# Patient Record
Sex: FEMALE | Race: BLACK OR AFRICAN AMERICAN | NOT HISPANIC OR LATINO | Employment: OTHER | URBAN - METROPOLITAN AREA
[De-identification: names, ages, dates, MRNs, and addresses within clinical notes are randomized per-mention and may not be internally consistent; named-entity substitution may affect disease eponyms.]

---

## 2017-01-16 ENCOUNTER — TELEPHONE (OUTPATIENT)
Dept: FAMILY MEDICINE | Facility: CLINIC | Age: 67
End: 2017-01-16

## 2017-01-16 DIAGNOSIS — E78.5 HYPERLIPIDEMIA WITH TARGET LDL LESS THAN 100: ICD-10-CM

## 2017-01-16 DIAGNOSIS — I10 ESSENTIAL HYPERTENSION: Primary | ICD-10-CM

## 2017-01-16 DIAGNOSIS — F32.0 MAJOR DEPRESSIVE DISORDER, SINGLE EPISODE, MILD: ICD-10-CM

## 2017-01-16 RX ORDER — BUPROPION HYDROCHLORIDE 75 MG/1
TABLET ORAL
Qty: 180 TABLET | Refills: 0 | Status: SHIPPED | OUTPATIENT
Start: 2017-01-16 | End: 2017-03-15 | Stop reason: SDUPTHER

## 2017-01-16 NOTE — TELEPHONE ENCOUNTER
Pt has a appointment in march and she is asking to have labs scheduled before her appointment.Thanks

## 2017-01-19 DIAGNOSIS — G25.81 RESTLESS LEGS SYNDROME (RLS): ICD-10-CM

## 2017-01-19 RX ORDER — ROPINIROLE 0.5 MG/1
TABLET, FILM COATED ORAL
Qty: 90 TABLET | Refills: 0 | Status: SHIPPED | OUTPATIENT
Start: 2017-01-19 | End: 2017-03-15 | Stop reason: SDUPTHER

## 2017-02-06 ENCOUNTER — TELEPHONE (OUTPATIENT)
Dept: BARIATRICS | Facility: CLINIC | Age: 67
End: 2017-02-06

## 2017-02-06 NOTE — TELEPHONE ENCOUNTER
----- Message from Thea Addison sent at 2/4/2017  2:56 PM CST -----  Contact: self   Patient would like to make an appointment , she is coming in for a consult .she is inquiring on the sleeve    She can be reached at [phone]

## 2017-02-06 NOTE — TELEPHONE ENCOUNTER
Called pt and instructed her to complete the online Seminar. Once complete she will be able to schedule a consult. Pt v/u

## 2017-02-09 ENCOUNTER — TELEPHONE (OUTPATIENT)
Dept: FAMILY MEDICINE | Facility: CLINIC | Age: 67
End: 2017-02-09

## 2017-02-09 NOTE — TELEPHONE ENCOUNTER
I called and spoke with pt she states that she has been having some abdominal pain,nauseau and vometing she asked for an appointment I offered her an appointment with Cecilia and she refused. I booked her an appointment with Ms. Vázquez tomorrow 02/10/17 for 11am.Thanks

## 2017-02-09 NOTE — TELEPHONE ENCOUNTER
----- Message from Liana Song sent at 2/9/2017 12:35 PM CST -----  Contact: self  Patient called stating that she has been nauseated even after she eats and not sure what to do.  Patient also been having abdominal pain. Please contact her at [phone], Thanks!

## 2017-02-09 NOTE — TELEPHONE ENCOUNTER
----- Message from Liana Song sent at 2/9/2017 12:35 PM CST -----  Contact: self  Patient called stating that she has been nauseated even after she eats and not sure what to do.  Patient also been having abdominal pain. Please contact her at [phone], Thanks!

## 2017-02-10 ENCOUNTER — OFFICE VISIT (OUTPATIENT)
Dept: FAMILY MEDICINE | Facility: CLINIC | Age: 67
End: 2017-02-10
Payer: MEDICARE

## 2017-02-10 ENCOUNTER — NURSE TRIAGE (OUTPATIENT)
Dept: ADMINISTRATIVE | Facility: CLINIC | Age: 67
End: 2017-02-10

## 2017-02-10 VITALS
TEMPERATURE: 98 F | BODY MASS INDEX: 36.6 KG/M2 | OXYGEN SATURATION: 98 % | SYSTOLIC BLOOD PRESSURE: 126 MMHG | HEIGHT: 62 IN | DIASTOLIC BLOOD PRESSURE: 82 MMHG | HEART RATE: 75 BPM | WEIGHT: 198.88 LBS

## 2017-02-10 DIAGNOSIS — R11.0 NAUSEA: ICD-10-CM

## 2017-02-10 DIAGNOSIS — R42 VERTIGO: Primary | ICD-10-CM

## 2017-02-10 PROCEDURE — 99214 OFFICE O/P EST MOD 30 MIN: CPT | Mod: S$GLB,,, | Performed by: NURSE PRACTITIONER

## 2017-02-10 PROCEDURE — 99999 PR PBB SHADOW E&M-EST. PATIENT-LVL V: CPT | Mod: PBBFAC,,, | Performed by: NURSE PRACTITIONER

## 2017-02-10 PROCEDURE — 3079F DIAST BP 80-89 MM HG: CPT | Mod: S$GLB,,, | Performed by: NURSE PRACTITIONER

## 2017-02-10 PROCEDURE — 3074F SYST BP LT 130 MM HG: CPT | Mod: S$GLB,,, | Performed by: NURSE PRACTITIONER

## 2017-02-10 RX ORDER — MECLIZINE HYDROCHLORIDE 25 MG/1
25 TABLET ORAL 3 TIMES DAILY PRN
Qty: 30 TABLET | Refills: 0 | Status: SHIPPED | OUTPATIENT
Start: 2017-02-10 | End: 2017-03-15 | Stop reason: SDUPTHER

## 2017-02-10 RX ORDER — ONDANSETRON 8 MG/1
8 TABLET, ORALLY DISINTEGRATING ORAL EVERY 8 HOURS PRN
Qty: 12 TABLET | Refills: 0 | Status: SHIPPED | OUTPATIENT
Start: 2017-02-10 | End: 2017-03-15 | Stop reason: SDUPTHER

## 2017-02-10 RX ORDER — LEVOCETIRIZINE DIHYDROCHLORIDE 5 MG/1
5 TABLET, FILM COATED ORAL NIGHTLY
Qty: 30 TABLET | Refills: 0 | Status: SHIPPED | OUTPATIENT
Start: 2017-02-10 | End: 2017-03-15 | Stop reason: SDUPTHER

## 2017-02-10 NOTE — PROGRESS NOTES
Subjective:       Patient ID: Cecilia Barahona is a 66 y.o. female.    Chief Complaint: Dizziness; Nausea; and Headache    Dizziness:   Chronicity:  New  Onset:  1 to 4 weeks ago (started about 3 weeks ago)  Progression since onset:  Unchanged  Frequency:  Constantly  Dizziness characteristics:  Off-balance   Associated symptoms: headaches, nausea and vomiting (a week ago).no ear congestion, no ear pain and no fever.  Aggravated by:  Position changes  Treatments tried:  Nothing      Past Medical History   Diagnosis Date    Allergic rhinitis, seasonal     Arthritis     Glaucoma NEC     History of positive PPD - treated - remote past     Hyperlipidemia LDL goal < 100     Hypertension     Obesity     CHETNA (obstructive sleep apnea)     Type II or unspecified type diabetes mellitus without mention of complication, uncontrolled        Social History     Social History    Marital status:      Spouse name: N/A    Number of children: N/A    Years of education: N/A     Occupational History    Not on file.     Social History Main Topics    Smoking status: Former Smoker     Packs/day: 0.50     Types: Cigarettes     Quit date: 11/10/2012    Smokeless tobacco: Never Used    Alcohol use No    Drug use: No    Sexual activity: Yes     Partners: Male     Other Topics Concern    Not on file     Social History Narrative       Past Surgical History   Procedure Laterality Date     section, classic      Trigger finger release      Total abdominal hysterectomy      Carpal tunnel release         Review of Systems   Constitutional: Negative for chills and fever.   HENT: Negative for congestion, ear pain, postnasal drip, rhinorrhea, sinus pressure, sneezing and sore throat.    Respiratory: Negative for cough and shortness of breath.    Gastrointestinal: Positive for nausea and vomiting (a week ago).   Neurological: Positive for dizziness and headaches.   All other systems reviewed and are negative.     "  Objective:     Visit Vitals    /82    Pulse 75    Temp 98 °F (36.7 °C) (Oral)    Ht 5' 2" (1.575 m)    Wt 90.2 kg (198 lb 13.7 oz)    SpO2 98%    BMI 36.37 kg/m2        Physical Exam   Constitutional: She is oriented to person, place, and time. She appears well-developed and well-nourished.   HENT:   Head: Normocephalic and atraumatic.   Right Ear: A middle ear effusion is present.   Left Ear: A middle ear effusion is present.   Cardiovascular: Normal rate, regular rhythm and normal heart sounds.    Pulmonary/Chest: Effort normal and breath sounds normal. No respiratory distress. She has no decreased breath sounds. She has no wheezes. She has no rhonchi. She has no rales.   Abdominal: Soft. Bowel sounds are normal. She exhibits no distension and no mass. There is no tenderness.   Neurological: She is alert and oriented to person, place, and time.   Skin: Skin is warm, dry and intact. She is not diaphoretic. No pallor.   Psychiatric: She has a normal mood and affect. Her speech is normal and behavior is normal.       Assessment:       1. Vertigo    2. Nausea        Plan:       Cecilia was seen today for dizziness, nausea and headache.    Diagnoses and all orders for this visit:    Vertigo  -     meclizine (ANTIVERT) 25 mg tablet; Take 1 tablet (25 mg total) by mouth 3 (three) times daily as needed.    Nausea  -     ondansetron (ZOFRAN-ODT) 8 MG TbDL; Take 1 tablet (8 mg total) by mouth every 8 (eight) hours as needed.      Return if symptoms worsen or fail to improve.  "This note will not be shared with the patient."    "

## 2017-02-10 NOTE — TELEPHONE ENCOUNTER
Reason for Disposition   Caller has medication question, adult has minor symptoms, caller declines triage, and triager answers question    Protocols used: ST MEDICATION QUESTION CALL-A-  has questions re:meds prescribed today/ reviewed med card and AVS and clarified info w/ pt    Elyssa Garcia RN

## 2017-02-10 NOTE — PATIENT INSTRUCTIONS
Dizziness (Uncertain Cause)  Dizziness is a common symptom. It may be described as lightheadedness, spinning, or feeling like you are going to faint. Dizziness can have many causes.  Be sure to tell the healthcare provider about:  · All medicines you take, including prescription, over-the-counter, herbs, and supplements  · Any other symptoms you have  · Any health problems you are being treated for  · Anything that causes the dizziness to get worse or better  Today's exam did not show an exact cause for your dizziness. Other tests may be needed. Follow up with your healthcare provider.  Home care  · Dizziness that occurs with sudden standing may be a sign of mild dehydration. Drink extra fluids for the next few days.  · If you recently started a new medicine, stopped a medicine, or had the dose of a current medicine changed, talk with the prescribing healthcare provider. Your medicine plan may need adjustment.  · If dizziness lasts more than a few seconds, sit or lie down until it passes. This may help prevent injury in case you pass out.  · Do not drive or use power tools or dangerous equipment until you have had no dizziness for at least 48 hours.  Follow-up care  Follow up with your healthcare provider for further evaluation within the next 7 days or as advised.  When to seek medical advice  Call your healthcare provider for any of the following:  · Worsening of symptoms or new symptoms  · Passing out or seizure  · Repeated vomiting  · Headache  · Palpitations (the sense that your heart is fluttering or beating fast or hard)  · Shortness of breath  · Blood in vomit or stool (black or red color)  · Weakness of an arm or leg or one side of the face  · Vision or hearing changes  · Trouble walking or speaking  · Chest, arm, neck, back, or jaw pain  Date Last Reviewed: 8/23/2015  © 0462-4804 [employer]. [address] [place] [address], [place] [address]. All rights reserved. This information is not intended as  a substitute for professional medical care. Always follow your healthcare professional's instructions.        Vertigo (Unknown Cause)    In addition to helping with hearing, the inner ear is part of the balance center of your body. Problems with the inner ear can a false feeling of motion. This is called vertigo. Often, it feels as if you or the room is spinning. A vertigo attack may cause sudden nausea, vomiting and heavy sweating. Severe vertigo causes a loss of balance and can cause you to fall. During vertigo, small head movements and changes in body position will often make the symptoms worse. You may also have ringing in the ears called tinnitus.  An episode of vertigo may last seconds, minutes or hours. Once you are over the first episode, it may never come back. However, symptoms may return off and on.  The cause of your vertigo is not yet known. Possible causes of vertigo include:  · Inflammation of the inner ear  · Disease of the nerves to the inner ear  · Movement of calcium particles in the inner ear  · Poor blood flow to the balance centers of the brain  · Migraine headaches  Home care  · If symptoms are severe, rest quietly in bed. Change positions very slowly. There is usually one position that will feel best, such as lying on one side or lying on your back with your head slightly raised on pillows.  · Do not drive a car or work with dangerous machinery until symptoms have been gone for at least one week.  · Take medicine as prescribed to relieve your symptoms. Unless another medicine was prescribed for symptoms of nausea, vomiting, and dizziness, you may use over-the-counter motion sickness pills. Ask your pharmacist for suggestions.  Follow-up care  Follow up with your healthcare provider or as directed. If you are referred to a specialist or for testing, make the appointment promptly.  When to seek medical advice  Call your healthcare provider if any of the following occur:  · Fever of 100.4°F  (38ºC) or higher, or as directed by your healthcare provider  · Vertigo worsens or is not controlled by prescribed medicine   · Repeated vomiting not relieved by prescribed medicine   · Severe headache  · Confusion  · Weakness of an arm or leg or one side of the face  · Difficulty with speech or vision  · Loss of consciousness   · Seizure  Date Last Reviewed: 8/16/2015  © 1580-6091 [employer]. [address] [place] [address], [place] [address]. All rights reserved. This information is not intended as a substitute for professional medical care. Always follow your healthcare professional's instructions.

## 2017-02-10 NOTE — MR AVS SNAPSHOT
[facility]  [address]  [place] [address]  Phone: [phone]  Fax: [phone]                  Cecilia Barahona   2/10/2017 11:00 AM   Office Visit    Description:  Female : 1950   Provider:  THOM Ramos   Department:  [facility]           Reason for Visit     Dizziness     Nausea     Headache                To Do List           Future Appointments        Provider Department Dept Phone    2/15/2017 2:00 PM Shayan Schaefer Jr., MD [facility] [phone]    3/7/2017 8:00 AM LAB, [place] [facility] [phone]    3/15/2017 3:00 PM Yudi Smart MD [facility] [phone]    3/21/2017 2:00 PM Billie Healy OD [facility]ometry [phone]      Goals (5 Years of Data)              10/28/16    3/29/16    10/2/15    HEMOGLOBIN A1C < 7.0   9.5  8.2  7.8    Related Problems    Type 2 diabetes, uncontrolled, with neuropathy      Follow-Up and Disposition     Return if symptoms worsen or fail to improve.       These Medications        Disp Refills Start End    levocetirizine (XYZAL) 5 MG tablet 30 tablet 0 2/10/2017 2/10/2018    Take 1 tablet (5 mg total) by mouth every evening. - Oral    Pharmacy: [address] Ph #: [phone]       ondansetron (ZOFRAN-ODT) 8 MG TbDL 12 tablet 0 2/10/2017     Take 1 tablet (8 mg total) by mouth every 8 (eight) hours as needed. - Oral    Pharmacy: [address] Ph #: [phone]       meclizine (ANTIVERT) 25 mg tablet 30 tablet 0 2/10/2017     Take 1 tablet (25 mg total) by mouth 3 (three) times daily as needed. - Oral    Pharmacy: [facility] [address] Ph #: [phone]         [facility]     [facility] -  Assistance  Registered nurses in the [facility] provide clinical  "advisement, health education, appointment booking, and other advisory services.  Call for this free service at [phone].             Medications           Message regarding Medications     Verify the changes and/or additions to your medication regime listed below are the same as discussed with your clinician today.  If any of these changes or additions are incorrect, please notify your healthcare provider.        START taking these NEW medications        Refills    levocetirizine (XYZAL) 5 MG tablet 0    Sig: Take 1 tablet (5 mg total) by mouth every evening.    Class: Normal    Route: Oral    ondansetron (ZOFRAN-ODT) 8 MG TbDL 0    Sig: Take 1 tablet (8 mg total) by mouth every 8 (eight) hours as needed.    Class: Normal    Route: Oral    meclizine (ANTIVERT) 25 mg tablet 0    Sig: Take 1 tablet (25 mg total) by mouth 3 (three) times daily as needed.    Class: Normal    Route: Oral      STOP taking these medications     loratadine (CLARITIN) 10 mg tablet Take 1 tablet (10 mg total) by mouth once daily.           Verify that the below list of medications is an accurate representation of the medications you are currently taking.  If none reported, the list may be blank. If incorrect, please contact your healthcare provider. Carry this list with you in case of emergency.           Current Medications     alcohol swabs PadM Apply 1 each topically 3 (three) times daily.    amlodipine-benazepril (LOTREL) 5-40 mg per capsule TAKE ONE Capsule BY MOUTH TWICE DAILY    artificial tear ointment (ARTIFICIAL TEARS) Oint Place into both eyes every evening.    aspirin 81 MG Chew Take by mouth once daily. 1 tablet, chewable Oral Every day    azelastine (OPTIVAR) 0.05 % ophthalmic solution Place 1 drop into both eyes 2 (two) times daily.    BD ULTRA-FINE LENY PEN NEEDLES 32 gauge x 5/32" Ndle USE FIVE TIMES DAILY    blood glucose control high,low (ACCU-CHEK BRIDGETTE CONTROL SOLN) Soln 1 kit by Misc.(Non-Drug; Combo Route) route " once daily.    blood glucose control, low (EMBRACE GLUCOSE CONTROL LOW) Soln Inject 1 Bottle into the skin every 30 days.    BLOOD SUGAR DIAGNOSTIC (ACCU-CHEK BRIDGETTE PLUS TEST STRP MISC) 1 strip by Misc.(Non-Drug; Combo Route) route 4 (four) times daily.    blood sugar diagnostic (EMBRACE BLOOD GLUCOSE SYSTEM) Strp 1 strip by Alternating Nares route 3 (three) times daily.    BLOOD-GLUCOSE METER (ACCU-CHEK BRIDGETTE PLUS METER MISC) by Misc.(Non-Drug; Combo Route) route. Use to test blood sugar 4 times daily.    blood-glucose meter (EMBRACE BLOOD GLUCOSE SYSTEM) Misc 1 Device by Alternating Nares route 3 (three) times daily.    buPROPion (WELLBUTRIN) 75 MG tablet TAKE TWO TABLETS BY MOUTH TWICE DAILY    gabapentin (NEURONTIN) 300 MG capsule Take 300 mg by mouth every evening.    glimepiride (AMARYL) 4 MG tablet TAKE ONE Tablet BY MOUTH TWICE DAILY    hydrochlorothiazide (MICROZIDE) 12.5 mg capsule TAKE ONE CAPSULE BY MOUTH EVERY DAY    insulin aspart (NOVOLOG FLEXPEN) 100 unit/mL InPn pen 10 units Subcutaneous before meals. Sliding scale: take 2 additional units for each blood sugar increment of 50 above 150.    lancets 30 gauge Misc Apply 1 lancet topically 3 (three) times daily.    lancets Misc 1 lancet by Misc.(Non-Drug; Combo Route) route 4 (four) times daily.    LANCING DEVICE WITH LANCETS Misc USE AS DIRECTED FOR DIABETIC TESTING    latanoprost 0.005 % ophthalmic solution Place 1 drop into both eyes every evening.    LEVEMIR FLEXTOUCH 100 unit/mL (3 mL) InPn pen INJECT 38 UNITS SUBCUTANEOUSLY EVERY NIGHT AT BEDTIME    levocetirizine (XYZAL) 5 MG tablet Take 1 tablet (5 mg total) by mouth every evening.    losartan (COZAAR) 100 MG tablet Take 1 tablet (100 mg total) by mouth once daily.    meclizine (ANTIVERT) 25 mg tablet Take 1 tablet (25 mg total) by mouth 3 (three) times daily as needed.    metformin (GLUCOPHAGE) 1000 MG tablet TAKE ONE TABLET BY MOUTH TWICE DAILY **TAKE WITH FOOD**    ondansetron (ZOFRAN-ODT) 8  "MG TbDL Take 1 tablet (8 mg total) by mouth every 8 (eight) hours as needed.    ONETOUCH ULTRA TEST Strp TEST THREE TIMES DAILY AS DIRECTED    pantoprazole (PROTONIX) 40 MG tablet Take 1 tablet (40 mg total) by mouth once daily.    pravastatin (PRAVACHOL) 40 MG tablet TAKE ONE TABLET BY MOUTH EVERY DAY    ropinirole (REQUIP) 0.5 MG tablet TAKE ONE Tablet BY MOUTH EVERY EVENING    tizanidine (ZANAFLEX) 2 MG tablet Take 2 tablets (4 mg total) by mouth nightly as needed.    ULTRA THIN LANCETS 31 gauge Misc USE 4 TIMES DAILY FOR DIABETIC TESTING           Clinical Reference Information           Your Vitals Were     BP Pulse Temp Height Weight SpO2    126/82 75 98 °F (36.7 °C) (Oral) 5' 2" (1.575 m) 90.2 kg (198 lb 13.7 oz) 98%    BMI                36.37 kg/m2          Blood Pressure          Most Recent Value    BP  126/82      Allergies as of 2/10/2017     Ace Inhibitors    Invokana [Canagliflozin]      Immunizations Administered on Date of Encounter - 2/10/2017     None      Instructions      Dizziness (Uncertain Cause)  Dizziness is a common symptom. It may be described as lightheadedness, spinning, or feeling like you are going to faint. Dizziness can have many causes.  Be sure to tell the healthcare provider about:  · All medicines you take, including prescription, over-the-counter, herbs, and supplements  · Any other symptoms you have  · Any health problems you are being treated for  · Anything that causes the dizziness to get worse or better  Today's exam did not show an exact cause for your dizziness. Other tests may be needed. Follow up with your healthcare provider.  Home care  · Dizziness that occurs with sudden standing may be a sign of mild dehydration. Drink extra fluids for the next few days.  · If you recently started a new medicine, stopped a medicine, or had the dose of a current medicine changed, talk with the prescribing healthcare provider. Your medicine plan may need adjustment.  · If dizziness " lasts more than a few seconds, sit or lie down until it passes. This may help prevent injury in case you pass out.  · Do not drive or use power tools or dangerous equipment until you have had no dizziness for at least 48 hours.  Follow-up care  Follow up with your healthcare provider for further evaluation within the next 7 days or as advised.  When to seek medical advice  Call your healthcare provider for any of the following:  · Worsening of symptoms or new symptoms  · Passing out or seizure  · Repeated vomiting  · Headache  · Palpitations (the sense that your heart is fluttering or beating fast or hard)  · Shortness of breath  · Blood in vomit or stool (black or red color)  · Weakness of an arm or leg or one side of the face  · Vision or hearing changes  · Trouble walking or speaking  · Chest, arm, neck, back, or jaw pain  Date Last Reviewed: 8/23/2015  © 9887-2586 [employer]. [address] [place] [address]. All rights reserved. This information is not intended as a substitute for professional medical care. Always follow your healthcare professional's instructions.        Vertigo (Unknown Cause)    In addition to helping with hearing, the inner ear is part of the balance center of your body. Problems with the inner ear can a false feeling of motion. This is called vertigo. Often, it feels as if you or the room is spinning. A vertigo attack may cause sudden nausea, vomiting and heavy sweating. Severe vertigo causes a loss of balance and can cause you to fall. During vertigo, small head movements and changes in body position will often make the symptoms worse. You may also have ringing in the ears called tinnitus.  An episode of vertigo may last seconds, minutes or hours. Once you are over the first episode, it may never come back. However, symptoms may return off and on.  The cause of your vertigo is not yet known. Possible causes of vertigo include:  · Inflammation of the inner  ear  · Disease of the nerves to the inner ear  · Movement of calcium particles in the inner ear  · Poor blood flow to the balance centers of the brain  · Migraine headaches  Home care  · If symptoms are severe, rest quietly in bed. Change positions very slowly. There is usually one position that will feel best, such as lying on one side or lying on your back with your head slightly raised on pillows.  · Do not drive a car or work with dangerous machinery until symptoms have been gone for at least one week.  · Take medicine as prescribed to relieve your symptoms. Unless another medicine was prescribed for symptoms of nausea, vomiting, and dizziness, you may use over-the-counter motion sickness pills. Ask your pharmacist for suggestions.  Follow-up care  Follow up with your healthcare provider or as directed. If you are referred to a specialist or for testing, make the appointment promptly.  When to seek medical advice  Call your healthcare provider if any of the following occur:  · Fever of 100.4°F (38ºC) or higher, or as directed by your healthcare provider  · Vertigo worsens or is not controlled by prescribed medicine   · Repeated vomiting not relieved by prescribed medicine   · Severe headache  · Confusion  · Weakness of an arm or leg or one side of the face  · Difficulty with speech or vision  · Loss of consciousness   · Seizure  Date Last Reviewed: 8/16/2015  © 6427-5548 [employer]. [address] [place] [address]. All rights reserved. This information is not intended as a substitute for professional medical care. Always follow your healthcare professional's instructions.             Language Assistance Services     ATTENTION: Language assistance services are available, free of charge. Please call [phone].      ATENCIÓN: Si mario albertola cristian, tiene a villanueva disposición servicios gratuitos de asistencia lingüística. Llame al [phone].     CHÚ Ý: N?u b?n nói Ti?ng Vi?t, có các d?ch v?  h? tr? jesus ng? mi?n phí dành cho b?n. G?i s? [phone].         [facility] complies with applicable Federal civil rights laws and does not discriminate on the basis of race, color, national origin, age, disability, or sex.

## 2017-02-14 DIAGNOSIS — I10 ESSENTIAL HYPERTENSION: ICD-10-CM

## 2017-02-14 RX ORDER — HYDROCHLOROTHIAZIDE 12.5 MG/1
CAPSULE ORAL
Qty: 90 CAPSULE | Refills: 0 | Status: SHIPPED | OUTPATIENT
Start: 2017-02-14 | End: 2017-03-15 | Stop reason: SDUPTHER

## 2017-02-20 ENCOUNTER — OFFICE VISIT (OUTPATIENT)
Dept: FAMILY MEDICINE | Facility: CLINIC | Age: 67
End: 2017-02-20
Payer: MEDICARE

## 2017-02-20 ENCOUNTER — HOSPITAL ENCOUNTER (OUTPATIENT)
Dept: RADIOLOGY | Facility: HOSPITAL | Age: 67
Discharge: HOME | End: 2017-02-20
Attending: NURSE PRACTITIONER
Payer: MEDICARE

## 2017-02-20 VITALS
BODY MASS INDEX: 30.79 KG/M2 | HEIGHT: 67 IN | HEART RATE: 64 BPM | OXYGEN SATURATION: 96 % | TEMPERATURE: 98 F | WEIGHT: 196.19 LBS | DIASTOLIC BLOOD PRESSURE: 61 MMHG | SYSTOLIC BLOOD PRESSURE: 128 MMHG

## 2017-02-20 DIAGNOSIS — J40 BRONCHITIS: ICD-10-CM

## 2017-02-20 DIAGNOSIS — J20.8 ACUTE BACTERIAL BRONCHITIS: Primary | ICD-10-CM

## 2017-02-20 DIAGNOSIS — B96.89 ACUTE BACTERIAL BRONCHITIS: Primary | ICD-10-CM

## 2017-02-20 PROCEDURE — 99999 PR PBB SHADOW E&M-EST. PATIENT-LVL V: CPT | Mod: PBBFAC,,, | Performed by: NURSE PRACTITIONER

## 2017-02-20 PROCEDURE — 71020 XR CHEST PA AND LATERAL: CPT | Mod: 26,,, | Performed by: RADIOLOGY

## 2017-02-20 PROCEDURE — 99214 OFFICE O/P EST MOD 30 MIN: CPT | Mod: S$GLB,,, | Performed by: NURSE PRACTITIONER

## 2017-02-20 PROCEDURE — 3074F SYST BP LT 130 MM HG: CPT | Mod: S$GLB,,, | Performed by: NURSE PRACTITIONER

## 2017-02-20 PROCEDURE — 71020 XR CHEST PA AND LATERAL: CPT | Mod: TC,PO

## 2017-02-20 PROCEDURE — 1160F RVW MEDS BY RX/DR IN RCRD: CPT | Mod: S$GLB,,, | Performed by: NURSE PRACTITIONER

## 2017-02-20 PROCEDURE — 3078F DIAST BP <80 MM HG: CPT | Mod: S$GLB,,, | Performed by: NURSE PRACTITIONER

## 2017-02-20 RX ORDER — ALBUTEROL SULFATE 90 UG/1
2 AEROSOL, METERED RESPIRATORY (INHALATION) EVERY 6 HOURS PRN
Qty: 1 INHALER | Refills: 0 | Status: SHIPPED | OUTPATIENT
Start: 2017-02-20 | End: 2017-11-08 | Stop reason: SDUPTHER

## 2017-02-20 RX ORDER — BENZONATATE 200 MG/1
200 CAPSULE ORAL EVERY 8 HOURS PRN
Qty: 30 CAPSULE | Refills: 0 | Status: SHIPPED | OUTPATIENT
Start: 2017-02-20 | End: 2017-03-02

## 2017-02-20 RX ORDER — METHYLPREDNISOLONE 4 MG/1
TABLET ORAL
Qty: 1 PACKAGE | Refills: 0 | Status: SHIPPED | OUTPATIENT
Start: 2017-02-20 | End: 2017-03-15 | Stop reason: ALTCHOICE

## 2017-02-20 RX ORDER — AMOXICILLIN AND CLAVULANATE POTASSIUM 875; 125 MG/1; MG/1
1 TABLET, FILM COATED ORAL 2 TIMES DAILY
Qty: 20 TABLET | Refills: 0 | Status: SHIPPED | OUTPATIENT
Start: 2017-02-20 | End: 2017-03-02

## 2017-02-20 NOTE — PROGRESS NOTES
Patient Name: Cecilia Barahona    : 1950  MRN: [id number]    Subjective:  Cecilia is a 66 y.o. female who presents today for     1. Cough, sob, wheezing, chest tightness, Started 1 week prior with fever , chills, bodyaches, which has improve, residual cough, taking delsym , mucinex , tylenol without improvement    Past Medical History  Past Medical History   Diagnosis Date    Allergic rhinitis, seasonal     Arthritis     Glaucoma NEC     History of positive PPD - treated - remote past     Hyperlipidemia LDL goal < 100     Hypertension     Obesity     CHETNA (obstructive sleep apnea)     Type II or unspecified type diabetes mellitus without mention of complication, uncontrolled        Past Surgical History  Past Surgical History   Procedure Laterality Date     section, classic      Trigger finger release      Total abdominal hysterectomy      Carpal tunnel release         Family History  Family History   Problem Relation Age of Onset    Hypertension Mother     Diabetes Mother     Heart failure Mother     Cataracts Mother     Prostate cancer Father     Hypertension Father     Diabetes Father     Heart failure Father     No Known Problems Sister     Alcohol abuse Maternal Aunt     Diabetes Maternal Uncle     Hyperlipidemia Maternal Uncle     Hypertension Maternal Uncle     No Known Problems Maternal Grandmother     No Known Problems Maternal Grandfather     No Known Problems Paternal Grandmother     No Known Problems Paternal Grandfather     Diabetes Maternal Aunt     Alzheimer's disease Maternal Aunt     Heart disease Cousin      s/p heart transplant    Amblyopia Neg Hx     Blindness Neg Hx     Cancer Neg Hx     Glaucoma Neg Hx     Macular degeneration Neg Hx     Retinal detachment Neg Hx     Strabismus Neg Hx     Stroke Neg Hx     Thyroid disease Neg Hx        Social History  Social History     Social History    Marital status:      Spouse name: N/A     "Number of children: N/A    Years of education: N/A     Occupational History    Not on file.     Social History Main Topics    Smoking status: Former Smoker     Packs/day: 0.50     Types: Cigarettes     Quit date: 11/10/2012    Smokeless tobacco: Never Used    Alcohol use No    Drug use: No    Sexual activity: Yes     Partners: Male     Other Topics Concern    Not on file     Social History Narrative       Allergies  Review of patient's allergies indicates:   Allergen Reactions    Ace inhibitors Other (See Comments)     cough    Invokana [canagliflozin] Other (See Comments)     Throat and tongue swelling    -reviewed and updated      Medications  Reviewed and updated.   Current Outpatient Prescriptions   Medication Sig Dispense Refill    alcohol swabs PadM Apply 1 each topically 3 (three) times daily. 400 each 2    amlodipine-benazepril (LOTREL) 5-40 mg per capsule TAKE ONE Capsule BY MOUTH TWICE DAILY 180 capsule 0    artificial tear ointment (ARTIFICIAL TEARS) Oint Place into both eyes every evening. 305 g 1    aspirin 81 MG Chew Take by mouth once daily. 1 tablet, chewable Oral Every day      azelastine (OPTIVAR) 0.05 % ophthalmic solution Place 1 drop into both eyes 2 (two) times daily. 18 mL 1    BD ULTRA-FINE LENY PEN NEEDLES 32 gauge x 5/32" Ndle USE FIVE TIMES DAILY 100 each 5    blood glucose control, low (EMBRACE GLUCOSE CONTROL LOW) Soln Inject 1 Bottle into the skin every 30 days. 1 each 0    BLOOD SUGAR DIAGNOSTIC (ACCU-CHEK BRIDGETTE PLUS TEST STRP MISC) 1 strip by Misc.(Non-Drug; Combo Route) route 4 (four) times daily.      blood sugar diagnostic (EMBRACE BLOOD GLUCOSE SYSTEM) Strp 1 strip by Alternating Nares route 3 (three) times daily. 400 each 1    BLOOD-GLUCOSE METER (ACCU-CHEK BRIDGETTE PLUS METER MISC) by Misc.(Non-Drug; Combo Route) route. Use to test blood sugar 4 times daily.      blood-glucose meter (EMBRACE BLOOD GLUCOSE SYSTEM) Misc 1 Device by Alternating Nares route 3 " (three) times daily. 1 each 0    buPROPion (WELLBUTRIN) 75 MG tablet TAKE TWO TABLETS BY MOUTH TWICE DAILY 180 tablet 0    gabapentin (NEURONTIN) 300 MG capsule Take 300 mg by mouth every evening.      glimepiride (AMARYL) 4 MG tablet TAKE ONE Tablet BY MOUTH TWICE DAILY 180 tablet 1    hydrochlorothiazide (MICROZIDE) 12.5 mg capsule TAKE ONE CAPSULE BY MOUTH EVERY DAY 90 capsule 0    insulin aspart (NOVOLOG FLEXPEN) 100 unit/mL InPn pen 10 units Subcutaneous before meals. Sliding scale: take 2 additional units for each blood sugar increment of 50 above 150. 9 mL 0    lancets 30 gauge Misc Apply 1 lancet topically 3 (three) times daily. 400 each 0    lancets Misc 1 lancet by Misc.(Non-Drug; Combo Route) route 4 (four) times daily. 400 each 1    LANCING DEVICE WITH LANCETS Misc USE AS DIRECTED FOR DIABETIC TESTING 1 each 0    latanoprost 0.005 % ophthalmic solution Place 1 drop into both eyes every evening. 1 Bottle 10    LEVEMIR FLEXTOUCH 100 unit/mL (3 mL) InPn pen INJECT 38 UNITS SUBCUTANEOUSLY EVERY NIGHT AT BEDTIME 45 mL 2    levocetirizine (XYZAL) 5 MG tablet Take 1 tablet (5 mg total) by mouth every evening. 30 tablet 0    losartan (COZAAR) 100 MG tablet Take 1 tablet (100 mg total) by mouth once daily. 90 tablet 1    meclizine (ANTIVERT) 25 mg tablet Take 1 tablet (25 mg total) by mouth 3 (three) times daily as needed. 30 tablet 0    metformin (GLUCOPHAGE) 1000 MG tablet TAKE ONE TABLET BY MOUTH TWICE DAILY **TAKE WITH FOOD** 180 tablet 0    ondansetron (ZOFRAN-ODT) 8 MG TbDL Take 1 tablet (8 mg total) by mouth every 8 (eight) hours as needed. 12 tablet 0    ONETOUCH ULTRA TEST Strp TEST THREE TIMES DAILY AS DIRECTED 300 strip 11    pantoprazole (PROTONIX) 40 MG tablet Take 1 tablet (40 mg total) by mouth once daily. 90 tablet 1    pravastatin (PRAVACHOL) 40 MG tablet TAKE ONE TABLET BY MOUTH EVERY DAY 90 tablet 0    ropinirole (REQUIP) 0.5 MG tablet TAKE ONE Tablet BY MOUTH EVERY EVENING  "90 tablet 0    tizanidine (ZANAFLEX) 2 MG tablet Take 2 tablets (4 mg total) by mouth nightly as needed. 30 tablet 0    ULTRA THIN LANCETS 31 gauge Misc USE 4 TIMES DAILY FOR DIABETIC TESTING 400 each 0    albuterol 90 mcg/actuation inhaler Inhale 2 puffs into the lungs every 6 (six) hours as needed for Wheezing or Shortness of Breath. Rescue 1 Inhaler 0    amoxicillin-clavulanate 875-125mg (AUGMENTIN) 875-125 mg per tablet Take 1 tablet by mouth 2 (two) times daily. 20 tablet 0    benzonatate (TESSALON) 200 MG capsule Take 1 capsule (200 mg total) by mouth every 8 (eight) hours as needed for Cough. 30 capsule 0    blood glucose control high,low (ACCU-CHEK BRIDGETTE CONTROL SOLN) Soln 1 kit by Misc.(Non-Drug; Combo Route) route once daily. 1 each 0    methylPREDNISolone (MEDROL DOSEPACK) 4 mg tablet use as directed 1 Package 0     No current facility-administered medications for this visit.          Review of Systems   Constitutional: Negative for chills and fever.   HENT: Negative for congestion, ear pain and sore throat.    Respiratory: Positive for cough (mostly dry, wet cough), sputum production, shortness of breath and wheezing.    Cardiovascular: Positive for chest pain (achy).   Musculoskeletal: Negative for myalgias.   Neurological: Negative for headaches.         Physical Exam  Visit Vitals    /61 (BP Location: Right arm, Patient Position: Sitting, BP Method: Manual)    Pulse 64    Temp 98.4 °F (36.9 °C) (Oral)    Ht 5' 7" (1.702 m)    Wt 89 kg (196 lb 3.4 oz)    SpO2 96%    BMI 30.73 kg/m2     Physical Exam   Constitutional: She appears well-developed. No distress.   HENT:   Head: Normocephalic.   Nose: Nose normal. Right sinus exhibits no maxillary sinus tenderness and no frontal sinus tenderness. Left sinus exhibits no maxillary sinus tenderness and no frontal sinus tenderness.   Mouth/Throat: No posterior oropharyngeal edema or posterior oropharyngeal erythema.   Cardiovascular: Normal " rate, regular rhythm and normal heart sounds.    Pulmonary/Chest: Effort normal.   Bilateral course crackles and wheezing   Skin: She is not diaphoretic.       Chest xray-No acute cardiopulmonary process  Assessment/Plan:  Cecilia Barahona is a 66 y.o. female who presents today for :    Acute bacterial bronchitis  -     X-Ray Chest PA And Lateral; Future; Expected date: 2/20/17  -     benzonatate (TESSALON) 200 MG capsule; Take 1 capsule (200 mg total) by mouth every 8 (eight) hours as needed for Cough.  Dispense: 30 capsule; Refill: 0  -     methylPREDNISolone (MEDROL DOSEPACK) 4 mg tablet; use as directed  Dispense: 1 Package; Refill: 0  -     amoxicillin-clavulanate 875-125mg (AUGMENTIN) 875-125 mg per tablet; Take 1 tablet by mouth 2 (two) times daily.  Dispense: 20 tablet; Refill: 0  -     albuterol 90 mcg/actuation inhaler; Inhale 2 puffs into the lungs every 6 (six) hours as needed for Wheezing or Shortness of Breath. Rescue  Dispense: 1 Inhaler; Refill: 0        Return if symptoms worsen or fail to improve in 3-5 days.

## 2017-02-20 NOTE — MR AVS SNAPSHOT
[facility]  [address] [place] [address]  Phone: [phone]  Fax: [phone]                  Cecilia Barahona   2017 3:00 PM   Office Visit    Description:  Female : 1950   Provider:  [facility]   Department:  [facility] Medicine           Reason for Visit     POSSIBLE FLU           Diagnoses this Visit        Comments    Bronchitis    -  Primary            To Do List           Future Appointments        Provider Department Dept Phone    3/7/2017 8:00 AM LAB, [facility] [phone]    3/15/2017 1:00 PM Shayan Schaefer Jr., MD [facility] [phone]    3/15/2017 3:00 PM Yudi Smart MD [facility] [phone]    3/21/2017 2:00 PM Billie Healy OD [facility] - Optometry [phone]      Goals (5 Years of Data)              10/28/16    3/29/16    10/2/15    HEMOGLOBIN A1C < 7.0   9.5  8.2  7.8    Related Problems    Type 2 diabetes, uncontrolled, with neuropathy      Follow-Up and Disposition     Return if symptoms worsen or fail to improve in 3-5 days.       These Medications        Disp Refills Start End    benzonatate (TESSALON) 200 MG capsule 30 capsule 0 2017 3/2/2017    Take 1 capsule (200 mg total) by mouth every 8 (eight) hours as needed for Cough. - Oral    Pharmacy: [facility] [place] Ph #: [phone]         [facility]     [facility] -  Assistance  Registered nurses in the [facility] provide clinical advisement, health education, appointment booking, and other advisory services.  Call for this free service at [phone].             Medications           Message regarding Medications     Verify the changes and/or additions to your medication regime listed below are the same as discussed with your clinician today.  If any of these changes or additions are incorrect, please notify your  "healthcare provider.        START taking these NEW medications        Refills    benzonatate (TESSALON) 200 MG capsule 0    Sig: Take 1 capsule (200 mg total) by mouth every 8 (eight) hours as needed for Cough.    Class: Normal    Route: Oral           Verify that the below list of medications is an accurate representation of the medications you are currently taking.  If none reported, the list may be blank. If incorrect, please contact your healthcare provider. Carry this list with you in case of emergency.           Current Medications     alcohol swabs PadM Apply 1 each topically 3 (three) times daily.    amlodipine-benazepril (LOTREL) 5-40 mg per capsule TAKE ONE Capsule BY MOUTH TWICE DAILY    artificial tear ointment (ARTIFICIAL TEARS) Oint Place into both eyes every evening.    aspirin 81 MG Chew Take by mouth once daily. 1 tablet, chewable Oral Every day    azelastine (OPTIVAR) 0.05 % ophthalmic solution Place 1 drop into both eyes 2 (two) times daily.    BD ULTRA-FINE LENY PEN NEEDLES 32 gauge x 5/32" Ndle USE FIVE TIMES DAILY    blood glucose control, low (EMBRACE GLUCOSE CONTROL LOW) Soln Inject 1 Bottle into the skin every 30 days.    BLOOD SUGAR DIAGNOSTIC (ACCU-CHEK BRIDGETTE PLUS TEST STRP MISC) 1 strip by Misc.(Non-Drug; Combo Route) route 4 (four) times daily.    blood sugar diagnostic (EMBRACE BLOOD GLUCOSE SYSTEM) Strp 1 strip by Alternating Nares route 3 (three) times daily.    BLOOD-GLUCOSE METER (ACCU-CHEK BRIDGETTE PLUS METER MISC) by Misc.(Non-Drug; Combo Route) route. Use to test blood sugar 4 times daily.    blood-glucose meter (EMBRACE BLOOD GLUCOSE SYSTEM) Misc 1 Device by Alternating Nares route 3 (three) times daily.    buPROPion (WELLBUTRIN) 75 MG tablet TAKE TWO TABLETS BY MOUTH TWICE DAILY    gabapentin (NEURONTIN) 300 MG capsule Take 300 mg by mouth every evening.    glimepiride (AMARYL) 4 MG tablet TAKE ONE Tablet BY MOUTH TWICE DAILY    hydrochlorothiazide (MICROZIDE) 12.5 mg capsule TAKE " ONE CAPSULE BY MOUTH EVERY DAY    insulin aspart (NOVOLOG FLEXPEN) 100 unit/mL InPn pen 10 units Subcutaneous before meals. Sliding scale: take 2 additional units for each blood sugar increment of 50 above 150.    lancets 30 gauge Misc Apply 1 lancet topically 3 (three) times daily.    lancets Misc 1 lancet by Misc.(Non-Drug; Combo Route) route 4 (four) times daily.    LANCING DEVICE WITH LANCETS Misc USE AS DIRECTED FOR DIABETIC TESTING    latanoprost 0.005 % ophthalmic solution Place 1 drop into both eyes every evening.    LEVEMIR FLEXTOUCH 100 unit/mL (3 mL) InPn pen INJECT 38 UNITS SUBCUTANEOUSLY EVERY NIGHT AT BEDTIME    levocetirizine (XYZAL) 5 MG tablet Take 1 tablet (5 mg total) by mouth every evening.    losartan (COZAAR) 100 MG tablet Take 1 tablet (100 mg total) by mouth once daily.    meclizine (ANTIVERT) 25 mg tablet Take 1 tablet (25 mg total) by mouth 3 (three) times daily as needed.    metformin (GLUCOPHAGE) 1000 MG tablet TAKE ONE TABLET BY MOUTH TWICE DAILY **TAKE WITH FOOD**    ondansetron (ZOFRAN-ODT) 8 MG TbDL Take 1 tablet (8 mg total) by mouth every 8 (eight) hours as needed.    ONETOUCH ULTRA TEST Strp TEST THREE TIMES DAILY AS DIRECTED    pantoprazole (PROTONIX) 40 MG tablet Take 1 tablet (40 mg total) by mouth once daily.    pravastatin (PRAVACHOL) 40 MG tablet TAKE ONE TABLET BY MOUTH EVERY DAY    ropinirole (REQUIP) 0.5 MG tablet TAKE ONE Tablet BY MOUTH EVERY EVENING    tizanidine (ZANAFLEX) 2 MG tablet Take 2 tablets (4 mg total) by mouth nightly as needed.    ULTRA THIN LANCETS 31 gauge Misc USE 4 TIMES DAILY FOR DIABETIC TESTING    benzonatate (TESSALON) 200 MG capsule Take 1 capsule (200 mg total) by mouth every 8 (eight) hours as needed for Cough.    blood glucose control high,low (ACCU-CHEK BRIDGETTE CONTROL SOLN) Soln 1 kit by Misc.(Non-Drug; Combo Route) route once daily.           Clinical Reference Information           Your Vitals Were     BP Pulse Temp Height Weight SpO2     "128/61 (BP Location: Right arm, Patient Position: Sitting, BP Method: Manual) 64 98.4 °F (36.9 °C) (Oral) 5' 7" (1.702 m) 89 kg (196 lb 3.4 oz) 96%    BMI                30.73 kg/m2          Blood Pressure          Most Recent Value    BP  128/61      Allergies as of 2/20/2017     Ace Inhibitors    Invokana [Canagliflozin]      Immunizations Administered on Date of Encounter - 2/20/2017     None      Orders Placed During Today's Visit     Future Labs/Procedures Expected by Expires    X-Ray Chest PA And Lateral  2/20/2017 2/20/2018      Language Assistance Services     ATTENTION: Language assistance services are available, free of charge. Please call [phone].      ATENCIÓN: Si mario albertola cristian, tiene a villanueva disposición servicios gratuitos de asistencia lingüística. Llame al [phone].     CHÚ Ý: N?u b?n nói Ti?ng Vi?t, có các d?ch v? h? tr? ngôn ng? mi?n phí dành cho b?n. G?i s? [phone].         [facility] complies with applicable Federal civil rights laws and does not discriminate on the basis of race, color, national origin, age, disability, or sex.        "

## 2017-03-02 RX ORDER — INSULIN ASPART 100 [IU]/ML
INJECTION, SOLUTION INTRAVENOUS; SUBCUTANEOUS
Qty: 9 ML | Refills: 0 | Status: SHIPPED | OUTPATIENT
Start: 2017-03-02 | End: 2017-03-15 | Stop reason: SDUPTHER

## 2017-03-02 RX ORDER — INSULIN ASPART 100 [IU]/ML
INJECTION, SOLUTION INTRAVENOUS; SUBCUTANEOUS
Qty: 9 ML | Refills: 0 | Status: SHIPPED | OUTPATIENT
Start: 2017-03-02 | End: 2017-03-02 | Stop reason: SDUPTHER

## 2017-03-02 NOTE — TELEPHONE ENCOUNTER
----- Message from Tonia Navarro sent at 3/1/2017 11:10 AM CST -----  Contact: Janis  REFILL:insulin aspart (NOVOLOG FLEXPEN) 100 unit/mL InPn pen

## 2017-03-10 ENCOUNTER — LAB VISIT (OUTPATIENT)
Dept: LAB | Facility: HOSPITAL | Age: 67
End: 2017-03-10
Attending: INTERNAL MEDICINE
Payer: MEDICARE

## 2017-03-10 DIAGNOSIS — E78.5 HYPERLIPIDEMIA WITH TARGET LDL LESS THAN 100: ICD-10-CM

## 2017-03-10 DIAGNOSIS — I10 ESSENTIAL HYPERTENSION: ICD-10-CM

## 2017-03-10 LAB
ALBUMIN SERPL BCP-MCNC: 3.3 G/DL
ALP SERPL-CCNC: 100 U/L
ALT SERPL W/O P-5'-P-CCNC: 21 U/L
ANION GAP SERPL CALC-SCNC: 12 MMOL/L
AST SERPL-CCNC: 24 U/L
BASOPHILS # BLD AUTO: 0.01 K/UL
BASOPHILS NFR BLD: 0.1 %
BILIRUB SERPL-MCNC: 0.6 MG/DL
BUN SERPL-MCNC: 18 MG/DL
CALCIUM SERPL-MCNC: 9.3 MG/DL
CHLORIDE SERPL-SCNC: 104 MMOL/L
CHOLEST/HDLC SERPL: 4.1 {RATIO}
CO2 SERPL-SCNC: 27 MMOL/L
CREAT SERPL-MCNC: 0.9 MG/DL
DIFFERENTIAL METHOD: NORMAL
EOSINOPHIL # BLD AUTO: 0.1 K/UL
EOSINOPHIL NFR BLD: 1.3 %
ERYTHROCYTE [DISTWIDTH] IN BLOOD BY AUTOMATED COUNT: 14.2 %
EST. GFR  (AFRICAN AMERICAN): >60 ML/MIN/1.73 M^2
EST. GFR  (NON AFRICAN AMERICAN): >60 ML/MIN/1.73 M^2
ESTIMATED AVG GLUCOSE: 192 MG/DL
GLUCOSE SERPL-MCNC: 80 MG/DL
HBA1C MFR BLD HPLC: 8.3 %
HCT VFR BLD AUTO: 37.9 %
HDL/CHOLESTEROL RATIO: 24.4 %
HDLC SERPL-MCNC: 176 MG/DL
HDLC SERPL-MCNC: 43 MG/DL
HGB BLD-MCNC: 12.3 G/DL
LDLC SERPL CALC-MCNC: 109 MG/DL
LYMPHOCYTES # BLD AUTO: 3.7 K/UL
LYMPHOCYTES NFR BLD: 40.1 %
MCH RBC QN AUTO: 29.2 PG
MCHC RBC AUTO-ENTMCNC: 32.5 %
MCV RBC AUTO: 90 FL
MONOCYTES # BLD AUTO: 0.7 K/UL
MONOCYTES NFR BLD: 7.4 %
NEUTROPHILS # BLD AUTO: 4.7 K/UL
NEUTROPHILS NFR BLD: 50.8 %
NONHDLC SERPL-MCNC: 133 MG/DL
PLATELET # BLD AUTO: 277 K/UL
PMV BLD AUTO: 10.2 FL
POTASSIUM SERPL-SCNC: 3.8 MMOL/L
PROT SERPL-MCNC: 7.5 G/DL
RBC # BLD AUTO: 4.21 M/UL
SODIUM SERPL-SCNC: 143 MMOL/L
TRIGL SERPL-MCNC: 120 MG/DL
WBC # BLD AUTO: 9.2 K/UL

## 2017-03-10 PROCEDURE — 36415 COLL VENOUS BLD VENIPUNCTURE: CPT | Mod: PO

## 2017-03-10 PROCEDURE — 85025 COMPLETE CBC W/AUTO DIFF WBC: CPT

## 2017-03-10 PROCEDURE — 80061 LIPID PANEL: CPT

## 2017-03-10 PROCEDURE — 83036 HEMOGLOBIN GLYCOSYLATED A1C: CPT

## 2017-03-10 PROCEDURE — 80053 COMPREHEN METABOLIC PANEL: CPT

## 2017-03-15 ENCOUNTER — OFFICE VISIT (OUTPATIENT)
Dept: FAMILY MEDICINE | Facility: CLINIC | Age: 67
End: 2017-03-15
Payer: MEDICARE

## 2017-03-15 VITALS
HEIGHT: 63 IN | DIASTOLIC BLOOD PRESSURE: 78 MMHG | OXYGEN SATURATION: 97 % | WEIGHT: 198 LBS | SYSTOLIC BLOOD PRESSURE: 132 MMHG | BODY MASS INDEX: 35.08 KG/M2 | HEART RATE: 77 BPM | TEMPERATURE: 98 F

## 2017-03-15 DIAGNOSIS — M25.562 CHRONIC PAIN OF LEFT KNEE: ICD-10-CM

## 2017-03-15 DIAGNOSIS — G47.33 OSA (OBSTRUCTIVE SLEEP APNEA): ICD-10-CM

## 2017-03-15 DIAGNOSIS — J30.9 ALLERGIC RHINITIS, UNSPECIFIED ALLERGIC RHINITIS TRIGGER, UNSPECIFIED RHINITIS SEASONALITY: ICD-10-CM

## 2017-03-15 DIAGNOSIS — E78.5 HYPERLIPIDEMIA WITH TARGET LDL LESS THAN 100: ICD-10-CM

## 2017-03-15 DIAGNOSIS — E66.01 SEVERE OBESITY (BMI 35.0-35.9 WITH COMORBIDITY): ICD-10-CM

## 2017-03-15 DIAGNOSIS — F32.0 MAJOR DEPRESSIVE DISORDER, SINGLE EPISODE, MILD: ICD-10-CM

## 2017-03-15 DIAGNOSIS — G25.81 RESTLESS LEGS SYNDROME (RLS): ICD-10-CM

## 2017-03-15 DIAGNOSIS — K21.9 GASTROESOPHAGEAL REFLUX DISEASE WITHOUT ESOPHAGITIS: ICD-10-CM

## 2017-03-15 DIAGNOSIS — I10 ESSENTIAL HYPERTENSION: ICD-10-CM

## 2017-03-15 DIAGNOSIS — G89.29 CHRONIC PAIN OF LEFT KNEE: ICD-10-CM

## 2017-03-15 DIAGNOSIS — R42 VERTIGO: ICD-10-CM

## 2017-03-15 DIAGNOSIS — M47.812 OSTEOARTHRITIS OF CERVICAL SPINE, UNSPECIFIED SPINAL OSTEOARTHRITIS COMPLICATION STATUS: ICD-10-CM

## 2017-03-15 DIAGNOSIS — Z79.4 LONG-TERM INSULIN USE: ICD-10-CM

## 2017-03-15 PROCEDURE — 99499 UNLISTED E&M SERVICE: CPT | Mod: S$GLB,,, | Performed by: INTERNAL MEDICINE

## 2017-03-15 PROCEDURE — 4010F ACE/ARB THERAPY RXD/TAKEN: CPT | Mod: S$GLB,,, | Performed by: INTERNAL MEDICINE

## 2017-03-15 PROCEDURE — 1126F AMNT PAIN NOTED NONE PRSNT: CPT | Mod: S$GLB,,, | Performed by: INTERNAL MEDICINE

## 2017-03-15 PROCEDURE — 1159F MED LIST DOCD IN RCRD: CPT | Mod: S$GLB,,, | Performed by: INTERNAL MEDICINE

## 2017-03-15 PROCEDURE — 3045F PR MOST RECENT HEMOGLOBIN A1C LEVEL 7.0-9.0%: CPT | Mod: S$GLB,,, | Performed by: INTERNAL MEDICINE

## 2017-03-15 PROCEDURE — 1160F RVW MEDS BY RX/DR IN RCRD: CPT | Mod: S$GLB,,, | Performed by: INTERNAL MEDICINE

## 2017-03-15 PROCEDURE — 1157F ADVNC CARE PLAN IN RCRD: CPT | Mod: S$GLB,,, | Performed by: INTERNAL MEDICINE

## 2017-03-15 PROCEDURE — 99999 PR PBB SHADOW E&M-EST. PATIENT-LVL IV: CPT | Mod: PBBFAC,,, | Performed by: INTERNAL MEDICINE

## 2017-03-15 PROCEDURE — 3075F SYST BP GE 130 - 139MM HG: CPT | Mod: S$GLB,,, | Performed by: INTERNAL MEDICINE

## 2017-03-15 PROCEDURE — 99215 OFFICE O/P EST HI 40 MIN: CPT | Mod: S$GLB,,, | Performed by: INTERNAL MEDICINE

## 2017-03-15 PROCEDURE — 3078F DIAST BP <80 MM HG: CPT | Mod: S$GLB,,, | Performed by: INTERNAL MEDICINE

## 2017-03-15 RX ORDER — PRAVASTATIN SODIUM 40 MG/1
40 TABLET ORAL DAILY
Qty: 90 TABLET | Refills: 1 | Status: SHIPPED | OUTPATIENT
Start: 2017-03-15 | End: 2017-07-19 | Stop reason: SDUPTHER

## 2017-03-15 RX ORDER — PEN NEEDLE, DIABETIC 30 GX3/16"
NEEDLE, DISPOSABLE MISCELLANEOUS
Qty: 100 EACH | Refills: 5 | Status: SHIPPED | OUTPATIENT
Start: 2017-03-15 | End: 2017-05-24 | Stop reason: SDUPTHER

## 2017-03-15 RX ORDER — BUPROPION HYDROCHLORIDE 75 MG/1
150 TABLET ORAL 2 TIMES DAILY
Qty: 180 TABLET | Refills: 0 | Status: SHIPPED | OUTPATIENT
Start: 2017-03-15 | End: 2017-05-19 | Stop reason: SDUPTHER

## 2017-03-15 RX ORDER — METFORMIN HYDROCHLORIDE 1000 MG/1
TABLET ORAL
Qty: 180 TABLET | Refills: 1 | Status: SHIPPED | OUTPATIENT
Start: 2017-03-15 | End: 2017-08-03 | Stop reason: SDUPTHER

## 2017-03-15 RX ORDER — LEVOCETIRIZINE DIHYDROCHLORIDE 5 MG/1
5 TABLET, FILM COATED ORAL NIGHTLY
Qty: 90 TABLET | Refills: 1 | Status: SHIPPED | OUTPATIENT
Start: 2017-03-15 | End: 2017-07-21

## 2017-03-15 RX ORDER — ALBUTEROL SULFATE 90 UG/1
2 AEROSOL, METERED RESPIRATORY (INHALATION) EVERY 6 HOURS PRN
Qty: 1 INHALER | Refills: 0 | Status: CANCELLED | OUTPATIENT
Start: 2017-03-15

## 2017-03-15 RX ORDER — PANTOPRAZOLE SODIUM 40 MG/1
40 TABLET, DELAYED RELEASE ORAL DAILY
Qty: 90 TABLET | Refills: 1 | Status: SHIPPED | OUTPATIENT
Start: 2017-03-15 | End: 2017-08-23

## 2017-03-15 RX ORDER — TIZANIDINE 2 MG/1
4 TABLET ORAL NIGHTLY PRN
Qty: 30 TABLET | Refills: 0 | Status: SHIPPED | OUTPATIENT
Start: 2017-03-15 | End: 2017-05-03 | Stop reason: SDUPTHER

## 2017-03-15 RX ORDER — HYDROCHLOROTHIAZIDE 12.5 MG/1
12.5 CAPSULE ORAL DAILY
Qty: 90 CAPSULE | Refills: 1 | Status: SHIPPED | OUTPATIENT
Start: 2017-03-15 | End: 2017-08-03 | Stop reason: SDUPTHER

## 2017-03-15 RX ORDER — LOSARTAN POTASSIUM 100 MG/1
100 TABLET ORAL DAILY
Qty: 90 TABLET | Refills: 1 | Status: SHIPPED | OUTPATIENT
Start: 2017-03-15 | End: 2017-07-21 | Stop reason: ALTCHOICE

## 2017-03-15 RX ORDER — BLOOD-GLUCOSE CONTROL, NORMAL
1 EACH MISCELLANEOUS 3 TIMES DAILY
Qty: 400 EACH | Refills: 0 | Status: SHIPPED | OUTPATIENT
Start: 2017-03-15 | End: 2017-07-21 | Stop reason: ALTCHOICE

## 2017-03-15 RX ORDER — GLIMEPIRIDE 4 MG/1
TABLET ORAL
Qty: 180 TABLET | Refills: 1 | Status: SHIPPED | OUTPATIENT
Start: 2017-03-15 | End: 2017-08-23

## 2017-03-15 RX ORDER — ONDANSETRON 8 MG/1
8 TABLET, ORALLY DISINTEGRATING ORAL EVERY 8 HOURS PRN
Qty: 12 TABLET | Refills: 0 | Status: SHIPPED | OUTPATIENT
Start: 2017-03-15 | End: 2017-08-23

## 2017-03-15 RX ORDER — MECLIZINE HYDROCHLORIDE 25 MG/1
25 TABLET ORAL 3 TIMES DAILY PRN
Qty: 30 TABLET | Refills: 3 | Status: SHIPPED | OUTPATIENT
Start: 2017-03-15 | End: 2017-08-23

## 2017-03-15 RX ORDER — SCOLOPAMINE TRANSDERMAL SYSTEM 1 MG/1
1 PATCH, EXTENDED RELEASE TRANSDERMAL
Qty: 10 PATCH | Refills: 0 | Status: SHIPPED | OUTPATIENT
Start: 2017-03-15 | End: 2017-03-25

## 2017-03-15 RX ORDER — INSULIN ASPART 100 [IU]/ML
INJECTION, SOLUTION INTRAVENOUS; SUBCUTANEOUS
Qty: 9 ML | Refills: 0 | Status: SHIPPED | OUTPATIENT
Start: 2017-03-15 | End: 2017-05-24 | Stop reason: SDUPTHER

## 2017-03-15 RX ORDER — GABAPENTIN 300 MG/1
300 CAPSULE ORAL NIGHTLY
Qty: 90 CAPSULE | Refills: 1 | Status: SHIPPED | OUTPATIENT
Start: 2017-03-15 | End: 2020-05-18 | Stop reason: ALTCHOICE

## 2017-03-15 RX ORDER — AMLODIPINE AND BENAZEPRIL HYDROCHLORIDE 5; 40 MG/1; MG/1
CAPSULE ORAL
Qty: 180 CAPSULE | Refills: 0 | Status: SHIPPED | OUTPATIENT
Start: 2017-03-15 | End: 2017-10-26 | Stop reason: CLARIF

## 2017-03-15 RX ORDER — ROPINIROLE 0.5 MG/1
0.5 TABLET, FILM COATED ORAL NIGHTLY
Qty: 90 TABLET | Refills: 1 | Status: SHIPPED | OUTPATIENT
Start: 2017-03-15 | End: 2017-08-23

## 2017-03-15 NOTE — MR AVS SNAPSHOT
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         Long-term insulin use         Essential hypertension         Hyperlipidemia with target LDL less than 100         Gastroesophageal reflux disease without esophagitis         Restless legs syndrome (RLS)         Major depressive disorder, single episode, mild         CHETNA (obstructive sleep apnea)         Mild major depression         Vertigo         Allergic rhinitis, unspecified allergic rhinitis trigger, unspecified rhinitis seasonality         Osteoarthritis of cervical spine, unspecified spinal osteoarthritis complication status         Chronic pain of left knee         Severe obesity (BMI 35.0-35.9 with comorbidity)                To Do List           Future Appointments        Provider Department Dept Phone    3/21/2017 2:00 PM Billie Healy OD [facility] [phone]    3/29/2017 1:00 PM Shayan Schaefer Jr., MD [facility] [phone]      Goals (5 Years of Data)              3/10/17    10/28/16    3/29/16    HEMOGLOBIN A1C < 7.0   8.3  9.5  8.2    Related Problems    Type 2 diabetes, uncontrolled, with neuropathy      Follow-Up and Disposition     Return in about 6 months (around 9/15/2017), or if symptoms worsen or fail to improve, for annual exam.       These Medications        Disp Refills Start End    amlodipine-benazepril (LOTREL) 5-40 mg per capsule 180 capsule 0 3/15/2017     TAKE ONE Capsule BY MOUTH TWICE  "DAILY    Pharmacy: [address] Ph #: [phone]       glimepiride (AMARYL) 4 MG tablet 180 tablet 1 3/15/2017     TAKE ONE Tablet BY MOUTH TWICE DAILY    Pharmacy: [address] Ph #: [phone]       buPROPion (WELLBUTRIN) 75 MG tablet 180 tablet 0 3/15/2017     Take 2 tablets (150 mg total) by mouth 2 (two) times daily. - Oral    Pharmacy: [address] Ph #: [phone]       gabapentin (NEURONTIN) 300 MG capsule 90 capsule 1 3/15/2017     Take 1 capsule (300 mg total) by mouth every evening. - Oral    Pharmacy: [address] Ph #: [phone]       blood sugar diagnostic (ACCU-CHEK BRIDGETTE PLUS TEST STRP) Strp 100 strip 11 3/15/2017     Use as directed - test qday and prn    Pharmacy: [address] Ph #: [phone]       pen needle, diabetic (BD ULTRA-FINE LENY PEN NEEDLES) 32 gauge x 5/32" Ndle 100 each 5 3/15/2017     USE FIVE TIMES DAILY    Pharmacy: [address] Ph #: [phone]       hydrochlorothiazide (MICROZIDE) 12.5 mg capsule 90 capsule 1 3/15/2017     Take 1 capsule (12.5 mg total) by mouth once daily. - Oral    Pharmacy: [address] Ph #: [phone]       insulin aspart (NOVOLOG FLEXPEN) 100 unit/mL InPn pen 9 mL 0 3/15/2017     10 units Subcutaneous before meals. Sliding scale: take 2 additional units for each blood sugar increment of 50 above 150.    Pharmacy: [address] Ph #: [phone]       lancets 30 gauge Misc 400 each 0 3/15/2017     Apply 1 lancet topically 3 (three) times daily. - Topical (Top)    [place] [facility] [place] [address] Ph #: " [phone]       meclizine (ANTIVERT) 25 mg tablet 30 tablet 3 3/15/2017     Take 1 tablet (25 mg total) by mouth 3 (three) times daily as needed. - Oral    Pharmacy: [address] Ph #: [phone]       losartan (COZAAR) 100 MG tablet 90 tablet 1 3/15/2017 3/15/2018    Take 1 tablet (100 mg total) by mouth once daily. - Oral    Pharmacy: [address] Ph #: [phone]       levocetirizine (XYZAL) 5 MG tablet 90 tablet 1 3/15/2017 3/15/2018    Take 1 tablet (5 mg total) by mouth every evening. - Oral    Pharmacy: [address] Ph #: [phone]       insulin detemir (LEVEMIR FLEXTOUCH) 100 unit/mL (3 mL) SubQ InPn pen 45 mL 2 3/15/2017     INJECT 38 UNITS SUBCUTANEOUSLY EVERY NIGHT AT BEDTIME    Pharmacy: [address] Ph #: [phone]       ondansetron (ZOFRAN-ODT) 8 MG TbDL 12 tablet 0 3/15/2017     Take 1 tablet (8 mg total) by mouth every 8 (eight) hours as needed. - Oral    Pharmacy: [address] Ph #: [phone]       pantoprazole (PROTONIX) 40 MG tablet 90 tablet 1 3/15/2017     Take 1 tablet (40 mg total) by mouth once daily. - Oral    Pharmacy: [address] Ph #: [phone]       pravastatin (PRAVACHOL) 40 MG tablet 90 tablet 1 3/15/2017     Take 1 tablet (40 mg total) by mouth once daily. - Oral    Pharmacy: [address] Ph #: [phone]       ropinirole (REQUIP) 0.5 MG tablet 90 tablet 1 3/15/2017     Take 1 tablet (0.5 mg total) by mouth every evening. - Oral    [place] [facility] [place] [address] #: [phone]       Notes to Pharmacy: This prescription was filled today(1/19/2017). Any refills  "authorized will be placed on file.    tizanidine (ZANAFLEX) 2 MG tablet 30 tablet 0 3/15/2017     Take 2 tablets (4 mg total) by mouth nightly as needed. - Oral    [place] [facility] [place] [address] [place] #: [phone]       metformin (GLUCOPHAGE) 1000 MG tablet 180 tablet 1 3/15/2017     TAKE ONE TABLET BY MOUTH TWICE DAILY **TAKE WITH FOOD**    Pharmacy: [facility] - [place] [address] Ph #: [phone]         [facility] On Call     [facility] On Call Nurse Care Line - 24/7 Assistance  Registered nurses in the [facility] On Call Center provide clinical advisement, health education, appointment booking, and other advisory services.  Call for this free service at [phone].             Medications           Message regarding Medications     Verify the changes and/or additions to your medication regime listed below are the same as discussed with your clinician today.  If any of these changes or additions are incorrect, please notify your healthcare provider.        START taking these NEW medications        Refills    pen needle, diabetic (BD ULTRA-FINE LENY PEN NEEDLES) 32 gauge x 5/32" Ndle 5    Sig: USE FIVE TIMES DAILY    Class: Normal      CHANGE how you are taking these medications     Start Taking Instead of    buPROPion (WELLBUTRIN) 75 MG tablet buPROPion (WELLBUTRIN) 75 MG tablet    Dosage:  Take 2 tablets (150 mg total) by mouth 2 (two) times daily. Dosage:  TAKE TWO TABLETS BY MOUTH TWICE DAILY    Reason for Change:  Reorder     gabapentin (NEURONTIN) 300 MG capsule gabapentin (NEURONTIN) 300 MG capsule    Dosage:  Take 1 capsule (300 mg total) by mouth every evening. Dosage:  Take 300 mg by mouth every evening.    Reason for Change:  Reorder     blood sugar diagnostic (ACCU-CHEK BRIDGETTE PLUS TEST STRP) Strp BLOOD SUGAR DIAGNOSTIC (ACCU-CHEK BRIDGETTE PLUS TEST STRP MISC)    Dosage:  Use as directed - test qday and prn Dosage:  1 strip by " Misc.(Non-Drug; Combo Route) route 4 (four) times daily.    Reason for Change:  Reorder     hydrochlorothiazide (MICROZIDE) 12.5 mg capsule hydrochlorothiazide (MICROZIDE) 12.5 mg capsule    Dosage:  Take 1 capsule (12.5 mg total) by mouth once daily. Dosage:  TAKE ONE CAPSULE BY MOUTH EVERY DAY    Reason for Change:  Reorder     insulin detemir (LEVEMIR FLEXTOUCH) 100 unit/mL (3 mL) SubQ InPn pen LEVEMIR FLEXTOUCH 100 unit/mL (3 mL) InPn pen    Dosage:  INJECT 38 UNITS SUBCUTANEOUSLY EVERY NIGHT AT BEDTIME Dosage:  INJECT 38 UNITS SUBCUTANEOUSLY EVERY NIGHT AT BEDTIME    Reason for Change:  Reorder     pravastatin (PRAVACHOL) 40 MG tablet pravastatin (PRAVACHOL) 40 MG tablet    Dosage:  Take 1 tablet (40 mg total) by mouth once daily. Dosage:  TAKE ONE TABLET BY MOUTH EVERY DAY    Reason for Change:  Reorder     ropinirole (REQUIP) 0.5 MG tablet ropinirole (REQUIP) 0.5 MG tablet    Dosage:  Take 1 tablet (0.5 mg total) by mouth every evening. Dosage:  TAKE ONE Tablet BY MOUTH EVERY EVENING    Reason for Change:  Reorder       STOP taking these medications     methylPREDNISolone (MEDROL DOSEPACK) 4 mg tablet use as directed           Verify that the below list of medications is an accurate representation of the medications you are currently taking.  If none reported, the list may be blank. If incorrect, please contact your healthcare provider. Carry this list with you in case of emergency.           Current Medications     albuterol 90 mcg/actuation inhaler Inhale 2 puffs into the lungs every 6 (six) hours as needed for Wheezing or Shortness of Breath. Rescue    alcohol swabs PadM Apply 1 each topically 3 (three) times daily.    amlodipine-benazepril (LOTREL) 5-40 mg per capsule TAKE ONE Capsule BY MOUTH TWICE DAILY    artificial tear ointment (ARTIFICIAL TEARS) Oint Place into both eyes every evening.    aspirin 81 MG Chew Take by mouth once daily. 1 tablet, chewable Oral Every day    azelastine (OPTIVAR) 0.05 %  ophthalmic solution Place 1 drop into both eyes 2 (two) times daily.    blood glucose control, low (EMBRACE GLUCOSE CONTROL LOW) Soln Inject 1 Bottle into the skin every 30 days.    blood sugar diagnostic (ACCU-CHEK BRIDGETTE PLUS TEST STRP) Strp Use as directed - test qday and prn    blood sugar diagnostic (EMBRACE BLOOD GLUCOSE SYSTEM) Strp 1 strip by Alternating Nares route 3 (three) times daily.    BLOOD-GLUCOSE METER (ACCU-CHEK BRIDGETTE PLUS METER MISC) by Misc.(Non-Drug; Combo Route) route. Use to test blood sugar 4 times daily.    blood-glucose meter (EMBRACE BLOOD GLUCOSE SYSTEM) Misc 1 Device by Alternating Nares route 3 (three) times daily.    buPROPion (WELLBUTRIN) 75 MG tablet Take 2 tablets (150 mg total) by mouth 2 (two) times daily.    gabapentin (NEURONTIN) 300 MG capsule Take 1 capsule (300 mg total) by mouth every evening.    glimepiride (AMARYL) 4 MG tablet TAKE ONE Tablet BY MOUTH TWICE DAILY    hydrochlorothiazide (MICROZIDE) 12.5 mg capsule Take 1 capsule (12.5 mg total) by mouth once daily.    insulin aspart (NOVOLOG FLEXPEN) 100 unit/mL InPn pen 10 units Subcutaneous before meals. Sliding scale: take 2 additional units for each blood sugar increment of 50 above 150.    insulin detemir (LEVEMIR FLEXTOUCH) 100 unit/mL (3 mL) SubQ InPn pen INJECT 38 UNITS SUBCUTANEOUSLY EVERY NIGHT AT BEDTIME    lancets 30 gauge Misc Apply 1 lancet topically 3 (three) times daily.    lancets Misc 1 lancet by Misc.(Non-Drug; Combo Route) route 4 (four) times daily.    LANCING DEVICE WITH LANCETS Misc USE AS DIRECTED FOR DIABETIC TESTING    latanoprost 0.005 % ophthalmic solution Place 1 drop into both eyes every evening.    levocetirizine (XYZAL) 5 MG tablet Take 1 tablet (5 mg total) by mouth every evening.    losartan (COZAAR) 100 MG tablet Take 1 tablet (100 mg total) by mouth once daily.    meclizine (ANTIVERT) 25 mg tablet Take 1 tablet (25 mg total) by mouth 3 (three) times daily as needed.    metformin  "(GLUCOPHAGE) 1000 MG tablet TAKE ONE TABLET BY MOUTH TWICE DAILY **TAKE WITH FOOD**    ondansetron (ZOFRAN-ODT) 8 MG TbDL Take 1 tablet (8 mg total) by mouth every 8 (eight) hours as needed.    ONETOUCH ULTRA TEST Strp TEST THREE TIMES DAILY AS DIRECTED    pantoprazole (PROTONIX) 40 MG tablet Take 1 tablet (40 mg total) by mouth once daily.    pen needle, diabetic (BD ULTRA-FINE LENY PEN NEEDLES) 32 gauge x 5/32" Ndle USE FIVE TIMES DAILY    pravastatin (PRAVACHOL) 40 MG tablet Take 1 tablet (40 mg total) by mouth once daily.    ropinirole (REQUIP) 0.5 MG tablet Take 1 tablet (0.5 mg total) by mouth every evening.    tizanidine (ZANAFLEX) 2 MG tablet Take 2 tablets (4 mg total) by mouth nightly as needed.    ULTRA THIN LANCETS 31 gauge Misc USE 4 TIMES DAILY FOR DIABETIC TESTING    blood glucose control high,low (ACCU-CHEK BRIDGETTE CONTROL SOLN) Soln 1 kit by Misc.(Non-Drug; Combo Route) route once daily.           Clinical Reference Information           Your Vitals Were     BP Pulse Temp Height Weight SpO2    132/78 77 98.2 °F (36.8 °C) (Oral) 5' 3" (1.6 m) 89.8 kg (197 lb 15.6 oz) 97%    BMI                35.07 kg/m2          Blood Pressure          Most Recent Value    BP  132/78      Allergies as of 3/15/2017     Ace Inhibitors    Invokana [Canagliflozin]      Immunizations Administered on Date of Encounter - 3/15/2017     None      Orders Placed During Today's Visit      Normal Orders This Visit    Ambulatory consult to Orthopedics     Ambulatory referral to ENT       Language Assistance Services     ATTENTION: Language assistance services are available, free of charge. Please call [phone].      ATENCIÓN: Si habla cristian, tiene a villanueva disposición servicios gratuitos de asistencia lingüística. Llame al [phone].     CHÚ Ý: N?u b?n nói Ti?ng Vi?t, có các d?ch v? h? tr? ngôn ng? mi?n phí dành cho b?n. G?i s? [phone].         [facility] complies with applicable Federal civil rights " laws and does not discriminate on the basis of race, color, national origin, age, disability, or sex.

## 2017-03-15 NOTE — PROGRESS NOTES
HISTORY OF PRESENT ILLNESS:  Cecilia Barahona is a 67 y.o. female who presents to the clinic today for Hypertension; Follow-up; Dizziness; and Nausea  .  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  She reports improvement in her dietary habits.  She recently had blood work done and is here today to discuss the results. The patient reports compliance with current medication- patient denies missing any  medication doses. The patient denies any problems with cardiac chest pain, dizziness, palpitations, orthopnea, or lower extremity edema.  She denies any significant problems with heartburn or reflux at this time.  The patient is cheerful.  She has depression.  She states that she thinks about death on a daily basis since her mother passed away.  She does take her Wellbutrin on a regular basis.  She finds this only minimally helpful.  She denies any suicidal or homicidal ideation.  She admits to not sleeping well at night.  She does use her CPAP machine on a regular basis.  She also has problems with intermittent vertigo for which she takes meclizine as needed.  She would like further evaluation by ENT.  She also complains of chronic left knee pain for which she would like to see orthopedics.  She denies any recent trauma.  She currently takes over-the-counter medication for pain as needed.      PAST MEDICAL HISTORY:  Past Medical History:   Diagnosis Date    Allergic rhinitis, seasonal     Arthritis     Glaucoma NEC     History of positive PPD - treated - remote past     Hyperlipidemia LDL goal < 100     Hypertension     Obesity     CHETNA (obstructive sleep apnea)     Type II or unspecified type diabetes mellitus without mention of complication, uncontrolled        PAST SURGICAL HISTORY:  Past Surgical History:   Procedure Laterality Date    CARPAL TUNNEL RELEASE       SECTION, CLASSIC       TOTAL ABDOMINAL HYSTERECTOMY      TRIGGER FINGER RELEASE         SOCIAL HISTORY:  Social History     Social History    Marital status:      Spouse name: N/A    Number of children: N/A    Years of education: N/A     Occupational History    Not on file.     Social History Main Topics    Smoking status: Former Smoker     Packs/day: 0.50     Types: Cigarettes     Quit date: 11/10/2012    Smokeless tobacco: Never Used    Alcohol use No    Drug use: No    Sexual activity: Yes     Partners: Male     Other Topics Concern    Not on file     Social History Narrative       FAMILY HISTORY:  Family History   Problem Relation Age of Onset    Hypertension Mother     Diabetes Mother     Heart failure Mother     Cataracts Mother     Prostate cancer Father     Hypertension Father     Diabetes Father     Heart failure Father     No Known Problems Sister     Alcohol abuse Maternal Aunt     Diabetes Maternal Uncle     Hyperlipidemia Maternal Uncle     Hypertension Maternal Uncle     No Known Problems Maternal Grandmother     No Known Problems Maternal Grandfather     No Known Problems Paternal Grandmother     No Known Problems Paternal Grandfather     Diabetes Maternal Aunt     Alzheimer's disease Maternal Aunt     Heart disease Cousin      s/p heart transplant    Amblyopia Neg Hx     Blindness Neg Hx     Cancer Neg Hx     Glaucoma Neg Hx     Macular degeneration Neg Hx     Retinal detachment Neg Hx     Strabismus Neg Hx     Stroke Neg Hx     Thyroid disease Neg Hx        ALLERGIES AND MEDICATIONS: updated and reviewed.  Review of patient's allergies indicates:   Allergen Reactions    Ace inhibitors Other (See Comments)     cough    Invokana [canagliflozin] Other (See Comments)     Throat and tongue swelling     Medication List with Changes/Refills   New Medications    SCOPOLAMINE (TRANSDERM-SCOP) 1.5 MG (1 MG OVER 3 DAYS)    Place 1 patch (1.5 mg total) onto the skin Every 3  (three) days. Apply behind ear 30 min prior to event. Replace q3 days.   Current Medications    ALBUTEROL 90 MCG/ACTUATION INHALER    Inhale 2 puffs into the lungs every 6 (six) hours as needed for Wheezing or Shortness of Breath. Rescue    ALCOHOL SWABS PADM    Apply 1 each topically 3 (three) times daily.    ARTIFICIAL TEAR OINTMENT (ARTIFICIAL TEARS) OINT    Place into both eyes every evening.    ASPIRIN 81 MG CHEW    Take by mouth once daily. 1 tablet, chewable Oral Every day    AZELASTINE (OPTIVAR) 0.05 % OPHTHALMIC SOLUTION    Place 1 drop into both eyes 2 (two) times daily.    BLOOD GLUCOSE CONTROL HIGH,LOW (ACCU-CHEK BRIDGETTE CONTROL SOLN) SOLN    1 kit by Misc.(Non-Drug; Combo Route) route once daily.    BLOOD GLUCOSE CONTROL, LOW (EMBRACE GLUCOSE CONTROL LOW) SOLN    Inject 1 Bottle into the skin every 30 days.    BLOOD SUGAR DIAGNOSTIC (EMBRACE BLOOD GLUCOSE SYSTEM) STRP    1 strip by Alternating Nares route 3 (three) times daily.    BLOOD-GLUCOSE METER (ACCU-CHEK BRIDGETTE PLUS METER MISC)    by Misc.(Non-Drug; Combo Route) route. Use to test blood sugar 4 times daily.    BLOOD-GLUCOSE METER (EMBRACE BLOOD GLUCOSE SYSTEM) MISC    1 Device by Alternating Nares route 3 (three) times daily.    LANCETS MISC    1 lancet by Misc.(Non-Drug; Combo Route) route 4 (four) times daily.    LANCING DEVICE WITH LANCETS MISC    USE AS DIRECTED FOR DIABETIC TESTING    LATANOPROST 0.005 % OPHTHALMIC SOLUTION    Place 1 drop into both eyes every evening.    ONETOUCH ULTRA TEST STRP    TEST THREE TIMES DAILY AS DIRECTED    ULTRA THIN LANCETS 31 GAUGE MISC    USE 4 TIMES DAILY FOR DIABETIC TESTING   Changed and/or Refilled Medications    Modified Medication Previous Medication    AMLODIPINE-BENAZEPRIL (LOTREL) 5-40 MG PER CAPSULE amlodipine-benazepril (LOTREL) 5-40 mg per capsule       TAKE ONE Capsule BY MOUTH TWICE DAILY    TAKE ONE Capsule BY MOUTH TWICE DAILY    BLOOD SUGAR DIAGNOSTIC (ACCU-CHEK BRIDGETTE PLUS TEST STRP) STRP BLOOD  SUGAR DIAGNOSTIC (ACCU-CHEK BRIDGETTE PLUS TEST STRP MISC)       Use as directed - test qday and prn    1 strip by Misc.(Non-Drug; Combo Route) route 4 (four) times daily.    BUPROPION (WELLBUTRIN) 75 MG TABLET buPROPion (WELLBUTRIN) 75 MG tablet       Take 2 tablets (150 mg total) by mouth 2 (two) times daily.    TAKE TWO TABLETS BY MOUTH TWICE DAILY    GABAPENTIN (NEURONTIN) 300 MG CAPSULE gabapentin (NEURONTIN) 300 MG capsule       Take 1 capsule (300 mg total) by mouth every evening.    Take 300 mg by mouth every evening.    GLIMEPIRIDE (AMARYL) 4 MG TABLET glimepiride (AMARYL) 4 MG tablet       TAKE ONE Tablet BY MOUTH TWICE DAILY    TAKE ONE Tablet BY MOUTH TWICE DAILY    HYDROCHLOROTHIAZIDE (MICROZIDE) 12.5 MG CAPSULE hydrochlorothiazide (MICROZIDE) 12.5 mg capsule       Take 1 capsule (12.5 mg total) by mouth once daily.    TAKE ONE CAPSULE BY MOUTH EVERY DAY    INSULIN ASPART (NOVOLOG FLEXPEN) 100 UNIT/ML INPN PEN insulin aspart (NOVOLOG FLEXPEN) 100 unit/mL InPn pen       10 units Subcutaneous before meals. Sliding scale: take 2 additional units for each blood sugar increment of 50 above 150.    10 units Subcutaneous before meals. Sliding scale: take 2 additional units for each blood sugar increment of 50 above 150.    INSULIN DETEMIR (LEVEMIR FLEXTOUCH) 100 UNIT/ML (3 ML) SUBQ INPN PEN LEVEMIR FLEXTOUCH 100 unit/mL (3 mL) InPn pen       INJECT 38 UNITS SUBCUTANEOUSLY EVERY NIGHT AT BEDTIME    INJECT 38 UNITS SUBCUTANEOUSLY EVERY NIGHT AT BEDTIME    LANCETS 30 GAUGE MISC lancets 30 gauge Misc       Apply 1 lancet topically 3 (three) times daily.    Apply 1 lancet topically 3 (three) times daily.    LEVOCETIRIZINE (XYZAL) 5 MG TABLET levocetirizine (XYZAL) 5 MG tablet       Take 1 tablet (5 mg total) by mouth every evening.    Take 1 tablet (5 mg total) by mouth every evening.    LOSARTAN (COZAAR) 100 MG TABLET losartan (COZAAR) 100 MG tablet       Take 1 tablet (100 mg total) by mouth once daily.    Take 1  "tablet (100 mg total) by mouth once daily.    MECLIZINE (ANTIVERT) 25 MG TABLET meclizine (ANTIVERT) 25 mg tablet       Take 1 tablet (25 mg total) by mouth 3 (three) times daily as needed.    Take 1 tablet (25 mg total) by mouth 3 (three) times daily as needed.    METFORMIN (GLUCOPHAGE) 1000 MG TABLET metformin (GLUCOPHAGE) 1000 MG tablet       TAKE ONE TABLET BY MOUTH TWICE DAILY **TAKE WITH FOOD**    TAKE ONE TABLET BY MOUTH TWICE DAILY **TAKE WITH FOOD**    ONDANSETRON (ZOFRAN-ODT) 8 MG TBDL ondansetron (ZOFRAN-ODT) 8 MG TbDL       Take 1 tablet (8 mg total) by mouth every 8 (eight) hours as needed.    Take 1 tablet (8 mg total) by mouth every 8 (eight) hours as needed.    PANTOPRAZOLE (PROTONIX) 40 MG TABLET pantoprazole (PROTONIX) 40 MG tablet       Take 1 tablet (40 mg total) by mouth once daily.    Take 1 tablet (40 mg total) by mouth once daily.    PEN NEEDLE, DIABETIC (BD ULTRA-FINE LENY PEN NEEDLES) 32 GAUGE X 5/32" NDLE BD ULTRA-FINE LENY PEN NEEDLES 32 gauge x 5/32" Ndle       USE FIVE TIMES DAILY    USE FIVE TIMES DAILY    PRAVASTATIN (PRAVACHOL) 40 MG TABLET pravastatin (PRAVACHOL) 40 MG tablet       Take 1 tablet (40 mg total) by mouth once daily.    TAKE ONE TABLET BY MOUTH EVERY DAY    ROPINIROLE (REQUIP) 0.5 MG TABLET ropinirole (REQUIP) 0.5 MG tablet       Take 1 tablet (0.5 mg total) by mouth every evening.    TAKE ONE Tablet BY MOUTH EVERY EVENING    TIZANIDINE (ZANAFLEX) 2 MG TABLET tizanidine (ZANAFLEX) 2 MG tablet       Take 2 tablets (4 mg total) by mouth nightly as needed.    Take 2 tablets (4 mg total) by mouth nightly as needed.   Discontinued Medications    METHYLPREDNISOLONE (MEDROL DOSEPACK) 4 MG TABLET    use as directed            REVIEW OF SYSTEMS:  General ROS: negative for - chills, fever or malaise  Psychological ROS: negative for - behavioral disorder, obsessive thoughts or suicidal ideation  Ophthalmic ROS: negative for - blurry vision or eye pain  ENT ROS: negative for - " "epistaxis, oral lesions or sore throat  Allergy and Immunology ROS: negative for - hives  Hematological and Lymphatic ROS: negative for - swollen lymph nodes  Endocrine ROS: negative for - polydipsia/polyuria or temperature intolerance  Respiratory ROS: no cough, shortness of breath, or wheezing  Cardiovascular ROS: no chest pain or dyspnea on exertion  Gastrointestinal ROS: no abdominal pain, change in bowel habits, or black or bloody stools  Dermatological ROS: negative for mole changes and rash  Musculoskeletal ROS: negative for - gait disturbance or muscle pain  Neurological ROS: no TIA or stroke symptoms  Genito-Urinary ROS: no dysuria, trouble voiding, or hematuria  Breast ROS: negative for breast lumps      PHYSICAL EXAM:   Vitals:    03/15/17 1511   BP: 132/78   Pulse: 77   Temp: 98.2 °F (36.8 °C)     Weight: 89.8 kg (197 lb 15.6 oz)   Height: 5' 3" (160 cm)   Body mass index is 35.07 kg/(m^2).     General appearance - alert, well appearing, and in no distress and obese  Mental status - alert, oriented to person, place, and time, normal behavior, speech, dress, motor activity, and thought processes, depressed mood - she became tearful during office visit  Eyes - pupils equal and reactive, extraocular eye movements intact, sclera anicteric  Mouth - mucous membranes moist, pharynx normal without lesions  Neck - supple, no significant adenopathy, carotids upstroke normal bilaterally, no bruits, thyroid exam: thyroid is normal in size without nodules or tenderness  Lymphatics - no palpable lymphadenopathy  Chest - clear to auscultation, no wheezes, rales or rhonchi, symmetric air entry  Heart - normal rate and regular rhythm, no gallops noted  Back exam - limited range of motion  Neurological - alert, oriented, normal speech, no focal findings or movement disorder noted, cranial nerves II through XII intact  Musculoskeletal - no muscular tenderness noted; Mild-Moderate osteoarthritic changes noted to both knee " joints. No joint effusions noted.   Extremities - no pedal edema noted, intact peripheral pulses  Skin - normal coloration and turgor, no rashes, no suspicious skin lesions noted      Protective Sensation (w/ 10 gram monofilament):  Right: Decreased  Left: Decreased    Visual Inspection:  Normal -  Bilateral except Dry Skin -  Bilateral    Pedal Pulses:   Right: Present  Left: Present    Posterior tibialis:   Right:Present  Left: Present         Results for orders placed or performed in visit on 03/10/17   CBC auto differential   Result Value Ref Range    WBC 9.20 3.90 - 12.70 K/uL    RBC 4.21 4.00 - 5.40 M/uL    Hemoglobin 12.3 12.0 - 16.0 g/dL    Hematocrit 37.9 37.0 - 48.5 %    MCV 90 82 - 98 fL    MCH 29.2 27.0 - 31.0 pg    MCHC 32.5 32.0 - 36.0 %    RDW 14.2 11.5 - 14.5 %    Platelets 277 150 - 350 K/uL    MPV 10.2 9.2 - 12.9 fL    Gran # 4.7 1.8 - 7.7 K/uL    Lymph # 3.7 1.0 - 4.8 K/uL    Mono # 0.7 0.3 - 1.0 K/uL    Eos # 0.1 0.0 - 0.5 K/uL    Baso # 0.01 0.00 - 0.20 K/uL    Gran% 50.8 38.0 - 73.0 %    Lymph% 40.1 18.0 - 48.0 %    Mono% 7.4 4.0 - 15.0 %    Eosinophil% 1.3 0.0 - 8.0 %    Basophil% 0.1 0.0 - 1.9 %    Differential Method Automated    Comprehensive metabolic panel   Result Value Ref Range    Sodium 143 136 - 145 mmol/L    Potassium 3.8 3.5 - 5.1 mmol/L    Chloride 104 95 - 110 mmol/L    CO2 27 23 - 29 mmol/L    Glucose 80 70 - 110 mg/dL    BUN, Bld 18 8 - 23 mg/dL    Creatinine 0.9 0.5 - 1.4 mg/dL    Calcium 9.3 8.7 - 10.5 mg/dL    Total Protein 7.5 6.0 - 8.4 g/dL    Albumin 3.3 (L) 3.5 - 5.2 g/dL    Total Bilirubin 0.6 0.1 - 1.0 mg/dL    Alkaline Phosphatase 100 55 - 135 U/L    AST 24 10 - 40 U/L    ALT 21 10 - 44 U/L    Anion Gap 12 8 - 16 mmol/L    eGFR if African American >60.0 >60 mL/min/1.73 m^2    eGFR if non African American >60.0 >60 mL/min/1.73 m^2   Lipid panel   Result Value Ref Range    Cholesterol 176 120 - 199 mg/dL    Triglycerides 120 30 - 150 mg/dL    HDL 43 40 - 75 mg/dL    LDL  "Cholesterol 109.0 63.0 - 159.0 mg/dL    HDL/Chol Ratio 24.4 20.0 - 50.0 %    Total Cholesterol/HDL Ratio 4.1 2.0 - 5.0    Non-HDL Cholesterol 133 mg/dL   Hemoglobin A1c   Result Value Ref Range    Hemoglobin A1C 8.3 (H) 4.5 - 6.2 %    Estimated Avg Glucose 192 (H) 68 - 131 mg/dL        ASSESSMENT AND PLAN:  1. Type 2 diabetes, uncontrolled, with neuropathy/2. 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 Long-term insulin use  Diabetes is not at goal, but there has certainly been a significant improvement.  We discussed continued improvement in dietary habits and increase in exercise level.  She will adjust her insulin dose as needed to prevent high blood sugars with meals.  Neuropathy pain control.  Recheck blood work in 6 months.  - glimepiride (AMARYL) 4 MG tablet; TAKE ONE Tablet BY MOUTH TWICE DAILY  Dispense: 180 tablet; Refill: 1  - gabapentin (NEURONTIN) 300 MG capsule; Take 1 capsule (300 mg total) by mouth every evening.  Dispense: 90 capsule; Refill: 1  - blood sugar diagnostic (ACCU-CHEK BRIDGETTE PLUS TEST STRP) Strp; Use as directed - test qday and prn  Dispense: 100 strip; Refill: 11  - pen needle, diabetic (BD ULTRA-FINE LENY PEN NEEDLES) 32 gauge x 5/32" Ndle; USE FIVE TIMES DAILY  Dispense: 100 each; Refill: 5  - insulin aspart (NOVOLOG FLEXPEN) 100 unit/mL InPn pen; 10 units Subcutaneous before meals. Sliding scale: take 2 additional units for each blood sugar increment of 50 above 150.  Dispense: 9 mL; Refill: 0  - lancets 30 gauge Misc; Apply 1 lancet topically 3 (three) times daily.  Dispense: 400 each; Refill: 0  - insulin detemir (LEVEMIR FLEXTOUCH) 100 unit/mL (3 mL) SubQ InPn pen; INJECT 38 UNITS SUBCUTANEOUSLY EVERY NIGHT AT BEDTIME  Dispense: 45 mL; Refill: 2  - metformin (GLUCOPHAGE) 1000 MG tablet; TAKE ONE TABLET BY MOUTH TWICE DAILY **TAKE WITH FOOD**  Dispense: 180 tablet; Refill: 1    4. Essential hypertension  Discussed sodium restriction, maintaining " ideal body weight and regular exercise program as physiologic means to achieve blood pressure control. The patient will strive towards this. The current medical regimen is effective;  continue present plan and medications. Recommended patient to check home readings to monitor and see me for followup as scheduled or sooner as needed. Patient was educated that both decongestant and anti-inflammatory medication may raise blood pressure.   - amlodipine-benazepril (LOTREL) 5-40 mg per capsule; TAKE ONE Capsule BY MOUTH TWICE DAILY  Dispense: 180 capsule; Refill: 0  - hydrochlorothiazide (MICROZIDE) 12.5 mg capsule; Take 1 capsule (12.5 mg total) by mouth once daily.  Dispense: 90 capsule; Refill: 1  - losartan (COZAAR) 100 MG tablet; Take 1 tablet (100 mg total) by mouth once daily.  Dispense: 90 tablet; Refill: 1    5. Hyperlipidemia with target LDL less than 100  We discussed low fat diet and regular exercise.The current medical regimen is effective;  continue present plan and medications.    - pravastatin (PRAVACHOL) 40 MG tablet; Take 1 tablet (40 mg total) by mouth once daily.  Dispense: 90 tablet; Refill: 1    6. Gastroesophageal reflux disease without esophagitis  Symptoms controlled. Reflux precautions discussed (eliminate tobacco if a smoker; minimize caffeine, chocolate and red/white peppermint intake; avoid heavy and spicy meals; don't lay down within 2-3 hours after eating). Medication as needed. Patient asked to take medication breaks, if possible - discussed chronic use can limit calcium absorption, increases the risk for intestinal infections, and can cause kidney damage.    - pantoprazole (PROTONIX) 40 MG tablet; Take 1 tablet (40 mg total) by mouth once daily.  Dispense: 90 tablet; Refill: 1    7. Major depressive disorder, single episode, mild  The current medical regimen is effective;  continue present plan and medications.  The patient is not interested in medication changes.  She does not want to see  a psychiatrist.  I did encourage her to see a counselor to discuss some coping mechanisms.  - buPROPion (WELLBUTRIN) 75 MG tablet; Take 2 tablets (150 mg total) by mouth 2 (two) times daily.  Dispense: 180 tablet; Refill: 0    8. Restless legs syndrome (RLS)  The current medical regimen is effective;  continue present plan and medications.   - ropinirole (REQUIP) 0.5 MG tablet; Take 1 tablet (0.5 mg total) by mouth every evening.  Dispense: 90 tablet; Refill: 1    9. CHETNA (obstructive sleep apnea)  We discussed the potential ramifications of untreated sleep apnea, which could include daytime sleepiness, hypertension, heart disease including CHF, sudden death while sleeping and/or stroke. The patient was advised to abstain from driving should they feel sleepy or drowsy.  We discussed potential treatment options, which could include weight loss, body positioning, continuous positive airway pressure (CPAP), or referral for surgical consideration.      10. Vertigo  Continue medication as needed.  Symptoms persistent.  I will refer her to ENT for further evaluation and treatment.  - meclizine (ANTIVERT) 25 mg tablet; Take 1 tablet (25 mg total) by mouth 3 (three) times daily as needed.  Dispense: 30 tablet; Refill: 3  - ondansetron (ZOFRAN-ODT) 8 MG TbDL; Take 1 tablet (8 mg total) by mouth every 8 (eight) hours as needed.  Dispense: 12 tablet; Refill: 0  - Ambulatory referral to ENT    11. Allergic rhinitis, unspecified allergic rhinitis trigger, unspecified rhinitis seasonality  The current medical regimen is effective;  continue present plan and medications.   - levocetirizine (XYZAL) 5 MG tablet; Take 1 tablet (5 mg total) by mouth every evening.  Dispense: 90 tablet; Refill: 1    12. Osteoarthritis of cervical spine, unspecified spinal osteoarthritis complication status  Stable. Medication as needed.   - tizanidine (ZANAFLEX) 2 MG tablet; Take 2 tablets (4 mg total) by mouth nightly as needed.  Dispense: 30 tablet;  Refill: 0    13. Chronic pain of left knee  Over-the-counter pain medication as needed.  I suspect osteoarthritis.  I will refer her to orthopedics for further evaluation and treatment.  I encouraged weight loss.  - Ambulatory consult to Orthopedics    14. Severe obesity (BMI 35.0-35.9 with comorbidity)  The patient is asked to make an attempt to improve diet and exercise patterns to aid in medical management of this problem.           Return in about 6 months (around 9/15/2017), or if symptoms worsen or fail to improve, for annual exam. or sooner as needed.

## 2017-03-21 ENCOUNTER — TELEPHONE (OUTPATIENT)
Dept: FAMILY MEDICINE | Facility: CLINIC | Age: 67
End: 2017-03-21

## 2017-03-21 NOTE — TELEPHONE ENCOUNTER
----- Message from Gemini Hunt sent at 3/21/2017 12:47 PM CDT -----  Contact: self  Pt is requesting a call in regards to a referral for a conselor. She can be reached @ [phone]        Thanks

## 2017-03-21 NOTE — TELEPHONE ENCOUNTER
Spoke w/patient, states at OV 3/15/17,  recommended a counselor for her to call and schedule appointment for nightmares, and thoughts of dying, but she never received the information. Please advise.  Patient also request recommendation/advice for medium size dark black spots on her back that has been there for years but has gotten worse with severe itching. Please advise.

## 2017-03-21 NOTE — TELEPHONE ENCOUNTER
Please give information for Cassie Bloch, the new  at [facility].  I will be happy to refer the patient to dermatology for evaluation of the spots on her back.

## 2017-03-29 ENCOUNTER — HOSPITAL ENCOUNTER (OUTPATIENT)
Dept: RADIOLOGY | Facility: OTHER | Age: 67
Discharge: HOME | End: 2017-03-29
Attending: ORTHOPAEDIC SURGERY
Payer: MEDICARE

## 2017-03-29 ENCOUNTER — OFFICE VISIT (OUTPATIENT)
Dept: ORTHOPEDICS | Facility: CLINIC | Age: 67
End: 2017-03-29
Attending: ORTHOPAEDIC SURGERY
Payer: MEDICARE

## 2017-03-29 VITALS — BODY MASS INDEX: 34.91 KG/M2 | HEIGHT: 63 IN | WEIGHT: 197 LBS

## 2017-03-29 DIAGNOSIS — G56.02 CARPAL TUNNEL SYNDROME OF LEFT WRIST: Primary | ICD-10-CM

## 2017-03-29 DIAGNOSIS — R52 PAIN: ICD-10-CM

## 2017-03-29 DIAGNOSIS — R52 PAIN: Primary | ICD-10-CM

## 2017-03-29 PROCEDURE — 1126F AMNT PAIN NOTED NONE PRSNT: CPT | Mod: S$GLB,,, | Performed by: ORTHOPAEDIC SURGERY

## 2017-03-29 PROCEDURE — 99999 PR PBB SHADOW E&M-EST. PATIENT-LVL II: CPT | Mod: PBBFAC,,, | Performed by: ORTHOPAEDIC SURGERY

## 2017-03-29 PROCEDURE — 99214 OFFICE O/P EST MOD 30 MIN: CPT | Mod: 25,S$GLB,, | Performed by: ORTHOPAEDIC SURGERY

## 2017-03-29 PROCEDURE — 73030 X-RAY EXAM OF SHOULDER: CPT | Mod: 26,50,, | Performed by: RADIOLOGY

## 2017-03-29 PROCEDURE — 20526 THER INJECTION CARP TUNNEL: CPT | Mod: LT,S$GLB,, | Performed by: ORTHOPAEDIC SURGERY

## 2017-03-29 PROCEDURE — 1157F ADVNC CARE PLAN IN RCRD: CPT | Mod: S$GLB,,, | Performed by: ORTHOPAEDIC SURGERY

## 2017-03-29 PROCEDURE — 73130 X-RAY EXAM OF HAND: CPT | Mod: 26,LT,, | Performed by: RADIOLOGY

## 2017-03-29 PROCEDURE — 1160F RVW MEDS BY RX/DR IN RCRD: CPT | Mod: S$GLB,,, | Performed by: ORTHOPAEDIC SURGERY

## 2017-03-29 PROCEDURE — 1159F MED LIST DOCD IN RCRD: CPT | Mod: S$GLB,,, | Performed by: ORTHOPAEDIC SURGERY

## 2017-03-29 RX ORDER — TRIAMCINOLONE ACETONIDE 40 MG/ML
40 INJECTION, SUSPENSION INTRA-ARTICULAR; INTRAMUSCULAR
Status: COMPLETED | OUTPATIENT
Start: 2017-03-29 | End: 2017-03-29

## 2017-03-29 RX ORDER — ETODOLAC 400 MG/1
400 TABLET, EXTENDED RELEASE ORAL DAILY
Qty: 30 TABLET | Refills: 2 | Status: SHIPPED | OUTPATIENT
Start: 2017-03-29 | End: 2017-04-28

## 2017-03-29 RX ADMIN — TRIAMCINOLONE ACETONIDE 40 MG: 40 INJECTION, SUSPENSION INTRA-ARTICULAR; INTRAMUSCULAR at 02:03

## 2017-03-29 NOTE — LETTER
March 29, 2017        Cecilia Singh, FNP-C  [address] [facility] [place] [address]             [facility]  [address], [address]  [facility] [address]  Phone: [phone]   Patient: Cecilia Barahona   MR Number: [id number]   YOB: 1950   Date of Visit: 3/29/2017       Dear Dr. Singh:    Thank you for referring Cecilia Barahona to me for evaluation. Below are the relevant portions of my assessment and plan of care.            If you have questions, please do not hesitate to call me. I look forward to following Cecilia along with you.    Sincerely,      Shayan Schaefer Jr., MD           CC  No Recipients

## 2017-03-29 NOTE — Clinical Note
March 29, 2017      Cecilia Singh, FNP-C  [address] [facility] [address]           [facility]  [address]  [facility] [address]  Phone: [phone]          Patient: Cecilia Barahona   MR Number: [id number]   YOB: 1950   Date of Visit: 3/29/2017       Dear Cecilia Singh:    Thank you for referring Cecilia Barahona to me for evaluation. Attached you will find relevant portions of my assessment and plan of care.    If you have questions, please do not hesitate to call me. I look forward to following Cecilia Barahona along with you.    Sincerely,    Shayan Schaefer Jr., MD    Enclosure  CC:  No Recipients    If you would like to receive this communication electronically, please contact [email or web address] or [phone] to request more information on [employer] access.    For providers and/or their staff who would like to refer a patient to [facility], please contact us through our one-stop-shop provider referral line, [facility], at [phone].    If you feel you have received this communication in error or would no longer like to receive these types of communications, please e-mail [email or web address]

## 2017-03-29 NOTE — PROGRESS NOTES
INITIAL VISIT HISTORY:  A 67-year-old female presents for evaluation of left   hand and arm symptoms.  For past several months, she has noticed some numbness   and tingling in the left hand and pain at night.  Her symptoms are worse at   night mainly, occasionally during the day.  Also, she has been having some pain   in the left shoulder at night, especially occasionally both shoulders, but   mainly in the left shoulder.  She also experiences a pop sensation in the left   shoulder from time to time.    PAST MEDICAL HISTORY:  Significant for rhinitis, glaucoma, hyperlipidemia,   hypertension and type 2 diabetes.    PAST SURGICAL HISTORY:  Includes , trigger release, total hysterectomy   and carpal tunnel release, right hand.    FAMILY HISTORY:  Positive for heart disease, diabetes and hyperlipidemia,   cataracts.    SOCIAL HISTORY:  The patient is a former smoker.  Does not drink alcohol.    REVIEW OF SYSTEMS:  Negative fever, chills, rashes.    CURRENT MEDICATIONS:  Reviewed on the chart.    ALLERGIES:  ACE INHIBITORS AND INVOKANA.    PHYSICAL EXAMINATION:  GENERAL:  Well-developed, well-nourished female in no acute distress, alert and   oriented x3.  MUSCULOSKELETAL:  Examination of upper extremities significant for the left   hand, demonstrating mild swelling volar compartment, left wrist.  Positive Tinel   sign.  Negative Phalen test.  Range of motion in wrist and fingers full.    Sensation intact.  No atrophy.   strength slightly decreased.  Examination   of left shoulder demonstrates some mild tenderness anterolaterally, positive   impingement sign with abduction and internal rotation of the shoulder, which   does cause a little bit of a pop sensation reproducing her pain symptoms.    Strength intact.    X-RAYS:  AP and lateral, left shoulder demonstrates some calcific deposit near   the supraspinatus insertion near the greater tuberosity.    IMPRESSION:  1.  Left carpal tunnel syndrome.  2.   Calcific tendinitis, left shoulder.    PLAN:  I explained the nature of these problems to the patient.  I think they   are unrelated although they seem to be related to her.    For treatment, I have recommended that we start with a wrist brace for the left   hand and wrist.  Also an injection was performed, left carpal tunnel with   combination of Kenalog 10 mg, 0.5 mL Xylocaine, sterile technique.    For the shoulder, I want to start her on Lodine twice a day with food and follow   up in three to four weeks for recheck.  If shoulder symptoms persist, we may   need to get an MRI scan of the shoulder to check the rotator cuff.      FAYE  dd: 03/29/2017 13:51:08 (CDT)  td: 03/30/2017 12:51:28 (CDT)  Doc ID   #[id number]  Job ID #[id number]    CC:

## 2017-04-01 ENCOUNTER — OFFICE VISIT (OUTPATIENT)
Dept: FAMILY MEDICINE | Facility: CLINIC | Age: 67
End: 2017-04-01
Payer: MEDICARE

## 2017-04-01 VITALS
BODY MASS INDEX: 34.65 KG/M2 | HEIGHT: 63 IN | WEIGHT: 195.56 LBS | DIASTOLIC BLOOD PRESSURE: 87 MMHG | SYSTOLIC BLOOD PRESSURE: 161 MMHG | HEART RATE: 71 BPM | OXYGEN SATURATION: 96 % | TEMPERATURE: 98 F

## 2017-04-01 DIAGNOSIS — E66.01 SEVERE OBESITY (BMI 35.0-35.9 WITH COMORBIDITY): ICD-10-CM

## 2017-04-01 DIAGNOSIS — J02.9 EXUDATIVE PHARYNGITIS: Primary | ICD-10-CM

## 2017-04-01 DIAGNOSIS — H83.02 OTITIS INTERNA, LEFT: ICD-10-CM

## 2017-04-01 DIAGNOSIS — I10 ESSENTIAL HYPERTENSION: ICD-10-CM

## 2017-04-01 PROCEDURE — 99999 PR PBB SHADOW E&M-EST. PATIENT-LVL V: CPT | Mod: PBBFAC,,, | Performed by: NURSE PRACTITIONER

## 2017-04-01 PROCEDURE — 99214 OFFICE O/P EST MOD 30 MIN: CPT | Mod: S$GLB,,, | Performed by: NURSE PRACTITIONER

## 2017-04-01 PROCEDURE — 3079F DIAST BP 80-89 MM HG: CPT | Mod: S$GLB,,, | Performed by: NURSE PRACTITIONER

## 2017-04-01 PROCEDURE — 1157F ADVNC CARE PLAN IN RCRD: CPT | Mod: S$GLB,,, | Performed by: NURSE PRACTITIONER

## 2017-04-01 PROCEDURE — 1160F RVW MEDS BY RX/DR IN RCRD: CPT | Mod: S$GLB,,, | Performed by: NURSE PRACTITIONER

## 2017-04-01 PROCEDURE — 99499 UNLISTED E&M SERVICE: CPT | Mod: S$GLB,,, | Performed by: NURSE PRACTITIONER

## 2017-04-01 PROCEDURE — 3077F SYST BP >= 140 MM HG: CPT | Mod: S$GLB,,, | Performed by: NURSE PRACTITIONER

## 2017-04-01 PROCEDURE — 3045F PR MOST RECENT HEMOGLOBIN A1C LEVEL 7.0-9.0%: CPT | Mod: S$GLB,,, | Performed by: NURSE PRACTITIONER

## 2017-04-01 PROCEDURE — 1125F AMNT PAIN NOTED PAIN PRSNT: CPT | Mod: S$GLB,,, | Performed by: NURSE PRACTITIONER

## 2017-04-01 PROCEDURE — 4010F ACE/ARB THERAPY RXD/TAKEN: CPT | Mod: S$GLB,,, | Performed by: NURSE PRACTITIONER

## 2017-04-01 PROCEDURE — 1159F MED LIST DOCD IN RCRD: CPT | Mod: S$GLB,,, | Performed by: NURSE PRACTITIONER

## 2017-04-01 RX ORDER — AZITHROMYCIN 250 MG/1
TABLET, FILM COATED ORAL
Qty: 6 TABLET | Refills: 0 | Status: SHIPPED | OUTPATIENT
Start: 2017-04-01 | End: 2017-05-03

## 2017-04-01 NOTE — PATIENT INSTRUCTIONS
Follow up with primary care  ER for new worse or concerning symptoms    Self-Care for Sore Throats  Sore throats happen for many reasons, such as colds, allergies, and infections caused by viruses or bacteria. In any case, your throat becomes red and sore. Your goal for self-care is to reduce your discomfort while giving your throat a chance to heal.    Moisten and soothe your throat  Tips include the following:  · Try a sip of water first thing after waking up.  · Keep your throat moist by drinking 6 or more glasses of clear liquids every day.  · Run a cool-air humidifier in your room overnight.  · Avoid cigarette smoke.   · Suck on throat lozenges, cough drops, hard candy, ice chips, or frozen fruit-juice bars. Use the sugar-free versions if your diet or medical condition requires them.  Gargle to ease irritation  Gargling every hour or 2 can ease irritation. Try gargling with 1 of these solutions:  · 1/4 teaspoon of salt in 1/2 cup of warm water  · An over-the-counter anesthetic gargle  Use medicine for more relief  Over-the-counter medicine can reduce sore throat symptoms. Ask your pharmacist if you have questions about which medicine to use:  · Ease pain with anesthetic sprays. Aspirin or an aspirin substitute also helps. Remember, never give aspirin to anyone 18 or younger, or if you are already taking blood thinners.   · For sore throats caused by allergies, try antihistamines to block the allergic reaction.  · Remember: unless a sore throat is caused by a bacterial infection, antibiotics wont help you.  Prevent future sore throats  Prevention tips include the following:  · Stop smoking or reduce contact with secondhand smoke. Smoke irritates the tender throat lining.  · Limit contact with pets and with allergy-causing substances, such as pollen and mold.  · When youre around someone with a sore throat or cold, wash your hands often to keep viruses or bacteria from spreading.  · Dont strain your vocal  cords.  Call your healthcare provider  Contact your healthcare provider if you have:  · A temperature over 101°F (38.3°C)  · White spots on the throat  · Great difficulty swallowing  · Trouble breathing  · A skin rash  · Recent exposure to someone else with strep bacteria  · Severe hoarseness and swollen glands in the neck or jaw   Date Last Reviewed: 8/1/2016 © 2000-2016 [employer]. [address] [place] [address], [place] [address]. All rights reserved. This information is not intended as a substitute for professional medical care. Always follow your healthcare professional's instructions.

## 2017-04-01 NOTE — PROGRESS NOTES
Subjective:       Patient ID: Cecilia Barahona is a 67 y.o. female.    Chief Complaint: URI (sore throat,cough,ear pain,congestion)    HPI Ms Barahona is here for some URI complaints. She was seen on 2/20 and 3/15 for similar complaints.  She reports L ear pain, sore throat and congestion.  She repots that she did not take the methylprednisolone at all and she's still taking the augmentin that was prescribed in February.  She has not taken her blood pressure medicine today, she states her sugars are normal.    Review of Systems   Constitutional: Negative for fever.   HENT: Positive for ear pain.    Respiratory: Negative for cough.    Cardiovascular: Negative.        Objective:      Physical Exam   Constitutional: She is oriented to person, place, and time. She appears well-developed and well-nourished. She does not appear ill. No distress.   HENT:   Right Ear: A middle ear effusion is present.   Left Ear: A middle ear effusion is present.   Nose: Mucosal edema and rhinorrhea present. Right sinus exhibits no maxillary sinus tenderness and no frontal sinus tenderness. Left sinus exhibits no maxillary sinus tenderness and no frontal sinus tenderness.   Mouth/Throat: Uvula is midline and mucous membranes are normal. Oropharyngeal exudate and posterior oropharyngeal erythema present.   Cardiovascular: Normal rate, regular rhythm and normal heart sounds.  Exam reveals no friction rub.    No murmur heard.  Pulmonary/Chest: Effort normal and breath sounds normal. No respiratory distress. She has no wheezes. She has no rales.   Musculoskeletal: Normal range of motion. She exhibits no edema.   Lymphadenopathy:        Head (right side): Submandibular adenopathy present. No submental and no tonsillar adenopathy present.        Head (left side): Submandibular adenopathy present. No submental and no tonsillar adenopathy present.     She has no cervical adenopathy.   Neurological: She is alert and oriented to person, place, and  time.   Skin: Skin is warm and dry.   Psychiatric: She has a normal mood and affect. Her behavior is normal.   Vitals reviewed.      Assessment:       1. Exudative pharyngitis    2. Otitis interna, left    3. Essential hypertension    4. Severe obesity (BMI 35.0-35.9 with comorbidity)    5. Type 2 diabetes, uncontrolled, with neuropathy        Plan:       Exudative pharyngitis  -     azithromycin (Z-CHAR) 250 MG tablet; Use as directed  Dispense: 6 tablet; Refill: 0    Otitis interna, left  -     azithromycin (Z-CHAR) 250 MG tablet; Use as directed  Dispense: 6 tablet; Refill: 0    Essential hypertension  F/u with PCP as needed    Severe obesity (BMI 35.0-35.9 with comorbidity)  F/u with PCP as needed    Type 2 diabetes, uncontrolled, with neuropathy  Controlled, f/u prn    She has some adenopathy and exudate, will treat with azithromycin.  She was instructed to d/c augmentin and take azithromycin exactly as directed, and take it until it's all gone.   Continue nasal spray and xyzal as previously prescribed  F/u if not improved  Increase fluid intake    Verbalied understanding

## 2017-04-01 NOTE — Clinical Note
Ms Barahona had a question about one of her blood pressure medications. She thinks you took her off of one, but she wasn't sure.

## 2017-04-01 NOTE — MR AVS SNAPSHOT
[facility]  [address] [place] [address]  Phone: [phone]  Fax: [phone]                  Cecilia Barahona   2017 1:30 PM   Office Visit    Description:  Female : 1950   Provider:  TRE HUSSEIN IN   Department:  [facility]           Reason for Visit     URI           Diagnoses this Visit        Comments    Exudative pharyngitis    -  Primary     Otitis interna, left                To Do List           Future Appointments        Provider Department Dept Phone    2017 1:30 PM TRE HUSSEIN IN [facility] [phone]    2017 1:30 PM Ishan Brink OD [facility] [phone]    2017 2:10 PM Shayan Schaefer Jr., MD [facility] [phone]      Goals (5 Years of Data)              3/10/17    10/28/16    3/29/16    HEMOGLOBIN A1C < 7.0   8.3  9.5  8.2    Related Problems    Type 2 diabetes, uncontrolled, with neuropathy       These Medications        Disp Refills Start End    azithromycin (Z-CHAR) 250 MG tablet 6 tablet 0 2017     Use as directed    Pharmacy: [facility] [address] Ph #: [phone]         [facility]     [facility] - 24 Assistance  Unless otherwise directed by your provider, please contact [facility], our nurse care line that is available for  assistance.     Registered nurses in the [facility] provide: appointment scheduling, clinical advisement, health education, and other advisory services.  Call: [phone] (toll free)               Medications           Message regarding Medications     Verify the changes and/or additions to your medication regime listed below are the same as discussed with your clinician today.  If any of these changes or additions are incorrect, please notify your healthcare provider.        START taking these NEW medications        Refills    azithromycin (Z-CHAR)  250 MG tablet 0    Sig: Use as directed    Class: Normal      STOP taking these medications     blood-glucose meter (EMBRACE BLOOD GLUCOSE SYSTEM) Misc 1 Device by Alternating Nares route 3 (three) times daily.           Verify that the below list of medications is an accurate representation of the medications you are currently taking.  If none reported, the list may be blank. If incorrect, please contact your healthcare provider. Carry this list with you in case of emergency.           Current Medications     albuterol 90 mcg/actuation inhaler Inhale 2 puffs into the lungs every 6 (six) hours as needed for Wheezing or Shortness of Breath. Rescue    alcohol swabs PadM Apply 1 each topically 3 (three) times daily.    amlodipine-benazepril (LOTREL) 5-40 mg per capsule TAKE ONE Capsule BY MOUTH TWICE DAILY    artificial tear ointment (ARTIFICIAL TEARS) Oint Place into both eyes every evening.    aspirin 81 MG Chew Take by mouth once daily. 1 tablet, chewable Oral Every day    azelastine (OPTIVAR) 0.05 % ophthalmic solution Place 1 drop into both eyes 2 (two) times daily.    blood glucose control high,low (ACCU-CHEK BRIDGETTE CONTROL SOLN) Soln 1 kit by Misc.(Non-Drug; Combo Route) route once daily.    blood glucose control, low (EMBRACE GLUCOSE CONTROL LOW) Soln Inject 1 Bottle into the skin every 30 days.    BLOOD-GLUCOSE METER (ACCU-CHEK BRIDGETTE PLUS METER MISC) by Misc.(Non-Drug; Combo Route) route. Use to test blood sugar 4 times daily.    buPROPion (WELLBUTRIN) 75 MG tablet Take 2 tablets (150 mg total) by mouth 2 (two) times daily.    etodolac (LODINE XL) 400 MG 24 hr tablet Take 1 tablet (400 mg total) by mouth once daily.    gabapentin (NEURONTIN) 300 MG capsule Take 1 capsule (300 mg total) by mouth every evening.    glimepiride (AMARYL) 4 MG tablet TAKE ONE Tablet BY MOUTH TWICE DAILY    hydrochlorothiazide (MICROZIDE) 12.5 mg capsule Take 1 capsule (12.5 mg total) by mouth once daily.    insulin aspart (NOVOLOG  "FLEXPEN) 100 unit/mL InPn pen 10 units Subcutaneous before meals. Sliding scale: take 2 additional units for each blood sugar increment of 50 above 150.    insulin detemir (LEVEMIR FLEXTOUCH) 100 unit/mL (3 mL) SubQ InPn pen INJECT 38 UNITS SUBCUTANEOUSLY EVERY NIGHT AT BEDTIME    lancets 30 gauge Misc Apply 1 lancet topically 3 (three) times daily.    LANCING DEVICE WITH LANCETS Misc USE AS DIRECTED FOR DIABETIC TESTING    latanoprost 0.005 % ophthalmic solution Place 1 drop into both eyes every evening.    levocetirizine (XYZAL) 5 MG tablet Take 1 tablet (5 mg total) by mouth every evening.    losartan (COZAAR) 100 MG tablet Take 1 tablet (100 mg total) by mouth once daily.    meclizine (ANTIVERT) 25 mg tablet Take 1 tablet (25 mg total) by mouth 3 (three) times daily as needed.    metformin (GLUCOPHAGE) 1000 MG tablet TAKE ONE TABLET BY MOUTH TWICE DAILY **TAKE WITH FOOD**    ondansetron (ZOFRAN-ODT) 8 MG TbDL Take 1 tablet (8 mg total) by mouth every 8 (eight) hours as needed.    ONETOUCH ULTRA TEST Strp TEST THREE TIMES DAILY AS DIRECTED    pantoprazole (PROTONIX) 40 MG tablet Take 1 tablet (40 mg total) by mouth once daily.    pen needle, diabetic (BD ULTRA-FINE LENY PEN NEEDLES) 32 gauge x 5/32" Ndle USE FIVE TIMES DAILY    pravastatin (PRAVACHOL) 40 MG tablet Take 1 tablet (40 mg total) by mouth once daily.    ropinirole (REQUIP) 0.5 MG tablet Take 1 tablet (0.5 mg total) by mouth every evening.    tizanidine (ZANAFLEX) 2 MG tablet Take 2 tablets (4 mg total) by mouth nightly as needed.    ULTRA THIN LANCETS 31 gauge Misc USE 4 TIMES DAILY FOR DIABETIC TESTING    azithromycin (Z-CHAR) 250 MG tablet Use as directed    lancets Misc 1 lancet by Misc.(Non-Drug; Combo Route) route 4 (four) times daily.           Clinical Reference Information           Your Vitals Were     BP Pulse Temp Height Weight SpO2    161/87 (BP Location: Left arm, Patient Position: Sitting, BP Method: Manual) 71 98 °F (36.7 °C) (Oral) 5' " "3" (1.6 m) 88.7 kg (195 lb 8.8 oz) 96%    BMI                34.64 kg/m2          Blood Pressure          Most Recent Value    BP  (!)  161/87      Allergies as of 4/1/2017     Ace Inhibitors    Invokana [Canagliflozin]      Immunizations Administered on Date of Encounter - 4/1/2017     None      Instructions    Follow up with primary care  ER for new worse or concerning symptoms    Self-Care for Sore Throats  Sore throats happen for many reasons, such as colds, allergies, and infections caused by viruses or bacteria. In any case, your throat becomes red and sore. Your goal for self-care is to reduce your discomfort while giving your throat a chance to heal.    Moisten and soothe your throat  Tips include the following:  · Try a sip of water first thing after waking up.  · Keep your throat moist by drinking 6 or more glasses of clear liquids every day.  · Run a cool-air humidifier in your room overnight.  · Avoid cigarette smoke.   · Suck on throat lozenges, cough drops, hard candy, ice chips, or frozen fruit-juice bars. Use the sugar-free versions if your diet or medical condition requires them.  Gargle to ease irritation  Gargling every hour or 2 can ease irritation. Try gargling with 1 of these solutions:  · 1/4 teaspoon of salt in 1/2 cup of warm water  · An over-the-counter anesthetic gargle  Use medicine for more relief  Over-the-counter medicine can reduce sore throat symptoms. Ask your pharmacist if you have questions about which medicine to use:  · Ease pain with anesthetic sprays. Aspirin or an aspirin substitute also helps. Remember, never give aspirin to anyone 18 or younger, or if you are already taking blood thinners.   · For sore throats caused by allergies, try antihistamines to block the allergic reaction.  · Remember: unless a sore throat is caused by a bacterial infection, antibiotics wont help you.  Prevent future sore throats  Prevention tips include the following:  · Stop smoking or reduce " contact with secondhand smoke. Smoke irritates the tender throat lining.  · Limit contact with pets and with allergy-causing substances, such as pollen and mold.  · When youre around someone with a sore throat or cold, wash your hands often to keep viruses or bacteria from spreading.  · Dont strain your vocal cords.  Call your healthcare provider  Contact your healthcare provider if you have:  · A temperature over 101°F (38.3°C)  · White spots on the throat  · Great difficulty swallowing  · Trouble breathing  · A skin rash  · Recent exposure to someone else with strep bacteria  · Severe hoarseness and swollen glands in the neck or jaw   Date Last Reviewed: 8/1/2016  © 8496-0365 [employer]. [address] [place] [address], [place] [address]. All rights reserved. This information is not intended as a substitute for professional medical care. Always follow your healthcare professional's instructions.             Language Assistance Services     ATTENTION: Language assistance services are available, free of charge. Please call [phone].      ATENCIÓN: Si habla marilyañol, tiene a villanueva disposición servicios gratuitos de asistencia lingüística. Llame al [phone].     BRIDGETTE Ý: N?u b?n nói Ti?ng Vi?t, có các d?ch v? h? tr? ngôn ng? mi?n phí dành cho b?n. G?i s? [phone].         [facility] complies with applicable Federal civil rights laws and does not discriminate on the basis of race, color, national origin, age, disability, or sex.

## 2017-04-04 ENCOUNTER — OFFICE VISIT (OUTPATIENT)
Dept: OPTOMETRY | Facility: CLINIC | Age: 67
End: 2017-04-04
Payer: MEDICARE

## 2017-04-04 DIAGNOSIS — H40.1132 PRIMARY OPEN-ANGLE GLAUCOMA, BILATERAL, MODERATE STAGE: Primary | ICD-10-CM

## 2017-04-04 PROCEDURE — 92014 COMPRE OPH EXAM EST PT 1/>: CPT | Mod: S$GLB,,, | Performed by: OPTOMETRIST

## 2017-04-04 PROCEDURE — 99499 UNLISTED E&M SERVICE: CPT | Mod: S$GLB,,, | Performed by: OPTOMETRIST

## 2017-04-04 PROCEDURE — 99999 PR PBB SHADOW E&M-EST. PATIENT-LVL III: CPT | Mod: PBBFAC,,, | Performed by: OPTOMETRIST

## 2017-04-04 NOTE — PROGRESS NOTES
HPI     Glaucoma    Additional comments: IOP Check            Comments   DSL- 12/2/17     Pt is here today for IOP check. Pt states when she look around she sees an   image then goes away- its not a floater. Pt does not check BSL. Pt did not   get last MRX filled.     Eyemeds  Latanoprost OU QHS last drop yesterday PM.  Last missed over 1 wk       Last edited by Ishan Brink, OD on 4/4/2017  1:51 PM. (History)            Assessment /Plan     For exam results, see Encounter Report.    Primary open-angle glaucoma, bilateral, moderate stage  -Improved Compliance and IOP control  -Latanoprost QHS  -No Pach available today    RTC 8 mo with HVF 24-2ss

## 2017-04-05 ENCOUNTER — TELEPHONE (OUTPATIENT)
Dept: OTOLARYNGOLOGY | Facility: CLINIC | Age: 67
End: 2017-04-05

## 2017-04-05 NOTE — TELEPHONE ENCOUNTER
----- Message from Gretchne Reveles sent at 4/4/2017  8:11 PM CDT -----  Contact: self  Pt would like a call back in regards to scheduling an urgent appt for a sore throat. Pt stated she has been to her pcp and her throat has not gotten better.     Pt stated she would like to be seen as soon as possible     Pt can be contacted at [phone]

## 2017-04-13 DIAGNOSIS — E11.9 DIABETES MELLITUS WITHOUT COMPLICATION: ICD-10-CM

## 2017-04-15 ENCOUNTER — PATIENT MESSAGE (OUTPATIENT)
Dept: FAMILY MEDICINE | Facility: CLINIC | Age: 67
End: 2017-04-15

## 2017-04-15 ENCOUNTER — HOSPITAL ENCOUNTER (EMERGENCY)
Facility: HOSPITAL | Age: 67
Discharge: HOME | End: 2017-04-15
Attending: EMERGENCY MEDICINE | Admitting: EMERGENCY MEDICINE
Payer: MEDICARE

## 2017-04-15 VITALS
WEIGHT: 198 LBS | SYSTOLIC BLOOD PRESSURE: 186 MMHG | BODY MASS INDEX: 36.44 KG/M2 | HEART RATE: 77 BPM | DIASTOLIC BLOOD PRESSURE: 84 MMHG | RESPIRATION RATE: 16 BRPM | TEMPERATURE: 99 F | HEIGHT: 62 IN | OXYGEN SATURATION: 99 %

## 2017-04-15 DIAGNOSIS — R31.9 URINARY TRACT INFECTION WITH HEMATURIA, SITE UNSPECIFIED: Primary | ICD-10-CM

## 2017-04-15 DIAGNOSIS — N39.0 URINARY TRACT INFECTION WITH HEMATURIA, SITE UNSPECIFIED: Primary | ICD-10-CM

## 2017-04-15 LAB
BACTERIA #/AREA URNS AUTO: ABNORMAL /HPF
BILIRUB UR QL STRIP: NEGATIVE
BUN SERPL-MCNC: 12 MG/DL (ref 6–30)
CHLORIDE SERPL-SCNC: 100 MMOL/L (ref 95–110)
CLARITY UR REFRACT.AUTO: ABNORMAL
COLOR UR AUTO: ABNORMAL
CREAT SERPL-MCNC: 0.7 MG/DL (ref 0.5–1.4)
GLUCOSE SERPL-MCNC: 91 MG/DL (ref 70–110)
GLUCOSE UR QL STRIP: NEGATIVE
HCT VFR BLD CALC: 37 %PCV (ref 36–54)
HGB UR QL STRIP: ABNORMAL
HYALINE CASTS UR QL AUTO: 0 /LPF
KETONES UR QL STRIP: NEGATIVE
LEUKOCYTE ESTERASE UR QL STRIP: ABNORMAL
MICROSCOPIC COMMENT: ABNORMAL
NITRITE UR QL STRIP: NEGATIVE
PH UR STRIP: >8 [PH] (ref 5–8)
POC IONIZED CALCIUM: 1.08 MMOL/L (ref 1.06–1.42)
POC TCO2 (MEASURED): 29 MMOL/L (ref 23–29)
POTASSIUM BLD-SCNC: 3.4 MMOL/L (ref 3.5–5.1)
PROT UR QL STRIP: ABNORMAL
RBC #/AREA URNS AUTO: >100 /HPF (ref 0–4)
SAMPLE: ABNORMAL
SODIUM BLD-SCNC: 142 MMOL/L (ref 136–145)
SP GR UR STRIP: 1.01 (ref 1–1.03)
URN SPEC COLLECT METH UR: ABNORMAL
UROBILINOGEN UR STRIP-ACNC: NEGATIVE EU/DL
WBC #/AREA URNS AUTO: >100 /HPF (ref 0–5)

## 2017-04-15 PROCEDURE — 96372 THER/PROPH/DIAG INJ SC/IM: CPT

## 2017-04-15 PROCEDURE — 87077 CULTURE AEROBIC IDENTIFY: CPT | Mod: 59

## 2017-04-15 PROCEDURE — 81001 URINALYSIS AUTO W/SCOPE: CPT

## 2017-04-15 PROCEDURE — 99283 EMERGENCY DEPT VISIT LOW MDM: CPT | Mod: ,,, | Performed by: PHYSICIAN ASSISTANT

## 2017-04-15 PROCEDURE — 99284 EMERGENCY DEPT VISIT MOD MDM: CPT | Mod: 25

## 2017-04-15 PROCEDURE — 87088 URINE BACTERIA CULTURE: CPT

## 2017-04-15 PROCEDURE — 63600175 PHARM REV CODE 636 W HCPCS: Performed by: PHYSICIAN ASSISTANT

## 2017-04-15 PROCEDURE — 87186 SC STD MICRODIL/AGAR DIL: CPT

## 2017-04-15 PROCEDURE — 87086 URINE CULTURE/COLONY COUNT: CPT

## 2017-04-15 RX ORDER — NITROFURANTOIN 25; 75 MG/1; MG/1
100 CAPSULE ORAL 2 TIMES DAILY
Qty: 14 CAPSULE | Refills: 0 | Status: SHIPPED | OUTPATIENT
Start: 2017-04-15 | End: 2017-04-22

## 2017-04-15 RX ORDER — CEFTRIAXONE 1 G/1
1 INJECTION, POWDER, FOR SOLUTION INTRAMUSCULAR; INTRAVENOUS
Status: COMPLETED | OUTPATIENT
Start: 2017-04-15 | End: 2017-04-15

## 2017-04-15 RX ADMIN — CEFTRIAXONE 1 G: 1 INJECTION, POWDER, FOR SOLUTION INTRAMUSCULAR; INTRAVENOUS at 07:04

## 2017-04-15 NOTE — ED AVS SNAPSHOT
[facility]  [address] [address]               Cecilia Barahona   4/15/2017  6:56 AM   ED    Description:  Female : 1950   Department:  [facility]           Your Care was Coordinated By:     Provider Role From To    Jordan Wood MD Attending Provider 04/15/17 0705 --    Kassie Nuñez PA-C Physician Assistant 04/15/17 0705 --      Reason for Visit     Hematuria           Diagnoses this Visit        Comments    Urinary tract infection with hematuria, site unspecified    -  Primary       ED Disposition     None           To Do List           Follow-up Information     Follow up with Yudi Smart MD. Schedule an appointment as soon as possible for a visit in 1 week.    Specialties:  Internal Medicine, Pediatrics    Contact information:    [address]  [place] [address] [phone]          Schedule an appointment as soon as possible for a visit with [facility] - Urology 4th Floor.    Specialty:  Urology    Contact information:    [address] [facility] [address] [phone]    Additional information:    [facility] - 4th Floor        Follow up with [facility]    Specialty:  Emergency Medicine    Why:  If symptoms worsen    Contact information:    [address] [facility] [address] [phone]       These Medications        Disp Refills Start End    nitrofurantoin, macrocrystal-monohydrate, (MACROBID) 100 MG capsule 14 capsule 0 4/15/2017 2017    Take 1 capsule (100 mg total) by mouth 2 (two) times daily. - Oral    [place] [facility] [place] [address] Ph #: [phone]         [facility]     [facility] Nurse Care Line - 24/ Assistance  Unless otherwise directed by your provider, please contact [facility], our nurse care line that is available for 24/7 assistance.     Registered nurses in the [facility] provide: appointment scheduling, clinical advisement, health education, and other advisory services.  Call: [phone] (toll free)               Medications           Message regarding Medications     Verify the changes and/or additions to your medication regime listed below are the same as discussed with your clinician today.  If any of these changes or additions are incorrect, please notify your healthcare provider.        START taking these NEW medications        Refills    nitrofurantoin, macrocrystal-monohydrate, (MACROBID) 100 MG capsule 0    Sig: Take 1 capsule (100 mg total) by mouth 2 (two) times daily.    Class: Print    Route: Oral      These medications were administered today        Dose Freq    cefTRIAXone injection 1 g 1 g ED 1 Time    Sig: Inject 1 g into the muscle ED 1 Time.    Class: Normal    Route: Intramuscular    Cosign for Ordering: Accepted by Jordan Wood MD on 4/15/2017  7:43 AM           Verify that the below list of medications is an accurate representation of the medications you are currently taking.  If none reported, the list may be blank. If incorrect, please contact your healthcare provider. Carry this list with you in case of emergency.           Current Medications     buPROPion (WELLBUTRIN) 75 MG tablet Take 2 tablets (150 mg total) by mouth 2 (two) times daily.    glimepiride (AMARYL) 4 MG tablet TAKE ONE Tablet BY MOUTH TWICE DAILY    hydrochlorothiazide (MICROZIDE) 12.5 mg capsule Take 1 capsule (12.5 mg total) by mouth once daily.    latanoprost 0.005 % ophthalmic solution Place 1 drop into both eyes every evening.    losartan (COZAAR) 100 MG tablet Take 1 tablet (100 mg total) by mouth once daily.    meclizine (ANTIVERT) 25 mg tablet Take 1 tablet (25 mg total) by mouth 3 (three) times daily as needed.    metformin (GLUCOPHAGE) 1000 MG tablet TAKE ONE TABLET BY MOUTH TWICE DAILY **TAKE WITH FOOD**    pravastatin (PRAVACHOL) 40 MG tablet Take 1 tablet (40  mg total) by mouth once daily.    ropinirole (REQUIP) 0.5 MG tablet Take 1 tablet (0.5 mg total) by mouth every evening.    albuterol 90 mcg/actuation inhaler Inhale 2 puffs into the lungs every 6 (six) hours as needed for Wheezing or Shortness of Breath. Rescue    alcohol swabs PadM Apply 1 each topically 3 (three) times daily.    amlodipine-benazepril (LOTREL) 5-40 mg per capsule TAKE ONE Capsule BY MOUTH TWICE DAILY    artificial tear ointment (ARTIFICIAL TEARS) Oint Place into both eyes every evening.    aspirin 81 MG Chew Take by mouth once daily. 1 tablet, chewable Oral Every day    azelastine (OPTIVAR) 0.05 % ophthalmic solution Place 1 drop into both eyes 2 (two) times daily.    azithromycin (Z-CHAR) 250 MG tablet Use as directed    blood glucose control high,low (ACCU-CHEK BRIDGETTE CONTROL SOLN) Soln 1 kit by Misc.(Non-Drug; Combo Route) route once daily.    blood glucose control, low (EMBRACE GLUCOSE CONTROL LOW) Soln Inject 1 Bottle into the skin every 30 days.    BLOOD-GLUCOSE METER (ACCU-CHEK BRIDGETTE PLUS METER MISC) by Misc.(Non-Drug; Combo Route) route. Use to test blood sugar 4 times daily.    cefTRIAXone injection 1 g Inject 1 g into the muscle ED 1 Time.    etodolac (LODINE XL) 400 MG 24 hr tablet Take 1 tablet (400 mg total) by mouth once daily.    gabapentin (NEURONTIN) 300 MG capsule Take 1 capsule (300 mg total) by mouth every evening.    insulin aspart (NOVOLOG FLEXPEN) 100 unit/mL InPn pen 10 units Subcutaneous before meals. Sliding scale: take 2 additional units for each blood sugar increment of 50 above 150.    insulin detemir (LEVEMIR FLEXTOUCH) 100 unit/mL (3 mL) SubQ InPn pen INJECT 38 UNITS SUBCUTANEOUSLY EVERY NIGHT AT BEDTIME    lancets 30 gauge Misc Apply 1 lancet topically 3 (three) times daily.    lancets Misc 1 lancet by Misc.(Non-Drug; Combo Route) route 4 (four) times daily.    LANCING DEVICE WITH LANCETS Misc USE AS DIRECTED FOR DIABETIC TESTING    levocetirizine (XYZAL) 5 MG tablet  "Take 1 tablet (5 mg total) by mouth every evening.    nitrofurantoin, macrocrystal-monohydrate, (MACROBID) 100 MG capsule Take 1 capsule (100 mg total) by mouth 2 (two) times daily.    ondansetron (ZOFRAN-ODT) 8 MG TbDL Take 1 tablet (8 mg total) by mouth every 8 (eight) hours as needed.    ONETOUCH ULTRA TEST Strp TEST THREE TIMES DAILY AS DIRECTED    pantoprazole (PROTONIX) 40 MG tablet Take 1 tablet (40 mg total) by mouth once daily.    pen needle, diabetic (BD ULTRA-FINE LENY PEN NEEDLES) 32 gauge x 5/32" Ndle USE FIVE TIMES DAILY    tizanidine (ZANAFLEX) 2 MG tablet Take 2 tablets (4 mg total) by mouth nightly as needed.    ULTRA THIN LANCETS 31 gauge Misc USE 4 TIMES DAILY FOR DIABETIC TESTING           Clinical Reference Information           Your Vitals Were     BP Pulse Temp Resp Height Weight    186/84 (BP Location: Right arm, Patient Position: Sitting) 77 98.7 °F (37.1 °C) (Oral) 16 5' 2" (1.575 m) 89.8 kg (198 lb)    SpO2 BMI             99% 36.21 kg/m2         Allergies as of 4/15/2017        Reactions    Ace Inhibitors Other (See Comments)    cough    Invokana [Canagliflozin] Other (See Comments)    Throat and tongue swelling      Immunizations Administered on Date of Encounter - 4/15/2017     None      ED Micro, Lab, POCT     Start Ordered       Status Ordering Provider    04/15/17 0736 04/15/17 0735  ISTAT CHEM8  Once      Acknowledged     04/15/17 0736 04/15/17 0735  Urine culture  Add-on      Completed     04/15/17 0707 04/15/17 0706  Urinalysis  STAT      Final result     04/15/17 0706 04/15/17 0706  Urinalysis Microscopic  Once      Final result       ED Imaging Orders     None        Discharge Instructions       Take oral antibiotics Macrobid 2 times a day which is every 12 hours for the next 7 days.  Drink plenty of fluids.  Practice proper hygiene.  Follow-up with your primary care physician in one week.  Follow-up with Urology if your symptoms do not improve or return to the emergency " "department for new worsening symptoms.    Future Appointments  Date Time Provider Department Center   4/18/2017 2:00 PM Slyvia Davies MD [facility] [address]   4/26/2017 2:10 PM Shayan Schaefer Jr., MD [facility]         Bladder Infection, Female (Adult)    Urine is normally doesn't have any bacteria in it. But bacteria can get into the urinary tract from the skin around the rectum. Or they can travel in the blood from elsewhere in the body. Once they are in your urinary tract, they can cause infection in the urethra (urethritis), the bladder (cystitis), or the kidneys (pyelonephritis).  The most common place for an infection is in the bladder. This is called a bladder infection. This is one of the most common infections in women. Most bladder infections are easily treated. They are not serious unless the infection spreads to the kidney.  The phrases "bladder infection," "UTI," and "cystitis" are often used to describe the same thing. But they are not always the same. Cystitis is an inflammation of the bladder. The most common cause of cystitis is an infection.  Symptoms  The infection causes inflammation in the urethra and bladder. This causes many of the symptoms. The most common symptoms of a bladder infection are:  · Pain or burning when urinating  · Having to urinate more often than usual  · Urgent need to urinate  · Only a small amount of urine comes out  · Blood in urine  · Abdominal discomfort. This is usually in the lower abdomen above the pubic bone.  · Cloudy urine  · Strong- or bad-smelling urine  · Unable to urinate (urinary retention)  · Unable to hold urine in (urinary incontinence)  · Fever  · Loss of appetite  · Confusion (in older adults)  Causes  Bladder infections are not contagious. You can't get one from someone else, from a toilet seat, or from sharing a bath.  The most common cause of bladder infections is bacteria from the bowels. The bacteria get onto the skin around the " opening of the urethra. From there, they can get into the urine and travel up to the bladder, causing inflammation and infection. This usually happens because of:  · Wiping improperly after urinating. Always wipe from front to back.  · Bowel incontinence  · Pregnancy  · Procedures such as having a catheter inserted  · Older age  · Not emptying your bladder. This can allow bacteria a chance to grow in your urine.  · Dehydration  · Constipation  · Sex  · Use of a diaphragm for birth control   Treatment  Bladder infections are diagnosed by a urine test. They are treated with antibiotics and usually clear up quickly without complications. Treatment helps prevent a more serious kidney infection.  Medicines  Medicines can help in the treatment of a bladder infection:  · Take antibiotics until they are used up, even if you feel better. It is important to finish them to make sure the infection has cleared.  · You can use acetaminophen or ibuprofen for pain, fever, or discomfort, unless another medicine was prescribed. If you have chronic liver or kidney disease, talk with your healthcare provider before using these medicines. Also talk with your provider if you've ever had a stomach ulcer or gastrointestinal bleeding, or are taking blood-thinner medicines.  · If you are given phenazopydridine to reduce burning with urination, it will cause your urine to become a bright orange color. This can stain clothing.  Care and prevention  These self-care steps can help prevent future infections:  · Drink plenty of fluids to prevent dehydration and flush out your bladder. Do this unless you must restrict fluids for other health reasons, or your doctor told you not to.  · Proper cleaning after going to the bathroom is important. Wipe from front to back after using the toilet to prevent the spread of bacteria.  · Urinate more often. Don't try to hold urine in for a long time.  · Wear loose-fitting clothes and cotton underwear. Avoid  tight-fitting pants.  · Improve your diet and prevent constipation. Eat more fresh fruit and vegetables, and fiber, and less junk and fatty foods.  · Avoid sex until your symptoms are gone.  · Avoid caffeine, alcohol, and spicy foods. These can irritate your bladder.  · Urinate right after intercourse to flush out your bladder.  · If you use birth control pills and have frequent bladder infections, discuss it with your doctor.  Follow-up care  Call your healthcare provider if all symptoms are not gone after 3 days of treatment. This is especially important if you have repeat infections.  If a culture was done, you will be told if your treatment needs to be changed. If directed, you can call to find out the results.  If X-rays were done, you will be told if the results will affect your treatment.  Call 911  Call 911 if any of the following occur:  · Trouble breathing  · Hard to wake up or confusion  · Fainting or loss of consciousness  · Rapid heart rate  When to seek medical advice  Call your healthcare provider right away if any of these occur:  · Fever of 100.4ºF (38.0ºC) or higher, or as directed by your healthcare provider  · Symptoms are not better by the third day of treatment  · Back or belly (abdominal) pain that gets worse  · Repeated vomiting, or unable to keep medicine down  · Weakness or dizziness  · Vaginal discharge  · Pain, redness, or swelling in the outer vaginal area (labia)  Date Last Reviewed: 10/1/2016  © 5392-8504 [employer]. [address] [place] [address]. All rights reserved. This information is not intended as a substitute for professional medical care. Always follow your healthcare professional's instructions.    Your Scheduled Appointments     Apr 18, 2017  2:00 PM CDT   New Patient with MD [facility] - Head[facility] S[facility] ([facility] )    [address]  [facility] [address]   [phone]            Apr 26, 2017  2:10 PM  CDT   Established Patient Visit with Shayan Schaefer Jr., MD   [facility] ([facility])    [address], [address]  [facility] [address] [phone]              Smoking Cessation     If you would like to quit smoking:   You may be eligible for free services if you are a [place] resident and started smoking cigarettes before September 1, 1988.  Call the Smoking Cessation Trust ([facility]) toll free at (490) 763-1416 or (196) 804-6653.   Call 1-800-QUIT-NOW if you do not meet the above criteria.   Contact us via email: [email or web address]   View our website for more information: [email or web address]         [facility] complies with applicable Federal civil rights laws and does not discriminate on the basis of race, color, national origin, age, disability, or sex.        Language Assistance Services     ATTENTION: Language assistance services are available, free of charge. Please call [phone].      ATENCIÓN: Si habla español, tiene a villanueva disposición servicios gratuitos de asistencia lingüística. Llame al [phone].     CHÚ Ý: N?u b?n nói Ti?ng Vi?t, có các d?ch v? h? tr? ngôn ng? mi?n phí dành cho b?n. G?i s? [phone].

## 2017-04-15 NOTE — ED PROVIDER NOTES
Encounter Date: 4/15/2017       History     Chief Complaint   Patient presents with    Hematuria     blood in urine states possible vaginal bleeding, uncertain     Review of patient's allergies indicates:   Allergen Reactions    Ace inhibitors Other (See Comments)     cough    Invokana [canagliflozin] Other (See Comments)     Throat and tongue swelling     HPI Comments: Patient is a 67-year-old female with past medical history of DM 2, HTN, HLD, CHETNA who presents the ED with gross hematuria this morning.  Patient states prior to using the bathroom this morning, she had a spot of blood on her pad.  Patient went to the bathroom and noticed gross hematuria.  She denies any vaginal pain, pelvic pain, abdominal pain, or dysuria.  She does not take any blood thinners.  She denies any fever, chills, chest pain, cough, SOB.  She endorses nausea and vomiting last night which resolved, but attributes this to her vertigo which she regularly gets and takes medication for. She denies any history of kidney stones. Patient had a hysterectomy but is unsure to what extent.  She has no other complaints at this time.      The history is provided by the patient.     Past Medical History:   Diagnosis Date    Allergic rhinitis, seasonal     Arthritis     Glaucoma NEC     History of positive PPD - treated - remote past     Hyperlipidemia LDL goal < 100     Hypertension     Obesity     CHETNA (obstructive sleep apnea)     Type II or unspecified type diabetes mellitus without mention of complication, uncontrolled      Past Surgical History:   Procedure Laterality Date    CARPAL TUNNEL RELEASE       SECTION, CLASSIC      TOTAL ABDOMINAL HYSTERECTOMY      TRIGGER FINGER RELEASE       Family History   Problem Relation Age of Onset    Hypertension Mother     Diabetes Mother     Heart failure Mother     Cataracts Mother     Prostate cancer Father     Hypertension Father     Diabetes Father     Heart failure Father      No Known Problems Sister     Alcohol abuse Maternal Aunt     Diabetes Maternal Uncle     Hyperlipidemia Maternal Uncle     Hypertension Maternal Uncle     No Known Problems Maternal Grandmother     No Known Problems Maternal Grandfather     No Known Problems Paternal Grandmother     No Known Problems Paternal Grandfather     Diabetes Maternal Aunt     Alzheimer's disease Maternal Aunt     Heart disease Cousin      s/p heart transplant    Amblyopia Neg Hx     Blindness Neg Hx     Cancer Neg Hx     Glaucoma Neg Hx     Macular degeneration Neg Hx     Retinal detachment Neg Hx     Strabismus Neg Hx     Stroke Neg Hx     Thyroid disease Neg Hx      Social History   Substance Use Topics    Smoking status: Former Smoker     Packs/day: 0.50     Types: Cigarettes     Quit date: 11/10/2012    Smokeless tobacco: Never Used    Alcohol use No     Review of Systems   Constitutional: Negative for chills and fever.   HENT: Negative for ear pain, rhinorrhea and sinus pressure.    Eyes: Negative for visual disturbance.   Respiratory: Negative for cough and shortness of breath.    Cardiovascular: Negative for chest pain.   Gastrointestinal: Negative for abdominal pain and diarrhea.   Genitourinary: Positive for hematuria. Negative for dysuria, flank pain, pelvic pain and vaginal pain.   Musculoskeletal: Negative for back pain and neck pain.   Skin: Negative for pallor.   Neurological: Negative for weakness and numbness.       Physical Exam   Initial Vitals   BP Pulse Resp Temp SpO2   04/15/17 0650 04/15/17 0650 04/15/17 0650 04/15/17 0650 04/15/17 0650   186/84 77 16 98.7 °F (37.1 °C) 99 %     Physical Exam    Vitals reviewed.  Constitutional: She appears well-developed and well-nourished. She is not diaphoretic. No distress.   HENT:   Head: Normocephalic and atraumatic.   Nose: Nose normal.   Eyes: Conjunctivae and EOM are normal.   Neck: Normal range of motion.   Cardiovascular: Normal rate, regular rhythm  and normal heart sounds. Exam reveals no friction rub.    No murmur heard.  Pulmonary/Chest: Breath sounds normal. No respiratory distress. She has no wheezes. She has no rales.   Abdominal: Soft. Bowel sounds are normal. She exhibits no distension and no mass. There is no tenderness. There is no rigidity, no rebound, no guarding and no CVA tenderness.   Musculoskeletal: Normal range of motion.   Neurological: She is alert and oriented to person, place, and time. She has normal strength. No sensory deficit.   Skin: Skin is warm and dry. No erythema. No pallor.   Psychiatric: She has a normal mood and affect. Her behavior is normal. Judgment and thought content normal.         ED Course   Procedures  Labs Reviewed   URINALYSIS - Abnormal; Notable for the following:        Result Value    Color, UA Red (*)     Appearance, UA Cloudy (*)     pH, UA >8.0 (*)     Protein, UA 2+ (*)     Occult Blood UA 3+ (*)     Leukocytes, UA 2+ (*)     All other components within normal limits   URINALYSIS MICROSCOPIC - Abnormal; Notable for the following:     RBC, UA >100 (*)     WBC, UA >100 (*)     All other components within normal limits   ISTAT PROCEDURE - Abnormal; Notable for the following:     POC Potassium 3.4 (*)     All other components within normal limits   CULTURE, URINE   CULTURE, URINE    Narrative:     CXURN added per Dr. Wood, order ID [id number] 04/15/17 07:47   ISTAT CHEM8             Medical Decision Making:   History:   Old Medical Records: I decided to obtain old medical records.  Clinical Tests:   Lab Tests: Ordered and Reviewed       APC / Resident Notes:   Patient presents the ED with gross hematuria.  On exam, vital signs stable.  NAD and nontoxic.  Abdomen soft, nontender nondistended.  No CVA tenderness.  On pelvic exam, external genitalia normal. No blood in vaginal vault.  Vaginal mucosa normal.  No hemorrhoids appreciated.  DDX includes but is not limited to UTI, carcinoma.  Will check UA and i-STAT chem  and reassess.    UA with 3+ blood and 2+ leukocytes. >100 WBCs and >100 RBCs on microscopy.  Urine culture pending.  I-STAT chem with hematocrit of 37%.    Patient updated with results.  I will give her 1 g of Rocephin IM here and prescribe her oral Macrobid for home for her UTI.  She is to follow-up with PCP.  Follow-up with urology if symptoms continue.  Strict ED return precaution given.  All questions answered.  She is comfortable with plan and stable for discharge.  I reviewed patient's chart and labs and discussed this case with my supervising M.D.              ED Course     Clinical Impression:   The encounter diagnosis was Urinary tract infection with hematuria, site unspecified.    Disposition:   Disposition: Discharged  Condition: Stable       Kassie Nuñez PA-C  04/15/17 1048

## 2017-04-15 NOTE — ED NOTES
Reports going to restroom this morning and having blood in her urine.  Unsure if from urine or vaginal bleeding.  Denies pain or dysuria.  Not on blood thinners.

## 2017-04-15 NOTE — DISCHARGE INSTRUCTIONS
"Take oral antibiotics Macrobid 2 times a day which is every 12 hours for the next 7 days.  Drink plenty of fluids.  Practice proper hygiene.  Follow-up with your primary care physician in one week.  Follow-up with Urology if your symptoms do not improve or return to the emergency department for new worsening symptoms.    Future Appointments  Date Time Provider Department Center   4/18/2017 2:00 PM Sylvia Davies MD [facility] [address]   4/26/2017 2:10 PM Shayan Schaefer Jr., MD [facility]         Bladder Infection, Female (Adult)    Urine is normally doesn't have any bacteria in it. But bacteria can get into the urinary tract from the skin around the rectum. Or they can travel in the blood from elsewhere in the body. Once they are in your urinary tract, they can cause infection in the urethra (urethritis), the bladder (cystitis), or the kidneys (pyelonephritis).  The most common place for an infection is in the bladder. This is called a bladder infection. This is one of the most common infections in women. Most bladder infections are easily treated. They are not serious unless the infection spreads to the kidney.  The phrases "bladder infection," "UTI," and "cystitis" are often used to describe the same thing. But they are not always the same. Cystitis is an inflammation of the bladder. The most common cause of cystitis is an infection.  Symptoms  The infection causes inflammation in the urethra and bladder. This causes many of the symptoms. The most common symptoms of a bladder infection are:  · Pain or burning when urinating  · Having to urinate more often than usual  · Urgent need to urinate  · Only a small amount of urine comes out  · Blood in urine  · Abdominal discomfort. This is usually in the lower abdomen above the pubic bone.  · Cloudy urine  · Strong- or bad-smelling urine  · Unable to urinate (urinary retention)  · Unable to hold urine in (urinary incontinence)  · Fever  · Loss of " appetite  · Confusion (in older adults)  Causes  Bladder infections are not contagious. You can't get one from someone else, from a toilet seat, or from sharing a bath.  The most common cause of bladder infections is bacteria from the bowels. The bacteria get onto the skin around the opening of the urethra. From there, they can get into the urine and travel up to the bladder, causing inflammation and infection. This usually happens because of:  · Wiping improperly after urinating. Always wipe from front to back.  · Bowel incontinence  · Pregnancy  · Procedures such as having a catheter inserted  · Older age  · Not emptying your bladder. This can allow bacteria a chance to grow in your urine.  · Dehydration  · Constipation  · Sex  · Use of a diaphragm for birth control   Treatment  Bladder infections are diagnosed by a urine test. They are treated with antibiotics and usually clear up quickly without complications. Treatment helps prevent a more serious kidney infection.  Medicines  Medicines can help in the treatment of a bladder infection:  · Take antibiotics until they are used up, even if you feel better. It is important to finish them to make sure the infection has cleared.  · You can use acetaminophen or ibuprofen for pain, fever, or discomfort, unless another medicine was prescribed. If you have chronic liver or kidney disease, talk with your healthcare provider before using these medicines. Also talk with your provider if you've ever had a stomach ulcer or gastrointestinal bleeding, or are taking blood-thinner medicines.  · If you are given phenazopydridine to reduce burning with urination, it will cause your urine to become a bright orange color. This can stain clothing.  Care and prevention  These self-care steps can help prevent future infections:  · Drink plenty of fluids to prevent dehydration and flush out your bladder. Do this unless you must restrict fluids for other health reasons, or your doctor  told you not to.  · Proper cleaning after going to the bathroom is important. Wipe from front to back after using the toilet to prevent the spread of bacteria.  · Urinate more often. Don't try to hold urine in for a long time.  · Wear loose-fitting clothes and cotton underwear. Avoid tight-fitting pants.  · Improve your diet and prevent constipation. Eat more fresh fruit and vegetables, and fiber, and less junk and fatty foods.  · Avoid sex until your symptoms are gone.  · Avoid caffeine, alcohol, and spicy foods. These can irritate your bladder.  · Urinate right after intercourse to flush out your bladder.  · If you use birth control pills and have frequent bladder infections, discuss it with your doctor.  Follow-up care  Call your healthcare provider if all symptoms are not gone after 3 days of treatment. This is especially important if you have repeat infections.  If a culture was done, you will be told if your treatment needs to be changed. If directed, you can call to find out the results.  If X-rays were done, you will be told if the results will affect your treatment.  Call 911  Call 911 if any of the following occur:  · Trouble breathing  · Hard to wake up or confusion  · Fainting or loss of consciousness  · Rapid heart rate  When to seek medical advice  Call your healthcare provider right away if any of these occur:  · Fever of 100.4ºF (38.0ºC) or higher, or as directed by your healthcare provider  · Symptoms are not better by the third day of treatment  · Back or belly (abdominal) pain that gets worse  · Repeated vomiting, or unable to keep medicine down  · Weakness or dizziness  · Vaginal discharge  · Pain, redness, or swelling in the outer vaginal area (labia)  Date Last Reviewed: 10/1/2016  © 3752-8989 [employer]. [address] [place] [address], [place] [address]. All rights reserved. This information is not intended as a substitute for professional medical care. Always follow your healthcare  professional's instructions.

## 2017-04-17 DIAGNOSIS — Z01.419 ROUTINE GYNECOLOGICAL EXAMINATION: Primary | ICD-10-CM

## 2017-04-17 LAB
BACTERIA UR CULT: NORMAL
BACTERIA UR CULT: NORMAL

## 2017-05-03 ENCOUNTER — HOSPITAL ENCOUNTER (OUTPATIENT)
Dept: RADIOLOGY | Facility: HOSPITAL | Age: 67
Discharge: HOME | End: 2017-05-03
Attending: INTERNAL MEDICINE
Payer: MEDICARE

## 2017-05-03 ENCOUNTER — OFFICE VISIT (OUTPATIENT)
Dept: FAMILY MEDICINE | Facility: CLINIC | Age: 67
End: 2017-05-03
Payer: MEDICARE

## 2017-05-03 VITALS
BODY MASS INDEX: 33.66 KG/M2 | OXYGEN SATURATION: 95 % | HEIGHT: 63 IN | HEART RATE: 76 BPM | SYSTOLIC BLOOD PRESSURE: 132 MMHG | DIASTOLIC BLOOD PRESSURE: 78 MMHG | TEMPERATURE: 98 F | WEIGHT: 190 LBS

## 2017-05-03 DIAGNOSIS — M47.812 OSTEOARTHRITIS OF CERVICAL SPINE, UNSPECIFIED SPINAL OSTEOARTHRITIS COMPLICATION STATUS: ICD-10-CM

## 2017-05-03 DIAGNOSIS — S83.92XA SPRAIN OF LEFT KNEE, UNSPECIFIED LIGAMENT, INITIAL ENCOUNTER: ICD-10-CM

## 2017-05-03 DIAGNOSIS — J30.9 ALLERGIC RHINITIS, UNSPECIFIED ALLERGIC RHINITIS TRIGGER, UNSPECIFIED RHINITIS SEASONALITY: ICD-10-CM

## 2017-05-03 DIAGNOSIS — S06.0X0A CONCUSSION, WITHOUT LOC, INITIAL ENCOUNTER: ICD-10-CM

## 2017-05-03 DIAGNOSIS — M54.2 NECK PAIN: ICD-10-CM
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
## 2017-05-03 PROCEDURE — 1160F RVW MEDS BY RX/DR IN RCRD: CPT | Mod: S$GLB,,, | Performed by: INTERNAL MEDICINE

## 2017-05-03 PROCEDURE — 1159F MED LIST DOCD IN RCRD: CPT | Mod: S$GLB,,, | Performed by: INTERNAL MEDICINE

## 2017-05-03 PROCEDURE — 73560 X-RAY EXAM OF KNEE 1 OR 2: CPT | Mod: 26,LT,, | Performed by: RADIOLOGY

## 2017-05-03 PROCEDURE — 3078F DIAST BP <80 MM HG: CPT | Mod: S$GLB,,, | Performed by: INTERNAL MEDICINE

## 2017-05-03 PROCEDURE — 72040 X-RAY EXAM NECK SPINE 2-3 VW: CPT | Mod: TC,PO

## 2017-05-03 PROCEDURE — 72040 X-RAY EXAM NECK SPINE 2-3 VW: CPT | Mod: 26,,, | Performed by: RADIOLOGY

## 2017-05-03 PROCEDURE — 1157F ADVNC CARE PLAN IN RCRD: CPT | Mod: S$GLB,,, | Performed by: INTERNAL MEDICINE

## 2017-05-03 PROCEDURE — 99214 OFFICE O/P EST MOD 30 MIN: CPT | Mod: S$GLB,,, | Performed by: INTERNAL MEDICINE

## 2017-05-03 PROCEDURE — 1125F AMNT PAIN NOTED PAIN PRSNT: CPT | Mod: S$GLB,,, | Performed by: INTERNAL MEDICINE

## 2017-05-03 PROCEDURE — 3075F SYST BP GE 130 - 139MM HG: CPT | Mod: S$GLB,,, | Performed by: INTERNAL MEDICINE

## 2017-05-03 PROCEDURE — 99999 PR PBB SHADOW E&M-EST. PATIENT-LVL IV: CPT | Mod: PBBFAC,,, | Performed by: INTERNAL MEDICINE

## 2017-05-03 PROCEDURE — 73560 X-RAY EXAM OF KNEE 1 OR 2: CPT | Mod: TC,PO,LT

## 2017-05-03 RX ORDER — TIZANIDINE 2 MG/1
4 TABLET ORAL NIGHTLY PRN
Qty: 30 TABLET | Refills: 0 | Status: SHIPPED | OUTPATIENT
Start: 2017-05-03 | End: 2017-08-23
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
## 2017-05-04 NOTE — PROGRESS NOTES
xR C spine chronic changes - spondylosis but no acute findings.  Referred to PT and concussion clinic

## 2017-05-10 ENCOUNTER — OFFICE VISIT (OUTPATIENT)
Dept: ORTHOPEDICS | Facility: CLINIC | Age: 67
End: 2017-05-10
Attending: ORTHOPAEDIC SURGERY
Payer: MEDICARE

## 2017-05-10 VITALS — BODY MASS INDEX: 33.66 KG/M2 | HEIGHT: 63 IN | WEIGHT: 190 LBS

## 2017-05-10 DIAGNOSIS — G56.02 CARPAL TUNNEL SYNDROME OF LEFT WRIST: ICD-10-CM

## 2017-05-10 DIAGNOSIS — M65.341 TRIGGER RING FINGER OF RIGHT HAND: Primary | ICD-10-CM

## 2017-05-10 PROCEDURE — 1159F MED LIST DOCD IN RCRD: CPT | Mod: S$GLB,,, | Performed by: ORTHOPAEDIC SURGERY

## 2017-05-10 PROCEDURE — 1126F AMNT PAIN NOTED NONE PRSNT: CPT | Mod: S$GLB,,, | Performed by: ORTHOPAEDIC SURGERY

## 2017-05-10 PROCEDURE — 99999 PR PBB SHADOW E&M-EST. PATIENT-LVL II: CPT | Mod: PBBFAC,,, | Performed by: ORTHOPAEDIC SURGERY

## 2017-05-10 PROCEDURE — 99213 OFFICE O/P EST LOW 20 MIN: CPT | Mod: S$GLB,,, | Performed by: ORTHOPAEDIC SURGERY

## 2017-05-10 PROCEDURE — 1157F ADVNC CARE PLAN IN RCRD: CPT | Mod: S$GLB,,, | Performed by: ORTHOPAEDIC SURGERY

## 2017-05-10 PROCEDURE — 1160F RVW MEDS BY RX/DR IN RCRD: CPT | Mod: S$GLB,,, | Performed by: ORTHOPAEDIC SURGERY

## 2017-05-10 RX ORDER — TRAMADOL HYDROCHLORIDE 50 MG/1
50 TABLET ORAL EVERY 6 HOURS PRN
Qty: 30 TABLET | Refills: 0 | Status: SHIPPED | OUTPATIENT
Start: 2017-05-10 | End: 2017-05-20

## 2017-05-10 NOTE — PROGRESS NOTES
HISTORY OF PRESENT ILLNESS:  Ms. Barahona in followup of left carpal tunnel   syndrome.  She is doing better since the last injection.  She is really having   just occasional symptoms only.  The shoulder is also improved, but she does have   some pain at night in the shoulder from time to time.    PHYSICAL EXAMINATION:  LEFT HAND:  No significant swelling.  Range of motion in wrist and fingers full.     strength improved.  Mildly positive Tinel sign.  Sensation intact.    IMPRESSION:  Left carpal tunnel syndrome, improved.    PLAN:  I would like her to continue with use of the wrist brace, mainly at   night.  I have given her some Ultram for shoulder pain at night as well.  Follow   up in 2-3 months or p.r.n.      JANEE/RAMESH  dd: 05/10/2017 15:44:17 (CDT)  td: 05/11/2017 09:06:49 (ESTRELLA)  Doc ID   #[id number]  Job ID #[id number]    CC:

## 2017-05-17 ENCOUNTER — CLINICAL SUPPORT (OUTPATIENT)
Dept: REHABILITATION | Facility: HOSPITAL | Age: 67
End: 2017-05-17
Attending: INTERNAL MEDICINE
Payer: MEDICARE

## 2017-05-17 DIAGNOSIS — S83.92XA SPRAIN OF UNSPECIFIED SITE OF LEFT KNEE, INITIAL ENCOUNTER: ICD-10-CM

## 2017-05-17 DIAGNOSIS — M25.60 JOINT STIFFNESS: ICD-10-CM

## 2017-05-17 DIAGNOSIS — M62.81 MUSCLE WEAKNESS: ICD-10-CM

## 2017-05-17 DIAGNOSIS — M54.2 NECK PAIN: ICD-10-CM

## 2017-05-17 PROCEDURE — G8978 MOBILITY CURRENT STATUS: HCPCS | Mod: CL,PN

## 2017-05-17 PROCEDURE — 97161 PT EVAL LOW COMPLEX 20 MIN: CPT | Mod: PN

## 2017-05-17 PROCEDURE — G8979 MOBILITY GOAL STATUS: HCPCS | Mod: CK,PN

## 2017-05-17 PROCEDURE — 97110 THERAPEUTIC EXERCISES: CPT | Mod: PN
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
## 2017-05-19 DIAGNOSIS — F32.0 MAJOR DEPRESSIVE DISORDER, SINGLE EPISODE, MILD: ICD-10-CM

## 2017-05-19 RX ORDER — BUPROPION HYDROCHLORIDE 75 MG/1
TABLET ORAL
Qty: 180 TABLET | Refills: 0 | Status: SHIPPED | OUTPATIENT
Start: 2017-05-19 | End: 2017-07-19 | Stop reason: SDUPTHER

## 2017-05-24 ENCOUNTER — TELEPHONE (OUTPATIENT)
Dept: FAMILY MEDICINE | Facility: CLINIC | Age: 67
End: 2017-05-24

## 2017-05-24 ENCOUNTER — OFFICE VISIT (OUTPATIENT)
Dept: FAMILY MEDICINE | Facility: CLINIC | Age: 67
End: 2017-05-24
Payer: MEDICARE

## 2017-05-24 VITALS
WEIGHT: 197 LBS | TEMPERATURE: 99 F | DIASTOLIC BLOOD PRESSURE: 64 MMHG | HEIGHT: 63 IN | HEART RATE: 77 BPM | OXYGEN SATURATION: 97 % | BODY MASS INDEX: 34.91 KG/M2 | SYSTOLIC BLOOD PRESSURE: 136 MMHG

## 2017-05-24 DIAGNOSIS — E66.9 OBESITY (BMI 30-39.9): ICD-10-CM

## 2017-05-24 DIAGNOSIS — M25.562 ACUTE PAIN OF LEFT KNEE: ICD-10-CM
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
## 2017-05-24 DIAGNOSIS — I10 ESSENTIAL HYPERTENSION: ICD-10-CM

## 2017-05-24 DIAGNOSIS — M54.2 NECK PAIN: ICD-10-CM

## 2017-05-24 PROCEDURE — 1159F MED LIST DOCD IN RCRD: CPT | Mod: S$GLB,,, | Performed by: INTERNAL MEDICINE

## 2017-05-24 PROCEDURE — 3075F SYST BP GE 130 - 139MM HG: CPT | Mod: S$GLB,,, | Performed by: INTERNAL MEDICINE

## 2017-05-24 PROCEDURE — 4010F ACE/ARB THERAPY RXD/TAKEN: CPT | Mod: S$GLB,,, | Performed by: INTERNAL MEDICINE

## 2017-05-24 PROCEDURE — 1160F RVW MEDS BY RX/DR IN RCRD: CPT | Mod: S$GLB,,, | Performed by: INTERNAL MEDICINE

## 2017-05-24 PROCEDURE — 3078F DIAST BP <80 MM HG: CPT | Mod: S$GLB,,, | Performed by: INTERNAL MEDICINE

## 2017-05-24 PROCEDURE — 99499 UNLISTED E&M SERVICE: CPT | Mod: S$GLB,,, | Performed by: INTERNAL MEDICINE

## 2017-05-24 PROCEDURE — 1126F AMNT PAIN NOTED NONE PRSNT: CPT | Mod: S$GLB,,, | Performed by: INTERNAL MEDICINE

## 2017-05-24 PROCEDURE — 99214 OFFICE O/P EST MOD 30 MIN: CPT | Mod: S$GLB,,, | Performed by: INTERNAL MEDICINE

## 2017-05-24 PROCEDURE — 3045F PR MOST RECENT HEMOGLOBIN A1C LEVEL 7.0-9.0%: CPT | Mod: S$GLB,,, | Performed by: INTERNAL MEDICINE

## 2017-05-24 PROCEDURE — 99999 PR PBB SHADOW E&M-EST. PATIENT-LVL III: CPT | Mod: PBBFAC,,, | Performed by: INTERNAL MEDICINE

## 2017-05-24 RX ORDER — PEN NEEDLE, DIABETIC 30 GX3/16"
NEEDLE, DISPOSABLE MISCELLANEOUS
Qty: 100 EACH | Refills: 5 | Status: SHIPPED | OUTPATIENT
Start: 2017-05-24 | End: 2018-01-10 | Stop reason: SDUPTHER

## 2017-05-24 RX ORDER — INSULIN ASPART 100 [IU]/ML
INJECTION, SOLUTION INTRAVENOUS; SUBCUTANEOUS
Qty: 9 ML | Refills: 0 | Status: SHIPPED | OUTPATIENT
Start: 2017-05-24 | End: 2017-07-21 | Stop reason: ALTCHOICE
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
## 2017-05-25 ENCOUNTER — TELEPHONE (OUTPATIENT)
Dept: REHABILITATION | Facility: HOSPITAL | Age: 67
End: 2017-05-25

## 2017-05-25 ENCOUNTER — TELEPHONE (OUTPATIENT)
Dept: FAMILY MEDICINE | Facility: CLINIC | Age: 67
End: 2017-05-25

## 2017-05-25 NOTE — TELEPHONE ENCOUNTER
----- Message from Mara Dhaliwal sent at 5/25/2017 10:22 AM CDT -----  Contact: self [phone]      ----- Message -----  From: Chioma Awan  Sent: 5/25/2017  10:06 AM  To: [facility] Cuevas Staff    Pt is upset that she put in a request for her valium to be refilled and no one has called her back. Pt hung up the phone . Pls call pt [phone]. Thanks......Zohreh

## 2017-05-26 ENCOUNTER — OFFICE VISIT (OUTPATIENT)
Dept: OBSTETRICS AND GYNECOLOGY | Facility: CLINIC | Age: 67
End: 2017-05-26
Payer: MEDICARE

## 2017-05-26 VITALS — BODY MASS INDEX: 34.76 KG/M2 | WEIGHT: 196.19 LBS | HEIGHT: 63 IN

## 2017-05-26 DIAGNOSIS — Z12.31 ENCOUNTER FOR SCREENING MAMMOGRAM FOR BREAST CANCER: ICD-10-CM

## 2017-05-26 DIAGNOSIS — Z01.419 ENCOUNTER FOR ANNUAL ROUTINE GYNECOLOGICAL EXAMINATION: Primary | ICD-10-CM

## 2017-05-26 PROCEDURE — 99999 PR PBB SHADOW E&M-EST. PATIENT-LVL II: CPT | Mod: PBBFAC,,, | Performed by: OBSTETRICS & GYNECOLOGY

## 2017-05-26 PROCEDURE — G0101 CA SCREEN;PELVIC/BREAST EXAM: HCPCS | Mod: S$GLB,,, | Performed by: OBSTETRICS & GYNECOLOGY

## 2017-05-26 NOTE — PROGRESS NOTES
Subjective:       Patient ID: Cecilia Barahona is a 67 y.o. female.    Chief Complaint:  Annual Exam      History of Present Illness  HPI  Cecilia Barahona is a 67 y.o. female  here for annual exam.    She had hysterectomy following her last baby.   denies break through bleeding.   denies vaginal itching or irritation.  denies vaginal discharge.  History of abnormal pap: No  Last Pap: ~30 years ago  Last MMG: 3/2016 was normal.   Last Colonoscopy:  colonoscopy 2 years ago without abnormalities. Repeat in 5 years.       GYN & OB History  No LMP recorded. Patient has had a hysterectomy.   Date of Last Pap: No result found    OB History   No data available       Review of Systems  Review of Systems   Constitutional: Negative for chills, fatigue and fever.   Respiratory: Negative for shortness of breath.    Cardiovascular: Negative for chest pain.   Gastrointestinal: Negative for abdominal pain, constipation, diarrhea, nausea and vomiting.   Genitourinary: Positive for urgency. Negative for dysuria, frequency, vaginal bleeding, vaginal discharge, vaginal pain, postcoital bleeding, postmenopausal bleeding and vaginal odor.   Breast: Negative for breast mass, breast pain, nipple discharge and skin changes          Objective:    Physical Exam:   Constitutional: She appears well-developed and well-nourished. No distress.    HENT:   Head: Normocephalic and atraumatic.    Eyes: EOM are normal.    Neck: Normal range of motion. Neck supple. No thyromegaly present.    Cardiovascular: Normal rate and normal heart sounds.  Exam reveals no gallop and no friction rub.    No murmur heard.   Pulmonary/Chest: Effort normal and breath sounds normal. She has no wheezes. She has no rales. Right breast exhibits no inverted nipple, no mass, no nipple discharge, no skin change, no tenderness, presence, no bleeding and no swelling. Left breast exhibits no inverted nipple, no mass, no nipple discharge, no skin change, no tenderness,  presence, no bleeding and no swelling. Breasts are symmetrical.        Abdominal: Soft. Normal appearance and bowel sounds are normal. She exhibits no distension. There is no hepatosplenomegaly. There is no tenderness. There is no rigidity, no rebound and no guarding.     Genitourinary: There is no rash, tenderness, lesion or injury on the right labia. There is no rash, tenderness, lesion or injury on the left labia. Uterus is absent. Right adnexum displays no mass, no tenderness and no fullness. Left adnexum displays no mass, no tenderness and no fullness. No tenderness or bleeding in the vagina. No foreign body in the vagina. No vaginal discharge found. Vaginal cuff normal.Cervix exhibits absence.              Lymphadenopathy:     She has no cervical adenopathy.     She has no axillary adenopathy.      Psychiatric: She has a normal mood and affect.          Assessment:        1. Encounter for annual routine gynecological examination    2. Encounter for screening mammogram for breast cancer              Plan:      1. Encounter for annual routine gynecological examination  - Pap not indicated. Discussed ASCCP guidelines for screening to stop after hysterectomy for benign conditions.   - MMG due 3/2018  - Colonoscopy up to date.      2. Encounter for screening mammogram for breast cancer  - Mammo Digital Screening Bilat With CAD; Future       Return in about 2 years (around 5/26/2019) for Annual exam.

## 2017-05-26 NOTE — LETTER
May 26, 2017      Yudi Smart MD  [address] [facility] [address]           [facility] [place] - OB/ GYN  [address] [facility] [address]  [facility] [address]  Phone: [phone]          Patient: Cecilia Barahona   MR Number: [id number]   YOB: 1950   Date of Visit: 5/26/2017       Dear Dr. Yudi Smart:    Thank you for referring Cecilia Barahona to me for evaluation. Attached you will find relevant portions of my assessment and plan of care.    If you have questions, please do not hesitate to call me. I look forward to following Cecilia Barahona along with you.    Sincerely,    Anna Platt MD    Enclosure  CC:  No Recipients    If you would like to receive this communication electronically, please contact [email or web address] or [phone] to request more information on [facility] Link access.    For providers and/or their staff who would like to refer a patient to [facility], please contact us through our one-stop-shop provider referral line, [facility], at [phone].    If you feel you have received this communication in error or would no longer like to receive these types of communications, please e-mail [email or web address]

## 2017-06-06 ENCOUNTER — TELEPHONE (OUTPATIENT)
Dept: ENDOCRINOLOGY | Facility: CLINIC | Age: 67
End: 2017-06-06

## 2017-06-06 ENCOUNTER — CLINICAL SUPPORT (OUTPATIENT)
Dept: REHABILITATION | Facility: HOSPITAL | Age: 67
End: 2017-06-06
Attending: INTERNAL MEDICINE
Payer: MEDICARE

## 2017-06-06 DIAGNOSIS — M62.81 MUSCLE WEAKNESS: ICD-10-CM

## 2017-06-06 DIAGNOSIS — M54.2 NECK PAIN: Primary | ICD-10-CM

## 2017-06-06 DIAGNOSIS — S83.92XA SPRAIN OF UNSPECIFIED SITE OF LEFT KNEE, INITIAL ENCOUNTER: ICD-10-CM

## 2017-06-06 DIAGNOSIS — M25.60 JOINT STIFFNESS: ICD-10-CM

## 2017-06-06 PROCEDURE — 97110 THERAPEUTIC EXERCISES: CPT | Mod: PN

## 2017-06-06 NOTE — TELEPHONE ENCOUNTER
Called and confirmed appointment with patient for Wed 6/7/17 at 1:00p. Patient verbalized confirmation.

## 2017-06-06 NOTE — PROGRESS NOTES
Physical Therapy Daily Note     Name: Cecilia Barahona  [facility] Number: [id number]  Diagnosis:   Encounter Diagnoses   Name Primary?    Neck pain Yes    Sprain of unspecified site of left knee, initial encounter     Muscle weakness     Joint stiffness      Physician: Jose Maria Angel MD  Visit #: 2 of 20  PTA Visit #: 1    Time In: 1500  Time Out: 1605  Total Treatment Time: 65 minutes (1:1 with PTA 30 minutes of treatment session)    Subjective     Pt reports: Cecilia reports she is feeling good today and that her neck is responding well to the HEP. Patient states her neck is ok but her left knee is a little sore today. Beatrice  Pain Scale: Cecilia rates pain on a scale of 0-10 to be 2 currently.    Objective     Cecilia received individual therapeutic exercises to develop strength, endurance, ROM, flexibility, posture and core stabilization for 45 minutes including:  -Nu step x 6 minutes   -Scapular retraction 2 x 10, 3 sec hold   -C/s AROM all planes x 2 minutes (rotation/ flex/ext)  -Seated B upper trap stretch 20 sec x 3 ea LE   -Seated BUE rotation (YTB) 2x10  -Seated LAQ 2x10, 3 sec hold   -Supine dowel flexion 2x10  -Supine quad set 2x10  -SAQ 2x10  -Heel slides 2x10    Cecilia received the following manual therapy techniques: patellar mobilizations were applied to the: left knee for 10 minutes including:  Medial/posterior knee STM    Cecilia received moist heat to cervical spine and left knee x 10 minutes post treatment session.     Written Home Exercises Provided: Reviewed current HEP with patient.   Pt demo good understanding of the education provided. Cecilia demonstrated good return demonstration of activities.     Education provided re:Cecilia verbalized good understanding of education provided.   No spiritual or educational barriers to learning provided    Assessment     Cecilia tolerated treatment session well today. Patient demonstrates improvements in active  cervical ROM today with only stretching reported at end ranges. Patient demonstrates decreased tissue mobility throughout medial/posterior knee structures with fair response to manual therapy techniques due to sensitivity. Patient demonstrates increased apprehension to movement in general with significant improvements in muscle relaxation achieved by end of treatment session.   This is a 67 y.o. female referred to outpatient physical therapy and presents with a medical diagnosis of neck/left knee pain and demonstrates limitations as described in the problem list. Pt prognosis is Good. Pt will continue to benefit from skilled outpatient physical therapy to address the deficits listed in the problem list, provide pt/family education and to maximize pt's level of independence in the home and community environment.     Goals as follows:  Short Term Goals (4 Weeks):     1.Pt to increase strength by a 1/2 grade of muscles test to allow for improvement in functional activities such as performing chores.  2.Pt to improve range of motion by 25% to allow for improved functional mobility to allow for improvement in IADLs.   3.Pt to report compliance with HEP and demonstrate proper exercise technique to PT to show competence with self management of condition.  4.Decrease pain by 25% during functional activities.    Long Term Goals (12 Weeks):     1. Increase ROM to allow improved joint biomechanics during functional activities.   2.Increase trunk, lowe and upper extremity strength to within normal limits during functional activities.   3. Independent with home exercise program.   4. Full return to functional activities with manageable complaints.  5. Patient to demonstrate improved posture and body mechanics.  6. Decrease pain by 75% during functional activities.       Plan     Continue with established Plan of Care towards PT goals.    Therapist: Betsy Yap, PTA  6/6/2017

## 2017-06-13 ENCOUNTER — CLINICAL SUPPORT (OUTPATIENT)
Dept: REHABILITATION | Facility: HOSPITAL | Age: 67
End: 2017-06-13
Attending: INTERNAL MEDICINE
Payer: MEDICARE

## 2017-06-13 DIAGNOSIS — M62.81 MUSCLE WEAKNESS: ICD-10-CM

## 2017-06-13 DIAGNOSIS — S83.92XA SPRAIN OF UNSPECIFIED SITE OF LEFT KNEE, INITIAL ENCOUNTER: ICD-10-CM

## 2017-06-13 DIAGNOSIS — M25.60 JOINT STIFFNESS: ICD-10-CM

## 2017-06-13 DIAGNOSIS — M54.2 NECK PAIN: Primary | ICD-10-CM

## 2017-06-13 PROCEDURE — 97110 THERAPEUTIC EXERCISES: CPT | Mod: PN

## 2017-06-13 PROCEDURE — 97140 MANUAL THERAPY 1/> REGIONS: CPT | Mod: PN

## 2017-06-13 NOTE — PROGRESS NOTES
Physical Therapy Daily Note     Name: Cecilia Barahona  [facility] Number: [id number]  Diagnosis:   Encounter Diagnoses   Name Primary?    Neck pain Yes    Sprain of unspecified site of left knee, initial encounter     Muscle weakness     Joint stiffness      Physician: Jose Maria Angel MD  Visit #: 3 of 20      Time In: 9:15  Time Out: 10:15  Total Treatment Time: 60 minutes    Subjective     Pt reports:  The knee is quite sore today 7/10 jpain. States the neck is 4/10.       Objective     Cecilia received individual therapeutic exercises to develop strength, endurance, ROM, flexibility, posture and core stabilization for 45 minutes including:  -Nu step x 6 minutes   -Scapular retraction 2 x 10, 3 sec hold   -C/s AROM all planes x 2 minutes (rotation/ flex/ext)  -Seated B upper trap stretch 20 sec x 3 ea LE   -Seated BUE rotation (YTB) 2x10; np  -Seated LAQ 2x10, 3 sec hold   -Supine dowel flexion 2x10  -resisted scapular retraction 2 x 10 yellow t-band  -Supine quad set 2x10  -SAQ 2x10  -Heel slides 2x10    Cecilia received the following manual therapy techniques: patellar mobilizations were applied to the: left knee for 8 minutes including:  Tibiofemoral distraction grade 2-2    Cecilia received moist heat to cervical spine and left knee x 10 minutes post treatment session.     Written Home Exercises Provided: Reviewed current HEP with patient.         Assessment     No c/o increased discomfort with prescribed activities. Good response to exercise progression.   Pt demonstrates a good understanding of the education provided and a good return demonstration of activities. Pt  Requires skilled supervision to complete and progress home program.    Medical necessity is demonstrated by the following IMPAIRMENTS:  -pain   -decreased range of motion/flexibility   -decreased muscle strength   -impaired function    -decreased ADL ability  -decreased recreational ability      Patient is making good progress towards established goals.      Goals as follows:  Short Term Goals (4 Weeks):     1.Pt to increase strength by a 1/2 grade of muscles test to allow for improvement in functional activities such as performing chores.  2.Pt to improve range of motion by 25% to allow for improved functional mobility to allow for improvement in IADLs.   3.Pt to report compliance with HEP and demonstrate proper exercise technique to PT to show competence with self management of condition.  4.Decrease pain by 25% during functional activities.    Long Term Goals (12 Weeks):     1. Increase ROM to allow improved joint biomechanics during functional activities.   2.Increase trunk, lowe and upper extremity strength to within normal limits during functional activities.   3. Independent with home exercise program.   4. Full return to functional activities with manageable complaints.  5. Patient to demonstrate improved posture and body mechanics.  6. Decrease pain by 75% during functional activities.       Plan     Continue with established Plan of Care towards PT goals.    Therapist: Maciel Mcarthur, PT  6/13/2017

## 2017-06-14 ENCOUNTER — TELEPHONE (OUTPATIENT)
Dept: ENDOCRINOLOGY | Facility: CLINIC | Age: 67
End: 2017-06-14

## 2017-06-14 NOTE — TELEPHONE ENCOUNTER
Called and confirmed appointment with patient for Thurs 6/15/17 at 10:00a. Patient verbalized confirmation.

## 2017-06-15 ENCOUNTER — TELEPHONE (OUTPATIENT)
Dept: OPTOMETRY | Facility: CLINIC | Age: 67
End: 2017-06-15

## 2017-06-16 ENCOUNTER — CLINICAL SUPPORT (OUTPATIENT)
Dept: REHABILITATION | Facility: HOSPITAL | Age: 67
End: 2017-06-16
Attending: INTERNAL MEDICINE
Payer: MEDICARE

## 2017-06-16 DIAGNOSIS — M54.2 NECK PAIN: Primary | ICD-10-CM

## 2017-06-16 DIAGNOSIS — S83.92XA SPRAIN OF UNSPECIFIED SITE OF LEFT KNEE, INITIAL ENCOUNTER: ICD-10-CM

## 2017-06-16 DIAGNOSIS — M62.81 MUSCLE WEAKNESS: ICD-10-CM

## 2017-06-16 DIAGNOSIS — M25.60 JOINT STIFFNESS: ICD-10-CM

## 2017-06-16 PROCEDURE — 97140 MANUAL THERAPY 1/> REGIONS: CPT | Mod: PN

## 2017-06-16 PROCEDURE — 97110 THERAPEUTIC EXERCISES: CPT | Mod: PN

## 2017-06-16 NOTE — PROGRESS NOTES
Physical Therapy Daily Note     Name: Cecilia Barahona  [facility] Number: [id number]  Diagnosis:   Encounter Diagnoses   Name Primary?    Neck pain Yes    Sprain of unspecified site of left knee, initial encounter     Muscle weakness     Joint stiffness      Physician: Jose Maria Angel MD  Visit #: 4 of 20      Time In: 9:00  Time Out: 10:00  Total Treatment Time: 60 minutes    Subjective     Pt reports:  The knee is feeling better today 4/10 pain. States the neck is 2/10.       Objective     Cecilia received individual therapeutic exercises to develop strength, endurance, ROM, flexibility, posture and core stabilization for 50 minutes including:    -Nu step x 6 minutes   -Scapular retraction 2 x 10, 3 sec hold   -C/s AROM all planes x 2 minutes (rotation/ flex/ext): home program  -Seated B upper trap stretch 20 sec x 3 ea LE   -Seated BUE rotation (YTB) 2x10; np  -Seated LAQ 2x10, 3 sec hold: np  -Supine dowel flexion 2x10  -hamstring stretch with belt 20 sec x 4  -resisted scapular retraction 2 x 10 yellow t-band  -Supine quad set 2x10  -SAQ 2x10: np  -supine hip abd with red t-band x 30  -SLR 2 x 5  -hip abd 2 x 5  -ball squeeze 2 x 10  -Heel slides 2x10    Cecilia received the following manual therapy techniques: patellar mobilizations were applied to the: left knee for 10 minutes including:  Tibiofemoral distraction grade 2, patellar mobilization    Cecilia received moist heat to cervical spine and left knee x 10 minutes post treatment session. np    Written Home Exercises Provided: Reviewed current HEP with patient.         Assessment     No c/o increased discomfort with prescribed activities. Good response to emphasis on right LE strengthening therex. . Pt demonstrates a good understanding of the education provided and a good return demonstration of activities. Pt  Requires skilled supervision to complete and progress home program.    Medical necessity is  demonstrated by the following IMPAIRMENTS:  -pain   -decreased range of motion/flexibility   -decreased muscle strength   -impaired function    -decreased ADL ability  -decreased recreational ability     Patient is making good progress towards established goals.      Goals as follows:  Short Term Goals (4 Weeks):     1.Pt to increase strength by a 1/2 grade of muscles test to allow for improvement in functional activities such as performing chores.  2.Pt to improve range of motion by 25% to allow for improved functional mobility to allow for improvement in IADLs.   3.Pt to report compliance with HEP and demonstrate proper exercise technique to PT to show competence with self management of condition.  4.Decrease pain by 25% during functional activities.    Long Term Goals (12 Weeks):     1. Increase ROM to allow improved joint biomechanics during functional activities.   2.Increase trunk, lowe and upper extremity strength to within normal limits during functional activities.   3. Independent with home exercise program.   4. Full return to functional activities with manageable complaints.  5. Patient to demonstrate improved posture and body mechanics.  6. Decrease pain by 75% during functional activities.       Plan     Continue with established Plan of Care towards PT goals.    Therapist: Maciel Mcarthur, PT  6/16/2017

## 2017-06-20 ENCOUNTER — TELEPHONE (OUTPATIENT)
Dept: ENDOCRINOLOGY | Facility: CLINIC | Age: 67
End: 2017-06-20

## 2017-06-20 NOTE — TELEPHONE ENCOUNTER
Called to confirm appointment with patient for Wed 6/21/17, patient stated she wanted to reschedule since the weather would be bad. Rescheduled for Wed 7/12/17 at 1:00p. Patient verbalized understanding. Reminder letter mailed.

## 2017-06-23 ENCOUNTER — CLINICAL SUPPORT (OUTPATIENT)
Dept: REHABILITATION | Facility: HOSPITAL | Age: 67
End: 2017-06-23
Attending: INTERNAL MEDICINE
Payer: MEDICARE

## 2017-06-23 DIAGNOSIS — M62.81 MUSCLE WEAKNESS: ICD-10-CM

## 2017-06-23 DIAGNOSIS — M25.60 JOINT STIFFNESS: ICD-10-CM

## 2017-06-23 DIAGNOSIS — S83.92XA SPRAIN OF UNSPECIFIED SITE OF LEFT KNEE, INITIAL ENCOUNTER: ICD-10-CM

## 2017-06-23 DIAGNOSIS — M54.2 NECK PAIN: Primary | ICD-10-CM

## 2017-06-23 PROCEDURE — 97110 THERAPEUTIC EXERCISES: CPT | Mod: PN

## 2017-06-27 ENCOUNTER — CLINICAL SUPPORT (OUTPATIENT)
Dept: REHABILITATION | Facility: HOSPITAL | Age: 67
End: 2017-06-27
Attending: INTERNAL MEDICINE
Payer: MEDICARE

## 2017-06-27 DIAGNOSIS — M54.2 NECK PAIN: Primary | ICD-10-CM

## 2017-06-27 DIAGNOSIS — S83.92XA SPRAIN OF UNSPECIFIED SITE OF LEFT KNEE, INITIAL ENCOUNTER: ICD-10-CM

## 2017-06-27 DIAGNOSIS — M62.81 MUSCLE WEAKNESS: ICD-10-CM

## 2017-06-27 DIAGNOSIS — M25.60 JOINT STIFFNESS: ICD-10-CM

## 2017-06-27 PROCEDURE — 97110 THERAPEUTIC EXERCISES: CPT | Mod: PN

## 2017-07-07 ENCOUNTER — TELEPHONE (OUTPATIENT)
Dept: FAMILY MEDICINE | Facility: CLINIC | Age: 67
End: 2017-07-07

## 2017-07-07 NOTE — TELEPHONE ENCOUNTER
2 months ago patient had a UTI and then 1 month ago she was passing blood and then that stopped. Now her symptoms are that when she urinates she still feels like she is full and only drops are coming out and her urine is clear. She denies drinking a lot of any fluids that could dilute this. Then the phone went dead, and have left 3 messages on this number and 1 on spouses cell. Message sent thru portal and Dr. Smart is aware of this .

## 2017-07-07 NOTE — TELEPHONE ENCOUNTER
----- Message from Tonia Navarro sent at 7/7/2017  1:49 PM CDT -----  Contact: Self  Pt states she's having trouble urinating.Pt can be reached @ [phone].

## 2017-07-10 ENCOUNTER — TELEPHONE (OUTPATIENT)
Dept: ENDOCRINOLOGY | Facility: CLINIC | Age: 67
End: 2017-07-10

## 2017-07-10 NOTE — TELEPHONE ENCOUNTER
Called and rescheduled Wed 7/12/17 appointment for Fri 7/21/17 at 10:00a per providers request. Patient verbalized understanding. Reminder letter mailed.

## 2017-07-12 DIAGNOSIS — R33.9 INCOMPLETE BLADDER EMPTYING: Primary | ICD-10-CM

## 2017-07-18 ENCOUNTER — TELEPHONE (OUTPATIENT)
Dept: FAMILY MEDICINE | Facility: CLINIC | Age: 67
End: 2017-07-18

## 2017-07-19 ENCOUNTER — OFFICE VISIT (OUTPATIENT)
Dept: UROLOGY | Facility: CLINIC | Age: 67
End: 2017-07-19
Attending: UROLOGY
Payer: MEDICARE

## 2017-07-19 ENCOUNTER — TELEPHONE (OUTPATIENT)
Dept: UROLOGY | Facility: CLINIC | Age: 67
End: 2017-07-19

## 2017-07-19 VITALS
HEIGHT: 63 IN | BODY MASS INDEX: 34.76 KG/M2 | HEART RATE: 75 BPM | WEIGHT: 196.19 LBS | DIASTOLIC BLOOD PRESSURE: 69 MMHG | SYSTOLIC BLOOD PRESSURE: 120 MMHG

## 2017-07-19 DIAGNOSIS — R31.0 GROSS HEMATURIA: Primary | ICD-10-CM

## 2017-07-19 DIAGNOSIS — R33.9 INCOMPLETE BLADDER EMPTYING: Primary | ICD-10-CM

## 2017-07-19 DIAGNOSIS — E78.5 HYPERLIPIDEMIA WITH TARGET LDL LESS THAN 100: ICD-10-CM

## 2017-07-19 DIAGNOSIS — F32.0 MAJOR DEPRESSIVE DISORDER, SINGLE EPISODE, MILD: ICD-10-CM

## 2017-07-19 DIAGNOSIS — R31.0 HEMATURIA, GROSS: ICD-10-CM

## 2017-07-19 LAB
BILIRUB SERPL-MCNC: ABNORMAL MG/DL
BLOOD URINE, POC: ABNORMAL
COLOR, POC UA: ABNORMAL
GLUCOSE UR QL STRIP: NORMAL
KETONES UR QL STRIP: ABNORMAL
LEUKOCYTE ESTERASE URINE, POC: ABNORMAL
NITRITE, POC UA: ABNORMAL
PH, POC UA: 5
POC RESIDUAL URINE VOLUME: 12 ML (ref 0–100)
PROTEIN, POC: ABNORMAL
SPECIFIC GRAVITY, POC UA: 1
UROBILINOGEN, POC UA: NORMAL

## 2017-07-19 PROCEDURE — 99999 PR PBB SHADOW E&M-EST. PATIENT-LVL III: CPT | Mod: PBBFAC,,, | Performed by: UROLOGY

## 2017-07-19 PROCEDURE — 99203 OFFICE O/P NEW LOW 30 MIN: CPT | Mod: 25,S$GLB,, | Performed by: UROLOGY

## 2017-07-19 PROCEDURE — 51798 US URINE CAPACITY MEASURE: CPT | Mod: S$GLB,,, | Performed by: UROLOGY

## 2017-07-19 PROCEDURE — 87088 URINE BACTERIA CULTURE: CPT

## 2017-07-19 PROCEDURE — 87077 CULTURE AEROBIC IDENTIFY: CPT

## 2017-07-19 PROCEDURE — 87186 SC STD MICRODIL/AGAR DIL: CPT

## 2017-07-19 PROCEDURE — 87086 URINE CULTURE/COLONY COUNT: CPT

## 2017-07-19 PROCEDURE — 1126F AMNT PAIN NOTED NONE PRSNT: CPT | Mod: S$GLB,,, | Performed by: UROLOGY

## 2017-07-19 PROCEDURE — 1159F MED LIST DOCD IN RCRD: CPT | Mod: S$GLB,,, | Performed by: UROLOGY

## 2017-07-19 PROCEDURE — 1157F ADVNC CARE PLAN IN RCRD: CPT | Mod: S$GLB,,, | Performed by: UROLOGY

## 2017-07-19 PROCEDURE — 81002 URINALYSIS NONAUTO W/O SCOPE: CPT | Mod: S$GLB,,, | Performed by: UROLOGY

## 2017-07-19 RX ORDER — PRAVASTATIN SODIUM 40 MG/1
TABLET ORAL
Qty: 90 TABLET | Refills: 1 | Status: SHIPPED | OUTPATIENT
Start: 2017-07-19 | End: 2018-01-10 | Stop reason: SDUPTHER

## 2017-07-19 RX ORDER — SULFAMETHOXAZOLE AND TRIMETHOPRIM 800; 160 MG/1; MG/1
1 TABLET ORAL 2 TIMES DAILY
Qty: 14 TABLET | Refills: 0 | Status: SHIPPED | OUTPATIENT
Start: 2017-07-19 | End: 2017-07-26

## 2017-07-19 RX ORDER — BUPROPION HYDROCHLORIDE 75 MG/1
TABLET ORAL
Qty: 180 TABLET | Refills: 1 | Status: SHIPPED | OUTPATIENT
Start: 2017-07-19 | End: 2017-11-10 | Stop reason: SDUPTHER

## 2017-07-19 NOTE — TELEPHONE ENCOUNTER
----- Message from Love Talavera sent at 7/19/2017  3:00 PM CDT -----  Patient is coming in tomorrow morning for a Ct scan patient will need lab orders for creatinine please. Thanks

## 2017-07-19 NOTE — TELEPHONE ENCOUNTER
----- Message from Idalmis Worrell MD sent at 7/19/2017 11:14 AM CDT -----  Please contact pt to schedule cystoscopy in [place] in 3-4 weeks.

## 2017-07-19 NOTE — PROGRESS NOTES
"  Subjective:       Cecilia Barahona is a 67 y.o. female who is a new patient who was referred by Dr Smart for evaluation of SALVADOR.      She reports difficulty emptying her bladder. She reports feeling of full bladder with only "drops coming out." She was treated for UTI in 4/17 (>100k E coli & Citrobacter). She noted gross hematuria at that time as well. She now has some occasional dysuria. Reports clear urine that was concerning for her. SALVADOR sensation has lessened but still present. Denies straining. Malodorous urine. Chronic constipation - has not been taking any meds for this now (Colace, MoM PRN). Reports BM only weekly!     Also noted blood while wiping only x 1 about 3 weeks ago.     PVR (bladder scan) today - 12cc      The following portions of the patient's history were reviewed and updated as appropriate: allergies, current medications, past family history, past medical history, past social history, past surgical history and problem list.    Review of Systems  Constitutional: no fever or chills  ENT: no nasal congestion or sore throat  Respiratory: no cough or shortness of breath  Cardiovascular: no chest pain or palpitations  Gastrointestinal: no nausea or vomiting, tolerating diet  Genitourinary: as per HPI  Hematologic/Lymphatic: no easy bruising or lymphadenopathy  Musculoskeletal: no arthralgias or myalgias  Skin: no rashes or lesions  Neurological: no seizures or tremors  Behavioral/Psych: no auditory or visual hallucinations       Objective:    Vitals: /69 (BP Location: Right arm, Patient Position: Sitting, BP Method: Automatic)   Pulse 75   Ht 5' 3" (1.6 m)   Wt 89 kg (196 lb 3.4 oz)   BMI 34.76 kg/m²     Physical Exam   General: well developed, well nourished in no acute distress  Head: normocephalic, atraumatic  Neck: supple, trachea midline, no obvious enlargement of thyroid  HEENT: EOMI, mucus membranes moist, sclera anicteric, no hearing impairment  Lungs: symmetric expansion, " non-labored breathing  Cardiovascular: regular rate and rhythm, normal pulses  Abdomen: soft, non tender, non distended, no palpable masses, no hepatosplenomegaly, no hernias, no CVA tenderness  Musculoskeletal: no peripheral edema, normal ROM in bilateral upper and lower extremities  Lymphatics: no cervical or inguinal lymphadenopathy  Skin: no rashes or lesions  Neuro: alert and oriented x 3, no gross deficits  Psych: normal judgment and insight, normal mood/affect and non-anxious  Genitourinary:   patient declined exam      Lab Review   Urine analysis today in clinic shows positive for ++leukocytes, red blood cells 50    Lab Results   Component Value Date    WBC 9.20 03/10/2017    HGB 12.3 03/10/2017    HCT 37 04/15/2017    MCV 90 03/10/2017     03/10/2017     Lab Results   Component Value Date    CREATININE 0.9 03/10/2017    BUN 18 03/10/2017       Imaging  NA       Assessment/Plan:      1. Incomplete bladder emptying    - PVR does not correlate with her sensation of SALVADOR   - Avoid/treat constipation - seems to be significant problem. May need GI referral.   - Consider Flomax   - Will assess urethra/bladder with cystoscopy   - Reassurance provided today     2. Hematuria, gross    - Likely initially related to UTI   - Recommend workup due to persistence   - Discussed etiology and workup of hematuria   - UCx today   - Cytology - not indicated   - CT urogram   - Office cystoscopy   - Bactrim empiric         Follow up in 3-4 weeks for cystoscopy

## 2017-07-19 NOTE — LETTER
July 19, 2017      Yudi Smart MD  [address]  [place] [address]           [facility]  [address] [place] [address], [address] [address]  Phone: [phone]          Patient: Cecilia Barahona   MR Number: [id number]   YOB: 1950   Date of Visit: 7/19/2017       Dear Dr. Yudi Smart:    Thank you for referring Cecilia Barahona to me for evaluation. Attached you will find relevant portions of my assessment and plan of care.    If you have questions, please do not hesitate to call me. I look forward to following Cecilia Barahona along with you.    Sincerely,    Idalmis Worrell MD    Enclosure  CC:  No Recipients    If you would like to receive this communication electronically, please contact [email or web address] or [phone] to request more information on [employer] Link access.    For providers and/or their staff who would like to refer a patient to [facility], please contact us through our one-stop-shop provider referral line, [facility], at [phone].    If you feel you have received this communication in error or would no longer like to receive these types of communications, please e-mail [email or web address]

## 2017-07-20 ENCOUNTER — HOSPITAL ENCOUNTER (OUTPATIENT)
Dept: RADIOLOGY | Facility: HOSPITAL | Age: 67
Discharge: HOME | End: 2017-07-20
Attending: UROLOGY
Payer: MEDICARE

## 2017-07-20 ENCOUNTER — TELEPHONE (OUTPATIENT)
Dept: ENDOCRINOLOGY | Facility: CLINIC | Age: 67
End: 2017-07-20

## 2017-07-20 DIAGNOSIS — R33.9 INCOMPLETE BLADDER EMPTYING: ICD-10-CM

## 2017-07-20 DIAGNOSIS — R31.0 HEMATURIA, GROSS: ICD-10-CM

## 2017-07-20 PROCEDURE — 25500020 PHARM REV CODE 255: Performed by: UROLOGY

## 2017-07-20 PROCEDURE — 74178 CT ABD&PLV WO CNTR FLWD CNTR: CPT | Mod: TC

## 2017-07-20 PROCEDURE — 74178 CT ABD&PLV WO CNTR FLWD CNTR: CPT | Mod: 26,,, | Performed by: RADIOLOGY

## 2017-07-20 RX ORDER — LORATADINE 10 MG/1
10 TABLET ORAL DAILY
Qty: 30 TABLET | Refills: 2 | Status: SHIPPED | OUTPATIENT
Start: 2017-07-20 | End: 2018-01-10 | Stop reason: SDUPTHER

## 2017-07-20 RX ADMIN — IOHEXOL 125 ML: 350 INJECTION, SOLUTION INTRAVENOUS at 09:07

## 2017-07-20 NOTE — TELEPHONE ENCOUNTER
Called and confirmed appointment with patient for Fri 7/21/17 at 10:00a. Patient verbalized confirmation.

## 2017-07-21 ENCOUNTER — LAB VISIT (OUTPATIENT)
Dept: LAB | Facility: HOSPITAL | Age: 67
End: 2017-07-21
Payer: MEDICARE

## 2017-07-21 ENCOUNTER — OFFICE VISIT (OUTPATIENT)
Dept: ENDOCRINOLOGY | Facility: CLINIC | Age: 67
End: 2017-07-21
Payer: MEDICARE

## 2017-07-21 VITALS
DIASTOLIC BLOOD PRESSURE: 72 MMHG | BODY MASS INDEX: 34.8 KG/M2 | SYSTOLIC BLOOD PRESSURE: 121 MMHG | WEIGHT: 196.44 LBS | HEART RATE: 70 BPM | HEIGHT: 63 IN

## 2017-07-21 DIAGNOSIS — E78.5 HYPERLIPIDEMIA LDL GOAL <70: ICD-10-CM

## 2017-07-21 DIAGNOSIS — E66.9 NON MORBID OBESITY, UNSPECIFIED OBESITY TYPE: ICD-10-CM

## 2017-07-21 DIAGNOSIS — I10 ESSENTIAL HYPERTENSION: ICD-10-CM

## 2017-07-21 DIAGNOSIS — G47.33 OSA (OBSTRUCTIVE SLEEP APNEA): ICD-10-CM

## 2017-07-21 LAB — GLUCOSE SERPL-MCNC: 68 MG/DL (ref 70–110)

## 2017-07-21 PROCEDURE — 3045F PR MOST RECENT HEMOGLOBIN A1C LEVEL 7.0-9.0%: CPT | Mod: S$GLB,,, | Performed by: NURSE PRACTITIONER

## 2017-07-21 PROCEDURE — 99204 OFFICE O/P NEW MOD 45 MIN: CPT | Mod: S$GLB,,, | Performed by: NURSE PRACTITIONER

## 2017-07-21 PROCEDURE — 82948 REAGENT STRIP/BLOOD GLUCOSE: CPT | Mod: S$GLB,,, | Performed by: NURSE PRACTITIONER

## 2017-07-21 PROCEDURE — 1126F AMNT PAIN NOTED NONE PRSNT: CPT | Mod: S$GLB,,, | Performed by: NURSE PRACTITIONER

## 2017-07-21 PROCEDURE — 1157F ADVNC CARE PLAN IN RCRD: CPT | Mod: S$GLB,,, | Performed by: NURSE PRACTITIONER

## 2017-07-21 PROCEDURE — 83036 HEMOGLOBIN GLYCOSYLATED A1C: CPT

## 2017-07-21 PROCEDURE — 99499 UNLISTED E&M SERVICE: CPT | Mod: S$GLB,,, | Performed by: NURSE PRACTITIONER

## 2017-07-21 PROCEDURE — 99999 PR PBB SHADOW E&M-EST. PATIENT-LVL V: CPT | Mod: PBBFAC,,, | Performed by: NURSE PRACTITIONER

## 2017-07-21 PROCEDURE — 4010F ACE/ARB THERAPY RXD/TAKEN: CPT | Mod: S$GLB,,, | Performed by: NURSE PRACTITIONER

## 2017-07-21 PROCEDURE — 36415 COLL VENOUS BLD VENIPUNCTURE: CPT | Mod: PN

## 2017-07-21 PROCEDURE — 1159F MED LIST DOCD IN RCRD: CPT | Mod: S$GLB,,, | Performed by: NURSE PRACTITIONER

## 2017-07-21 RX ORDER — LANCETS
EACH MISCELLANEOUS
Qty: 400 EACH | Refills: 3 | Status: SHIPPED | OUTPATIENT
Start: 2017-07-21 | End: 2018-01-10 | Stop reason: SDUPTHER

## 2017-07-21 RX ORDER — ACETAMINOPHEN 500 MG
1 TABLET ORAL DAILY
Qty: 1 EACH | Refills: 0 | Status: SHIPPED | OUTPATIENT
Start: 2017-07-21 | End: 2023-08-23

## 2017-07-21 RX ORDER — INSULIN PUMP SYRINGE, 3 ML
EACH MISCELLANEOUS
Qty: 1 EACH | Refills: 0 | Status: SHIPPED | OUTPATIENT
Start: 2017-07-21 | End: 2020-01-16 | Stop reason: ALTCHOICE

## 2017-07-21 NOTE — PATIENT INSTRUCTIONS
A1C goal: <7%  Fasting/premeal blood glucose goal:   2 hour post-meal blood glucose goal: less than 180      See Diabetes Educator/Registered Dietician for Medical Nutrition Therapy.   Start exercise routine - try to walk 30 minutes 3x/week.   Stop taking glimepiride since you are already on Novolog.   Continue Levemir at 38 units every night for now.   Continue Novolog 10 units before each meal - be consistent. Carry your Novolog when you go out to eat.   Test glucose 4x/day - before meals and at bedtime. Send a glucose log in 2 weeks.   Return to clinic in a month.

## 2017-07-21 NOTE — LETTER
July 21, 2017      Yudi Smart MD  [address]  [place] [address]           [facility]  [address]  [place] [address]  Phone: [phone]  Fax: [phone]          Patient: Cecilia Barahona   MR Number: [id number]   YOB: 1950   Date of Visit: 7/21/2017       Dear Dr. Yudi Smart:    Thank you for referring Cecilia Barahona to me for evaluation. Attached you will find relevant portions of my assessment and plan of care.    If you have questions, please do not hesitate to call me. I look forward to following Cecilia Barahona along with you.    Sincerely,    Sindi Shultz NP    Enclosure  CC:  No Recipients    If you would like to receive this communication electronically, please contact [email or web address] or [phone] to request more information on [employer] Link access.    For providers and/or their staff who would like to refer a patient to [facility], please contact us through our one-stop-shop provider referral line, [facility], at [phone].    If you feel you have received this communication in error or would no longer like to receive these types of communications, please e-mail [email or web address]

## 2017-07-21 NOTE — PROGRESS NOTES
"CC: This 67 y.o. Black or  female  is here for evaluation of  T2DM along with comorbidities indicated in the Visit Diagnosis section of this encounter.    HPI: Cecilia Barahona was diagnosed with T2DM in her 30s. Oral agents started at the time of diagnosis.     New to Endocrine. Referred by Dr. Smart.     PMHX: CHETNA - does not like her old mask.     LAST DIABETES EDUCATION: never    RECENT ILLNESSES/HOSPITALIZATIONS - -No.     HOSPITALIZED FOR DIABETES  -  No.    PRESCRIBED DIABETES MEDICATIONS: glimepiride 4 mg bid, Novolog Flexpen 10 units ac with ss, Levemir Flextouch 38 units hs ~ 10 pm, metformin 1000 mg bid     Misses medication doses - Yes - takes Novolog about 1x/day before lunch. If she eats "the wrong thing" she'll take it again after lunch.     DM COMPLICATIONS: peripheral neuropathy    SIGNIFICANT DIABETES MED HISTORY: denies    SELF MONITORING BLOOD GLUCOSE: Checks blood glucose at home - none.   POCT GLUCOSE 68, fasting.     HYPOGLYCEMIC EPISODES: denies      CURRENT DIET: drinks 2 cans of regular cokes/ week - a month ago, she was drinking 2/day.   Doesn't like breakfast. At least 2 meals/day but the times are inconsistent. Often goes several hrs without eating. Seldom snacks on a bag of chips or pickle.   Diet recall: supper was spaghetti, meat sauce, garlic toast, lemonade juice. Lunch?     CURRENT EXERCISE: none       /72 (BP Location: Right arm, Patient Position: Sitting, BP Method: Automatic)   Pulse 70   Ht 5' 3" (1.6 m)   Wt 89.1 kg (196 lb 6.9 oz)   BMI 34.80 kg/m²       ROS:   CONSTITUTIONAL: Appetite good, denies fatigue  SKIN: + rash to back x months    EYES: No visual disturbances  RESPIRATORY: No shortness of breath; + wsanson   CARDIAC: No chest pain or palpitations  GI: No nausea, vomiting, or diarrhea; + constipation   : No urinary frequency or dysuria; c/o incomplete emptying   MS: No arthralgias or mylagias   NEURO: + paresthesias to hands, h/o CTS  "   OTHER: + headache       PHYSICAL EXAM:  GENERAL: Well developed, well nourished. No acute distress.   PSYCH: AAOx3, appropriate mood and affect, conversant, well-groomed. Judgement and insight good.   NEURO: Cranial nerves grossly intact. Speech clear, no tremor.   NECK: Trachea midline, no thyromegaly or lymphadenopathy.   CHEST: Respirations even and unlabored. CTA bilaterally.  CARDIOVASCULAR: Regular rate and rhythm. No bruits. No murmur. No edema.   ABDOMEN: Soft, non-tender, non-distended. Bowel sounds present.   MS: Gait steady. No clubbing.   SKIN: Normal skin turgor. Skin warm and dry. No areas of breakdown. + nuchal acanthosis nigricans.        Hemoglobin A1C   Date Value Ref Range Status   03/10/2017 8.3 (H) 4.5 - 6.2 % Final     Comment:     According to ADA guidelines, hemoglobin A1C <7.0% represents  optimal control in non-pregnant diabetic patients.  Different  metrics may apply to specific populations.   Standards of Medical Care in Diabetes - 2016.  For the purpose of screening for the presence of diabetes:  <5.7%     Consistent with the absence of diabetes  5.7-6.4%  Consistent with increasing risk for diabetes   (prediabetes)  >or=6.5%  Consistent with diabetes  Currently no consensus exists for use of hemoglobin A1C  for diagnosis of diabetes for children.     10/28/2016 9.5 (H) 4.5 - 6.2 % Final     Comment:     According to ADA guidelines, hemoglobin A1C <7.0% represents  optimal control in non-pregnant diabetic patients.  Different  metrics may apply to specific populations.   Standards of Medical Care in Diabetes - 2016.  For the purpose of screening for the presence of diabetes:  <5.7%     Consistent with the absence of diabetes  5.7-6.4%  Consistent with increasing risk for diabetes   (prediabetes)  >or=6.5%  Consistent with diabetes  Currently no consensus exists for use of hemoglobin A1C  for diagnosis of diabetes for children.     03/29/2016 8.2 (H) 4.5 - 6.2 % Final            Chemistry        Component Value Date/Time     07/20/2017 0815    K 3.7 07/20/2017 0815     07/20/2017 0815    CO2 30 (H) 07/20/2017 0815    BUN 20 07/20/2017 0815    CREATININE 0.8 07/20/2017 0815    GLU 50 (L) 07/20/2017 0815        Component Value Date/Time    CALCIUM 9.7 07/20/2017 0815    ALKPHOS 100 03/10/2017 0920    AST 24 03/10/2017 0920    ALT 21 03/10/2017 0920    BILITOT 0.6 03/10/2017 0920    ESTGFRAFRICA >60 07/20/2017 0815    EGFRNONAA >60 07/20/2017 0815          Lab Results   Component Value Date    LDLCALC 109.0 03/10/2017       Lab Results   Component Value Date    MICALBCREAT 17.7 10/12/2016         STANDARDS of CARE:        ASA:               Last eye exam:       ASSESSMENT and PLAN:    A1C GOAL:     1. Uncontrolled type 2 diabetes mellitus with complication, with long-term current use of insulin  Discussed progression of DM disease, long term complications, and tx options. Reviewed A1c and BG goals. Reviewed hypoglycemia, s/s, and appropriate tx options.       a1c today.   See Diabetes Educator/Registered Dietician for Medical Nutrition Therapy.   Should ideally eat 3 meals/day. Can try a Glucerna as a meal replacement.   Start exercise routine - try to walk 30 minutes 3x/week.   Stop taking glimepiride since you are already on Novolog.   Continue Levemir at 38 units every night for now.   Continue Novolog 10 units before each meal - be consistent. Carry your Novolog when you go out to eat.   Test glucose 4x/day - before meals and at bedtime. Send a glucose log in 2 weeks.   Return to clinic in a month.     Ambulatory Referral to Diabetes Education    CANCELED: Ambulatory consult for Sleep Study   2. Essential hypertension  blood pressure monitor (BLOOD PRESSURE KIT) Kit    Stop losartan since she's already on amlodipine/benazepril BID     Ambulatory consult for Sleep Study - call to make appt.    3. CHETNA (obstructive sleep apnea)  Ambulatory consult for Sleep Study   4.  Hyperlipidemia LDL goal <70  Will address at next visit.    5. Non morbid obesity, unspecified obesity type  Increases insulin resistance.   Improve diet and exercise.          Orders Placed This Encounter   Procedures    Hemoglobin A1c     Standing Status:   Future     Standing Expiration Date:   9/19/2018    Ambulatory Referral to Diabetes Education     Referral Priority:   Routine     Referral Type:   Consultation     Referral Reason:   Specialty Services Required     Requested Specialty:   Endocrinology     Number of Visits Requested:   1    Ambulatory consult for Sleep Study     Referral Priority:   Routine     Referral Type:   Consultation     Number of Visits Requested:   1        Return in about 4 weeks (around 8/18/2017).     Thank you very much for allowing me to participate in Cecilia Barahona's care.

## 2017-07-22 LAB
BACTERIA UR CULT: NORMAL
ESTIMATED AVG GLUCOSE: 180 MG/DL
HBA1C MFR BLD HPLC: 7.9 %

## 2017-07-24 ENCOUNTER — TELEPHONE (OUTPATIENT)
Dept: UROLOGY | Facility: CLINIC | Age: 67
End: 2017-07-24

## 2017-07-24 NOTE — TELEPHONE ENCOUNTER
Please inform pt that recent urine culture showed infection. Continue taking abx given at recent visit until all pills are completed. Thanks.  (Bactrim)

## 2017-07-24 NOTE — TELEPHONE ENCOUNTER
Spoke to pt advised urine did show positive for uti advised pt to continue bactrim as dirrected./elisabet

## 2017-08-03 DIAGNOSIS — I10 ESSENTIAL HYPERTENSION: ICD-10-CM

## 2017-08-03 RX ORDER — METFORMIN HYDROCHLORIDE 1000 MG/1
TABLET ORAL
Qty: 180 TABLET | Refills: 0 | Status: SHIPPED | OUTPATIENT
Start: 2017-08-03 | End: 2018-03-12 | Stop reason: SDUPTHER

## 2017-08-03 RX ORDER — HYDROCHLOROTHIAZIDE 12.5 MG/1
CAPSULE ORAL
Qty: 90 CAPSULE | Refills: 0 | Status: SHIPPED | OUTPATIENT
Start: 2017-08-03 | End: 2018-01-10 | Stop reason: SDUPTHER

## 2017-08-18 ENCOUNTER — TELEPHONE (OUTPATIENT)
Dept: ENDOCRINOLOGY | Facility: CLINIC | Age: 67
End: 2017-08-18

## 2017-08-18 NOTE — TELEPHONE ENCOUNTER
Called to confirm 8/21/17 appointment with patient but rescheduled it to Wed 8/30/17 at 2:00p, per patients request. Reminder letter mailed.

## 2017-08-21 ENCOUNTER — HOSPITAL ENCOUNTER (EMERGENCY)
Facility: OTHER | Age: 67
Discharge: HOME | End: 2017-08-21
Attending: INTERNAL MEDICINE
Payer: MEDICARE

## 2017-08-21 VITALS
BODY MASS INDEX: 34.76 KG/M2 | RESPIRATION RATE: 16 BRPM | OXYGEN SATURATION: 98 % | DIASTOLIC BLOOD PRESSURE: 75 MMHG | TEMPERATURE: 98 F | HEART RATE: 71 BPM | SYSTOLIC BLOOD PRESSURE: 148 MMHG | WEIGHT: 196.25 LBS

## 2017-08-21 DIAGNOSIS — M54.9 ACUTE RIGHT-SIDED BACK PAIN, UNSPECIFIED BACK LOCATION: Primary | ICD-10-CM

## 2017-08-21 LAB
BILIRUBIN, POC UA: NEGATIVE
BLOOD, POC UA: NEGATIVE
CLARITY, POC UA: CLEAR
COLOR, POC UA: ABNORMAL
GLUCOSE, POC UA: NEGATIVE
KETONES, POC UA: NEGATIVE
LEUKOCYTE EST, POC UA: NEGATIVE
NITRITE, POC UA: NEGATIVE
PH UR STRIP: 5.5 [PH]
PROTEIN, POC UA: NEGATIVE
SPECIFIC GRAVITY, POC UA: <=1.005
UROBILINOGEN, POC UA: 0.2 E.U./DL

## 2017-08-21 PROCEDURE — 99283 EMERGENCY DEPT VISIT LOW MDM: CPT

## 2017-08-21 PROCEDURE — 81003 URINALYSIS AUTO W/O SCOPE: CPT

## 2017-08-21 RX ORDER — IBUPROFEN 800 MG/1
800 TABLET ORAL EVERY 8 HOURS PRN
Qty: 20 TABLET | Refills: 0 | Status: SHIPPED | OUTPATIENT
Start: 2017-08-21 | End: 2019-02-07

## 2017-08-21 RX ORDER — METHOCARBAMOL 750 MG/1
1500 TABLET, FILM COATED ORAL 3 TIMES DAILY
Qty: 30 TABLET | Refills: 0 | Status: SHIPPED | OUTPATIENT
Start: 2017-08-21 | End: 2017-08-26

## 2017-08-21 NOTE — ED NOTES
Pt reports flank pain to the rt side and back, pain has been ongoing for the past 3 days, states she has been taking Ultram without relief

## 2017-08-22 NOTE — ED PROVIDER NOTES
Encounter Date: 2017       History     Chief Complaint   Patient presents with    Back Pain     symptoms for the past 3 days, states she has been taking Ultram for pain    Flank Pain     68 y/o female presents to ED with c/o right lower back pain x 3 days, unrelieved by ultram      The history is provided by the patient. No  was used.   Back Pain    This is a new problem. The current episode started several days ago. The problem occurs intermittently. The problem has been unchanged. The pain is associated with no known injury. The pain is present in the lumbar spine. The quality of the pain is described as stabbing and cramping. The pain does not radiate. The pain is at a severity of 5/10. The symptoms are aggravated by bending, twisting and certain positions. The pain is the same all the time. Pertinent negatives include no fever. She has tried analgesics for the symptoms. The treatment provided mild relief. Risk factors include obesity.     Review of patient's allergies indicates:   Allergen Reactions    Ace inhibitors Other (See Comments)     cough    Invokana [canagliflozin] Other (See Comments)     Throat and tongue swelling     Past Medical History:   Diagnosis Date    Allergic rhinitis, seasonal     Arthritis     Glaucoma NEC     History of positive PPD - treated - remote past     Hyperlipidemia LDL goal < 100     Hypertension     Obesity     CHETNA (obstructive sleep apnea)     Type II or unspecified type diabetes mellitus without mention of complication, uncontrolled      Past Surgical History:   Procedure Laterality Date    CARPAL TUNNEL RELEASE       SECTION, CLASSIC      TOTAL ABDOMINAL HYSTERECTOMY      TRIGGER FINGER RELEASE       Family History   Problem Relation Age of Onset    Hypertension Mother     Diabetes Mother     Heart failure Mother     Cataracts Mother     Prostate cancer Father     Hypertension Father     Diabetes Father     Heart  failure Father     No Known Problems Sister     Alcohol abuse Maternal Aunt     Diabetes Maternal Uncle     Hyperlipidemia Maternal Uncle     Hypertension Maternal Uncle     Diabetes Maternal Grandmother     Diabetes Maternal Grandfather     No Known Problems Paternal Grandmother     No Known Problems Paternal Grandfather     Diabetes Maternal Aunt     Alzheimer's disease Maternal Aunt     Heart disease Cousin      s/p heart transplant    Amblyopia Neg Hx     Blindness Neg Hx     Cancer Neg Hx     Glaucoma Neg Hx     Macular degeneration Neg Hx     Retinal detachment Neg Hx     Strabismus Neg Hx     Stroke Neg Hx     Thyroid disease Neg Hx      Social History   Substance Use Topics    Smoking status: Former Smoker     Packs/day: 0.50     Types: Cigarettes     Quit date: 11/10/2012    Smokeless tobacco: Never Used    Alcohol use No     Review of Systems   Constitutional: Negative for fever.   Respiratory: Negative.    Cardiovascular: Negative.    Musculoskeletal: Positive for back pain.   All other systems reviewed and are negative.      Physical Exam     Initial Vitals [08/21/17 0212]   BP Pulse Resp Temp SpO2   (!) 181/85 85 16 97.9 °F (36.6 °C) 98 %      MAP       117         Physical Exam    Nursing note and vitals reviewed.  Constitutional: She appears well-developed and well-nourished.   HENT:   Head: Normocephalic and atraumatic.   Eyes: EOM are normal.   Neck: Normal range of motion.   Cardiovascular: Normal rate.   Pulmonary/Chest: Breath sounds normal.   Abdominal: Bowel sounds are normal.   Musculoskeletal:   Right flank pain upon movement; NV intact   Neurological: She is alert.   Skin: Skin is warm and dry.   Psychiatric: She has a normal mood and affect.         ED Course   Procedures  Labs Reviewed   POCT URINALYSIS W/O SCOPE - Abnormal; Notable for the following:        Result Value    Glucose, UA Negative (*)     Bilirubin, UA Negative (*)     Ketones, UA Negative (*)     Spec  Grav UA <=1.005 (*)     Blood, UA Negative (*)     Protein, UA Negative (*)     Nitrite, UA Negative (*)     Leukocytes, UA Negative (*)     All other components within normal limits   POCT URINALYSIS W/O SCOPE             Medical Decision Making:   Initial Assessment:   66 y/o female presents to ED with c/o right lower back pain x 3 days, unrelieved by ultram  Differential Diagnosis:   Pyelonephritis  Low back pain  ED Management:  U/A was wnl. Pt was given instructions for back pain and Rx for ibuprofen and robaxin. He was advised to f/u with PCP for reevaluation tomorrow.                    ED Course     Clinical Impression:   The encounter diagnosis was Acute right-sided back pain, unspecified back location.    Disposition:   Disposition: Discharged  Condition: Stable                        Oracio Vázquez MD  08/22/17 5236

## 2017-08-23 ENCOUNTER — OFFICE VISIT (OUTPATIENT)
Dept: FAMILY MEDICINE | Facility: CLINIC | Age: 67
End: 2017-08-23
Payer: MEDICARE

## 2017-08-23 VITALS
HEIGHT: 63 IN | HEART RATE: 74 BPM | DIASTOLIC BLOOD PRESSURE: 64 MMHG | BODY MASS INDEX: 34.45 KG/M2 | SYSTOLIC BLOOD PRESSURE: 120 MMHG | WEIGHT: 194.44 LBS | OXYGEN SATURATION: 97 % | TEMPERATURE: 98 F

## 2017-08-23 DIAGNOSIS — S39.012D BACK STRAIN, SUBSEQUENT ENCOUNTER: ICD-10-CM

## 2017-08-23 DIAGNOSIS — M54.9 ACUTE RIGHT-SIDED BACK PAIN, UNSPECIFIED BACK LOCATION: Primary | ICD-10-CM

## 2017-08-23 PROBLEM — S39.012A BACK STRAIN: Status: ACTIVE | Noted: 2017-08-23

## 2017-08-23 PROCEDURE — 1157F ADVNC CARE PLAN IN RCRD: CPT | Mod: S$GLB,,, | Performed by: FAMILY MEDICINE

## 2017-08-23 PROCEDURE — 3078F DIAST BP <80 MM HG: CPT | Mod: S$GLB,,, | Performed by: FAMILY MEDICINE

## 2017-08-23 PROCEDURE — 3074F SYST BP LT 130 MM HG: CPT | Mod: S$GLB,,, | Performed by: FAMILY MEDICINE

## 2017-08-23 PROCEDURE — 99214 OFFICE O/P EST MOD 30 MIN: CPT | Mod: S$GLB,,, | Performed by: FAMILY MEDICINE

## 2017-08-23 PROCEDURE — 1159F MED LIST DOCD IN RCRD: CPT | Mod: S$GLB,,, | Performed by: FAMILY MEDICINE

## 2017-08-23 PROCEDURE — 99999 PR PBB SHADOW E&M-EST. PATIENT-LVL V: CPT | Mod: PBBFAC,,, | Performed by: FAMILY MEDICINE

## 2017-08-23 PROCEDURE — 1125F AMNT PAIN NOTED PAIN PRSNT: CPT | Mod: S$GLB,,, | Performed by: FAMILY MEDICINE

## 2017-08-23 PROCEDURE — 3008F BODY MASS INDEX DOCD: CPT | Mod: S$GLB,,, | Performed by: FAMILY MEDICINE

## 2017-08-23 RX ORDER — HYDROXYZINE HYDROCHLORIDE 25 MG/1
25 TABLET, FILM COATED ORAL NIGHTLY
COMMUNITY
End: 2018-03-12 | Stop reason: SDUPTHER
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
## 2017-08-24 ENCOUNTER — PROCEDURE VISIT (OUTPATIENT)
Dept: UROLOGY | Facility: CLINIC | Age: 67
End: 2017-08-24
Payer: MEDICARE

## 2017-08-24 VITALS — BODY MASS INDEX: 34.45 KG/M2 | HEIGHT: 63 IN | WEIGHT: 194.44 LBS

## 2017-08-24 DIAGNOSIS — R33.9 INCOMPLETE BLADDER EMPTYING: ICD-10-CM

## 2017-08-24 DIAGNOSIS — R31.0 HEMATURIA, GROSS: ICD-10-CM

## 2017-08-24 PROCEDURE — 52000 CYSTOURETHROSCOPY: CPT | Mod: S$GLB,,, | Performed by: UROLOGY

## 2017-08-24 RX ORDER — DESOXIMETASONE 0.5 MG/G
CREAM TOPICAL
COMMUNITY
Start: 2017-08-18 | End: 2018-01-10 | Stop reason: SDUPTHER

## 2017-08-24 RX ORDER — INSULIN DETEMIR 100 [IU]/ML
INJECTION, SOLUTION SUBCUTANEOUS
COMMUNITY
Start: 2017-07-20 | End: 2018-01-10 | Stop reason: SDUPTHER

## 2017-08-29 ENCOUNTER — TELEPHONE (OUTPATIENT)
Dept: ENDOCRINOLOGY | Facility: CLINIC | Age: 67
End: 2017-08-29

## 2017-08-29 NOTE — TELEPHONE ENCOUNTER
Called to confirm appointment with patient for Wed 8/30/17 at 2:00p. Patient cancelled and will call at a later time to reschedule.

## 2017-08-30 ENCOUNTER — TELEPHONE (OUTPATIENT)
Dept: FAMILY MEDICINE | Facility: CLINIC | Age: 67
End: 2017-08-30

## 2017-08-30 NOTE — TELEPHONE ENCOUNTER
----- Message from Mirian Reid sent at 8/30/2017  9:42 AM CDT -----  Contact: self  Patient is calling to get the name of the spine physician that Dr. Tinoco was referring her to. Patient states she is still having pain. Patient can be reached at [phone].      thanks

## 2017-08-30 NOTE — TELEPHONE ENCOUNTER
Referral placed to sports medicine and healthy back on the [facility] [place], [place].  Needs to get scheduled.

## 2017-09-01 NOTE — TELEPHONE ENCOUNTER
Patient advised of message states she need to be put in the hospital she feel that sport medicine and rehab is not what she need she know her body and fell that something is wrong. Please advised.

## 2017-09-01 NOTE — TELEPHONE ENCOUNTER
She needs to come in to see her primary care doctor.  At this time she does not need to be admitted based upon her symptoms.

## 2017-09-01 NOTE — TELEPHONE ENCOUNTER
Patient advised of message below next appt with pcp(Bing) is in November patient she wait and go the referrals that Dr Tinoco put in.

## 2017-09-13 ENCOUNTER — CLINICAL SUPPORT (OUTPATIENT)
Dept: REHABILITATION | Facility: OTHER | Age: 67
End: 2017-09-13
Attending: PHYSICAL MEDICINE & REHABILITATION
Payer: MEDICARE

## 2017-09-13 VITALS
HEIGHT: 63 IN | SYSTOLIC BLOOD PRESSURE: 140 MMHG | BODY MASS INDEX: 35.08 KG/M2 | WEIGHT: 198 LBS | HEART RATE: 78 BPM | DIASTOLIC BLOOD PRESSURE: 70 MMHG

## 2017-09-13 DIAGNOSIS — M54.50 THORACOLUMBAR BACK PAIN: Primary | ICD-10-CM

## 2017-09-13 DIAGNOSIS — M62.838 MUSCLE SPASM: ICD-10-CM

## 2017-09-13 DIAGNOSIS — M54.6 THORACOLUMBAR BACK PAIN: Primary | ICD-10-CM

## 2017-09-13 PROCEDURE — 99204 OFFICE O/P NEW MOD 45 MIN: CPT | Mod: ,,, | Performed by: PHYSICAL MEDICINE & REHABILITATION

## 2017-09-13 RX ORDER — TRAMADOL HYDROCHLORIDE 50 MG/1
50 TABLET ORAL EVERY 6 HOURS PRN
COMMUNITY
End: 2020-05-18 | Stop reason: CLARIF

## 2017-09-13 RX ORDER — METHOCARBAMOL 500 MG/1
750 TABLET, FILM COATED ORAL 4 TIMES DAILY
COMMUNITY
End: 2018-07-13

## 2017-09-13 NOTE — PROGRESS NOTES
Health  met with patient to complete initial outcomes for the Healthy Back lumbar program.  Questions were reviewed with patient and discussed with Dr. Villalobos. The patient will meet with Dr. Villalobos to determine program enrollment.     HealthyBack Questionnaire  9/13/2017   Please select the location of your worst pain:  Low back   Please select the location of any additional pain: (hold down the control key, and click to select multiple responses) Mid back- Below shoulder blades   Did any event trigger your pain?  No   How long has this pain been going on?  2 months   Please indicate how the pain is changing.  Worsening   What is the WORST level of pain that you have experienced in the last week?  8   What is the LEAST level of pain that you have experienced in the past week?  0   What can you NOT do not that you used to be able to do?  sleeping   Are your limitations mainly due to your pain?  Yes   What are your additional complaints, if any? Numbness, Tingling   Is there ever a time during the day when your pain stops, even for a brief moment, before returning? Yes   If yes, when your pain stops, does it disappear completely? Is it totally gone? No   Does bending forward make your typical pain worse? No   Morning: Same   Afternoon: Same   Evening:  Worse during   Nighttime: Worse during   Standing:  Worse   Lying on stomach: Worse   Lying on back: Worse   Sitting:  Same   Walking: Same   Climbing stairs: Same   In the last seven days, have you had any changes in your bowel and/or bladder habits? No   Have all of your attempts to treat your back/leg pain resulted in failure?  Yes   Do you believe your doctor(s) do NOT understand how much pain you have?  No   Do you believe that you have one or more conditions that have not been diagnosed by your doctor(s)?  Yes   Do you believe that you deserve more understanding from others including your family than you are getting?  Yes   Do you feel relatively calm about  how your back/leg pain has impacted your life?  No   Are you OK with treatment taking a very long time (even years) before you feel relief from your back/leg pain?  Yes   Do you believe that your pain has caused you to be more forgetful?  Yes   Do you feel that you have not received enough treatment for your pain?  Yes   Do you believe that your doctor(s) do not have the right training to treat your back/leg pain effectively?  No   Do you believe you should not be allowed to work or attend school because of your back/leg pain?  Yes   When did this pain begin?  2 months ago   Did the pain begin after an injury or an accident? No   Is the pain work related?  No   Please roxanne which of the following over-the-counter medicines you take. (hold down the control key, and click to select multiple responses) Ibuprofen, Acetaminophen   Please roxanne which of the following prescription medicines you take. (hold down the control key, and click to select multiple responses) Other   Emergency department  Yes   Health care providers (hold down the control key, and click to select multiple responses) Pain management   Investigations done (hold down the control key, and click to select multiple responses) X-ray, MRI   Procedures (hold down the control key, and click to select multiple responses) Epidural steroid injections (BRANT)   Emergency department  No   Health care providers (hold down the control key, and click to select multiple responses) None   Investigations done (hold down the control key, and click to select multiple responses) None   Procedures (hold down the control key, and click to select multiple responses) None   Have you had any surgery on your back?  No   Please roxanne what you are experiencing. (hold down the control key, and click to select multiple responses) Muscle pain in arms or legs, Joint swelling   First activity you would like to perform better:  exercise   Current ability score to perform first activity: 5    Second activity you would like to perform better: pain/symptom  management    Current ability score to perform second activity: 2   Third activity you would like to perform better: laying on back/sleeping   Current ability score to perform third activity: 2   Pain intensity:  The pain comes and goes and is very severe.   Personal care (washing, dressing, etc.):  I do not normally change my way of washing or dressing even though it causes some pain.   Lifting: I can lift heavy weights, but it causes pain.   Walking: I cannot walk more than one mile without increasing pain.   Sitting: Pain prevents me from sitting more than one hour.   Standing: Pain prevents me from standing more than one hour.   Sleeping: Because of my pain, my normal night sleep is reduced by less than three quarters.   Social life: Pain has restricted my social life, and I do not go out as often.   Traveling: I get some pain while traveling, but none of my usual forms of travel make it any worse.   Changing degree of pain: My pain is neither getting better nor worse.   Do you need any help looking after yourself? I need no help at all.   When doing household tasks, e.g., preparing food, gardening, using the video recorder, radio, telephone, or washing the car: I need help with the more difficult household tasks.   Thinking about how easily you can get around your home and community: I find it difficult to get around my home and community by myself.   Because of your health, your relationships, e.g., your friends, partner or parents, generally: I have no close and warm relationships   Thinking about your relationships with other people: Although I have friends, I am occasionally lonely.   Thinking about your health and your relationship with your  family:  There are some parts of my family role I cannot carry out.   Thinking about your vision, including when using your glasses or contact lenses if needed: I have some difficulty focusing on  things, or I do not see them sharply, e.g., small print, a newspaper or objects in the distance.   Thinking about your hearing, including using your hearing aid if needed: I have some difficulty hearing, or I do not hear clearly, e.g., I ask people to speak up or turn up the TV radio volume.   When you communicate with others, e.g., talking, listening, writing, or signing: I am only understood by people who know me well. I have great trouble understanding what others are saying to me.   Thinking about how you sleep: I sleep in short bursts only. I am awake most of the night.   Thinking about how you generally feel: I feel moderately anxious, worried or depressed.   How much pain or discomfort do you experience? I suffer from severe pain.   Little interest or pleasure in doing things Nearly every day   Feeling down, depressed or hopeless Nearly every day   How do you spend your leisure time? What are your hobbies? watching tv   How do you spend your leisure time? What are your hobbies? watching tv   What is your highest level of education? Other   What is your work status? Disabled   How did you hear about the Healthyback program?  Physician   When did this pain begin?  2 months ago   Do you need any help looking after yourself? I need no help at all.   When doing household tasks, e.g., preparing food, gardening, using the video recorder, radio, telephone, or washing the car: I need help with the more difficult household tasks.   Thinking about how easily you can get around your home and community: I find it difficult to get around my home and community by myself.   Because of your health, your relationships, e.g., your friends, partner or parents, generally: I have no close and warm relationships   Thinking about your relationships with other people: Although I have friends, I am occasionally lonely.   Thinking about your health and your relationship with your  family:  There are some parts of my family role I  cannot carry out.   Thinking about your vision, including when using your glasses or contact lenses if needed: I have some difficulty focusing on things, or I do not see them sharply, e.g., small print, a newspaper or objects in the distance.   Thinking about your hearing, including using your hearing aid if needed: I have some difficulty hearing, or I do not hear clearly, e.g., I ask people to speak up or turn up the TV radio volume.   When you communicate with others, e.g., talking, listening, writing, or signing: I am only understood by people who know me well. I have great trouble understanding what others are saying to me.   Thinking about how you sleep: I sleep in short bursts only. I am awake most of the night.   Thinking about how you generally feel: I feel moderately anxious, worried or depressed.   How much pain or discomfort do you experience? I suffer from severe pain.   Little interest or pleasure in doing things Nearly every day   Feeling down, depressed or hopeless Nearly every day

## 2017-09-13 NOTE — PROGRESS NOTES
Subjective:      Patient ID: Cecilia Barahona is a 67 y.o. female.    Chief Complaint: No chief complaint on file.    Referred by: Sierra Tinoco MD     Ms Barahona is a 66 yo female sent by Dr. Tinoco  for evaluation for the healthy back lumbar program.  she has had upper low back pain for 2 months and feels like it is worsening, she has history of low back pain from 2012, but feels like this is different.  The 2012 claim is workers comp, but this is not.  The pain is low back dominant, and into right flank.  The pain is intermittent 0-8/10.  Pain is sharp and achy.  It is worse with standing, lying on her back or stomach increase the pain.  She feels like her sleep is interrupted.  She has to move with sitting, she has soreness but no pain, but standing is worse.  There is no increased pain with bending.  The recliner gives her some relief.  Her goals are exercise, manage her pain, lying on her back to sleep.  She feels like she jerks when she has the pain.  She has been taking tramadol, methocarbamol, and ibuprofen and naproxen.  We discussed nsaids, and she is going to switch to just ibuprofen.  She has gabapentin but does not take.  Pattern 2    Past Medical History:  No date: Allergic rhinitis, seasonal  No date: Arthritis  No date: Glaucoma NEC  No date: History of positive PPD - treated - remote past  No date: Hyperlipidemia LDL goal < 100  No date: Hypertension  No date: Obesity  No date: CHETNA (obstructive sleep apnea)  No date: Type II or unspecified type diabetes mellitus *    Past Surgical History:  No date: CARPAL TUNNEL RELEASE  No date:  SECTION, CLASSIC  No date: TOTAL ABDOMINAL HYSTERECTOMY  No date: TRIGGER FINGER RELEASE    Review of patient's family history indicates:    Hypertension                   Mother                    Diabetes                       Mother                    Heart failure                  Mother                    Cataracts                      Mother                     Prostate cancer                Father                    Hypertension                   Father                    Diabetes                       Father                    Heart failure                  Father                    No Known Problems              Sister                    Alcohol abuse                  Maternal Aunt             Diabetes                       Maternal Uncle            Hyperlipidemia                 Maternal Uncle            Hypertension                   Maternal Uncle            Diabetes                       Maternal Grandmother      Diabetes                       Maternal Grandfather      No Known Problems              Paternal Grandmother      No Known Problems              Paternal Grandfather      Diabetes                       Maternal Aunt             Alzheimer's disease            Maternal Aunt             Heart disease                  Cousin                      Comment: s/p heart transplant    Amblyopia                      Neg Hx                    Blindness                      Neg Hx                    Cancer                         Neg Hx                    Glaucoma                       Neg Hx                    Macular degeneration           Neg Hx                    Retinal detachment             Neg Hx                    Strabismus                     Neg Hx                    Stroke                         Neg Hx                    Thyroid disease                Neg Hx                      Social History    Marital status:              Spouse name:                       Years of education:                 Number of children:               Social History Main Topics    Smoking status: Former Smoker                                                                Packs/day: 0.50      Years: 0.00           Types: Cigarettes       Quit date: 11/10/2012    Smokeless tobacco: Never Used                        Alcohol use: No              Drug use: No               Sexual activity: No                       Current Outpatient Prescriptions:  albuterol 90 mcg/actuation inhaler, Inhale 2 puffs into the lungs every 6 (six) hours as needed for Wheezing or Shortness of Breath. Rescue, Disp: 1 Inhaler, Rfl: 0  alcohol swabs PadM, Apply 1 each topically 3 (three) times daily., Disp: 400 each, Rfl: 2  amlodipine-benazepril (LOTREL) 5-40 mg per capsule, TAKE ONE Capsule BY MOUTH TWICE DAILY, Disp: 180 capsule, Rfl: 0  artificial tear ointment (ARTIFICIAL TEARS) Oint, Place into both eyes every evening., Disp: 305 g, Rfl: 1  aspirin 81 MG Chew, Take by mouth once daily. 1 tablet, chewable Oral Every day, Disp: , Rfl:   azelastine (OPTIVAR) 0.05 % ophthalmic solution, Place 1 drop into both eyes 2 (two) times daily., Disp: 18 mL, Rfl: 1  blood glucose control high,low (ACCU-CHEK BRIDGETTE CONTROL SOLN) Soln, 1 kit by Misc.(Non-Drug; Combo Route) route once daily., Disp: 1 each, Rfl: 0  blood glucose control, low (EMBRACE GLUCOSE CONTROL LOW) Soln, Inject 1 Bottle into the skin every 30 days., Disp: 1 each, Rfl: 0  blood pressure monitor (BLOOD PRESSURE KIT) Kit, 1 kit by Misc.(Non-Drug; Combo Route) route once daily., Disp: 1 each, Rfl: 0  blood sugar diagnostic Strp, Check blood glucose 4 times a day. Dispense accuchek meter or other meter preferred by insurance. Dx code e11.65, Disp: 400 strip, Rfl: 3  blood-glucose meter kit, ispense accuchek meter or other meter preferred by insurance. Dx code e11.65, Disp: 1 each, Rfl: 0  buPROPion (WELLBUTRIN) 75 MG tablet, TAKE TWO TABLETS BY MOUTH TWICE DAILY, Disp: 180 tablet, Rfl: 1  desoximetasone (TOPICORT) 0.05 % cream, , Disp: , Rfl:   gabapentin (NEURONTIN) 300 MG capsule, Take 1 capsule (300 mg total) by mouth every evening., Disp: 90 capsule, Rfl: 1  hydrochlorothiazide (MICROZIDE) 12.5 mg capsule, TAKE ONE Capsule BY MOUTH once DAILY, Disp: 90 capsule, Rfl: 0  hydrOXYzine HCl (ATARAX) 25 MG tablet, Take 25 mg by mouth nightly., Disp: ,  "Rfl:   ibuprofen (ADVIL,MOTRIN) 800 MG tablet, Take 1 tablet (800 mg total) by mouth every 8 (eight) hours as needed for Pain., Disp: 20 tablet, Rfl: 0  lancets Misc, Check blood glucose 4 times a day. Dispense accuchek meter or other meter preferred by insurance. Dx code e11.65, Disp: 400 each, Rfl: 3  LANCING DEVICE WITH LANCETS Misc, USE AS DIRECTED FOR DIABETIC TESTING, Disp: 1 each, Rfl: 0  latanoprost 0.005 % ophthalmic solution, Place 1 drop into both eyes every evening., Disp: 1 Bottle, Rfl: 10  LEVEMIR FLEXTOUCH 100 unit/mL (3 mL) InPn pen, , Disp: , Rfl:   loratadine (CLARITIN) 10 mg tablet, Take 1 tablet (10 mg total) by mouth once daily., Disp: 30 tablet, Rfl: 2  metformin (GLUCOPHAGE) 1000 MG tablet, TAKE ONE Tablet BY MOUTH TWICE DAILY **TAKE WITH FOOD**, Disp: 180 tablet, Rfl: 0  multivitamin with minerals tablet, Take 1 tablet by mouth once daily., Disp: , Rfl:   pen needle, diabetic (BD ULTRA-FINE LENY PEN NEEDLES) 32 gauge x 5/32" Ndle, USE FIVE TIMES DAILY, Disp: 100 each, Rfl: 5  pravastatin (PRAVACHOL) 40 MG tablet, TAKE ONE Tablet BY MOUTH once DAILY, Disp: 90 tablet, Rfl: 1    No current facility-administered medications for this visit.       Review of patient's allergies indicates:   -- Ace inhibitors -- Other (See Comments)    --  cough   -- Invokana (canagliflozin) -- Other (See Comments)    --  Throat and tongue swelling                Review of Systems   Constitution: Negative for weight gain and weight loss.   Cardiovascular: Negative for chest pain.   Respiratory: Negative for shortness of breath.    Musculoskeletal: Positive for back pain. Negative for joint pain and joint swelling.   Gastrointestinal: Negative for abdominal pain and bowel incontinence.   Genitourinary: Negative for bladder incontinence.   Neurological: Negative for numbness.           Objective:          General    Vitals reviewed.  Constitutional: She is oriented to person, place, and time. She appears well-developed " and well-nourished.   HENT:   Head: Normocephalic and atraumatic.   Pulmonary/Chest: Effort normal.   Neurological: She is alert and oriented to person, place, and time.   Psychiatric: She has a normal mood and affect. Her behavior is normal. Judgment and thought content normal.     General Musculoskeletal Exam   Gait: normal     Right Ankle/Foot Exam     Tests   Heel Walk: able to perform  Tiptoe Walk: able to perform    Left Ankle/Foot Exam     Tests   Heel Walk: able to perform  Tiptoe Walk: able to perform  Back (L-Spine & T-Spine) / Neck (C-Spine) Exam     Tenderness Right paramedian tenderness of the Upper L-Spine.     Back (L-Spine & T-Spine) Range of Motion   Extension: 20 (with pain)   Flexion: 70   Lateral Bend Right: 20   Lateral Bend Left: 20 (with pain on right)   Rotation Right: 40   Rotation Left: 40     Spinal Sensation   Right Side Sensation  C-Spine Level: normal   L-Spine Level: normal  S-Spine Level: normal  Left Side Sensation  C-Spine Level: normal  L-Spine Level: normal  S-Spine Level: normal    Back (L-Spine & T-Spine) Tests   Right Side Tests  Straight leg raise:      Sitting SLR: > 70 degrees      Left Side Tests  Straight leg raise:     Sitting SLR: > 70 degrees          Other She has no scoliosis .  Spinal Kyphosis:  Absent      Muscle Strength   Right Upper Extremity   Biceps: 5/5/5   Deltoid:  5/5  Triceps:  5/5  Wrist Extension: 5/5/5   Finger Flexors:  5/5  Left Upper Extremity  Biceps: 5/5/5   Deltoid:  5/5  Triceps:  5/5  Wrist Extension: 5/5/5   Finger Flexors:  5/5  Right Lower Extremity   Hip Flexion: 5/5   Quadriceps:  5/5   Anterior tibial:  5/5/5  EHL:  5/5  Left Lower Extremity   Hip Flexion: 5/5   Quadriceps:  5/5   Anterior tibial:  5/5/5   EHL:  5/5    Reflexes     Left Side  Biceps:  2+  Triceps:  2+  Brachioradialis:  2+  Quadriceps:  2+  Achilles:  2+  Left Mccarty's Sign:  Absent  Babinski Sign:  absent    Right Side   Biceps:  2+  Triceps:  2+  Brachioradialis:   2+  Quadriceps:  2+  Achilles:  2+  Right Mccarty's Sign:  absent  Babinski Sign:  absent    Vascular Exam     Right Pulses        Carotid:                  2+    Left Pulses        Carotid:                  2+        Assessment:       Encounter Diagnoses   Name Primary?    Thoracolumbar back pain Yes    Muscle spasm          Plan:       Diagnoses and all orders for this visit:    Thoracolumbar back pain  -     Ambulatory Referral to Physical/Occupational Therapy    Muscle spasm  -     Ambulatory Referral to Physical/Occupational Therapy         The patient has had a history of low back pain with limitations in there activities of Daily living    Previous treatment has not provided relief.    The situation was discussed at length with the patient.  More than 50% of the total time of 45 minutes was spent in counseling.  We discussed different causes of back pain and different treatment options.  We discussed the importance of stretching and strengthening.  We discussed posture.  We discussed the pros and cons of further diagnostic testing, alternative treatment and Medications I reviewed her meds, and we discussed not taking two nsaids    Based on the history, physical exam, and functional index, an active physical therapy program is recommended.  The goal is to restore the patients function and reduce pain.  A program of progressive resistance exercises, biomechanical, and mobility maneuvers, instructions in proper body mechanics, aerobic conditioning and HEP will be utilized. The program will continue as long as making improvements.    An assessment of patients progress will be made at each visit to document change in status.    The patient will be actively involved in there own treatment, and responsible for appointments and home program    The patient's lumbar isometric strength will be tested and they will be placed in a program of isolated strength training based on 50% of their total functional torque and  advanced as clinically appropriate.      Directional preference of pain will further influence the patients active rehabilitation program    The patient was instructed there might be an initial increase in discomfort    They are enrolled with good prognosis  Pattern 2

## 2017-10-16 DIAGNOSIS — I10 ESSENTIAL HYPERTENSION: ICD-10-CM

## 2017-10-17 DIAGNOSIS — I10 ESSENTIAL HYPERTENSION: ICD-10-CM

## 2017-10-17 RX ORDER — LOSARTAN POTASSIUM 100 MG/1
TABLET ORAL
COMMUNITY
Start: 2017-09-11 | End: 2018-01-10 | Stop reason: SDUPTHER

## 2017-10-17 RX ORDER — AMLODIPINE AND BENAZEPRIL HYDROCHLORIDE 5; 40 MG/1; MG/1
CAPSULE ORAL
Qty: 180 CAPSULE | Refills: 0 | OUTPATIENT
Start: 2017-10-17

## 2017-10-17 RX ORDER — AMLODIPINE BESYLATE 5 MG/1
5 TABLET ORAL DAILY
Qty: 90 TABLET | Refills: 0 | Status: SHIPPED | OUTPATIENT
Start: 2017-10-17 | End: 2018-01-17 | Stop reason: ALTCHOICE

## 2017-10-17 RX ORDER — AMLODIPINE AND BENAZEPRIL HYDROCHLORIDE 5; 40 MG/1; MG/1
CAPSULE ORAL
Qty: 180 CAPSULE | OUTPATIENT
Start: 2017-10-17

## 2017-10-17 RX ORDER — NAPROXEN 500 MG/1
500 TABLET ORAL 2 TIMES DAILY PRN
Refills: 1 | Status: ON HOLD | COMMUNITY
Start: 2017-08-17 | End: 2020-05-29

## 2017-10-17 NOTE — TELEPHONE ENCOUNTER
Called pt and told her that her refills were sent to the pharmacy pt states she is not taking Lotrel

## 2017-10-17 NOTE — TELEPHONE ENCOUNTER
Lotrel is both amlodipine and benazepril in one pill. If she is currently taking lotrel in addition to the losartan then we need to change her lotrel to amlodipine by itself.

## 2017-10-17 NOTE — TELEPHONE ENCOUNTER
Amlodipine Rx sent to pharmacy.  Patient needs to continue losartan and start amlodipine - she needs to stop lotrel.  Please have her come for BP check with nursing staff in 4 weeks on this new regimen.

## 2017-10-17 NOTE — TELEPHONE ENCOUNTER
. Mrs. 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

## 2017-10-17 NOTE — TELEPHONE ENCOUNTER
Please clarify with patient: I received information that she recently filled losartan but now I am getting a refill request for lotrel. Is she taking both medications?

## 2017-10-17 NOTE — TELEPHONE ENCOUNTER
Spoke w/patient, states yes she is taking Lotrel, states she did not realize that Amlodipine is Lotrel.

## 2017-10-17 NOTE — TELEPHONE ENCOUNTER
Called patient to see if she was taking both lotrel and losartan. Pt states that she is taking both and she wants to know if you would like her to stop taking Losartan

## 2017-10-18 ENCOUNTER — PATIENT MESSAGE (OUTPATIENT)
Dept: FAMILY MEDICINE | Facility: CLINIC | Age: 67
End: 2017-10-18

## 2017-10-19 NOTE — TELEPHONE ENCOUNTER
Please clarify with patient:  - she needs to take amlodipine and losartan. (stop lotrel which contains lisinopril).

## 2017-10-19 NOTE — TELEPHONE ENCOUNTER
Spoke with patient and gave her the message and she told she was called yesterday and was told to stop the losartan. She said she would like to speak with you about this.

## 2017-10-26 ENCOUNTER — OFFICE VISIT (OUTPATIENT)
Dept: UROLOGY | Facility: CLINIC | Age: 67
End: 2017-10-26
Payer: MEDICARE

## 2017-10-26 VITALS
DIASTOLIC BLOOD PRESSURE: 62 MMHG | HEART RATE: 68 BPM | WEIGHT: 194.88 LBS | BODY MASS INDEX: 34.53 KG/M2 | HEIGHT: 63 IN | RESPIRATION RATE: 14 BRPM | SYSTOLIC BLOOD PRESSURE: 130 MMHG

## 2017-10-26 DIAGNOSIS — R31.0 HEMATURIA, GROSS: ICD-10-CM

## 2017-10-26 DIAGNOSIS — R33.9 INCOMPLETE BLADDER EMPTYING: Primary | ICD-10-CM

## 2017-10-26 PROCEDURE — 99999 PR PBB SHADOW E&M-EST. PATIENT-LVL III: CPT | Mod: PBBFAC,,, | Performed by: UROLOGY

## 2017-10-26 PROCEDURE — 99214 OFFICE O/P EST MOD 30 MIN: CPT | Mod: S$GLB,,, | Performed by: UROLOGY

## 2017-10-26 NOTE — PROGRESS NOTES
"  Subjective:       Cecilia Barahona is a 67 y.o. female who is an established patient who was referred by Dr Smart for evaluation of SALVADOR.      She reports difficulty emptying her bladder. She reports feeling of full bladder with only "drops coming out." She was treated for UTI in 4/17 (>100k E coli & Citrobacter). She noted gross hematuria at that time as well. She now has some occasional dysuria. Reports clear urine that was concerning for her. SALVADOR sensation has lessened but still present. Denies straining. Malodorous urine. Chronic constipation - has not been taking any meds for this now (Colace, MoM PRN). Reports BM only weekly!     Also noted blood while wiping only x 1 about 3 weeks ago.     PVR (bladder scan) initial visit - 12cc    She is now s/p hematuria workup - +UCx, CT uro - neg, cysto - neg (8/17).     She noted some SP pressure last week that went away on its own. No gross hematuria since then.       The following portions of the patient's history were reviewed and updated as appropriate: allergies, current medications, past family history, past medical history, past social history, past surgical history and problem list.    Review of Systems  Constitutional: no fever or chills  ENT: no nasal congestion or sore throat  Respiratory: no cough or shortness of breath  Cardiovascular: no chest pain or palpitations  Gastrointestinal: no nausea or vomiting, tolerating diet  Genitourinary: as per HPI  Hematologic/Lymphatic: no easy bruising or lymphadenopathy  Musculoskeletal: no arthralgias or myalgias  Skin: no rashes or lesions  Neurological: no seizures or tremors  Behavioral/Psych: no auditory or visual hallucinations       Objective:    Vitals: /62   Pulse 68   Resp 14   Ht 5' 3" (1.6 m)   Wt 88.4 kg (194 lb 14.2 oz)   BMI 34.52 kg/m²     Physical Exam   General: well developed, well nourished in no acute distress  Head: normocephalic, atraumatic  Neck: supple, trachea midline, no obvious " enlargement of thyroid  HEENT: EOMI, mucus membranes moist, sclera anicteric, no hearing impairment  Lungs: symmetric expansion, non-labored breathing  Cardiovascular: regular rate and rhythm, normal pulses  Abdomen: soft, non tender, non distended, no palpable masses, no hepatosplenomegaly, no hernias, no CVA tenderness  Musculoskeletal: no peripheral edema, normal ROM in bilateral upper and lower extremities  Lymphatics: no cervical or inguinal lymphadenopathy  Skin: no rashes or lesions  Neuro: alert and oriented x 3, no gross deficits  Psych: normal judgment and insight, normal mood/affect and non-anxious  Genitourinary:   patient declined exam      Lab Review   Urine analysis today in clinic shows positive for ++LE, 5-10 RBCs    Lab Results   Component Value Date    WBC 9.20 03/10/2017    HGB 12.3 03/10/2017    HCT 37 04/15/2017    MCV 90 03/10/2017     03/10/2017     Lab Results   Component Value Date    CREATININE 0.8 07/20/2017    BUN 20 07/20/2017       Imaging  NA       Assessment/Plan:      1. Incomplete bladder emptying    - PVR does not correlate with her sensation of SALVADOR   - Avoid/treat constipation - seems to be significant problem. May need GI referral.   - Consider Flomax   - Urethra/bladder normal with cystoscopy   - Reassurance provided today     2. Hematuria, gross    - Likely initially related to UTI   - Recommend workup due to persistence   - Discussed etiology and workup of hematuria   - UCx +   - Cytology - not indicated   - CT urogram - neg   - Office cystoscopy - neg          Follow up in 6 months

## 2017-10-26 NOTE — PATIENT INSTRUCTIONS
""What can I do to prevent urinary tract infections?"     These recommendations have not all been scientifically proven, but are nonetheless, frequently found to be helpful in many patients:      · Always urinate after sexual intercourse. Ideally, urinate before and after to minimize  risk of bacteria entering bladder.   · Consider cranberry juice or cranberry tablets.    · Consider adding a probiotic to vitamin regimen.    · When wiping after using the bathroom, always wipe front to back.    · Wear cotton-lined panties. Wash underwear in Clorox and rinse them very well.    · Wash genitalia with mild antibacterial soap. Showers, not baths.    · Do not douche.    · Do not use bubble baths, perfumed soaps, or any other caustic soaps on your skin. Strong soaps can disturb your own immune mechanisms that normally fight off infections.    · Avoid wearing constrictive and tight clothing, particularly during the warm months.    · Drink plenty of water in the warm months. Drink at least 2 liters in a 24 hour period. In the colder months, drink at least 1 liter in a 24 hour period.    · Frequent urination will keep your bladder empty and decrease the ability of the bacteria to grow in your bladder. Rather than waiting until you have strong urge to urinate, attempt to empty your bladder every two to three hours while awake, if possible.    · There are no known foods or beverages that cause urinary infections. However, the idea of keeping yourself well-hydrated, particularly with water, is quite effective.    · If you use sanitary pads for urinary leakage, change them often to prevent sitting in a warm, moist environment that bacteria will flourish.    · If post-menopausal, consider topical vaginal estrogen replacement to help prevent recurrent infections. Vaginal estrogen provides a very low dose of estrogen to help improve the quality of the skin by normalizing its acidity and making it thicker and better " lubricated.    Diabetics:  It is essential to keep your blood sugar under good control. High blood sugar can lead to sugar in the urine, which can contribute to UTIs as sugar can promote bacterial growth.

## 2017-11-06 ENCOUNTER — TELEPHONE (OUTPATIENT)
Dept: FAMILY MEDICINE | Facility: CLINIC | Age: 67
End: 2017-11-06

## 2017-11-06 DIAGNOSIS — R21 RASH: Primary | ICD-10-CM

## 2017-11-06 NOTE — TELEPHONE ENCOUNTER
----- Message from Randy Pagan sent at 11/6/2017 11:32 AM CST -----  Contact: [facility] [phone]  Calling to get an referral for PT

## 2017-11-07 NOTE — TELEPHONE ENCOUNTER
Spoke with [facility] - patient saw Pratik Humphrey MD.  Have sent message to Epic to have provider added ASAP so I can place referral

## 2017-11-07 NOTE — TELEPHONE ENCOUNTER
----- Message from Randy Pagan sent at 11/6/2017 11:32 AM CST -----  Contact: [facility] [phone]  Calling to get an referral for PT

## 2017-11-07 NOTE — TELEPHONE ENCOUNTER
Spoke/wTabigaila with [facility], requesting a referral for patient who was seen on 8/3/17 to submit to her insurance for payment.  Spoke w/patient, states she went for a bump on her (L) leg in which an injection was given and also for a rash on her back.

## 2017-11-08 DIAGNOSIS — J20.8 ACUTE BACTERIAL BRONCHITIS: ICD-10-CM

## 2017-11-08 DIAGNOSIS — B96.89 ACUTE BACTERIAL BRONCHITIS: ICD-10-CM

## 2017-11-08 RX ORDER — ALBUTEROL SULFATE 90 UG/1
2 AEROSOL, METERED RESPIRATORY (INHALATION) EVERY 6 HOURS PRN
Qty: 1 INHALER | Refills: 0 | Status: SHIPPED | OUTPATIENT
Start: 2017-11-08 | End: 2020-09-30 | Stop reason: SDUPTHER

## 2017-11-10 DIAGNOSIS — F32.0 MAJOR DEPRESSIVE DISORDER, SINGLE EPISODE, MILD: ICD-10-CM

## 2017-11-10 RX ORDER — BUPROPION HYDROCHLORIDE 75 MG/1
TABLET ORAL
Qty: 180 TABLET | Refills: 0 | Status: SHIPPED | OUTPATIENT
Start: 2017-11-10 | End: 2018-01-10 | Stop reason: SDUPTHER

## 2017-11-13 NOTE — TELEPHONE ENCOUNTER
----- Message from Randy Pagan sent at 11/13/2017  2:17 PM CST -----  Contact: [phone]/PT  Returning call TO [facility]

## 2017-11-14 ENCOUNTER — TELEPHONE (OUTPATIENT)
Dept: FAMILY MEDICINE | Facility: CLINIC | Age: 67
End: 2017-11-14

## 2017-11-14 ENCOUNTER — LAB VISIT (OUTPATIENT)
Dept: LAB | Facility: HOSPITAL | Age: 67
End: 2017-11-14
Payer: MEDICARE

## 2017-11-14 LAB
CREAT UR-MCNC: 107 MG/DL
MICROALBUMIN UR DL<=1MG/L-MCNC: 33 UG/ML
MICROALBUMIN/CREATININE RATIO: 30.8 UG/MG

## 2017-11-14 PROCEDURE — 82570 ASSAY OF URINE CREATININE: CPT

## 2018-01-03 ENCOUNTER — TELEPHONE (OUTPATIENT)
Dept: FAMILY MEDICINE | Facility: CLINIC | Age: 68
End: 2018-01-03

## 2018-01-03 NOTE — TELEPHONE ENCOUNTER
Spoke w/patient, want to know if she should be on more Blood pressure medications or maybe a higher dosage. States yesterday after taking b/p med her pressure was 175/91, one hour later 147/85. Today's before med b/p 159/87. States yesterday she was feeling dizzy, but has resolved today. Please advise.

## 2018-01-03 NOTE — TELEPHONE ENCOUNTER
Recommend office visit. Also recommend she sign up on the home digital hypertension program (she can go to the [facility]) at [facility] or [facility] to sign up).  Recommend patient get flu shot if not already completed.

## 2018-01-03 NOTE — TELEPHONE ENCOUNTER
Spoke w/patient, informed of recommendation. OV 1/10/18, states she will receive flu vaccine at visit.

## 2018-01-03 NOTE — TELEPHONE ENCOUNTER
----- Message from Opal Green sent at 1/3/2018 11:29 AM CST -----  Contact: [phone]  Pt believes her blood pressure is up because she is dizzy with a headache  Please call pt at your earliest convenience.  Thanks

## 2018-01-08 ENCOUNTER — TELEPHONE (OUTPATIENT)
Dept: FAMILY MEDICINE | Facility: CLINIC | Age: 68
End: 2018-01-08

## 2018-01-08 ENCOUNTER — TELEPHONE (OUTPATIENT)
Dept: OPTOMETRY | Facility: CLINIC | Age: 68
End: 2018-01-08

## 2018-01-08 NOTE — TELEPHONE ENCOUNTER
----- Message from Randy Pagan sent at 1/8/2018 12:00 PM CST -----  Contact: [phone]/PT  Calling to speak with nurse to find out,name of high blood pressure program that was referred .

## 2018-01-09 DIAGNOSIS — H40.1122 PRIMARY OPEN ANGLE GLAUCOMA OF LEFT EYE, MODERATE STAGE: ICD-10-CM

## 2018-01-09 RX ORDER — LATANOPROST 50 UG/ML
1 SOLUTION/ DROPS OPHTHALMIC NIGHTLY
Qty: 1 BOTTLE | Refills: 1 | Status: SHIPPED | OUTPATIENT
Start: 2018-01-09 | End: 2019-01-09

## 2018-01-10 ENCOUNTER — OFFICE VISIT (OUTPATIENT)
Dept: FAMILY MEDICINE | Facility: CLINIC | Age: 68
End: 2018-01-10
Payer: MEDICARE

## 2018-01-10 VITALS
HEIGHT: 63 IN | BODY MASS INDEX: 34.65 KG/M2 | HEART RATE: 74 BPM | OXYGEN SATURATION: 97 % | DIASTOLIC BLOOD PRESSURE: 80 MMHG | SYSTOLIC BLOOD PRESSURE: 122 MMHG | TEMPERATURE: 99 F | WEIGHT: 195.56 LBS

## 2018-01-10 DIAGNOSIS — F32.0 MAJOR DEPRESSIVE DISORDER, SINGLE EPISODE, MILD: ICD-10-CM

## 2018-01-10 DIAGNOSIS — L85.3 DRY SKIN DERMATITIS: ICD-10-CM

## 2018-01-10 DIAGNOSIS — E78.5 HYPERLIPIDEMIA LDL GOAL <70: ICD-10-CM

## 2018-01-10 DIAGNOSIS — I10 ESSENTIAL HYPERTENSION: ICD-10-CM

## 2018-01-10 DIAGNOSIS — Z23 FLU VACCINE NEED: ICD-10-CM

## 2018-01-10 DIAGNOSIS — H10.13 ALLERGIC CONJUNCTIVITIS, BILATERAL: ICD-10-CM

## 2018-01-10 DIAGNOSIS — K21.9 GASTROESOPHAGEAL REFLUX DISEASE WITHOUT ESOPHAGITIS: ICD-10-CM

## 2018-01-10 DIAGNOSIS — Z23 NEED FOR SHINGLES VACCINE: ICD-10-CM

## 2018-01-10 DIAGNOSIS — E66.9 OBESITY (BMI 30-39.9): ICD-10-CM

## 2018-01-10 DIAGNOSIS — R53.83 FATIGUE, UNSPECIFIED TYPE: ICD-10-CM

## 2018-01-10 DIAGNOSIS — Z00.00 ROUTINE MEDICAL EXAM: Primary | ICD-10-CM

## 2018-01-10 DIAGNOSIS — G47.33 OSA (OBSTRUCTIVE SLEEP APNEA): ICD-10-CM

## 2018-01-10 DIAGNOSIS — Z79.4 LONG-TERM INSULIN USE: ICD-10-CM

## 2018-01-10 DIAGNOSIS — J30.9 ALLERGIC RHINITIS, UNSPECIFIED CHRONICITY, UNSPECIFIED SEASONALITY, UNSPECIFIED TRIGGER: ICD-10-CM

## 2018-01-10 PROBLEM — M54.9 ACUTE RIGHT-SIDED BACK PAIN: Status: RESOLVED | Noted: 2017-08-21 | Resolved: 2018-01-10

## 2018-01-10 PROCEDURE — 99397 PER PM REEVAL EST PAT 65+ YR: CPT | Mod: 25,S$GLB,, | Performed by: INTERNAL MEDICINE

## 2018-01-10 PROCEDURE — 99999 PR PBB SHADOW E&M-EST. PATIENT-LVL III: CPT | Mod: PBBFAC,,, | Performed by: INTERNAL MEDICINE

## 2018-01-10 PROCEDURE — 90662 IIV NO PRSV INCREASED AG IM: CPT | Mod: S$GLB,,, | Performed by: INTERNAL MEDICINE

## 2018-01-10 PROCEDURE — G0008 ADMIN INFLUENZA VIRUS VAC: HCPCS | Mod: S$GLB,,, | Performed by: INTERNAL MEDICINE

## 2018-01-10 PROCEDURE — 99499 UNLISTED E&M SERVICE: CPT | Mod: S$GLB,,, | Performed by: INTERNAL MEDICINE

## 2018-01-10 RX ORDER — PRAVASTATIN SODIUM 40 MG/1
40 TABLET ORAL DAILY
Qty: 90 TABLET | Refills: 1 | Status: SHIPPED | OUTPATIENT
Start: 2018-01-10 | End: 2018-03-12 | Stop reason: SDUPTHER

## 2018-01-10 RX ORDER — LANCING DEVICE
EACH MISCELLANEOUS
Qty: 1 EACH | Refills: 0 | Status: CANCELLED | OUTPATIENT
Start: 2018-01-10

## 2018-01-10 RX ORDER — LATANOPROST 50 UG/ML
1 SOLUTION/ DROPS OPHTHALMIC NIGHTLY
Qty: 1 BOTTLE | Refills: 1 | Status: CANCELLED | OUTPATIENT
Start: 2018-01-10 | End: 2019-01-10

## 2018-01-10 RX ORDER — HYDROCHLOROTHIAZIDE 12.5 MG/1
12.5 CAPSULE ORAL DAILY
Qty: 90 CAPSULE | Refills: 1 | Status: SHIPPED | OUTPATIENT
Start: 2018-01-10 | End: 2018-02-07 | Stop reason: ALTCHOICE

## 2018-01-10 RX ORDER — DESOXIMETASONE 0.5 MG/G
CREAM TOPICAL 2 TIMES DAILY PRN
Qty: 100 G | Refills: 2 | Status: SHIPPED | OUTPATIENT
Start: 2018-01-10 | End: 2021-09-15 | Stop reason: SDUPTHER

## 2018-01-10 RX ORDER — MELOXICAM 15 MG/1
15 TABLET ORAL DAILY
Refills: 1 | COMMUNITY
Start: 2017-12-13 | End: 2018-10-09

## 2018-01-10 RX ORDER — AZELASTINE HYDROCHLORIDE 0.5 MG/ML
1 SOLUTION/ DROPS OPHTHALMIC 2 TIMES DAILY
Qty: 18 ML | Refills: 1 | Status: SHIPPED | OUTPATIENT
Start: 2018-01-10 | End: 2018-08-04 | Stop reason: SDUPTHER

## 2018-01-10 RX ORDER — INSULIN ASPART 100 [IU]/ML
INJECTION, SOLUTION INTRAVENOUS; SUBCUTANEOUS
Qty: 15 ML | Refills: 2 | Status: SHIPPED | OUTPATIENT
Start: 2018-01-10 | End: 2018-10-03 | Stop reason: ALTCHOICE

## 2018-01-10 RX ORDER — PEN NEEDLE, DIABETIC 30 GX3/16"
NEEDLE, DISPOSABLE MISCELLANEOUS
Qty: 100 EACH | Refills: 5 | Status: SHIPPED | OUTPATIENT
Start: 2018-01-10 | End: 2021-07-21

## 2018-01-10 RX ORDER — AMLODIPINE BESYLATE 10 MG/1
10 TABLET ORAL DAILY
COMMUNITY
Start: 2018-01-09 | End: 2018-03-12 | Stop reason: SDUPTHER

## 2018-01-10 RX ORDER — LORATADINE 10 MG/1
10 TABLET ORAL DAILY
Qty: 90 TABLET | Refills: 1 | Status: SHIPPED | OUTPATIENT
Start: 2018-01-10 | End: 2018-08-29 | Stop reason: SDUPTHER

## 2018-01-10 RX ORDER — LOSARTAN POTASSIUM 100 MG/1
100 TABLET ORAL DAILY
Qty: 90 TABLET | Refills: 1 | Status: SHIPPED | OUTPATIENT
Start: 2018-01-10 | End: 2018-03-12 | Stop reason: SDUPTHER

## 2018-01-10 RX ORDER — INSULIN DETEMIR 100 [IU]/ML
INJECTION, SOLUTION SUBCUTANEOUS
Qty: 15 ML | Refills: 2 | Status: SHIPPED | OUTPATIENT
Start: 2018-01-10 | End: 2018-04-14 | Stop reason: SDUPTHER

## 2018-01-10 RX ORDER — LANCETS
EACH MISCELLANEOUS
Qty: 400 EACH | Refills: 3 | Status: SHIPPED | OUTPATIENT
Start: 2018-01-10 | End: 2021-01-26

## 2018-01-10 RX ORDER — BUPROPION HYDROCHLORIDE 75 MG/1
150 TABLET ORAL 2 TIMES DAILY
Qty: 180 TABLET | Refills: 1 | Status: SHIPPED | OUTPATIENT
Start: 2018-01-10 | End: 2018-03-12 | Stop reason: SDUPTHER

## 2018-01-10 RX ORDER — ALBUTEROL SULFATE 90 UG/1
2 AEROSOL, METERED RESPIRATORY (INHALATION) EVERY 6 HOURS PRN
Qty: 1 INHALER | Refills: 0 | Status: CANCELLED | OUTPATIENT
Start: 2018-01-10

## 2018-01-10 NOTE — PROGRESS NOTES
HISTORY OF PRESENT ILLNESS:  Cecilia Barahona is a 67 y.o. female who presents to the clinic today for a routine medical physical exam. Her last physical exam was approximately 1 years(s) ago.        PAST MEDICAL HISTORY:  Past Medical History:   Diagnosis Date    Allergic rhinitis, seasonal     Arthritis     Glaucoma NEC     History of positive PPD - treated - remote past     Hyperlipidemia LDL goal < 100     Hypertension     Obesity     CHETNA (obstructive sleep apnea)     Type II or unspecified type diabetes mellitus without mention of complication, uncontrolled        PAST SURGICAL HISTORY:  Past Surgical History:   Procedure Laterality Date    CARPAL TUNNEL RELEASE       SECTION, CLASSIC      TOTAL ABDOMINAL HYSTERECTOMY      TRIGGER FINGER RELEASE         SOCIAL HISTORY:  Social History     Social History    Marital status:      Spouse name: N/A    Number of children: N/A    Years of education: N/A     Occupational History    Not on file.     Social History Main Topics    Smoking status: Former Smoker     Packs/day: 0.50     Types: Cigarettes     Quit date: 11/10/2012    Smokeless tobacco: Never Used    Alcohol use No    Drug use: No    Sexual activity: No     Other Topics Concern    Not on file     Social History Narrative    No narrative on file       FAMILY HISTORY:  Family History   Problem Relation Age of Onset    Hypertension Mother     Diabetes Mother     Heart failure Mother     Cataracts Mother     Prostate cancer Father     Hypertension Father     Diabetes Father     Heart failure Father     No Known Problems Sister     Alcohol abuse Maternal Aunt     Diabetes Maternal Uncle     Hyperlipidemia Maternal Uncle     Hypertension Maternal Uncle     Diabetes Maternal Grandmother     Diabetes Maternal Grandfather     No Known Problems Paternal Grandmother     No Known Problems Paternal Grandfather     Diabetes Maternal Aunt     Alzheimer's disease  Maternal Aunt     Heart disease Cousin      s/p heart transplant    Amblyopia Neg Hx     Blindness Neg Hx     Cancer Neg Hx     Glaucoma Neg Hx     Macular degeneration Neg Hx     Retinal detachment Neg Hx     Strabismus Neg Hx     Stroke Neg Hx     Thyroid disease Neg Hx        ALLERGIES AND MEDICATIONS: updated and reviewed.  Review of patient's allergies indicates:   Allergen Reactions    Ace inhibitors Other (See Comments)     cough    Invokana [canagliflozin] Other (See Comments)     Throat and tongue swelling     Medication List with Changes/Refills   New Medications    INSULIN ASPART (NOVOLOG FLEXPEN) 100 UNIT/ML INPN PEN    10 units sq three times a day with meals   Current Medications    ALBUTEROL 90 MCG/ACTUATION INHALER    Inhale 2 puffs into the lungs every 6 (six) hours as needed for Wheezing or Shortness of Breath. Rescue    ALCOHOL SWABS PADM    Apply 1 each topically 3 (three) times daily.    AMLODIPINE (NORVASC) 10 MG TABLET    Take 10 mg by mouth once daily.    AMLODIPINE (NORVASC) 5 MG TABLET    Take 1 tablet (5 mg total) by mouth once daily.    ARTIFICIAL TEAR OINTMENT (ARTIFICIAL TEARS) OINT    Place into both eyes every evening.    ASPIRIN 81 MG CHEW    Take by mouth once daily. 1 tablet, chewable Oral Every day    BLOOD GLUCOSE CONTROL HIGH,LOW (ACCU-CHEK BRIDGETTE CONTROL SOLN) SOLN    1 kit by Misc.(Non-Drug; Combo Route) route once daily.    BLOOD GLUCOSE CONTROL, LOW (EMBRACE GLUCOSE CONTROL LOW) SOLN    Inject 1 Bottle into the skin every 30 days.    BLOOD PRESSURE MONITOR (BLOOD PRESSURE KIT) KIT    1 kit by Misc.(Non-Drug; Combo Route) route once daily.    BLOOD SUGAR DIAGNOSTIC STRP    Check blood glucose 4 times a day. Dispense accuchek meter or other meter preferred by insurance. Dx code e11.65    BLOOD-GLUCOSE METER KIT    ispense accuchek meter or other meter preferred by insurance. Dx code e11.65    GABAPENTIN (NEURONTIN) 300 MG CAPSULE    Take 1 capsule (300 mg total) by  mouth every evening.    HYDROXYZINE HCL (ATARAX) 25 MG TABLET    Take 25 mg by mouth nightly.    IBUPROFEN (ADVIL,MOTRIN) 800 MG TABLET    Take 1 tablet (800 mg total) by mouth every 8 (eight) hours as needed for Pain.    LANCING DEVICE WITH LANCETS MISC    USE AS DIRECTED FOR DIABETIC TESTING    LATANOPROST 0.005 % OPHTHALMIC SOLUTION    Place 1 drop into both eyes every evening.    MELOXICAM (MOBIC) 15 MG TABLET    Take 15 mg by mouth once daily.    METFORMIN (GLUCOPHAGE) 1000 MG TABLET    TAKE ONE Tablet BY MOUTH TWICE DAILY **TAKE WITH FOOD**    METHOCARBAMOL (ROBAXIN) 500 MG TAB    Take 750 mg by mouth 4 (four) times daily.     MULTIVITAMIN WITH MINERALS TABLET    Take 1 tablet by mouth once daily.    NAPROXEN (NAPROSYN) 500 MG TABLET    Take 500 mg by mouth 2 (two) times daily as needed.    TRAMADOL (ULTRAM) 50 MG TABLET    Take 50 mg by mouth every 6 (six) hours as needed for Pain.   Changed and/or Refilled Medications    Modified Medication Previous Medication    AZELASTINE (OPTIVAR) 0.05 % OPHTHALMIC SOLUTION azelastine (OPTIVAR) 0.05 % ophthalmic solution       Place 1 drop into both eyes 2 (two) times daily.    Place 1 drop into both eyes 2 (two) times daily.    BUPROPION (WELLBUTRIN) 75 MG TABLET buPROPion (WELLBUTRIN) 75 MG tablet       Take 2 tablets (150 mg total) by mouth 2 (two) times daily.    TAKE TWO TABLETS BY MOUTH TWICE DAILY    DESOXIMETASONE (TOPICORT) 0.05 % CREAM desoximetasone (TOPICORT) 0.05 % cream       Apply topically 2 (two) times daily as needed.        HYDROCHLOROTHIAZIDE (MICROZIDE) 12.5 MG CAPSULE hydrochlorothiazide (MICROZIDE) 12.5 mg capsule       Take 1 capsule (12.5 mg total) by mouth once daily.    TAKE ONE Capsule BY MOUTH once DAILY    LANCETS MISC lancets Misc       Check blood glucose 4 times a day. Dispense accuchek meter or other meter preferred by insurance. Dx code e11.65    Check blood glucose 4 times a day. Dispense accuchek meter or other meter preferred by  "insurance. Dx code e11.65    LEVEMIR FLEXTOUCH 100 UNIT/ML (3 ML) INPN PEN LEVEMIR FLEXTOUCH 100 unit/mL (3 mL) InPn pen       38 units sq qhs        LORATADINE (CLARITIN) 10 MG TABLET loratadine (CLARITIN) 10 mg tablet       Take 1 tablet (10 mg total) by mouth once daily.    Take 1 tablet (10 mg total) by mouth once daily.    LOSARTAN (COZAAR) 100 MG TABLET losartan (COZAAR) 100 MG tablet       Take 1 tablet (100 mg total) by mouth once daily.        PEN NEEDLE, DIABETIC (BD ULTRA-FINE LENY PEN NEEDLES) 32 GAUGE X 5/32" NDLE pen needle, diabetic (BD ULTRA-FINE LENY PEN NEEDLES) 32 gauge x 5/32" Ndle       USE FIVE TIMES DAILY    USE FIVE TIMES DAILY    PRAVASTATIN (PRAVACHOL) 40 MG TABLET pravastatin (PRAVACHOL) 40 MG tablet       Take 1 tablet (40 mg total) by mouth once daily.    TAKE ONE Tablet BY MOUTH once DAILY         CARE TEAM:  Patient Care Team:  Yudi Smart MD as PCP - General (Internal Medicine)           SCREENING HISTORY:  Health Maintenance       Date Due Completion Date    Zoster Vaccine 03/10/2011 ---    Influenza Vaccine 08/01/2017 10/12/2016    Hemoglobin A1c 01/21/2018 7/21/2017    Lipid Panel 03/10/2018 3/10/2017    Foot Exam 03/15/2018 3/15/2017    Override on 2/23/2015: Done    Mammogram 03/29/2018 3/29/2016    Eye Exam 04/04/2018 4/4/2017    Override on 1/18/2016: Done (Dr. Lujan - no retinopathy)    Urine Microalbumin 11/14/2018 11/14/2017    DEXA SCAN 03/29/2019 3/29/2016    Colonoscopy 01/07/2020 1/7/2015    Override on 12/3/2004: Done    TETANUS VACCINE 09/30/2025 9/30/2015            REVIEW OF SYSTEMS:   The patient reports fair dietary habits.  The patient does not exercise regularly.  Review of Systems   Constitutional: Positive for malaise/fatigue (she is not using her CPAP machine). Negative for chills, fever and weight loss.   HENT: Negative for congestion, ear pain, nosebleeds and sore throat (or Oral Lesions).    Eyes: Negative for blurred vision, pain and discharge. " "  Respiratory: Negative for cough, shortness of breath and wheezing.    Cardiovascular: Negative for chest pain, palpitations and leg swelling.        - having some high BPs in the am on her home BP machine   Gastrointestinal: Negative for abdominal pain, blood in stool, constipation, diarrhea, heartburn, nausea and vomiting.   Genitourinary: Negative for dysuria, frequency, hematuria and urgency.   Musculoskeletal: Negative for falls (or Gait Disturbance), joint pain and myalgias.   Skin: Negative for itching (or Hives) and rash.   Neurological: Negative for dizziness, focal weakness, seizures, loss of consciousness and headaches.   Endo/Heme/Allergies: Negative for environmental allergies and polydipsia (or Temperature Intolerance). Does not bruise/bleed easily (or Lymphadenopathy).   Psychiatric/Behavioral: Positive for depression (very unhappy at home). Negative for memory loss and substance abuse. The patient is not nervous/anxious and does not have insomnia.                 Physical Examination:   Vitals:    01/10/18 1035   BP: 122/80   Pulse: 74   Temp: 98.8 °F (37.1 °C)     Weight: 88.7 kg (195 lb 8.8 oz)   Height: 5' 3" (160 cm)   Body mass index is 34.64 kg/m².    General appearance - alert, well appearing, and in no distress and obese  Mental status - alert, oriented to person, place, and time, normal behavior, speech, dress, motor activity, and thought processes, depressed mood  Eyes - pupils equal and reactive, extraocular eye movements intact, sclera anicteric  Mouth - mucous membranes moist, pharynx normal without lesions  Neck - supple, no significant adenopathy, carotids upstroke normal bilaterally, no bruits, thyroid exam: thyroid is normal in size without nodules or tenderness  Lymphatics - no palpable lymphadenopathy  Chest - clear to auscultation, no wheezes, rales or rhonchi, symmetric air entry  Heart - normal rate and regular rhythm, no gallops noted  Abdomen - soft, nontender, nondistended, " "no masses or organomegaly  Breasts - not examined  Back exam - full range of motion, no tenderness, palpable spasm or pain on motion  Neurological - alert, oriented, normal speech, no focal findings or movement disorder noted, cranial nerves II through XII intact  Musculoskeletal - no joint tenderness, deformity or swelling, no muscular tenderness noted  Extremities - peripheral pulses normal, no pedal edema, no clubbing or cyanosis  Skin - normal coloration and turgor, no rashes, no suspicious skin lesions noted      ASSESSMENT AND PLAN:  1. Routine medical exam  Counseled on age appropriate medical preventative services including age appropriate cancer screenings, age appropriate eye and dental exams, over all nutritional health, need for a consistent exercise regimen, and an over all push towards maintaining a vigorous and active lifestyle.  Counseled on age appropriate vaccines and discussed upcoming health care needs based on age/gender. Discussed good sleep hygiene and stress management.     2. Type 2 diabetes, uncontrolled, with neuropathy/3. 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 Long-term insulin use  Unsure of diabetes control at this time. Will check blood work and address results accordingly.  - LEVEMIR FLEXTOUCH 100 unit/mL (3 mL) InPn pen; 38 units sq qhs  Dispense: 15 mL; Refill: 2  - pen needle, diabetic (BD ULTRA-FINE LENY PEN NEEDLES) 32 gauge x 5/32" Ndle; USE FIVE TIMES DAILY  Dispense: 100 each; Refill: 5  - lancets Misc; Check blood glucose 4 times a day. Dispense accuchek meter or other meter preferred by insurance. Dx code e11.65  Dispense: 400 each; Refill: 3  - Hemoglobin A1c; Future  - CBC auto differential; Future  - insulin aspart (NOVOLOG FLEXPEN) 100 unit/mL InPn pen; 10 units sq three times a day with meals  Dispense: 15 mL; Refill: 2    5. Essential hypertension  Discussed sodium restriction, maintaining ideal body weight and regular exercise " program as physiologic means to achieve blood pressure control. The patient will strive towards this. The current medical regimen is effective per office BP check, but patient reports BP high at home;  continue present plan and medications. Recommended patient to check home readings to monitor and see me for followup as scheduled or sooner as needed. Patient was educated that both decongestant and anti-inflammatory medication may raise blood pressure. Will sign up for digital hypertension program.  - hydroCHLOROthiazide (MICROZIDE) 12.5 mg capsule; Take 1 capsule (12.5 mg total) by mouth once daily.  Dispense: 90 capsule; Refill: 1  - losartan (COZAAR) 100 MG tablet; Take 1 tablet (100 mg total) by mouth once daily.  Dispense: 90 tablet; Refill: 1  - Hypertension Digital Medicine ([facility]) Enrollment Order  - Hypertension Digital Medicine ([facility]): Assign Onboarding Questionnaires    6. Hyperlipidemia LDL goal <70  We discussed low fat diet and regular exercise.The current medical regimen is effective;  continue present plan and medications.    - pravastatin (PRAVACHOL) 40 MG tablet; Take 1 tablet (40 mg total) by mouth once daily.  Dispense: 90 tablet; Refill: 1  - Comprehensive metabolic panel; Future  - Lipid panel; Future    7. Major depressive disorder, single episode, mild  The current medical regimen is effective;  continue present plan and medications. Referred to counseling.  - buPROPion (WELLBUTRIN) 75 MG tablet; Take 2 tablets (150 mg total) by mouth 2 (two) times daily.  Dispense: 180 tablet; Refill: 1    8. Allergic rhinitis, unspecified chronicity, unspecified seasonality, unspecified trigger  Stable. Medication as needed.   - loratadine (CLARITIN) 10 mg tablet; Take 1 tablet (10 mg total) by mouth once daily.  Dispense: 90 tablet; Refill: 1    9. Allergic conjunctivitis, bilateral  Stable. Medication as needed.   - azelastine (OPTIVAR) 0.05 % ophthalmic solution; Place 1 drop into both eyes 2 (two) times  daily.  Dispense: 18 mL; Refill: 1    10. CHETNA (obstructive sleep apnea)/17. Fatigue, unspecified type  Last sleep study was many years ago at [facility].  We will set her up for a new sleep study.  - Polysomnogram (CPAP will be added if patient meets diagnostic criteria.); Future    11. Gastroesophageal reflux disease without esophagitis  Symptoms controlled. Reflux precautions discussed (eliminate tobacco if a smoker; minimize caffeine, chocolate and red/white peppermint intake; avoid heavy and spicy meals; don't lay down within 2-3 hours after eating). Medication as needed. Patient asked to take medication breaks, if possible - discussed chronic use can limit calcium absorption, increases the risk for intestinal infections, and can cause kidney damage. There are also some newer studies that show possible increased risk of mortality.     12. Obesity (BMI 30-39.9)  The patient is asked to make an attempt to improve diet and exercise patterns to aid in medical management of this problem.     13. Flu vaccine need    - Influenza - High Dose (65+) (PF) (IM)    14. Need for shingles vaccine  Will wait for new vaccine.    15. Dry skin dermatitis  - We discussed routine skin care: Take short warm (not hot) showers. Pat skin dry. Apply OTC Eucerin plus cream bid, especially after taking a shower. Use OTC 0.1 % hydrocortisone cream bid for up to 14 days on affected areas. Call if no improvement for consultation with dermatology.  - desoximetasone (TOPICORT) 0.05 % cream; Apply topically 2 (two) times daily as needed.  Dispense: 100 g; Refill: 2          Return in about 6 months (around 7/10/2018), or if symptoms worsen or fail to improve, for follow up chronic medical conditions.. or sooner as needed.

## 2018-01-10 NOTE — PROGRESS NOTES
Influenza vaccine administered, vitaly well. Instructed to wait 15mins for observation, no reaction noted @ time of discharge.

## 2018-01-16 ENCOUNTER — PATIENT MESSAGE (OUTPATIENT)
Dept: ADMINISTRATIVE | Facility: OTHER | Age: 68
End: 2018-01-16

## 2018-01-16 ENCOUNTER — TELEPHONE (OUTPATIENT)
Dept: FAMILY MEDICINE | Facility: CLINIC | Age: 68
End: 2018-01-16

## 2018-01-16 NOTE — TELEPHONE ENCOUNTER
Spoke w/patient, states for the last three morning. she has been waking up with a headache. States her b/p Sunday before med was 166/89, Monday 151/79, and today 179/86 (no other readings). States she does not know if she is not on enough medication or need medication to be changed. States she once was on Lotrel and never had a problem. Please advise.

## 2018-01-16 NOTE — TELEPHONE ENCOUNTER
Pt would like to speak with Dr. Smart or her nurse about some medications she is taking. Please Call. Thanks.

## 2018-01-17 NOTE — TELEPHONE ENCOUNTER
Ok - have her increase the microzide to 2 a day. She needs to decrease her salt and avoid canned foods (eat fresh or frozen only). Recommend nurse BP visit for Monday for recheck.

## 2018-01-17 NOTE — TELEPHONE ENCOUNTER
Spoke w/patient, states she takes Amlodipine 10mg, Losartan 100mg, Microzide 12.5mg. States she has not minimized her salt intake and do eat canned foods. States she takes Claritin only, does not exercise and will be signing up for the HTN program.

## 2018-01-17 NOTE — TELEPHONE ENCOUNTER
Unfortunately, the patient cannot take lotrel anymore because she is intolerant of one of its ingredients.   Please check with patient which dose of amlodipine she is taking (I see both 5 and 10 mg listed). Please make sure she is on losartan 100 mg and microzide 12.5 mg. Is she minimizing her salt intake (no canned foods, etc). Is she exercising? Is she taking any decongestant or anti-inflammatory medication?  I believe she is in the process of signing up on the digital hypertension program, correct?

## 2018-01-22 ENCOUNTER — CLINICAL SUPPORT (OUTPATIENT)
Dept: FAMILY MEDICINE | Facility: CLINIC | Age: 68
End: 2018-01-22
Payer: MEDICARE

## 2018-01-22 ENCOUNTER — PATIENT MESSAGE (OUTPATIENT)
Dept: ADMINISTRATIVE | Facility: OTHER | Age: 68
End: 2018-01-22

## 2018-01-22 VITALS — DIASTOLIC BLOOD PRESSURE: 74 MMHG | SYSTOLIC BLOOD PRESSURE: 120 MMHG | HEART RATE: 69 BPM

## 2018-01-22 DIAGNOSIS — I10 BENIGN ESSENTIAL HTN: Primary | ICD-10-CM

## 2018-01-22 PROCEDURE — 99499 UNLISTED E&M SERVICE: CPT | Mod: S$GLB,,, | Performed by: INTERNAL MEDICINE

## 2018-01-22 PROCEDURE — 99999 PR PBB SHADOW E&M-EST. PATIENT-LVL I: CPT | Mod: PBBFAC,,,

## 2018-01-22 NOTE — PROGRESS NOTES
Cecilia Barahona 67 y.o. female is here today for Blood Pressure check.   History of HTN yes.    Review of patient's allergies indicates:   Allergen Reactions    Ace inhibitors Other (See Comments)     cough    Invokana [canagliflozin] Other (See Comments)     Throat and tongue swelling     Creatinine   Date Value Ref Range Status   07/20/2017 0.8 0.5 - 1.4 mg/dL Final     Sodium   Date Value Ref Range Status   07/20/2017 143 136 - 145 mmol/L Final     Potassium   Date Value Ref Range Status   07/20/2017 3.7 3.5 - 5.1 mmol/L Final   ]  Patient verifies taking blood pressure medications on a regular basis at the same time of the day.     Current Outpatient Prescriptions:     albuterol 90 mcg/actuation inhaler, Inhale 2 puffs into the lungs every 6 (six) hours as needed for Wheezing or Shortness of Breath. Rescue, Disp: 1 Inhaler, Rfl: 0    alcohol swabs PadM, Apply 1 each topically 3 (three) times daily., Disp: 400 each, Rfl: 2    amLODIPine (NORVASC) 10 MG tablet, Take 10 mg by mouth once daily., Disp: , Rfl:     artificial tear ointment (ARTIFICIAL TEARS) Oint, Place into both eyes every evening., Disp: 305 g, Rfl: 1    aspirin 81 MG Chew, Take by mouth once daily. 1 tablet, chewable Oral Every day, Disp: , Rfl:     azelastine (OPTIVAR) 0.05 % ophthalmic solution, Place 1 drop into both eyes 2 (two) times daily., Disp: 18 mL, Rfl: 1    blood glucose control high,low (ACCU-CHEK BRIDGETTE CONTROL SOLN) Soln, 1 kit by Misc.(Non-Drug; Combo Route) route once daily., Disp: 1 each, Rfl: 0    blood glucose control, low (EMBRACE GLUCOSE CONTROL LOW) Soln, Inject 1 Bottle into the skin every 30 days., Disp: 1 each, Rfl: 0    blood pressure monitor (BLOOD PRESSURE KIT) Kit, 1 kit by Misc.(Non-Drug; Combo Route) route once daily., Disp: 1 each, Rfl: 0    blood sugar diagnostic Strp, Check blood glucose 4 times a day. Dispense accuchek meter or other meter preferred by insurance. Dx code e11.65, Disp: 400 strip, Rfl:  3    blood-glucose meter kit, ispense accuchek meter or other meter preferred by insurance. Dx code e11.65, Disp: 1 each, Rfl: 0    buPROPion (WELLBUTRIN) 75 MG tablet, Take 2 tablets (150 mg total) by mouth 2 (two) times daily., Disp: 180 tablet, Rfl: 1    desoximetasone (TOPICORT) 0.05 % cream, Apply topically 2 (two) times daily as needed., Disp: 100 g, Rfl: 2    gabapentin (NEURONTIN) 300 MG capsule, Take 1 capsule (300 mg total) by mouth every evening., Disp: 90 capsule, Rfl: 1    hydroCHLOROthiazide (MICROZIDE) 12.5 mg capsule, Take 1 capsule (12.5 mg total) by mouth once daily., Disp: 90 capsule, Rfl: 1    hydrOXYzine HCl (ATARAX) 25 MG tablet, Take 25 mg by mouth nightly., Disp: , Rfl:     ibuprofen (ADVIL,MOTRIN) 800 MG tablet, Take 1 tablet (800 mg total) by mouth every 8 (eight) hours as needed for Pain., Disp: 20 tablet, Rfl: 0    insulin aspart (NOVOLOG FLEXPEN) 100 unit/mL InPn pen, 10 units sq three times a day with meals, Disp: 15 mL, Rfl: 2    lancets Misc, Check blood glucose 4 times a day. Dispense accuchek meter or other meter preferred by insurance. Dx code e11.65, Disp: 400 each, Rfl: 3    LANCING DEVICE WITH LANCETS Misc, USE AS DIRECTED FOR DIABETIC TESTING, Disp: 1 each, Rfl: 0    latanoprost 0.005 % ophthalmic solution, Place 1 drop into both eyes every evening., Disp: 1 Bottle, Rfl: 1    LEVEMIR FLEXTOUCH 100 unit/mL (3 mL) InPn pen, 38 units sq qhs, Disp: 15 mL, Rfl: 2    loratadine (CLARITIN) 10 mg tablet, Take 1 tablet (10 mg total) by mouth once daily., Disp: 90 tablet, Rfl: 1    losartan (COZAAR) 100 MG tablet, Take 1 tablet (100 mg total) by mouth once daily., Disp: 90 tablet, Rfl: 1    meloxicam (MOBIC) 15 MG tablet, Take 15 mg by mouth once daily., Disp: , Rfl: 1    metformin (GLUCOPHAGE) 1000 MG tablet, TAKE ONE Tablet BY MOUTH TWICE DAILY **TAKE WITH FOOD**, Disp: 180 tablet, Rfl: 0    methocarbamol (ROBAXIN) 500 MG Tab, Take 750 mg by mouth 4 (four) times daily.  ", Disp: , Rfl:     multivitamin with minerals tablet, Take 1 tablet by mouth once daily., Disp: , Rfl:     naproxen (NAPROSYN) 500 MG tablet, Take 500 mg by mouth 2 (two) times daily as needed., Disp: , Rfl: 1    pen needle, diabetic (BD ULTRA-FINE LENY PEN NEEDLES) 32 gauge x 5/32" Ndle, USE FIVE TIMES DAILY, Disp: 100 each, Rfl: 5    pravastatin (PRAVACHOL) 40 MG tablet, Take 1 tablet (40 mg total) by mouth once daily., Disp: 90 tablet, Rfl: 1    tramadol (ULTRAM) 50 mg tablet, Take 50 mg by mouth every 6 (six) hours as needed for Pain., Disp: , Rfl:   Does patient have record of home blood pressure readings no..   Last dose of blood pressure medication was taken at 8:30 this morning.  Patient is asymptomatic.   Complains of none.. Escorted patient to [facility] to have Josette assist patient. Patient states she was having problems signing up    Vitals:    01/22/18 1029   BP: 120/74   BP Location: Right arm   Patient Position: Sitting   BP Method: Medium (Manual)   Pulse: 69         Dr. Smart notified.   "

## 2018-01-31 ENCOUNTER — TELEPHONE (OUTPATIENT)
Dept: SLEEP MEDICINE | Facility: OTHER | Age: 68
End: 2018-01-31

## 2018-02-06 DIAGNOSIS — J30.9 ALLERGIC RHINITIS: ICD-10-CM

## 2018-02-06 RX ORDER — LEVOCETIRIZINE DIHYDROCHLORIDE 5 MG/1
5 TABLET, FILM COATED ORAL NIGHTLY PRN
Qty: 90 TABLET | Refills: 1 | Status: SHIPPED | OUTPATIENT
Start: 2018-02-06 | End: 2019-02-07

## 2018-02-07 ENCOUNTER — TELEPHONE (OUTPATIENT)
Dept: FAMILY MEDICINE | Facility: CLINIC | Age: 68
End: 2018-02-07

## 2018-02-07 DIAGNOSIS — I10 ESSENTIAL HYPERTENSION: ICD-10-CM

## 2018-02-07 RX ORDER — HYDROCHLOROTHIAZIDE 25 MG/1
25 TABLET ORAL DAILY
Qty: 90 TABLET | Refills: 1 | Status: SHIPPED | OUTPATIENT
Start: 2018-02-07 | End: 2018-03-12 | Stop reason: SDUPTHER

## 2018-02-07 NOTE — TELEPHONE ENCOUNTER
----- Message from Tonia Navarro sent at 2/7/2018  1:23 PM CST -----  Contact: [facility]  Pharmacy called to request refill on hydrochlorothiazide (MICROZIDE) 12.5 mg capsule. States per pt Rx has been increased to 4 X's a day. Janis can be reached @ [phone].

## 2018-02-07 NOTE — TELEPHONE ENCOUNTER
Spoke with patient: clarified that I just wanted her to double up on her 12.5mg tablet. I will change her to a 25mg tablet once a day. Pharmacy notified. I sent the Rx to the pharmacy.

## 2018-02-07 NOTE — TELEPHONE ENCOUNTER
----- Message from Tonia Navarro sent at 2/7/2018  1:23 PM CST -----  Contact: [facility]  Pharmacy called to request refill on hydrochlorothiazide (MICROZIDE) 12.5 mg capsule. States per pt Rx has been increased to 4 X's a day. Janis can be reached @ [phone].

## 2018-02-21 ENCOUNTER — PES CALL (OUTPATIENT)
Dept: ADMINISTRATIVE | Facility: CLINIC | Age: 68
End: 2018-02-21

## 2018-02-28 ENCOUNTER — TELEPHONE (OUTPATIENT)
Dept: SLEEP MEDICINE | Facility: HOSPITAL | Age: 68
End: 2018-02-28

## 2018-03-01 ENCOUNTER — HOSPITAL ENCOUNTER (OUTPATIENT)
Dept: SLEEP MEDICINE | Facility: HOSPITAL | Age: 68
Discharge: HOME | End: 2018-03-01
Attending: INTERNAL MEDICINE
Payer: MEDICARE

## 2018-03-01 DIAGNOSIS — G47.33 OSA (OBSTRUCTIVE SLEEP APNEA): ICD-10-CM

## 2018-03-01 DIAGNOSIS — R53.83 FATIGUE, UNSPECIFIED TYPE: ICD-10-CM

## 2018-03-01 PROCEDURE — 95811 POLYSOM 6/>YRS CPAP 4/> PARM: CPT | Mod: 26,,, | Performed by: INTERNAL MEDICINE

## 2018-03-01 PROCEDURE — 95811 PR POLYSOMNOGRAPHY W/CPAP: ICD-10-PCS | Mod: 26,,, | Performed by: INTERNAL MEDICINE

## 2018-03-01 PROCEDURE — 95810 POLYSOM 6/> YRS 4/> PARAM: CPT

## 2018-03-02 NOTE — PROGRESS NOTES
Patient was educated about the purpose of the wires and what data was being collected.  Patient was informed that the technician may need to enter the room during the night to fix leads or make adjustments to the equipment.  Patient did not meet the criteria for cpap titration.      EKG appears to be Bradycardia/SVT    Left eye electrode changed       Follow up instructions were provided to the patient.

## 2018-03-12 ENCOUNTER — OFFICE VISIT (OUTPATIENT)
Dept: FAMILY MEDICINE | Facility: CLINIC | Age: 68
End: 2018-03-12
Payer: MEDICARE

## 2018-03-12 VITALS
WEIGHT: 201.94 LBS | BODY MASS INDEX: 35.78 KG/M2 | TEMPERATURE: 99 F | HEART RATE: 69 BPM | SYSTOLIC BLOOD PRESSURE: 128 MMHG | DIASTOLIC BLOOD PRESSURE: 76 MMHG | OXYGEN SATURATION: 95 % | HEIGHT: 63 IN

## 2018-03-12 DIAGNOSIS — E78.5 HYPERLIPIDEMIA LDL GOAL <70: ICD-10-CM

## 2018-03-12 DIAGNOSIS — Z23 NEED FOR SHINGLES VACCINE: ICD-10-CM

## 2018-03-12 DIAGNOSIS — G47.33 OSA (OBSTRUCTIVE SLEEP APNEA): ICD-10-CM

## 2018-03-12 DIAGNOSIS — F32.0 MAJOR DEPRESSIVE DISORDER, SINGLE EPISODE, MILD: ICD-10-CM

## 2018-03-12 DIAGNOSIS — G25.81 RESTLESS LEGS SYNDROME (RLS): ICD-10-CM

## 2018-03-12 DIAGNOSIS — E66.01 SEVERE OBESITY (BMI 35.0-39.9) WITH COMORBIDITY: ICD-10-CM

## 2018-03-12 DIAGNOSIS — Z12.31 ENCOUNTER FOR SCREENING MAMMOGRAM FOR BREAST CANCER: ICD-10-CM

## 2018-03-12 DIAGNOSIS — I10 ESSENTIAL HYPERTENSION: ICD-10-CM

## 2018-03-12 DIAGNOSIS — L29.9 PRURITUS OF SKIN: ICD-10-CM

## 2018-03-12 PROBLEM — R31.0 HEMATURIA, GROSS: Status: RESOLVED | Noted: 2017-07-19 | Resolved: 2018-03-12

## 2018-03-12 PROCEDURE — 99999 PR PBB SHADOW E&M-EST. PATIENT-LVL III: CPT | Mod: PBBFAC,,, | Performed by: INTERNAL MEDICINE

## 2018-03-12 PROCEDURE — 3078F DIAST BP <80 MM HG: CPT | Mod: CPTII,S$GLB,, | Performed by: INTERNAL MEDICINE

## 2018-03-12 PROCEDURE — 99215 OFFICE O/P EST HI 40 MIN: CPT | Mod: S$GLB,,, | Performed by: INTERNAL MEDICINE

## 2018-03-12 PROCEDURE — 99499 UNLISTED E&M SERVICE: CPT | Mod: S$GLB,,, | Performed by: INTERNAL MEDICINE

## 2018-03-12 PROCEDURE — 3074F SYST BP LT 130 MM HG: CPT | Mod: CPTII,S$GLB,, | Performed by: INTERNAL MEDICINE

## 2018-03-12 RX ORDER — LOSARTAN POTASSIUM 100 MG/1
100 TABLET ORAL DAILY
Qty: 90 TABLET | Refills: 1 | Status: SHIPPED | OUTPATIENT
Start: 2018-03-12 | End: 2018-07-13 | Stop reason: ALTCHOICE

## 2018-03-12 RX ORDER — AMLODIPINE BESYLATE 10 MG/1
10 TABLET ORAL DAILY
Qty: 30 TABLET | Refills: 1 | Status: SHIPPED | OUTPATIENT
Start: 2018-03-12 | End: 2018-05-15 | Stop reason: SDUPTHER

## 2018-03-12 RX ORDER — METFORMIN HYDROCHLORIDE 1000 MG/1
TABLET ORAL
Qty: 180 TABLET | Refills: 1 | Status: SHIPPED | OUTPATIENT
Start: 2018-03-12 | End: 2018-06-26 | Stop reason: SDUPTHER

## 2018-03-12 RX ORDER — BUPROPION HYDROCHLORIDE 75 MG/1
150 TABLET ORAL 2 TIMES DAILY
Qty: 180 TABLET | Refills: 1 | Status: SHIPPED | OUTPATIENT
Start: 2018-03-12 | End: 2018-06-26 | Stop reason: SDUPTHER

## 2018-03-12 RX ORDER — ACETAMINOPHEN 500 MG
1 TABLET ORAL DAILY
Qty: 1 EACH | Refills: 0 | Status: CANCELLED | OUTPATIENT
Start: 2018-03-12

## 2018-03-12 RX ORDER — PRAVASTATIN SODIUM 40 MG/1
40 TABLET ORAL DAILY
Qty: 90 TABLET | Refills: 1 | Status: SHIPPED | OUTPATIENT
Start: 2018-03-12 | End: 2018-10-29 | Stop reason: SDUPTHER

## 2018-03-12 RX ORDER — HYDROCHLOROTHIAZIDE 25 MG/1
25 TABLET ORAL DAILY
Qty: 90 TABLET | Refills: 1 | Status: SHIPPED | OUTPATIENT
Start: 2018-03-12 | End: 2018-08-03 | Stop reason: SDUPTHER

## 2018-03-14 ENCOUNTER — TELEPHONE (OUTPATIENT)
Dept: SLEEP MEDICINE | Facility: OTHER | Age: 68
End: 2018-03-14

## 2018-03-19 ENCOUNTER — TELEPHONE (OUTPATIENT)
Dept: FAMILY MEDICINE | Facility: CLINIC | Age: 68
End: 2018-03-19

## 2018-03-19 DIAGNOSIS — M25.561 RIGHT KNEE PAIN, UNSPECIFIED CHRONICITY: Primary | ICD-10-CM

## 2018-03-19 NOTE — TELEPHONE ENCOUNTER
----- Message from Hudson Paris sent at 3/16/2018  3:12 PM CDT -----  Contact: Cecilia [phone]  Pt is calling to speak to the staff about getting an xray of her knee. Please call at your earliest convenience.

## 2018-03-19 NOTE — TELEPHONE ENCOUNTER
Spoke w/patient, requesting xray of right knee. States for the past week, the pain has been unbearable. No injury, states it hurts to stand or bend. Please advise.

## 2018-03-19 NOTE — TELEPHONE ENCOUNTER
An X-ray is unlikely to show me anything if she did not have trauma. Recommend she see ortho or pain management for further evaluation.

## 2018-03-22 ENCOUNTER — TELEPHONE (OUTPATIENT)
Dept: SLEEP MEDICINE | Facility: OTHER | Age: 68
End: 2018-03-22

## 2018-04-05 ENCOUNTER — TELEPHONE (OUTPATIENT)
Dept: FAMILY MEDICINE | Facility: CLINIC | Age: 68
End: 2018-04-05

## 2018-04-05 DIAGNOSIS — M25.561 RIGHT KNEE PAIN, UNSPECIFIED CHRONICITY: Primary | ICD-10-CM

## 2018-04-05 NOTE — TELEPHONE ENCOUNTER
----- Message from Randy Pagan sent at 4/5/2018  1:46 PM CDT -----  Contact: [phone]/PT  Calling TO speak with doc to get referred to Orthopedic for knee pain. Pt states needs relief from pain keeping her up at night,all the way down leg

## 2018-04-05 NOTE — TELEPHONE ENCOUNTER
Spoke w/patient, states she need to see orthopaedic, states the pain is in the right knee that radiates to her calf. States she need to know what's going on, not cover it up with a knee injection. Please advise.

## 2018-04-05 NOTE — TELEPHONE ENCOUNTER
X-ray last year showed arthritis which is the likely cause. Pain management will investigate before doing injection and they have access to all our records. Orthopaedics will also most likely do injection, but they do not have access to our records.

## 2018-04-05 NOTE — TELEPHONE ENCOUNTER
Spoke w/patient, requesting orthopaedic referral on WB for right knee pain that keeps her up at night.

## 2018-04-05 NOTE — TELEPHONE ENCOUNTER
Please advise patient that I will refer her to pain management for possible knee injection.    Left a voice message with the previous message

## 2018-04-11 ENCOUNTER — TELEPHONE (OUTPATIENT)
Dept: FAMILY MEDICINE | Facility: CLINIC | Age: 68
End: 2018-04-11

## 2018-04-14 RX ORDER — INSULIN DETEMIR 100 [IU]/ML
INJECTION, SOLUTION SUBCUTANEOUS
Qty: 15 ML | Refills: 0 | Status: SHIPPED | OUTPATIENT
Start: 2018-04-14 | End: 2018-06-21 | Stop reason: SDUPTHER

## 2018-04-24 ENCOUNTER — HOSPITAL ENCOUNTER (OUTPATIENT)
Dept: SLEEP MEDICINE | Facility: HOSPITAL | Age: 68
Discharge: HOME | End: 2018-04-24
Attending: INTERNAL MEDICINE
Payer: MEDICARE

## 2018-04-24 DIAGNOSIS — G47.33 OSA (OBSTRUCTIVE SLEEP APNEA): ICD-10-CM

## 2018-04-24 PROCEDURE — 95811 PR POLYSOMNOGRAPHY W/CPAP: ICD-10-PCS | Mod: 26,,, | Performed by: INTERNAL MEDICINE

## 2018-04-24 PROCEDURE — 95811 POLYSOM 6/>YRS CPAP 4/> PARM: CPT

## 2018-04-24 PROCEDURE — 95811 POLYSOM 6/>YRS CPAP 4/> PARM: CPT | Mod: 26,,, | Performed by: INTERNAL MEDICINE

## 2018-04-25 NOTE — PROGRESS NOTES
Cecilia Barahona is a 67 y/o F. She's here for CPAP titration study. Patient reported she used CPAP before and failed. Reported unable to tolerate the mask. Patient education was performed including different types of masks available. She requested to use nasal pillow mask.    EKG: NSR, PACs    A Resmed Airfit 10 nasal pillow mask was use during the study. CPAP pressure from 4 to 14 explored. Supine REM sleep observed.    The patient's reaction to CPAP in the morning was, it was good.

## 2018-04-30 ENCOUNTER — TELEPHONE (OUTPATIENT)
Dept: FAMILY MEDICINE | Facility: CLINIC | Age: 68
End: 2018-04-30

## 2018-04-30 DIAGNOSIS — G47.33 OSA (OBSTRUCTIVE SLEEP APNEA): Primary | ICD-10-CM

## 2018-04-30 NOTE — TELEPHONE ENCOUNTER
Please call patient: I received the results of the CPAP sleep study/titration study. Please set up patient with machine as per recommendations in report. Patient will need office visit in 60-90 days for recheck.

## 2018-05-07 ENCOUNTER — TELEPHONE (OUTPATIENT)
Dept: FAMILY MEDICINE | Facility: CLINIC | Age: 68
End: 2018-05-07

## 2018-05-07 NOTE — TELEPHONE ENCOUNTER
----- Message from Mary Horvath sent at 5/7/2018  4:03 PM CDT -----  Contact: self  Please advise pt on status of CPAp machine. Pt contact [phone].    Thanks-

## 2018-05-08 NOTE — TELEPHONE ENCOUNTER
Spoke w/patient, informed her I spoke with [employer] and they stated they will be giving her a call for more information.

## 2018-05-10 ENCOUNTER — TELEPHONE (OUTPATIENT)
Dept: FAMILY MEDICINE | Facility: CLINIC | Age: 68
End: 2018-05-10

## 2018-05-15 DIAGNOSIS — I10 ESSENTIAL HYPERTENSION: ICD-10-CM

## 2018-05-15 RX ORDER — AMLODIPINE BESYLATE 10 MG/1
TABLET ORAL
Qty: 30 TABLET | Refills: 3 | Status: SHIPPED | OUTPATIENT
Start: 2018-05-15 | End: 2018-11-21 | Stop reason: SDUPTHER

## 2018-05-25 ENCOUNTER — PATIENT OUTREACH (OUTPATIENT)
Dept: OTHER | Facility: OTHER | Age: 68
End: 2018-05-25

## 2018-05-25 NOTE — PROGRESS NOTES
Ms. Cecilia Barahona is a 68 y.o. female who is newly enrolled in the [facility].     The following information was reviewed/updated:  Preferred pharmacy   [facility] [address] [address]  Phone: [phone] Fax: [phone]    Patient prefers a 90 days supply    Review of patient's allergies indicates:   Allergen Reactions    Ace inhibitors Other (See Comments)     cough    Invokana [canagliflozin] Other (See Comments)     Throat and tongue swelling     Current Outpatient Prescriptions on File Prior to Visit   Medication Sig Dispense Refill    albuterol 90 mcg/actuation inhaler Inhale 2 puffs into the lungs every 6 (six) hours as needed for Wheezing or Shortness of Breath. Rescue 1 Inhaler 0    alcohol swabs PadM Apply 1 each topically 3 (three) times daily. 400 each 2    amLODIPine (NORVASC) 10 MG tablet TAKE ONE TABLET BY MOUTH EVERY DAY 30 tablet 3    artificial tear ointment (ARTIFICIAL TEARS) Oint Place into both eyes every evening. 305 g 1    aspirin 81 MG Chew Take by mouth once daily. 1 tablet, chewable Oral Every day      azelastine (OPTIVAR) 0.05 % ophthalmic solution Place 1 drop into both eyes 2 (two) times daily. 18 mL 1    blood glucose control high,low (ACCU-CHEK BRIDGETTE CONTROL SOLN) Soln 1 kit by Misc.(Non-Drug; Combo Route) route once daily. 1 each 0    blood glucose control, low (EMBRACE GLUCOSE CONTROL LOW) Soln Inject 1 Bottle into the skin every 30 days. 1 each 0    blood pressure monitor (BLOOD PRESSURE KIT) Kit 1 kit by Misc.(Non-Drug; Combo Route) route once daily. 1 each 0    blood sugar diagnostic Strp Check blood glucose 4 times a day. Dispense accuchek meter or other meter preferred by insurance. Dx code e11.65 400 strip 3    blood-glucose meter kit ispense accuchek meter or other meter preferred by insurance. Dx code e11.65 1 each 0    buPROPion (WELLBUTRIN) 75 MG tablet  "Take 2 tablets (150 mg total) by mouth 2 (two) times daily. 180 tablet 1    desoximetasone (TOPICORT) 0.05 % cream Apply topically 2 (two) times daily as needed. 100 g 2    gabapentin (NEURONTIN) 300 MG capsule Take 1 capsule (300 mg total) by mouth every evening. 90 capsule 1    hydroCHLOROthiazide (HYDRODIURIL) 25 MG tablet Take 1 tablet (25 mg total) by mouth once daily. 90 tablet 1    ibuprofen (ADVIL,MOTRIN) 800 MG tablet Take 1 tablet (800 mg total) by mouth every 8 (eight) hours as needed for Pain. 20 tablet 0    insulin aspart (NOVOLOG FLEXPEN) 100 unit/mL InPn pen 10 units sq three times a day with meals 15 mL 2    lancets Misc Check blood glucose 4 times a day. Dispense accuchek meter or other meter preferred by insurance. Dx code e11.65 400 each 3    LANCING DEVICE WITH LANCETS Misc USE AS DIRECTED FOR DIABETIC TESTING 1 each 0    latanoprost 0.005 % ophthalmic solution Place 1 drop into both eyes every evening. 1 Bottle 1    LEVEMIR FLEXTOUCH U-100 INSULN 100 unit/mL (3 mL) InPn pen INJECT 38 UNITS EVERY NIGHT AT BEDTIME 15 mL 0    levocetirizine (XYZAL) 5 MG tablet Take 1 tablet (5 mg total) by mouth nightly as needed for Allergies. 90 tablet 1    loratadine (CLARITIN) 10 mg tablet Take 1 tablet (10 mg total) by mouth once daily. 90 tablet 1    losartan (COZAAR) 100 MG tablet Take 1 tablet (100 mg total) by mouth once daily. 90 tablet 1    meloxicam (MOBIC) 15 MG tablet Take 15 mg by mouth once daily.  1    metFORMIN (GLUCOPHAGE) 1000 MG tablet TAKE ONE Tablet BY MOUTH TWICE DAILY **TAKE WITH FOOD** 180 tablet 1    methocarbamol (ROBAXIN) 500 MG Tab Take 750 mg by mouth 4 (four) times daily.       multivitamin with minerals tablet Take 1 tablet by mouth once daily.      naproxen (NAPROSYN) 500 MG tablet Take 500 mg by mouth 2 (two) times daily as needed.  1    pen needle, diabetic (BD ULTRA-FINE LENY PEN NEEDLES) 32 gauge x 5/32" Ndle USE FIVE TIMES DAILY 100 each 5    pravastatin " (PRAVACHOL) 40 MG tablet Take 1 tablet (40 mg total) by mouth once daily. 90 tablet 1    tramadol (ULTRAM) 50 mg tablet Take 50 mg by mouth every 6 (six) hours as needed for Pain.       No current facility-administered medications on file prior to visit.        Responses to the depression screening suggest patient is having depressive symptoms. Patient is followed for this and is not interested in referral.     Yudi Smart MD indicated she would like to see patient / have patient  referred to a specialist for further evaluation.       CHETNA screening results for this patient suggest a high likelihood of sleep apnea, which can contribute to hypertension. Patient has been previously diagnosed with sleep apnea and is not interested in referral at this time. Ms. Barahona reports consistent CPAP use at least 7-8 hours per night, about 6 nights per week. CHETNA is managed effectively with CPAP use.     Yudi Smart MD indicated she would like to see patient / have patient  referred to a specialist for further evaluation.     Reviewed non-pharmacologic therapies and impact on BP:    1. Low-sodium diet- 11 mmHg reduction 2-4 weeks. I have reviewed [facility] diet sodium restrictions (<2000mg/day) Does not track salt intake. Prepares most of own food.  2. Exercise- 7 mmHg reduction 4-12 weeks. I have recommended patient engage in exercise as tolerated at least 30 minutes 5x per week to improve cardiovascular health. Was going to [facility] gym until about 3 weeks ago but stopped due to fatigue and muscle pain. Was doing continuous circuit group exercise for about 1 hour each time.    3. Alcohol intake- 3 mmHg reduction 4-12 weeks. I have discussed with patient the maximum recommended number of 1 drink(s) per day for women. Denies alcohol or tobacco use.    Explained that we expect patient to obtain several blood pressures per week at random times of day.   Our goal is to get  BP to consistently below 130/80mmHg and make the  process convenient so patient can avoid extra trips to the office. Getting your blood pressure below 130/80mmHg (definition of control) will reduce your risk for heart attack, kidney failure, stroke and death (as well as kidney failure, eye disease, & dementia).     Patient is meeting the goal already.   When asked what the patient thinks is causing BP to be elevated, she states: unsure    Instructed patient not to allow anyone else to use phone and BP cuff.   I'm not available for emergencies. Patient will call [facility] on-call ([phone] or [phone]) or 911 if needed.     Discussed appropriate BP measuring technique:  Before taking your blood pressure  Find a quiet place. You will need to listen for your heartbeat.  Roll up the sleeve on your left arm or remove any tight-sleeved clothing, if needed. (It's best to take your blood pressure from your left arm if you are right-handed.You can use the other arm if you have been told by your health care provider to do so.)  Rest in a chair next to a table for 5 to 10 minutes. (Your left arm should rest comfortably at heart level.)  Sit up straight with your back against the chair, legs uncrossed and on the ground.  Rest your forearm on the table with the palm of your hand facing up.  You should not talk, read the newspaper, or watch television during this process.      Patient and I agreed that she will continue to monitor blood pressure and sodium intake, and continue to remain adherent to medications.     I will plan to follow-up with the patient in 3-4 weeks.   Emailed patient link to [facility]'s HT[facility] webpage and my contact information in case she has any questions.       Last 5 Patient Entered Readings                                      Current 30 Day Average: 120/80     Recent Readings 5/15/2018    SBP (mmHg) 120    DBP (mmHg) 80    Pulse 80        Task placed for tech support as patient reports phone and cuff are not connecting.

## 2018-05-29 ENCOUNTER — PATIENT OUTREACH (OUTPATIENT)
Dept: OTHER | Facility: OTHER | Age: 68
End: 2018-05-29

## 2018-05-29 NOTE — PROGRESS NOTES
Last 5 Patient Entered Readings                                      Current 30 Day Average: 149/78     Recent Readings 5/29/2018 5/29/2018 5/29/2018 5/28/2018 5/15/2018    SBP (mmHg) 163 158 166 164 120    DBP (mmHg) 75 87 79 78 80    Pulse 73 65 61 59 80          Patient's BP average is above goal of <130/80.     Patient denies s/s of hypotension (lightheadedness, dizziness, nausea, fatigue) associated with low readings. Instructed patient to inform me if this occurs, patient confirms understanding.      Patient denies s/s of hypertension (SOB, CP, severe headaches, changes in vision) associated with high readings. Instructed patient to go to the ED if BP > 180/110 and accompanied by hypertensive s/s, patient confirms understanding.    Will continue to monitor regularly. Will follow up in 1-2 weeks, sooner if BP begins to trend upward or downward.    Patient has my contact information and knows to call with any concerns or clinical changes.     Current HTN regimen:  Hypertension Medications             amLODIPine (NORVASC) 10 MG tablet TAKE ONE TABLET BY MOUTH EVERY DAY    hydroCHLOROthiazide (HYDRODIURIL) 25 MG tablet Take 1 tablet (25 mg total) by mouth once daily.    losartan (COZAAR) 100 MG tablet Take 1 tablet (100 mg total) by mouth once daily.        Returned patient's call regarding BP reading classification in [facility]is yaakov. Patient denies s/s of hypertension with higher readings. States she did not rest prior to readings and drank a Coke about 10 minutes prior to measurement. Reviewed proper BP measurement technique and timing.    5/30 - Patient reports being out of losartan for 3 days. I spoke with [facility], and they are preparing her refill. Patient agreed to  losartan today or tomorrow.

## 2018-05-30 ENCOUNTER — PATIENT OUTREACH (OUTPATIENT)
Dept: OTHER | Facility: OTHER | Age: 68
End: 2018-05-30

## 2018-05-30 NOTE — PROGRESS NOTES
"Last 5 Patient Entered Readings                                      Current 30 Day Average: 149/78     Recent Readings 5/29/2018 5/29/2018 5/29/2018 5/28/2018 5/15/2018    SBP (mmHg) 163 158 166 164 120    DBP (mmHg) 75 87 79 78 80    Pulse 73 65 61 59 80          Digital Medicine: Health  Introduction    Introduced Mrs. Cecilia Barahona to Digital Medicine. Discussed health  role and recommended lifestyle modifications.    Lifestyle Assessment:  Current Dietary Habits(i.e. low sodium, food labels, dining out): Recommended DASH diet (<2,000mg of sodium/day)  Patient states that she likes to cook at home.  Patient states that she likes to cook beans, gravy, meat, rice, and "[place] blend" vegetables.  Patient states that she uses season all seasonings.  Recommended using fresh herbs for seasoning.  Patient states that she usually eats grits or oatmeal in the morning.  When asked if she would like to be sent information about the DASH diet, patient agreed.  Will email patient DASH diet manual and grocery list.    Exercise: Patient states that she would like to go back to [place] gym.  Patient states that she tried the Senior Interval Class, but it hurt her joints.  Recommended low impact exercises such as water aerobics or exercise bike.    Alcohol/Tobacco: Patient reports no alcohol or tobacco use.    Medication Adherence: has been compliant with the medicaiton regimen     Other goals: Patient states that she would like to lose some weight to keep her blood pressure down.    Patient states that she is out of her medication, but does not know which medication it is.  Put in a task for clinical pharmacist.    Reviewed AHA/AACE recommendations:  Limit sodium intake to <2000mg/day  Recommended CHO intake, 45-65% of daily caloric intake  Perform 150 minutes of physical activity per week    Reviewed the importance of self-monitoring, medication adherence, and that the health  can be used as a " resource for lifestyle modifications to help reduce or maintain a healthy lifestyle.  Reviewed that the Digital Medicine team is not available for emergencies and instructed the patient to call 911 or [facility] ([phone] or [phone]) if one arises.

## 2018-06-08 ENCOUNTER — PATIENT OUTREACH (OUTPATIENT)
Dept: OTHER | Facility: OTHER | Age: 68
End: 2018-06-08

## 2018-06-08 NOTE — PROGRESS NOTES
Last 5 Patient Entered Readings                                      Current 30 Day Average: 148/76     Recent Readings 6/8/2018 6/7/2018 6/6/2018 6/5/2018 6/4/2018    SBP (mmHg) 144 144 136 146 150    DBP (mmHg) 86 82 76 75 80    Pulse 70 74 79 80 64          Patient's BP average is above goal of <130/80.     Patient denies s/s of hypotension (lightheadedness, dizziness, nausea, fatigue) associated with low readings. Instructed patient to inform me if this occurs, patient confirms understanding.      Patient denies s/s of hypertension (SOB, CP, severe headaches, changes in vision) associated with high readings. Instructed patient to go to the ED if BP > 180/110 and accompanied by hypertensive s/s, patient confirms understanding.    Will continue to monitor regularly. Will follow up in 2-3 weeks, sooner if BP begins to trend upward or downward.    Patient has my contact information and knows to call with any concerns or clinical changes.     Current HTN regimen:  Hypertension Medications             amLODIPine (NORVASC) 10 MG tablet TAKE ONE TABLET BY MOUTH EVERY DAY    hydroCHLOROthiazide (HYDRODIURIL) 25 MG tablet Take 1 tablet (25 mg total) by mouth once daily.    losartan (COZAAR) 100 MG tablet Take 1 tablet (100 mg total) by mouth once daily.        Patient reports restarting losartan ~ 1 week ago and is tolerating. She wakes up ~ 11am - 12 pm and has been taking BP measurement within about 15 minutes of waking. She will try to delay BP measurement to about an hour or more after waking.

## 2018-06-13 ENCOUNTER — PATIENT OUTREACH (OUTPATIENT)
Dept: OTHER | Facility: OTHER | Age: 68
End: 2018-06-13

## 2018-06-13 NOTE — PROGRESS NOTES
Last 5 Patient Entered Readings                                      Current 30 Day Average: 148/76     Recent Readings 6/12/2018 6/11/2018 6/8/2018 6/7/2018 6/6/2018    SBP (mmHg) 144 153 144 144 136    DBP (mmHg) 73 75 86 82 76    Pulse 70 62 70 74 79        Digital Medicine: Health  Follow Up    Lifestyle Modifications:    1.Dietary Modifications (Sodium intake <2,000mg/day, food labels, dining out): Patient reports no change in diet.  Patient states she did not receive information about DASH diet, grocery list or salt free seasonings.  Patient states she is interested in received 7 day meal plan as well.  Will resend information to patient's email.    2.Physical Activity: Patient reports no change in physical activity.  Patient claims that she enjoys walking and states she is going to start that again.  Encouraged patient to start walking routine.    3.Medication Therapy: Patient has been compliant with the medication regimen.    4.Patient has the following medication side effects/concerns: none  (Frequency/Alleviating factors/Precipitating factors, etc.)     Follow up with Mrs. Cecilia Correa Jun completed. No further questions or concerns. Will continue follow up to achieve health goals.    Patient expressed concern about her top number and asked what she could do.  Informed patient that combining low sodium diet and increasing exercise will benefit her overall health as well as decrease her blood pressure.

## 2018-06-21 RX ORDER — INSULIN DETEMIR 100 [IU]/ML
INJECTION, SOLUTION SUBCUTANEOUS
Qty: 15 ML | Refills: 1 | Status: SHIPPED | OUTPATIENT
Start: 2018-06-21 | End: 2018-08-08 | Stop reason: SDUPTHER

## 2018-06-22 ENCOUNTER — PATIENT OUTREACH (OUTPATIENT)
Dept: OTHER | Facility: OTHER | Age: 68
End: 2018-06-22

## 2018-06-22 DIAGNOSIS — I10 ESSENTIAL HYPERTENSION: Primary | ICD-10-CM

## 2018-06-22 RX ORDER — INSULIN ASPART 100 [IU]/ML
INJECTION, SOLUTION INTRAVENOUS; SUBCUTANEOUS
Qty: 15 ML | Refills: 0 | Status: SHIPPED | OUTPATIENT
Start: 2018-06-22 | End: 2018-06-27 | Stop reason: SDUPTHER

## 2018-06-22 NOTE — PROGRESS NOTES
Last 5 Patient Entered Readings                                      Current 30 Day Average: 150/76     Recent Readings 7/12/2018 7/11/2018 7/10/2018 7/9/2018 7/7/2018    SBP (mmHg) 152 140 145 149 158    DBP (mmHg) 78 73 69 86 78    Pulse 69 71 66 75 68        Patient's BP average is above goal of <130/80.     Patient denies s/s of hypotension (lightheadedness, dizziness, nausea, fatigue) associated with low readings. Instructed patient to inform me if this occurs, patient confirms understanding.      Patient reports s/s of hypertension of headaches and vision changes during the day. Instructed patient to go to the ED if BP > 180/110 and accompanied by hypertensive s/s, patient confirms understanding.    Will continue to monitor regularly. Will follow up in 2-3 weeks, sooner if BP begins to trend upward or downward.    Patient has my contact information and knows to call with any concerns or clinical changes.     Current HTN regimen:  Hypertension Medications             amLODIPine (NORVASC) 10 MG tablet TAKE ONE TABLET BY MOUTH EVERY DAY    hydroCHLOROthiazide (HYDRODIURIL) 25 MG tablet Take 1 tablet (25 mg total) by mouth once daily.    losartan (COZAAR) 100 MG tablet Take 1 tablet (100 mg total) by mouth once daily.        6/22 - left voicemail. Consider changing losartan to valsartan or hctz to chlorthalidone. Discuss variation in BP readings.  6/29 - left voicemail and sent [facility] message  7/6 - left voicemail for patient per health  note. Discuss changing losartan to valsartan.  7/13 - Change losartan to valsartan 320 mg once daily.

## 2018-06-26 DIAGNOSIS — F32.0 MAJOR DEPRESSIVE DISORDER, SINGLE EPISODE, MILD: ICD-10-CM

## 2018-06-27 RX ORDER — BUPROPION HYDROCHLORIDE 75 MG/1
TABLET ORAL
Qty: 180 TABLET | Refills: 0 | Status: SHIPPED | OUTPATIENT
Start: 2018-06-27 | End: 2018-11-05 | Stop reason: SDUPTHER

## 2018-06-27 RX ORDER — INSULIN ASPART 100 [IU]/ML
INJECTION, SOLUTION INTRAVENOUS; SUBCUTANEOUS
Qty: 15 ML | Refills: 0 | Status: SHIPPED | OUTPATIENT
Start: 2018-06-27 | End: 2018-10-03 | Stop reason: ALTCHOICE

## 2018-06-27 RX ORDER — METFORMIN HYDROCHLORIDE 1000 MG/1
TABLET ORAL
Qty: 180 TABLET | Refills: 0 | Status: SHIPPED | OUTPATIENT
Start: 2018-06-27 | End: 2018-11-21 | Stop reason: SDUPTHER

## 2018-06-28 ENCOUNTER — TELEPHONE (OUTPATIENT)
Dept: FAMILY MEDICINE | Facility: CLINIC | Age: 68
End: 2018-06-28

## 2018-06-28 NOTE — TELEPHONE ENCOUNTER
Spoke with patient, she is upset that the phone room cancelled her appointment, August 15, 2018.   appt is 8/13/18.  I explained to the patient, that there is not an appt for 8/15/2018.  Patient said she will be here on 8/15/18, at 1:20 pm.  Patient hung up in my face!

## 2018-07-06 ENCOUNTER — PATIENT OUTREACH (OUTPATIENT)
Dept: OTHER | Facility: OTHER | Age: 68
End: 2018-07-06

## 2018-07-13 ENCOUNTER — TELEPHONE (OUTPATIENT)
Dept: FAMILY MEDICINE | Facility: CLINIC | Age: 68
End: 2018-07-13

## 2018-07-13 DIAGNOSIS — J30.9 ALLERGIC RHINITIS, UNSPECIFIED SEASONALITY, UNSPECIFIED TRIGGER: Primary | ICD-10-CM

## 2018-07-13 RX ORDER — FLUTICASONE PROPIONATE 50 MCG
1 SPRAY, SUSPENSION (ML) NASAL DAILY
Qty: 16 G | Refills: 5 | Status: SHIPPED | OUTPATIENT
Start: 2018-07-13 | End: 2018-08-20 | Stop reason: SDUPTHER

## 2018-07-13 RX ORDER — VALSARTAN 320 MG/1
320 TABLET ORAL NIGHTLY
Qty: 30 TABLET | Refills: 1 | Status: SHIPPED | OUTPATIENT
Start: 2018-07-13 | End: 2018-08-10 | Stop reason: SDUPTHER

## 2018-07-13 RX ORDER — ROPINIROLE 0.5 MG/1
TABLET, FILM COATED ORAL
COMMUNITY
End: 2018-08-20 | Stop reason: SDUPTHER

## 2018-07-13 NOTE — TELEPHONE ENCOUNTER
----- Message from Otilia Martin PharmD sent at 7/13/2018 11:40 AM CDT -----  Patient requesting prescription refill for flonase nasal spray for allergies. Please contact her with any questions.

## 2018-07-15 ENCOUNTER — NURSE TRIAGE (OUTPATIENT)
Dept: ADMINISTRATIVE | Facility: CLINIC | Age: 68
End: 2018-07-15

## 2018-07-16 ENCOUNTER — TELEPHONE (OUTPATIENT)
Dept: FAMILY MEDICINE | Facility: CLINIC | Age: 68
End: 2018-07-16

## 2018-07-16 NOTE — TELEPHONE ENCOUNTER
Spoke with the pt, she states she was throwing up on yesterday.  Pt states she is feeling much better today.  Patient verbalized understandings.

## 2018-07-20 ENCOUNTER — TELEPHONE (OUTPATIENT)
Dept: FAMILY MEDICINE | Facility: CLINIC | Age: 68
End: 2018-07-20

## 2018-07-20 NOTE — TELEPHONE ENCOUNTER
----- Message from Randy Pagan sent at 7/20/2018 12:05 PM CDT -----  Contact: [phone]/Pt  Calling To speak with nurse regarding feeling ill ,and bowel movement issues.

## 2018-07-20 NOTE — TELEPHONE ENCOUNTER
Spoke with patient states hasn't had a bowel movement since Mani 7/15/18. States she has some bloating and nausea w/no complaints of abdominal pain. States she is concerned that she hasn't had bowel movement. Advised pt to try miralax over the weekend as directed on bottle. Informed pt if Miralax is not effective to then try a fleet's enema. Pt states she will  miralax today. States she will call our office for further direction if needed.

## 2018-07-24 ENCOUNTER — PATIENT OUTREACH (OUTPATIENT)
Dept: OTHER | Facility: OTHER | Age: 68
End: 2018-07-24

## 2018-07-24 NOTE — PROGRESS NOTES
Last 5 Patient Entered Readings                                      Current 30 Day Average: 149/77     Recent Readings 7/21/2018 7/17/2018 7/16/2018 7/15/2018 7/15/2018    SBP (mmHg) 136 149 142 157 157    DBP (mmHg) 80 69 82 84 76    Pulse 69 72 82 93 69          07/24: RCB in 30min.

## 2018-07-24 NOTE — PROGRESS NOTES
Last 5 Patient Entered Readings                                      Current 30 Day Average: 149/77     Recent Readings 7/21/2018 7/17/2018 7/16/2018 7/15/2018 7/15/2018    SBP (mmHg) 136 149 142 157 157    DBP (mmHg) 80 69 82 84 76    Pulse 69 72 82 93 69          07/24: LVM.  Will call back on 07/31

## 2018-07-27 ENCOUNTER — PATIENT OUTREACH (OUTPATIENT)
Dept: OTHER | Facility: OTHER | Age: 68
End: 2018-07-27

## 2018-07-27 NOTE — PROGRESS NOTES
Last 5 Patient Entered Readings                                      Current 30 Day Average: 149/77     Recent Readings 8/1/2018 7/30/2018 7/29/2018 7/28/2018 7/28/2018    SBP (mmHg) 151 149 149 144 151    DBP (mmHg) 74 81 80 72 76    Pulse 78 71 74 59 58          7/27 - Patient agreed to contact pharmacy to determine if her valsartan affected by recall. She reports more mood disturbances and agreed to contact Dr. Smart.  8/3 - left voicemail. PCP appointment 8/20.

## 2018-07-31 ENCOUNTER — TELEPHONE (OUTPATIENT)
Dept: FAMILY MEDICINE | Facility: CLINIC | Age: 68
End: 2018-07-31

## 2018-07-31 NOTE — TELEPHONE ENCOUNTER
----- Message from Rafa Vázquez sent at 7/31/2018  1:57 PM CDT -----  Patient reports that she is having problems with bowel movements and mood.  Please advise.

## 2018-08-02 ENCOUNTER — HOSPITAL ENCOUNTER (OUTPATIENT)
Dept: RADIOLOGY | Facility: HOSPITAL | Age: 68
Discharge: HOME | End: 2018-08-02
Attending: INTERNAL MEDICINE
Payer: MEDICARE

## 2018-08-02 DIAGNOSIS — Z12.31 ENCOUNTER FOR SCREENING MAMMOGRAM FOR BREAST CANCER: ICD-10-CM

## 2018-08-02 PROCEDURE — 77067 SCR MAMMO BI INCL CAD: CPT | Mod: TC,PO

## 2018-08-02 PROCEDURE — 77067 SCR MAMMO BI INCL CAD: CPT | Mod: 26,,, | Performed by: RADIOLOGY

## 2018-08-02 PROCEDURE — 77063 BREAST TOMOSYNTHESIS BI: CPT | Mod: 26,,, | Performed by: RADIOLOGY

## 2018-08-03 DIAGNOSIS — I10 ESSENTIAL HYPERTENSION: ICD-10-CM

## 2018-08-03 RX ORDER — HYDROCHLOROTHIAZIDE 25 MG/1
TABLET ORAL
Qty: 90 TABLET | Refills: 0 | Status: SHIPPED | OUTPATIENT
Start: 2018-08-03 | End: 2018-10-03

## 2018-08-04 DIAGNOSIS — H10.13 ALLERGIC CONJUNCTIVITIS, BILATERAL: ICD-10-CM

## 2018-08-05 RX ORDER — AZELASTINE HYDROCHLORIDE 0.5 MG/ML
SOLUTION/ DROPS OPHTHALMIC
Qty: 12 ML | Refills: 0 | Status: SHIPPED | OUTPATIENT
Start: 2018-08-05 | End: 2023-08-23

## 2018-08-08 RX ORDER — INSULIN DETEMIR 100 [IU]/ML
INJECTION, SOLUTION SUBCUTANEOUS
Qty: 15 ML | Refills: 0 | Status: SHIPPED | OUTPATIENT
Start: 2018-08-08 | End: 2018-11-05 | Stop reason: SDUPTHER

## 2018-08-09 NOTE — PROGRESS NOTES
Last 5 Patient Entered Readings                                      Current 30 Day Average: 148/77     Recent Readings 8/7/2018 8/7/2018 8/6/2018 8/6/2018 8/1/2018    SBP (mmHg) 153 160 151 148 151    DBP (mmHg) 70 79 86 92 74    Pulse 76 58 69 69 78          Left voicemail and sent [facility] message

## 2018-08-10 DIAGNOSIS — I10 ESSENTIAL HYPERTENSION: ICD-10-CM

## 2018-08-10 RX ORDER — VALSARTAN 320 MG/1
TABLET ORAL
Qty: 30 TABLET | Refills: 0 | Status: SHIPPED | OUTPATIENT
Start: 2018-08-10 | End: 2018-10-02 | Stop reason: SDUPTHER

## 2018-08-20 ENCOUNTER — OFFICE VISIT (OUTPATIENT)
Dept: FAMILY MEDICINE | Facility: CLINIC | Age: 68
End: 2018-08-20
Payer: MEDICARE

## 2018-08-20 ENCOUNTER — PES CALL (OUTPATIENT)
Dept: ADMINISTRATIVE | Facility: CLINIC | Age: 68
End: 2018-08-20

## 2018-08-20 VITALS
TEMPERATURE: 99 F | WEIGHT: 199.5 LBS | OXYGEN SATURATION: 94 % | BODY MASS INDEX: 35.35 KG/M2 | HEART RATE: 74 BPM | DIASTOLIC BLOOD PRESSURE: 60 MMHG | SYSTOLIC BLOOD PRESSURE: 140 MMHG | HEIGHT: 63 IN

## 2018-08-20 DIAGNOSIS — E66.01 SEVERE OBESITY (BMI 35.0-39.9) WITH COMORBIDITY: ICD-10-CM

## 2018-08-20 DIAGNOSIS — G56.02 LEFT CARPAL TUNNEL SYNDROME: ICD-10-CM

## 2018-08-20 DIAGNOSIS — J30.9 ALLERGIC RHINITIS, UNSPECIFIED SEASONALITY, UNSPECIFIED TRIGGER: ICD-10-CM

## 2018-08-20 DIAGNOSIS — I10 ESSENTIAL HYPERTENSION: ICD-10-CM

## 2018-08-20 DIAGNOSIS — Z23 FLU VACCINE NEED: ICD-10-CM

## 2018-08-20 DIAGNOSIS — G47.33 OSA (OBSTRUCTIVE SLEEP APNEA): ICD-10-CM

## 2018-08-20 DIAGNOSIS — G25.81 RESTLESS LEGS SYNDROME (RLS): ICD-10-CM

## 2018-08-20 DIAGNOSIS — Z79.4 LONG-TERM INSULIN USE: ICD-10-CM

## 2018-08-20 DIAGNOSIS — K21.9 GASTROESOPHAGEAL REFLUX DISEASE WITHOUT ESOPHAGITIS: ICD-10-CM

## 2018-08-20 DIAGNOSIS — M18.11 DEGENERATIVE ARTHRITIS OF THUMB, RIGHT: ICD-10-CM

## 2018-08-20 DIAGNOSIS — E78.5 HYPERLIPIDEMIA LDL GOAL <70: ICD-10-CM

## 2018-08-20 DIAGNOSIS — Z23 NEED FOR SHINGLES VACCINE: ICD-10-CM

## 2018-08-20 PROBLEM — M25.60 JOINT STIFFNESS: Status: RESOLVED | Noted: 2017-05-17 | Resolved: 2018-08-20

## 2018-08-20 PROBLEM — S83.92XA SPRAIN OF UNSPECIFIED SITE OF LEFT KNEE, INITIAL ENCOUNTER: Status: RESOLVED | Noted: 2017-05-17 | Resolved: 2018-08-20

## 2018-08-20 PROBLEM — M62.81 MUSCLE WEAKNESS: Status: RESOLVED | Noted: 2017-05-17 | Resolved: 2018-08-20

## 2018-08-20 PROBLEM — S39.012A BACK STRAIN: Status: RESOLVED | Noted: 2017-08-23 | Resolved: 2018-08-20

## 2018-08-20 PROBLEM — M54.2 NECK PAIN: Status: RESOLVED | Noted: 2017-05-17 | Resolved: 2018-08-20

## 2018-08-20 PROCEDURE — 3045F PR MOST RECENT HEMOGLOBIN A1C LEVEL 7.0-9.0%: CPT | Mod: CPTII,S$GLB,, | Performed by: INTERNAL MEDICINE

## 2018-08-20 PROCEDURE — 99999 PR PBB SHADOW E&M-EST. PATIENT-LVL III: CPT | Mod: PBBFAC,,, | Performed by: INTERNAL MEDICINE

## 2018-08-20 PROCEDURE — 3077F SYST BP >= 140 MM HG: CPT | Mod: CPTII,S$GLB,, | Performed by: INTERNAL MEDICINE

## 2018-08-20 PROCEDURE — 99215 OFFICE O/P EST HI 40 MIN: CPT | Mod: S$GLB,,, | Performed by: INTERNAL MEDICINE

## 2018-08-20 PROCEDURE — 3078F DIAST BP <80 MM HG: CPT | Mod: CPTII,S$GLB,, | Performed by: INTERNAL MEDICINE

## 2018-08-20 RX ORDER — FLUTICASONE PROPIONATE 50 MCG
1 SPRAY, SUSPENSION (ML) NASAL DAILY
Qty: 16 G | Refills: 5 | Status: SHIPPED | OUTPATIENT
Start: 2018-08-20

## 2018-08-20 RX ORDER — ROPINIROLE 0.5 MG/1
TABLET, FILM COATED ORAL
Qty: 90 TABLET | Refills: 1 | Status: SHIPPED | OUTPATIENT
Start: 2018-08-20 | End: 2019-02-22 | Stop reason: SDUPTHER

## 2018-08-20 NOTE — Clinical Note
Patient feels her BP cuff is not working well - I asked her to come to the [facility] at [facility] at her convenience to have it checked.Also, she is still on Diovan - did her lot not get recalled? Just checking to make sure.Thanks!

## 2018-08-21 ENCOUNTER — TELEPHONE (OUTPATIENT)
Dept: FAMILY MEDICINE | Facility: CLINIC | Age: 68
End: 2018-08-21

## 2018-08-21 ENCOUNTER — LAB VISIT (OUTPATIENT)
Dept: LAB | Facility: HOSPITAL | Age: 68
End: 2018-08-21
Attending: INTERNAL MEDICINE
Payer: MEDICARE

## 2018-08-21 ENCOUNTER — PES CALL (OUTPATIENT)
Dept: ADMINISTRATIVE | Facility: CLINIC | Age: 68
End: 2018-08-21

## 2018-08-21 LAB
ALBUMIN/CREAT UR: 20.8 UG/MG
CREAT UR-MCNC: 144 MG/DL
MICROALBUMIN UR DL<=1MG/L-MCNC: 30 UG/ML

## 2018-08-21 PROCEDURE — 82043 UR ALBUMIN QUANTITATIVE: CPT

## 2018-08-21 NOTE — TELEPHONE ENCOUNTER
----- Message from Faby Escobar sent at 8/20/2018  3:54 PM CDT -----  Contact: Pharmacy  Pts pharmacy is calling to speak with staff to get clarification on rOPINIRole (REQUIP) 0.5 MG tablet. Please call Joseph at [phone].

## 2018-08-21 NOTE — TELEPHONE ENCOUNTER
Spoke with patient. Will send patient to endocrinology to see if they can get her a continuous glucose monitor.

## 2018-08-21 NOTE — TELEPHONE ENCOUNTER
----- Message from Delphine Wolff sent at 8/20/2018  3:27 PM CDT -----  Contact: [facility]   [facility] called because they don't dispense flash glucose sensor (FREESTYLE ANAMARIA SENSOR) Kit or flash glucose scanning reader (FREESTYLE ANAMARIA READER) Misc. Please sent it to another pharmacy.

## 2018-08-22 ENCOUNTER — TELEPHONE (OUTPATIENT)
Dept: FAMILY MEDICINE | Facility: CLINIC | Age: 68
End: 2018-08-22

## 2018-08-22 NOTE — PROGRESS NOTES
Last 5 Patient Entered Readings                                      Current 30 Day Average: 155/79     Recent Readings 8/20/2018 8/20/2018 8/18/2018 8/16/2018 8/15/2018    SBP (mmHg) 175 178 164 167 162    DBP (mmHg) 84 87 78 84 93    Pulse 71 62 68 60 63          Spoke with patient's pharmacy, her July fill of valsartan was affected by recall ( Solco) but latest fill 8/10 was an unaffected  (Mylan).

## 2018-08-22 NOTE — TELEPHONE ENCOUNTER
----- Message from Yudi Smart MD sent at 8/22/2018 10:14 AM CDT -----  Please inform patient regarding diovan.  ----- Message -----  From: Otilia Martin PharmD  Sent: 8/22/2018   9:51 AM  To: Yudi Smart MD    I spoke with the pharmacy. Her prescription in July was affected by the recall (Solco), however, they said the prescription sent 8/10 was filled with a non-affected  (Mylan).    ----- Message -----  From: Yudi Smart MD  Sent: 8/21/2018   1:29 PM  To: Otilia Martin PharmD    Is it possible to call her pharmacy and find out?  ----- Message -----  From: Otilia Martin PharmD  Sent: 8/21/2018   1:12 PM  To: Yudi Smart MD    Hi Dr. Smart,  Thanks for the information regarding her cuff.    As far as the valsartan, I spoke with her 7/27. She did not have her prescription bottle with her, so she agreed to call her dispensing pharmacy to see if her supply was affected by the recall. I have tried calling and sending [facility] messages since to follow up, but she has not responded.    Thanks,  Otilia    ----- Message -----  From: Yudi Smart MD  Sent: 8/20/2018   7:02 PM  To: Otilia Martin PharmD    Patient feels her BP cuff is not working well - I asked her to come to the [facility] at [facility] at her convenience to have it checked.  Also, she is still on Diovan - did her lot not get recalled? Just checking to make sure.  Thanks!

## 2018-08-23 NOTE — TELEPHONE ENCOUNTER
Spoke with patient - advised she would be a good candidate for the study but she needs to talk with the program to see if it is something she would like to enroll. She voiced understanding.

## 2018-08-23 NOTE — TELEPHONE ENCOUNTER
Spoke with pt requesting a callback from Dr. Smart States she received a letter for a clinical trial. Pt would not go into further detail. States she concerned about her kidneys.

## 2018-08-23 NOTE — TELEPHONE ENCOUNTER
----- Message from Mary Horvath sent at 8/23/2018 11:20 AM CDT -----  Contact: self  Pt called regarding letter she received referencing a clinical trial. She is concerned. Pt contact [phone].

## 2018-08-24 NOTE — PROGRESS NOTES
Last 5 Patient Entered Readings                                      Current 30 Day Average: 155/79     Recent Readings 8/20/2018 8/20/2018 8/18/2018 8/16/2018 8/15/2018    SBP (mmHg) 175 178 164 167 162    DBP (mmHg) 84 87 78 84 93    Pulse 71 62 68 60 63          Spoke with patient. Due to lower office BP reading, patient intends to take cuff to [facility] 8/27.

## 2018-08-28 NOTE — PROGRESS NOTES
Last 5 Patient Entered Readings                                      Current 30 Day Average: 158/80     Recent Readings 8/20/2018 8/20/2018 8/18/2018 8/16/2018 8/15/2018    SBP (mmHg) 175 178 164 167 162    DBP (mmHg) 84 87 78 84 93    Pulse 71 62 68 60 63          08/28: States she will go by the [facility] today and check her cuff.  Unable to talk about lifestyle.  Will follow up on 09/13 to check on progress.

## 2018-08-29 DIAGNOSIS — J30.9 ALLERGIC RHINITIS: ICD-10-CM

## 2018-08-29 RX ORDER — LORATADINE 10 MG/1
10 TABLET ORAL DAILY PRN
Qty: 90 TABLET | Refills: 1 | Status: SHIPPED | OUTPATIENT
Start: 2018-08-29 | End: 2019-04-22 | Stop reason: SDUPTHER

## 2018-09-13 ENCOUNTER — PATIENT OUTREACH (OUTPATIENT)
Dept: OTHER | Facility: OTHER | Age: 68
End: 2018-09-13

## 2018-09-13 NOTE — PROGRESS NOTES
Last 5 Patient Entered Readings                                      Current 30 Day Average: 166/85     Recent Readings 9/2/2018 8/20/2018 8/20/2018 8/18/2018 8/16/2018    SBP (mmHg) 164 175 178 164 167    DBP (mmHg) 87 84 87 78 84    Pulse 68 71 62 68 60          09/13: LVM.  Will follow up next week.  Informed patient to send more readings.

## 2018-09-18 ENCOUNTER — CLINICAL SUPPORT (OUTPATIENT)
Dept: FAMILY MEDICINE | Facility: CLINIC | Age: 68
End: 2018-09-18
Payer: MEDICARE

## 2018-09-18 VITALS — HEART RATE: 83 BPM | OXYGEN SATURATION: 96 % | DIASTOLIC BLOOD PRESSURE: 64 MMHG | SYSTOLIC BLOOD PRESSURE: 138 MMHG

## 2018-09-18 DIAGNOSIS — I10 ESSENTIAL HYPERTENSION: Primary | ICD-10-CM

## 2018-09-18 PROCEDURE — 99999 PR PBB SHADOW E&M-EST. PATIENT-LVL II: CPT | Mod: PBBFAC,,,

## 2018-09-18 PROCEDURE — 99212 OFFICE O/P EST SF 10 MIN: CPT | Mod: PBBFAC,PO

## 2018-09-18 PROCEDURE — 99499 UNLISTED E&M SERVICE: CPT | Mod: S$PBB,,, | Performed by: INTERNAL MEDICINE

## 2018-09-18 NOTE — PROGRESS NOTES
Cecilia Barahona 68 y.o. female is here today for Blood Pressure check.   History of HTN yes.    Review of patient's allergies indicates:   Allergen Reactions    Ace inhibitors Other (See Comments)     cough    Invokana [canagliflozin] Other (See Comments)     Throat and tongue swelling     Creatinine   Date Value Ref Range Status   08/02/2018 0.8 0.5 - 1.4 mg/dL Final     Sodium   Date Value Ref Range Status   08/02/2018 143 136 - 145 mmol/L Final     Potassium   Date Value Ref Range Status   08/02/2018 3.8 3.5 - 5.1 mmol/L Final   ]  Patient verifies taking blood pressure medications on a regular basis at the same time of the day.     Current Outpatient Medications:     albuterol 90 mcg/actuation inhaler, Inhale 2 puffs into the lungs every 6 (six) hours as needed for Wheezing or Shortness of Breath. Rescue, Disp: 1 Inhaler, Rfl: 0    alcohol swabs PadM, Apply 1 each topically 3 (three) times daily., Disp: 400 each, Rfl: 2    amLODIPine (NORVASC) 10 MG tablet, TAKE ONE TABLET BY MOUTH EVERY DAY, Disp: 30 tablet, Rfl: 3    artificial tear ointment (ARTIFICIAL TEARS) Oint, Place into both eyes every evening., Disp: 305 g, Rfl: 1    aspirin 81 MG Chew, Take by mouth once daily. 1 tablet, chewable Oral Every day, Disp: , Rfl:     azelastine (OPTIVAR) 0.05 % ophthalmic solution, INSTILL 1 DROP IN EACH EYE TWICE DAILY, Disp: 12 mL, Rfl: 0    blood glucose control high,low (ACCU-CHEK BRIDGETTE CONTROL SOLN) Soln, 1 kit by Misc.(Non-Drug; Combo Route) route once daily., Disp: 1 each, Rfl: 0    blood glucose control, low (EMBRACE GLUCOSE CONTROL LOW) Soln, Inject 1 Bottle into the skin every 30 days., Disp: 1 each, Rfl: 0    blood pressure monitor (BLOOD PRESSURE KIT) Kit, 1 kit by Misc.(Non-Drug; Combo Route) route once daily., Disp: 1 each, Rfl: 0    blood sugar diagnostic Strp, Check blood glucose 4 times a day. Dispense accuchek meter or other meter preferred by insurance. Dx code e11.65, Disp: 400 strip,  Rfl: 3    blood-glucose meter kit, ispense accuchek meter or other meter preferred by insurance. Dx code e11.65, Disp: 1 each, Rfl: 0    buPROPion (WELLBUTRIN) 75 MG tablet, TAKE TWO TABLETS BY MOUTH TWICE DAILY, Disp: 180 tablet, Rfl: 0    desoximetasone (TOPICORT) 0.05 % cream, Apply topically 2 (two) times daily as needed., Disp: 100 g, Rfl: 2    flash glucose scanning reader (FREESTYLE ANAMARIA READER) Misc, 1 Units by Misc.(Non-Drug; Combo Route) route before meals as needed., Disp: 1 each, Rfl: 11    flash glucose sensor (FREESTYLE ANAMARIA SENSOR) Kit, 1 Units by Misc.(Non-Drug; Combo Route) route every meal as needed., Disp: 1 kit, Rfl: 0    fluticasone (FLONASE) 50 mcg/actuation nasal spray, 1 spray (50 mcg total) by Each Nare route once daily., Disp: 16 g, Rfl: 5    gabapentin (NEURONTIN) 300 MG capsule, Take 1 capsule (300 mg total) by mouth every evening., Disp: 90 capsule, Rfl: 1    hydroCHLOROthiazide (HYDRODIURIL) 25 MG tablet, TAKE ONE TABLET BY MOUTH EVERY DAY, Disp: 90 tablet, Rfl: 0    ibuprofen (ADVIL,MOTRIN) 800 MG tablet, Take 1 tablet (800 mg total) by mouth every 8 (eight) hours as needed for Pain., Disp: 20 tablet, Rfl: 0    insulin aspart (NOVOLOG FLEXPEN) 100 unit/mL InPn pen, 10 units sq three times a day with meals, Disp: 15 mL, Rfl: 2    lancets Misc, Check blood glucose 4 times a day. Dispense accuchek meter or other meter preferred by insurance. Dx code e11.65, Disp: 400 each, Rfl: 3    LANCING DEVICE WITH LANCETS Misc, USE AS DIRECTED FOR DIABETIC TESTING, Disp: 1 each, Rfl: 0    latanoprost 0.005 % ophthalmic solution, Place 1 drop into both eyes every evening., Disp: 1 Bottle, Rfl: 1    LEVEMIR FLEXTOUCH U-100 INSULN 100 unit/mL (3 mL) InPn pen, INJECT 38 UNITS SUBCUTANEOUSLY AT BEDTIME, Disp: 15 mL, Rfl: 0    levocetirizine (XYZAL) 5 MG tablet, Take 1 tablet (5 mg total) by mouth nightly as needed for Allergies., Disp: 90 tablet, Rfl: 1    loratadine (CLARITIN) 10 mg  "tablet, Take 1 tablet (10 mg total) by mouth daily as needed for Allergies., Disp: 90 tablet, Rfl: 1    meloxicam (MOBIC) 15 MG tablet, Take 15 mg by mouth once daily., Disp: , Rfl: 1    metFORMIN (GLUCOPHAGE) 1000 MG tablet, TAKE ONE TABLET BY MOUTH TWICE DAILY WITH FOOD, Disp: 180 tablet, Rfl: 0    multivitamin with minerals tablet, Take 1 tablet by mouth once daily., Disp: , Rfl:     naproxen (NAPROSYN) 500 MG tablet, Take 500 mg by mouth 2 (two) times daily as needed., Disp: , Rfl: 1    NOVOLOG FLEXPEN U-100 INSULIN 100 unit/mL InPn pen, INJECT 10 UNITS SUBCUTANEOUSLY BEFORE meals sliding scale TAKE 2 additional UNITS FOR each blood sugar increment OF 50 above 150, Disp: 15 mL, Rfl: 0    pen needle, diabetic (BD ULTRA-FINE LENY PEN NEEDLES) 32 gauge x 5/32" Ndle, USE FIVE TIMES DAILY, Disp: 100 each, Rfl: 5    pravastatin (PRAVACHOL) 40 MG tablet, Take 1 tablet (40 mg total) by mouth once daily., Disp: 90 tablet, Rfl: 1    rOPINIRole (REQUIP) 0.5 MG tablet, ropinirole 0.5 mg tablet, Disp: 90 tablet, Rfl: 1    tramadol (ULTRAM) 50 mg tablet, Take 50 mg by mouth every 6 (six) hours as needed for Pain., Disp: , Rfl:     valsartan (DIOVAN) 320 MG tablet, TAKE ONE TABLET BY MOUTH EVERY EVENING., Disp: 30 tablet, Rfl: 0  Does patient have record of home blood pressure readings yes. Patient is enrolled in digital HTN program.   Last dose of blood pressure medication was taken at approx 9:30am.  Patient is asymptomatic.   Complains of none.    Vitals:    09/18/18 1148   BP: 138/64   BP Location: Right arm   Patient Position: Sitting   BP Method: Large (Manual)   Pulse: 83   SpO2: 96%     Digital HTN monitor reads pressure as 162/72 pulse 89. Patient heading to [facility] for assistance with machine. Reports that digital machine is consistently reading her pressure as high.     Dr. Smart notified.   "

## 2018-09-20 NOTE — PROGRESS NOTES
Last 5 Patient Entered Readings                                      Current 30 Day Average: 163/83     Recent Readings 9/19/2018 9/19/2018 9/19/2018 9/19/2018 9/18/2018    SBP (mmHg) 176 179 168 163 149    DBP (mmHg) 82 84 86 82 81    Pulse 75 73 75 71 92          Left message. Patient resumed BP monitoring. Follow up if she went to [facility]. Health  also attempting contact.

## 2018-09-26 ENCOUNTER — OFFICE VISIT (OUTPATIENT)
Dept: FAMILY MEDICINE | Facility: CLINIC | Age: 68
End: 2018-09-26
Payer: MEDICARE

## 2018-09-26 VITALS
HEIGHT: 63 IN | TEMPERATURE: 99 F | HEART RATE: 75 BPM | DIASTOLIC BLOOD PRESSURE: 80 MMHG | BODY MASS INDEX: 35.08 KG/M2 | WEIGHT: 198 LBS | OXYGEN SATURATION: 96 % | SYSTOLIC BLOOD PRESSURE: 148 MMHG

## 2018-09-26 DIAGNOSIS — Z23 FLU VACCINE NEED: ICD-10-CM

## 2018-09-26 DIAGNOSIS — G47.33 OSA (OBSTRUCTIVE SLEEP APNEA): ICD-10-CM

## 2018-09-26 DIAGNOSIS — I10 ESSENTIAL HYPERTENSION: ICD-10-CM

## 2018-09-26 DIAGNOSIS — E66.01 SEVERE OBESITY (BMI 35.0-39.9) WITH COMORBIDITY: ICD-10-CM

## 2018-09-26 DIAGNOSIS — M79.644 THUMB PAIN, RIGHT: ICD-10-CM

## 2018-09-26 DIAGNOSIS — M25.512 LEFT SHOULDER PAIN, UNSPECIFIED CHRONICITY: ICD-10-CM

## 2018-09-26 PROCEDURE — 3077F SYST BP >= 140 MM HG: CPT | Mod: CPTII,,, | Performed by: INTERNAL MEDICINE

## 2018-09-26 PROCEDURE — 90662 IIV NO PRSV INCREASED AG IM: CPT | Mod: PBBFAC,PO

## 2018-09-26 PROCEDURE — 1101F PT FALLS ASSESS-DOCD LE1/YR: CPT | Mod: CPTII,,, | Performed by: INTERNAL MEDICINE

## 2018-09-26 PROCEDURE — 3079F DIAST BP 80-89 MM HG: CPT | Mod: CPTII,,, | Performed by: INTERNAL MEDICINE

## 2018-09-26 PROCEDURE — 99214 OFFICE O/P EST MOD 30 MIN: CPT | Mod: S$PBB,,, | Performed by: INTERNAL MEDICINE

## 2018-09-26 PROCEDURE — 99999 PR PBB SHADOW E&M-EST. PATIENT-LVL V: CPT | Mod: PBBFAC,,, | Performed by: INTERNAL MEDICINE

## 2018-09-26 PROCEDURE — 99215 OFFICE O/P EST HI 40 MIN: CPT | Mod: PBBFAC,PO | Performed by: INTERNAL MEDICINE

## 2018-09-26 PROCEDURE — 3045F PR MOST RECENT HEMOGLOBIN A1C LEVEL 7.0-9.0%: CPT | Mod: CPTII,,, | Performed by: INTERNAL MEDICINE

## 2018-09-26 NOTE — PROGRESS NOTES
HISTORY OF PRESENT ILLNESS:  Cecilia Barahona is a 68 y.o. female who presents to the clinic today for Hypertension; c pap orders; and Hand Pain  .   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  She was compliant with her CPAP machine until very recently when she developed some nasal congestion. She needs some new equipment as she has developed a leak in 1 of her lines.  She did find the CPAP machine very helpful when she was able to use it regularly improvement of her fatigue and sleep overall.  Diabetes: The patient reports that they  check blood sugars only occasionally. Blood sugar results are - patient cannot remember blood sugar readings. The patient  denies any problems with low blood sugars. The patient  reports that they have been compliant with current medication plan but have had difficulty with following diet and/or exercise plan on a regular basis.  Hypertension: The patient reports that they check their blood sugars at home  regularly and blood pressure generally is not well controlled (>139/89). The patient  is enrolled and active in the digital hypertension program. The patient denies  cardiac chest pain, shortness of breath, lower extremity edema, headaches and side effects of medication. The patient reports problems with  none.  The patient  does not follow a low salt diet. The patient  has been compliant with the current medication regimen.  The patient also complains today of intermittent sharp pain in her right thumb as well as pain in her left shoulder and arm at times.  She denies any trauma.  She takes Tylenol occasionally as needed for pain.      PAST MEDICAL HISTORY:  Past Medical History:   Diagnosis Date    Allergic rhinitis, seasonal     Arthritis     Glaucoma NEC     History of positive PPD - treated - remote past     Hyperlipidemia LDL goal < 100      Hypertension     Obesity     CHETNA (obstructive sleep apnea)     Type II or unspecified type diabetes mellitus without mention of complication, uncontrolled        PAST SURGICAL HISTORY:  Past Surgical History:   Procedure Laterality Date    CARPAL TUNNEL RELEASE       SECTION, CLASSIC      COLONOSCOPY N/A 2015    Performed by [facility]el [facility] at [facility] END[facility] (4TH FLR)    ESOPHAGOGASTRODUODENOSCOPY (EGD) N/A 2015    Performed by [facility]el Sanders [facility] at [facility] ENDO (4TH FLR)    RELEASE-FINGER-TRIGGER left thumb Left 2014    Performed by [facility]ne Arnett [facility] at [facility] OR 1ST FLR    RELEASE-FINGER-TRIGGER, right ring Right 10/6/2014    [facility]    TOTAL ABDOMINAL HYSTERECTOMY      TRIGGER FINGER RELEASE         SOCIAL HISTORY:  Social History     Socioeconomic History    Marital status:      Spouse name: Not on file    Number of children: 3    Years of education: Not on file    Highest education level: Not on file   Social Needs    Financial resource strain: Somewhat hard    Food insecurity - worry: Never true    Food insecurity - inability: Sometimes true    Transportation needs - medical: No    Transportation needs - non-medical: No   Occupational History    Occupation: disabled - back and leg pain   Tobacco Use    Smoking status: Former Smoker     Packs/day: 0.50     Types: Cigarettes     Last attempt to quit: 11/10/2012     Years since quittin.8    Smokeless tobacco: Never Used   Substance and Sexual Activity    Alcohol use: No    Drug use: No    Sexual activity: No     Partners: Male   Other Topics Concern    Not on file   Social History Narrative    Not on file       FAMILY HISTORY:  Family History   Problem Relation Age of Onset    Hypertension Mother     Diabetes Mother     Heart failure Mother     Cataracts Mother     Prostate cancer Father     Hypertension Father     Diabetes Father     Heart failure  Father     No Known Problems Sister     Alcohol abuse Maternal Aunt     Diabetes Maternal Uncle     Hyperlipidemia Maternal Uncle     Hypertension Maternal Uncle     Diabetes Maternal Grandmother     Diabetes Maternal Grandfather     No Known Problems Paternal Grandmother     No Known Problems Paternal Grandfather     Diabetes Maternal Aunt     Alzheimer's disease Maternal Aunt     Heart disease Cousin         s/p heart transplant    Amblyopia Neg Hx     Blindness Neg Hx     Cancer Neg Hx     Glaucoma Neg Hx     Macular degeneration Neg Hx     Retinal detachment Neg Hx     Strabismus Neg Hx     Stroke Neg Hx     Thyroid disease Neg Hx        ALLERGIES AND MEDICATIONS: updated and reviewed.  Review of patient's allergies indicates:   Allergen Reactions    Ace inhibitors Other (See Comments)     cough    Invokana [canagliflozin] Other (See Comments)     Throat and tongue swelling        Medication List           Accurate as of 9/26/18  6:43 PM. If you have any questions, ask your nurse or doctor.               CONTINUE taking these medications    albuterol 90 mcg/actuation inhaler  Commonly known as:  PROVENTIL/VENTOLIN HFA  Inhale 2 puffs into the lungs every 6 (six) hours as needed for Wheezing or Shortness of Breath. Rescue     alcohol swabs Padm  Apply 1 each topically 3 (three) times daily.     amLODIPine 10 MG tablet  Commonly known as:  NORVASC  TAKE ONE TABLET BY MOUTH EVERY DAY     artificial tears ointment Oint  Commonly known as:  ARTIFICIAL TEARS  Place into both eyes every evening.     aspirin 81 MG Chew     azelastine 0.05 % ophthalmic solution  Commonly known as:  OPTIVAR  INSTILL 1 DROP IN EACH EYE TWICE DAILY     blood glucose control high,low Soln  Commonly known as:  ACCU-CHEK BRIDGETTE CONTROL SOLN  1 kit by Misc.(Non-Drug; Combo Route) route once daily.     blood glucose control, low Soln  Commonly known as:  EMBRACE GLUCOSE CONTROL LOW  Inject 1 Bottle into the skin every 30  days.     blood pressure monitor Kit  Commonly known as:  BLOOD PRESSURE KIT  1 kit by Misc.(Non-Drug; Combo Route) route once daily.     blood sugar diagnostic Strp  Check blood glucose 4 times a day. Dispense accuchek meter or other meter preferred by insurance. Dx code e11.65     blood-glucose meter kit  ispense accuchek meter or other meter preferred by insurance. Dx code e11.65     buPROPion 75 MG tablet  Commonly known as:  WELLBUTRIN  TAKE TWO TABLETS BY MOUTH TWICE DAILY     desoximetasone 0.05 % cream  Commonly known as:  TOPICORT  Apply topically 2 (two) times daily as needed.     flash glucose scanning reader Misc  Commonly known as:  FREESTYLE ANAMARIA READER  1 Units by Misc.(Non-Drug; Combo Route) route before meals as needed.     flash glucose sensor Kit  Commonly known as:  FREESTYLE ANAMARIA SENSOR  1 Units by Misc.(Non-Drug; Combo Route) route every meal as needed.     fluticasone 50 mcg/actuation nasal spray  Commonly known as:  FLONASE  1 spray (50 mcg total) by Each Nare route once daily.     gabapentin 300 MG capsule  Commonly known as:  NEURONTIN  Take 1 capsule (300 mg total) by mouth every evening.     hydroCHLOROthiazide 25 MG tablet  Commonly known as:  HYDRODIURIL  TAKE ONE TABLET BY MOUTH EVERY DAY     ibuprofen 800 MG tablet  Commonly known as:  ADVIL,MOTRIN  Take 1 tablet (800 mg total) by mouth every 8 (eight) hours as needed for Pain.     * insulin aspart U-100 100 unit/mL Inpn pen  Commonly known as:  NovoLOG Flexpen U-100 Insulin  10 units sq three times a day with meals     * NovoLOG Flexpen U-100 Insulin 100 unit/mL Inpn pen  Generic drug:  insulin aspart U-100  INJECT 10 UNITS SUBCUTANEOUSLY BEFORE meals sliding scale TAKE 2 additional UNITS FOR each blood sugar increment OF 50 above 150     lancets Misc  Check blood glucose 4 times a day. Dispense accuchek meter or other meter preferred by insurance. Dx code e11.65     LANCING DEVICE WITH LANCETS Misc  Generic drug:  lancing  "device  USE AS DIRECTED FOR DIABETIC TESTING     latanoprost 0.005 % ophthalmic solution  Place 1 drop into both eyes every evening.     LEVEMIR FLEXTOUCH U-100 INSULN 100 unit/mL (3 mL) Inpn pen  Generic drug:  insulin detemir U-100  INJECT 38 UNITS SUBCUTANEOUSLY AT BEDTIME     levocetirizine 5 MG tablet  Commonly known as:  XYZAL  Take 1 tablet (5 mg total) by mouth nightly as needed for Allergies.     loratadine 10 mg tablet  Commonly known as:  CLARITIN  Take 1 tablet (10 mg total) by mouth daily as needed for Allergies.     meloxicam 15 MG tablet  Commonly known as:  MOBIC     metFORMIN 1000 MG tablet  Commonly known as:  GLUCOPHAGE  TAKE ONE TABLET BY MOUTH TWICE DAILY WITH FOOD     multivitamin with minerals tablet     naproxen 500 MG tablet  Commonly known as:  NAPROSYN     pen needle, diabetic 32 gauge x 5/32" Ndle  Commonly known as:  BD ULTRA-FINE LENY PEN NEEDLE  USE FIVE TIMES DAILY     pravastatin 40 MG tablet  Commonly known as:  PRAVACHOL  Take 1 tablet (40 mg total) by mouth once daily.     rOPINIRole 0.5 MG tablet  Commonly known as:  REQUIP  ropinirole 0.5 mg tablet     traMADol 50 mg tablet  Commonly known as:  ULTRAM     valsartan 320 MG tablet  Commonly known as:  DIOVAN  TAKE ONE TABLET BY MOUTH EVERY EVENING.         * This list has 2 medication(s) that are the same as other medications prescribed for you. Read the directions carefully, and ask your doctor or other care provider to review them with you.                   CARE TEAM:  Patient Care Team:  Yudi Smart MD as PCP - General (Internal Medicine)  Yudi Smart MD as PCP - [facility]  Yudi Smart MD as Hypertension Digital Medicine Responsible Provider (Internal Medicine)  Otilia Martin, PharmD as Hypertension Digital Medicine Clinician (Pharmacist)  Rafa Vázquez as Digital Medicine Health          REVIEW OF SYSTEMS:  Review of Systems   Constitutional: Negative for chills, fatigue, fever and " "unexpected weight change.   HENT: Negative for congestion, ear discharge, ear pain and postnasal drip.    Eyes: Negative for photophobia, pain and visual disturbance.   Respiratory: Negative for cough, shortness of breath and wheezing.    Cardiovascular: Negative for chest pain, palpitations and leg swelling.   Gastrointestinal: Negative for abdominal pain, constipation, diarrhea, nausea and vomiting.   Genitourinary: Negative for dysuria, frequency, urgency and vaginal discharge.   Musculoskeletal: Positive for arthralgias. Negative for back pain, joint swelling and neck stiffness.   Skin: Negative for rash.   Neurological: Negative for weakness and headaches.   Psychiatric/Behavioral: Negative for dysphoric mood and sleep disturbance. The patient is not nervous/anxious.          PHYSICAL EXAM:   Vitals:    09/26/18 1623   BP: (!) 148/80   Pulse: 75   Temp: 98.8 °F (37.1 °C)     Weight: 89.8 kg (197 lb 15.6 oz)   Height: 5' 3" (160 cm)   Body mass index is 35.07 kg/m².     General appearance - alert, well appearing, and in no distress and obese  Mental status - alert, oriented to person, place, and time, normal mood, behavior, speech, dress, motor activity, and thought processes  Eyes - pupils equal and reactive, extraocular eye movements intact, sclera anicteric  Mouth - mucous membranes moist, pharynx normal without lesions  Neck - supple, no significant adenopathy, carotids upstroke normal bilaterally, no bruits  Lymphatics - no palpable lymphadenopathy  Chest - clear to auscultation, no wheezes, rales or rhonchi, symmetric air entry  Heart - normal rate and regular rhythm, no gallops noted  Neurological - alert, oriented, normal speech, no focal findings or movement disorder noted, cranial nerves II through XII intact  Musculoskeletal - not examined  Extremities - pedal edema trace +  Skin - normal coloration and turgor, no rashes, no suspicious skin lesions noted      ASSESSMENT AND PLAN:  1. Type 2 diabetes, " uncontrolled, with neuropathy/2. 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 We discussed diabetic diet and regular exercise. We discussed home blood sugar monitoring, if appropriate. I will refer her to endocrinology for further evaluation/treatment.  Diabetic complications addressed: Neuropathy pain controlled.  Patient was counseled on the need for yearly diabetic retinopathy exam and yearly diabetic foot exam.   - Ambulatory referral to Endocrinology  - Ambulatory referral to Optometry    3. Essential hypertension  BP under fair control.  We discussed low-salt diet and regular exercise.  She is enrolled in the digital hypertension program.    4. CHETNA (obstructive sleep apnea)  CPAP compliance: yes - but currently needs new supplies.  We discussed the potential ramifications of untreated sleep apnea, which could include daytime sleepiness, hypertension, heart disease including CHF, sudden death while sleeping and/or stroke. The patient was advised to abstain from driving should they feel sleepy or drowsy.  We discussed potential treatment options, which could include weight loss, body positioning, continuous positive airway pressure (CPAP), or referral for surgical consideration.    - CPAP/BIPAP SUPPLIES    5. Thumb pain, right/6.  Left shoulder pain  For now she can take Tylenol as needed for pain. I will refer her to Orthopedics for further evaluation.  - Ambulatory referral to Orthopedics    7. Severe obesity (BMI 35.0-39.9) with comorbidity  The patient is asked to make an attempt to improve diet and exercise patterns to aid in medical management of this problem.     8. Flu vaccine need    - Influenza - High Dose (65+) (PF) (IM)           Follow-up in about 6 months (around 3/26/2019), or if symptoms worsen or fail to improve, for follow up chronic medical conditions.. or sooner as needed.

## 2018-09-28 NOTE — PROGRESS NOTES
Last 5 Patient Entered Readings                                      Current 30 Day Average: 163/83     Recent Readings 9/28/2018 9/26/2018 9/25/2018 9/24/2018 9/22/2018    SBP (mmHg) 148 156 173 166 177    DBP (mmHg) 73 85 89 84 95    Pulse 72 77 64 70 81          Digital Medicine: Health  Follow Up    Lifestyle Modifications:    1.Dietary Modifications (Sodium intake <2,000mg/day, food labels, dining out): States she can work on reducing her sodium intake.  Informed patient about aiming for less than 500mg per serving for meals and less than 140mg for snacks.  Would like to receive low carb/low sodium recipes through mail.    2.Physical Activity: deferred    3.Medication Therapy: Patient has been compliant with the medication regimen.    4.Patient has the following medication side effects/concerns: none  (Frequency/Alleviating factors/Precipitating factors, etc.)     Follow up with Mrs. Cecilia Correa Jun completed. No further questions or concerns. Will continue to follow up to achieve health goals.

## 2018-10-01 ENCOUNTER — TELEPHONE (OUTPATIENT)
Dept: FAMILY MEDICINE | Facility: CLINIC | Age: 68
End: 2018-10-01

## 2018-10-02 DIAGNOSIS — I10 ESSENTIAL HYPERTENSION: ICD-10-CM

## 2018-10-02 RX ORDER — VALSARTAN 320 MG/1
TABLET ORAL
Qty: 30 TABLET | Refills: 5 | Status: SHIPPED | OUTPATIENT
Start: 2018-10-02 | End: 2019-04-04 | Stop reason: SDUPTHER

## 2018-10-03 ENCOUNTER — OFFICE VISIT (OUTPATIENT)
Dept: ENDOCRINOLOGY | Facility: CLINIC | Age: 68
End: 2018-10-03
Payer: MEDICARE

## 2018-10-03 VITALS
HEART RATE: 78 BPM | SYSTOLIC BLOOD PRESSURE: 143 MMHG | WEIGHT: 198.44 LBS | DIASTOLIC BLOOD PRESSURE: 77 MMHG | BODY MASS INDEX: 35.16 KG/M2 | HEIGHT: 63 IN

## 2018-10-03 DIAGNOSIS — E66.9 NON MORBID OBESITY, UNSPECIFIED OBESITY TYPE: ICD-10-CM

## 2018-10-03 DIAGNOSIS — I10 ESSENTIAL HYPERTENSION: ICD-10-CM

## 2018-10-03 PROCEDURE — 3077F SYST BP >= 140 MM HG: CPT | Mod: CPTII,,, | Performed by: NURSE PRACTITIONER

## 2018-10-03 PROCEDURE — 99215 OFFICE O/P EST HI 40 MIN: CPT | Mod: PBBFAC,PN | Performed by: NURSE PRACTITIONER

## 2018-10-03 PROCEDURE — 1101F PT FALLS ASSESS-DOCD LE1/YR: CPT | Mod: CPTII,,, | Performed by: NURSE PRACTITIONER

## 2018-10-03 PROCEDURE — 99214 OFFICE O/P EST MOD 30 MIN: CPT | Mod: S$PBB,,, | Performed by: NURSE PRACTITIONER

## 2018-10-03 PROCEDURE — 99999 PR PBB SHADOW E&M-EST. PATIENT-LVL V: CPT | Mod: PBBFAC,,, | Performed by: NURSE PRACTITIONER

## 2018-10-03 PROCEDURE — 3078F DIAST BP <80 MM HG: CPT | Mod: CPTII,,, | Performed by: NURSE PRACTITIONER

## 2018-10-03 PROCEDURE — 3045F PR MOST RECENT HEMOGLOBIN A1C LEVEL 7.0-9.0%: CPT | Mod: CPTII,,, | Performed by: NURSE PRACTITIONER

## 2018-10-03 RX ORDER — CHLORTHALIDONE 25 MG/1
25 TABLET ORAL DAILY
Qty: 30 TABLET | Refills: 1 | Status: SHIPPED | OUTPATIENT
Start: 2018-10-03 | End: 2018-10-29 | Stop reason: SDUPTHER

## 2018-10-03 NOTE — PATIENT INSTRUCTIONS
See Diabetes Educator/Registered Dietician for Medical Nutrition Therapy.   Start exercise routine.   Avoid beverages with sugar.     Stop Novolog - you haven't been taking it anyway.  CONTINUE metformin and Levemir at same doses.     Start Ozempic. Inject 0.25 mg once weekly for 4 weeks, then increase to 0.5 mg weekly.      Test blood sugar 2x/day - fasting and again before dinner/bedtime. Bring a written glucose log to every visit.     Return to clinic in 6 weeks.       Blood pressure medications - valsartan, amlodipine  Switch from hctz to chlorthalidone every morning for blood pressure.     Take pravastatin at night.       Start Freestyle Arvin glucose monitoring system.    Medicare/Medicaid patients: Please go on [email or web address] to obtain a discount.  You will be able to use the discount at the pharmacy and purchase the sensors and reader as a CASH payer with the discount. The pharmacy will not process the prescription through your insurance. The reader is about $65 and the sensors ~ $55/per month. [facility] may be the cheapest. You can set up an appointment with diabetes education when you  your supplies.

## 2018-10-03 NOTE — PROGRESS NOTES
CC: This 68 y.o. Black or  female  is here for evaluation of  T2DM along with comorbidities indicated in the Visit Diagnosis section of this encounter.    HPI: Cecilia Barahona was diagnosed with T2DM in her 30s. Oral agents started at the time of diagnosis.     Initial visit on 7/21/17  New to Endocrine. Referred by Dr. Smart.   Plan a1c today.   See Diabetes Educator/Registered Dietician for Medical Nutrition Therapy.   Should ideally eat 3 meals/day. Can try a Glucerna as a meal replacement.   Start exercise routine - try to walk 30 minutes 3x/week.   Stop taking glimepiride since you are already on Novolog.   Continue Levemir at 38 units every night for now.   Continue Novolog 10 units before each meal - be consistent. Carry your Novolog when you go out to eat.   Test glucose 4x/day - before meals and at bedtime. Send a glucose log in 2 weeks.   Return to clinic in a month.     Interval history  She has not been seen for over a year. Did not see dietician as advised.   a1c up to 8.6%.     PMHX: CHETNA - does not like her old mask.     LAST DIABETES EDUCATION: never    RECENT ILLNESSES/HOSPITALIZATIONS - -No.     HOSPITALIZED FOR DIABETES  -  No.    PRESCRIBED DIABETES MEDICATIONS:  Novolog Flexpen 10 units ac with ss, Levemir Flextouch 38 units hs ~ 10 pm, metformin 1000 mg bid     Misses medication doses - yes, skips Novolog most of the time (she takes it about 4x/week after meals). Sometimes she will take it if she eats something sweet like ice cream.     DM COMPLICATIONS: peripheral neuropathy    SIGNIFICANT DIABETES MED HISTORY: denies    SELF MONITORING BLOOD GLUCOSE: Checks blood glucose at home - very infrequent, perhaps a few times a month. last tested her BG 2 weeks ago, cannot recall the value.     HYPOGLYCEMIC EPISODES: denies      CURRENT DIET: drinks 2 cans of regular cokes/ week; also drinks juice, sweet tea, Koolaid.     Doesn't like breakfast. At least 2 meals/day but the times  "are inconsistent. Often goes several hrs without eating. Seldom snacks on a bag of chips or pickle, cookies, grapes. Loves to eat grapes and raisins.     CURRENT EXERCISE: none       BP (!) 143/77 (BP Location: Right arm, Patient Position: Sitting, BP Method: Large (Automatic))   Pulse 78   Ht 5' 3" (1.6 m)   Wt 90 kg (198 lb 6.6 oz)   BMI 35.15 kg/m²       ROS:   CONSTITUTIONAL: Appetite good, denies fatigue  RESPIRATORY: No shortness of breath; + swanson   CARDIAC: No chest pain or palpitations      PHYSICAL EXAM:  GENERAL: Well developed, well nourished. No acute distress.   PSYCH: AAOx3, appropriate mood and affect, conversant, well-groomed. Judgement and insight good.   NEURO: Cranial nerves grossly intact. Speech clear, no tremor.   CHEST: Respirations even and unlabored.   MS: Gait steady. No clubbing.         Hemoglobin A1C   Date Value Ref Range Status   08/02/2018 8.6 (H) 4.0 - 5.6 % Final     Comment:     ADA Screening Guidelines:  5.7-6.4%  Consistent with prediabetes  >or=6.5%  Consistent with diabetes  High levels of fetal hemoglobin interfere with the HbA1C  assay. Heterozygous hemoglobin variants (HbS, HgC, etc)do  not significantly interfere with this assay.   However, presence of multiple variants may affect accuracy.     07/21/2017 7.9 (H) 4.0 - 5.6 % Final     Comment:     According to ADA guidelines, hemoglobin A1c <7.0% represents  optimal control in non-pregnant diabetic patients. Different  metrics may apply to specific patient populations.   Standards of Medical Care in Diabetes-2016.  For the purpose of screening for the presence of diabetes:  <5.7%     Consistent with the absence of diabetes  5.7-6.4%  Consistent with increasing risk for diabetes   (prediabetes)  >or=6.5%  Consistent with diabetes  Currently, no consensus exists for use of hemoglobin A1c  for diagnosis of diabetes for children.  This Hemoglobin A1c assay has significant interference with fetal   hemoglobin   (HbF). The " results are invalid for patients with abnormal amounts of   HbF,   including those with known Hereditary Persistence   of Fetal Hemoglobin. Heterozygous hemoglobin variants (HbAS, HbAC,   HbAD, HbAE, HbA2) do not significantly interfere with this assay;   however, presence of multiple variants in a sample may impact the %   interference.     03/10/2017 8.3 (H) 4.5 - 6.2 % Final     Comment:     According to ADA guidelines, hemoglobin A1C <7.0% represents  optimal control in non-pregnant diabetic patients.  Different  metrics may apply to specific populations.   Standards of Medical Care in Diabetes - 2016.  For the purpose of screening for the presence of diabetes:  <5.7%     Consistent with the absence of diabetes  5.7-6.4%  Consistent with increasing risk for diabetes   (prediabetes)  >or=6.5%  Consistent with diabetes  Currently no consensus exists for use of hemoglobin A1C  for diagnosis of diabetes for children.             Chemistry        Component Value Date/Time     08/02/2018 1124    K 3.8 08/02/2018 1124     08/02/2018 1124    CO2 28 08/02/2018 1124    BUN 19 08/02/2018 1124    CREATININE 0.8 08/02/2018 1124    GLU 91 08/02/2018 1124        Component Value Date/Time    CALCIUM 9.4 08/02/2018 1124    ALKPHOS 99 08/02/2018 1124    AST 25 08/02/2018 1124    ALT 24 08/02/2018 1124    BILITOT 0.5 08/02/2018 1124    ESTGFRAFRICA >60.0 08/02/2018 1124    EGFRNONAA >60.0 08/02/2018 1124          Lab Results   Component Value Date    LDLCALC 88.8 08/02/2018        Ref. Range 8/2/2018 11:24   Cholesterol Latest Ref Range: 120 - 199 mg/dL 155   HDL Latest Ref Range: 40 - 75 mg/dL 42   LDL Cholesterol Latest Ref Range: 63.0 - 159.0 mg/dL 88.8   Total Cholesterol/HDL Ratio Latest Ref Range: 2.0 - 5.0  3.7   Triglycerides Latest Ref Range: 30 - 150 mg/dL 121     Lab Results   Component Value Date    MICALBCREAT 20.8 08/21/2018         STANDARDS of CARE:        ASA:               Last eye exam:        ASSESSMENT and PLAN:    A1C GOAL: < 7 %       1. Type 2 diabetes, uncontrolled, with neuropathy  See Diabetes Educator/Registered Dietician for Medical Nutrition Therapy.   Start exercise routine.   Avoid beverages with sugar.     Stop Novolog - you haven't been taking it anyway.  CONTINUE metformin and Levemir at same doses.     Start Ozempic. Inject 0.25 mg once weekly for 4 weeks, then increase to 0.5 mg weekly.      Test blood sugar 2x/day - fasting and again before dinner/bedtime. Bring a written glucose log to every visit. -- she is interested in Freestyle Arvin. rx's sent.     Return to clinic in 6 weeks.       Ambulatory Referral to Diabetes Education   2. Essential hypertension  Switch from hctz to chlorthalidone every morning for blood pressure.   She is also enrolled in digital htn medicine.    3. Non morbid obesity, unspecified obesity type  Lifestyle changes as above          Orders Placed This Encounter   Procedures    Ambulatory Referral to Diabetes Education     Referral Priority:   Routine     Referral Type:   Consultation     Referral Reason:   Specialty Services Required     Requested Specialty:   Endocrinology     Number of Visits Requested:   1     Expiration Date:   10/3/2019        Follow-up in about 6 weeks (around 11/14/2018).

## 2018-10-03 NOTE — LETTER
October 3, 2018      Yudi Smart MD  [address]  [place] [address]           [facility]  [address], [address]  [place] [address]  Phone: [phone]  Fax: [phone]          Patient: Cecilia Barahona   MR Number: [id number]   YOB: 1950   Date of Visit: 10/3/2018       Dear Dr. Yudi Smart:    Thank you for referring Cecilia Barahona to me for evaluation. Attached you will find relevant portions of my assessment and plan of care.    If you have questions, please do not hesitate to call me. I look forward to following Cecilia Barahona along with you.    Sincerely,    Sindi Shultz NP    Enclosure  CC:  No Recipients    If you would like to receive this communication electronically, please contact [email or web address] or [phone] to request more information on [facility] access.    For providers and/or their staff who would like to refer a patient to [facility], please contact us through our one-stop-shop provider referral line, [facility], at [phone].    If you feel you have received this communication in error or would no longer like to receive these types of communications, please e-mail [email or web address]

## 2018-10-04 ENCOUNTER — CLINICAL SUPPORT (OUTPATIENT)
Dept: DIABETES | Facility: CLINIC | Age: 68
End: 2018-10-04
Payer: MEDICARE

## 2018-10-04 VITALS — BODY MASS INDEX: 35.15 KG/M2 | WEIGHT: 198.44 LBS

## 2018-10-04 PROCEDURE — G0108 DIAB MANAGE TRN  PER INDIV: HCPCS | Mod: PBBFAC,PN | Performed by: DIETITIAN, REGISTERED

## 2018-10-04 NOTE — PROGRESS NOTES
"Diabetes Education  Author: Juana Larios RD  Date: 10/4/2018    Diabetes Care Management Summary  Pt visit today focused in introducing pt to diabetes self management and START Free Style Arvin CDM. Discussion included CHO awareness/counting, low-sodium food choices, meal planning, meds and exercise   Diabetes Education Record Assessment/Progress: Initial  Current Diabetes Risk Level: Moderate     Last A1c:   Lab Results   Component Value Date    HGBA1C 8.6 (H) 08/02/2018     Last visit with Diabetes Educator: : 10/04/2018      Diabetes Type  Diabetes Type : Type II    Diabetes History  Diabetes Diagnosis: >10 years  Current Treatment: Oral Medication, Diet, Injectable, Insulin  Reviewed Problem List with Patient: Yes    Health Maintenance was reviewed today with patient. Discussed with patient importance of routine eye exams, foot exams/foot care, blood work (i.e.: A1c, microalbumin, and lipid), dental visits, yearly flu vaccine, and pneumonia vaccine as indicated by PCP. Patient verbalized understanding.     Health Maintenance Topics with due status: Not Due       Topic Last Completion Date    Colonoscopy 01/07/2015    TETANUS VACCINE 09/30/2015    DEXA SCAN 03/29/2016    Foot Exam 03/12/2018    Mammogram 08/02/2018    Lipid Panel 08/02/2018    Hemoglobin A1c 08/02/2018    Urine Microalbumin 08/21/2018    Low Dose Statin 10/03/2018     Health Maintenance Due   Topic Date Due    Zoster Vaccine  03/10/2011    Eye Exam  07/21/2018       Nutrition 24-hr diet recall; pt states she skip meals but actually  she skips "Hot Cooked meals" but still consumes sandwiches or high CHO foods at these meal times  Meal Planning: drinks regular soda, water, eats out seldom, snacks between meal  What type of sweetener do you use?: sugar  What type of beverages do you drink?: regular soda/tea, milk  Meal Plan 24 Hour Recall - Breakfast: wakes up at 10am; eats meal at 1030 ater taking meds: luncheon meat sandwich on 2 sl toast w " barclay, REGULAR coke  Meal Plan 24 Hour Recall - Lunch: 1130: starts snacking on large amt of raisins and nicholas covered almonds throught early afternoon; pt states she could eat an entire bag of grapes in one sitting  Meal Plan 24 Hour Recall - Dinner: 5pm: spaghett w meat sauce, green beans, REGULAR sugar sweetened beverages such as COKE, Koolaid, or Juice  Meal Plan 24 Hour Recall - Snack: 9-10pm: when  comes in from work pt consumes a repeat of the 5pm meal or a sandwich and beverage like the 1030 meal    Monitoring   Blood Glucose Logs: No  Do you use a personal continuous glucose monitor?: Yes(started today on Freesyle Arvin CGM)  What kind of glucose monitor do you use?: Freestyle Arvin Flash  In the last month, how often have you had a low blood sugar reaction?: never  Can you tell when your blood sugar is too high?: no    Exercise   Exercise Type: none    Current Diabetes Treatment   Current Treatment: Oral Medication, Diet, Injectable, Insulin    Social History  Preferred Learning Method: Face to Face, Group Education  Primary Support: Self, Family, Spouse, Daughter  Educational Level: Some College  Smoking Status: Ex Smoker  Alcohol Use: Never    Barriers to Change: None  Learning Challenges : None  Readiness to Learn : Eager  Cultural Influences: No    Diabetes Education Assessment/Progress  Diabetes Disease Process (diabetes disease process and treatment options): Individual Session, Written Materials Provided, Instructed, Demonstrates Understanding/Competency(verbalizes/demonstrates), Discussion  Nutrition (Incorporating nutritional management into one's lifestyle): Individual Session, Written Materials Provided, Instructed, Discussion, Comprehends Key Points  -diet hx indicates continued DAILY use of high sugar beverages and foods (coke, nicholas candy, frosted or sugar in cereal, ice cream, etc). Estimated CHO in EACH meal and bedtime snack based on diet recall is 100-120geach , am snack in excess of  "120g. Pt is often CHO unaware as noted in 50% failure rate of identifying carbs in her daily meals.  Diet hx also notes limited fresh fruit, whole grains and non starchy vegetables.(exception: pt consumes large amt of raisins daily) Diet also includes regular intake of high sodium foods such as deli or luncheon meats and processed products/condiment.  -Identified foods to limit or change in diet to control sodium  intake and suggestions provided on choosing lower sodium foods  -Discussed carb vs non-carb foods. Discussed appropriate amount of carbs to have at meals/snacks. Discussed appropriate serving sizes of individual carb items.   -Instructed pt to aim for 3 evenly-spaced meals with 30-45 gm carb/meal and 0-15 gm at snacks.. Pt can also use a Glucerna in place of skipped meal or grazing mid-day snack    Physical Activity (incorporating physical activity into one's lifestyle): Individual Session, Written Materials Provided, Instructed, Discussion, Needs Review, Comprehends Key Points  -pt stated she started exercising w [employer] at  but quit after 2 weeks bc it hurt  - Discussed benefits of physical activity on BG control. Encouraged starting with small manageable goals such as waking 2 to 3 ten-minute walks per day for a total of 150 minutes per week while keeping 3 components in mind: Frequency- 3-5x/wk, Duration- 30 min, Intensity- can say name but "not sing a song"    Medications (states correct name, dose, onset, peak, duration, side effects & timing of meds): Individual Session, Written Materials Provided, Demonstration, Instructed, Discussion, Return Demonstration, Comprehends Key Points  -Reviewed change in meds: dc Novolg, continue w Levemir and Metformin in current doses, Start Ozempic. Inject 0.25 mg once weekly for 4 weeks, then increase to 0.5 mg weekly.   -Demonstrated correct pen use, airshots and storage w pt; pt correctly repeated the technique    Monitoring (monitoring blood " glucose/other parameters & using results): Individual Session, Written Materials Provided, Demonstration, Instructed, Discussion, Return Demonstration, Video, Comprehends Key Points  -DIABETES EDUCATION  FREESTYLE ANAMARIA CGM TRAINING     Patient referred to clinic today for Freestyle Anamaria continuous glucose sensor system training. Patient arrived with his reader charged and 1 sensor for application. Provided personal FreeStyle Anamaria training - reviewed the following with patient:   · no fingersticks required but if feeling s/s that do not match with SG reading or in event of hypoglycemia confirm with fingerstick,   · salicylic acid and ascorbic acid can cause false readings  · sensor is 10 day wear  · FDA approved for back of the arm wear  · site rotation  · 12 hour warm-up period  · scan minimum every 8 hours for continual graph. Encouraged pt to scan more often -  before each meal and before bed.      Pt successfully set up reader and placed sensor on back of upper left arm. Reviewed additional adhesive options if having any difficulty with sensor staying on. Target BG range set for 100-140 mg/dL.       Acute Complications (preventing, detecting, and treating acute complications): Individual Session, Written Materials Provided, Instructed, Discussion, Comprehends Key Points  -Reviewed s/s, causes, and treatment of hyperglycemia and hypoglycemia     Chronic Complications (preventing, detecting, and treating chronic complications): Individual Session, Written Materials Provided, Instructed, Discussion, Comprehends Key Points  Clinical (diabetes, other pertinent medical history, and relevant comorbidities reviewed during visit): Individual Session, Written Materials Provided, Instructed, Discussion, Comprehends Key Points  Cognitive (knowledge of self-management skills, functional health literacy): Individual Session, Written Materials Provided, Instructed, Discussion, Comprehends Key Points  Psychosocial (emotional  response to diabetes): Discussion, Individual Session  Diabetes Distress and Support Systems: Discussion, Individual Session  Behavioral (readiness for change, lifestyle practices, self-care behaviors): Discussion, Individual Session    Goals  Patient has selected/evaluated goals during today's session: Yes, selected  Healthy Eating: Set(consume 3 evenly spaced meals/day, 30-45 g CHO/meal)  Start Date: 10/04/18  Target Date: 11/04/18  Physical Activity: (exercise 20-30 min 3 times/wk)  Start Date: 10/04/18  Target Date: 11/04/18         Diabetes Care Plan/Intervention  Education Plan/Intervention: Individual Follow-Up DSMT, Sensor Start(contact information provided; pt will scheule appt in 6 weeks or as needed)    Diabetes Meal Plan  Restrictions: Restricted Carbohydrate, Low Sodium  Carbohydrate Per Meal: 30-45g  Carbohydrate Per Snack : 15-20g    Today's Self-Management Care Plan was developed with the patient's input and is based on barriers identified during today's assessment.    The long and short-term goals in the care plan were written with the patient/caregiver's input. The patient has agreed to work toward these goals to improve her overall diabetes control.      The patient received a copy of today's self-management plan and verbalized understanding of the care plan, goals, and all of today's instructions.      The patient was encouraged to communicate with her physician and care team regarding her condition(s) and treatment.  I provided the patient with my contact information today and encouraged her to contact me via phone or patient portal as needed.     Education Units of Time   Time Spent: 90 min

## 2018-10-04 NOTE — PROGRESS NOTES
Last 5 Patient Entered Readings                                      Current 30 Day Average: 161/81     Recent Readings 10/3/2018 10/2/2018 9/28/2018 9/26/2018 9/25/2018    SBP (mmHg) 151 155 148 156 173    DBP (mmHg) 74 76 73 85 89    Pulse 79 74 72 77 64          Contacted patient, who was busy at the time. Pt recently switched from HCTZ to chlorthalidone. Will assess adherence with BP meds. Consider BMP in 3-4 weeks. Consider adding carvedilol if pt is adherent to med changes and BP elevated. Will follow up 10/9/18

## 2018-10-08 ENCOUNTER — TELEPHONE (OUTPATIENT)
Dept: ORTHOPEDICS | Facility: CLINIC | Age: 68
End: 2018-10-08

## 2018-10-08 DIAGNOSIS — M25.519 SHOULDER PAIN, UNSPECIFIED CHRONICITY, UNSPECIFIED LATERALITY: Primary | ICD-10-CM

## 2018-10-08 DIAGNOSIS — M79.644 THUMB PAIN, RIGHT: ICD-10-CM

## 2018-10-09 ENCOUNTER — OFFICE VISIT (OUTPATIENT)
Dept: ORTHOPEDICS | Facility: CLINIC | Age: 68
End: 2018-10-09
Payer: MEDICARE

## 2018-10-09 VITALS
HEIGHT: 63 IN | SYSTOLIC BLOOD PRESSURE: 122 MMHG | BODY MASS INDEX: 34.38 KG/M2 | WEIGHT: 194 LBS | DIASTOLIC BLOOD PRESSURE: 60 MMHG

## 2018-10-09 DIAGNOSIS — G56.02 LEFT CARPAL TUNNEL SYNDROME: ICD-10-CM

## 2018-10-09 DIAGNOSIS — M75.32 CALCIFIC TENDINITIS OF LEFT SHOULDER: ICD-10-CM

## 2018-10-09 DIAGNOSIS — M25.512 CHRONIC LEFT SHOULDER PAIN: Primary | ICD-10-CM

## 2018-10-09 DIAGNOSIS — M65.341 TRIGGER RING FINGER OF RIGHT HAND: Primary | ICD-10-CM

## 2018-10-09 DIAGNOSIS — M79.641 RIGHT HAND PAIN: ICD-10-CM

## 2018-10-09 DIAGNOSIS — G89.29 CHRONIC LEFT SHOULDER PAIN: Primary | ICD-10-CM

## 2018-10-09 PROCEDURE — 99204 OFFICE O/P NEW MOD 45 MIN: CPT | Mod: S$PBB,,, | Performed by: ORTHOPAEDIC SURGERY

## 2018-10-09 PROCEDURE — 3074F SYST BP LT 130 MM HG: CPT | Mod: CPTII,,, | Performed by: ORTHOPAEDIC SURGERY

## 2018-10-09 PROCEDURE — 99215 OFFICE O/P EST HI 40 MIN: CPT | Mod: PBBFAC,25,PN | Performed by: ORTHOPAEDIC SURGERY

## 2018-10-09 PROCEDURE — 99999 PR PBB SHADOW E&M-EST. PATIENT-LVL V: CPT | Mod: PBBFAC,,, | Performed by: ORTHOPAEDIC SURGERY

## 2018-10-09 PROCEDURE — 1101F PT FALLS ASSESS-DOCD LE1/YR: CPT | Mod: CPTII,,, | Performed by: ORTHOPAEDIC SURGERY

## 2018-10-09 PROCEDURE — 3078F DIAST BP <80 MM HG: CPT | Mod: CPTII,,, | Performed by: ORTHOPAEDIC SURGERY

## 2018-10-09 RX ORDER — MELOXICAM 7.5 MG/1
7.5 TABLET ORAL DAILY
Qty: 30 TABLET | Refills: 0 | Status: SHIPPED | OUTPATIENT
Start: 2018-10-09 | End: 2019-03-19 | Stop reason: SDUPTHER

## 2018-10-09 NOTE — PROGRESS NOTES
Last 5 Patient Entered Readings                                      Current 30 Day Average: 161/83     Recent Readings 10/6/2018 10/3/2018 10/2/2018 9/28/2018 9/26/2018    SBP (mmHg) 153 151 155 148 156    DBP (mmHg) 106 74 76 73 85    Pulse 77 79 74 72 77          Contacted patient, who was busy at the time. Pt recently switched from HCTZ to chlorthalidone. Will assess adherence with BP meds. Consider BMP in 3-4 weeks. Consider adding carvedilol if pt is adherent to med changes and BP elevated. Will follow up 10/16/18

## 2018-10-09 NOTE — PROGRESS NOTES
CC: L shoulder pain, bilateral hand pain      This patient was seen in consultation at the request of Yudi Smart MD     HPI: Cecilia Barahona is a 68 y.o. female who presents with multiple complaints     Left shoulder pain for 6-12 months. Onset of pain was not associated with injury or new activities.  The pain is constant 1/10 at best and 8/10 at its worst.  The pain is worse at night when she sleeps on the shoulder and when she reaches overhead  and better with rest. She endorses  associated numbness in the left hand at night but denies radicular type symptoms. There is no associated limited range of motion.  She has not been treated in the past with PT and steroid injection .  She has tried OTC aleve and tramadol which do help with the pain.   The pain does interfere with sleep.  She also has notcied left hand numbness and pain that wakes her up along with the shoulder. She had previous CTR on the right and these symptoms are similar.     Right hand pain for about 1 year. Onset of pain was not associated with injury or new activities.  The pain is intermittent 0/10 at best and 8/10 at its worst.  She has episodes of pain and swelling in the area 1-2 times per week but has not noticed any aggravating or alleviatng activties. She denies associated numbness, paresthesias and radiation of pain to the arm. There is associated swelling.  She has been treated in the past with OTC NSAIDs .   The pain does not interfere with sleep and activities of daily living .    This is the extent of the patient's complaints at this time.     Hand dominance: Right     Occupation: Retired but takes care of her two grandchildren    Review of Systems   Constitutional: Negative.    HENT: Negative.    Eyes: Negative.    Respiratory: Negative.    Cardiovascular: Negative.    Gastrointestinal: Positive for nausea (she attributes this to a new DM medication). Negative for abdominal pain, blood in stool, constipation, diarrhea,  heartburn and vomiting.   Genitourinary: Negative.    Musculoskeletal: Positive for joint pain.   Skin: Negative.    Neurological: Negative.    Endo/Heme/Allergies: Negative.    Psychiatric/Behavioral: Negative.          Review of patient's allergies indicates:   Allergen Reactions    Ace inhibitors Other (See Comments)     cough    Invokana [canagliflozin] Other (See Comments)     Throat and tongue swelling       Current Outpatient Medications:     albuterol 90 mcg/actuation inhaler, Inhale 2 puffs into the lungs every 6 (six) hours as needed for Wheezing or Shortness of Breath. Rescue, Disp: 1 Inhaler, Rfl: 0    alcohol swabs PadM, Apply 1 each topically 3 (three) times daily., Disp: 400 each, Rfl: 2    amLODIPine (NORVASC) 10 MG tablet, TAKE ONE TABLET BY MOUTH EVERY DAY, Disp: 30 tablet, Rfl: 3    artificial tear ointment (ARTIFICIAL TEARS) Oint, Place into both eyes every evening., Disp: 305 g, Rfl: 1    aspirin 81 MG Chew, Take by mouth once daily. 1 tablet, chewable Oral Every day, Disp: , Rfl:     azelastine (OPTIVAR) 0.05 % ophthalmic solution, INSTILL 1 DROP IN EACH EYE TWICE DAILY, Disp: 12 mL, Rfl: 0    blood glucose control high,low (ACCU-CHEK BRIDGETTE CONTROL SOLN) Soln, 1 kit by Misc.(Non-Drug; Combo Route) route once daily., Disp: 1 each, Rfl: 0    blood glucose control, low (EMBRACE GLUCOSE CONTROL LOW) Soln, Inject 1 Bottle into the skin every 30 days., Disp: 1 each, Rfl: 0    blood pressure monitor (BLOOD PRESSURE KIT) Kit, 1 kit by Misc.(Non-Drug; Combo Route) route once daily., Disp: 1 each, Rfl: 0    blood sugar diagnostic Strp, Check blood glucose 4 times a day. Dispense accuchek meter or other meter preferred by insurance. Dx code e11.65, Disp: 400 strip, Rfl: 3    blood-glucose meter kit, ispense accuchek meter or other meter preferred by insurance. Dx code e11.65, Disp: 1 each, Rfl: 0    buPROPion (WELLBUTRIN) 75 MG tablet, TAKE TWO TABLETS BY MOUTH TWICE DAILY, Disp: 180 tablet,  Rfl: 0    chlorthalidone (HYGROTEN) 25 MG Tab, Take 1 tablet (25 mg total) by mouth once daily., Disp: 30 tablet, Rfl: 1    desoximetasone (TOPICORT) 0.05 % cream, Apply topically 2 (two) times daily as needed., Disp: 100 g, Rfl: 2    diabetic supplies, miscellan. Kit, 1 FreeStyle Arvin Lott, Disp: 1 kit, Rfl: 0    diabetic supplies, miscellan. Kit, Please change sensor every 10 days. FreeStyle Arvin Sensor 30-day supply (3 sensors/month), Disp: 3 kit, Rfl: 5    flash glucose scanning reader (FREESTYLE ARVIN READER) Misc, 1 Units by Misc.(Non-Drug; Combo Route) route before meals as needed., Disp: 1 each, Rfl: 11    flash glucose sensor (FREESTYLE ARVIN SENSOR) Kit, 1 Units by Misc.(Non-Drug; Combo Route) route every meal as needed., Disp: 1 kit, Rfl: 0    fluticasone (FLONASE) 50 mcg/actuation nasal spray, 1 spray (50 mcg total) by Each Nare route once daily., Disp: 16 g, Rfl: 5    gabapentin (NEURONTIN) 300 MG capsule, Take 1 capsule (300 mg total) by mouth every evening., Disp: 90 capsule, Rfl: 1    ibuprofen (ADVIL,MOTRIN) 800 MG tablet, Take 1 tablet (800 mg total) by mouth every 8 (eight) hours as needed for Pain., Disp: 20 tablet, Rfl: 0    lancets Misc, Check blood glucose 4 times a day. Dispense accuchek meter or other meter preferred by insurance. Dx code e11.65, Disp: 400 each, Rfl: 3    LANCING DEVICE WITH LANCETS Misc, USE AS DIRECTED FOR DIABETIC TESTING, Disp: 1 each, Rfl: 0    latanoprost 0.005 % ophthalmic solution, Place 1 drop into both eyes every evening., Disp: 1 Bottle, Rfl: 1    LEVEMIR FLEXTOUCH U-100 INSULN 100 unit/mL (3 mL) InPn pen, INJECT 38 UNITS SUBCUTANEOUSLY AT BEDTIME, Disp: 15 mL, Rfl: 0    levocetirizine (XYZAL) 5 MG tablet, Take 1 tablet (5 mg total) by mouth nightly as needed for Allergies., Disp: 90 tablet, Rfl: 1    loratadine (CLARITIN) 10 mg tablet, Take 1 tablet (10 mg total) by mouth daily as needed for Allergies., Disp: 90 tablet, Rfl: 1    meloxicam  "(MOBIC) 15 MG tablet, Take 15 mg by mouth once daily., Disp: , Rfl: 1    metFORMIN (GLUCOPHAGE) 1000 MG tablet, TAKE ONE TABLET BY MOUTH TWICE DAILY WITH FOOD, Disp: 180 tablet, Rfl: 0    multivitamin with minerals tablet, Take 1 tablet by mouth once daily., Disp: , Rfl:     naproxen (NAPROSYN) 500 MG tablet, Take 500 mg by mouth 2 (two) times daily as needed., Disp: , Rfl: 1    pen needle, diabetic (BD ULTRA-FINE LENY PEN NEEDLES) 32 gauge x 5/32" Ndle, USE FIVE TIMES DAILY, Disp: 100 each, Rfl: 5    pravastatin (PRAVACHOL) 40 MG tablet, Take 1 tablet (40 mg total) by mouth once daily., Disp: 90 tablet, Rfl: 1    rOPINIRole (REQUIP) 0.5 MG tablet, ropinirole 0.5 mg tablet, Disp: 90 tablet, Rfl: 1    semaglutide (OZEMPIC) 0.25 mg or 0.5 mg(2 mg/1.5 mL) PnIj, Inject 0.5 mg into the skin every 7 days., Disp: 1 Syringe, Rfl: 1    tramadol (ULTRAM) 50 mg tablet, Take 50 mg by mouth every 6 (six) hours as needed for Pain., Disp: , Rfl:     valsartan (DIOVAN) 320 MG tablet, TAKE ONE TABLET BY MOUTH EVERY EVENING., Disp: 30 tablet, Rfl: 5  Past Medical History:   Diagnosis Date    Allergic rhinitis, seasonal     Arthritis     Glaucoma NEC     History of positive PPD - treated - remote past     Hyperlipidemia LDL goal < 100     Hypertension     Obesity     CHETNA (obstructive sleep apnea)     Type II or unspecified type diabetes mellitus without mention of complication, uncontrolled      Past Surgical History:   Procedure Laterality Date    CARPAL TUNNEL RELEASE       SECTION, CLASSIC      COLONOSCOPY N/A 2015    [facility] ([facility])    ESOPHAGOGASTRODUODENOSCOPY (EGD) N/A 2015    Performed [facility] at [facility] END[facility] (4TH FLR)    RELEASE-FINGER-TRIGGER left thumb Left 2014    Performed by [facility] at [facility] OR 1ST FLR    RELEASE-FINGER-TRIGGER, right ring Right 10/6/2014    [facility] "    TOTAL ABDOMINAL HYSTERECTOMY      TRIGGER FINGER RELEASE       Social History     Tobacco Use    Smoking status: Former Smoker     Packs/day: 0.50     Types: Cigarettes     Last attempt to quit: 11/10/2012     Years since quittin.9    Smokeless tobacco: Never Used   Substance Use Topics    Alcohol use: No    Drug use: No     Family History   Problem Relation Age of Onset    Hypertension Mother     Diabetes Mother     Heart failure Mother     Cataracts Mother     Prostate cancer Father     Hypertension Father     Diabetes Father     Heart failure Father     No Known Problems Sister     Alcohol abuse Maternal Aunt     Diabetes Maternal Uncle     Hyperlipidemia Maternal Uncle     Hypertension Maternal Uncle     Diabetes Maternal Grandmother     Diabetes Maternal Grandfather     No Known Problems Paternal Grandmother     No Known Problems Paternal Grandfather     Diabetes Maternal Aunt     Alzheimer's disease Maternal Aunt     Heart disease Cousin         s/p heart transplant    Amblyopia Neg Hx     Blindness Neg Hx     Cancer Neg Hx     Glaucoma Neg Hx     Macular degeneration Neg Hx     Retinal detachment Neg Hx     Strabismus Neg Hx     Stroke Neg Hx     Thyroid disease Neg Hx        Physical Exam:     There were no vitals filed for this visit.        General:   There is no height or weight on file to calculate BMI.  Patient is alert, awake and oriented to time, place and person. Mood and affect are appropriate.  Patient does not appear to be in any distress, denies any constitutional symptoms and appears stated age.   HEENT: Pupils are equal and round, sclera are not injected. External examination of ears and nose reveals no abnormalities. Cranial nerves II-X are grossly intact  Neck: examination demonstrates painless  active range of motion.   Skin: no rashes, abrasions or open wounds on the affected extremity   Resp: No respiratory distress or audible wheezing   CV: 2+   pulses, all extremities warm and well perfused   Left Shoulder:    Shoulder Range of Motion    Right     Left   (Active/Passive)       Forward Elevation     150/150            120/140  External rotation (arm at side)  40/40             40/40   Internal rotation behind the back  L5                L5     Range of motion is painful     Acromioclavicular joint is not tender  Crossbody test: negative    Neer's positive  Hawkin's positive    Annabelle's positive (Pain no weakness)  Drop arm negative  Belly press negative  External rotation lag sign negative  Internal rotation lag sign negative    Cuff Strength     Right     Left   Supraspinatus        5/5    5/5  Infraspinatus     5/5    5/5  Subscapularis     5/5    5/5    Deltoid testing            5/5    5/5    Speeds negative  Yergasons negative    right wrist and hand:  Well healed scar over volar wrist   No triggering of any digit  FROM all digits  negative Phalen's and Tinel's   Inspection does not demonstrate thenar atrophy   Neg finklestein test   No pain over first dorsal compartment   positive grind test  No z deformity   5/5 epl, io, fds, fdp of  all digits  ltsi m/u/r  2+ RP    left wrist and hand:  FROM all digits   No triggering of any digit   Patient is able to make a full fist  positive Tinel's  and Durken's compression test   Inspection does not demonstrate thenar atrophy   neg finklestein test   No pain over first dorsal compartment   negative grind test  No z deformity   5/5 epl, io, fds, fdp of  all digits   ltsi m/u/r  2+ RP    Imaging:   3 views of the left shoulder:  positive for degenerative changes of the AC joint. The humeral head is well centered on the AP and axillary views.  There is calcification at the rotator cuff insertion on the greater tuberosity consistent with calcific tendinitis. There is not significant degenerative change of the glenohumeral joint or posterior subluxation of the humeral head. No acute changes or fracture.   3 views of  the bilateral hands:  + mild joint space narrowing of the CMC joint with ulnar sided osteophyte, radial subluxation of the first metacarpal. No other acute changes     Assessment: 68 y.o. female with left shoulder calcific tendinitis, left carpal tunnel syndrome, right CMC arthritis       Plan:   - Mobic 7.5 mg PO QD x 2 weeks then PRN. The patient was advised that NSAID-type medications have two very important potential side effects: gastrointestinal irritation including hemorrhage and renal injuries. She was asked to take the medication with food and to stop if she experiences any GI upset. I asked her to call for vomiting, abdominal pain or black/bloody stools. The patient expresses understanding of these issues and questions were answered.  - Physical therapy referral for the shoulder, may benefit from injection in the future   - She is going to pay attention aggravating or relieving factors for right thumb pain -- if only with certain activities will stacey her a rigid brace, if no correlation will consider Comfort Cool brace.  - Night splint for left wrist.  - Return to clinic in 3 months. Return sooner if symptoms worsen or fail to improve.    All questions were answered in detail. The patient is in full agreement with the treatment plan and will proceed accordingly.    A note notifying Yudi Smart MD of my findings was sent via the electronic medical record

## 2018-10-09 NOTE — LETTER
October 15, 2018      Yudi Smart MD  [address]  [place] [address]           [facility]  [address] [place] [address]  Phone: [phone]          Patient: Cecilia Barahona   MR Number: [id number]   YOB: 1950   Date of Visit: 10/9/2018       Dear Dr. Yudi Smart:    Thank you for referring Cecilia Barahona to me for evaluation. Attached you will find relevant portions of my assessment and plan of care.    If you have questions, please do not hesitate to call me. I look forward to following Cecilia Barahona along with you.    Sincerely,    Brandy Ordonez MD    Enclosure  CC:  No Recipients    If you would like to receive this communication electronically, please contact [email or web address] or [phone] to request more information on [facility] access.    For providers and/or their staff who would like to refer a patient to [facility], please contact us through our one-stop-shop provider referral line, [facility], at [phone].    If you feel you have received this communication in error or would no longer like to receive these types of communications, please e-mail [email or web address]

## 2018-10-16 NOTE — PROGRESS NOTES
Last 5 Patient Entered Readings                                      Current 30 Day Average: 157/80     Recent Readings 10/16/2018 10/15/2018 10/14/2018 10/6/2018 10/3/2018    SBP (mmHg) 145 149 137 153 151    DBP (mmHg) 74 69 63 106 74    Pulse 73 70 83 77 79          I agree with the resident's assessment and plan for this patient.

## 2018-10-16 NOTE — PROGRESS NOTES
Last 5 Patient Entered Readings                                      Current 30 Day Average: 157/80     Recent Readings 10/16/2018 10/15/2018 10/14/2018 10/6/2018 10/3/2018    SBP (mmHg) 145 149 137 153 151    DBP (mmHg) 74 69 63 106 74    Pulse 73 70 83 77 79          Patient's BP average is above goal of <130/80.     Patient denies s/s of hypotension (lightheadedness, dizziness, nausea, fatigue) associated with low readings. Instructed patient to inform me if this occurs, patient confirms understanding.      Patient denies s/s of hypertension (SOB, CP, severe headaches, changes in vision) associated with high readings. Instructed patient to go to the ED if BP > 180/110 and accompanied by hypertensive s/s, patient confirms understanding.    Patient has my contact information and knows to call with any concerns or clinical changes.     Current HTN regimen:  Hypertension Medications             amLODIPine (NORVASC) 10 MG tablet TAKE ONE TABLET BY MOUTH EVERY DAY    chlorthalidone (HYGROTEN) 25 MG Tab Take 1 tablet (25 mg total) by mouth once daily.    valsartan (DIOVAN) 320 MG tablet TAKE ONE TABLET BY MOUTH EVERY EVENING.          Avg /80 on max amlodipine, chlorthalidone, and valsartan. Pt endorses adherence with medications. However pt reports that she has been taking her BP readings in the morning before she eats/before she takes her BP meds. Reviewed how to take BP measurements and requested that pt check BP about an hour after meds, and that she check her BP readings more frequently. Pt states that she will consider adding on a BP med next week if BP is still elevated on assessment. Consider adding coreg 6.25 BID if still uncontrolled at follow up next week. Also consider BMP since pt switched from HCTZ to chlorthalidone at beginning of month. Pt also complains of N/V since starting Ozempic. Advised to have smaller portion meals.

## 2018-10-22 ENCOUNTER — TELEPHONE (OUTPATIENT)
Dept: ENDOCRINOLOGY | Facility: CLINIC | Age: 68
End: 2018-10-22

## 2018-10-22 NOTE — TELEPHONE ENCOUNTER
----- Message from Jasmyn Pritchett sent at 10/22/2018 12:57 PM CDT -----  Contact: self - [phone]  Pt states she had glucose monitor on her arm but she knocked it off. She would like to know what she should do. Please call back at earliest convenience.

## 2018-10-22 NOTE — TELEPHONE ENCOUNTER
Called patient and informed her to contact [facility] and inform them her sensor fell off, and to go ahead and put a new sensor on. Patient verbalized understanding.

## 2018-10-23 ENCOUNTER — PATIENT OUTREACH (OUTPATIENT)
Dept: OTHER | Facility: OTHER | Age: 68
End: 2018-10-23

## 2018-10-23 NOTE — PROGRESS NOTES
Last 5 Patient Entered Readings                                      Current 30 Day Average: 151/77     Recent Readings 10/21/2018 10/19/2018 10/18/2018 10/17/2018 10/16/2018    SBP (mmHg) 153 154 134 143 145    DBP (mmHg) 73 72 69 66 74    Pulse 70 72 75 77 73          Patient's BP average is above goal of <130/80.     Patient denies s/s of hypotension (lightheadedness, dizziness, nausea, fatigue) associated with low readings. Instructed patient to inform me if this occurs, patient confirms understanding.      Patient denies s/s of hypertension (SOB, CP, severe headaches, changes in vision) associated with high readings. Instructed patient to go to the ED if BP > 180/110 and accompanied by hypertensive s/s, patient confirms understanding.    Patient has my contact information and knows to call with any concerns or clinical changes.     Current HTN regimen:  Hypertension Medications             amLODIPine (NORVASC) 10 MG tablet TAKE ONE TABLET BY MOUTH EVERY DAY    chlorthalidone (HYGROTEN) 25 MG Tab Take 1 tablet (25 mg total) by mouth once daily.    valsartan (DIOVAN) 320 MG tablet TAKE ONE TABLET BY MOUTH EVERY EVENING.        From last call on 10/16, pt had been advised to check BP after she had taken her medications as opposed to checking before her medications which she had been doing (her most recent office visit BP was 122/60, although previous visits were elevated in the upper 140s). Today pt reports that she had been checking her BP after medications, but only after 10 mins of taking them. Reiterated to pt to check BP about 1-2 hours from taking BP meds. Pt feels like it maybe her monitor, although she reports that she had it replaced for another one at the [facility]. Pt wants to check BP for another week before addition of a new medication, which she states she would be amenable to. Consider coreg 6.25 BID. Will follow up in one week.

## 2018-10-24 NOTE — PROGRESS NOTES
Last 5 Patient Entered Readings                                      Current 30 Day Average: 151/76     Recent Readings 10/24/2018 10/21/2018 10/19/2018 10/18/2018 10/17/2018    SBP (mmHg) 142 153 154 134 143    DBP (mmHg) 66 73 72 69 66    Pulse 77 70 72 75 77        I agree with the resident's assessment and plan for this patient.

## 2018-10-25 ENCOUNTER — PATIENT OUTREACH (OUTPATIENT)
Dept: OTHER | Facility: OTHER | Age: 68
End: 2018-10-25

## 2018-10-25 NOTE — LETTER
November 26, 2018     Cecilia Barahona  [address] [place] [address]       Dear Cecilia,    Thank you for enrolling in the [facility]. To participate in our programs, we ask that you submit weekly home readings through your [facility] account and maintain regular contact with your Care Team.  We have not received any data or heard from you in some time.     The Digital Medicine Care Team has attempted to reach you on multiple occasions to determine if you would like to continue participating in the program. While we encourage you to continue participating fully, we understand that circumstances may change.      To continue participating in the program, please contact me at . If we do not hear back, you will be un-enrolled and your physician will be notified of your decision.    We look forward to hearing from you soon.    Sincerely,     Rafa Vázquez  [facility]  [phone]

## 2018-10-25 NOTE — PROGRESS NOTES
Last 5 Patient Entered Readings                                      Current 30 Day Average: 150/75     Recent Readings 10/24/2018 10/21/2018 10/19/2018 10/18/2018 10/17/2018    SBP (mmHg) 142 153 154 134 143    DBP (mmHg) 66 73 72 69 66    Pulse 77 70 72 75 77          10/25: Pharmacist contacted patient this week.  Will push call back 2 weeks.  Patient on downward trend.

## 2018-10-29 DIAGNOSIS — E78.5 HYPERLIPIDEMIA LDL GOAL <70: ICD-10-CM

## 2018-10-29 RX ORDER — CHLORTHALIDONE 25 MG/1
TABLET ORAL
Qty: 30 TABLET | Refills: 1 | Status: SHIPPED | OUTPATIENT
Start: 2018-10-29 | End: 2019-01-09 | Stop reason: SDUPTHER

## 2018-10-29 RX ORDER — PRAVASTATIN SODIUM 40 MG/1
TABLET ORAL
Qty: 90 TABLET | Refills: 1 | Status: SHIPPED | OUTPATIENT
Start: 2018-10-29 | End: 2019-07-22 | Stop reason: SDUPTHER

## 2018-10-30 ENCOUNTER — PATIENT OUTREACH (OUTPATIENT)
Dept: OTHER | Facility: OTHER | Age: 68
End: 2018-10-30

## 2018-10-30 NOTE — PROGRESS NOTES
Last 5 Patient Entered Readings                                      Current 30 Day Average: 145/72     Recent Readings 10/30/2018 10/29/2018 10/27/2018 10/26/2018 10/26/2018    SBP (mmHg) 151 128 137 155 142    DBP (mmHg) 72 66 72 72 66    Pulse 79 67 76 70 80               Contacted pt, who was unavailable. Left message. Consider addition of carvedilol 6.25 mg BID for elevated BP.

## 2018-11-05 DIAGNOSIS — F32.0 MAJOR DEPRESSIVE DISORDER, SINGLE EPISODE, MILD: ICD-10-CM

## 2018-11-05 RX ORDER — BUPROPION HYDROCHLORIDE 75 MG/1
TABLET ORAL
Qty: 180 TABLET | Refills: 0 | Status: SHIPPED | OUTPATIENT
Start: 2018-11-05 | End: 2018-11-21 | Stop reason: SDUPTHER

## 2018-11-05 RX ORDER — INSULIN DETEMIR 100 [IU]/ML
INJECTION, SOLUTION SUBCUTANEOUS
Qty: 15 ML | Refills: 0 | Status: SHIPPED | OUTPATIENT
Start: 2018-11-05 | End: 2018-11-23

## 2018-11-05 NOTE — PROGRESS NOTES
Last 5 Patient Entered Readings                                      Current 30 Day Average: 141/70     Recent Readings 11/5/2018 11/3/2018 11/1/2018 11/1/2018 10/31/2018    SBP (mmHg) 137 142 134 137 124    DBP (mmHg) 65 73 61 69 67    Pulse 75 73 84 71 77          11/5: LVM. Will call back in 1 week.  Patient on downward trend.

## 2018-11-06 NOTE — PROGRESS NOTES
Last 5 Patient Entered Readings                                      Current 30 Day Average: 140/68     Recent Readings 11/5/2018 11/3/2018 11/1/2018 11/1/2018 10/31/2018    SBP (mmHg) 137 142 134 137 124    DBP (mmHg) 65 73 61 69 67    Pulse 75 73 84 71 77          Left voicemail and sent [facility] message. Consider initiating carvedilol 6.25 mg BID.

## 2018-11-12 NOTE — PROGRESS NOTES
Last 5 Patient Entered Readings                                      Current 30 Day Average: 140/69     Recent Readings 11/11/2018 11/11/2018 11/11/2018 11/9/2018 11/9/2018    SBP (mmHg) 134 147 160 138 135    DBP (mmHg) 65 66 79 64 72    Pulse 88 79 87 62 73        11/12: LVM.  Will send [employer].

## 2018-11-13 NOTE — PROGRESS NOTES
Last 5 Patient Entered Readings                                      Current 30 Day Average: 140/69     Recent Readings 11/12/2018 11/11/2018 11/11/2018 11/11/2018 11/9/2018    SBP (mmHg) 144 134 147 160 138    DBP (mmHg) 70 65 66 79 64    Pulse 83 88 79 87 62          Left voicemail. Will ask health  to proceed with non-compliance protocol on next unsuccessful outreach. Consider initiating carvedilol 6.25 mg BID. Review measurement technique due to variable readings.

## 2018-11-15 RX ORDER — SEMAGLUTIDE 1.34 MG/ML
INJECTION, SOLUTION SUBCUTANEOUS
Qty: 1.5 ML | OUTPATIENT
Start: 2018-11-15

## 2018-11-21 ENCOUNTER — TELEPHONE (OUTPATIENT)
Dept: ENDOCRINOLOGY | Facility: CLINIC | Age: 68
End: 2018-11-21

## 2018-11-21 DIAGNOSIS — F32.0 MAJOR DEPRESSIVE DISORDER, SINGLE EPISODE, MILD: ICD-10-CM

## 2018-11-21 DIAGNOSIS — I10 ESSENTIAL HYPERTENSION: ICD-10-CM

## 2018-11-21 RX ORDER — METFORMIN HYDROCHLORIDE 1000 MG/1
TABLET ORAL
Qty: 180 TABLET | Refills: 0 | Status: SHIPPED | OUTPATIENT
Start: 2018-11-21 | End: 2019-02-07 | Stop reason: SDUPTHER

## 2018-11-21 RX ORDER — AMLODIPINE BESYLATE 10 MG/1
TABLET ORAL
Qty: 30 TABLET | Refills: 4 | Status: SHIPPED | OUTPATIENT
Start: 2018-11-21 | End: 2019-04-24 | Stop reason: SDUPTHER

## 2018-11-21 RX ORDER — BUPROPION HYDROCHLORIDE 75 MG/1
TABLET ORAL
Qty: 180 TABLET | Refills: 0 | Status: SHIPPED | OUTPATIENT
Start: 2018-11-21 | End: 2019-01-24 | Stop reason: SDUPTHER

## 2018-11-21 RX ORDER — SEMAGLUTIDE 1.34 MG/ML
INJECTION, SOLUTION SUBCUTANEOUS
Qty: 1.5 ML | OUTPATIENT
Start: 2018-11-21

## 2018-11-21 NOTE — TELEPHONE ENCOUNTER
She has f/u appt later this week. I would like to refill ozempic at that time since we may adjust the dose.

## 2018-11-21 NOTE — TELEPHONE ENCOUNTER
Can we schedule her for eyecam at [facility] for when she sees Sindi in the near future?

## 2018-11-21 NOTE — TELEPHONE ENCOUNTER
Called patient and informed her that provider wanted to hold Rx for Ozempic until her visit on Fri incase of medication changes. Patient verbalized understanding.

## 2018-11-23 ENCOUNTER — OFFICE VISIT (OUTPATIENT)
Dept: ENDOCRINOLOGY | Facility: CLINIC | Age: 68
End: 2018-11-23
Payer: MEDICARE

## 2018-11-23 VITALS
SYSTOLIC BLOOD PRESSURE: 110 MMHG | HEART RATE: 74 BPM | HEIGHT: 63 IN | DIASTOLIC BLOOD PRESSURE: 67 MMHG | BODY MASS INDEX: 34.1 KG/M2 | WEIGHT: 192.44 LBS

## 2018-11-23 DIAGNOSIS — E66.9 NON MORBID OBESITY, UNSPECIFIED OBESITY TYPE: ICD-10-CM

## 2018-11-23 DIAGNOSIS — I10 ESSENTIAL HYPERTENSION: ICD-10-CM

## 2018-11-23 PROCEDURE — 3078F DIAST BP <80 MM HG: CPT | Mod: CPTII,S$GLB,, | Performed by: NURSE PRACTITIONER

## 2018-11-23 PROCEDURE — 3074F SYST BP LT 130 MM HG: CPT | Mod: CPTII,S$GLB,, | Performed by: NURSE PRACTITIONER

## 2018-11-23 PROCEDURE — 3045F PR MOST RECENT HEMOGLOBIN A1C LEVEL 7.0-9.0%: CPT | Mod: CPTII,S$GLB,, | Performed by: NURSE PRACTITIONER

## 2018-11-23 PROCEDURE — 99214 OFFICE O/P EST MOD 30 MIN: CPT | Mod: S$GLB,,, | Performed by: NURSE PRACTITIONER

## 2018-11-23 PROCEDURE — 1101F PT FALLS ASSESS-DOCD LE1/YR: CPT | Mod: CPTII,S$GLB,, | Performed by: NURSE PRACTITIONER

## 2018-11-23 PROCEDURE — 99999 PR PBB SHADOW E&M-EST. PATIENT-LVL V: CPT | Mod: PBBFAC,,, | Performed by: NURSE PRACTITIONER

## 2018-11-23 RX ORDER — INSULIN DEGLUDEC 200 U/ML
30 INJECTION, SOLUTION SUBCUTANEOUS DAILY
Qty: 3 SYRINGE | Refills: 2 | Status: SHIPPED | OUTPATIENT
Start: 2018-11-23 | End: 2019-02-07 | Stop reason: SDUPTHER

## 2018-11-23 NOTE — PROGRESS NOTES
CC: This 68 y.o. Black or  female  is here for evaluation of  T2DM along with comorbidities indicated in the Visit Diagnosis section of this encounter.    HPI: Cecilia Barahona was diagnosed with T2DM in her 30s. Oral agents started at the time of diagnosis.       Prior visit on 10/3/18  She has not been seen for over a year. Did not see dietician as advised.   a1c up to 8.6%.   Plan See Diabetes Educator/Registered Dietician for Medical Nutrition Therapy.   Start exercise routine.   Avoid beverages with sugar.   Stop Novolog - you haven't been taking it anyway.  CONTINUE metformin and Levemir at same doses.   Start Ozempic. Inject 0.25 mg once weekly for 4 weeks, then increase to 0.5 mg weekly.    Test blood sugar 2x/day - fasting and again before dinner/bedtime. Bring a written glucose log to every visit. -- she is interested in Freestyle Arvin. rx's sent.   Return to clinic in 6 weeks.     Interval history  She started Ozempic and felt nauseous and vomited even at 0.25 mg weekly. Injected 0.25 mg weekly x 4 weeks then increased to 0.5 mg weekly but felt very sick at this dose w/ worse n/v. So last week she went back to 0.25 mg. Has had no n/v in the last week.   She has lost 6 lb since lov.       PMHX: CHETNA - does not like her old mask.     LAST DIABETES EDUCATION: never    RECENT ILLNESSES/HOSPITALIZATIONS - -No.     HOSPITALIZED FOR DIABETES  -  No.    PRESCRIBED DIABETES MEDICATIONS:   Levemir Flextouch 38 units hs ~ 10 pm, metformin 1000 mg bid, Ozempic 0.25 mg once weekly      Misses medication doses     DM COMPLICATIONS: peripheral neuropathy    SIGNIFICANT DIABETES MED HISTORY:   Glimepiride stopped at initial ov 7/2017 since she was already on Novolog      SELF MONITORING BLOOD GLUCOSE: Checks blood glucose at home - 1-2x/day. See media for Freestyle Arvin report. She enjoys using the Arvin.     HYPOGLYCEMIC EPISODES: denies      CURRENT DIET: drinks water. No more sweet beverage. Eats 2  "small meals per day with a snack.     CURRENT EXERCISE: none       /67 (BP Location: Left arm, Patient Position: Sitting)   Pulse 74   Ht 5' 3" (1.6 m)   Wt 87.3 kg (192 lb 7.4 oz)   BMI 34.09 kg/m²       ROS:   CONSTITUTIONAL: Appetite good, denies fatigue  RESPIRATORY: No shortness of breath; + swanson   CARDIAC: No chest pain or palpitations      PHYSICAL EXAM:  GENERAL: Well developed, well nourished. No acute distress.   PSYCH: AAOx3, appropriate mood and affect, conversant, well-groomed. Judgement and insight good.   NEURO: Cranial nerves grossly intact. Speech clear, no tremor.   CHEST: Respirations even and unlabored.   MS: Gait steady. No clubbing.         Hemoglobin A1C   Date Value Ref Range Status   08/02/2018 8.6 (H) 4.0 - 5.6 % Final     Comment:     ADA Screening Guidelines:  5.7-6.4%  Consistent with prediabetes  >or=6.5%  Consistent with diabetes  High levels of fetal hemoglobin interfere with the HbA1C  assay. Heterozygous hemoglobin variants (HbS, HgC, etc)do  not significantly interfere with this assay.   However, presence of multiple variants may affect accuracy.     07/21/2017 7.9 (H) 4.0 - 5.6 % Final     Comment:     According to ADA guidelines, hemoglobin A1c <7.0% represents  optimal control in non-pregnant diabetic patients. Different  metrics may apply to specific patient populations.   Standards of Medical Care in Diabetes-2016.  For the purpose of screening for the presence of diabetes:  <5.7%     Consistent with the absence of diabetes  5.7-6.4%  Consistent with increasing risk for diabetes   (prediabetes)  >or=6.5%  Consistent with diabetes  Currently, no consensus exists for use of hemoglobin A1c  for diagnosis of diabetes for children.  This Hemoglobin A1c assay has significant interference with fetal   hemoglobin   (HbF). The results are invalid for patients with abnormal amounts of   HbF,   including those with known Hereditary Persistence   of Fetal Hemoglobin. Heterozygous " hemoglobin variants (HbAS, HbAC,   HbAD, HbAE, HbA2) do not significantly interfere with this assay;   however, presence of multiple variants in a sample may impact the %   interference.     03/10/2017 8.3 (H) 4.5 - 6.2 % Final     Comment:     According to ADA guidelines, hemoglobin A1C <7.0% represents  optimal control in non-pregnant diabetic patients.  Different  metrics may apply to specific populations.   Standards of Medical Care in Diabetes - 2016.  For the purpose of screening for the presence of diabetes:  <5.7%     Consistent with the absence of diabetes  5.7-6.4%  Consistent with increasing risk for diabetes   (prediabetes)  >or=6.5%  Consistent with diabetes  Currently no consensus exists for use of hemoglobin A1C  for diagnosis of diabetes for children.             Chemistry        Component Value Date/Time     08/02/2018 1124    K 3.8 08/02/2018 1124     08/02/2018 1124    CO2 28 08/02/2018 1124    BUN 19 08/02/2018 1124    CREATININE 0.8 08/02/2018 1124    GLU 91 08/02/2018 1124        Component Value Date/Time    CALCIUM 9.4 08/02/2018 1124    ALKPHOS 99 08/02/2018 1124    AST 25 08/02/2018 1124    ALT 24 08/02/2018 1124    BILITOT 0.5 08/02/2018 1124    ESTGFRAFRICA >60.0 08/02/2018 1124    EGFRNONAA >60.0 08/02/2018 1124          Lab Results   Component Value Date    LDLCALC 88.8 08/02/2018        Ref. Range 8/2/2018 11:24   Cholesterol Latest Ref Range: 120 - 199 mg/dL 155   HDL Latest Ref Range: 40 - 75 mg/dL 42   LDL Cholesterol Latest Ref Range: 63.0 - 159.0 mg/dL 88.8   Total Cholesterol/HDL Ratio Latest Ref Range: 2.0 - 5.0  3.7   Triglycerides Latest Ref Range: 30 - 150 mg/dL 121     Lab Results   Component Value Date    MICALBCREAT 20.8 08/21/2018         STANDARDS of CARE:        ASA:               Last eye exam:       ASSESSMENT and PLAN:    A1C GOAL: < 7 %     1. Type 2 diabetes, uncontrolled, with neuropathy  Decrease insulin from 38 to 30  Units once daily.   Finish out  Levemir and then switch to Tresiba at the same dose/time.     Continue metformin.     Continue Ozempic at 0.25 mg weekly. Can increase it back up to 0.5 mg after 3-4 weeks. But please reduce back to 0.25 mg if having upset stomach, nausea, vomiting.     If blood sugars are still lower than 80, please reduce insulin dose from 30 to 26 units once daily.   If blood sugars are still lower than 80 at 26 units dose, please call office.      new reader for 14 day sensors. Prescriptions have been sent.   Monitor blood sugar every 8 hours with the Freestyle in order to get a continuous glucose reading.     Start exercise.     Return to clinic in 2 months with labs prior.     Hemoglobin A1c    Basic metabolic panel   2. Essential hypertension  Controlled    3. Non morbid obesity, unspecified obesity type  Weight loss noted. Advised her to start exercise.        Orders Placed This Encounter   Procedures    Hemoglobin A1c     Standing Status:   Future     Standing Expiration Date:   1/22/2020    Basic metabolic panel     Standing Status:   Future     Standing Expiration Date:   1/22/2020        Follow-up in about 2 months (around 1/23/2019).

## 2018-11-23 NOTE — PATIENT INSTRUCTIONS
Decrease insulin from 38 to 30  Units once daily.   Finish out Levemir and then switch to Tresiba at the same dose/time.     Continue metformin.     Continue Ozempic at 0.25 mg weekly. Can increase it back up to 0.5 mg after 3-4 weeks. But please reduce back to 0.25 mg if having upset stomach, nausea, vomiting.     If blood sugars are still lower than 80, please reduce insulin dose from 30 to 26 units once daily.   If blood sugars are still lower than 80 at 26 units dose, please call office.      new reader for 14 day sensors. Prescriptions have been sent.   Monitor blood sugar every 8 hours with the Freestyle in order to get a continuous glucose reading.     Start exercise.     Return to clinic in 2 months with labs prior.

## 2018-11-26 ENCOUNTER — PATIENT MESSAGE (OUTPATIENT)
Dept: ENDOCRINOLOGY | Facility: CLINIC | Age: 68
End: 2018-11-26

## 2018-11-26 ENCOUNTER — TELEPHONE (OUTPATIENT)
Dept: ENDOCRINOLOGY | Facility: CLINIC | Age: 68
End: 2018-11-26

## 2018-11-26 NOTE — PROGRESS NOTES
Last 5 Patient Entered Readings                                      Current 30 Day Average: 137/69     Recent Readings 11/24/2018 11/16/2018 11/16/2018 11/14/2018 11/12/2018    SBP (mmHg) 142 140 137 144 144    DBP (mmHg) 70 68 70 69 70    Pulse 76 70 72 73 83          11/26: LVM.  Sent noncompliance.  Will call in 2 weeks.

## 2018-11-26 NOTE — TELEPHONE ENCOUNTER
----- Message from Hudson Paris sent at 11/26/2018  2:25 PM CST -----  Contact: Yolie [phone]  Yolie with [facility], called to speak to the staff. They would like to get a new Rx for the  10 day sensors, for the patient, since that is the machine that she has. Please call at your earliest convenience.

## 2018-11-26 NOTE — TELEPHONE ENCOUNTER
Called pharmacy and informed them that we are trying to get the patient to transition over to the 14-day sensors and reader, and that a message was left for the patient to discuss this further. Pharmacy verbalized understanding.

## 2018-11-27 ENCOUNTER — TELEPHONE (OUTPATIENT)
Dept: ENDOCRINOLOGY | Facility: CLINIC | Age: 68
End: 2018-11-27

## 2018-11-27 NOTE — TELEPHONE ENCOUNTER
----- Message from Hailee Saavedra sent at 11/27/2018  8:12 AM CST -----  Contact: Self/ [phone]  Pt returning call to office. Thank you.

## 2018-11-27 NOTE — PROGRESS NOTES
Last 5 Patient Entered Readings                                      Current 30 Day Average: 137/69     Recent Readings 11/24/2018 11/16/2018 11/16/2018 11/14/2018 11/12/2018    SBP (mmHg) 142 140 137 144 144    DBP (mmHg) 70 68 70 69 70    Pulse 76 70 72 73 83          Closing encounter. Health  following non-compliance protocol. If patient remains in program, consider initiating carvedilol 6.25 mg BID. Review measurement technique due to variable readings.

## 2018-11-27 NOTE — TELEPHONE ENCOUNTER
----- Message from Jasmyn Pritchett sent at 11/27/2018 10:49 AM CST -----  Contact: self - [phone]  Pt returned staff's call. She states she wants to let Lexi know that she is in the waiting room waiting for her.

## 2018-12-04 NOTE — TELEPHONE ENCOUNTER
Eye cam schedule not available yet for appointment    Reminder placed to schedule patient when available

## 2018-12-10 NOTE — PROGRESS NOTES
Last 5 Patient Entered Readings                                      Current 30 Day Average: 143/70     Recent Readings 12/9/2018 12/8/2018 12/8/2018 11/24/2018 11/16/2018    SBP (mmHg) 144 156 120 142 140    DBP (mmHg) 64 80 69 70 68    Pulse 69 41 67 76 70          12/10: LVM.  Will call in 1 week.

## 2018-12-14 ENCOUNTER — CLINICAL SUPPORT (OUTPATIENT)
Dept: REHABILITATION | Facility: HOSPITAL | Age: 68
End: 2018-12-14
Payer: MEDICARE

## 2018-12-14 DIAGNOSIS — M62.81 MUSCLE WEAKNESS: ICD-10-CM

## 2018-12-14 DIAGNOSIS — R29.3 POSTURE ABNORMALITY: ICD-10-CM

## 2018-12-14 DIAGNOSIS — M54.2 CERVICAL PAIN: Primary | ICD-10-CM

## 2018-12-14 DIAGNOSIS — Z74.09 IMPAIRED MOBILITY: ICD-10-CM

## 2018-12-14 PROCEDURE — G8985 CARRY GOAL STATUS: HCPCS | Mod: CK,PN

## 2018-12-14 PROCEDURE — 97110 THERAPEUTIC EXERCISES: CPT | Mod: PN

## 2018-12-14 PROCEDURE — G8984 CARRY CURRENT STATUS: HCPCS | Mod: CK,PN

## 2018-12-14 PROCEDURE — 97161 PT EVAL LOW COMPLEX 20 MIN: CPT | Mod: PN

## 2018-12-14 NOTE — PROGRESS NOTES
See full Physical Therapy Evaluation in Plan of Care    Precautions: standard    Evaluation Date: 12/14/18  Visit # authorized: 1  Authorization period: 12/14/18 to 1/2/2020    TREATMENT:  Cecilia received therapeutic exercises to develop strength and endurance, flexibility for 15 minutes including:  Scapular retractions 20 reps  Upper trap stretch 20 sec x 3  Levator scap stretch 20 sec x 3  Shoulder extensions  20 reps    Prognosis: Fair    Anticipated barriers to physical therapy: Chronicity of condition, sedentary lifestyle    Medical necessity is demonstrated by the following IMPAIRMENTS/PROBLEM LIST:   1) Increase in pain level limiting function   2) muscular weakness   3) decreased cervical ROM   4) abnormal posture   5) Lack of HEP    GOALS: Short Term Goals:  4 weeks  1. Patient will report a decrease in cervical pain  <   / =  5/10 at worst to increase tolerance for daily activities and improved sleep.  2. Patient will be able to demonstrate an increase of at least 10 degrees of pain free L SB motion in cervical region to improve mobility.   3. Patient will be able to tolerate HEP to improve ROM and independence with ADL's.  4. Patient will be able to demonstrate proper resting and activity posture to improve shoulder mechanics with activity.     Long Term Goals: 8 weeks  1. Patient will report a decrease in shoulder, cervical and hand pain  <   / =  2/10 at worse to increase tolerance for walking and ADL activities.  2. Patient will be able to increase MMT to at least 4-/5 in B posterior shoulder girdle muscles  to increase tolerance for functional activities and hobbies.  3. Patient will report at least 50 percent improvement in her sleeping ability to improve quality of life.  4. Patient will be Independent with her finalized HEP to improve ROM and sleeping patterns.      Plan   Cont PT 1-3 times a week for 12 weeks during the certification period (12/14/18 - 3/14/18) PN Due: (1/14/19)

## 2018-12-14 NOTE — PLAN OF CARE
Physical Therapy Evaluation    Name: Cecilia Barahona  [facility] Number: [id number]    Diagnosis:   Encounter Diagnoses   Name Primary?    Cervical pain Yes    Muscle weakness     Posture abnormality     Impaired mobility      Physician: Brandy Ordonez MD  Treatment Orders: PT Eval and Treat    Past Medical History:   Diagnosis Date    Allergic rhinitis, seasonal     Arthritis     Glaucoma NEC     History of positive PPD - treated - remote past     Hyperlipidemia LDL goal < 100     Hypertension     Obesity     CHETNA (obstructive sleep apnea)     Type II or unspecified type diabetes mellitus without mention of complication, uncontrolled      Current Outpatient Medications   Medication Sig    albuterol 90 mcg/actuation inhaler Inhale 2 puffs into the lungs every 6 (six) hours as needed for Wheezing or Shortness of Breath. Rescue    alcohol swabs PadM Apply 1 each topically 3 (three) times daily.    amLODIPine (NORVASC) 10 MG tablet TAKE ONE TABLET BY MOUTH EVERY DAY    artificial tear ointment (ARTIFICIAL TEARS) Oint Place into both eyes every evening.    aspirin 81 MG Chew Take by mouth once daily. 1 tablet, chewable Oral Every day    azelastine (OPTIVAR) 0.05 % ophthalmic solution INSTILL 1 DROP IN EACH EYE TWICE DAILY    blood glucose control high,low (ACCU-CHEK BRIDGETTE CONTROL SOLN) Soln 1 kit by Misc.(Non-Drug; Combo Route) route once daily.    blood glucose control, low (EMBRACE GLUCOSE CONTROL LOW) Soln Inject 1 Bottle into the skin every 30 days.    blood pressure monitor (BLOOD PRESSURE KIT) Kit 1 kit by Misc.(Non-Drug; Combo Route) route once daily.    blood sugar diagnostic Strp Check blood glucose 4 times a day. Dispense accuchek meter or other meter preferred by insurance. Dx code e11.65    blood-glucose meter kit ispense accuchek meter or other meter preferred by insurance. Dx code e11.65    buPROPion (WELLBUTRIN) 75 MG tablet TAKE TWO TABLETS BY MOUTH TWICE DAILY    chlorthalidone  (HYGROTEN) 25 MG Tab TAKE ONE TABLET BY MOUTH once DAILY    desoximetasone (TOPICORT) 0.05 % cream Apply topically 2 (two) times daily as needed.    diabetic supplies, miscellan. Kit Please dispense one 14 day FreeStyle Arvin [place]    diabetic supplies, miscellan. Kit Please change sensor every 14 days. FreeStyle Arvin Sensor 30-day supply (2 sensors/month)    flash glucose scanning reader (FREESTYLE ARVIN READER) Misc 1 Units by Misc.(Non-Drug; Combo Route) route before meals as needed.    flash glucose sensor (FREESTYLE ARVIN SENSOR) Kit 1 Units by Misc.(Non-Drug; Combo Route) route every meal as needed.    fluticasone (FLONASE) 50 mcg/actuation nasal spray 1 spray (50 mcg total) by Each Nare route once daily.    gabapentin (NEURONTIN) 300 MG capsule Take 1 capsule (300 mg total) by mouth every evening.    ibuprofen (ADVIL,MOTRIN) 800 MG tablet Take 1 tablet (800 mg total) by mouth every 8 (eight) hours as needed for Pain.    insulin degludec (TRESIBA FLEXTOUCH U-200) 200 unit/mL (3 mL) InPn Inject 30 Units into the skin once daily.    lancets Misc Check blood glucose 4 times a day. Dispense accuchek meter or other meter preferred by insurance. Dx code e11.65    LANCING DEVICE WITH LANCETS Misc USE AS DIRECTED FOR DIABETIC TESTING    latanoprost 0.005 % ophthalmic solution Place 1 drop into both eyes every evening.    levocetirizine (XYZAL) 5 MG tablet Take 1 tablet (5 mg total) by mouth nightly as needed for Allergies.    loratadine (CLARITIN) 10 mg tablet Take 1 tablet (10 mg total) by mouth daily as needed for Allergies.    meloxicam (MOBIC) 7.5 MG tablet Take 1 tablet (7.5 mg total) by mouth once daily. Take once a day for two weeks then as needed. Take with food    metFORMIN (GLUCOPHAGE) 1000 MG tablet TAKE ONE TABLET BY MOUTH TWICE DAILY WITH FOOD    multivitamin with minerals tablet Take 1 tablet by mouth once daily.    naproxen (NAPROSYN) 500 MG tablet Take 500 mg by mouth 2 (two) times  "daily as needed.    pen needle, diabetic (BD ULTRA-FINE LENY PEN NEEDLES) 32 gauge x 5/32" Ndle USE FIVE TIMES DAILY    pravastatin (PRAVACHOL) 40 MG tablet TAKE ONE TABLET BY MOUTH EVERY DAY    rOPINIRole (REQUIP) 0.5 MG tablet ropinirole 0.5 mg tablet    semaglutide (OZEMPIC) 0.25 mg or 0.5 mg(2 mg/1.5 mL) PnIj Inject 0.5 mg into the skin every 7 days.    tramadol (ULTRAM) 50 mg tablet Take 50 mg by mouth every 6 (six) hours as needed for Pain.    valsartan (DIOVAN) 320 MG tablet TAKE ONE TABLET BY MOUTH EVERY EVENING.     No current facility-administered medications for this visit.      Review of patient's allergies indicates:   Allergen Reactions    Ace inhibitors Other (See Comments)     cough    Invokana [canagliflozin] Other (See Comments)     Throat and tongue swelling     Precautions: standard    Evaluation Date: 12/14/18  Visit # authorized: 1  Authorization period: 12/14/18 to 1/2/2020    Bre Brooks is a 68 y.o. female that presents to [facility] outpatient clinic secondary to B shoulder pain with reported B hand numbness that started about 6 months ago.  Left pain greater than right.  She reports an insidious onset.  She reports the pain is worst when she lies on her side.  She reports no accidents or falls.      Patient c/o: continuous/intermittent symptoms  Radicular symptoms: present  Onset:: insidious  Pain Scale: Cecilia rates pain on a scale of 0-10 to be 9 at worst; 4 currently; 4 at best .  Aggravating factors: sleeping on her side  Dizziness/HA/blurred vision/B&B/ringing in ears: none reported  Relieving factors: repositioning   Previous treatment/Past surgical history: none  Imaging: per x-ray in October 2018, no acute abnormality.  Calcific tendinitis at the left shoulder similar to prior exam.  Functional deficits: sleeping.  Reports she is waking 3 times a night due to pain.  Occupation: retired  No cultural or spiritual barriers identified to treatment or learning.  Patient's " goals: to get a good nights sleep      Objective     Observation: None    Posture: Slight rounded shoulder and forward head posture    Cervical Range of Motion:    Degrees Pain   Flexion WFL      Extension WFL      Right Side Bending 20 present     Left Side Bending 30      Right Rotation WFL    Left Rotation WFL       Shoulder Range of Motion:   Shoulder Left Right   Flexion WFL WFL   Abduction WFL WFL   ER Mildly limited WFL   IR WFL WFL   Shoulder ROM is grossly equal bilaterally with minor limitation noted on L ER.    Upper Extremity Strength   (R) UE  (L) UE   Shoulder flexion: 5/5 Shoulder flexion: 5/5   Shoulder Abduction: 5/5 Shoulder abduction: 5/5   Shoulder ER 4/5 Shoulder ER 4/5   Shoulder IR 5/5 Shoulder IR 5/5   Elbow flexion: 5/5 Elbow flexion: 5/5   Elbow extension: 5/5 Elbow extension: 5/5   Wrist extension: 5/5 Wrist extension: 5/5   Lower Trap 3+/5 Lower Trap 3+/5   Middle Trap 3+/5 Middle Trap 3+/5   Rhomboids 3+/5 Rhomboids 3+/5       Special Tests:  Distraction negative   Compression negative   Spurlings NT   Sharp-Sherice NT   VA test NT     Upper Limb Neurodynamic testing:   Right Left   UNT negative negative   MNT negative postive   RNT negative positive       Joint Mobility: decreased PA articular mobility noted at C5-C7    Thoracic mobility: decreased grossly in upper thoracic region    Palpation: hypertonus noted of her cervical paraspinals and B upper traps.. L>R    Sensation: intact    Flexibility: mild decreased pectoral length    Functional Limitations Reports - G Codes  Category: Carrying, moving & handling objects functional limitation  Intake 55% Current Status CK    Predicted 41% Goal Status+ CK     PT Evaluation Completed? Yes  Discussed Plan of Care with patient: Yes    TREATMENT:  Cecilia received therapeutic exercises to develop strength and endurance, flexibility for 15 minutes including:  Scapular retractions 20 reps  Upper trap stretch 20 sec x 3  Levator scap  stretch 20 sec x 3  Shoulder extensions  20 reps    Cecilia  received the following manual therapy techniques x 00 min.    HEP provided: See patient instructions  Instructed patient regarding: Proper technique with all exercises. Patient demonstrated good understanding of the education provided. Cecilia demonstrated good return demonstration of activities.      Assessment     This is a 68 y.o. female referred to outpatient physical therapy and presents with a medical diagnosis of calcific tendinitis of the L shoulder and demonstrates limitations as described in the problem list. Patient demonstrates decreased articular mobility in the lower cervical region, increased muscular tightness in her cervical and periscapular region, possible neural tension on the L, and postural deficits which will benefit from PT.  Patient will benefit from physcial therapy services in order to maximize pain free functional independence. The following goals were discussed with the patient and patient is in agreement with them as to be addressed in the treatment plan. Patient was given a HEP consisting of exercises above. Patient verbally understood the instructions as they were given and demonstrated proper form and technique during therapy. Patient was advised to perform these exercises free of pain, and to stop performing them if pain occurs. Patient would benefit from skilled PT to address above stated problems, as well as, achieve pt goals within a timely manner. Patient has set realistic goals and has verbalized good understanding and agreement with reported diagnosis, prognosis and treatment. Patient demonstrates no additional cultural, spiritual or educational need and currently has no barriers to learning.      History  Co-morbidities and personal factors that may impact the plan of care Examination  Body Structures and Functions, activity limitations and participation restrictions that may impact the plan of care Clinical  Presentation   Decision Making/ Complexity Score   Co-morbidities:   Allergic rhinitis, seasonal   Arthritis   Glaucoma NEC   History of positive PPD - treated - remote past   Hyperlipidemia LDL goal < 100   Hypertension   Obesity   CHETNA (obstructive sleep apnea)   Type II or unspecified type diabetes mellitus without mention of complication, uncontrolled         Personal Factors:   Age 68  Occupation: retired  Lifestyle: sedentary   Attitudes: pleasant   Body Regions: cervical, thoracic, and upper extremities    Body Systems: Musculoskeletal (symmetry, ROM, strength, flexibility, joint mobility), Neuromuscular (coordination, posture, balance, gait, transfers, motor control/learning), Cardiovascular (endurance)    Activity limitations: sleeping    Participation Restrictions: none noted   Stable clinical presentation with stable clinical characteristics    Pain: worst 9/10, current 4/10, best 4/10   Complexity:  low    Functional Outcome measure  FOTO limitation: 55% disability         Prognosis: Fair    Anticipated barriers to physical therapy: Chronicity of condition, sedentary lifestyle    Medical necessity is demonstrated by the following IMPAIRMENTS/PROBLEM LIST:   1) Increase in pain level limiting function   2) muscular weakness   3) decreased cervical ROM   4) abnormal posture   5) Lack of HEP    GOALS: Short Term Goals:  4 weeks  1. Patient will report a decrease in cervical pain  <   / =  5/10 at worst to increase tolerance for daily activities and improved sleep.  2. Patient will be able to demonstrate an increase of at least 10 degrees of pain free L SB motion in cervical region to improve mobility.   3. Patient will be able to tolerate HEP to improve ROM and independence with ADL's.  4. Patient will be able to demonstrate proper resting and activity posture to improve shoulder mechanics with activity.     Long Term Goals: 8 weeks  1. Patient will report a decrease in shoulder, cervical and hand pain  <    / =  2/10 at worse to increase tolerance for walking and ADL activities.  2. Patient will be able to increase MMT to at least 4-/5 in B posterior shoulder girdle muscles  to increase tolerance for functional activities and hobbies.  3. Patient will report at least 50 percent improvement in her sleeping ability to improve quality of life.  4. Patient will be Independent with her finalized HEP to improve ROM and sleeping patterns.      Plan     Patient will be treated by physical therapy 1-3 times a week for 12 weeks for Electrical Stimulation PRN, Iontophoresis (with dexamethasone PRN), Manual Therapy, Moist Heat/ Ice, Neuromuscular Re-ed, Patient Education, Therapeutic Activites, Therapeutic Exercise and Other therapeutic taping, dry needling, aquatic therapy to achieve established goals. Cecilia may at times be seen by a PTA as part of the Rehab Team.      Cont PT 1-3 times a week for 12 weeks during the certification period (12/14/18 - 3/14/18) PN Due: (1/14/19)       I certify the need for these services furnished under this plan of treatment and while under my care.______________________________ Physician/Referring Practitioner  Date of Signature      Max Sena, PT  12/14/2018

## 2018-12-17 ENCOUNTER — TELEPHONE (OUTPATIENT)
Dept: FAMILY MEDICINE | Facility: CLINIC | Age: 68
End: 2018-12-17

## 2018-12-17 NOTE — PROGRESS NOTES
Last 5 Patient Entered Readings                                      Current 30 Day Average: 139/68     Recent Readings 12/17/2018 12/16/2018 12/16/2018 12/15/2018 12/14/2018    SBP (mmHg) 131 144 150 126 137    DBP (mmHg) 64 70 68 63 65    Pulse 71 82 68 70 74          Digital Medicine: Health  Follow Up    Lifestyle Modifications:    1.Dietary Modifications (Sodium intake <2,000mg/day, food labels, dining out): Patient reports she has not been eating due to feeling nausea with ozempic.  States she has contacted her Endo's office.    2.Physical Activity: deferred    3.Medication Therapy: Patient has been compliant with the medication regimen.    4.Patient has the following medication side effects/concerns: feeling nauseous and has lost weight with ozempic.  Has not been able to digest anything.   (Frequency/Alleviating factors/Precipitating factors, etc.)     Follow up with Mrs. Cecilia Correa Jun completed. No further questions or concerns. Will continue to follow up to achieve health goals.    Patient feels her BP is looking better because she is losing weight and not eating due to ozempic.

## 2018-12-17 NOTE — TELEPHONE ENCOUNTER
Spoke with patient and she said that she has been having vomiting, constipation, and weight loss since she started the medication Ozempic but she tolerated due to it was helping her loss weight but she said that it has been 2 months now and the symptoms are getting worse.

## 2018-12-17 NOTE — PROGRESS NOTES
Last 5 Patient Entered Readings                                      Current 30 Day Average: 139/68     Recent Readings 12/17/2018 12/16/2018 12/16/2018 12/15/2018 12/14/2018    SBP (mmHg) 131 144 150 126 137    DBP (mmHg) 64 70 68 63 65    Pulse 71 82 68 70 74        12/17: RCB in 15min.

## 2018-12-17 NOTE — TELEPHONE ENCOUNTER
----- Message from Odette Maria sent at 12/17/2018  9:46 AM CST -----  Contact: Self  Patient called because listed medication is causing her to vomit, constipation, and weight lost. Please call to advise at [phone]          semaglutide (OZEMPIC) 0.25 mg or 0.5 mg(2 mg/1.5 mL) PnIj 1

## 2018-12-17 NOTE — TELEPHONE ENCOUNTER
Spoke to pt.  She has been taking ozempic 0.25 mg once weekly and yesterday was her last injection. However, she did take 0.5 mg last week.   Advised pt to stop Ozempic.   When symptoms resolve - start Trulicity 0.75 mg once weekly for 4 weeks. Then, if no side effects, increase to Trulicity 1.5 mg once weekly.   Call with any issues. She voiced understanding.

## 2018-12-18 NOTE — PROGRESS NOTES
Last 5 Patient Entered Readings                                      Current 30 Day Average: 139/68     Recent Readings 12/17/2018 12/16/2018 12/16/2018 12/15/2018 12/14/2018    SBP (mmHg) 131 144 150 126 137    DBP (mmHg) 64 70 68 63 65    Pulse 71 82 68 70 74          BP improving. Health  spoke with patient 12/17. Patient's comments reviewed, majority of recent elevated readings taken before meds, and one was her 's. Consider initiating carvedilol 6.25 mg BID if BP does not continue to improve.

## 2018-12-20 ENCOUNTER — CLINICAL SUPPORT (OUTPATIENT)
Dept: REHABILITATION | Facility: HOSPITAL | Age: 68
End: 2018-12-20
Payer: MEDICARE

## 2018-12-20 DIAGNOSIS — M54.2 CERVICAL PAIN: ICD-10-CM

## 2018-12-20 DIAGNOSIS — R29.3 POSTURE ABNORMALITY: ICD-10-CM

## 2018-12-20 DIAGNOSIS — Z74.09 IMPAIRED MOBILITY: ICD-10-CM

## 2018-12-20 PROCEDURE — 97110 THERAPEUTIC EXERCISES: CPT | Mod: PN

## 2018-12-20 NOTE — PROGRESS NOTES
Physical Therapy Daily Note   Name: Cecilia Barahona  Clinic Number: [id number]    Diagnosis:   Encounter Diagnoses   Name Primary?    Posture abnormality     Impaired mobility     Cervical pain      Physician: Brandy Ordonez MD  Treatment Orders: PT Eval and Treat    Precautions: Standard   Visit #: 2 of 20  G-code reported: Initial Evaluation (Visit #1)   Certification period: (12/14/18 - 3/14/18) PN Due: (1/14/19)      Subjective   Cecilia reports: that she has been doing her exercises at home. Patient reports that she is doing okay today.     Pain Scale:  6/102      Objective     Time In: 1308  Time Out:: 1408  Total Treatment time: 60 minutes (1:1 with PT for 60' of treatment session)     Patient began exercise program on UBE  for 6 minutes. (forward/backward)     Patient performed the following therapeutic exercises for 54 minutes in order to strengthen and improve endurance on para scapular region bilateral:  Scapular retractions 20 reps  Upper trap stretch 20 sec x 3  Levator scap stretch 20 sec x 3  Shoulder extensions  20 reps  +Standing rows 2 x 15 reps c green TB   +Seated median nerve flossing 10 reps 5 sec   +Standing radial flossing 10 reps 5 sec   Towel roll    Scapular retractions c red TB   Shoulder dowel flexion 2 x 15 reps    Shoulder protraction flexion 2 x 15 reps    Snow angels (next visit)   Supine chin tucks 20 reps 5 sec     Written Home Exercises Provided: Patient educated to continue with previously issued HEP in order to complement therapy sessions.   Exercises were reviewed and Cecilia was able to demonstrate them prior to the end of the session. Patient received a written copy of exercises to perform at home. Cecilia demonstrated good  understanding of the education provided.     Assessment     Cecilia is progressing well towards her goals. Patient demonstrates accuracy in her HEP exercises demonstrated and performed in clinic today.  Patient unable to tolerate addition of foam roll. Patient not able to lay supine without rolling. Towel roll worked better for this patient. Patient will continue to benefit from skilled PT services in order to address limitations present in problem list and education on proper advancement of HEP.       Pt's spiritual, cultural and educational needs considered and pt agreeable to plan of care and goals.    Prognosis: Fair    Anticipated barriers to physical therapy: Chronicity of condition, sedentary lifestyle    Medical necessity is demonstrated by the following IMPAIRMENTS/PROBLEM LIST:   1) Increase in pain level limiting function   2) muscular weakness   3) decreased cervical ROM   4) abnormal posture   5) Lack of HEP    GOALS: Short Term Goals:  4 weeks  1. Patient will report a decrease in cervical pain  <   / =  5/10 at worst to increase tolerance for daily activities and improved sleep.  2. Patient will be able to demonstrate an increase of at least 10 degrees of pain free L SB motion in cervical region to improve mobility.   3. Patient will be able to tolerate HEP to improve ROM and independence with ADL's.  4. Patient will be able to demonstrate proper resting and activity posture to improve shoulder mechanics with activity.     Long Term Goals: 8 weeks  1. Patient will report a decrease in shoulder, cervical and hand pain  <   / =  2/10 at worse to increase tolerance for walking and ADL activities.  2. Patient will be able to increase MMT to at least 4-/5 in B posterior shoulder girdle muscles  to increase tolerance for functional activities and hobbies.  3. Patient will report at least 50 percent improvement in her sleeping ability to improve quality of life.  4. Patient will be Independent with her finalized HEP to improve ROM and sleeping patterns.      Plan   Continue with established Plan of Care towards PT goals. Cont PT 1-3 times a week for 12 weeks during the certification period (12/14/18 -  3/14/18) PN Due: (1/14/19)           Max Sena, PT  12/21/2018

## 2018-12-28 ENCOUNTER — DOCUMENTATION ONLY (OUTPATIENT)
Dept: REHABILITATION | Facility: HOSPITAL | Age: 68
End: 2018-12-28

## 2018-12-28 NOTE — PROGRESS NOTES
Physical Therapy: No show/Cancellation of Visit  Date: 12/28/2018    Patient was a no show to today's PT appointment. Patient's next scheduled appointment is 1/2/2019.     Cancel: 0  No show: 2    Therapist: Cyn Holcomb PTA

## 2019-01-03 ENCOUNTER — CLINICAL SUPPORT (OUTPATIENT)
Dept: REHABILITATION | Facility: HOSPITAL | Age: 69
End: 2019-01-03
Payer: MEDICARE

## 2019-01-03 DIAGNOSIS — Z74.09 IMPAIRED MOBILITY: ICD-10-CM

## 2019-01-03 DIAGNOSIS — R29.3 POSTURE ABNORMALITY: ICD-10-CM

## 2019-01-03 DIAGNOSIS — M54.2 CERVICAL PAIN: ICD-10-CM

## 2019-01-03 PROCEDURE — 97110 THERAPEUTIC EXERCISES: CPT | Mod: PN

## 2019-01-03 NOTE — PROGRESS NOTES
Physical Therapy Daily Note   Name: Cecilia Barahona  [facility] Number: [id number]    Diagnosis:   Encounter Diagnoses   Name Primary?    Posture abnormality     Impaired mobility     Cervical pain      Physician: Brandy Ordonez MD  Treatment Orders: PT Eval and Treat    Precautions: Standard   Visit #: 3 of 20  G-code reported: Initial Evaluation (Visit #1)   Certification period: (12/14/18 - 3/14/18) PN Due: (1/14/19)      Subjective   Cecilia reports: that she is feeling shifting in her left shoulder. Patient states the numbness has gone away some. Patient reports that she feels it very little now     Pain Scale:  6/102      Objective     Time In: 0835  Time Out::0930  Total Treatment time: 55 minutes (1:1 with PT for 30' of treatment session)     Patient began exercise program on UBE  for 6 minutes. (forward/backward)     Patient performed the following therapeutic exercises for 54 minutes in order to strengthen and improve endurance on para scapular region bilateral:  Corner wall stretch 5 x 20 sec   Scapular retractions 20 reps  Upper trap stretch 20 sec x 3  Levator scap stretch 20 sec x 3  Shoulder extensions  2 x 15 reps c green   Standing rows 2 x 15 reps c green TB   Seated median nerve flossing 15 x 2   Standing radial flossing 15 x 2   Towel roll    No moneys c red TB   Shoulder dowel flexion 2 x 15 reps    Shoulder protraction flexion 2 x 15 reps    Snow angels (next visit)   Supine chin tucks 20 reps 5 sec     Written Home Exercises Provided: Patient educated to continue with previously issued HEP in order to complement therapy sessions.   Exercises were reviewed and Cecilia was able to demonstrate them prior to the end of the session. Patient received a written copy of exercises to perform at home. Cecilia demonstrated good  understanding of the education provided.     Assessment     Cecilia is progressing well towards her goals. Patient had poor  tolerance with supine dowel flexion exercise. Patient was encouraged to try to not go into pain with this exercise. Patient did well with the addition of new exercises to increase the strength in her left shoulder. Patient will continue to benefit from skilled PT services in order to address limitations present in problem list and education on proper advancement of HEP.     Patient was reassessed during today's treatment session and presents with improvements in pain symptoms and neurological symptoms. Patient will also benefit from additional activities that promote greater ROM of the cervical region and improve the stability of the left shoulder.       Pt's spiritual, cultural and educational needs considered and pt agreeable to plan of care and goals.    Prognosis: Fair    Anticipated barriers to physical therapy: Chronicity of condition, sedentary lifestyle    Medical necessity is demonstrated by the following IMPAIRMENTS/PROBLEM LIST:   1) Increase in pain level limiting function   2) muscular weakness   3) decreased cervical ROM   4) abnormal posture   5) Lack of HEP    GOALS: Short Term Goals:  4 weeks  1. Patient will report a decrease in cervical pain  <   / =  5/10 at worst to increase tolerance for daily activities and improved sleep. - MET (1/3/2019)   2. Patient will be able to demonstrate an increase of at least 10 degrees of pain free L SB motion in cervical region to improve mobility.  - IN progress   3. Patient will be able to tolerate HEP to improve ROM and independence with ADL's. - MET (1/3/2019)  4. Patient will be able to demonstrate proper resting and activity posture to improve shoulder mechanics with activity. - MET (1/3/3019)     Long Term Goals: 8 weeks  1. Patient will report a decrease in shoulder, cervical and hand pain  <   / =  2/10 at worse to increase tolerance for walking and ADL activities.  2. Patient will be able to increase MMT to at least 4-/5 in B posterior shoulder girdle  muscles  to increase tolerance for functional activities and hobbies.  3. Patient will report at least 50 percent improvement in her sleeping ability to improve quality of life.  4. Patient will be Independent with her finalized HEP to improve ROM and sleeping patterns.      Plan   Continue with established Plan of Care towards PT goals. Cont PT 1-3 times a week for 12 weeks during the certification period (12/14/18 - 3/14/18) PN Due: (2/3/19)           Max Sena, PT  1/3/2019

## 2019-01-08 ENCOUNTER — PATIENT OUTREACH (OUTPATIENT)
Dept: OTHER | Facility: OTHER | Age: 69
End: 2019-01-08

## 2019-01-08 NOTE — PROGRESS NOTES
Last 5 Patient Entered Readings                                      Current 30 Day Average: 138/68     Recent Readings 1/7/2019 1/6/2019 1/6/2019 1/4/2019 1/3/2019    SBP (mmHg) 149 138 98 126 150    DBP (mmHg) 65 65 83 75 66    Pulse 66 69 119 71 73          HPI:  Called patient to follow up. Patient endorses adherence to medication regimen. She states she continues to take BP readings prior to medications most days.    Patient denies hypotensive s/sx (lightheadedness, dizziness, nausea, fatigue); patient denies hypertensive s/sx (SOB, CP, severe headaches, changes in vision, dizziness, fatigue, confusion, anxiety, nosebleeds).    Assessment:  Reviewed recent readings. Per 2017 ACC/ AHA HTN guidelines (goal of BP < 130/80), current 30-day average needs to be addressed more thoroughly today. Majority of BP readings are taken prior to medication dose. Despite this timing, patient has submitted several at goal BP readings.     Plan:  Continue current medication regimen. Stressed timing of BP measurement an hour or more after medications, patient verbalized understanding. Monitor for hypotension. I will continue to monitor regularly and will follow-up in 3 to 4 weeks, sooner if blood pressure begins to trend upward or downward.     Current medication regimen:  Hypertension Medications             amLODIPine (NORVASC) 10 MG tablet TAKE ONE TABLET BY MOUTH EVERY DAY    chlorthalidone (HYGROTEN) 25 MG Tab TAKE ONE TABLET BY MOUTH once DAILY    valsartan (DIOVAN) 320 MG tablet TAKE ONE TABLET BY MOUTH EVERY EVENING.          Patient denies having questions or concerns. Patient has my contact information and knows to call with any concerns or clinical changes.

## 2019-01-09 ENCOUNTER — OFFICE VISIT (OUTPATIENT)
Dept: ORTHOPEDICS | Facility: CLINIC | Age: 69
End: 2019-01-09
Payer: MEDICARE

## 2019-01-09 VITALS
SYSTOLIC BLOOD PRESSURE: 120 MMHG | HEART RATE: 66 BPM | BODY MASS INDEX: 34.14 KG/M2 | WEIGHT: 192.69 LBS | DIASTOLIC BLOOD PRESSURE: 60 MMHG | HEIGHT: 63 IN

## 2019-01-09 DIAGNOSIS — M75.32 CALCIFIC TENDINITIS OF LEFT SHOULDER: ICD-10-CM

## 2019-01-09 DIAGNOSIS — G56.02 CARPAL TUNNEL SYNDROME OF LEFT WRIST: Primary | ICD-10-CM

## 2019-01-09 PROCEDURE — 99213 PR OFFICE/OUTPT VISIT, EST, LEVL III, 20-29 MIN: ICD-10-PCS | Mod: S$GLB,,, | Performed by: ORTHOPAEDIC SURGERY

## 2019-01-09 PROCEDURE — 3074F SYST BP LT 130 MM HG: CPT | Mod: CPTII,S$GLB,, | Performed by: ORTHOPAEDIC SURGERY

## 2019-01-09 PROCEDURE — 3078F PR MOST RECENT DIASTOLIC BLOOD PRESSURE < 80 MM HG: ICD-10-PCS | Mod: CPTII,S$GLB,, | Performed by: ORTHOPAEDIC SURGERY

## 2019-01-09 PROCEDURE — 99213 OFFICE O/P EST LOW 20 MIN: CPT | Mod: S$GLB,,, | Performed by: ORTHOPAEDIC SURGERY

## 2019-01-09 PROCEDURE — 3078F DIAST BP <80 MM HG: CPT | Mod: CPTII,S$GLB,, | Performed by: ORTHOPAEDIC SURGERY

## 2019-01-09 PROCEDURE — 3074F PR MOST RECENT SYSTOLIC BLOOD PRESSURE < 130 MM HG: ICD-10-PCS | Mod: CPTII,S$GLB,, | Performed by: ORTHOPAEDIC SURGERY

## 2019-01-09 PROCEDURE — 1101F PT FALLS ASSESS-DOCD LE1/YR: CPT | Mod: CPTII,S$GLB,, | Performed by: ORTHOPAEDIC SURGERY

## 2019-01-09 PROCEDURE — 99999 PR PBB SHADOW E&M-EST. PATIENT-LVL IV: ICD-10-PCS | Mod: PBBFAC,,, | Performed by: ORTHOPAEDIC SURGERY

## 2019-01-09 PROCEDURE — 1101F PR PT FALLS ASSESS DOC 0-1 FALLS W/OUT INJ PAST YR: ICD-10-PCS | Mod: CPTII,S$GLB,, | Performed by: ORTHOPAEDIC SURGERY

## 2019-01-09 PROCEDURE — 99999 PR PBB SHADOW E&M-EST. PATIENT-LVL IV: CPT | Mod: PBBFAC,,, | Performed by: ORTHOPAEDIC SURGERY

## 2019-01-09 RX ORDER — CHLORTHALIDONE 25 MG/1
TABLET ORAL
Qty: 30 TABLET | Refills: 0 | Status: SHIPPED | OUTPATIENT
Start: 2019-01-09 | End: 2019-02-22 | Stop reason: SDUPTHER

## 2019-01-09 NOTE — PROGRESS NOTES
"Follow up visit    History of Present Illness:   Cecilia comes to the office for follow up evaluation of left shoulder calcific tendinitis and left carpal tunnel syndrome.  Pain in the shoulder and hand has improved.  She does report occasional sharp, "excruciating" pain in the shoulder with forward elevation beyond 160 degrees and occasionally at night when she lays on the left side.  She gets numbness in the left hand when she lays on the left side as well.  She is not complaining of right hand pain today.    ROS: unremarkable and no change since last visit    Physical Examination:    NAD  Left shoulder:  ROM: , ER 40  RC strength: good, no pain with jobes  + pain with ojeda  NV status: Unchanged    Radiographic imaging: no new imaging     Assessment/Plan:  Left shoulder calcific tendinitis   Left CTS    Patient is improving with conservative management.  1. Oral antiinflammatory agents (Aleve/Advil/Tylenol-OTC, prn if tolerated)     2. Activity modification. Patient will continue with activity modification till the pain is controlled and then gradually introduce strenous activities.  3. Physical therapy: continue  4. Patient declined injection as her pain has improved  5. Return for follow up visit: PRN    All questions were answered in detail. The patient  verbalized the understanding of the treatment plan and is in full agreement with the treatment plan.    "

## 2019-01-11 ENCOUNTER — DOCUMENTATION ONLY (OUTPATIENT)
Dept: REHABILITATION | Facility: HOSPITAL | Age: 69
End: 2019-01-11

## 2019-01-11 NOTE — PROGRESS NOTES
Missed Visit/Cancellation     Date: 1/11/19    Canceled Number: 2  No Show: 2             Pt initially had visit scheduled for today at 1200.  Pt called office and cancelled appointment.

## 2019-01-15 ENCOUNTER — CLINICAL SUPPORT (OUTPATIENT)
Dept: REHABILITATION | Facility: HOSPITAL | Age: 69
End: 2019-01-15
Payer: MEDICARE

## 2019-01-15 ENCOUNTER — OFFICE VISIT (OUTPATIENT)
Dept: ORTHOPEDICS | Facility: CLINIC | Age: 69
End: 2019-01-15
Payer: MEDICARE

## 2019-01-15 VITALS
BODY MASS INDEX: 33.83 KG/M2 | DIASTOLIC BLOOD PRESSURE: 60 MMHG | WEIGHT: 190.94 LBS | HEIGHT: 63 IN | HEART RATE: 58 BPM | SYSTOLIC BLOOD PRESSURE: 120 MMHG

## 2019-01-15 DIAGNOSIS — M25.551 RIGHT HIP PAIN: ICD-10-CM

## 2019-01-15 DIAGNOSIS — R29.3 POSTURE ABNORMALITY: ICD-10-CM

## 2019-01-15 DIAGNOSIS — Z74.09 IMPAIRED MOBILITY: ICD-10-CM

## 2019-01-15 DIAGNOSIS — M54.2 CERVICAL PAIN: ICD-10-CM

## 2019-01-15 DIAGNOSIS — M70.61 GREATER TROCHANTERIC BURSITIS OF RIGHT HIP: Primary | ICD-10-CM

## 2019-01-15 PROCEDURE — 3078F PR MOST RECENT DIASTOLIC BLOOD PRESSURE < 80 MM HG: ICD-10-PCS | Mod: CPTII,S$GLB,, | Performed by: ORTHOPAEDIC SURGERY

## 2019-01-15 PROCEDURE — 99214 PR OFFICE/OUTPT VISIT, EST, LEVL IV, 30-39 MIN: ICD-10-PCS | Mod: 25,S$GLB,, | Performed by: ORTHOPAEDIC SURGERY

## 2019-01-15 PROCEDURE — 20610 DRAIN/INJ JOINT/BURSA W/O US: CPT | Mod: RT,S$GLB,, | Performed by: ORTHOPAEDIC SURGERY

## 2019-01-15 PROCEDURE — 99999 PR PBB SHADOW E&M-EST. PATIENT-LVL V: CPT | Mod: PBBFAC,,, | Performed by: ORTHOPAEDIC SURGERY

## 2019-01-15 PROCEDURE — 97110 THERAPEUTIC EXERCISES: CPT | Mod: PN

## 2019-01-15 PROCEDURE — 3074F SYST BP LT 130 MM HG: CPT | Mod: CPTII,S$GLB,, | Performed by: ORTHOPAEDIC SURGERY

## 2019-01-15 PROCEDURE — 99999 PR PBB SHADOW E&M-EST. PATIENT-LVL V: ICD-10-PCS | Mod: PBBFAC,,, | Performed by: ORTHOPAEDIC SURGERY

## 2019-01-15 PROCEDURE — 3074F PR MOST RECENT SYSTOLIC BLOOD PRESSURE < 130 MM HG: ICD-10-PCS | Mod: CPTII,S$GLB,, | Performed by: ORTHOPAEDIC SURGERY

## 2019-01-15 PROCEDURE — 3078F DIAST BP <80 MM HG: CPT | Mod: CPTII,S$GLB,, | Performed by: ORTHOPAEDIC SURGERY

## 2019-01-15 PROCEDURE — 1101F PT FALLS ASSESS-DOCD LE1/YR: CPT | Mod: CPTII,S$GLB,, | Performed by: ORTHOPAEDIC SURGERY

## 2019-01-15 PROCEDURE — 99214 OFFICE O/P EST MOD 30 MIN: CPT | Mod: 25,S$GLB,, | Performed by: ORTHOPAEDIC SURGERY

## 2019-01-15 PROCEDURE — 20610 LARGE JOINT ASPIRATION/INJECTION: R GREATER TROCHANTERIC BURSA: ICD-10-PCS | Mod: RT,S$GLB,, | Performed by: ORTHOPAEDIC SURGERY

## 2019-01-15 PROCEDURE — 1101F PR PT FALLS ASSESS DOC 0-1 FALLS W/OUT INJ PAST YR: ICD-10-PCS | Mod: CPTII,S$GLB,, | Performed by: ORTHOPAEDIC SURGERY

## 2019-01-15 RX ADMIN — TRIAMCINOLONE ACETONIDE 40 MG: 40 INJECTION, SUSPENSION INTRA-ARTICULAR; INTRAMUSCULAR at 11:01

## 2019-01-15 NOTE — PROGRESS NOTES
Physical Therapy Daily Note   Name: Cecilia Barahona  Clinic Number: [id number]    Diagnosis:   Encounter Diagnoses   Name Primary?    Posture abnormality     Impaired mobility     Cervical pain      Physician: Brandy Ordonez MD  Treatment Orders: PT Eval and Treat    Precautions: Standard   Visit #: 4 of 20  G-code reported: Initial Evaluation (Visit #1)   Certification period: (12/14/18 - 3/14/18) PN Due: (2/03/2019)      Subjective   Cecilia reports: that she is still just having problems with lifting her right arm.     Pain Scale:  6/10      Objective     Time In: 0930  Time Out :1030  Total Treatment time: 55 minutes (1:1 with PT for 30' of treatment session)     Patient began exercise program on UBE  for 6 minutes. (forward/backward)     Patient performed the following therapeutic exercises for 54 minutes in order to strengthen and improve endurance on para scapular region bilateral:  Corner wall stretch 5 x 20 sec   Shoulder extensions  2 x 15 reps c green   Standing rows 2 x 15 reps c green TB   Supine dowel flexion c white dowel 20 reps 3 sec hold  +Prone shoulder flexion 3 x 10   +Prone horizontal abduction neutral 3 x 10   +Prone row 3 x 10 c 3#  +Prone shoulder extension 3 x 10 reps   +Standing shoulder abduction 3 x 10 reps c yellow TB  +Standing shoulder scaption 3 x 10 reps c yellow TB   +Standing shoulder abduction 3 x 10 reps c yellow TB      Not performed today:  Seated median nerve flossing 15 x 2   Standing radial flossing 15 x 2   Scapular retractions 20 reps  Upper trap stretch 20 sec x 3  Levator scap stretch 20 sec x 3  Towel roll    No moneys c red TB   Shoulder dowel flexion 2 x 15 reps    Shoulder protraction flexion 2 x 15 reps    Snow angels (next visit)   Supine chin tucks 20 reps 5 sec     Written Home Exercises Provided: Patient educated to continue with previously issued HEP in order to complement therapy sessions.   Exercises  were reviewed and Cecilia was able to demonstrate them prior to the end of the session. Patient received a written copy of exercises to perform at home. Cecilia demonstrated good  understanding of the education provided.     Assessment     Cecilia is progressing well towards her goals. Patient did well with the addition of new exercises performed during today. Patient demonstrated greater tolerance to shoulder flexion at end of session today, than she did last treatment session. Patient will be progressed as tolerated to increase strength in ability to lift arm. Patient will continue to benefit from skilled PT services in order to address limitations present in problem list and education on proper advancement of HEP.     Pt's spiritual, cultural and educational needs considered and pt agreeable to plan of care and goals.     Prognosis: Fair    Anticipated barriers to physical therapy: Chronicity of condition, sedentary lifestyle    Medical necessity is demonstrated by the following IMPAIRMENTS/PROBLEM LIST:   1) Increase in pain level limiting function   2) muscular weakness   3) decreased cervical ROM   4) abnormal posture   5) Lack of HEP    GOALS: Short Term Goals:  4 weeks  1. Patient will report a decrease in cervical pain  <   / =  5/10 at worst to increase tolerance for daily activities and improved sleep. - MET (1/3/2019)   2. Patient will be able to demonstrate an increase of at least 10 degrees of pain free L SB motion in cervical region to improve mobility.  - IN progress   3. Patient will be able to tolerate HEP to improve ROM and independence with ADL's. - MET (1/3/2019)  4. Patient will be able to demonstrate proper resting and activity posture to improve shoulder mechanics with activity. - MET (1/3/3019)     Long Term Goals: 8 weeks  1. Patient will report a decrease in shoulder, cervical and hand pain  <   / =  2/10 at worse to increase tolerance for walking and ADL activities.  2. Patient will be able to  increase MMT to at least 4-/5 in B posterior shoulder girdle muscles  to increase tolerance for functional activities and hobbies.  3. Patient will report at least 50 percent improvement in her sleeping ability to improve quality of life.  4. Patient will be Independent with her finalized HEP to improve ROM and sleeping patterns.      Plan   Continue with established Plan of Care towards PT goals. Cont PT 1-3 times a week for 12 weeks during the certification period (12/14/18 - 3/14/18) PN Due: (2/3/19)           Max Sena, PT  1/15/2019

## 2019-01-15 NOTE — PATIENT INSTRUCTIONS
Your right hip was injected with two medications today: a numbing medicine (lidocaine) and a corticosteroid (kenalog).  The numbing medicine works immediately and works for a few hours. The steroid medication takes 2-3 days to work.   You may notice that your pain returns or even worsens after the numbing medicine wears off.    If pain worsens, treat with ice 30 minutes on and 10 minutes off the area, over the counter or prescription anti-inflammatories and rest.    Call for any redness, fevers, chills or inability to move the joint. Call the office if pain does not improve in 2-3 days.

## 2019-01-15 NOTE — PROGRESS NOTES
Follow up visit    History of Present Illness:   Cecilia comes to the office for follow up evaluation of right hip pain.  She states it was bothering her occasionally at the time of her last visit but has become much more bothersome over the last week. She denies back pain, numbness and paresthesias. The pain is localizes to the lateral hip and does not radiate down the leg. She denies groin pain.     ROS: unremarkable and no change since last visit    Physical Examination:    NAD  RLE:  + TTP over greater trochanter   Full ROM of hip without pain -- flexion to 110, ER 30, IR 40  The leg is non tender and not swollen  2+ DP  ltsi s/s/sp/dp/t  + ehl/fhl/ta/gs    Radiographic imaging: 3 views right hip: Minimal degenerative changes of the hip joint    Assessment/Plan:  1. Right greater trochanteric bursitis     We discussed the etiology of persistent pain and further treatment options.  1. Resume mobic for 2 weeks  2. Injection R hip, see procedure note   3. Ortho info hip burditis and stretching handouts  4. Will add Hip to current PT if she does not see improvement with stretches   5. RTC 3 months     All questions were answered in detail. The patient  verbalized the understanding of the treatment plan and is in full agreement with the treatment plan.

## 2019-01-15 NOTE — PROCEDURES
Large Joint Aspiration/Injection: R greater trochanteric bursa  Date/Time: 1/15/2019 11:40 AM  Performed by: Brandy Ordonez MD  Authorized by: Brandy Ordonez MD     Consent Done?:  Yes (Verbal)  Indications:  Pain  Timeout: Prior to procedure the correct patient, procedure, and site was verified      Location:  Hip  Site:  R greater trochanteric bursa  Prep: Patient was prepped and draped in usual sterile fashion    Ultrasonic Guidance for needle placement: No  Needle size:  22 G  Approach:  Lateral  Medications:  40 mg triamcinolone acetonide 40 mg/mL  Patient tolerance:  Patient tolerated the procedure well with no immediate complications

## 2019-01-16 ENCOUNTER — PATIENT OUTREACH (OUTPATIENT)
Dept: OTHER | Facility: OTHER | Age: 69
End: 2019-01-16

## 2019-01-16 PROBLEM — M70.61 GREATER TROCHANTERIC BURSITIS OF RIGHT HIP: Status: ACTIVE | Noted: 2019-01-16

## 2019-01-16 RX ORDER — TRIAMCINOLONE ACETONIDE 40 MG/ML
40 INJECTION, SUSPENSION INTRA-ARTICULAR; INTRAMUSCULAR
Status: DISCONTINUED | OUTPATIENT
Start: 2019-01-15 | End: 2019-01-16 | Stop reason: HOSPADM

## 2019-01-16 NOTE — PROGRESS NOTES
Last 5 Patient Entered Readings                                      Current 30 Day Average: 137/68     Recent Readings 1/7/2019 1/6/2019 1/6/2019 1/4/2019 1/3/2019    SBP (mmHg) 149 138 98 126 150    DBP (mmHg) 65 65 83 75 66    Pulse 66 69 119 71 73          1/16: LVM.  Will call in 1 week.

## 2019-01-17 ENCOUNTER — PES CALL (OUTPATIENT)
Dept: ADMINISTRATIVE | Facility: CLINIC | Age: 69
End: 2019-01-17

## 2019-01-22 ENCOUNTER — DOCUMENTATION ONLY (OUTPATIENT)
Dept: REHABILITATION | Facility: HOSPITAL | Age: 69
End: 2019-01-22

## 2019-01-22 NOTE — PROGRESS NOTES
Missed Visit/Cancellation     Date: 1/22/19    Canceled Number: 4  No Show: 2             Pt initially had visit scheduled for today at 1200.  Pt called office and cancelled appointment 2/2 to not being able to make it.

## 2019-01-23 NOTE — PROGRESS NOTES
"Last 5 Patient Entered Readings                                      Current 30 Day Average: 135/69     Recent Readings 1/23/2019 1/7/2019 1/6/2019 1/6/2019 1/4/2019    SBP (mmHg) 120 149 138 98 126    DBP (mmHg) 80 65 65 83 75    Pulse 80 66 69 119 71          1/23: Patient states she is back in town and is trying to connect her BP cuff to her phone.  States it just keeps "going around and around."  States the reading sent today of 120/80 mmHg was not accurate.  Placing task for tech team.  "

## 2019-01-23 NOTE — PROGRESS NOTES
Last 5 Patient Entered Readings                                      Current 30 Day Average: 137/69     Recent Readings 1/7/2019 1/6/2019 1/6/2019 1/4/2019 1/3/2019    SBP (mmHg) 149 138 98 126 150    DBP (mmHg) 65 65 83 75 66    Pulse 66 69 119 71 73          1/23: LVM.  Reminded patient to send readings.  Sent [email or web address].

## 2019-01-24 DIAGNOSIS — F32.0 MAJOR DEPRESSIVE DISORDER, SINGLE EPISODE, MILD: ICD-10-CM

## 2019-01-24 RX ORDER — BUPROPION HYDROCHLORIDE 75 MG/1
TABLET ORAL
Qty: 180 TABLET | Refills: 0 | Status: SHIPPED | OUTPATIENT
Start: 2019-01-24 | End: 2019-02-22 | Stop reason: SDUPTHER

## 2019-01-25 ENCOUNTER — DOCUMENTATION ONLY (OUTPATIENT)
Dept: REHABILITATION | Facility: HOSPITAL | Age: 69
End: 2019-01-25

## 2019-01-25 NOTE — PROGRESS NOTES
Missed Visit/Cancellation     Date: 1/25/19    Canceled Number: 4  No Show Number: 3             Pt initially had visit scheduled for today at 1200.  Pt with NS visit today.  Pt's next scheduled visit is 1/29 at 1330.

## 2019-01-30 ENCOUNTER — TELEPHONE (OUTPATIENT)
Dept: FAMILY MEDICINE | Facility: CLINIC | Age: 69
End: 2019-01-30

## 2019-01-30 NOTE — TELEPHONE ENCOUNTER
----- Message from Yudi Smart MD sent at 1/29/2019 11:18 AM CST -----      ----- Message -----  From: Yudi Smart MD  Sent: 1/24/2019   4:10 PM  To: [facility] Staff    Please call patient to address HM.

## 2019-02-04 DIAGNOSIS — H40.1132 PRIMARY OPEN-ANGLE GLAUCOMA, BILATERAL, MODERATE STAGE: Primary | ICD-10-CM

## 2019-02-04 NOTE — PROGRESS NOTES
Last 5 Patient Entered Readings                                      Current 30 Day Average: 135/74     Recent Readings 2/4/2019 2/4/2019 2/3/2019 2/3/2019 2/3/2019    SBP (mmHg) 126 129 135 142 132    DBP (mmHg) 70 58 88 96 69    Pulse 75 69 84 86 76          BP average improving. No medication recommendations at this time.

## 2019-02-05 ENCOUNTER — LAB VISIT (OUTPATIENT)
Dept: LAB | Facility: HOSPITAL | Age: 69
End: 2019-02-05
Attending: NURSE PRACTITIONER
Payer: MEDICARE

## 2019-02-05 LAB
ANION GAP SERPL CALC-SCNC: 10 MMOL/L
BUN SERPL-MCNC: 18 MG/DL
CALCIUM SERPL-MCNC: 9.7 MG/DL
CHLORIDE SERPL-SCNC: 102 MMOL/L
CO2 SERPL-SCNC: 28 MMOL/L
CREAT SERPL-MCNC: 0.9 MG/DL
EST. GFR  (AFRICAN AMERICAN): >60 ML/MIN/1.73 M^2
EST. GFR  (NON AFRICAN AMERICAN): >60 ML/MIN/1.73 M^2
ESTIMATED AVG GLUCOSE: 163 MG/DL
GLUCOSE SERPL-MCNC: 89 MG/DL
HBA1C MFR BLD HPLC: 7.3 %
POTASSIUM SERPL-SCNC: 3.7 MMOL/L
SODIUM SERPL-SCNC: 140 MMOL/L

## 2019-02-05 PROCEDURE — 80048 BASIC METABOLIC PNL TOTAL CA: CPT

## 2019-02-05 PROCEDURE — 36415 COLL VENOUS BLD VENIPUNCTURE: CPT | Mod: PN

## 2019-02-05 PROCEDURE — 83036 HEMOGLOBIN GLYCOSYLATED A1C: CPT

## 2019-02-07 ENCOUNTER — OFFICE VISIT (OUTPATIENT)
Dept: ENDOCRINOLOGY | Facility: CLINIC | Age: 69
End: 2019-02-07
Payer: MEDICARE

## 2019-02-07 VITALS
HEART RATE: 70 BPM | BODY MASS INDEX: 33.2 KG/M2 | HEIGHT: 63 IN | DIASTOLIC BLOOD PRESSURE: 69 MMHG | WEIGHT: 187.38 LBS | SYSTOLIC BLOOD PRESSURE: 124 MMHG

## 2019-02-07 DIAGNOSIS — E78.5 HYPERLIPIDEMIA, UNSPECIFIED HYPERLIPIDEMIA TYPE: ICD-10-CM

## 2019-02-07 DIAGNOSIS — I10 ESSENTIAL HYPERTENSION: ICD-10-CM

## 2019-02-07 DIAGNOSIS — E66.9 NON MORBID OBESITY, UNSPECIFIED OBESITY TYPE: ICD-10-CM

## 2019-02-07 PROCEDURE — 1101F PT FALLS ASSESS-DOCD LE1/YR: CPT | Mod: CPTII,S$GLB,, | Performed by: NURSE PRACTITIONER

## 2019-02-07 PROCEDURE — 3045F PR MOST RECENT HEMOGLOBIN A1C LEVEL 7.0-9.0%: ICD-10-PCS | Mod: CPTII,S$GLB,, | Performed by: NURSE PRACTITIONER

## 2019-02-07 PROCEDURE — 99499 RISK ADDL DX/OHS AUDIT: ICD-10-PCS | Mod: HCNC,S$GLB,, | Performed by: NURSE PRACTITIONER

## 2019-02-07 PROCEDURE — 99214 PR OFFICE/OUTPT VISIT, EST, LEVL IV, 30-39 MIN: ICD-10-PCS | Mod: S$GLB,,, | Performed by: NURSE PRACTITIONER

## 2019-02-07 PROCEDURE — 3078F PR MOST RECENT DIASTOLIC BLOOD PRESSURE < 80 MM HG: ICD-10-PCS | Mod: CPTII,S$GLB,, | Performed by: NURSE PRACTITIONER

## 2019-02-07 PROCEDURE — 99999 PR PBB SHADOW E&M-EST. PATIENT-LVL V: CPT | Mod: PBBFAC,,, | Performed by: NURSE PRACTITIONER

## 2019-02-07 PROCEDURE — 99999 PR PBB SHADOW E&M-EST. PATIENT-LVL V: ICD-10-PCS | Mod: PBBFAC,,, | Performed by: NURSE PRACTITIONER

## 2019-02-07 PROCEDURE — 1101F PR PT FALLS ASSESS DOC 0-1 FALLS W/OUT INJ PAST YR: ICD-10-PCS | Mod: CPTII,S$GLB,, | Performed by: NURSE PRACTITIONER

## 2019-02-07 PROCEDURE — 3074F SYST BP LT 130 MM HG: CPT | Mod: CPTII,S$GLB,, | Performed by: NURSE PRACTITIONER

## 2019-02-07 PROCEDURE — 3045F PR MOST RECENT HEMOGLOBIN A1C LEVEL 7.0-9.0%: CPT | Mod: CPTII,S$GLB,, | Performed by: NURSE PRACTITIONER

## 2019-02-07 PROCEDURE — 99214 OFFICE O/P EST MOD 30 MIN: CPT | Mod: S$GLB,,, | Performed by: NURSE PRACTITIONER

## 2019-02-07 PROCEDURE — 3078F DIAST BP <80 MM HG: CPT | Mod: CPTII,S$GLB,, | Performed by: NURSE PRACTITIONER

## 2019-02-07 PROCEDURE — 3074F PR MOST RECENT SYSTOLIC BLOOD PRESSURE < 130 MM HG: ICD-10-PCS | Mod: CPTII,S$GLB,, | Performed by: NURSE PRACTITIONER

## 2019-02-07 PROCEDURE — 99499 UNLISTED E&M SERVICE: CPT | Mod: HCNC,S$GLB,, | Performed by: NURSE PRACTITIONER

## 2019-02-07 RX ORDER — METFORMIN HYDROCHLORIDE 1000 MG/1
1000 TABLET ORAL 2 TIMES DAILY WITH MEALS
Qty: 180 TABLET | Refills: 1 | Status: SHIPPED | OUTPATIENT
Start: 2019-02-07 | End: 2019-10-30 | Stop reason: SDUPTHER

## 2019-02-07 RX ORDER — INSULIN DEGLUDEC 200 U/ML
30 INJECTION, SOLUTION SUBCUTANEOUS DAILY
Qty: 3 SYRINGE | Refills: 5 | Status: SHIPPED | OUTPATIENT
Start: 2019-02-07 | End: 2019-07-10 | Stop reason: SDUPTHER

## 2019-02-07 NOTE — PROGRESS NOTES
CC: This 68 y.o. Black or  female  is here for evaluation of  T2DM along with comorbidities indicated in the Visit Diagnosis section of this encounter.    HPI: Cecilia Barahona was diagnosed with T2DM in her 30s. Oral agents started at the time of diagnosis.       Prior visit on 11/23/18  She started Ozempic and felt nauseous and vomited even at 0.25 mg weekly. Injected 0.25 mg weekly x 4 weeks then increased to 0.5 mg weekly but felt very sick at this dose w/ worse n/v. So last week she went back to 0.25 mg. Has had no n/v in the last week.   She has lost 6 lb since lov.   Plan Decrease insulin from 38 to 30  Units once daily.   Finish out Levemir and then switch to Tresiba at the same dose/time.   Continue metformin.   Continue Ozempic at 0.25 mg weekly. Can increase it back up to 0.5 mg after 3-4 weeks. But please reduce back to 0.25 mg if having upset stomach, nausea, vomiting.   If blood sugars are still lower than 80, please reduce insulin dose from 30 to 26 units once daily.   If blood sugars are still lower than 80 at 26 units dose, please call office.    new reader for 14 day sensors. Prescriptions have been sent.   Monitor blood sugar every 8 hours with the Freestyle in order to get a continuous glucose reading.   Start exercise. Return to clinic in 2 months with labs prior.     Interval history  a1c down to 7.3%, from 8.6% in August.   She has lost 5 lb since lov.   Admits that she's been eating sweets with holidays, astrid cake, cinnamon rolls which she gets about once a week.   She stopped taking Ozempic 0.25 mg weekly r/t nausea, which did improve with discontinuation. However, she does c/o intermittent nausea still - unsure why however she take naproxen prn for pain sometimes on empty stomach. She is tolerating Trulicity much better than Ozempic.     She switched from levemir to Tresiba about a month ago.     PMHX: CHETNA - does not like her old mask.     LAST DIABETES EDUCATION:  "never    RECENT ILLNESSES/HOSPITALIZATIONS - -No.     HOSPITALIZED FOR DIABETES  -  No.    PRESCRIBED DIABETES MEDICATIONS:  Tresiba 30 units hs ~ 10 pm, metformin 1000 mg bid, Trulicity 1.5 mg once weekly       Misses medication doses     DM COMPLICATIONS: peripheral neuropathy    SIGNIFICANT DIABETES MED HISTORY:   Glimepiride stopped at initial ov 7/2017 since she was already on Novolog  ozempic switched to trulicity ~ 12/2018 d/t nausea     SELF MONITORING BLOOD GLUCOSE: Checks blood glucose at home - 1-2x/day. See media for Freestyle Arvin report. She enjoys using the Arvin.     HYPOGLYCEMIC EPISODES: denies      CURRENT DIET: drinks water. No more sweet beverages. Eats 2 small meals per day with a snack. Avoids rice.     CURRENT EXERCISE: none       /69 (BP Location: Right arm, Patient Position: Sitting, BP Method: Large (Automatic))   Pulse 70   Ht 5' 3" (1.6 m)   Wt 85 kg (187 lb 6.3 oz)   BMI 33.19 kg/m²       ROS:   CONSTITUTIONAL: Appetite good, denies fatigue  RESPIRATORY: No shortness of breath   CARDIAC: No chest pain or palpitations  GI: + nausea       PHYSICAL EXAM:  GENERAL: Well developed, well nourished. No acute distress.   PSYCH: AAOx3, appropriate mood and affect, conversant, well-groomed. Judgement and insight good.   NEURO: Cranial nerves grossly intact. Speech clear, no tremor.   CHEST: Respirations even and unlabored.   MS: Gait steady. No clubbing.         Hemoglobin A1C   Date Value Ref Range Status   02/05/2019 7.3 (H) 4.0 - 5.6 % Final     Comment:     ADA Screening Guidelines:  5.7-6.4%  Consistent with prediabetes  >or=6.5%  Consistent with diabetes  High levels of fetal hemoglobin interfere with the HbA1C  assay. Heterozygous hemoglobin variants (HbS, HgC, etc)do  not significantly interfere with this assay.   However, presence of multiple variants may affect accuracy.     08/02/2018 8.6 (H) 4.0 - 5.6 % Final     Comment:     ADA Screening Guidelines:  5.7-6.4%  Consistent " with prediabetes  >or=6.5%  Consistent with diabetes  High levels of fetal hemoglobin interfere with the HbA1C  assay. Heterozygous hemoglobin variants (HbS, HgC, etc)do  not significantly interfere with this assay.   However, presence of multiple variants may affect accuracy.     07/21/2017 7.9 (H) 4.0 - 5.6 % Final     Comment:     According to ADA guidelines, hemoglobin A1c <7.0% represents  optimal control in non-pregnant diabetic patients. Different  metrics may apply to specific patient populations.   Standards of Medical Care in Diabetes-2016.  For the purpose of screening for the presence of diabetes:  <5.7%     Consistent with the absence of diabetes  5.7-6.4%  Consistent with increasing risk for diabetes   (prediabetes)  >or=6.5%  Consistent with diabetes  Currently, no consensus exists for use of hemoglobin A1c  for diagnosis of diabetes for children.  This Hemoglobin A1c assay has significant interference with fetal   hemoglobin   (HbF). The results are invalid for patients with abnormal amounts of   HbF,   including those with known Hereditary Persistence   of Fetal Hemoglobin. Heterozygous hemoglobin variants (HbAS, HbAC,   HbAD, HbAE, HbA2) do not significantly interfere with this assay;   however, presence of multiple variants in a sample may impact the %   interference.             Chemistry        Component Value Date/Time     02/05/2019 0837    K 3.7 02/05/2019 0837     02/05/2019 0837    CO2 28 02/05/2019 0837    BUN 18 02/05/2019 0837    CREATININE 0.9 02/05/2019 0837    GLU 89 02/05/2019 0837        Component Value Date/Time    CALCIUM 9.7 02/05/2019 0837    ALKPHOS 99 08/02/2018 1124    AST 25 08/02/2018 1124    ALT 24 08/02/2018 1124    BILITOT 0.5 08/02/2018 1124    ESTGFRAFRICA >60 02/05/2019 0837    EGFRNONAA >60 02/05/2019 0837          Lab Results   Component Value Date    LDLCALC 88.8 08/02/2018        Ref. Range 8/2/2018 11:24   Cholesterol Latest Ref Range: 120 - 199 mg/dL  155   HDL Latest Ref Range: 40 - 75 mg/dL 42   LDL Cholesterol Latest Ref Range: 63.0 - 159.0 mg/dL 88.8   Total Cholesterol/HDL Ratio Latest Ref Range: 2.0 - 5.0  3.7   Triglycerides Latest Ref Range: 30 - 150 mg/dL 121     Lab Results   Component Value Date    MICALBCREAT 20.8 08/21/2018         STANDARDS of CARE:        ASA:               Last eye exam:       ASSESSMENT and PLAN:    A1C GOAL: < 7 %       1. Type 2 diabetes, uncontrolled, with neuropathy  Please improve diet. Avoid sweets.   Continue current meds.   Test blood sugars 2x/day.   Return to clinic in 5 mo with labs prior.     Hemoglobin A1c    Basic metabolic panel   2. Essential hypertension  controlled   3. Non morbid obesity, unspecified obesity type  Weight loss noted   4. Hyperlipidemia, unspecified hyperlipidemia type  Continue pravastatin.          Orders Placed This Encounter   Procedures    Hemoglobin A1c     Standing Status:   Future     Standing Expiration Date:   4/7/2020    Basic metabolic panel     Standing Status:   Future     Standing Expiration Date:   4/7/2020        Follow-up in about 5 months (around 7/7/2019).

## 2019-02-13 ENCOUNTER — PATIENT OUTREACH (OUTPATIENT)
Dept: OTHER | Facility: OTHER | Age: 69
End: 2019-02-13

## 2019-02-13 NOTE — PROGRESS NOTES
Last 5 Patient Entered Readings                                      Current 30 Day Average: 130/74     Recent Readings 2/8/2019 2/8/2019 2/6/2019 2/6/2019 2/6/2019    SBP (mmHg) 123 116 132 145 150    DBP (mmHg) 67 72 78 89 78    Pulse 40 72 75 84 38          2/13: LVM.  Will call in 3 weeks.  Patient at goal.

## 2019-02-22 ENCOUNTER — CLINICAL SUPPORT (OUTPATIENT)
Dept: OPHTHALMOLOGY | Facility: CLINIC | Age: 69
End: 2019-02-22
Payer: MEDICARE

## 2019-02-22 DIAGNOSIS — G25.81 RESTLESS LEGS SYNDROME (RLS): ICD-10-CM

## 2019-02-22 DIAGNOSIS — F32.0 MAJOR DEPRESSIVE DISORDER, SINGLE EPISODE, MILD: ICD-10-CM

## 2019-02-22 DIAGNOSIS — H40.1132 PRIMARY OPEN-ANGLE GLAUCOMA, BILATERAL, MODERATE STAGE: ICD-10-CM

## 2019-02-22 PROCEDURE — 92083 HUMPHREY VISUAL FIELD - OU - BOTH EYES: ICD-10-PCS | Mod: HCNC,S$GLB,, | Performed by: OPTOMETRIST

## 2019-02-22 PROCEDURE — 92133 CPTRZD OPH DX IMG PST SGM ON: CPT | Mod: HCNC,S$GLB,, | Performed by: OPTOMETRIST

## 2019-02-22 PROCEDURE — 92133 POSTERIOR SEGMENT OCT OPTIC NERVE(OCULAR COHERENCE TOMOGRAPHY) - OU - BOTH EYES: ICD-10-PCS | Mod: HCNC,S$GLB,, | Performed by: OPTOMETRIST

## 2019-02-22 PROCEDURE — 92083 EXTENDED VISUAL FIELD XM: CPT | Mod: HCNC,S$GLB,, | Performed by: OPTOMETRIST

## 2019-02-22 RX ORDER — LATANOPROST 50 UG/ML
SOLUTION/ DROPS OPHTHALMIC
COMMUNITY
End: 2019-02-26 | Stop reason: SDUPTHER

## 2019-02-22 RX ORDER — BUPROPION HYDROCHLORIDE 75 MG/1
TABLET ORAL
Qty: 180 TABLET | Refills: 0 | Status: SHIPPED | OUTPATIENT
Start: 2019-02-22 | End: 2019-04-24 | Stop reason: SDUPTHER

## 2019-02-22 RX ORDER — CHLORTHALIDONE 25 MG/1
TABLET ORAL
Qty: 90 TABLET | Refills: 2 | Status: SHIPPED | OUTPATIENT
Start: 2019-02-22 | End: 2020-01-14

## 2019-02-22 RX ORDER — ROPINIROLE 0.5 MG/1
TABLET, FILM COATED ORAL
Qty: 90 TABLET | Refills: 0 | Status: SHIPPED | OUTPATIENT
Start: 2019-02-22 | End: 2019-06-24 | Stop reason: SDUPTHER

## 2019-02-25 ENCOUNTER — PATIENT OUTREACH (OUTPATIENT)
Dept: OTHER | Facility: OTHER | Age: 69
End: 2019-02-25

## 2019-02-25 NOTE — PROGRESS NOTES
Last 5 Patient Entered Readings                                      Current 30 Day Average: 135/73     Recent Readings 2/25/2019 2/23/2019 2/22/2019 2/22/2019 2/21/2019    SBP (mmHg) 149 124 138 140 156    DBP (mmHg) 79 63 64 70 71    Pulse 70 74 79 77 69          HPI:  Called patient to follow up. Patient endorses adherence to medication regimen. Contributes higher BP readings to taking them prior to medications. Patient denies hypotensive s/sx (lightheadedness, dizziness, nausea, fatigue); patient denies hypertensive s/sx (SOB, CP, severe headaches, changes in vision).     Assessment:  Reviewed recent readings. Per 2017 ACC/ AHA HTN guidelines (goal of BP < 130/80), current 30-day average needs to be addressed more thoroughly today.     Plan:  Continue current medication regimen. Reviewed to take BP an hour or more after medications, requested 2-3 readings per week. I will continue to monitor regularly and will follow-up in 4 to 6 weeks, sooner if blood pressure begins to trend upward or downward.     Current medication regimen:  Hypertension Medications             amLODIPine (NORVASC) 10 MG tablet TAKE ONE TABLET BY MOUTH EVERY DAY    chlorthalidone (HYGROTEN) 25 MG Tab TAKE ONE TABLET BY MOUTH once DAILY    valsartan (DIOVAN) 320 MG tablet TAKE ONE TABLET BY MOUTH EVERY EVENING.          Patient denies having questions or concerns. Patient has my contact information and knows to call with any concerns or clinical changes.

## 2019-02-26 ENCOUNTER — INITIAL CONSULT (OUTPATIENT)
Dept: OPTOMETRY | Facility: CLINIC | Age: 69
End: 2019-02-26
Payer: MEDICARE

## 2019-02-26 DIAGNOSIS — H40.1122 PRIMARY OPEN ANGLE GLAUCOMA (POAG) OF LEFT EYE, MODERATE STAGE: ICD-10-CM

## 2019-02-26 DIAGNOSIS — E11.9 TYPE 2 DIABETES MELLITUS WITHOUT RETINOPATHY: Primary | ICD-10-CM

## 2019-02-26 DIAGNOSIS — H52.7 REFRACTIVE ERROR: ICD-10-CM

## 2019-02-26 DIAGNOSIS — H25.13 NUCLEAR SCLEROSIS OF BOTH EYES: ICD-10-CM

## 2019-02-26 DIAGNOSIS — H40.1111 PRIMARY OPEN ANGLE GLAUCOMA (POAG) OF RIGHT EYE, MILD STAGE: ICD-10-CM

## 2019-02-26 PROBLEM — H40.1132 PRIMARY OPEN-ANGLE GLAUCOMA, BILATERAL, MODERATE STAGE: Status: ACTIVE | Noted: 2019-02-26

## 2019-02-26 LAB
LEFT EYE DM RETINOPATHY: NEGATIVE
RIGHT EYE DM RETINOPATHY: NEGATIVE

## 2019-02-26 PROCEDURE — 99499 RISK ADDL DX/OHS AUDIT: ICD-10-PCS | Mod: HCNC,S$GLB,, | Performed by: OPTOMETRIST

## 2019-02-26 PROCEDURE — 92015 PR REFRACTION: ICD-10-PCS | Mod: HCNC,S$GLB,, | Performed by: OPTOMETRIST

## 2019-02-26 PROCEDURE — 99999 PR PBB SHADOW E&M-EST. PATIENT-LVL II: CPT | Mod: PBBFAC,HCNC,, | Performed by: OPTOMETRIST

## 2019-02-26 PROCEDURE — 99999 PR PBB SHADOW E&M-EST. PATIENT-LVL II: ICD-10-PCS | Mod: PBBFAC,HCNC,, | Performed by: OPTOMETRIST

## 2019-02-26 PROCEDURE — 99499 UNLISTED E&M SERVICE: CPT | Mod: HCNC,S$GLB,, | Performed by: OPTOMETRIST

## 2019-02-26 PROCEDURE — 92015 DETERMINE REFRACTIVE STATE: CPT | Mod: HCNC,S$GLB,, | Performed by: OPTOMETRIST

## 2019-02-26 PROCEDURE — 92014 COMPRE OPH EXAM EST PT 1/>: CPT | Mod: HCNC,S$GLB,, | Performed by: OPTOMETRIST

## 2019-02-26 PROCEDURE — 92014 PR EYE EXAM, EST PATIENT,COMPREHESV: ICD-10-PCS | Mod: HCNC,S$GLB,, | Performed by: OPTOMETRIST

## 2019-02-26 RX ORDER — LATANOPROST 50 UG/ML
1 SOLUTION/ DROPS OPHTHALMIC NIGHTLY
Qty: 7.5 ML | Refills: 2 | Status: SHIPPED | OUTPATIENT
Start: 2019-02-26 | End: 2019-09-05 | Stop reason: SDUPTHER

## 2019-02-26 NOTE — PROGRESS NOTES
Subjective:       Patient ID: Cecilia Barahona is a 68 y.o. female      Chief Complaint   Patient presents with    Glaucoma    Diabetic Eye Exam     History of Present Illness  Dls: 4/4/17 Dr. Brink     67 y/o female presents today for diabetic eye exam and glaucoma ck.   Pt states no changes in vision. Pt wears pal's.      x 3 days ago     No tearing  + itching  No burning  No pain  No ha's  +ou floaters  + ou  flashes    Eye meds  Latanoprost OU QHS last dose 6 months ago   ? rx gtts OU QD last dose 6 months ago     Hemoglobin A1C       Date                     Value               Ref Range                          02/05/2019               7.3 (H)             4.0 - 5.6 %         Final        08/02/2018               8.6 (H)             4.0 - 5.6 %         Final        07/21/2017               7.9 (H)             4.0 - 5.6 %         Final   ----------    Assessment/Plan:     1. Type 2 diabetes mellitus without retinopathy  No diabetic retinopathy. Discussed with pt the effects of diabetes on vision, importance of good blood sugar control, compliance with meds, and follow up care with PCP. Return in 1 year for dilated eye exam, sooner PRN.    2. Primary open angle glaucoma (POAG) of left eye, moderate stage  3. Primary open angle glaucoma (POAG) of right eye, mild stage  Poor compliance with drops. Rediscussed with pt importance of compliance of drops and progression of glc. Restart latanoprost QHS OU. OCT and HVF stable on exam today.   - latanoprost 0.005 % ophthalmic solution; Place 1 drop into both eyes every evening.  Dispense: 7.5 mL; Refill: 2    4. Nuclear sclerosis of both eyes  Educated pt on presence of cataracts and effects on vision. No surgery at this time. Recheck in one year.    5. Refractive error  Educated patient on refractive error and discussed lens options. Dispensed updated spectacle Rx. Educated about adaptation period to new specs.    Eyeglass Final Rx     Eyeglass Final Rx        Sphere Cylinder Axis Add    Right -0.50 +1.25 010 +2.75    Left -0.75 +1.50 010 +2.75    Expiration Date:  2/27/2020                  Follow-up in about 6 months (around 8/26/2019) for IOP check, CCT.

## 2019-02-26 NOTE — LETTER
February 26, 2019      Yudi Smart MD  [address]  [place] [address]           [facility]  [address]  [place] [address]  Phone: [phone]  Fax: [phone]          Patient: Cecilia Barahona   MR Number: [id number]   YOB: 1950   Date of Visit: 2/26/2019       Dear Dr. Yudi Smart:    Thank you for referring Cecilia Barahona to me for evaluation. Attached you will find relevant portions of my assessment and plan of care.    If you have questions, please do not hesitate to call me. I look forward to following Cecilia Barahona along with you.    Sincerely,    Billie Healy, OD    Enclosure  CC:  No Recipients    If you would like to receive this communication electronically, please contact [email or web address] or [phone] to request more information on "[employer] Link access.    For providers and/or their staff who would like to refer a patient to [facility], please contact us through our one-stop-shop provider referral line, [facility], at [phone].    If you feel you have received this communication in error or would no longer like to receive these types of communications, please e-mail [email or web address]          no

## 2019-03-06 NOTE — PROGRESS NOTES
Last 5 Patient Entered Readings                                      Current 30 Day Average: 136/70     Recent Readings 3/5/2019 3/4/2019 3/1/2019 2/28/2019 2/28/2019    SBP (mmHg) 136 127 139 144 144    DBP (mmHg) 61 69 65 74 74    Pulse 69 75 73 73 73          3/6: LVM.  Will call in 2 weeks.

## 2019-03-13 ENCOUNTER — TELEPHONE (OUTPATIENT)
Dept: ORTHOPEDICS | Facility: CLINIC | Age: 69
End: 2019-03-13

## 2019-03-13 NOTE — TELEPHONE ENCOUNTER
Mrs. Barahona missed her appointment with Dr. Ordonez on 03/12/19 for 11:30 am. She called the office to reschedule her appointment. I told that Dr. Ordonez will be out on Wednesday and I have a 8:00 am on Thursday . She took that appointment . She came to the clinic on Wednesday morning  thinking that she have appointment on that day. And was telling  me that I told her to come in on Wednesday for 8:00 am. I did reschedule her to Tuesday 19, 2019 at 1:45 pm and gave her appointment.

## 2019-03-19 ENCOUNTER — OFFICE VISIT (OUTPATIENT)
Dept: ORTHOPEDICS | Facility: CLINIC | Age: 69
End: 2019-03-19
Payer: MEDICARE

## 2019-03-19 VITALS
HEART RATE: 71 BPM | WEIGHT: 194 LBS | SYSTOLIC BLOOD PRESSURE: 122 MMHG | DIASTOLIC BLOOD PRESSURE: 60 MMHG | HEIGHT: 63 IN | BODY MASS INDEX: 34.38 KG/M2

## 2019-03-19 DIAGNOSIS — M79.604 PAIN OF RIGHT LEG: ICD-10-CM

## 2019-03-19 DIAGNOSIS — M25.561 ACUTE PAIN OF RIGHT KNEE: Primary | ICD-10-CM

## 2019-03-19 PROCEDURE — 3078F PR MOST RECENT DIASTOLIC BLOOD PRESSURE < 80 MM HG: ICD-10-PCS | Mod: HCNC,CPTII,S$GLB, | Performed by: ORTHOPAEDIC SURGERY

## 2019-03-19 PROCEDURE — 3074F PR MOST RECENT SYSTOLIC BLOOD PRESSURE < 130 MM HG: ICD-10-PCS | Mod: HCNC,CPTII,S$GLB, | Performed by: ORTHOPAEDIC SURGERY

## 2019-03-19 PROCEDURE — 99213 PR OFFICE/OUTPT VISIT, EST, LEVL III, 20-29 MIN: ICD-10-PCS | Mod: HCNC,S$GLB,, | Performed by: ORTHOPAEDIC SURGERY

## 2019-03-19 PROCEDURE — 1101F PT FALLS ASSESS-DOCD LE1/YR: CPT | Mod: HCNC,CPTII,S$GLB, | Performed by: ORTHOPAEDIC SURGERY

## 2019-03-19 PROCEDURE — 99999 PR PBB SHADOW E&M-EST. PATIENT-LVL V: ICD-10-PCS | Mod: PBBFAC,HCNC,, | Performed by: ORTHOPAEDIC SURGERY

## 2019-03-19 PROCEDURE — 99999 PR PBB SHADOW E&M-EST. PATIENT-LVL V: CPT | Mod: PBBFAC,HCNC,, | Performed by: ORTHOPAEDIC SURGERY

## 2019-03-19 PROCEDURE — 3078F DIAST BP <80 MM HG: CPT | Mod: HCNC,CPTII,S$GLB, | Performed by: ORTHOPAEDIC SURGERY

## 2019-03-19 PROCEDURE — 3074F SYST BP LT 130 MM HG: CPT | Mod: HCNC,CPTII,S$GLB, | Performed by: ORTHOPAEDIC SURGERY

## 2019-03-19 PROCEDURE — 99213 OFFICE O/P EST LOW 20 MIN: CPT | Mod: HCNC,S$GLB,, | Performed by: ORTHOPAEDIC SURGERY

## 2019-03-19 PROCEDURE — 1101F PR PT FALLS ASSESS DOC 0-1 FALLS W/OUT INJ PAST YR: ICD-10-PCS | Mod: HCNC,CPTII,S$GLB, | Performed by: ORTHOPAEDIC SURGERY

## 2019-03-19 RX ORDER — MELOXICAM 7.5 MG/1
7.5 TABLET ORAL DAILY
Qty: 30 TABLET | Refills: 0 | Status: SHIPPED | OUTPATIENT
Start: 2019-03-19 | End: 2019-04-05 | Stop reason: SDUPTHER

## 2019-03-19 NOTE — PROGRESS NOTES
Chief Complaint   Patient presents with    Right Knee - Pain, Swelling    Right Lower Leg - Pain      HPI: Cecilia Barahona is a 69 y.o. female who presents today complaining of right knee pain   Duration of symptoms:  3  months  Trauma or new activity: no  Pain is intermittent  Worst is 10/10, 2/0 right now   Aggravating factors: worse at night, kneeling   Relieving factors: changing position  Radicular symptoms: no numbness and paresthesias   Associated symptoms:no  swelling, popping and giving way.    Prior treatment:  OTC NSAIDs  with improvement in pain.   Can sleep with advial PM   Pain does interfere with sleep.  Does have a history of restless leg syndrome but this feels different     This is the extent of the patient's complaints at this time.     Review of Systems   All other systems reviewed and are negative.       Review of patient's allergies indicates:   Allergen Reactions    Ace inhibitors Other (See Comments)     cough    Invokana [canagliflozin] Other (See Comments)     Throat and tongue swelling    Ozempic [semaglutide]      Nausea and constipation on 0.25 mg dose.          Current Outpatient Medications:     albuterol 90 mcg/actuation inhaler, Inhale 2 puffs into the lungs every 6 (six) hours as needed for Wheezing or Shortness of Breath. Rescue, Disp: 1 Inhaler, Rfl: 0    alcohol swabs PadM, Apply 1 each topically 3 (three) times daily., Disp: 400 each, Rfl: 2    amLODIPine (NORVASC) 10 MG tablet, TAKE ONE TABLET BY MOUTH EVERY DAY, Disp: 30 tablet, Rfl: 4    artificial tear ointment (ARTIFICIAL TEARS) Oint, Place into both eyes every evening., Disp: 305 g, Rfl: 1    aspirin 81 MG Chew, Take by mouth once daily. 1 tablet, chewable Oral Every day, Disp: , Rfl:     azelastine (OPTIVAR) 0.05 % ophthalmic solution, INSTILL 1 DROP IN EACH EYE TWICE DAILY, Disp: 12 mL, Rfl: 0    blood glucose control, low (EMBRACE GLUCOSE CONTROL LOW) Soln, Inject 1 Bottle into the skin every 30 days.,  Disp: 1 each, Rfl: 0    blood pressure monitor (BLOOD PRESSURE KIT) Kit, 1 kit by Misc.(Non-Drug; Combo Route) route once daily., Disp: 1 each, Rfl: 0    blood sugar diagnostic Strp, Check blood glucose 4 times a day. Dispense accuchek meter or other meter preferred by insurance. Dx code e11.65, Disp: 400 strip, Rfl: 3    buPROPion (WELLBUTRIN) 75 MG tablet, TAKE TWO TABLETS BY MOUTH TWICE DAILY, Disp: 180 tablet, Rfl: 0    chlorthalidone (HYGROTEN) 25 MG Tab, TAKE ONE TABLET BY MOUTH once DAILY, Disp: 90 tablet, Rfl: 2    desoximetasone (TOPICORT) 0.05 % cream, Apply topically 2 (two) times daily as needed., Disp: 100 g, Rfl: 2    diabetic supplies, miscellan. Kit, Please change sensor every 14 days. FreeStyle Arvin Sensor 30-day supply (2 sensors/month), Disp: 2 kit, Rfl: 5    dulaglutide (TRULICITY) 1.5 mg/0.5 mL PnIj, Inject 1.5 mg into the skin every 7 days., Disp: 4 Syringe, Rfl: 5    flash glucose scanning reader (FREESTYLE ARVIN READER) Misc, 1 Units by Misc.(Non-Drug; Combo Route) route before meals as needed., Disp: 1 each, Rfl: 11    flash glucose sensor (FREESTYLE ARVIN SENSOR) Kit, 1 Units by Misc.(Non-Drug; Combo Route) route every meal as needed., Disp: 1 kit, Rfl: 0    fluticasone (FLONASE) 50 mcg/actuation nasal spray, 1 spray (50 mcg total) by Each Nare route once daily., Disp: 16 g, Rfl: 5    gabapentin (NEURONTIN) 300 MG capsule, Take 1 capsule (300 mg total) by mouth every evening., Disp: 90 capsule, Rfl: 1    insulin degludec (TRESIBA FLEXTOUCH U-200) 200 unit/mL (3 mL) InPn, Inject 30 Units into the skin once daily., Disp: 3 Syringe, Rfl: 5    lancets Misc, Check blood glucose 4 times a day. Dispense accuchek meter or other meter preferred by insurance. Dx code e11.65, Disp: 400 each, Rfl: 3    LANCING DEVICE WITH LANCETS Misc, USE AS DIRECTED FOR DIABETIC TESTING, Disp: 1 each, Rfl: 0    latanoprost 0.005 % ophthalmic solution, Place 1 drop into both eyes every evening., Disp:  "7.5 mL, Rfl: 2    loratadine (CLARITIN) 10 mg tablet, Take 1 tablet (10 mg total) by mouth daily as needed for Allergies., Disp: 90 tablet, Rfl: 1    meloxicam (MOBIC) 7.5 MG tablet, Take 1 tablet (7.5 mg total) by mouth once daily. Take once a day for two weeks then as needed. Take with food, Disp: 30 tablet, Rfl: 0    metFORMIN (GLUCOPHAGE) 1000 MG tablet, Take 1 tablet (1,000 mg total) by mouth 2 (two) times daily with meals., Disp: 180 tablet, Rfl: 1    multivitamin with minerals tablet, Take 1 tablet by mouth once daily., Disp: , Rfl:     naproxen (NAPROSYN) 500 MG tablet, Take 500 mg by mouth 2 (two) times daily as needed., Disp: , Rfl: 1    pen needle, diabetic (BD ULTRA-FINE LENY PEN NEEDLES) 32 gauge x 5/32" Ndle, USE FIVE TIMES DAILY, Disp: 100 each, Rfl: 5    pravastatin (PRAVACHOL) 40 MG tablet, TAKE ONE TABLET BY MOUTH EVERY DAY, Disp: 90 tablet, Rfl: 1    rOPINIRole (REQUIP) 0.5 MG tablet, TAKE ONE TABLET BY MOUTH EVERY NIGHT AT BEDTIME, Disp: 90 tablet, Rfl: 0    tramadol (ULTRAM) 50 mg tablet, Take 50 mg by mouth every 6 (six) hours as needed for Pain., Disp: , Rfl:     valsartan (DIOVAN) 320 MG tablet, TAKE ONE TABLET BY MOUTH EVERY EVENING., Disp: 30 tablet, Rfl: 5    blood glucose control high,low (ACCU-CHEK BRIDGETTE CONTROL SOLN) Soln, 1 kit by Misc.(Non-Drug; Combo Route) route once daily., Disp: 1 each, Rfl: 0    blood-glucose meter kit, ispense accuchek meter or other meter preferred by insurance. Dx code e11.65, Disp: 1 each, Rfl: 0    Past Medical History:   Diagnosis Date    Allergic rhinitis, seasonal     Arthritis     Glaucoma NEC     History of positive PPD - treated - remote past     Hyperlipidemia LDL goal < 100     Hypertension     Nuclear sclerosis of both eyes 2/26/2019    Obesity     CHETNA (obstructive sleep apnea)     Type II or unspecified type diabetes mellitus without mention of complication, uncontrolled        Patient Active Problem List   Diagnosis    " Hypertension    Severe obesity (BMI 35.0-39.9) with comorbidity    Glaucoma NEC    Type 2 diabetes, uncontrolled, with neuropathy    Hyperlipidemia LDL goal <70    Cervical spondylosis    Trigger ring finger of right hand    Mild major depression    Long-term insulin use    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    Gastroesophageal reflux disease without esophagitis    History of positive PPD - treated - remote past    Carpal tunnel syndrome of left wrist    Cervical pain    Incomplete bladder emptying    Restless legs syndrome (RLS)    Posture abnormality    Impaired mobility    Calcific tendinitis of left shoulder    Greater trochanteric bursitis of right hip    Refractive error    Nuclear sclerosis of both eyes    Type 2 diabetes mellitus without retinopathy    Primary open-angle glaucoma, bilateral, moderate stage       Past Surgical History:   Procedure Laterality Date    CARPAL TUNNEL RELEASE       SECTION, CLASSIC      COLONOSCOPY N/A 2015    [facility]formed [facility] ([facility])    ESOPHAGOGASTRODUODENOSCOPY (EGD) N/A 2015    [facility]formed [facility] at [facility] END[facility] (4TH FLR)    RELEASE-FINGER-TRIGGER left thumb Left 2014    Performed by [facility] at [facility] OR 1ST FLR    RELEASE-FINGER-TRIGGER, right ring Right 10/6/2014    [facility]    TOTAL ABDOMINAL HYSTERECTOMY      TRIGGER FINGER RELEASE         Social History     Tobacco Use    Smoking status: Former Smoker     Packs/day: 0.50     Types: Cigarettes     Last attempt to quit: 11/10/2012     Years since quittin.3    Smokeless tobacco: Never Used   Substance Use Topics    Alcohol use: No    Drug use: No       Family History   Problem Relation Age of Onset    Hypertension Mother     Diabetes Mother     Heart failure Mother     Cataracts Mother     Glaucoma Mother     Prostate  cancer Father     Hypertension Father     Diabetes Father     Heart failure Father     No Known Problems Sister     Alcohol abuse Maternal Aunt     Diabetes Maternal Uncle     Hyperlipidemia Maternal Uncle     Hypertension Maternal Uncle     Diabetes Maternal Grandmother     Diabetes Maternal Grandfather     No Known Problems Paternal Grandmother     No Known Problems Paternal Grandfather     Diabetes Maternal Aunt     Alzheimer's disease Maternal Aunt     Heart disease Cousin         s/p heart transplant    No Known Problems Paternal Aunt     No Known Problems Paternal Uncle     Amblyopia Neg Hx     Blindness Neg Hx     Cancer Neg Hx     Macular degeneration Neg Hx     Retinal detachment Neg Hx     Strabismus Neg Hx     Stroke Neg Hx     Thyroid disease Neg Hx        Physical Exam:     Vitals:    03/19/19 1406   BP: 122/60   Pulse: 71           General: Weight: 88 kg (194 lb 0.1 oz) Body mass index is 34.37 kg/m².  Patient is alert, awake and oriented to time, place and person. Mood and affect are appropriate.  Patient does not appear to be in any distress, denies any constitutional symptoms and appears stated age.   HEENT: Pupils are equal and round, sclera are not injected. External examination of ears and nose reveals no abnormalities. Cranial nerves II-X are grossly intact  Skin: no rashes, abrasions or open wounds on the affected extremity   Resp: No respiratory distress or audible wheezing   CV: 2+ pulses, all extremities warm and well perfused   Right Knee    AROM Knee 0-130  No effusion or erythema  Non tender over medial joint line, lateral joint line, posterior joint line.  Localizes knee pain to patellar tendon and patella  No calf swelling or discoloration   No calf tenderness but patient localizes this as area of pain at night   Negative Mccarty sign   2+ DP  Light touch sensation intact sural, saphenous, superficial peroneal, deep peroneal, tibial nerve distribution  Intact  EHL, FHL, TA, GS     Imaging:  3 views right knee:  mild patellofemoral OA with joint space narrowing and loose body formation      Assessment: 69 y.o. female with right calf pain, knee pain     I explained my diagnostic impression and the reasoning behind it in detail, using layman's terms.  Models and/or pictures were used to help in the explanation.    Plan:   - Right knee XR on the way out   - US doppler to r/o DVT  - Will call with results     All questions were answered in detail. The patient is in full agreement with the treatment plan and will proceed accordingly.      This note was created by combination of typed  and M-Modal dictation. Transcription and phonetic errors may be present.  If there are any questions, please contact me.

## 2019-03-20 NOTE — PROGRESS NOTES
Last 5 Patient Entered Readings                                      Current 30 Day Average: 139/68     Recent Readings 3/19/2019 3/18/2019 3/18/2019 3/11/2019 3/9/2019    SBP (mmHg) 132 139 148 128 147    DBP (mmHg) 62 64 67 71 64    Pulse 65 68 68 78 65        3/20: LVM.  Sent MyChart.  Will call in 1 week.

## 2019-03-28 NOTE — PROGRESS NOTES
Last 5 Patient Entered Readings                                      Current 30 Day Average: 137/68     Recent Readings 3/28/2019 3/28/2019 3/28/2019 3/26/2019 3/25/2019    SBP (mmHg) 153 162 160 137 132    DBP (mmHg) 67 67 78 77 62    Pulse 66 69 58 75 68          Digital Medicine: Health  Follow Up    Lifestyle Modifications:    1.Dietary Modifications (Sodium intake <2,000mg/day, food labels, dining out): deferred    2.Physical Activity: deferred    3.Medication Therapy: Patient has been compliant with the medication regimen.    4.Patient has the following medication side effects/concerns: Patient reports she ran out of amlodipine, so she did not take it this morning.  Took one of her 's amlodipine around 1pm and states she will check her BP again.  States when she gets paid tomorrow she will  refill.  Will route note to pharmD  (Frequency/Alleviating factors/Precipitating factors, etc.)     Follow up with Mrs. Brooks Sunny Barahona completed. No further questions or concerns. Will continue to follow up to achieve health goals.

## 2019-03-29 NOTE — PROGRESS NOTES
Last 5 Patient Entered Readings                                      Current 30 Day Average: 141/69     Recent Readings 3/29/2019 3/28/2019 3/28/2019 3/28/2019 3/28/2019    SBP (mmHg) 169 170 153 162 160    DBP (mmHg) 79 77 67 67 78    Pulse 66 74 66 69 58          See recent health  note. Reassess BP control once patient has picked up and resumed amlodipine, intended to  prescription 3/29.

## 2019-04-04 DIAGNOSIS — I10 ESSENTIAL HYPERTENSION: ICD-10-CM

## 2019-04-04 RX ORDER — VALSARTAN 320 MG/1
TABLET ORAL
Qty: 30 TABLET | Refills: 5 | Status: SHIPPED | OUTPATIENT
Start: 2019-04-04 | End: 2019-10-30 | Stop reason: SDUPTHER

## 2019-04-04 RX ORDER — INSULIN ASPART 100 [IU]/ML
INJECTION, SOLUTION INTRAVENOUS; SUBCUTANEOUS
COMMUNITY
End: 2019-09-11

## 2019-04-05 RX ORDER — MELOXICAM 7.5 MG/1
TABLET ORAL
Qty: 30 TABLET | Refills: 0 | Status: SHIPPED | OUTPATIENT
Start: 2019-04-05 | End: 2019-06-04 | Stop reason: SDUPTHER

## 2019-04-08 ENCOUNTER — PATIENT OUTREACH (OUTPATIENT)
Dept: ADMINISTRATIVE | Facility: HOSPITAL | Age: 69
End: 2019-04-08

## 2019-04-08 DIAGNOSIS — Z13.820 ENCOUNTER FOR SCREENING FOR OSTEOPOROSIS: Primary | ICD-10-CM

## 2019-04-08 DIAGNOSIS — M89.9 DISORDER OF BONE AND CARTILAGE: ICD-10-CM

## 2019-04-08 DIAGNOSIS — M94.9 DISORDER OF BONE AND CARTILAGE: ICD-10-CM

## 2019-04-22 ENCOUNTER — OFFICE VISIT (OUTPATIENT)
Dept: FAMILY MEDICINE | Facility: CLINIC | Age: 69
End: 2019-04-22
Payer: MEDICARE

## 2019-04-22 VITALS
OXYGEN SATURATION: 95 % | HEIGHT: 63 IN | WEIGHT: 191.56 LBS | TEMPERATURE: 98 F | BODY MASS INDEX: 33.94 KG/M2 | HEART RATE: 74 BPM | SYSTOLIC BLOOD PRESSURE: 130 MMHG | DIASTOLIC BLOOD PRESSURE: 60 MMHG

## 2019-04-22 DIAGNOSIS — F32.0 MILD MAJOR DEPRESSION: ICD-10-CM

## 2019-04-22 DIAGNOSIS — J30.9 ALLERGIC RHINITIS: ICD-10-CM

## 2019-04-22 DIAGNOSIS — E78.5 HYPERLIPIDEMIA LDL GOAL <70: ICD-10-CM

## 2019-04-22 DIAGNOSIS — Z79.4 LONG-TERM INSULIN USE: ICD-10-CM

## 2019-04-22 DIAGNOSIS — Z00.00 ROUTINE MEDICAL EXAM: Primary | ICD-10-CM

## 2019-04-22 DIAGNOSIS — N64.4 BREAST PAIN, LEFT: ICD-10-CM

## 2019-04-22 DIAGNOSIS — I10 ESSENTIAL HYPERTENSION: ICD-10-CM

## 2019-04-22 DIAGNOSIS — M94.9 DISORDER OF BONE AND CARTILAGE: ICD-10-CM

## 2019-04-22 DIAGNOSIS — M89.9 DISORDER OF BONE AND CARTILAGE: ICD-10-CM

## 2019-04-22 DIAGNOSIS — K59.09 CHRONIC CONSTIPATION: ICD-10-CM

## 2019-04-22 DIAGNOSIS — Z23 NEED FOR SHINGLES VACCINE: ICD-10-CM

## 2019-04-22 DIAGNOSIS — R21 RASH: ICD-10-CM

## 2019-04-22 DIAGNOSIS — E66.9 OBESITY (BMI 30-39.9): ICD-10-CM

## 2019-04-22 PROBLEM — M79.604 PAIN OF RIGHT LEG: Status: RESOLVED | Noted: 2019-03-19 | Resolved: 2019-04-22

## 2019-04-22 PROBLEM — E11.9 TYPE 2 DIABETES MELLITUS WITHOUT RETINOPATHY: Status: RESOLVED | Noted: 2019-02-26 | Resolved: 2019-04-22

## 2019-04-22 PROBLEM — M25.561 ACUTE PAIN OF RIGHT KNEE: Status: RESOLVED | Noted: 2019-03-19 | Resolved: 2019-04-22

## 2019-04-22 PROCEDURE — 3075F PR MOST RECENT SYSTOLIC BLOOD PRESS GE 130-139MM HG: ICD-10-PCS | Mod: HCNC,CPTII,S$GLB, | Performed by: INTERNAL MEDICINE

## 2019-04-22 PROCEDURE — 99999 PR PBB SHADOW E&M-EST. PATIENT-LVL IV: ICD-10-PCS | Mod: PBBFAC,HCNC,, | Performed by: INTERNAL MEDICINE

## 2019-04-22 PROCEDURE — 3045F PR MOST RECENT HEMOGLOBIN A1C LEVEL 7.0-9.0%: CPT | Mod: HCNC,CPTII,S$GLB, | Performed by: INTERNAL MEDICINE

## 2019-04-22 PROCEDURE — 99499 UNLISTED E&M SERVICE: CPT | Mod: HCNC,S$GLB,, | Performed by: INTERNAL MEDICINE

## 2019-04-22 PROCEDURE — 3045F PR MOST RECENT HEMOGLOBIN A1C LEVEL 7.0-9.0%: ICD-10-PCS | Mod: HCNC,CPTII,S$GLB, | Performed by: INTERNAL MEDICINE

## 2019-04-22 PROCEDURE — 99397 PR PREVENTIVE VISIT,EST,65 & OVER: ICD-10-PCS | Mod: HCNC,S$GLB,, | Performed by: INTERNAL MEDICINE

## 2019-04-22 PROCEDURE — 3078F DIAST BP <80 MM HG: CPT | Mod: HCNC,CPTII,S$GLB, | Performed by: INTERNAL MEDICINE

## 2019-04-22 PROCEDURE — 3075F SYST BP GE 130 - 139MM HG: CPT | Mod: HCNC,CPTII,S$GLB, | Performed by: INTERNAL MEDICINE

## 2019-04-22 PROCEDURE — 99999 PR PBB SHADOW E&M-EST. PATIENT-LVL IV: CPT | Mod: PBBFAC,HCNC,, | Performed by: INTERNAL MEDICINE

## 2019-04-22 PROCEDURE — 99397 PER PM REEVAL EST PAT 65+ YR: CPT | Mod: HCNC,S$GLB,, | Performed by: INTERNAL MEDICINE

## 2019-04-22 PROCEDURE — 99499 RISK ADDL DX/OHS AUDIT: ICD-10-PCS | Mod: HCNC,S$GLB,, | Performed by: INTERNAL MEDICINE

## 2019-04-22 PROCEDURE — 3078F PR MOST RECENT DIASTOLIC BLOOD PRESSURE < 80 MM HG: ICD-10-PCS | Mod: HCNC,CPTII,S$GLB, | Performed by: INTERNAL MEDICINE

## 2019-04-22 RX ORDER — LORATADINE 10 MG/1
10 TABLET ORAL DAILY PRN
Qty: 90 TABLET | Refills: 1 | Status: SHIPPED | OUTPATIENT
Start: 2019-04-22 | End: 2019-09-11 | Stop reason: SDUPTHER

## 2019-04-22 NOTE — PROGRESS NOTES
HISTORY OF PRESENT ILLNESS:  Cecilia Barahona is a 69 y.o. female who presents to the clinic today for a routine medical physical exam. Her last physical exam was approximately 1 years(s) ago.        PAST MEDICAL HISTORY:  Past Medical History:   Diagnosis Date    Allergic rhinitis, seasonal     Arthritis     Glaucoma NEC     History of positive PPD - treated - remote past     Hyperlipidemia LDL goal < 100     Hypertension     Nuclear sclerosis of both eyes 2019    Obesity     CHETNA (obstructive sleep apnea)     Type II or unspecified type diabetes mellitus without mention of complication, uncontrolled        PAST SURGICAL HISTORY:  Past Surgical History:   Procedure Laterality Date    CARPAL TUNNEL RELEASE       SECTION, CLASSIC      COLONOSCOPY N/A 2015    Performed by [facility]el [facility] at [facility] (4TH FLR)    ESOPHAGOGASTRODUODENOSCOPY (EGD) N/A 2015    Performed by [facility]el [facility] at [facility] (4TH FLR)    RELEASE-FINGER-TRIGGER left thumb Left 2014    Performed by [facility]ne Arnett [facility] at [facility] OR 1ST FLR    RELEASE-FINGER-TRIGGER, right ring Right 10/6/2014    Performed by [facility] at [facility] [place]    TOTAL ABDOMINAL HYSTERECTOMY      TRIGGER FINGER RELEASE         SOCIAL HISTORY:  Social History     Socioeconomic History    Marital status:      Spouse name: Not on file    Number of children: 3    Years of education: Not on file    Highest education level: Not on file   Occupational History    Occupation: disabled - back and leg pain   Social Needs    Financial resource strain: Somewhat hard    Food insecurity:     Worry: Never true     Inability: Sometimes true    Transportation needs:     Medical: No     Non-medical: No   Tobacco Use    Smoking status: Former Smoker     Packs/day: 0.50     Types: Cigarettes     Last attempt to quit: 11/10/2012     Years since quittin.4    Smokeless tobacco: Never Used   Substance and Sexual  Activity    Alcohol use: No    Drug use: No    Sexual activity: Never     Partners: Male   Lifestyle    Physical activity:     Days per week: Not on file     Minutes per session: Not on file    Stress: Not on file   Relationships    Social connections:     Talks on phone: Not on file     Gets together: Not on file     Attends [employer] service: Not on file     Active member of club or organization: Not on file     Attends meetings of clubs or organizations: Not on file     Relationship status: Not on file   Other Topics Concern    Not on file   Social History Narrative    Not on file       FAMILY HISTORY:  Family History   Problem Relation Age of Onset    Hypertension Mother     Diabetes Mother     Heart failure Mother     Cataracts Mother     Glaucoma Mother     Prostate cancer Father     Hypertension Father     Diabetes Father     Heart failure Father     No Known Problems Sister     Alcohol abuse Maternal Aunt     Diabetes Maternal Uncle     Hyperlipidemia Maternal Uncle     Hypertension Maternal Uncle     Diabetes Maternal Grandmother     Diabetes Maternal Grandfather     No Known Problems Paternal Grandmother     No Known Problems Paternal Grandfather     Diabetes Maternal Aunt     Alzheimer's disease Maternal Aunt     Heart disease Cousin         s/p heart transplant    No Known Problems Paternal Aunt     No Known Problems Paternal Uncle     Amblyopia Neg Hx     Blindness Neg Hx     Cancer Neg Hx     Macular degeneration Neg Hx     Retinal detachment Neg Hx     Strabismus Neg Hx     Stroke Neg Hx     Thyroid disease Neg Hx        ALLERGIES AND MEDICATIONS: updated and reviewed.  Review of patient's allergies indicates:   Allergen Reactions    Ace inhibitors Other (See Comments)     cough    Invokana [canagliflozin] Other (See Comments)     Throat and tongue swelling    Ozempic [semaglutide]      Nausea and constipation on 0.25 mg dose.      Medication List with  Changes/Refills   Current Medications    ALBUTEROL 90 MCG/ACTUATION INHALER    Inhale 2 puffs into the lungs every 6 (six) hours as needed for Wheezing or Shortness of Breath. Rescue    ALCOHOL SWABS PADM    Apply 1 each topically 3 (three) times daily.    AMLODIPINE (NORVASC) 10 MG TABLET    TAKE ONE TABLET BY MOUTH EVERY DAY    ARTIFICIAL TEAR OINTMENT (ARTIFICIAL TEARS) OINT    Place into both eyes every evening.    ASPIRIN 81 MG CHEW    Take by mouth once daily. 1 tablet, chewable Oral Every day    AZELASTINE (OPTIVAR) 0.05 % OPHTHALMIC SOLUTION    INSTILL 1 DROP IN EACH EYE TWICE DAILY    BLOOD GLUCOSE CONTROL HIGH,LOW (ACCU-CHEK BRIDGETTE CONTROL SOLN) SOLN    1 kit by Misc.(Non-Drug; Combo Route) route once daily.    BLOOD GLUCOSE CONTROL, LOW (EMBRACE GLUCOSE CONTROL LOW) SOLN    Inject 1 Bottle into the skin every 30 days.    BLOOD PRESSURE MONITOR (BLOOD PRESSURE KIT) KIT    1 kit by Misc.(Non-Drug; Combo Route) route once daily.    BLOOD SUGAR DIAGNOSTIC STRP    Check blood glucose 4 times a day. Dispense accuchek meter or other meter preferred by insurance. Dx code e11.65    BLOOD-GLUCOSE METER KIT    ispense accuchek meter or other meter preferred by insurance. Dx code e11.65    BUPROPION (WELLBUTRIN) 75 MG TABLET    TAKE TWO TABLETS BY MOUTH TWICE DAILY    CHLORTHALIDONE (HYGROTEN) 25 MG TAB    TAKE ONE TABLET BY MOUTH once DAILY    DESOXIMETASONE (TOPICORT) 0.05 % CREAM    Apply topically 2 (two) times daily as needed.    DIABETIC SUPPLIES, LeadCloudCELLAN. KIT    Please change sensor every 14 days. FreeStyle Arvin Sensor 30-day supply (2 sensors/month)    DULAGLUTIDE (TRULICITY) 1.5 MG/0.5 ML PNIJ    Inject 1.5 mg into the skin every 7 days.    FLASH GLUCOSE SCANNING READER (FREESTYLE ARVIN READER) MISC    1 Units by Misc.(Non-Drug; Combo Route) route before meals as needed.    FLASH GLUCOSE SENSOR (FREESTYLE ARVIN SENSOR) KIT    1 Units by Misc.(Non-Drug; Combo Route) route every meal as needed.     "FLUTICASONE (FLONASE) 50 MCG/ACTUATION NASAL SPRAY    1 spray (50 mcg total) by Each Nare route once daily.    GABAPENTIN (NEURONTIN) 300 MG CAPSULE    Take 1 capsule (300 mg total) by mouth every evening.    INSULIN ASPART U-100 (NOVOLOG FLEXPEN U-100 INSULIN) 100 UNIT/ML INPN PEN    Novolog Flexpen U-100 Insulin aspart 100 unit/mL (3 mL) subcutaneous    INSULIN DEGLUDEC (TRESIBA FLEXTOUCH U-200) 200 UNIT/ML (3 ML) INPN    Inject 30 Units into the skin once daily.    LANCETS MISC    Check blood glucose 4 times a day. Dispense accuchek meter or other meter preferred by insurance. Dx code e11.65    LANCING DEVICE WITH LANCETS MISC    USE AS DIRECTED FOR DIABETIC TESTING    LATANOPROST 0.005 % OPHTHALMIC SOLUTION    Place 1 drop into both eyes every evening.    MELOXICAM (MOBIC) 7.5 MG TABLET    TAKE ONE Tablet BY MOUTH once DAILY WITH FOOD for 2 weeks then as needed    METFORMIN (GLUCOPHAGE) 1000 MG TABLET    Take 1 tablet (1,000 mg total) by mouth 2 (two) times daily with meals.    MULTIVITAMIN WITH MINERALS TABLET    Take 1 tablet by mouth once daily.    NAPROXEN (NAPROSYN) 500 MG TABLET    Take 500 mg by mouth 2 (two) times daily as needed.    PEN NEEDLE, DIABETIC (BD ULTRA-FINE LENY PEN NEEDLES) 32 GAUGE X 5/32" NDLE    USE FIVE TIMES DAILY    PRAVASTATIN (PRAVACHOL) 40 MG TABLET    TAKE ONE TABLET BY MOUTH EVERY DAY    ROPINIROLE (REQUIP) 0.5 MG TABLET    TAKE ONE TABLET BY MOUTH EVERY NIGHT AT BEDTIME    TRAMADOL (ULTRAM) 50 MG TABLET    Take 50 mg by mouth every 6 (six) hours as needed for Pain.    VALSARTAN (DIOVAN) 320 MG TABLET    TAKE ONE TABLET BY MOUTH EVERY EVENING   Changed and/or Refilled Medications    Modified Medication Previous Medication    LORATADINE (CLARITIN) 10 MG TABLET loratadine (CLARITIN) 10 mg tablet       Take 1 tablet (10 mg total) by mouth daily as needed for Allergies.    Take 1 tablet (10 mg total) by mouth daily as needed for Allergies.         CARE TEAM:  Patient Care Team:  Yudi" TIFFANIE Smart MD as PCP - General (Internal Medicine)  Yudi Smart MD as Hypertension Digital Medicine Responsible Provider (Internal Medicine)  Fran AlbaradoD as Hypertension Digital Medicine Clinician (Pharmacist)  Rafa Vázquez as Digital Medicine Health   Susie Mauro LPN as Licensed Practical Nurse           SCREENING HISTORY:  Health Maintenance       Date Due Completion Date    Zoster Vaccine 03/10/2011 ---    Foot Exam 03/12/2019 3/12/2018    Override on 2/23/2015: Done    DEXA SCAN 03/29/2019 3/29/2016    Lipid Panel 08/02/2019 8/2/2018    Hemoglobin A1c 08/05/2019 2/5/2019    Urine Microalbumin 08/21/2019 8/21/2018    Colonoscopy 01/07/2020 1/7/2015    Override on 12/3/2004: Done    Eye Exam 02/26/2020 2/26/2019    Override on 1/18/2016: Done (Dr. Lujan - no retinopathy)    Low Dose Statin 04/22/2020 4/22/2019    Mammogram 08/02/2020 8/2/2018    TETANUS VACCINE 09/30/2025 9/30/2015            REVIEW OF SYSTEMS:   The patient reports: fair dietary habits.  The patient reports : that they do not exercise, but stay active.  Review of Systems   Constitutional: Negative for chills, fatigue, fever and unexpected weight change.   HENT: Positive for congestion. Negative for ear pain, sinus pain and sore throat.    Eyes: Negative for pain and visual disturbance.   Respiratory: Negative for cough, shortness of breath and wheezing.    Cardiovascular: Negative for chest pain, palpitations and leg swelling.   Gastrointestinal: Positive for constipation. Negative for abdominal pain, diarrhea, nausea and vomiting.   Genitourinary: Negative for dysuria.   Musculoskeletal: Negative for arthralgias and back pain.   Skin: Positive for rash (- on right upper back with itching).   Neurological: Negative for weakness and headaches.   Psychiatric/Behavioral: Negative for dysphoric mood and sleep disturbance. The patient is not nervous/anxious.      Breast ROS: negative for breast lumps  positive for -  "she reports a sensation in her left nipple when she lays on her breasts that she does not feel on the right side             Physical Examination:   Vitals:    04/22/19 0923   BP: 130/60   Pulse: 74   Temp: 98.3 °F (36.8 °C)     Weight: 86.9 kg (191 lb 9.3 oz)   Height: 5' 3" (160 cm)   Body mass index is 33.94 kg/m².      Patient did not require to have a chaperone present during the exam today.  General appearance - alert, well appearing, and in no distress and obese  Mental status - alert, oriented to person, place, and time, normal mood, behavior, speech, dress, motor activity, and thought processes  Eyes - pupils equal and reactive, extraocular eye movements intact, sclera anicteric  Ears - bilateral TM's and external ear canals normal  Nose - normal nontender sinuses and mucosal congestion  Mouth - mucous membranes moist, pharynx normal without lesions  Neck - supple, no significant adenopathy, carotids upstroke normal bilaterally, no bruits  Lymphatics - no palpable lymphadenopathy  Chest - clear to auscultation, no wheezes, rales or rhonchi, symmetric air entry  Heart - normal rate and regular rhythm, no gallops noted  Abdomen - not examined  Breasts - not examined  Back exam - full range of motion, no tenderness, palpable spasm or pain on motion  Neurological - alert, oriented, normal speech, no focal findings or movement disorder noted, cranial nerves II through XII intact  Musculoskeletal - no muscular tenderness noted, Mild-Moderate osteoarthritic changes noted to both knee joints. No joint effusions noted.   Extremities - no pedal edema noted  Skin - normal coloration and turgor, no suspicious skin lesions noted; small patch on right upper back that felt rough and was slightly hyper pigmented      Protective Sensation (w/ 10 gram monofilament):  Right: Intact  Left: Intact    Visual Inspection:  Normal -  Bilateral    Pedal Pulses:   Right: Present  Left: Present    Posterior tibialis: "   Right:Present  Left: Present         Lab Results   Component Value Date    HGBA1C 7.3 (H) 02/05/2019    HGBA1C 8.6 (H) 08/02/2018    HGBA1C 7.9 (H) 07/21/2017      Lab Results   Component Value Date    CHOL 155 08/02/2018    CHOL 176 03/10/2017    CHOL 172 03/29/2016     Lab Results   Component Value Date    LDLCALC 88.8 08/02/2018    LDLCALC 109.0 03/10/2017    LDLCALC 90.2 03/29/2016          ASSESSMENT AND PLAN:  1. Routine medical exam  Counseled on age appropriate medical preventative services including age appropriate cancer screenings, age appropriate eye and dental exams, over all nutritional health, need for a consistent exercise regimen, and an over all push towards maintaining a vigorous and active lifestyle.  Counseled on age appropriate vaccines and discussed upcoming health care needs based on age/gender. Discussed good sleep hygiene and stress management.     2. Type 2 diabetes, uncontrolled, with neuropathy/3. 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 Long-term insulin use  Diabetes uncontrolled, but much improved 2 months ago.  She has been having some difficulty with medication causing some nausea.  We discussed diabetic diet and regular exercise.  She will follow up with endocrinology in the near future.  Continue current regimen for now.    5. Essential hypertension  Discussed sodium restriction, maintaining ideal body weight and regular exercise program as physiologic means to achieve blood pressure control. The patient will strive towards this. The current medical regimen is effective;  continue present plan and medications. Recommended patient to check home readings to monitor and see me for followup as scheduled or sooner as needed. Patient was educated that both decongestant and anti-inflammatory medication may raise blood pressure.     6. Hyperlipidemia LDL goal <70  We discussed low fat diet and regular exercise.The current medical regimen is  effective;  continue present plan and medications.      7. Mild major depression  The current medical regimen is effective;  continue present plan and medications.     8. Allergic rhinitis  We discussed several treatment strategies: antihistamine at bedtime, flonase in the morning. We also discussed saline nasal rinse in the evening as needed. I recommended allergy covers for pillow and mattress. Patient will let me know if symptoms worsen or persist.   - loratadine (CLARITIN) 10 mg tablet; Take 1 tablet (10 mg total) by mouth daily as needed for Allergies.  Dispense: 90 tablet; Refill: 1    9. Obesity (BMI 30-39.9)  The patient is asked to make an attempt to improve diet and exercise patterns to aid in medical management of this problem.   She is considering bariatric surgery.  Consult order place.  - Ambulatory consult to Bariatric Surgery    10. Need for shingles vaccine  Patient was advised to get immunization at the pharmacy.     11. Disorder of bone and cartilage  BMD scheduled.    12. Chronic constipation  We discussed the need to prevent constipation rather than treated.  I recommended daily MiraLax.  She likely has constipation due to her chronic diabetes which has caused some neuropathy and slowing of the GI tract.    13. Rash  I discussed with her to try over-the-counter hydrocortisone cream twice a day and increased moisturization in general.  Consider Dermatology consultation if symptoms worsen or persist.    14. Breast pain, left  Mammogram in August of last year was normal.  She continues to have this altered sensation in the left nipple.  I will refer her to the breast specialist for further evaluation.  - Ambulatory Referral to Breast Surgery          Follow up in about 6 months (around 10/22/2019) for follow up chronic medical conditions.. or sooner as needed.

## 2019-04-24 DIAGNOSIS — I10 ESSENTIAL HYPERTENSION: ICD-10-CM

## 2019-04-24 DIAGNOSIS — F32.0 MAJOR DEPRESSIVE DISORDER, SINGLE EPISODE, MILD: ICD-10-CM

## 2019-04-24 RX ORDER — BUPROPION HYDROCHLORIDE 75 MG/1
TABLET ORAL
Qty: 180 TABLET | Refills: 1 | Status: SHIPPED | OUTPATIENT
Start: 2019-04-24 | End: 2019-08-10 | Stop reason: SDUPTHER

## 2019-04-24 RX ORDER — AMLODIPINE BESYLATE 10 MG/1
TABLET ORAL
Qty: 30 TABLET | Refills: 5 | Status: SHIPPED | OUTPATIENT
Start: 2019-04-24 | End: 2019-12-14 | Stop reason: SDUPTHER

## 2019-04-25 ENCOUNTER — NURSE TRIAGE (OUTPATIENT)
Dept: ADMINISTRATIVE | Facility: CLINIC | Age: 69
End: 2019-04-25

## 2019-04-25 NOTE — TELEPHONE ENCOUNTER
Reason for Disposition   Age > 40 and no obvious cause and pain even when not moving the arm (Exception: pain is clearly made worse by moving arm or bending neck)    Protocols used: SHOULDER PAIN-A-OH    Pt states she laid down all day yesterday and was nauseated. She states she has been nauseated all day today and started having left shoulder pain that would radiate down her arm to her left elbow. Pain is not duplicated by moving arm and does happen even when she is not moving it. Denies chest pain, sweating, or SOB. Advised her to seek treatment in the ER, caller agrees.

## 2019-04-26 ENCOUNTER — TELEPHONE (OUTPATIENT)
Dept: FAMILY MEDICINE | Facility: CLINIC | Age: 69
End: 2019-04-26

## 2019-04-26 NOTE — TELEPHONE ENCOUNTER
Pt is scheduled to see LORENZO Olmos on 5/14/19 @ 11am (1st available). The patient did not want to wait until August for an appointment on the [facility]. The patient will call the Bariatrics department to schedule her appointment. Thanks.

## 2019-04-29 ENCOUNTER — PATIENT OUTREACH (OUTPATIENT)
Dept: OTHER | Facility: OTHER | Age: 69
End: 2019-04-29

## 2019-04-29 NOTE — PROGRESS NOTES
Last 5 Patient Entered Readings                                      Current 30 Day Average: 139/67     Recent Readings 4/26/2019 4/23/2019 4/21/2019 4/21/2019 4/14/2019    SBP (mmHg) 128 147 151 151 144    DBP (mmHg) 62 69 66 69 63    Pulse 72 77 76 78 77          4/29: LVM.  Will call in 2 weeks.

## 2019-04-30 ENCOUNTER — TELEPHONE (OUTPATIENT)
Dept: FAMILY MEDICINE | Facility: CLINIC | Age: 69
End: 2019-04-30

## 2019-04-30 NOTE — TELEPHONE ENCOUNTER
"Patient stated she is not taking her medication that where prescribed since she seen you. Agitated she would not go into specifics when asked which medications she stated "Check your records all my medications" just tell her I can't afford them now. I'll get them on 05/8th or 9th.  "

## 2019-04-30 NOTE — TELEPHONE ENCOUNTER
Pt says she has not been taking her RX because she can not afford it and will not be able to get RX until 5/8-9/2019 when she receives her money, Pt just wanted to notify provider.        Thank you,  Jamila

## 2019-04-30 NOTE — TELEPHONE ENCOUNTER
----- Message from Hailee Ovalles sent at 4/30/2019 11:36 AM CDT -----  Contact: pt  Type: Patient Call Back    Who called:pt    What is the request in detail:pt wants to speak to the nurse in regards to affording her medication. Call pt    Can the clinic reply by [facility]?    Would the patient rather a call back or a response via [facility]? Call back    Best call back number:[phone]    Additional Information:

## 2019-05-01 NOTE — TELEPHONE ENCOUNTER
I do not see any new medications that I started.  Please advise patient that we can contact the pharmacy assistance program if we know which medication she may need help with.

## 2019-05-02 ENCOUNTER — TELEPHONE (OUTPATIENT)
Dept: PHARMACY | Facility: CLINIC | Age: 69
End: 2019-05-02

## 2019-05-06 ENCOUNTER — TELEPHONE (OUTPATIENT)
Dept: PHARMACY | Facility: CLINIC | Age: 69
End: 2019-05-06

## 2019-05-06 NOTE — TELEPHONE ENCOUNTER
Patient contacted by pharmacy assistance program and was rude - will not be able to help the patient at this time.

## 2019-05-06 NOTE — TELEPHONE ENCOUNTER
Returned call to patient left a message for patient to call Mary Kate with Pharmacy Patient Assistance.

## 2019-05-06 NOTE — TELEPHONE ENCOUNTER
I returned patients called and patient was very upset when I was trying to explain what documents were required to apply for Pharmacy Patient Assistance.  Patient stated if I need any documation I can get it from her doctor and pharmacist. She said I was not trying to help her and she have been out of her medication for days now. Patient wouldn't let me get a word in.  She disconnected the call saying she haven't had her medication and I wasn't trying to help.  I called her back asking if she could listen to me explain what was needed to assist her she said I was just trying to cover my butt(not the term used) and disconnected the call again. We will not be able to assist the patient unless she provides her proof of income and EOB.

## 2019-05-13 RX ORDER — MELOXICAM 7.5 MG/1
TABLET ORAL
Qty: 30 TABLET | Refills: 0 | OUTPATIENT
Start: 2019-05-13

## 2019-05-13 NOTE — PROGRESS NOTES
Last 5 Patient Entered Readings                                      Current 30 Day Average: 138/64     Recent Readings 5/7/2019 5/2/2019 4/30/2019 4/30/2019 4/26/2019    SBP (mmHg) 129 134 131 140 128    DBP (mmHg) 58 63 59 65 62    Pulse 70 67 70 72 72        Patient states her blood sugars are going up.  Reports no changes in lifestyle recently.  Reports no new goals she would like to work on.    Digital Medicine: Health  Follow Up    Lifestyle Modifications:    1.Dietary Modifications (Sodium intake <2,000mg/day, food labels, dining out): No change in diet.    2.Physical Activity: Reports no change in PA    3.Medication Therapy: Patient has been compliant with the medication regimen.    4.Patient has the following medication side effects/concerns: none  (Frequency/Alleviating factors/Precipitating factors, etc.)     Follow up with Mrs. Cecilia Correa Jun completed. No further questions or concerns. Will continue to follow up to achieve health goals.

## 2019-05-14 ENCOUNTER — TELEPHONE (OUTPATIENT)
Dept: FAMILY MEDICINE | Facility: CLINIC | Age: 69
End: 2019-05-14

## 2019-05-14 ENCOUNTER — TELEPHONE (OUTPATIENT)
Dept: SURGERY | Facility: CLINIC | Age: 69
End: 2019-05-14

## 2019-05-14 DIAGNOSIS — R29.90 ABNORMAL NEUROLOGICAL EXAM: Primary | ICD-10-CM

## 2019-05-14 NOTE — TELEPHONE ENCOUNTER
----- Message from Jasmyn Pritchett sent at 5/14/2019 12:38 PM CDT -----  ..Type: Patient Call Back    Who called: Pt     What is the request in detail: Pt asking for a call back from Dr. Smart. She states she went to her Workman's Comp doctor and they diagnosed her with Early onset Parkinson's.    Can the clinic reply by [facility]? No     Would the patient rather a call back or a response via [facility]? Call back     Best call back number: [phone]

## 2019-05-14 NOTE — TELEPHONE ENCOUNTER
Contacted the patient to reschedule her appointment that was canceled on today. Patient did not answer, left a voicemail for her to call me back on my direct line.

## 2019-05-15 ENCOUNTER — TELEPHONE (OUTPATIENT)
Dept: SURGERY | Facility: CLINIC | Age: 69
End: 2019-05-15

## 2019-05-15 NOTE — TELEPHONE ENCOUNTER
Spoke with pt informed of recommendation. Pt is requesting a referral to neurology.   Please advise.

## 2019-05-15 NOTE — TELEPHONE ENCOUNTER
Contacted the patient to reschedule her appointment that was canceled on 5/14/19 with Genesis Solis PA-C. I rescheduled the patient appointment to 5/20/19 @ 11am. The patient voiced understanding of Date,Time and Location. A reminder letter will be mailed out to the patient.

## 2019-05-20 ENCOUNTER — TELEPHONE (OUTPATIENT)
Dept: SURGERY | Facility: CLINIC | Age: 69
End: 2019-05-20

## 2019-05-20 NOTE — TELEPHONE ENCOUNTER
Contacted the patient to reschedule her appointment on 5/20/19. Scheduled the patient appointment to 5/22/19 @ 1:30pm with Genesis Solis PA-C.

## 2019-05-22 ENCOUNTER — TELEPHONE (OUTPATIENT)
Dept: SURGERY | Facility: CLINIC | Age: 69
End: 2019-05-22

## 2019-05-22 ENCOUNTER — DOCUMENTATION ONLY (OUTPATIENT)
Dept: BARIATRICS | Facility: CLINIC | Age: 69
End: 2019-05-22

## 2019-05-22 NOTE — TELEPHONE ENCOUNTER
Contacted the patient regarding the message below. Patient did not answer, left a voicemail to call me back on my direct line.        ----- Message from Sharon Denis sent at 5/21/2019  5:20 PM CDT -----  Contact: self  Pt called to cancel and wilda appt.          Pt callback number [phone]

## 2019-05-22 NOTE — PROGRESS NOTES
Date: 05- 20:08:55  Patient's Name: Cecilia Barahona  YOB: 1950 Email: [email or web address]  Phone: [phone]   Patient Address: [address] [place] [address] [address] [place] [address]  Preferred Surgical Location: [facility]   I certify that I am 18 years of age or older: Yes   Confirmation Code: [id number]  Verification of Bariatric Seminar: yes  Insurance Information  Insurance or Self Pay? Insurance - Fill out fields below  Insurance Company Name: [employer]   Type of Coverage/Coverage Plan (i.e. PPO, HMO): HMO   Group Name:    Subscriber Name: Cecilia Barahona   Member Name (patient's name): Same   Member ID/Policy #:[id number]   Plan Effective Date: 11/04/2018  Card Issuance date: 11/04/2018

## 2019-06-04 ENCOUNTER — PATIENT OUTREACH (OUTPATIENT)
Dept: OTHER | Facility: OTHER | Age: 69
End: 2019-06-04

## 2019-06-04 NOTE — PROGRESS NOTES
Last 5 Patient Entered Readings                                      Current 30 Day Average: 135/65     Recent Readings 6/4/2019 6/3/2019 6/3/2019 6/2/2019 6/1/2019    SBP (mmHg) 133 136 116 132 128    DBP (mmHg) 65 66 69 57 66    Pulse 79 74 79 74 77          HPI:  Called patient to follow up. Patient endorses adherence to medication regimen. Patient denies hypotensive s/sx (lightheadedness, dizziness, nausea, fatigue); patient denies hypertensive s/sx (SOB, CP, severe headaches, changes in vision).      Patient reports she is currently in [place] visiting her daughter and will be returning for upcoming appointments.    Assessment:  Reviewed recent readings. Per 2017 ACC/ AHA HTN guidelines (goal of BP < 130/80), current 30-day average needs to be addressed more thoroughly today. BP improved since last health  outreach.     Plan:  Continue current medication regimen. Be consistent with healthy lifestyle. Charge BP cuff once/month. I will continue to monitor regularly and will follow-up in 4 to 6 weeks, sooner if blood pressure begins to trend upward or downward.     Current medication regimen:  Hypertension Medications             amLODIPine (NORVASC) 10 MG tablet TAKE ONE TABLET BY MOUTH ONCE DAILY    chlorthalidone (HYGROTEN) 25 MG Tab TAKE ONE TABLET BY MOUTH once DAILY    valsartan (DIOVAN) 320 MG tablet TAKE ONE TABLET BY MOUTH EVERY EVENING          Patient denies having questions or concerns. Patient has my contact information and knows to call with any concerns or clinical changes.

## 2019-06-06 RX ORDER — MELOXICAM 7.5 MG/1
TABLET ORAL
Qty: 30 TABLET | Refills: 0 | Status: SHIPPED | OUTPATIENT
Start: 2019-06-06 | End: 2020-05-18 | Stop reason: CLARIF

## 2019-06-07 ENCOUNTER — OFFICE VISIT (OUTPATIENT)
Dept: NEUROLOGY | Facility: CLINIC | Age: 69
End: 2019-06-07
Payer: MEDICARE

## 2019-06-07 VITALS
BODY MASS INDEX: 33.49 KG/M2 | DIASTOLIC BLOOD PRESSURE: 63 MMHG | HEIGHT: 63 IN | WEIGHT: 189 LBS | SYSTOLIC BLOOD PRESSURE: 134 MMHG | HEART RATE: 70 BPM

## 2019-06-07 DIAGNOSIS — I63.81 LACUNAR INFARCTION: ICD-10-CM

## 2019-06-07 DIAGNOSIS — Z86.73 HISTORY OF STROKE: Primary | ICD-10-CM

## 2019-06-07 PROCEDURE — 3078F PR MOST RECENT DIASTOLIC BLOOD PRESSURE < 80 MM HG: ICD-10-PCS | Mod: HCNC,CPTII,S$GLB, | Performed by: PSYCHIATRY & NEUROLOGY

## 2019-06-07 PROCEDURE — 99204 PR OFFICE/OUTPT VISIT, NEW, LEVL IV, 45-59 MIN: ICD-10-PCS | Mod: HCNC,S$GLB,, | Performed by: PSYCHIATRY & NEUROLOGY

## 2019-06-07 PROCEDURE — 99204 OFFICE O/P NEW MOD 45 MIN: CPT | Mod: HCNC,S$GLB,, | Performed by: PSYCHIATRY & NEUROLOGY

## 2019-06-07 PROCEDURE — 3078F DIAST BP <80 MM HG: CPT | Mod: HCNC,CPTII,S$GLB, | Performed by: PSYCHIATRY & NEUROLOGY

## 2019-06-07 PROCEDURE — 3075F SYST BP GE 130 - 139MM HG: CPT | Mod: HCNC,CPTII,S$GLB, | Performed by: PSYCHIATRY & NEUROLOGY

## 2019-06-07 PROCEDURE — 1101F PR PT FALLS ASSESS DOC 0-1 FALLS W/OUT INJ PAST YR: ICD-10-PCS | Mod: HCNC,CPTII,S$GLB, | Performed by: PSYCHIATRY & NEUROLOGY

## 2019-06-07 PROCEDURE — 99999 PR PBB SHADOW E&M-EST. PATIENT-LVL V: ICD-10-PCS | Mod: PBBFAC,HCNC,, | Performed by: PSYCHIATRY & NEUROLOGY

## 2019-06-07 PROCEDURE — 3075F PR MOST RECENT SYSTOLIC BLOOD PRESS GE 130-139MM HG: ICD-10-PCS | Mod: HCNC,CPTII,S$GLB, | Performed by: PSYCHIATRY & NEUROLOGY

## 2019-06-07 PROCEDURE — 1101F PT FALLS ASSESS-DOCD LE1/YR: CPT | Mod: HCNC,CPTII,S$GLB, | Performed by: PSYCHIATRY & NEUROLOGY

## 2019-06-07 PROCEDURE — 99999 PR PBB SHADOW E&M-EST. PATIENT-LVL V: CPT | Mod: PBBFAC,HCNC,, | Performed by: PSYCHIATRY & NEUROLOGY

## 2019-06-07 RX ORDER — PROMETHAZINE HYDROCHLORIDE 25 MG/1
25 TABLET ORAL DAILY
Refills: 1 | COMMUNITY
Start: 2019-05-01 | End: 2020-05-18 | Stop reason: CLARIF

## 2019-06-07 RX ORDER — ACETAMINOPHEN AND CODEINE PHOSPHATE 300; 30 MG/1; MG/1
1 TABLET ORAL DAILY
Refills: 1 | COMMUNITY
Start: 2019-05-01 | End: 2020-05-18 | Stop reason: CLARIF

## 2019-06-07 NOTE — LETTER
June 7, 2019      Yudi Smart MD  [address] [place] [address]           [facility] [address] [facility] [address]  [facility] [address]  Phone: [phone]  Fax: [phone]          Patient: Cecilia Barahona   MR Number: [id number]   YOB: 1950   Date of Visit: 6/7/2019       Dear Dr. Yudi Smart:    Thank you for referring Cecilia Barahona to me for evaluation. Attached you will find relevant portions of my assessment and plan of care.    If you have questions, please do not hesitate to call me. I look forward to following Cecilia Barahona along with you.    Sincerely,    María Elena Membreno,     Enclosure  CC:  No Recipients    If you would like to receive this communication electronically, please contact [email or web address] or [phone] to request more information on [facility] access.    For providers and/or their staff who would like to refer a patient to [facility], please contact us through our one-stop-shop provider referral line, [facility], at [phone].    If you feel you have received this communication in error or would no longer like to receive these types of communications, please e-mail [email or web address]

## 2019-06-07 NOTE — PROGRESS NOTES
Neurology Consult Note    Chief Complaint: patient states she was told she had Parkinson's disease by another neurologist    HPI:   Cecilia Barahona is a 69 y.o. female with medical conditions as outlined below who presents for further evaluation of possible parkinson's. She states she went to a workman's compensation neurologist for a routine physical exam and she was told she had Parkinson's disease. She states she receives workman's compensation for a fall in . She denies any recent falls. She denies rest tremor, difficulty swallowing, or difficulty writing. She had a CT head in  which showed an incidental left basal ganglia infarct, but was otherwise unremarkable. She has no known history of a stroke. She takes aspirin and a statin daily. She has a history of HTN, HLD and DM. She denies having an irregular heart rate. She has no further complaints.    Past Medical History:  Past Medical History:   Diagnosis Date    Allergic rhinitis, seasonal     Arthritis     Glaucoma NEC     History of positive PPD - treated - remote past     Hyperlipidemia LDL goal < 100     Hypertension     Nuclear sclerosis of both eyes 2019    Obesity     CHETNA (obstructive sleep apnea)     Type II or unspecified type diabetes mellitus without mention of complication, uncontrolled        Past Surgical History:  Past Surgical History:   Procedure Laterality Date    CARPAL TUNNEL RELEASE       SECTION, CLASSIC      COLONOSCOPY N/A 2015    Performed by [facility]el [facility] at [facility] END[facility] (4TH FLR)    ESOPHAGOGASTRODUODENOSCOPY (EGD) N/A 2015    [facility] at [facility] (4TH FLR)    RELEASE-FINGER-TRIGGER left thumb Left 2014    Performed by [facility] at [facility] OR 1ST FLR    RELEASE-FINGER-TRIGGER, right ring Right 10/6/2014    [facility]    TOTAL ABDOMINAL HYSTERECTOMY      TRIGGER FINGER RELEASE         Social  History:  Social History     Socioeconomic History    Marital status:      Spouse name: Not on file    Number of children: 3    Years of education: Not on file    Highest education level: Not on file   Occupational History    Occupation: disabled - back and leg pain   Social Needs    Financial resource strain: Somewhat hard    Food insecurity:     Worry: Never true     Inability: Sometimes true    Transportation needs:     Medical: No     Non-medical: No   Tobacco Use    Smoking status: Former Smoker     Packs/day: 0.50     Types: Cigarettes     Last attempt to quit: 11/10/2012     Years since quittin.5    Smokeless tobacco: Never Used   Substance and Sexual Activity    Alcohol use: No    Drug use: No    Sexual activity: Never     Partners: Male   Lifestyle    Physical activity:     Days per week: Not on file     Minutes per session: Not on file    Stress: Not on file   Relationships    Social connections:     Talks on phone: Not on file     Gets together: Not on file     Attends Latter day service: Not on file     Active member of club or organization: Not on file     Attends meetings of clubs or organizations: Not on file     Relationship status: Not on file   Other Topics Concern    Not on file   Social History Narrative    Not on file       Family History:  Family History   Problem Relation Age of Onset    Hypertension Mother     Diabetes Mother     Heart failure Mother     Cataracts Mother     Glaucoma Mother     Prostate cancer Father     Hypertension Father     Diabetes Father     Heart failure Father     No Known Problems Sister     Alcohol abuse Maternal Aunt     Diabetes Maternal Uncle     Hyperlipidemia Maternal Uncle     Hypertension Maternal Uncle     Diabetes Maternal Grandmother     Diabetes Maternal Grandfather     No Known Problems Paternal Grandmother     No Known Problems Paternal Grandfather     Diabetes Maternal Aunt     Alzheimer's disease  Maternal Aunt     Heart disease Cousin         s/p heart transplant    No Known Problems Paternal Aunt     No Known Problems Paternal Uncle     Amblyopia Neg Hx     Blindness Neg Hx     Cancer Neg Hx     Macular degeneration Neg Hx     Retinal detachment Neg Hx     Strabismus Neg Hx     Stroke Neg Hx     Thyroid disease Neg Hx        Medications:  Current Outpatient Medications   Medication Sig Dispense Refill    acetaminophen-codeine 300-30mg (TYLENOL #3) 300-30 mg Tab Take 1 tablet by mouth once daily.  1    albuterol 90 mcg/actuation inhaler Inhale 2 puffs into the lungs every 6 (six) hours as needed for Wheezing or Shortness of Breath. Rescue 1 Inhaler 0    alcohol swabs PadM Apply 1 each topically 3 (three) times daily. 400 each 2    amLODIPine (NORVASC) 10 MG tablet TAKE ONE TABLET BY MOUTH ONCE DAILY 30 tablet 5    artificial tear ointment (ARTIFICIAL TEARS) Oint Place into both eyes every evening. 305 g 1    aspirin 81 MG Chew Take by mouth once daily. 1 tablet, chewable Oral Every day      azelastine (OPTIVAR) 0.05 % ophthalmic solution INSTILL 1 DROP IN EACH EYE TWICE DAILY 12 mL 0    blood glucose control, low (EMBRACE GLUCOSE CONTROL LOW) Soln Inject 1 Bottle into the skin every 30 days. 1 each 0    blood pressure monitor (BLOOD PRESSURE KIT) Kit 1 kit by Misc.(Non-Drug; Combo Route) route once daily. 1 each 0    blood sugar diagnostic Strp Check blood glucose 4 times a day. Dispense accuchek meter or other meter preferred by insurance. Dx code e11.65 400 strip 3    buPROPion (WELLBUTRIN) 75 MG tablet TAKE TWO TABLETS BY MOUTH TWICE DAILY 180 tablet 1    chlorthalidone (HYGROTEN) 25 MG Tab TAKE ONE TABLET BY MOUTH once DAILY 90 tablet 2    desoximetasone (TOPICORT) 0.05 % cream Apply topically 2 (two) times daily as needed. 100 g 2    diabetic supplies, miscellan. Kit Please change sensor every 14 days. FreeStyle Arvin Sensor 30-day supply (2 sensors/month) 2 kit 5     "dulaglutide (TRULICITY) 1.5 mg/0.5 mL PnIj Inject 1.5 mg into the skin every 7 days. 4 Syringe 5    flash glucose scanning reader (FREESTYLE ANAMARIA READER) Misc 1 Units by Misc.(Non-Drug; Combo Route) route before meals as needed. 1 each 11    flash glucose sensor (FREESTYLE ANAMARIA SENSOR) Kit 1 Units by Misc.(Non-Drug; Combo Route) route every meal as needed. 1 kit 0    fluticasone (FLONASE) 50 mcg/actuation nasal spray 1 spray (50 mcg total) by Each Nare route once daily. 16 g 5    gabapentin (NEURONTIN) 300 MG capsule Take 1 capsule (300 mg total) by mouth every evening. 90 capsule 1    insulin aspart U-100 (NOVOLOG FLEXPEN U-100 INSULIN) 100 unit/mL InPn pen Novolog Flexpen U-100 Insulin aspart 100 unit/mL (3 mL) subcutaneous      insulin degludec (TRESIBA FLEXTOUCH U-200) 200 unit/mL (3 mL) InPn Inject 30 Units into the skin once daily. 3 Syringe 5    lancets Misc Check blood glucose 4 times a day. Dispense accuchek meter or other meter preferred by insurance. Dx code e11.65 400 each 3    LANCING DEVICE WITH LANCETS Misc USE AS DIRECTED FOR DIABETIC TESTING 1 each 0    latanoprost 0.005 % ophthalmic solution Place 1 drop into both eyes every evening. 7.5 mL 2    loratadine (CLARITIN) 10 mg tablet Take 1 tablet (10 mg total) by mouth daily as needed for Allergies. 90 tablet 1    meloxicam (MOBIC) 7.5 MG tablet TAKE ONE Tablet BY MOUTH once DAILY WITH FOOD for 2 weeks then AS NEEDED 30 tablet 0    metFORMIN (GLUCOPHAGE) 1000 MG tablet Take 1 tablet (1,000 mg total) by mouth 2 (two) times daily with meals. 180 tablet 1    multivitamin with minerals tablet Take 1 tablet by mouth once daily.      naproxen (NAPROSYN) 500 MG tablet Take 500 mg by mouth 2 (two) times daily as needed.  1    pen needle, diabetic (BD ULTRA-FINE LENY PEN NEEDLES) 32 gauge x 5/32" Ndle USE FIVE TIMES DAILY 100 each 5    pravastatin (PRAVACHOL) 40 MG tablet TAKE ONE TABLET BY MOUTH EVERY DAY 90 tablet 1    promethazine " (PHENERGAN) 25 MG tablet Take 25 mg by mouth once daily.  1    rOPINIRole (REQUIP) 0.5 MG tablet TAKE ONE TABLET BY MOUTH EVERY NIGHT AT BEDTIME 90 tablet 0    tramadol (ULTRAM) 50 mg tablet Take 50 mg by mouth every 6 (six) hours as needed for Pain.      valsartan (DIOVAN) 320 MG tablet TAKE ONE TABLET BY MOUTH EVERY EVENING 30 tablet 5    blood glucose control high,low (ACCU-CHEK BRIDGETTE CONTROL SOLN) Soln 1 kit by Misc.(Non-Drug; Combo Route) route once daily. 1 each 0    blood-glucose meter kit ispense accuchek meter or other meter preferred by insurance. Dx code e11.65 1 each 0     No current facility-administered medications for this visit.        Allergies:  Review of patient's allergies indicates:   Allergen Reactions    Ace inhibitors Other (See Comments)     cough    Invokana [canagliflozin] Other (See Comments)     Throat and tongue swelling    Ozempic [semaglutide]      Nausea and constipation on 0.25 mg dose.        ROS:  A 12 point review of system was negative aside from pertinent positives and negatives as outlined above.    Physical Exam  Vitals:    06/07/19 0903   BP: 134/63   Pulse: 70       General: well nourished, well developed  Eyes: no scleral icterus   Nose: nasal turbinates intact  Neck: supple, ROM intact  Skin: no rash or ecchymosis  Joints: no swelling or erythema  Cardiac: regular rate and rhythm  Lungs: clear to auscultation bilaterally    Neuro:  Mental status: AAO x 3, no dysarthria, no aphasia, communicating appropriately  CN: PERRL, EOMI, VFF, V1-V3 sensation intact, mild right facial droop, hearing grossly intact, tongue midline  Motor:   RUE 5/5  RLE 5/5  LUE 5/5  LLE 5/5    Normal bulk and tone  No cogwheel rigidity bilaterally    Reflexes: 1+ throughout, toes equivocal bilaterally  Sensory: intact to light touch throughout  Coordination: no dysmetria on FTN  Gait: steady    Prior Imaging/Labs:  Reviewed      Assessment and Plan:  69 y.o. female with no evidence of  parkinson's disease on examination today. I will continue to follow her exam every few months to ensure she does not develop signs of this. Given mild right facial droop on examination, we discussed obtaining an open MRI brain as patient is claustrophobic and she is in agreement with this plan. It is possible right facial asymmetry is old given remote left basal ganglia infarct on CTH in 2012, will also obtain carotid US    1. History of stroke/right facial droop  - MRI Brain Without Contrast; Future  Carotid US  C/w aspirin, statin                Patient was advised to notify me for worsening symptoms. I will see patient back in 1 month or sooner if necessary.       María Elena Membreno DO  [facility]  [address] [facility] [address]  [place] [address] [phone]

## 2019-06-12 ENCOUNTER — HOSPITAL ENCOUNTER (OUTPATIENT)
Dept: RADIOLOGY | Facility: HOSPITAL | Age: 69
Discharge: HOME | End: 2019-06-12
Attending: PSYCHIATRY & NEUROLOGY
Payer: MEDICARE

## 2019-06-12 ENCOUNTER — HOSPITAL ENCOUNTER (OUTPATIENT)
Dept: RADIOLOGY | Facility: HOSPITAL | Age: 69
Discharge: HOME | End: 2019-06-12
Attending: INTERNAL MEDICINE
Payer: MEDICARE

## 2019-06-12 DIAGNOSIS — I63.81 LACUNAR INFARCTION: ICD-10-CM

## 2019-06-12 DIAGNOSIS — M94.9 DISORDER OF BONE AND CARTILAGE: ICD-10-CM

## 2019-06-12 DIAGNOSIS — M89.9 DISORDER OF BONE AND CARTILAGE: ICD-10-CM

## 2019-06-12 PROCEDURE — 93880 US CAROTID BILATERAL: ICD-10-PCS | Mod: 26,HCNC,, | Performed by: RADIOLOGY

## 2019-06-12 PROCEDURE — 93880 EXTRACRANIAL BILAT STUDY: CPT | Mod: TC,HCNC

## 2019-06-12 PROCEDURE — 77080 DEXA BONE DENSITY SPINE HIP: ICD-10-PCS | Mod: 26,HCNC,, | Performed by: RADIOLOGY

## 2019-06-12 PROCEDURE — 93880 EXTRACRANIAL BILAT STUDY: CPT | Mod: 26,HCNC,, | Performed by: RADIOLOGY

## 2019-06-12 PROCEDURE — 77080 DXA BONE DENSITY AXIAL: CPT | Mod: TC,HCNC

## 2019-06-12 PROCEDURE — 77080 DXA BONE DENSITY AXIAL: CPT | Mod: 26,HCNC,, | Performed by: RADIOLOGY

## 2019-06-13 ENCOUNTER — TELEPHONE (OUTPATIENT)
Dept: FAMILY MEDICINE | Facility: CLINIC | Age: 69
End: 2019-06-13

## 2019-06-13 NOTE — TELEPHONE ENCOUNTER
Spoke with pt requesting a sooner appt. Pt states she would like to discuss changing her diabetic medication and results from imaging. Pt also states she would like to discuss a personal matter but would not go into detail.   Pt declined to schedule w/ another provider and first avialable appt.  Please advise.

## 2019-06-13 NOTE — TELEPHONE ENCOUNTER
----- Message from Marifer Jennings sent at 6/13/2019  1:53 PM CDT -----  Contact: Patient  Type:  Sooner Appointment Request    Patient is requesting a sooner appointment.  Patient declined first available appointment listed as well as another facility and provider .  Patient will not accept being placed on the waitlist and is requesting a message be sent to doctor.    Name of Caller: Patient    When is the first available appointment? 9/4/2019    Symptoms: Discuss medication and test results    Would the patient rather a call back or a response via [facility]? Call back    Best Call Back Number: [phone]    Additional Information:

## 2019-06-13 NOTE — TELEPHONE ENCOUNTER
Recommend patient see Sindi Shultz NP to discuss her diabetes medication.  Which imaging test does she have concerns about?  Can she send me a message regarding the personal matter that I can respond to her? If not, we can put her in an urgent care spot next week.

## 2019-06-13 NOTE — TELEPHONE ENCOUNTER
Spoke with patient and informed her of provider recommendation to contact TAWNYA Shultz about her diabetes. Patient said that she has access to the portal and will contact provider that way for her personnel matter.

## 2019-06-17 ENCOUNTER — CLINICAL SUPPORT (OUTPATIENT)
Dept: BARIATRICS | Facility: CLINIC | Age: 69
End: 2019-06-17
Payer: MEDICARE

## 2019-06-17 ENCOUNTER — PATIENT OUTREACH (OUTPATIENT)
Dept: OTHER | Facility: OTHER | Age: 69
End: 2019-06-17

## 2019-06-17 NOTE — PROGRESS NOTES
Last 5 Patient Entered Readings                                      Current 30 Day Average: 132/66     Recent Readings 6/17/2019 6/15/2019 6/14/2019 6/13/2019 6/12/2019    SBP (mmHg) 103 132 127 119 144    DBP (mmHg) 62 58 61 73 70    Pulse 74 77 72 70 70        6/17: RCB tomorrow.

## 2019-06-18 NOTE — PROGRESS NOTES
Last 5 Patient Entered Readings                                      Current 30 Day Average: 132/65     Recent Readings 6/18/2019 6/17/2019 6/15/2019 6/14/2019 6/13/2019    SBP (mmHg) 139 103 132 127 119    DBP (mmHg) 63 62 58 61 73    Pulse 69 74 77 72 70          6/18: LVM.  Will call in 2 weeks.

## 2019-06-24 DIAGNOSIS — G25.81 RESTLESS LEGS SYNDROME (RLS): ICD-10-CM

## 2019-06-24 RX ORDER — ROPINIROLE 0.5 MG/1
TABLET, FILM COATED ORAL
Qty: 90 TABLET | Refills: 0 | Status: SHIPPED | OUTPATIENT
Start: 2019-06-24 | End: 2019-10-10 | Stop reason: SDUPTHER

## 2019-06-26 ENCOUNTER — TELEPHONE (OUTPATIENT)
Dept: ENDOCRINOLOGY | Facility: CLINIC | Age: 69
End: 2019-06-26

## 2019-06-26 NOTE — TELEPHONE ENCOUNTER
----- Message from Hudson Paris sent at 6/25/2019  2:11 PM CDT -----  Contact: Cecilia [phone]  Type:  Sooner Appointment Request    Patient is requesting a sooner appointment.  Patient declined first available appointment listed as well as another facility and provider .  Patient will not accept being placed on the waitlist and is requesting a message be sent to doctor.    Name of Caller: Cecilia     When is the first available appointment? #the patient has an appointment scheduled for Friday, June 28. She would like to reschedule for another date and time with IBIS Shultz. She would like a call back from the staff.    Symptoms: no symptoms stated    Would the patient rather a call back or a response via [facility]? Call back    Best Call Back Number: [phone]

## 2019-06-28 ENCOUNTER — LAB VISIT (OUTPATIENT)
Dept: LAB | Facility: HOSPITAL | Age: 69
End: 2019-06-28
Attending: NURSE PRACTITIONER
Payer: MEDICARE

## 2019-06-28 ENCOUNTER — OFFICE VISIT (OUTPATIENT)
Dept: ENDOCRINOLOGY | Facility: CLINIC | Age: 69
End: 2019-06-28
Payer: MEDICARE

## 2019-06-28 VITALS
BODY MASS INDEX: 33.99 KG/M2 | DIASTOLIC BLOOD PRESSURE: 67 MMHG | WEIGHT: 191.88 LBS | SYSTOLIC BLOOD PRESSURE: 116 MMHG | HEART RATE: 76 BPM

## 2019-06-28 DIAGNOSIS — I10 ESSENTIAL HYPERTENSION: ICD-10-CM

## 2019-06-28 DIAGNOSIS — E66.9 NON MORBID OBESITY, UNSPECIFIED OBESITY TYPE: ICD-10-CM

## 2019-06-28 DIAGNOSIS — E78.5 HYPERLIPIDEMIA, UNSPECIFIED HYPERLIPIDEMIA TYPE: ICD-10-CM

## 2019-06-28 PROCEDURE — 1101F PR PT FALLS ASSESS DOC 0-1 FALLS W/OUT INJ PAST YR: ICD-10-PCS | Mod: HCNC,CPTII,S$GLB, | Performed by: NURSE PRACTITIONER

## 2019-06-28 PROCEDURE — 3074F PR MOST RECENT SYSTOLIC BLOOD PRESSURE < 130 MM HG: ICD-10-PCS | Mod: HCNC,CPTII,S$GLB, | Performed by: NURSE PRACTITIONER

## 2019-06-28 PROCEDURE — 99999 PR PBB SHADOW E&M-EST. PATIENT-LVL V: CPT | Mod: PBBFAC,HCNC,, | Performed by: NURSE PRACTITIONER

## 2019-06-28 PROCEDURE — 3074F SYST BP LT 130 MM HG: CPT | Mod: HCNC,CPTII,S$GLB, | Performed by: NURSE PRACTITIONER

## 2019-06-28 PROCEDURE — 3045F PR MOST RECENT HEMOGLOBIN A1C LEVEL 7.0-9.0%: ICD-10-PCS | Mod: HCNC,CPTII,S$GLB, | Performed by: NURSE PRACTITIONER

## 2019-06-28 PROCEDURE — 99214 OFFICE O/P EST MOD 30 MIN: CPT | Mod: HCNC,S$GLB,, | Performed by: NURSE PRACTITIONER

## 2019-06-28 PROCEDURE — 36415 COLL VENOUS BLD VENIPUNCTURE: CPT | Mod: HCNC,PN

## 2019-06-28 PROCEDURE — 1101F PT FALLS ASSESS-DOCD LE1/YR: CPT | Mod: HCNC,CPTII,S$GLB, | Performed by: NURSE PRACTITIONER

## 2019-06-28 PROCEDURE — 3045F PR MOST RECENT HEMOGLOBIN A1C LEVEL 7.0-9.0%: CPT | Mod: HCNC,CPTII,S$GLB, | Performed by: NURSE PRACTITIONER

## 2019-06-28 PROCEDURE — 3078F PR MOST RECENT DIASTOLIC BLOOD PRESSURE < 80 MM HG: ICD-10-PCS | Mod: HCNC,CPTII,S$GLB, | Performed by: NURSE PRACTITIONER

## 2019-06-28 PROCEDURE — 99214 PR OFFICE/OUTPT VISIT, EST, LEVL IV, 30-39 MIN: ICD-10-PCS | Mod: HCNC,S$GLB,, | Performed by: NURSE PRACTITIONER

## 2019-06-28 PROCEDURE — 83036 HEMOGLOBIN GLYCOSYLATED A1C: CPT | Mod: HCNC

## 2019-06-28 PROCEDURE — 99999 PR PBB SHADOW E&M-EST. PATIENT-LVL V: ICD-10-PCS | Mod: PBBFAC,HCNC,, | Performed by: NURSE PRACTITIONER

## 2019-06-28 PROCEDURE — 3078F DIAST BP <80 MM HG: CPT | Mod: HCNC,CPTII,S$GLB, | Performed by: NURSE PRACTITIONER

## 2019-06-28 NOTE — PROGRESS NOTES
CC: This 69 y.o. Black or  female  is here for evaluation of  T2DM along with comorbidities indicated in the Visit Diagnosis section of this encounter.    HPI: Cecilia Barahona was diagnosed with T2DM in her 30s. Oral agents started at the time of diagnosis.         Prior visit on 2/7/19  a1c down to 7.3%, from 8.6% in August.   She has lost 5 lb since lov.   Admits that she's been eating sweets with holidays, astrid cake, cinnamon rolls which she gets about once a week.   She stopped taking Ozempic 0.25 mg weekly r/t nausea, which did improve with discontinuation. However, she does c/o intermittent nausea still - unsure why however she take naproxen prn for pain sometimes on empty stomach. She is tolerating Trulicity much better than Ozempic.   She switched from levemir to Tresiba about a month ago.   Plan Please improve diet. Avoid sweets.   Continue current meds.   Test blood sugars 2x/day.   Return to clinic in 5 mo with labs prior.     Interval history  Not taking Trulicity x 2 weeks d/t nausea. This was on 1.5 mg dose. No longer having nausea.   Was told she does not qualify for gastric sleeve since BMI is not high enough.     PMHX: CHETNA - does not like her old mask. H/o stroke/right facial dropp     LAST DIABETES EDUCATION: never    RECENT ILLNESSES/HOSPITALIZATIONS - -No.     HOSPITALIZED FOR DIABETES  -  No.    PRESCRIBED DIABETES MEDICATIONS:  Tresiba 30 units hs ~ 10 pm, metformin 1000 mg bid, Trulicity 1.5 mg once weekly       Misses medication doses     DM COMPLICATIONS: peripheral neuropathy    SIGNIFICANT DIABETES MED HISTORY:   Glimepiride stopped at initial ov 7/2017 since she was already on Novolog  ozempic started 10/2018,  switched to trulicity ~ 12/2018 d/t nausea     SELF MONITORING BLOOD GLUCOSE: Checks blood glucose at home - 1x/day fasting blood sugars 120-140s; never higher than 170.     HYPOGLYCEMIC EPISODES: denies      CURRENT DIET: drinks water. Drinks sweet tea a lot.  Eats 2 small meals per day with a snack. Avoids rice.     CURRENT EXERCISE: none       /67 (BP Location: Left arm, Patient Position: Sitting, BP Method: Large (Automatic))   Pulse 76   Wt 87 kg (191 lb 14.4 oz)   BMI 33.99 kg/m²       ROS:   CONSTITUTIONAL: Appetite good, denies fatigue  GI: nausea resolved with discontinuation of Trulicity.       PHYSICAL EXAM:  GENERAL: Well developed, well nourished. No acute distress.   PSYCH: AAOx3, appropriate mood and affect, conversant, well-groomed. Judgement and insight good.   NEURO: Cranial nerves grossly intact. Speech clear, no tremor.   CHEST: Respirations even and unlabored.   MS: Gait steady. No clubbing.         Hemoglobin A1C   Date Value Ref Range Status   02/05/2019 7.3 (H) 4.0 - 5.6 % Final     Comment:     ADA Screening Guidelines:  5.7-6.4%  Consistent with prediabetes  >or=6.5%  Consistent with diabetes  High levels of fetal hemoglobin interfere with the HbA1C  assay. Heterozygous hemoglobin variants (HbS, HgC, etc)do  not significantly interfere with this assay.   However, presence of multiple variants may affect accuracy.     08/02/2018 8.6 (H) 4.0 - 5.6 % Final     Comment:     ADA Screening Guidelines:  5.7-6.4%  Consistent with prediabetes  >or=6.5%  Consistent with diabetes  High levels of fetal hemoglobin interfere with the HbA1C  assay. Heterozygous hemoglobin variants (HbS, HgC, etc)do  not significantly interfere with this assay.   However, presence of multiple variants may affect accuracy.     07/21/2017 7.9 (H) 4.0 - 5.6 % Final     Comment:     According to ADA guidelines, hemoglobin A1c <7.0% represents  optimal control in non-pregnant diabetic patients. Different  metrics may apply to specific patient populations.   Standards of Medical Care in Diabetes-2016.  For the purpose of screening for the presence of diabetes:  <5.7%     Consistent with the absence of diabetes  5.7-6.4%  Consistent with increasing risk for diabetes    (prediabetes)  >or=6.5%  Consistent with diabetes  Currently, no consensus exists for use of hemoglobin A1c  for diagnosis of diabetes for children.  This Hemoglobin A1c assay has significant interference with fetal   hemoglobin   (HbF). The results are invalid for patients with abnormal amounts of   HbF,   including those with known Hereditary Persistence   of Fetal Hemoglobin. Heterozygous hemoglobin variants (HbAS, HbAC,   HbAD, HbAE, HbA2) do not significantly interfere with this assay;   however, presence of multiple variants in a sample may impact the %   interference.             Chemistry        Component Value Date/Time     02/05/2019 0837    K 3.7 02/05/2019 0837     02/05/2019 0837    CO2 28 02/05/2019 0837    BUN 18 02/05/2019 0837    CREATININE 0.9 02/05/2019 0837    GLU 89 02/05/2019 0837        Component Value Date/Time    CALCIUM 9.7 02/05/2019 0837    ALKPHOS 99 08/02/2018 1124    AST 25 08/02/2018 1124    ALT 24 08/02/2018 1124    BILITOT 0.5 08/02/2018 1124    ESTGFRAFRICA >60 02/05/2019 0837    EGFRNONAA >60 02/05/2019 0837          Lab Results   Component Value Date    LDLCALC 88.8 08/02/2018        Ref. Range 8/2/2018 11:24   Cholesterol Latest Ref Range: 120 - 199 mg/dL 155   HDL Latest Ref Range: 40 - 75 mg/dL 42   LDL Cholesterol Latest Ref Range: 63.0 - 159.0 mg/dL 88.8   Total Cholesterol/HDL Ratio Latest Ref Range: 2.0 - 5.0  3.7   Triglycerides Latest Ref Range: 30 - 150 mg/dL 121     Lab Results   Component Value Date    MICALBCREAT 20.8 08/21/2018         STANDARDS of CARE:        ASA:               Last eye exam:       ASSESSMENT and PLAN:    A1C GOAL: < 7 %       1. Type 2 diabetes, uncontrolled, with neuropathy  Avoid sweet tea. Start exercise routine.   Reduce trulicity to 0.75 mg once weekly. rtc in 3 mo with labs prior.     Hemoglobin A1c    Microalbumin/creatinine urine ratio    Hemoglobin A1c   2. Essential hypertension  controlled   3. Non morbid obesity,  unspecified obesity type  Increases insulin resistance.   Lifestyle changes as noted above. She will see Dr. Villarreal in Bariatrics.    4. Hyperlipidemia, unspecified hyperlipidemia type  Lipid panel         Orders Placed This Encounter   Procedures    Hemoglobin A1c     Standing Status:   Future     Standing Expiration Date:   8/26/2020    Microalbumin/creatinine urine ratio     Standing Status:   Future     Standing Expiration Date:   8/26/2020     Order Specific Question:   Specimen Source     Answer:   Urine    Lipid panel     Standing Status:   Future     Standing Expiration Date:   6/27/2020    Hemoglobin A1c     Standing Status:   Future     Standing Expiration Date:   8/26/2020        Follow up in about 3 months (around 9/28/2019).

## 2019-06-29 LAB
ESTIMATED AVG GLUCOSE: 186 MG/DL (ref 68–131)
HBA1C MFR BLD HPLC: 8.1 % (ref 4–5.6)

## 2019-07-02 NOTE — PROGRESS NOTES
Last 5 Patient Entered Readings                                      Current 30 Day Average: 132/66     Recent Readings 6/30/2019 6/29/2019 6/27/2019 6/26/2019 6/26/2019    SBP (mmHg) 134 132 133 128 143    DBP (mmHg) 66 62 67 61 69    Pulse 75 74 73 77 77        7/2: RCB later today.

## 2019-07-03 NOTE — PROGRESS NOTES
Last 5 Patient Entered Readings                                      Current 30 Day Average: 131/66     Recent Readings 7/3/2019 7/3/2019 6/30/2019 6/29/2019 6/27/2019    SBP (mmHg) 122 120 134 132 133    DBP (mmHg) 66 80 66 62 67    Pulse 68 80 75 74 73          7/3: LVM.  Will call in 4 weeks.  Patient's BP on downward trend

## 2019-07-09 ENCOUNTER — TELEPHONE (OUTPATIENT)
Dept: NEUROLOGY | Facility: CLINIC | Age: 69
End: 2019-07-09

## 2019-07-10 RX ORDER — INSULIN DEGLUDEC 200 U/ML
30 INJECTION, SOLUTION SUBCUTANEOUS DAILY
Qty: 6 SYRINGE | Refills: 1 | Status: SHIPPED | OUTPATIENT
Start: 2019-07-10 | End: 2019-09-14 | Stop reason: SDUPTHER

## 2019-07-10 NOTE — TELEPHONE ENCOUNTER
----- Message from Marisol Becky sent at 7/10/2019 11:24 AM CDT -----  Contact: self [phone]  .Type: RX Refill Request    Who Called: self     Refill or New Rx: refill    RX Name and Strength: insulin degludec (TRESIBA FLEXTOUCH U-200) 200 unit/mL (3 mL) InPn    Is this a 30 day or 90 day RX: 90 day    Preferred Pharmacy with phone number: [facility] [address] [address]  Phone: [phone] Fax: [phone]    Local or Mail Order: local     Would the patient rather a call back or a response via [facility]?  Call back     Best Call Back Number: [phone]

## 2019-07-15 NOTE — PROGRESS NOTES
Last 5 Patient Entered Readings                                      Current 30 Day Average: 130/66     Recent Readings 7/11/2019 7/3/2019 7/3/2019 6/30/2019 6/29/2019    SBP (mmHg) 129 122 120 134 132    DBP (mmHg) 69 66 80 66 62    Pulse 71 68 80 75 74          BP improved and 30 day average at goal  Health  attempting contact  No medication recommendations at this time

## 2019-07-17 ENCOUNTER — TELEPHONE (OUTPATIENT)
Dept: BARIATRICS | Facility: CLINIC | Age: 69
End: 2019-07-17

## 2019-07-17 NOTE — TELEPHONE ENCOUNTER
----- Message from Gretchen Villarreal MD sent at 7/17/2019 12:26 PM CDT -----  Please let pt know that with her medical hx history (glaucoma, bladder retention and drug allergies to trulicity and ozempic), there are not any medical option that I will be able to offer her for weight loss. Her BMI is too low for surgery.     Thank you,  Dr. Villarreal

## 2019-07-18 ENCOUNTER — TELEPHONE (OUTPATIENT)
Dept: NEUROLOGY | Facility: CLINIC | Age: 69
End: 2019-07-18

## 2019-07-18 NOTE — TELEPHONE ENCOUNTER
Returned call she needs a copy of office notes from 6-7-19.        ----- Message from Kenzie Meraz sent at 7/18/2019  1:17 PM CDT -----  Contact: Self   Type: Patient Call Back    What is the request in detail: Pt calling to speak to a nurse. Pt did not want to discuss anything with me only with nurse.     Can the clinic reply by [facility]? No     Would the patient rather a call back or a response via [facility]? Call back    Best call back number: [phone]

## 2019-07-22 DIAGNOSIS — E78.5 HYPERLIPIDEMIA LDL GOAL <70: ICD-10-CM

## 2019-07-22 RX ORDER — PRAVASTATIN SODIUM 40 MG/1
TABLET ORAL
Qty: 90 TABLET | Refills: 1 | Status: SHIPPED | OUTPATIENT
Start: 2019-07-22 | End: 2019-09-30

## 2019-07-30 ENCOUNTER — TELEPHONE (OUTPATIENT)
Dept: ENDOCRINOLOGY | Facility: CLINIC | Age: 69
End: 2019-07-30

## 2019-08-01 NOTE — PROGRESS NOTES
Last 5 Patient Entered Readings                                      Current 30 Day Average: 124/68     Recent Readings 7/29/2019 7/20/2019 7/17/2019 7/17/2019 7/16/2019    SBP (mmHg) 126 128 128 141 112    DBP (mmHg) 64 69 58 67 69    Pulse 67 77 79 80 79          8/1: LVM.  MyChart. Will call in 4 weeks.  Patient at goal.

## 2019-08-08 ENCOUNTER — TELEPHONE (OUTPATIENT)
Dept: FAMILY MEDICINE | Facility: CLINIC | Age: 69
End: 2019-08-08

## 2019-08-08 DIAGNOSIS — Z79.4 LONG-TERM INSULIN USE: ICD-10-CM

## 2019-08-08 DIAGNOSIS — E66.9 OBESITY (BMI 30-39.9): Primary | ICD-10-CM

## 2019-08-08 DIAGNOSIS — I10 ESSENTIAL HYPERTENSION: ICD-10-CM

## 2019-08-08 NOTE — TELEPHONE ENCOUNTER
Pt is requesting to  a referral to Bariatrics w/ Dr. Fall @. Pt states during previous visits she was told she did not qualify.   Please advise.

## 2019-08-08 NOTE — TELEPHONE ENCOUNTER
----- Message from Marisol Pena sent at 8/8/2019  1:03 PM CDT -----  Contact: self  [phone]  .Type:  Patient Requesting Referral    Who Called: self     Referral to What Specialty: Bariatric     Reason for Referral: weight loss    Does the patient want the referral with a specific physician?: Dr. Fall    Is the specialist [facility]?: [facility]     Would the patient rather a call back or a response via [facility]? Call back     Best Call Back Number: [phone]

## 2019-08-10 DIAGNOSIS — F32.0 MAJOR DEPRESSIVE DISORDER, SINGLE EPISODE, MILD: ICD-10-CM

## 2019-08-12 RX ORDER — BUPROPION HYDROCHLORIDE 75 MG/1
TABLET ORAL
Qty: 180 TABLET | Refills: 0 | Status: SHIPPED | OUTPATIENT
Start: 2019-08-12 | End: 2019-09-26 | Stop reason: DRUGHIGH

## 2019-08-20 ENCOUNTER — TELEPHONE (OUTPATIENT)
Dept: FAMILY MEDICINE | Facility: CLINIC | Age: 69
End: 2019-08-20

## 2019-08-28 ENCOUNTER — TELEPHONE (OUTPATIENT)
Dept: FAMILY MEDICINE | Facility: CLINIC | Age: 69
End: 2019-08-28

## 2019-09-04 NOTE — PROGRESS NOTES
Last 5 Patient Entered Readings                                      Current 30 Day Average: 135/66     Recent Readings 8/28/2019 8/17/2019 8/17/2019 8/1/2019 8/1/2019    SBP (mmHg) 134 135 143 137 139    DBP (mmHg) 65 66 68 64 65    Pulse 69 77 76 72 77            Digital Medicine: Health  Follow Up    Left voicemail to follow up with . Cecilia Correa Jun.  Current BP average 135/66 mmHg is not at goal, 130/80 mmHg.    Will call in 2 weeks.

## 2019-09-05 DIAGNOSIS — H40.1122 PRIMARY OPEN ANGLE GLAUCOMA (POAG) OF LEFT EYE, MODERATE STAGE: ICD-10-CM

## 2019-09-10 RX ORDER — LATANOPROST 50 UG/ML
SOLUTION/ DROPS OPHTHALMIC
Qty: 2.5 ML | Refills: 0 | Status: SHIPPED | OUTPATIENT
Start: 2019-09-10 | End: 2021-01-29 | Stop reason: SDUPTHER

## 2019-09-11 ENCOUNTER — TELEPHONE (OUTPATIENT)
Dept: BARIATRICS | Facility: CLINIC | Age: 69
End: 2019-09-11

## 2019-09-11 ENCOUNTER — OFFICE VISIT (OUTPATIENT)
Dept: FAMILY MEDICINE | Facility: CLINIC | Age: 69
End: 2019-09-11
Payer: MEDICARE

## 2019-09-11 VITALS
DIASTOLIC BLOOD PRESSURE: 52 MMHG | BODY MASS INDEX: 34.09 KG/M2 | SYSTOLIC BLOOD PRESSURE: 126 MMHG | HEIGHT: 63 IN | OXYGEN SATURATION: 97 % | WEIGHT: 192.38 LBS | TEMPERATURE: 98 F | HEART RATE: 71 BPM

## 2019-09-11 DIAGNOSIS — Z79.4 LONG-TERM INSULIN USE: ICD-10-CM

## 2019-09-11 DIAGNOSIS — M54.9 UPPER BACK PAIN ON LEFT SIDE: ICD-10-CM

## 2019-09-11 DIAGNOSIS — E78.5 HYPERLIPIDEMIA LDL GOAL <70: ICD-10-CM

## 2019-09-11 DIAGNOSIS — Z23 NEED FOR SHINGLES VACCINE: ICD-10-CM

## 2019-09-11 DIAGNOSIS — J30.9 ALLERGIC RHINITIS, UNSPECIFIED SEASONALITY, UNSPECIFIED TRIGGER: ICD-10-CM

## 2019-09-11 DIAGNOSIS — E66.9 OBESITY (BMI 30-39.9): ICD-10-CM

## 2019-09-11 DIAGNOSIS — Z23 FLU VACCINE NEED: ICD-10-CM

## 2019-09-11 DIAGNOSIS — R21 RASH: ICD-10-CM

## 2019-09-11 DIAGNOSIS — F32.0 MILD MAJOR DEPRESSION: ICD-10-CM

## 2019-09-11 DIAGNOSIS — I10 ESSENTIAL HYPERTENSION: ICD-10-CM

## 2019-09-11 DIAGNOSIS — G25.81 RESTLESS LEGS SYNDROME (RLS): ICD-10-CM

## 2019-09-11 PROBLEM — M54.2 CERVICAL PAIN: Status: RESOLVED | Noted: 2017-05-17 | Resolved: 2019-09-11

## 2019-09-11 PROBLEM — R29.3 POSTURE ABNORMALITY: Status: RESOLVED | Noted: 2018-12-14 | Resolved: 2019-09-11

## 2019-09-11 PROBLEM — H25.13 NUCLEAR SCLEROSIS OF BOTH EYES: Status: RESOLVED | Noted: 2019-02-26 | Resolved: 2019-09-11

## 2019-09-11 PROCEDURE — 99999 PR PBB SHADOW E&M-EST. PATIENT-LVL III: ICD-10-PCS | Mod: PBBFAC,HCNC,, | Performed by: INTERNAL MEDICINE

## 2019-09-11 PROCEDURE — 99999 PR PBB SHADOW E&M-EST. PATIENT-LVL III: CPT | Mod: PBBFAC,HCNC,, | Performed by: INTERNAL MEDICINE

## 2019-09-11 PROCEDURE — 3045F PR MOST RECENT HEMOGLOBIN A1C LEVEL 7.0-9.0%: CPT | Mod: HCNC,CPTII,S$GLB, | Performed by: INTERNAL MEDICINE

## 2019-09-11 PROCEDURE — 1101F PT FALLS ASSESS-DOCD LE1/YR: CPT | Mod: HCNC,CPTII,S$GLB, | Performed by: INTERNAL MEDICINE

## 2019-09-11 PROCEDURE — 1101F PR PT FALLS ASSESS DOC 0-1 FALLS W/OUT INJ PAST YR: ICD-10-PCS | Mod: HCNC,CPTII,S$GLB, | Performed by: INTERNAL MEDICINE

## 2019-09-11 PROCEDURE — 3074F PR MOST RECENT SYSTOLIC BLOOD PRESSURE < 130 MM HG: ICD-10-PCS | Mod: HCNC,CPTII,S$GLB, | Performed by: INTERNAL MEDICINE

## 2019-09-11 PROCEDURE — 3078F DIAST BP <80 MM HG: CPT | Mod: HCNC,CPTII,S$GLB, | Performed by: INTERNAL MEDICINE

## 2019-09-11 PROCEDURE — G0008 FLU VACCINE - HIGH DOSE (65+) PRESERVATIVE FREE IM: ICD-10-PCS | Mod: HCNC,S$GLB,, | Performed by: INTERNAL MEDICINE

## 2019-09-11 PROCEDURE — 3078F PR MOST RECENT DIASTOLIC BLOOD PRESSURE < 80 MM HG: ICD-10-PCS | Mod: HCNC,CPTII,S$GLB, | Performed by: INTERNAL MEDICINE

## 2019-09-11 PROCEDURE — 99214 OFFICE O/P EST MOD 30 MIN: CPT | Mod: 25,HCNC,S$GLB, | Performed by: INTERNAL MEDICINE

## 2019-09-11 PROCEDURE — 99214 PR OFFICE/OUTPT VISIT, EST, LEVL IV, 30-39 MIN: ICD-10-PCS | Mod: 25,HCNC,S$GLB, | Performed by: INTERNAL MEDICINE

## 2019-09-11 PROCEDURE — 90662 IIV NO PRSV INCREASED AG IM: CPT | Mod: HCNC,S$GLB,, | Performed by: INTERNAL MEDICINE

## 2019-09-11 PROCEDURE — G0008 ADMIN INFLUENZA VIRUS VAC: HCPCS | Mod: HCNC,S$GLB,, | Performed by: INTERNAL MEDICINE

## 2019-09-11 PROCEDURE — 3074F SYST BP LT 130 MM HG: CPT | Mod: HCNC,CPTII,S$GLB, | Performed by: INTERNAL MEDICINE

## 2019-09-11 PROCEDURE — 3045F PR MOST RECENT HEMOGLOBIN A1C LEVEL 7.0-9.0%: ICD-10-PCS | Mod: HCNC,CPTII,S$GLB, | Performed by: INTERNAL MEDICINE

## 2019-09-11 PROCEDURE — 90662 FLU VACCINE - HIGH DOSE (65+) PRESERVATIVE FREE IM: ICD-10-PCS | Mod: HCNC,S$GLB,, | Performed by: INTERNAL MEDICINE

## 2019-09-11 RX ORDER — LORATADINE 10 MG/1
10 TABLET ORAL DAILY PRN
Qty: 90 TABLET | Refills: 1 | Status: SHIPPED | OUTPATIENT
Start: 2019-09-11 | End: 2020-04-22

## 2019-09-11 NOTE — TELEPHONE ENCOUNTER
Left voicemail to let patient know she has an appt with everton Martinez Friday for 11am and to please be here 15 mins before appt.

## 2019-09-11 NOTE — TELEPHONE ENCOUNTER
----- Message from Ashely ANGÉLICA Aponte sent at 9/11/2019 12:47 PM CDT -----  Contact: PT  2nd Attempt  PT said she called earlier and left a message    PT wants to start being seen again, she said a few months back she was rejected because her BMI was too low and she wasn't eligible to go thru the program.   PT said it was because she starved herself because she thought she needed to.    Callback: [phone]

## 2019-09-11 NOTE — PROGRESS NOTES
HISTORY OF PRESENT ILLNESS:  Cecilia Barahona is a 69 y.o. female who presents to the clinic today for Diabetes (f/u); Back Pain; and Rash (on back )  .   The patient presents to clinic today for follow-up of her type 2 diabetes mellitus complicated by neuropathy, proteinuria on chronic insulin, hypertension, and hyperlipidemia.  She is currently seeing endocrinology to get her diabetes under control.  She has the Arvin sensor.  She is realizing what is causing her blood sugars to go up which includes Néstor-Aid and soft drinks.  She denies any problems with low blood sugars.  She is considering bariatric surgery.  Her daughter is concerned that she is too old.  She is in the digital hypertension program.  Blood pressures are generally well controlled.  She feels she is doing okay with her depression.  Restless leg syndrome is also controlled at this time.  She is concerned about a rash on her right upper back which is chronic.  It occasionally itches.  She wants to know what apply to it.  She is also concerned about pain in her left upper back.  She is concerned because her grandfather had Paget's disease. She does not really know what this is, but she knows that he started walking hunched over and she is concerned that this may happen to her.  She has a little tender spot that sometimes swells.      PAST MEDICAL HISTORY:  Past Medical History:   Diagnosis Date    Allergic rhinitis, seasonal     Arthritis     Glaucoma NEC     History of positive PPD - treated - remote past     Hyperlipidemia LDL goal < 100     Hypertension     Nuclear sclerosis of both eyes 2019    Obesity     CHETNA (obstructive sleep apnea)     Type II or unspecified type diabetes mellitus without mention of complication, uncontrolled        PAST SURGICAL HISTORY:  Past Surgical History:   Procedure Laterality Date    CARPAL TUNNEL RELEASE       SECTION, CLASSIC      COLONOSCOPY N/A 2015    Performed by Noel SHABAZZ  MD [facility] at [facility] ENDO (4TH FLR)    ESOPHAGOGASTRODUODENOSCOPY (EGD) N/A 2015    Performed by Noel Sanders MD at [facility] ENDO (4TH FLR)    RELEASE-FINGER-TRIGGER left thumb Left 2014    Performed by Brianne Arnett MD at [facility] OR 1ST FLR    RELEASE-FINGER-TRIGGER, right ring Right 10/6/2014    [facility] OR    TOTAL ABDOMINAL HYSTERECTOMY      TRIGGER FINGER RELEASE         SOCIAL HISTORY:  Social History     Socioeconomic History    Marital status:      Spouse name: Not on file    Number of children: 3    Years of education: Not on file    Highest education level: Not on file   Occupational History    Occupation: disabled - back and leg pain   Social Needs    Financial resource strain: Somewhat hard    Food insecurity:     Worry: Never true     Inability: Sometimes true    Transportation needs:     Medical: No     Non-medical: No   Tobacco Use    Smoking status: Former Smoker     Packs/day: 0.50     Types: Cigarettes     Last attempt to quit: 11/10/2012     Years since quittin.8    Smokeless tobacco: Never Used   Substance and Sexual Activity    Alcohol use: No    Drug use: No    Sexual activity: Never     Partners: Male   Lifestyle    Physical activity:     Days per week: Not on file     Minutes per session: Not on file    Stress: Very much   Relationships    Social connections:     Talks on phone: Not on file     Gets together: Not on file     Attends Quaker service: Not on file     Active member of club or organization: Not on file     Attends meetings of clubs or organizations: Not on file     Relationship status: Not on file   Other Topics Concern    Not on file   Social History Narrative    Not on file       FAMILY HISTORY:  Family History   Problem Relation Age of Onset    Hypertension Mother     Diabetes Mother     Heart failure Mother     Cataracts Mother     Glaucoma Mother     Prostate cancer Father      Hypertension Father     Diabetes Father     Heart failure Father     No Known Problems Sister     Alcohol abuse Maternal Aunt     Diabetes Maternal Uncle     Hyperlipidemia Maternal Uncle     Hypertension Maternal Uncle     Diabetes Maternal Grandmother     Diabetes Maternal Grandfather     No Known Problems Paternal Grandmother     No Known Problems Paternal Grandfather     Diabetes Maternal Aunt     Alzheimer's disease Maternal Aunt     Heart disease Cousin         s/p heart transplant    No Known Problems Paternal Aunt     No Known Problems Paternal Uncle     Amblyopia Neg Hx     Blindness Neg Hx     Cancer Neg Hx     Macular degeneration Neg Hx     Retinal detachment Neg Hx     Strabismus Neg Hx     Stroke Neg Hx     Thyroid disease Neg Hx        ALLERGIES AND MEDICATIONS: updated and reviewed.  Review of patient's allergies indicates:   Allergen Reactions    Ace inhibitors Other (See Comments)     cough    Invokana [canagliflozin] Other (See Comments)     Throat and tongue swelling    Ozempic [semaglutide]      Nausea and constipation on 0.25 mg dose.     Trulicity [dulaglutide]      Medication List with Changes/Refills   Current Medications    ACETAMINOPHEN-CODEINE 300-30MG (TYLENOL #3) 300-30 MG TAB    Take 1 tablet by mouth once daily.    ALBUTEROL 90 MCG/ACTUATION INHALER    Inhale 2 puffs into the lungs every 6 (six) hours as needed for Wheezing or Shortness of Breath. Rescue    ALCOHOL SWABS PADM    Apply 1 each topically 3 (three) times daily.    AMLODIPINE (NORVASC) 10 MG TABLET    TAKE ONE TABLET BY MOUTH ONCE DAILY    ARTIFICIAL TEAR OINTMENT (ARTIFICIAL TEARS) OINT    Place into both eyes every evening.    ASPIRIN 81 MG CHEW    Take by mouth once daily. 1 tablet, chewable Oral Every day    AZELASTINE (OPTIVAR) 0.05 % OPHTHALMIC SOLUTION    INSTILL 1 DROP IN EACH EYE TWICE DAILY    BLOOD GLUCOSE CONTROL HIGH,LOW (ACCU-CHEK BRIDGETTE CONTROL SOLN) SOLN    1 kit by  Misc.(Non-Drug; Combo Route) route once daily.    BLOOD GLUCOSE CONTROL, LOW (EMBRACE GLUCOSE CONTROL LOW) SOLN    Inject 1 Bottle into the skin every 30 days.    BLOOD PRESSURE MONITOR (BLOOD PRESSURE KIT) KIT    1 kit by Misc.(Non-Drug; Combo Route) route once daily.    BLOOD SUGAR DIAGNOSTIC STRP    Check blood glucose 4 times a day. Dispense accuchek meter or other meter preferred by insurance. Dx code e11.65    BLOOD-GLUCOSE METER KIT    ispense accuchek meter or other meter preferred by insurance. Dx code e11.65    BUPROPION (WELLBUTRIN) 75 MG TABLET    TAKE TWO TABLETS BY MOUTH TWICE DAILY    CHLORTHALIDONE (HYGROTEN) 25 MG TAB    TAKE ONE TABLET BY MOUTH once DAILY    DESOXIMETASONE (TOPICORT) 0.05 % CREAM    Apply topically 2 (two) times daily as needed.    DIABETIC SUPPLIES, [facility]. KIT    Please change sensor every 14 days. FreeStyle Arvin Sensor 30-day supply (2 sensors/month)    DULAGLUTIDE (TRULICITY) 0.75 MG/0.5 ML PNIJ    Inject 0.5 mLs (0.75 mg total) into the skin every 7 days.    FLASH GLUCOSE SCANNING READER (FREESTYLE ARVIN READER) MISC    1 Units by Misc.(Non-Drug; Combo Route) route before meals as needed.    FLASH GLUCOSE SENSOR (FREESTYLE AVRIN SENSOR) KIT    1 Units by Misc.(Non-Drug; Combo Route) route every meal as needed.    FLUTICASONE (FLONASE) 50 MCG/ACTUATION NASAL SPRAY    1 spray (50 mcg total) by Each Nare route once daily.    GABAPENTIN (NEURONTIN) 300 MG CAPSULE    Take 1 capsule (300 mg total) by mouth every evening.    INSULIN DEGLUDEC (TRESIBA FLEXTOUCH U-200) 200 UNIT/ML (3 ML) INPN    Inject 30 Units into the skin once daily.    LANCETS MISC    Check blood glucose 4 times a day. Dispense accuchek meter or other meter preferred by insurance. Dx code e11.65    LANCING DEVICE WITH LANCETS MISC    USE AS DIRECTED FOR DIABETIC TESTING    LATANOPROST 0.005 % OPHTHALMIC SOLUTION    PLACE 1 DROP IN EACH EYE EVERY EVENING    MELOXICAM (MOBIC) 7.5 MG TABLET    TAKE ONE Tablet BY  "MOUTH once DAILY WITH FOOD for 2 weeks then AS NEEDED    METFORMIN (GLUCOPHAGE) 1000 MG TABLET    Take 1 tablet (1,000 mg total) by mouth 2 (two) times daily with meals.    MULTIVITAMIN WITH MINERALS TABLET    Take 1 tablet by mouth once daily.    NAPROXEN (NAPROSYN) 500 MG TABLET    Take 500 mg by mouth 2 (two) times daily as needed.    PEN NEEDLE, DIABETIC (BD ULTRA-FINE LENY PEN NEEDLES) 32 GAUGE X 5/32" NDLE    USE FIVE TIMES DAILY    PRAVASTATIN (PRAVACHOL) 40 MG TABLET    TAKE ONE TABLET BY MOUTH EVERY DAY    PROMETHAZINE (PHENERGAN) 25 MG TABLET    Take 25 mg by mouth once daily.    ROPINIROLE (REQUIP) 0.5 MG TABLET    TAKE ONE TABLET BY MOUTH EVERY NIGHT AT BEDTIME    TRAMADOL (ULTRAM) 50 MG TABLET    Take 50 mg by mouth every 6 (six) hours as needed for Pain.    VALSARTAN (DIOVAN) 320 MG TABLET    TAKE ONE TABLET BY MOUTH EVERY EVENING   Changed and/or Refilled Medications    Modified Medication Previous Medication    LORATADINE (CLARITIN) 10 MG TABLET loratadine (CLARITIN) 10 mg tablet       Take 1 tablet (10 mg total) by mouth daily as needed for Allergies.    Take 1 tablet (10 mg total) by mouth daily as needed for Allergies.   Discontinued Medications    INSULIN ASPART U-100 (NOVOLOG FLEXPEN U-100 INSULIN) 100 UNIT/ML INPN PEN    Novolog Flexpen U-100 Insulin aspart 100 unit/mL (3 mL) subcutaneous          CARE TEAM:  Patient Care Team:  Yudi Smart MD as PCP - General (Internal Medicine)  Yudi Smart MD as Hypertension Digital Medicine Responsible Provider (Internal Medicine)  Fran AlbaradoD as Hypertension Digital Medicine Clinician (Pharmacist)  Rafa Vázquez as Digital Medicine Health   Susie Mauro LPN as Licensed Practical Nurse  [facility] as Hypertension Digital Medicine Contract         REVIEW OF SYSTEMS:  Review of Systems   Constitutional: Positive for fatigue. Negative for chills, fever and unexpected weight change.   HENT: Negative for " "congestion and postnasal drip.    Eyes: Negative for pain and visual disturbance.   Respiratory: Negative for cough, shortness of breath and wheezing.    Cardiovascular: Negative for chest pain, palpitations and leg swelling.   Gastrointestinal: Negative for abdominal pain, constipation, diarrhea, nausea and vomiting.   Genitourinary: Negative for dysuria.   Musculoskeletal: Positive for arthralgias and back pain.   Skin: Positive for rash.   Neurological: Negative for weakness and headaches.   Psychiatric/Behavioral: Positive for dysphoric mood. Negative for sleep disturbance. The patient is not nervous/anxious.          PHYSICAL EXAM:   Vitals:    09/11/19 0925   BP: (!) 126/52   Pulse: 71   Temp: 98.2 °F (36.8 °C)     Weight: 87.2 kg (192 lb 5.6 oz)   Height: 5' 3" (160 cm)   Body mass index is 34.07 kg/m².      General appearance - alert, well appearing, and in no distress, obese  Mental status - alert, oriented to person, place, and time, normal mood, behavior, speech, dress, motor activity, and thought processes  Eyes - pupils equal and reactive, extraocular eye movements intact, sclera anicteric  Mouth - mucous membranes moist, pharynx normal without lesions  Neck - supple, no significant adenopathy, carotids upstroke normal bilaterally, no bruits  Lymphatics - no palpable cervical lymphadenopathy  Chest - clear to auscultation, no wheezes, rales or rhonchi, symmetric air entry  Heart - normal rate and regular rhythm, no gallops noted  Back exam - she had a small spot of tenderness on her left upper back near the midline with what appear to be a opening for a small sebaceous cyst.  No significant swelling, erythema, or warmth noted at  Neurological - alert, normal speech, no focal findings or movement disorder noted, cranial nerves II through XII intact  Musculoskeletal - no muscular tenderness noted, patient noted to have Mild-Moderate osteoarthritic changes to both knee joints. No joint effusions " noted.  Extremities - peripheral pulses normal, no pedal edema, no clubbing or cyanosis  Skin - normal coloration, no suspicious skin lesions, rash noted:  She had some eczematous/chronic irritation on the right upper shoulder region      Labs:  Lab Results   Component Value Date    HGBA1C 8.1 (H) 06/28/2019    HGBA1C 7.3 (H) 02/05/2019    HGBA1C 8.6 (H) 08/02/2018      Lab Results   Component Value Date    CHOL 155 08/02/2018    CHOL 176 03/10/2017    CHOL 172 03/29/2016     Lab Results   Component Value Date    LDLCALC 88.8 08/02/2018    LDLCALC 109.0 03/10/2017    LDLCALC 90.2 03/29/2016          ASSESSMENT AND PLAN:  1. Type 2 diabetes, uncontrolled, with neuropathy/2. 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 Long-term insulin use  Diabetes is under fair control at this time for age and comorbid conditions - her control has improved. We discussed diabetic diet and regular exercise. We discussed home blood sugar monitoring, if appropriate - the patient should test three times per day with meals and as needed. Continue current medication regimen. and Followed by endocrinology.  Diabetic complications addressed: Neuropathy pain controlled.  Patient was counseled on the need for yearly eye exam to screen for/monitor diabetic retinopathy and yearly diabetic foot exam.   - Diabetes Digital Medicine (DDMP) Enrollment Order    4. Essential hypertension  Discussed sodium restriction, maintaining ideal body weight and regular exercise program as physiologic means to achieve blood pressure control. The patient will strive towards this. The current medical regimen is effective;  continue present plan and medications. Recommended patient to check home readings to monitor and see me for followup as scheduled or sooner as needed. Patient was educated that both decongestant and anti-inflammatory medication may raise blood pressure.   She is active on the digital hypertension program.    5.  Hyperlipidemia LDL goal <70  We discussed low fat diet and regular exercise.The current medical regimen is effective;  continue present plan and medications.      6. Mild major depression  The current medical regimen is effective;  continue present plan and medications.     7. Restless legs syndrome (RLS)  The current medical regimen is effective;  continue present plan and medications.     8. Allergic rhinitis, unspecified seasonality, unspecified trigger  Stable. Medication as needed.   - loratadine (CLARITIN) 10 mg tablet; Take 1 tablet (10 mg total) by mouth daily as needed for Allergies.  Dispense: 90 tablet; Refill: 1    9. Obesity (BMI 30-39.9)  The patient is asked to make an attempt to improve diet and exercise patterns to aid in medical management of this problem.     10. Flu vaccine need    - Influenza - High Dose (65+) (PF) (IM)    11. Need for shingles vaccine  Patient was advised to get immunization at the pharmacy.     12. Rash  I think this is just some benign irritation.  She can try over-the-counter hydrocortisone cream twice a day for 2 weeks.  We discussed the concerns long-term topical steroid use.  Consider Dermatology consultation if symptoms worsen or persist.    13. Upper back pain on left side  I had a brief discussion with the patient regarding kyphosis, which is likely what her grandfather exhibited as well as Paget's disease.  I do not think she has Paget's.  She has a normal alkaline phosphatase.  I advised her that I think her pain is coming from a small sebaceous cyst.  No need for further evaluation or treatment at this time.  Consider Dermatology consultation for removal if symptoms worsen or persist.           Follow up in about 6 months (around 3/11/2020). or sooner as needed.

## 2019-09-13 ENCOUNTER — CLINICAL SUPPORT (OUTPATIENT)
Dept: BARIATRICS | Facility: CLINIC | Age: 69
End: 2019-09-13
Payer: MEDICARE

## 2019-09-13 NOTE — PATIENT INSTRUCTIONS
Bariatric Diet Grocery List      High in Protein:   ? Canned tuna or chicken (packed in water)  ? Lean ground turkey breast or ground round  ? Turkey or chicken (no skin)  ? Lean pork or beef   ? Scrambled, poached, or boiled eggs  ? Baked or broiled fish and seafood (not fried!)  ? Beans, edamame and lentils  ? Low fat deli meats: turkey, chicken, ham, roast beef  ? 1% or Skim Milk, Lactaid, or Soymilk  ? Low-fat cheese, cottage cheese, mozzarella string cheese, ricotta  ? Light yogurt, FF/SF frozen yogurt, custard, SF pudding  ? Protein drinks and protein bars with 0-4 grams sugar     Fruits, Vegetables and Snacks   - Green beans, broccoli, cauliflower, spinach, asparagus, carrots, lima beans, yellow squash, zucchini, etc.  - Apple, pineapple, peach, grapes, banana, watermelon, oranges, etc.  - Fruit canned in its own juice or in water (not in syrup)  - Raw veggies  - Lettuce: dark greens like spinach and Shaggy  - Unsalted Nuts  - Jason Links beef jerky     Fluids:   Skim/1% milk, Lactaid, Soymilk  Sugar-free beverages  (decaf and non-carbonated)  Decaf coffee & decaf tea   Water     Healthy Eating on the go ~ Pre and Post-Surgery     (*) Means this meal is appropriate for pre-surgery consumption because it is >30g of protein per meal. Post-surgery, may want to split meal in half or save a small portion for a snack.     [facility] Kitchen  Item  Calories  Protein (g)   Mediterranean Lamb [place]  350 22   *Chicken Kabobs 290 41   *[place] Kabobs 330 40   Shrimp Kabobs 170 23   *Steak Kabobs 490 42   *Cauliflower Rice Bowl- chicken (salmon, lamb)  490 41   Mediterranean Chicken 260 34   *Protein Power Plate 520 41   Side items: Greek side salad, fruit salad, roasted vegetables, baked falafel       Mcmillans   Item  Calories Protein (g)   Artisan Grilled Chicken [place], no bun  <380 36   *Hawkins Ranch Grilled Chicken Salad, no dressing <320 42   Double Hamburger, no bun <440 25   Side items: apple slices, side salad        Nevaehs   Item Calories Protein (g)   Grilled Chicken [place], no bun  <370 34   [place] Chicken Salad, half size, no dressing 320  22   Apple Pecan Chicken Salad, half size, no dressing  340 20   Large Chili 250 21   Side items: garden or caesar side salad (no croutons), apple bites       Bob Holcomb   Item Calories Protein (g)   Grilled Chicken [place], no bun <470 37   Whopper Jr., no bun <310 13   Double Hamburger, no bun  <390 23   *Chicken Garden Salad, no croutons, use ½ packet of dressing  <520 40   Side items: garden side salad,         Chick-brooke-A  Item Calories Protein (g)   Grilled Chicken [place], no bun  <310 29   Grilled Nuggets, 8 count 140 25   Grilled Chicken Market Salad with light balsamic dressing 330 28   Small Chicken Tortilla Soup, no tortilla strips 340 23   Side items: side salad, superfood side salad, carrot raisin salad, fruit cup,          Sonic  Item Calories Protein (g)   Jr. Rojas, no bun  <330 15   Classic Grilled Chicken [place], no bun <490 31   Hearty Snover, no fritos 140 10       Darrons   Item Calories Protein (g)   Blackened Chicken Tenders, 3 piece 170 26   Side items: green beans             Fernando York   Item Calories Protein (g)   Unwich: any sandwich made wrapped in lettuce instead of bread. Ask for no barclay.      Original Roast Beef Unwich 260 16   [place] Breast Unwich 230 14   Perfect Japanese Unwich 340 22   Ham and Provolone Unwich 350 19   Tuna Salad Unwich 280 11   The Veggie Unwich 410 17   Side items: dill pickle          Chipotle  Item Calories Protein (g)   *Salad with your choice of meat, beans, fresh tomato salsa, fajita veggies, and guacamole (NO rice) ~375 ~40        Applebees  Item Calories Protein (g)   Grilled Chicken Breast 290 38   Joaquin Shrimp Salad, no wonton strips, use ½ dressing <390 26   Blackened Shrimp Caesar Salad, no croutons, use ½ dressing  <660 25   *Grilled Chicken Caesar Salad, no croutons, use ½ dressing <770 47   Dawes [place]  with Maple Mustard Glaze 350 37   Side Grilled Shrimp Skewer 110 27   Side items: steamed broccoli, garlicky green beans,               [employer]  Item Calories Protein (g)   *Spinach & Mushroom Omelette, no hollandaise sauce  <890 46   *Garden Omelette 840 46   Egg White Vegetable Omelette 380 29   *Grilled Chicken & Veggie Salad, use ½ dressing  <680 38     Olive Garden   Item Calories Protein (g)   *Herb-Grilled [place] 460 45   House salad with, no croutons. (Add grilled chicken or shrimp) <400 25   Side items: steamed broccoli       Walk Ons   Tuna Tini 410 30   Venison Hydes- Bowl 330 14   Chicken Berry Pecan Salad      Side items: seasonal fruit, broccoli, green beans side salad       1200- 1500 calorie Sample meal plan   80-120g protein per day.   Protein drinks and bars: 0-4 grams sugar   Drink lots of water throughout the day and exercise!   MENU # 1   Breakfast: 2 eggs, 1 turkey sausage nena, 1 apple   Snack: P3 protein pack  Lunch: 2 roll-ups (sliced turkey, low-fat sliced cheese, mustard), 12 baby carrots dipped in 1 Tbsp natural peanut butter   Mid-Day Snack: ¼ cup unsalted almonds, ½ cup fruit   Dinner: 1 chicken thigh simmered in tomato sauce + 2 Tbsp mozzarella cheese, ½ cup black beans, 1/2 cup steamed carrots   Evening Snack: 1/2 cup grapes with 4 cubes low-fat cheddar cheese   ___________________________________________________   MENU # 2   Breakfast: 200 calorie protein drink   Mid-morning snack : ¼ cup unsalted nuts, medium banana   Lunch: 3oz tuna or chicken salad made with 2 tbsp light barclay, over salad with tomatoes and cucumbers.   Snack: low-fat cheese stick   Dinner: 3oz hamburger nena, slice of low-fat cheese, 1 cup yellow squash and zucchini sauteed in nonstick cookspray  Snack: 6oz light yogurt   _______________________________________________________   Menu #3   Breakfast: 6oz plain Greek yogurt + fruit (½ banana OR ½ cup fruit - pineapple, blueberries, strawberries, peach), may add  Splenda to gael.   Lunch: Grilled chicken breast w/ slice low-fat pepper toan cheese, 1/2 cup grilled/baked asparagus and small salad with Salad Spritzer.   Mid-Day snack: 200 calorie protein drink   Dinner: 4oz Grilled fish, ½ cup white beans, ½ cup cooked spinach   Evening Snack: fudgsicle no-sugar-added   Menu # 4   Breakfast: 1 scoop vanilla protein powder + 4oz skim milk + 4oz coffee   Snack: Pure protein bar   Lunch: 2 Lettuce tacos: 3oz seasoned ground turkey wrapped in a Shaggy lettuce leaves with 1 Tbsp shredded cheese and dollop low-fat sour cream   Snack: ½ cup cottage cheese, ½ cup pineapple chunks   Dinner: Shrimp omelet: 2 eggs, ½ cup shrimp, green onions, and shredded cheese   ______________________________________________________   Menu #5   Breakfast: 1 cup low-fat cottage cheese, ½ cup peaches (no sugar added)   Snack: 1 apple, 4 cubes cheese   Lunch: 3oz baked pork chop, 1 cup okra and tomato stew   Snack: 1/2 cup black beans + salsa + dollop light sour cream   Dinner: Caprese chicken salad: 3 oz chicken breast, 1oz fresh mozzarella, sliced tomato, 1 Tbsp olive oil, basil   Snack: sugar-free popsicle   _______________________________________________________  Menu #6   Breakfast: ½ cup part-skim ricotta cheese, 2 Tbsp sugar-free strawberry preserves, sprinkle of slivered almonds   Snack: 1 orange + string cheese  Lunch: 3 oz canned chicken, 1oz shredded cheddar cheese, ½ cup black beans, 2 Tbsp salsa   Snack: 200 calorie Protein drink   Dinner: 4 oz grilled salmon steak, over mixed green salad with 2 tbsp light dressing   _______________________________________________________  Menu #7  Breakfast: crust-less quiche (bake 1 egg mixed w/ low fat cheese, broccoli and low sodium ham in a muffin cup until cooked inside)  Snack: 1 light yogurt  Lunch: protein shake (1 scoop powder + 8 oz skim milk, blended w/ ice)  Snack: small apple w/ 2 tbsp PB2 (powdered peanut butter)  Dinner: 2 Turkey meatballs  w/ low sugar marinara sauce, 1/2 cup spiralized zucchini (sauteed on stove top w/ nonstick cookspray)

## 2019-09-16 RX ORDER — INSULIN DEGLUDEC 200 U/ML
INJECTION, SOLUTION SUBCUTANEOUS
Qty: 6 SYRINGE | Refills: 1 | Status: SHIPPED | OUTPATIENT
Start: 2019-09-16 | End: 2019-12-20

## 2019-09-18 ENCOUNTER — TELEPHONE (OUTPATIENT)
Dept: FAMILY MEDICINE | Facility: CLINIC | Age: 69
End: 2019-09-18

## 2019-09-18 NOTE — TELEPHONE ENCOUNTER
----- Message from Gail Martin sent at 9/18/2019 10:41 AM CDT -----  Contact: Patient [phone]  Type:  Patient Requesting Referral    Who Called: Patient    Referral to What Specialty: Psychiatry    Reason for Referral: Possible Bipolar Disorder    Does the patient want the referral with a specific physician?: No    Is the specialist [facility]?: Either, just so long insurance covers it.     Would the patient rather a call back or a response via [facility]? Call back    Best Call Back Number:[phone]

## 2019-09-19 ENCOUNTER — TELEPHONE (OUTPATIENT)
Dept: FAMILY MEDICINE | Facility: CLINIC | Age: 69
End: 2019-09-19

## 2019-09-19 NOTE — TELEPHONE ENCOUNTER
Left message on answering machine to return call to clinic. Need to know with issue she is having for psych referral.

## 2019-09-19 NOTE — TELEPHONE ENCOUNTER
----- Message from Hudson Paris sent at 9/18/2019  2:02 PM CDT -----  Contact: Cecilia [phone]  Type: Patient Call Back    Who called:Cecilia    What is the request in detail: The patient is returning a call to the staff, in regards to a referral to psychiatry    Can the clinic reply by [facility]?no    Would the patient rather a call back or a response via [facility]? Call back    Best call back number:[phone]

## 2019-09-19 NOTE — TELEPHONE ENCOUNTER
Spoke with the pt and informed her, that she will need to call and schedule an appt.  I gave the telephone number [phone]..  Patient verbalized understandings.

## 2019-09-19 NOTE — TELEPHONE ENCOUNTER
----- Message from Hudson Paris sent at 9/19/2019  1:16 PM CDT -----  Contact: Cecilia [phone]  Type: Patient Call Back    Who called:Cecilia     What is the request in detail: the patient is requesting a call back from Ms. Tawnya    Can the clinic reply by [facility]?no    Would the patient rather a call back or a response via [facility]? Call back    Best call back number:[phone]

## 2019-09-19 NOTE — TELEPHONE ENCOUNTER
----- Message from Marifer Jennings sent at 9/19/2019  9:37 AM CDT -----  Contact: Patient   Type:  Patient Returning Call    Who Called: Patient     Who Left Message for Patient: Tawnya Nayak LPN      Does the patient know what this is regarding?: referral     Would the patient rather a call back or a response via [facility]? Call back     Best Call Back Number:[phone]
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
## 2019-09-26 ENCOUNTER — LAB VISIT (OUTPATIENT)
Dept: LAB | Facility: HOSPITAL | Age: 69
End: 2019-09-26
Attending: NURSE PRACTITIONER
Payer: MEDICARE

## 2019-09-26 ENCOUNTER — OFFICE VISIT (OUTPATIENT)
Dept: PSYCHIATRY | Facility: CLINIC | Age: 69
End: 2019-09-26
Payer: MEDICARE

## 2019-09-26 VITALS
BODY MASS INDEX: 34.73 KG/M2 | HEART RATE: 72 BPM | DIASTOLIC BLOOD PRESSURE: 62 MMHG | HEIGHT: 63 IN | WEIGHT: 196 LBS | SYSTOLIC BLOOD PRESSURE: 122 MMHG

## 2019-09-26 DIAGNOSIS — F41.1 GENERALIZED ANXIETY DISORDER: ICD-10-CM

## 2019-09-26 DIAGNOSIS — E78.5 HYPERLIPIDEMIA, UNSPECIFIED HYPERLIPIDEMIA TYPE: ICD-10-CM

## 2019-09-26 DIAGNOSIS — G47.10 HYPERSOMNIA: ICD-10-CM

## 2019-09-26 DIAGNOSIS — Z79.899 PHARMACOLOGIC THERAPY: ICD-10-CM

## 2019-09-26 DIAGNOSIS — F33.3 MAJOR DEPRESSIVE DISORDER, RECURRENT EPISODE, SEVERE WITH MOOD-CONGRUENT PSYCHOTIC FEATURES: Primary | ICD-10-CM

## 2019-09-26 LAB
CHOLEST SERPL-MCNC: 179 MG/DL (ref 120–199)
CHOLEST/HDLC SERPL: 4 {RATIO} (ref 2–5)
ESTIMATED AVG GLUCOSE: 214 MG/DL (ref 68–131)
HBA1C MFR BLD HPLC: 9.1 % (ref 4–5.6)
HDLC SERPL-MCNC: 45 MG/DL (ref 40–75)
HDLC SERPL: 25.1 % (ref 20–50)
LDLC SERPL CALC-MCNC: 106.6 MG/DL (ref 63–159)
NONHDLC SERPL-MCNC: 134 MG/DL
TRIGL SERPL-MCNC: 137 MG/DL (ref 30–150)

## 2019-09-26 PROCEDURE — 99999 PR PBB SHADOW E&M-EST. PATIENT-LVL III: ICD-10-PCS | Mod: PBBFAC,HCNC,, | Performed by: NURSE PRACTITIONER

## 2019-09-26 PROCEDURE — 83036 HEMOGLOBIN GLYCOSYLATED A1C: CPT | Mod: HCNC

## 2019-09-26 PROCEDURE — 1101F PT FALLS ASSESS-DOCD LE1/YR: CPT | Mod: HCNC,CPTII,S$GLB, | Performed by: NURSE PRACTITIONER

## 2019-09-26 PROCEDURE — 3078F PR MOST RECENT DIASTOLIC BLOOD PRESSURE < 80 MM HG: ICD-10-PCS | Mod: HCNC,CPTII,S$GLB, | Performed by: NURSE PRACTITIONER

## 2019-09-26 PROCEDURE — 80061 LIPID PANEL: CPT | Mod: HCNC

## 2019-09-26 PROCEDURE — 99999 PR PBB SHADOW E&M-EST. PATIENT-LVL III: CPT | Mod: PBBFAC,HCNC,, | Performed by: NURSE PRACTITIONER

## 2019-09-26 PROCEDURE — 3074F SYST BP LT 130 MM HG: CPT | Mod: HCNC,CPTII,S$GLB, | Performed by: NURSE PRACTITIONER

## 2019-09-26 PROCEDURE — 3078F DIAST BP <80 MM HG: CPT | Mod: HCNC,CPTII,S$GLB, | Performed by: NURSE PRACTITIONER

## 2019-09-26 PROCEDURE — 99204 OFFICE O/P NEW MOD 45 MIN: CPT | Mod: HCNC,S$GLB,, | Performed by: NURSE PRACTITIONER

## 2019-09-26 PROCEDURE — 99204 PR OFFICE/OUTPT VISIT, NEW, LEVL IV, 45-59 MIN: ICD-10-PCS | Mod: HCNC,S$GLB,, | Performed by: NURSE PRACTITIONER

## 2019-09-26 PROCEDURE — 3074F PR MOST RECENT SYSTOLIC BLOOD PRESSURE < 130 MM HG: ICD-10-PCS | Mod: HCNC,CPTII,S$GLB, | Performed by: NURSE PRACTITIONER

## 2019-09-26 PROCEDURE — 1101F PR PT FALLS ASSESS DOC 0-1 FALLS W/OUT INJ PAST YR: ICD-10-PCS | Mod: HCNC,CPTII,S$GLB, | Performed by: NURSE PRACTITIONER

## 2019-09-26 PROCEDURE — 36415 COLL VENOUS BLD VENIPUNCTURE: CPT | Mod: HCNC

## 2019-09-26 RX ORDER — FLUOXETINE 10 MG/1
10 CAPSULE ORAL DAILY
Qty: 30 CAPSULE | Refills: 0 | Status: SHIPPED | OUTPATIENT
Start: 2019-09-26 | End: 2019-11-11 | Stop reason: SDUPTHER

## 2019-09-26 RX ORDER — BUPROPION HYDROCHLORIDE 75 MG/1
75 TABLET ORAL 2 TIMES DAILY
Qty: 1 TABLET | Refills: 0
Start: 2019-09-26 | End: 2019-10-30 | Stop reason: SDUPTHER

## 2019-09-26 RX ORDER — BUPROPION HYDROCHLORIDE 75 MG/1
75 TABLET ORAL 2 TIMES DAILY
COMMUNITY
End: 2019-09-26 | Stop reason: SDUPTHER

## 2019-09-26 NOTE — PATIENT INSTRUCTIONS
[facility] - DEPARTMENT OF PSYCHIATRY   NEW PATIENT ORIENTATION INFORMATION  OUTPATIENT SERVICES COUNSELING CONTRACT    We appreciate the opportunity to participate in your medical care and hope the following protocols will make it easier for you to receive quality treatment in our department.    1. PUNCTUALITY: Your appointment is scheduled for a fixed amount of time reserved especially for you.  To get the benefit of your appointment, please arrive early enough to allow time for parking and registration.  If you are late for your appointment, your clinician is not able to offer additional time.  Please make every effort to be on time.      2. PAYMENT FOR SERVICES:   Payments are expected at the time of service.  Please contact [phone] if you need to resolve issues involving your account at [facility] or to set up a payment plan.    3. CANCELLATION / MISSED APPOINTMENTS:   In order to receive quality care, all appointments must be kept.  Appointment may be cancelled, ONLY by talking with an  at phone number [phone], between 8:00 a.m. and 5:00 p.m., Monday through Friday, at least 24 hours before your appointment time.  Your clinician reserves this time specifically for you, and if you will be unable to use it, it is necessary that you cancel in a timely manner.  If you do not give at least 24-hour notice of cancellation a full fee will be assessed.  Please note that insurance does not cover no-show charges, so you will be billed directly.  If you are consistently late, cancel, or do not show for your appointments, our department reserves the right to terminate treatment    4. CALLING THE DEPARTMENT:   MESSAGES, SCHEDULE OR CANCEL APPOINTMENTS- In general you can reach the department by calling [phone], between 8:00 a.m. and 5:00 p.m., Monday through Friday, to schedule or cancel appointments or leave a message for your clinician.  It is advisable to schedule your visits  far in advance to obtain the most convenient times for your appointments.   AFTER HOURS, WEEKEND OR HOLIDAYS- For urgent questions after hours, weekends and holidays, calling the department number [phone] will connect you to the nursing staff on the Psychiatry Inpatient Unit.  The nursing staff will speak with you and contact the On Call psychiatrist if necessary.   EMERGENCY-  In case of a crisis when there is a concern of harm to self or others, call 911 or the office [phone] between 8:00 a.m. and 5:00 p.m., Monday through Friday.  After hours, weekends or holidays, please call 911 or go to the Emergency Department where you can be thoroughly evaluated by the On Call psychiatrist.  If possible, contact the psychiatrist on call at [phone] prior to leaving for the [facility] Emergency Department.    5. TEAM APPROACH:  Most patients receive therapy through our team system.  In the team system, your primary therapist will be a , psychologist, psychiatry resident or psychiatrist.  If your therapist is a , psychologist or psychiatry resident, please contact your primary therapist first in matters other than medications or acute medical problems.    6. PRESCRIPTION REFILLS:  Prescription refills must be done at your physician office visit.  You will be given a sufficient number of refills to last one extra month beyond your next appointment.  No additional refills will be approved beyond the original treatment plan.  After hours, sufficient medication may be approved to last until the next scheduled appointment. After hours requests for refills on controlled substances may be declined by the psychiatrist on call, as he or she may not be familiar with your case  Please work closely with your doctor so that you have sufficient medication until your next appointment.  Again, please note that no additional prescriptions will be approved per patient request over the phone.   No  "additional refills will be approved beyond the original treatment plan.   In certain exceptional situations, a phone consult appointment may be arranged, with appropriate charge, for the review and approval of prescription refills to last until the next scheduled appointment.    7. FOLLOW UP APPOINTMENTS:  Follow-up appointments can be made in person at the Psychiatry Appointment Desk, [place] [phone], or by calling [phone], from 8am to 5pm, between Monday and Friday.  It is advisable to schedule your office visits far enough in advance to obtain the most convenient times for your appointments.    Revised Feb. 23, 2010 - F/OA1/Newpatient    You have been provided with a certain amount of medication with a specified number of refills.  Please follow up within an adequate time before you run out of medications.    REFILLS FOR CONTROLLED SUBSTANCES WILL NOT BE GIVEN WITHOUT AN APPOINTMENT.  I will not honor or fill automated refill requests from pharmacies.  You must come in for an appointment to get refills.    Please book your next appointment for myself or therapist by phone by calling our office at [phone].      PLEASE BE AT LEAST 15 MINUTES EARLY FOR YOUR NEXT APPOINTMENT.  PLEASE, DO NOT BE LATE OR YOU WILL BE TURNED AWAY AND ASKED TO RESCHEDULE.  YOU MUST COME EARLY TO ALLOW TIME FOR CHECK-IN AS WELL AS GET YOUR VITAL SIGNS AND GO OVER YOUR MEDICATIONS.  Tardiness is not fair to the patients who present after you and are on time for their appointments.  It causes a delay in the appointments for patients and staff.  IF YOU ARE LATE, THERE IS A POSSIBILITY THAT YOU WILL BE CHARGED FOR THE APPOINTMENT TIME PERSONALLY AND IT WILL NOT GO TO YOUR INSURANCE.  YOU MAY ALSO BE DISCHARGED FROM CLINIC with multiple "No Show" appointments.  -----------------------------------------------------------------------------------------------------------------  IF YOU FEEL SUICIDAL OR HAVING THOUGHTS OR PLANS TO HURT " YOURSELF OR OTHERS, CALL 911 OR REPORT TO THE NEAREST EMERGENCY ROOM.  YOU CAN ALSO ACCESS THE FOLLOWING HOTLINE(S):    National Suicide Hotline Number 9-215-670-TALK (7059)     ([facility] Mobile Crisis, 110.586.6477'   [facility], [phone]) [phone]; [facility], 24 hours / 7 days, [phone]) [phone]-AUJE (7559), 1-673-516-WTBG (7637))     TIPS FOR GETTING YOUR PRESCRIPTIONS:    You can always ask your pharmacist the cost of your medications without the use of your insurance. Sometimes the medication will be cheaper if you do not use your insurance.     If you decide you want to have your prescriptions filled at a different pharmacy, you can always go to the new pharmacy of your choice and have them call the pharmacy where your prescription was sent and they can have your prescription transferred to the new pharmacy.     INSTRUCTIONS:     Start Wellbutrin taper: Take one tablet by mouth twice a day while taking the new prescription for fluoxetine 10 mgs.

## 2019-09-29 ENCOUNTER — HOSPITAL ENCOUNTER (EMERGENCY)
Facility: HOSPITAL | Age: 69
Discharge: HOME | End: 2019-09-30
Attending: EMERGENCY MEDICINE
Payer: MEDICARE

## 2019-09-29 DIAGNOSIS — R09.1 PLEURISY: Primary | ICD-10-CM

## 2019-09-29 DIAGNOSIS — R07.9 CHEST PAIN: ICD-10-CM

## 2019-09-29 PROCEDURE — 99284 EMERGENCY DEPT VISIT MOD MDM: CPT | Mod: HCNC,ER

## 2019-09-30 ENCOUNTER — OFFICE VISIT (OUTPATIENT)
Dept: ENDOCRINOLOGY | Facility: CLINIC | Age: 69
End: 2019-09-30
Payer: MEDICARE

## 2019-09-30 VITALS
RESPIRATION RATE: 18 BRPM | OXYGEN SATURATION: 98 % | SYSTOLIC BLOOD PRESSURE: 156 MMHG | HEIGHT: 63 IN | WEIGHT: 198 LBS | BODY MASS INDEX: 35.08 KG/M2 | TEMPERATURE: 98 F | HEART RATE: 79 BPM | DIASTOLIC BLOOD PRESSURE: 78 MMHG

## 2019-09-30 VITALS
BODY MASS INDEX: 34.77 KG/M2 | HEART RATE: 70 BPM | SYSTOLIC BLOOD PRESSURE: 107 MMHG | WEIGHT: 196.31 LBS | DIASTOLIC BLOOD PRESSURE: 64 MMHG

## 2019-09-30 DIAGNOSIS — F43.9 STRESS: ICD-10-CM

## 2019-09-30 DIAGNOSIS — E78.5 HYPERLIPIDEMIA, UNSPECIFIED HYPERLIPIDEMIA TYPE: ICD-10-CM

## 2019-09-30 DIAGNOSIS — I10 ESSENTIAL HYPERTENSION: ICD-10-CM

## 2019-09-30 PROCEDURE — 99999 PR PBB SHADOW E&M-EST. PATIENT-LVL V: ICD-10-PCS | Mod: PBBFAC,HCNC,, | Performed by: NURSE PRACTITIONER

## 2019-09-30 PROCEDURE — 1101F PR PT FALLS ASSESS DOC 0-1 FALLS W/OUT INJ PAST YR: ICD-10-PCS | Mod: HCNC,CPTII,S$GLB, | Performed by: NURSE PRACTITIONER

## 2019-09-30 PROCEDURE — 1101F PT FALLS ASSESS-DOCD LE1/YR: CPT | Mod: HCNC,CPTII,S$GLB, | Performed by: NURSE PRACTITIONER

## 2019-09-30 PROCEDURE — 3046F HEMOGLOBIN A1C LEVEL >9.0%: CPT | Mod: HCNC,CPTII,S$GLB, | Performed by: NURSE PRACTITIONER

## 2019-09-30 PROCEDURE — 3078F PR MOST RECENT DIASTOLIC BLOOD PRESSURE < 80 MM HG: ICD-10-PCS | Mod: HCNC,CPTII,S$GLB, | Performed by: NURSE PRACTITIONER

## 2019-09-30 PROCEDURE — 3074F SYST BP LT 130 MM HG: CPT | Mod: HCNC,CPTII,S$GLB, | Performed by: NURSE PRACTITIONER

## 2019-09-30 PROCEDURE — 3078F DIAST BP <80 MM HG: CPT | Mod: HCNC,CPTII,S$GLB, | Performed by: NURSE PRACTITIONER

## 2019-09-30 PROCEDURE — 99999 PR PBB SHADOW E&M-EST. PATIENT-LVL V: CPT | Mod: PBBFAC,HCNC,, | Performed by: NURSE PRACTITIONER

## 2019-09-30 PROCEDURE — 99214 OFFICE O/P EST MOD 30 MIN: CPT | Mod: HCNC,S$GLB,, | Performed by: NURSE PRACTITIONER

## 2019-09-30 PROCEDURE — 3046F PR MOST RECENT HEMOGLOBIN A1C LEVEL > 9.0%: ICD-10-PCS | Mod: HCNC,CPTII,S$GLB, | Performed by: NURSE PRACTITIONER

## 2019-09-30 PROCEDURE — 99214 PR OFFICE/OUTPT VISIT, EST, LEVL IV, 30-39 MIN: ICD-10-PCS | Mod: HCNC,S$GLB,, | Performed by: NURSE PRACTITIONER

## 2019-09-30 PROCEDURE — 3074F PR MOST RECENT SYSTOLIC BLOOD PRESSURE < 130 MM HG: ICD-10-PCS | Mod: HCNC,CPTII,S$GLB, | Performed by: NURSE PRACTITIONER

## 2019-09-30 RX ORDER — ATORVASTATIN CALCIUM 20 MG/1
20 TABLET, FILM COATED ORAL DAILY
Qty: 30 TABLET | Refills: 3 | Status: SHIPPED | OUTPATIENT
Start: 2019-09-30 | End: 2020-02-06 | Stop reason: ALTCHOICE

## 2019-09-30 RX ORDER — PREDNISONE 10 MG/1
10 TABLET ORAL DAILY
Qty: 5 TABLET | Refills: 0 | Status: SHIPPED | OUTPATIENT
Start: 2019-09-30 | End: 2019-10-05

## 2019-09-30 RX ORDER — METHOCARBAMOL 500 MG/1
500 TABLET, FILM COATED ORAL 2 TIMES DAILY PRN
Qty: 15 TABLET | Refills: 0 | Status: SHIPPED | OUTPATIENT
Start: 2019-09-30 | End: 2019-10-05

## 2019-09-30 NOTE — ED PROVIDER NOTES
Encounter Date: 2019    SCRIBE #1 NOTE: I, Dominique Dumont , am scribing for, and in the presence of,  Dr. Fernandez . I have scribed the following portions of the note - Other sections scribed: HPI, ROS, PE .       History     Chief Complaint   Patient presents with    Shoulder Pain     pt c/o l shoulder and chest pain that worsen with breathing     Cecilia Barahona is a 69 y.o. female who presents to the ED complaining of left shoulder and left chest wall pain that is worse with breathing. Pt was originally triaged at [facility] but left before she could be seen by a provider. She has no other complaints at this time.     The history is provided by the patient.     Review of patient's allergies indicates:   Allergen Reactions    Ace inhibitors Other (See Comments)     cough    Invokana [canagliflozin] Other (See Comments)     Throat and tongue swelling    Ozempic [semaglutide]      Nausea and constipation on 0.25 mg dose.      Past Medical History:   Diagnosis Date    Allergic rhinitis, seasonal     Anxiety     Arthritis     CVA (cerebral vascular accident)     Depression     Glaucoma NEC     Hallucination     images out of corner of eye about a year ago    History of positive PPD - treated - remote past     Hx of psychiatric care     Hyperlipidemia LDL goal < 100     Hypertension     Nuclear sclerosis of both eyes 2019    Obesity     CHETNA (obstructive sleep apnea)     Psychiatric problem     Psychosis     Renal disorder     Sleep difficulties     Suicide attempt     about 30 years ago by overdose of pills    Therapy     Type II or unspecified type diabetes mellitus without mention of complication, uncontrolled      Past Surgical History:   Procedure Laterality Date    CARPAL TUNNEL RELEASE       SECTION, CLASSIC      TOTAL ABDOMINAL HYSTERECTOMY      TRIGGER FINGER RELEASE       Family History   Problem Relation Age of Onset    Hypertension Mother     Diabetes Mother      Heart failure Mother     Cataracts Mother     Glaucoma Mother     Prostate cancer Father     Hypertension Father     Diabetes Father     Heart failure Father     No Known Problems Sister     Diabetes Maternal Uncle     Hyperlipidemia Maternal Uncle     Hypertension Maternal Uncle     Diabetes Maternal Grandmother     Diabetes Maternal Grandfather     No Known Problems Paternal Grandmother     No Known Problems Paternal Grandfather     Diabetes Maternal Aunt     Alzheimer's disease Maternal Aunt     Schizophrenia Maternal Aunt     Heart disease Cousin         s/p heart transplant    No Known Problems Paternal Aunt     No Known Problems Paternal Uncle     Drug abuse Grandchild     Amblyopia Neg Hx     Blindness Neg Hx     Cancer Neg Hx     Macular degeneration Neg Hx     Retinal detachment Neg Hx     Strabismus Neg Hx     Stroke Neg Hx     Thyroid disease Neg Hx      Social History     Tobacco Use    Smoking status: Former Smoker     Packs/day: 0.50     Types: Cigarettes     Last attempt to quit: 11/10/2012     Years since quittin.8    Smokeless tobacco: Never Used   Substance Use Topics    Alcohol use: Not Currently     Comment: drank socially in the past    Drug use: Not Currently     Types: Marijuana     Comment: tried once 6 months ago     Review of Systems   Constitutional: Negative.  Negative for fever.   HENT: Negative.  Negative for sore throat.    Eyes: Negative.    Respiratory: Negative.  Negative for shortness of breath.    Cardiovascular: Positive for chest pain.   Gastrointestinal: Negative.  Negative for nausea and vomiting.   Endocrine: Negative.    Genitourinary: Negative.  Negative for dysuria.   Musculoskeletal: Positive for arthralgias (left shoulder). Negative for myalgias.   Skin: Negative.  Negative for rash.   Allergic/Immunologic: Negative.    Neurological: Negative.  Negative for headaches.   Hematological: Negative.  Negative for adenopathy.    Psychiatric/Behavioral: Negative.  Negative for behavioral problems.   All other systems reviewed and are negative.      Physical Exam     Initial Vitals [09/29/19 2348]   BP Pulse Resp Temp SpO2   (!) 172/73 78 18 98 °F (36.7 °C) 98 %      MAP       --         Physical Exam    Nursing note and vitals reviewed.  Constitutional: She appears well-developed and well-nourished.   HENT:   Head: Normocephalic and atraumatic.   Right Ear: External ear normal.   Left Ear: External ear normal.   Nose: Nose normal.   Eyes: Conjunctivae are normal.   Neck: Normal range of motion. Neck supple.   Cardiovascular: Normal rate and intact distal pulses.   Pulmonary/Chest: Effort normal. No respiratory distress.   Abdominal: Soft. There is no tenderness.   Musculoskeletal: Normal range of motion.   Reproducible pain in her upper sternal border on the left side.    Neurological: She is alert and oriented to person, place, and time.   Skin: Skin is warm and dry. Capillary refill takes less than 2 seconds.   Psychiatric: She has a normal mood and affect. Her behavior is normal.         ED Course   Procedures  Labs Reviewed - No data to display       Imaging Results          X-Ray Chest PA And Lateral (Final result)  Result time 09/30/19 00:29:35    Final result by Ananda Barrientos MD (09/30/19 00:29:35)                 Impression:      There is no radiographic evidence for acute intrathoracic process.      Electronically signed by: Ananda Barrientos  Date:    09/30/2019  Time:    00:29             Narrative:    EXAMINATION:  XR CHEST PA AND LATERAL    CLINICAL HISTORY:  Chest pain, unspecified    TECHNIQUE:  PA and lateral views of the chest were performed.    COMPARISON:  February 20, 2017    FINDINGS:  Two views of the chest are submitted.  The cardiomediastinal silhouette appears stable.  There is no evidence for confluent infiltrate or consolidation, significant pleural effusion or pneumothorax.  The osseous structures demonstrate  chronic change.                                 Medical Decision Making:   Clinical Tests:   Radiological Study: Ordered and Reviewed  ED Management:  This patient presents with low risk stratification for pulmonary embolus as well as for coronary artery disease.  Patient has reproducible pain in her left parasternal border with a normal chest x-ray and a nondiagnostic EKG.  I feel this patient's pain is musculoskeletal in etiology I will treat with steroids and muscle relaxer and have the patient follow up with primary health care physician.    Please low last comparison EKG is from 2015.  Again the patient's EKGs equivocal and her physical findings are consistent with musculoskeletal pain. Patient is instructed follow up with her primary health care physician.    Additional MDM:   PERC Rule:   Age is greater than or equal to 50 = 1.0  Heart Rate is greater than or equal to 100 = 0.0  SaO2 on room air < 95% = 0.0  Unilateral leg swelling = 0.0  Hemoptysis = 0.0  Recent surgery or trauma = 0.0  Prior PE or DVT =  0.0  Hormone use = 0.00  PERC Score = 1    Well's Criteria Score:  -Clinical symptoms of DVT (leg swelling, pain with palpation) = 0.0  -Other diagnosis less likely than pulmonary embolism =            0.0  -Heart Rate >100 =   0.0  -Immobilization (= or > than 3 days) or surgery in the previous 4 weeks = 0.0  -Previous DVT/PE = 0.0  -Hemoptysis =          0.0  -Malignancy =           0.0  Well's Probability Score =    0      Heart Score:    History:          Slightly suspicious.  ECG:             Normal  Age:               >65 years  Risk factors: 1-2 risk factors    SUSY Score:   Age over 65:                                    1.00   > or = to 3 CAD risk factors:           0.00  Established CAD:                            0.00  > or = to 2 anginal events in the past 24 hours: 0.00  Use of ASA in past 7 days:              0.00  Elevated Enzymes:                         0.00  ST Depression > or = to 0.05  mV:  0.00  SUSY score: 1     Imaging Results          X-Ray Chest PA And Lateral (Final result)  Result time 09/30/19 00:29:35    Final result by Ananda Barrientos MD (09/30/19 00:29:35)                 Impression:      There is no radiographic evidence for acute intrathoracic process.      Electronically signed by: Ananda Barrientos  Date:    09/30/2019  Time:    00:29             Narrative:    EXAMINATION:  XR CHEST PA AND LATERAL    CLINICAL HISTORY:  Chest pain, unspecified    TECHNIQUE:  PA and lateral views of the chest were performed.    COMPARISON:  February 20, 2017    FINDINGS:  Two views of the chest are submitted.  The cardiomediastinal silhouette appears stable.  There is no evidence for confluent infiltrate or consolidation, significant pleural effusion or pneumothorax.  The osseous structures demonstrate chronic change.                                     Scribe Attestation:   Scribe #1: I performed the above scribed service and the documentation accurately describes the services I performed. I attest to the accuracy of the note.    This document was produced by a scribe under my direction and in my presence. I agree with the content of the note and have made any necessary edits.     Albert Fernandez MD    09/30/2019 12:39 AM           Clinical Impression:     1. Pleurisy    2. Chest pain                                   Albert Fernandez MD  09/30/19 0040

## 2019-09-30 NOTE — PROGRESS NOTES
"CC: This 69 y.o. Black or  female  is here for evaluation of  T2DM along with comorbidities indicated in the Visit Diagnosis section of this encounter.    HPI: Cecilia Barahona was diagnosed with T2DM in her 30s. Oral agents started at the time of diagnosis.       Prior visit 6/28/19  Not taking Trulicity x 2 weeks d/t nausea. This was on 1.5 mg dose. No longer having nausea.   Was told she does not qualify for gastric sleeve since BMI is not high enough.   Plan Avoid sweet tea. Start exercise routine.   Reduce trulicity to 0.75 mg once weekly. rtc in 3 mo with labs prior.     Interval history  a1c up from 8.1 to 9.1% - the highest it's been in 3 years.   She has not been testing BGs often. Admits diet has been poor - eating watermelon. Gets frustrated and has been eating more. "just not caring." Undergoing financial trouble that she has not been faced with before.   She is taking the lower dose of Trulicity and not having any nausea. Interested in trying higher dose again to improve BGs; believes nausea could be better if she takes it in the morning.         PMHX: CHETNA - does not like her old mask. H/o stroke/right facial dropp     LAST DIABETES EDUCATION: never    RECENT ILLNESSES/HOSPITALIZATIONS - -No.     HOSPITALIZED FOR DIABETES  -  No.    PRESCRIBED DIABETES MEDICATIONS:  Tresiba 30 units hs ~ 10 pm, metformin 1000 mg bid, Trulicity 0.75 mg once weekly       Misses medication doses     DM COMPLICATIONS: peripheral neuropathy    SIGNIFICANT DIABETES MED HISTORY:   Glimepiride stopped at initial ov 7/2017 since she was already on Novolog  ozempic started 10/2018,  switched to trulicity ~ 12/2018 d/t nausea     SELF MONITORING BLOOD GLUCOSE: Checks blood glucose at home - 1x/day fasting blood sugars 120-140s; never higher than 170.     HYPOGLYCEMIC EPISODES: denies      CURRENT DIET: drinks water or crystal lite - no more sweet tea.  Snacking more and maybe not getting standard meals in. "     CURRENT EXERCISE: none       /64 (BP Location: Left arm, Patient Position: Sitting, BP Method: X-Large (Automatic))   Pulse 70   Wt 89 kg (196 lb 4.8 oz)   BMI 34.77 kg/m²         ROS:   CONSTITUTIONAL: Appetite good, denies fatigue  GI: nausea - no       PHYSICAL EXAM:  GENERAL: Well developed, well nourished. No acute distress.   PSYCH: AAOx3, appropriate mood and affect, conversant, well-groomed. Judgement and insight good.   NEURO: Cranial nerves grossly intact. Speech clear, no tremor.   CHEST: Respirations even and unlabored.   MS: Gait steady. No clubbing.         Hemoglobin A1C   Date Value Ref Range Status   09/26/2019 9.1 (H) 4.0 - 5.6 % Final     Comment:     ADA Screening Guidelines:  5.7-6.4%  Consistent with prediabetes  >or=6.5%  Consistent with diabetes  High levels of fetal hemoglobin interfere with the HbA1C  assay. Heterozygous hemoglobin variants (HbS, HgC, etc)do  not significantly interfere with this assay.   However, presence of multiple variants may affect accuracy.     06/28/2019 8.1 (H) 4.0 - 5.6 % Final     Comment:     ADA Screening Guidelines:  5.7-6.4%  Consistent with prediabetes  >or=6.5%  Consistent with diabetes  High levels of fetal hemoglobin interfere with the HbA1C  assay. Heterozygous hemoglobin variants (HbS, HgC, etc)do  not significantly interfere with this assay.   However, presence of multiple variants may affect accuracy.     02/05/2019 7.3 (H) 4.0 - 5.6 % Final     Comment:     ADA Screening Guidelines:  5.7-6.4%  Consistent with prediabetes  >or=6.5%  Consistent with diabetes  High levels of fetal hemoglobin interfere with the HbA1C  assay. Heterozygous hemoglobin variants (HbS, HgC, etc)do  not significantly interfere with this assay.   However, presence of multiple variants may affect accuracy.             Chemistry        Component Value Date/Time     09/26/2019 1004    K 3.7 09/26/2019 1004     09/26/2019 1004    CO2 29 09/26/2019 1004     BUN 15 09/26/2019 1004    CREATININE 0.9 09/26/2019 1004     (H) 09/26/2019 1004        Component Value Date/Time    CALCIUM 9.5 09/26/2019 1004    ALKPHOS 127 09/26/2019 1004    AST 19 09/26/2019 1004    ALT 20 09/26/2019 1004    BILITOT 0.4 09/26/2019 1004    ESTGFRAFRICA >60 09/26/2019 1004    EGFRNONAA >60 09/26/2019 1004          Lab Results   Component Value Date    LDLCALC 106.6 09/26/2019          Ref. Range 9/26/2019 10:04   Cholesterol Latest Ref Range: 120 - 199 mg/dL 179   HDL Latest Ref Range: 40 - 75 mg/dL 45   Hdl/Cholesterol Ratio Latest Ref Range: 20.0 - 50.0 % 25.1   LDL Cholesterol External Latest Ref Range: 63.0 - 159.0 mg/dL 106.6   Non-HDL Cholesterol Latest Units: mg/dL 134   Total Cholesterol/HDL Ratio Latest Ref Range: 2.0 - 5.0  4.0   Triglycerides Latest Ref Range: 30 - 150 mg/dL 137       Lab Results   Component Value Date    MICALBCREAT 24.2 09/26/2019         STANDARDS of CARE:        ASA:               Last eye exam:       ASSESSMENT and PLAN:    A1C GOAL: < 7 %     1. Type 2 diabetes, uncontrolled, with neuropathy  Increase Trulicity to 1.5 mg weekly   Advised her to improve diet.   Test blood glucose 2x/day.   Return to clinic 3 mo with labs prior.       Hemoglobin A1c   2. Hyperlipidemia, unspecified hyperlipidemia type  Switch from pravastatin to atorvastatin (LIPITOR) 20 MG tablet    Lipid panel    Hepatic function panel   3. Essential hypertension  controlled   4. Stress  Continue f/u with Psychiatry   Recently started on prozac.          Orders Placed This Encounter   Procedures    Hemoglobin A1c     Standing Status:   Future     Standing Expiration Date:   11/28/2020    Lipid panel     Standing Status:   Future     Standing Expiration Date:   9/29/2020    Hepatic function panel     Standing Status:   Future     Standing Expiration Date:   11/28/2020        Follow up in about 3 months (around 12/30/2019).

## 2019-10-03 ENCOUNTER — PATIENT MESSAGE (OUTPATIENT)
Dept: OPTOMETRY | Facility: CLINIC | Age: 69
End: 2019-10-03

## 2019-10-08 ENCOUNTER — TELEPHONE (OUTPATIENT)
Dept: ENDOCRINOLOGY | Facility: CLINIC | Age: 69
End: 2019-10-08

## 2019-10-08 ENCOUNTER — PATIENT MESSAGE (OUTPATIENT)
Dept: ENDOCRINOLOGY | Facility: CLINIC | Age: 69
End: 2019-10-08

## 2019-10-09 NOTE — PROGRESS NOTES
"[facility]: Health  Follow-Up    Brief encounter.  Patient reports she has been "forgetting" to take readings, but states she will take a BP reading today.  Reports she is feeling better since visiting ED last week.    The history is provided by the patient.   Hypertension       Follow Up  Follow-up reason(s): reading review      Readings are missing.   patient reminder needed.      Assessment/Plan    SDOH    INTERVENTION(S)  encouragement/support    PLAN  patient verbalizes understanding    Informed patient I will follow up in 2 weeks to discuss lifestyle goals.      There are no preventive care reminders to display for this patient.    Last 5 Patient Entered Readings                                      Current 30 Day Average: 123/65     Recent Readings 9/27/2019 9/27/2019 9/19/2019 9/12/2019 9/12/2019    SBP (mmHg) 133 144 138 95 138    DBP (mmHg) 61 63 68 66 68    Pulse 71 71 65 82 74                "

## 2019-10-10 DIAGNOSIS — G25.81 RESTLESS LEGS SYNDROME (RLS): ICD-10-CM

## 2019-10-10 RX ORDER — ROPINIROLE 0.5 MG/1
TABLET, FILM COATED ORAL
Qty: 90 TABLET | Refills: 0 | Status: SHIPPED | OUTPATIENT
Start: 2019-10-10 | End: 2020-01-14

## 2019-10-23 ENCOUNTER — PATIENT OUTREACH (OUTPATIENT)
Dept: OTHER | Facility: OTHER | Age: 69
End: 2019-10-23

## 2019-10-23 NOTE — PROGRESS NOTES
"Digital Medicine: Health  Follow-Up    The history is provided by the patient.     Follow Up  Follow-up reason(s): routine education      Routine Education Topics: eating patterns and physical activity  Reports she has been working on reducing temptations of candy this time of year.  Reports PA seems "too hard."          Diet:   Patient reports eating or drinking the following: desserts/sweets    Reports she is reducing temptations by keeping candy and sweets out of the house.  Replaced cold drinks with crystal light.    Intervention(s): limiting drinks that contain sugar and reducing processed foods    Physical Activity:   When asked if exercising, patient responded: no    She identified the following barriers to physical activity: motivation    Informed patient to start small with exercise goals, like walking in her neighborhood in morning or evening for 15min 2x/week.  Patient agreed to set SMG.    Intervention(s): goal setting     Assigning the following patient goal(s): participate in exercise weekly      SDOH    INTERVENTION(S)  recommended diet modifications, recommend physical activity and encouragement/support    PLAN  patient verbalizes understanding      There are no preventive care reminders to display for this patient.    Last 5 Patient Entered Readings                                      Current 30 Day Average: 136/67     Recent Readings 10/23/2019 10/22/2019 10/17/2019 10/14/2019 10/9/2019    SBP (mmHg) 128 138 146 134 139    DBP (mmHg) 64 74 68 67 73    Pulse 62 65 71 65 73                "

## 2019-10-30 DIAGNOSIS — F33.3 MAJOR DEPRESSIVE DISORDER, RECURRENT EPISODE, SEVERE WITH MOOD-CONGRUENT PSYCHOTIC FEATURES: ICD-10-CM

## 2019-10-30 DIAGNOSIS — I10 ESSENTIAL HYPERTENSION: ICD-10-CM

## 2019-10-30 RX ORDER — BUPROPION HYDROCHLORIDE 75 MG/1
TABLET ORAL
Qty: 60 TABLET | Refills: 5 | Status: SHIPPED | OUTPATIENT
Start: 2019-10-30 | End: 2019-12-12

## 2019-10-30 RX ORDER — VALSARTAN 320 MG/1
TABLET ORAL
Qty: 30 TABLET | Refills: 5 | Status: SHIPPED | OUTPATIENT
Start: 2019-10-30 | End: 2020-05-18 | Stop reason: SDUPTHER

## 2019-10-31 RX ORDER — METFORMIN HYDROCHLORIDE 1000 MG/1
TABLET ORAL
Qty: 180 TABLET | Refills: 2 | Status: ON HOLD | OUTPATIENT
Start: 2019-10-31 | End: 2020-06-03 | Stop reason: SDUPTHER

## 2019-11-01 ENCOUNTER — PATIENT OUTREACH (OUTPATIENT)
Dept: OTHER | Facility: OTHER | Age: 69
End: 2019-11-01

## 2019-11-01 NOTE — PROGRESS NOTES
Digital Medicine: Clinician Follow-Up    Patient states she is doing well with BP regimen    The history is provided by the patient.     Follow Up  Follow-up reason(s): reading review    CMP 9/2019 WNL      INTERVENTION(S)  reviewed appropriate dose schedule and encouragement/support    PLAN  patient verbalizes understanding and continue monitoring    Occasional BP spikes taken prior to medications  Other BP readings at or near goal  Continue current regimen      There are no preventive care reminders to display for this patient.    Last 5 Patient Entered Readings                                      Current 30 Day Average: 134/69     Recent Readings 10/30/2019 10/28/2019 10/26/2019 10/25/2019 10/24/2019    SBP (mmHg) 142 133 133 118 133    DBP (mmHg) 69 68 66 76 66    Pulse 66 62 68 63 59             Hypertension Medications             amLODIPine (NORVASC) 10 MG tablet TAKE ONE TABLET BY MOUTH ONCE DAILY    chlorthalidone (HYGROTEN) 25 MG Tab TAKE ONE TABLET BY MOUTH once DAILY    valsartan (DIOVAN) 320 MG tablet TAKE ONE Tablet BY MOUTH EVERY EVENING            Patient asked: Patient states she had nausea with Ozempic, less with Trulicity but still occurring. States she wants to go back to [facility]. Encouraged her to follow with TAWNYA Shultz NP, to discuss further. She denies weight loss with Trulicity.     Patient asked:     Patient asked:                   Medication Adherence Screening     She does not wonder if medications are working.  She knows purpose of medications.

## 2019-11-07 RX ORDER — FLASH GLUCOSE SENSOR
KIT MISCELLANEOUS
Qty: 2 KIT | Refills: 11 | Status: SHIPPED | OUTPATIENT
Start: 2019-11-07 | End: 2020-09-24 | Stop reason: SDUPTHER

## 2019-11-07 NOTE — TELEPHONE ENCOUNTER
Please let patient know the sensors are not covered. If she wishes, she can pay for it out of pocket for about $80 per month for the sensors.

## 2019-11-07 NOTE — TELEPHONE ENCOUNTER
----- Message from Chasity Celaya sent at 11/7/2019 12:51 PM CST -----  Contact: [phone]/self  Refill request for flash glucose sensor (FREESTYLE ANAMARIA SENSOR) Kit  PHARMACY: [id number] - [place] - [address] AT [address] [phone] (Phone)

## 2019-11-11 DIAGNOSIS — F41.1 GENERALIZED ANXIETY DISORDER: ICD-10-CM

## 2019-11-11 DIAGNOSIS — F33.3 MAJOR DEPRESSIVE DISORDER, RECURRENT EPISODE, SEVERE WITH MOOD-CONGRUENT PSYCHOTIC FEATURES: ICD-10-CM

## 2019-11-11 RX ORDER — FLUOXETINE 10 MG/1
10 CAPSULE ORAL DAILY
Qty: 30 CAPSULE | Refills: 0 | Status: SHIPPED | OUTPATIENT
Start: 2019-11-11 | End: 2019-12-12 | Stop reason: SDUPTHER

## 2019-11-11 NOTE — TELEPHONE ENCOUNTER
Received message from sheree Yang, stating that patient called and requests refill on Prozac. Further states that patient reported being out of medication for one week at this point. Script sent as requested.

## 2019-11-12 NOTE — PROGRESS NOTES
Physical Therapy Daily Note     Name: Cecilia Barahona  [facility] Number: [id number]  Diagnosis:   Encounter Diagnoses   Name Primary?    Neck pain Yes    Sprain of unspecified site of left knee, initial encounter     Muscle weakness     Joint stiffness      Physician: Jose Maria Angel MD  Visit #: 5 of 20      Time In: 9:00  Time Out: 10:00  Total Treatment Time: 60 minutes(1 on 1 with PT for 30 min)    Subjective     Pt reports:  The knee is feeling better today 3/10 pain. States the neck is 2/10.   Pain:  Location: left cervical, left knee     Activities Which Increase Pain: household chores, bending, lifting, sleeping on left side, turning,   Activities Which Decrease Pain: medication  Pain Scale: 2/10 at best 3/10 now  6/10 at worst     Objective        Palpation:  Mild med/lateral joint line and peripatellar tenderness globally. Mild tenderness on palpation of left CT junction and left C4-C6 facets.         Range of Motion/Strength:       Cervical AROM: Degrees   Flexion 35   Extension 25   Right side bending 15   Left side bending 15   Right rotation 35   Left rotation 35   Cervical quadrant reveals: increase in discomfort in all planes.      Knee Right   Left       AROM MMT AROM MMT   Flexion 125 4 125 4-   Extension 0 4 -3 4-         AROM: Bilateral UE: Grossly WFL, impaired to 110 deg bilaterally                       Right LE: WNL  Left LE: impaired flexion  MMT:  Right UE: 4-/5   Left UE: 4-/5              Right LE: 4/5   Left LE 4-/5      Cecilia received individual therapeutic exercises to develop strength, endurance, ROM, flexibility, posture and core stabilization for 50 minutes including:    -Nu step x 8 minutes   -Scapular retraction 2 x 10, 3 sec hold   -C/s AROM all planes x 2 minutes (rotation/ flex/ext): home program  -Seated B upper trap stretch 20 sec x 3 ea LE   -Seated BUE rotation (YTB) 2x10; np  -Seated LAQ 2x10, 3 sec hold: np  -Supine  dowel flexion 2x10  -hamstring stretch with belt 20 sec x 4  -resisted scapular retraction 2 x 10 yellow t-band  -Supine quad set 2x10  -SAQ 2x10: np  -supine hip abd with red t-band x 30  -SLR 2 x 5  -hip abd 2 x 5  -ball squeeze 2 x 10  -Heel slides 2x10    Cecilia received the following manual therapy techniques: patellar mobilizations were applied to the: left knee for 5 minutes including:  Tibiofemoral distraction grade 2, patellar mobilization    Cecilia received moist heat to cervical spine and left knee x 10 minutes post treatment session. np    Written Home Exercises Provided: Reviewed current HEP with patient.         Assessment     No c/o increased discomfort with prescribed activities. Improved cervical and left knee AROM in all planes. Pt demonstrates a good understanding of the education provided and a good return demonstration of activities. Pt  Requires skilled supervision to complete and progress home program.    Medical necessity is demonstrated by the following IMPAIRMENTS:  -pain   -decreased range of motion/flexibility   -decreased muscle strength   -impaired function    -decreased ADL ability  -decreased recreational ability     Patient is making good progress towards established goals.      Goals as follows:  Short Term Goals (4 Weeks):  Updated 6/23/17  MET    1.Pt to increase strength by a 1/2 grade of muscles test to allow for improvement in functional activities such as performing chores.  2.Pt to improve range of motion by 25% to allow for improved functional mobility to allow for improvement in IADLs.   3.Pt to report compliance with HEP and demonstrate proper exercise technique to PT to show competence with self management of condition.  4.Decrease pain by 25% during functional activities.    Long Term Goals (12 Weeks):  In progress    1. Increase ROM to allow improved joint biomechanics during functional activities.   2.Increase trunk, lowe and upper extremity strength to within normal  limits during functional activities.   3. Independent with home exercise program.   4. Full return to functional activities with manageable complaints.  5. Patient to demonstrate improved posture and body mechanics.  6. Decrease pain by 75% during functional activities.       Plan     Continue with established Plan of Care towards PT goals.    Therapist: Maciel Mcarthur, PT  6/23/2017   BRADLEY Elias

## 2019-11-22 ENCOUNTER — PATIENT OUTREACH (OUTPATIENT)
Dept: OTHER | Facility: OTHER | Age: 69
End: 2019-11-22

## 2019-12-05 ENCOUNTER — OFFICE VISIT (OUTPATIENT)
Dept: CARDIOLOGY | Facility: CLINIC | Age: 69
End: 2019-12-05
Payer: MEDICARE

## 2019-12-05 VITALS
HEART RATE: 60 BPM | WEIGHT: 197.63 LBS | OXYGEN SATURATION: 97 % | DIASTOLIC BLOOD PRESSURE: 72 MMHG | RESPIRATION RATE: 16 BRPM | BODY MASS INDEX: 35.01 KG/M2 | SYSTOLIC BLOOD PRESSURE: 136 MMHG

## 2019-12-05 DIAGNOSIS — R07.9 CHEST PAIN, UNSPECIFIED TYPE: Primary | ICD-10-CM

## 2019-12-05 DIAGNOSIS — Z76.89 ESTABLISHING CARE WITH NEW DOCTOR, ENCOUNTER FOR: ICD-10-CM

## 2019-12-05 PROCEDURE — 99999 PR PBB SHADOW E&M-EST. PATIENT-LVL III: CPT | Mod: PBBFAC,HCNC,, | Performed by: INTERNAL MEDICINE

## 2019-12-05 PROCEDURE — 99999 PR PBB SHADOW E&M-EST. PATIENT-LVL III: ICD-10-PCS | Mod: PBBFAC,HCNC,, | Performed by: INTERNAL MEDICINE

## 2019-12-05 PROCEDURE — 1101F PT FALLS ASSESS-DOCD LE1/YR: CPT | Mod: HCNC,CPTII,S$GLB, | Performed by: INTERNAL MEDICINE

## 2019-12-05 PROCEDURE — 1126F PR PAIN SEVERITY QUANTIFIED, NO PAIN PRESENT: ICD-10-PCS | Mod: HCNC,S$GLB,, | Performed by: INTERNAL MEDICINE

## 2019-12-05 PROCEDURE — 3075F SYST BP GE 130 - 139MM HG: CPT | Mod: HCNC,CPTII,S$GLB, | Performed by: INTERNAL MEDICINE

## 2019-12-05 PROCEDURE — 99204 OFFICE O/P NEW MOD 45 MIN: CPT | Mod: HCNC,S$GLB,, | Performed by: INTERNAL MEDICINE

## 2019-12-05 PROCEDURE — 3075F PR MOST RECENT SYSTOLIC BLOOD PRESS GE 130-139MM HG: ICD-10-PCS | Mod: HCNC,CPTII,S$GLB, | Performed by: INTERNAL MEDICINE

## 2019-12-05 PROCEDURE — 1159F MED LIST DOCD IN RCRD: CPT | Mod: HCNC,S$GLB,, | Performed by: INTERNAL MEDICINE

## 2019-12-05 PROCEDURE — 93000 EKG 12-LEAD: ICD-10-PCS | Mod: HCNC,S$GLB,, | Performed by: INTERNAL MEDICINE

## 2019-12-05 PROCEDURE — 3078F DIAST BP <80 MM HG: CPT | Mod: HCNC,CPTII,S$GLB, | Performed by: INTERNAL MEDICINE

## 2019-12-05 PROCEDURE — 93000 ELECTROCARDIOGRAM COMPLETE: CPT | Mod: HCNC,S$GLB,, | Performed by: INTERNAL MEDICINE

## 2019-12-05 PROCEDURE — 1159F PR MEDICATION LIST DOCUMENTED IN MEDICAL RECORD: ICD-10-PCS | Mod: HCNC,S$GLB,, | Performed by: INTERNAL MEDICINE

## 2019-12-05 PROCEDURE — 99204 PR OFFICE/OUTPT VISIT, NEW, LEVL IV, 45-59 MIN: ICD-10-PCS | Mod: HCNC,S$GLB,, | Performed by: INTERNAL MEDICINE

## 2019-12-05 PROCEDURE — 3078F PR MOST RECENT DIASTOLIC BLOOD PRESSURE < 80 MM HG: ICD-10-PCS | Mod: HCNC,CPTII,S$GLB, | Performed by: INTERNAL MEDICINE

## 2019-12-05 PROCEDURE — 1126F AMNT PAIN NOTED NONE PRSNT: CPT | Mod: HCNC,S$GLB,, | Performed by: INTERNAL MEDICINE

## 2019-12-05 PROCEDURE — 1101F PR PT FALLS ASSESS DOC 0-1 FALLS W/OUT INJ PAST YR: ICD-10-PCS | Mod: HCNC,CPTII,S$GLB, | Performed by: INTERNAL MEDICINE

## 2019-12-05 RX ORDER — ISOSORBIDE MONONITRATE 30 MG/1
30 TABLET, EXTENDED RELEASE ORAL DAILY
Qty: 90 TABLET | Refills: 3 | Status: ON HOLD | OUTPATIENT
Start: 2019-12-05 | End: 2020-06-01 | Stop reason: HOSPADM

## 2019-12-05 NOTE — PROGRESS NOTES
CARDIOVASCULAR CONSULTATION    REASON FOR CONSULT:   Cecilia Barahona is a 69 y.o. female who presents for chest pain.      HISTORY OF PRESENT ILLNESS:     Patient is a pleasant 61-year-old lady.  States that over the past month has started noticing that whenever she walks about 50 ft she gets chest pressure as well as dyspnea on exertion.  No chest pressure at rest.  Also feels tired after walking 50 ft and has to stop.  Denies orthopnea, PND any resting chest pains or pressure.      PAST MEDICAL HISTORY:     Past Medical History:   Diagnosis Date    Allergic rhinitis, seasonal     Anxiety     Arthritis     CVA (cerebral vascular accident)     Depression     Glaucoma NEC     Hallucination     images out of corner of eye about a year ago    History of positive PPD - treated - remote past     Hx of psychiatric care     Hyperlipidemia LDL goal < 100     Hypertension     Nuclear sclerosis of both eyes 2019    Obesity     CHETNA (obstructive sleep apnea)     Psychiatric problem     Psychosis     Renal disorder     Sleep difficulties     Suicide attempt     about 30 years ago by overdose of pills    Therapy     Type II or unspecified type diabetes mellitus without mention of complication, uncontrolled        PAST SURGICAL HISTORY:     Past Surgical History:   Procedure Laterality Date    CARPAL TUNNEL RELEASE       SECTION, CLASSIC      TOTAL ABDOMINAL HYSTERECTOMY      TRIGGER FINGER RELEASE         ALLERGIES AND MEDICATION:     Review of patient's allergies indicates:   Allergen Reactions    Ace inhibitors Other (See Comments)     cough    Invokana [canagliflozin] Other (See Comments)     Throat and tongue swelling    Ozempic [semaglutide]      Nausea and constipation on 0.25 mg dose.         Medication List           Accurate as of 2019  1:43 PM. If you have any questions, ask your nurse or doctor.               CONTINUE taking these medications     acetaminophen-codeine 300-30mg 300-30 mg Tab  Commonly known as:  TYLENOL #3     albuterol 90 mcg/actuation inhaler  Commonly known as:  PROVENTIL/VENTOLIN HFA  Inhale 2 puffs into the lungs every 6 (six) hours as needed for Wheezing or Shortness of Breath. Rescue     alcohol swabs Padm  Apply 1 each topically 3 (three) times daily.     amLODIPine 10 MG tablet  Commonly known as:  NORVASC  TAKE ONE TABLET BY MOUTH ONCE DAILY     artificial tears ointment Oint  Commonly known as:  Artificial Tears  Place into both eyes every evening.     aspirin 81 MG Chew     atorvastatin 20 MG tablet  Commonly known as:  LIPITOR  Take 1 tablet (20 mg total) by mouth once daily.     azelastine 0.05 % ophthalmic solution  Commonly known as:  OPTIVAR  INSTILL 1 DROP IN EACH EYE TWICE DAILY     blood glucose control high,low Soln  Commonly known as:  Accu-Chek Fiorella Control Soln  1 kit by Misc.(Non-Drug; Combo Route) route once daily.     blood glucose control, low Soln  Commonly known as:  Embrace Glucose Control Low  Inject 1 Bottle into the skin every 30 days.     blood pressure monitor Kit  Commonly known as:  Blood Pressure Kit  1 kit by Misc.(Non-Drug; Combo Route) route once daily.     blood sugar diagnostic Strp  Check blood glucose 4 times a day. Dispense accuchek meter or other meter preferred by insurance. Dx code e11.65     blood-glucose meter kit  ispense accuchek meter or other meter preferred by insurance. Dx code e11.65     blood-glucose meter,continuous Misc  Commonly known as:  Dexcom G6   Use with dexcom G6 system     blood-glucose sensor Vanna  Commonly known as:  Dexcom G6 Sensor  Change sensor every 10 days     blood-glucose transmitter Vanna  Commonly known as:  Dexcom G6 Transmitter  Change every 3 months     buPROPion 75 MG tablet  Commonly known as:  WELLBUTRIN  TAKE TWO TABLETS BY MOUTH TWICE DAILY     chlorthalidone 25 MG Tab  Commonly known as:  HYGROTEN  TAKE ONE TABLET BY MOUTH once DAILY    "  desoximetasone 0.05 % cream  Commonly known as:  TOPICORT  Apply topically 2 (two) times daily as needed.     dulaglutide 1.5 mg/0.5 mL Pnij  Commonly known as:  Trulicity  Inject 1.5 mg into the skin every 7 days.     flash glucose scanning reader Misc  Commonly known as:  FreeStyle Arvin 10 Day [place]  1 Units by Misc.(Non-Drug; Combo Route) route before meals as needed.     FLUoxetine 10 MG capsule  Take 1 capsule (10 mg total) by mouth once daily.     fluticasone propionate 50 mcg/actuation nasal spray  Commonly known as:  FLONASE  1 spray (50 mcg total) by Each Nare route once daily.     FreeStyle Arvin 14 Day Sensor Kit  Generic drug:  flash glucose sensor  CHANGE SENSOR EVERY 14 DAYS     gabapentin 300 MG capsule  Commonly known as:  NEURONTIN  Take 1 capsule (300 mg total) by mouth every evening.     lancets Misc  Check blood glucose 4 times a day. Dispense accuchek meter or other meter preferred by insurance. Dx code e11.65     lancing device with lancets Misc  Generic drug:  lancing device  USE AS DIRECTED FOR DIABETIC TESTING     latanoprost 0.005 % ophthalmic solution  PLACE 1 DROP IN EACH EYE EVERY EVENING     loratadine 10 mg tablet  Commonly known as:  CLARITIN  Take 1 tablet (10 mg total) by mouth daily as needed for Allergies.     meloxicam 7.5 MG tablet  Commonly known as:  MOBIC  TAKE ONE Tablet BY MOUTH once DAILY WITH FOOD for 2 weeks then AS NEEDED     metFORMIN 1000 MG tablet  Commonly known as:  GLUCOPHAGE  TAKE ONE TABLET BY MOUTH TWICE DAILY WITH MEALS     multivitamin with minerals tablet     naproxen 500 MG tablet  Commonly known as:  NAPROSYN     pen needle, diabetic 32 gauge x 5/32" Ndle  Commonly known as:  BD Ultra-Fine Edwige Pen Needle  USE FIVE TIMES DAILY     promethazine 25 MG tablet  Commonly known as:  PHENERGAN     rOPINIRole 0.5 MG tablet  Commonly known as:  REQUIP  TAKE ONE Tablet BY MOUTH EVERY NIGHT AT BEDTIME     traMADol 50 mg tablet  Commonly known as:  ULTRAM   "   Tresiba FlexTouch U-200 200 unit/mL (3 mL) Inpn  Generic drug:  insulin degludec  INJECT 30 UNITS SUBCUTANEOUSLY EVERY DAY     valsartan 320 MG tablet  Commonly known as:  DIOVAN  TAKE ONE Tablet BY MOUTH EVERY EVENING            SOCIAL HISTORY:     Social History     Socioeconomic History    Marital status:      Spouse name: Yuniel    Number of children: 3    Years of education: Not on file    Highest education level: Not on file   Occupational History    Occupation: Retired   Social Needs    Financial resource strain: Somewhat hard    Food insecurity:     Worry: Never true     Inability: Sometimes true    Transportation needs:     Medical: No     Non-medical: No   Tobacco Use    Smoking status: Former Smoker     Packs/day: 0.50     Types: Cigarettes     Last attempt to quit: 11/10/2012     Years since quittin.0    Smokeless tobacco: Never Used   Substance and Sexual Activity    Alcohol use: Not Currently     Comment: drank socially in the past    Drug use: Not Currently     Types: Marijuana     Comment: tried once 6 months ago    Sexual activity: Not Currently     Partners: Male   Lifestyle    Physical activity:     Days per week: Not on file     Minutes per session: Not on file    Stress: Very much   Relationships    Social connections:     Talks on phone: Not on file     Gets together: Not on file     Attends Congregational service: Not on file     Active member of club or organization: Not on file     Attends meetings of clubs or organizations: Not on file     Relationship status: Not on file   Other Topics Concern    Patient feels they ought to cut down on drinking/drug use No    Patient annoyed by others criticizing their drinking/drug use No    Patient has felt bad or guilty about drinking/drug use No    Patient has had a drink/used drugs as an eye opener in the AM No   Social History Narrative     x 1.    Has 3 grown children.    Lives with spouse.       FAMILY HISTORY:      Family History   Problem Relation Age of Onset    Hypertension Mother     Diabetes Mother     Heart failure Mother     Cataracts Mother     Glaucoma Mother     Prostate cancer Father     Hypertension Father     Diabetes Father     Heart failure Father     No Known Problems Sister     Diabetes Maternal Uncle     Hyperlipidemia Maternal Uncle     Hypertension Maternal Uncle     Diabetes Maternal Grandmother     Diabetes Maternal Grandfather     No Known Problems Paternal Grandmother     No Known Problems Paternal Grandfather     Diabetes Maternal Aunt     Alzheimer's disease Maternal Aunt     Schizophrenia Maternal Aunt     Heart disease Cousin         s/p heart transplant    No Known Problems Paternal Aunt     No Known Problems Paternal Uncle     Drug abuse Grandchild     Amblyopia Neg Hx     Blindness Neg Hx     Cancer Neg Hx     Macular degeneration Neg Hx     Retinal detachment Neg Hx     Strabismus Neg Hx     Stroke Neg Hx     Thyroid disease Neg Hx        REVIEW OF SYSTEMS:   Review of Systems   Constitution: Negative.   HENT: Negative.    Eyes: Negative.    Cardiovascular: Positive for chest pain.   Respiratory: Negative.    Endocrine: Negative.    Hematologic/Lymphatic: Negative.    Skin: Negative.    Musculoskeletal: Negative.    Gastrointestinal: Negative.    Genitourinary: Negative.    Neurological: Negative.    Psychiatric/Behavioral: Negative.    Allergic/Immunologic: Negative.        A 10 point review of systems was performed and all the pertinent positives have been mentioned. Rest of review of systems was negative.        PHYSICAL EXAM:     Vitals:    12/05/19 1328   BP: 136/72   Pulse: 60   Resp: 16    Body mass index is 35.01 kg/m².  Weight: 89.7 kg (197 lb 10.3 oz)         Physical Exam   Constitutional: She is oriented to person, place, and time. She appears well-developed and well-nourished.   HENT:   Head: Normocephalic.   Eyes: Pupils are equal, round, and  reactive to light.   Neck: Normal range of motion. Neck supple.   Cardiovascular: Normal rate and regular rhythm.   Pulmonary/Chest: Effort normal and breath sounds normal.   Abdominal: Soft. Normal appearance and bowel sounds are normal. There is no tenderness.   Musculoskeletal: Normal range of motion.   Neurological: She is alert and oriented to person, place, and time.   Skin: Skin is warm.   Psychiatric: She has a normal mood and affect.         DATA:     Laboratory:  CBC:  Recent Labs   Lab 03/10/17  0920 04/15/17  0751 08/02/18  1124 09/26/19  1004   WBC 9.20  --  7.97 7.55   Hemoglobin 12.3  --  11.1 L 11.7 L   POC Hematocrit  --  37  --   --    Hematocrit 37.9  --  36.4 L 37.9   Platelets 277  --  281 300       CHEMISTRIES:  Recent Labs   Lab 08/02/18  1124 02/05/19  0837 09/26/19  1004   Glucose 91 89 137 H   Sodium 143 140 140   Potassium 3.8 3.7 3.7   BUN, Bld 19 18 15   Creatinine 0.8 0.9 0.9   eGFR if African American >60.0 >60 >60   eGFR if non African American >60.0 >60 >60   Calcium 9.4 9.7 9.5       CARDIAC BIOMARKERS:        COAGS:        LIPIDS/LFTS:  Recent Labs   Lab 03/10/17  0920 08/02/18  1124 09/26/19  1004   Cholesterol 176 155 179   Triglycerides 120 121 137   HDL 43 42 45   LDL Cholesterol 109.0 88.8 106.6   Non-HDL Cholesterol 133 113 134   AST 24 25 19   ALT 21 24 20       Hemoglobin A1C   Date Value Ref Range Status   09/26/2019 9.1 (H) 4.0 - 5.6 % Final     Comment:     ADA Screening Guidelines:  5.7-6.4%  Consistent with prediabetes  >or=6.5%  Consistent with diabetes  High levels of fetal hemoglobin interfere with the HbA1C  assay. Heterozygous hemoglobin variants (HbS, HgC, etc)do  not significantly interfere with this assay.   However, presence of multiple variants may affect accuracy.     06/28/2019 8.1 (H) 4.0 - 5.6 % Final     Comment:     ADA Screening Guidelines:  5.7-6.4%  Consistent with prediabetes  >or=6.5%  Consistent with diabetes  High levels of fetal hemoglobin  interfere with the HbA1C  assay. Heterozygous hemoglobin variants (HbS, HgC, etc)do  not significantly interfere with this assay.   However, presence of multiple variants may affect accuracy.     02/05/2019 7.3 (H) 4.0 - 5.6 % Final     Comment:     ADA Screening Guidelines:  5.7-6.4%  Consistent with prediabetes  >or=6.5%  Consistent with diabetes  High levels of fetal hemoglobin interfere with the HbA1C  assay. Heterozygous hemoglobin variants (HbS, HgC, etc)do  not significantly interfere with this assay.   However, presence of multiple variants may affect accuracy.         TSH  Recent Labs   Lab 09/26/19  1004   TSH 2.177       The 10-year ASCVD risk score (Arelis KIM Jr., et al., 2013) is: 24.6%    Values used to calculate the score:      Age: 69 years      Sex: Female      Is Non- : Yes      Diabetic: Yes      Tobacco smoker: No      Systolic Blood Pressure: 136 mmHg      Is BP treated: Yes      HDL Cholesterol: 45 mg/dL      Total Cholesterol: 179 mg/dL           Cardiovascular Testing:    EKG: (personally reviewed tracing)  Normal sinus rhythm, low-voltage QRS, questionable anterior lateral infarct.      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  Check stress test and echocardiogram.  Start Imdur 30 mg daily.  Follow-up after testing.    Continue Lipitor.        Thank you very much for involving me in the care of your patient.  Please do not hesitate to contact me if there are any questions.      Nicole Brown MD, FAC, Psychiatric  Interventional Cardiologist, [facility].           This note was dictated with the help of speech recognition software.  There might be un-intended errors and/or substitutions.

## 2019-12-09 NOTE — PROGRESS NOTES
Digital Medicine: Health  Follow-Up    The history is provided by the patient.     Follow Up  Follow-up reason(s): reading review      Readings are trending up due to inaccurate technique.  Patient reports she feels something is wrong with her BP machine.  States she has charged her BP cuff.  States she has an appt on 12/11 with Dr. Smart and will bring her BP cuff to her appt.      INTERVENTION(S)  reviewed monitoring technique and encouragement/support    PLAN  patient verbalizes understanding      There are no preventive care reminders to display for this patient.    Last 5 Patient Entered Readings                                      Current 30 Day Average: 147/71     Recent Readings 12/7/2019 12/6/2019 12/6/2019 12/5/2019 12/2/2019    SBP (mmHg) 167 162 166 120 157    DBP (mmHg) 82 78 75 80 74    Pulse 60 68 76 80 62                  Screenings    SDOH

## 2019-12-11 ENCOUNTER — OFFICE VISIT (OUTPATIENT)
Dept: FAMILY MEDICINE | Facility: CLINIC | Age: 69
End: 2019-12-11
Payer: MEDICARE

## 2019-12-11 VITALS
TEMPERATURE: 98 F | DIASTOLIC BLOOD PRESSURE: 54 MMHG | WEIGHT: 198.31 LBS | HEIGHT: 63 IN | HEART RATE: 66 BPM | SYSTOLIC BLOOD PRESSURE: 142 MMHG | OXYGEN SATURATION: 96 % | BODY MASS INDEX: 35.14 KG/M2

## 2019-12-11 DIAGNOSIS — I10 ESSENTIAL HYPERTENSION: ICD-10-CM

## 2019-12-11 DIAGNOSIS — E66.01 SEVERE OBESITY (BMI 35.0-39.9) WITH COMORBIDITY: ICD-10-CM

## 2019-12-11 DIAGNOSIS — R41.3 MEMORY LOSS, SHORT TERM: ICD-10-CM

## 2019-12-11 DIAGNOSIS — F33.3 MAJOR DEPRESSIVE DISORDER, RECURRENT EPISODE, SEVERE WITH MOOD-CONGRUENT PSYCHOTIC FEATURES: ICD-10-CM

## 2019-12-11 DIAGNOSIS — E78.5 HYPERLIPIDEMIA LDL GOAL <70: ICD-10-CM

## 2019-12-11 DIAGNOSIS — Z79.4 LONG-TERM INSULIN USE: ICD-10-CM

## 2019-12-11 DIAGNOSIS — Z23 NEED FOR SHINGLES VACCINE: ICD-10-CM

## 2019-12-11 PROCEDURE — 99214 PR OFFICE/OUTPT VISIT, EST, LEVL IV, 30-39 MIN: ICD-10-PCS | Mod: HCNC,S$GLB,, | Performed by: INTERNAL MEDICINE

## 2019-12-11 PROCEDURE — 99214 OFFICE O/P EST MOD 30 MIN: CPT | Mod: HCNC,S$GLB,, | Performed by: INTERNAL MEDICINE

## 2019-12-11 PROCEDURE — 1101F PT FALLS ASSESS-DOCD LE1/YR: CPT | Mod: HCNC,CPTII,S$GLB, | Performed by: INTERNAL MEDICINE

## 2019-12-11 PROCEDURE — 99999 PR PBB SHADOW E&M-EST. PATIENT-LVL III: CPT | Mod: PBBFAC,HCNC,, | Performed by: INTERNAL MEDICINE

## 2019-12-11 PROCEDURE — 3077F PR MOST RECENT SYSTOLIC BLOOD PRESSURE >= 140 MM HG: ICD-10-PCS | Mod: HCNC,CPTII,S$GLB, | Performed by: INTERNAL MEDICINE

## 2019-12-11 PROCEDURE — 3046F HEMOGLOBIN A1C LEVEL >9.0%: CPT | Mod: HCNC,CPTII,S$GLB, | Performed by: INTERNAL MEDICINE

## 2019-12-11 PROCEDURE — 1101F PR PT FALLS ASSESS DOC 0-1 FALLS W/OUT INJ PAST YR: ICD-10-PCS | Mod: HCNC,CPTII,S$GLB, | Performed by: INTERNAL MEDICINE

## 2019-12-11 PROCEDURE — 1126F PR PAIN SEVERITY QUANTIFIED, NO PAIN PRESENT: ICD-10-PCS | Mod: HCNC,S$GLB,, | Performed by: INTERNAL MEDICINE

## 2019-12-11 PROCEDURE — 1159F MED LIST DOCD IN RCRD: CPT | Mod: HCNC,S$GLB,, | Performed by: INTERNAL MEDICINE

## 2019-12-11 PROCEDURE — 3046F PR MOST RECENT HEMOGLOBIN A1C LEVEL > 9.0%: ICD-10-PCS | Mod: HCNC,CPTII,S$GLB, | Performed by: INTERNAL MEDICINE

## 2019-12-11 PROCEDURE — 1159F PR MEDICATION LIST DOCUMENTED IN MEDICAL RECORD: ICD-10-PCS | Mod: HCNC,S$GLB,, | Performed by: INTERNAL MEDICINE

## 2019-12-11 PROCEDURE — 3078F PR MOST RECENT DIASTOLIC BLOOD PRESSURE < 80 MM HG: ICD-10-PCS | Mod: HCNC,CPTII,S$GLB, | Performed by: INTERNAL MEDICINE

## 2019-12-11 PROCEDURE — 3078F DIAST BP <80 MM HG: CPT | Mod: HCNC,CPTII,S$GLB, | Performed by: INTERNAL MEDICINE

## 2019-12-11 PROCEDURE — 99499 UNLISTED E&M SERVICE: CPT | Mod: HCNC,S$GLB,, | Performed by: INTERNAL MEDICINE

## 2019-12-11 PROCEDURE — 99499 RISK ADDL DX/OHS AUDIT: ICD-10-PCS | Mod: HCNC,S$GLB,, | Performed by: INTERNAL MEDICINE

## 2019-12-11 PROCEDURE — 1126F AMNT PAIN NOTED NONE PRSNT: CPT | Mod: HCNC,S$GLB,, | Performed by: INTERNAL MEDICINE

## 2019-12-11 PROCEDURE — 3077F SYST BP >= 140 MM HG: CPT | Mod: HCNC,CPTII,S$GLB, | Performed by: INTERNAL MEDICINE

## 2019-12-11 PROCEDURE — 99999 PR PBB SHADOW E&M-EST. PATIENT-LVL III: ICD-10-PCS | Mod: PBBFAC,HCNC,, | Performed by: INTERNAL MEDICINE

## 2019-12-11 NOTE — PROGRESS NOTES
HISTORY OF PRESENT ILLNESS:  Cecilia Barahona is a 69 y.o. female who presents to the clinic today for Hypertension; Diabetes; Memory Loss; Mass (on back); and Shortness of Breath  .   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  She is seeing endocrinology to get her diabetes under control.  She is seeing Cardiology to get her blood pressure under control.  She has some testing scheduled in the near future to evaluate atypical chest pain. She denies any cardiac chest pain or shortness of breath at this time.  She does admit to a lot of problems with depression.  Her  has some health issues that she is having difficulty dealing with.  Also, her daughter has mentioned to her many times in the past that she may put her in a nursing home.  She is helping to take care of grandchildren.  She has been having some trouble helping them out with new math.  All of this is very overwhelming to her.  She states she is having trouble with short-term memory.  She thought she had lost her wallet earlier today.  She did eventually find it.  She states she sometimes cannot remember what a sermon is about.  She has no difficulty finding her way home.      PAST MEDICAL HISTORY:  Past Medical History:   Diagnosis Date    Allergic rhinitis, seasonal     Anxiety     Arthritis     CVA (cerebral vascular accident)     Depression     Glaucoma NEC     Hallucination     images out of corner of eye about a year ago    History of positive PPD - treated - remote past     Hx of psychiatric care     Hyperlipidemia LDL goal < 100     Hypertension     Nuclear sclerosis of both eyes 2/26/2019    Obesity     CHETNA (obstructive sleep apnea)     Psychiatric problem     Psychosis     Renal disorder     Sleep difficulties     Suicide attempt     about 30 years ago by overdose of pills    Therapy     Type II or  unspecified type diabetes mellitus without mention of complication, uncontrolled        PAST SURGICAL HISTORY:  Past Surgical History:   Procedure Laterality Date    CARPAL TUNNEL RELEASE       SECTION, CLASSIC      TOTAL ABDOMINAL HYSTERECTOMY      TRIGGER FINGER RELEASE         SOCIAL HISTORY:  Social History     Socioeconomic History    Marital status:      Spouse name: Yuniel    Number of children: 3    Years of education: Not on file    Highest education level: Not on file   Occupational History    Occupation: Retired   Social Needs    Financial resource strain: Somewhat hard    Food insecurity:     Worry: Never true     Inability: Sometimes true    Transportation needs:     Medical: No     Non-medical: No   Tobacco Use    Smoking status: Former Smoker     Packs/day: 0.50     Types: Cigarettes     Last attempt to quit: 11/10/2012     Years since quittin.0    Smokeless tobacco: Never Used   Substance and Sexual Activity    Alcohol use: Not Currently     Comment: drank socially in the past    Drug use: Not Currently     Types: Marijuana     Comment: tried once 6 months ago    Sexual activity: Not Currently     Partners: Male   Lifestyle    Physical activity:     Days per week: Not on file     Minutes per session: Not on file    Stress: To some extent   Relationships    Social connections:     Talks on phone: Not on file     Gets together: Not on file     Attends Baptism service: Not on file     Active member of club or organization: Not on file     Attends meetings of clubs or organizations: Not on file     Relationship status: Not on file   Other Topics Concern    Patient feels they ought to cut down on drinking/drug use No    Patient annoyed by others criticizing their drinking/drug use No    Patient has felt bad or guilty about drinking/drug use No    Patient has had a drink/used drugs as an eye opener in the AM No   Social History Narrative     x 1.    Has  3 grown children.    Lives with spouse.       FAMILY HISTORY:  Family History   Problem Relation Age of Onset    Hypertension Mother     Diabetes Mother     Heart failure Mother     Cataracts Mother     Glaucoma Mother     Prostate cancer Father     Hypertension Father     Diabetes Father     Heart failure Father     No Known Problems Sister     Diabetes Maternal Uncle     Hyperlipidemia Maternal Uncle     Hypertension Maternal Uncle     Diabetes Maternal Grandmother     Diabetes Maternal Grandfather     No Known Problems Paternal Grandmother     No Known Problems Paternal Grandfather     Diabetes Maternal Aunt     Alzheimer's disease Maternal Aunt     Schizophrenia Maternal Aunt     Heart disease Cousin         s/p heart transplant    No Known Problems Paternal Aunt     No Known Problems Paternal Uncle     Drug abuse Grandchild     Amblyopia Neg Hx     Blindness Neg Hx     Cancer Neg Hx     Macular degeneration Neg Hx     Retinal detachment Neg Hx     Strabismus Neg Hx     Stroke Neg Hx     Thyroid disease Neg Hx        ALLERGIES AND MEDICATIONS: updated and reviewed.  Review of patient's allergies indicates:   Allergen Reactions    Ace inhibitors Other (See Comments)     cough    Invokana [canagliflozin] Other (See Comments)     Throat and tongue swelling    Ozempic [semaglutide]      Nausea and constipation on 0.25 mg dose.      Medication List with Changes/Refills   Current Medications    ACETAMINOPHEN-CODEINE 300-30MG (TYLENOL #3) 300-30 MG TAB    Take 1 tablet by mouth once daily.    ALBUTEROL 90 MCG/ACTUATION INHALER    Inhale 2 puffs into the lungs every 6 (six) hours as needed for Wheezing or Shortness of Breath. Rescue    ALCOHOL SWABS PADM    Apply 1 each topically 3 (three) times daily.    AMLODIPINE (NORVASC) 10 MG TABLET    TAKE ONE TABLET BY MOUTH ONCE DAILY    ARTIFICIAL TEAR OINTMENT (ARTIFICIAL TEARS) OINT    Place into both eyes every evening.    ASPIRIN 81 MG  CHEW    Take by mouth once daily. 1 tablet, chewable Oral Every day    ATORVASTATIN (LIPITOR) 20 MG TABLET    Take 1 tablet (20 mg total) by mouth once daily.    AZELASTINE (OPTIVAR) 0.05 % OPHTHALMIC SOLUTION    INSTILL 1 DROP IN EACH EYE TWICE DAILY    BLOOD GLUCOSE CONTROL HIGH,LOW (ACCU-CHEK BRIDGETTE CONTROL SOLN) SOLN    1 kit by Misc.(Non-Drug; Combo Route) route once daily.    BLOOD GLUCOSE CONTROL, LOW (EMBRACE GLUCOSE CONTROL LOW) SOLN    Inject 1 Bottle into the skin every 30 days.    BLOOD PRESSURE MONITOR (BLOOD PRESSURE KIT) KIT    1 kit by Misc.(Non-Drug; Combo Route) route once daily.    BLOOD SUGAR DIAGNOSTIC STRP    Check blood glucose 4 times a day. Dispense accuchek meter or other meter preferred by insurance. Dx code e11.65    BLOOD-GLUCOSE METER KIT    ispense accuchek meter or other meter preferred by insurance. Dx code e11.65    BLOOD-GLUCOSE METER,CONTINUOUS (DEXCOM G6 ) MISC    Use with dexcom G6 system    BLOOD-GLUCOSE SENSOR (DEXCOM G6 SENSOR) KAREN    Change sensor every 10 days    BLOOD-GLUCOSE TRANSMITTER (DEXCOM G6 TRANSMITTER) KAREN    Change every 3 months    BUPROPION (WELLBUTRIN) 75 MG TABLET    TAKE TWO TABLETS BY MOUTH TWICE DAILY    CHLORTHALIDONE (HYGROTEN) 25 MG TAB    TAKE ONE TABLET BY MOUTH once DAILY    DESOXIMETASONE (TOPICORT) 0.05 % CREAM    Apply topically 2 (two) times daily as needed.    DULAGLUTIDE (TRULICITY) 1.5 MG/0.5 ML PNIJ    Inject 1.5 mg into the skin every 7 days.    FLASH GLUCOSE SCANNING READER (FREESTYLE ANAMARIA READER) MISC    1 Units by Misc.(Non-Drug; Combo Route) route before meals as needed.    FLUOXETINE 10 MG CAPSULE    Take 1 capsule (10 mg total) by mouth once daily.    FLUTICASONE (FLONASE) 50 MCG/ACTUATION NASAL SPRAY    1 spray (50 mcg total) by Each Nare route once daily.    FREESTYLE ANAMARIA 14 DAY SENSOR KIT    CHANGE SENSOR EVERY 14 DAYS    GABAPENTIN (NEURONTIN) 300 MG CAPSULE    Take 1 capsule (300 mg total) by mouth every evening.     "ISOSORBIDE MONONITRATE (IMDUR) 30 MG 24 HR TABLET    Take 1 tablet (30 mg total) by mouth once daily.    LANCETS MISC    Check blood glucose 4 times a day. Dispense accuchek meter or other meter preferred by insurance. Dx code e11.65    LANCING DEVICE WITH LANCETS MISC    USE AS DIRECTED FOR DIABETIC TESTING    LATANOPROST 0.005 % OPHTHALMIC SOLUTION    PLACE 1 DROP IN EACH EYE EVERY EVENING    LORATADINE (CLARITIN) 10 MG TABLET    Take 1 tablet (10 mg total) by mouth daily as needed for Allergies.    MELOXICAM (MOBIC) 7.5 MG TABLET    TAKE ONE Tablet BY MOUTH once DAILY WITH FOOD for 2 weeks then AS NEEDED    METFORMIN (GLUCOPHAGE) 1000 MG TABLET    TAKE ONE TABLET BY MOUTH TWICE DAILY WITH MEALS    MULTIVITAMIN WITH MINERALS TABLET    Take 1 tablet by mouth once daily.    NAPROXEN (NAPROSYN) 500 MG TABLET    Take 500 mg by mouth 2 (two) times daily as needed.    PEN NEEDLE, DIABETIC (BD ULTRA-FINE LENY PEN NEEDLES) 32 GAUGE X 5/32" NDLE    USE FIVE TIMES DAILY    PROMETHAZINE (PHENERGAN) 25 MG TABLET    Take 25 mg by mouth once daily.    ROPINIROLE (REQUIP) 0.5 MG TABLET    TAKE ONE Tablet BY MOUTH EVERY NIGHT AT BEDTIME    TRAMADOL (ULTRAM) 50 MG TABLET    Take 50 mg by mouth every 6 (six) hours as needed for Pain.    TRESIBA FLEXTOUCH U-200 200 UNIT/ML (3 ML) INPN    INJECT 30 UNITS SUBCUTANEOUSLY EVERY DAY    VALSARTAN (DIOVAN) 320 MG TABLET    TAKE ONE Tablet BY MOUTH EVERY EVENING          CARE TEAM:  Patient Care Team:  Yudi Smart MD as PCP - General (Internal Medicine)  Yudi Smart MD as Hypertension Digital Medicine Responsible Provider (Internal Medicine)  Fran AlbaradoD as Hypertension Digital Medicine Clinician (Pharmacist)  Rafa Vázquez as Digital Medicine Health   Susie Mauro LPN as Licensed Practical Nurse  Located within [facility] as Hypertension Digital Medicine Contract         REVIEW OF SYSTEMS:  Review of Systems   Constitutional: Positive for fatigue. " "Negative for chills, fever and unexpected weight change.   HENT: Negative for congestion and postnasal drip.    Eyes: Negative for pain and visual disturbance.   Respiratory: Negative for cough, shortness of breath and wheezing.    Cardiovascular: Negative for chest pain, palpitations and leg swelling.   Gastrointestinal: Negative for abdominal pain, constipation, diarrhea, nausea and vomiting.   Genitourinary: Negative for dysuria.   Musculoskeletal: Positive for arthralgias. Negative for back pain.   Skin: Negative for rash.   Neurological: Negative for weakness and headaches.   Psychiatric/Behavioral: Positive for dysphoric mood. Negative for sleep disturbance. The patient is nervous/anxious.          PHYSICAL EXAM:   Vitals:    12/11/19 1452   BP: (!) 142/54   Pulse: 66   Temp: 97.9 °F (36.6 °C)     Weight: 89.9 kg (198 lb 4.9 oz)   Height: 5' 3" (160 cm)   Body mass index is 35.13 kg/m².      General appearance - alert, well appearing, and in no distress, obese  Psychiatric - alert, oriented to person, place, and time, normal behavior, speech, dress, motor activity, depressed mood, tearful during office visit  Eyes - pupils equal and reactive, extraocular eye movements intact, sclera anicteric  Mouth - mucous membranes moist, pharynx normal without lesions  Chest - clear to auscultation, no wheezes, rales or rhonchi, symmetric air entry  Heart - normal rate and regular rhythm, no gallops noted  Neurological - alert, normal speech, no focal findings or movement disorder noted, cranial nerves II through XII intact  Musculoskeletal - patient noted to have Mild-Moderate osteoarthritic changes to both knee joints. No joint effusions noted., no muscular tenderness noted  Extremities - pedal edema trace  Skin - normal coloration and turgor, no rashes, no suspicious skin lesions noted; she had a small dark spot on her left upper back.  She states his area occasionally is painful and is sometimes bigger than other " times.  It is not growing.      Labs:  Lab Results   Component Value Date    HGBA1C 9.1 (H) 09/26/2019    HGBA1C 8.1 (H) 06/28/2019    HGBA1C 7.3 (H) 02/05/2019      Lab Results   Component Value Date    CHOL 179 09/26/2019    CHOL 155 08/02/2018    CHOL 176 03/10/2017     Lab Results   Component Value Date    LDLCALC 106.6 09/26/2019    LDLCALC 88.8 08/02/2018    LDLCALC 109.0 03/10/2017         ASSESSMENT AND PLAN:  1. Type 2 diabetes, uncontrolled, with neuropathy/2. 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 Long-term insulin use  Diabetes is not controlled at this time for age and comorbid conditions. We discussed diabetic diet and regular exercise. We discussed home blood sugar monitoring, if appropriate - the patient should test three times per day with meals and as needed. Continue current medication regimen. and Followed by endocrinology.  Diabetic complications addressed: Neuropathy pain controlled.  Patient was counseled on the need for yearly eye exam to screen for/monitor diabetic retinopathy and yearly diabetic foot exam.     4. Essential hypertension  Hypertension under borderline control.  Continue current regimen.  She is active on the digital hypertension program.  She is followed by Cardiology.    5. Hyperlipidemia LDL goal <70  We discussed low fat diet and regular exercise.The current medical regimen is effective;  continue present plan and medications.     6. Major depressive disorder, recurrent episode, severe with mood-congruent psychotic features  She has been under a lot of stress and is depressed.  We discussed thing she can do to improve her mood.  She has an appointment with Psychiatry tomorrow.    7. Severe obesity (BMI 35.0-39.9) with comorbidity  The patient is asked to make an attempt to improve diet and exercise patterns to aid in medical management of this problem.    8. Need for shingles vaccine  Patient was advised to get immunization at the  "pharmacy.    9. Memory loss, short term  MMSE 12/11/2019   What is the (year), (season), (date), (day), (month)? 5   Where are we (state), (country), (town or city), (hospital), (floor)? 5   Name 3 common objects (eg. "apple", "table", "jacinta"). Take 1 second to say each. Then ask the patient to repeat all 3. Give 1 point for each correct answer. Then repeat them until he/she learns all 3. Count trials and record. 3   Serial 7's backwards. Stop after 5 answers. (100,93,86,79,72) or alternatively  spell "WORLD" backwards. (D..L..R..O..W). The score is the number of letters in correct order. 5   Ask for the 3 common objects named earlier in the exam. Give 1 point for each correct answer. 3   Name a "pencil" and "watch." 2   Repeat the following: "No ifs, ands, or buts." 1   Follow a 3-stage command: "Take a paper in your right hand, fold it in half, & put it on the floor." 3   Read and obey the following: (see paper exam) 1   Write a sentence. 1   Copy the following design: (see paper exam) 1   Total MMSE Score 30   Some recent data might be hidden             I discussed with the patient that I think her short-term memory loss is more related to all of her stress and depression.  She does not appear to have any signs of dementia.  Observe for now.           Follow up in about 6 months (around 6/11/2020), or if symptoms worsen or fail to improve, for follow up chronic medical conditions.. or sooner as needed.  "

## 2019-12-12 ENCOUNTER — OFFICE VISIT (OUTPATIENT)
Dept: PSYCHIATRY | Facility: CLINIC | Age: 69
End: 2019-12-12
Payer: MEDICARE

## 2019-12-12 VITALS
BODY MASS INDEX: 35.24 KG/M2 | WEIGHT: 198.88 LBS | HEIGHT: 63 IN | DIASTOLIC BLOOD PRESSURE: 64 MMHG | SYSTOLIC BLOOD PRESSURE: 136 MMHG | HEART RATE: 60 BPM

## 2019-12-12 DIAGNOSIS — G47.10 HYPERSOMNIA: ICD-10-CM

## 2019-12-12 DIAGNOSIS — F33.3 MAJOR DEPRESSIVE DISORDER, RECURRENT EPISODE, SEVERE WITH MOOD-CONGRUENT PSYCHOTIC FEATURES: Primary | ICD-10-CM

## 2019-12-12 DIAGNOSIS — F41.1 GENERALIZED ANXIETY DISORDER: ICD-10-CM

## 2019-12-12 PROCEDURE — 3075F SYST BP GE 130 - 139MM HG: CPT | Mod: HCNC,CPTII,S$GLB, | Performed by: NURSE PRACTITIONER

## 2019-12-12 PROCEDURE — 1159F PR MEDICATION LIST DOCUMENTED IN MEDICAL RECORD: ICD-10-PCS | Mod: HCNC,S$GLB,, | Performed by: NURSE PRACTITIONER

## 2019-12-12 PROCEDURE — 99213 PR OFFICE/OUTPT VISIT, EST, LEVL III, 20-29 MIN: ICD-10-PCS | Mod: HCNC,S$GLB,, | Performed by: NURSE PRACTITIONER

## 2019-12-12 PROCEDURE — 99499 UNLISTED E&M SERVICE: CPT | Mod: HCNC,S$GLB,, | Performed by: NURSE PRACTITIONER

## 2019-12-12 PROCEDURE — 3078F DIAST BP <80 MM HG: CPT | Mod: HCNC,CPTII,S$GLB, | Performed by: NURSE PRACTITIONER

## 2019-12-12 PROCEDURE — 3075F PR MOST RECENT SYSTOLIC BLOOD PRESS GE 130-139MM HG: ICD-10-PCS | Mod: HCNC,CPTII,S$GLB, | Performed by: NURSE PRACTITIONER

## 2019-12-12 PROCEDURE — 99999 PR PBB SHADOW E&M-EST. PATIENT-LVL III: ICD-10-PCS | Mod: PBBFAC,HCNC,, | Performed by: NURSE PRACTITIONER

## 2019-12-12 PROCEDURE — 1101F PT FALLS ASSESS-DOCD LE1/YR: CPT | Mod: HCNC,CPTII,S$GLB, | Performed by: NURSE PRACTITIONER

## 2019-12-12 PROCEDURE — 1126F PR PAIN SEVERITY QUANTIFIED, NO PAIN PRESENT: ICD-10-PCS | Mod: HCNC,S$GLB,, | Performed by: NURSE PRACTITIONER

## 2019-12-12 PROCEDURE — 99499 RISK ADDL DX/OHS AUDIT: ICD-10-PCS | Mod: HCNC,S$GLB,, | Performed by: NURSE PRACTITIONER

## 2019-12-12 PROCEDURE — 1159F MED LIST DOCD IN RCRD: CPT | Mod: HCNC,S$GLB,, | Performed by: NURSE PRACTITIONER

## 2019-12-12 PROCEDURE — 3078F PR MOST RECENT DIASTOLIC BLOOD PRESSURE < 80 MM HG: ICD-10-PCS | Mod: HCNC,CPTII,S$GLB, | Performed by: NURSE PRACTITIONER

## 2019-12-12 PROCEDURE — 99213 OFFICE O/P EST LOW 20 MIN: CPT | Mod: HCNC,S$GLB,, | Performed by: NURSE PRACTITIONER

## 2019-12-12 PROCEDURE — 99999 PR PBB SHADOW E&M-EST. PATIENT-LVL III: CPT | Mod: PBBFAC,HCNC,, | Performed by: NURSE PRACTITIONER

## 2019-12-12 PROCEDURE — 1101F PR PT FALLS ASSESS DOC 0-1 FALLS W/OUT INJ PAST YR: ICD-10-PCS | Mod: HCNC,CPTII,S$GLB, | Performed by: NURSE PRACTITIONER

## 2019-12-12 PROCEDURE — 1126F AMNT PAIN NOTED NONE PRSNT: CPT | Mod: HCNC,S$GLB,, | Performed by: NURSE PRACTITIONER

## 2019-12-12 RX ORDER — FLUOXETINE 10 MG/1
10 CAPSULE ORAL DAILY
Qty: 30 CAPSULE | Refills: 0 | Status: SHIPPED | OUTPATIENT
Start: 2019-12-12 | End: 2020-01-16 | Stop reason: SDUPTHER

## 2019-12-12 RX ORDER — BUPROPION HYDROCHLORIDE 75 MG/1
150 TABLET ORAL 2 TIMES DAILY
Qty: 1 TABLET | Refills: 0
Start: 2019-12-12 | End: 2020-01-16

## 2019-12-12 NOTE — PATIENT INSTRUCTIONS
"You have been provided with a certain amount of medication with a specified number of refills.  Please follow up within an adequate time before you run out of medications.    REFILLS FOR CONTROLLED SUBSTANCES WILL NOT BE GIVEN WITHOUT AN APPOINTMENT.  I will not honor or fill automated refill requests from pharmacies.  You must come in for an appointment to get refills.    Please book your next appointment for myself or therapist by phone by calling our office at [phone].      PLEASE BE AT LEAST 15 MINUTES EARLY FOR YOUR NEXT APPOINTMENT.  PLEASE, DO NOT BE LATE OR YOU WILL BE TURNED AWAY AND ASKED TO RESCHEDULE.  YOU MUST COME EARLY TO ALLOW TIME FOR CHECK-IN AS WELL AS GET YOUR VITAL SIGNS AND GO OVER YOUR MEDICATIONS.  Tardiness is not fair to the patients who present after you and are on time for their appointments.  It causes a delay in the appointments for patients and staff.  IF YOU ARE LATE, THERE IS A POSSIBILITY THAT YOU WILL BE CHARGED FOR THE APPOINTMENT TIME PERSONALLY AND IT WILL NOT GO TO YOUR INSURANCE.  YOU MAY ALSO BE DISCHARGED FROM CLINIC with multiple "No Show" appointments.  -----------------------------------------------------------------------------------------------------------------  IF YOU FEEL SUICIDAL OR HAVING THOUGHTS OR PLANS TO HURT YOURSELF OR OTHERS, CALL 911 OR REPORT TO THE NEAREST EMERGENCY ROOM.  YOU CAN ALSO ACCESS THE FOLLOWING HOTLINE(S):    National Suicide Hotline Number 2-622-129-TALK (9798)     ([facility] Mobile Crisis, 821.243.9350'   [place] [facility] Crisis Line, (293) 562-2496; [facility], 24 hours / 7 days, [phone]) 569-COPE (2154), 5-052-741-COPE (5688))     TIPS FOR GETTING YOUR PRESCRIPTIONS:    You can always ask your pharmacist the cost of your medications without the use of your insurance. Sometimes the medication will be cheaper if you do not use your insurance.     If you decide you want to have your prescriptions filled at " a different pharmacy, you can always go to the new pharmacy of your choice and have them call the pharmacy where your prescription was sent and they can have your prescription transferred to the new pharmacy.     INSTRUCTIONS:    Taper off Wellbutrin: Take one 75 mg tablet by mouth every other day for 7 doses, then stop.      2018 UpToDate, Inc. and/or its affiliates. All Rights Reserved.  Dietary sources of iron  Food Approximate measure Iron (mg)   High iron sources    Cream of Wheat (quick or instant)* 1/2 cup 7.8   Kidney, beef¶ 2 oz (60 g) 5.3   Liver, beef¶ 2 oz (60 g) 5.8   Liver, calf¶ 2 oz (60 g) 9   Liver, chicken¶ 2 oz (60 g) 6   Liverwurst¶ 2 oz (60 g) 3.6   Prune juice 1/2 cup 5.1   Spinach 1/2 cup 3.2   Moderate iron sources   All-Bran cereal 1/2 cup 2.9   Almonds, dried unblanched 1/2 cup 3   Dried beans and peas   Baked beans, no pork 1/4 cup 1.5   Blackeye peas, cooked 1/4 cup 0.8   Chick peas, dry 1/4 cup 3.5   Great northern beans, cooked 1/4 cup 1.3   Green peas, cooked 1/4 cup 1.4   Lentils, dry 1/4 cup 3.4   Lima beans, cooked 1/4 cup 1.3   Navy beans, cooked 1/4 cup 1.3   Red beans, dry 1/4 cup 3.5   Soybeans, cooked 1/4 cup 1.4   White beans, dry 1/4 cup 3.9   Beef, cooked 2 oz (60 g) 2-3?   Ham, cooked 2 oz (60 g) 1.3   Mason, cooked 2 oz (60 g) 1.9   Peaches, dried 1/4 cup 2.4   Peanuts, roasted without skins 3 1/2 oz (100 g) 3.2   Pork, cooked 2 oz (60 g) 2-3?   Prunes, dried 2 large 1.1   Scallops 2 oz (60 g) 1.6   Turkey, cooked 2 oz (60 g) 1.7   Approximate iron content of children's favorite foods   Hamburger, small 1 3   Large 1 5.2   Big Mac 1 4.3   Quarter Pounder 1 5.1   Spaghetti with meatballs 1 cup 3.3   Frankfurter and beans 1 cup 4.8   Pork and beans 1 cup 5.9   Raisins§ 5/8 cup 3.5   Cereals, fortified 1 serving 4.5-17.8   Nuts§ 1 cup 5-7   Seeds, sunflower§ 3 1/2 oz (100 g) 7.1   Chile con carne 1 cup 3.6   Beef burrito or quang 1 medium 3.4-4.6   Cheese pizza 2 slices 3    Cheese pizza with beef 2 slices 4.8   White bread 1 piece 0.7   * Or other fortified cereals which contain 10 mg of iron per ounce or 100 percent RDA per serving.  ¶ As organ meats are generally high in cholesterol, these iron-rich foods should be eaten in moderation.  ? Depending on cut, the greatest amounts of iron are generally found in the bety, flank, and bottom round cuts of beef.  ? Depending on cut, the greatest amounts of iron are generally found in the loin, sirloin, tenderloin, and picnic shoulder cuts of pork.  § Raisins, nuts, and seeds are not generally recommended for children under age three because of risk of choking.  Data from: Srini MARIE, Nikia TAYLOR (Eds), Nutrition in Pediatrics, 2nd ed, [employer] [facility] [facility], [place] 1997.  Graphic 78077 Version 2.0

## 2019-12-12 NOTE — PROGRESS NOTES
Outpatient Psychiatry Follow-Up Visit (MD/NP)    12/12/2019    Clinical Status of Patient:  Outpatient (Ambulatory)    Chief Complaint:  Cecilia Barahona is a 69 y.o. female who presents today for follow-up of depression, anxiety and psychosis and sleep problems.  Met with patient.      Interval History and Content of Current Session:  Interim Events/Subjective Report/Content of Current Session: Cecilia  was last seen by me on 09/26/2019 for an initial psychiatric evaluation. Cecilia was diagnosed with Major depressive disorder, recurrent episode, severe with mood-congruent psychotic features, hypersomnia and EILEEN. A plan of care was devised to include:    1. Safety: Call 911 or Crisis Line or go to ER for suicidal ideation, adverse effects of medication or any other emergency  2. Start Prozac 10 mg po qd.   3. Taper off Wellbutrin: Take one 75 mg tablet by mouth twice a day, then return for further taper.   4. Labs: CMP, CBC, Lipid profile, TSH, Free T3, Free T4, Prolactin Level, Urine Drug Screening.  5. Return to clinic in 3 weeks or sooner prn.       Today Cecilia is seen face to face. She did not return to clinic as originally scheduled. She should have been out of medication for about 6 weeks. She states her health  had a prescription sent and she has been taking the Prozac daily. She does not have her bottles with her. She is adamant that she has been taking her fluoxetine daily. She did taper down the Wellbutrin and states that she is now taking one tablet a day at nighttime. She is re-instructed on its potential to cause sleep problems. She has trouble falling asleep 3 nights a week. She is advised that she should take the wellbutrin at night as we complete her taper. Verbalizes understanding. Her appetite is good and her energy level is low. She pushes herself to do things. She also states that she does know that she is better though not optimal. She feels better. She use to feel guilty about not having a  relationship with her estranged sister who lives in [place]. She no longer feels guilty about this and accepts the situation for what it is. They have never been close and when her mother was ill and subsequently , her sister was not around much and complained when she was. She accepts this without feeling bad or feeling an obligation to keep trying to make them close because they are biological sisters.      She is having a stress test done next week with Dr. Holden because of SOB onexertion.     Her labs are reviewed with her today.  Lab Visit on 2019   Component Date Value Ref Range Status    Sodium 2019 140  136 - 145 mmol/L Final    Potassium 2019 3.7  3.5 - 5.1 mmol/L Final    Chloride 2019 103  95 - 110 mmol/L Final    CO2 2019 29  23 - 29 mmol/L Final    Glucose 2019 137* 70 - 110 mg/dL Final    BUN, Bld 2019 15  8 - 23 mg/dL Final    Creatinine 2019 0.9  0.5 - 1.4 mg/dL Final    Calcium 2019 9.5  8.7 - 10.5 mg/dL Final    Total Protein 2019 7.9  6.0 - 8.4 g/dL Final    Albumin 2019 3.6  3.5 - 5.2 g/dL Final    Total Bilirubin 2019 0.4  0.1 - 1.0 mg/dL Final    Comment: For infants and newborns, interpretation of results should be based  on gestational age, weight and in agreement with clinical  observations.  Premature Infant recommended reference ranges:  Up to 24 hours.............<8.0 mg/dL  Up to 48 hours............<12.0 mg/dL  3-5 days..................<15.0 mg/dL  6-29 days.................<15.0 mg/dL      Alkaline Phosphatase 2019 127  55 - 135 U/L Final    AST 2019 19  10 - 40 U/L Final    ALT 2019 20  10 - 44 U/L Final    Anion Gap 2019 8  8 - 16 mmol/L Final    eGFR if African American 2019 >60  >60 mL/min/1.73 m^2 Final    eGFR if non African American 2019 >60  >60 mL/min/1.73 m^2 Final    Comment: Calculation used to obtain the estimated glomerular filtration  rate  (eGFR) is the CKD-EPI equation.       WBC 09/26/2019 7.55  3.90 - 12.70 K/uL Final    RBC 09/26/2019 4.12  4.00 - 5.40 M/uL Final    Hemoglobin 09/26/2019 11.7* 12.0 - 16.0 g/dL Final    Hematocrit 09/26/2019 37.9  37.0 - 48.5 % Final    Mean Corpuscular Volume 09/26/2019 92  82 - 98 fL Final    Mean Corpuscular Hemoglobin 09/26/2019 28.4  27.0 - 31.0 pg Final    Mean Corpuscular Hemoglobin Conc 09/26/2019 30.9* 32.0 - 36.0 g/dL Final    RDW 09/26/2019 14.3  11.5 - 14.5 % Final    Platelets 09/26/2019 300  150 - 350 K/uL Final    MPV 09/26/2019 8.6* 9.2 - 12.9 fL Final    Gran # (ANC) 09/26/2019 4.3  1.8 - 7.7 K/uL Final    Lymph # 09/26/2019 2.6  1.0 - 4.8 K/uL Final    Mono # 09/26/2019 0.6  0.3 - 1.0 K/uL Final    Eos # 09/26/2019 0.1  0.0 - 0.5 K/uL Final    Baso # 09/26/2019 0.01  0.00 - 0.20 K/uL Final    Gran% 09/26/2019 57.2  38.0 - 73.0 % Final    Lymph% 09/26/2019 33.9  18.0 - 48.0 % Final    Mono% 09/26/2019 7.5  4.0 - 15.0 % Final    Eosinophil% 09/26/2019 1.3  0.0 - 8.0 % Final    Basophil% 09/26/2019 0.1  0.0 - 1.9 % Final    Differential Method 09/26/2019 Automated   Final    TSH 09/26/2019 2.177  0.400 - 4.000 uIU/mL Final    T3, Free 09/26/2019 2.5  2.3 - 4.2 pg/mL Final    Free T4 09/26/2019 1.05  0.71 - 1.51 ng/dL Final    Prolactin 09/26/2019 6.0  5.2 - 26.5 ng/mL Final    Alcohol, Urine 09/26/2019 <10  <10 mg/dL Final    Benzodiazepines 09/26/2019 Negative   Final    Methadone metabolites 09/26/2019 Negative   Final    Cocaine (Metab.) 09/26/2019 Negative   Final    Opiate Scrn, Ur 09/26/2019 Negative   Final    Barbiturate Screen, Ur 09/26/2019 Negative   Final    Amphetamine Screen, Ur 09/26/2019 Negative   Final    THC 09/26/2019 Negative   Final    Phencyclidine 09/26/2019 Negative   Final    Creatinine, Random Ur 09/26/2019 66.0  15.0 - 325.0 mg/dL Final    Comment: The random urine reference ranges provided were established   for 24 hour urine  collections.  No reference ranges exist for  random urine specimens.  Correlate clinically.      Toxicology Information 09/26/2019 SEE COMMENT   Final    Comment: This screen includes the following classes of drugs at the   listed cut-off:  Benzodiazepines                  200 ng/ml  Methadone                        300 ng/ml  Cocaine metabolite               300 ng/ml  Opiates                          300 ng/ml  Barbiturates                     200 ng/ml  Amphetamines                    1000 ng/ml  Marijuana metabs (THC)            50 ng/ml  Phencyclidine (PCP)               25 ng/ml  High concentrations of Diphenhydramine may cross-react with  Phencyclidine PCP screening immunoassay giving a false   positive result.  High concentrations of Methylenedioxymethamphetamine (MDMA aka  Ectasy) and other structurally similar compounds may cross-   react with the Amphetamine/Methamphetamine screening   immunoassay giving a false positive result.  A metabolite of the anti-HIV drug Sustiva () may cause  false positive results in the Marijuana metabolite (THC)   screening assay.  Note: This exception list includes only more common   interferants i                           n toxicology screen testing.  Because of many   cross-reactantspositive results on toxicology drug screens   should be confirmed whenever results do not correlate with   clinical presentation.  This report is intended for use in clinical monitoring and  management of patients. It is not intended for use in   employment related drug testing.  Because of any cross-reactants, positive results on toxicology  drug screens should be confirmed whenever results do not  correlate with clinical presentation.  Presumptive positive results are unconfirmed and may be used   only for medical purposes.  Assay Intended Use: This assay provides only a preliminary analytical  test result. A more specific alternate chemical method must be used  to obtain a confirmed  analytical result. Gas chromatography/mass  spectrometry (GS/MS)is the preferred confirmatory method. Clinical  consideration and professional judgement should be applied to any   drug of abuse test result, particularly when preliminary resul                           ts  are used.     Lab Visit on 09/26/2019   Component Date Value Ref Range Status    Microalbum.,U,Random 09/26/2019 16.0  ug/mL Final    Creatinine, Random Ur 09/26/2019 66.0  15.0 - 325.0 mg/dL Final    Comment: The random urine reference ranges provided were established   for 24 hour urine collections.  No reference ranges exist for  random urine specimens.  Correlate clinically.      Microalb Creat Ratio 09/26/2019 24.2  0.0 - 30.0 ug/mg Final   Lab Visit on 09/26/2019   Component Date Value Ref Range Status    Hemoglobin A1C 09/26/2019 9.1* 4.0 - 5.6 % Final    Comment: ADA Screening Guidelines:  5.7-6.4%  Consistent with prediabetes  >or=6.5%  Consistent with diabetes  High levels of fetal hemoglobin interfere with the HbA1C  assay. Heterozygous hemoglobin variants (HbS, HgC, etc)do  not significantly interfere with this assay.   However, presence of multiple variants may affect accuracy.      Estimated Avg Glucose 09/26/2019 214* 68 - 131 mg/dL Final    Cholesterol 09/26/2019 179  120 - 199 mg/dL Final    Comment: The National Cholesterol Education Program (NCEP) has set the  following guidelines (reference ranges) for Cholesterol:  Optimal.....................<200 mg/dL  Borderline High.............200-239 mg/dL  High........................> or = 240 mg/dL      Triglycerides 09/26/2019 137  30 - 150 mg/dL Final    Comment: The National Cholesterol Education Program (NCEP) has set the  following guidelines (reference values) for triglycerides:  Normal......................<150 mg/dL  Borderline High.............150-199 mg/dL  High........................200-499 mg/dL      HDL 09/26/2019 45  40 - 75 mg/dL Final    Comment: The National  Cholesterol Education Program (NCEP) has set the  following guidelines (reference values) for HDL Cholesterol:  Low...............<40 mg/dL  Optimal...........>60 mg/dL      LDL Cholesterol 09/26/2019 106.6  63.0 - 159.0 mg/dL Final    Comment: The National Cholesterol Education Program (NCEP) has set the  following guidelines (reference values) for LDL Cholesterol:  Optimal.......................<130 mg/dL  Borderline High...............130-159 mg/dL  High..........................160-189 mg/dL  Very High.....................>190 mg/dL      Hdl/Cholesterol Ratio 09/26/2019 25.1  20.0 - 50.0 % Final    Total Cholesterol/HDL Ratio 09/26/2019 4.0  2.0 - 5.0 Final    Non-HDL Cholesterol 09/26/2019 134  mg/dL Final    Comment: Risk category and Non-HDL cholesterol goals:  Coronary heart disease (CHD)or equivalent (10-year risk of CHD >20%):  Non-HDL cholesterol goal     <130 mg/dL  Two or more CHD risk factors and 10-year risk of CHD <= 20%:  Non-HDL cholesterol goal     <160 mg/dL  0 to 1 CHD risk factor:  Non-HDL cholesterol goal     <190 mg/dL     ]  Psychotherapy:  · Target symptoms: depression, anxiety , psychosis and sleep problems.   · Why chosen therapy is appropriate versus another modality: relevant to diagnosis, evidence based practice  · Outcome monitoring methods: self-report, observation  · Therapeutic intervention type: supportive psychotherapy  · Topics discussed/themes: medications, symptoms, improvement  · The patient's response to the intervention is accepting. The patient's progress toward treatment goals is fair.   · Duration of intervention: 19 minutes.    Review of Systems   · PSYCHIATRIC: Pertinant items are noted in the narrative.  GENERAL:  Well developed   SKIN:  No rashes or lacerations  HEAD:  No headache  EYES:  No exophthalmos, jaundice or blindness  EARS:  No hearing loss  MOUTH & THROAT:  No dyskinetic movements or obvious goiter  CHEST:  No shortness of breath  CARDIOVASCULAR:  No  "chest pain  ABDOMEN:  No nausea, vomiting, pain, or diarrhea, has chronic constipation  URINARY:  No dysuria, low sex drive  MUSCULOSKELETAL:  No tremor, no tic  NEUROLOGIC:  No abnormal movements    Past Medical, Family and Social History: The patient's past medical, family and social history have been reviewed and updated as appropriate within the electronic medical record - see encounter notes.    Compliance: yes    Side effects: None    Risk Parameters:  Patient reports no suicidal ideation  Patient reports no homicidal ideation  Patient reports no self-injurious behavior  Patient reports no violent behavior    Exam (detailed: at least 9 elements; comprehensive: all 15 elements)   Constitutional  Vitals:  Most recent vital signs, dated less than 90 days prior to this appointment, were reviewed.   Vitals:    12/12/19 1314   BP: 136/64   Pulse: 60   Weight: 90.2 kg (198 lb 13.7 oz)   Height: 5' 3" (1.6 m)        General:  age appropriate, overweight     Musculoskeletal  Muscle Strength/Tone:  no tremor, no tic   Gait & Station:  non-ataxic     Psychiatric  Speech:  no latency; no press   Mood & Affect:  "I'm better."  congruent and appropriate   Thought Process:  normal and logical   Associations:  intact   Thought Content:  normal, no suicidality, no homicidality, delusions, or paranoia   Insight:  has awareness of illness   Judgement: behavior is adequate to circumstances   Orientation:  grossly intact   Memory: intact for content of interview   Language: grossly intact   Attention Span & Concentration:  able to focus   Fund of Knowledge:  intact and appropriate to age and level of education     Assessment and Diagnosis   Status/Progress: Based on the examination today, the patient's problem(s) is/are improved.  New problems have not been presented today.   Co-morbidities are not complicating management of the primary condition.  There are no active rule-out diagnoses for this patient at this time.     General " Impression: She has improved but is not optimal. She is taking Wellbutrin at night and having trouble with sleep approximately three nights a week. Will taper patient off of Wellbutrin completely and give patient more time on Prozac 10 mgs po qd to see if symptoms improve.      ICD-10-CM ICD-9-CM   1. Major depressive disorder, recurrent episode, severe with mood-congruent psychotic features F33.3 296.34   2. Generalized anxiety disorder F41.1 300.02   3. Hypersomnia G47.10 780.54       Intervention/Counseling/Treatment Plan   · Medication Management: The risks and benefits of medication were discussed with the patient.  · The treatment plan and follow up plan were reviewed with the patient.   1. Safety: Call 911 or Crisis Line or go to ER for suicidal ideation, adverse effects of medication or any other emergency  2. Continue Prozac 10 mg po qd.   3. Taper off Wellbutrin: Take one 75 mg tablet by mouth every other day for 7 doses, then stop  4. Return to clinic in 4-6 weeks or sooner prn.     Patient agrees with POC.    INSTRUCTIONS  Instructed to call 911 or Crisis Line or go to ER for suicidal ideation, adverse effects of medication or any other emergency. Verbalizes understanding and plan to comply.    2018 Up[employer]Date, [employer]. and/or its affiliates. All Rights Reserved.  Dietary sources of iron  Food Approximate measure Iron (mg)   High iron sources    Cream of Wheat (quick or instant)* 1/2 cup 7.8   Kidney, beef¶ 2 oz (60 g) 5.3   Liver, beef¶ 2 oz (60 g) 5.8   Liver, calf¶ 2 oz (60 g) 9   Liver, chicken¶ 2 oz (60 g) 6   Liverwurst¶ 2 oz (60 g) 3.6   Prune juice 1/2 cup 5.1   Spinach 1/2 cup 3.2   Moderate iron sources   All-Bran cereal 1/2 cup 2.9   Almonds, dried unblanched 1/2 cup 3   Dried beans and peas   Baked beans, no pork 1/4 cup 1.5   Blackeye peas, cooked 1/4 cup 0.8   Chick peas, dry 1/4 cup 3.5   Great northern beans, cooked 1/4 cup 1.3   Green peas, cooked 1/4 cup 1.4   Lentils, dry 1/4 cup 3.4   Lima  beans, cooked 1/4 cup 1.3   Navy beans, cooked 1/4 cup 1.3   Red beans, dry 1/4 cup 3.5   Soybeans, cooked 1/4 cup 1.4   White beans, dry 1/4 cup 3.9   Beef, cooked 2 oz (60 g) 2-3?   Ham, cooked 2 oz (60 g) 1.3   Mason, cooked 2 oz (60 g) 1.9   Peaches, dried 1/4 cup 2.4   Peanuts, roasted without skins 3 1/2 oz (100 g) 3.2   Pork, cooked 2 oz (60 g) 2-3?   Prunes, dried 2 large 1.1   Scallops 2 oz (60 g) 1.6   Turkey, cooked 2 oz (60 g) 1.7   Approximate iron content of children's favorite foods   Hamburger, small 1 3   Large 1 5.2   Big Mac 1 4.3   Quarter Pounder 1 5.1   Spaghetti with meatballs 1 cup 3.3   Frankfurter and beans 1 cup 4.8   Pork and beans 1 cup 5.9   Raisins§ 5/8 cup 3.5   Cereals, fortified 1 serving 4.5-17.8   Nuts§ 1 cup 5-7   Seeds, sunflower§ 3 1/2 oz (100 g) 7.1   Chile con carne 1 cup 3.6   Beef burrito or quang 1 medium 3.4-4.6   Cheese pizza 2 slices 3   Cheese pizza with beef 2 slices 4.8   White bread 1 piece 0.7   * Or other fortified cereals which contain 10 mg of iron per ounce or 100 percent RDA per serving.  ¶ As organ meats are generally high in cholesterol, these iron-rich foods should be eaten in moderation.  ? Depending on cut, the greatest amounts of iron are generally found in the bety, flank, and bottom round cuts of beef.  ? Depending on cut, the greatest amounts of iron are generally found in the loin, sirloin, tenderloin, and picnic shoulder cuts of pork.  § Raisins, nuts, and seeds are not generally recommended for children under age three because of risk of choking.  Data from: Srini MARIE, Nikia TAYLOR (Eds), Nutrition in Pediatrics, 2nd ed, [place], [place] 1997.  Graphic 59500 Version 2.0      Return to Clinic: 6 weeks, 1 month, as needed

## 2019-12-14 DIAGNOSIS — I10 ESSENTIAL HYPERTENSION: ICD-10-CM

## 2019-12-16 RX ORDER — AMLODIPINE BESYLATE 10 MG/1
TABLET ORAL
Qty: 30 TABLET | Refills: 5 | Status: ON HOLD | OUTPATIENT
Start: 2019-12-16 | End: 2020-06-01 | Stop reason: HOSPADM

## 2019-12-19 ENCOUNTER — HOSPITAL ENCOUNTER (OUTPATIENT)
Dept: CARDIOLOGY | Facility: HOSPITAL | Age: 69
Discharge: HOME | End: 2019-12-19
Attending: INTERNAL MEDICINE
Payer: MEDICARE

## 2019-12-19 ENCOUNTER — HOSPITAL ENCOUNTER (OUTPATIENT)
Dept: RADIOLOGY | Facility: HOSPITAL | Age: 69
Discharge: HOME | End: 2019-12-19
Attending: INTERNAL MEDICINE
Payer: MEDICARE

## 2019-12-19 DIAGNOSIS — R07.9 CHEST PAIN, UNSPECIFIED TYPE: ICD-10-CM

## 2019-12-19 LAB
AORTIC ROOT ANNULUS: 3.31 CM
AORTIC VALVE CUSP SEPERATION: 1.97 CM
ASCENDING AORTA: 3.08 CM
AV INDEX (PROSTH): 0.89
AV MEAN GRADIENT: 7 MMHG
AV PEAK GRADIENT: 14 MMHG
AV VALVE AREA: 4.01 CM2
AV VELOCITY RATIO: 0.65
CV ECHO LV RWT: 0.46 CM
CV STRESS BASE HR: 68 BPM
DIASTOLIC BLOOD PRESSURE: 62 MMHG
DOP CALC AO PEAK VEL: 1.88 M/S
DOP CALC AO VTI: 34.97 CM
DOP CALC LVOT AREA: 4.5 CM2
DOP CALC LVOT DIAMETER: 2.39 CM
DOP CALC LVOT PEAK VEL: 1.23 M/S
DOP CALC LVOT STROKE VOLUME: 140.08 CM3
DOP CALCLVOT PEAK VEL VTI: 31.24 CM
E WAVE DECELERATION TIME: 233.51 MSEC
E/A RATIO: 0.68
E/E' RATIO: 12.47 M/S
ECHO LV POSTERIOR WALL: 1.14 CM (ref 0.6–1.1)
FRACTIONAL SHORTENING: 45 % (ref 28–44)
INTERVENTRICULAR SEPTUM: 1.01 CM (ref 0.6–1.1)
IVRT: 0.08 MSEC
LA MAJOR: 5.15 CM
LA MINOR: 5.11 CM
LA WIDTH: 5.14 CM
LEFT ATRIUM SIZE: 4.1 CM
LEFT ATRIUM VOLUME: 91.89 CM3
LEFT INTERNAL DIMENSION IN SYSTOLE: 2.7 CM (ref 2.1–4)
LEFT VENTRICLE DIASTOLIC VOLUME: 114.86 ML
LEFT VENTRICLE SYSTOLIC VOLUME: 26.96 ML
LEFT VENTRICULAR INTERNAL DIMENSION IN DIASTOLE: 4.94 CM (ref 3.5–6)
LEFT VENTRICULAR MASS: 196.82 G
LV LATERAL E/E' RATIO: 10.6 M/S
LV SEPTAL E/E' RATIO: 15.14 M/S
MV PEAK A VEL: 1.55 M/S
MV PEAK E VEL: 1.06 M/S
NUC REST EJECTION FRACTION: 73
OHS CV CPX 1 MINUTE RECOVERY HEART RATE: 111 BPM
OHS CV CPX 85 PERCENT MAX PREDICTED HEART RATE MALE: 124
OHS CV CPX ESTIMATED METS: 5
OHS CV CPX MAX PREDICTED HEART RATE: 146
OHS CV CPX PATIENT IS FEMALE: 1
OHS CV CPX PATIENT IS MALE: 0
OHS CV CPX PEAK DIASTOLIC BLOOD PRESSURE: 57 MMHG
OHS CV CPX PEAK HEAR RATE: 126 BPM
OHS CV CPX PEAK RATE PRESSURE PRODUCT: NORMAL
OHS CV CPX PEAK SYSTOLIC BLOOD PRESSURE: 156 MMHG
OHS CV CPX PERCENT MAX PREDICTED HEART RATE ACHIEVED: 86
OHS CV CPX RATE PRESSURE PRODUCT PRESENTING: 9724
PISA TR MAX VEL: 2.45 M/S
PULM VEIN S/D RATIO: 1.16
PV PEAK D VEL: 0.43 M/S
PV PEAK S VEL: 0.5 M/S
PV PEAK VELOCITY: 1.28 CM/S
RA MAJOR: 4.76 CM
RA PRESSURE: 3 MMHG
RA WIDTH: 3.31 CM
RIGHT VENTRICULAR END-DIASTOLIC DIMENSION: 2.63 CM
RV TISSUE DOPPLER FREE WALL SYSTOLIC VELOCITY 1 (APICAL 4 CHAMBER VIEW): 15.98 CM/S
SINUS: 3.76 CM
STJ: 2.81 CM
STRESS ECHO POST EXERCISE DUR MIN: 3 MINUTES
STRESS ECHO POST EXERCISE DUR SEC: 39 SECONDS
STRESS ECHO TARGET HR: 129 BPM
SYSTOLIC BLOOD PRESSURE: 143 MMHG
TDI LATERAL: 0.1 M/S
TDI SEPTAL: 0.07 M/S
TDI: 0.09 M/S
TR MAX PG: 24 MMHG
TRICUSPID ANNULAR PLANE SYSTOLIC EXCURSION: 2.66 CM
TV REST PULMONARY ARTERY PRESSURE: 27 MMHG

## 2019-12-19 PROCEDURE — 93018 CV STRESS TEST I&R ONLY: CPT | Mod: HCNC,,, | Performed by: INTERNAL MEDICINE

## 2019-12-19 PROCEDURE — 78452 HT MUSCLE IMAGE SPECT MULT: CPT | Mod: 26,HCNC,, | Performed by: INTERNAL MEDICINE

## 2019-12-19 PROCEDURE — 93016 CV STRESS TEST SUPVJ ONLY: CPT | Mod: HCNC,,, | Performed by: INTERNAL MEDICINE

## 2019-12-19 PROCEDURE — A9502 TC99M TETROFOSMIN: HCPCS | Mod: HCNC

## 2019-12-19 PROCEDURE — 93016 STRESS TEST WITH MYOCARDIAL PERFUSION (CUPID ONLY): ICD-10-PCS | Mod: HCNC,,, | Performed by: INTERNAL MEDICINE

## 2019-12-19 PROCEDURE — 93017 CV STRESS TEST TRACING ONLY: CPT | Mod: HCNC

## 2019-12-19 PROCEDURE — 93018 STRESS TEST WITH MYOCARDIAL PERFUSION (CUPID ONLY): ICD-10-PCS | Mod: HCNC,,, | Performed by: INTERNAL MEDICINE

## 2019-12-19 PROCEDURE — 78452 STRESS TEST WITH MYOCARDIAL PERFUSION (CUPID ONLY): ICD-10-PCS | Mod: 26,HCNC,, | Performed by: INTERNAL MEDICINE

## 2019-12-19 PROCEDURE — 93306 ECHO (CUPID ONLY): ICD-10-PCS | Mod: 26,HCNC,, | Performed by: INTERNAL MEDICINE

## 2019-12-19 PROCEDURE — 93306 TTE W/DOPPLER COMPLETE: CPT | Mod: HCNC

## 2019-12-19 PROCEDURE — 93306 TTE W/DOPPLER COMPLETE: CPT | Mod: 26,HCNC,, | Performed by: INTERNAL MEDICINE

## 2019-12-19 NOTE — PROGRESS NOTES
Physical Therapy Daily Note     Name: Cecilia Barahona  [facility] Number: [id number]  Diagnosis:   Encounter Diagnoses   Name Primary?    Neck pain Yes    Sprain of unspecified site of left knee, initial encounter     Muscle weakness     Joint stiffness      Physician: Jose Maria Angel MD  Visit #: 6 of 20    Time In: 1330  Time Out: 1430  Total Treatment Time: 60 minutes (1:1 with PTA 30 minutes of treatment session)    Subjective     Pt reports: Cecilia states her neck was very sore last night and she needed to take pain medication. Patient states her left knee is 6/10 and her neck is 5/10.      Objective      Cecilia received individual therapeutic exercises to develop strength, endurance, ROM, flexibility, posture and core stabilization for 50 minutes including:    -Nu step x 8 minutes   -Scapular retraction 2 x 10, 3 sec hold   -Seated B upper trap stretch 20 sec x 3 ea LE   -Seated BUE rotation (YTB) 2x10  -resisted scapular retraction 2 x 10 yellow t-band  -Seated LAQ 2x10, 3 sec hold: np  -Supine dowel flexion 2x10  -hamstring stretch with belt 20 sec x 4  -Supine quad set 2x10  -SAQ 2x10: np  -supine hip abd with red t-band x 30  -SLR 2 x 5  -hip abd 2 x 5  -ball squeeze 2 x 10  -Heel slides 2x10    Cecilia received the following manual therapy techniques: patellar mobilizations were applied to the: left knee for 5 minutes including:  Tibiofemoral distraction grade 2, patellar mobilization  +Kinesiotape for medial knee pain relief (star techniques) Patient received education regarding appropriate care and removal of Kinesiotape. Patient instructed in proper removal techniques if skin irritation occurs.    Cecilia received moist heat to cervical spine and left knee x 10 minutes post treatment session. np    Written Home Exercises Provided: Reviewed current HEP with patient.       Assessment     Cecilia tolerated treatment well today. Patient demonstrates increased  Billing Type: Third-Party Bill tissue restriction and discomfort through left medial knee with fair response to manual therapy techniques noted. Patient demonstrates improvements in active cervical ROM but cueing needed to correct upper trap over activation with motion. Patient demonstrates appropriate quad activation but difficulty maintaining TKE with SLR due to poor endurance and muscle weakness. Pt demonstrates a good understanding of the education provided and a good return demonstration of activities. Pt  Requires skilled supervision to complete and progress home program.    Medical necessity is demonstrated by the following IMPAIRMENTS:  -pain   -decreased range of motion/flexibility   -decreased muscle strength   -impaired function    -decreased ADL ability  -decreased recreational ability     Patient is making good progress towards established goals.      Goals as follows:  Short Term Goals (4 Weeks):  Updated 6/23/17  MET    1.Pt to increase strength by a 1/2 grade of muscles test to allow for improvement in functional activities such as performing chores.  2.Pt to improve range of motion by 25% to allow for improved functional mobility to allow for improvement in IADLs.   3.Pt to report compliance with HEP and demonstrate proper exercise technique to PT to show competence with self management of condition.  4.Decrease pain by 25% during functional activities.    Long Term Goals (12 Weeks):  In progress    1. Increase ROM to allow improved joint biomechanics during functional activities.   2.Increase trunk, lowe and upper extremity strength to within normal limits during functional activities.   3. Independent with home exercise program.   4. Full return to functional activities with manageable complaints.  5. Patient to demonstrate improved posture and body mechanics.  6. Decrease pain by 75% during functional activities.       Plan     Continue with established Plan of Care towards PT goals.    Therapist: Betsy Yap,  PTA  6/27/2017

## 2019-12-20 ENCOUNTER — LAB VISIT (OUTPATIENT)
Dept: LAB | Facility: HOSPITAL | Age: 69
End: 2019-12-20
Attending: NURSE PRACTITIONER
Payer: MEDICARE

## 2019-12-20 DIAGNOSIS — E78.5 HYPERLIPIDEMIA, UNSPECIFIED HYPERLIPIDEMIA TYPE: ICD-10-CM

## 2019-12-20 LAB
ALBUMIN SERPL BCP-MCNC: 3.4 G/DL (ref 3.5–5.2)
ALP SERPL-CCNC: 120 U/L (ref 55–135)
ALT SERPL W/O P-5'-P-CCNC: 16 U/L (ref 10–44)
AST SERPL-CCNC: 14 U/L (ref 10–40)
BILIRUB DIRECT SERPL-MCNC: 0.2 MG/DL (ref 0.1–0.3)
BILIRUB SERPL-MCNC: 0.6 MG/DL (ref 0.1–1)
CHOLEST SERPL-MCNC: 193 MG/DL (ref 120–199)
CHOLEST/HDLC SERPL: 3.8 {RATIO} (ref 2–5)
HDLC SERPL-MCNC: 51 MG/DL (ref 40–75)
HDLC SERPL: 26.4 % (ref 20–50)
LDLC SERPL CALC-MCNC: 120.4 MG/DL (ref 63–159)
NONHDLC SERPL-MCNC: 142 MG/DL
PROT SERPL-MCNC: 7.5 G/DL (ref 6–8.4)
TRIGL SERPL-MCNC: 108 MG/DL (ref 30–150)

## 2019-12-20 PROCEDURE — 80061 LIPID PANEL: CPT | Mod: HCNC

## 2019-12-20 PROCEDURE — 80076 HEPATIC FUNCTION PANEL: CPT | Mod: HCNC

## 2019-12-20 PROCEDURE — 36415 COLL VENOUS BLD VENIPUNCTURE: CPT | Mod: HCNC,PN

## 2019-12-20 PROCEDURE — 83036 HEMOGLOBIN GLYCOSYLATED A1C: CPT | Mod: HCNC

## 2019-12-20 RX ORDER — INSULIN DEGLUDEC 200 U/ML
INJECTION, SOLUTION SUBCUTANEOUS
Qty: 6 SYRINGE | Refills: 1 | Status: SHIPPED | OUTPATIENT
Start: 2019-12-20 | End: 2020-02-06 | Stop reason: SDUPTHER

## 2019-12-21 LAB
ESTIMATED AVG GLUCOSE: 214 MG/DL (ref 68–131)
HBA1C MFR BLD HPLC: 9.1 % (ref 4–5.6)

## 2019-12-23 ENCOUNTER — TELEPHONE (OUTPATIENT)
Dept: FAMILY MEDICINE | Facility: CLINIC | Age: 69
End: 2019-12-23

## 2019-12-23 NOTE — TELEPHONE ENCOUNTER
----- Message from Leonardo Nesbitt sent at 12/23/2019 12:59 PM CST -----  Contact: Self  Type: Patient Call Back    Who called: self  What is the request in detail: patient has left thigh pain with numbness and would like to speak with a nurse    Can the clinic reply by [facility]?no     Would the patient rather a call back or a response via [facility]? Call     Best call back number: [phone]

## 2019-12-24 ENCOUNTER — TELEPHONE (OUTPATIENT)
Dept: FAMILY MEDICINE | Facility: CLINIC | Age: 69
End: 2019-12-24

## 2019-12-24 NOTE — TELEPHONE ENCOUNTER
----- Message from Gail Martin sent at 12/23/2019  4:21 PM CST -----  Contact: Patient ph [phone]  Type:  Patient Returning Call    Who Called: Patient    Who Left Message for Patient: Tawnya    Does the patient know what this is regarding?: Concerns pt had earlier.    Would the patient rather a call back or a response via [facility]?  Call back    Best Call Back Number: [phone]

## 2020-01-07 ENCOUNTER — PATIENT OUTREACH (OUTPATIENT)
Dept: OTHER | Facility: OTHER | Age: 70
End: 2020-01-07

## 2020-01-08 ENCOUNTER — PATIENT OUTREACH (OUTPATIENT)
Dept: ADMINISTRATIVE | Facility: OTHER | Age: 70
End: 2020-01-08

## 2020-01-08 DIAGNOSIS — Z12.11 SCREEN FOR COLON CANCER: Primary | ICD-10-CM

## 2020-01-09 ENCOUNTER — TELEPHONE (OUTPATIENT)
Dept: FAMILY MEDICINE | Facility: CLINIC | Age: 70
End: 2020-01-09

## 2020-01-14 ENCOUNTER — PATIENT OUTREACH (OUTPATIENT)
Dept: ADMINISTRATIVE | Facility: OTHER | Age: 70
End: 2020-01-14

## 2020-01-14 DIAGNOSIS — G25.81 RESTLESS LEGS SYNDROME (RLS): ICD-10-CM

## 2020-01-14 DIAGNOSIS — Z12.11 COLON CANCER SCREENING: Primary | ICD-10-CM

## 2020-01-14 RX ORDER — CHLORTHALIDONE 25 MG/1
TABLET ORAL
Qty: 90 TABLET | Refills: 2 | Status: ON HOLD | OUTPATIENT
Start: 2020-01-14 | End: 2020-06-01 | Stop reason: HOSPADM

## 2020-01-14 RX ORDER — ROPINIROLE 0.5 MG/1
TABLET, FILM COATED ORAL
Qty: 90 TABLET | Refills: 0 | Status: SHIPPED | OUTPATIENT
Start: 2020-01-14 | End: 2020-06-29 | Stop reason: SDUPTHER

## 2020-01-16 ENCOUNTER — OFFICE VISIT (OUTPATIENT)
Dept: PSYCHIATRY | Facility: CLINIC | Age: 70
End: 2020-01-16
Payer: MEDICARE

## 2020-01-16 VITALS
SYSTOLIC BLOOD PRESSURE: 134 MMHG | HEIGHT: 63 IN | DIASTOLIC BLOOD PRESSURE: 66 MMHG | BODY MASS INDEX: 35.2 KG/M2 | HEART RATE: 74 BPM | WEIGHT: 198.63 LBS

## 2020-01-16 DIAGNOSIS — F33.3 MAJOR DEPRESSIVE DISORDER, RECURRENT EPISODE, SEVERE WITH MOOD-CONGRUENT PSYCHOTIC FEATURES: Primary | ICD-10-CM

## 2020-01-16 DIAGNOSIS — F41.1 GENERALIZED ANXIETY DISORDER: ICD-10-CM

## 2020-01-16 DIAGNOSIS — Z12.11 SPECIAL SCREENING FOR MALIGNANT NEOPLASMS, COLON: Primary | ICD-10-CM

## 2020-01-16 DIAGNOSIS — G47.10 HYPERSOMNIA: ICD-10-CM

## 2020-01-16 PROCEDURE — 99999 PR PBB SHADOW E&M-EST. PATIENT-LVL III: CPT | Mod: PBBFAC,HCNC,, | Performed by: NURSE PRACTITIONER

## 2020-01-16 PROCEDURE — 99999 PR PBB SHADOW E&M-EST. PATIENT-LVL III: ICD-10-PCS | Mod: PBBFAC,HCNC,, | Performed by: NURSE PRACTITIONER

## 2020-01-16 PROCEDURE — 3078F PR MOST RECENT DIASTOLIC BLOOD PRESSURE < 80 MM HG: ICD-10-PCS | Mod: HCNC,CPTII,S$GLB, | Performed by: NURSE PRACTITIONER

## 2020-01-16 PROCEDURE — 3078F DIAST BP <80 MM HG: CPT | Mod: HCNC,CPTII,S$GLB, | Performed by: NURSE PRACTITIONER

## 2020-01-16 PROCEDURE — 3075F SYST BP GE 130 - 139MM HG: CPT | Mod: HCNC,CPTII,S$GLB, | Performed by: NURSE PRACTITIONER

## 2020-01-16 PROCEDURE — 1101F PT FALLS ASSESS-DOCD LE1/YR: CPT | Mod: HCNC,CPTII,S$GLB, | Performed by: NURSE PRACTITIONER

## 2020-01-16 PROCEDURE — 1159F MED LIST DOCD IN RCRD: CPT | Mod: HCNC,S$GLB,, | Performed by: NURSE PRACTITIONER

## 2020-01-16 PROCEDURE — 3075F PR MOST RECENT SYSTOLIC BLOOD PRESS GE 130-139MM HG: ICD-10-PCS | Mod: HCNC,CPTII,S$GLB, | Performed by: NURSE PRACTITIONER

## 2020-01-16 PROCEDURE — 99499 UNLISTED E&M SERVICE: CPT | Mod: HCNC,S$GLB,, | Performed by: NURSE PRACTITIONER

## 2020-01-16 PROCEDURE — 1159F PR MEDICATION LIST DOCUMENTED IN MEDICAL RECORD: ICD-10-PCS | Mod: HCNC,S$GLB,, | Performed by: NURSE PRACTITIONER

## 2020-01-16 PROCEDURE — 1126F PR PAIN SEVERITY QUANTIFIED, NO PAIN PRESENT: ICD-10-PCS | Mod: HCNC,S$GLB,, | Performed by: NURSE PRACTITIONER

## 2020-01-16 PROCEDURE — 99213 PR OFFICE/OUTPT VISIT, EST, LEVL III, 20-29 MIN: ICD-10-PCS | Mod: HCNC,S$GLB,, | Performed by: NURSE PRACTITIONER

## 2020-01-16 PROCEDURE — 99213 OFFICE O/P EST LOW 20 MIN: CPT | Mod: HCNC,S$GLB,, | Performed by: NURSE PRACTITIONER

## 2020-01-16 PROCEDURE — 1101F PR PT FALLS ASSESS DOC 0-1 FALLS W/OUT INJ PAST YR: ICD-10-PCS | Mod: HCNC,CPTII,S$GLB, | Performed by: NURSE PRACTITIONER

## 2020-01-16 PROCEDURE — 1126F AMNT PAIN NOTED NONE PRSNT: CPT | Mod: HCNC,S$GLB,, | Performed by: NURSE PRACTITIONER

## 2020-01-16 PROCEDURE — 99499 RISK ADDL DX/OHS AUDIT: ICD-10-PCS | Mod: HCNC,S$GLB,, | Performed by: NURSE PRACTITIONER

## 2020-01-16 RX ORDER — FLUOXETINE 10 MG/1
10 CAPSULE ORAL DAILY
Qty: 90 CAPSULE | Refills: 1 | Status: SHIPPED | OUTPATIENT
Start: 2020-01-16 | End: 2020-04-01 | Stop reason: SDUPTHER

## 2020-01-16 RX ORDER — POLYETHYLENE GLYCOL 3350, SODIUM SULFATE ANHYDROUS, SODIUM BICARBONATE, SODIUM CHLORIDE, POTASSIUM CHLORIDE 236; 22.74; 6.74; 5.86; 2.97 G/4L; G/4L; G/4L; G/4L; G/4L
4 POWDER, FOR SOLUTION ORAL ONCE
Qty: 4000 ML | Refills: 0 | Status: SHIPPED | OUTPATIENT
Start: 2020-01-16 | End: 2020-01-16

## 2020-01-16 NOTE — PATIENT INSTRUCTIONS
"You have been provided with a certain amount of medication with a specified number of refills.  Please follow up within an adequate time before you run out of medications.    REFILLS FOR CONTROLLED SUBSTANCES WILL NOT BE GIVEN WITHOUT AN APPOINTMENT.  I will not honor or fill automated refill requests from pharmacies.  You must come in for an appointment to get refills.    Please book your next appointment for myself or therapist by phone by calling our office at [phone].      PLEASE BE AT LEAST 15 MINUTES EARLY FOR YOUR NEXT APPOINTMENT.  PLEASE, DO NOT BE LATE OR YOU WILL BE TURNED AWAY AND ASKED TO RESCHEDULE.  YOU MUST COME EARLY TO ALLOW TIME FOR CHECK-IN AS WELL AS GET YOUR VITAL SIGNS AND GO OVER YOUR MEDICATIONS.  [facility] is not fair to the patients who present after you and are on time for their appointments.  It causes a delay in the appointments for patients and staff.  IF YOU ARE LATE, THERE IS A POSSIBILITY THAT YOU WILL BE CHARGED FOR THE APPOINTMENT TIME PERSONALLY AND IT WILL NOT GO TO YOUR INSURANCE.  YOU MAY ALSO BE DISCHARGED FROM CLINIC with multiple "No Show" appointments.  -----------------------------------------------------------------------------------------------------------------  IF YOU FEEL SUICIDAL OR HAVING THOUGHTS OR PLANS TO HURT YOURSELF OR OTHERS, CALL 911 OR REPORT TO THE NEAREST EMERGENCY ROOM.  YOU CAN ALSO ACCESS THE FOLLOWING HOTLINE(S):    National Suicide Hotline Number 9-342-800-TALK (3728)     ([facility] Mobile Crisis, [phone]'   [place] [facility], (398) 362-4322; [place], 24 hours / 7 days, (584) 983-COPE (2123), 3-704-302-COPE (1679))     TIPS FOR GETTING YOUR PRESCRIPTIONS:    You can always ask your pharmacist the cost of your medications without the use of your insurance. Sometimes the medication will be cheaper if you do not use your insurance.     If you decide you want to have your prescriptions filled at " a different pharmacy, you can always go to the new pharmacy of your choice and have them call the pharmacy where your prescription was sent and they can have your prescription transferred to the new pharmacy.

## 2020-01-16 NOTE — PROCEDURES
"Cystoscopy  Date/Time: 8/24/2017 9:43 AM  Performed by: FAUSTINA MENDEZ  Authorized by: FAUSTINA MENDEZ     Consent Done?:  Yes (Written)  Time out: Immediately prior to procedure a "time out" was called to verify the correct patient, procedure, equipment, support staff and site/side marked as required.    Indications: recurrent UTIs and hematuria    Position:  Dorsal lithotomy  Anesthesia:  Lidocaine jelly  Patient sedated?: No    Preparation: Patient was prepped and draped in usual sterile fashion      Scope type:  Flexible cystoscope  Stent inserted: No    Stent removed: No    External exam normal: Yes    Digital exam performed: No    Urethra normal: Yes  Bladder neck normal: Bladder neck normal   Bladder normal: Yes      Patient tolerance:  Patient tolerated the procedure well with no immediate complications     Normal cystoscopy.      "
Spine appears normal, range of motion is not limited, no muscle or joint tenderness

## 2020-01-16 NOTE — PROGRESS NOTES
Outpatient Psychiatry Follow-Up Visit (MD/NP)    1/16/2020    Clinical Status of Patient:  Outpatient (Ambulatory)    Chief Complaint:  Cecilia Barahona is a 69 y.o. female who presents today for follow-up of depression, anxiety and psychosis and sleep problems.  Met with patient.      Interval History and Content of Current Session:  Interim Events/Subjective Report/Content of Current Session: Cecilia  was last seen by me on 12/12/2019 for a follow up visit. Cecilia was diagnosed with Major depressive disorder, recurrent episode, severe with mood-congruent psychotic features, hypersomnia and EILEEN. She had improved but was not optimal and was having problems with sleep. A plan of care was devised to include:    1. Safety: Call 911 or Crisis Line or go to ER for suicidal ideation, adverse effects of medication or any other emergency  2. Continue Prozac 10 mg po qd.   3. Taper off Wellbutrin: Take one 75 mg tablet by mouth every other day for 7 doses, then stop  4. Return to clinic in 4-6 weeks or sooner prn.     Today Cecilia is seen face to face.Her depression is under control. She does not cry a lot anymore. Her anxiety is under control. Her sleep is good. She is no longer sleeping excessively. Her appetite is good. Her energy level is good. She did taper off of the Wellbutrin without any difficulty.      Her labs are reviewed with her today.  Lab Visit on 12/20/2019   Component Date Value Ref Range Status    Hemoglobin A1C 12/20/2019 9.1* 4.0 - 5.6 % Final    Comment: ADA Screening Guidelines:  5.7-6.4%  Consistent with prediabetes  >or=6.5%  Consistent with diabetes  High levels of fetal hemoglobin interfere with the HbA1C  assay. Heterozygous hemoglobin variants (HbS, HgC, etc)do  not significantly interfere with this assay.   However, presence of multiple variants may affect accuracy.      Estimated Avg Glucose 12/20/2019 214* 68 - 131 mg/dL Final    Cholesterol 12/20/2019 193  120 - 199 mg/dL Final    Comment:  The National Cholesterol Education Program (NCEP) has set the  following guidelines (reference ranges) for Cholesterol:  Optimal.....................<200 mg/dL  Borderline High.............200-239 mg/dL  High........................> or = 240 mg/dL      Triglycerides 12/20/2019 108  30 - 150 mg/dL Final    Comment: The National Cholesterol Education Program (NCEP) has set the  following guidelines (reference values) for triglycerides:  Normal......................<150 mg/dL  Borderline High.............150-199 mg/dL  High........................200-499 mg/dL      HDL 12/20/2019 51  40 - 75 mg/dL Final    Comment: The National Cholesterol Education Program (NCEP) has set the  following guidelines (reference values) for HDL Cholesterol:  Low...............<40 mg/dL  Optimal...........>60 mg/dL      LDL Cholesterol 12/20/2019 120.4  63.0 - 159.0 mg/dL Final    Comment: The National Cholesterol Education Program (NCEP) has set the  following guidelines (reference values) for LDL Cholesterol:  Optimal.......................<130 mg/dL  Borderline High...............130-159 mg/dL  High..........................160-189 mg/dL  Very High.....................>190 mg/dL      Hdl/Cholesterol Ratio 12/20/2019 26.4  20.0 - 50.0 % Final    Total Cholesterol/HDL Ratio 12/20/2019 3.8  2.0 - 5.0 Final    Non-HDL Cholesterol 12/20/2019 142  mg/dL Final    Comment: Risk category and Non-HDL cholesterol goals:  Coronary heart disease (CHD)or equivalent (10-year risk of CHD >20%):  Non-HDL cholesterol goal     <130 mg/dL  Two or more CHD risk factors and 10-year risk of CHD <= 20%:  Non-HDL cholesterol goal     <160 mg/dL  0 to 1 CHD risk factor:  Non-HDL cholesterol goal     <190 mg/dL      Total Protein 12/20/2019 7.5  6.0 - 8.4 g/dL Final    Albumin 12/20/2019 3.4* 3.5 - 5.2 g/dL Final    Total Bilirubin 12/20/2019 0.6  0.1 - 1.0 mg/dL Final    Comment: For infants and newborns, interpretation of results should be based  on  gestational age, weight and in agreement with clinical  observations.  Premature Infant recommended reference ranges:  Up to 24 hours.............<8.0 mg/dL  Up to 48 hours............<12.0 mg/dL  3-5 days..................<15.0 mg/dL  6-29 days.................<15.0 mg/dL      Bilirubin, Direct 12/20/2019 0.2  0.1 - 0.3 mg/dL Final    AST 12/20/2019 14  10 - 40 U/L Final    ALT 12/20/2019 16  10 - 44 U/L Final    Alkaline Phosphatase 12/20/2019 120  55 - 135 U/L Final   Hospital Outpatient Visit on 12/19/2019   Component Date Value Ref Range Status    TDI SEPTAL 12/19/2019 0.07  m/s Final    LV LATERAL E/E' RATIO 12/19/2019 10.60  m/s Final    LV SEPTAL E/E' RATIO 12/19/2019 15.14  m/s Final    LA WIDTH 12/19/2019 5.14  cm Final    AORTIC VALVE CUSP SEPERATION 12/19/2019 1.97  cm Final    TDI LATERAL 12/19/2019 0.10  m/s Final    PV PEAK VELOCITY 12/19/2019 1.28  cm/s Final    LVIDD 12/19/2019 4.94  3.5 - 6.0 cm Final    IVS 12/19/2019 1.01  0.6 - 1.1 cm Final    PW 12/19/2019 1.14* 0.6 - 1.1 cm Final    Ao root annulus 12/19/2019 3.31  cm Final    LVIDS 12/19/2019 2.70  2.1 - 4.0 cm Final    FS 12/19/2019 45  28 - 44 % Final    LA volume 12/19/2019 91.89  cm3 Final    Sinus 12/19/2019 3.76  cm Final    STJ 12/19/2019 2.81  cm Final    Ascending aorta 12/19/2019 3.08  cm Final    LV mass 12/19/2019 196.82  g Final    LA size 12/19/2019 4.10  cm Final    RVDD 12/19/2019 2.63  cm Final    TAPSE 12/19/2019 2.66  cm Final    RV S' 12/19/2019 15.98  cm/s Final    Left Ventricle Relative Wall Thick* 12/19/2019 0.46  cm Final    AV mean gradient 12/19/2019 7  mmHg Final    AV valve area 12/19/2019 4.01  cm2 Final    AV Velocity Ratio 12/19/2019 0.65   Final    AV index (prosthetic) 12/19/2019 0.89   Final    E/A ratio 12/19/2019 0.68   Final    Mean e' 12/19/2019 0.09  m/s Final    E wave decelartion time 12/19/2019 233.51  msec Final    IVRT 12/19/2019 0.08  msec Final    Pulm vein S/D  ratio 12/19/2019 1.16   Final    LVOT diameter 12/19/2019 2.39  cm Final    LVOT area 12/19/2019 4.5  cm2 Final    LVOT peak chidi 12/19/2019 1.23  m/s Final    LVOT peak VTI 12/19/2019 31.24  cm Final    Ao peak chidi 12/19/2019 1.88  m/s Final    Ao VTI 12/19/2019 34.97  cm Final    LVOT stroke volume 12/19/2019 140.08  cm3 Final    AV peak gradient 12/19/2019 14  mmHg Final    E/E' ratio 12/19/2019 12.47  m/s Final    MV Peak E Chidi 12/19/2019 1.06  m/s Final    TR Max Chidi 12/19/2019 2.45  m/s Final    MV Peak A Chidi 12/19/2019 1.55  m/s Final    PV Peak S Chidi 12/19/2019 0.50  m/s Final    PV Peak D Chidi 12/19/2019 0.43  m/s Final    LV Systolic Volume 12/19/2019 26.96  mL Final    LV Diastolic Volume 12/19/2019 114.86  mL Final    RA Major Axis 12/19/2019 4.76  cm Final    Left Atrium Minor Axis 12/19/2019 5.11  cm Final    Left Atrium Major Axis 12/19/2019 5.15  cm Final    Triscuspid Valve Regurgitation Pea* 12/19/2019 24  mmHg Final    RA Width 12/19/2019 3.31  cm Final    Right Atrial Pressure (from IVC) 12/19/2019 3  mmHg Final    TV rest pulmonary artery pressure 12/19/2019 27  mmHg Final   Hospital Outpatient Visit on 12/19/2019   Component Date Value Ref Range Status    Target HR 12/19/2019 129  bpm Final    85% Max Predicted HR 12/19/2019 124   Final    Max Predicted HR 12/19/2019 146   Final    OHS CV CPX PATIENT IS MALE 12/19/2019 0   Final    OHS CV CPX PATIENT IS FEMALE 12/19/2019 1   Final    Exercise duration (min) 12/19/2019 3  minutes Final    Exercise duration (sec) 12/19/2019 39  seconds Final    HR at rest 12/19/2019 68.0  bpm Final    Systolic blood pressure 12/19/2019 143.0  mmHg Final    Diastolic blood pressure 12/19/2019 62.0  mmHg Final    RPP 12/19/2019 9,724   Final    Peak HR 12/19/2019 126.0  bpm Final    Peak Systolic BP 12/19/2019 156.0  mmHg Final    Peak Diatolic BP 12/19/2019 57.0  mmHg Final    Peak RPP 12/19/2019 19,656   Final    Estimated  METs 12/19/2019 5.0   Final    % Max HR Achieved 12/19/2019 86   Final    1 Minute Recovery HR 12/19/2019 111.0  bpm Final    Nuc Rest EF 12/19/2019 73   Final   ]  Psychotherapy:  · Target symptoms: depression, anxiety , psychosis and sleep problems.   · Why chosen therapy is appropriate versus another modality: relevant to diagnosis, evidence based practice  · Outcome monitoring methods: self-report, observation  · Therapeutic intervention type: supportive psychotherapy  · Topics discussed/themes: medications, symptoms, improvement   · The patient's response to the intervention is accepting. The patient's progress toward treatment goals is good.   · Duration of intervention: 19 minutes.    Review of Systems   · PSYCHIATRIC: Pertinant items are noted in the narrative.  GENERAL:  Well developed   SKIN:  No rashes or lacerations  HEAD:  No headache  EYES:  No exophthalmos, jaundice or blindness  EARS:  No hearing loss  MOUTH & THROAT:  No dyskinetic movements or obvious goiter  CHEST:  No shortness of breath  CARDIOVASCULAR:  No chest pain  ABDOMEN:  No nausea, vomiting, pain, or diarrhea, has chronic constipation  URINARY:  No dysuria, low sex drive  MUSCULOSKELETAL:  No tremor, no tic  NEUROLOGIC:  No abnormal movements    Past Medical, Family and Social History: The patient's past medical, family and social history have been reviewed and updated as appropriate within the electronic medical record - see encounter notes.    Compliance: yes     Side effects: None    Risk Parameters:  Patient reports no suicidal ideation  Patient reports no homicidal ideation  Patient reports no self-injurious behavior  Patient reports no violent behavior    Exam (detailed: at least 9 elements; comprehensive: all 15 elements)   Constitutional  Vitals:  Most recent vital signs, dated less than 90 days prior to this appointment, were reviewed.   Vitals:    01/16/20 1026   BP: 134/66   Pulse: 74   Weight: 90.1 kg (198 lb 10.2 oz)  "  Height: 5' 3" (1.6 m)        General:  age appropriate, overweight     Musculoskeletal  Muscle Strength/Tone:  no tremor, no tic   Gait & Station:  non-ataxic     Psychiatric  Speech:  no latency; no press   Mood & Affect:  "Good."  congruent and appropriate   Thought Process:  normal and logical   Associations:  intact   Thought Content:  normal, no suicidality, no homicidality, delusions, or paranoia   Insight:  has awareness of illness   Judgement: behavior is adequate to circumstances   Orientation:  grossly intact   Memory: intact for content of interview   Language: grossly intact   Attention Span & Concentration:  able to focus   Fund of Knowledge:  intact and appropriate to age and level of education     Assessment and Diagnosis   Status/Progress: Based on the examination today, the patient's problem(s) is/are improved and well controlled.  New problems have not been presented today.   Co-morbidities are not complicating management of the primary condition.  There are no active rule-out diagnoses for this patient at this time.     General Impression: She is doing very well on her current medication regime.       ICD-10-CM ICD-9-CM   1. Major depressive disorder, recurrent episode, severe with mood-congruent psychotic features F33.3 296.34   2. Generalized anxiety disorder F41.1 300.02   3. Hypersomnia G47.10 780.54       Intervention/Counseling/Treatment Plan   · Medication Management: The risks and benefits of medication were discussed with the patient.  · The treatment plan and follow up plan were reviewed with the patient.   1. Safety: Call 911 or Crisis Line or go to ER for suicidal ideation, adverse effects of medication or any other emergency  2. Continue Prozac 10 mg po qd to target depression and anxiety.   3. Return to clinic in 6 months or sooner prn.     Patient agrees with POC.    INSTRUCTIONS  Instructed to call 911 or Crisis Line or go to ER for suicidal ideation, adverse effects of " medication or any other emergency. Verbalizes understanding and plan to comply.      Return to Clinic: 6 months, as needed

## 2020-01-17 ENCOUNTER — OFFICE VISIT (OUTPATIENT)
Dept: FAMILY MEDICINE | Facility: CLINIC | Age: 70
End: 2020-01-17
Payer: MEDICARE

## 2020-01-17 ENCOUNTER — OFFICE VISIT (OUTPATIENT)
Dept: CARDIOLOGY | Facility: CLINIC | Age: 70
End: 2020-01-17
Payer: MEDICARE

## 2020-01-17 VITALS
WEIGHT: 200.19 LBS | SYSTOLIC BLOOD PRESSURE: 126 MMHG | HEART RATE: 70 BPM | DIASTOLIC BLOOD PRESSURE: 54 MMHG | OXYGEN SATURATION: 97 % | HEIGHT: 63 IN | BODY MASS INDEX: 35.47 KG/M2 | TEMPERATURE: 98 F

## 2020-01-17 VITALS
HEART RATE: 70 BPM | BODY MASS INDEX: 35.49 KG/M2 | SYSTOLIC BLOOD PRESSURE: 140 MMHG | DIASTOLIC BLOOD PRESSURE: 60 MMHG | OXYGEN SATURATION: 98 % | WEIGHT: 200.31 LBS | HEIGHT: 63 IN | RESPIRATION RATE: 15 BRPM

## 2020-01-17 DIAGNOSIS — M25.531 RIGHT WRIST PAIN: ICD-10-CM

## 2020-01-17 DIAGNOSIS — E66.01 SEVERE OBESITY (BMI 35.0-39.9) WITH COMORBIDITY: ICD-10-CM

## 2020-01-17 DIAGNOSIS — F32.0 MILD MAJOR DEPRESSION: ICD-10-CM

## 2020-01-17 DIAGNOSIS — R06.00 DYSPNEA, UNSPECIFIED TYPE: Primary | ICD-10-CM

## 2020-01-17 DIAGNOSIS — Z12.11 COLON CANCER SCREENING: ICD-10-CM

## 2020-01-17 DIAGNOSIS — Z79.4 LONG-TERM INSULIN USE: ICD-10-CM

## 2020-01-17 DIAGNOSIS — G57.12 MERALGIA PARAESTHETICA, LEFT: ICD-10-CM

## 2020-01-17 PROCEDURE — 3074F PR MOST RECENT SYSTOLIC BLOOD PRESSURE < 130 MM HG: ICD-10-PCS | Mod: HCNC,CPTII,S$GLB, | Performed by: INTERNAL MEDICINE

## 2020-01-17 PROCEDURE — 1159F MED LIST DOCD IN RCRD: CPT | Mod: HCNC,S$GLB,, | Performed by: INTERNAL MEDICINE

## 2020-01-17 PROCEDURE — 1101F PT FALLS ASSESS-DOCD LE1/YR: CPT | Mod: HCNC,CPTII,S$GLB, | Performed by: INTERNAL MEDICINE

## 2020-01-17 PROCEDURE — 99214 OFFICE O/P EST MOD 30 MIN: CPT | Mod: HCNC,S$GLB,, | Performed by: INTERNAL MEDICINE

## 2020-01-17 PROCEDURE — 1126F PR PAIN SEVERITY QUANTIFIED, NO PAIN PRESENT: ICD-10-PCS | Mod: HCNC,S$GLB,, | Performed by: INTERNAL MEDICINE

## 2020-01-17 PROCEDURE — 3078F DIAST BP <80 MM HG: CPT | Mod: HCNC,CPTII,S$GLB, | Performed by: INTERNAL MEDICINE

## 2020-01-17 PROCEDURE — 1126F AMNT PAIN NOTED NONE PRSNT: CPT | Mod: HCNC,S$GLB,, | Performed by: INTERNAL MEDICINE

## 2020-01-17 PROCEDURE — 99999 PR PBB SHADOW E&M-EST. PATIENT-LVL III: ICD-10-PCS | Mod: PBBFAC,HCNC,, | Performed by: INTERNAL MEDICINE

## 2020-01-17 PROCEDURE — 99214 PR OFFICE/OUTPT VISIT, EST, LEVL IV, 30-39 MIN: ICD-10-PCS | Mod: HCNC,S$GLB,, | Performed by: INTERNAL MEDICINE

## 2020-01-17 PROCEDURE — 99999 PR PBB SHADOW E&M-EST. PATIENT-LVL III: CPT | Mod: PBBFAC,HCNC,, | Performed by: INTERNAL MEDICINE

## 2020-01-17 PROCEDURE — 99499 UNLISTED E&M SERVICE: CPT | Mod: HCNC,S$GLB,, | Performed by: INTERNAL MEDICINE

## 2020-01-17 PROCEDURE — 3046F PR MOST RECENT HEMOGLOBIN A1C LEVEL > 9.0%: ICD-10-PCS | Mod: HCNC,CPTII,S$GLB, | Performed by: INTERNAL MEDICINE

## 2020-01-17 PROCEDURE — 3077F PR MOST RECENT SYSTOLIC BLOOD PRESSURE >= 140 MM HG: ICD-10-PCS | Mod: HCNC,CPTII,S$GLB, | Performed by: INTERNAL MEDICINE

## 2020-01-17 PROCEDURE — 99499 RISK ADDL DX/OHS AUDIT: ICD-10-PCS | Mod: HCNC,S$GLB,, | Performed by: INTERNAL MEDICINE

## 2020-01-17 PROCEDURE — 3078F PR MOST RECENT DIASTOLIC BLOOD PRESSURE < 80 MM HG: ICD-10-PCS | Mod: HCNC,CPTII,S$GLB, | Performed by: INTERNAL MEDICINE

## 2020-01-17 PROCEDURE — 3077F SYST BP >= 140 MM HG: CPT | Mod: HCNC,CPTII,S$GLB, | Performed by: INTERNAL MEDICINE

## 2020-01-17 PROCEDURE — 3046F HEMOGLOBIN A1C LEVEL >9.0%: CPT | Mod: HCNC,CPTII,S$GLB, | Performed by: INTERNAL MEDICINE

## 2020-01-17 PROCEDURE — 1101F PR PT FALLS ASSESS DOC 0-1 FALLS W/OUT INJ PAST YR: ICD-10-PCS | Mod: HCNC,CPTII,S$GLB, | Performed by: INTERNAL MEDICINE

## 2020-01-17 PROCEDURE — 3074F SYST BP LT 130 MM HG: CPT | Mod: HCNC,CPTII,S$GLB, | Performed by: INTERNAL MEDICINE

## 2020-01-17 PROCEDURE — 1159F PR MEDICATION LIST DOCUMENTED IN MEDICAL RECORD: ICD-10-PCS | Mod: HCNC,S$GLB,, | Performed by: INTERNAL MEDICINE

## 2020-01-17 NOTE — PROGRESS NOTES
CARDIOVASCULAR CONSULTATION    REASON FOR CONSULT:   Cecilia Barahona is a 69 y.o. female who presents for chest pain.      HISTORY OF PRESENT ILLNESS:     Patient is a pleasant 61-year-old lady.  States that over the past month has started noticing that whenever she walks about 50 ft she gets chest pressure as well as dyspnea on exertion.  No chest pressure at rest.  Also feels tired after walking 50 ft and has to stop.  Denies orthopnea, PND any resting chest pains or pressure.    Notes from January 2020:  Patient here for follow-up.  Stress test did not show any significant ischemia.  2D echocardiogram showed normal left ventricle systolic function.  States that her chest pains have gone away after starting the Imdur or.  However she still has dyspnea on exertion.  Denies any resting chest pains or exertional chest pains anymore.    The perfusion scan is free of evidence from myocardial ischemia or injury.    There is a mild to moderate intensity defect in the anterior and anteroapical wall of the left ventricle, secondary to breast attenuation.    Sensitivity was reduced because patient unable to achieve target heart rate (reached 82% of MAPHR)    Gated perfusion images showed an ejection fraction of 73 %    There is normal wall motion at rest and post stress.    The EKG portion of this study is negative for ischemia.    The patient reported no chest pain during the stress test.    There were no arrhythmias during stress.      Normal left ventricular systolic function. The estimated ejection fraction is 65%  Mild concentric left ventricular hypertrophy.  Grade I (mild) left ventricular diastolic dysfunction consistent with impaired relaxation.  Normal right ventricular systolic function.  Moderate left atrial enlargement.  Mild right atrial enlargement.  Mild mitral regurgitation.  Mild tricuspid regurgitation.  Normal central venous pressure (3 mm Hg).  The estimated PA systolic pressure is 27 mm  Hg        PAST MEDICAL HISTORY:     Past Medical History:   Diagnosis Date    Allergic rhinitis, seasonal     Anxiety     Arthritis     CVA (cerebral vascular accident)     Depression     Glaucoma NEC     Hallucination     images out of corner of eye about a year ago    History of positive PPD - treated - remote past     Hx of psychiatric care     Hyperlipidemia LDL goal < 100     Hypertension     Nuclear sclerosis of both eyes 2019    Obesity     CHETNA (obstructive sleep apnea)     Psychiatric problem     Psychosis     Renal disorder     Sleep difficulties     Suicide attempt     about 30 years ago by overdose of pills    Therapy     Type II or unspecified type diabetes mellitus without mention of complication, uncontrolled        PAST SURGICAL HISTORY:     Past Surgical History:   Procedure Laterality Date    CARPAL TUNNEL RELEASE       SECTION, CLASSIC      TOTAL ABDOMINAL HYSTERECTOMY      TRIGGER FINGER RELEASE         ALLERGIES AND MEDICATION:     Review of patient's allergies indicates:   Allergen Reactions    Ace inhibitors Other (See Comments)     cough    Invokana [canagliflozin] Other (See Comments)     Throat and tongue swelling    Ozempic [semaglutide]      Nausea and constipation on 0.25 mg dose.         Medication List           Accurate as of 2020 10:35 AM. If you have any questions, ask your nurse or doctor.               CONTINUE taking these medications    acetaminophen-codeine 300-30mg 300-30 mg Tab  Commonly known as:  TYLENOL #3     albuterol 90 mcg/actuation inhaler  Commonly known as:  PROVENTIL/VENTOLIN HFA  Inhale 2 puffs into the lungs every 6 (six) hours as needed for Wheezing or Shortness of Breath. Rescue     alcohol swabs Padm  Apply 1 each topically 3 (three) times daily.     amLODIPine 10 MG tablet  Commonly known as:  NORVASC  TAKE ONE TABLET BY MOUTH EVERY DAY     artificial tears ointment Oint  Commonly known as:  Artificial  Tears  Place into both eyes every evening.     aspirin 81 MG Chew     atorvastatin 20 MG tablet  Commonly known as:  LIPITOR  Take 1 tablet (20 mg total) by mouth once daily.     azelastine 0.05 % ophthalmic solution  Commonly known as:  OPTIVAR  INSTILL 1 DROP IN EACH EYE TWICE DAILY     blood pressure monitor Kit  Commonly known as:  Blood Pressure Kit  1 kit by Misc.(Non-Drug; Combo Route) route once daily.     blood-glucose meter,continuous Misc  Commonly known as:  Dexcom G6   Use with dexcom G6 system     blood-glucose sensor Vanna  Commonly known as:  Dexcom G6 Sensor  Change sensor every 10 days     blood-glucose transmitter Vanna  Commonly known as:  Dexcom G6 Transmitter  Change every 3 months     chlorthalidone 25 MG Tab  Commonly known as:  HYGROTEN  TAKE ONE TABLET BY MOUTH EVERY DAY     desoximetasone 0.05 % cream  Commonly known as:  TOPICORT  Apply topically 2 (two) times daily as needed.     dulaglutide 1.5 mg/0.5 mL Pnij  Commonly known as:  Trulicity  Inject 1.5 mg into the skin every 7 days.     flash glucose scanning reader Misc  Commonly known as:  FreeStyle Arvin 10 Day Lafe  1 Units by Misc.(Non-Drug; Combo Route) route before meals as needed.     FLUoxetine 10 MG capsule  Take 1 capsule (10 mg total) by mouth once daily.     fluticasone propionate 50 mcg/actuation nasal spray  Commonly known as:  FLONASE  1 spray (50 mcg total) by Each Nare route once daily.     FreeStyle Arvin 14 Day Sensor Kit  Generic drug:  flash glucose sensor  CHANGE SENSOR EVERY 14 DAYS     gabapentin 300 MG capsule  Commonly known as:  NEURONTIN  Take 1 capsule (300 mg total) by mouth every evening.     isosorbide mononitrate 30 MG 24 hr tablet  Commonly known as:  IMDUR  Take 1 tablet (30 mg total) by mouth once daily.     lancets Misc  Check blood glucose 4 times a day. Dispense accuchek meter or other meter preferred by insurance. Dx code e11.65     lancing device with lancets Misc  Generic drug:  lancing  "device  USE AS DIRECTED FOR DIABETIC TESTING     latanoprost 0.005 % ophthalmic solution  PLACE 1 DROP IN EACH EYE EVERY EVENING     loratadine 10 mg tablet  Commonly known as:  CLARITIN  Take 1 tablet (10 mg total) by mouth daily as needed for Allergies.     meloxicam 7.5 MG tablet  Commonly known as:  MOBIC  TAKE ONE Tablet BY MOUTH once DAILY WITH FOOD for 2 weeks then AS NEEDED     metFORMIN 1000 MG tablet  Commonly known as:  GLUCOPHAGE  TAKE ONE TABLET BY MOUTH TWICE DAILY WITH MEALS     multivitamin with minerals tablet     naproxen 500 MG tablet  Commonly known as:  NAPROSYN     pen needle, diabetic 32 gauge x /" Ndle  Commonly known as:  BD Ultra-Fine Edwige Pen Needle  USE FIVE TIMES DAILY     promethazine 25 MG tablet  Commonly known as:  PHENERGAN     rOPINIRole 0.5 MG tablet  Commonly known as:  REQUIP  TAKE ONE Tablet BY MOUTH EVERY NIGHT AT BEDTIME     traMADol 50 mg tablet  Commonly known as:  ULTRAM     Tresiba FlexTouch U-200 200 unit/mL (3 mL) Inpn  Generic drug:  insulin degludec  INJECT 30 UNITS SUBCUTANEOUSLY EVERY DAY     valsartan 320 MG tablet  Commonly known as:  DIOVAN  TAKE ONE Tablet BY MOUTH EVERY EVENING            SOCIAL HISTORY:     Social History     Socioeconomic History    Marital status:      Spouse name: Yuniel    Number of children: 3    Years of education: Not on file    Highest education level: Not on file   Occupational History    Occupation: Retired   Social Needs    Financial resource strain: Somewhat hard    Food insecurity:     Worry: Never true     Inability: Sometimes true    Transportation needs:     Medical: No     Non-medical: No   Tobacco Use    Smoking status: Former Smoker     Packs/day: 0.50     Types: Cigarettes     Last attempt to quit: 11/10/2012     Years since quittin.1    Smokeless tobacco: Never Used   Substance and Sexual Activity    Alcohol use: Not Currently     Comment: drank socially in the past    Drug use: Not Currently     " Types: Marijuana     Comment: tried once 6 months ago    Sexual activity: Not Currently     Partners: Male   Lifestyle    Physical activity:     Days per week: Not on file     Minutes per session: Not on file    Stress: To some extent   Relationships    Social connections:     Talks on phone: Not on file     Gets together: Not on file     Attends Anabaptism service: Not on file     Active member of club or organization: Not on file     Attends meetings of clubs or organizations: Not on file     Relationship status: Not on file   Other Topics Concern    Patient feels they ought to cut down on drinking/drug use No    Patient annoyed by others criticizing their drinking/drug use No    Patient has felt bad or guilty about drinking/drug use No    Patient has had a drink/used drugs as an eye opener in the AM No   Social History Narrative     x 1.    Has 3 grown children.    Lives with spouse.       FAMILY HISTORY:     Family History   Problem Relation Age of Onset    Hypertension Mother     Diabetes Mother     Heart failure Mother     Cataracts Mother     Glaucoma Mother     Prostate cancer Father     Hypertension Father     Diabetes Father     Heart failure Father     No Known Problems Sister     Diabetes Maternal Uncle     Hyperlipidemia Maternal Uncle     Hypertension Maternal Uncle     Diabetes Maternal Grandmother     Diabetes Maternal Grandfather     No Known Problems Paternal Grandmother     No Known Problems Paternal Grandfather     Diabetes Maternal Aunt     Alzheimer's disease Maternal Aunt     Schizophrenia Maternal Aunt     Heart disease Cousin         s/p heart transplant    No Known Problems Paternal Aunt     No Known Problems Paternal Uncle     Drug abuse Grandchild     Amblyopia Neg Hx     Blindness Neg Hx     Cancer Neg Hx     Macular degeneration Neg Hx     Retinal detachment Neg Hx     Strabismus Neg Hx     Stroke Neg Hx     Thyroid disease Neg Hx   "      REVIEW OF SYSTEMS:   Review of Systems   Constitution: Negative.   HENT: Negative.    Eyes: Negative.    Respiratory: Negative.    Endocrine: Negative.    Hematologic/Lymphatic: Negative.    Skin: Negative.    Musculoskeletal: Negative.    Gastrointestinal: Negative.    Genitourinary: Negative.    Neurological: Negative.    Psychiatric/Behavioral: Negative.    Allergic/Immunologic: Negative.        A 10 point review of systems was performed and all the pertinent positives have been mentioned. Rest of review of systems was negative.        PHYSICAL EXAM:     Vitals:    01/17/20 1029   BP: (!) 140/60   Pulse: 70   Resp: 15    Body mass index is 35.48 kg/m².  Weight: 90.9 kg (200 lb 4.6 oz)   Height: 5' 3" (160 cm)     Physical Exam   Constitutional: She is oriented to person, place, and time. She appears well-developed and well-nourished.   HENT:   Head: Normocephalic.   Eyes: Pupils are equal, round, and reactive to light.   Neck: Normal range of motion. Neck supple.   Cardiovascular: Normal rate and regular rhythm.   Pulmonary/Chest: Effort normal and breath sounds normal.   Abdominal: Soft. Normal appearance and bowel sounds are normal. There is no tenderness.   Musculoskeletal: Normal range of motion.   Neurological: She is alert and oriented to person, place, and time.   Skin: Skin is warm.   Psychiatric: She has a normal mood and affect.         DATA:     Laboratory:  CBC:  Recent Labs   Lab 03/10/17  0920 04/15/17  0751 08/02/18  1124 09/26/19  1004   WBC 9.20  --  7.97 7.55   Hemoglobin 12.3  --  11.1 L 11.7 L   POC Hematocrit  --  37  --   --    Hematocrit 37.9  --  36.4 L 37.9   Platelets 277  --  281 300       CHEMISTRIES:  Recent Labs   Lab 08/02/18  1124 02/05/19  0837 09/26/19  1004   Glucose 91 89 137 H   Sodium 143 140 140   Potassium 3.8 3.7 3.7   BUN, Bld 19 18 15   Creatinine 0.8 0.9 0.9   eGFR if African American >60.0 >60 >60   eGFR if non African American >60.0 >60 >60   Calcium 9.4 9.7 9.5 "       CARDIAC BIOMARKERS:        COAGS:        LIPIDS/LFTS:  Recent Labs   Lab 08/02/18  1124 09/26/19  1004 12/20/19  1138   Cholesterol 155 179 193   Triglycerides 121 137 108   HDL 42 45 51   LDL Cholesterol 88.8 106.6 120.4   Non-HDL Cholesterol 113 134 142   AST 25 19 14   ALT 24 20 16       Hemoglobin A1C   Date Value Ref Range Status   12/20/2019 9.1 (H) 4.0 - 5.6 % Final     Comment:     ADA Screening Guidelines:  5.7-6.4%  Consistent with prediabetes  >or=6.5%  Consistent with diabetes  High levels of fetal hemoglobin interfere with the HbA1C  assay. Heterozygous hemoglobin variants (HbS, HgC, etc)do  not significantly interfere with this assay.   However, presence of multiple variants may affect accuracy.     09/26/2019 9.1 (H) 4.0 - 5.6 % Final     Comment:     ADA Screening Guidelines:  5.7-6.4%  Consistent with prediabetes  >or=6.5%  Consistent with diabetes  High levels of fetal hemoglobin interfere with the HbA1C  assay. Heterozygous hemoglobin variants (HbS, HgC, etc)do  not significantly interfere with this assay.   However, presence of multiple variants may affect accuracy.     06/28/2019 8.1 (H) 4.0 - 5.6 % Final     Comment:     ADA Screening Guidelines:  5.7-6.4%  Consistent with prediabetes  >or=6.5%  Consistent with diabetes  High levels of fetal hemoglobin interfere with the HbA1C  assay. Heterozygous hemoglobin variants (HbS, HgC, etc)do  not significantly interfere with this assay.   However, presence of multiple variants may affect accuracy.         TSH  Recent Labs   Lab 09/26/19  1004   TSH 2.177       The 10-year ASCVD risk score (Arelis KIM Jr., et al., 2013) is: 27.5%    Values used to calculate the score:      Age: 69 years      Sex: Female      Is Non- : Yes      Diabetic: Yes      Tobacco smoker: No      Systolic Blood Pressure: 140 mmHg      Is BP treated: Yes      HDL Cholesterol: 51 mg/dL      Total Cholesterol: 193 mg/dL           Cardiovascular  Testing:    EKG: (personally reviewed tracing)  Normal sinus rhythm, low-voltage QRS, questionable anterior lateral infarct.      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  Chest pressure has resolved after starting Imdur or.  Still has mild dyspnea on exertion.  Stress test did not show any significant ischemia.  2D echocardiogram showed normal left ventricle systolic function.  Healthy diet, regular exercise have been recommended.  If symptoms persist, consider referral pulmonology for lung function testing.    Continue Lipitor.    Follow-up in 3 months.    Thank you very much for involving me in the care of your patient.  Please do not hesitate to contact me if there are any questions.      Nicole Brown MD, FACC, [facility]  Interventional Cardiologist, [facility].           This note was dictated with the help of speech recognition software.  There might be un-intended errors and/or substitutions.

## 2020-01-20 ENCOUNTER — PATIENT OUTREACH (OUTPATIENT)
Dept: OTHER | Facility: OTHER | Age: 70
End: 2020-01-20

## 2020-01-20 NOTE — PROGRESS NOTES
Digital Medicine: Clinician Follow-Up    Patient confirms exchanging BP cuff last month  Contributes elevated BP 1/15/20 to dietary indiscretion. States she needs to monitor what she eats.    The history is provided by the patient.     Follow Up  Follow-up reason(s): reading review          INTERVENTION(S)  encouragement/support and denied questions    PLAN  patient verbalizes understanding and continue monitoring    BP control acceptable  Majority of readings at goal with occasional spike  Praised patient for being more aware of diet choices and effect on BP  9/2019 CMP reviewed      There are no preventive care reminders to display for this patient.    Last 5 Patient Entered Readings                                      Current 30 Day Average: 136/66     Recent Readings 1/15/2020 1/15/2020 1/9/2020 12/27/2019 12/27/2019    SBP (mmHg) 159 145 121 128 113    DBP (mmHg) 76 69 56 61 65    Pulse 69 63 74 69 68             Hypertension Medications             amLODIPine (NORVASC) 10 MG tablet TAKE ONE TABLET BY MOUTH EVERY DAY    chlorthalidone (HYGROTEN) 25 MG Tab TAKE ONE TABLET BY MOUTH EVERY DAY    isosorbide mononitrate (IMDUR) 30 MG 24 hr tablet Take 1 tablet (30 mg total) by mouth once daily.    valsartan (DIOVAN) 320 MG tablet TAKE ONE Tablet BY MOUTH EVERY EVENING                             Medication Adherence Screening     She does not wonder if medications are working.  She knows purpose of medications.

## 2020-01-27 ENCOUNTER — HOSPITAL ENCOUNTER (EMERGENCY)
Facility: HOSPITAL | Age: 70
Discharge: HOME | End: 2020-01-27
Attending: INTERNAL MEDICINE
Payer: MEDICARE

## 2020-01-27 VITALS
BODY MASS INDEX: 34.55 KG/M2 | HEIGHT: 63 IN | TEMPERATURE: 98 F | RESPIRATION RATE: 20 BRPM | OXYGEN SATURATION: 95 % | HEART RATE: 63 BPM | SYSTOLIC BLOOD PRESSURE: 171 MMHG | DIASTOLIC BLOOD PRESSURE: 77 MMHG | WEIGHT: 195 LBS

## 2020-01-27 DIAGNOSIS — R07.89 CHEST WALL PAIN: Primary | ICD-10-CM

## 2020-01-27 DIAGNOSIS — R07.89 CHEST DISCOMFORT: ICD-10-CM

## 2020-01-27 LAB
ALBUMIN SERPL-MCNC: 3.3 G/DL (ref 3.3–5.5)
ALP SERPL-CCNC: 105 U/L (ref 42–141)
BILIRUB SERPL-MCNC: 0.5 MG/DL (ref 0.2–1.6)
BUN SERPL-MCNC: 21 MG/DL (ref 7–22)
CALCIUM SERPL-MCNC: 10.1 MG/DL (ref 8–10.3)
CHLORIDE SERPL-SCNC: 104 MMOL/L (ref 98–108)
CREAT SERPL-MCNC: 0.7 MG/DL (ref 0.6–1.2)
GLUCOSE SERPL-MCNC: 192 MG/DL (ref 73–118)
POC ALT (SGPT): 28 U/L (ref 10–47)
POC AST (SGOT): 30 U/L (ref 11–38)
POC CARDIAC TROPONIN I: 0 NG/ML
POC TCO2: 27 MMOL/L (ref 18–33)
POCT GLUCOSE: 179 MG/DL (ref 70–110)
POTASSIUM BLD-SCNC: 3.7 MMOL/L (ref 3.6–5.1)
PROTEIN, POC: 7.5 G/DL (ref 6.4–8.1)
SAMPLE: NORMAL
SODIUM BLD-SCNC: 143 MMOL/L (ref 128–145)

## 2020-01-27 PROCEDURE — 99285 EMERGENCY DEPT VISIT HI MDM: CPT | Mod: 25,HCNC,ER

## 2020-01-27 PROCEDURE — 84484 ASSAY OF TROPONIN QUANT: CPT | Mod: HCNC,ER

## 2020-01-27 PROCEDURE — 93005 ELECTROCARDIOGRAM TRACING: CPT | Mod: HCNC,ER

## 2020-01-27 PROCEDURE — 25000003 PHARM REV CODE 250: Mod: HCNC,ER | Performed by: INTERNAL MEDICINE

## 2020-01-27 PROCEDURE — 63600175 PHARM REV CODE 636 W HCPCS: Mod: HCNC,ER | Performed by: INTERNAL MEDICINE

## 2020-01-27 PROCEDURE — 93010 ELECTROCARDIOGRAM REPORT: CPT | Mod: HCNC,,, | Performed by: INTERNAL MEDICINE

## 2020-01-27 PROCEDURE — 85025 COMPLETE CBC W/AUTO DIFF WBC: CPT | Mod: HCNC,ER

## 2020-01-27 PROCEDURE — 93010 EKG 12-LEAD: ICD-10-PCS | Mod: HCNC,,, | Performed by: INTERNAL MEDICINE

## 2020-01-27 PROCEDURE — 96374 THER/PROPH/DIAG INJ IV PUSH: CPT | Mod: HCNC,ER

## 2020-01-27 PROCEDURE — 80053 COMPREHEN METABOLIC PANEL: CPT | Mod: HCNC,ER

## 2020-01-27 RX ORDER — KETOROLAC TROMETHAMINE 30 MG/ML
15 INJECTION, SOLUTION INTRAMUSCULAR; INTRAVENOUS
Status: COMPLETED | OUTPATIENT
Start: 2020-01-27 | End: 2020-01-27

## 2020-01-27 RX ORDER — NITROGLYCERIN 0.4 MG/1
0.4 TABLET SUBLINGUAL EVERY 5 MIN PRN
Status: DISCONTINUED | OUTPATIENT
Start: 2020-01-27 | End: 2020-01-27 | Stop reason: HOSPADM

## 2020-01-27 RX ORDER — ASPIRIN 325 MG
325 TABLET ORAL
Status: COMPLETED | OUTPATIENT
Start: 2020-01-27 | End: 2020-01-27

## 2020-01-27 RX ORDER — ONDANSETRON 4 MG/1
8 TABLET, ORALLY DISINTEGRATING ORAL
Status: COMPLETED | OUTPATIENT
Start: 2020-01-27 | End: 2020-01-27

## 2020-01-27 RX ADMIN — ONDANSETRON 8 MG: 4 TABLET, ORALLY DISINTEGRATING ORAL at 04:01

## 2020-01-27 RX ADMIN — KETOROLAC TROMETHAMINE 15 MG: 30 INJECTION, SOLUTION INTRAMUSCULAR; INTRAVENOUS at 05:01

## 2020-01-27 RX ADMIN — ASPIRIN 325 MG ORAL TABLET 325 MG: 325 PILL ORAL at 04:01

## 2020-01-27 NOTE — ED PROVIDER NOTES
Encounter Date: 2020       History     Chief Complaint   Patient presents with    Chest Pain     PT C/O RIGHT SIDED CP SINCE , REPORTS WORSE WITH MOVEMENT AND SLIGHT NAUSEA PER PT     69-year-old female with a past medical history significant for hypertension and diabetes presents to the emergency department complaining right-sided chest pain since 8:00 p.m. (approximately 8 hr ago).  She states she has had intermittent nausea without vomiting and denies fever/chills.    The history is provided by the patient. No  was used.     Review of patient's allergies indicates:   Allergen Reactions    Ace inhibitors Other (See Comments)     cough    Invokana [canagliflozin] Other (See Comments)     Throat and tongue swelling    Ozempic [semaglutide]      Nausea and constipation on 0.25 mg dose.      Past Medical History:   Diagnosis Date    Allergic rhinitis, seasonal     Anxiety     Arthritis     CVA (cerebral vascular accident)     Depression     Glaucoma NEC     Hallucination     images out of corner of eye about a year ago    History of positive PPD - treated - remote past     Hx of psychiatric care     Hyperlipidemia LDL goal < 100     Hypertension     Nuclear sclerosis of both eyes 2019    Obesity     CHETNA (obstructive sleep apnea)     Psychiatric problem     Psychosis     Renal disorder     Sleep difficulties     Suicide attempt     about 30 years ago by overdose of pills    Therapy     Type II or unspecified type diabetes mellitus without mention of complication, uncontrolled      Past Surgical History:   Procedure Laterality Date    CARPAL TUNNEL RELEASE       SECTION, CLASSIC      TOTAL ABDOMINAL HYSTERECTOMY      TRIGGER FINGER RELEASE       Family History   Problem Relation Age of Onset    Hypertension Mother     Diabetes Mother     Heart failure Mother     Cataracts Mother     Glaucoma Mother     Prostate cancer Father     Hypertension  Father     Diabetes Father     Heart failure Father     No Known Problems Sister     Diabetes Maternal Uncle     Hyperlipidemia Maternal Uncle     Hypertension Maternal Uncle     Diabetes Maternal Grandmother     Diabetes Maternal Grandfather     No Known Problems Paternal Grandmother     No Known Problems Paternal Grandfather     Diabetes Maternal Aunt     Alzheimer's disease Maternal Aunt     Schizophrenia Maternal Aunt     Heart disease Cousin         s/p heart transplant    No Known Problems Paternal Aunt     No Known Problems Paternal Uncle     Drug abuse Grandchild     Amblyopia Neg Hx     Blindness Neg Hx     Cancer Neg Hx     Macular degeneration Neg Hx     Retinal detachment Neg Hx     Strabismus Neg Hx     Stroke Neg Hx     Thyroid disease Neg Hx      Social History     Tobacco Use    Smoking status: Former Smoker     Packs/day: 0.50     Types: Cigarettes     Last attempt to quit: 11/10/2012     Years since quittin.2    Smokeless tobacco: Never Used   Substance Use Topics    Alcohol use: Not Currently     Comment: drank socially in the past    Drug use: Not Currently     Types: Marijuana     Comment: tried once 6 months ago     Review of Systems   Cardiovascular: Positive for chest pain.   Gastrointestinal: Positive for nausea. Negative for abdominal distention and abdominal pain.   All other systems reviewed and are negative.      Physical Exam     Initial Vitals [20 0427]   BP Pulse Resp Temp SpO2   (!) 192/83 71 20 98.3 °F (36.8 °C) 97 %      MAP       --         Physical Exam    Nursing note and vitals reviewed.  Constitutional: She appears well-developed and well-nourished. No distress.   HENT:   Head: Normocephalic and atraumatic.   Right Ear: External ear normal.   Left Ear: External ear normal.   Mouth/Throat: Oropharynx is clear and moist.   Eyes: Conjunctivae are normal. Pupils are equal, round, and reactive to light.   Neck: Normal range of motion. Neck  supple.   Cardiovascular: Normal rate and regular rhythm.   Pulmonary/Chest: Breath sounds normal. No respiratory distress.   Abdominal: Soft. Bowel sounds are normal. She exhibits no distension. There is no tenderness.   Musculoskeletal: Normal range of motion.   Neurological: She is alert. She has normal strength.   Skin: Skin is warm and dry.   Psychiatric: She has a normal mood and affect. Thought content normal.         ED Course   Procedures  Labs Reviewed   POCT CBC   POCT CMP   POCT TROPONIN     EKG Readings: (Independently Interpreted)   Initial Reading: No STEMI. Rhythm: Normal Sinus Rhythm. Heart Rate: 70. Ectopy: No Ectopy. ST Segments: Normal ST Segments.       Imaging Results    None          Medical Decision Making:   Initial Assessment:   69-year-old female with a past medical history significant for hypertension and diabetes presents to the emergency department complaining right-sided chest pain since 8:00 p.m. (approximately 8 hr ago).  She states she has had intermittent nausea without vomiting and denies fever/chills.    ED Management:  EKG, troponin, CBC, CMP, chest x-ray revealed no acute disease.  Toradol was given emergency depart for musculoskeletal chest pain and patient was given instructions musculoskeletal chest wall pain. She was advised follow up with her primary care physician within the next 3 days for re-evaluation/return to the emergency department if condition worsens.    Additional MDM:   Differential Diagnosis:   Symptom: Chest pain. <> The follow diagnoses were considered and will be evaluated: Acute MI, Chest Wall Pain, Chest Wall Trauma, Costochondritis, GERD, Pericarditis, Pleural Effusion, Pleurisy and Pneumonia.                                   Clinical Impression:       ICD-10-CM ICD-9-CM   1. Chest wall pain R07.89 786.52   2. Chest discomfort R07.89 786.59         Disposition:   Disposition: Discharged  Condition: Stable                     Oracio Vázquez  MD  01/27/20 0528

## 2020-01-29 ENCOUNTER — TELEPHONE (OUTPATIENT)
Dept: FAMILY MEDICINE | Facility: CLINIC | Age: 70
End: 2020-01-29

## 2020-02-03 ENCOUNTER — TELEPHONE (OUTPATIENT)
Dept: FAMILY MEDICINE | Facility: CLINIC | Age: 70
End: 2020-02-03

## 2020-02-03 NOTE — TELEPHONE ENCOUNTER
[facility] is requesting a new prescription for patient due to multiple Valsartan strengths on backorder please advise.

## 2020-02-03 NOTE — TELEPHONE ENCOUNTER
Spoke with pharmacist Jasmyn wiggins [facility] on [address] Piaemelia has it available she will go and pick it up later. If she has any problems she will call our office.

## 2020-02-04 ENCOUNTER — PATIENT OUTREACH (OUTPATIENT)
Dept: ADMINISTRATIVE | Facility: OTHER | Age: 70
End: 2020-02-04

## 2020-02-04 RX ORDER — PRAVASTATIN SODIUM 40 MG/1
TABLET ORAL
COMMUNITY
Start: 2020-01-30 | End: 2020-02-06 | Stop reason: ALTCHOICE

## 2020-02-06 ENCOUNTER — OFFICE VISIT (OUTPATIENT)
Dept: ENDOCRINOLOGY | Facility: CLINIC | Age: 70
End: 2020-02-06
Payer: MEDICARE

## 2020-02-06 VITALS
DIASTOLIC BLOOD PRESSURE: 64 MMHG | BODY MASS INDEX: 35.44 KG/M2 | HEART RATE: 66 BPM | SYSTOLIC BLOOD PRESSURE: 117 MMHG | WEIGHT: 196.88 LBS

## 2020-02-06 DIAGNOSIS — E78.5 HYPERLIPIDEMIA, UNSPECIFIED HYPERLIPIDEMIA TYPE: ICD-10-CM

## 2020-02-06 DIAGNOSIS — I10 ESSENTIAL HYPERTENSION: ICD-10-CM

## 2020-02-06 DIAGNOSIS — F32.A DEPRESSION, UNSPECIFIED DEPRESSION TYPE: ICD-10-CM

## 2020-02-06 DIAGNOSIS — Z12.11 SPECIAL SCREENING FOR MALIGNANT NEOPLASMS, COLON: Primary | ICD-10-CM

## 2020-02-06 DIAGNOSIS — Z71.9 VISIT FOR COUNSELING: ICD-10-CM

## 2020-02-06 PROCEDURE — 1101F PR PT FALLS ASSESS DOC 0-1 FALLS W/OUT INJ PAST YR: ICD-10-PCS | Mod: HCNC,CPTII,S$GLB, | Performed by: NURSE PRACTITIONER

## 2020-02-06 PROCEDURE — 1159F PR MEDICATION LIST DOCUMENTED IN MEDICAL RECORD: ICD-10-PCS | Mod: HCNC,S$GLB,, | Performed by: NURSE PRACTITIONER

## 2020-02-06 PROCEDURE — 3046F PR MOST RECENT HEMOGLOBIN A1C LEVEL > 9.0%: ICD-10-PCS | Mod: HCNC,CPTII,S$GLB, | Performed by: NURSE PRACTITIONER

## 2020-02-06 PROCEDURE — 99215 OFFICE O/P EST HI 40 MIN: CPT | Mod: HCNC,S$GLB,, | Performed by: NURSE PRACTITIONER

## 2020-02-06 PROCEDURE — 1126F PR PAIN SEVERITY QUANTIFIED, NO PAIN PRESENT: ICD-10-PCS | Mod: HCNC,S$GLB,, | Performed by: NURSE PRACTITIONER

## 2020-02-06 PROCEDURE — 1126F AMNT PAIN NOTED NONE PRSNT: CPT | Mod: HCNC,S$GLB,, | Performed by: NURSE PRACTITIONER

## 2020-02-06 PROCEDURE — 99999 PR PBB SHADOW E&M-EST. PATIENT-LVL V: CPT | Mod: PBBFAC,HCNC,, | Performed by: NURSE PRACTITIONER

## 2020-02-06 PROCEDURE — 3078F PR MOST RECENT DIASTOLIC BLOOD PRESSURE < 80 MM HG: ICD-10-PCS | Mod: HCNC,CPTII,S$GLB, | Performed by: NURSE PRACTITIONER

## 2020-02-06 PROCEDURE — 99999 PR PBB SHADOW E&M-EST. PATIENT-LVL V: ICD-10-PCS | Mod: PBBFAC,HCNC,, | Performed by: NURSE PRACTITIONER

## 2020-02-06 PROCEDURE — 1159F MED LIST DOCD IN RCRD: CPT | Mod: HCNC,S$GLB,, | Performed by: NURSE PRACTITIONER

## 2020-02-06 PROCEDURE — 3074F PR MOST RECENT SYSTOLIC BLOOD PRESSURE < 130 MM HG: ICD-10-PCS | Mod: HCNC,CPTII,S$GLB, | Performed by: NURSE PRACTITIONER

## 2020-02-06 PROCEDURE — 3046F HEMOGLOBIN A1C LEVEL >9.0%: CPT | Mod: HCNC,CPTII,S$GLB, | Performed by: NURSE PRACTITIONER

## 2020-02-06 PROCEDURE — 3074F SYST BP LT 130 MM HG: CPT | Mod: HCNC,CPTII,S$GLB, | Performed by: NURSE PRACTITIONER

## 2020-02-06 PROCEDURE — 3078F DIAST BP <80 MM HG: CPT | Mod: HCNC,CPTII,S$GLB, | Performed by: NURSE PRACTITIONER

## 2020-02-06 PROCEDURE — 99215 PR OFFICE/OUTPT VISIT, EST, LEVL V, 40-54 MIN: ICD-10-PCS | Mod: HCNC,S$GLB,, | Performed by: NURSE PRACTITIONER

## 2020-02-06 PROCEDURE — 1101F PT FALLS ASSESS-DOCD LE1/YR: CPT | Mod: HCNC,CPTII,S$GLB, | Performed by: NURSE PRACTITIONER

## 2020-02-06 RX ORDER — SODIUM, POTASSIUM,MAG SULFATES 17.5-3.13G
SOLUTION, RECONSTITUTED, ORAL ORAL
Qty: 1 BOTTLE | Refills: 0 | Status: SHIPPED | OUTPATIENT
Start: 2020-02-06 | End: 2020-05-18 | Stop reason: CLARIF

## 2020-02-06 RX ORDER — INSULIN DEGLUDEC 200 U/ML
30 INJECTION, SOLUTION SUBCUTANEOUS DAILY
Qty: 6 SYRINGE | Refills: 1 | Status: SHIPPED | OUTPATIENT
Start: 2020-02-06 | End: 2020-02-11

## 2020-02-06 RX ORDER — ATORVASTATIN CALCIUM 40 MG/1
40 TABLET, FILM COATED ORAL NIGHTLY
Qty: 90 TABLET | Refills: 0 | Status: ON HOLD | OUTPATIENT
Start: 2020-02-06 | End: 2020-06-01 | Stop reason: HOSPADM

## 2020-02-06 NOTE — PROGRESS NOTES
"CC: This 69 y.o. Black or  female  is here for evaluation of  T2DM along with comorbidities indicated in the Visit Diagnosis section of this encounter.    HPI: Cecilia Barahona was diagnosed with T2DM in her 30s. Oral agents started at the time of diagnosis.       Prior visit 9/30/19  a1c up from 8.1 to 9.1% - the highest it's been in 3 years.   She has not been testing BGs often. Admits diet has been poor - eating watermelon. Gets frustrated and has been eating more. "just not caring." Undergoing financial trouble that she has not been faced with before.   She is taking the lower dose of Trulicity and not having any nausea. Interested in trying higher dose again to improve BGs; believes nausea could be better if she takes it in the morning.   [place] Increase Trulicity to 1.5 mg weekly   Advised her to improve diet.   Test blood glucose 2x/day.   Return to clinic 3 mo with labs prior.       Interval history  Latest A1c was 9.1% a month ago.   She admits diet is poor and eats too much of the wrong things like astrid cake. Especially at night. Denies binging.   She did increase Trulicity from 0.75 mg to 1.5 mg weekly and denies nausea.       PMHX: CHETNA - does not like her old mask. H/o stroke/right facial dropp     LAST DIABETES EDUCATION: never    RECENT ILLNESSES/HOSPITALIZATIONS - -No.     HOSPITALIZED FOR DIABETES  -  No.    PRESCRIBED DIABETES MEDICATIONS:  Tresiba 30 units hs ~ 10 pm, metformin 1000 mg bid, Trulicity 1.5 mg once weekly       Misses medication doses     DM COMPLICATIONS: peripheral neuropathy    SIGNIFICANT DIABETES MED HISTORY:   Glimepiride stopped at initial ov 7/2017 since she was already on Novolog  ozempic started 10/2018,  switched to trulicity ~ 12/2018 d/t nausea     SELF MONITORING BLOOD GLUCOSE: Checks blood glucose at home  - 0-2x/day. Using Arvin and BGs range from . Mostly mid 100s to low 200s.     HYPOGLYCEMIC EPISODES: lowest BG was 77 yesterday afternoon, no " symptoms; attributes this to taking a laxative the day before.      CURRENT DIET: drinks water or crystal lite - no more sweet tea.   2 regular sodas/week. Snacking more and maybe not getting standard meals in. Likes popcorn with butter and salt.   Eats at her daughter's sometimes.   Breakfast yesterday was banana sandwich with sugar.     CURRENT EXERCISE: none       /64 (BP Location: Right arm, Patient Position: Sitting, BP Method: X-Large (Automatic))   Pulse 66   Wt 89.3 kg (196 lb 14.4 oz)   BMI 35.44 kg/m²         ROS:   CONSTITUTIONAL: Appetite good, denies fatigue  GI: nausea - none   PSYCH: + depression       PHYSICAL EXAM:  GENERAL: Well developed, well nourished. No acute distress.   PSYCH: AAOx3, appropriate mood and affect, conversant, well-groomed. Judgement and insight good. Tearful at times re: depression   NEURO: Cranial nerves grossly intact. Speech clear, no tremor.   CHEST: Respirations even and unlabored.   MS: Gait steady. No clubbing.         Hemoglobin A1C   Date Value Ref Range Status   12/20/2019 9.1 (H) 4.0 - 5.6 % Final     Comment:     ADA Screening Guidelines:  5.7-6.4%  Consistent with prediabetes  >or=6.5%  Consistent with diabetes  High levels of fetal hemoglobin interfere with the HbA1C  assay. Heterozygous hemoglobin variants (HbS, HgC, etc)do  not significantly interfere with this assay.   However, presence of multiple variants may affect accuracy.     09/26/2019 9.1 (H) 4.0 - 5.6 % Final     Comment:     ADA Screening Guidelines:  5.7-6.4%  Consistent with prediabetes  >or=6.5%  Consistent with diabetes  High levels of fetal hemoglobin interfere with the HbA1C  assay. Heterozygous hemoglobin variants (HbS, HgC, etc)do  not significantly interfere with this assay.   However, presence of multiple variants may affect accuracy.     06/28/2019 8.1 (H) 4.0 - 5.6 % Final     Comment:     ADA Screening Guidelines:  5.7-6.4%  Consistent with prediabetes  >or=6.5%  Consistent with  diabetes  High levels of fetal hemoglobin interfere with the HbA1C  assay. Heterozygous hemoglobin variants (HbS, HgC, etc)do  not significantly interfere with this assay.   However, presence of multiple variants may affect accuracy.             Chemistry        Component Value Date/Time     09/26/2019 1004    K 3.7 09/26/2019 1004     09/26/2019 1004    CO2 29 09/26/2019 1004    BUN 15 09/26/2019 1004    CREATININE 0.9 09/26/2019 1004     (H) 09/26/2019 1004        Component Value Date/Time    CALCIUM 9.5 09/26/2019 1004    ALKPHOS 120 12/20/2019 1138    AST 14 12/20/2019 1138    ALT 16 12/20/2019 1138    BILITOT 0.6 12/20/2019 1138    ESTGFRAFRICA >60 09/26/2019 1004    EGFRNONAA >60 09/26/2019 1004          Lab Results   Component Value Date    LDLCALC 120.4 12/20/2019           Lab Results   Component Value Date    MICALBCREAT 24.2 09/26/2019           ASSESSMENT and PLAN:    A1C GOAL: < 7 %       1. Type 2 diabetes, uncontrolled, with neuropathy  Start jardiance 10 mg once daily.   Increase Tresiba to 36 units once daily.   Continue metformin 1000 mg twice daily.   Test glucose 2x/day. Try to limit high carb foods.   Recommend seeking therapy with a counselor to help with depression. Patient has been using food as a coping mechanism.     Return to clinic in 3 months with labs prior.       Hemoglobin A1c   2. Hyperlipidemia, unspecified hyperlipidemia type  Change to atorvastatin (LIPITOR) 40 MG tablet    Hepatic function panel    Lipid panel   3. Essential hypertension  controlled   4. Depression, unspecified depression type  As above.   Affecting diabetes control        Spent 40 minutes with patient with >50% time spent in counseling, as noted in # 1-4       Orders Placed This Encounter   Procedures    Hepatic function panel     Standing Status:   Future     Standing Expiration Date:   4/6/2021    Lipid panel     Standing Status:   Future     Standing Expiration Date:   2/5/2021     Hemoglobin A1c     Standing Status:   Future     Standing Expiration Date:   4/6/2021        Follow up in about 3 months (around 5/6/2020).

## 2020-02-06 NOTE — PATIENT INSTRUCTIONS
Start jardiance 10 mg once daily.   Increase Tresiba to 36 units once daily.   Continue metformin 1000 mg twice daily.   Test glucose 2x/day. Try to limit high carb foods.   Recommend seeing a counselor to help with depression. Patient has been using food as a coping mechanism.     Return to clinic in 3 months with labs prior.

## 2020-02-11 ENCOUNTER — TELEPHONE (OUTPATIENT)
Dept: GASTROENTEROLOGY | Facility: CLINIC | Age: 70
End: 2020-02-11

## 2020-02-11 RX ORDER — INSULIN DEGLUDEC 200 U/ML
36 INJECTION, SOLUTION SUBCUTANEOUS DAILY
Qty: 6 SYRINGE | Refills: 1 | Status: ON HOLD | OUTPATIENT
Start: 2020-02-11 | End: 2020-06-01 | Stop reason: SDUPTHER

## 2020-02-11 NOTE — TELEPHONE ENCOUNTER
----- Message from Jane Ma sent at 2/11/2020 12:53 PM CST -----  Contact: Self  Pt is calling to speak with Staff regarding the time she needs to report for her procedure tomorrow.    She can be reached at [phone].    Thank you.

## 2020-02-21 ENCOUNTER — TELEPHONE (OUTPATIENT)
Dept: PSYCHIATRY | Facility: CLINIC | Age: 70
End: 2020-02-21

## 2020-02-21 NOTE — TELEPHONE ENCOUNTER
Returned patient's call left on our voicemail to schedule appointment with Elmer James NP. Patient was to follow up in July 2020 but requested sooner appointment. Patient notified that provider is no longer her and advised that she may want to call and schedule appointment at [facility] or with someone else of her choosing prior to needing Rx refills in July.

## 2020-03-04 ENCOUNTER — ANESTHESIA EVENT (OUTPATIENT)
Dept: ENDOSCOPY | Facility: HOSPITAL | Age: 70
End: 2020-03-04
Payer: MEDICARE

## 2020-03-04 NOTE — ANESTHESIA PREPROCEDURE EVALUATION
2020  Cecilia Barahona is a 69 y.o., female.  Past Medical History:   Diagnosis Date    Allergic rhinitis, seasonal     Anxiety     Arthritis     CVA (cerebral vascular accident)     Depression     Glaucoma NEC     Hallucination     images out of corner of eye about a year ago    History of positive PPD - treated - remote past     Hx of psychiatric care     Hyperlipidemia LDL goal < 100     Hypertension     Nuclear sclerosis of both eyes 2019    Obesity     CHETNA (obstructive sleep apnea)     Psychiatric problem     Psychosis     Renal disorder     Sleep difficulties     Suicide attempt     about 30 years ago by overdose of pills    Therapy     Type II or unspecified type diabetes mellitus without mention of complication, uncontrolled      Past Surgical History:   Procedure Laterality Date    CARPAL TUNNEL RELEASE       SECTION, CLASSIC      TOTAL ABDOMINAL HYSTERECTOMY      TRIGGER FINGER RELEASE         Anesthesia Evaluation    I have reviewed the Patient Summary Reports.    I have reviewed the Nursing Notes.   I have reviewed the Medications.     Review of Systems  Hematology/Oncology:  Hematology Normal   Oncology Normal     EENT/Dental:EENT/Dental Normal   Cardiovascular:  Cardiovascular Normal     Pulmonary:  Pulmonary Normal    Renal/:  Renal/ Normal     Hepatic/GI:  Hepatic/GI Normal    Musculoskeletal:  Musculoskeletal Normal    Neurological:  Neurology Normal    Endocrine:  Endocrine Normal    Dermatological:  Skin Normal    Psych:  Psychiatric Normal           Physical Exam  General:  Well nourished    Airway/Jaw/Neck:  Airway Findings: Mouth Opening: Normal Tongue: Normal  General Airway Assessment: Adult  Mallampati: II  Improves to II with phonation.  TM Distance: Normal, at least 6 cm     Eyes/Ears/Nose:  EYES/EARS/NOSE FINDINGS: Normal    Dental:  Dental Findings: In tact   Chest/Lungs:  Chest/Lungs Clear    Heart/Vascular:  Heart Findings: Normal Heart murmur: negative Vascular Findings: Normal    Abdomen:  Abdomen Findings: Normal    Musculoskeletal:  Musculoskeletal Findings: Normal   Skin:  Skin Findings: Normal    Mental Status:  Mental Status Findings: Normal        Anesthesia Plan  Type of Anesthesia, risks & benefits discussed:  Anesthesia Type:  general  Patient's Preference: General  Intra-op Monitoring Plan: standard ASA monitors  Intra-op Monitoring Plan Comments:   Post Op Pain Control Plan: multimodal analgesia  Post Op Pain Control Plan Comments:   Induction:   IV  Beta Blocker:  Patient is not currently on a Beta-Blocker (No further documentation required).       Informed Consent: Patient understands risks and agrees with Anesthesia plan.  Questions answered. Anesthesia consent signed with patient.  ASA Score: 3     Day of Surgery Review of History & Physical:    H&P update referred to the surgeon.         Ready For Surgery From Anesthesia Perspective.

## 2020-03-05 ENCOUNTER — HOSPITAL ENCOUNTER (OUTPATIENT)
Facility: HOSPITAL | Age: 70
Discharge: HOME | End: 2020-03-05
Attending: INTERNAL MEDICINE | Admitting: INTERNAL MEDICINE
Payer: MEDICARE

## 2020-03-05 ENCOUNTER — ANESTHESIA (OUTPATIENT)
Dept: ENDOSCOPY | Facility: HOSPITAL | Age: 70
End: 2020-03-05
Payer: MEDICARE

## 2020-03-05 VITALS
WEIGHT: 198 LBS | OXYGEN SATURATION: 99 % | BODY MASS INDEX: 35.08 KG/M2 | TEMPERATURE: 98 F | RESPIRATION RATE: 15 BRPM | HEIGHT: 63 IN | HEART RATE: 75 BPM | DIASTOLIC BLOOD PRESSURE: 97 MMHG | SYSTOLIC BLOOD PRESSURE: 150 MMHG

## 2020-03-05 DIAGNOSIS — Z12.11 COLON CANCER SCREENING: Primary | ICD-10-CM

## 2020-03-05 DIAGNOSIS — Z12.11 SCREENING FOR COLON CANCER: ICD-10-CM

## 2020-03-05 LAB — POCT GLUCOSE: 86 MG/DL (ref 70–110)

## 2020-03-05 PROCEDURE — 88305 TISSUE EXAM BY PATHOLOGIST: ICD-10-PCS | Mod: 26,HCNC,, | Performed by: PATHOLOGY

## 2020-03-05 PROCEDURE — 88305 TISSUE EXAM BY PATHOLOGIST: CPT | Mod: 26,HCNC,, | Performed by: PATHOLOGY

## 2020-03-05 PROCEDURE — 45380 COLONOSCOPY AND BIOPSY: CPT | Mod: HCNC | Performed by: INTERNAL MEDICINE

## 2020-03-05 PROCEDURE — 27201012 HC FORCEPS, HOT/COLD, DISP: Mod: HCNC | Performed by: INTERNAL MEDICINE

## 2020-03-05 PROCEDURE — 88305 TISSUE EXAM BY PATHOLOGIST: CPT | Mod: HCNC | Performed by: PATHOLOGY

## 2020-03-05 PROCEDURE — 63600175 PHARM REV CODE 636 W HCPCS: Mod: HCNC | Performed by: NURSE ANESTHETIST, CERTIFIED REGISTERED

## 2020-03-05 PROCEDURE — 37000009 HC ANESTHESIA EA ADD 15 MINS: Mod: HCNC | Performed by: INTERNAL MEDICINE

## 2020-03-05 PROCEDURE — 45380 COLONOSCOPY AND BIOPSY: CPT | Mod: PT,HCNC,, | Performed by: INTERNAL MEDICINE

## 2020-03-05 PROCEDURE — 37000008 HC ANESTHESIA 1ST 15 MINUTES: Mod: HCNC | Performed by: INTERNAL MEDICINE

## 2020-03-05 PROCEDURE — 63600175 PHARM REV CODE 636 W HCPCS: Mod: HCNC | Performed by: INTERNAL MEDICINE

## 2020-03-05 PROCEDURE — E9220 PRA ENDO ANESTHESIA: HCPCS | Mod: PT,HCNC,, | Performed by: NURSE ANESTHETIST, CERTIFIED REGISTERED

## 2020-03-05 PROCEDURE — 45380 PR COLONOSCOPY,BIOPSY: ICD-10-PCS | Mod: PT,HCNC,, | Performed by: INTERNAL MEDICINE

## 2020-03-05 PROCEDURE — E9220 PRA ENDO ANESTHESIA: ICD-10-PCS | Mod: PT,HCNC,, | Performed by: NURSE ANESTHETIST, CERTIFIED REGISTERED

## 2020-03-05 RX ORDER — PROPOFOL 10 MG/ML
VIAL (ML) INTRAVENOUS CONTINUOUS PRN
Status: DISCONTINUED | OUTPATIENT
Start: 2020-03-05 | End: 2020-03-05

## 2020-03-05 RX ORDER — PROPOFOL 10 MG/ML
VIAL (ML) INTRAVENOUS
Status: DISCONTINUED | OUTPATIENT
Start: 2020-03-05 | End: 2020-03-05

## 2020-03-05 RX ORDER — LIDOCAINE HYDROCHLORIDE 20 MG/ML
INJECTION INTRAVENOUS
Status: DISCONTINUED | OUTPATIENT
Start: 2020-03-05 | End: 2020-03-05

## 2020-03-05 RX ORDER — SODIUM CHLORIDE 9 MG/ML
INJECTION, SOLUTION INTRAVENOUS CONTINUOUS
Status: DISCONTINUED | OUTPATIENT
Start: 2020-03-05 | End: 2020-03-05 | Stop reason: HOSPADM

## 2020-03-05 RX ADMIN — LIDOCAINE HYDROCHLORIDE 50 MG: 20 INJECTION, SOLUTION INTRAVENOUS at 01:03

## 2020-03-05 RX ADMIN — PROPOFOL 150 MCG/KG/MIN: 10 INJECTION, EMULSION INTRAVENOUS at 01:03

## 2020-03-05 RX ADMIN — PROPOFOL 70 MG: 10 INJECTION, EMULSION INTRAVENOUS at 01:03

## 2020-03-05 RX ADMIN — SODIUM CHLORIDE: 0.9 INJECTION, SOLUTION INTRAVENOUS at 01:03

## 2020-03-05 NOTE — H&P
Short Stay Endoscopy History and Physical    PCP - Yudi Smart MD    Procedure - Colonoscopy  ASA - per anesthesia  Mallampati - per anesthesia  History of Anesthesia problems - no  Family history Anesthesia problems - no   Plan of anesthesia - General    HPI:  69 year old male with a history of HTN, HLD, DM Type 2, and CHETNA who presents for a colonoscopy.    Last colonoscopy was in  with a fair prep and no colon found.   No family history of advance colon polyps or colon cancer.    ROS:  Constitutional: No fevers, chills, No weight loss  CV: No chest pain  Pulm: No cough, No shortness of breath  GI: see HPI  Derm: No rash    Medical History:  has a past medical history of Allergic rhinitis, seasonal, Anxiety, Arthritis, CVA (cerebral vascular accident), Depression, Glaucoma NEC, Hallucination, History of positive PPD - treated - remote past, psychiatric care, Hyperlipidemia LDL goal < 100, Hypertension, Nuclear sclerosis of both eyes (2019), Obesity, CHETNA (obstructive sleep apnea), Psychiatric problem, Psychosis, Renal disorder, Sleep difficulties, Suicide attempt, Therapy, and Type II or unspecified type diabetes mellitus without mention of complication, uncontrolled.    Surgical History:  has a past surgical history that includes  section, classic; Trigger finger release; Total abdominal hysterectomy; and Carpal tunnel release.    Family History: family history includes Alzheimer's disease in her maternal aunt; Cataracts in her mother; Diabetes in her father, maternal aunt, maternal grandfather, maternal grandmother, maternal uncle, and mother; Drug abuse in her grandchild; Glaucoma in her mother; Heart disease in her cousin; Heart failure in her father and mother; Hyperlipidemia in her maternal uncle; Hypertension in her father, maternal uncle, and mother; No Known Problems in her paternal aunt, paternal grandfather, paternal grandmother, paternal uncle, and sister; Prostate cancer in her  father; Schizophrenia in her maternal aunt.. Otherwise no colon cancer, inflammatory bowel disease, or GI malignancies.    Social History:  reports that she quit smoking about 7 years ago. Her smoking use included cigarettes. She smoked 0.50 packs per day. She has never used smokeless tobacco. She reports that she drank alcohol. She reports that she has current or past drug history. Drug: Marijuana.    Review of patient's allergies indicates:   Allergen Reactions    Ace inhibitors Other (See Comments)     cough    Invokana [canagliflozin] Other (See Comments)     Throat and tongue swelling    Ozempic [semaglutide]      Nausea and constipation on 0.25 mg dose.        Medications:   Medications Prior to Admission   Medication Sig Dispense Refill Last Dose    albuterol 90 mcg/actuation inhaler Inhale 2 puffs into the lungs every 6 (six) hours as needed for Wheezing or Shortness of Breath. Rescue 1 Inhaler 0 Past Month at Unknown time    amLODIPine (NORVASC) 10 MG tablet TAKE ONE TABLET BY MOUTH EVERY DAY 30 tablet 5 Past Week at Unknown time    artificial tear ointment (ARTIFICIAL TEARS) Oint Place into both eyes every evening. 305 g 1 Past Week at Unknown time    aspirin 81 MG Chew Take by mouth once daily. 1 tablet, chewable Oral Every day   Past Week at Unknown time    atorvastatin (LIPITOR) 40 MG tablet Take 1 tablet (40 mg total) by mouth every evening. For cholesterol. 90 tablet 0 Past Week at Unknown time    azelastine (OPTIVAR) 0.05 % ophthalmic solution INSTILL 1 DROP IN EACH EYE TWICE DAILY 12 mL 0 Past Week at Unknown time    chlorthalidone (HYGROTEN) 25 MG Tab TAKE ONE TABLET BY MOUTH EVERY DAY 90 tablet 2 Past Week at Unknown time    empagliflozin (JARDIANCE) 10 mg Tab Take 10 mg by mouth once daily. 30 tablet 3 Past Week at Unknown time    FLUoxetine 10 MG capsule Take 1 capsule (10 mg total) by mouth once daily. 90 capsule 1 Past Week at Unknown time    fluticasone (FLONASE) 50 mcg/actuation  nasal spray 1 spray (50 mcg total) by Each Nare route once daily. 16 g 5 Past Month at Unknown time    gabapentin (NEURONTIN) 300 MG capsule Take 1 capsule (300 mg total) by mouth every evening. 90 capsule 1 Past Week at Unknown time    insulin degludec (TRESIBA FLEXTOUCH U-200) 200 unit/mL (3 mL) InPn Inject 36 Units into the skin once daily. Inject 36 units once daily 6 Syringe 1 Past Week at Unknown time    isosorbide mononitrate (IMDUR) 30 MG 24 hr tablet Take 1 tablet (30 mg total) by mouth once daily. 90 tablet 3 Past Week at Unknown time    latanoprost 0.005 % ophthalmic solution PLACE 1 DROP IN EACH EYE EVERY EVENING 2.5 mL 0 Past Week at Unknown time    loratadine (CLARITIN) 10 mg tablet Take 1 tablet (10 mg total) by mouth daily as needed for Allergies. 90 tablet 1 Past Week at Unknown time    metFORMIN (GLUCOPHAGE) 1000 MG tablet TAKE ONE TABLET BY MOUTH TWICE DAILY WITH MEALS 180 tablet 2 Past Week at Unknown time    multivitamin with minerals tablet Take 1 tablet by mouth once daily.   Past Week at Unknown time    rOPINIRole (REQUIP) 0.5 MG tablet TAKE ONE Tablet BY MOUTH EVERY NIGHT AT BEDTIME 90 tablet 0 Past Week at Unknown time    valsartan (DIOVAN) 320 MG tablet TAKE ONE Tablet BY MOUTH EVERY EVENING 30 tablet 5 Past Week at Unknown time    acetaminophen-codeine 300-30mg (TYLENOL #3) 300-30 mg Tab Take 1 tablet by mouth once daily.  1 More than a month at Unknown time    alcohol swabs PadM Apply 1 each topically 3 (three) times daily. 400 each 2 Taking    blood pressure monitor (BLOOD PRESSURE KIT) Kit 1 kit by Misc.(Non-Drug; Combo Route) route once daily. 1 each 0 Taking    blood-glucose meter,continuous (DEXCOM G6 ) Misc Use with dexcom G6 system 1 each 0 Taking    blood-glucose sensor (DEXCOM G6 SENSOR) Vanna Change sensor every 10 days 3 Device 12 Taking    blood-glucose transmitter (DEXCOM G6 TRANSMITTER) Vanna Change every 3 months 1 Device 3 Taking    desoximetasone  "(TOPICORT) 0.05 % cream Apply topically 2 (two) times daily as needed. 100 g 2 Taking    dulaglutide (TRULICITY) 1.5 mg/0.5 mL PnIj Inject 1.5 mg into the skin every 7 days. 4 Syringe 5 Taking    flash glucose scanning reader (FREESTYLE ANAMARIA READER) Misc 1 Units by Misc.(Non-Drug; Combo Route) route before meals as needed. 1 each 11 Taking    FREESTYLE ANAMARIA 14 DAY SENSOR Kit CHANGE SENSOR EVERY 14 DAYS 2 kit 11 Taking    lancets Misc Check blood glucose 4 times a day. Dispense accuchek meter or other meter preferred by insurance. Dx code e11.65 400 each 3 Taking    LANCING DEVICE WITH LANCETS Misc USE AS DIRECTED FOR DIABETIC TESTING 1 each 0 Taking    meloxicam (MOBIC) 7.5 MG tablet TAKE ONE Tablet BY MOUTH once DAILY WITH FOOD for 2 weeks then AS NEEDED 30 tablet 0 More than a month at Unknown time    naproxen (NAPROSYN) 500 MG tablet Take 500 mg by mouth 2 (two) times daily as needed.  1 More than a month at Unknown time    pen needle, diabetic (BD ULTRA-FINE LENY PEN NEEDLES) 32 gauge x 5/32" Ndle USE FIVE TIMES DAILY 100 each 5 Taking    promethazine (PHENERGAN) 25 MG tablet Take 25 mg by mouth once daily.  1 More than a month at Unknown time    sodium,potassium,mag sulfates (SUPREP BOWEL PREP KIT) 17.5-3.13-1.6 gram SolR As Directed 1 Bottle 0 Taking    tramadol (ULTRAM) 50 mg tablet Take 50 mg by mouth every 6 (six) hours as needed for Pain.   More than a month at Unknown time         Physical Exam:    Vital Signs:   Vitals:    03/05/20 1134   BP: (!) 141/67   Pulse: (!) 58   Resp: 16   Temp: 97.9 °F (36.6 °C)       General Appearance: Well appearing in no acute distress  Eyes:    No scleral icterus  ENT: Neck supple, Lips, mucosa, and tongue normal; teeth and gums normal  Lungs: CTA bilaterally  Heart:  S1, S2 normal, no murmurs heard  Abdomen: Soft, non tender, non distended with positive bowel sounds. No hepatosplenomegaly, ascites, or mass.  Extremities: 2+ pulses, no clubbing, cyanosis or " edema  Skin: No rash      Labs:  Lab Results   Component Value Date    WBC 7.55 09/26/2019    HGB 11.7 (L) 09/26/2019    HCT 37.9 09/26/2019     09/26/2019    CHOL 193 12/20/2019    TRIG 108 12/20/2019    HDL 51 12/20/2019    ALT 16 12/20/2019    AST 14 12/20/2019     09/26/2019    K 3.7 09/26/2019     09/26/2019    CREATININE 0.9 09/26/2019    BUN 15 09/26/2019    CO2 29 09/26/2019    TSH 2.177 09/26/2019    INR 1.0 08/07/2014    HGBA1C 9.1 (H) 12/20/2019       I have explained the risks and benefits of endoscopy procedures to the patient including but not limited to bleeding, perforation, infection, and death.      Elisa Blair M.D.  Gastroenterology Fellow, PGY-VI  Pager: [phone]  [facility]

## 2020-03-05 NOTE — PLAN OF CARE
ID applied and verified. Plan of care initiated with Cecilia Barahona. Understanding verbalized.

## 2020-03-05 NOTE — [employer]ATION PATIENT INSTRUCTIONS
Discharge Summary/Instructions after an Endoscopic Procedure  Patient Name: Cecilia Barahona  Patient MRN: [id number]  Patient YOB: 1950     Thursday, March 05, 2020  Wiliam Carrillo MD  RESTRICTIONS:  During your procedure today, you received medications for sedation.  These   medications may affect your judgment, balance and coordination.  Therefore,   for 24 hours, you have the following restrictions:   - DO NOT drive a car, operate machinery, make legal/financial decisions,   sign important papers or drink alcohol.    ACTIVITY:  Today: no heavy lifting, straining or running due to procedural   sedation/anesthesia.  The following day: return to full activity including work.  DIET:  Eat and drink normally unless instructed otherwise.     TREATMENT FOR COMMON SIDE EFFECTS:  - Mild abdominal pain, nausea, belching, bloating or excessive gas:  rest,   eat lightly and use a heating pad.  - Sore Throat: treat with throat lozenges and/or gargle with warm salt   water.  - Because air was used during the procedure, expelling large amounts of air   from your rectum or belching is normal.  - If a bowel prep was taken, you may not have a bowel movement for 1-3 days.    This is normal.  SYMPTOMS TO WATCH FOR AND REPORT TO YOUR PHYSICIAN:  1. Abdominal pain or bloating, other than gas cramps.  2. Chest pain.  3. Back pain.  4. Signs of infection such as: chills or fever occurring within 24 hours   after the procedure.  5. Rectal bleeding, which would show as bright red, maroon, or black stools.   (A tablespoon of blood from the rectum is not serious, especially if   hemorrhoids are present.)  6. Vomiting.  7. Weakness or dizziness.  GO DIRECTLY TO THE NEAREST EMERGENCY ROOM IF YOU HAVE ANY OF THE FOLLOWING:      Difficulty breathing              Chills and/or fever over 101 F   Persistent vomiting and/or vomiting blood   Severe abdominal pain   Severe chest pain   Black, tarry stools   Bleeding- more than one  tablespoon   Any other symptom or condition that you feel may need urgent attention  Your doctor recommends these additional instructions:  If any biopsies were taken, your doctors clinic will contact you in 1 to 2   weeks with any results.  - Discharge patient to home (ambulatory).   - Resume previous diet today.   - Continue present medications.   - Await pathology results.   - Repeat colonoscopy in 5 years for surveillance.   - Return to referring physician as previously scheduled.   - Patient has a contact number available for emergencies.  The signs and   symptoms of potential delayed complications were discussed with the   patient.  Return to normal activities tomorrow.  Written discharge   instructions were provided to the patient.  For questions, problems or results please call your physician - Wiliam Carrillo MD at Work:  [phone].  [facility], EMERGENCY ROOM PHONE NUMBER: [phone]  IF A COMPLICATION OR EMERGENCY SITUATION ARISES AND YOU ARE UNABLE TO REACH   YOUR PHYSICIAN - GO DIRECTLY TO THE EMERGENCY ROOM.  Wiliam Carrillo MD  3/5/2020 1:54:48 PM  This report has been verified and signed electronically.  [employer]

## 2020-03-05 NOTE — DISCHARGE INSTRUCTIONS
Colonoscopy     A camera attached to a flexible tube with a viewing lens is used to take video pictures.     Colonoscopy is a test to view the inside of your lower digestive tract (colon and rectum). Sometimes it can show the last part of the small intestine (ileum). During the test, small pieces of tissue may be removed for testing. This is called a biopsy. Small growths, such as polyps, may also be removed.   Why is colonoscopy done?  The test is done to help look for colon cancer. And it can help find the source of abdominal pain, bleeding, and changes in bowel habits. It may be needed once a year, depending on factors such as your:  · Age  · Health history  · Family health history  · Symptoms  · Results from any prior colonoscopy  Risks and possible complications  These include:  · Bleeding               · A puncture or tear in the colon   · Risks of anesthesia  · A cancer lesion not being seen  Getting ready   To prepare for the test:  · Talk with your healthcare provider about the risks of the test (see below). Also ask your healthcare provider about alternatives to the test.  · Tell your healthcare provider about any medicines you take. Also tell him or her about any health conditions you may have.  · Make sure your rectum and colon are empty for the test. Follow the diet and bowel prep instructions exactly. If you dont, the test may need to be rescheduled.  · Plan for a friend or family member to drive you home after the test.     Colonoscopy provides an inside view of the entire colon.     You may discuss the results with your doctor right away or at a future visit.  During the test   The test is usually done in the hospital on an outpatient basis. This means you go home the same day. The procedure takes about 30 minutes. During that time:  · You are given relaxing (sedating) medicine through an IV line. You may be drowsy, or fully asleep.  · The healthcare provider will first give you a physical exam to  check for anal and rectal problems.  · Then the anus is lubricated and the scope inserted.  · If you are awake, you may have a feeling similar to needing to have a bowel movement. You may also feel pressure as air is pumped into the colon. Its OK to pass gas during the procedure.  · Biopsy, polyp removal, or other treatments may be done during the test.  After the test   You may have gas right after the test. It can help to try to pass it to help prevent later bloating. Your healthcare provider may discuss the results with you right away. Or you may need to schedule a follow-up visit to talk about the results. After the test, you can go back to your normal eating and other activities. You may be tired from the sedation and need to rest for a few hours.  Date Last Reviewed: 11/1/2016 © 2000-2017 [employer] [place]. [address] [place] [address], [place] [address]. All rights reserved. This information is not intended as a substitute for professional medical care. Always follow your healthcare professional's instructions.

## 2020-03-05 NOTE — TRANSFER OF CARE
"Anesthesia Transfer of Care Note    Patient: Cecilia Barahona    Procedure(s) Performed: Procedure(s) (LRB):  COLONOSCOPY (N/A)    Patient location: PACU    Anesthesia Type: general    Transport from OR: Transported from OR on 2-3 L/min O2 by NC with adequate spontaneous ventilation    Post pain: adequate analgesia    Post assessment: no apparent anesthetic complications and tolerated procedure well    Post vital signs: stable    Level of consciousness: responds to stimulation and sedated    Nausea/Vomiting: no nausea/vomiting    Complications: none    Transfer of care protocol was followed      Last vitals:   Visit Vitals  BP (!) 120/58   Pulse (!) 59   Temp 36.7 °C (98 °F)   Resp 15   Ht 5' 3" (1.6 m)   Wt 89.8 kg (198 lb)   SpO2 96%   Breastfeeding? No   BMI 35.07 kg/m²     "

## 2020-03-05 NOTE — ANESTHESIA POSTPROCEDURE EVALUATION
Anesthesia Post Evaluation    Patient: Cecilia Barahona    Procedure(s) Performed: Procedure(s) (LRB):  COLONOSCOPY (N/A)    Final Anesthesia Type: general    Patient location during evaluation: PACU  Patient participation: Yes- Able to Participate  Level of consciousness: awake and alert and oriented  Post-procedure vital signs: reviewed and stable  Pain management: adequate  Airway patency: patent    PONV status at discharge: No PONV  Anesthetic complications: no      Cardiovascular status: stable  Respiratory status: unassisted, spontaneous ventilation and room air  Hydration status: euvolemic  Follow-up not needed.          Vitals Value Taken Time   /72 3/5/2020  2:20 PM   Temp 36.7 °C (98 °F) 3/5/2020  1:58 PM   Pulse 52 3/5/2020  2:20 PM   Resp 16 3/5/2020  2:20 PM   SpO2 99 % 3/5/2020  2:20 PM         No case tracking events are documented in the log.      Pain/Billy Score: No data recorded

## 2020-03-11 ENCOUNTER — TELEPHONE (OUTPATIENT)
Dept: FAMILY MEDICINE | Facility: CLINIC | Age: 70
End: 2020-03-11

## 2020-03-11 DIAGNOSIS — I10 ESSENTIAL HYPERTENSION: Primary | ICD-10-CM

## 2020-03-11 RX ORDER — VALSARTAN 160 MG/1
320 TABLET ORAL DAILY
Qty: 180 TABLET | Refills: 1 | Status: ON HOLD | OUTPATIENT
Start: 2020-03-11 | End: 2020-06-01 | Stop reason: HOSPADM

## 2020-03-11 NOTE — TELEPHONE ENCOUNTER
[facility] states Valsartan [facility] on backorder the alternative is Valsartan 160 twice daily please advise.

## 2020-03-12 ENCOUNTER — TELEPHONE (OUTPATIENT)
Dept: ENDOSCOPY | Facility: HOSPITAL | Age: 70
End: 2020-03-12

## 2020-03-16 NOTE — PROGRESS NOTES
Digital Medicine: Health  Follow-Up    The history is provided by the patient.     Follow Up  Follow-up reason(s): reading review      Readings are trending down due to lifestyle change.    Routine Education Topics: eating patterns        INTERVENTION(S)  recommended diet modifications, encouragement/support and denied questions    PLAN  patient verbalizes understanding and continue monitoring          Topic    Eye Exam     Hemoglobin A1C        Last 5 Patient Entered Readings                                      Current 30 Day Average: 134/68     Recent Readings 3/13/2020 3/2/2020 3/1/2020 2/20/2020 2/13/2020    SBP (mmHg) 128 142 122 144 135    DBP (mmHg) 67 67 68 68 60    Pulse 71 74 77 81 62                      Diet Screening   She has the following dietary restrictions: low sodium diet    States she has been working on reducing sodium intake and not drinking cold drinks.    Intervention(s): portion control and low sodium diet education      SDOH

## 2020-03-18 LAB — FINAL PATHOLOGIC DIAGNOSIS: NORMAL

## 2020-03-19 ENCOUNTER — PATIENT MESSAGE (OUTPATIENT)
Dept: GASTROENTEROLOGY | Facility: CLINIC | Age: 70
End: 2020-03-19

## 2020-03-30 NOTE — PROGRESS NOTES
BP control remains acceptable  BP average 133/68 mmHg    Hypertension Medications             amLODIPine (NORVASC) 10 MG tablet TAKE ONE TABLET BY MOUTH EVERY DAY    chlorthalidone (HYGROTEN) 25 MG Tab TAKE ONE TABLET BY MOUTH EVERY DAY    isosorbide mononitrate (IMDUR) 30 MG 24 hr tablet Take 1 tablet (30 mg total) by mouth once daily.         valsartan (DIOVAN) 320 MG tablet TAKE ONE Tablet BY MOUTH EVERY EVENING

## 2020-03-31 ENCOUNTER — TELEPHONE (OUTPATIENT)
Dept: FAMILY MEDICINE | Facility: CLINIC | Age: 70
End: 2020-03-31

## 2020-03-31 DIAGNOSIS — F41.1 GENERALIZED ANXIETY DISORDER: ICD-10-CM

## 2020-03-31 DIAGNOSIS — F33.3 MAJOR DEPRESSIVE DISORDER, RECURRENT EPISODE, SEVERE WITH MOOD-CONGRUENT PSYCHOTIC FEATURES: ICD-10-CM

## 2020-03-31 NOTE — TELEPHONE ENCOUNTER
----- Message from Jasmyn Pritchett sent at 3/31/2020  9:16 AM CDT -----  Type: Patient Call Back    Who called: pt     What is the request in detail: pt asking for a call back regarding medication Wellbutrin. She states she put herself back on the med that was prescribed to her by Dr. Jo-Ann James that is no longer at [facility]. She states psych is no longer accepting new pt and requesting pcp to fill for her because she needs help coping.     Can the clinic reply by [facility]? No     Would the patient rather a call back or a response via [facility]? Call back     Best call back number: [phone]    Additional Information: ..  [facility]/pharmacy #[id number] - [place] - [address] [place] [address]  Phone: [phone] Fax: [phone]

## 2020-03-31 NOTE — TELEPHONE ENCOUNTER
----- Message from Jasmyn sandyshyanne sent at 3/31/2020  1:39 PM CDT -----  Type:  Patient Returning Call    Who Called: pt     Who Left Message for Patient: Rayo    Does the patient know what this is regarding?: requested call    Would the patient rather a call back or a response via [facility]? Call back     Best Call Back Number: [phone]    Additional Information:

## 2020-03-31 NOTE — TELEPHONE ENCOUNTER
Is she still taking the prozac?  Can we try to increase the prozac instead of trying to switch over?

## 2020-03-31 NOTE — TELEPHONE ENCOUNTER
Pt. Returned call, and is requesting a refill on Wellbutrin. Pt. Stated you had her on that a while ago , then she was taken off by  and put on Prozac. This doctor is not with [facility] anymore and pt. Wants to start taking the Wellbutrin again. Pt. Reports that it works better for her then the Prozac. Last seen 12/11/2019.

## 2020-04-01 RX ORDER — FLUOXETINE HYDROCHLORIDE 20 MG/1
20 CAPSULE ORAL DAILY
Qty: 90 CAPSULE | Refills: 0 | Status: SHIPPED | OUTPATIENT
Start: 2020-04-01 | End: 2020-06-23

## 2020-04-01 NOTE — TELEPHONE ENCOUNTER
Spoke with pt she's still taking prozac also I informed her of providers recommendations. She states she is ok with increasing dosage.Pt verbalized understanding.

## 2020-04-01 NOTE — TELEPHONE ENCOUNTER
New Rx sent for higher dose, 20mg per day.  Recommend patient call us in 4 weeks to see how she is doing and if the higher dose is working or not.

## 2020-04-02 ENCOUNTER — PATIENT MESSAGE (OUTPATIENT)
Dept: FAMILY MEDICINE | Facility: CLINIC | Age: 70
End: 2020-04-02

## 2020-04-02 ENCOUNTER — TELEPHONE (OUTPATIENT)
Dept: FAMILY MEDICINE | Facility: CLINIC | Age: 70
End: 2020-04-02

## 2020-04-02 DIAGNOSIS — M54.9 BACK PAIN, UNSPECIFIED BACK LOCATION, UNSPECIFIED BACK PAIN LATERALITY, UNSPECIFIED CHRONICITY: Primary | ICD-10-CM

## 2020-04-02 RX ORDER — TIZANIDINE 4 MG/1
4 TABLET ORAL EVERY 6 HOURS PRN
Qty: 60 TABLET | Refills: 0 | Status: SHIPPED | OUTPATIENT
Start: 2020-04-02 | End: 2020-05-18 | Stop reason: CLARIF

## 2020-04-02 NOTE — TELEPHONE ENCOUNTER
Spoke with pt informed her that her message was sent to provider to review via my chart. Pt verbalized understanding.

## 2020-04-02 NOTE — TELEPHONE ENCOUNTER
----- Message from Alissa Reveles sent at 4/2/2020 12:42 PM CDT -----  Contact: Self   Type: Patient Call Back    Who called:  Ondina Calloway     What is the request in detail:patient's daughter states patient is getting worse and now she can't stand she took gabapentin at 4 am and a Tramadol a few minutes ago. Please call     Can the clinic reply by [facility]? No     Would the patient rather a call back or a response via [facility]?  Call     Best call back number: [phone]

## 2020-04-02 NOTE — TELEPHONE ENCOUNTER
Pts. Daughter calling stating they left 2 previous  messages regarding her mother having severe back pain that is getting worst. EMS was called out today,due to pain being to severe pt. Cant even walk. Was told by EMS that they have 2 options, to  E.D and sit and wait, or call her PCP. PT. Chose to call PCP.

## 2020-04-02 NOTE — TELEPHONE ENCOUNTER
----- Message from Odette Maria sent at 4/2/2020  9:36 AM CDT -----  Contact: Elli-Daughter  Type: Patient Call Back    Who called:Elli    What is the request in detail: Patient called to request a Rx for her severe back pain. Patient is also asking for a virtual visit if one is needed. Please call to advise    Can the clinic reply by [facility]?    Would the patient rather a call back or a response via [facility]? Call    Best call back number:[phone]

## 2020-04-08 ENCOUNTER — TELEPHONE (OUTPATIENT)
Dept: ENDOCRINOLOGY | Facility: CLINIC | Age: 70
End: 2020-04-08

## 2020-04-08 NOTE — TELEPHONE ENCOUNTER
----- Message from Leonardo Nesbitt sent at 4/7/2020  4:42 PM CDT -----  Contact: Self  Type:  Patient Returning Call    Who Called: Self    Who Left Message for Patient: Lucy    Does the patient know what this is regarding?: no    Would the patient rather a call back or a response via [facility]? call    Best Call Back Number [phone]

## 2020-04-13 DIAGNOSIS — M54.9 BACK PAIN, UNSPECIFIED BACK LOCATION, UNSPECIFIED BACK PAIN LATERALITY, UNSPECIFIED CHRONICITY: ICD-10-CM

## 2020-04-14 RX ORDER — TIZANIDINE 4 MG/1
TABLET ORAL
Qty: 60 TABLET | Refills: 0 | OUTPATIENT
Start: 2020-04-14

## 2020-04-22 DIAGNOSIS — J30.9 ALLERGIC RHINITIS, UNSPECIFIED SEASONALITY, UNSPECIFIED TRIGGER: ICD-10-CM

## 2020-04-22 RX ORDER — LORATADINE 10 MG/1
TABLET ORAL
Qty: 30 TABLET | Refills: 2 | Status: SHIPPED | OUTPATIENT
Start: 2020-04-22 | End: 2020-07-28 | Stop reason: SDUPTHER

## 2020-04-22 NOTE — TELEPHONE ENCOUNTER
Spoke with the patient and scheduled an appt for her and the .  Patient asked to get a copy in the mail and she didn't want to stay on the line until the process was finished.  I only had options of extended pt and annual appts.  October told me to scheduled them and if the appt type has to be changed, it will be changed.  Mailed copy of the appts to the home.  Patient verbalized understandings.

## 2020-04-27 ENCOUNTER — PATIENT OUTREACH (OUTPATIENT)
Dept: OTHER | Facility: OTHER | Age: 70
End: 2020-04-27

## 2020-05-04 ENCOUNTER — PATIENT OUTREACH (OUTPATIENT)
Dept: ADMINISTRATIVE | Facility: OTHER | Age: 70
End: 2020-05-04

## 2020-05-05 ENCOUNTER — LAB VISIT (OUTPATIENT)
Dept: LAB | Facility: HOSPITAL | Age: 70
End: 2020-05-05
Attending: INTERNAL MEDICINE
Payer: MEDICARE

## 2020-05-05 DIAGNOSIS — E78.5 HYPERLIPIDEMIA, UNSPECIFIED HYPERLIPIDEMIA TYPE: ICD-10-CM

## 2020-05-05 LAB
ALBUMIN SERPL BCP-MCNC: 3.5 G/DL (ref 3.5–5.2)
ALP SERPL-CCNC: 113 U/L (ref 55–135)
ALT SERPL W/O P-5'-P-CCNC: 17 U/L (ref 10–44)
AST SERPL-CCNC: 21 U/L (ref 10–40)
BILIRUB DIRECT SERPL-MCNC: 0.2 MG/DL (ref 0.1–0.3)
BILIRUB SERPL-MCNC: 0.4 MG/DL (ref 0.1–1)
CHOLEST SERPL-MCNC: 243 MG/DL (ref 120–199)
CHOLEST/HDLC SERPL: 5.5 {RATIO} (ref 2–5)
ESTIMATED AVG GLUCOSE: 154 MG/DL (ref 68–131)
HBA1C MFR BLD HPLC: 7 % (ref 4–5.6)
HDLC SERPL-MCNC: 44 MG/DL (ref 40–75)
HDLC SERPL: 18.1 % (ref 20–50)
LDLC SERPL CALC-MCNC: 150 MG/DL (ref 63–159)
NONHDLC SERPL-MCNC: 199 MG/DL
PROT SERPL-MCNC: 7.8 G/DL (ref 6–8.4)
TRIGL SERPL-MCNC: 245 MG/DL (ref 30–150)

## 2020-05-05 PROCEDURE — 36415 COLL VENOUS BLD VENIPUNCTURE: CPT | Mod: HCNC,PN

## 2020-05-05 PROCEDURE — 83036 HEMOGLOBIN GLYCOSYLATED A1C: CPT | Mod: HCNC

## 2020-05-05 PROCEDURE — 80076 HEPATIC FUNCTION PANEL: CPT | Mod: HCNC

## 2020-05-05 PROCEDURE — 80061 LIPID PANEL: CPT | Mod: HCNC

## 2020-05-06 ENCOUNTER — TELEPHONE (OUTPATIENT)
Dept: ENDOCRINOLOGY | Facility: CLINIC | Age: 70
End: 2020-05-06

## 2020-05-06 NOTE — TELEPHONE ENCOUNTER
Spoke with the pt and informed her that her A1c has decreased from 9.1 to 7% which is very near goal! Diabetes is less than 7%. Liver enzymes are normal but cholesterol is still high.   Also told her to please keep your appointment with Dr. Smart in a couple of weeks per Sindi. She can transfer her diabetes care to Dr. Smart and return to clinic with Sindi as needed.     Pt wants to advice on what she can do about cholesterol being high. She wants to know should she take her medication twice a day.

## 2020-05-06 NOTE — TELEPHONE ENCOUNTER
Spoke with the pt and informed her that she should defer to Dr. mSart about further advice re: cholesterol per Sindi.

## 2020-05-18 ENCOUNTER — HOSPITAL ENCOUNTER (INPATIENT)
Facility: OTHER | Age: 70
LOS: 14 days | Discharge: HOME HEALTH | DRG: 234 | End: 2020-06-01
Attending: EMERGENCY MEDICINE | Admitting: INTERNAL MEDICINE
Payer: MEDICARE

## 2020-05-18 ENCOUNTER — NURSE TRIAGE (OUTPATIENT)
Dept: ADMINISTRATIVE | Facility: CLINIC | Age: 70
End: 2020-05-18

## 2020-05-18 DIAGNOSIS — I24.9 ACUTE CORONARY SYNDROME: ICD-10-CM

## 2020-05-18 DIAGNOSIS — I25.110 CORONARY ARTERY DISEASE INVOLVING NATIVE CORONARY ARTERY OF NATIVE HEART WITH UNSTABLE ANGINA PECTORIS: ICD-10-CM

## 2020-05-18 DIAGNOSIS — I10 ESSENTIAL HYPERTENSION: ICD-10-CM

## 2020-05-18 DIAGNOSIS — I25.10 CORONARY ARTERY DISEASE: ICD-10-CM

## 2020-05-18 DIAGNOSIS — F32.0 MILD MAJOR DEPRESSION: ICD-10-CM

## 2020-05-18 DIAGNOSIS — E78.2 MIXED HYPERLIPIDEMIA: ICD-10-CM

## 2020-05-18 DIAGNOSIS — R07.9 CHEST PAIN: ICD-10-CM

## 2020-05-18 DIAGNOSIS — E66.01 SEVERE OBESITY (BMI 35.0-39.9) WITH COMORBIDITY: ICD-10-CM

## 2020-05-18 DIAGNOSIS — I25.118 CORONARY ARTERY DISEASE OF NATIVE ARTERY OF NATIVE HEART WITH STABLE ANGINA PECTORIS: ICD-10-CM

## 2020-05-18 DIAGNOSIS — Z79.4 TYPE 2 DIABETES MELLITUS WITHOUT COMPLICATION, WITH LONG-TERM CURRENT USE OF INSULIN: ICD-10-CM

## 2020-05-18 DIAGNOSIS — E11.9 TYPE 2 DIABETES MELLITUS WITHOUT COMPLICATION, WITH LONG-TERM CURRENT USE OF INSULIN: ICD-10-CM

## 2020-05-18 DIAGNOSIS — I65.23 BILATERAL CAROTID ARTERY STENOSIS: ICD-10-CM

## 2020-05-18 DIAGNOSIS — K21.9 GASTROESOPHAGEAL REFLUX DISEASE WITHOUT ESOPHAGITIS: ICD-10-CM

## 2020-05-18 DIAGNOSIS — Z95.1 S/P CABG X 3: Primary | ICD-10-CM

## 2020-05-18 DIAGNOSIS — Z99.11 ENCOUNTER FOR WEANING FROM VENTILATOR: ICD-10-CM

## 2020-05-18 DIAGNOSIS — G25.81 RESTLESS LEGS SYNDROME (RLS): ICD-10-CM

## 2020-05-18 DIAGNOSIS — R07.9 CHEST PAIN, UNSPECIFIED TYPE: ICD-10-CM

## 2020-05-18 LAB
ALBUMIN SERPL-MCNC: 3.1 G/DL (ref 3.3–5.5)
ALP SERPL-CCNC: 112 U/L (ref 42–141)
BILIRUB SERPL-MCNC: 0.5 MG/DL (ref 0.2–1.6)
BUN SERPL-MCNC: 20 MG/DL (ref 7–22)
CALCIUM SERPL-MCNC: 9.6 MG/DL (ref 8–10.3)
CHLORIDE SERPL-SCNC: 99 MMOL/L (ref 98–108)
CREAT SERPL-MCNC: 1 MG/DL (ref 0.6–1.2)
GLUCOSE SERPL-MCNC: 209 MG/DL (ref 73–118)
POC ALT (SGPT): 23 U/L (ref 10–47)
POC AST (SGOT): 29 U/L (ref 11–38)
POC CARDIAC TROPONIN I: 0 NG/ML
POC TCO2: 27 MMOL/L (ref 18–33)
POTASSIUM BLD-SCNC: 3.8 MMOL/L (ref 3.6–5.1)
PROTEIN, POC: 7.1 G/DL (ref 6.4–8.1)
SAMPLE: NORMAL
SODIUM BLD-SCNC: 142 MMOL/L (ref 128–145)

## 2020-05-18 PROCEDURE — 84484 ASSAY OF TROPONIN QUANT: CPT | Mod: HCNC,ER

## 2020-05-18 PROCEDURE — U0002 COVID-19 LAB TEST NON-CDC: HCPCS | Mod: HCNC

## 2020-05-18 PROCEDURE — 93005 ELECTROCARDIOGRAM TRACING: CPT | Mod: HCNC,ER

## 2020-05-18 PROCEDURE — 93010 EKG 12-LEAD: ICD-10-PCS | Mod: HCNC,,, | Performed by: INTERNAL MEDICINE

## 2020-05-18 PROCEDURE — 99285 EMERGENCY DEPT VISIT HI MDM: CPT | Mod: 25,HCNC,ER

## 2020-05-18 PROCEDURE — 11000001 HC ACUTE MED/SURG PRIVATE ROOM: Mod: HCNC

## 2020-05-18 PROCEDURE — 85025 COMPLETE CBC W/AUTO DIFF WBC: CPT | Mod: HCNC,ER

## 2020-05-18 PROCEDURE — 80053 COMPREHEN METABOLIC PANEL: CPT | Mod: HCNC,ER

## 2020-05-18 PROCEDURE — 93010 ELECTROCARDIOGRAM REPORT: CPT | Mod: HCNC,,, | Performed by: INTERNAL MEDICINE

## 2020-05-18 PROCEDURE — 25000242 PHARM REV CODE 250 ALT 637 W/ HCPCS: Mod: HCNC,ER | Performed by: EMERGENCY MEDICINE

## 2020-05-18 PROCEDURE — 25000242 PHARM REV CODE 250 ALT 637 W/ HCPCS: Mod: HCNC,ER | Performed by: INTERNAL MEDICINE

## 2020-05-18 RX ORDER — NITROGLYCERIN 0.4 MG/1
0.4 TABLET SUBLINGUAL EVERY 5 MIN PRN
Status: DISCONTINUED | OUTPATIENT
Start: 2020-05-18 | End: 2020-05-25

## 2020-05-18 RX ADMIN — NITROGLYCERIN 0.4 MG: 0.4 TABLET, ORALLY DISINTEGRATING SUBLINGUAL at 11:05

## 2020-05-18 RX ADMIN — NITROGLYCERIN 1 INCH: 20 OINTMENT TOPICAL at 11:05

## 2020-05-19 ENCOUNTER — HOSPITAL ENCOUNTER (OUTPATIENT)
Dept: CARDIOLOGY | Facility: OTHER | Age: 70
Discharge: HOME | DRG: 234 | End: 2020-05-19
Attending: EMERGENCY MEDICINE
Payer: MEDICARE

## 2020-05-19 PROBLEM — R07.9 CHEST PAIN: Status: ACTIVE | Noted: 2020-05-19

## 2020-05-19 LAB
ALBUMIN SERPL BCP-MCNC: 3.2 G/DL (ref 3.5–5.2)
ALP SERPL-CCNC: 115 U/L (ref 55–135)
ALT SERPL W/O P-5'-P-CCNC: 18 U/L (ref 10–44)
ANION GAP SERPL CALC-SCNC: 11 MMOL/L (ref 8–16)
ASCENDING AORTA: 2.74 CM
AST SERPL-CCNC: 19 U/L (ref 10–40)
AV INDEX (PROSTH): 0.68
AV MEAN GRADIENT: 8 MMHG
AV PEAK GRADIENT: 13 MMHG
AV VALVE AREA: 2.57 CM2
AV VELOCITY RATIO: 0.6
BASOPHILS # BLD AUTO: 0.02 K/UL (ref 0–0.2)
BASOPHILS NFR BLD: 0.2 % (ref 0–1.9)
BILIRUB SERPL-MCNC: 0.2 MG/DL (ref 0.1–1)
BSA FOR ECHO PROCEDURE: 1.95 M2
BUN SERPL-MCNC: 22 MG/DL (ref 8–23)
CALCIUM SERPL-MCNC: 9.3 MG/DL (ref 8.7–10.5)
CHLORIDE SERPL-SCNC: 102 MMOL/L (ref 95–110)
CHOLEST SERPL-MCNC: 204 MG/DL (ref 120–199)
CHOLEST/HDLC SERPL: 5 {RATIO} (ref 2–5)
CO2 SERPL-SCNC: 26 MMOL/L (ref 23–29)
CREAT SERPL-MCNC: 0.9 MG/DL (ref 0.5–1.4)
CV ECHO LV RWT: 0.39 CM
DIFFERENTIAL METHOD: ABNORMAL
DOP CALC AO PEAK VEL: 1.81 M/S
DOP CALC AO VTI: 35.01 CM
DOP CALC LVOT AREA: 3.8 CM2
DOP CALC LVOT DIAMETER: 2.19 CM
DOP CALC LVOT PEAK VEL: 1.08 M/S
DOP CALC LVOT STROKE VOLUME: 89.91 CM3
DOP CALCLVOT PEAK VEL VTI: 23.88 CM
E WAVE DECELERATION TIME: 220.29 MSEC
E/A RATIO: 0.69
E/E' RATIO: 11.07 M/S
ECHO LV POSTERIOR WALL: 0.89 CM (ref 0.6–1.1)
EOSINOPHIL # BLD AUTO: 0.1 K/UL (ref 0–0.5)
EOSINOPHIL NFR BLD: 1.5 % (ref 0–8)
ERYTHROCYTE [DISTWIDTH] IN BLOOD BY AUTOMATED COUNT: 14.3 % (ref 11.5–14.5)
EST. GFR  (AFRICAN AMERICAN): >60 ML/MIN/1.73 M^2
EST. GFR  (NON AFRICAN AMERICAN): >60 ML/MIN/1.73 M^2
ESTIMATED AVG GLUCOSE: 157 MG/DL (ref 68–131)
FRACTIONAL SHORTENING: 42 % (ref 28–44)
GLUCOSE SERPL-MCNC: 128 MG/DL (ref 70–110)
HBA1C MFR BLD HPLC: 7.1 % (ref 4–5.6)
HCT VFR BLD AUTO: 36.7 % (ref 37–48.5)
HDLC SERPL-MCNC: 41 MG/DL (ref 40–75)
HDLC SERPL: 20.1 % (ref 20–50)
HGB BLD-MCNC: 11.4 G/DL (ref 12–16)
IMM GRANULOCYTES # BLD AUTO: 0.01 K/UL (ref 0–0.04)
IMM GRANULOCYTES NFR BLD AUTO: 0.1 % (ref 0–0.5)
INTERVENTRICULAR SEPTUM: 0.89 CM (ref 0.6–1.1)
LA MAJOR: 4.99 CM
LA MINOR: 4.59 CM
LA WIDTH: 4.39 CM
LDLC SERPL CALC-MCNC: 128.8 MG/DL (ref 63–159)
LEFT ATRIUM SIZE: 3.74 CM
LEFT ATRIUM VOLUME INDEX: 35.3 ML/M2
LEFT ATRIUM VOLUME: 66.73 CM3
LEFT INTERNAL DIMENSION IN SYSTOLE: 2.65 CM (ref 2.1–4)
LEFT VENTRICLE DIASTOLIC VOLUME INDEX: 50.01 ML/M2
LEFT VENTRICLE DIASTOLIC VOLUME: 94.41 ML
LEFT VENTRICLE MASS INDEX: 70 G/M2
LEFT VENTRICLE SYSTOLIC VOLUME INDEX: 13.6 ML/M2
LEFT VENTRICLE SYSTOLIC VOLUME: 25.73 ML
LEFT VENTRICULAR INTERNAL DIMENSION IN DIASTOLE: 4.54 CM (ref 3.5–6)
LEFT VENTRICULAR MASS: 132.77 G
LV LATERAL E/E' RATIO: 10.38 M/S
LV SEPTAL E/E' RATIO: 11.86 M/S
LYMPHOCYTES # BLD AUTO: 3 K/UL (ref 1–4.8)
LYMPHOCYTES NFR BLD: 32.7 % (ref 18–48)
MCH RBC QN AUTO: 28.9 PG (ref 27–31)
MCHC RBC AUTO-ENTMCNC: 31.1 G/DL (ref 32–36)
MCV RBC AUTO: 93 FL (ref 82–98)
MONOCYTES # BLD AUTO: 0.9 K/UL (ref 0.3–1)
MONOCYTES NFR BLD: 9.7 % (ref 4–15)
MV PEAK A VEL: 1.2 M/S
MV PEAK E VEL: 0.83 M/S
MV STENOSIS PRESSURE HALF TIME: 64 MS
MV VALVE AREA P 1/2 METHOD: 3.44 CM2
NEUTROPHILS # BLD AUTO: 5.1 K/UL (ref 1.8–7.7)
NEUTROPHILS NFR BLD: 55.8 % (ref 38–73)
NONHDLC SERPL-MCNC: 163 MG/DL
NRBC BLD-RTO: 0 /100 WBC
PISA TR MAX VEL: 1.31 M/S
PLATELET # BLD AUTO: 276 K/UL (ref 150–350)
PMV BLD AUTO: 9.2 FL (ref 9.2–12.9)
POCT GLUCOSE: 107 MG/DL (ref 70–110)
POCT GLUCOSE: 125 MG/DL (ref 70–110)
POCT GLUCOSE: 126 MG/DL (ref 70–110)
POCT GLUCOSE: 160 MG/DL (ref 70–110)
POTASSIUM SERPL-SCNC: 3.7 MMOL/L (ref 3.5–5.1)
PROT SERPL-MCNC: 7 G/DL (ref 6–8.4)
PULM VEIN S/D RATIO: 1.42
PV PEAK D VEL: 0.45 M/S
PV PEAK S VEL: 0.64 M/S
PV PEAK VELOCITY: 0.9 CM/S
RA MAJOR: 4.91 CM
RA PRESSURE: 3 MMHG
RBC # BLD AUTO: 3.95 M/UL (ref 4–5.4)
SARS-COV-2 RDRP RESP QL NAA+PROBE: NEGATIVE
SINUS: 3.49 CM
SODIUM SERPL-SCNC: 139 MMOL/L (ref 136–145)
STJ: 2.89 CM
TDI LATERAL: 0.08 M/S
TDI SEPTAL: 0.07 M/S
TDI: 0.08 M/S
TR MAX PG: 7 MMHG
TRICUSPID ANNULAR PLANE SYSTOLIC EXCURSION: 2.57 CM
TRIGL SERPL-MCNC: 171 MG/DL (ref 30–150)
TROPONIN I SERPL DL<=0.01 NG/ML-MCNC: 0.01 NG/ML (ref 0–0.03)
TV REST PULMONARY ARTERY PRESSURE: 10 MMHG
WBC # BLD AUTO: 9.18 K/UL (ref 3.9–12.7)

## 2020-05-19 PROCEDURE — 25000003 PHARM REV CODE 250: Mod: HCNC | Performed by: INTERNAL MEDICINE

## 2020-05-19 PROCEDURE — 85025 COMPLETE CBC W/AUTO DIFF WBC: CPT | Mod: HCNC

## 2020-05-19 PROCEDURE — 93306 TTE W/DOPPLER COMPLETE: CPT | Mod: 26,HCNC,, | Performed by: INTERNAL MEDICINE

## 2020-05-19 PROCEDURE — 80061 LIPID PANEL: CPT | Mod: HCNC

## 2020-05-19 PROCEDURE — 93306 ECHO (CUPID ONLY): ICD-10-PCS | Mod: 26,HCNC,, | Performed by: INTERNAL MEDICINE

## 2020-05-19 PROCEDURE — 84484 ASSAY OF TROPONIN QUANT: CPT | Mod: 91,HCNC

## 2020-05-19 PROCEDURE — 93306 TTE W/DOPPLER COMPLETE: CPT | Mod: HCNC

## 2020-05-19 PROCEDURE — 25000003 PHARM REV CODE 250: Mod: HCNC | Performed by: PHYSICIAN ASSISTANT

## 2020-05-19 PROCEDURE — 25000003 PHARM REV CODE 250: Mod: HCNC,ER | Performed by: EMERGENCY MEDICINE

## 2020-05-19 PROCEDURE — 99220 PR INITIAL OBSERVATION CARE,LEVL III: CPT | Mod: HCNC,,, | Performed by: PHYSICIAN ASSISTANT

## 2020-05-19 PROCEDURE — 63600175 PHARM REV CODE 636 W HCPCS: Mod: HCNC | Performed by: INTERNAL MEDICINE

## 2020-05-19 PROCEDURE — 84484 ASSAY OF TROPONIN QUANT: CPT | Mod: HCNC

## 2020-05-19 PROCEDURE — 63600175 PHARM REV CODE 636 W HCPCS: Mod: HCNC | Performed by: PHYSICIAN ASSISTANT

## 2020-05-19 PROCEDURE — 94761 N-INVAS EAR/PLS OXIMETRY MLT: CPT | Mod: HCNC,ER

## 2020-05-19 PROCEDURE — 96372 THER/PROPH/DIAG INJ SC/IM: CPT | Mod: 59

## 2020-05-19 PROCEDURE — 80053 COMPREHEN METABOLIC PANEL: CPT | Mod: HCNC

## 2020-05-19 PROCEDURE — 99220 PR INITIAL OBSERVATION CARE,LEVL III: ICD-10-PCS | Mod: HCNC,,, | Performed by: PHYSICIAN ASSISTANT

## 2020-05-19 PROCEDURE — 99900035 HC TECH TIME PER 15 MIN (STAT): Mod: HCNC

## 2020-05-19 PROCEDURE — 25000003 PHARM REV CODE 250: Mod: HCNC | Performed by: STUDENT IN AN ORGANIZED HEALTH CARE EDUCATION/TRAINING PROGRAM

## 2020-05-19 PROCEDURE — G0378 HOSPITAL OBSERVATION PER HR: HCPCS | Mod: HCNC

## 2020-05-19 PROCEDURE — 36415 COLL VENOUS BLD VENIPUNCTURE: CPT | Mod: HCNC

## 2020-05-19 PROCEDURE — C9399 UNCLASSIFIED DRUGS OR BIOLOG: HCPCS | Mod: HCNC | Performed by: PHYSICIAN ASSISTANT

## 2020-05-19 PROCEDURE — 11000001 HC ACUTE MED/SURG PRIVATE ROOM: Mod: HCNC

## 2020-05-19 PROCEDURE — 25000003 PHARM REV CODE 250: Mod: HCNC | Performed by: NURSE PRACTITIONER

## 2020-05-19 PROCEDURE — 96374 THER/PROPH/DIAG INJ IV PUSH: CPT

## 2020-05-19 PROCEDURE — 83036 HEMOGLOBIN GLYCOSYLATED A1C: CPT | Mod: HCNC

## 2020-05-19 RX ORDER — SODIUM CHLORIDE 0.9 % (FLUSH) 0.9 %
10 SYRINGE (ML) INJECTION
Status: DISCONTINUED | OUTPATIENT
Start: 2020-05-19 | End: 2020-05-25

## 2020-05-19 RX ORDER — IBUPROFEN 200 MG
16 TABLET ORAL
Status: DISCONTINUED | OUTPATIENT
Start: 2020-05-19 | End: 2020-05-25

## 2020-05-19 RX ORDER — PANTOPRAZOLE SODIUM 40 MG/1
40 TABLET, DELAYED RELEASE ORAL DAILY
Status: DISCONTINUED | OUTPATIENT
Start: 2020-05-19 | End: 2020-05-25

## 2020-05-19 RX ORDER — ISOSORBIDE MONONITRATE 30 MG/1
30 TABLET, EXTENDED RELEASE ORAL DAILY
Status: DISCONTINUED | OUTPATIENT
Start: 2020-05-19 | End: 2020-05-21

## 2020-05-19 RX ORDER — FLUOXETINE HYDROCHLORIDE 20 MG/1
20 CAPSULE ORAL DAILY
Status: DISCONTINUED | OUTPATIENT
Start: 2020-05-19 | End: 2020-05-25

## 2020-05-19 RX ORDER — IBUPROFEN 200 MG
24 TABLET ORAL
Status: DISCONTINUED | OUTPATIENT
Start: 2020-05-19 | End: 2020-05-25

## 2020-05-19 RX ORDER — AMLODIPINE BESYLATE 10 MG/1
10 TABLET ORAL DAILY
Status: DISCONTINUED | OUTPATIENT
Start: 2020-05-19 | End: 2020-05-24

## 2020-05-19 RX ORDER — VALSARTAN 160 MG/1
320 TABLET ORAL DAILY
Status: DISCONTINUED | OUTPATIENT
Start: 2020-05-19 | End: 2020-05-22

## 2020-05-19 RX ORDER — ALBUTEROL SULFATE 90 UG/1
2 AEROSOL, METERED RESPIRATORY (INHALATION) EVERY 6 HOURS PRN
Status: DISCONTINUED | OUTPATIENT
Start: 2020-05-19 | End: 2020-05-25

## 2020-05-19 RX ORDER — ONDANSETRON 4 MG/1
4 TABLET, ORALLY DISINTEGRATING ORAL
Status: COMPLETED | OUTPATIENT
Start: 2020-05-19 | End: 2020-05-19

## 2020-05-19 RX ORDER — CHLORTHALIDONE 25 MG/1
25 TABLET ORAL DAILY
Status: DISCONTINUED | OUTPATIENT
Start: 2020-05-19 | End: 2020-05-19

## 2020-05-19 RX ORDER — GLUCAGON 1 MG
1 KIT INJECTION
Status: DISCONTINUED | OUTPATIENT
Start: 2020-05-19 | End: 2020-05-25

## 2020-05-19 RX ORDER — NAPROXEN SODIUM 220 MG/1
81 TABLET, FILM COATED ORAL DAILY
Status: DISCONTINUED | OUTPATIENT
Start: 2020-05-19 | End: 2020-05-21

## 2020-05-19 RX ORDER — ROPINIROLE 0.25 MG/1
0.5 TABLET, FILM COATED ORAL NIGHTLY
Status: DISCONTINUED | OUTPATIENT
Start: 2020-05-19 | End: 2020-06-01 | Stop reason: HOSPADM

## 2020-05-19 RX ORDER — ATORVASTATIN CALCIUM 20 MG/1
40 TABLET, FILM COATED ORAL NIGHTLY
Status: DISCONTINUED | OUTPATIENT
Start: 2020-05-19 | End: 2020-05-20

## 2020-05-19 RX ORDER — MORPHINE SULFATE 2 MG/ML
2 INJECTION, SOLUTION INTRAMUSCULAR; INTRAVENOUS EVERY 4 HOURS PRN
Status: DISCONTINUED | OUTPATIENT
Start: 2020-05-19 | End: 2020-05-25

## 2020-05-19 RX ORDER — HEPARIN SODIUM 5000 [USP'U]/ML
5000 INJECTION, SOLUTION INTRAVENOUS; SUBCUTANEOUS EVERY 8 HOURS
Status: COMPLETED | OUTPATIENT
Start: 2020-05-19 | End: 2020-05-19

## 2020-05-19 RX ORDER — ONDANSETRON 2 MG/ML
4 INJECTION INTRAMUSCULAR; INTRAVENOUS EVERY 8 HOURS PRN
Status: DISCONTINUED | OUTPATIENT
Start: 2020-05-19 | End: 2020-05-25

## 2020-05-19 RX ORDER — CLOPIDOGREL BISULFATE 75 MG/1
75 TABLET ORAL DAILY
Status: DISCONTINUED | OUTPATIENT
Start: 2020-05-20 | End: 2020-05-20

## 2020-05-19 RX ORDER — ACETAMINOPHEN 325 MG/1
650 TABLET ORAL EVERY 4 HOURS PRN
Status: DISCONTINUED | OUTPATIENT
Start: 2020-05-19 | End: 2020-05-25

## 2020-05-19 RX ORDER — LATANOPROST 50 UG/ML
1 SOLUTION/ DROPS OPHTHALMIC NIGHTLY
Status: DISCONTINUED | OUTPATIENT
Start: 2020-05-19 | End: 2020-05-25

## 2020-05-19 RX ORDER — INSULIN ASPART 100 [IU]/ML
1-10 INJECTION, SOLUTION INTRAVENOUS; SUBCUTANEOUS
Status: DISCONTINUED | OUTPATIENT
Start: 2020-05-19 | End: 2020-05-25

## 2020-05-19 RX ORDER — CLOPIDOGREL 300 MG/1
600 TABLET, FILM COATED ORAL ONCE
Status: COMPLETED | OUTPATIENT
Start: 2020-05-19 | End: 2020-05-19

## 2020-05-19 RX ADMIN — ASPIRIN 81 MG 81 MG: 81 TABLET ORAL at 08:05

## 2020-05-19 RX ADMIN — VALSARTAN 320 MG: 80 TABLET, FILM COATED ORAL at 08:05

## 2020-05-19 RX ADMIN — CLOPIDOGREL BISULFATE 600 MG: 300 TABLET, FILM COATED ORAL at 10:05

## 2020-05-19 RX ADMIN — LATANOPROST 1 DROP: 50 SOLUTION OPHTHALMIC at 10:05

## 2020-05-19 RX ADMIN — HEPARIN SODIUM 5000 UNITS: 5000 INJECTION, SOLUTION INTRAVENOUS; SUBCUTANEOUS at 01:05

## 2020-05-19 RX ADMIN — FLUOXETINE 20 MG: 20 CAPSULE ORAL at 08:05

## 2020-05-19 RX ADMIN — PANTOPRAZOLE SODIUM 40 MG: 40 TABLET, DELAYED RELEASE ORAL at 01:05

## 2020-05-19 RX ADMIN — INSULIN DETEMIR 21 UNITS: 100 INJECTION, SOLUTION SUBCUTANEOUS at 08:05

## 2020-05-19 RX ADMIN — AMLODIPINE BESYLATE 10 MG: 10 TABLET ORAL at 08:05

## 2020-05-19 RX ADMIN — MORPHINE SULFATE 2 MG: 2 INJECTION, SOLUTION INTRAMUSCULAR; INTRAVENOUS at 10:05

## 2020-05-19 RX ADMIN — ATORVASTATIN CALCIUM 40 MG: 20 TABLET, FILM COATED ORAL at 10:05

## 2020-05-19 RX ADMIN — HEPARIN SODIUM 5000 UNITS: 5000 INJECTION, SOLUTION INTRAVENOUS; SUBCUTANEOUS at 10:05

## 2020-05-19 RX ADMIN — CHLORTHALIDONE 25 MG: 25 TABLET ORAL at 08:05

## 2020-05-19 RX ADMIN — ONDANSETRON 4 MG: 4 TABLET, ORALLY DISINTEGRATING ORAL at 12:05

## 2020-05-19 RX ADMIN — ROPINIROLE HYDROCHLORIDE 0.5 MG: 0.25 TABLET, FILM COATED ORAL at 10:05

## 2020-05-19 RX ADMIN — ISOSORBIDE MONONITRATE 30 MG: 30 TABLET, EXTENDED RELEASE ORAL at 08:05

## 2020-05-19 RX ADMIN — HEPARIN SODIUM 5000 UNITS: 5000 INJECTION, SOLUTION INTRAVENOUS; SUBCUTANEOUS at 06:05

## 2020-05-19 NOTE — PLAN OF CARE
SW spoke to pt and completed discharge assessment, [facility] and would like bedside delivery.  DaughterStarla will provide transportation home.  Pt requested Advance Directives.     05/19/20 [facility]   Discharge Assessment   Assessment Type Discharge Planning Assessment   Confirmed/corrected address and phone number on facesheet? Yes   Assessment information obtained from? Patient   Communicated expected length of stay with patient/caregiver no   Prior to hospitilization cognitive status: Alert/Oriented   Prior to hospitalization functional status: Independent   Current cognitive status: Alert/Oriented   Current Functional Status: Independent   Lives With spouse   Able to Return to Prior Arrangements yes   Is patient able to care for self after discharge? Yes   Readmission Within the Last 30 Days no previous admission in last 30 days   Patient currently being followed by outpatient case management? No   Patient currently receives any other outside agency services? No   Equipment Currently Used at Home none   Do you have any problems affording any of your prescribed medications? No   Is the patient taking medications as prescribed? yes   Does the patient have transportation home? Yes   Transportation Anticipated car, drives self   Does the patient receive services at the [facility]? No   Discharge Plan A Home   Patient/Family in Agreement with Plan yes

## 2020-05-19 NOTE — TELEPHONE ENCOUNTER
Chest pain since yesterday.  Now it is increasing .  No sweating and no nausea    Reason for Disposition   [1] Chest pain lasts > 5 minutes AND [2] described as crushing, pressure-like, or heavy    Additional Information   Negative: Severe difficulty breathing (e.g., struggling for each breath, speaks in single words)   Negative: Difficult to awaken or acting confused (e.g., disoriented, slurred speech)   Negative: Shock suspected (e.g., cold/pale/clammy skin, too weak to stand, low BP, rapid pulse)   Negative: [1] Chest pain lasts > 5 minutes AND [2] history of heart disease  (i.e., heart attack, bypass surgery, angina, angioplasty, CHF; not just a heart murmur)    Protocols used: CHEST PAIN-A-

## 2020-05-19 NOTE — NURSING
Patient awake and alert, oriented X4. O2 sats >92% on room air. Denies chest pain on shift. VSS. Left floor for ECHO today. Trops Q6H. Continue to trend. Up ad onofre. Steady gait. Call light w/in reach. Encouraged to call for assist. Bed to lowest position and locked. Call light w/in reach. Encouraged to call for assist. Purposeful rounding completed.

## 2020-05-19 NOTE — ED PROVIDER NOTES
Encounter Date: 5/18/2020       History     Chief Complaint   Patient presents with    Chest Pain     PT C/O NON RADIATING MIDSTERNAL SP SINCE YESTERDAY, DENIES ANY OTHER SYMPTOMS     70 y.o. female Past Medical History:  No date: Allergic rhinitis, seasonal  No date: Anxiety  No date: Arthritis  No date: CVA (cerebral vascular accident)  No date: Depression  No date: Glaucoma NEC  No date: Hallucination      Comment:  images out of corner of eye about a year ago  No date: History of positive PPD - treated - remote past  No date: Hx of psychiatric care  No date: Hyperlipidemia LDL goal < 100  No date: Hypertension  2/26/2019: Nuclear sclerosis of both eyes  No date: Obesity  No date: CHETNA (obstructive sleep apnea)  No date: Psychiatric problem  No date: Psychosis  No date: Renal disorder  No date: Sleep difficulties  No date: Suicide attempt      Comment:  about 30 years ago by overdose of pills  No date: Therapy  No date: Type II or unspecified type diabetes mellitus without   mention of complication, uncontrolled     Presents for evaluation of chest pain since yesterday. Pt notes constant chest pressure like a weight on her chest since yesterday, notes that there is an exertional component which makes it worse.      12/19/19 stress test  The perfusion scan is free of evidence from myocardial ischemia or injury.    There is a mild to moderate intensity defect in the anterior and anteroapical wall of the left ventricle, secondary to breast attenuation.    Sensitivity was reduced because patient unable to achieve target heart rate (reached 82% of MAPHR)    Gated perfusion images showed an ejection fraction of 73 %    There is normal wall motion at rest and post stress.    The EKG portion of this study is negative for ischemia.    The patient reported no chest pain during the stress test.    There were no arrhythmias during stress.           Review of patient's allergies indicates:   Allergen Reactions    Ace  inhibitors Other (See Comments)     cough    Invokana [canagliflozin] Other (See Comments)     Throat and tongue swelling    Ozempic [semaglutide]      Nausea and constipation on 0.25 mg dose.      Past Medical History:   Diagnosis Date    Allergic rhinitis, seasonal     Anxiety     Arthritis     CVA (cerebral vascular accident)     Depression     Glaucoma NEC     Hallucination     images out of corner of eye about a year ago    History of positive PPD - treated - remote past     Hx of psychiatric care     Hyperlipidemia LDL goal < 100     Hypertension     Nuclear sclerosis of both eyes 2019    Obesity     CHETNA (obstructive sleep apnea)     Psychiatric problem     Psychosis     Renal disorder     Sleep difficulties     Suicide attempt     about 30 years ago by overdose of pills    Therapy     Type II or unspecified type diabetes mellitus without mention of complication, uncontrolled      Past Surgical History:   Procedure Laterality Date    CARPAL TUNNEL RELEASE       SECTION, CLASSIC      COLONOSCOPY N/A 3/5/2020    Procedure: COLONOSCOPY;  Surgeon: Wiliam Carrillo MD;  Location: [facility] ([address] [place] [address]);  Service: Endoscopy;  Laterality: N/A;  20 appt confirmed-rb  pt requested this date    TOTAL ABDOMINAL HYSTERECTOMY      TRIGGER FINGER RELEASE       Family History   Problem Relation Age of Onset    Hypertension Mother     Diabetes Mother     Heart failure Mother     Cataracts Mother     Glaucoma Mother     Prostate cancer Father     Hypertension Father     Diabetes Father     Heart failure Father     No Known Problems Sister     Diabetes Maternal Uncle     Hyperlipidemia Maternal Uncle     Hypertension Maternal Uncle     Diabetes Maternal Grandmother     Diabetes Maternal Grandfather     No Known Problems Paternal Grandmother     No Known Problems Paternal Grandfather     Diabetes Maternal Aunt     Alzheimer's disease Maternal Aunt     Schizophrenia  Maternal Aunt     Heart disease Cousin         s/p heart transplant    No Known Problems Paternal Aunt     No Known Problems Paternal Uncle     Drug abuse Grandchild     Amblyopia Neg Hx     Blindness Neg Hx     Cancer Neg Hx     Macular degeneration Neg Hx     Retinal detachment Neg Hx     Strabismus Neg Hx     Stroke Neg Hx     Thyroid disease Neg Hx      Social History     Tobacco Use    Smoking status: Former Smoker     Packs/day: 0.50     Types: Cigarettes     Last attempt to quit: 11/10/2012     Years since quittin.5    Smokeless tobacco: Never Used   Substance Use Topics    Alcohol use: Not Currently     Comment: drank socially in the past    Drug use: Not Currently     Types: Marijuana     Comment: tried once 6 months ago     Review of Systems   Constitutional: Negative for fever.   HENT: Negative for sore throat.    Respiratory: Negative for shortness of breath.    Cardiovascular: Positive for chest pain.   Gastrointestinal: Negative for nausea.   Genitourinary: Negative for dysuria.   Musculoskeletal: Negative for back pain.   Skin: Negative for rash.   Neurological: Negative for weakness.   Hematological: Does not bruise/bleed easily.   All other systems reviewed and are negative.      Physical Exam     Initial Vitals [20 2234]   BP Pulse Resp Temp SpO2   (!) 136/59 68 20 98.2 °F (36.8 °C) 98 %      MAP       --         Physical Exam    Nursing note and vitals reviewed.  Constitutional: She appears well-developed and well-nourished.   HENT:   Head: Normocephalic and atraumatic.   Eyes: Conjunctivae and EOM are normal. Pupils are equal, round, and reactive to light.   Neck: Normal range of motion.   Cardiovascular: Normal rate.   Pulmonary/Chest: No respiratory distress.   Abdominal: She exhibits no distension.   Musculoskeletal: Normal range of motion.   Neurological: She is alert. No cranial nerve deficit. GCS score is 15. GCS eye subscore is 4. GCS verbal subscore is 5. GCS  "motor subscore is 6.   Skin: Skin is warm and dry.   Psychiatric: She has a normal mood and affect. Thought content normal.     no reproduction with palpation    ED Course   Procedures  Labs Reviewed   POCT GLUCOSE, HAND-HELD DEVICE   POCT CBC   POCT CMP   POCT TROPONIN     EKG Readings: (Independently Interpreted)   Hr 64, sinus, nl axis/intervals, no breana/twi       Imaging Results    None             Additional MDM:   Heart Score:    History:          Moderately suspicious.  ECG:             Normal  Age:               >65 years  Risk factors: >= 3 risk factors or history of atherosclerotic disease  Troponin:       Less than or equal to normal limit  Final Score: 5                     nitro x 2 almost completely alleviated her chest pain      I have discussed with the patient that given her heart score of 5, exertional chest pain and chest pain alleviated with nitro that she will need serial trop and likely a stress test. The patient refuses transfer to [facility] which she refers to as [facility]" [facility] I have d/w pt that it is no longer the same health system and all physicians have changed/[facility] of [facility] etc. But she continues to refuse transfer to [facility], in fact she states that any other hospital in the entire [facility] except there.    Clinical Impression:       ICD-10-CM ICD-9-CM   1. Chest pain R07.9 786.50                                Gabby [place] MD  05/19/20 0201    "

## 2020-05-19 NOTE — ASSESSMENT & PLAN NOTE
- Most recent NM stress test 12/2019 negative for ischemia, but did show mild to moderate intensity defect in the anterior and anteroapical wall of the left ventricle, secondary to breast attenuation.    - Continues with intermittent midsternal chest discomfort with left chest wall tenderness  - Initial EKG without ischemic changes and troponin negative; will continue to trend  - Continue ASA, atorvastatin 40 mg daily, continue home antihypertensive regimen  - Known cardiac risk factors and will consult Cardiology for evaluation

## 2020-05-19 NOTE — CONSULTS
[facility]  Cardiology  Consult Note    Patient Name: Cecilia Barahona  MRN: [id number]  Admission Date: 5/18/2020  Hospital Length of Stay: 0 days  Code Status: Full Code   Attending Provider: Chele Galindo MD   Consulting Provider: Etelvina Lowery MD  Primary Care Physician: Yudi Smart MD  Principal Problem:Chest pain    Patient information was obtained from patient, past medical records and ER records.     Inpatient consult to Cardiology  Consult performed by: Etelvina Lowery MD  Consult ordered by: Karen Arias NP  Reason for consult: Chest pain        Subjective:     Chief Complaint:  Chest pain.     HPI: Cecilia Barahona is a 70 y.o.female with hypertension, hypercholesterolemia and diabetes mellitus type 2. She is mildly obese. She appears to have had a CVA in about 2004 when she had an episode of weakness in her left hand. She had exertional chest discomfort in 12/2019. She was given isosorbidemononitrate and the chest discomfort resolved. She had a Stress MPI that was negative.    On 5/16/2020 she began to experience a mild pressure over her chest. The heaviness lasted most of the day. Walking in her house made the discomfort more pronounced. There was no radiation of the pain and neither any associated dyspnea or diaphoresis. She had similar discomfort on 5/17/2020 and 5/18/2020. She told her daughter that took her to . She received NTG sl x 2 tablets and the chest discomfort resolved. There were no acute ST-T wave changes. [facility] She has had no further chest pain since presentation.      Past Medical History:   Diagnosis Date    Allergic rhinitis, seasonal     Anxiety     Arthritis     CVA (cerebral vascular accident)     Depression     Glaucoma NEC     Hallucination     images out of corner of eye about a year ago    History of positive PPD - treated - remote past     Hx of psychiatric care     Hyperlipidemia LDL  goal < 100     Hypertension     Nuclear sclerosis of both eyes 2019    Obesity     CHETNA (obstructive sleep apnea)     Psychiatric problem     Psychosis     Renal disorder     Sleep difficulties     Suicide attempt     about 30 years ago by overdose of pills    Therapy     Type II or unspecified type diabetes mellitus without mention of complication, uncontrolled        Past Surgical History:   Procedure Laterality Date    CARPAL TUNNEL RELEASE       SECTION, CLASSIC      COLONOSCOPY N/A 3/5/2020    Procedure: COLONOSCOPY;  Surgeon: Wiliam Carrillo MD;  Location: [address]);  Service: Endoscopy;  Laterality: N/A;  20 appt confirmed-rb  pt requested this date    TOTAL ABDOMINAL HYSTERECTOMY      TRIGGER FINGER RELEASE         Review of patient's allergies indicates:   Allergen Reactions    Ace inhibitors Other (See Comments)     cough    Invokana [canagliflozin] Other (See Comments)     Throat and tongue swelling    Ozempic [semaglutide]      Nausea and constipation on 0.25 mg dose.        No current facility-administered medications on file prior to encounter.      Current Outpatient Medications on File Prior to Encounter   Medication Sig    alcohol swabs PadM Apply 1 each topically 3 (three) times daily.    amLODIPine (NORVASC) 10 MG tablet TAKE ONE TABLET BY MOUTH EVERY DAY    artificial tear ointment (ARTIFICIAL TEARS) Oint Place into both eyes every evening.    aspirin 81 MG Chew Take by mouth once daily. 1 tablet, chewable Oral Every day    atorvastatin (LIPITOR) 40 MG tablet Take 1 tablet (40 mg total) by mouth every evening. For cholesterol.    azelastine (OPTIVAR) 0.05 % ophthalmic solution INSTILL 1 DROP IN EACH EYE TWICE DAILY    chlorthalidone (HYGROTEN) 25 MG Tab TAKE ONE TABLET BY MOUTH EVERY DAY    dulaglutide (TRULICITY) 1.5 mg/0.5 mL PnIj Inject 1.5 mg into the skin every 7 days.    empagliflozin (JARDIANCE) 10 mg Tab Take 10 mg by mouth once  daily.    flash glucose scanning reader (FREESTYLE ANAMARIA READER) Misc 1 Units by Misc.(Non-Drug; Combo Route) route before meals as needed.    fluticasone (FLONASE) 50 mcg/actuation nasal spray 1 spray (50 mcg total) by Each Nare route once daily.    insulin degludec (TRESIBA FLEXTOUCH U-200) 200 unit/mL (3 mL) InPn Inject 36 Units into the skin once daily. Inject 36 units once daily (Patient taking differently: Inject 30 Units into the skin once daily. Inject 36 units once daily)    isosorbide mononitrate (IMDUR) 30 MG 24 hr tablet Take 1 tablet (30 mg total) by mouth once daily.    latanoprost 0.005 % ophthalmic solution PLACE 1 DROP IN EACH EYE EVERY EVENING    loratadine (CLARITIN) 10 mg tablet TAKE ONE TABLET BY MOUTH EVERY DAY AS NEEDED FOR ALLERGIES    metFORMIN (GLUCOPHAGE) 1000 MG tablet TAKE ONE TABLET BY MOUTH TWICE DAILY WITH MEALS    multivitamin with minerals tablet Take 1 tablet by mouth once daily.    naproxen (NAPROSYN) 500 MG tablet Take 500 mg by mouth 2 (two) times daily as needed.    rOPINIRole (REQUIP) 0.5 MG tablet TAKE ONE Tablet BY MOUTH EVERY NIGHT AT BEDTIME    albuterol 90 mcg/actuation inhaler Inhale 2 puffs into the lungs every 6 (six) hours as needed for Wheezing or Shortness of Breath. Rescue    blood pressure monitor (BLOOD PRESSURE KIT) Kit 1 kit by Misc.(Non-Drug; Combo Route) route once daily.    blood-glucose meter,continuous (DEXCOM G6 ) Misc Use with dexcom G6 system    blood-glucose sensor (DEXCOM G6 SENSOR) Vanna Change sensor every 10 days    blood-glucose transmitter (DEXCOM G6 TRANSMITTER) Vanna Change every 3 months    desoximetasone (TOPICORT) 0.05 % cream Apply topically 2 (two) times daily as needed.    FLUoxetine 20 MG capsule Take 1 capsule (20 mg total) by mouth once daily.    FREESTYLE ANAMARIA 14 DAY SENSOR Kit CHANGE SENSOR EVERY 14 DAYS    lancets Misc Check blood glucose 4 times a day. Dispense accuchek meter or other meter preferred by  "insurance. Dx code e11.65    LANCING DEVICE WITH LANCETS Misc USE AS DIRECTED FOR DIABETIC TESTING    pen needle, diabetic (BD ULTRA-FINE LENY PEN NEEDLES) 32 gauge x 5/32" Ndle USE FIVE TIMES DAILY    valsartan (DIOVAN) 160 MG tablet Take 2 tablets (320 mg total) by mouth once daily. (Patient taking differently: Take 320 mg by mouth 2 (two) times daily. )     Family History     Problem Relation (Age of Onset)    Alzheimer's disease Maternal Aunt    Cataracts Mother    Diabetes Mother, Father, Maternal Uncle, Maternal Grandmother, Maternal Grandfather, Maternal Aunt    Drug abuse Grandchild    Glaucoma Mother    Heart disease Cousin    Heart failure Mother, Father    Hyperlipidemia Maternal Uncle    Hypertension Mother, Father, Maternal Uncle    No Known Problems Sister, Paternal Grandmother, Paternal Grandfather, Paternal Aunt, Paternal Uncle    Prostate cancer Father    Schizophrenia Maternal Aunt        Tobacco Use    Smoking status: Former Smoker     Packs/day: 0.50     Types: Cigarettes     Last attempt to quit: 11/10/2012     Years since quittin.5    Smokeless tobacco: Never Used   Substance and Sexual Activity    Alcohol use: Not Currently     Comment: drank socially in the past    Drug use: Not Currently     Types: Marijuana     Comment: tried once 6 months ago    Sexual activity: Not Currently     Partners: Male     Review of Systems   Constitution: Negative for chills, fever and malaise/fatigue.   HENT: Negative for nosebleeds.    Eyes: Negative for double vision, vision loss in left eye and vision loss in right eye.   Cardiovascular: Positive for chest pain. Negative for claudication, dyspnea on exertion, irregular heartbeat, leg swelling, near-syncope, orthopnea, palpitations, paroxysmal nocturnal dyspnea and syncope.   Respiratory: Negative for cough, hemoptysis, shortness of breath and wheezing.    Endocrine: Negative for cold intolerance and heat intolerance.   Hematologic/Lymphatic: " Negative for bleeding problem. Does not bruise/bleed easily.   Skin: Negative for color change and rash.   Musculoskeletal: Negative for back pain, falls, muscle weakness and myalgias.   Gastrointestinal: Negative for heartburn, hematemesis, hematochezia, hemorrhoids, jaundice, melena, nausea and vomiting.   Genitourinary: Negative for dysuria and hematuria.   Neurological: Negative for dizziness, focal weakness, headaches, light-headedness, loss of balance, numbness, vertigo and weakness.   Psychiatric/Behavioral: Positive for memory loss. Negative for altered mental status and depression. The patient is not nervous/anxious.    Allergic/Immunologic: Negative for hives and persistent infections.     Objective:     Vital Signs (Most Recent):  Temp: 97.7 °F (36.5 °C) (05/19/20 1623)  Pulse: 64 (05/19/20 1800)  Resp: 18 (05/19/20 1700)  BP: 109/61 (05/19/20 1623)  SpO2: 95 % (05/19/20 1700) Vital Signs (24h Range):  Temp:  [97.7 °F (36.5 °C)-98.5 °F (36.9 °C)] 97.7 °F (36.5 °C)  Pulse:  [57-74] 64  Resp:  [14-20] 18  SpO2:  [93 %-100 %] 95 %  BP: (109-173)/(54-72) 109/61     Weight: 85.7 kg (189 lb)  Body mass index is 33.48 kg/m².    SpO2: 95 %  O2 Device (Oxygen Therapy): room air      Intake/Output Summary (Last 24 hours) at 5/19/2020 1828  Last data filed at 5/19/2020 1700  Gross per 24 hour   Intake 600 ml   Output 5 ml   Net 595 ml       Lines/Drains/Airways     Peripheral Intravenous Line                 Peripheral IV - Single Lumen 05/18/20 2305 20 G Left Forearm less than 1 day                Physical Exam   Constitutional: She is oriented to person, place, and time. She appears well-developed and well-nourished.  Non-toxic appearance. No distress.   HENT:   Head: Normocephalic and atraumatic.   Nose: Nose normal.   Eyes: Right eye exhibits no discharge. Left eye exhibits no discharge. Right conjunctiva is not injected. Left conjunctiva is not injected. Right pupil is round. Left pupil is round. Pupils are  equal.   Neck: Neck supple. No JVD present. Carotid bruit is not present. No thyromegaly present.   Cardiovascular: Normal rate, regular rhythm, S1 normal and S2 normal.  No extrasystoles are present. PMI is not displaced. Exam reveals gallop and S4.   No murmur heard.  Pulses:       Radial pulses are 2+ on the right side, and 2+ on the left side.        Femoral pulses are 2+ on the right side, and 2+ on the left side.       Dorsalis pedis pulses are 2+ on the right side, and 2+ on the left side.        Posterior tibial pulses are 2+ on the right side, and 2+ on the left side.   Pulmonary/Chest: Effort normal and breath sounds normal.   Abdominal: Soft. Normal appearance. There is no hepatosplenomegaly. There is no tenderness.   Musculoskeletal:        Right ankle: She exhibits no swelling, no ecchymosis and no deformity.        Left ankle: She exhibits no swelling, no ecchymosis and no deformity.   Lymphadenopathy:        Head (right side): No submandibular adenopathy present.        Head (left side): No submandibular adenopathy present.     She has no cervical adenopathy.   Neurological: She is alert and oriented to person, place, and time. She is not disoriented. No cranial nerve deficit.   Skin: Skin is warm, dry and intact. No rash noted. She is not diaphoretic.   Psychiatric: She has a normal mood and affect. Her speech is normal and behavior is normal. Judgment and thought content normal. Cognition and memory are normal.       Current Medications:     amLODIPine  10 mg Oral Daily    aspirin  81 mg Oral Daily    atorvastatin  40 mg Oral QHS    chlorthalidone  25 mg Oral Daily    FLUoxetine  20 mg Oral Daily    heparin (porcine)  5,000 Units Subcutaneous Q8H    insulin detemir U-100  21 Units Subcutaneous Daily    isosorbide mononitrate  30 mg Oral Daily    latanoprost  1 drop Both Eyes QHS    pantoprazole  40 mg Oral Daily    rOPINIRole  0.5 mg Oral Nightly    valsartan  320 mg Oral Daily      Current Laboratory Results:    Recent Results (from the past 24 hour(s))   COVID-19 Rapid Screening    Collection Time: 05/18/20 11:00 PM   Result Value Ref Range    SARS-CoV-2 RNA, Amplification, Qual Negative Negative   POCT CMP    Collection Time: 05/18/20 11:12 PM   Result Value Ref Range    Albumin, POC 3.1 3.3 - 5.5 g/dL    Alkaline Phosphatase,  42 - 141 U/L    ALT (SGPT), POC 23 10 - 47 U/L    AST (SGOT), POC 29 11 - 38 U/L    POC BUN 20 7 - 22 mg/dL    Calcium, POC 9.6 8 - 10.3 mg/dL    POC Chloride 99 98 - 108 mmol/L    POC Creatinine 1.0 0.6 - 1.2 mg/dL    POC Glucose 209 (H) 73 - 118 mg/dL    POC Potassium 3.8 3.6 - 5.1 mmol/L    POC Sodium 142 128 - 145 mmol/L    Bilirubin 0.5 0.2 - 1.6 mg/dL    POC TCO2 27 18 - 33 mmol/L    Protein 7.1 6.4 - 8.1 g/dL   Troponin ISTAT    Collection Time: 05/18/20 11:15 PM   Result Value Ref Range    POC Cardiac Troponin I 0.00 <0.09 ng/mL    Sample unknown    CBC auto differential    Collection Time: 05/19/20  4:51 AM   Result Value Ref Range    WBC 9.18 3.90 - 12.70 K/uL    RBC 3.95 (L) 4.00 - 5.40 M/uL    Hemoglobin 11.4 (L) 12.0 - 16.0 g/dL    Hematocrit 36.7 (L) 37.0 - 48.5 %    Mean Corpuscular Volume 93 82 - 98 fL    Mean Corpuscular Hemoglobin 28.9 27.0 - 31.0 pg    Mean Corpuscular Hemoglobin Conc 31.1 (L) 32.0 - 36.0 g/dL    RDW 14.3 11.5 - 14.5 %    Platelets 276 150 - 350 K/uL    MPV 9.2 9.2 - 12.9 fL    Immature Granulocytes 0.1 0.0 - 0.5 %    Gran # (ANC) 5.1 1.8 - 7.7 K/uL    Immature Grans (Abs) 0.01 0.00 - 0.04 K/uL    Lymph # 3.0 1.0 - 4.8 K/uL    Mono # 0.9 0.3 - 1.0 K/uL    Eos # 0.1 0.0 - 0.5 K/uL    Baso # 0.02 0.00 - 0.20 K/uL    nRBC 0 0 /100 WBC    Gran% 55.8 38.0 - 73.0 %    Lymph% 32.7 18.0 - 48.0 %    Mono% 9.7 4.0 - 15.0 %    Eosinophil% 1.5 0.0 - 8.0 %    Basophil% 0.2 0.0 - 1.9 %    Differential Method Automated    Comprehensive metabolic panel    Collection Time: 05/19/20  4:51 AM   Result Value Ref Range    Sodium 139 136 - 145  mmol/L    Potassium 3.7 3.5 - 5.1 mmol/L    Chloride 102 95 - 110 mmol/L    CO2 26 23 - 29 mmol/L    Glucose 128 (H) 70 - 110 mg/dL    BUN, Bld 22 8 - 23 mg/dL    Creatinine 0.9 0.5 - 1.4 mg/dL    Calcium 9.3 8.7 - 10.5 mg/dL    Total Protein 7.0 6.0 - 8.4 g/dL    Albumin 3.2 (L) 3.5 - 5.2 g/dL    Total Bilirubin 0.2 0.1 - 1.0 mg/dL    Alkaline Phosphatase 115 55 - 135 U/L    AST 19 10 - 40 U/L    ALT 18 10 - 44 U/L    Anion Gap 11 8 - 16 mmol/L    eGFR if African American >60 >60 mL/min/1.73 m^2    eGFR if non African American >60 >60 mL/min/1.73 m^2   Troponin I    Collection Time: 05/19/20  4:51 AM   Result Value Ref Range    Troponin I 0.006 0.000 - 0.026 ng/mL   Lipid Panel    Collection Time: 05/19/20  4:51 AM   Result Value Ref Range    Cholesterol 204 (H) 120 - 199 mg/dL    Triglycerides 171 (H) 30 - 150 mg/dL    HDL 41 40 - 75 mg/dL    LDL Cholesterol 128.8 63.0 - 159.0 mg/dL    Hdl/Cholesterol Ratio 20.1 20.0 - 50.0 %    Total Cholesterol/HDL Ratio 5.0 2.0 - 5.0    Non-HDL Cholesterol 163 mg/dL   Hemoglobin A1C    Collection Time: 05/19/20  4:51 AM   Result Value Ref Range    Hemoglobin A1C 7.1 (H) 4.0 - 5.6 %    Estimated Avg Glucose 157 (H) 68 - 131 mg/dL   POCT glucose    Collection Time: 05/19/20  7:29 AM   Result Value Ref Range    POCT Glucose 107 70 - 110 mg/dL   Troponin I    Collection Time: 05/19/20 11:20 AM   Result Value Ref Range    Troponin I 0.008 0.000 - 0.026 ng/mL   POCT glucose    Collection Time: 05/19/20 11:43 AM   Result Value Ref Range    POCT Glucose 126 (H) 70 - 110 mg/dL   Echo Color Flow Doppler? Yes    Collection Time: 05/19/20  2:40 PM   Result Value Ref Range    Ascending aorta 2.74 cm    STJ 2.89 cm    AV mean gradient 8 mmHg    Ao peak monse 1.81 m/s    Ao VTI 35.01 cm    IVS 0.89 0.6 - 1.1 cm    LA size 3.74 cm    Left Atrium Major Axis 4.99 cm    Left Atrium Minor Axis 4.59 cm    LVIDD 4.54 3.5 - 6.0 cm    LVIDS 2.65 2.1 - 4.0 cm    LVOT diameter 2.19 cm    LVOT peak VTI  23.88 cm    PW 0.89 0.6 - 1.1 cm    MV Peak A Chidi 1.20 m/s    E wave decelartion time 220.29 msec    MV Peak E Chidi 0.83 m/s    PV Peak D Chidi 0.45 m/s    PV Peak S Chidi 0.64 m/s    RA Major Axis 4.91 cm    Sinus 3.49 cm    TAPSE 2.57 cm    TR Max Chidi 1.31 m/s    LA WIDTH 4.39 cm    PV PEAK VELOCITY 0.90 cm/s    LV Diastolic Volume 94.41 mL    LV Systolic Volume 25.73 mL    LVOT peak chidi 1.08 m/s    FS 42 %    LA volume 66.73 cm3    LV mass 132.77 g    Left Ventricle Relative Wall Thickness 0.39 cm    AV valve area 2.57 cm2    AV Velocity Ratio 0.60     AV index (prosthetic) 0.68     E/A ratio 0.69     Pulm vein S/D ratio 1.42     LVOT area 3.8 cm2    LVOT stroke volume 89.91 cm3    AV peak gradient 13 mmHg    LV Systolic Volume Index 13.6 mL/m2    LV Diastolic Volume Index 50.01 mL/m2    LA Volume Index 35.3 mL/m2    LV Mass Index 70 g/m2    Triscuspid Valve Regurgitation Peak Gradient 7 mmHg    BSA 1.95 m2    TDI SEPTAL 0.07 m/s    LV LATERAL E/E' RATIO 10.38 m/s    LV SEPTAL E/E' RATIO 11.86 m/s    TDI LATERAL 0.08 m/s    MV valve area p 1/2 method 3.44 cm2    Mean e' 0.08 m/s    E/E' ratio 11.07 m/s    MV stenosis pressure 1/2 time 64 ms    Right Atrial Pressure (from IVC) 3 mmHg    TV rest pulmonary artery pressure 10 mmHg   POCT glucose    Collection Time: 05/19/20  4:22 PM   Result Value Ref Range    POCT Glucose 160 (H) 70 - 110 mg/dL     Current Imaging Results:    X-Ray Chest 1 View   Final Result      There is no radiographic evidence for acute intrathoracic process.         Electronically signed by: Ananda Barrientos   Date:    05/18/2020   Time:    23:31        12/19/2019: Stress MPI:     The perfusion scan is free of evidence from myocardial ischemia or injury.  There is a mild to moderate intensity defect in the anterior and anteroapical wall of the left ventricle, secondary to breast attenuation.   Sensitivity was reduced because patient unable to achieve target heart rate (reached 82% of MAPHR)  Gated  perfusion images showed an ejection fraction of 73 %  There is normal wall motion at rest and post stress.  The EKG portion of this study is negative for ischemia.  The patient reported no chest pain during the stress test.  There were no arrhythmias during stress.      5/19/2020: Echo:    Normal left ventricular systolic function. The estimated ejection fraction is 75%.  Normal right ventricular systolic function.  Grade I (mild) left ventricular diastolic dysfunction consistent with impaired relaxation.  Mild left atrial enlargement.  Mild aortic valve sclerosis.  Moderate mitral sclerosis.  The estimated PA systolic pressure is 10 mmHg.  Normal central venous pressure (3 mmHg).      5/19/2020: ECG:    NSR. PRWP. Inferior Q waves.      Assessment and Plan:     Active Diagnoses:    Diagnosis Date Noted POA    PRINCIPAL PROBLEM:  Chest pain [R07.9] 05/19/2020 Yes    Mild major depression [F32.0] 01/17/2020 Yes    Restless legs syndrome (RLS) [G25.81] 03/12/2018 Yes    Type 2 diabetes mellitus without complication, with long-term current use of insulin [E11.9, Z79.4] 09/30/2015 Not Applicable    Gastroesophageal reflux disease without esophagitis [K21.9] 09/30/2015 Yes    Mixed hyperlipidemia [E78.2]  Yes    Essential hypertension [I10]  Yes    Glaucoma NEC [H40.89]  Yes      Problems Resolved During this Admission:     Assessment and Plan:    1. Chest Pain   12/2019: Began experience chest pain. Resolved with isosorbidemononitrate.   12/19/2019: Stress MPI:3:39 min. No CP. ECG negative. MPI: Mild to moderate fixed anterior and anteroapical defect. EF 73%.   5/18/2020: Presents with more pronounced chest pain. Resolved with NTG sl.   ECG without acute changes. Troponin x 3 negative.    5/19/2020: Echo: Normal left ventricular size and systolic function. EF 75%. Mildly dilated LA. Mild aortic valve sclerosis. Mild mitral valve sclerosis.   Possibly unstable angina.      Would favor coronary angiography. Plan  5/20/2020.    2. History of Cerebrovascular Accident   2004: Weakness in left hand. Affected memory.    3. Hypertension   2000: Diagnosed.   On valsartan 320 mg Q24, amlodipine 10 mg Q24 and chlorthalidone 25 mg Q24    4. Hypercholesterolemia   2000: Began statin.   On atorvastatin 40 mg Q24.    5/19/2020: Chol 204. HDL 41. . .   Will consider increasing dose and add ezetimibe.    5. Diabetes Mellitus, Type 2   2000: Diagnosed. Complications: CVA. Medications: Insulin.      6. Mild Obesity   5/19/2020: Weight 86 kg. BMI 33.    7. Primary Care   Dr. Yudi Smart.     The planned procedure was discussed in detail with the patient and the family members present. Risk, benefit and alternatives were reviewed. All questions answered. Consent was then signed.    If further questions or concerns arise the patient was encouraged to contact me prior to the planned procedure.         VTE Risk Mitigation (From admission, onward)         Ordered     heparin (porcine) injection 5,000 Units  Every 8 hours      05/19/20 0513     IP VTE HIGH RISK PATIENT  Once      05/19/20 0513     Place sequential compression device  Until discontinued      05/19/20 0513                Thank you for your consult.    I will follow-up with patient. Please contact us if you have any additional questions.    Etelvina Lowery MD  Cardiology   [facility]

## 2020-05-19 NOTE — ASSESSMENT & PLAN NOTE
- Most recent A1c 7.1 with morning labs  - Continue cardiac and diabetic diet  - Continue basal insulin 21 units daily, and moderate dose sliding scale.    - Glucose goal during hospitalization between 140 -180, monitor and adjust insulin as needed

## 2020-05-19 NOTE — ASSESSMENT & PLAN NOTE
- last A1C:   Lab Results   Component Value Date    HGBA1C 7.0 (H) 05/05/2020   - hold oral antidiabetic meds   - Diabetic diet   - SSI with accuchekcs AC/HS

## 2020-05-19 NOTE — PROGRESS NOTES
HISTORY OF PRESENT ILLNESS:  Cecilia Barahona is a 68 y.o. female who presents to the clinic today for Hyperlipidemia; Hypertension; and Diabetes  .  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  She reports overall fair dietary habits.  She has not been exercising.  She states her blood sugars at home are in the mid 100s.  Blood pressures at home have been pretty well controlled.  She is trying to sign up on the digital hypertension program.  She continues with depression.  A lot of this is related to her current situation with her .  She recently had a sleep study done that showed obstructive sleep apnea.  She still needs to do a CPAP titration study.  She was diagnosed with restless leg syndrome in the past.  She was on Requip in the past.  She stopped taking it when she stopped having symptoms.  She denies abdominal pain, temperature intolerance, or unexplained changes in her weight.  No cardiac chest pain or shortness of breath.      PAST MEDICAL HISTORY:  Past Medical History:   Diagnosis Date    Allergic rhinitis, seasonal     Arthritis     Glaucoma NEC     History of positive PPD - treated - remote past     Hyperlipidemia LDL goal < 100     Hypertension     Obesity     CHETNA (obstructive sleep apnea)     Type II or unspecified type diabetes mellitus without mention of complication, uncontrolled        PAST SURGICAL HISTORY:  Past Surgical History:   Procedure Laterality Date    CARPAL TUNNEL RELEASE       SECTION, CLASSIC      TOTAL ABDOMINAL HYSTERECTOMY      TRIGGER FINGER RELEASE         SOCIAL HISTORY:  Social History     Social History    Marital status:      Spouse name: N/A    Number of children: N/A    Years of education: N/A     Occupational History    Not on file.     Social History Main Topics    Smoking status: Former Smoker     Packs/day: 0.50  Has upcoming appointment with me this month.     RACHEL Contreras MD  Internal Medicine-Pediatrics         Types: Cigarettes     Quit date: 11/10/2012    Smokeless tobacco: Never Used    Alcohol use No    Drug use: No    Sexual activity: No     Other Topics Concern    Not on file     Social History Narrative    No narrative on file       FAMILY HISTORY:  Family History   Problem Relation Age of Onset    Hypertension Mother     Diabetes Mother     Heart failure Mother     Cataracts Mother     Prostate cancer Father     Hypertension Father     Diabetes Father     Heart failure Father     No Known Problems Sister     Alcohol abuse Maternal Aunt     Diabetes Maternal Uncle     Hyperlipidemia Maternal Uncle     Hypertension Maternal Uncle     Diabetes Maternal Grandmother     Diabetes Maternal Grandfather     No Known Problems Paternal Grandmother     No Known Problems Paternal Grandfather     Diabetes Maternal Aunt     Alzheimer's disease Maternal Aunt     Heart disease Cousin      s/p heart transplant    Amblyopia Neg Hx     Blindness Neg Hx     Cancer Neg Hx     Glaucoma Neg Hx     Macular degeneration Neg Hx     Retinal detachment Neg Hx     Strabismus Neg Hx     Stroke Neg Hx     Thyroid disease Neg Hx        ALLERGIES AND MEDICATIONS: updated and reviewed.  Review of patient's allergies indicates:   Allergen Reactions    Ace inhibitors Other (See Comments)     cough    Invokana [canagliflozin] Other (See Comments)     Throat and tongue swelling     Medication List with Changes/Refills   Current Medications    ALBUTEROL 90 MCG/ACTUATION INHALER    Inhale 2 puffs into the lungs every 6 (six) hours as needed for Wheezing or Shortness of Breath. Rescue    ALCOHOL SWABS PADM    Apply 1 each topically 3 (three) times daily.    ARTIFICIAL TEAR OINTMENT (ARTIFICIAL TEARS) OINT    Place into both eyes every evening.    ASPIRIN 81 MG CHEW    Take by mouth once daily. 1 tablet, chewable Oral Every day    AZELASTINE (OPTIVAR) 0.05 % OPHTHALMIC SOLUTION    Place 1 drop into both eyes 2 (two)  times daily.    BLOOD GLUCOSE CONTROL HIGH,LOW (ACCU-CHEK BRIDGETTE CONTROL SOLN) SOLN    1 kit by Misc.(Non-Drug; Combo Route) route once daily.    BLOOD GLUCOSE CONTROL, LOW (EMBRACE GLUCOSE CONTROL LOW) SOLN    Inject 1 Bottle into the skin every 30 days.    BLOOD PRESSURE MONITOR (BLOOD PRESSURE KIT) KIT    1 kit by Misc.(Non-Drug; Combo Route) route once daily.    BLOOD SUGAR DIAGNOSTIC STRP    Check blood glucose 4 times a day. Dispense accuchek meter or other meter preferred by insurance. Dx code e11.65    BLOOD-GLUCOSE METER KIT    ispense accuchek meter or other meter preferred by insurance. Dx code e11.65    DESOXIMETASONE (TOPICORT) 0.05 % CREAM    Apply topically 2 (two) times daily as needed.    GABAPENTIN (NEURONTIN) 300 MG CAPSULE    Take 1 capsule (300 mg total) by mouth every evening.    IBUPROFEN (ADVIL,MOTRIN) 800 MG TABLET    Take 1 tablet (800 mg total) by mouth every 8 (eight) hours as needed for Pain.    INSULIN ASPART (NOVOLOG FLEXPEN) 100 UNIT/ML INPN PEN    10 units sq three times a day with meals    LANCETS MISC    Check blood glucose 4 times a day. Dispense accuchek meter or other meter preferred by insurance. Dx code e11.65    LANCING DEVICE WITH LANCETS MISC    USE AS DIRECTED FOR DIABETIC TESTING    LATANOPROST 0.005 % OPHTHALMIC SOLUTION    Place 1 drop into both eyes every evening.    LEVEMIR FLEXTOUCH 100 UNIT/ML (3 ML) INPN PEN    38 units sq qhs    LEVOCETIRIZINE (XYZAL) 5 MG TABLET    Take 1 tablet (5 mg total) by mouth nightly as needed for Allergies.    LORATADINE (CLARITIN) 10 MG TABLET    Take 1 tablet (10 mg total) by mouth once daily.    MELOXICAM (MOBIC) 15 MG TABLET    Take 15 mg by mouth once daily.    METHOCARBAMOL (ROBAXIN) 500 MG TAB    Take 750 mg by mouth 4 (four) times daily.     MULTIVITAMIN WITH MINERALS TABLET    Take 1 tablet by mouth once daily.    NAPROXEN (NAPROSYN) 500 MG TABLET    Take 500 mg by mouth 2 (two) times daily as needed.    PEN NEEDLE, DIABETIC (BD  "ULTRA-FINE LENY PEN NEEDLES) 32 GAUGE X 5/32" NDLE    USE FIVE TIMES DAILY    TRAMADOL (ULTRAM) 50 MG TABLET    Take 50 mg by mouth every 6 (six) hours as needed for Pain.   Changed and/or Refilled Medications    Modified Medication Previous Medication    AMLODIPINE (NORVASC) 10 MG TABLET amLODIPine (NORVASC) 10 MG tablet       Take 1 tablet (10 mg total) by mouth once daily.    Take 10 mg by mouth once daily.    BUPROPION (WELLBUTRIN) 75 MG TABLET buPROPion (WELLBUTRIN) 75 MG tablet       Take 2 tablets (150 mg total) by mouth 2 (two) times daily.    Take 2 tablets (150 mg total) by mouth 2 (two) times daily.    HYDROCHLOROTHIAZIDE (HYDRODIURIL) 25 MG TABLET hydroCHLOROthiazide (HYDRODIURIL) 25 MG tablet       Take 1 tablet (25 mg total) by mouth once daily.    Take 1 tablet (25 mg total) by mouth once daily.    LOSARTAN (COZAAR) 100 MG TABLET losartan (COZAAR) 100 MG tablet       Take 1 tablet (100 mg total) by mouth once daily.    Take 1 tablet (100 mg total) by mouth once daily.    METFORMIN (GLUCOPHAGE) 1000 MG TABLET metformin (GLUCOPHAGE) 1000 MG tablet       TAKE ONE Tablet BY MOUTH TWICE DAILY **TAKE WITH FOOD**    TAKE ONE Tablet BY MOUTH TWICE DAILY **TAKE WITH FOOD**    PRAVASTATIN (PRAVACHOL) 40 MG TABLET pravastatin (PRAVACHOL) 40 MG tablet       Take 1 tablet (40 mg total) by mouth once daily.    Take 1 tablet (40 mg total) by mouth once daily.   Discontinued Medications    HYDROXYZINE HCL (ATARAX) 25 MG TABLET    Take 25 mg by mouth nightly.          CARE TEAM:  Patient Care Team:  Yudi Smart MD as PCP - General (Internal Medicine)         REVIEW OF SYSTEMS:  Review of Systems   Constitutional: Positive for fatigue. Negative for chills, fever and unexpected weight change.   HENT: Negative for congestion, ear discharge, ear pain and postnasal drip.    Eyes: Negative for photophobia, pain and visual disturbance.   Respiratory: Negative for cough, shortness of breath and wheezing.  " "  Cardiovascular: Negative for chest pain, palpitations and leg swelling.   Gastrointestinal: Negative for abdominal pain, constipation, diarrhea, nausea and vomiting.   Genitourinary: Negative for dysuria, frequency, urgency and vaginal discharge.   Musculoskeletal: Negative for back pain, joint swelling and neck stiffness.   Skin: Negative for rash.        - mild chronic generalized skin itching   Neurological: Negative for weakness and headaches.   Psychiatric/Behavioral: Positive for dysphoric mood and sleep disturbance. The patient is not nervous/anxious.          PHYSICAL EXAM:   Vitals:    03/12/18 1023   BP: 128/76   Pulse: 69   Temp: 98.5 °F (36.9 °C)     Weight: 91.6 kg (201 lb 15.1 oz)   Height: 5' 3" (160 cm)   Body mass index is 35.77 kg/m².     General appearance - alert, well appearing, and in no distress and obese  Mental status - alert, oriented to person, place, and time, normal behavior, speech, dress, motor activity, and thought processes, depressed mood  Eyes - pupils equal and reactive, extraocular eye movements intact, sclera anicteric  Mouth - mucous membranes moist, pharynx normal without lesions  Neck - supple, no significant adenopathy, carotids upstroke normal bilaterally, no bruits, thyroid exam: thyroid is normal in size without nodules or tenderness  Lymphatics - no palpable lymphadenopathy  Chest - clear to auscultation, no wheezes, rales or rhonchi, symmetric air entry  Heart - normal rate and regular rhythm, no gallops noted  Neurological - alert, oriented, normal speech, no focal findings or movement disorder noted, cranial nerves II through XII intact  Musculoskeletal - not examined  Extremities - no pedal edema noted  Skin - normal coloration and turgor, no rashes, no suspicious skin lesions noted      Protective Sensation (w/ 10 gram monofilament):  Right: Intact  Left: Intact    Visual Inspection:  Normal -  Bilateral    Pedal Pulses:   Right: Present  Left: " Present    Posterior tibialis:   Right:Present  Left: Present         ASSESSMENT AND PLAN:  1. Type 2 diabetes, uncontrolled, with neuropathy/2. 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 We discussed diabetic diet and regular exercise. We discussed home blood sugar monitoring, if appropriate. We will check blood work and address results accordingly.   - Hemoglobin A1c; Future  - metFORMIN (GLUCOPHAGE) 1000 MG tablet; TAKE ONE Tablet BY MOUTH TWICE DAILY **TAKE WITH FOOD**  Dispense: 180 tablet; Refill: 1    3. Essential hypertension  Discussed sodium restriction, maintaining ideal body weight and regular exercise program as physiologic means to achieve blood pressure control. The patient will strive towards this. The current medical regimen is effective;  continue present plan and medications. Recommended patient to check home readings to monitor and see me for followup as scheduled or sooner as needed. Patient was educated that both decongestant and anti-inflammatory medication may raise blood pressure.   - CBC auto differential; Future  - hydroCHLOROthiazide (HYDRODIURIL) 25 MG tablet; Take 1 tablet (25 mg total) by mouth once daily.  Dispense: 90 tablet; Refill: 1  - losartan (COZAAR) 100 MG tablet; Take 1 tablet (100 mg total) by mouth once daily.  Dispense: 90 tablet; Refill: 1  - amLODIPine (NORVASC) 10 MG tablet; Take 1 tablet (10 mg total) by mouth once daily.  Dispense: 30 tablet; Refill: 1    4. Hyperlipidemia LDL goal <70  We discussed low fat diet and regular exercise.The current medical regimen is effective;  continue present plan and medications.    - Comprehensive metabolic panel; Future  - Lipid panel; Future  - pravastatin (PRAVACHOL) 40 MG tablet; Take 1 tablet (40 mg total) by mouth once daily.  Dispense: 90 tablet; Refill: 1    5. Major depressive disorder, single episode, mild  Patient still  feels depressed.  I advised her that she is on maximum dose of Wellbutrin.  I encouraged her to see a counselor or psychiatrist for further evaluation.  She declines at this time.  - buPROPion (WELLBUTRIN) 75 MG tablet; Take 2 tablets (150 mg total) by mouth 2 (two) times daily.  Dispense: 180 tablet; Refill: 1    6. CHETNA (obstructive sleep apnea)  We will set her up for her CPAP titration study.  We discussed the potential ramifications of untreated sleep apnea, which could include daytime sleepiness, hypertension, heart disease including CHF, sudden death while sleeping and/or stroke. The patient was advised to abstain from driving should they feel sleepy or drowsy.  We discussed potential treatment options, which could include weight loss, body positioning, continuous positive airway pressure (CPAP), or referral for surgical consideration.    - CPAP Titration (Must have dx of CHETNA from previous sleep study.); Future    7. Restless legs syndrome (RLS)  She was given a handout with some non-medication treatment options at home.  Consider resuming Requip if symptoms do not improve.    8. Severe obesity (BMI 35.0-39.9) with comorbidity  The patient is asked to make an attempt to improve diet and exercise patterns to aid in medical management of this problem.     9. Pruritus of skin  We discussed liberal moisturization. Consider dermatology consultation if symptoms worsen or persist.  - Ferritin; Future    10. Encounter for screening mammogram for breast cancer    - Mammo Digital Screening Bilat with CAD; Future    11. Need for shingles vaccine  Patient was advised to get immunization at the pharmacy.       I had a discussion with the patient today regarding advanced directives. Patient was given paperwork to complete living will and medical POA. We also briefly discussed [place].              Follow-up in about 3 months (around 6/12/2018), or if symptoms worsen or fail to improve, for follow up chronic medical  conditions.. or sooner as needed.

## 2020-05-19 NOTE — PLAN OF CARE
Plan of care discussed with pt. VSS on room air. No complaints of pain or shortness of breath. Free of falls, personal items within reach. Will continue to monitor.     Problem: Fall Injury Risk  Goal: Absence of Fall and Fall-Related Injury  Intervention: Identify and Manage Contributors to Fall Injury Risk  Flowsheets (Taken 5/19/2020 0604)  Self-Care Promotion: independence encouraged; BADL personal objects within reach; BADL personal routines maintained  Medication Review/Management: medications reviewed     Problem: Adult Inpatient Plan of Care  Goal: Plan of Care Review  Outcome: Ongoing, Progressing  Flowsheets (Taken 5/19/2020 0604)  Plan of Care Reviewed With: patient

## 2020-05-19 NOTE — ASSESSMENT & PLAN NOTE
- Ms. Cecilia Barahona is placed on OBS   - she has midsternal chest pain that was intermittent and worse with exertion, and has become constant   - troponin not elevated in ED   - EKG without ischemic changes   - trend troponin  - assess risk factors: lipid panel & A1C pending   - PRN Nitro, oxygen, morphine ordered   - continue ASA and statin   - prior Stress test:  12/19/19 stress test  The perfusion scan is free of evidence from myocardial ischemia or injury.    There is a mild to moderate intensity defect in the anterior and anteroapical wall of the left ventricle, secondary to breast attenuation.    Sensitivity was reduced because patient unable to achieve target heart rate (reached 82% of MAPHR)    Gated perfusion images showed an ejection fraction of 73 %    There is normal wall motion at rest and post stress.    The EKG portion of this study is negative for ischemia.    The patient reported no chest pain during the stress test.    There were no arrhythmias during stress.  - HEART Score: 5  - risk factors: HTN, DM, Obesity, piror CVA, HLD

## 2020-05-19 NOTE — PROGRESS NOTES
[facility] Hospital Medicine  Progress Note    Patient Name: Cecilia Barahona  MRN: [id number]  Patient Class: OP- Observation   Admission Date: 5/18/2020  Length of Stay: 0 days  Attending Physician: Chele Galindo MD  Primary Care Provider: Yudi Smart MD        Subjective:     Principal Problem:Chest pain        HPI:  Alexander Cam, PA:  Ms. Cecilia Barahona is a 71 y/o female, with PMH of T2DM HTN, HLD, obesity, CHETNA, and prior CVA, who presented to ED w/ intermittent mid sternal chest pain x 1 day. Chest pressure is mild all day, worse with exertion and associated w/ SOB. Pain became constant x 24 hours. Pain resolved after nitro x 2. ED evaluation with a negative troponin, EKG unchanged from prior, and without ischemic changes. Transferred to [facility] and placed on OBS.       Overview/Hospital Course:  Cecilia Barahona was admitted under Observation to rule out myocardial infarction.  The patient reports that prior to going to bed on May 18 she noted chest tightness and mild substernal pain.  She states that she took two melatonin gummies prior to bed. She states she awoke with worsening pain and decided to visit the ED.  She reports worsening fatigue over the past two days with intermittent nausea noted over the past two days.  She reports a similar episode in December 2019 where at that time she underwent NM stress test that was without evidence of myocardial ischemia and stress echocardiogram with EF 65% and grade I diastolic dysfunction. She denies sick contact and COVID-19 testing negative on admission.  Initial work up in the ED with electrocardiogram without evidence of ischemic changes and troponon 0.006.  She was treated with nitroglycerin while in the ED x2 with some improvement to pain, but continued with discomfort with movement.      After admission, second troponin negative and cardiac monitoring revealed no ischemic changes or arrhythmias.   Given her cardiac risk factors,  Cardiology will be consulted for evaluation.  Echocardiogram is pending.     Interval History:   Initial troponin negative and will continue to trend. Continues with intermittent chest pain, worsens with movement, left chest wall tenderness.     Review of Systems   Constitutional: Positive for fatigue (noted two days). Negative for chills and fever.   HENT: Negative for congestion and trouble swallowing.    Eyes: Negative.    Respiratory: Negative for cough and shortness of breath.    Cardiovascular: Negative for chest pain and palpitations.   Gastrointestinal: Negative for abdominal pain, diarrhea, nausea and vomiting.   Genitourinary: Negative for dysuria, frequency and urgency.   Musculoskeletal: Negative for arthralgias and myalgias.   Skin: Negative.    Neurological: Negative for dizziness, weakness, light-headedness and headaches.   Hematological: Does not bruise/bleed easily.   Psychiatric/Behavioral: Negative.      Objective:     Vital Signs (Most Recent):  Temp: 98.3 °F (36.8 °C) (05/19/20 0422)  Pulse: (!) 58 (05/19/20 0600)  Resp: 16 (05/19/20 0422)  BP: (!) 173/72 (05/19/20 0422)  SpO2: (!) 93 % (05/19/20 0422) Vital Signs (24h Range):  Temp:  [98.2 °F (36.8 °C)-98.4 °F (36.9 °C)] 98.3 °F (36.8 °C)  Pulse:  [58-74] 58  Resp:  [14-20] 16  SpO2:  [93 %-100 %] 93 %  BP: (124-173)/(54-72) 173/72     Weight: 85.7 kg (189 lb)  Body mass index is 33.48 kg/m².  No intake or output data in the 24 hours ending 05/19/20 0842     Physical Exam   Constitutional: She is oriented to person, place, and time. She appears well-developed and well-nourished. No distress.   HENT:   Head: Normocephalic and atraumatic.   Mouth/Throat: Oropharynx is clear and moist.   Eyes: Pupils are equal, round, and reactive to light. Conjunctivae are normal.   Neck: Normal range of motion. Neck supple. No JVD present.   Cardiovascular: Normal rate, regular rhythm, normal heart sounds and intact distal pulses.   No murmur  heard.  Pulmonary/Chest: Effort normal and breath sounds normal. No respiratory distress.   Abdominal: Soft. Bowel sounds are normal. There is no tenderness.   Musculoskeletal: Normal range of motion. She exhibits no tenderness.   Neurological: She is alert and oriented to person, place, and time. She has normal strength. No sensory deficit.   Skin: Skin is warm and dry. Capillary refill takes less than 2 seconds.   Psychiatric: She has a normal mood and affect. Her speech is normal and behavior is normal.   Nursing note and vitals reviewed.      Significant Labs:   A1C:   Recent Labs   Lab 12/20/19  1138 05/05/20  0747   HGBA1C 9.1* 7.0*     CBC:   Recent Labs   Lab 05/19/20  0451   WBC 9.18   HGB 11.4*   HCT 36.7*        CMP:   Recent Labs   Lab 05/19/20 0451      K 3.7      CO2 26   *   BUN 22   CREATININE 0.9   CALCIUM 9.3   PROT 7.0   ALBUMIN 3.2*   BILITOT 0.2   ALKPHOS 115   AST 19   ALT 18   ANIONGAP 11   EGFRNONAA >60       Troponin:   Recent Labs   Lab 05/19/20  0451   TROPONINI 0.006     All pertinent labs within the past 24 hours have been reviewed.    Significant Imaging: I have reviewed all pertinent imaging results/findings within the past 24 hours.     Imaging Results          X-Ray Chest 1 View (Final result)  Result time 05/18/20 23:31:21    Final result by Ananda Barrientos MD (05/18/20 23:31:21)                 Impression:      There is no radiographic evidence for acute intrathoracic process.      Electronically signed by: Ananda Barrientos  Date:    05/18/2020  Time:    23:31             Narrative:    EXAMINATION:  XR CHEST 1 VIEW    CLINICAL HISTORY:  Chest pain, unspecified    TECHNIQUE:  Single frontal view of the chest was performed.    COMPARISON:  January 27, 2020    FINDINGS:  Single portable chest view is submitted.  The cardiomediastinal silhouette appears appropriate.  There is no evidence for confluent infiltrate or consolidation, significant pleural effusion  or pneumothorax.  The osseous structures demonstrate chronic change.                                    Assessment/Plan:      * Chest pain  - Most recent NM stress test 12/2019 negative for ischemia, but did show mild to moderate intensity defect in the anterior and anteroapical wall of the left ventricle, secondary to breast attenuation.    - Continues with intermittent midsternal chest discomfort with left chest wall tenderness  - Initial EKG without ischemic changes and troponin negative; will continue to trend  - Continue ASA, atorvastatin 40 mg daily, continue home antihypertensive regimen  - Known cardiac risk factors and will consult Cardiology for evaluation        Mild major depression  - Per medical record review, history of depression with psychosis and generalized anxiety disorder  - Follows Psychiatry with most recent visit January 2020 with follow up planned for March 2020  - On exam, appears anxious with stable mood  - Continue Prozax 20 mg daily  - Monitor       Restless legs syndrome (RLS)  - Appears stable on ropinirole and will continue      Gastroesophageal reflux disease without esophagitis  - Per medical record and will start pantoprazole 40 mg daily  -       Type 2 diabetes mellitus without complication, with long-term current use of insulin  - Most recent A1c 7.1 with morning labs  - Continue cardiac and diabetic diet  - Continue basal insulin 21 units daily, and moderate dose sliding scale.    - Glucose goal during hospitalization between 140 -180, monitor and adjust insulin as needed    Mixed hyperlipidemia  - Lipid panel on admission with mildly elevated total cholesterol 204 and triglycerides 171; these are improved from prior   - Continue home dose of atorvastatin 40 mg daily  - Continue dietary interventions       Glaucoma NEC  - Per medical record.  Appears stable and will continue home medications      Essential hypertension  - Hypertensive on admission and now better controlled  -  Continue home antihypertensive regimen with chlorthalidone 25 mg daily, amlodipine 10 mg daily, valsartan 320 mg daily.   - Monitor     VTE Risk Mitigation (From admission, onward)         Ordered     heparin (porcine) injection 5,000 Units  Every 8 hours      05/19/20 0513     IP VTE HIGH RISK PATIENT  Once      05/19/20 0513     Place sequential compression device  Until discontinued      05/19/20 0513                      Karen Arias NP  Department of Hospital Medicine   [facility]

## 2020-05-19 NOTE — SUBJECTIVE & OBJECTIVE
Interval History:   Initial troponin negative and will continue to trend. Continues with intermittent chest pain, worsens with movement, left chest wall tenderness.     Review of Systems   Constitutional: Positive for fatigue (noted two days). Negative for chills and fever.   HENT: Negative for congestion and trouble swallowing.    Eyes: Negative.    Respiratory: Negative for cough and shortness of breath.    Cardiovascular: Negative for chest pain and palpitations.   Gastrointestinal: Negative for abdominal pain, diarrhea, nausea and vomiting.   Genitourinary: Negative for dysuria, frequency and urgency.   Musculoskeletal: Negative for arthralgias and myalgias.   Skin: Negative.    Neurological: Negative for dizziness, weakness, light-headedness and headaches.   Hematological: Does not bruise/bleed easily.   Psychiatric/Behavioral: Negative.      Objective:     Vital Signs (Most Recent):  Temp: 98.3 °F (36.8 °C) (05/19/20 0422)  Pulse: (!) 58 (05/19/20 0600)  Resp: 16 (05/19/20 0422)  BP: (!) 173/72 (05/19/20 0422)  SpO2: (!) 93 % (05/19/20 0422) Vital Signs (24h Range):  Temp:  [98.2 °F (36.8 °C)-98.4 °F (36.9 °C)] 98.3 °F (36.8 °C)  Pulse:  [58-74] 58  Resp:  [14-20] 16  SpO2:  [93 %-100 %] 93 %  BP: (124-173)/(54-72) 173/72     Weight: 85.7 kg (189 lb)  Body mass index is 33.48 kg/m².  No intake or output data in the 24 hours ending 05/19/20 0842     Physical Exam   Constitutional: She is oriented to person, place, and time. She appears well-developed and well-nourished. No distress.   HENT:   Head: Normocephalic and atraumatic.   Mouth/Throat: Oropharynx is clear and moist.   Eyes: Pupils are equal, round, and reactive to light. Conjunctivae are normal.   Neck: Normal range of motion. Neck supple. No JVD present.   Cardiovascular: Normal rate, regular rhythm, normal heart sounds and intact distal pulses.   No murmur heard.  Pulmonary/Chest: Effort normal and breath sounds normal. No respiratory distress.    Abdominal: Soft. Bowel sounds are normal. There is no tenderness.   Musculoskeletal: Normal range of motion. She exhibits no tenderness.   Neurological: She is alert and oriented to person, place, and time. She has normal strength. No sensory deficit.   Skin: Skin is warm and dry. Capillary refill takes less than 2 seconds.   Psychiatric: She has a normal mood and affect. Her speech is normal and behavior is normal.   Nursing note and vitals reviewed.      Significant Labs:   A1C:   Recent Labs   Lab 12/20/19  1138 05/05/20  0747   HGBA1C 9.1* 7.0*     CBC:   Recent Labs   Lab 05/19/20  0451   WBC 9.18   HGB 11.4*   HCT 36.7*        CMP:   Recent Labs   Lab 05/19/20 0451      K 3.7      CO2 26   *   BUN 22   CREATININE 0.9   CALCIUM 9.3   PROT 7.0   ALBUMIN 3.2*   BILITOT 0.2   ALKPHOS 115   AST 19   ALT 18   ANIONGAP 11   EGFRNONAA >60       Troponin:   Recent Labs   Lab 05/19/20 0451   TROPONINI 0.006     All pertinent labs within the past 24 hours have been reviewed.    Significant Imaging: I have reviewed all pertinent imaging results/findings within the past 24 hours.     Imaging Results          X-Ray Chest 1 View (Final result)  Result time 05/18/20 23:31:21    Final result by Ananda Barrientos MD (05/18/20 23:31:21)                 Impression:      There is no radiographic evidence for acute intrathoracic process.      Electronically signed by: Ananda Barrientos  Date:    05/18/2020  Time:    23:31             Narrative:    EXAMINATION:  XR CHEST 1 VIEW    CLINICAL HISTORY:  Chest pain, unspecified    TECHNIQUE:  Single frontal view of the chest was performed.    COMPARISON:  January 27, 2020    FINDINGS:  Single portable chest view is submitted.  The cardiomediastinal silhouette appears appropriate.  There is no evidence for confluent infiltrate or consolidation, significant pleural effusion or pneumothorax.  The osseous structures demonstrate chronic change.

## 2020-05-19 NOTE — SUBJECTIVE & OBJECTIVE
Past Medical History:   Diagnosis Date    Allergic rhinitis, seasonal     Anxiety     Arthritis     CVA (cerebral vascular accident)     Depression     Glaucoma NEC     Hallucination     images out of corner of eye about a year ago    History of positive PPD - treated - remote past     Hx of psychiatric care     Hyperlipidemia LDL goal < 100     Hypertension     Nuclear sclerosis of both eyes 2019    Obesity     CHETNA (obstructive sleep apnea)     Psychiatric problem     Psychosis     Renal disorder     Sleep difficulties     Suicide attempt     about 30 years ago by overdose of pills    Therapy     Type II or unspecified type diabetes mellitus without mention of complication, uncontrolled        Past Surgical History:   Procedure Laterality Date    CARPAL TUNNEL RELEASE       SECTION, CLASSIC      COLONOSCOPY N/A 3/5/2020    Procedure: COLONOSCOPY;  Surgeon: Wiliam Carrillo MD;  Location: [facility] ([address] [place] [address]);  Service: Endoscopy;  Laterality: N/A;  20 appt confirmed-rb  pt requested this date    TOTAL ABDOMINAL HYSTERECTOMY      TRIGGER FINGER RELEASE         Review of patient's allergies indicates:   Allergen Reactions    Ace inhibitors Other (See Comments)     cough    Invokana [canagliflozin] Other (See Comments)     Throat and tongue swelling    Ozempic [semaglutide]      Nausea and constipation on 0.25 mg dose.        No current facility-administered medications on file prior to encounter.      Current Outpatient Medications on File Prior to Encounter   Medication Sig    alcohol swabs PadM Apply 1 each topically 3 (three) times daily.    amLODIPine (NORVASC) 10 MG tablet TAKE ONE TABLET BY MOUTH EVERY DAY    artificial tear ointment (ARTIFICIAL TEARS) Oint Place into both eyes every evening.    aspirin 81 MG Chew Take by mouth once daily. 1 tablet, chewable Oral Every day    atorvastatin (LIPITOR) 40 MG tablet Take 1 tablet (40 mg total) by mouth every  evening. For cholesterol.    azelastine (OPTIVAR) 0.05 % ophthalmic solution INSTILL 1 DROP IN EACH EYE TWICE DAILY    chlorthalidone (HYGROTEN) 25 MG Tab TAKE ONE TABLET BY MOUTH EVERY DAY    dulaglutide (TRULICITY) 1.5 mg/0.5 mL PnIj Inject 1.5 mg into the skin every 7 days.    empagliflozin (JARDIANCE) 10 mg Tab Take 10 mg by mouth once daily.    fluticasone (FLONASE) 50 mcg/actuation nasal spray 1 spray (50 mcg total) by Each Nare route once daily.    insulin degludec (TRESIBA FLEXTOUCH U-200) 200 unit/mL (3 mL) InPn Inject 36 Units into the skin once daily. Inject 36 units once daily (Patient taking differently: Inject 30 Units into the skin once daily. Inject 36 units once daily)    isosorbide mononitrate (IMDUR) 30 MG 24 hr tablet Take 1 tablet (30 mg total) by mouth once daily.    latanoprost 0.005 % ophthalmic solution PLACE 1 DROP IN EACH EYE EVERY EVENING    loratadine (CLARITIN) 10 mg tablet TAKE ONE TABLET BY MOUTH EVERY DAY AS NEEDED FOR ALLERGIES    metFORMIN (GLUCOPHAGE) 1000 MG tablet TAKE ONE TABLET BY MOUTH TWICE DAILY WITH MEALS    multivitamin with minerals tablet Take 1 tablet by mouth once daily.    naproxen (NAPROSYN) 500 MG tablet Take 500 mg by mouth 2 (two) times daily as needed.    rOPINIRole (REQUIP) 0.5 MG tablet TAKE ONE Tablet BY MOUTH EVERY NIGHT AT BEDTIME    albuterol 90 mcg/actuation inhaler Inhale 2 puffs into the lungs every 6 (six) hours as needed for Wheezing or Shortness of Breath. Rescue    blood pressure monitor (BLOOD PRESSURE KIT) Kit 1 kit by Misc.(Non-Drug; Combo Route) route once daily.    blood-glucose meter,continuous (DEXCOM G6 ) Misc Use with dexcom G6 system    blood-glucose sensor (DEXCOM G6 SENSOR) Vanna Change sensor every 10 days    blood-glucose transmitter (DEXCOM G6 TRANSMITTER) Vanna Change every 3 months    desoximetasone (TOPICORT) 0.05 % cream Apply topically 2 (two) times daily as needed.    flash glucose scanning reader  "(FREESTYLE ANAMARIA READER) Misc 1 Units by Misc.(Non-Drug; Combo Route) route before meals as needed.    FLUoxetine 20 MG capsule Take 1 capsule (20 mg total) by mouth once daily.    FREESTYLE ANAMARIA 14 DAY SENSOR Kit CHANGE SENSOR EVERY 14 DAYS    lancets Misc Check blood glucose 4 times a day. Dispense accuchek meter or other meter preferred by insurance. Dx code e11.65    LANCING DEVICE WITH LANCETS Misc USE AS DIRECTED FOR DIABETIC TESTING    pen needle, diabetic (BD ULTRA-FINE LENY PEN NEEDLES) 32 gauge x 5/32" Ndle USE FIVE TIMES DAILY    valsartan (DIOVAN) 160 MG tablet Take 2 tablets (320 mg total) by mouth once daily. (Patient taking differently: Take 320 mg by mouth 2 (two) times daily. )     Family History     Problem Relation (Age of Onset)    Alzheimer's disease Maternal Aunt    Cataracts Mother    Diabetes Mother, Father, Maternal Uncle, Maternal Grandmother, Maternal Grandfather, Maternal Aunt    Drug abuse Grandchild    Glaucoma Mother    Heart disease Cousin    Heart failure Mother, Father    Hyperlipidemia Maternal Uncle    Hypertension Mother, Father, Maternal Uncle    No Known Problems Sister, Paternal Grandmother, Paternal Grandfather, Paternal Aunt, Paternal Uncle    Prostate cancer Father    Schizophrenia Maternal Aunt        Tobacco Use    Smoking status: Former Smoker     Packs/day: 0.50     Types: Cigarettes     Last attempt to quit: 11/10/2012     Years since quittin.5    Smokeless tobacco: Never Used   Substance and Sexual Activity    Alcohol use: Not Currently     Comment: drank socially in the past    Drug use: Not Currently     Types: Marijuana     Comment: tried once 6 months ago    Sexual activity: Not Currently     Partners: Male     Review of Systems   Constitutional: Negative for activity change, appetite change, diaphoresis and fatigue.   HENT: Negative for postnasal drip.    Respiratory: Negative for cough, shortness of breath and wheezing.    Cardiovascular: " Positive for chest pain. Negative for palpitations.   Gastrointestinal: Negative for abdominal pain, diarrhea, nausea and vomiting.   Genitourinary: Negative for dysuria, flank pain, frequency, hematuria and urgency.   Musculoskeletal: Negative for arthralgias, neck pain and neck stiffness.   Skin: Negative for color change and pallor.   Neurological: Negative for dizziness, syncope, weakness, light-headedness and headaches.   Psychiatric/Behavioral: Negative for confusion and decreased concentration.     Objective:     Vital Signs (Most Recent):  Temp: 98.3 °F (36.8 °C) (05/19/20 0422)  Pulse: 63 (05/19/20 0422)  Resp: 16 (05/19/20 0422)  BP: (!) 173/72 (05/19/20 0422)  SpO2: (!) 93 % (05/19/20 0422) Vital Signs (24h Range):  Temp:  [98.2 °F (36.8 °C)-98.4 °F (36.9 °C)] 98.3 °F (36.8 °C)  Pulse:  [59-74] 63  Resp:  [14-20] 16  SpO2:  [93 %-100 %] 93 %  BP: (124-173)/(54-72) 173/72     Weight: 85.7 kg (189 lb)  Body mass index is 33.48 kg/m².    Physical Exam   Constitutional: She is oriented to person, place, and time. She appears well-developed and well-nourished. No distress.   HENT:   Head: Normocephalic and atraumatic.   Eyes: Pupils are equal, round, and reactive to light. Conjunctivae and EOM are normal. No scleral icterus.   Neck: Normal range of motion. Neck supple. No tracheal deviation present.   Cardiovascular: Normal rate, regular rhythm, normal heart sounds and intact distal pulses. Exam reveals no gallop and no friction rub.   No murmur heard.  Pulmonary/Chest: Effort normal and breath sounds normal. No stridor. No respiratory distress. She has no wheezes. She has no rales.   Abdominal: Soft. Bowel sounds are normal. She exhibits no distension. There is no tenderness. There is no guarding.   Neurological: She is alert and oriented to person, place, and time. No cranial nerve deficit. She exhibits normal muscle tone.   Skin: Skin is warm and dry. Capillary refill takes less than 2 seconds. She is not  diaphoretic. No pallor.   Psychiatric: She has a normal mood and affect. Her behavior is normal. Judgment and thought content normal.   Nursing note and vitals reviewed.        CRANIAL NERVES     CN III, IV, VI   Pupils are equal, round, and reactive to light.  Extraocular motions are normal.        Significant Labs:   BMP:   Recent Labs   Lab 05/19/20  0451   *      K 3.7      CO2 26   BUN 22   CREATININE 0.9   CALCIUM 9.3     CBC:   Recent Labs   Lab 05/19/20 0451   WBC 9.18   HGB 11.4*   HCT 36.7*        CMP:   Recent Labs   Lab 05/19/20  0451      K 3.7      CO2 26   *   BUN 22   CREATININE 0.9   CALCIUM 9.3   PROT 7.0   ALBUMIN 3.2*   BILITOT 0.2   ALKPHOS 115   AST 19   ALT 18   ANIONGAP 11   EGFRNONAA >60     Cardiac Markers: No results for input(s): CKMB, MYOGLOBIN, BNP, TROPISTAT in the last 48 hours.  Troponin: No results for input(s): TROPONINI in the last 48 hours.  Urine Culture: No results for input(s): LABURIN in the last 48 hours.  Urine Studies: No results for input(s): COLORU, APPEARANCEUA, PHUR, SPECGRAV, PROTEINUA, GLUCUA, KETONESU, BILIRUBINUA, OCCULTUA, NITRITE, UROBILINOGEN, LEUKOCYTESUR, RBCUA, WBCUA, BACTERIA, SQUAMEPITHEL, HYALINECASTS in the last 48 hours.    Invalid input(s): WRIGHTSUR  All pertinent labs within the past 24 hours have been reviewed.    Significant Imaging: I have reviewed all pertinent imaging results/findings within the past 24 hours.\  Imaging Results          X-Ray Chest 1 View (Final result)  Result time 05/18/20 23:31:21    Final result by Ananda Barrientos MD (05/18/20 23:31:21)                 Impression:      There is no radiographic evidence for acute intrathoracic process.      Electronically signed by: Ananda Barrientos  Date:    05/18/2020  Time:    23:31             Narrative:    EXAMINATION:  XR CHEST 1 VIEW    CLINICAL HISTORY:  Chest pain, unspecified    TECHNIQUE:  Single frontal view of the chest was  performed.    COMPARISON:  January 27, 2020    FINDINGS:  Single portable chest view is submitted.  The cardiomediastinal silhouette appears appropriate.  There is no evidence for confluent infiltrate or consolidation, significant pleural effusion or pneumothorax.  The osseous structures demonstrate chronic change.

## 2020-05-19 NOTE — H&P
[facility] Hospital Medicine  History & Physical    Patient Name: Cecilia Barahona  MRN: [id number]  Admission Date: 2020  Attending Physician: Chele Galindo MD   Primary Care Provider: Yudi Smart MD         Patient information was obtained from patient, past medical records and ER records.     Subjective:     Principal Problem:<principal problem not specified>    Chief Complaint:   Chief Complaint   Patient presents with    Chest Pain     PT C/O NON RADIATING MIDSTERNAL SP SINCE YESTERDAY, DENIES ANY OTHER SYMPTOMS        HPI: Ms. Cecilia Barahona is a 71 y/o female, with PMH of T2DM HTN, HLD, obesity, CHETNA, and prior CVA, who presented to ED w/ intermittent mid sternal chest pain x 1 day. Chest pressure is mild all day, worse with exertion and associated w/ SOB. Pain became constant x 24 hours. Pain resolved after nitro x 2. ED evaluation with a negative troponin, EKG unchanged from prior, and without ischemic changes. [place] [facility] [place].       Past Medical History:   Diagnosis Date    Allergic rhinitis, seasonal     Anxiety     Arthritis     CVA (cerebral vascular accident)     Depression     Glaucoma NEC     Hallucination     images out of corner of eye about a year ago    History of positive PPD - treated - remote past     Hx of psychiatric care     Hyperlipidemia LDL goal < 100     Hypertension     Nuclear sclerosis of both eyes 2019    Obesity     CHENTA (obstructive sleep apnea)     Psychiatric problem     Psychosis     Renal disorder     Sleep difficulties     Suicide attempt     about 30 years ago by overdose of pills    Therapy     Type II or unspecified type diabetes mellitus without mention of complication, uncontrolled        Past Surgical History:   Procedure Laterality Date    CARPAL TUNNEL RELEASE       SECTION, CLASSIC      COLONOSCOPY N/A 3/5/2020    Procedure: COLONOSCOPY;  Surgeon: Wiliam Carrillo MD;   Location: [facility] ([facility]);  Service: Endoscopy;  Laterality: N/A;  2/6/20 appt confirmed-rb  pt requested this date    TOTAL ABDOMINAL HYSTERECTOMY      TRIGGER FINGER RELEASE         Review of patient's allergies indicates:   Allergen Reactions    Ace inhibitors Other (See Comments)     cough    Invokana [canagliflozin] Other (See Comments)     Throat and tongue swelling    Ozempic [semaglutide]      Nausea and constipation on 0.25 mg dose.        No current facility-administered medications on file prior to encounter.      Current Outpatient Medications on File Prior to Encounter   Medication Sig    alcohol swabs PadM Apply 1 each topically 3 (three) times daily.    amLODIPine (NORVASC) 10 MG tablet TAKE ONE TABLET BY MOUTH EVERY DAY    artificial tear ointment (ARTIFICIAL TEARS) Oint Place into both eyes every evening.    aspirin 81 MG Chew Take by mouth once daily. 1 tablet, chewable Oral Every day    atorvastatin (LIPITOR) 40 MG tablet Take 1 tablet (40 mg total) by mouth every evening. For cholesterol.    azelastine (OPTIVAR) 0.05 % ophthalmic solution INSTILL 1 DROP IN EACH EYE TWICE DAILY    chlorthalidone (HYGROTEN) 25 MG Tab TAKE ONE TABLET BY MOUTH EVERY DAY    dulaglutide (TRULICITY) 1.5 mg/0.5 mL PnIj Inject 1.5 mg into the skin every 7 days.    empagliflozin (JARDIANCE) 10 mg Tab Take 10 mg by mouth once daily.    fluticasone (FLONASE) 50 mcg/actuation nasal spray 1 spray (50 mcg total) by Each Nare route once daily.    insulin degludec (TRESIBA FLEXTOUCH U-200) 200 unit/mL (3 mL) InPn Inject 36 Units into the skin once daily. Inject 36 units once daily (Patient taking differently: Inject 30 Units into the skin once daily. Inject 36 units once daily)    isosorbide mononitrate (IMDUR) 30 MG 24 hr tablet Take 1 tablet (30 mg total) by mouth once daily.    latanoprost 0.005 % ophthalmic solution PLACE 1 DROP IN EACH EYE EVERY EVENING    loratadine (CLARITIN) 10 mg tablet TAKE ONE  "TABLET BY MOUTH EVERY DAY AS NEEDED FOR ALLERGIES    metFORMIN (GLUCOPHAGE) 1000 MG tablet TAKE ONE TABLET BY MOUTH TWICE DAILY WITH MEALS    multivitamin with minerals tablet Take 1 tablet by mouth once daily.    naproxen (NAPROSYN) 500 MG tablet Take 500 mg by mouth 2 (two) times daily as needed.    rOPINIRole (REQUIP) 0.5 MG tablet TAKE ONE Tablet BY MOUTH EVERY NIGHT AT BEDTIME    albuterol 90 mcg/actuation inhaler Inhale 2 puffs into the lungs every 6 (six) hours as needed for Wheezing or Shortness of Breath. Rescue    blood pressure monitor (BLOOD PRESSURE KIT) Kit 1 kit by Misc.(Non-Drug; Combo Route) route once daily.    blood-glucose meter,continuous (DEXCOM G6 ) Misc Use with dexcom G6 system    blood-glucose sensor (DEXCOM G6 SENSOR) Vanna Change sensor every 10 days    blood-glucose transmitter (DEXCOM G6 TRANSMITTER) Vanna Change every 3 months    desoximetasone (TOPICORT) 0.05 % cream Apply topically 2 (two) times daily as needed.    flash glucose scanning reader (FREESTYLE ANAMARIA READER) Misc 1 Units by Misc.(Non-Drug; Combo Route) route before meals as needed.    FLUoxetine 20 MG capsule Take 1 capsule (20 mg total) by mouth once daily.    FREESTYLE ANAMARIA 14 DAY SENSOR Kit CHANGE SENSOR EVERY 14 DAYS    lancets Misc Check blood glucose 4 times a day. Dispense accuchek meter or other meter preferred by insurance. Dx code e11.65    LANCING DEVICE WITH LANCETS Misc USE AS DIRECTED FOR DIABETIC TESTING    pen needle, diabetic (BD ULTRA-FINE LENY PEN NEEDLES) 32 gauge x 5/32" Ndle USE FIVE TIMES DAILY    valsartan (DIOVAN) 160 MG tablet Take 2 tablets (320 mg total) by mouth once daily. (Patient taking differently: Take 320 mg by mouth 2 (two) times daily. )     Family History     Problem Relation (Age of Onset)    Alzheimer's disease Maternal Aunt    Cataracts Mother    Diabetes Mother, Father, Maternal Uncle, Maternal Grandmother, Maternal Grandfather, Maternal Aunt    Drug abuse " Grandchild    Glaucoma Mother    Heart disease Cousin    Heart failure Mother, Father    Hyperlipidemia Maternal Uncle    Hypertension Mother, Father, Maternal Uncle    No Known Problems Sister, Paternal Grandmother, Paternal Grandfather, Paternal Aunt, Paternal Uncle    Prostate cancer Father    Schizophrenia Maternal Aunt        Tobacco Use    Smoking status: Former Smoker     Packs/day: 0.50     Types: Cigarettes     Last attempt to quit: 11/10/2012     Years since quittin.5    Smokeless tobacco: Never Used   Substance and Sexual Activity    Alcohol use: Not Currently     Comment: drank socially in the past    Drug use: Not Currently     Types: Marijuana     Comment: tried once 6 months ago    Sexual activity: Not Currently     Partners: Male     Review of Systems   Constitutional: Negative for activity change, appetite change, diaphoresis and fatigue.   HENT: Negative for postnasal drip.    Respiratory: Negative for cough, shortness of breath and wheezing.    Cardiovascular: Positive for chest pain. Negative for palpitations.   Gastrointestinal: Negative for abdominal pain, diarrhea, nausea and vomiting.   Genitourinary: Negative for dysuria, flank pain, frequency, hematuria and urgency.   Musculoskeletal: Negative for arthralgias, neck pain and neck stiffness.   Skin: Negative for color change and pallor.   Neurological: Negative for dizziness, syncope, weakness, light-headedness and headaches.   Psychiatric/Behavioral: Negative for confusion and decreased concentration.     Objective:     Vital Signs (Most Recent):  Temp: 98.3 °F (36.8 °C) (20)  Pulse: 63 (20)  Resp: 16 (20)  BP: (!) 173/72 (20)  SpO2: (!) 93 % (20) Vital Signs (24h Range):  Temp:  [98.2 °F (36.8 °C)-98.4 °F (36.9 °C)] 98.3 °F (36.8 °C)  Pulse:  [59-74] 63  Resp:  [14-20] 16  SpO2:  [93 %-100 %] 93 %  BP: (124-173)/(54-72) 173/72     Weight: 85.7 kg (189 lb)  Body mass index is  33.48 kg/m².    Physical Exam   Constitutional: She is oriented to person, place, and time. She appears well-developed and well-nourished. No distress.   HENT:   Head: Normocephalic and atraumatic.   Eyes: Pupils are equal, round, and reactive to light. Conjunctivae and EOM are normal. No scleral icterus.   Neck: Normal range of motion. Neck supple. No tracheal deviation present.   Cardiovascular: Normal rate, regular rhythm, normal heart sounds and intact distal pulses. Exam reveals no gallop and no friction rub.   No murmur heard.  Pulmonary/Chest: Effort normal and breath sounds normal. No stridor. No respiratory distress. She has no wheezes. She has no rales.   Abdominal: Soft. Bowel sounds are normal. She exhibits no distension. There is no tenderness. There is no guarding.   Neurological: She is alert and oriented to person, place, and time. No cranial nerve deficit. She exhibits normal muscle tone.   Skin: Skin is warm and dry. Capillary refill takes less than 2 seconds. She is not diaphoretic. No pallor.   Psychiatric: She has a normal mood and affect. Her behavior is normal. Judgment and thought content normal.   Nursing note and vitals reviewed.        CRANIAL NERVES     CN III, IV, VI   Pupils are equal, round, and reactive to light.  Extraocular motions are normal.        Significant Labs:   BMP:   Recent Labs   Lab 05/19/20  0451   *      K 3.7      CO2 26   BUN 22   CREATININE 0.9   CALCIUM 9.3     CBC:   Recent Labs   Lab 05/19/20  0451   WBC 9.18   HGB 11.4*   HCT 36.7*        CMP:   Recent Labs   Lab 05/19/20  0451      K 3.7      CO2 26   *   BUN 22   CREATININE 0.9   CALCIUM 9.3   PROT 7.0   ALBUMIN 3.2*   BILITOT 0.2   ALKPHOS 115   AST 19   ALT 18   ANIONGAP 11   EGFRNONAA >60     Cardiac Markers: No results for input(s): CKMB, MYOGLOBIN, BNP, TROPISTAT in the last 48 hours.  Troponin: No results for input(s): TROPONINI in the last 48 hours.  Urine  Culture: No results for input(s): LABURIN in the last 48 hours.  Urine Studies: No results for input(s): COLORU, APPEARANCEUA, PHUR, SPECGRAV, PROTEINUA, GLUCUA, KETONESU, BILIRUBINUA, OCCULTUA, NITRITE, UROBILINOGEN, LEUKOCYTESUR, RBCUA, WBCUA, BACTERIA, SQUAMEPITHEL, HYALINECASTS in the last 48 hours.    Invalid input(s): WRIGHTSUR  All pertinent labs within the past 24 hours have been reviewed.    Significant Imaging: I have reviewed all pertinent imaging results/findings within the past 24 hours.\  Imaging Results          X-Ray Chest 1 View (Final result)  Result time 05/18/20 23:31:21    Final result by Ananda Barrientos MD (05/18/20 23:31:21)                 Impression:      There is no radiographic evidence for acute intrathoracic process.      Electronically signed by: Ananda Barrientos  Date:    05/18/2020  Time:    23:31             Narrative:    EXAMINATION:  XR CHEST 1 VIEW    CLINICAL HISTORY:  Chest pain, unspecified    TECHNIQUE:  Single frontal view of the chest was performed.    COMPARISON:  January 27, 2020    FINDINGS:  Single portable chest view is submitted.  The cardiomediastinal silhouette appears appropriate.  There is no evidence for confluent infiltrate or consolidation, significant pleural effusion or pneumothorax.  The osseous structures demonstrate chronic change.                                 Assessment/Plan:     Chest pain  - Ms. Cecilia Barahona is placed on OBS   - she has midsternal chest pain that was intermittent and worse with exertion, and has become constant   - troponin not elevated in ED   - EKG without ischemic changes   - trend troponin  - assess risk factors: lipid panel & A1C pending   - PRN Nitro, oxygen, morphine ordered   - continue ASA and statin   - prior Stress test:  12/19/19 stress test  The perfusion scan is free of evidence from myocardial ischemia or injury.    There is a mild to moderate intensity defect in the anterior and anteroapical wall of the left  ventricle, secondary to breast attenuation.    Sensitivity was reduced because patient unable to achieve target heart rate (reached 82% of MAPHR)    Gated perfusion images showed an ejection fraction of 73 %    There is normal wall motion at rest and post stress.    The EKG portion of this study is negative for ischemia.    The patient reported no chest pain during the stress test.    There were no arrhythmias during stress.  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 Range 5/5/2020 07:47   Cholesterol Latest Ref Range: 120 - 199 mg/dL 243 (H)   HDL Latest Ref Range: 40 - 75 mg/dL 44   Hdl/Cholesterol Ratio Latest Ref Range: 20.0 - 50.0 % 18.1 (L)   LDL Cholesterol External Latest Ref Range: 63.0 - 159.0 mg/dL 150.0   Non-HDL Cholesterol Latest Units: mg/dL 199   Total Cholesterol/HDL Ratio Latest Ref Range: 2.0 - 5.0  5.5 (H)   Triglycerides Latest Ref Range: 30 - 150 mg/dL 245 (H)       Hypertension  - hypertensive at present  - continue home meds   - monitor     VTE Risk Mitigation (From admission, onward)         Ordered     heparin (porcine) injection 5,000 Units  Every 8 hours      05/19/20 0513     IP VTE HIGH RISK PATIENT  Once      05/19/20 0513     Place sequential compression device  Until discontinued      05/19/20 0513                   Lucita Cam PA-C  Department of Hospital Medicine   [facility]

## 2020-05-19 NOTE — ASSESSMENT & PLAN NOTE
- Hypertensive on admission and now better controlled  - Continue home antihypertensive regimen with chlorthalidone 25 mg daily, amlodipine 10 mg daily, valsartan 320 mg daily.   - Monitor

## 2020-05-19 NOTE — ASSESSMENT & PLAN NOTE
- Lipid panel on admission with mildly elevated total cholesterol 204 and triglycerides 171; these are improved from prior   - Continue home dose of atorvastatin 40 mg daily  - Continue dietary interventions

## 2020-05-19 NOTE — ASSESSMENT & PLAN NOTE
- continue statin  - last lipid panel:   Results for ZHEN CARRANZA (MRN [id number]) as of 5/19/2020 05:26   Ref. Range 5/5/2020 07:47   Cholesterol Latest Ref Range: 120 - 199 mg/dL 243 (H)   HDL Latest Ref Range: 40 - 75 mg/dL 44   Hdl/Cholesterol Ratio Latest Ref Range: 20.0 - 50.0 % 18.1 (L)   LDL Cholesterol External Latest Ref Range: 63.0 - 159.0 mg/dL 150.0   Non-HDL Cholesterol Latest Units: mg/dL 199   Total Cholesterol/HDL Ratio Latest Ref Range: 2.0 - 5.0  5.5 (H)   Triglycerides Latest Ref Range: 30 - 150 mg/dL 245 (H)

## 2020-05-19 NOTE — PLAN OF CARE
Problem: Fall Injury Risk  Goal: Absence of Fall and Fall-Related Injury  Outcome: Ongoing, Progressing     Problem: Adult Inpatient Plan of Care  Goal: Plan of Care Review  Outcome: Ongoing, Progressing     Problem: Adult Inpatient Plan of Care  Goal: Patient-Specific Goal (Individualization)  Outcome: Ongoing, Progressing     Problem: Adult Inpatient Plan of Care  Goal: Optimal Comfort and Wellbeing  Outcome: Ongoing, Progressing

## 2020-05-19 NOTE — ASSESSMENT & PLAN NOTE
- Per medical record review, history of depression with psychosis and generalized anxiety disorder  - Follows Psychiatry with most recent visit January 2020 with follow up planned for March 2020  - On exam, appears anxious with stable mood  - Continue Prozax 20 mg daily  - Monitor

## 2020-05-19 NOTE — ED NOTES
[facility] in room and report given to EMS crew for transport to [facility]. Belongings given to patient and paperwork given to  EMS crew.

## 2020-05-19 NOTE — PLAN OF CARE
(Physician in Lead of Transfers)   Outside Transfer Acceptance Note / [facility]      Transferring Physician: Dr. Graham    Accepting Physician: Franklyn Quiñones MD / Dr. Adames    Date of Acceptance: 05/19/2020    Transferring Facility: [facility]    Reason for Transfer: Chest pain rule out     Report from Transferring Physician/Hospital course: The patient is a 71 y/o female with PMH of HTN, DM, HLP, H/O stroke. Presenting with mild chest pressure and increased with exertion and also become SOB. Eases with rest but never resolved. Improved with nitro. Heart score is 5. EKG and troponin negative. Negative nuclear stress test a year ago. Stable vitals.       Labs & Radiographs: see EPIC      To Do List:   1) Admit to Obs  2) Trend troponin   3) Telemetry      Franklyn Quiñones MD  Hospital Medicine Staff

## 2020-05-19 NOTE — HOSPITAL COURSE
Cecilia Barahona was admitted under Observation to rule out myocardial infarction.  The patient reports that prior to going to bed on May 18 she noted chest tightness and mild substernal pain.  She states that she took two melatonin gummies prior to bed. She states she awoke with worsening pain and decided to visit the ED.  She reports worsening fatigue over the past two days with intermittent nausea noted over the past two days.  She reports a similar episode in December 2019 where at that time she underwent NM stress test that was without evidence of myocardial ischemia and stress echocardiogram with EF 65% and grade I diastolic dysfunction. She denies sick contact and COVID-19 testing negative on admission.  Initial work up in the ED with electrocardiogram without evidence of ischemic changes and troponon 0.006.  She was treated with nitroglycerin while in the ED x2 with some improvement to pain, but continued with discomfort with movement.      After admission, troponins were negative 0.006, 0.008. 0.0011. 0.009.  The patient continued with intermittent substernal chest pain. Cardiology was consulted given concern for acute coronary syndrome and known cardiac risk factors.  Agree with initiation of metoprolol 25 mg Q12 and isosorbidemononitrate 90 mg Q24 and Plavix daily; patient loaded on 5/19/2020.  On 5/20/2020 patient under went left heart catheterization that revealed LAD: Prox 30%. Mid 70%. Distal 80%. OM2: Osteal 90%. OM3: 95%. OM4 90%. RCA: Mid subtotal. LV: Basal inferior mild hypokinesia. EF 60%.  Case discussed with Dr. Lowery and plan to transfer patient to [facility] on 5/21/2020 per Cardiothoracic Surgery, Dr. Bowman.  Continue ASA, Plavix and atorvastatin 40 mg daily.

## 2020-05-19 NOTE — HPI
Per LORENZO Rmaos:  Ms. Cecilia Barahona is a 71 y/o female, with PMH of T2DM HTN, HLD, obesity, CHETNA, and prior CVA, who presented to ED w/ intermittent mid sternal chest pain x 1 day. Chest pressure is mild all day, worse with exertion and associated w/ SOB. Pain became constant x 24 hours. Pain resolved after nitro x 2. ED evaluation with a negative troponin, EKG unchanged from prior, and without ischemic changes. Transferred [place] [facility] and placed on OBS.

## 2020-05-20 PROBLEM — I24.9 ACUTE CORONARY SYNDROME: Status: ACTIVE | Noted: 2020-05-20

## 2020-05-20 PROBLEM — I25.10 CORONARY ARTERY DISEASE: Status: ACTIVE | Noted: 2020-05-20

## 2020-05-20 LAB
ANION GAP SERPL CALC-SCNC: 10 MMOL/L (ref 8–16)
BASOPHILS # BLD AUTO: 0.02 K/UL (ref 0–0.2)
BASOPHILS NFR BLD: 0.2 % (ref 0–1.9)
BUN SERPL-MCNC: 16 MG/DL (ref 8–23)
CALCIUM SERPL-MCNC: 9.4 MG/DL (ref 8.7–10.5)
CATH EF ESTIMATED: 60 %
CHLORIDE SERPL-SCNC: 102 MMOL/L (ref 95–110)
CO2 SERPL-SCNC: 28 MMOL/L (ref 23–29)
CREAT SERPL-MCNC: 0.9 MG/DL (ref 0.5–1.4)
DIFFERENTIAL METHOD: ABNORMAL
EOSINOPHIL # BLD AUTO: 0.1 K/UL (ref 0–0.5)
EOSINOPHIL NFR BLD: 1.7 % (ref 0–8)
ERYTHROCYTE [DISTWIDTH] IN BLOOD BY AUTOMATED COUNT: 14.3 % (ref 11.5–14.5)
EST. GFR  (AFRICAN AMERICAN): >60 ML/MIN/1.73 M^2
EST. GFR  (NON AFRICAN AMERICAN): >60 ML/MIN/1.73 M^2
GLUCOSE SERPL-MCNC: 108 MG/DL (ref 70–110)
HCT VFR BLD AUTO: 39.3 % (ref 37–48.5)
HGB BLD-MCNC: 12.5 G/DL (ref 12–16)
IMM GRANULOCYTES # BLD AUTO: 0.02 K/UL (ref 0–0.04)
IMM GRANULOCYTES NFR BLD AUTO: 0.2 % (ref 0–0.5)
LYMPHOCYTES # BLD AUTO: 2.7 K/UL (ref 1–4.8)
LYMPHOCYTES NFR BLD: 33.3 % (ref 18–48)
MCH RBC QN AUTO: 29.1 PG (ref 27–31)
MCHC RBC AUTO-ENTMCNC: 31.8 G/DL (ref 32–36)
MCV RBC AUTO: 92 FL (ref 82–98)
MONOCYTES # BLD AUTO: 0.7 K/UL (ref 0.3–1)
MONOCYTES NFR BLD: 8.4 % (ref 4–15)
NEUTROPHILS # BLD AUTO: 4.5 K/UL (ref 1.8–7.7)
NEUTROPHILS NFR BLD: 56.2 % (ref 38–73)
NRBC BLD-RTO: 0 /100 WBC
PLATELET # BLD AUTO: 292 K/UL (ref 150–350)
PMV BLD AUTO: 9.4 FL (ref 9.2–12.9)
POCT GLUCOSE: 113 MG/DL (ref 70–110)
POCT GLUCOSE: 117 MG/DL (ref 70–110)
POCT GLUCOSE: 151 MG/DL (ref 70–110)
POCT GLUCOSE: 160 MG/DL (ref 70–110)
POTASSIUM SERPL-SCNC: 4.1 MMOL/L (ref 3.5–5.1)
RBC # BLD AUTO: 4.29 M/UL (ref 4–5.4)
SODIUM SERPL-SCNC: 140 MMOL/L (ref 136–145)
WBC # BLD AUTO: 8.02 K/UL (ref 3.9–12.7)

## 2020-05-20 PROCEDURE — 63600175 PHARM REV CODE 636 W HCPCS: Mod: HCNC | Performed by: INTERNAL MEDICINE

## 2020-05-20 PROCEDURE — 93010 EKG 12-LEAD: ICD-10-PCS | Mod: HCNC,,, | Performed by: INTERNAL MEDICINE

## 2020-05-20 PROCEDURE — 96375 TX/PRO/DX INJ NEW DRUG ADDON: CPT

## 2020-05-20 PROCEDURE — 93005 ELECTROCARDIOGRAM TRACING: CPT | Mod: HCNC

## 2020-05-20 PROCEDURE — C1769 GUIDE WIRE: HCPCS | Mod: HCNC | Performed by: INTERNAL MEDICINE

## 2020-05-20 PROCEDURE — 11000001 HC ACUTE MED/SURG PRIVATE ROOM: Mod: HCNC

## 2020-05-20 PROCEDURE — 85025 COMPLETE CBC W/AUTO DIFF WBC: CPT | Mod: HCNC

## 2020-05-20 PROCEDURE — 99900035 HC TECH TIME PER 15 MIN (STAT): Mod: HCNC

## 2020-05-20 PROCEDURE — 25000003 PHARM REV CODE 250: Mod: HCNC | Performed by: INTERNAL MEDICINE

## 2020-05-20 PROCEDURE — 93010 ELECTROCARDIOGRAM REPORT: CPT | Mod: HCNC,,, | Performed by: INTERNAL MEDICINE

## 2020-05-20 PROCEDURE — 25000003 PHARM REV CODE 250: Mod: HCNC | Performed by: PHYSICIAN ASSISTANT

## 2020-05-20 PROCEDURE — 36415 COLL VENOUS BLD VENIPUNCTURE: CPT | Mod: HCNC

## 2020-05-20 PROCEDURE — 80048 BASIC METABOLIC PNL TOTAL CA: CPT | Mod: HCNC

## 2020-05-20 PROCEDURE — C1894 INTRO/SHEATH, NON-LASER: HCPCS | Mod: HCNC | Performed by: INTERNAL MEDICINE

## 2020-05-20 PROCEDURE — 25500020 PHARM REV CODE 255: Mod: HCNC | Performed by: INTERNAL MEDICINE

## 2020-05-20 PROCEDURE — 99233 SBSQ HOSP IP/OBS HIGH 50: CPT | Mod: HCNC,,, | Performed by: NURSE PRACTITIONER

## 2020-05-20 PROCEDURE — 94761 N-INVAS EAR/PLS OXIMETRY MLT: CPT | Mod: HCNC

## 2020-05-20 PROCEDURE — 96376 TX/PRO/DX INJ SAME DRUG ADON: CPT

## 2020-05-20 PROCEDURE — 25000003 PHARM REV CODE 250: Mod: HCNC | Performed by: NURSE PRACTITIONER

## 2020-05-20 PROCEDURE — 99233 PR SUBSEQUENT HOSPITAL CARE,LEVL III: ICD-10-PCS | Mod: HCNC,,, | Performed by: NURSE PRACTITIONER

## 2020-05-20 PROCEDURE — 93458 L HRT ARTERY/VENTRICLE ANGIO: CPT | Mod: HCNC | Performed by: INTERNAL MEDICINE

## 2020-05-20 PROCEDURE — 63600175 PHARM REV CODE 636 W HCPCS: Mod: HCNC | Performed by: PHYSICIAN ASSISTANT

## 2020-05-20 RX ORDER — DIPHENHYDRAMINE HYDROCHLORIDE 50 MG/ML
INJECTION INTRAMUSCULAR; INTRAVENOUS
Status: DISCONTINUED | OUTPATIENT
Start: 2020-05-20 | End: 2020-05-20 | Stop reason: HOSPADM

## 2020-05-20 RX ORDER — ATORVASTATIN CALCIUM 20 MG/1
80 TABLET, FILM COATED ORAL NIGHTLY
Status: DISCONTINUED | OUTPATIENT
Start: 2020-05-20 | End: 2020-06-01 | Stop reason: HOSPADM

## 2020-05-20 RX ORDER — HYDROCODONE BITARTRATE AND ACETAMINOPHEN 5; 325 MG/1; MG/1
1 TABLET ORAL EVERY 4 HOURS PRN
Status: DISCONTINUED | OUTPATIENT
Start: 2020-05-20 | End: 2020-05-25

## 2020-05-20 RX ORDER — LIDOCAINE HYDROCHLORIDE 10 MG/ML
INJECTION INFILTRATION; PERINEURAL
Status: DISCONTINUED | OUTPATIENT
Start: 2020-05-20 | End: 2020-05-20 | Stop reason: HOSPADM

## 2020-05-20 RX ORDER — DIPHENHYDRAMINE HCL 25 MG
25 CAPSULE ORAL
Status: DISCONTINUED | OUTPATIENT
Start: 2020-05-20 | End: 2020-05-20 | Stop reason: HOSPADM

## 2020-05-20 RX ORDER — MIDAZOLAM HYDROCHLORIDE 1 MG/ML
INJECTION, SOLUTION INTRAMUSCULAR; INTRAVENOUS
Status: DISCONTINUED | OUTPATIENT
Start: 2020-05-20 | End: 2020-05-20 | Stop reason: HOSPADM

## 2020-05-20 RX ORDER — ACETAMINOPHEN 325 MG/1
650 TABLET ORAL EVERY 4 HOURS PRN
Status: DISCONTINUED | OUTPATIENT
Start: 2020-05-20 | End: 2020-05-21

## 2020-05-20 RX ORDER — SODIUM CHLORIDE 9 MG/ML
INJECTION, SOLUTION INTRAVENOUS CONTINUOUS
Status: ACTIVE | OUTPATIENT
Start: 2020-05-20 | End: 2020-05-20

## 2020-05-20 RX ORDER — SODIUM CHLORIDE 9 MG/ML
INJECTION, SOLUTION INTRAVENOUS CONTINUOUS
Status: DISCONTINUED | OUTPATIENT
Start: 2020-05-20 | End: 2020-05-21

## 2020-05-20 RX ORDER — HEPARIN SOD,PORCINE/0.9 % NACL 1000/500ML
INTRAVENOUS SOLUTION INTRAVENOUS
Status: DISCONTINUED | OUTPATIENT
Start: 2020-05-20 | End: 2020-05-20 | Stop reason: HOSPADM

## 2020-05-20 RX ORDER — FENTANYL CITRATE 50 UG/ML
INJECTION, SOLUTION INTRAMUSCULAR; INTRAVENOUS
Status: DISCONTINUED | OUTPATIENT
Start: 2020-05-20 | End: 2020-05-20 | Stop reason: HOSPADM

## 2020-05-20 RX ORDER — EZETIMIBE 10 MG/1
10 TABLET ORAL NIGHTLY
Status: DISCONTINUED | OUTPATIENT
Start: 2020-05-20 | End: 2020-05-25

## 2020-05-20 RX ADMIN — PANTOPRAZOLE SODIUM 40 MG: 40 TABLET, DELAYED RELEASE ORAL at 08:05

## 2020-05-20 RX ADMIN — SODIUM CHLORIDE: 0.9 INJECTION, SOLUTION INTRAVENOUS at 04:05

## 2020-05-20 RX ADMIN — ISOSORBIDE MONONITRATE 30 MG: 30 TABLET, EXTENDED RELEASE ORAL at 08:05

## 2020-05-20 RX ADMIN — LATANOPROST 1 DROP: 50 SOLUTION OPHTHALMIC at 09:05

## 2020-05-20 RX ADMIN — MORPHINE SULFATE 2 MG: 2 INJECTION, SOLUTION INTRAMUSCULAR; INTRAVENOUS at 08:05

## 2020-05-20 RX ADMIN — ONDANSETRON 4 MG: 2 INJECTION INTRAMUSCULAR; INTRAVENOUS at 05:05

## 2020-05-20 RX ADMIN — VALSARTAN 320 MG: 80 TABLET, FILM COATED ORAL at 08:05

## 2020-05-20 RX ADMIN — MORPHINE SULFATE 2 MG: 2 INJECTION, SOLUTION INTRAMUSCULAR; INTRAVENOUS at 05:05

## 2020-05-20 RX ADMIN — ROPINIROLE HYDROCHLORIDE 0.5 MG: 0.25 TABLET, FILM COATED ORAL at 09:05

## 2020-05-20 RX ADMIN — ATORVASTATIN CALCIUM 80 MG: 20 TABLET, FILM COATED ORAL at 09:05

## 2020-05-20 RX ADMIN — CLOPIDOGREL BISULFATE 75 MG: 75 TABLET ORAL at 08:05

## 2020-05-20 RX ADMIN — FLUOXETINE 20 MG: 20 CAPSULE ORAL at 08:05

## 2020-05-20 RX ADMIN — SODIUM CHLORIDE: 0.9 INJECTION, SOLUTION INTRAVENOUS at 12:05

## 2020-05-20 RX ADMIN — AMLODIPINE BESYLATE 10 MG: 10 TABLET ORAL at 08:05

## 2020-05-20 RX ADMIN — EZETIMIBE 10 MG: 10 TABLET ORAL at 09:05

## 2020-05-20 RX ADMIN — INSULIN ASPART 1 UNITS: 100 INJECTION, SOLUTION INTRAVENOUS; SUBCUTANEOUS at 09:05

## 2020-05-20 RX ADMIN — ASPIRIN 81 MG 81 MG: 81 TABLET ORAL at 08:05

## 2020-05-20 NOTE — PLAN OF CARE
Pt in bed, complaints of pain throughout shift relieved with PRN pain medication, NPO since midnight for procedure, AAO x 4, no injuries or falls during shift, bed in low locked position, call light in reach, hourly rounds complete, will continue to assess

## 2020-05-20 NOTE — PROGRESS NOTES
[address] Medicine  Progress Note    Patient Name: Cecilia Barahona  MRN: [id number]  Patient Class: IP- Inpatient   Admission Date: 5/18/2020  Length of Stay: 0 days  Attending Physician: Chele Galindo MD  Primary Care Provider: Yudi Smart MD        Subjective:     Principal Problem:Acute coronary syndrome        HPI:  Alexander Cam, PA:  Ms. Cecilia Barahona is a 69 y/o female, with PMH of T2DM HTN, HLD, obesity, CHETNA, and prior CVA, who presented to ED w/ intermittent mid sternal chest pain x 1 day. Chest pressure is mild all day, worse with exertion and associated w/ SOB. Pain became constant x 24 hours. Pain resolved after nitro x 2. ED evaluation with a negative troponin, EKG unchanged from prior, and without ischemic changes. Transferred to [facility] and placed on OBS.       Overview/Hospital Course:  Cecilia Barahona was admitted under Observation to rule out myocardial infarction.  The patient reports that prior to going to bed on May 18 she noted chest tightness and mild substernal pain.  She states that she took two melatonin gummies prior to bed. She states she awoke with worsening pain and decided to visit the ED.  She reports worsening fatigue over the past two days with intermittent nausea noted over the past two days.  She reports a similar episode in December 2019 where at that time she underwent NM stress test that was without evidence of myocardial ischemia and stress echocardiogram with EF 65% and grade I diastolic dysfunction. She denies sick contact and COVID-19 testing negative on admission.  Initial work up in the ED with electrocardiogram without evidence of ischemic changes and troponon 0.006.  She was treated with nitroglycerin while in the ED x2 with some improvement to pain, but continued with discomfort with movement.      After admission, troponins were negative 0.006, 0.008. 0.0011. 0.009.  The patient continued with intermittent substernal chest pain.  Cardiology was consulted given concern for acute coronary syndrome and known cardiac risk factors.  Agree with initiation of metoprolol 25 mg Q12 and isosorbidemononitrate 90 mg Q24 and Plavix daily; patient loaded on 5/19/2020.  On 5/20/2020 patient under went left heart catheterization that revealed LAD: Prox 30%. Mid 70%. Distal 80%. OM2: Osteal 90%. OM3: 95%. OM4 90%. RCA: Mid subtotal. LV: Basal inferior mild hypokinesia. EF 60%.  Case discussed with Dr. Lowery and plan to transfer patient to [facility] on 5/21/2020 per Cardiothoracic Surgery, Dr. Bowman.  Continue ASA, Plavix and atorvastatin 40 mg daily.         Interval History:   Troponin negative.  Continues with intermittent chest pain, worsens with movement, left chest wall tenderness. Cardiology consulted and  [facility] 5/2020 with severe three vessel disease. Plan for transfer on 5/21/2020 to [facility] for evaluation for CABG.  Monitor closely on telemetry.  Continue DAPT and atorvastatin 80 mg daily    Review of Systems   Constitutional: Positive for fatigue (noted two days). Negative for chills and fever.   HENT: Negative for congestion and trouble swallowing.    Eyes: Negative.    Respiratory: Negative for cough and shortness of breath.    Cardiovascular: Positive for chest pain (intermittent center of chest;relieved by morphine). Negative for palpitations.   Gastrointestinal: Negative for abdominal pain, diarrhea, nausea and vomiting.   Genitourinary: Negative for dysuria, frequency and urgency.   Musculoskeletal: Negative for arthralgias and myalgias.   Skin: Negative.    Neurological: Negative for dizziness, weakness, light-headedness and headaches.   Hematological: Does not bruise/bleed easily.   Psychiatric/Behavioral: Negative.      Objective:     Vital Signs (Most Recent):  Temp: 98.6 °F (37 °C) (05/20/20 1630)  Pulse: 62 (05/20/20 1630)  Resp: 16 (05/20/20 1630)  BP: (!) 148/71 (05/20/20 1630)  SpO2: 95 % (05/20/20 1630) Vital Signs  (24h Range):  Temp:  [98 °F (36.7 °C)-98.9 °F (37.2 °C)] 98.6 °F (37 °C)  Pulse:  [] 62  Resp:  [16-20] 16  SpO2:  [92 %-98 %] 95 %  BP: (126-168)/(58-77) 148/71     Weight: 87.7 kg (193 lb 5.5 oz)  Body mass index is 34.25 kg/m².    Intake/Output Summary (Last 24 hours) at 5/20/2020 1643  Last data filed at 5/20/2020 1636  Gross per 24 hour   Intake 320 ml   Output --   Net 320 ml        Physical Exam   Constitutional: She is oriented to person, place, and time. She appears well-developed and well-nourished. No distress.   HENT:   Head: Normocephalic and atraumatic.   Mouth/Throat: Oropharynx is clear and moist.   Eyes: Pupils are equal, round, and reactive to light. Conjunctivae are normal.   Neck: Normal range of motion. Neck supple. No JVD present.   Cardiovascular: Normal rate, regular rhythm and intact distal pulses.   Murmur heard.  Pulses:       Radial pulses are 2+ on the right side, and 2+ on the left side.        Dorsalis pedis pulses are 2+ on the right side, and 2+ on the left side.   Pulmonary/Chest: Effort normal and breath sounds normal. No respiratory distress. She exhibits tenderness (left sided).   Abdominal: Soft. Bowel sounds are normal. There is no tenderness.   Musculoskeletal: Normal range of motion. She exhibits no tenderness.   Neurological: She is alert and oriented to person, place, and time. She has normal strength. No sensory deficit.   Skin: Skin is warm and dry. Capillary refill takes less than 2 seconds.   Psychiatric: She has a normal mood and affect. Her speech is normal and behavior is normal.   Nursing note and vitals reviewed.      Significant Labs:   A1C:   Recent Labs   Lab 12/20/19  1138 05/05/20  0747 05/19/20  0451   HGBA1C 9.1* 7.0* 7.1*     CBC:   Recent Labs   Lab 05/19/20  0451 05/20/20  0517   WBC 9.18 8.02   HGB 11.4* 12.5   HCT 36.7* 39.3    292     CMP:   Recent Labs   Lab 05/19/20  0451 05/20/20  0517    140   K 3.7 4.1    102   CO2 26 28    * 108   BUN 22 16   CREATININE 0.9 0.9   CALCIUM 9.3 9.4   PROT 7.0  --    ALBUMIN 3.2*  --    BILITOT 0.2  --    ALKPHOS 115  --    AST 19  --    ALT 18  --    ANIONGAP 11 10   EGFRNONAA >60 >60       Troponin:   Recent Labs   Lab 05/19/20  0451 05/19/20  1120 05/19/20  1844   TROPONINI 0.006 0.008 0.009  0.011     All pertinent labs within the past 24 hours have been reviewed.    Significant Imaging: I have reviewed all pertinent imaging results/findings within the past 24 hours.             Assessment/Plan:      * Acute coronary syndrome  - Most recent NM stress test 12/2019 negative for ischemia, but did show mild to moderate intensity defect in the anterior and anteroapical wall of the    - Initial EKG without ischemic changes and troponin trended. 0.006, 0.008. 0.0011. 0.009    - Known cardiac risk factors and Cardiology consulted for evaluation  - Continue ASA, atorvastatin 80 mg daily, metoprolol 25 mg twice daily and Imdur 90 mg daily;  load with Plavix 5/21/2020 and continue daily   - Agree with add metoprolol 25 mg Q12 and isosorbidemononitrate 90 mg Q24.  - 5/20/2020: [facility]: Cath: LAD: Prox 30%. Mid 70%. Distal 80%. OM2: Osteal 90%. OM3: 95%. OM4 90%. RCA: Mid subtotal. LV: Basal inferior mild hypokinesia. EF 60%.  - Discussed case with Dr. Lowery and plan to transfer patient to [facility] on 5/21/2020 per Cardiothoracic Surgery, Dr. Bowman.  - Continue to monitor closely on telemetry    Coronary artery disease  - Cardiology consulted and patient with left heart cath 5/20/2020 revealing significant vascular disease  - Patient to be transferred to [facility] on 5/21/2020 per Dr. Bowman, Cardiothoracic Surgery  - Loaded with Plavix 5/19/2020 and continue DAPT and atorvastatin 40 mg daily  - Monitor closely on telemetry      Mild major depression  - Per medical record review, history of depression with psychosis and generalized anxiety disorder  - Follows Psychiatry with most  recent visit January 2020 with follow up planned for March 2020  - On exam, appears anxious with stable mood  - Continue Prozax 20 mg daily  - Monitor       Restless legs syndrome (RLS)  - Appears stable on ropinirole and will continue      Gastroesophageal reflux disease without esophagitis  - Per medical record and will start pantoprazole 40 mg daily  -       Type 2 diabetes mellitus without complication, with long-term current use of insulin  - Most recent A1c 7.1 with morning labs  - Continue cardiac and diabetic diet  - Continue basal insulin 21 units daily, and moderate dose sliding scale.    - Glucose goal during hospitalization between 140 -180, monitor and adjust insulin as needed    Mixed hyperlipidemia  - Lipid panel on admission with mildly elevated total cholesterol 204 and triglycerides 171; these are improved from prior   - Increase home dose of atorvastatin from 40 mg to 80 mg daily  - Continue dietary interventions       Glaucoma NEC  - Per medical record.  Appears stable and will continue home medications      Severe obesity (BMI 35.0-39.9) with comorbidity  - Continue to provide support for dietary changes and increased physical activity once stable  - Cardiac and diabetic diet      Essential hypertension  - Hypertensive on admission and now better controlled  - Continue home antihypertensive regimen with chlorthalidone 25 mg daily, amlodipine 10 mg daily, valsartan 320 mg daily.   - Agree with addition of metoprolol 25 mg twice daily and Imdur 90 mg daily.   - Monitor     VTE Risk Mitigation (From admission, onward)         Ordered     heparin infusion 1,000 units/500 ml in 0.9% NaCl (on sterile field)  As needed (PRN)      05/20/20 1434     IP VTE HIGH RISK PATIENT  Once      05/19/20 0513     Place sequential compression device  Until discontinued      05/19/20 0513                      Karen Arias NP  Department of Hospital Medicine   [address] CheckIn

## 2020-05-20 NOTE — SUBJECTIVE & OBJECTIVE
Interval History:   Troponin negative.  Continues with intermittent chest pain, worsens with movement, left chest wall tenderness. Cardiology consulted and  [facility] 5/2020 with severe three vessel disease. Plan for transfer on 5/21/2020 to [facility] for evaluation for CABG.  Monitor closely on telemetry.  Continue DAPT and atorvastatin 80 mg daily    Review of Systems   Constitutional: Positive for fatigue (noted two days). Negative for chills and fever.   HENT: Negative for congestion and trouble swallowing.    Eyes: Negative.    Respiratory: Negative for cough and shortness of breath.    Cardiovascular: Positive for chest pain (intermittent center of chest;relieved by morphine). Negative for palpitations.   Gastrointestinal: Negative for abdominal pain, diarrhea, nausea and vomiting.   Genitourinary: Negative for dysuria, frequency and urgency.   Musculoskeletal: Negative for arthralgias and myalgias.   Skin: Negative.    Neurological: Negative for dizziness, weakness, light-headedness and headaches.   Hematological: Does not bruise/bleed easily.   Psychiatric/Behavioral: Negative.      Objective:     Vital Signs (Most Recent):  Temp: 98.6 °F (37 °C) (05/20/20 1630)  Pulse: 62 (05/20/20 1630)  Resp: 16 (05/20/20 1630)  BP: (!) 148/71 (05/20/20 1630)  SpO2: 95 % (05/20/20 1630) Vital Signs (24h Range):  Temp:  [98 °F (36.7 °C)-98.9 °F (37.2 °C)] 98.6 °F (37 °C)  Pulse:  [] 62  Resp:  [16-20] 16  SpO2:  [92 %-98 %] 95 %  BP: (126-168)/(58-77) 148/71     Weight: 87.7 kg (193 lb 5.5 oz)  Body mass index is 34.25 kg/m².    Intake/Output Summary (Last 24 hours) at 5/20/2020 1643  Last data filed at 5/20/2020 1636  Gross per 24 hour   Intake 320 ml   Output --   Net 320 ml        Physical Exam   Constitutional: She is oriented to person, place, and time. She appears well-developed and well-nourished. No distress.   HENT:   Head: Normocephalic and atraumatic.   Mouth/Throat: Oropharynx is clear and moist.   Eyes:  Pupils are equal, round, and reactive to light. Conjunctivae are normal.   Neck: Normal range of motion. Neck supple. No JVD present.   Cardiovascular: Normal rate, regular rhythm and intact distal pulses.   Murmur heard.  Pulses:       Radial pulses are 2+ on the right side, and 2+ on the left side.        Dorsalis pedis pulses are 2+ on the right side, and 2+ on the left side.   Pulmonary/Chest: Effort normal and breath sounds normal. No respiratory distress. She exhibits tenderness (left sided).   Abdominal: Soft. Bowel sounds are normal. There is no tenderness.   Musculoskeletal: Normal range of motion. She exhibits no tenderness.   Neurological: She is alert and oriented to person, place, and time. She has normal strength. No sensory deficit.   Skin: Skin is warm and dry. Capillary refill takes less than 2 seconds.   Psychiatric: She has a normal mood and affect. Her speech is normal and behavior is normal.   Nursing note and vitals reviewed.      Significant Labs:   A1C:   Recent Labs   Lab 12/20/19  1138 05/05/20  0747 05/19/20 0451   HGBA1C 9.1* 7.0* 7.1*     CBC:   Recent Labs   Lab 05/19/20  0451 05/20/20  0517   WBC 9.18 8.02   HGB 11.4* 12.5   HCT 36.7* 39.3    292     CMP:   Recent Labs   Lab 05/19/20  0451 05/20/20  0517    140   K 3.7 4.1    102   CO2 26 28   * 108   BUN 22 16   CREATININE 0.9 0.9   CALCIUM 9.3 9.4   PROT 7.0  --    ALBUMIN 3.2*  --    BILITOT 0.2  --    ALKPHOS 115  --    AST 19  --    ALT 18  --    ANIONGAP 11 10   EGFRNONAA >60 >60       Troponin:   Recent Labs   Lab 05/19/20  0451 05/19/20  1120 05/19/20  1844   TROPONINI 0.006 0.008 0.009  0.011     All pertinent labs within the past 24 hours have been reviewed.    Significant Imaging: I have reviewed all pertinent imaging results/findings within the past 24 hours.

## 2020-05-20 NOTE — BRIEF OP NOTE
5/20/2020: [facility]: Cath: LAD: Prox 30%. Mid 70%. Distal 80%. OM2: Osteal 90%. OM3: 95%. OM4 90%. RCA: Mid subtotal. LV: Basal inferior mild hypokinesia. EF 60%.                Etelvina Lowery M.D.

## 2020-05-20 NOTE — ASSESSMENT & PLAN NOTE
- Initial EKG without ischemic changes and troponin negative; will continue to trend  - Continue ASA, atorvastatin 40 mg daily, continue home antihypertensive regimen  - Known cardiac risk factors and will consult Cardiology for evaluation

## 2020-05-20 NOTE — PLAN OF CARE
Patient in no apparent distress. Sat's  93 % on room air. PRN MDI not needed . Will continue to monitor.

## 2020-05-20 NOTE — NURSING
"Patient alert and oriented X4. O2 sats 90-93% on RA. 2L prn. VSS. Condones substernal reproducible cp on shift, (patient describes as "spasms") radiating to back. NP notified. Morphine admin. Patient reports moderate relief. Will continue to monitor. Pt to [facility] today for Cardiac Cath. Bedrest till 2100.. Pt aware. Aceess site WNL. Purewick placed. Bed to lowest position and locked. Call light w/inr each. Encouraged to call for assist. Purposeful rounding completed.  "

## 2020-05-20 NOTE — ASSESSMENT & PLAN NOTE
- Lipid panel on admission with mildly elevated total cholesterol 204 and triglycerides 171; these are improved from prior   - Increase home dose of atorvastatin from 40 mg to 80 mg daily  - Continue dietary interventions

## 2020-05-20 NOTE — ASSESSMENT & PLAN NOTE
- Hypertensive on admission and now better controlled  - Continue home antihypertensive regimen with chlorthalidone 25 mg daily, amlodipine 10 mg daily, valsartan 320 mg daily.   - Agree with addition of metoprolol 25 mg twice daily and Imdur 90 mg daily.   - Monitor

## 2020-05-20 NOTE — PROGRESS NOTES
[address] CheckIn  Cardiology  Progress Note    Patient Name: Cecilia Barahona  MRN: [id number]  Admission Date: 5/18/2020  Hospital Length of Stay: 0 days  Code Status: Full Code   Attending Physician: Chele Galindo MD   Primary Care Physician: Yudi Smart MD  Expected Discharge Date:   Principal Problem:Acute coronary syndrome    Subjective:     Brief HPI:    Cecilia Barahona is a 70 y.o.female with hypertension, hypercholesterolemia and diabetes mellitus type 2. She is mildly obese. She appears to have had a CVA in about 2004 when she had an episode of weakness in her left hand. She had exertional chest discomfort in 12/2019. She was given isosorbidemononitrate and the chest discomfort resolved. She had a Stress MPI that was negative.     On 5/16/2020 she began to experience a mild pressure over her chest. The heaviness lasted most of the day. Walking in her house made the discomfort more pronounced. There was no radiation of the pain and neither any associated dyspnea or diaphoresis. She had similar discomfort on 5/17/2020 and 5/18/2020. She told her daughter that took her to . She received NTG sl x 2 tablets and the chest discomfort resolved. There were no acute ST-T wave changes. She was advised admission and transferred to [facility]. She has had no further chest pain since presentation.    Hospital Course:     [facility]: LAD: Prox 30%. Mid 70%. Distal 80%. OM2: Osteal 90%. OM3: 95%. OM4 90%. RCA: Mid subtotal. LV: Basal inferior mild hypokinesia. EF 60%.     Interval History:     Brief episode of chest pain this am.    Severe 3 V CAD. Due to extent of disease feel CABG best option.    Review of Systems   Constitution: Negative for chills, fever and malaise/fatigue.   HENT: Negative for nosebleeds.    Eyes: Negative for double vision, vision loss in left eye and vision loss in right eye.   Cardiovascular: Positive for chest pain. Negative for claudication, dyspnea on  exertion, irregular heartbeat, leg swelling, near-syncope, orthopnea, palpitations, paroxysmal nocturnal dyspnea and syncope.   Respiratory: Negative for cough, hemoptysis, shortness of breath and wheezing.    Endocrine: Negative for cold intolerance and heat intolerance.   Hematologic/Lymphatic: Negative for bleeding problem. Does not bruise/bleed easily.   Skin: Negative for color change and rash.   Musculoskeletal: Negative for back pain, falls, muscle weakness and myalgias.   Gastrointestinal: Negative for heartburn, hematemesis, hematochezia, hemorrhoids, jaundice, melena, nausea and vomiting.   Genitourinary: Negative for dysuria and hematuria.   Neurological: Negative for dizziness, focal weakness, headaches, light-headedness, loss of balance, numbness, vertigo and weakness.   Psychiatric/Behavioral: Negative for altered mental status, depression and memory loss. The patient is not nervous/anxious.    Allergic/Immunologic: Negative for hives and persistent infections.     Objective:     Vital Signs (Most Recent):  Temp: 98.6 °F (37 °C) (05/20/20 1545)  Pulse: (!) 56 (05/20/20 1615)  Resp: 16 (05/20/20 1600)  BP: 130/63 (05/20/20 1615)  SpO2: 97 % (05/20/20 1615) Vital Signs (24h Range):  Temp:  [97.7 °F (36.5 °C)-98.9 °F (37.2 °C)] 98.6 °F (37 °C)  Pulse:  [] 56  Resp:  [16-20] 16  SpO2:  [92 %-98 %] 97 %  BP: (109-168)/(58-77) 130/63     Weight: 87.7 kg (193 lb 5.5 oz)  Body mass index is 34.25 kg/m².    SpO2: 97 %  O2 Device (Oxygen Therapy): nasal cannula      Intake/Output Summary (Last 24 hours) at 5/20/2020 1622  Last data filed at 5/20/2020 0858  Gross per 24 hour   Intake 170 ml   Output --   Net 170 ml       Lines/Drains/Airways     Peripheral Intravenous Line                 Peripheral IV - Single Lumen 05/18/20 2305 20 G Left Forearm 1 day                Physical Exam   Constitutional: She is oriented to person, place, and time. She appears well-developed and well-nourished.  Non-toxic  appearance. She does not appear ill. No distress.   Eyes: Right conjunctiva is not injected. Right conjunctiva has no hemorrhage. Left conjunctiva is not injected. Left conjunctiva has no hemorrhage.   Neck: No JVD present.   Cardiovascular: Normal rate, regular rhythm, S1 normal and S2 normal. Exam reveals gallop and S4.   No murmur heard.  Pulmonary/Chest: Effort normal and breath sounds normal.   Abdominal: Normal appearance. There is no tenderness.   Musculoskeletal:        Right ankle: She exhibits no swelling.        Left ankle: She exhibits no swelling.   Neurological: She is alert and oriented to person, place, and time. She is not disoriented.   Skin: Skin is warm and dry. No rash noted.   Psychiatric: She has a normal mood and affect. Her speech is normal and behavior is normal. Cognition and memory are normal.     Current Medications:     amLODIPine  10 mg Oral Daily    aspirin  81 mg Oral Daily    atorvastatin  40 mg Oral QHS    clopidogreL  75 mg Oral Daily    FLUoxetine  20 mg Oral Daily    insulin detemir U-100  10 Units Subcutaneous Daily    isosorbide mononitrate  30 mg Oral Daily    latanoprost  1 drop Both Eyes QHS    pantoprazole  40 mg Oral Daily    rOPINIRole  0.5 mg Oral Nightly    valsartan  320 mg Oral Daily     Current Laboratory Results:    Recent Results (from the past 24 hour(s))   Troponin I    Collection Time: 05/19/20  6:44 PM   Result Value Ref Range    Troponin I 0.011 0.000 - 0.026 ng/mL   Troponin I    Collection Time: 05/19/20  6:44 PM   Result Value Ref Range    Troponin I 0.009 0.000 - 0.026 ng/mL   POCT glucose    Collection Time: 05/19/20  8:50 PM   Result Value Ref Range    POCT Glucose 125 (H) 70 - 110 mg/dL   Basic Metabolic Panel (BMP)    Collection Time: 05/20/20  5:17 AM   Result Value Ref Range    Sodium 140 136 - 145 mmol/L    Potassium 4.1 3.5 - 5.1 mmol/L    Chloride 102 95 - 110 mmol/L    CO2 28 23 - 29 mmol/L    Glucose 108 70 - 110 mg/dL    BUN, Bld 16  8 - 23 mg/dL    Creatinine 0.9 0.5 - 1.4 mg/dL    Calcium 9.4 8.7 - 10.5 mg/dL    Anion Gap 10 8 - 16 mmol/L    eGFR if African American >60 >60 mL/min/1.73 m^2    eGFR if non African American >60 >60 mL/min/1.73 m^2   CBC with Automated Differential    Collection Time: 05/20/20  5:17 AM   Result Value Ref Range    WBC 8.02 3.90 - 12.70 K/uL    RBC 4.29 4.00 - 5.40 M/uL    Hemoglobin 12.5 12.0 - 16.0 g/dL    Hematocrit 39.3 37.0 - 48.5 %    Mean Corpuscular Volume 92 82 - 98 fL    Mean Corpuscular Hemoglobin 29.1 27.0 - 31.0 pg    Mean Corpuscular Hemoglobin Conc 31.8 (L) 32.0 - 36.0 g/dL    RDW 14.3 11.5 - 14.5 %    Platelets 292 150 - 350 K/uL    MPV 9.4 9.2 - 12.9 fL    Immature Granulocytes 0.2 0.0 - 0.5 %    Gran # (ANC) 4.5 1.8 - 7.7 K/uL    Immature Grans (Abs) 0.02 0.00 - 0.04 K/uL    Lymph # 2.7 1.0 - 4.8 K/uL    Mono # 0.7 0.3 - 1.0 K/uL    Eos # 0.1 0.0 - 0.5 K/uL    Baso # 0.02 0.00 - 0.20 K/uL    nRBC 0 0 /100 WBC    Gran% 56.2 38.0 - 73.0 %    Lymph% 33.3 18.0 - 48.0 %    Mono% 8.4 4.0 - 15.0 %    Eosinophil% 1.7 0.0 - 8.0 %    Basophil% 0.2 0.0 - 1.9 %    Differential Method Automated    POCT glucose    Collection Time: 05/20/20  8:04 AM   Result Value Ref Range    POCT Glucose 117 (H) 70 - 110 mg/dL   POCT glucose    Collection Time: 05/20/20 11:09 AM   Result Value Ref Range    POCT Glucose 160 (H) 70 - 110 mg/dL   Cardiac catheterization    Collection Time: 05/20/20  2:57 PM   Result Value Ref Range    Cath EF Estimated 60 %   POCT glucose    Collection Time: 05/20/20  3:56 PM   Result Value Ref Range    POCT Glucose 113 (H) 70 - 110 mg/dL     Current Imaging Results:    X-Ray Chest 1 View   Final Result      There is no radiographic evidence for acute intrathoracic process.         Electronically signed by: Annada Barrientos   Date:    05/18/2020   Time:    23:31         Assessment and Plan:     Problem List:    Active Diagnoses:    Diagnosis Date Noted POA    PRINCIPAL PROBLEM:  Acute coronary  syndrome [I24.9] 05/20/2020 Yes    Coronary artery disease [I25.10] 05/20/2020 Yes    Essential hypertension [I10]  Yes    Mixed hyperlipidemia [E78.2]  Yes    Type 2 diabetes mellitus without complication, with long-term current use of insulin [E11.9, Z79.4] 09/30/2015 Not Applicable    Severe obesity (BMI 35.0-39.9) with comorbidity [E66.01]  Yes    Mild major depression [F32.0] 01/17/2020 Yes    Restless legs syndrome (RLS) [G25.81] 03/12/2018 Yes    Gastroesophageal reflux disease without esophagitis [K21.9] 09/30/2015 Yes    Glaucoma NEC [H40.89]  Yes      Problems Resolved During this Admission:     Assessment and Plan:    1. Coronary Artery Disease              12/2019: Began experience chest pain. Resolved with isosorbidemononitrate.              12/19/2019: Stress MPI:3:39 min. No CP. ECG negative. MPI: Mild to moderate fixed anterior and anteroapical defect. EF 73%.              5/18/2020: Presents with more pronounced chest pain. Resolved with NTG sl.              ECG without acute changes. Troponin x 3 negative.               5/19/2020: Echo: Normal left ventricular size and systolic function. EF 75%. Mildly dilated LA. Mild aortic valve sclerosis. Mild mitral valve sclerosis.   5/20/2020: [facility]: Cath: LAD: Prox 30%. Mid 70%. Distal 80%. OM2: Osteal 90%. OM3: 95%. OM4 90%. RCA: Mid subtotal. LV: Basal inferior mild hypokinesia. EF 60%.   5/19/2020: Received clopidogrel load.   On aspirin 81 mg Q24.   On metoprolol 25 mg Q12 and isosorbidemononitrate 90 mg Q24.    Evaluation for CABG. Discussed with Dr. Dinesh Bowman.   Discussed with patient and daughter, Ms. Eid: [phone].     2. History of Cerebrovascular Accident              2004: Weakness in left hand. Affected memory.     3. Hypertension              2000: Diagnosed.              On valsartan 320 mg Q24, amlodipine 10 mg Q24 and chlorthalidone 25 mg Q24     4. Hypercholesterolemia              2000: Began statin.              On  atorvastatin 40 mg Q24.                 5/19/2020: Chol 204. HDL 41. . .              Will increase atorvastatin to 80 mg Q24 and add ezetimibe 10 mg Q24.     5. Diabetes Mellitus, Type 2              2000: Diagnosed. Complications: CVA. Medications: Insulin.      6. Mild Obesity              5/19/2020: Weight 86 kg. BMI 33.     7. Primary Care              Dr. Yudi Smart.      VTE Risk Mitigation (From admission, onward)         Ordered     heparin infusion 1,000 units/500 ml in 0.9% NaCl (on sterile field)  As needed (PRN)      05/20/20 1434     IP VTE HIGH RISK PATIENT  Once      05/19/20 0513     Place sequential compression device  Until discontinued      05/19/20 0513                Etelvina Lowery MD  Cardiology  [address] CheckIn

## 2020-05-20 NOTE — ASSESSMENT & PLAN NOTE
- Cardiology consulted and patient with left heart cath 5/20/2020 revealing significant vascular disease  - Patient to be transferred to [facility] on 5/21/2020 per Dr. Bowman, Cardiothoracic Surgery  - Loaded with Plavix 5/19/2020 and continue DAPT and atorvastatin 40 mg daily  - Monitor closely on telemetry

## 2020-05-20 NOTE — ASSESSMENT & PLAN NOTE
- Most recent NM stress test 12/2019 negative for ischemia, but did show mild to moderate intensity defect in the anterior and anteroapical wall of the    - Initial EKG without ischemic changes and troponin trended. 0.006, 0.008. 0.0011. 0.009    - Known cardiac risk factors and Cardiology consulted for evaluation  - Continue ASA, atorvastatin 80 mg daily, metoprolol 25 mg twice daily and Imdur 90 mg daily;  load with Plavix 5/21/2020 and continue daily   - Agree with add metoprolol 25 mg Q12 and isosorbidemononitrate 90 mg Q24.  - 5/20/2020: [facility]: Cath: LAD: Prox 30%. Mid 70%. Distal 80%. OM2: Osteal 90%. OM3: 95%. OM4 90%. RCA: Mid subtotal. LV: Basal inferior mild hypokinesia. EF 60%.  - Discussed case with Dr. Lowery and plan to transfer patient to [facility] on 5/21/2020 per Cardiothoracic Surgery, Dr. Bowman.  - Continue to monitor closely on telemetry

## 2020-05-20 NOTE — ASSESSMENT & PLAN NOTE
- Continue to provide support for dietary changes and increased physical activity once stable  - Cardiac and diabetic diet

## 2020-05-21 PROBLEM — I24.9 ACUTE CORONARY SYNDROME: Status: RESOLVED | Noted: 2020-05-20 | Resolved: 2020-05-21

## 2020-05-21 LAB
ANION GAP SERPL CALC-SCNC: 10 MMOL/L (ref 8–16)
BASOPHILS # BLD AUTO: 0.01 K/UL (ref 0–0.2)
BASOPHILS # BLD AUTO: 0.02 K/UL (ref 0–0.2)
BASOPHILS NFR BLD: 0.1 % (ref 0–1.9)
BASOPHILS NFR BLD: 0.2 % (ref 0–1.9)
BUN SERPL-MCNC: 13 MG/DL (ref 8–23)
CALCIUM SERPL-MCNC: 9.2 MG/DL (ref 8.7–10.5)
CHLORIDE SERPL-SCNC: 103 MMOL/L (ref 95–110)
CO2 SERPL-SCNC: 28 MMOL/L (ref 23–29)
CREAT SERPL-MCNC: 0.8 MG/DL (ref 0.5–1.4)
DIFFERENTIAL METHOD: ABNORMAL
DIFFERENTIAL METHOD: ABNORMAL
EOSINOPHIL # BLD AUTO: 0.1 K/UL (ref 0–0.5)
EOSINOPHIL # BLD AUTO: 0.1 K/UL (ref 0–0.5)
EOSINOPHIL NFR BLD: 1.3 % (ref 0–8)
EOSINOPHIL NFR BLD: 1.4 % (ref 0–8)
ERYTHROCYTE [DISTWIDTH] IN BLOOD BY AUTOMATED COUNT: 14 % (ref 11.5–14.5)
ERYTHROCYTE [DISTWIDTH] IN BLOOD BY AUTOMATED COUNT: 14.2 % (ref 11.5–14.5)
EST. GFR  (AFRICAN AMERICAN): >60 ML/MIN/1.73 M^2
EST. GFR  (NON AFRICAN AMERICAN): >60 ML/MIN/1.73 M^2
GLUCOSE SERPL-MCNC: 132 MG/DL (ref 70–110)
HCT VFR BLD AUTO: 35.9 % (ref 37–48.5)
HCT VFR BLD AUTO: 38.9 % (ref 37–48.5)
HGB BLD-MCNC: 11.4 G/DL (ref 12–16)
HGB BLD-MCNC: 12.2 G/DL (ref 12–16)
IMM GRANULOCYTES # BLD AUTO: 0.02 K/UL (ref 0–0.04)
IMM GRANULOCYTES # BLD AUTO: 0.03 K/UL (ref 0–0.04)
IMM GRANULOCYTES NFR BLD AUTO: 0.2 % (ref 0–0.5)
IMM GRANULOCYTES NFR BLD AUTO: 0.3 % (ref 0–0.5)
LYMPHOCYTES # BLD AUTO: 2.4 K/UL (ref 1–4.8)
LYMPHOCYTES # BLD AUTO: 2.9 K/UL (ref 1–4.8)
LYMPHOCYTES NFR BLD: 27.5 % (ref 18–48)
LYMPHOCYTES NFR BLD: 32.7 % (ref 18–48)
MCH RBC QN AUTO: 28.6 PG (ref 27–31)
MCH RBC QN AUTO: 29 PG (ref 27–31)
MCHC RBC AUTO-ENTMCNC: 31.4 G/DL (ref 32–36)
MCHC RBC AUTO-ENTMCNC: 31.8 G/DL (ref 32–36)
MCV RBC AUTO: 91 FL (ref 82–98)
MCV RBC AUTO: 91 FL (ref 82–98)
MONOCYTES # BLD AUTO: 0.7 K/UL (ref 0.3–1)
MONOCYTES # BLD AUTO: 0.7 K/UL (ref 0.3–1)
MONOCYTES NFR BLD: 8.2 % (ref 4–15)
MONOCYTES NFR BLD: 8.5 % (ref 4–15)
NEUTROPHILS # BLD AUTO: 5.1 K/UL (ref 1.8–7.7)
NEUTROPHILS # BLD AUTO: 5.4 K/UL (ref 1.8–7.7)
NEUTROPHILS NFR BLD: 57.4 % (ref 38–73)
NEUTROPHILS NFR BLD: 62.2 % (ref 38–73)
NRBC BLD-RTO: 0 /100 WBC
NRBC BLD-RTO: 0 /100 WBC
PLATELET # BLD AUTO: 273 K/UL (ref 150–350)
PLATELET # BLD AUTO: 281 K/UL (ref 150–350)
PMV BLD AUTO: 9.2 FL (ref 9.2–12.9)
PMV BLD AUTO: 9.6 FL (ref 9.2–12.9)
POCT GLUCOSE: 131 MG/DL (ref 70–110)
POCT GLUCOSE: 179 MG/DL (ref 70–110)
POCT GLUCOSE: 200 MG/DL (ref 70–110)
POCT GLUCOSE: 223 MG/DL (ref 70–110)
POTASSIUM SERPL-SCNC: 3.5 MMOL/L (ref 3.5–5.1)
RBC # BLD AUTO: 3.93 M/UL (ref 4–5.4)
RBC # BLD AUTO: 4.26 M/UL (ref 4–5.4)
SODIUM SERPL-SCNC: 141 MMOL/L (ref 136–145)
WBC # BLD AUTO: 8.75 K/UL (ref 3.9–12.7)
WBC # BLD AUTO: 8.89 K/UL (ref 3.9–12.7)

## 2020-05-21 PROCEDURE — 25000003 PHARM REV CODE 250: Mod: HCNC | Performed by: INTERNAL MEDICINE

## 2020-05-21 PROCEDURE — 99233 SBSQ HOSP IP/OBS HIGH 50: CPT | Mod: HCNC,,, | Performed by: NURSE PRACTITIONER

## 2020-05-21 PROCEDURE — 85025 COMPLETE CBC W/AUTO DIFF WBC: CPT | Mod: 91,HCNC

## 2020-05-21 PROCEDURE — C9399 UNCLASSIFIED DRUGS OR BIOLOG: HCPCS | Mod: HCNC | Performed by: INTERNAL MEDICINE

## 2020-05-21 PROCEDURE — 20600001 HC STEP DOWN PRIVATE ROOM: Mod: HCNC

## 2020-05-21 PROCEDURE — 36415 COLL VENOUS BLD VENIPUNCTURE: CPT | Mod: HCNC

## 2020-05-21 PROCEDURE — 99233 PR SUBSEQUENT HOSPITAL CARE,LEVL III: ICD-10-PCS | Mod: HCNC,,, | Performed by: NURSE PRACTITIONER

## 2020-05-21 PROCEDURE — 63600175 PHARM REV CODE 636 W HCPCS: Mod: HCNC | Performed by: INTERNAL MEDICINE

## 2020-05-21 PROCEDURE — 80048 BASIC METABOLIC PNL TOTAL CA: CPT | Mod: HCNC

## 2020-05-21 RX ORDER — TALC
6 POWDER (GRAM) TOPICAL NIGHTLY PRN
Status: DISCONTINUED | OUTPATIENT
Start: 2020-05-21 | End: 2020-05-25

## 2020-05-21 RX ORDER — SENNOSIDES 8.6 MG/1
8.6 TABLET ORAL DAILY PRN
Status: DISCONTINUED | OUTPATIENT
Start: 2020-05-21 | End: 2020-05-23

## 2020-05-21 RX ORDER — METOPROLOL TARTRATE 25 MG/1
25 TABLET, FILM COATED ORAL 2 TIMES DAILY
Status: DISCONTINUED | OUTPATIENT
Start: 2020-05-21 | End: 2020-05-23

## 2020-05-21 RX ORDER — HEPARIN SODIUM 5000 [USP'U]/ML
5000 INJECTION, SOLUTION INTRAVENOUS; SUBCUTANEOUS EVERY 8 HOURS
Status: DISCONTINUED | OUTPATIENT
Start: 2020-05-21 | End: 2020-05-25

## 2020-05-21 RX ORDER — ASPIRIN 81 MG/1
81 TABLET ORAL DAILY
Status: DISCONTINUED | OUTPATIENT
Start: 2020-05-21 | End: 2020-05-25

## 2020-05-21 RX ADMIN — HEPARIN SODIUM 5000 UNITS: 5000 INJECTION INTRAVENOUS; SUBCUTANEOUS at 08:05

## 2020-05-21 RX ADMIN — Medication 6 MG: at 09:05

## 2020-05-21 RX ADMIN — AMLODIPINE BESYLATE 10 MG: 10 TABLET ORAL at 08:05

## 2020-05-21 RX ADMIN — ASPIRIN 81 MG: 81 TABLET, COATED ORAL at 08:05

## 2020-05-21 RX ADMIN — HEPARIN SODIUM 5000 UNITS: 5000 INJECTION INTRAVENOUS; SUBCUTANEOUS at 03:05

## 2020-05-21 RX ADMIN — FLUOXETINE 20 MG: 20 CAPSULE ORAL at 08:05

## 2020-05-21 RX ADMIN — INSULIN ASPART 4 UNITS: 100 INJECTION, SOLUTION INTRAVENOUS; SUBCUTANEOUS at 04:05

## 2020-05-21 RX ADMIN — LATANOPROST 1 DROP: 50 SOLUTION OPHTHALMIC at 08:05

## 2020-05-21 RX ADMIN — ISOSORBIDE MONONITRATE 30 MG: 60 TABLET, EXTENDED RELEASE ORAL at 08:05

## 2020-05-21 RX ADMIN — ROPINIROLE HYDROCHLORIDE 0.5 MG: 0.25 TABLET, FILM COATED ORAL at 08:05

## 2020-05-21 RX ADMIN — INSULIN DETEMIR 21 UNITS: 100 INJECTION, SOLUTION SUBCUTANEOUS at 08:05

## 2020-05-21 RX ADMIN — SENNOSIDES 8.6 MG: 8.6 TABLET, FILM COATED ORAL at 09:05

## 2020-05-21 RX ADMIN — EZETIMIBE 10 MG: 10 TABLET ORAL at 08:05

## 2020-05-21 RX ADMIN — METOPROLOL TARTRATE 25 MG: 25 TABLET, FILM COATED ORAL at 08:05

## 2020-05-21 RX ADMIN — VALSARTAN 320 MG: 80 TABLET, FILM COATED ORAL at 08:05

## 2020-05-21 RX ADMIN — PANTOPRAZOLE SODIUM 40 MG: 40 TABLET, DELAYED RELEASE ORAL at 08:05

## 2020-05-21 RX ADMIN — ATORVASTATIN CALCIUM 80 MG: 20 TABLET, FILM COATED ORAL at 08:05

## 2020-05-21 NOTE — PLAN OF CARE
Pt free of falls/trauma/injuries. A&Ox4 VSS. Denies c/o SOB and chest pains; O2Sats remain stable on room air. SATs above 90%. Fall precautions in place; non-slip socks on, bed rails x2, and call light in reach. Pt was emotional upon arrival, pt reassured. Pt tolerating plan of care. Will continue to monitor.

## 2020-05-21 NOTE — SUBJECTIVE & OBJECTIVE
Interval History:  No events overnight.   Continues with intermittent chest pain, worsens with movement, left chest wall tenderness.  [facility] 5/2020 with severe three vessel disease. Continue DAPT and atorvastatin 80 mg daily  [facility].       Review of Systems   Constitutional: Negative for chills, fatigue (improving) and fever.   HENT: Negative for congestion and trouble swallowing.    Eyes: Negative.    Respiratory: Negative for cough and shortness of breath.    Cardiovascular: Positive for chest pain (intermittent center of chest;relieved by morphine). Negative for palpitations.   Gastrointestinal: Negative for abdominal pain, diarrhea, nausea and vomiting.   Genitourinary: Negative for dysuria, frequency and urgency.   Musculoskeletal: Negative for arthralgias and myalgias.   Skin: Negative.    Neurological: Negative for dizziness, weakness, light-headedness and headaches.   Hematological: Does not bruise/bleed easily.   Psychiatric/Behavioral: Negative.      Objective:     Vital Signs (Most Recent):  Temp: 98.4 °F (36.9 °C) (05/21/20 0859)  Pulse: 64 (05/21/20 0859)  Resp: 18 (05/21/20 0859)  BP: (!) 140/101 (05/21/20 0859)  SpO2: 96 % (05/21/20 0859) Vital Signs (24h Range):  Temp:  [98 °F (36.7 °C)-98.6 °F (37 °C)] 98.4 °F (36.9 °C)  Pulse:  [] 64  Resp:  [16-20] 18  SpO2:  [92 %-98 %] 96 %  BP: (128-168)/() 140/101     Weight: 87.7 kg (193 lb 5.5 oz)  Body mass index is 34.25 kg/m².    Intake/Output Summary (Last 24 hours) at 5/21/2020 0900  Last data filed at 5/20/2020 1636  Gross per 24 hour   Intake 150 ml   Output --   Net 150 ml        Physical Exam   Constitutional: She is oriented to person, place, and time. She appears well-developed and well-nourished. No distress.   HENT:   Head: Normocephalic and atraumatic.   Mouth/Throat: Oropharynx is clear and moist.   Eyes: Pupils are equal, round, and reactive to light. Conjunctivae are  normal.   Neck: Normal range of motion. Neck supple. No JVD present.   Cardiovascular: Normal rate, regular rhythm and intact distal pulses.   Murmur heard.  Pulses:       Radial pulses are 2+ on the right side, and 2+ on the left side.        Dorsalis pedis pulses are 2+ on the right side, and 2+ on the left side.   Pulmonary/Chest: Effort normal and breath sounds normal. No respiratory distress. She exhibits tenderness (left sided).   Abdominal: Soft. Bowel sounds are normal. There is no tenderness.   Musculoskeletal: Normal range of motion. She exhibits no tenderness.   Neurological: She is alert and oriented to person, place, and time. She has normal strength. No sensory deficit.   Skin: Skin is warm and dry. Capillary refill takes less than 2 seconds.   Psychiatric: She has a normal mood and affect. Her speech is normal and behavior is normal.   Nursing note and vitals reviewed.      Significant Labs:   A1C:   Recent Labs   Lab 12/20/19  1138 05/05/20  0747 05/19/20  0451   HGBA1C 9.1* 7.0* 7.1*     CBC:   Recent Labs   Lab 05/20/20  0517 05/21/20  0049 05/21/20  0814   WBC 8.02 8.89 8.75   HGB 12.5 11.4* 12.2   HCT 39.3 35.9* 38.9    273 281     CMP:   Recent Labs   Lab 05/20/20  0517      K 4.1      CO2 28      BUN 16   CREATININE 0.9   CALCIUM 9.4   ANIONGAP 10   EGFRNONAA >60       Troponin:   Recent Labs   Lab 05/19/20  1120 05/19/20  1844   TROPONINI 0.008 0.009  0.011     All pertinent labs within the past 24 hours have been reviewed.    Significant Imaging: I have reviewed all pertinent imaging results/findings within the past 24 hours.

## 2020-05-21 NOTE — PROGRESS NOTES
Patient admitted to [facility] from [facility]. Patient alert and oriented to room. Cardiac monitoring maintained throughout transfer. Patient connected to telemetry, assessed, denies any pain, oriented to room, and instructed to call for assistance or any needs. Call bell in reach. Reviewed goals for today. Will continue to monitor

## 2020-05-21 NOTE — NURSING
Patient awake and alert, oriented X4. O2 sats>92% on room air. VSS. Condones CP w/ movement, Refused pain meds at this time. . Report passed to [facility]. .

## 2020-05-21 NOTE — PROGRESS NOTES
[address] CheckIn  Cardiology  Progress Note    Patient Name: Cecilia Barahona  MRN: [id number]  Admission Date: 5/18/2020  Hospital Length of Stay: 1 days  Code Status: Full Code   Attending Physician: Chele Galindo MD   Primary Care Physician: Yudi Smart MD  Expected Discharge Date:   Principal Problem:Acute coronary syndrome    Subjective:     Brief HPI:    Cecilia Barahona is a 70 y.o.female with hypertension, hypercholesterolemia and diabetes mellitus type 2. She is mildly obese. She appears to have had a CVA in about 2004 when she had an episode of weakness in her left hand. She had exertional chest discomfort in 12/2019. She was given isosorbidemononitrate and the chest discomfort resolved. She had a Stress MPI that was negative.     On 5/16/2020 she began to experience a mild pressure over her chest. The heaviness lasted most of the day. Walking in her house made the discomfort more pronounced. There was no radiation of the pain and neither any associated dyspnea or diaphoresis. She had similar discomfort on 5/17/2020 and 5/18/2020. She told her daughter that took her to . She received NTG sl x 2 tablets and the chest discomfort resolved. There were no acute ST-T wave changes. She was advised admission and transferred to [facility]. She has had no further chest pain since presentation.    Hospital Course:     [facility]: LAD: Prox 30%. Mid 70%. Distal 80%. OM2: Osteal 90%. OM3: 95%. OM4 90%. RCA: Mid subtotal. LV: Basal inferior mild hypokinesia. EF 60%.     Interval History:    Severe 3 V CAD. Due to extent of disease feel CABG best option.    She has some atypical sharp chest pain but she has not had any chest pressure since her presentation.    Review of Systems   Constitution: Negative for chills, fever and malaise/fatigue.   HENT: Negative for nosebleeds.    Eyes: Negative for double vision, vision loss in left eye and vision loss in right eye.   Cardiovascular:  Positive for chest pain. Negative for claudication, dyspnea on exertion, irregular heartbeat, leg swelling, near-syncope, orthopnea, palpitations, paroxysmal nocturnal dyspnea and syncope.   Respiratory: Negative for cough, hemoptysis, shortness of breath and wheezing.    Endocrine: Negative for cold intolerance and heat intolerance.   Hematologic/Lymphatic: Negative for bleeding problem. Does not bruise/bleed easily.   Skin: Negative for color change and rash.   Musculoskeletal: Negative for back pain, falls, muscle weakness and myalgias.   Gastrointestinal: Negative for heartburn, hematemesis, hematochezia, hemorrhoids, jaundice, melena, nausea and vomiting.   Genitourinary: Negative for dysuria and hematuria.   Neurological: Negative for dizziness, focal weakness, headaches, light-headedness, loss of balance, numbness, vertigo and weakness.   Psychiatric/Behavioral: Negative for altered mental status, depression and memory loss. The patient is not nervous/anxious.    Allergic/Immunologic: Negative for hives and persistent infections.     Objective:     Vital Signs (Most Recent):  Temp: 98.4 °F (36.9 °C) (05/21/20 0859)  Pulse: 64 (05/21/20 0859)  Resp: 18 (05/21/20 0859)  BP: (!) 140/101 (05/21/20 0859)  SpO2: 96 % (05/21/20 0859) Vital Signs (24h Range):  Temp:  [98 °F (36.7 °C)-98.6 °F (37 °C)] 98.4 °F (36.9 °C)  Pulse:  [] 64  Resp:  [16-20] 18  SpO2:  [92 %-98 %] 96 %  BP: (128-168)/() 140/101     Weight: 87.7 kg (193 lb 5.5 oz)  Body mass index is 34.25 kg/m².    SpO2: 96 %  O2 Device (Oxygen Therapy): room air      Intake/Output Summary (Last 24 hours) at 5/21/2020 0948  Last data filed at 5/20/2020 1636  Gross per 24 hour   Intake 150 ml   Output --   Net 150 ml       Lines/Drains/Airways     Peripheral Intravenous Line                 Peripheral IV - Single Lumen 05/18/20 2305 20 G Left Forearm 2 days                Physical Exam   Constitutional: She is oriented to person, place, and time.  She appears well-developed and well-nourished.  Non-toxic appearance. She does not appear ill. No distress.   Eyes: Right conjunctiva is not injected. Right conjunctiva has no hemorrhage. Left conjunctiva is not injected. Left conjunctiva has no hemorrhage.   Neck: No JVD present.   Cardiovascular: Normal rate, regular rhythm, S1 normal and S2 normal. Exam reveals gallop and S4.   No murmur heard.  Right groin fine.   Pulmonary/Chest: Effort normal and breath sounds normal.   Abdominal: Normal appearance. There is no tenderness.   Musculoskeletal:        Right ankle: She exhibits no swelling.        Left ankle: She exhibits no swelling.   Neurological: She is alert and oriented to person, place, and time. She is not disoriented.   Skin: Skin is warm and dry. No rash noted.   Psychiatric: She has a normal mood and affect. Her speech is normal and behavior is normal. Cognition and memory are normal.     Current Medications:     amLODIPine  10 mg Oral Daily    aspirin  81 mg Oral Daily    atorvastatin  80 mg Oral QHS    ezetimibe  10 mg Oral QHS    FLUoxetine  20 mg Oral Daily    heparin (porcine)  5,000 Units Subcutaneous Q8H    insulin detemir U-100  21 Units Subcutaneous Daily    isosorbide mononitrate  90 mg Oral Daily    latanoprost  1 drop Both Eyes QHS    metoprolol tartrate  25 mg Oral BID    pantoprazole  40 mg Oral Daily    rOPINIRole  0.5 mg Oral Nightly    valsartan  320 mg Oral Daily     Current Laboratory Results:    Recent Results (from the past 24 hour(s))   POCT glucose    Collection Time: 05/20/20 11:09 AM   Result Value Ref Range    POCT Glucose 160 (H) 70 - 110 mg/dL   Cardiac catheterization    Collection Time: 05/20/20  2:57 PM   Result Value Ref Range    Cath EF Estimated 60 %   POCT glucose    Collection Time: 05/20/20  3:56 PM   Result Value Ref Range    POCT Glucose 113 (H) 70 - 110 mg/dL   POCT glucose    Collection Time: 05/20/20  9:34 PM   Result Value Ref Range    POCT Glucose  151 (H) 70 - 110 mg/dL   CBC auto differential    Collection Time: 05/21/20 12:49 AM   Result Value Ref Range    WBC 8.89 3.90 - 12.70 K/uL    RBC 3.93 (L) 4.00 - 5.40 M/uL    Hemoglobin 11.4 (L) 12.0 - 16.0 g/dL    Hematocrit 35.9 (L) 37.0 - 48.5 %    Mean Corpuscular Volume 91 82 - 98 fL    Mean Corpuscular Hemoglobin 29.0 27.0 - 31.0 pg    Mean Corpuscular Hemoglobin Conc 31.8 (L) 32.0 - 36.0 g/dL    RDW 14.0 11.5 - 14.5 %    Platelets 273 150 - 350 K/uL    MPV 9.6 9.2 - 12.9 fL    Immature Granulocytes 0.2 0.0 - 0.5 %    Gran # (ANC) 5.1 1.8 - 7.7 K/uL    Immature Grans (Abs) 0.02 0.00 - 0.04 K/uL    Lymph # 2.9 1.0 - 4.8 K/uL    Mono # 0.7 0.3 - 1.0 K/uL    Eos # 0.1 0.0 - 0.5 K/uL    Baso # 0.02 0.00 - 0.20 K/uL    nRBC 0 0 /100 WBC    Gran% 57.4 38.0 - 73.0 %    Lymph% 32.7 18.0 - 48.0 %    Mono% 8.2 4.0 - 15.0 %    Eosinophil% 1.3 0.0 - 8.0 %    Basophil% 0.2 0.0 - 1.9 %    Differential Method Automated    POCT glucose    Collection Time: 05/21/20  7:55 AM   Result Value Ref Range    POCT Glucose 131 (H) 70 - 110 mg/dL   Basic Metabolic Panel (BMP)    Collection Time: 05/21/20  8:14 AM   Result Value Ref Range    Sodium 141 136 - 145 mmol/L    Potassium 3.5 3.5 - 5.1 mmol/L    Chloride 103 95 - 110 mmol/L    CO2 28 23 - 29 mmol/L    Glucose 132 (H) 70 - 110 mg/dL    BUN, Bld 13 8 - 23 mg/dL    Creatinine 0.8 0.5 - 1.4 mg/dL    Calcium 9.2 8.7 - 10.5 mg/dL    Anion Gap 10 8 - 16 mmol/L    eGFR if African American >60 >60 mL/min/1.73 m^2    eGFR if non African American >60 >60 mL/min/1.73 m^2   CBC with Automated Differential    Collection Time: 05/21/20  8:14 AM   Result Value Ref Range    WBC 8.75 3.90 - 12.70 K/uL    RBC 4.26 4.00 - 5.40 M/uL    Hemoglobin 12.2 12.0 - 16.0 g/dL    Hematocrit 38.9 37.0 - 48.5 %    Mean Corpuscular Volume 91 82 - 98 fL    Mean Corpuscular Hemoglobin 28.6 27.0 - 31.0 pg    Mean Corpuscular Hemoglobin Conc 31.4 (L) 32.0 - 36.0 g/dL    RDW 14.2 11.5 - 14.5 %    Platelets 281 150  - 350 K/uL    MPV 9.2 9.2 - 12.9 fL    Immature Granulocytes 0.3 0.0 - 0.5 %    Gran # (ANC) 5.4 1.8 - 7.7 K/uL    Immature Grans (Abs) 0.03 0.00 - 0.04 K/uL    Lymph # 2.4 1.0 - 4.8 K/uL    Mono # 0.7 0.3 - 1.0 K/uL    Eos # 0.1 0.0 - 0.5 K/uL    Baso # 0.01 0.00 - 0.20 K/uL    nRBC 0 0 /100 WBC    Gran% 62.2 38.0 - 73.0 %    Lymph% 27.5 18.0 - 48.0 %    Mono% 8.5 4.0 - 15.0 %    Eosinophil% 1.4 0.0 - 8.0 %    Basophil% 0.1 0.0 - 1.9 %    Differential Method Automated      Current Imaging Results:    X-Ray Chest 1 View   Final Result      There is no radiographic evidence for acute intrathoracic process.         Electronically signed by: Ananda Barrientos   Date:    05/18/2020   Time:    23:31         Assessment and Plan:     Problem List:    Active Diagnoses:    Diagnosis Date Noted POA    PRINCIPAL PROBLEM:  Acute coronary syndrome [I24.9] 05/20/2020 Yes    Coronary artery disease [I25.10] 05/20/2020 Yes    Essential hypertension [I10]  Yes    Mixed hyperlipidemia [E78.2]  Yes    Type 2 diabetes mellitus without complication, with long-term current use of insulin [E11.9, Z79.4] 09/30/2015 Not Applicable    Severe obesity (BMI 35.0-39.9) with comorbidity [E66.01]  Yes    Mild major depression [F32.0] 01/17/2020 Yes    Restless legs syndrome (RLS) [G25.81] 03/12/2018 Yes    Gastroesophageal reflux disease without esophagitis [K21.9] 09/30/2015 Yes    Glaucoma NEC [H40.89]  Yes      Problems Resolved During this Admission:     Assessment and Plan:    1. Coronary Artery Disease              12/2019: Began experience chest pain. Resolved with isosorbidemononitrate.              12/19/2019: Stress MPI:3:39 min. No CP. ECG negative. MPI: Mild to moderate fixed anterior and anteroapical defect. EF 73%.              5/18/2020: Presents with more pronounced chest pain. Resolved with NTG sl.              ECG without acute changes. Troponin x 3 negative.               5/19/2020: Echo: Normal left ventricular size and  systolic function. EF 75%. Mildly dilated LA. Mild aortic valve sclerosis. Mild mitral valve sclerosis.   5/20/2020: CBC: Cath: LAD: Prox 30%. Mid 70%. Distal 80%. OM2: Osteal 90%. OM3: 95%. OM4 90%. RCA: Mid subtotal. LV: Basal inferior mild hypokinesia. EF 60%.   5/20/2020: Discussed with patient and daughter, Ms. Eid: [phone].   5/21/2020: Reviewed images from angiogram with patient and discussed issues in detail.   5/19/2020: Received clopidogrel load.   On aspirin 81 mg Q24.   On metoprolol 25 mg Q12 and isosorbidemononitrate 90 mg Q24.    Evaluation for CABG. Discussed with Dr. Dinesh Bowman.   5[place]21/2020: [place] [facility] [place]     2. History of Cerebrovascular Accident              2004: Weakness in left hand. Affected memory.     3. Hypertension              2000: Diagnosed.              On valsartan 320 mg Q24, amlodipine 10 mg Q24 and chlorthalidone 25 mg Q24     4. Hypercholesterolemia              2000: Began statin.              On atorvastatin 40 mg Q24.                 5/19/2020: Chol 204. HDL 41. . .              Onatorvastatin to 80 mg Q24 and ezetimibe 10 mg Q24.     5. Diabetes Mellitus, Type 2              2000: Diagnosed. Complications: CVA & CAD. Medications: Insulin.      6. Mild Obesity              5/19/2020: Weight 86 kg. BMI 33.     7. Primary Care              Dr. Yudi Smart.      VTE Risk Mitigation (From admission, onward)         Ordered     heparin (porcine) injection 5,000 Units  Every 8 hours      05/21/20 0543     IP VTE HIGH RISK PATIENT  Once      05/19/20 0513                Etelvina Lowery MD  Cardiology  [address] CheckIn

## 2020-05-21 NOTE — PLAN OF CARE
AAox4. Denies CP and SOB. Pedal pulses 2+. No drainage or hematoma around incision site. Bed in lowest position and locked. Call bell within reach. Will continue to monitor

## 2020-05-21 NOTE — ASSESSMENT & PLAN NOTE
- Most recent NM stress test 12/2019 negative for ischemia, but did show mild to moderate intensity defect in the anterior and anteroapical wall of the    - Initial EKG without ischemic changes and troponin trended. 0.006, 0.008. 0.0011. 0.009    - Known cardiac risk factors and Cardiology consulted for evaluation  - Continue ASA, atorvastatin 80 mg daily, metoprolol 25 mg twice daily and Imdur 90 mg daily;  load with Plavix 5/21/2020 and continue daily   - Agree with add metoprolol 25 mg Q12 and isosorbidemononitrate 90 mg Q24.  - 5/20/2020: [facility]: Cath: LAD: Prox 30%. Mid 70%. Distal 80%. OM2: Osteal 90%. OM3: 95%. OM4 90%. RCA: Mid subtotal. LV: Basal inferior mild hypokinesia. EF 60%.  - Discussed case with Dr. Lowery and plan to transfer patient to [facility] on 5/21/2020 per Cardiothoracic Surgery, Dr. Bowman.  Case discussed with Dr. Jauregui prior to transfer.  - Continue to monitor closely on telemetry

## 2020-05-21 NOTE — PROGRESS NOTES
[facility] Hospital Medicine  Progress Note    Patient Name: Cecilia Barahona  MRN: [id number]  Patient Class: IP- Inpatient   Admission Date: 5/18/2020  Length of Stay: 1 days  Attending Physician: Chele Galindo MD  Primary Care Provider: Yudi Smart MD        Subjective:     Principal Problem:Acute coronary syndrome        HPI:  Alexander Cam, PA:  Ms. Cecilia Barahona is a 71 y/o female, with PMH of T2DM HTN, HLD, obesity, CHETNA, and prior CVA, who presented to ED w/ intermittent mid sternal chest pain x 1 day. Chest pressure is mild all day, worse with exertion and associated w/ SOB. Pain became constant x 24 hours. Pain resolved after nitro x 2. ED evaluation with a negative troponin, EKG unchanged from prior, and without ischemic changes. [place] [facility] [place]       Overview/Hospital Course:  Cecilia Barahona was admitted under Observation to rule out myocardial infarction.  The patient reports that prior to going to bed on May 18 she noted chest tightness and mild substernal pain.  She states that she took two melatonin gummies prior to bed. She states she awoke with worsening pain and decided to visit the ED.  She reports worsening fatigue over the past two days with intermittent nausea noted over the past two days.  She reports a similar episode in December 2019 where at that time she underwent NM stress test that was without evidence of myocardial ischemia and stress echocardiogram with EF 65% and grade I diastolic dysfunction. She denies sick contact and COVID-19 testing negative on admission.  Initial work up in the ED with electrocardiogram without evidence of ischemic changes and troponon 0.006.  She was treated with nitroglycerin while in the ED x2 with some improvement to pain, but continued with discomfort with movement.      After admission, troponins were negative 0.006, 0.008. 0.0011. 0.009.  The patient continued with intermittent substernal chest pain.  Cardiology was consulted given concern for acute coronary syndrome and known cardiac risk factors.  Agree with initiation of metoprolol 25 mg Q12 and isosorbidemononitrate 90 mg Q24 and Plavix daily; patient loaded on 5/19/2020.  On 5/20/2020 patient under went left heart catheterization that revealed LAD: Prox 30%. Mid 70%. Distal 80%. OM2: Osteal 90%. OM3: 95%. OM4 90%. RCA: Mid subtotal. LV: Basal inferior mild hypokinesia. EF 60%.  Case discussed with Dr. Lowery and plan to transfer patient to [facility] on 5/21/2020 per Cardiothoracic Surgery, Dr. Bowman.  Continue ASA, Plavix and atorvastatin 40 mg daily.         Interval History:  No events overnight.   Continues with intermittent chest pain, worsens with movement, left chest wall tenderness.  [facility] 5/2020 with severe three vessel disease. Continue DAPT and atorvastatin 80 mg daily  Plan for transfer on 5/21/2020 to [facility] for evaluation for CABG.       Review of Systems   Constitutional: Negative for chills, fatigue (improving) and fever.   HENT: Negative for congestion and trouble swallowing.    Eyes: Negative.    Respiratory: Negative for cough and shortness of breath.    Cardiovascular: Positive for chest pain (intermittent center of chest;relieved by morphine). Negative for palpitations.   Gastrointestinal: Negative for abdominal pain, diarrhea, nausea and vomiting.   Genitourinary: Negative for dysuria, frequency and urgency.   Musculoskeletal: Negative for arthralgias and myalgias.   Skin: Negative.    Neurological: Negative for dizziness, weakness, light-headedness and headaches.   Hematological: Does not bruise/bleed easily.   Psychiatric/Behavioral: Negative.      Objective:     Vital Signs (Most Recent):  Temp: 98.4 °F (36.9 °C) (05/21/20 0859)  Pulse: 64 (05/21/20 0859)  Resp: 18 (05/21/20 0859)  BP: (!) 140/101 (05/21/20 0859)  SpO2: 96 % (05/21/20 0859) Vital Signs (24h Range):  Temp:  [98 °F (36.7 °C)-98.6 °F (37 °C)] 98.4 °F  (36.9 °C)  Pulse:  [] 64  Resp:  [16-20] 18  SpO2:  [92 %-98 %] 96 %  BP: (128-168)/() 140/101     Weight: 87.7 kg (193 lb 5.5 oz)  Body mass index is 34.25 kg/m².    Intake/Output Summary (Last 24 hours) at 5/21/2020 0900  Last data filed at 5/20/2020 1636  Gross per 24 hour   Intake 150 ml   Output --   Net 150 ml        Physical Exam   Constitutional: She is oriented to person, place, and time. She appears well-developed and well-nourished. No distress.   HENT:   Head: Normocephalic and atraumatic.   Mouth/Throat: Oropharynx is clear and moist.   Eyes: Pupils are equal, round, and reactive to light. Conjunctivae are normal.   Neck: Normal range of motion. Neck supple. No JVD present.   Cardiovascular: Normal rate, regular rhythm and intact distal pulses.   Murmur heard.  Pulses:       Radial pulses are 2+ on the right side, and 2+ on the left side.        Dorsalis pedis pulses are 2+ on the right side, and 2+ on the left side.   Pulmonary/Chest: Effort normal and breath sounds normal. No respiratory distress. She exhibits tenderness (left sided).   Abdominal: Soft. Bowel sounds are normal. There is no tenderness.   Musculoskeletal: Normal range of motion. She exhibits no tenderness.   Neurological: She is alert and oriented to person, place, and time. She has normal strength. No sensory deficit.   Skin: Skin is warm and dry. Capillary refill takes less than 2 seconds.   Psychiatric: She has a normal mood and affect. Her speech is normal and behavior is normal.   Nursing note and vitals reviewed.      Significant Labs:   A1C:   Recent Labs   Lab 12/20/19  1138 05/05/20  0747 05/19/20  0451   HGBA1C 9.1* 7.0* 7.1*     CBC:   Recent Labs   Lab 05/20/20  0517 05/21/20  0049 05/21/20  0814   WBC 8.02 8.89 8.75   HGB 12.5 11.4* 12.2   HCT 39.3 35.9* 38.9    273 281     CMP:   Recent Labs   Lab 05/20/20  0517      K 4.1      CO2 28      BUN 16   CREATININE 0.9   CALCIUM 9.4    ANIONGAP 10   EGFRNONAA >60       Troponin:   Recent Labs   Lab 05/19/20  1120 05/19/20  1844   TROPONINI 0.008 0.009  0.011     All pertinent labs within the past 24 hours have been reviewed.    Significant Imaging: I have reviewed all pertinent imaging results/findings within the past 24 hours.             Assessment/Plan:      * Acute coronary syndrome  - Most recent NM stress test 12/2019 negative for ischemia, but did show mild to moderate intensity defect in the anterior and anteroapical wall of the    - Initial EKG without ischemic changes and troponin trended. 0.006, 0.008. 0.0011. 0.009    - Known cardiac risk factors and Cardiology consulted for evaluation  - Continue ASA, atorvastatin 80 mg daily, metoprolol 25 mg twice daily and Imdur 90 mg daily;  load with Plavix 5/21/2020 and continue daily   - Agree with add metoprolol 25 mg Q12 and isosorbidemononitrate 90 mg Q24.  - 5/20/2020: [facility]: Cath: LAD: Prox 30%. Mid 70%. Distal 80%. OM2: Osteal 90%. OM3: 95%. OM4 90%. RCA: Mid subtotal. LV: Basal inferior mild hypokinesia. EF 60%.  - Discussed case with Dr. Lowery and plan to transfer patient to [facility] on 5/21/2020 per Cardiothoracic Surgery, Dr. Bowman.  - Continue to monitor closely on telemetry    Coronary artery disease  - Cardiology consulted and patient with left heart cath 5/20/2020 revealing significant vascular disease  - Patient to be transferred to [facility] on 5/21/2020 per Dr. Bowman, Cardiothoracic Surgery  - Loaded with Plavix 5/19/2020 and continue DAPT and atorvastatin 40 mg daily  - Monitor closely on telemetry      Mild major depression  - Per medical record review, history of depression with psychosis and generalized anxiety disorder  - Follows Psychiatry with most recent visit January 2020 with follow up planned for March 2020  - On exam, appears anxious with stable mood  - Continue Prozax 20 mg daily  - Monitor       Restless legs syndrome (RLS)  - Appears  stable on ropinirole and will continue      Gastroesophageal reflux disease without esophagitis  - Per medical record and will start pantoprazole 40 mg daily  -       Type 2 diabetes mellitus without complication, with long-term current use of insulin  - Most recent A1c 7.1 with morning labs  - Continue cardiac and diabetic diet  - Continue basal insulin 21 units daily, and moderate dose sliding scale.    - Glucose goal during hospitalization between 140 -180, monitor and adjust insulin as needed    Mixed hyperlipidemia  - Lipid panel on admission with mildly elevated total cholesterol 204 and triglycerides 171; these are improved from prior   - Increase home dose of atorvastatin from 40 mg to 80 mg daily  - Continue dietary interventions       Glaucoma NEC  - Per medical record.  Appears stable and will continue home medications      Severe obesity (BMI 35.0-39.9) with comorbidity  - Continue to provide support for dietary changes and increased physical activity once stable  - Cardiac and diabetic diet      Essential hypertension  - Hypertensive on admission and now better controlled  - Continue home antihypertensive regimen with chlorthalidone 25 mg daily, amlodipine 10 mg daily, valsartan 320 mg daily.   - Agree with addition of metoprolol 25 mg twice daily and Imdur 90 mg daily.   - Monitor     VTE Risk Mitigation (From admission, onward)         Ordered     heparin (porcine) injection 5,000 Units  Every 8 hours      05/21/20 0543     IP VTE HIGH RISK PATIENT  Once      05/19/20 0513     Place sequential compression device  Until discontinued      05/19/20 0513                      Karen Arias NP  Department of Hospital Medicine   [facility]

## 2020-05-22 ENCOUNTER — ANESTHESIA EVENT (OUTPATIENT)
Dept: SURGERY | Facility: HOSPITAL | Age: 70
DRG: 234 | End: 2020-05-22
Payer: MEDICARE

## 2020-05-22 LAB
ABO + RH BLD: NORMAL
ANION GAP SERPL CALC-SCNC: 9 MMOL/L (ref 8–16)
BASOPHILS # BLD AUTO: 0.02 K/UL (ref 0–0.2)
BASOPHILS NFR BLD: 0.2 % (ref 0–1.9)
BLD GP AB SCN CELLS X3 SERPL QL: NORMAL
BUN SERPL-MCNC: 16 MG/DL (ref 8–23)
CALCIUM SERPL-MCNC: 8.8 MG/DL (ref 8.7–10.5)
CHLORIDE SERPL-SCNC: 104 MMOL/L (ref 95–110)
CO2 SERPL-SCNC: 26 MMOL/L (ref 23–29)
CREAT SERPL-MCNC: 0.8 MG/DL (ref 0.5–1.4)
DIFFERENTIAL METHOD: ABNORMAL
EOSINOPHIL # BLD AUTO: 0.2 K/UL (ref 0–0.5)
EOSINOPHIL NFR BLD: 1.8 % (ref 0–8)
ERYTHROCYTE [DISTWIDTH] IN BLOOD BY AUTOMATED COUNT: 14 % (ref 11.5–14.5)
EST. GFR  (AFRICAN AMERICAN): >60 ML/MIN/1.73 M^2
EST. GFR  (NON AFRICAN AMERICAN): >60 ML/MIN/1.73 M^2
GLUCOSE SERPL-MCNC: 112 MG/DL (ref 70–110)
HCT VFR BLD AUTO: 37.3 % (ref 37–48.5)
HGB BLD-MCNC: 11.5 G/DL (ref 12–16)
IMM GRANULOCYTES # BLD AUTO: 0.03 K/UL (ref 0–0.04)
IMM GRANULOCYTES NFR BLD AUTO: 0.3 % (ref 0–0.5)
LYMPHOCYTES # BLD AUTO: 2.9 K/UL (ref 1–4.8)
LYMPHOCYTES NFR BLD: 31.7 % (ref 18–48)
MAGNESIUM SERPL-MCNC: 1.6 MG/DL (ref 1.6–2.6)
MCH RBC QN AUTO: 29 PG (ref 27–31)
MCHC RBC AUTO-ENTMCNC: 30.8 G/DL (ref 32–36)
MCV RBC AUTO: 94 FL (ref 82–98)
MONOCYTES # BLD AUTO: 0.9 K/UL (ref 0.3–1)
MONOCYTES NFR BLD: 9.6 % (ref 4–15)
NEUTROPHILS # BLD AUTO: 5.1 K/UL (ref 1.8–7.7)
NEUTROPHILS NFR BLD: 56.4 % (ref 38–73)
NRBC BLD-RTO: 0 /100 WBC
PLATELET # BLD AUTO: 266 K/UL (ref 150–350)
PMV BLD AUTO: 9.8 FL (ref 9.2–12.9)
POCT GLUCOSE: 117 MG/DL (ref 70–110)
POCT GLUCOSE: 129 MG/DL (ref 70–110)
POCT GLUCOSE: 137 MG/DL (ref 70–110)
POCT GLUCOSE: 161 MG/DL (ref 70–110)
POTASSIUM SERPL-SCNC: 4 MMOL/L (ref 3.5–5.1)
RBC # BLD AUTO: 3.96 M/UL (ref 4–5.4)
SODIUM SERPL-SCNC: 139 MMOL/L (ref 136–145)
WBC # BLD AUTO: 8.99 K/UL (ref 3.9–12.7)

## 2020-05-22 PROCEDURE — 25000003 PHARM REV CODE 250: Mod: HCNC | Performed by: INTERNAL MEDICINE

## 2020-05-22 PROCEDURE — 85025 COMPLETE CBC W/AUTO DIFF WBC: CPT | Mod: HCNC

## 2020-05-22 PROCEDURE — 99232 PR SUBSEQUENT HOSPITAL CARE,LEVL II: ICD-10-PCS | Mod: HCNC,,, | Performed by: INTERNAL MEDICINE

## 2020-05-22 PROCEDURE — 36415 COLL VENOUS BLD VENIPUNCTURE: CPT | Mod: HCNC

## 2020-05-22 PROCEDURE — 63600175 PHARM REV CODE 636 W HCPCS: Mod: HCNC | Performed by: STUDENT IN AN ORGANIZED HEALTH CARE EDUCATION/TRAINING PROGRAM

## 2020-05-22 PROCEDURE — C9399 UNCLASSIFIED DRUGS OR BIOLOG: HCPCS | Mod: HCNC | Performed by: INTERNAL MEDICINE

## 2020-05-22 PROCEDURE — 99223 1ST HOSP IP/OBS HIGH 75: CPT | Mod: HCNC,,, | Performed by: PHYSICIAN ASSISTANT

## 2020-05-22 PROCEDURE — 93882 EXTRACRANIAL UNI/LTD STUDY: CPT | Mod: HCNC | Performed by: SURGERY

## 2020-05-22 PROCEDURE — 94761 N-INVAS EAR/PLS OXIMETRY MLT: CPT | Mod: HCNC

## 2020-05-22 PROCEDURE — 80048 BASIC METABOLIC PNL TOTAL CA: CPT | Mod: HCNC

## 2020-05-22 PROCEDURE — 99232 SBSQ HOSP IP/OBS MODERATE 35: CPT | Mod: HCNC,,, | Performed by: INTERNAL MEDICINE

## 2020-05-22 PROCEDURE — 63600175 PHARM REV CODE 636 W HCPCS: Mod: HCNC | Performed by: INTERNAL MEDICINE

## 2020-05-22 PROCEDURE — 20600001 HC STEP DOWN PRIVATE ROOM: Mod: HCNC

## 2020-05-22 PROCEDURE — 99223 PR INITIAL HOSPITAL CARE,LEVL III: ICD-10-PCS | Mod: HCNC,,, | Performed by: PHYSICIAN ASSISTANT

## 2020-05-22 PROCEDURE — 86850 RBC ANTIBODY SCREEN: CPT | Mod: HCNC

## 2020-05-22 PROCEDURE — 83735 ASSAY OF MAGNESIUM: CPT | Mod: HCNC

## 2020-05-22 RX ORDER — MUPIROCIN 20 MG/G
OINTMENT TOPICAL
Status: CANCELLED | OUTPATIENT
Start: 2020-05-22

## 2020-05-22 RX ORDER — MAGNESIUM SULFATE HEPTAHYDRATE 40 MG/ML
2 INJECTION, SOLUTION INTRAVENOUS ONCE
Status: COMPLETED | OUTPATIENT
Start: 2020-05-22 | End: 2020-05-22

## 2020-05-22 RX ORDER — CEFAZOLIN SODIUM 1 G/3ML
2 INJECTION, POWDER, FOR SOLUTION INTRAMUSCULAR; INTRAVENOUS
Status: CANCELLED | OUTPATIENT
Start: 2020-05-22

## 2020-05-22 RX ADMIN — VALSARTAN 320 MG: 80 TABLET, FILM COATED ORAL at 09:05

## 2020-05-22 RX ADMIN — MAGNESIUM SULFATE 2 G: 2 INJECTION INTRAVENOUS at 09:05

## 2020-05-22 RX ADMIN — HEPARIN SODIUM 5000 UNITS: 5000 INJECTION INTRAVENOUS; SUBCUTANEOUS at 01:05

## 2020-05-22 RX ADMIN — HEPARIN SODIUM 5000 UNITS: 5000 INJECTION INTRAVENOUS; SUBCUTANEOUS at 08:05

## 2020-05-22 RX ADMIN — FLUOXETINE 20 MG: 20 CAPSULE ORAL at 09:05

## 2020-05-22 RX ADMIN — PANTOPRAZOLE SODIUM 40 MG: 40 TABLET, DELAYED RELEASE ORAL at 09:05

## 2020-05-22 RX ADMIN — SENNOSIDES 8.6 MG: 8.6 TABLET, FILM COATED ORAL at 08:05

## 2020-05-22 RX ADMIN — AMLODIPINE BESYLATE 10 MG: 10 TABLET ORAL at 09:05

## 2020-05-22 RX ADMIN — ATORVASTATIN CALCIUM 80 MG: 20 TABLET, FILM COATED ORAL at 08:05

## 2020-05-22 RX ADMIN — INSULIN DETEMIR 21 UNITS: 100 INJECTION, SOLUTION SUBCUTANEOUS at 09:05

## 2020-05-22 RX ADMIN — LATANOPROST 1 DROP: 50 SOLUTION OPHTHALMIC at 08:05

## 2020-05-22 RX ADMIN — METOPROLOL TARTRATE 25 MG: 25 TABLET, FILM COATED ORAL at 09:05

## 2020-05-22 RX ADMIN — ROPINIROLE HYDROCHLORIDE 0.5 MG: 0.25 TABLET, FILM COATED ORAL at 08:05

## 2020-05-22 RX ADMIN — ASPIRIN 81 MG: 81 TABLET, COATED ORAL at 09:05

## 2020-05-22 RX ADMIN — Medication 6 MG: at 08:05

## 2020-05-22 RX ADMIN — HEPARIN SODIUM 5000 UNITS: 5000 INJECTION INTRAVENOUS; SUBCUTANEOUS at 05:05

## 2020-05-22 RX ADMIN — ISOSORBIDE MONONITRATE 90 MG: 60 TABLET, EXTENDED RELEASE ORAL at 09:05

## 2020-05-22 RX ADMIN — EZETIMIBE 10 MG: 10 TABLET ORAL at 08:05

## 2020-05-22 NOTE — H&P
[facility]  Cardiology  History and Physical     Patient Name: Cecilia Barahona  MRN: [id number]  Admission Date: 5/18/2020  Code Status: Full Code   Attending Provider: Colby Jauregui MD   Primary Care Physician: Yudi Smart MD  Principal Problem:Coronary artery disease    Patient information was obtained from patient and past medical records.     Subjective:     Chief Complaint:  Chest pain     HPI:  71 y/o F with CAD (newly diagnosed), HTN, HLD, DM2 (on insulin HbA1c 7.1%). Admitted to [facility] on 5/18 with worsening chest pain for the past 4 days. She started having chest pain in 12/2019, she underwent a SPECT which was unremarkable. Since then, she reports mild chest pressure with activity, reports that she learned to live with it. For the past 4 days, her pain has been progressively worsening. Gets worst with exertion and better with rest. Not very active at baseline. No prior history of MI or CAD. Smoked 0.5 PPD for 5 years, 20 years ago. In OSH, troponin negative x4. Underwent C that showed multivessel disease and is transferred here for CABG evaluation.     5/19/20 TTE  · Normal left ventricular systolic function. The estimated ejection fraction is 75%.  · Normal right ventricular systolic function.  · Grade I (mild) left ventricular diastolic dysfunction consistent with impaired relaxation.  · Mild left atrial enlargement.  · Mild aortic valve sclerosis.  · Moderate mitral sclerosis.  · The estimated PA systolic pressure is 10 mmHg.  · Normal central venous pressure (3 mmHg).     C 5/20/2020 - LAD mid 70%, OM2 osteal 90%, OM3 95%, RCA mid subtotal  12/019 negative SPECT    Past Medical History:   Diagnosis Date    Acute coronary syndrome 5/20/2020 5/18/2020: Presents with unstable angina.    Allergic rhinitis, seasonal     Anxiety     Arthritis     CVA (cerebral vascular accident)     Depression     Glaucoma NEC     Hallucination     images out of corner of eye  about a year ago    History of positive PPD - treated - remote past     Hx of psychiatric care     Hyperlipidemia LDL goal < 100     Hypertension     Nuclear sclerosis of both eyes 2019    Obesity     CHETNA (obstructive sleep apnea)     Psychiatric problem     Psychosis     Renal disorder     Sleep difficulties     Suicide attempt     about 30 years ago by overdose of pills    Therapy     Type II or unspecified type diabetes mellitus without mention of complication, uncontrolled        Past Surgical History:   Procedure Laterality Date    CARPAL TUNNEL RELEASE       SECTION, CLASSIC      COLONOSCOPY N/A 3/5/2020    Procedure: COLONOSCOPY;  Surgeon: Wiliam Carrillo MD;  [facility]  Service: Endoscopy;  Laterality: N/A;  20 appt confirmed-rb  pt requested this date    LEFT HEART CATHETERIZATION N/A 2020    Procedure: HEART CATH-LEFT;  Surgeon: Etelvina Lowery MD;  Location: [facility] CATH LAB;  Service: Cardiology;  Laterality: N/A;    TOTAL ABDOMINAL HYSTERECTOMY      TRIGGER FINGER RELEASE         Review of patient's allergies indicates:   Allergen Reactions    Ace inhibitors Other (See Comments)     cough    Invokana [canagliflozin] Other (See Comments)     Throat and tongue swelling    Ozempic [semaglutide]      Nausea and constipation on 0.25 mg dose.        No current facility-administered medications on file prior to encounter.      Current Outpatient Medications on File Prior to Encounter   Medication Sig    alcohol swabs PadM Apply 1 each topically 3 (three) times daily.    amLODIPine (NORVASC) 10 MG tablet TAKE ONE TABLET BY MOUTH EVERY DAY    artificial tear ointment (ARTIFICIAL TEARS) Oint Place into both eyes every evening.    aspirin 81 MG Chew Take by mouth once daily. 1 tablet, chewable Oral Every day    atorvastatin (LIPITOR) 40 MG tablet Take 1 tablet (40 mg total) by mouth every evening. For cholesterol.    azelastine (OPTIVAR) 0.05  % ophthalmic solution INSTILL 1 DROP IN EACH EYE TWICE DAILY    chlorthalidone (HYGROTEN) 25 MG Tab TAKE ONE TABLET BY MOUTH EVERY DAY    dulaglutide (TRULICITY) 1.5 mg/0.5 mL PnIj Inject 1.5 mg into the skin every 7 days.    empagliflozin (JARDIANCE) 10 mg Tab Take 10 mg by mouth once daily.    flash glucose scanning reader (FREESTYLE ANAMARIA READER) Misc 1 Units by Misc.(Non-Drug; Combo Route) route before meals as needed.    fluticasone (FLONASE) 50 mcg/actuation nasal spray 1 spray (50 mcg total) by Each Nare route once daily.    insulin degludec (TRESIBA FLEXTOUCH U-200) 200 unit/mL (3 mL) InPn Inject 36 Units into the skin once daily. Inject 36 units once daily (Patient taking differently: Inject 30 Units into the skin once daily. Inject 36 units once daily)    isosorbide mononitrate (IMDUR) 30 MG 24 hr tablet Take 1 tablet (30 mg total) by mouth once daily.    latanoprost 0.005 % ophthalmic solution PLACE 1 DROP IN EACH EYE EVERY EVENING    loratadine (CLARITIN) 10 mg tablet TAKE ONE TABLET BY MOUTH EVERY DAY AS NEEDED FOR ALLERGIES    metFORMIN (GLUCOPHAGE) 1000 MG tablet TAKE ONE TABLET BY MOUTH TWICE DAILY WITH MEALS    multivitamin with minerals tablet Take 1 tablet by mouth once daily.    naproxen (NAPROSYN) 500 MG tablet Take 500 mg by mouth 2 (two) times daily as needed.    rOPINIRole (REQUIP) 0.5 MG tablet TAKE ONE Tablet BY MOUTH EVERY NIGHT AT BEDTIME    albuterol 90 mcg/actuation inhaler Inhale 2 puffs into the lungs every 6 (six) hours as needed for Wheezing or Shortness of Breath. Rescue    blood pressure monitor (BLOOD PRESSURE KIT) Kit 1 kit by Misc.(Non-Drug; Combo Route) route once daily.    blood-glucose meter,continuous (DEXCOM G6 ) Misc Use with dexcom G6 system    blood-glucose sensor (DEXCOM G6 SENSOR) Vanna Change sensor every 10 days    blood-glucose transmitter (DEXCOM G6 TRANSMITTER) Vanna Change every 3 months    desoximetasone (TOPICORT) 0.05 % cream Apply  "topically 2 (two) times daily as needed.    FLUoxetine 20 MG capsule Take 1 capsule (20 mg total) by mouth once daily.    FREESTYLE ANAMARIA 14 DAY SENSOR Kit CHANGE SENSOR EVERY 14 DAYS    lancets Misc Check blood glucose 4 times a day. Dispense accuchek meter or other meter preferred by insurance. Dx code e11.65    LANCING DEVICE WITH LANCETS Misc USE AS DIRECTED FOR DIABETIC TESTING    pen needle, diabetic (BD ULTRA-FINE LENY PEN NEEDLES) 32 gauge x 5/32" Ndle USE FIVE TIMES DAILY    valsartan (DIOVAN) 160 MG tablet Take 2 tablets (320 mg total) by mouth once daily. (Patient taking differently: Take 320 mg by mouth 2 (two) times daily. )     Family History     Problem Relation (Age of Onset)    Alzheimer's disease Maternal Aunt    Cataracts Mother    Diabetes Mother, Father, Maternal Uncle, Maternal Grandmother, Maternal Grandfather, Maternal Aunt    Drug abuse Grandchild    Glaucoma Mother    Heart disease Cousin    Heart failure Mother, Father    Hyperlipidemia Maternal Uncle    Hypertension Mother, Father, Maternal Uncle    No Known Problems Sister, Paternal Grandmother, Paternal Grandfather, Paternal Aunt, Paternal Uncle    Prostate cancer Father    Schizophrenia Maternal Aunt        Tobacco Use    Smoking status: Former Smoker     Packs/day: 0.50     Types: Cigarettes     Last attempt to quit: 11/10/2012     Years since quittin.5    Smokeless tobacco: Never Used   Substance and Sexual Activity    Alcohol use: Not Currently     Comment: drank socially in the past    Drug use: Not Currently     Types: Marijuana     Comment: tried once 6 months ago    Sexual activity: Not Currently     Partners: Male     Review of Systems   Constitution: Negative for chills and fever.   Cardiovascular: Positive for chest pain and dyspnea on exertion. Negative for palpitations and syncope.   Respiratory: Negative for cough and sleep disturbances due to breathing.    Musculoskeletal: Negative for back pain. "   Gastrointestinal: Negative for abdominal pain, nausea and vomiting.   Neurological: Negative for dizziness and focal weakness.   Psychiatric/Behavioral: Negative for altered mental status.     Objective:     Vital Signs (Most Recent):  Temp: 99 °F (37.2 °C) (05/21/20 2001)  Pulse: 61 (05/21/20 2001)  Resp: 19 (05/21/20 2001)  BP: 110/60 (05/21/20 1540)  SpO2: 98 % (05/21/20 2001) Vital Signs (24h Range):  Temp:  [98 °F (36.7 °C)-99 °F (37.2 °C)] 99 °F (37.2 °C)  Pulse:  [58-75] 61  Resp:  [16-20] 19  SpO2:  [92 %-98 %] 98 %  BP: (107-156)/() 110/60     Weight: 87.6 kg (193 lb 2 oz)  Body mass index is 34.21 kg/m².    SpO2: 98 %  O2 Device (Oxygen Therapy): room air    No intake or output data in the 24 hours ending 05/21/20 2033    Lines/Drains/Airways     Peripheral Intravenous Line                 Peripheral IV - Single Lumen 05/18/20 2305 20 G Left Forearm 2 days                Physical Exam   Constitutional: She is oriented to person, place, and time. She appears well-developed and well-nourished. No distress.   HENT:   Head: Normocephalic and atraumatic.   Eyes: Conjunctivae and EOM are normal.   Neck: Normal range of motion. No tracheal deviation present.   Cardiovascular: Normal rate and regular rhythm.   Murmur (2/6 systolic murmur louder in RUSB) heard.  Pulmonary/Chest: Effort normal and breath sounds normal. No respiratory distress. She has no wheezes.   Abdominal: Soft. Bowel sounds are normal. She exhibits no distension. There is no tenderness.   Musculoskeletal: Normal range of motion. She exhibits no edema.   Neurological: She is alert and oriented to person, place, and time.   Skin: She is not diaphoretic.       Significant Labs:   CMP   Recent Labs   Lab 05/20/20  0517 05/21/20  0814    141   K 4.1 3.5    103   CO2 28 28    132*   BUN 16 13   CREATININE 0.9 0.8   CALCIUM 9.4 9.2   ANIONGAP 10 10   ESTGFRAFRICA >60 >60   EGFRNONAA >60 >60   , CBC   Recent Labs   Lab  05/20/20  0517 05/21/20  0049 05/21/20  0814   WBC 8.02 8.89 8.75   HGB 12.5 11.4* 12.2   HCT 39.3 35.9* 38.9    273 281    and INR No results for input(s): INR, PROTIME in the last 48 hours.    Significant Imaging: All pertinent images from the last 24h have been reviewed.      Assessment and Plan:     * Coronary artery disease  - Triple vessel disease  - On aspirin. Hold Plavix for now for possible CABG  - Continue metoprolol 25 mg BID and IMDUR 90 mg daily  - CTS consult in AM    Mild major depression  - Continue fluoxetine    Restless legs syndrome (RLS)  - Continue ropinirole    Type 2 diabetes mellitus without complication, with long-term current use of insulin  - On determir 21 units here (uses 30 units at home)  - Insulin SS with meals    Mixed hyperlipidemia  - On atorvastatin 80 mg daily. Started on ezetimibe recently due to LDL not in target.   - Chol 204, , HDL 41 - 5/19/20    Essential hypertension  - Continue amlodipine 10 mg daily, valsartan 320 mg daily. Also started on metoprolol 25 mg BID, continue        VTE Risk Mitigation (From admission, onward)         Ordered     heparin (porcine) injection 5,000 Units  Every 8 hours      05/21/20 0543     IP VTE HIGH RISK PATIENT  Once      05/19/20 0513                Terry Almonte MD  Cardiology   [facility]

## 2020-05-22 NOTE — SUBJECTIVE & OBJECTIVE
No current facility-administered medications on file prior to encounter.      Current Outpatient Medications on File Prior to Encounter   Medication Sig    alcohol swabs PadM Apply 1 each topically 3 (three) times daily.    amLODIPine (NORVASC) 10 MG tablet TAKE ONE TABLET BY MOUTH EVERY DAY    artificial tear ointment (ARTIFICIAL TEARS) Oint Place into both eyes every evening.    aspirin 81 MG Chew Take by mouth once daily. 1 tablet, chewable Oral Every day    atorvastatin (LIPITOR) 40 MG tablet Take 1 tablet (40 mg total) by mouth every evening. For cholesterol.    azelastine (OPTIVAR) 0.05 % ophthalmic solution INSTILL 1 DROP IN EACH EYE TWICE DAILY    chlorthalidone (HYGROTEN) 25 MG Tab TAKE ONE TABLET BY MOUTH EVERY DAY    dulaglutide (TRULICITY) 1.5 mg/0.5 mL PnIj Inject 1.5 mg into the skin every 7 days.    empagliflozin (JARDIANCE) 10 mg Tab Take 10 mg by mouth once daily.    flash glucose scanning reader (FREESTYLE ANAMARIA READER) Misc 1 Units by Misc.(Non-Drug; Combo Route) route before meals as needed.    fluticasone (FLONASE) 50 mcg/actuation nasal spray 1 spray (50 mcg total) by Each Nare route once daily.    insulin degludec (TRESIBA FLEXTOUCH U-200) 200 unit/mL (3 mL) InPn Inject 36 Units into the skin once daily. Inject 36 units once daily (Patient taking differently: Inject 30 Units into the skin once daily. Inject 36 units once daily)    isosorbide mononitrate (IMDUR) 30 MG 24 hr tablet Take 1 tablet (30 mg total) by mouth once daily.    latanoprost 0.005 % ophthalmic solution PLACE 1 DROP IN EACH EYE EVERY EVENING    loratadine (CLARITIN) 10 mg tablet TAKE ONE TABLET BY MOUTH EVERY DAY AS NEEDED FOR ALLERGIES    metFORMIN (GLUCOPHAGE) 1000 MG tablet TAKE ONE TABLET BY MOUTH TWICE DAILY WITH MEALS    multivitamin with minerals tablet Take 1 tablet by mouth once daily.    naproxen (NAPROSYN) 500 MG tablet Take 500 mg by mouth 2 (two) times daily as needed.    rOPINIRole (REQUIP) 0.5  "MG tablet TAKE ONE Tablet BY MOUTH EVERY NIGHT AT BEDTIME    albuterol 90 mcg/actuation inhaler Inhale 2 puffs into the lungs every 6 (six) hours as needed for Wheezing or Shortness of Breath. Rescue    blood pressure monitor (BLOOD PRESSURE KIT) Kit 1 kit by Misc.(Non-Drug; Combo Route) route once daily.    blood-glucose meter,continuous (DEXCOM G6 ) Misc Use with dexcom G6 system    blood-glucose sensor (DEXCOM G6 SENSOR) Vanna Change sensor every 10 days    blood-glucose transmitter (DEXCOM G6 TRANSMITTER) Vanna Change every 3 months    desoximetasone (TOPICORT) 0.05 % cream Apply topically 2 (two) times daily as needed.    FLUoxetine 20 MG capsule Take 1 capsule (20 mg total) by mouth once daily.    FREESTYLE ANAMARIA 14 DAY SENSOR Kit CHANGE SENSOR EVERY 14 DAYS    lancets Misc Check blood glucose 4 times a day. Dispense accuchek meter or other meter preferred by insurance. Dx code e11.65    LANCING DEVICE WITH LANCETS Misc USE AS DIRECTED FOR DIABETIC TESTING    pen needle, diabetic (BD ULTRA-FINE LENY PEN NEEDLES) 32 gauge x 5/32" Ndle USE FIVE TIMES DAILY    valsartan (DIOVAN) 160 MG tablet Take 2 tablets (320 mg total) by mouth once daily. (Patient taking differently: Take 320 mg by mouth 2 (two) times daily. )       Review of patient's allergies indicates:   Allergen Reactions    Ace inhibitors Other (See Comments)     cough    Invokana [canagliflozin] Other (See Comments)     Throat and tongue swelling    Ozempic [semaglutide]      Nausea and constipation on 0.25 mg dose.        Past Medical History:   Diagnosis Date    Acute coronary syndrome 5/20/2020 5/18/2020: Presents with unstable angina.    Allergic rhinitis, seasonal     Anxiety     Arthritis     CVA (cerebral vascular accident)     Depression     Glaucoma NEC     Hallucination     images out of corner of eye about a year ago    History of positive PPD - treated - remote past     Hx of psychiatric care     " Hyperlipidemia LDL goal < 100     Hypertension     Nuclear sclerosis of both eyes 2019    Obesity     CHETNA (obstructive sleep apnea)     Psychiatric problem     Psychosis     Renal disorder     Sleep difficulties     Suicide attempt     about 30 years ago by overdose of pills    Therapy     Type II or unspecified type diabetes mellitus without mention of complication, uncontrolled      Past Surgical History:   Procedure Laterality Date    CARPAL TUNNEL RELEASE       SECTION, CLASSIC      COLONOSCOPY N/A 3/5/2020    Procedure: COLONOSCOPY;  Surgeon: Wiliam Carrillo MD;  [facility]  Service: Endoscopy;  Laterality: N/A;  20 appt confirmed-rb  pt requested this date    LEFT HEART CATHETERIZATION N/A 2020    Procedure: HEART CATH-LEFT;  Surgeon: Etelvina Lowery MD;  Location: [facility] CATH LAB;  Service: Cardiology;  Laterality: N/A;    TOTAL ABDOMINAL HYSTERECTOMY      TRIGGER FINGER RELEASE       Family History     Problem Relation (Age of Onset)    Alzheimer's disease Maternal Aunt    Cataracts Mother    Diabetes Mother, Father, Maternal Uncle, Maternal Grandmother, Maternal Grandfather, Maternal Aunt    Drug abuse Grandchild    Glaucoma Mother    Heart disease Cousin    Heart failure Mother, Father    Hyperlipidemia Maternal Uncle    Hypertension Mother, Father, Maternal Uncle    No Known Problems Sister, Paternal Grandmother, Paternal Grandfather, Paternal Aunt, Paternal Uncle    Prostate cancer Father    Schizophrenia Maternal Aunt        Tobacco Use    Smoking status: Former Smoker     Packs/day: 0.50     Types: Cigarettes     Last attempt to quit: 11/10/2012     Years since quittin.5    Smokeless tobacco: Never Used   Substance and Sexual Activity    Alcohol use: Not Currently     Comment: drank socially in the past    Drug use: Not Currently     Types: Marijuana     Comment: tried once 6 months ago    Sexual activity: Not Currently     Partners:  Male     Review of Systems   Constitutional: Positive for fatigue.   HENT: Negative for nosebleeds.    Eyes: Negative for visual disturbance.   Respiratory: Positive for shortness of breath.    Cardiovascular: Positive for chest pain and palpitations. Negative for leg swelling.   Gastrointestinal: Negative for nausea.   Musculoskeletal: Negative for gait problem.   Skin: Negative for color change.   Neurological: Positive for dizziness (rarely). Negative for seizures.   Hematological: Does not bruise/bleed easily.   Psychiatric/Behavioral: Negative for sleep disturbance.     Objective:     Vital Signs (Most Recent):  Temp: 98.4 °F (36.9 °C) (05/22/20 0455)  Pulse: (!) 55 (05/22/20 0455)  Resp: 16 (05/22/20 0455)  BP: (!) 141/71 (05/22/20 0455)  SpO2: 97 % (05/22/20 0455) Vital Signs (24h Range):  Temp:  [98 °F (36.7 °C)-99 °F (37.2 °C)] 98.4 °F (36.9 °C)  Pulse:  [52-73] 55  Resp:  [16-19] 16  SpO2:  [92 %-98 %] 97 %  BP: (107-145)/() 141/71     Weight: 87.6 kg (193 lb 2 oz)  Body mass index is 34.21 kg/m².    SpO2: 97 %  O2 Device (Oxygen Therapy): room air     Intake/Output - Last 3 Shifts       05/20 0700 - 05/21 0659 05/21 0700 - 05/22 0659 05/22 0700 - 05/23 0659    P.O. 50 300     I.V. (mL/kg) 150 (1.7)      Total Intake(mL/kg) 200 (2.3) 300 (3.4)     Urine (mL/kg/hr)       Stool       Total Output       Net +200 +300            Urine Occurrence 1 x 2 x            Lines/Drains/Airways     Peripheral Intravenous Line                 Peripheral IV - Single Lumen 05/18/20 2305 20 G Left Forearm 3 days                 STS Risk Score: 1.1%    Physical Exam   Constitutional: She is oriented to person, place, and time. She appears well-developed and well-nourished. No distress.   HENT:   Head: Normocephalic and atraumatic.   Eyes: Pupils are equal, round, and reactive to light. EOM are normal.   Neck: Normal range of motion.   Cardiovascular: Normal rate, regular rhythm and normal pulses.   Pulmonary/Chest:  Effort normal. No respiratory distress.   Abdominal: Soft.   Musculoskeletal: Normal range of motion. She exhibits no edema.   Neurological: She is alert and oriented to person, place, and time.   Skin: Skin is warm, dry and intact. Capillary refill takes less than 2 seconds. She is not diaphoretic.   Psychiatric: She has a normal mood and affect. Her speech is normal and behavior is normal. Judgment and thought content normal.   Vitals reviewed.      Significant Labs:  ABGs: No results for input(s): PH, PCO2, PO2, HCO3, POCSATURATED, BE in the last 48 hours.  Amylase: No results for input(s): AMYLASE in the last 48 hours.  BMP:   Recent Labs   Lab 05/22/20  0340   *      K 4.0      CO2 26   BUN 16   CREATININE 0.8   CALCIUM 8.8   MG 1.6     Cardiac markers: No results for input(s): CKMB, CPKMB, TROPONINT, TROPONINI, MYOGLOBIN in the last 48 hours.  CBC:   Recent Labs   Lab 05/22/20  0340   WBC 8.99   RBC 3.96*   HGB 11.5*   HCT 37.3      MCV 94   MCH 29.0   MCHC 30.8*     CMP:   Recent Labs   Lab 05/22/20  0340   *   CALCIUM 8.8      K 4.0   CO2 26      BUN 16   CREATININE 0.8     Coagulation: No results for input(s): PT, INR, APTT in the last 48 hours.  Lactic Acid: No results for input(s): LACTATE in the last 48 hours.  LFTs: No results for input(s): ALT, AST, ALKPHOS, BILITOT, PROT, ALBUMIN in the last 48 hours.  Lipase: No results for input(s): LIPASE in the last 48 hours.    Significant Diagnostics: reviewed   [facility] 5/20/2020  · The left ventricular systolic function is normal.  · LV end diastolic pressure is elevated.  · Three vessel coronary artery disease.  · Normal systolic funcyion.  · Evaluation for CABG.    TTE 5/19/2020  · Normal left ventricular systolic function. The estimated ejection fraction is 75%.  · Normal right ventricular systolic function.  · Grade I (mild) left ventricular diastolic dysfunction consistent with impaired relaxation.  · Mild left  atrial enlargement.  · Mild aortic valve sclerosis.  · Moderate mitral sclerosis.  · The estimated PA systolic pressure is 10 mmHg.  · Normal central venous pressure (3 mmHg).    Nuclear Stress Test 12/191/2019    The perfusion scan is free of evidence from myocardial ischemia or injury.    There is a mild to moderate intensity defect in the anterior and anteroapical wall of the left ventricle, secondary to breast attenuation.    Sensitivity was reduced because patient unable to achieve target heart rate (reached 82% of MAPHR)    Gated perfusion images showed an ejection fraction of 73 %    There is normal wall motion at rest and post stress.    The EKG portion of this study is negative for ischemia.    The patient reported no chest pain during the stress test.    There were no arrhythmias during stress.

## 2020-05-22 NOTE — ASSESSMENT & PLAN NOTE
69 yo lady with T2DM, HTN, HLD, GERD, presented to outside hospital with SOB and chest pain. Went to cath lab and found to have multivessel disease. Previously smokes 2 packs of ciagerettes per week but quit 20 years ago. Reports that at home she is able to perform ADLs without difficulty. Walking up the stairs to her house causes SOB. Denies chest pain today. Reports having a stroke many years ago that caused some memory issues which is still a problem and some left hand weakness which has resolved. Blood glucose usually in 150s. Last A1C 7. Stop Valsartan and Imdur in preparation for CABG Monday, May 25. Will order CT chest and carotid US. Dr. Bowman to review and staff.

## 2020-05-22 NOTE — CONSULTS
[facility]  Cardiothoracic Surgery  Consult Note    Patient Name: Cecilia Barahona  MRN: [id number]  Admission Date: 5/18/2020  Attending Physician: Colby Jauregui MD  Referring Provider: Self, Aaareferral    Patient information was obtained from patient and past medical records.     Inpatient consult to Cardiothoracic Surgery  Consult performed by: Pricilla Jain PA-C  Consult ordered by: Terry Almonte MD        Subjective:     Principal Problem: Coronary artery disease    History of Present Illness: 69 y/o F with CAD (newly diagnosed), HTN, HLD, DM2 (on insulin HbA1c 7.1%). Admitted to [facility] on 5/18 with worsening chest pain for the past 4 days. She started having chest pain in 12/2019, she underwent a SPECT which was unremarkable. Since then, she reports mild chest pressure with activity, reports that she learned to live with it. For the past 4 days, her pain has been progressively worsening. Gets worst with exertion and better with rest. Not very active at baseline. No prior history of MI or CAD. Smoked 0.5 PPD for 5 years, 20 years ago. In OSH, troponin negative x4. 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     No current facility-administered medications on file prior to encounter.      Current Outpatient Medications on File Prior to Encounter   Medication Sig    alcohol swabs PadM Apply 1 each topically 3 (three) times daily.    amLODIPine (NORVASC) 10 MG tablet TAKE ONE TABLET BY MOUTH EVERY DAY    artificial tear ointment (ARTIFICIAL TEARS) Oint Place into both eyes every evening.    aspirin 81 MG Chew Take by mouth once daily. 1 tablet, chewable Oral Every day    atorvastatin (LIPITOR) 40 MG tablet Take 1 tablet (40 mg total) by mouth every evening. For cholesterol.    azelastine (OPTIVAR) 0.05 % ophthalmic solution INSTILL 1 DROP IN EACH EYE TWICE DAILY    chlorthalidone (HYGROTEN) 25 MG Tab TAKE ONE TABLET BY  MOUTH EVERY DAY    dulaglutide (TRULICITY) 1.5 mg/0.5 mL PnIj Inject 1.5 mg into the skin every 7 days.    empagliflozin (JARDIANCE) 10 mg Tab Take 10 mg by mouth once daily.    flash glucose scanning reader (FREESTYLE ANAMARIA READER) Misc 1 Units by Misc.(Non-Drug; Combo Route) route before meals as needed.    fluticasone (FLONASE) 50 mcg/actuation nasal spray 1 spray (50 mcg total) by Each Nare route once daily.    insulin degludec (TRESIBA FLEXTOUCH U-200) 200 unit/mL (3 mL) InPn Inject 36 Units into the skin once daily. Inject 36 units once daily (Patient taking differently: Inject 30 Units into the skin once daily. Inject 36 units once daily)    isosorbide mononitrate (IMDUR) 30 MG 24 hr tablet Take 1 tablet (30 mg total) by mouth once daily.    latanoprost 0.005 % ophthalmic solution PLACE 1 DROP IN EACH EYE EVERY EVENING    loratadine (CLARITIN) 10 mg tablet TAKE ONE TABLET BY MOUTH EVERY DAY AS NEEDED FOR ALLERGIES    metFORMIN (GLUCOPHAGE) 1000 MG tablet TAKE ONE TABLET BY MOUTH TWICE DAILY WITH MEALS    multivitamin with minerals tablet Take 1 tablet by mouth once daily.    naproxen (NAPROSYN) 500 MG tablet Take 500 mg by mouth 2 (two) times daily as needed.    rOPINIRole (REQUIP) 0.5 MG tablet TAKE ONE Tablet BY MOUTH EVERY NIGHT AT BEDTIME    albuterol 90 mcg/actuation inhaler Inhale 2 puffs into the lungs every 6 (six) hours as needed for Wheezing or Shortness of Breath. Rescue    blood pressure monitor (BLOOD PRESSURE KIT) Kit 1 kit by Misc.(Non-Drug; Combo Route) route once daily.    blood-glucose meter,continuous (DEXCOM G6 ) Misc Use with dexcom G6 system    blood-glucose sensor (DEXCOM G6 SENSOR) Vanna Change sensor every 10 days    blood-glucose transmitter (DEXCOM G6 TRANSMITTER) Vanna Change every 3 months    desoximetasone (TOPICORT) 0.05 % cream Apply topically 2 (two) times daily as needed.    FLUoxetine 20 MG capsule Take 1 capsule (20 mg total) by mouth once daily.  "   FREESTYLE ANAMARIA 14 DAY SENSOR Kit CHANGE SENSOR EVERY 14 DAYS    lancets Misc Check blood glucose 4 times a day. Dispense accuchek meter or other meter preferred by insurance. Dx code e11.65    LANCING DEVICE WITH LANCETS Misc USE AS DIRECTED FOR DIABETIC TESTING    pen needle, diabetic (BD ULTRA-FINE LENY PEN NEEDLES) 32 gauge x 5/32" Ndle USE FIVE TIMES DAILY    valsartan (DIOVAN) 160 MG tablet Take 2 tablets (320 mg total) by mouth once daily. (Patient taking differently: Take 320 mg by mouth 2 (two) times daily. )       Review of patient's allergies indicates:   Allergen Reactions    Ace inhibitors Other (See Comments)     cough    Invokana [canagliflozin] Other (See Comments)     Throat and tongue swelling    Ozempic [semaglutide]      Nausea and constipation on 0.25 mg dose.        Past Medical History:   Diagnosis Date    Acute coronary syndrome 2020: Presents with unstable angina.    Allergic rhinitis, seasonal     Anxiety     Arthritis     CVA (cerebral vascular accident)     Depression     Glaucoma NEC     Hallucination     images out of corner of eye about a year ago    History of positive PPD - treated - remote past     Hx of psychiatric care     Hyperlipidemia LDL goal < 100     Hypertension     Nuclear sclerosis of both eyes 2019    Obesity     CHETNA (obstructive sleep apnea)     Psychiatric problem     Psychosis     Renal disorder     Sleep difficulties     Suicide attempt     about 30 years ago by overdose of pills    Therapy     Type II or unspecified type diabetes mellitus without mention of complication, uncontrolled      Past Surgical History:   Procedure Laterality Date    CARPAL TUNNEL RELEASE       SECTION, CLASSIC      COLONOSCOPY N/A 3/5/2020    Procedure: COLONOSCOPY;  Surgeon: Wiliam Carrillo MD;  Location: [facility] ([address] [place] [address]);  Service: Endoscopy;  Laterality: N/A;  20 appt confirmed-rb  pt requested this date    " LEFT HEART CATHETERIZATION N/A 2020    Procedure: HEART CATH-LEFT;  Surgeon: Etelvina Lowery MD;  Location: [facility] CATH LAB;  Service: Cardiology;  Laterality: N/A;    TOTAL ABDOMINAL HYSTERECTOMY      TRIGGER FINGER RELEASE       Family History     Problem Relation (Age of Onset)    Alzheimer's disease Maternal Aunt    Cataracts Mother    Diabetes Mother, Father, Maternal Uncle, Maternal Grandmother, Maternal Grandfather, Maternal Aunt    Drug abuse Grandchild    Glaucoma Mother    Heart disease Cousin    Heart failure Mother, Father    Hyperlipidemia Maternal Uncle    Hypertension Mother, Father, Maternal Uncle    No Known Problems Sister, Paternal Grandmother, Paternal Grandfather, Paternal Aunt, Paternal Uncle    Prostate cancer Father    Schizophrenia Maternal Aunt        Tobacco Use    Smoking status: Former Smoker     Packs/day: 0.50     Types: Cigarettes     Last attempt to quit: 11/10/2012     Years since quittin.5    Smokeless tobacco: Never Used   Substance and Sexual Activity    Alcohol use: Not Currently     Comment: drank socially in the past    Drug use: Not Currently     Types: Marijuana     Comment: tried once 6 months ago    Sexual activity: Not Currently     Partners: Male     Review of Systems   Constitutional: Positive for fatigue.   HENT: Negative for nosebleeds.    Eyes: Negative for visual disturbance.   Respiratory: Positive for shortness of breath.    Cardiovascular: Positive for chest pain and palpitations. Negative for leg swelling.   Gastrointestinal: Negative for nausea.   Musculoskeletal: Negative for gait problem.   Skin: Negative for color change.   Neurological: Positive for dizziness (rarely). Negative for seizures.   Hematological: Does not bruise/bleed easily.   Psychiatric/Behavioral: Negative for sleep disturbance.     Objective:     Vital Signs (Most Recent):  Temp: 98.4 °F (36.9 °C) (20)  Pulse: (!) 55 (20)  Resp: 16 (20)  BP:  (!) 141/71 (05/22/20 0455)  SpO2: 97 % (05/22/20 0455) Vital Signs (24h Range):  Temp:  [98 °F (36.7 °C)-99 °F (37.2 °C)] 98.4 °F (36.9 °C)  Pulse:  [52-73] 55  Resp:  [16-19] 16  SpO2:  [92 %-98 %] 97 %  BP: (107-145)/() 141/71     Weight: 87.6 kg (193 lb 2 oz)  Body mass index is 34.21 kg/m².    SpO2: 97 %  O2 Device (Oxygen Therapy): room air     Intake/Output - Last 3 Shifts       05/20 0700 - 05/21 0659 05/21 0700 - 05/22 0659 05/22 0700 - 05/23 0659    P.O. 50 300     I.V. (mL/kg) 150 (1.7)      Total Intake(mL/kg) 200 (2.3) 300 (3.4)     Urine (mL/kg/hr)       Stool       Total Output       Net +200 +300            Urine Occurrence 1 x 2 x            Lines/Drains/Airways     Peripheral Intravenous Line                 Peripheral IV - Single Lumen 05/18/20 2305 20 G Left Forearm 3 days                 STS Risk Score: 1.1%    Physical Exam   Constitutional: She is oriented to person, place, and time. She appears well-developed and well-nourished. No distress.   HENT:   Head: Normocephalic and atraumatic.   Eyes: Pupils are equal, round, and reactive to light. EOM are normal.   Neck: Normal range of motion.   Cardiovascular: Normal rate, regular rhythm and normal pulses.   Pulmonary/Chest: Effort normal. No respiratory distress.   Abdominal: Soft.   Musculoskeletal: Normal range of motion. She exhibits no edema.   Neurological: She is alert and oriented to person, place, and time.   Skin: Skin is warm, dry and intact. Capillary refill takes less than 2 seconds. She is not diaphoretic.   Psychiatric: She has a normal mood and affect. Her speech is normal and behavior is normal. Judgment and thought content normal.   Vitals reviewed.      Significant Labs:  ABGs: No results for input(s): PH, PCO2, PO2, HCO3, POCSATURATED, BE in the last 48 hours.  Amylase: No results for input(s): AMYLASE in the last 48 hours.  BMP:   Recent Labs   Lab 05/22/20  0340   *      K 4.0      CO2 26   BUN 16    CREATININE 0.8   CALCIUM 8.8   MG 1.6     Cardiac markers: No results for input(s): CKMB, CPKMB, TROPONINT, TROPONINI, MYOGLOBIN in the last 48 hours.  CBC:   Recent Labs   Lab 05/22/20  0340   WBC 8.99   RBC 3.96*   HGB 11.5*   HCT 37.3      MCV 94   MCH 29.0   MCHC 30.8*     CMP:   Recent Labs   Lab 05/22/20  0340   *   CALCIUM 8.8      K 4.0   CO2 26      BUN 16   CREATININE 0.8     Coagulation: No results for input(s): PT, INR, APTT in the last 48 hours.  Lactic Acid: No results for input(s): LACTATE in the last 48 hours.  LFTs: No results for input(s): ALT, AST, ALKPHOS, BILITOT, PROT, ALBUMIN in the last 48 hours.  Lipase: No results for input(s): LIPASE in the last 48 hours.    Significant Diagnostics: reviewed   [facility] 5/20/2020  · The left ventricular systolic function is normal.  · LV end diastolic pressure is elevated.  · Three vessel coronary artery disease.  · Normal systolic funcyion.  · Evaluation for CABG.    TTE 5/19/2020  · Normal left ventricular systolic function. The estimated ejection fraction is 75%.  · Normal right ventricular systolic function.  · Grade I (mild) left ventricular diastolic dysfunction consistent with impaired relaxation.  · Mild left atrial enlargement.  · Mild aortic valve sclerosis.  · Moderate mitral sclerosis.  · The estimated PA systolic pressure is 10 mmHg.  · Normal central venous pressure (3 mmHg).    Nuclear Stress Test 12/191/2019    The perfusion scan is free of evidence from myocardial ischemia or injury.    There is a mild to moderate intensity defect in the anterior and anteroapical wall of the left ventricle, secondary to breast attenuation.    Sensitivity was reduced because patient unable to achieve target heart rate (reached 82% of MAPHR)    Gated perfusion images showed an ejection fraction of 73 %    There is normal wall motion at rest and post stress.    The EKG portion of this study is negative for ischemia.    The patient  reported no chest pain during the stress test.    There were no arrhythmias during stress.    Assessment/Plan:     NYHA Score: NYHA II: slight limitation of physical activity, comfortable at rest    * Coronary artery disease  69 yo lady with T2DM, HTN, HLD, GERD, presented to outside hospital with SOB and chest pain. Went to cath lab and found to have multivessel disease. Previously smokes 2 packs of ciagerettes per week but quit 20 years ago. Reports that at home she is able to perform ADLs without difficulty. Walking up the stairs to her house causes SOB. Denies chest pain today. Reports having a stroke many years ago that caused some memory issues which is still a problem and some left hand weakness which has resolved. Blood glucose usually in 150s. Last A1C 7. Stop Valsartan and Imdur in preparation for CABG Monday, May 25. Will order CT chest and carotid US. Dr. Bowman to review and staff.         Thank you for your consult. I will follow-up with patient. Please contact us if you have any additional questions.    Pricilla Jain PA-C  Cardiothoracic Surgery  [facility]    CTS Attending Note:    I have personally taken the history and examined this patient and agree with the RK's note as stated above.  Very pleasant 70-year-old woman with exertional chest pain the recently progressed to unstable angina.  She underwent a thoughtful and thorough evaluation which included coronary angiography.  This study demonstrated multivessel disease.  I recommended coronary artery bypass grafting, with left internal mammary artery to left anterior descending, saphenous vein graft to the first obtuse marginal, and saphenous vein graft to the terminal circumflex branch.  I discussed the risks and benefits of the proposed procedure with the patient in person and her daughters by phone, including but not limited to death, stroke, respiratory complications, renal complications, arrythmia, need for  permanent pacemaker, and infection. Her questions have been answered, and she wishes to proceed.

## 2020-05-22 NOTE — H&P (VIEW-ONLY)
[facility]  Cardiothoracic Surgery  Consult Note    Patient Name: Cecilia Barahona  MRN: [id number]  Admission Date: 5/18/2020  Attending Physician: Colby Jauregui MD  Referring Provider: Self, Aaareferral    Patient information was obtained from patient and past medical records.     Inpatient consult to Cardiothoracic Surgery  Consult performed by: Pricilla Jain PA-C  Consult ordered by: Terry Almonte MD        Subjective:     Principal Problem: Coronary artery disease    History of Present Illness: 71 y/o F with CAD (newly diagnosed), HTN, HLD, DM2 (on insulin HbA1c 7.1%). Admitted to [facility] on 5/18 with worsening chest pain for the past 4 days. She started having chest pain in 12/2019, she underwent a SPECT which was unremarkable. Since then, she reports mild chest pressure with activity, reports that she learned to live with it. For the past 4 days, her pain has been progressively worsening. Gets worst with exertion and better with rest. Not very active at baseline. No prior history of MI or CAD. Smoked 0.5 PPD for 5 years, 20 years ago. In OSH, troponin negative x4. 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     No current facility-administered medications on file prior to encounter.      Current Outpatient Medications on File Prior to Encounter   Medication Sig    alcohol swabs PadM Apply 1 each topically 3 (three) times daily.    amLODIPine (NORVASC) 10 MG tablet TAKE ONE TABLET BY MOUTH EVERY DAY    artificial tear ointment (ARTIFICIAL TEARS) Oint Place into both eyes every evening.    aspirin 81 MG Chew Take by mouth once daily. 1 tablet, chewable Oral Every day    atorvastatin (LIPITOR) 40 MG tablet Take 1 tablet (40 mg total) by mouth every evening. For cholesterol.    azelastine (OPTIVAR) 0.05 % ophthalmic solution INSTILL 1 DROP IN EACH EYE TWICE DAILY    chlorthalidone (HYGROTEN) 25 MG Tab TAKE ONE TABLET BY  MOUTH EVERY DAY    dulaglutide (TRULICITY) 1.5 mg/0.5 mL PnIj Inject 1.5 mg into the skin every 7 days.    empagliflozin (JARDIANCE) 10 mg Tab Take 10 mg by mouth once daily.    flash glucose scanning reader (FREESTYLE ANAMARIA READER) Misc 1 Units by Misc.(Non-Drug; Combo Route) route before meals as needed.    fluticasone (FLONASE) 50 mcg/actuation nasal spray 1 spray (50 mcg total) by Each Nare route once daily.    insulin degludec (TRESIBA FLEXTOUCH U-200) 200 unit/mL (3 mL) InPn Inject 36 Units into the skin once daily. Inject 36 units once daily (Patient taking differently: Inject 30 Units into the skin once daily. Inject 36 units once daily)    isosorbide mononitrate (IMDUR) 30 MG 24 hr tablet Take 1 tablet (30 mg total) by mouth once daily.    latanoprost 0.005 % ophthalmic solution PLACE 1 DROP IN EACH EYE EVERY EVENING    loratadine (CLARITIN) 10 mg tablet TAKE ONE TABLET BY MOUTH EVERY DAY AS NEEDED FOR ALLERGIES    metFORMIN (GLUCOPHAGE) 1000 MG tablet TAKE ONE TABLET BY MOUTH TWICE DAILY WITH MEALS    multivitamin with minerals tablet Take 1 tablet by mouth once daily.    naproxen (NAPROSYN) 500 MG tablet Take 500 mg by mouth 2 (two) times daily as needed.    rOPINIRole (REQUIP) 0.5 MG tablet TAKE ONE Tablet BY MOUTH EVERY NIGHT AT BEDTIME    albuterol 90 mcg/actuation inhaler Inhale 2 puffs into the lungs every 6 (six) hours as needed for Wheezing or Shortness of Breath. Rescue    blood pressure monitor (BLOOD PRESSURE KIT) Kit 1 kit by Misc.(Non-Drug; Combo Route) route once daily.    blood-glucose meter,continuous (DEXCOM G6 ) Misc Use with dexcom G6 system    blood-glucose sensor (DEXCOM G6 SENSOR) Vanna Change sensor every 10 days    blood-glucose transmitter (DEXCOM G6 TRANSMITTER) Vanna Change every 3 months    desoximetasone (TOPICORT) 0.05 % cream Apply topically 2 (two) times daily as needed.    FLUoxetine 20 MG capsule Take 1 capsule (20 mg total) by mouth once daily.  "   FREESTYLE ANAMARIA 14 DAY SENSOR Kit CHANGE SENSOR EVERY 14 DAYS    lancets Misc Check blood glucose 4 times a day. Dispense accuchek meter or other meter preferred by insurance. Dx code e11.65    LANCING DEVICE WITH LANCETS Misc USE AS DIRECTED FOR DIABETIC TESTING    pen needle, diabetic (BD ULTRA-FINE LENY PEN NEEDLES) 32 gauge x 5/32" Ndle USE FIVE TIMES DAILY    valsartan (DIOVAN) 160 MG tablet Take 2 tablets (320 mg total) by mouth once daily. (Patient taking differently: Take 320 mg by mouth 2 (two) times daily. )       Review of patient's allergies indicates:   Allergen Reactions    Ace inhibitors Other (See Comments)     cough    Invokana [canagliflozin] Other (See Comments)     Throat and tongue swelling    Ozempic [semaglutide]      Nausea and constipation on 0.25 mg dose.        Past Medical History:   Diagnosis Date    Acute coronary syndrome 2020: Presents with unstable angina.    Allergic rhinitis, seasonal     Anxiety     Arthritis     CVA (cerebral vascular accident)     Depression     Glaucoma NEC     Hallucination     images out of corner of eye about a year ago    History of positive PPD - treated - remote past     Hx of psychiatric care     Hyperlipidemia LDL goal < 100     Hypertension     Nuclear sclerosis of both eyes 2019    Obesity     CHETNA (obstructive sleep apnea)     Psychiatric problem     Psychosis     Renal disorder     Sleep difficulties     Suicide attempt     about 30 years ago by overdose of pills    Therapy     Type II or unspecified type diabetes mellitus without mention of complication, uncontrolled      Past Surgical History:   Procedure Laterality Date    CARPAL TUNNEL RELEASE       SECTION, CLASSIC      COLONOSCOPY N/A 3/5/2020    Procedure: COLONOSCOPY;  Surgeon: Wiliam Carrillo MD;  Location: [facility] ([address] [place] [address]);  Service: Endoscopy;  Laterality: N/A;  20 appt confirmed-rb  pt requested this date    " LEFT HEART CATHETERIZATION N/A 2020    Procedure: HEART CATH-LEFT;  Surgeon: Etelvina Lowery MD;  Location: [facility] CATH LAB;  Service: Cardiology;  Laterality: N/A;    TOTAL ABDOMINAL HYSTERECTOMY      TRIGGER FINGER RELEASE       Family History     Problem Relation (Age of Onset)    Alzheimer's disease Maternal Aunt    Cataracts Mother    Diabetes Mother, Father, Maternal Uncle, Maternal Grandmother, Maternal Grandfather, Maternal Aunt    Drug abuse Grandchild    Glaucoma Mother    Heart disease Cousin    Heart failure Mother, Father    Hyperlipidemia Maternal Uncle    Hypertension Mother, Father, Maternal Uncle    No Known Problems Sister, Paternal Grandmother, Paternal Grandfather, Paternal Aunt, Paternal Uncle    Prostate cancer Father    Schizophrenia Maternal Aunt        Tobacco Use    Smoking status: Former Smoker     Packs/day: 0.50     Types: Cigarettes     Last attempt to quit: 11/10/2012     Years since quittin.5    Smokeless tobacco: Never Used   Substance and Sexual Activity    Alcohol use: Not Currently     Comment: drank socially in the past    Drug use: Not Currently     Types: Marijuana     Comment: tried once 6 months ago    Sexual activity: Not Currently     Partners: Male     Review of Systems   Constitutional: Positive for fatigue.   HENT: Negative for nosebleeds.    Eyes: Negative for visual disturbance.   Respiratory: Positive for shortness of breath.    Cardiovascular: Positive for chest pain and palpitations. Negative for leg swelling.   Gastrointestinal: Negative for nausea.   Musculoskeletal: Negative for gait problem.   Skin: Negative for color change.   Neurological: Positive for dizziness (rarely). Negative for seizures.   Hematological: Does not bruise/bleed easily.   Psychiatric/Behavioral: Negative for sleep disturbance.     Objective:     Vital Signs (Most Recent):  Temp: 98.4 °F (36.9 °C) (20)  Pulse: (!) 55 (20)  Resp: 16 (20)  BP:  (!) 141/71 (05/22/20 0455)  SpO2: 97 % (05/22/20 0455) Vital Signs (24h Range):  Temp:  [98 °F (36.7 °C)-99 °F (37.2 °C)] 98.4 °F (36.9 °C)  Pulse:  [52-73] 55  Resp:  [16-19] 16  SpO2:  [92 %-98 %] 97 %  BP: (107-145)/() 141/71     Weight: 87.6 kg (193 lb 2 oz)  Body mass index is 34.21 kg/m².    SpO2: 97 %  O2 Device (Oxygen Therapy): room air     Intake/Output - Last 3 Shifts       05/20 0700 - 05/21 0659 05/21 0700 - 05/22 0659 05/22 0700 - 05/23 0659    P.O. 50 300     I.V. (mL/kg) 150 (1.7)      Total Intake(mL/kg) 200 (2.3) 300 (3.4)     Urine (mL/kg/hr)       Stool       Total Output       Net +200 +300            Urine Occurrence 1 x 2 x            Lines/Drains/Airways     Peripheral Intravenous Line                 Peripheral IV - Single Lumen 05/18/20 2305 20 G Left Forearm 3 days                 STS Risk Score: 1.1%    Physical Exam   Constitutional: She is oriented to person, place, and time. She appears well-developed and well-nourished. No distress.   HENT:   Head: Normocephalic and atraumatic.   Eyes: Pupils are equal, round, and reactive to light. EOM are normal.   Neck: Normal range of motion.   Cardiovascular: Normal rate, regular rhythm and normal pulses.   Pulmonary/Chest: Effort normal. No respiratory distress.   Abdominal: Soft.   Musculoskeletal: Normal range of motion. She exhibits no edema.   Neurological: She is alert and oriented to person, place, and time.   Skin: Skin is warm, dry and intact. Capillary refill takes less than 2 seconds. She is not diaphoretic.   Psychiatric: She has a normal mood and affect. Her speech is normal and behavior is normal. Judgment and thought content normal.   Vitals reviewed.      Significant Labs:  ABGs: No results for input(s): PH, PCO2, PO2, HCO3, POCSATURATED, BE in the last 48 hours.  Amylase: No results for input(s): AMYLASE in the last 48 hours.  BMP:   Recent Labs   Lab 05/22/20  0340   *      K 4.0      CO2 26   BUN 16    CREATININE 0.8   CALCIUM 8.8   MG 1.6     Cardiac markers: No results for input(s): CKMB, CPKMB, TROPONINT, TROPONINI, MYOGLOBIN in the last 48 hours.  CBC:   Recent Labs   Lab 05/22/20  0340   WBC 8.99   RBC 3.96*   HGB 11.5*   HCT 37.3      MCV 94   MCH 29.0   MCHC 30.8*     CMP:   Recent Labs   Lab 05/22/20  0340   *   CALCIUM 8.8      K 4.0   CO2 26      BUN 16   CREATININE 0.8     Coagulation: No results for input(s): PT, INR, APTT in the last 48 hours.  Lactic Acid: No results for input(s): LACTATE in the last 48 hours.  LFTs: No results for input(s): ALT, AST, ALKPHOS, BILITOT, PROT, ALBUMIN in the last 48 hours.  Lipase: No results for input(s): LIPASE in the last 48 hours.    Significant Diagnostics: reviewed   [facility] 5/20/2020  · The left ventricular systolic function is normal.  · LV end diastolic pressure is elevated.  · Three vessel coronary artery disease.  · Normal systolic funcyion.  · Evaluation for CABG.    TTE 5/19/2020  · Normal left ventricular systolic function. The estimated ejection fraction is 75%.  · Normal right ventricular systolic function.  · Grade I (mild) left ventricular diastolic dysfunction consistent with impaired relaxation.  · Mild left atrial enlargement.  · Mild aortic valve sclerosis.  · Moderate mitral sclerosis.  · The estimated PA systolic pressure is 10 mmHg.  · Normal central venous pressure (3 mmHg).    Nuclear Stress Test 12/191/2019    The perfusion scan is free of evidence from myocardial ischemia or injury.    There is a mild to moderate intensity defect in the anterior and anteroapical wall of the left ventricle, secondary to breast attenuation.    Sensitivity was reduced because patient unable to achieve target heart rate (reached 82% of MAPHR)    Gated perfusion images showed an ejection fraction of 73 %    There is normal wall motion at rest and post stress.    The EKG portion of this study is negative for ischemia.    The patient  reported no chest pain during the stress test.    There were no arrhythmias during stress.    Assessment/Plan:     NYHA Score: NYHA II: slight limitation of physical activity, comfortable at rest    * Coronary artery disease  71 yo lady with T2DM, HTN, HLD, GERD, presented to outside hospital with SOB and chest pain. Went to cath lab and found to have multivessel disease. Previously smokes 2 packs of ciagerettes per week but quit 20 years ago. Reports that at home she is able to perform ADLs without difficulty. Walking up the stairs to her house causes SOB. Denies chest pain today. Reports having a stroke many years ago that caused some memory issues which is still a problem and some left hand weakness which has resolved. Blood glucose usually in 150s. Last A1C 7. Stop Valsartan and Imdur in preparation for CABG Monday, May 25. Will order CT chest and carotid US. Dr. Bowman to review and staff.         Thank you for your consult. I will follow-up with patient. Please contact us if you have any additional questions.    Pricilla Jain PA-C  Cardiothoracic Surgery  [facility]    CTS Attending Note:    I have personally taken the history and examined this patient and agree with the RK's note as stated above.  Very pleasant 70-year-old woman with exertional chest pain the recently progressed to unstable angina.  She underwent a thoughtful and thorough evaluation which included coronary angiography.  This study demonstrated multivessel disease.  I recommended coronary artery bypass grafting, with left internal mammary artery to left anterior descending, saphenous vein graft to the first obtuse marginal, and saphenous vein graft to the terminal circumflex branch.  I discussed the risks and benefits of the proposed procedure with the patient in person and her daughters by phone, including but not limited to death, stroke, respiratory complications, renal complications, arrythmia, need for  permanent pacemaker, and infection. Her questions have been answered, and she wishes to proceed.

## 2020-05-22 NOTE — ASSESSMENT & PLAN NOTE
- Continue amlodipine 10 mg daily, valsartan 320 mg daily. Also started on metoprolol 25 mg BID, continue

## 2020-05-22 NOTE — SUBJECTIVE & OBJECTIVE
Past Medical History:   Diagnosis Date    Acute coronary syndrome 2020: Presents with unstable angina.    Allergic rhinitis, seasonal     Anxiety     Arthritis     CVA (cerebral vascular accident)     Depression     Glaucoma NEC     Hallucination     images out of corner of eye about a year ago    History of positive PPD - treated - remote past     Hx of psychiatric care     Hyperlipidemia LDL goal < 100     Hypertension     Nuclear sclerosis of both eyes 2019    Obesity     CHETNA (obstructive sleep apnea)     Psychiatric problem     Psychosis     Renal disorder     Sleep difficulties     Suicide attempt     about 30 years ago by overdose of pills    Therapy     Type II or unspecified type diabetes mellitus without mention of complication, uncontrolled        Past Surgical History:   Procedure Laterality Date    CARPAL TUNNEL RELEASE       SECTION, CLASSIC      COLONOSCOPY N/A 3/5/2020    Procedure: COLONOSCOPY;  Surgeon: Wiliam Carrillo MD;  Location: [facility] ENDO [address]);  Service: Endoscopy;  Laterality: N/A;  20 appt confirmed-rb  pt requested this date    LEFT HEART CATHETERIZATION N/A 2020    Procedure: HEART CATH-LEFT;  Surgeon: Etelvina Lowery MD;  [facility]: [facility] CATH LAB;  Service: Cardiology;  Laterality: N/A;    TOTAL ABDOMINAL HYSTERECTOMY      TRIGGER FINGER RELEASE         Review of patient's allergies indicates:   Allergen Reactions    Ace inhibitors Other (See Comments)     cough    Invokana [canagliflozin] Other (See Comments)     Throat and tongue swelling    Ozempic [semaglutide]      Nausea and constipation on 0.25 mg dose.        No current facility-administered medications on file prior to encounter.      Current Outpatient Medications on File Prior to Encounter   Medication Sig    alcohol swabs PadM Apply 1 each topically 3 (three) times daily.    amLODIPine (NORVASC) 10 MG tablet TAKE ONE TABLET BY MOUTH EVERY DAY     artificial tear ointment (ARTIFICIAL TEARS) Oint Place into both eyes every evening.    aspirin 81 MG Chew Take by mouth once daily. 1 tablet, chewable Oral Every day    atorvastatin (LIPITOR) 40 MG tablet Take 1 tablet (40 mg total) by mouth every evening. For cholesterol.    azelastine (OPTIVAR) 0.05 % ophthalmic solution INSTILL 1 DROP IN EACH EYE TWICE DAILY    chlorthalidone (HYGROTEN) 25 MG Tab TAKE ONE TABLET BY MOUTH EVERY DAY    dulaglutide (TRULICITY) 1.5 mg/0.5 mL PnIj Inject 1.5 mg into the skin every 7 days.    empagliflozin (JARDIANCE) 10 mg Tab Take 10 mg by mouth once daily.    flash glucose scanning reader (FREESTYLE ANAMARIA READER) Misc 1 Units by Misc.(Non-Drug; Combo Route) route before meals as needed.    fluticasone (FLONASE) 50 mcg/actuation nasal spray 1 spray (50 mcg total) by Each Nare route once daily.    insulin degludec (TRESIBA FLEXTOUCH U-200) 200 unit/mL (3 mL) InPn Inject 36 Units into the skin once daily. Inject 36 units once daily (Patient taking differently: Inject 30 Units into the skin once daily. Inject 36 units once daily)    isosorbide mononitrate (IMDUR) 30 MG 24 hr tablet Take 1 tablet (30 mg total) by mouth once daily.    latanoprost 0.005 % ophthalmic solution PLACE 1 DROP IN EACH EYE EVERY EVENING    loratadine (CLARITIN) 10 mg tablet TAKE ONE TABLET BY MOUTH EVERY DAY AS NEEDED FOR ALLERGIES    metFORMIN (GLUCOPHAGE) 1000 MG tablet TAKE ONE TABLET BY MOUTH TWICE DAILY WITH MEALS    multivitamin with minerals tablet Take 1 tablet by mouth once daily.    naproxen (NAPROSYN) 500 MG tablet Take 500 mg by mouth 2 (two) times daily as needed.    rOPINIRole (REQUIP) 0.5 MG tablet TAKE ONE Tablet BY MOUTH EVERY NIGHT AT BEDTIME    albuterol 90 mcg/actuation inhaler Inhale 2 puffs into the lungs every 6 (six) hours as needed for Wheezing or Shortness of Breath. Rescue    blood pressure monitor (BLOOD PRESSURE KIT) Kit 1 kit by Misc.(Non-Drug; Combo Route) route  "once daily.    blood-glucose meter,continuous (DEXCOM G6 ) Misc Use with dexcom G6 system    blood-glucose sensor (DEXCOM G6 SENSOR) Vanna Change sensor every 10 days    blood-glucose transmitter (DEXCOM G6 TRANSMITTER) Vanna Change every 3 months    desoximetasone (TOPICORT) 0.05 % cream Apply topically 2 (two) times daily as needed.    FLUoxetine 20 MG capsule Take 1 capsule (20 mg total) by mouth once daily.    FREESTYLE ANAMARIA 14 DAY SENSOR Kit CHANGE SENSOR EVERY 14 DAYS    lancets Misc Check blood glucose 4 times a day. Dispense accuchek meter or other meter preferred by insurance. Dx code e11.65    LANCING DEVICE WITH LANCETS Misc USE AS DIRECTED FOR DIABETIC TESTING    pen needle, diabetic (BD ULTRA-FINE LENY PEN NEEDLES) 32 gauge x 5/32" Ndle USE FIVE TIMES DAILY    valsartan (DIOVAN) 160 MG tablet Take 2 tablets (320 mg total) by mouth once daily. (Patient taking differently: Take 320 mg by mouth 2 (two) times daily. )     Family History     Problem Relation (Age of Onset)    Alzheimer's disease Maternal Aunt    Cataracts Mother    Diabetes Mother, Father, Maternal Uncle, Maternal Grandmother, Maternal Grandfather, Maternal Aunt    Drug abuse Grandchild    Glaucoma Mother    Heart disease Cousin    Heart failure Mother, Father    Hyperlipidemia Maternal Uncle    Hypertension Mother, Father, Maternal Uncle    No Known Problems Sister, Paternal Grandmother, Paternal Grandfather, Paternal Aunt, Paternal Uncle    Prostate cancer Father    Schizophrenia Maternal Aunt        Tobacco Use    Smoking status: Former Smoker     Packs/day: 0.50     Types: Cigarettes     Last attempt to quit: 11/10/2012     Years since quittin.5    Smokeless tobacco: Never Used   Substance and Sexual Activity    Alcohol use: Not Currently     Comment: drank socially in the past    Drug use: Not Currently     Types: Marijuana     Comment: tried once 6 months ago    Sexual activity: Not Currently     Partners: " Male     Review of Systems   Constitution: Negative for chills and fever.   Cardiovascular: Positive for chest pain and dyspnea on exertion. Negative for palpitations and syncope.   Respiratory: Negative for cough and sleep disturbances due to breathing.    Musculoskeletal: Negative for back pain.   Gastrointestinal: Negative for abdominal pain, nausea and vomiting.   Neurological: Negative for dizziness and focal weakness.   Psychiatric/Behavioral: Negative for altered mental status.     Objective:     Vital Signs (Most Recent):  Temp: 99 °F (37.2 °C) (05/21/20 2001)  Pulse: 61 (05/21/20 2001)  Resp: 19 (05/21/20 2001)  BP: 110/60 (05/21/20 1540)  SpO2: 98 % (05/21/20 2001) Vital Signs (24h Range):  Temp:  [98 °F (36.7 °C)-99 °F (37.2 °C)] 99 °F (37.2 °C)  Pulse:  [58-75] 61  Resp:  [16-20] 19  SpO2:  [92 %-98 %] 98 %  BP: (107-156)/() 110/60     Weight: 87.6 kg (193 lb 2 oz)  Body mass index is 34.21 kg/m².    SpO2: 98 %  O2 Device (Oxygen Therapy): room air    No intake or output data in the 24 hours ending 05/21/20 2033    Lines/Drains/Airways     Peripheral Intravenous Line                 Peripheral IV - Single Lumen 05/18/20 2305 20 G Left Forearm 2 days                Physical Exam   Constitutional: She is oriented to person, place, and time. She appears well-developed and well-nourished. No distress.   HENT:   Head: Normocephalic and atraumatic.   Eyes: Conjunctivae and EOM are normal.   Neck: Normal range of motion. No tracheal deviation present.   Cardiovascular: Normal rate and regular rhythm.   Murmur (2/6 systolic murmur louder in RUSB) heard.  Pulmonary/Chest: Effort normal and breath sounds normal. No respiratory distress. She has no wheezes.   Abdominal: Soft. Bowel sounds are normal. She exhibits no distension. There is no tenderness.   Musculoskeletal: Normal range of motion. She exhibits no edema.   Neurological: She is alert and oriented to person, place, and time.   Skin: She is not  diaphoretic.       Significant Labs:   CMP   Recent Labs   Lab 05/20/20  0517 05/21/20  0814    141   K 4.1 3.5    103   CO2 28 28    132*   BUN 16 13   CREATININE 0.9 0.8   CALCIUM 9.4 9.2   ANIONGAP 10 10   ESTGFRAFRICA >60 >60   EGFRNONAA >60 >60   , CBC   Recent Labs   Lab 05/20/20  0517 05/21/20  0049 05/21/20  0814   WBC 8.02 8.89 8.75   HGB 12.5 11.4* 12.2   HCT 39.3 35.9* 38.9    273 281    and INR No results for input(s): INR, PROTIME in the last 48 hours.    Significant Imaging: All pertinent images from the last 24h have been reviewed.

## 2020-05-22 NOTE — HPI
Ms. Barahona is a pleasant 70-year-old woman who initially presented to [facility] with a 4 day history of chest pain.  Her symptoms originally had started in December of last year, at which time a nuclear study was done which was unremarkable.  She had had only exertional angina until the recent 4 day period of persistent chest pain.  She was admitted to [facility], and underwent a thoughtful and thorough evaluation.  Coronary angiography demonstrated multivessel disease.  She now presents for coronary artery bypass grafting.

## 2020-05-22 NOTE — SUBJECTIVE & OBJECTIVE
Interval History: No acute episodes overnight. Patient denies chest pain. Patient seen by CTS this morning and candidate for CABG. Further workup for CABG is pending.     Review of Systems   Constitution: Negative for chills and fever.   HENT: Negative for congestion.    Eyes: Negative for blurred vision.   Cardiovascular: Positive for chest pain. Negative for dyspnea on exertion, irregular heartbeat, leg swelling, near-syncope, orthopnea, palpitations, paroxysmal nocturnal dyspnea and syncope.   Respiratory: Positive for shortness of breath. Negative for cough, sputum production and wheezing.    Gastrointestinal: Negative for nausea and vomiting.   Genitourinary: Negative for dysuria.   Neurological: Negative for dizziness, focal weakness, headaches, light-headedness and weakness.     Objective:     Vital Signs (Most Recent):  Temp: 98.7 °F (37.1 °C) (05/22/20 0754)  Pulse: 65 (05/22/20 0754)  Resp: 18 (05/22/20 0754)  BP: 131/70 (05/22/20 0754)  SpO2: (!) 90 % (05/22/20 0754) Vital Signs (24h Range):  Temp:  [98 °F (36.7 °C)-99 °F (37.2 °C)] 98.7 °F (37.1 °C)  Pulse:  [52-74] 65  Resp:  [16-19] 18  SpO2:  [90 %-98 %] 90 %  BP: (110-145)/(60-77) 131/70     Weight: 87.6 kg (193 lb 2 oz)  Body mass index is 34.21 kg/m².     SpO2: (!) 90 %  O2 Device (Oxygen Therapy): room air      Intake/Output Summary (Last 24 hours) at 5/22/2020 1111  Last data filed at 5/22/2020 0500  Gross per 24 hour   Intake 300 ml   Output --   Net 300 ml       Lines/Drains/Airways     Peripheral Intravenous Line                 Peripheral IV - Single Lumen 05/18/20 2305 20 G Left Forearm 3 days                Physical Exam   Constitutional: She is oriented to person, place, and time. She appears well-developed and well-nourished. No distress.   HENT:   Mouth/Throat: Oropharynx is clear and moist.   Eyes: Pupils are equal, round, and reactive to light.   Neck: Neck supple. No JVD present.   Cardiovascular: Normal rate, regular rhythm, normal  heart sounds and intact distal pulses.   Pulmonary/Chest: Effort normal and breath sounds normal.   Abdominal: Soft.   Musculoskeletal: She exhibits no edema.   Neurological: She is alert and oriented to person, place, and time. No cranial nerve deficit.   Skin: Skin is warm and dry. She is not diaphoretic.   Psychiatric: She has a normal mood and affect. Her behavior is normal.       Significant Labs: All pertinent lab results from the last 24 hours have been reviewed.    Significant Imaging: Echocardiogram: 2D echo with color flow doppler: No results found for this or any previous visit.

## 2020-05-22 NOTE — ASSESSMENT & PLAN NOTE
- On atorvastatin 80 mg daily. Started on ezetimibe recently due to LDL not in target.   - Chol 204, , HDL 41 - 5/19/20

## 2020-05-22 NOTE — HPI
69 y/o F with CAD (newly diagnosed), HTN, HLD, DM2 (on insulin HbA1c 7.1%). Admitted to [facility] on 5/18 with worsening chest pain for the past 4 days. She started having chest pain in 12/2019, she underwent a SPECT which was unremarkable. Since then, she reports mild chest pressure with activity, reports that she learned to live with it. For the past 4 days, her pain has been progressively worsening. Gets worst with exertion and better with rest. Not very active at baseline. No prior history of MI or CAD. Smoked 0.5 PPD for 5 years, 20 years ago. In OSH, troponin negative x4. 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     5/19/20 TTE  · Normal left ventricular systolic function. The estimated ejection fraction is 75%.  · Normal right ventricular systolic function.  · Grade I (mild) left ventricular diastolic dysfunction consistent with impaired relaxation.  · Mild left atrial enlargement.  · Mild aortic valve sclerosis.  · Moderate mitral sclerosis.  · The estimated PA systolic pressure is 10 mmHg.  · Normal central venous pressure (3 mmHg).     LHC 5/20/2020 - LAD mid 70%, OM2 osteal 90%, OM3 95%, RCA mid subtotal  12/019 negative SPECT

## 2020-05-22 NOTE — PLAN OF CARE
Discharge assessment completed with pt. Over phone. Recent transfer from [facility] to [facility] for cardiac work up for acute coronary symptoms. Pt. Lives with spouse. Independent and active. Plan is to d/c home with daughter Elli. D/C needs to be determined once plan for cardiac care is determined. SW/CM will continue to follow.     Payor: [employer] MANAGED MEDICARE / Plan: [employer] TOTAL CARE ADVANTAGE / Product Type: Medicare Advantage /      Yudi Smart MD        [id number] - [place] - [address]  [address] [place] [address]  Phone: [phone] Fax: [phone]    [id number] - [place] - [address] AT [facility] [address]  [address]  [place] [address]  Phone: [phone] Fax: [phone]         05/22/20 0905   Discharge Assessment   Assessment Type Discharge Planning Assessment   Confirmed/corrected address and phone number on facesheet? Yes   Assessment information obtained from? Patient   Expected Length of Stay (days) 3   Prior to hospitilization cognitive status: Alert/Oriented   Prior to hospitalization functional status: Independent   Current cognitive status: Alert/Oriented   Current Functional Status: Independent   Lives With spouse   Able to Return to Prior Arrangements no  (pt. will stay with daughter)   Is patient able to care for self after discharge? Yes  (with assistance from daughter)   Who are your caregiver(s) and their phone number(s)? Elli Pino [phone]   Patient's perception of discharge disposition home health   Readmission Within the Last 30 Days no previous admission in last 30 days   Patient currently being followed by outpatient case management? No   Patient currently receives any other outside agency services? No   Equipment Currently Used at Home none   Do you have any problems affording any of your prescribed medications? No   Is the patient taking medications as prescribed? yes   Does the patient  have transportation home? Yes   Transportation Anticipated family or friend will provide   Does the patient receive services at the [facility]? No   Discharge Plan A Home;Home Health   Discharge Plan B Home with family   DME Needed Upon Discharge  none

## 2020-05-22 NOTE — PROGRESS NOTES
[facility]  Cardiology  Progress Note    Patient Name: Cecilia Barahona  MRN: [id number]  Admission Date: 5/18/2020  Hospital Length of Stay: 2 days  Code Status: Full Code   Attending Physician: Colby Jauregui MD   Primary Care Physician: Yudi Smart MD  Expected Discharge Date: 5/29/2020  Principal Problem:Coronary artery disease    Subjective:     Interval History: No acute episodes overnight. Patient denies chest pain this AM. Patient seen by CTS this morning and candidate for CABG. Further workup for CABG is pending.     Review of Systems   Constitution: Negative for chills and fever.   HENT: Negative for congestion.    Eyes: Negative for blurred vision.   Cardiovascular: Positive for chest pain. Negative for dyspnea on exertion, irregular heartbeat, leg swelling, near-syncope, orthopnea, palpitations, paroxysmal nocturnal dyspnea and syncope.   Respiratory: Positive for shortness of breath. Negative for cough, sputum production and wheezing.    Gastrointestinal: Negative for nausea and vomiting.   Genitourinary: Negative for dysuria.   Neurological: Negative for dizziness, focal weakness, headaches, light-headedness and weakness.     Objective:     Vital Signs (Most Recent):  Temp: 98.7 °F (37.1 °C) (05/22/20 0754)  Pulse: 65 (05/22/20 0754)  Resp: 18 (05/22/20 0754)  BP: 131/70 (05/22/20 0754)  SpO2: (!) 90 % (05/22/20 0754) Vital Signs (24h Range):  Temp:  [98 °F (36.7 °C)-99 °F (37.2 °C)] 98.7 °F (37.1 °C)  Pulse:  [52-74] 65  Resp:  [16-19] 18  SpO2:  [90 %-98 %] 90 %  BP: (110-145)/(60-77) 131/70     Weight: 87.6 kg (193 lb 2 oz)  Body mass index is 34.21 kg/m².     SpO2: (!) 90 %  O2 Device (Oxygen Therapy): room air      Intake/Output Summary (Last 24 hours) at 5/22/2020 1111  Last data filed at 5/22/2020 0500  Gross per 24 hour   Intake 300 ml   Output --   Net 300 ml       Lines/Drains/Airways     Peripheral Intravenous Line                 Peripheral IV - Single Lumen 05/18/20  2305 20 G Left Forearm 3 days                Physical Exam   Constitutional: She is oriented to person, place, and time. She appears well-developed and well-nourished. No distress.   HENT:   Mouth/Throat: Oropharynx is clear and moist.   Eyes: Pupils are equal, round, and reactive to light.   Neck: Neck supple. No JVD present.   Cardiovascular: Normal rate, regular rhythm, normal heart sounds and intact distal pulses.   Pulmonary/Chest: Effort normal and breath sounds normal.   Abdominal: Soft.   Musculoskeletal: She exhibits no edema.   Neurological: She is alert and oriented to person, place, and time. No cranial nerve deficit.   Skin: Skin is warm and dry. She is not diaphoretic.   Psychiatric: She has a normal mood and affect. Her behavior is normal.       Significant Labs: All pertinent lab results from the last 24 hours have been reviewed.    Significant Imaging:     TTE (5/19/20):   · Normal left ventricular systolic function. The estimated ejection fraction is 75%.  · Normal right ventricular systolic function.  · Grade I (mild) left ventricular diastolic dysfunction consistent with impaired relaxation.  · Mild left atrial enlargement.  · Mild aortic valve sclerosis.  · Moderate mitral sclerosis.  · The estimated PA systolic pressure is 10 mmHg.  · Normal central venous pressure (3 mmHg).    Coronary Angiogram (5/20/20):   Left Anterior Descending   Prox LAD lesion is 30% stenosed.   Mid LAD-1 lesion is 70% stenosed.   Mid LAD-2 lesion is 80% stenosed.   Left Circumflex   Dist Cx lesion is 90% stenosed.   Second Obtuse Marginal Branch   Ost 2nd Mrg lesion is 90% stenosed.   Third Obtuse Marginal Branch   Ost 3rd Mrg lesion is 95% stenosed.   Right Coronary Artery   Prox RCA to Dist RCA lesion is 100% stenosed.   Right Posterior Atrioventricular Branch   Collaterals   Post Atrio filled by collaterals from Dist Cx.     Assessment and Plan:     * Coronary artery disease  - Admitted for unstable angina, s/p  coronary angiogram and found to have multivessel disease  - Seen by CTS for CABG evaluation and excellent candidate (scheduled date for CABG is 5/25/20); further workup pending including chest CT and carotid U/S  - Continue daily ASA, Atorvastatin 80 mg QD, Ezetimibe 10 mg QD, Metoprolol Tartrate 25 mg BID  - Holding Valsartan and Imdur per CTS    Mild major depression  - Continue fluoxetine    Restless legs syndrome (RLS)  - Continue ropinirole    Type 2 diabetes mellitus without complication, with long-term current use of insulin  -A!C 7%   -On determir 21 units here (uses 30 units at home)  - Insulin SS with meals    Mixed hyperlipidemia  - On atorvastatin 80 mg daily. Started on ezetimibe recently due to LDL not in target.   - Chol 204, , HDL 41 - 5/19/20    Essential hypertension  - Continue amlodipine 10 mg daily    Virginia Vogt MD  Cardiology Fellow, PGY-5   [facility]

## 2020-05-22 NOTE — PLAN OF CARE
Problem: Adult Inpatient Plan of Care  Goal: Plan of Care Review  Outcome: Ongoing, Progressing  Plan of care reviewed with pt. MD came to bedside, all questions and concerns addressed. [facility]     Problem: Diabetes Comorbidity  Goal: Blood Glucose Level Within Desired Range  Outcome: Ongoing, Progressing   BG monitoring, voices understanding of procedure.    Problem: Fall Injury Risk  Goal: Absence of Fall and Fall-Related Injury  Outcome: Ongoing, Progressing  Compliant with all fall precautions. Remains free of falls or injuries. TM

## 2020-05-22 NOTE — ANESTHESIA PREPROCEDURE EVALUATION
[facility]  Anesthesia Pre-Operative Evaluation         Patient Name: Cecilia Braahona  YOB: 1950  MRN: [id number]    SUBJECTIVE:     Pre-operative evaluation for Procedure(s) (LRB):  CORONARY ARTERY BYPASS GRAFT (CABG) (N/A)     05/22/2020    Cecilia Barahona is a 70 y.o. female w/ a significant PMHx of HTN, severe obesity, NIDDM2 7.1 Hba1c 7.1%, GERD, HLD, CAD. Admitted to [facility] 12/2019 for chest pain/ACS workup- underwent SPECT which was unremarkable. Now with worsening chest pain for 4 days found to have multivessel disease on [facility] and transferred to [facility] [place]. Nl EF%.     LHC:   Left Anterior Descending   Prox LAD lesion is 30% stenosed.   Mid LAD-1 lesion is 70% stenosed.   Mid LAD-2 lesion is 80% stenosed.   Left Circumflex   Dist Cx lesion is 90% stenosed.   Second Obtuse Marginal Branch   Ost 2nd Mrg lesion is 90% stenosed.   Third Obtuse Marginal Branch   Ost 3rd Mrg lesion is 95% stenosed.   Right Coronary Artery   Prox RCA to Dist RCA lesion is 100% stenosed.   Right Posterior Atrioventricular Branch   Collaterals   Post Atrio filled by collaterals from Dist Cx.     Patient now presents for the above procedure(s).    LDA:        Peripheral IV - Single Lumen 05/18/20 2305 20 G Left Forearm (Active)   Site Assessment Clean;Dry;Intact;No redness;No swelling 5/22/2020  7:50 AM   Line Status Flushed;Saline locked 5/22/2020  7:50 AM   Dressing Status Clean;Dry;Intact 5/22/2020  7:50 AM   Dressing Intervention Integrity maintained 5/22/2020  7:50 AM   Dressing Change Due 05/22/20 5/22/2020  7:50 AM   Site Change Due 05/22/20 5/22/2020  7:50 AM   Reason Not Rotated Not due 5/22/2020  7:50 AM   Number of days: 3       Prev airway: None documented.    Drips: None documented.      Patient Active Problem List   Diagnosis    Essential hypertension    Severe obesity (BMI 35.0-39.9) with comorbidity    Glaucoma NEC    Type 2 diabetes, uncontrolled, with neuropathy    Mixed  hyperlipidemia    Cervical spondylosis    Trigger ring finger of right hand    Long-term insulin use    Type 2 diabetes mellitus without complication, with long-term current use of insulin    Gastroesophageal reflux disease without esophagitis    History of positive PPD - treated - remote past    Carpal tunnel syndrome of left wrist    Incomplete bladder emptying    Restless legs syndrome (RLS)    Impaired mobility    Calcific tendinitis of left shoulder    Greater trochanteric bursitis of right hip    Refractive error    Primary open-angle glaucoma, bilateral, moderate stage    Chronic constipation    Mild major depression    Chest discomfort    Screening for colon cancer    Coronary artery disease    Chest pain    Bilateral carotid artery stenosis       Review of patient's allergies indicates:   Allergen Reactions    Ace inhibitors Other (See Comments)     cough    Invokana [canagliflozin] Other (See Comments)     Throat and tongue swelling    Ozempic [semaglutide]      Nausea and constipation on 0.25 mg dose.        Current Inpatient Medications:   amLODIPine  10 mg Oral Daily    aspirin  81 mg Oral Daily    atorvastatin  80 mg Oral QHS    ezetimibe  10 mg Oral QHS    FLUoxetine  20 mg Oral Daily    heparin (porcine)  5,000 Units Subcutaneous Q8H    insulin detemir U-100  21 Units Subcutaneous Daily    latanoprost  1 drop Both Eyes QHS    metoprolol tartrate  25 mg Oral BID    pantoprazole  40 mg Oral Daily    rOPINIRole  0.5 mg Oral Nightly       No current facility-administered medications on file prior to encounter.      Current Outpatient Medications on File Prior to Encounter   Medication Sig Dispense Refill    alcohol swabs PadM Apply 1 each topically 3 (three) times daily. 400 each 2    amLODIPine (NORVASC) 10 MG tablet TAKE ONE TABLET BY MOUTH EVERY DAY 30 tablet 5    artificial tear ointment (ARTIFICIAL TEARS) Oint Place into both eyes every evening. 305 g 1     aspirin 81 MG Chew Take by mouth once daily. 1 tablet, chewable Oral Every day      atorvastatin (LIPITOR) 40 MG tablet Take 1 tablet (40 mg total) by mouth every evening. For cholesterol. 90 tablet 0    azelastine (OPTIVAR) 0.05 % ophthalmic solution INSTILL 1 DROP IN EACH EYE TWICE DAILY 12 mL 0    chlorthalidone (HYGROTEN) 25 MG Tab TAKE ONE TABLET BY MOUTH EVERY DAY 90 tablet 2    dulaglutide (TRULICITY) 1.5 mg/0.5 mL PnIj Inject 1.5 mg into the skin every 7 days. 4 Syringe 5    empagliflozin (JARDIANCE) 10 mg Tab Take 10 mg by mouth once daily. 30 tablet 3    flash glucose scanning reader (FREESTYLE ANAMARIA READER) Misc 1 Units by Misc.(Non-Drug; Combo Route) route before meals as needed. 1 each 11    fluticasone (FLONASE) 50 mcg/actuation nasal spray 1 spray (50 mcg total) by Each Nare route once daily. 16 g 5    insulin degludec (TRESIBA FLEXTOUCH U-200) 200 unit/mL (3 mL) InPn Inject 36 Units into the skin once daily. Inject 36 units once daily (Patient taking differently: Inject 30 Units into the skin once daily. Inject 36 units once daily) 6 Syringe 1    isosorbide mononitrate (IMDUR) 30 MG 24 hr tablet Take 1 tablet (30 mg total) by mouth once daily. 90 tablet 3    latanoprost 0.005 % ophthalmic solution PLACE 1 DROP IN EACH EYE EVERY EVENING 2.5 mL 0    loratadine (CLARITIN) 10 mg tablet TAKE ONE TABLET BY MOUTH EVERY DAY AS NEEDED FOR ALLERGIES 30 tablet 2    metFORMIN (GLUCOPHAGE) 1000 MG tablet TAKE ONE TABLET BY MOUTH TWICE DAILY WITH MEALS 180 tablet 2    multivitamin with minerals tablet Take 1 tablet by mouth once daily.      naproxen (NAPROSYN) 500 MG tablet Take 500 mg by mouth 2 (two) times daily as needed.  1    rOPINIRole (REQUIP) 0.5 MG tablet TAKE ONE Tablet BY MOUTH EVERY NIGHT AT BEDTIME 90 tablet 0    albuterol 90 mcg/actuation inhaler Inhale 2 puffs into the lungs every 6 (six) hours as needed for Wheezing or Shortness of Breath. Rescue 1 Inhaler 0    blood pressure  "monitor (BLOOD PRESSURE KIT) Kit 1 kit by Misc.(Non-Drug; Combo Route) route once daily. 1 each 0    blood-glucose meter,continuous (DEXCOM G6 ) Misc Use with dexcom G6 system 1 each 0    blood-glucose sensor (DEXCOM G6 SENSOR) Vanna Change sensor every 10 days 3 Device 12    blood-glucose transmitter (DEXCOM G6 TRANSMITTER) Vanna Change every 3 months 1 Device 3    desoximetasone (TOPICORT) 0.05 % cream Apply topically 2 (two) times daily as needed. 100 g 2    FLUoxetine 20 MG capsule Take 1 capsule (20 mg total) by mouth once daily. 90 capsule 0    FREESTYLE ANAMARIA 14 DAY SENSOR Kit CHANGE SENSOR EVERY 14 DAYS 2 kit 11    lancets Misc Check blood glucose 4 times a day. Dispense accuchek meter or other meter preferred by insurance. Dx code e11.65 400 each 3    LANCING DEVICE WITH LANCETS Misc USE AS DIRECTED FOR DIABETIC TESTING 1 each 0    pen needle, diabetic (BD ULTRA-FINE LENY PEN NEEDLES) 32 gauge x 5/32" Ndle USE FIVE TIMES DAILY 100 each 5    valsartan (DIOVAN) 160 MG tablet Take 2 tablets (320 mg total) by mouth once daily. (Patient taking differently: Take 320 mg by mouth 2 (two) times daily. ) 180 tablet 1       Past Surgical History:   Procedure Laterality Date    CARPAL TUNNEL RELEASE       SECTION, CLASSIC      COLONOSCOPY N/A 3/5/2020    Procedure: COLONOSCOPY;  Surgeon: Wiliam Carrillo MD;  Location: [facility] ENDO [address]);  Service: Endoscopy;  Laterality: N/A;  20 appt confirmed-rb  pt requested this date    LEFT HEART CATHETERIZATION N/A 2020    Procedure: HEART CATH-LEFT;  Surgeon: Etelvina Lowery MD;  Location: [facility] CATH LAB;  Service: Cardiology;  Laterality: N/A;    TOTAL ABDOMINAL HYSTERECTOMY      TRIGGER FINGER RELEASE         Social History     Socioeconomic History    Marital status:      Spouse name: Yuniel    Number of children: 3    Years of education: Not on file    Highest education level: Not on file   Occupational History    " Occupation: Retired   Social Needs    Financial resource strain: Somewhat hard    Food insecurity:     Worry: Never true     Inability: Sometimes true    Transportation needs:     Medical: No     Non-medical: No   Tobacco Use    Smoking status: Former Smoker     Packs/day: 0.50     Types: Cigarettes     Last attempt to quit: 11/10/2012     Years since quittin.5    Smokeless tobacco: Never Used   Substance and Sexual Activity    Alcohol use: Not Currently     Comment: drank socially in the past    Drug use: Not Currently     Types: Marijuana     Comment: tried once 6 months ago    Sexual activity: Not Currently     Partners: Male   Lifestyle    Physical activity:     Days per week: Not on file     Minutes per session: Not on file    Stress: Not at all   Relationships    Social connections:     Talks on phone: Not on file     Gets together: Not on file     Attends Yarsanism service: Not on file     Active member of club or organization: Not on file     Attends meetings of clubs or organizations: Not on file     Relationship status: Not on file   Other Topics Concern    Patient feels they ought to cut down on drinking/drug use No    Patient annoyed by others criticizing their drinking/drug use No    Patient has felt bad or guilty about drinking/drug use No    Patient has had a drink/used drugs as an eye opener in the AM No   Social History Narrative     x 1.    Has 3 grown children.    Lives with spouse.       OBJECTIVE:     Vital Signs Range (Last 24H):  Temp:  [36.7 °C (98 °F)-37.2 °C (99 °F)]   Pulse:  [52-74]   Resp:  [16-19]   BP: (102-145)/(51-77)   SpO2:  [90 %-98 %]       Significant Labs:  Lab Results   Component Value Date    WBC 8.99 2020    HGB 11.5 (L) 2020    HCT 37.3 2020     2020    CHOL 204 (H) 2020    TRIG 171 (H) 2020    HDL 41 2020    ALT 18 2020    AST 19 2020     2020    K 4.0 2020      05/22/2020    CREATININE 0.8 05/22/2020    BUN 16 05/22/2020    CO2 26 05/22/2020    TSH 2.177 09/26/2019    INR 1.0 08/07/2014    HGBA1C 7.1 (H) 05/19/2020       Diagnostic Studies: No relevant studies.    EKG:   Results for orders placed or performed during the hospital encounter of 05/18/20   EKG 12-lead    Collection Time: 05/18/20 10:39 PM    Narrative    Test Reason : R07.9,    Vent. Rate : 064 BPM     Atrial Rate : 064 BPM     P-R Int : 184 ms          QRS Dur : 070 ms      QT Int : 370 ms       P-R-T Axes : 000 -28 023 degrees     QTc Int : 381 ms    Normal sinus rhythm  Inferior infarct ,age undetermined  Anterior infarct (cited on or before 27-JAN-2020)  Abnormal ECG  Confirmed by Darío Schneider MD ([id number]) on 5/19/2020 3:58:26 PM    Referred By: [email or web address]   SELF           Confirmed By:Darío Schneider MD       ECHOCARDIOGRAM:  TTE:  Results for orders placed or performed during the hospital encounter of 05/18/20   Echo Color Flow Doppler? Yes   Result Value Ref Range    Ascending aorta 2.74 cm    STJ 2.89 cm    AV mean gradient 8 mmHg    Ao peak chidi 1.81 m/s    Ao VTI 35.01 cm    IVS 0.89 0.6 - 1.1 cm    LA size 3.74 cm    Left Atrium Major Axis 4.99 cm    Left Atrium Minor Axis 4.59 cm    LVIDD 4.54 3.5 - 6.0 cm    LVIDS 2.65 2.1 - 4.0 cm    LVOT diameter 2.19 cm    LVOT peak VTI 23.88 cm    PW 0.89 0.6 - 1.1 cm    MV Peak A Chidi 1.20 m/s    E wave decelartion time 220.29 msec    MV Peak E Chidi 0.83 m/s    PV Peak D Chidi 0.45 m/s    PV Peak S Chidi 0.64 m/s    RA Major Axis 4.91 cm    Sinus 3.49 cm    TAPSE 2.57 cm    TR Max Chidi 1.31 m/s    LA WIDTH 4.39 cm    PV PEAK VELOCITY 0.90 cm/s    LV Diastolic Volume 94.41 mL    LV Systolic Volume 25.73 mL    LVOT peak chidi 1.08 m/s    FS 42 %    LA volume 66.73 cm3    LV mass 132.77 g    Left Ventricle Relative Wall Thickness 0.39 cm    AV valve area 2.57 cm2    AV Velocity Ratio 0.60     AV index (prosthetic) 0.68     E/A ratio 0.69     Pulm vein S/D ratio 1.42      LVOT area 3.8 cm2    LVOT stroke volume 89.91 cm3    AV peak gradient 13 mmHg    LV Systolic Volume Index 13.6 mL/m2    LV Diastolic Volume Index 50.01 mL/m2    LA Volume Index 35.3 mL/m2    LV Mass Index 70 g/m2    Triscuspid Valve Regurgitation Peak Gradient 7 mmHg    BSA 1.95 m2    TDI SEPTAL 0.07 m/s    LV LATERAL E/E' RATIO 10.38 m/s    LV SEPTAL E/E' RATIO 11.86 m/s    TDI LATERAL 0.08 m/s    MV valve area p 1/2 method 3.44 cm2    Mean e' 0.08 m/s    E/E' ratio 11.07 m/s    MV stenosis pressure 1/2 time 64 ms    Right Atrial Pressure (from IVC) 3 mmHg    TV rest pulmonary artery pressure 10 mmHg    Narrative    · Normal left ventricular systolic function. The estimated ejection   fraction is 75%.  · Normal right ventricular systolic function.  · Grade I (mild) left ventricular diastolic dysfunction consistent with   impaired relaxation.  · Mild left atrial enlargement.  · Mild aortic valve sclerosis.  · Moderate mitral sclerosis.  · The estimated PA systolic pressure is 10 mmHg.  · Normal central venous pressure (3 mmHg).          ASSESSMENT/PLAN:         Anesthesia Evaluation    I have reviewed the Patient Summary Reports.    I have reviewed the Nursing Notes.   I have reviewed the Medications.     Review of Systems  Anesthesia Hx:  No problems with previous Anesthesia    Hematology/Oncology:  Hematology Normal   Oncology Normal     EENT/Dental:EENT/Dental Normal   Cardiovascular:   Hypertension CAD      Pulmonary:   Sleep Apnea    Renal/:   Chronic Renal Disease    Hepatic/GI:   GERD    Neurological:   CVA Neuromuscular Disease,    Endocrine:   Diabetes, type 2    Dermatological:  Skin Normal    Psych:   Psychiatric History          Physical Exam  General:  Well nourished    Airway/Jaw/Neck:  Airway Findings: Mouth Opening: Normal General Airway Assessment: Adult  Mallampati: III  TM Distance: Normal, at least 6 cm  Jaw/Neck Findings:     Neck ROM: Normal ROM      Dental:  Dental Findings: In tact    Chest/Lungs:  Chest/Lungs Findings: Clear to auscultation     Heart/Vascular:  Heart Findings: Rate: Normal  Rhythm: Regular Rhythm  Sounds: Normal        Mental Status:  Mental Status Findings:  Cooperative, Alert and Oriented         Anesthesia Plan  Type of Anesthesia, risks & benefits discussed:  Anesthesia Type:  general  Patient's Preference:   Intra-op Monitoring Plan: standard ASA monitors, arterial line and central line  Intra-op Monitoring Plan Comments:   Post Op Pain Control Plan: multimodal analgesia and IV/PO Opioids PRN  Post Op Pain Control Plan Comments:   Induction:   IV  Beta Blocker:  Patient is on a Beta-Blocker and has received one dose within the past 24 hours (No further documentation required).       Informed Consent: Patient understands risks and agrees with Anesthesia plan.  Questions answered. Anesthesia consent signed with patient.  ASA Score: 4     Day of Surgery Review of History & Physical:    H&P update referred to the surgeon.         Ready For Surgery From Anesthesia Perspective.

## 2020-05-22 NOTE — ASSESSMENT & PLAN NOTE
- Triple vessel disease  - On aspirin. Hold Plavix for now for possible CABG  - Continue metoprolol 25 mg BID and IMDUR 90 mg daily

## 2020-05-22 NOTE — PLAN OF CARE
Plan of care discussed with patient. Patient is free of fall/trauma/injury. Denies CP, SOB, or pain/discomfort. All questions addressed. BG monitored per order. US of carotid. IV Mg administered per order. Plan for CABG 5/25. Will continue to monitor

## 2020-05-23 LAB
ANION GAP SERPL CALC-SCNC: 8 MMOL/L (ref 8–16)
BASOPHILS # BLD AUTO: 0.02 K/UL (ref 0–0.2)
BASOPHILS NFR BLD: 0.2 % (ref 0–1.9)
BUN SERPL-MCNC: 17 MG/DL (ref 8–23)
CALCIUM SERPL-MCNC: 8.8 MG/DL (ref 8.7–10.5)
CHLORIDE SERPL-SCNC: 104 MMOL/L (ref 95–110)
CO2 SERPL-SCNC: 29 MMOL/L (ref 23–29)
CREAT SERPL-MCNC: 0.9 MG/DL (ref 0.5–1.4)
DIFFERENTIAL METHOD: ABNORMAL
EOSINOPHIL # BLD AUTO: 0.2 K/UL (ref 0–0.5)
EOSINOPHIL NFR BLD: 2.1 % (ref 0–8)
ERYTHROCYTE [DISTWIDTH] IN BLOOD BY AUTOMATED COUNT: 14.1 % (ref 11.5–14.5)
EST. GFR  (AFRICAN AMERICAN): >60 ML/MIN/1.73 M^2
EST. GFR  (NON AFRICAN AMERICAN): >60 ML/MIN/1.73 M^2
GLUCOSE SERPL-MCNC: 92 MG/DL (ref 70–110)
HCT VFR BLD AUTO: 36.1 % (ref 37–48.5)
HGB BLD-MCNC: 11.3 G/DL (ref 12–16)
IMM GRANULOCYTES # BLD AUTO: 0.02 K/UL (ref 0–0.04)
IMM GRANULOCYTES NFR BLD AUTO: 0.2 % (ref 0–0.5)
LYMPHOCYTES # BLD AUTO: 3.3 K/UL (ref 1–4.8)
LYMPHOCYTES NFR BLD: 38.7 % (ref 18–48)
MAGNESIUM SERPL-MCNC: 2 MG/DL (ref 1.6–2.6)
MCH RBC QN AUTO: 29.5 PG (ref 27–31)
MCHC RBC AUTO-ENTMCNC: 31.3 G/DL (ref 32–36)
MCV RBC AUTO: 94 FL (ref 82–98)
MONOCYTES # BLD AUTO: 0.7 K/UL (ref 0.3–1)
MONOCYTES NFR BLD: 8.1 % (ref 4–15)
NEUTROPHILS # BLD AUTO: 4.4 K/UL (ref 1.8–7.7)
NEUTROPHILS NFR BLD: 50.7 % (ref 38–73)
NRBC BLD-RTO: 0 /100 WBC
PLATELET # BLD AUTO: 274 K/UL (ref 150–350)
PMV BLD AUTO: 9.9 FL (ref 9.2–12.9)
POCT GLUCOSE: 127 MG/DL (ref 70–110)
POCT GLUCOSE: 141 MG/DL (ref 70–110)
POTASSIUM SERPL-SCNC: 3.5 MMOL/L (ref 3.5–5.1)
RBC # BLD AUTO: 3.83 M/UL (ref 4–5.4)
SODIUM SERPL-SCNC: 141 MMOL/L (ref 136–145)
WBC # BLD AUTO: 8.63 K/UL (ref 3.9–12.7)

## 2020-05-23 PROCEDURE — 99232 SBSQ HOSP IP/OBS MODERATE 35: CPT | Mod: HCNC,,, | Performed by: INTERNAL MEDICINE

## 2020-05-23 PROCEDURE — 25000003 PHARM REV CODE 250: Mod: HCNC | Performed by: INTERNAL MEDICINE

## 2020-05-23 PROCEDURE — 36415 COLL VENOUS BLD VENIPUNCTURE: CPT | Mod: HCNC

## 2020-05-23 PROCEDURE — 20600001 HC STEP DOWN PRIVATE ROOM: Mod: HCNC

## 2020-05-23 PROCEDURE — 25000003 PHARM REV CODE 250: Mod: HCNC | Performed by: STUDENT IN AN ORGANIZED HEALTH CARE EDUCATION/TRAINING PROGRAM

## 2020-05-23 PROCEDURE — 63600175 PHARM REV CODE 636 W HCPCS: Mod: HCNC | Performed by: INTERNAL MEDICINE

## 2020-05-23 PROCEDURE — 99232 PR SUBSEQUENT HOSPITAL CARE,LEVL II: ICD-10-PCS | Mod: HCNC,,, | Performed by: INTERNAL MEDICINE

## 2020-05-23 PROCEDURE — 85025 COMPLETE CBC W/AUTO DIFF WBC: CPT | Mod: HCNC

## 2020-05-23 PROCEDURE — 83735 ASSAY OF MAGNESIUM: CPT | Mod: HCNC

## 2020-05-23 PROCEDURE — 80048 BASIC METABOLIC PNL TOTAL CA: CPT | Mod: HCNC

## 2020-05-23 PROCEDURE — 94761 N-INVAS EAR/PLS OXIMETRY MLT: CPT | Mod: HCNC

## 2020-05-23 RX ORDER — AMOXICILLIN 250 MG
1 CAPSULE ORAL DAILY
Status: DISCONTINUED | OUTPATIENT
Start: 2020-05-24 | End: 2020-05-25

## 2020-05-23 RX ORDER — LACTULOSE 10 G/15ML
10 SOLUTION ORAL ONCE
Status: COMPLETED | OUTPATIENT
Start: 2020-05-23 | End: 2020-05-23

## 2020-05-23 RX ORDER — POTASSIUM CHLORIDE 20 MEQ/1
40 TABLET, EXTENDED RELEASE ORAL ONCE
Status: COMPLETED | OUTPATIENT
Start: 2020-05-23 | End: 2020-05-23

## 2020-05-23 RX ORDER — METOPROLOL TARTRATE 25 MG/1
12.5 TABLET ORAL 2 TIMES DAILY
Status: DISCONTINUED | OUTPATIENT
Start: 2020-05-24 | End: 2020-05-25

## 2020-05-23 RX ORDER — POLYETHYLENE GLYCOL 3350 17 G/17G
17 POWDER, FOR SOLUTION ORAL DAILY
Status: DISCONTINUED | OUTPATIENT
Start: 2020-05-23 | End: 2020-05-25

## 2020-05-23 RX ADMIN — AMLODIPINE BESYLATE 10 MG: 10 TABLET ORAL at 08:05

## 2020-05-23 RX ADMIN — HEPARIN SODIUM 5000 UNITS: 5000 INJECTION INTRAVENOUS; SUBCUTANEOUS at 02:05

## 2020-05-23 RX ADMIN — LACTULOSE 10 G: 20 SOLUTION ORAL at 10:05

## 2020-05-23 RX ADMIN — LATANOPROST 1 DROP: 50 SOLUTION OPHTHALMIC at 08:05

## 2020-05-23 RX ADMIN — Medication 6 MG: at 08:05

## 2020-05-23 RX ADMIN — EZETIMIBE 10 MG: 10 TABLET ORAL at 08:05

## 2020-05-23 RX ADMIN — INSULIN DETEMIR 21 UNITS: 100 INJECTION, SOLUTION SUBCUTANEOUS at 08:05

## 2020-05-23 RX ADMIN — HEPARIN SODIUM 5000 UNITS: 5000 INJECTION INTRAVENOUS; SUBCUTANEOUS at 05:05

## 2020-05-23 RX ADMIN — SENNOSIDES 8.6 MG: 8.6 TABLET, FILM COATED ORAL at 08:05

## 2020-05-23 RX ADMIN — PANTOPRAZOLE SODIUM 40 MG: 40 TABLET, DELAYED RELEASE ORAL at 08:05

## 2020-05-23 RX ADMIN — POLYETHYLENE GLYCOL 3350 17 G: 17 POWDER, FOR SOLUTION ORAL at 08:05

## 2020-05-23 RX ADMIN — POTASSIUM CHLORIDE 40 MEQ: 1500 TABLET, EXTENDED RELEASE ORAL at 08:05

## 2020-05-23 RX ADMIN — HEPARIN SODIUM 5000 UNITS: 5000 INJECTION INTRAVENOUS; SUBCUTANEOUS at 09:05

## 2020-05-23 RX ADMIN — METOPROLOL TARTRATE 25 MG: 25 TABLET, FILM COATED ORAL at 08:05

## 2020-05-23 RX ADMIN — ROPINIROLE HYDROCHLORIDE 0.5 MG: 0.25 TABLET, FILM COATED ORAL at 08:05

## 2020-05-23 RX ADMIN — FLUOXETINE 20 MG: 20 CAPSULE ORAL at 08:05

## 2020-05-23 RX ADMIN — ATORVASTATIN CALCIUM 80 MG: 20 TABLET, FILM COATED ORAL at 08:05

## 2020-05-23 RX ADMIN — ASPIRIN 81 MG: 81 TABLET, COATED ORAL at 08:05

## 2020-05-23 NOTE — PLAN OF CARE
Problem: Adult Inpatient Plan of Care  Goal: Plan of Care Review  Outcome: Ongoing, Progressing  Plan of care reviewed with pt. MD came to bedside, all questions and concerns addressed. [facility]     Problem: Diabetes Comorbidity  Goal: Blood Glucose Level Within Desired Range  Outcome: Ongoing, Progressing   BG monitoring, voices understanding of procedure.     Problem: Fall Injury Risk  Goal: Absence of Fall and Fall-Related Injury  Outcome: Ongoing, Progressing  Compliant with all fall precautions. Remains free of falls or injuries. TM

## 2020-05-23 NOTE — SUBJECTIVE & OBJECTIVE
Interval History: Yesterday, patient met with CTS team to discuss CABG and timing of the surgery next week. She had carotid U/S done. This AM, she has no complaints.     Review of Systems   Constitution: Negative for chills and fever.   HENT: Negative for congestion.    Eyes: Negative for blurred vision.   Cardiovascular: Negative for chest pain, dyspnea on exertion, irregular heartbeat and leg swelling.   Respiratory: Negative for cough and shortness of breath.    Gastrointestinal: Negative for abdominal pain, nausea and vomiting.   Genitourinary: Negative for dysuria.   Neurological: Negative for dizziness, focal weakness, headaches, light-headedness and weakness.     Objective:     Vital Signs (Most Recent):  Temp: 98 °F (36.7 °C) (05/23/20 0726)  Pulse: (!) 53 (05/23/20 0749)  Resp: 18 (05/23/20 0726)  BP: 127/63 (05/23/20 0726)  SpO2: (!) 92 % (05/23/20 0726) Vital Signs (24h Range):  Temp:  [96 °F (35.6 °C)-98.6 °F (37 °C)] 98 °F (36.7 °C)  Pulse:  [52-70] 53  Resp:  [18] 18  SpO2:  [92 %-99 %] 92 %  BP: (102-136)/(51-64) 127/63     Weight: 87.6 kg (193 lb 2 oz)  Body mass index is 34.21 kg/m².     SpO2: (!) 92 %  O2 Device (Oxygen Therapy): room air      Intake/Output Summary (Last 24 hours) at 5/23/2020 0801  Last data filed at 5/23/2020 0400  Gross per 24 hour   Intake 720 ml   Output --   Net 720 ml       Lines/Drains/Airways     Peripheral Intravenous Line                 Peripheral IV - Single Lumen 05/18/20 2305 20 G Left Forearm 4 days                Physical Exam   Constitutional: She is oriented to person, place, and time. She appears well-developed and well-nourished. No distress.   HENT:   Mouth/Throat: Oropharynx is clear and moist.   Eyes: Pupils are equal, round, and reactive to light.   Neck: No JVD present.   Cardiovascular: Regular rhythm, normal heart sounds and intact distal pulses.   bradycardic   Pulmonary/Chest: Effort normal and breath sounds normal.   Abdominal: Soft.   Neurological:  She is alert and oriented to person, place, and time.   Skin: Skin is warm and dry. She is not diaphoretic.   Psychiatric: She has a normal mood and affect. Her behavior is normal.       Significant Labs: All pertinent lab results from the last 24 hours have been reviewed.    Significant Imaging: Echocardiogram: 2D echo with color flow doppler: No results found for this or any previous visit.

## 2020-05-23 NOTE — PROGRESS NOTES
[facility]  Cardiology  Progress Note    Patient Name: Cecilia Barahona  MRN: [id number]  Admission Date: 5/18/2020  Hospital Length of Stay: 3 days  Code Status: Full Code   Attending Physician: Yan Su MD   Primary Care Physician: Yudi Smart MD  Expected Discharge Date: 5/29/2020  Principal Problem:Coronary artery disease    Subjective:     Interval History: Yesterday, patient met with CTS team to discuss CABG and timing of the surgery next week. She had carotid U/S done (right ICA stenosis 1-39% and left ICA stenosis 1-39%). This AM, she has no complaints.     Review of Systems   Constitution: Negative for chills and fever.   HENT: Negative for congestion.    Eyes: Negative for blurred vision.   Cardiovascular: Negative for chest pain, dyspnea on exertion, irregular heartbeat and leg swelling.   Respiratory: Negative for cough and shortness of breath.    Gastrointestinal: Negative for abdominal pain, nausea and vomiting.   Genitourinary: Negative for dysuria.   Neurological: Negative for dizziness, focal weakness, headaches, light-headedness and weakness.     Objective:     Vital Signs (Most Recent):  Temp: 98 °F (36.7 °C) (05/23/20 0726)  Pulse: (!) 53 (05/23/20 0749)  Resp: 18 (05/23/20 0726)  BP: 127/63 (05/23/20 0726)  SpO2: (!) 92 % (05/23/20 0726) Vital Signs (24h Range):  Temp:  [96 °F (35.6 °C)-98.6 °F (37 °C)] 98 °F (36.7 °C)  Pulse:  [52-70] 53  Resp:  [18] 18  SpO2:  [92 %-99 %] 92 %  BP: (102-136)/(51-64) 127/63     Weight: 87.6 kg (193 lb 2 oz)  Body mass index is 34.21 kg/m².     SpO2: (!) 92 %  O2 Device (Oxygen Therapy): room air      Intake/Output Summary (Last 24 hours) at 5/23/2020 0801  Last data filed at 5/23/2020 0400  Gross per 24 hour   Intake 720 ml   Output --   Net 720 ml       Lines/Drains/Airways     Peripheral Intravenous Line                 Peripheral IV - Single Lumen 05/18/20 2305 20 G Left Forearm 4 days                Physical Exam    Constitutional: She is oriented to person, place, and time. She appears well-developed and well-nourished. No distress.   HENT:   Mouth/Throat: Oropharynx is clear and moist.   Eyes: Pupils are equal, round, and reactive to light.   Neck: No JVD present.   Cardiovascular: Regular rhythm, normal heart sounds and intact distal pulses.   bradycardic   Pulmonary/Chest: Effort normal and breath sounds normal.   Abdominal: Soft.   Neurological: She is alert and oriented to person, place, and time.   Skin: Skin is warm and dry. She is not diaphoretic.   Psychiatric: She has a normal mood and affect. Her behavior is normal.       Significant Labs: All pertinent lab results from the last 24 hours have been reviewed.    Significant Imaging:     TTE (5/19/20):   · Normal left ventricular systolic function. The estimated ejection fraction is 75%.  · Normal right ventricular systolic function.  · Grade I (mild) left ventricular diastolic dysfunction consistent with impaired relaxation.  · Mild left atrial enlargement.  · Mild aortic valve sclerosis.  · Moderate mitral sclerosis.  · The estimated PA systolic pressure is 10 mmHg.  · Normal central venous pressure (3 mmHg).     Coronary Angiogram (5/20/20):   Left Anterior Descending   Prox LAD lesion is 30% stenosed.   Mid LAD-1 lesion is 70% stenosed.   Mid LAD-2 lesion is 80% stenosed.   Left Circumflex   Dist Cx lesion is 90% stenosed.   Second Obtuse Marginal Branch   Ost 2nd Mrg lesion is 90% stenosed.   Third Obtuse Marginal Branch   Ost 3rd Mrg lesion is 95% stenosed.   Right Coronary Artery   Prox RCA to Dist RCA lesion is 100% stenosed.   Right Posterior Atrioventricular Branch   Collaterals   Post Atrio filled by collaterals from Dist Cx.     Assessment and Plan:     * Coronary artery disease  · Admitted for unstable angina, s/p coronary angiogram and found to have multivessel disease  · Seen by CTS for CABG evaluation and excellent candidate (scheduled date for CABG  is 5/25/20); further workup pending including chest CT. Carotid U/S completed.   · Continue daily ASA, Atorvastatin 80 mg QD, Ezetimibe 10 mg QD, Metoprolol Tartrate 25 mg BID  · Holding Valsartan and Imdur per CTS     Mild major depression  - Continue fluoxetine     Restless legs syndrome (RLS)  - Continue ropinirole     Type 2 diabetes mellitus without complication, with long-term current use of insulin  -A1C 7%, POCT BG ranging   -On determir 21 units here (uses 30 units at home)  - Insulin sliding scale with meals; on diabetic cardiac diet      Mixed hyperlipidemia  - On atorvastatin 80 mg daily. Started on ezetimibe recently due to LDL not in target.   - Chol 204, , HDL 41 - 5/19/20     Essential hypertension  - Continue amlodipine 10 mg daily    Virginia Vogt MD  Cardiology Fellow, PGY-5   [facility]

## 2020-05-23 NOTE — PLAN OF CARE
Plan of care discussed with patient. Patient is free of fall/trauma/injury. Denies CP, SOB, or pain/discomfort. BG monitored per order. All questions addressed. Will continue to monitor

## 2020-05-24 LAB
ABO + RH BLD: NORMAL
ANION GAP SERPL CALC-SCNC: 7 MMOL/L (ref 8–16)
BASOPHILS # BLD AUTO: 0.02 K/UL (ref 0–0.2)
BASOPHILS NFR BLD: 0.3 % (ref 0–1.9)
BLD GP AB SCN CELLS X3 SERPL QL: NORMAL
BUN SERPL-MCNC: 12 MG/DL (ref 8–23)
CALCIUM SERPL-MCNC: 9.3 MG/DL (ref 8.7–10.5)
CHLORIDE SERPL-SCNC: 103 MMOL/L (ref 95–110)
CO2 SERPL-SCNC: 29 MMOL/L (ref 23–29)
CREAT SERPL-MCNC: 0.9 MG/DL (ref 0.5–1.4)
DIFFERENTIAL METHOD: ABNORMAL
EOSINOPHIL # BLD AUTO: 0.2 K/UL (ref 0–0.5)
EOSINOPHIL NFR BLD: 2.3 % (ref 0–8)
ERYTHROCYTE [DISTWIDTH] IN BLOOD BY AUTOMATED COUNT: 14 % (ref 11.5–14.5)
EST. GFR  (AFRICAN AMERICAN): >60 ML/MIN/1.73 M^2
EST. GFR  (NON AFRICAN AMERICAN): >60 ML/MIN/1.73 M^2
GLUCOSE SERPL-MCNC: 116 MG/DL (ref 70–110)
HCT VFR BLD AUTO: 38.4 % (ref 37–48.5)
HGB BLD-MCNC: 11.9 G/DL (ref 12–16)
IMM GRANULOCYTES # BLD AUTO: 0.02 K/UL (ref 0–0.04)
IMM GRANULOCYTES NFR BLD AUTO: 0.3 % (ref 0–0.5)
LYMPHOCYTES # BLD AUTO: 2.4 K/UL (ref 1–4.8)
LYMPHOCYTES NFR BLD: 33.8 % (ref 18–48)
MAGNESIUM SERPL-MCNC: 1.9 MG/DL (ref 1.6–2.6)
MCH RBC QN AUTO: 29.2 PG (ref 27–31)
MCHC RBC AUTO-ENTMCNC: 31 G/DL (ref 32–36)
MCV RBC AUTO: 94 FL (ref 82–98)
MONOCYTES # BLD AUTO: 0.7 K/UL (ref 0.3–1)
MONOCYTES NFR BLD: 9.3 % (ref 4–15)
NEUTROPHILS # BLD AUTO: 3.8 K/UL (ref 1.8–7.7)
NEUTROPHILS NFR BLD: 54 % (ref 38–73)
NRBC BLD-RTO: 0 /100 WBC
PLATELET # BLD AUTO: 311 K/UL (ref 150–350)
PMV BLD AUTO: 10.1 FL (ref 9.2–12.9)
POCT GLUCOSE: 123 MG/DL (ref 70–110)
POCT GLUCOSE: 123 MG/DL (ref 70–110)
POCT GLUCOSE: 135 MG/DL (ref 70–110)
POCT GLUCOSE: 155 MG/DL (ref 70–110)
POCT GLUCOSE: 158 MG/DL (ref 70–110)
POCT GLUCOSE: 213 MG/DL (ref 70–110)
POTASSIUM SERPL-SCNC: 4 MMOL/L (ref 3.5–5.1)
RBC # BLD AUTO: 4.07 M/UL (ref 4–5.4)
SODIUM SERPL-SCNC: 139 MMOL/L (ref 136–145)
WBC # BLD AUTO: 7.1 K/UL (ref 3.9–12.7)

## 2020-05-24 PROCEDURE — 63600175 PHARM REV CODE 636 W HCPCS: Mod: HCNC | Performed by: INTERNAL MEDICINE

## 2020-05-24 PROCEDURE — 25000003 PHARM REV CODE 250: Mod: HCNC | Performed by: STUDENT IN AN ORGANIZED HEALTH CARE EDUCATION/TRAINING PROGRAM

## 2020-05-24 PROCEDURE — 63600175 PHARM REV CODE 636 W HCPCS: Mod: HCNC | Performed by: PHYSICIAN ASSISTANT

## 2020-05-24 PROCEDURE — 25000003 PHARM REV CODE 250: Mod: HCNC | Performed by: INTERNAL MEDICINE

## 2020-05-24 PROCEDURE — 99232 SBSQ HOSP IP/OBS MODERATE 35: CPT | Mod: HCNC,,, | Performed by: INTERNAL MEDICINE

## 2020-05-24 PROCEDURE — 85025 COMPLETE CBC W/AUTO DIFF WBC: CPT | Mod: HCNC

## 2020-05-24 PROCEDURE — 83735 ASSAY OF MAGNESIUM: CPT | Mod: HCNC

## 2020-05-24 PROCEDURE — 86901 BLOOD TYPING SEROLOGIC RH(D): CPT | Mod: HCNC

## 2020-05-24 PROCEDURE — 20600001 HC STEP DOWN PRIVATE ROOM: Mod: HCNC

## 2020-05-24 PROCEDURE — 80048 BASIC METABOLIC PNL TOTAL CA: CPT | Mod: HCNC

## 2020-05-24 PROCEDURE — 36415 COLL VENOUS BLD VENIPUNCTURE: CPT | Mod: HCNC

## 2020-05-24 PROCEDURE — 99232 PR SUBSEQUENT HOSPITAL CARE,LEVL II: ICD-10-PCS | Mod: HCNC,,, | Performed by: INTERNAL MEDICINE

## 2020-05-24 RX ADMIN — HEPARIN SODIUM 5000 UNITS: 5000 INJECTION INTRAVENOUS; SUBCUTANEOUS at 09:05

## 2020-05-24 RX ADMIN — FLUOXETINE 20 MG: 20 CAPSULE ORAL at 08:05

## 2020-05-24 RX ADMIN — LATANOPROST 1 DROP: 50 SOLUTION OPHTHALMIC at 09:05

## 2020-05-24 RX ADMIN — AMLODIPINE BESYLATE 10 MG: 10 TABLET ORAL at 08:05

## 2020-05-24 RX ADMIN — ATORVASTATIN CALCIUM 80 MG: 20 TABLET, FILM COATED ORAL at 09:05

## 2020-05-24 RX ADMIN — INSULIN ASPART 2 UNITS: 100 INJECTION, SOLUTION INTRAVENOUS; SUBCUTANEOUS at 11:05

## 2020-05-24 RX ADMIN — EZETIMIBE 10 MG: 10 TABLET ORAL at 09:05

## 2020-05-24 RX ADMIN — Medication 6 MG: at 09:05

## 2020-05-24 RX ADMIN — INSULIN DETEMIR 21 UNITS: 100 INJECTION, SOLUTION SUBCUTANEOUS at 08:05

## 2020-05-24 RX ADMIN — PANTOPRAZOLE SODIUM 40 MG: 40 TABLET, DELAYED RELEASE ORAL at 08:05

## 2020-05-24 RX ADMIN — SENNOSIDES AND DOCUSATE SODIUM 1 TABLET: 8.6; 5 TABLET ORAL at 08:05

## 2020-05-24 RX ADMIN — METOPROLOL TARTRATE 12.5 MG: 25 TABLET, FILM COATED ORAL at 09:05

## 2020-05-24 RX ADMIN — HEPARIN SODIUM 5000 UNITS: 5000 INJECTION INTRAVENOUS; SUBCUTANEOUS at 05:05

## 2020-05-24 RX ADMIN — POLYETHYLENE GLYCOL 3350 17 G: 17 POWDER, FOR SOLUTION ORAL at 08:05

## 2020-05-24 RX ADMIN — HEPARIN SODIUM 5000 UNITS: 5000 INJECTION INTRAVENOUS; SUBCUTANEOUS at 02:05

## 2020-05-24 RX ADMIN — ASPIRIN 81 MG: 81 TABLET, COATED ORAL at 08:05

## 2020-05-24 RX ADMIN — ROPINIROLE HYDROCHLORIDE 0.5 MG: 0.25 TABLET, FILM COATED ORAL at 09:05

## 2020-05-24 NOTE — CARE UPDATE
Brief CCU Note    Called re sinus bradycardia to 50s that on chart review has persisted throughout stay. Ok w RN holding tonights dose as done yesterday, will decrease lopressor to 12.5mg bid. No BM in several days despite multiple doses miralax and senna. Made senna-docusate scheduled, gave lactulose x 1.      Luis M Short MD  PGY-4, Cardiology  Pager [phone]

## 2020-05-24 NOTE — PLAN OF CARE
Pt to have CABG performed on 5/25. Morning metoprolol held DT HR in 50s. BG monitoring cont. Pt remains free from injury/falls for shift. Educated Pt on meds no questions at this time. VSS.  No complaints of CP, SOB, pain/discomfort  Bed locked in lowest position, call bell within reach. All questions addressed.  Will continue to monitor.

## 2020-05-24 NOTE — ASSESSMENT & PLAN NOTE
- Admitted for unstable angina, s/p coronary angiogram and found to have multivessel disease  - Seen by CTS for CABG evaluation and excellent candidate (scheduled date for CABG is 5/25/20); further workup pending including chest CT. Carotid U/S completed.   - Continue daily ASA, Atorvastatin 80 mg QD, Ezetimibe 10 mg QD, Metoprolol Tartrate 25 mg BID  - Holding Valsartan and Imdur per CTS

## 2020-05-24 NOTE — ASSESSMENT & PLAN NOTE
-A1C 7%, POCT BG ranging   -On determir 21 units here (uses 30 units at home)  - Insulin sliding scale with meals; on diabetic cardiac diet

## 2020-05-24 NOTE — SUBJECTIVE & OBJECTIVE
Interval History: Patient denies any chest pain yesterday. Patient was able to ambulate throughout the unit without incident. Patient with asymptomatic bradycardia, metoprolol held.     Review of Systems   Constitution: Negative for chills and fever.   HENT: Negative for congestion.    Eyes: Negative for blurred vision.   Cardiovascular: Negative for chest pain, dyspnea on exertion, irregular heartbeat and leg swelling.   Respiratory: Negative for cough and shortness of breath.    Gastrointestinal: Negative for abdominal pain, nausea and vomiting.   Genitourinary: Negative for dysuria.   Neurological: Negative for dizziness, focal weakness, headaches, light-headedness and weakness.     Objective:     Vital Signs (Most Recent):  Temp: 98.3 °F (36.8 °C) (05/24/20 0736)  Pulse: (!) 55 (05/24/20 0736)  Resp: 18 (05/24/20 0736)  BP: (!) 142/68 (05/24/20 0736)  SpO2: 97 % (05/24/20 0736) Vital Signs (24h Range):  Temp:  [97.9 °F (36.6 °C)-98.7 °F (37.1 °C)] 98.3 °F (36.8 °C)  Pulse:  [52-68] 55  Resp:  [16-18] 18  SpO2:  [94 %-97 %] 97 %  BP: (119-152)/(60-70) 142/68     Weight: 87.6 kg (193 lb 2 oz)  Body mass index is 34.21 kg/m².     SpO2: 97 %  O2 Device (Oxygen Therapy): room air      Intake/Output Summary (Last 24 hours) at 5/24/2020 1105  Last data filed at 5/24/2020 0100  Gross per 24 hour   Intake 720 ml   Output 600 ml   Net 120 ml       Lines/Drains/Airways     Peripheral Intravenous Line                 Peripheral IV - Single Lumen 05/18/20 2305 20 G Left Forearm 5 days                Physical Exam   Constitutional: She is oriented to person, place, and time. She appears well-developed and well-nourished. No distress.   HENT:   Mouth/Throat: Oropharynx is clear and moist.   Eyes: Pupils are equal, round, and reactive to light.   Neck: No JVD present.   Cardiovascular: Regular rhythm, normal heart sounds and intact distal pulses.   bradycardic   Pulmonary/Chest: Effort normal and breath sounds normal.    Abdominal: Soft.   Neurological: She is alert and oriented to person, place, and time.   Skin: Skin is warm and dry. She is not diaphoretic.   Psychiatric: She has a normal mood and affect. Her behavior is normal.       Significant Labs:   CMP   Recent Labs   Lab 05/23/20  0327 05/24/20  0557    139   K 3.5 4.0    103   CO2 29 29   GLU 92 116*   BUN 17 12   CREATININE 0.9 0.9   CALCIUM 8.8 9.3   ANIONGAP 8 7*   ESTGFRAFRICA >60.0 >60.0   EGFRNONAA >60.0 >60.0   , CBC   Recent Labs   Lab 05/23/20  0327 05/24/20  0558   WBC 8.63 7.10   HGB 11.3* 11.9*   HCT 36.1* 38.4    311    and All pertinent lab results from the last 24 hours have been reviewed.     Significant Imaging: All pertinent imaging results from the last 24 hours have been reviewed.

## 2020-05-24 NOTE — PLAN OF CARE
Problem: Adult Inpatient Plan of Care  Goal: Plan of Care Review  Outcome: Ongoing, Progressing  Plan of care reviewed with pt. MD came to bedside, all questions and concerns addressed. [facility]     Problem: Diabetes Comorbidity  Goal: Blood Glucose Level Within Desired Range  Outcome: Ongoing, Progressing   BG monitoring, voices understanding of procedure.     Problem: Fall Injury Risk  Goal: Absence of Fall and Fall-Related Injury  Outcome: Ongoing, Progressing  Compliant with all fall precautions. Remains free of falls or injuries. TM

## 2020-05-24 NOTE — HOSPITAL COURSE
Patient met with CTS team to discuss CABG and timing of the surgery next week, scheduled 05/24. She had carotid U/S done (right ICA stenosis 1-39% and left ICA stenosis 1-39%).

## 2020-05-24 NOTE — PROGRESS NOTES
[facility]  Cardiology  Progress Note    Patient Name: Cecilia Barahona  MRN: [id number]  Admission Date: 5/18/2020  Hospital Length of Stay: 4 days  Code Status: Full Code   Attending Physician: Yan Su MD   Primary Care Physician: Yudi Smart MD  Expected Discharge Date: 5/29/2020  Principal Problem:Coronary artery disease    Subjective:     Hospital Course:   Patient met with CTS team to discuss CABG and timing of the surgery next week, scheduled 05/24. She had carotid U/S done (right ICA stenosis 1-39% and left ICA stenosis 1-39%).     Interval History: Patient denies any chest pain yesterday. Patient was able to ambulate throughout the unit without incident. Patient with asymptomatic bradycardia, metoprolol held.     Review of Systems   Constitution: Negative for chills and fever.   HENT: Negative for congestion.    Eyes: Negative for blurred vision.   Cardiovascular: Negative for chest pain, dyspnea on exertion, irregular heartbeat and leg swelling.   Respiratory: Negative for cough and shortness of breath.    Gastrointestinal: Negative for abdominal pain, nausea and vomiting.   Genitourinary: Negative for dysuria.   Neurological: Negative for dizziness, focal weakness, headaches, light-headedness and weakness.     Objective:     Vital Signs (Most Recent):  Temp: 98.3 °F (36.8 °C) (05/24/20 0736)  Pulse: (!) 55 (05/24/20 0736)  Resp: 18 (05/24/20 0736)  BP: (!) 142/68 (05/24/20 0736)  SpO2: 97 % (05/24/20 0736) Vital Signs (24h Range):  Temp:  [97.9 °F (36.6 °C)-98.7 °F (37.1 °C)] 98.3 °F (36.8 °C)  Pulse:  [52-68] 55  Resp:  [16-18] 18  SpO2:  [94 %-97 %] 97 %  BP: (119-152)/(60-70) 142/68     Weight: 87.6 kg (193 lb 2 oz)  Body mass index is 34.21 kg/m².     SpO2: 97 %  O2 Device (Oxygen Therapy): room air      Intake/Output Summary (Last 24 hours) at 5/24/2020 1105  Last data filed at 5/24/2020 0100  Gross per 24 hour   Intake 720 ml   Output 600 ml   Net 120 ml        Lines/Drains/Airways     Peripheral Intravenous Line                 Peripheral IV - Single Lumen 05/18/20 2305 20 G Left Forearm 5 days                Physical Exam   Constitutional: She is oriented to person, place, and time. She appears well-developed and well-nourished. No distress.   HENT:   Mouth/Throat: Oropharynx is clear and moist.   Eyes: Pupils are equal, round, and reactive to light.   Neck: No JVD present.   Cardiovascular: Regular rhythm, normal heart sounds and intact distal pulses.   bradycardic   Pulmonary/Chest: Effort normal and breath sounds normal.   Abdominal: Soft.   Neurological: She is alert and oriented to person, place, and time.   Skin: Skin is warm and dry. She is not diaphoretic.   Psychiatric: She has a normal mood and affect. Her behavior is normal.       Significant Labs:   CMP   Recent Labs   Lab 05/23/20  0327 05/24/20  0557    139   K 3.5 4.0    103   CO2 29 29   GLU 92 116*   BUN 17 12   CREATININE 0.9 0.9   CALCIUM 8.8 9.3   ANIONGAP 8 7*   ESTGFRAFRICA >60.0 >60.0   EGFRNONAA >60.0 >60.0   , CBC   Recent Labs   Lab 05/23/20  0327 05/24/20  0558   WBC 8.63 7.10   HGB 11.3* 11.9*   HCT 36.1* 38.4    311    and All pertinent lab results from the last 24 hours have been reviewed.     Significant Imaging: All pertinent imaging results from the last 24 hours have been reviewed.     Assessment and Plan:     Brief HPI: 71 y/o F with CAD (newly diagnosed), HTN, HLD, DM2 (on insulin HbA1c 7.1%). Admitted to [facility] on 5/18 with worsening chest pain for the past 4 days. She started having chest pain in 12/2019, she underwent a SPECT which was unremarkable. Since then, she reports mild chest pressure with activity, reports that she learned to live with it. For the past 4 days, her pain has been progressively worsening. Gets worst with exertion and better with rest. Not very active at baseline. No prior history of MI or CAD. Smoked 0.5 PPD for 5 years, 20  years ago. In OSH, troponin negative x4. 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     * Coronary artery disease  - Admitted for unstable angina, s/p coronary angiogram and found to have multivessel disease  - Seen by CTS for CABG evaluation and excellent candidate (scheduled date for CABG is 5/25/20); further workup pending including chest CT. Carotid U/S completed.   - Continue daily ASA, Atorvastatin 80 mg QD, Ezetimibe 10 mg QD, Metoprolol Tartrate 25 mg BID  - Holding Valsartan and Imdur per CTS    Mild major depression  - Continue fluoxetine    Restless legs syndrome (RLS)  - Continue ropinirole    Type 2 diabetes mellitus without complication, with long-term current use of insulin  -A1C 7%, POCT BG ranging   -On determir 21 units here (uses 30 units at home)  - Insulin sliding scale with meals; on diabetic cardiac diet     Mixed hyperlipidemia  - On atorvastatin 80 mg daily. Started on ezetimibe recently due to LDL not in target.   - Chol 204, , HDL 41 - 5/19/20    Essential hypertension  - Continue amlodipine 10 mg daily, valsartan 320 mg daily. Also started on metoprolol 25 mg BID, continue      VTE Risk Mitigation (From admission, onward)         Ordered     heparin (porcine) injection 5,000 Units  Every 8 hours      05/21/20 0543     IP VTE HIGH RISK PATIENT  Once      05/19/20 0513                Estella Tubbs MD  Cardiology  [facility]

## 2020-05-25 ENCOUNTER — ANESTHESIA (OUTPATIENT)
Dept: SURGERY | Facility: HOSPITAL | Age: 70
DRG: 234 | End: 2020-05-25
Payer: MEDICARE

## 2020-05-25 PROBLEM — Z95.1 S/P CABG X 3: Status: ACTIVE | Noted: 2020-05-25

## 2020-05-25 LAB
ALLENS TEST: ABNORMAL
ALLENS TEST: NORMAL
ANION GAP SERPL CALC-SCNC: 10 MMOL/L (ref 8–16)
ANION GAP SERPL CALC-SCNC: 10 MMOL/L (ref 8–16)
APTT BLDCRRT: 24.9 SEC (ref 21–32)
BASOPHILS # BLD AUTO: 0.02 K/UL (ref 0–0.2)
BASOPHILS # BLD AUTO: 0.03 K/UL (ref 0–0.2)
BASOPHILS NFR BLD: 0.1 % (ref 0–1.9)
BASOPHILS NFR BLD: 0.3 % (ref 0–1.9)
BUN SERPL-MCNC: 13 MG/DL (ref 8–23)
BUN SERPL-MCNC: 14 MG/DL (ref 8–23)
CALCIUM SERPL-MCNC: 9 MG/DL (ref 8.7–10.5)
CALCIUM SERPL-MCNC: 9.3 MG/DL (ref 8.7–10.5)
CHLORIDE SERPL-SCNC: 103 MMOL/L (ref 95–110)
CHLORIDE SERPL-SCNC: 108 MMOL/L (ref 95–110)
CO2 SERPL-SCNC: 22 MMOL/L (ref 23–29)
CO2 SERPL-SCNC: 26 MMOL/L (ref 23–29)
CREAT SERPL-MCNC: 0.8 MG/DL (ref 0.5–1.4)
CREAT SERPL-MCNC: 0.9 MG/DL (ref 0.5–1.4)
DELSYS: ABNORMAL
DELSYS: NORMAL
DIFFERENTIAL METHOD: ABNORMAL
DIFFERENTIAL METHOD: ABNORMAL
EOSINOPHIL # BLD AUTO: 0 K/UL (ref 0–0.5)
EOSINOPHIL # BLD AUTO: 0.1 K/UL (ref 0–0.5)
EOSINOPHIL NFR BLD: 0.1 % (ref 0–8)
EOSINOPHIL NFR BLD: 1.4 % (ref 0–8)
ERYTHROCYTE [DISTWIDTH] IN BLOOD BY AUTOMATED COUNT: 13.9 % (ref 11.5–14.5)
ERYTHROCYTE [DISTWIDTH] IN BLOOD BY AUTOMATED COUNT: 14 % (ref 11.5–14.5)
ERYTHROCYTE [SEDIMENTATION RATE] IN BLOOD BY WESTERGREN METHOD: 16 MM/H
EST. GFR  (AFRICAN AMERICAN): >60 ML/MIN/1.73 M^2
EST. GFR  (AFRICAN AMERICAN): >60 ML/MIN/1.73 M^2
EST. GFR  (NON AFRICAN AMERICAN): >60 ML/MIN/1.73 M^2
EST. GFR  (NON AFRICAN AMERICAN): >60 ML/MIN/1.73 M^2
FIO2: 100
FIO2: 100
FIO2: 50
FIO2: 55
FIO2: 60
FIO2: 60
FIO2: 70
GLUCOSE SERPL-MCNC: 113 MG/DL (ref 70–110)
GLUCOSE SERPL-MCNC: 130 MG/DL (ref 70–110)
GLUCOSE SERPL-MCNC: 197 MG/DL (ref 70–110)
GLUCOSE SERPL-MCNC: 202 MG/DL (ref 70–110)
GLUCOSE SERPL-MCNC: 204 MG/DL (ref 70–110)
GLUCOSE SERPL-MCNC: 216 MG/DL (ref 70–110)
GLUCOSE SERPL-MCNC: 224 MG/DL (ref 70–110)
GLUCOSE SERPL-MCNC: 228 MG/DL (ref 70–110)
GLUCOSE SERPL-MCNC: 232 MG/DL (ref 70–110)
GLUCOSE SERPL-MCNC: 247 MG/DL (ref 70–110)
GLUCOSE SERPL-MCNC: 249 MG/DL (ref 70–110)
HCO3 UR-SCNC: 21.5 MMOL/L (ref 24–28)
HCO3 UR-SCNC: 21.8 MMOL/L (ref 24–28)
HCO3 UR-SCNC: 23 MMOL/L (ref 24–28)
HCO3 UR-SCNC: 24.1 MMOL/L (ref 24–28)
HCO3 UR-SCNC: 24.6 MMOL/L (ref 24–28)
HCO3 UR-SCNC: 24.7 MMOL/L (ref 24–28)
HCO3 UR-SCNC: 25 MMOL/L (ref 24–28)
HCO3 UR-SCNC: 25.7 MMOL/L (ref 24–28)
HCO3 UR-SCNC: 25.8 MMOL/L (ref 24–28)
HCO3 UR-SCNC: 25.9 MMOL/L (ref 24–28)
HCO3 UR-SCNC: 26.2 MMOL/L (ref 24–28)
HCO3 UR-SCNC: 26.6 MMOL/L (ref 24–28)
HCO3 UR-SCNC: 26.6 MMOL/L (ref 24–28)
HCO3 UR-SCNC: 26.7 MMOL/L (ref 24–28)
HCO3 UR-SCNC: 26.7 MMOL/L (ref 24–28)
HCO3 UR-SCNC: 26.9 MMOL/L (ref 24–28)
HCO3 UR-SCNC: 27.3 MMOL/L (ref 24–28)
HCO3 UR-SCNC: 27.5 MMOL/L (ref 24–28)
HCO3 UR-SCNC: 28 MMOL/L (ref 24–28)
HCO3 UR-SCNC: 28.1 MMOL/L (ref 24–28)
HCO3 UR-SCNC: 30.9 MMOL/L (ref 24–28)
HCT VFR BLD AUTO: 31 % (ref 37–48.5)
HCT VFR BLD AUTO: 39 % (ref 37–48.5)
HCT VFR BLD CALC: 25 %PCV (ref 36–54)
HCT VFR BLD CALC: 26 %PCV (ref 36–54)
HCT VFR BLD CALC: 27 %PCV (ref 36–54)
HCT VFR BLD CALC: 29 %PCV (ref 36–54)
HCT VFR BLD CALC: 30 %PCV (ref 36–54)
HCT VFR BLD CALC: 30 %PCV (ref 36–54)
HCT VFR BLD CALC: 31 %PCV (ref 36–54)
HCT VFR BLD CALC: 31 %PCV (ref 36–54)
HCT VFR BLD CALC: 32 %PCV (ref 36–54)
HCT VFR BLD CALC: 32 %PCV (ref 36–54)
HCT VFR BLD CALC: 33 %PCV (ref 36–54)
HCT VFR BLD CALC: 34 %PCV (ref 36–54)
HGB BLD-MCNC: 10.1 G/DL (ref 12–16)
HGB BLD-MCNC: 12.2 G/DL (ref 12–16)
IMM GRANULOCYTES # BLD AUTO: 0.02 K/UL (ref 0–0.04)
IMM GRANULOCYTES # BLD AUTO: 0.08 K/UL (ref 0–0.04)
IMM GRANULOCYTES NFR BLD AUTO: 0.2 % (ref 0–0.5)
IMM GRANULOCYTES NFR BLD AUTO: 0.5 % (ref 0–0.5)
INR PPP: 1.1 (ref 0.8–1.2)
LDH SERPL L TO P-CCNC: 0.45 MMOL/L (ref 0.36–1.25)
LDH SERPL L TO P-CCNC: 0.56 MMOL/L (ref 0.36–1.25)
LDH SERPL L TO P-CCNC: 0.62 MMOL/L (ref 0.36–1.25)
LDH SERPL L TO P-CCNC: 0.84 MMOL/L (ref 0.5–2.2)
LDH SERPL L TO P-CCNC: 0.96 MMOL/L (ref 0.36–1.25)
LDH SERPL L TO P-CCNC: 1.05 MMOL/L (ref 0.36–1.25)
LDH SERPL L TO P-CCNC: 1.41 MMOL/L (ref 0.36–1.25)
LDH SERPL L TO P-CCNC: 1.51 MMOL/L (ref 0.36–1.25)
LDH SERPL L TO P-CCNC: 1.96 MMOL/L (ref 0.36–1.25)
LDH SERPL L TO P-CCNC: 2.52 MMOL/L (ref 0.36–1.25)
LDH SERPL L TO P-CCNC: 4 MMOL/L (ref 0.36–1.25)
LYMPHOCYTES # BLD AUTO: 1.7 K/UL (ref 1–4.8)
LYMPHOCYTES # BLD AUTO: 3.5 K/UL (ref 1–4.8)
LYMPHOCYTES NFR BLD: 11.5 % (ref 18–48)
LYMPHOCYTES NFR BLD: 34 % (ref 18–48)
MAGNESIUM SERPL-MCNC: 1.9 MG/DL (ref 1.6–2.6)
MAGNESIUM SERPL-MCNC: 2.4 MG/DL (ref 1.6–2.6)
MCH RBC QN AUTO: 29.1 PG (ref 27–31)
MCH RBC QN AUTO: 30.3 PG (ref 27–31)
MCHC RBC AUTO-ENTMCNC: 31.3 G/DL (ref 32–36)
MCHC RBC AUTO-ENTMCNC: 32.6 G/DL (ref 32–36)
MCV RBC AUTO: 93 FL (ref 82–98)
MCV RBC AUTO: 93 FL (ref 82–98)
MODE: ABNORMAL
MODE: NORMAL
MONOCYTES # BLD AUTO: 0.5 K/UL (ref 0.3–1)
MONOCYTES # BLD AUTO: 1 K/UL (ref 0.3–1)
MONOCYTES NFR BLD: 3.4 % (ref 4–15)
MONOCYTES NFR BLD: 9.4 % (ref 4–15)
NEUTROPHILS # BLD AUTO: 12.5 K/UL (ref 1.8–7.7)
NEUTROPHILS # BLD AUTO: 5.6 K/UL (ref 1.8–7.7)
NEUTROPHILS NFR BLD: 54.7 % (ref 38–73)
NEUTROPHILS NFR BLD: 84.4 % (ref 38–73)
NRBC BLD-RTO: 0 /100 WBC
NRBC BLD-RTO: 0 /100 WBC
PCO2 BLDA: 35.3 MMHG (ref 35–45)
PCO2 BLDA: 36.3 MMHG (ref 35–45)
PCO2 BLDA: 38.4 MMHG (ref 35–45)
PCO2 BLDA: 38.6 MMHG (ref 35–45)
PCO2 BLDA: 38.7 MMHG (ref 35–45)
PCO2 BLDA: 38.8 MMHG (ref 35–45)
PCO2 BLDA: 39.5 MMHG (ref 35–45)
PCO2 BLDA: 39.5 MMHG (ref 35–45)
PCO2 BLDA: 39.9 MMHG (ref 35–45)
PCO2 BLDA: 40.5 MMHG (ref 35–45)
PCO2 BLDA: 41 MMHG (ref 35–45)
PCO2 BLDA: 41.2 MMHG (ref 35–45)
PCO2 BLDA: 42 MMHG (ref 35–45)
PCO2 BLDA: 42.4 MMHG (ref 35–45)
PCO2 BLDA: 42.4 MMHG (ref 35–45)
PCO2 BLDA: 43.4 MMHG (ref 35–45)
PCO2 BLDA: 44.4 MMHG (ref 35–45)
PCO2 BLDA: 44.8 MMHG (ref 35–45)
PCO2 BLDA: 44.8 MMHG (ref 35–45)
PCO2 BLDA: 45.5 MMHG (ref 35–45)
PCO2 BLDA: 45.6 MMHG (ref 35–45)
PEEP: 5
PEEP: 8
PH SMN: 7.29 [PH] (ref 7.35–7.45)
PH SMN: 7.35 [PH] (ref 7.35–7.45)
PH SMN: 7.35 [PH] (ref 7.35–7.45)
PH SMN: 7.36 [PH] (ref 7.35–7.45)
PH SMN: 7.37 [PH] (ref 7.35–7.45)
PH SMN: 7.38 [PH] (ref 7.35–7.45)
PH SMN: 7.39 [PH] (ref 7.35–7.45)
PH SMN: 7.39 [PH] (ref 7.35–7.45)
PH SMN: 7.41 [PH] (ref 7.35–7.45)
PH SMN: 7.42 [PH] (ref 7.35–7.45)
PH SMN: 7.43 [PH] (ref 7.35–7.45)
PH SMN: 7.43 [PH] (ref 7.35–7.45)
PH SMN: 7.44 [PH] (ref 7.35–7.45)
PH SMN: 7.45 [PH] (ref 7.35–7.45)
PH SMN: 7.46 [PH] (ref 7.35–7.45)
PH SMN: 7.47 [PH] (ref 7.35–7.45)
PHOSPHATE SERPL-MCNC: 3.7 MG/DL (ref 2.7–4.5)
PLATELET # BLD AUTO: 208 K/UL (ref 150–350)
PLATELET # BLD AUTO: 322 K/UL (ref 150–350)
PMV BLD AUTO: 9.7 FL (ref 9.2–12.9)
PMV BLD AUTO: 9.7 FL (ref 9.2–12.9)
PO2 BLDA: 100 MMHG (ref 80–100)
PO2 BLDA: 107 MMHG (ref 80–100)
PO2 BLDA: 112 MMHG (ref 80–100)
PO2 BLDA: 113 MMHG (ref 80–100)
PO2 BLDA: 148 MMHG (ref 80–100)
PO2 BLDA: 176 MMHG (ref 80–100)
PO2 BLDA: 208 MMHG (ref 80–100)
PO2 BLDA: 209 MMHG (ref 80–100)
PO2 BLDA: 220 MMHG (ref 80–100)
PO2 BLDA: 222 MMHG (ref 80–100)
PO2 BLDA: 244 MMHG (ref 80–100)
PO2 BLDA: 273 MMHG (ref 80–100)
PO2 BLDA: 42 MMHG (ref 40–60)
PO2 BLDA: 44 MMHG (ref 40–60)
PO2 BLDA: 598 MMHG (ref 80–100)
PO2 BLDA: 63 MMHG (ref 80–100)
PO2 BLDA: 65 MMHG (ref 80–100)
PO2 BLDA: 75 MMHG (ref 80–100)
PO2 BLDA: 84 MMHG (ref 80–100)
PO2 BLDA: 89 MMHG (ref 80–100)
PO2 BLDA: 99 MMHG (ref 80–100)
POC BE: -1 MMOL/L
POC BE: -4 MMOL/L
POC BE: -5 MMOL/L
POC BE: 0 MMOL/L
POC BE: 1 MMOL/L
POC BE: 2 MMOL/L
POC BE: 3 MMOL/L
POC BE: 7 MMOL/L
POC IONIZED CALCIUM: 0.97 MMOL/L (ref 1.06–1.42)
POC IONIZED CALCIUM: 0.99 MMOL/L (ref 1.06–1.42)
POC IONIZED CALCIUM: 1.08 MMOL/L (ref 1.06–1.42)
POC IONIZED CALCIUM: 1.1 MMOL/L (ref 1.06–1.42)
POC IONIZED CALCIUM: 1.13 MMOL/L (ref 1.06–1.42)
POC IONIZED CALCIUM: 1.14 MMOL/L (ref 1.06–1.42)
POC IONIZED CALCIUM: 1.17 MMOL/L (ref 1.06–1.42)
POC IONIZED CALCIUM: 1.21 MMOL/L (ref 1.06–1.42)
POC IONIZED CALCIUM: 1.23 MMOL/L (ref 1.06–1.42)
POC IONIZED CALCIUM: 1.24 MMOL/L (ref 1.06–1.42)
POC IONIZED CALCIUM: 1.25 MMOL/L (ref 1.06–1.42)
POC IONIZED CALCIUM: 1.26 MMOL/L (ref 1.06–1.42)
POC IONIZED CALCIUM: 1.33 MMOL/L (ref 1.06–1.42)
POC SATURATED O2: 100 % (ref 95–100)
POC SATURATED O2: 79 % (ref 95–100)
POC SATURATED O2: 79 % (ref 95–100)
POC SATURATED O2: 89 % (ref 95–100)
POC SATURATED O2: 92 % (ref 95–100)
POC SATURATED O2: 94 % (ref 95–100)
POC SATURATED O2: 96 % (ref 95–100)
POC SATURATED O2: 97 % (ref 95–100)
POC SATURATED O2: 98 % (ref 95–100)
POC SATURATED O2: 99 % (ref 95–100)
POC SATURATED O2: 99 % (ref 95–100)
POC TCO2: 23 MMOL/L (ref 23–27)
POC TCO2: 23 MMOL/L (ref 23–27)
POC TCO2: 24 MMOL/L (ref 23–27)
POC TCO2: 25 MMOL/L (ref 23–27)
POC TCO2: 26 MMOL/L (ref 23–27)
POC TCO2: 27 MMOL/L (ref 23–27)
POC TCO2: 28 MMOL/L (ref 23–27)
POC TCO2: 28 MMOL/L (ref 24–29)
POC TCO2: 29 MMOL/L (ref 23–27)
POC TCO2: 29 MMOL/L (ref 24–29)
POC TCO2: 32 MMOL/L (ref 23–27)
POCT GLUCOSE: 101 MG/DL (ref 70–110)
POCT GLUCOSE: 104 MG/DL (ref 70–110)
POCT GLUCOSE: 123 MG/DL (ref 70–110)
POCT GLUCOSE: 127 MG/DL (ref 70–110)
POCT GLUCOSE: 146 MG/DL (ref 70–110)
POCT GLUCOSE: 186 MG/DL (ref 70–110)
POCT GLUCOSE: 194 MG/DL (ref 70–110)
POCT GLUCOSE: 197 MG/DL (ref 70–110)
POCT GLUCOSE: 208 MG/DL (ref 70–110)
POCT GLUCOSE: 210 MG/DL (ref 70–110)
POCT GLUCOSE: 89 MG/DL (ref 70–110)
POTASSIUM BLD-SCNC: 3.2 MMOL/L (ref 3.5–5.1)
POTASSIUM BLD-SCNC: 3.2 MMOL/L (ref 3.5–5.1)
POTASSIUM BLD-SCNC: 3.3 MMOL/L (ref 3.5–5.1)
POTASSIUM BLD-SCNC: 3.4 MMOL/L (ref 3.5–5.1)
POTASSIUM BLD-SCNC: 3.6 MMOL/L (ref 3.5–5.1)
POTASSIUM BLD-SCNC: 3.7 MMOL/L (ref 3.5–5.1)
POTASSIUM BLD-SCNC: 3.7 MMOL/L (ref 3.5–5.1)
POTASSIUM BLD-SCNC: 3.8 MMOL/L (ref 3.5–5.1)
POTASSIUM BLD-SCNC: 3.9 MMOL/L (ref 3.5–5.1)
POTASSIUM BLD-SCNC: 3.9 MMOL/L (ref 3.5–5.1)
POTASSIUM BLD-SCNC: 4.1 MMOL/L (ref 3.5–5.1)
POTASSIUM BLD-SCNC: 4.2 MMOL/L (ref 3.5–5.1)
POTASSIUM BLD-SCNC: 4.2 MMOL/L (ref 3.5–5.1)
POTASSIUM BLD-SCNC: 4.4 MMOL/L (ref 3.5–5.1)
POTASSIUM SERPL-SCNC: 3.7 MMOL/L (ref 3.5–5.1)
POTASSIUM SERPL-SCNC: 4 MMOL/L (ref 3.5–5.1)
POTASSIUM SERPL-SCNC: 4 MMOL/L (ref 3.5–5.1)
PROTHROMBIN TIME: 11.4 SEC (ref 9–12.5)
PROVIDER CREDENTIALS: ABNORMAL
PROVIDER CREDENTIALS: ABNORMAL
PROVIDER NOTIFIED: ABNORMAL
PROVIDER NOTIFIED: ABNORMAL
PS: 10
PS: 10
RBC # BLD AUTO: 3.33 M/UL (ref 4–5.4)
RBC # BLD AUTO: 4.19 M/UL (ref 4–5.4)
SAMPLE: ABNORMAL
SAMPLE: NORMAL
SARS-COV-2 RDRP RESP QL NAA+PROBE: NEGATIVE
SITE: ABNORMAL
SITE: NORMAL
SODIUM BLD-SCNC: 137 MMOL/L (ref 136–145)
SODIUM BLD-SCNC: 137 MMOL/L (ref 136–145)
SODIUM BLD-SCNC: 138 MMOL/L (ref 136–145)
SODIUM BLD-SCNC: 139 MMOL/L (ref 136–145)
SODIUM BLD-SCNC: 140 MMOL/L (ref 136–145)
SODIUM BLD-SCNC: 140 MMOL/L (ref 136–145)
SODIUM BLD-SCNC: 141 MMOL/L (ref 136–145)
SODIUM BLD-SCNC: 142 MMOL/L (ref 136–145)
SODIUM BLD-SCNC: 143 MMOL/L (ref 136–145)
SODIUM SERPL-SCNC: 139 MMOL/L (ref 136–145)
SODIUM SERPL-SCNC: 140 MMOL/L (ref 136–145)
SP02: 100
SP02: 93
SP02: 94
SPONT RATE: 22
SPONT RATE: 24
TIME NOTIFIED: 1755
TIME NOTIFIED: 1757
VERBAL RESULT READBACK PERFORMED: YES
VERBAL RESULT READBACK PERFORMED: YES
VT: 350
VT: 380
VT: 400
VT: 400
WBC # BLD AUTO: 10.18 K/UL (ref 3.9–12.7)
WBC # BLD AUTO: 14.8 K/UL (ref 3.9–12.7)

## 2020-05-25 PROCEDURE — 37799 UNLISTED PX VASCULAR SURGERY: CPT | Mod: HCNC

## 2020-05-25 PROCEDURE — 25000003 PHARM REV CODE 250: Mod: HCNC | Performed by: THORACIC SURGERY (CARDIOTHORACIC VASCULAR SURGERY)

## 2020-05-25 PROCEDURE — 36592 COLLECT BLOOD FROM PICC: CPT | Mod: HCNC

## 2020-05-25 PROCEDURE — 63600175 PHARM REV CODE 636 W HCPCS: Mod: JG,HCNC | Performed by: SURGERY

## 2020-05-25 PROCEDURE — 93312 PR ECHO HEART,TRANSESOPHAGEAL: ICD-10-PCS | Mod: 26,59,, | Performed by: ANESTHESIOLOGY

## 2020-05-25 PROCEDURE — U0002 COVID-19 LAB TEST NON-CDC: HCPCS | Mod: HCNC

## 2020-05-25 PROCEDURE — 63600175 PHARM REV CODE 636 W HCPCS: Mod: JG,HCNC | Performed by: STUDENT IN AN ORGANIZED HEALTH CARE EDUCATION/TRAINING PROGRAM

## 2020-05-25 PROCEDURE — 82803 BLOOD GASES ANY COMBINATION: CPT | Mod: HCNC

## 2020-05-25 PROCEDURE — 25000003 PHARM REV CODE 250: Mod: HCNC | Performed by: STUDENT IN AN ORGANIZED HEALTH CARE EDUCATION/TRAINING PROGRAM

## 2020-05-25 PROCEDURE — 27200678 HC TRANSDUCER MONITOR KIT TRIPLE: Mod: HCNC | Performed by: ANESTHESIOLOGY

## 2020-05-25 PROCEDURE — 27200953 HC CARDIOPLEGIA SYSTEM: Mod: HCNC

## 2020-05-25 PROCEDURE — 36620 INSERTION CATHETER ARTERY: CPT | Mod: 59,HCNC,, | Performed by: ANESTHESIOLOGY

## 2020-05-25 PROCEDURE — 27000221 HC OXYGEN, UP TO 24 HOURS: Mod: HCNC

## 2020-05-25 PROCEDURE — 80048 BASIC METABOLIC PNL TOTAL CA: CPT | Mod: HCNC

## 2020-05-25 PROCEDURE — 84100 ASSAY OF PHOSPHORUS: CPT | Mod: HCNC

## 2020-05-25 PROCEDURE — 36620 ARTERIAL: ICD-10-PCS | Mod: 59,HCNC,, | Performed by: ANESTHESIOLOGY

## 2020-05-25 PROCEDURE — 94002 VENT MGMT INPAT INIT DAY: CPT | Mod: HCNC

## 2020-05-25 PROCEDURE — 83735 ASSAY OF MAGNESIUM: CPT | Mod: HCNC

## 2020-05-25 PROCEDURE — 82962 GLUCOSE BLOOD TEST: CPT | Mod: HCNC | Performed by: THORACIC SURGERY (CARDIOTHORACIC VASCULAR SURGERY)

## 2020-05-25 PROCEDURE — 64450 NJX AA&/STRD OTHER PN/BRANCH: CPT | Mod: 50,59,HCNC, | Performed by: ANESTHESIOLOGY

## 2020-05-25 PROCEDURE — 27200750 HC INSULATED NEEDLE/ STIMUPLEX: Mod: HCNC | Performed by: ANESTHESIOLOGY

## 2020-05-25 PROCEDURE — P9045 ALBUMIN (HUMAN), 5%, 250 ML: HCPCS | Mod: JG,HCNC | Performed by: SURGERY

## 2020-05-25 PROCEDURE — 27201423 OPTIME MED/SURG SUP & DEVICES STERILE SUPPLY: Mod: HCNC | Performed by: THORACIC SURGERY (CARDIOTHORACIC VASCULAR SURGERY)

## 2020-05-25 PROCEDURE — 63600175 PHARM REV CODE 636 W HCPCS: Mod: HCNC | Performed by: STUDENT IN AN ORGANIZED HEALTH CARE EDUCATION/TRAINING PROGRAM

## 2020-05-25 PROCEDURE — 27100021 HC MULTIPORT INFUSION MANIFOLD: Mod: HCNC | Performed by: ANESTHESIOLOGY

## 2020-05-25 PROCEDURE — 80048 BASIC METABOLIC PNL TOTAL CA: CPT | Mod: 91,HCNC

## 2020-05-25 PROCEDURE — 85520 HEPARIN ASSAY: CPT | Mod: HCNC

## 2020-05-25 PROCEDURE — 99223 PR INITIAL HOSPITAL CARE,LEVL III: ICD-10-PCS | Mod: HCNC,,, | Performed by: NURSE PRACTITIONER

## 2020-05-25 PROCEDURE — 36000712 HC OR TIME LEV V 1ST 15 MIN: Mod: HCNC | Performed by: THORACIC SURGERY (CARDIOTHORACIC VASCULAR SURGERY)

## 2020-05-25 PROCEDURE — C1729 CATH, DRAINAGE: HCPCS | Mod: HCNC | Performed by: THORACIC SURGERY (CARDIOTHORACIC VASCULAR SURGERY)

## 2020-05-25 PROCEDURE — 36000713 HC OR TIME LEV V EA ADD 15 MIN: Mod: HCNC | Performed by: THORACIC SURGERY (CARDIOTHORACIC VASCULAR SURGERY)

## 2020-05-25 PROCEDURE — 33508 ENDOSCOPIC VEIN HARVEST: CPT | Mod: HCNC,,, | Performed by: THORACIC SURGERY (CARDIOTHORACIC VASCULAR SURGERY)

## 2020-05-25 PROCEDURE — 99223 1ST HOSP IP/OBS HIGH 75: CPT | Mod: HCNC,,, | Performed by: SURGERY

## 2020-05-25 PROCEDURE — P9045 ALBUMIN (HUMAN), 5%, 250 ML: HCPCS | Mod: JG,HCNC | Performed by: STUDENT IN AN ORGANIZED HEALTH CARE EDUCATION/TRAINING PROGRAM

## 2020-05-25 PROCEDURE — 84132 ASSAY OF SERUM POTASSIUM: CPT | Mod: HCNC

## 2020-05-25 PROCEDURE — 36556 INSERT NON-TUNNEL CV CATH: CPT | Mod: 59,HCNC,, | Performed by: ANESTHESIOLOGY

## 2020-05-25 PROCEDURE — 83735 ASSAY OF MAGNESIUM: CPT | Mod: 91,HCNC

## 2020-05-25 PROCEDURE — C1751 CATH, INF, PER/CENT/MIDLINE: HCPCS | Mod: HCNC | Performed by: ANESTHESIOLOGY

## 2020-05-25 PROCEDURE — 99223 1ST HOSP IP/OBS HIGH 75: CPT | Mod: HCNC,,, | Performed by: NURSE PRACTITIONER

## 2020-05-25 PROCEDURE — 33533 PR CABG, ARTERIAL, SINGLE: ICD-10-PCS | Mod: HCNC,,, | Performed by: THORACIC SURGERY (CARDIOTHORACIC VASCULAR SURGERY)

## 2020-05-25 PROCEDURE — 85730 THROMBOPLASTIN TIME PARTIAL: CPT | Mod: HCNC

## 2020-05-25 PROCEDURE — 84295 ASSAY OF SERUM SODIUM: CPT | Mod: HCNC

## 2020-05-25 PROCEDURE — 33533 CABG ARTERIAL SINGLE: CPT | Mod: HCNC,,, | Performed by: THORACIC SURGERY (CARDIOTHORACIC VASCULAR SURGERY)

## 2020-05-25 PROCEDURE — 85014 HEMATOCRIT: CPT | Mod: HCNC

## 2020-05-25 PROCEDURE — 33518 PR CABG, ARTERY-VEIN, TWO: ICD-10-PCS | Mod: HCNC,,, | Performed by: THORACIC SURGERY (CARDIOTHORACIC VASCULAR SURGERY)

## 2020-05-25 PROCEDURE — 82330 ASSAY OF CALCIUM: CPT | Mod: HCNC

## 2020-05-25 PROCEDURE — 83605 ASSAY OF LACTIC ACID: CPT | Mod: HCNC

## 2020-05-25 PROCEDURE — 27100088 HC CELL SAVER: Mod: HCNC

## 2020-05-25 PROCEDURE — 85610 PROTHROMBIN TIME: CPT | Mod: HCNC

## 2020-05-25 PROCEDURE — 27100025 HC TUBING, SET FLUID WARMER: Mod: HCNC | Performed by: ANESTHESIOLOGY

## 2020-05-25 PROCEDURE — 85025 COMPLETE CBC W/AUTO DIFF WBC: CPT | Mod: 91,HCNC

## 2020-05-25 PROCEDURE — D9220A PRA ANESTHESIA: Mod: HCNC,,, | Performed by: ANESTHESIOLOGY

## 2020-05-25 PROCEDURE — 27201037 HC PRESSURE MONITORING SET UP: Mod: HCNC

## 2020-05-25 PROCEDURE — 37000009 HC ANESTHESIA EA ADD 15 MINS: Mod: HCNC | Performed by: THORACIC SURGERY (CARDIOTHORACIC VASCULAR SURGERY)

## 2020-05-25 PROCEDURE — 64450: ICD-10-PCS | Mod: 50,59,HCNC, | Performed by: ANESTHESIOLOGY

## 2020-05-25 PROCEDURE — 63600175 PHARM REV CODE 636 W HCPCS: Mod: HCNC | Performed by: THORACIC SURGERY (CARDIOTHORACIC VASCULAR SURGERY)

## 2020-05-25 PROCEDURE — 36556 CENTRAL LINE: ICD-10-PCS | Mod: 59,HCNC,, | Performed by: ANESTHESIOLOGY

## 2020-05-25 PROCEDURE — 93010 EKG 12-LEAD: ICD-10-PCS | Mod: HCNC,,, | Performed by: INTERNAL MEDICINE

## 2020-05-25 PROCEDURE — 37000008 HC ANESTHESIA 1ST 15 MINUTES: Mod: HCNC | Performed by: THORACIC SURGERY (CARDIOTHORACIC VASCULAR SURGERY)

## 2020-05-25 PROCEDURE — 99223 PR INITIAL HOSPITAL CARE,LEVL III: ICD-10-PCS | Mod: HCNC,,, | Performed by: SURGERY

## 2020-05-25 PROCEDURE — 93312 ECHO TRANSESOPHAGEAL: CPT | Mod: 26,59,, | Performed by: ANESTHESIOLOGY

## 2020-05-25 PROCEDURE — 33508 PR ENDOSCOPY W/VIDEO-ASST VEIN HARVEST,CABG: ICD-10-PCS | Mod: HCNC,,, | Performed by: THORACIC SURGERY (CARDIOTHORACIC VASCULAR SURGERY)

## 2020-05-25 PROCEDURE — D9220A PRA ANESTHESIA: ICD-10-PCS | Mod: HCNC,,, | Performed by: ANESTHESIOLOGY

## 2020-05-25 PROCEDURE — 36415 COLL VENOUS BLD VENIPUNCTURE: CPT | Mod: HCNC

## 2020-05-25 PROCEDURE — 33518 CABG ARTERY-VEIN TWO: CPT | Mod: HCNC,,, | Performed by: THORACIC SURGERY (CARDIOTHORACIC VASCULAR SURGERY)

## 2020-05-25 PROCEDURE — 99900035 HC TECH TIME PER 15 MIN (STAT): Mod: HCNC

## 2020-05-25 PROCEDURE — 94761 N-INVAS EAR/PLS OXIMETRY MLT: CPT | Mod: HCNC

## 2020-05-25 PROCEDURE — 27000191 HC C-V MONITORING: Mod: HCNC

## 2020-05-25 PROCEDURE — 27000175 HC ADULT BYPASS PUMP: Mod: HCNC

## 2020-05-25 PROCEDURE — 93010 ELECTROCARDIOGRAM REPORT: CPT | Mod: HCNC,,, | Performed by: INTERNAL MEDICINE

## 2020-05-25 PROCEDURE — 93005 ELECTROCARDIOGRAM TRACING: CPT | Mod: HCNC

## 2020-05-25 PROCEDURE — 20000000 HC ICU ROOM: Mod: HCNC

## 2020-05-25 RX ORDER — NITROGLYCERIN 5 MG/ML
INJECTION, SOLUTION INTRAVENOUS
Status: DISCONTINUED | OUTPATIENT
Start: 2020-05-25 | End: 2020-05-25

## 2020-05-25 RX ORDER — CEFAZOLIN SODIUM 1 G/3ML
2 INJECTION, POWDER, FOR SOLUTION INTRAMUSCULAR; INTRAVENOUS
Status: COMPLETED | OUTPATIENT
Start: 2020-05-25 | End: 2020-05-27

## 2020-05-25 RX ORDER — NICARDIPINE HYDROCHLORIDE 0.2 MG/ML
5 INJECTION INTRAVENOUS CONTINUOUS
Status: DISCONTINUED | OUTPATIENT
Start: 2020-05-25 | End: 2020-05-27

## 2020-05-25 RX ORDER — PROTAMINE SULFATE 10 MG/ML
INJECTION, SOLUTION INTRAVENOUS
Status: DISCONTINUED | OUTPATIENT
Start: 2020-05-25 | End: 2020-05-25

## 2020-05-25 RX ORDER — ESMOLOL HYDROCHLORIDE 10 MG/ML
INJECTION INTRAVENOUS
Status: DISCONTINUED | OUTPATIENT
Start: 2020-05-25 | End: 2020-05-25

## 2020-05-25 RX ORDER — POTASSIUM CHLORIDE 14.9 MG/ML
INJECTION INTRAVENOUS CONTINUOUS PRN
Status: DISCONTINUED | OUTPATIENT
Start: 2020-05-25 | End: 2020-05-25

## 2020-05-25 RX ORDER — FENTANYL CITRATE 50 UG/ML
25 INJECTION, SOLUTION INTRAMUSCULAR; INTRAVENOUS
Status: DISCONTINUED | OUTPATIENT
Start: 2020-05-25 | End: 2020-05-25

## 2020-05-25 RX ORDER — PANTOPRAZOLE SODIUM 40 MG/10ML
40 INJECTION, POWDER, LYOPHILIZED, FOR SOLUTION INTRAVENOUS DAILY
Status: DISCONTINUED | OUTPATIENT
Start: 2020-05-26 | End: 2020-05-26

## 2020-05-25 RX ORDER — ACETAMINOPHEN 325 MG/1
650 TABLET ORAL EVERY 4 HOURS PRN
Status: DISCONTINUED | OUTPATIENT
Start: 2020-05-25 | End: 2020-05-26

## 2020-05-25 RX ORDER — NOREPINEPHRINE BITARTRATE 1 MG/ML
INJECTION, SOLUTION INTRAVENOUS
Status: DISCONTINUED | OUTPATIENT
Start: 2020-05-25 | End: 2020-05-25

## 2020-05-25 RX ORDER — PROPOFOL 10 MG/ML
VIAL (ML) INTRAVENOUS CONTINUOUS PRN
Status: DISCONTINUED | OUTPATIENT
Start: 2020-05-25 | End: 2020-05-25

## 2020-05-25 RX ORDER — PROPOFOL 10 MG/ML
VIAL (ML) INTRAVENOUS
Status: DISCONTINUED | OUTPATIENT
Start: 2020-05-25 | End: 2020-05-25

## 2020-05-25 RX ORDER — OXYCODONE HYDROCHLORIDE 5 MG/1
5 TABLET ORAL EVERY 4 HOURS PRN
Status: DISCONTINUED | OUTPATIENT
Start: 2020-05-25 | End: 2020-06-01 | Stop reason: HOSPADM

## 2020-05-25 RX ORDER — ACETAMINOPHEN 10 MG/ML
1000 INJECTION, SOLUTION INTRAVENOUS EVERY 8 HOURS
Status: DISCONTINUED | OUTPATIENT
Start: 2020-05-25 | End: 2020-05-25

## 2020-05-25 RX ORDER — ACETAMINOPHEN 10 MG/ML
1000 INJECTION, SOLUTION INTRAVENOUS EVERY 8 HOURS
Status: COMPLETED | OUTPATIENT
Start: 2020-05-25 | End: 2020-05-26

## 2020-05-25 RX ORDER — ONDANSETRON 2 MG/ML
INJECTION INTRAMUSCULAR; INTRAVENOUS
Status: DISCONTINUED | OUTPATIENT
Start: 2020-05-25 | End: 2020-05-25

## 2020-05-25 RX ORDER — ASPIRIN 325 MG
325 TABLET ORAL ONCE
Status: DISCONTINUED | OUTPATIENT
Start: 2020-05-25 | End: 2020-05-26

## 2020-05-25 RX ORDER — HEPARIN SODIUM 1000 [USP'U]/ML
INJECTION, SOLUTION INTRAVENOUS; SUBCUTANEOUS
Status: DISCONTINUED | OUTPATIENT
Start: 2020-05-25 | End: 2020-05-25

## 2020-05-25 RX ORDER — DEXTROSE MONOHYDRATE, SODIUM CHLORIDE, AND POTASSIUM CHLORIDE 50; 1.49; 9 G/1000ML; G/1000ML; G/1000ML
5 INJECTION, SOLUTION INTRAVENOUS CONTINUOUS
Status: DISCONTINUED | OUTPATIENT
Start: 2020-05-25 | End: 2020-05-26

## 2020-05-25 RX ORDER — SODIUM CHLORIDE 9 MG/ML
INJECTION, SOLUTION INTRAVENOUS CONTINUOUS PRN
Status: DISCONTINUED | OUTPATIENT
Start: 2020-05-25 | End: 2020-05-25

## 2020-05-25 RX ORDER — POLYETHYLENE GLYCOL 3350 17 G/17G
17 POWDER, FOR SOLUTION ORAL DAILY
Status: DISCONTINUED | OUTPATIENT
Start: 2020-05-25 | End: 2020-06-01 | Stop reason: HOSPADM

## 2020-05-25 RX ORDER — FENTANYL CITRATE 50 UG/ML
INJECTION, SOLUTION INTRAMUSCULAR; INTRAVENOUS
Status: DISCONTINUED | OUTPATIENT
Start: 2020-05-25 | End: 2020-05-25

## 2020-05-25 RX ORDER — KETAMINE HCL IN 0.9 % NACL 50 MG/5 ML
SYRINGE (ML) INTRAVENOUS
Status: DISCONTINUED | OUTPATIENT
Start: 2020-05-25 | End: 2020-05-25

## 2020-05-25 RX ORDER — CEFAZOLIN SODIUM 1 G/3ML
INJECTION, POWDER, FOR SOLUTION INTRAMUSCULAR; INTRAVENOUS
Status: DISCONTINUED | OUTPATIENT
Start: 2020-05-25 | End: 2020-05-25

## 2020-05-25 RX ORDER — ACETAMINOPHEN 500 MG
1000 TABLET ORAL ONCE
Status: DISCONTINUED | OUTPATIENT
Start: 2020-05-25 | End: 2020-05-25

## 2020-05-25 RX ORDER — NICARDIPINE HYDROCHLORIDE 0.2 MG/ML
INJECTION INTRAVENOUS CONTINUOUS PRN
Status: DISCONTINUED | OUTPATIENT
Start: 2020-05-25 | End: 2020-05-25

## 2020-05-25 RX ORDER — OXYCODONE HYDROCHLORIDE 5 MG/1
10 TABLET ORAL EVERY 4 HOURS PRN
Status: DISCONTINUED | OUTPATIENT
Start: 2020-05-25 | End: 2020-06-01 | Stop reason: HOSPADM

## 2020-05-25 RX ORDER — BACITRACIN 50000 [IU]/1
INJECTION, POWDER, FOR SOLUTION INTRAMUSCULAR
Status: DISCONTINUED | OUTPATIENT
Start: 2020-05-25 | End: 2020-05-25

## 2020-05-25 RX ORDER — ROCURONIUM BROMIDE 10 MG/ML
INJECTION, SOLUTION INTRAVENOUS
Status: DISCONTINUED | OUTPATIENT
Start: 2020-05-25 | End: 2020-05-25

## 2020-05-25 RX ORDER — ACETAMINOPHEN 325 MG/1
650 TABLET ORAL EVERY 6 HOURS PRN
Status: DISCONTINUED | OUTPATIENT
Start: 2020-05-25 | End: 2020-06-01 | Stop reason: HOSPADM

## 2020-05-25 RX ORDER — VECURONIUM BROMIDE FOR INJECTION 1 MG/ML
INJECTION, POWDER, LYOPHILIZED, FOR SOLUTION INTRAVENOUS
Status: DISCONTINUED | OUTPATIENT
Start: 2020-05-25 | End: 2020-05-25

## 2020-05-25 RX ORDER — BISACODYL 10 MG
10 SUPPOSITORY, RECTAL RECTAL EVERY 12 HOURS PRN
Status: DISCONTINUED | OUTPATIENT
Start: 2020-05-25 | End: 2020-05-28

## 2020-05-25 RX ORDER — POTASSIUM CHLORIDE 14.9 MG/ML
60 INJECTION INTRAVENOUS
Status: DISCONTINUED | OUTPATIENT
Start: 2020-05-25 | End: 2020-05-29

## 2020-05-25 RX ORDER — DEXMEDETOMIDINE HYDROCHLORIDE 4 UG/ML
0.2 INJECTION, SOLUTION INTRAVENOUS CONTINUOUS
Status: DISCONTINUED | OUTPATIENT
Start: 2020-05-25 | End: 2020-05-27

## 2020-05-25 RX ORDER — MAGNESIUM SULFATE HEPTAHYDRATE 40 MG/ML
4 INJECTION, SOLUTION INTRAVENOUS
Status: DISCONTINUED | OUTPATIENT
Start: 2020-05-25 | End: 2020-05-29

## 2020-05-25 RX ORDER — MIDAZOLAM HYDROCHLORIDE 1 MG/ML
INJECTION, SOLUTION INTRAMUSCULAR; INTRAVENOUS
Status: DISCONTINUED | OUTPATIENT
Start: 2020-05-25 | End: 2020-05-25

## 2020-05-25 RX ORDER — HYDROMORPHONE HYDROCHLORIDE 1 MG/ML
0.2 INJECTION, SOLUTION INTRAMUSCULAR; INTRAVENOUS; SUBCUTANEOUS
Status: DISCONTINUED | OUTPATIENT
Start: 2020-05-25 | End: 2020-05-29

## 2020-05-25 RX ORDER — POTASSIUM CHLORIDE 29.8 MG/ML
40 INJECTION INTRAVENOUS
Status: DISCONTINUED | OUTPATIENT
Start: 2020-05-25 | End: 2020-05-29

## 2020-05-25 RX ORDER — PHENYLEPHRINE HYDROCHLORIDE 10 MG/ML
INJECTION INTRAVENOUS
Status: DISCONTINUED | OUTPATIENT
Start: 2020-05-25 | End: 2020-05-25

## 2020-05-25 RX ORDER — ONDANSETRON 8 MG/1
8 TABLET, ORALLY DISINTEGRATING ORAL EVERY 8 HOURS PRN
Status: DISCONTINUED | OUTPATIENT
Start: 2020-05-25 | End: 2020-06-01 | Stop reason: HOSPADM

## 2020-05-25 RX ORDER — ALBUMIN HUMAN 50 G/1000ML
25 SOLUTION INTRAVENOUS ONCE
Status: COMPLETED | OUTPATIENT
Start: 2020-05-25 | End: 2020-05-25

## 2020-05-25 RX ORDER — ASPIRIN 325 MG
325 TABLET ORAL DAILY
Status: DISCONTINUED | OUTPATIENT
Start: 2020-05-26 | End: 2020-06-01 | Stop reason: HOSPADM

## 2020-05-25 RX ORDER — TRANEXAMIC ACID 100 MG/ML
INJECTION, SOLUTION INTRAVENOUS CONTINUOUS PRN
Status: DISCONTINUED | OUTPATIENT
Start: 2020-05-25 | End: 2020-05-25

## 2020-05-25 RX ORDER — PROPOFOL 10 MG/ML
5 INJECTION, EMULSION INTRAVENOUS CONTINUOUS
Status: DISCONTINUED | OUTPATIENT
Start: 2020-05-25 | End: 2020-05-26

## 2020-05-25 RX ORDER — LIDOCAINE HCL/PF 100 MG/5ML
SYRINGE (ML) INTRAVENOUS
Status: DISCONTINUED | OUTPATIENT
Start: 2020-05-25 | End: 2020-05-25

## 2020-05-25 RX ORDER — ALBUMIN HUMAN 50 G/1000ML
12.5 SOLUTION INTRAVENOUS ONCE
Status: COMPLETED | OUTPATIENT
Start: 2020-05-25 | End: 2020-05-25

## 2020-05-25 RX ORDER — NOREPINEPHRINE BITARTRATE/D5W 4MG/250ML
0.02 PLASTIC BAG, INJECTION (ML) INTRAVENOUS CONTINUOUS
Status: DISCONTINUED | OUTPATIENT
Start: 2020-05-25 | End: 2020-05-26

## 2020-05-25 RX ORDER — MAGNESIUM SULFATE HEPTAHYDRATE 40 MG/ML
2 INJECTION, SOLUTION INTRAVENOUS
Status: DISCONTINUED | OUTPATIENT
Start: 2020-05-25 | End: 2020-05-29

## 2020-05-25 RX ORDER — HYDROMORPHONE HYDROCHLORIDE 1 MG/ML
0.2 INJECTION, SOLUTION INTRAMUSCULAR; INTRAVENOUS; SUBCUTANEOUS
Status: DISCONTINUED | OUTPATIENT
Start: 2020-05-25 | End: 2020-05-25

## 2020-05-25 RX ORDER — MUPIROCIN 20 MG/G
1 OINTMENT TOPICAL 2 TIMES DAILY
Status: COMPLETED | OUTPATIENT
Start: 2020-05-25 | End: 2020-05-30

## 2020-05-25 RX ORDER — ONDANSETRON 2 MG/ML
4 INJECTION INTRAMUSCULAR; INTRAVENOUS EVERY 12 HOURS PRN
Status: DISCONTINUED | OUTPATIENT
Start: 2020-05-25 | End: 2020-06-01 | Stop reason: HOSPADM

## 2020-05-25 RX ORDER — POTASSIUM CHLORIDE 14.9 MG/ML
20 INJECTION INTRAVENOUS
Status: DISCONTINUED | OUTPATIENT
Start: 2020-05-25 | End: 2020-05-29

## 2020-05-25 RX ORDER — TRANEXAMIC ACID 100 MG/ML
INJECTION, SOLUTION INTRAVENOUS
Status: DISCONTINUED | OUTPATIENT
Start: 2020-05-25 | End: 2020-05-25

## 2020-05-25 RX ORDER — PAPAVERINE HYDROCHLORIDE 30 MG/ML
INJECTION INTRAMUSCULAR; INTRAVENOUS
Status: DISCONTINUED | OUTPATIENT
Start: 2020-05-25 | End: 2020-05-25

## 2020-05-25 RX ORDER — FENTANYL CITRATE 50 UG/ML
25 INJECTION, SOLUTION INTRAMUSCULAR; INTRAVENOUS ONCE
Status: COMPLETED | OUTPATIENT
Start: 2020-05-25 | End: 2020-05-25

## 2020-05-25 RX ADMIN — HEPARIN SODIUM 26000 UNITS: 1000 INJECTION, SOLUTION INTRAVENOUS; SUBCUTANEOUS at 11:05

## 2020-05-25 RX ADMIN — HYDROMORPHONE HYDROCHLORIDE 0.2 MG: 1 INJECTION, SOLUTION INTRAMUSCULAR; INTRAVENOUS; SUBCUTANEOUS at 09:05

## 2020-05-25 RX ADMIN — PHENYLEPHRINE HYDROCHLORIDE 50 MCG: 10 INJECTION INTRAVENOUS at 08:05

## 2020-05-25 RX ADMIN — ESMOLOL HYDROCHLORIDE 10 MG: 10 INJECTION INTRAVENOUS at 08:05

## 2020-05-25 RX ADMIN — CALCIUM CHLORIDE 0.5 G: 100 INJECTION, SOLUTION INTRAVENOUS at 02:05

## 2020-05-25 RX ADMIN — PROTAMINE SULFATE 220 MG: 10 INJECTION, SOLUTION INTRAVENOUS at 02:05

## 2020-05-25 RX ADMIN — LIDOCAINE HYDROCHLORIDE 100 MG: 20 INJECTION, SOLUTION INTRAVENOUS at 07:05

## 2020-05-25 RX ADMIN — PROPOFOL 30 MG: 10 INJECTION, EMULSION INTRAVENOUS at 11:05

## 2020-05-25 RX ADMIN — PROPOFOL 10 MG: 10 INJECTION, EMULSION INTRAVENOUS at 08:05

## 2020-05-25 RX ADMIN — PROPOFOL 35 MCG/KG/MIN: 10 INJECTION, EMULSION INTRAVENOUS at 07:05

## 2020-05-25 RX ADMIN — ROCURONIUM BROMIDE 100 MG: 10 INJECTION, SOLUTION INTRAVENOUS at 07:05

## 2020-05-25 RX ADMIN — Medication 10 MG: at 10:05

## 2020-05-25 RX ADMIN — Medication 10 MG: at 08:05

## 2020-05-25 RX ADMIN — FENTANYL CITRATE 50 MCG: 50 INJECTION, SOLUTION INTRAMUSCULAR; INTRAVENOUS at 11:05

## 2020-05-25 RX ADMIN — ALBUMIN (HUMAN) 25 G: 12.5 SOLUTION INTRAVENOUS at 06:05

## 2020-05-25 RX ADMIN — TRANEXAMIC ACID 1 MG/KG/HR: 100 INJECTION, SOLUTION INTRAVENOUS at 08:05

## 2020-05-25 RX ADMIN — PROPOFOL 30 MG: 10 INJECTION, EMULSION INTRAVENOUS at 09:05

## 2020-05-25 RX ADMIN — NITROGLYCERIN 25 MCG: 5 INJECTION, SOLUTION INTRAVENOUS at 11:05

## 2020-05-25 RX ADMIN — HEPARIN SODIUM 5000 UNITS: 1000 INJECTION, SOLUTION INTRAVENOUS; SUBCUTANEOUS at 11:05

## 2020-05-25 RX ADMIN — VECURONIUM BROMIDE 5 MG: 10 INJECTION, POWDER, LYOPHILIZED, FOR SOLUTION INTRAVENOUS at 11:05

## 2020-05-25 RX ADMIN — DEXMEDETOMIDINE HYDROCHLORIDE 0.2 MCG/KG/HR: 4 INJECTION, SOLUTION INTRAVENOUS at 10:05

## 2020-05-25 RX ADMIN — FENTANYL CITRATE 50 MCG: 50 INJECTION, SOLUTION INTRAMUSCULAR; INTRAVENOUS at 07:05

## 2020-05-25 RX ADMIN — HYDROMORPHONE HYDROCHLORIDE 0.2 MG: 1 INJECTION, SOLUTION INTRAMUSCULAR; INTRAVENOUS; SUBCUTANEOUS at 04:05

## 2020-05-25 RX ADMIN — MUPIROCIN 1 G: 20 OINTMENT TOPICAL at 08:05

## 2020-05-25 RX ADMIN — SODIUM CHLORIDE, SODIUM GLUCONATE, SODIUM ACETATE, POTASSIUM CHLORIDE, MAGNESIUM CHLORIDE, SODIUM PHOSPHATE, DIBASIC, AND POTASSIUM PHOSPHATE: .53; .5; .37; .037; .03; .012; .00082 INJECTION, SOLUTION INTRAVENOUS at 08:05

## 2020-05-25 RX ADMIN — SODIUM CHLORIDE 3 UNITS/HR: 9 INJECTION, SOLUTION INTRAVENOUS at 10:05

## 2020-05-25 RX ADMIN — ESMOLOL HYDROCHLORIDE 20 MG: 10 INJECTION INTRAVENOUS at 07:05

## 2020-05-25 RX ADMIN — ALBUMIN (HUMAN) 12.5 G: 12.5 SOLUTION INTRAVENOUS at 04:05

## 2020-05-25 RX ADMIN — NOREPINEPHRINE BITARTRATE 0.02 MCG/KG/MIN: 1 INJECTION, SOLUTION, CONCENTRATE INTRAVENOUS at 08:05

## 2020-05-25 RX ADMIN — FENTANYL CITRATE 25 MCG: 50 INJECTION INTRAMUSCULAR; INTRAVENOUS at 05:05

## 2020-05-25 RX ADMIN — SODIUM CHLORIDE: 0.9 INJECTION, SOLUTION INTRAVENOUS at 06:05

## 2020-05-25 RX ADMIN — CEFAZOLIN 2 G: 330 INJECTION, POWDER, FOR SOLUTION INTRAMUSCULAR; INTRAVENOUS at 09:05

## 2020-05-25 RX ADMIN — PROPOFOL 30 MG: 10 INJECTION, EMULSION INTRAVENOUS at 10:05

## 2020-05-25 RX ADMIN — HYDROMORPHONE HYDROCHLORIDE 0.2 MG: 1 INJECTION, SOLUTION INTRAMUSCULAR; INTRAVENOUS; SUBCUTANEOUS at 11:05

## 2020-05-25 RX ADMIN — POTASSIUM CHLORIDE: 200 INJECTION, SOLUTION INTRAVENOUS at 02:05

## 2020-05-25 RX ADMIN — PROPOFOL 50 MG: 10 INJECTION, EMULSION INTRAVENOUS at 07:05

## 2020-05-25 RX ADMIN — CALCIUM CHLORIDE 0.3 G: 100 INJECTION, SOLUTION INTRAVENOUS at 02:05

## 2020-05-25 RX ADMIN — MIDAZOLAM HYDROCHLORIDE 1 MG: 1 INJECTION, SOLUTION INTRAMUSCULAR; INTRAVENOUS at 07:05

## 2020-05-25 RX ADMIN — ACETAMINOPHEN 1000 MG: 10 INJECTION, SOLUTION INTRAVENOUS at 06:05

## 2020-05-25 RX ADMIN — CEFAZOLIN 2 G: 330 INJECTION, POWDER, FOR SOLUTION INTRAMUSCULAR; INTRAVENOUS at 08:05

## 2020-05-25 RX ADMIN — DEXTROSE MONOHYDRATE, SODIUM CHLORIDE, AND POTASSIUM CHLORIDE 5 ML/HR: 50; 9; 1.49 INJECTION, SOLUTION INTRAVENOUS at 05:05

## 2020-05-25 RX ADMIN — PHENYLEPHRINE HYDROCHLORIDE 100 MCG: 10 INJECTION INTRAVENOUS at 08:05

## 2020-05-25 RX ADMIN — PROPOFOL 50 MCG/KG/MIN: 10 INJECTION, EMULSION INTRAVENOUS at 03:05

## 2020-05-25 RX ADMIN — ACETAMINOPHEN 1000 MG: 10 INJECTION, SOLUTION INTRAVENOUS at 09:05

## 2020-05-25 RX ADMIN — NOREPINEPHRINE BITARTRATE 8 MCG: 1 INJECTION, SOLUTION, CONCENTRATE INTRAVENOUS at 11:05

## 2020-05-25 RX ADMIN — ONDANSETRON 4 MG: 2 INJECTION, SOLUTION INTRAMUSCULAR; INTRAVENOUS at 03:05

## 2020-05-25 RX ADMIN — CALCIUM CHLORIDE 0.7 G: 100 INJECTION, SOLUTION INTRAVENOUS at 02:05

## 2020-05-25 RX ADMIN — FENTANYL CITRATE 100 MCG: 50 INJECTION, SOLUTION INTRAMUSCULAR; INTRAVENOUS at 07:05

## 2020-05-25 RX ADMIN — SODIUM CHLORIDE 7 UNITS/HR: 9 INJECTION, SOLUTION INTRAVENOUS at 04:05

## 2020-05-25 RX ADMIN — Medication 10 MG: at 12:05

## 2020-05-25 RX ADMIN — PROPOFOL 10 MG: 10 INJECTION, EMULSION INTRAVENOUS at 07:05

## 2020-05-25 RX ADMIN — SUGAMMADEX 400 MG: 100 INJECTION, SOLUTION INTRAVENOUS at 03:05

## 2020-05-25 RX ADMIN — NICARDIPINE HYDROCHLORIDE 10 MG/HR: 0.2 INJECTION, SOLUTION INTRAVENOUS at 03:05

## 2020-05-25 RX ADMIN — FENTANYL CITRATE 50 MCG: 50 INJECTION, SOLUTION INTRAMUSCULAR; INTRAVENOUS at 10:05

## 2020-05-25 RX ADMIN — NICARDIPINE HYDROCHLORIDE 5 MG/HR: 0.2 INJECTION, SOLUTION INTRAVENOUS at 08:05

## 2020-05-25 RX ADMIN — TRANEXAMIC ACID 900 MG: 100 INJECTION, SOLUTION INTRAVENOUS at 08:05

## 2020-05-25 RX ADMIN — PROPOFOL 20 MG: 10 INJECTION, EMULSION INTRAVENOUS at 09:05

## 2020-05-25 RX ADMIN — PROPOFOL 20 MG: 10 INJECTION, EMULSION INTRAVENOUS at 08:05

## 2020-05-25 RX ADMIN — EPINEPHRINE 0.04 MCG/KG/MIN: 1 INJECTION INTRAMUSCULAR; INTRAVENOUS; SUBCUTANEOUS at 01:05

## 2020-05-25 RX ADMIN — VECURONIUM BROMIDE 5 MG: 10 INJECTION, POWDER, LYOPHILIZED, FOR SOLUTION INTRAVENOUS at 01:05

## 2020-05-25 RX ADMIN — FENTANYL CITRATE 100 MCG: 50 INJECTION, SOLUTION INTRAMUSCULAR; INTRAVENOUS at 09:05

## 2020-05-25 RX ADMIN — ONDANSETRON HYDROCHLORIDE 4 MG: 2 SOLUTION INTRAMUSCULAR; INTRAVENOUS at 11:05

## 2020-05-25 RX ADMIN — CEFAZOLIN 2 G: 330 INJECTION, POWDER, FOR SOLUTION INTRAMUSCULAR; INTRAVENOUS at 01:05

## 2020-05-25 RX ADMIN — NICARDIPINE HYDROCHLORIDE 2.5 MG/HR: 0.2 INJECTION, SOLUTION INTRAVENOUS at 12:05

## 2020-05-25 RX ADMIN — Medication 20 MG: at 07:05

## 2020-05-25 NOTE — PROGRESS NOTES
Pt admitted to SICU [id number] via anesthesia team, attached to bedside monitor and ventilator. VSS upon arrival. CTS team and charge nurse notified and at bedside. Orders received and implemented. Head to toe assessment completed of patient at this time. Midsternal incision and chest tube dressings dated, no drainage noted. Pacer wires isolated. Chest tubes placed to suction, lombardi emptied. No other significant events upon admit. Pt side rails raised. Plan of care reviewed with pt, all questions answered. Will continue to monitor closely.    Skin Note: No breakdown noted to pt sacrum or heels. Heel boots applied, foam applied to pt sacrum. SCD applied to pt RLE.

## 2020-05-25 NOTE — PLAN OF CARE
Problem: Adult Inpatient Plan of Care  Goal: Plan of Care Review  Outcome: Ongoing, Progressing  Plan of care reviewed with pt. MD came to bedside, all questions and concerns addressed. [facility]     Problem: Diabetes Comorbidity  Goal: Blood Glucose Level Within Desired Range  Outcome: Ongoing, Progressing   BG monitoring, voices understanding.     Problem: Fall Injury Risk  Goal: Absence of Fall and Fall-Related Injury  Outcome: Ongoing, Progressing  Compliant with all fall precautions. Remains free of falls or injuries. [facility]    VSS, Ambulatory, denies pain or discomfort. AAOx4.

## 2020-05-25 NOTE — CARE UPDATE
Pt arrived to SICU intubated with 7.0 ETT secured 24 cm at the lips and connected to ventilator on documented settings. EKG and ABG done. Results are within normal limits. Pt tolerating well at this time. Will continue to monitor closely.

## 2020-05-25 NOTE — PLAN OF CARE
Care Update    Spoke to CTS who took patient to OR for CABG. CTS to assume care as primary team moving forward.      Thank you for your consult. I will sign off. Please contact us if you have any additional questions.    Estella Tubbs MD/MS  [facility]   Internal Medicine, PGY-2

## 2020-05-25 NOTE — ANESTHESIA PROCEDURE NOTES
[place]    Diagnosis: CAD  Patient location during procedure: OR  Procedure start time: 5/25/2020 8:31 AM  Timeout: 5/25/2020 8:30 AM  Procedure end time: 5/25/2020 8:45 AM  Surgery related to: CAD  Staffing  Anesthesiologist: Holger Brandt Jr., MD  Other anesthesia staff: Aguilar Li MD  Performed: other anesthesia staff, resident and anesthesiologist   Preanesthetic Checklist  Completed: patient identified, surgical consent, pre-op evaluation, timeout performed, risks and benefits discussed, monitors and equipment checked, anesthesia consent given, oxygen available, suction available, hand hygiene performed and patient being monitored  Setup & Induction  Patient preparation: bite block inserted  Probe Insertion: easy  Exam: complete      Findings  Impression  Other Findings  Pre:  Normal biventricular structure and function  All valves normal functioning with trace MR and TR  Aortic plaque throughout, worst in arch    Post:  Unchanged from prior with minimal pressor support post CPB  Probe Removal      Exam     Left Heart  Left Atruim: normal and normal (men 3.0-4.0; women 2.7-3.8)  Left appendage velocity:45      LV Wall Thickness (posterior wall):0.8 cm    LVAD:no  Estimated Ejection Fraction: > 55% normal  Regional Wall Abnormalities: no RWMA        Left Ventricle Diastolic Function    E/A Ratio: pseudonormal (0.8-1.5)  Lateral E': 8 cm/s    Right Heart  Right Ventricle: normal  Right Ventricle Function: normal    Intra Atrial Septum  PFO: no shunt by color flow doppler  no IAS aneurysm  no lipomatous hypertrophy  no Atrial Septal Defect (Asd)    Right Ventricle  Size: normal    Aortic Valve:  Stenosis: none  Morphology: trileaflet  Regurgitation: no aortic valve regurgitation     Mitral Valve:  Morphology:normal  Jet Description: none    Tricuspid Valve:  Morphology: normal  Regurgitation: none    Pulmonic Valve:  Morphology:normal  Regurgitation(color flow): none    Great Vessels  Ascending Aorta  Atherosclerosis: 2=mild dz (<3mm)  Aortic Arch Atherosclerosis: 3=sessile dz (3-5mm)  IABP: no  Descending Aorta Atherosclerosis: 2=mild dz (<3mm)  Aorta    Descending aorta IABP: no    Effusions  Effusions: none    Summary  Findings discussed with surgeon.    Other Findings   Pre:  Normal biventricular structure and function  All valves normal functioning with trace MR and TR  Aortic plaque throughout, worst in arch    Post:  Unchanged from prior with minimal pressor support post CPB

## 2020-05-25 NOTE — SUBJECTIVE & OBJECTIVE
Interval HPI:   Overnight events: Admitted to SICU s/p CABG. BG above goal ranges on IV intensive insulin protocol with infusion rates ranging from 3-8 u/hr.  Eating:   NPO  Nausea: No  Hypoglycemia and intervention: No  Fever: No  TPN and/or TF: No  If yes, type of TF/TPN and rate: none    PMH, PSH, FH, SH reviewed     ROS:  Unable to obtain due to: Sedation,Intubation,Altered mental status,Critical illness,Reviewed ROS from note dated 5/25/20 by Cathleen Maher MD    Review of Systems    Current Medications and/or Treatments Impacting Glycemic Control  Immunotherapy:    Immunosuppressants     None        Steroids:   Hormones (From admission, onward)    Start     Stop Route Frequency Ordered    05/25/20 1630  vasopressin (PITRESSIN) 0.2 Units/mL in dextrose 5 % 100 mL infusion      -- IV Continuous 05/25/20 1529        Pressors:    Autonomic Drugs (From admission, onward)    Start     Stop Route Frequency Ordered    05/25/20 1630  EPINEPHrine (ADRENALIN) 5 mg in sodium chloride 0.9% 250 mL infusion     Question Answer Comment   Titrate by: (in mcg/kg/min) 0.01    Titrate interval: (in minutes) 5    Titrate to maintain: (SBP or MAP or Cardiac Index) SBP    Maximum dose: (in mcg/kg/min) 2        -- IV Continuous 05/25/20 1529    05/25/20 1630  norepinephrine 4 mg in dextrose 5% 250 mL infusion (premix) (titrating)     Question Answer Comment   Titrate by: (in mcg/kg/min) 0.05    Titrate interval: (in minutes) 5    Titrate to maintain: (MAP or SBP) MAP    Greater than: (in mmHg) 65    Maximum dose: (in mcg/kg/min) 3        -- IV Continuous 05/25/20 1529        Hyperglycemia/Diabetes Medications:   Antihyperglycemics (From admission, onward)    Start     Stop Route Frequency Ordered    05/25/20 1630  insulin regular 100 Units in sodium chloride 0.9% 100 mL infusion     Question:  Insulin rate changes (DO NOT MODIFY ANSWER)  Answer:  [email or web address]    --  IV Continuous 05/25/20 1529             PHYSICAL EXAMINATION:  Vitals:    05/25/20 0638   BP: (!) 157/74   Pulse:    Resp:    Temp:      Body mass index is 34.21 kg/m².    Physical Exam   Deferred to decrease risk/exposure of COVID-19.

## 2020-05-25 NOTE — NURSING
Notified Dr Almonte that pt is bradycardic, into the mid 40's despite decreased dose of metoprolol. Pt is currently resting, asymptomatic. No new orders at this time. TM

## 2020-05-25 NOTE — OP NOTE
DATE OF PROCEDURE:  05/25/2020     ATTENDING SURGEON:  Yan Bowman M.D.    ASSISTANT:  Cathleen Maher MD, (Cardiothoracic Resident)     PREOPERATIVE DIAGNOSES:  1. Coronary artery disease.  2. Diabetes mellitus.  3. Hypertension.  4. Hyperlipidemia.  5. History of smoking     POSTOPERATIVE DIAGNOSES:  1. Coronary artery disease.  2. Diabetes mellitus.  3. Hypertension.  4. Hyperlipidemia.  5. History of smoking     OPERATION PERFORMED:  Coronary artery bypass grafting x3, with left internal   mammary artery to left anterior descending, saphenous vein graft as a sequenced graft to the second obtuse   marginal and to the fourth obtuse marginal     ANESTHESIA:  General endotracheal.     ESTIMATED BLOOD LOSS:  100 mL.     BRIEF HISTORY:  Ms. Barahona is a pleasant 70-year-old woman who initially presented to [facility] with a 4 day history of chest pain.  Her symptoms originally had started in December of last year, at which time a nuclear study was done which was unremarkable.  She had had only exertional angina until the recent 4 day period of persistent chest pain.  She was admitted to [facility], and underwent a thoughtful and thorough evaluation.  Coronary angiography demonstrated multivessel disease.  She now presents for coronary artery bypass grafting.     PROCEDURE IN DETAIL:  After obtaining informed and written consent, the patient   was brought to the Operating Room and placed on the operating room table in   supine position.  After induction of adequate general endotracheal anesthesia,   the patient's chest, abdomen, pelvis and bilateral lower extremities were   prepped and draped in the usual sterile fashion.  Skin incisions were made over   the chest and left lower extremity.  Through the left lower extremity skin   incision, greater saphenous vein was harvested using endoscopic technique.  Once   the vein was out, the leg was closed in multiple layers of absorbable suture   material.   Through the chest incision, a median sternotomy was performed.  The   left internal mammary artery was completely dissected, but not divided.  A   pericardial well was created.  The patient was systemically heparinized.    Cannulation sutures were placed in the aorta and in the right atrial appendage.    The aortic cannula was inserted, followed by the venous.  Antegrade and   retrograde cardioplegia catheters were placed.  The mammary was divided   distally, and its end was prepared.  The patient was then put on cardiopulmonary   bypass.  Once on bypass, the aortic crossclamp was applied and the heart was   arrested using cold blood enhanced antegrade cardioplegia.  A prompt   electromechanical arrest was achieved.  Once the cardioplegia was all in, we   first addressed the fourth obtuse marginal.  A site suitable for grafting   was located, and an arteriotomy was made.  The vein was brought up, its end was   spatulated, and the anastomosis was performed using a running 7-0 Prolene   suture.  After completion of the anastomosis, it was tested.  It was hemostatic.    Another dose of cardioplegia was given retrograde and down this vein.  We then   turned our attention to the anterolateral wall.  The second obtuse marginal was identified and an arteriotomy was made.  The vein was brought up and a site suitable for a side to side anastomosis was identified.  After insuring that there was no rotation of the vein, a longitudinal venotomy was created.  A adin on adin side to side anastomosis was performed using a running 7-0 Prolene   suture.  After completion of the anastomosis, it was tested in isolation.  It was hemostatic.    Another dose of cardioplegia was given retrograde and down this vein.  We then   turned our attention to the anterior wall.  A site suitable for grafting in the   distal third of the LAD was identified, and an arteriotomy was made.  The   mammary was brought up, and the anastomosis was  performed using a running 7-0   Prolene suture.  After completion of the anastomosis, it was tested.  It was   hemostatic.  Another dose of cardioplegia was given retrograde and down the   lateral wall vein.  The mammary pedicle was tacked to the heart using two 5-0   Prolene sutures.  We then prepared to do the venous proximal. A #11 blade was used to incise the aorta and an aortic   punch to enlarge the aortotomy.  The vein was checked for rotation, and cut to   an appropriate length.  The end was spatulated, and the proximal   anastomosis was performed using a running 5-0 Prolene suture.  After completion   of the proximal, the hot shot was given retrograde.  Root de-airing maneuvers   were performed, and the aortic cross-clamp was removed.  The patient was   subsequently weaned from cardiopulmonary bypass.  The patient did separate   easily from bypass.  Once off bypass, all surgical sites were inspected.  There   was good hemostasis.  The test dose of protamine was administered, and this was   well tolerated.  The total dose was then given.  [place] through the total dose,   all cannulas were removed.  All surgical sites were again inspected, again   there was good hemostasis.  Ventricular pacing wires were placed.  Drains   were placed.  After again   confirming adequate hemostasis, the patient's chest was closed using #6   stainless steel wires to reapproximate the sternum.  The overlying soft tissues   were reapproximated using absorbable suture material.  The patient's chest was   washed and dried, and a dry dressing was applied.  The patient tolerated the   procedure well, there were no complications.  At the conclusion of the case,   sponge and instrument counts were correct.

## 2020-05-25 NOTE — BRIEF OP NOTE
[facility]  Cardiothoracic Surgery  Operative Note    SUMMARY     Date of Procedure: 5/25/2020     Procedure: Procedure(s) (LRB):  CORONARY ARTERY BYPASS GRAFT (CABG) x3 with LIMA-LAD, SVG-OM2-OM4 (sequenced graft)  48870, 30773, 07338    Surgeon(s) and Role:     * Yan Bowman MD - Primary     * Cathleen Maher MD - Fellow    Assisting Surgeon: None    Pre-Operative Diagnosis: Coronary artery disease of native artery of native heart with stable angina pectoris [I25.118]    Post-Operative Diagnosis: Post-Op Diagnosis Codes:     * Coronary artery disease of native artery of native heart with stable angina pectoris [I25.118]    Anesthesia: General        Description of the Findings of the Procedure: Good conduit, good distal targets.        Complications: None    Estimated Blood Loss (EBL): 100 mL             Specimens:   Specimen (12h ago, onward)    None                  Attestation:  I was present and scrubbed for the procedure.

## 2020-05-25 NOTE — ANESTHESIA PROCEDURE NOTES
Tranversus Thoracis Plane Block    Patient location during procedure: OR   Block not for primary anesthetic.  Reason for block: at surgeon's request and post-op pain management   Post-op Pain Location: Bilateral sternum  Start time: 5/25/2020 8:22 AM  Timeout: 5/25/2020 8:00 AM   End time: 5/25/2020 8:28 AM    Staffing  Authorizing Provider: Holger Brandt Jr., MD  Performing Provider: Aguilar Li MD    Preanesthetic Checklist  Completed: patient identified, site marked, surgical consent, pre-op evaluation, timeout performed, IV checked, risks and benefits discussed and monitors and equipment checked  Peripheral Block  Patient position: supine  Prep: ChloraPrep  Patient monitoring: heart rate, cardiac monitor, continuous pulse ox, continuous capnometry and frequent blood pressure checks  Block type: Transversus thoracis  Laterality: bilateral  Injection technique: single shot  Needle  Needle type: Stimuplex   Needle gauge: 21 G  Needle length: 4 in  Needle localization: ultrasound guidance     Assessment  Injection assessment: negative aspiration and local visualized surrounding nerve  Paresthesia pain: none  Heart rate change: no  Slow fractionated injection: yes  Additional Notes  VSS.  See intra-op record for vitals.  Patient tolerated procedure well.    15 cc of 0.5% bupivacaine used for the block on each side.

## 2020-05-25 NOTE — HPI
Reason for Consult: Management of T2DM, Hyperglycemia     Surgical Procedure and Date: CABG x 3 5/25/20    Lab Results   Component Value Date    HGBA1C 7.1 (H) 05/19/2020     Diabetes diagnosis year: >10 years    Home Diabetes Medications:  (per [facility] endo team)  Jardiance 10 mg daily, Tresiba 36 units daily, Metformin 1000 mg BID    How often checking glucose at home? Free Style dionna  BG readings on regimen: 90's 100's  Hypoglycemia on the regimen? No  Missed doses on regimen?  No    Diabetes Complications include:     Hyperglycemia    Complicating diabetes co morbidities:   History of CVA and CHETNA      HPI:   Patient is a 70 y.o. female with a diagnosis of CAD (newly diagnosed), HTN, HLD, DM2 (on insulin HbA1c 7.1%). Admitted to [facility] on 5/18 with worsening chest pain for the past 4 days. She started having chest pain in 12/2019, she underwent a SPECT which was unremarkable. No prior history of MI or CAD.  In OSH, troponin negative x4. 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 She is now s/p CABG. Endocrinology consulted for management of T2DM.

## 2020-05-25 NOTE — ANESTHESIA PROCEDURE NOTES
Arterial    Diagnosis: CAD    Patient location during procedure: done in OR  Procedure start time: 5/25/2020 7:42 AM  Timeout: 5/25/2020 7:40 AM  Procedure end time: 5/25/2020 7:53 AM    Staffing  Authorizing Provider: Holger Brandt Jr., MD  Performing Provider: Aguilar Li MD    Anesthesiologist was present at the time of the procedure.    Preanesthetic Checklist  Completed: patient identified, site marked, surgical consent, pre-op evaluation, timeout performed, IV checked, risks and benefits discussed, monitors and equipment checked and anesthesia consent givenArterial  Skin Prep: chlorhexidine gluconate and isopropyl alcohol  Local Infiltration: lidocaine  Orientation: right  Location: radial  Catheter Size: 20 G  Catheter placement by Ultrasound guidance. Heme positive aspiration all ports.  Vessel Caliber: small, patent, compressibility normal  Needle advanced into vessel with real time Ultrasound guidance.Insertion Attempts: 3  Assessment  Dressing: secured with tape and tegaderm  Patient: Tolerated well

## 2020-05-25 NOTE — CONSULTS
[facility]  Endocrinology  Diabetes Consult Note    Consult Requested by: Yan Bowman MD   Reason for admit: Coronary artery disease    HISTORY OF PRESENT ILLNESS:  Reason for Consult: Management of T2DM, Hyperglycemia     Surgical Procedure and Date: CABG x 3 5/25/20    Lab Results   Component Value Date    HGBA1C 7.1 (H) 05/19/2020     Diabetes diagnosis year: TSERING    Home Diabetes Medications:  Jardiance 10 mg daily, Tresiba 36 units daily, Metformin 1000 mg BID    How often checking glucose at home? TSERING  BG readings on regimen: TSERING  Hypoglycemia on the regimen? TSERING  Missed doses on regimen?  TSERING    Diabetes Complications include:     Hyperglycemia    Complicating diabetes co morbidities:   History of CVA and CHETNA      HPI:   Patient is a 70 y.o. female with a diagnosis of CAD (newly diagnosed), HTN, HLD, DM2 (on insulin HbA1c 7.1%). Admitted to [facility] on 5/18 with worsening chest pain for the past 4 days. She started having chest pain in 12/2019, she underwent a SPECT which was unremarkable. No prior history of MI or CAD.  In OSH, troponin negative x4. 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  She is now s/p CABG. Endocrinology consulted for management of T2DM.        Interval HPI:   Overnight events: Admitted to SICU s/p CABG. BG above goal ranges on IV intensive insulin protocol with infusion rates ranging from 3-8 u/hr.  Eating:   NPO  Nausea: No  Hypoglycemia and intervention: No  Fever: No  TPN and/or TF: No  If yes, type of TF/TPN and rate: none    PMH, PSH, FH, SH reviewed     ROS:  Unable to obtain due to: Sedation,Intubation,Altered mental status,Critical illness,Reviewed ROS from note dated 5/25/20 by Cathleen Maher MD    Review of Systems    Current Medications and/or Treatments Impacting Glycemic Control  Immunotherapy:    Immunosuppressants     None        Steroids:   Hormones (From admission, onward)     Start     Stop Route Frequency Ordered    05/25/20 1630  vasopressin (PITRESSIN) 0.2 Units/mL in dextrose 5 % 100 mL infusion      -- IV Continuous 05/25/20 1529        Pressors:    Autonomic Drugs (From admission, onward)    Start     Stop Route Frequency Ordered    05/25/20 1630  EPINEPHrine (ADRENALIN) 5 mg in sodium chloride 0.9% 250 mL infusion     Question Answer Comment   Titrate by: (in mcg/kg/min) 0.01    Titrate interval: (in minutes) 5    Titrate to maintain: (SBP or MAP or Cardiac Index) SBP    Maximum dose: (in mcg/kg/min) 2        -- IV Continuous 05/25/20 1529    05/25/20 1630  norepinephrine 4 mg in dextrose 5% 250 mL infusion (premix) (titrating)     Question Answer Comment   Titrate by: (in mcg/kg/min) 0.05    Titrate interval: (in minutes) 5    Titrate to maintain: (MAP or SBP) MAP    Greater than: (in mmHg) 65    Maximum dose: (in mcg/kg/min) 3        -- IV Continuous 05/25/20 1529        Hyperglycemia/Diabetes Medications:   Antihyperglycemics (From admission, onward)    Start     Stop Route Frequency Ordered    05/25/20 1630  insulin regular 100 Units in sodium chloride 0.9% 100 mL infusion     Question:  Insulin rate changes (DO NOT MODIFY ANSWER)  Answer:  \\[email or web address]    -- IV Continuous 05/25/20 1529             PHYSICAL EXAMINATION:  Vitals:    05/25/20 0638   BP: (!) 157/74   Pulse:    Resp:    Temp:      Body mass index is 34.21 kg/m².    Physical Exam   Deferred to decrease risk/exposure of COVID-19.      Labs Reviewed and Include   Recent Labs   Lab 05/25/20  0239   *   CALCIUM 9.3      K 3.7   CO2 26      BUN 14   CREATININE 0.9     Lab Results   Component Value Date    WBC 10.18 05/25/2020    HGB 12.2 05/25/2020    HCT 26 (L) 05/25/2020    MCV 93 05/25/2020     05/25/2020     No results for input(s): TSH, FREET4 in the last 168 hours.  Lab Results   Component Value Date    HGBA1C 7.1 (H)  05/19/2020       Nutritional status:   Body mass index is 34.21 kg/m².  Lab Results   Component Value Date    ALBUMIN 3.2 (L) 05/19/2020    ALBUMIN 3.5 05/05/2020    ALBUMIN 3.4 (L) 12/20/2019     No results found for: PREALBUMIN    Estimated Creatinine Clearance: 61.9 mL/min (based on SCr of 0.9 mg/dL).    Accu-Checks  Recent Labs     05/23/20  0725 05/23/20  1109 05/23/20  1614 05/23/20  2119 05/24/20  0733 05/24/20  1120 05/24/20  1620 05/24/20  2104 05/25/20  0645 05/25/20  1546   POCTGLUCOSE 123* 141* 127* 155* 123* 158* 135* 213* 123* 208*        ASSESSMENT and PLAN    * Coronary artery disease  Managed per primary team  S/p CABG      S/P CABG (coronary artery bypass graft)  Managed per primary team  Optimize BG control      Severe obesity (BMI 35.0-39.9) with comorbidity  Body mass index is 34.21 kg/m².  May increase insulin resistance.         Mixed hyperlipidemia  May increase insulin resistance.         Type 2 diabetes mellitus without complication, with long-term current use of insulin  BG goal 110-140    Continue IV insulin infusion protocol  Requires intensive BG monitoring while on protocol     ** Please call Endocrine for any BG related issues **  ** Please notify Endocrine for any change and/or advance in diet**    Discharge planning: JEWEL Estrada NP  Endocrinology  [facility]

## 2020-05-25 NOTE — ASSESSMENT & PLAN NOTE
BG goal 110-140    Continue IV insulin infusion protocol  Requires intensive BG monitoring while on protocol     ** Please call Endocrine for any BG related issues **  ** Please notify Endocrine for any change and/or advance in diet**    Discharge planning: JEWEL

## 2020-05-25 NOTE — ANESTHESIA PROCEDURE NOTES
Central Line    Diagnosis: CAD  Patient location during procedure: done in OR  Procedure start time: 5/25/2020 8:02 AM  Timeout: 5/25/2020 8:00 AM  Procedure end time: 5/25/2020 8:20 AM    Staffing  Authorizing Provider: Holger Brandt Jr., MD  Performing Provider: Aguilar Li MD    Staffing  Anesthesiologist: Holger Brandt Jr., MD  Resident/CRNA: Aguilar Li MD  Performed: resident/CRNA   Anesthesiologist was present at the time of the procedure.  Preanesthetic Checklist  Completed: patient identified, site marked, surgical consent, pre-op evaluation, timeout performed, IV checked, risks and benefits discussed, monitors and equipment checked and anesthesia consent given  Indication   Indication: hemodynamic monitoring, vascular access, med administration     Anesthesia   general anesthesia    Central Line   Skin Prep: skin prepped with ChloraPrep, skin prep agent completely dried prior to procedure  maximum sterile barriers used during central venous catheter insertion  hand hygiene performed prior to central venous catheter insertion  Location: right, internal jugular.   Catheter type: quad lumen  Catheter Size: 8.5 Fr  Inserted Catheter Length: 16 cm  Ultrasound: vascular probe with ultrasound  Vessel Caliber: large, patent  Vascular Doppler:  not done, compressibility normal  Needle advanced into vessel with real time Ultrasound guidance.  Guidewire confirmed in vessel.   Manometry: Venous cannualation confirmed by visual estimation of blood vessel pressure using manometry.  Insertion Attempts: 1   Securement:line sutured, chlorhexidine patch, sterile dressing applied and blood return through all ports    Post-Procedure   Adverse Events:none    Guidewire Guidewire removed intact.

## 2020-05-25 NOTE — ANESTHESIA PROCEDURE NOTES
Airway Management  Performed by: Radha Mckoy MD  Authorized by: Holger Brandt Jr., MD     Intubation:     Induction:  Intravenous    Intubated:  Postinduction    Mask Ventilation:  Moderately difficult with oral airway    Attempts:  1    Attempted By:  Resident anesthesiologist    Method of Intubation:  Bougie and direct    Blade:  Shirley 2    Laryngeal View Grade: Grade IIA - cords partially seen      Difficult Airway Encountered?: No      Complications:  None    Airway Device:  Oral endotracheal tube    Airway Device Size:  7.0    Style/Cuff Inflation:  Cuffed (inflated to minimal occlusive pressure)    Inflation Amount (mL):  5    Tube secured:  23    Secured at:  The lips    Placement Verified By:  Capnometry    Complicating Factors:  Anterior larynx, short neck, poor neck/head extension, small mouth and oropharyngeal edema or fat    Findings Post-Intubation:  BS equal bilateral and atraumatic/condition of teeth unchanged

## 2020-05-25 NOTE — INTERVAL H&P NOTE
The patient has been examined and the H&P has been reviewed:    I concur with the findings and no changes have occurred since H&P was written.    Anesthesia/Surgery risks, benefits and alternative options discussed and understood by patient/family.    Active Hospital Problems    Diagnosis  POA    *Coronary artery disease [I25.10]  Yes     5/18/2020: Presents with unstable angina.  5/20/2020: [facility]: Cath: LAD: Prox 30%. Mid 70%. Distal 80%. OM2: Osteal 90%. OM3: 95%. OM4 90%. RCA: Mid subtotal. LV: Basal inferior mild hypokinesia. EF 60%.          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]  Yes    Restless legs syndrome (RLS) [G25.81]  Yes    Type 2 diabetes mellitus without complication, with long-term current use of insulin [E11.9, Z79.4]  Not Applicable    Gastroesophageal reflux disease without esophagitis [K21.9]  Yes    Mixed hyperlipidemia [E78.2]  Yes    Essential hypertension [I10]  Yes    Glaucoma NEC [H40.89]  Yes     Dx updated per 2019 IMO Load      Severe obesity (BMI 35.0-39.9) with comorbidity [E66.01]  Yes      Resolved Hospital Problems    Diagnosis Date Resolved POA    Acute coronary syndrome [I24.9] 05/21/2020 Yes     5/18/2020: Presents with unstable angina.

## 2020-05-25 NOTE — NURSING
Pt showered with hibiclens, leads changed, linen changed. Body hair clipped. Pt education completed. VSS WCTM

## 2020-05-26 LAB
ALLENS TEST: ABNORMAL
ALLENS TEST: NORMAL
ANION GAP SERPL CALC-SCNC: 7 MMOL/L (ref 8–16)
BASOPHILS # BLD AUTO: 0.01 K/UL (ref 0–0.2)
BASOPHILS NFR BLD: 0.1 % (ref 0–1.9)
BUN SERPL-MCNC: 13 MG/DL (ref 8–23)
CALCIUM SERPL-MCNC: 8.8 MG/DL (ref 8.7–10.5)
CHLORIDE SERPL-SCNC: 107 MMOL/L (ref 95–110)
CO2 SERPL-SCNC: 25 MMOL/L (ref 23–29)
CREAT SERPL-MCNC: 0.8 MG/DL (ref 0.5–1.4)
DELSYS: ABNORMAL
DELSYS: NORMAL
DIFFERENTIAL METHOD: ABNORMAL
EOSINOPHIL # BLD AUTO: 0 K/UL (ref 0–0.5)
EOSINOPHIL NFR BLD: 0 % (ref 0–8)
ERYTHROCYTE [DISTWIDTH] IN BLOOD BY AUTOMATED COUNT: 14.3 % (ref 11.5–14.5)
ERYTHROCYTE [SEDIMENTATION RATE] IN BLOOD BY WESTERGREN METHOD: 16 MM/H
EST. GFR  (AFRICAN AMERICAN): >60 ML/MIN/1.73 M^2
EST. GFR  (NON AFRICAN AMERICAN): >60 ML/MIN/1.73 M^2
FIO2: 50
FLOW: 6
GLUCOSE SERPL-MCNC: 158 MG/DL (ref 70–110)
HCO3 UR-SCNC: 22.4 MMOL/L (ref 24–28)
HCO3 UR-SCNC: 22.8 MMOL/L (ref 24–28)
HCO3 UR-SCNC: 23.1 MMOL/L (ref 24–28)
HCO3 UR-SCNC: 24.4 MMOL/L (ref 24–28)
HCT VFR BLD AUTO: 27.6 % (ref 37–48.5)
HCT VFR BLD CALC: 26 %PCV (ref 36–54)
HCT VFR BLD CALC: 27 %PCV (ref 36–54)
HCT VFR BLD CALC: 27 %PCV (ref 36–54)
HGB BLD-MCNC: 8.5 G/DL (ref 12–16)
IMM GRANULOCYTES # BLD AUTO: 0.03 K/UL (ref 0–0.04)
IMM GRANULOCYTES NFR BLD AUTO: 0.3 % (ref 0–0.5)
LDH SERPL L TO P-CCNC: 0.62 MMOL/L (ref 0.36–1.25)
LDH SERPL L TO P-CCNC: 0.63 MMOL/L (ref 0.36–1.25)
LDH SERPL L TO P-CCNC: 0.97 MMOL/L (ref 0.36–1.25)
LYMPHOCYTES # BLD AUTO: 1.1 K/UL (ref 1–4.8)
LYMPHOCYTES NFR BLD: 11.7 % (ref 18–48)
MAGNESIUM SERPL-MCNC: 1.9 MG/DL (ref 1.6–2.6)
MAGNESIUM SERPL-MCNC: 2.1 MG/DL (ref 1.6–2.6)
MCH RBC QN AUTO: 28.7 PG (ref 27–31)
MCHC RBC AUTO-ENTMCNC: 30.8 G/DL (ref 32–36)
MCV RBC AUTO: 93 FL (ref 82–98)
MODE: ABNORMAL
MODE: NORMAL
MONOCYTES # BLD AUTO: 0.6 K/UL (ref 0.3–1)
MONOCYTES NFR BLD: 6.6 % (ref 4–15)
NEUTROPHILS # BLD AUTO: 7.9 K/UL (ref 1.8–7.7)
NEUTROPHILS NFR BLD: 81.3 % (ref 38–73)
NRBC BLD-RTO: 0 /100 WBC
PCO2 BLDA: 37.5 MMHG (ref 35–45)
PCO2 BLDA: 39.4 MMHG (ref 35–45)
PCO2 BLDA: 39.4 MMHG (ref 35–45)
PCO2 BLDA: 43 MMHG (ref 35–45)
PEEP: 5
PH SMN: 7.33 [PH] (ref 7.35–7.45)
PH SMN: 7.37 [PH] (ref 7.35–7.45)
PH SMN: 7.4 [PH] (ref 7.35–7.45)
PH SMN: 7.4 [PH] (ref 7.35–7.45)
PHOSPHATE SERPL-MCNC: 3.4 MG/DL (ref 2.7–4.5)
PHOSPHATE SERPL-MCNC: 3.9 MG/DL (ref 2.7–4.5)
PLATELET # BLD AUTO: 171 K/UL (ref 150–350)
PLATELET BLD QL SMEAR: ABNORMAL
PMV BLD AUTO: 9.5 FL (ref 9.2–12.9)
PO2 BLDA: 55 MMHG (ref 80–100)
PO2 BLDA: 61 MMHG (ref 80–100)
PO2 BLDA: 61 MMHG (ref 80–100)
PO2 BLDA: 72 MMHG (ref 80–100)
POC BE: -2 MMOL/L
POC BE: -2 MMOL/L
POC BE: -4 MMOL/L
POC BE: 0 MMOL/L
POC IONIZED CALCIUM: 1.22 MMOL/L (ref 1.06–1.42)
POC IONIZED CALCIUM: 1.24 MMOL/L (ref 1.06–1.42)
POC IONIZED CALCIUM: 1.24 MMOL/L (ref 1.06–1.42)
POC SATURATED O2: 87 % (ref 95–100)
POC SATURATED O2: 89 % (ref 95–100)
POC SATURATED O2: 91 % (ref 95–100)
POC SATURATED O2: 94 % (ref 95–100)
POC TCO2: 24 MMOL/L (ref 23–27)
POC TCO2: 26 MMOL/L (ref 23–27)
POCT GLUCOSE: 135 MG/DL (ref 70–110)
POCT GLUCOSE: 142 MG/DL (ref 70–110)
POCT GLUCOSE: 145 MG/DL (ref 70–110)
POCT GLUCOSE: 149 MG/DL (ref 70–110)
POCT GLUCOSE: 149 MG/DL (ref 70–110)
POCT GLUCOSE: 150 MG/DL (ref 70–110)
POCT GLUCOSE: 153 MG/DL (ref 70–110)
POCT GLUCOSE: 153 MG/DL (ref 70–110)
POCT GLUCOSE: 156 MG/DL (ref 70–110)
POCT GLUCOSE: 160 MG/DL (ref 70–110)
POCT GLUCOSE: 162 MG/DL (ref 70–110)
POCT GLUCOSE: 164 MG/DL (ref 70–110)
POCT GLUCOSE: 168 MG/DL (ref 70–110)
POCT GLUCOSE: 170 MG/DL (ref 70–110)
POCT GLUCOSE: 213 MG/DL (ref 70–110)
POTASSIUM BLD-SCNC: 3.9 MMOL/L (ref 3.5–5.1)
POTASSIUM BLD-SCNC: 4 MMOL/L (ref 3.5–5.1)
POTASSIUM BLD-SCNC: 4.2 MMOL/L (ref 3.5–5.1)
POTASSIUM SERPL-SCNC: 3.8 MMOL/L (ref 3.5–5.1)
POTASSIUM SERPL-SCNC: 4 MMOL/L (ref 3.5–5.1)
PROVIDER CREDENTIALS: ABNORMAL
PROVIDER NOTIFIED: ABNORMAL
RBC # BLD AUTO: 2.96 M/UL (ref 4–5.4)
SAMPLE: ABNORMAL
SAMPLE: NORMAL
SITE: ABNORMAL
SITE: NORMAL
SODIUM BLD-SCNC: 141 MMOL/L (ref 136–145)
SODIUM BLD-SCNC: 141 MMOL/L (ref 136–145)
SODIUM BLD-SCNC: 142 MMOL/L (ref 136–145)
SODIUM SERPL-SCNC: 139 MMOL/L (ref 136–145)
SP02: 89
TIME NOTIFIED: 7348
VERBAL RESULT READBACK PERFORMED: YES
VT: 380
WBC # BLD AUTO: 9.71 K/UL (ref 3.9–12.7)

## 2020-05-26 PROCEDURE — P9045 ALBUMIN (HUMAN), 5%, 250 ML: HCPCS | Mod: JG,HCNC

## 2020-05-26 PROCEDURE — 25000003 PHARM REV CODE 250: Mod: HCNC | Performed by: STUDENT IN AN ORGANIZED HEALTH CARE EDUCATION/TRAINING PROGRAM

## 2020-05-26 PROCEDURE — 99233 PR SUBSEQUENT HOSPITAL CARE,LEVL III: ICD-10-PCS | Mod: HCNC,,, | Performed by: SURGERY

## 2020-05-26 PROCEDURE — 85025 COMPLETE CBC W/AUTO DIFF WBC: CPT | Mod: HCNC

## 2020-05-26 PROCEDURE — 99900035 HC TECH TIME PER 15 MIN (STAT): Mod: HCNC

## 2020-05-26 PROCEDURE — 82803 BLOOD GASES ANY COMBINATION: CPT | Mod: HCNC

## 2020-05-26 PROCEDURE — 99900026 HC AIRWAY MAINTENANCE (STAT): Mod: HCNC

## 2020-05-26 PROCEDURE — 63600175 PHARM REV CODE 636 W HCPCS: Mod: JG,HCNC

## 2020-05-26 PROCEDURE — 20000000 HC ICU ROOM: Mod: HCNC

## 2020-05-26 PROCEDURE — 94010 BREATHING CAPACITY TEST: CPT | Mod: HCNC

## 2020-05-26 PROCEDURE — 63600175 PHARM REV CODE 636 W HCPCS: Mod: HCNC | Performed by: STUDENT IN AN ORGANIZED HEALTH CARE EDUCATION/TRAINING PROGRAM

## 2020-05-26 PROCEDURE — 63600175 PHARM REV CODE 636 W HCPCS: Mod: JG,HCNC | Performed by: STUDENT IN AN ORGANIZED HEALTH CARE EDUCATION/TRAINING PROGRAM

## 2020-05-26 PROCEDURE — 27000221 HC OXYGEN, UP TO 24 HOURS: Mod: HCNC

## 2020-05-26 PROCEDURE — 94150 VITAL CAPACITY TEST: CPT | Mod: HCNC

## 2020-05-26 PROCEDURE — 83735 ASSAY OF MAGNESIUM: CPT | Mod: HCNC

## 2020-05-26 PROCEDURE — 97535 SELF CARE MNGMENT TRAINING: CPT | Mod: HCNC

## 2020-05-26 PROCEDURE — 80048 BASIC METABOLIC PNL TOTAL CA: CPT | Mod: HCNC

## 2020-05-26 PROCEDURE — 94761 N-INVAS EAR/PLS OXIMETRY MLT: CPT | Mod: HCNC

## 2020-05-26 PROCEDURE — 27200966 HC CLOSED SUCTION SYSTEM: Mod: HCNC

## 2020-05-26 PROCEDURE — 97165 OT EVAL LOW COMPLEX 30 MIN: CPT | Mod: HCNC

## 2020-05-26 PROCEDURE — 83605 ASSAY OF LACTIC ACID: CPT | Mod: HCNC

## 2020-05-26 PROCEDURE — 83735 ASSAY OF MAGNESIUM: CPT | Mod: 91,HCNC

## 2020-05-26 PROCEDURE — 99233 SBSQ HOSP IP/OBS HIGH 50: CPT | Mod: HCNC,,, | Performed by: NURSE PRACTITIONER

## 2020-05-26 PROCEDURE — 84100 ASSAY OF PHOSPHORUS: CPT | Mod: 91,HCNC

## 2020-05-26 PROCEDURE — 97116 GAIT TRAINING THERAPY: CPT | Mod: HCNC

## 2020-05-26 PROCEDURE — 84132 ASSAY OF SERUM POTASSIUM: CPT | Mod: HCNC

## 2020-05-26 PROCEDURE — 94003 VENT MGMT INPAT SUBQ DAY: CPT | Mod: HCNC

## 2020-05-26 PROCEDURE — 99233 SBSQ HOSP IP/OBS HIGH 50: CPT | Mod: HCNC,,, | Performed by: SURGERY

## 2020-05-26 PROCEDURE — 97162 PT EVAL MOD COMPLEX 30 MIN: CPT | Mod: HCNC

## 2020-05-26 PROCEDURE — 84100 ASSAY OF PHOSPHORUS: CPT | Mod: HCNC

## 2020-05-26 PROCEDURE — 85347 COAGULATION TIME ACTIVATED: CPT | Mod: HCNC

## 2020-05-26 PROCEDURE — 94799 UNLISTED PULMONARY SVC/PX: CPT | Mod: HCNC

## 2020-05-26 PROCEDURE — 99900017 HC EXTUBATION W/PARAMETERS (STAT): Mod: HCNC

## 2020-05-26 PROCEDURE — 25000003 PHARM REV CODE 250: Mod: HCNC | Performed by: NURSE PRACTITIONER

## 2020-05-26 PROCEDURE — 37799 UNLISTED PX VASCULAR SURGERY: CPT | Mod: HCNC

## 2020-05-26 PROCEDURE — 63600175 PHARM REV CODE 636 W HCPCS: Mod: HCNC | Performed by: NURSE PRACTITIONER

## 2020-05-26 PROCEDURE — 99233 PR SUBSEQUENT HOSPITAL CARE,LEVL III: ICD-10-PCS | Mod: HCNC,,, | Performed by: NURSE PRACTITIONER

## 2020-05-26 PROCEDURE — C9113 INJ PANTOPRAZOLE SODIUM, VIA: HCPCS | Mod: HCNC | Performed by: STUDENT IN AN ORGANIZED HEALTH CARE EDUCATION/TRAINING PROGRAM

## 2020-05-26 PROCEDURE — P9045 ALBUMIN (HUMAN), 5%, 250 ML: HCPCS | Mod: JG,HCNC | Performed by: STUDENT IN AN ORGANIZED HEALTH CARE EDUCATION/TRAINING PROGRAM

## 2020-05-26 RX ORDER — IBUPROFEN 200 MG
24 TABLET ORAL
Status: DISCONTINUED | OUTPATIENT
Start: 2020-05-26 | End: 2020-05-29

## 2020-05-26 RX ORDER — IBUPROFEN 200 MG
16 TABLET ORAL
Status: DISCONTINUED | OUTPATIENT
Start: 2020-05-26 | End: 2020-05-29

## 2020-05-26 RX ORDER — ACETAMINOPHEN 325 MG/1
650 TABLET ORAL EVERY 4 HOURS PRN
Status: DISCONTINUED | OUTPATIENT
Start: 2020-05-26 | End: 2020-06-01 | Stop reason: HOSPADM

## 2020-05-26 RX ORDER — TALC
6 POWDER (GRAM) TOPICAL NIGHTLY PRN
Status: DISCONTINUED | OUTPATIENT
Start: 2020-05-26 | End: 2020-06-01 | Stop reason: HOSPADM

## 2020-05-26 RX ORDER — INSULIN ASPART 100 [IU]/ML
0-4 INJECTION, SOLUTION INTRAVENOUS; SUBCUTANEOUS
Status: DISCONTINUED | OUTPATIENT
Start: 2020-05-26 | End: 2020-05-29

## 2020-05-26 RX ORDER — HYDRALAZINE HYDROCHLORIDE 20 MG/ML
10 INJECTION INTRAMUSCULAR; INTRAVENOUS ONCE
Status: COMPLETED | OUTPATIENT
Start: 2020-05-26 | End: 2020-05-26

## 2020-05-26 RX ORDER — PANTOPRAZOLE SODIUM 40 MG/1
40 TABLET, DELAYED RELEASE ORAL DAILY
Status: DISCONTINUED | OUTPATIENT
Start: 2020-05-27 | End: 2020-06-01 | Stop reason: HOSPADM

## 2020-05-26 RX ORDER — ALBUMIN HUMAN 50 G/1000ML
SOLUTION INTRAVENOUS
Status: COMPLETED
Start: 2020-05-26 | End: 2020-05-26

## 2020-05-26 RX ORDER — ALBUMIN HUMAN 50 G/1000ML
25 SOLUTION INTRAVENOUS ONCE
Status: COMPLETED | OUTPATIENT
Start: 2020-05-26 | End: 2020-05-26

## 2020-05-26 RX ORDER — GLUCAGON 1 MG
1 KIT INJECTION
Status: DISCONTINUED | OUTPATIENT
Start: 2020-05-26 | End: 2020-05-29

## 2020-05-26 RX ORDER — BISACODYL 10 MG
10 SUPPOSITORY, RECTAL RECTAL ONCE
Status: COMPLETED | OUTPATIENT
Start: 2020-05-26 | End: 2020-05-26

## 2020-05-26 RX ADMIN — HYDROMORPHONE HYDROCHLORIDE 0.2 MG: 1 INJECTION, SOLUTION INTRAMUSCULAR; INTRAVENOUS; SUBCUTANEOUS at 12:05

## 2020-05-26 RX ADMIN — ALBUMIN (HUMAN) 25 G: 12.5 SOLUTION INTRAVENOUS at 06:05

## 2020-05-26 RX ADMIN — BISACODYL 10 MG: 10 SUPPOSITORY RECTAL at 07:05

## 2020-05-26 RX ADMIN — ALBUMIN (HUMAN) 12.5 G: 12.5 SOLUTION INTRAVENOUS at 05:05

## 2020-05-26 RX ADMIN — POTASSIUM CHLORIDE 20 MEQ: 200 INJECTION, SOLUTION INTRAVENOUS at 07:05

## 2020-05-26 RX ADMIN — CEFAZOLIN 2 G: 330 INJECTION, POWDER, FOR SOLUTION INTRAMUSCULAR; INTRAVENOUS at 04:05

## 2020-05-26 RX ADMIN — ROPINIROLE HYDROCHLORIDE 0.5 MG: 0.25 TABLET, FILM COATED ORAL at 08:05

## 2020-05-26 RX ADMIN — OXYCODONE HYDROCHLORIDE 10 MG: 10 TABLET ORAL at 04:05

## 2020-05-26 RX ADMIN — HYDRALAZINE HYDROCHLORIDE 10 MG: 20 INJECTION INTRAMUSCULAR; INTRAVENOUS at 02:05

## 2020-05-26 RX ADMIN — HYDROMORPHONE HYDROCHLORIDE 0.2 MG: 1 INJECTION, SOLUTION INTRAMUSCULAR; INTRAVENOUS; SUBCUTANEOUS at 07:05

## 2020-05-26 RX ADMIN — HYDROMORPHONE HYDROCHLORIDE 0.2 MG: 1 INJECTION, SOLUTION INTRAMUSCULAR; INTRAVENOUS; SUBCUTANEOUS at 02:05

## 2020-05-26 RX ADMIN — ONDANSETRON HYDROCHLORIDE 4 MG: 2 SOLUTION INTRAMUSCULAR; INTRAVENOUS at 11:05

## 2020-05-26 RX ADMIN — CEFAZOLIN 2 G: 330 INJECTION, POWDER, FOR SOLUTION INTRAMUSCULAR; INTRAVENOUS at 08:05

## 2020-05-26 RX ADMIN — OXYCODONE HYDROCHLORIDE 10 MG: 10 TABLET ORAL at 12:05

## 2020-05-26 RX ADMIN — ATORVASTATIN CALCIUM 80 MG: 20 TABLET, FILM COATED ORAL at 08:05

## 2020-05-26 RX ADMIN — PROMETHAZINE HYDROCHLORIDE 6.25 MG: 25 INJECTION INTRAMUSCULAR; INTRAVENOUS at 07:05

## 2020-05-26 RX ADMIN — PANTOPRAZOLE SODIUM 40 MG: 40 INJECTION, POWDER, LYOPHILIZED, FOR SOLUTION INTRAVENOUS at 04:05

## 2020-05-26 RX ADMIN — OXYCODONE HYDROCHLORIDE 10 MG: 10 TABLET ORAL at 09:05

## 2020-05-26 RX ADMIN — ONDANSETRON HYDROCHLORIDE 4 MG: 2 SOLUTION INTRAMUSCULAR; INTRAVENOUS at 02:05

## 2020-05-26 RX ADMIN — ACETAMINOPHEN 1000 MG: 10 INJECTION, SOLUTION INTRAVENOUS at 06:05

## 2020-05-26 RX ADMIN — MUPIROCIN 1 G: 20 OINTMENT TOPICAL at 08:05

## 2020-05-26 RX ADMIN — MUPIROCIN 1 G: 20 OINTMENT TOPICAL at 09:05

## 2020-05-26 RX ADMIN — Medication 6 MG: at 10:05

## 2020-05-26 RX ADMIN — CEFAZOLIN 2 G: 330 INJECTION, POWDER, FOR SOLUTION INTRAMUSCULAR; INTRAVENOUS at 12:05

## 2020-05-26 RX ADMIN — HYDROMORPHONE HYDROCHLORIDE 0.2 MG: 1 INJECTION, SOLUTION INTRAMUSCULAR; INTRAVENOUS; SUBCUTANEOUS at 09:05

## 2020-05-26 RX ADMIN — FUROSEMIDE 10 MG/HR: 10 INJECTION, SOLUTION INTRAMUSCULAR; INTRAVENOUS at 10:05

## 2020-05-26 RX ADMIN — SODIUM CHLORIDE 1.8 UNITS/HR: 9 INJECTION, SOLUTION INTRAVENOUS at 07:05

## 2020-05-26 NOTE — PLAN OF CARE
05/26/20 1337   Discharge Reassessment   Assessment Type Discharge Planning Reassessment   Discharge Plan A Home Health   Discharge Plan B Home with family   DME Needed Upon Discharge  other (see comments)  (TBD)   Anticipated Discharge Disposition Home-Health   Post-Acute Status   Post-Acute Authorization Home Health   Home Health Status Awaiting Internal Medical Clearance

## 2020-05-26 NOTE — PROGRESS NOTES
Pt extubated @ 0932 to 5L NC per MD order, tolerated well. Sats >90%. Will continue to monitor closely.

## 2020-05-26 NOTE — PROGRESS NOTES
[facility]  Critical Care - Surgery  Progress Note    Patient Name: Cecilia Barahona  MRN: [id number]  Admission Date: 5/18/2020  Hospital Length of Stay: 6 days  Code Status: Full Code  Attending Provider: Yan Bowman MD  Primary Care Provider: Yudi Smart MD   Principal Problem: Coronary artery disease    Subjective:     Hospital/ICU Course:  No notes on file    Interval History/Significant Events: weaned off Cardene over night. Otherwise, NAEON    Follow-up For: Procedure(s) (LRB):  CORONARY ARTERY BYPASS GRAFT (CABG) x3  (N/A)    Post-Operative Day: 1 Day Post-Op    Objective:     Vital Signs (Most Recent):  Temp: 98.6 °F (37 °C) (05/26/20 0200)  Pulse: 74 (05/26/20 0600)  Resp: (!) 21 (05/26/20 0600)  BP: 130/80 (05/26/20 0600)  SpO2: 96 % (05/26/20 0600) Vital Signs (24h Range):  Temp:  [97.5 °F (36.4 °C)-98.7 °F (37.1 °C)] 98.6 °F (37 °C)  Pulse:  [52-88] 74  Resp:  [11-35] 21  SpO2:  [87 %-100 %] 96 %  BP: (100-157)/(56-80) 130/80  Arterial Line BP: (100-150)/(43-64) 114/62     Weight: 87.6 kg (193 lb 2 oz)  Body mass index is 34.21 kg/m².      Intake/Output Summary (Last 24 hours) at 5/26/2020 0622  Last data filed at 5/26/2020 0600  Gross per 24 hour   Intake 4393 ml   Output 2355 ml   Net 2038 ml       Physical Exam   Constitutional: She appears well-developed and well-nourished.   HENT:   Head: Normocephalic and atraumatic.   Eyes: Pupils are equal, round, and reactive to light.   Neck: Neck supple.   Cardiovascular: Normal rate, regular rhythm and intact distal pulses.   Pulmonary/Chest:   intubated and sedated  Vent Mode: A/C  Oxygen Concentration (%):  (50-70) 50  Resp Rate Total:  (15 br/min-30 br/min) 19 br/min  Vt Set:  (380 mL-400 mL) 380 mL  PEEP/CPAP:  (5 cmH20-8 cmH20) 5 cmH20  Pressure Support:  (10 cmH20) 10 cmH20  Mean Airway Pressure:  (8.6 prW43-94 cmH20) 8.9 cmH20     Abdominal: Soft. She exhibits no distension.   Musculoskeletal:   Chest tube to  suction  Sternal dressing c/d/i   Neurological:   sedated   Skin: Skin is warm and dry.       Vents:  Vent Mode: A/C (05/26/20 0324)  Ventilator Initiated: Yes(chart correction) (05/25/20 1549)  Set Rate: 16 BPM (05/26/20 0324)  Vt Set: 380 mL (05/26/20 0324)  Pressure Support: 10 cmH20 (05/25/20 2340)  PEEP/CPAP: 5 cmH20 (05/26/20 0324)  Oxygen Concentration (%): 50 (05/26/20 0600)  Peak Airway Pressure: 22 cmH2O (05/26/20 0324)  Plateau Pressure: 23 cmH20 (05/26/20 0324)  Total Ve: 6.72 mL (05/26/20 0324)  F/VT Ratio<105 (RSBI): (!) 50.85 (05/26/20 0324)    Lines/Drains/Airways     Central Venous Catheter Line                 Percutaneous Central Line Insertion/Assessment - Quad lumen  05/25/20 0802 less than 1 day    Percutaneous Central Line Insertion/Assessment - Quad Lumen  05/25/20 0802 less than 1 day          Drain                 Urethral Catheter 05/25/20 0826 Straight-tip;Temperature probe 14 Fr. less than 1 day         Y Chest Tube 1 and 2 05/25/20 1413 Mediastinal 19 Fr. Mediastinal 19 Fr. less than 1 day         Y Chest Tube 3 and 4 05/25/20 1415 Right Pleural 19 Fr. Left Pleural 19 Fr. less than 1 day          Airway                 Airway - Non-Surgical 05/25/20 0826 less than 1 day          Arterial Line                 Arterial Line 05/25/20 0742 Right Radial less than 1 day          Peripheral Intravenous Line                 Peripheral IV - Single Lumen 05/18/20 2305 20 G Left Forearm 7 days         Peripheral IV - Single Lumen 05/25/20 0814 16 G Left Wrist less than 1 day                Significant Labs:    CBC/Anemia Profile:  Recent Labs   Lab 05/25/20  0239  05/25/20  1544  05/25/20  2338 05/26/20  0008 05/26/20  0258   WBC 10.18  --  14.80*  --   --   --  9.71   HGB 12.2  --  10.1*  --   --   --  8.5*   HCT 39.0   < > 31.0*   < > 26* 27* 27.6*     --  208  --   --   --  171   MCV 93  --  93  --   --   --  93   RDW 13.9  --  14.0  --   --   --  14.3    < > = values in this interval  not displayed.        Chemistries:  Recent Labs   Lab 05/25/20  0239 05/25/20  1544 05/26/20  0258    140 139   K 3.7 4.0  4.0 4.0    108 107   CO2 26 22* 25   BUN 14 13 13   CREATININE 0.9 0.8 0.8   CALCIUM 9.3 9.0 8.8   MG 1.9 2.4 2.1   PHOS  --  3.7 3.9       All pertinent labs within the past 24 hours have been reviewed.    Significant Imaging:  I have reviewed all pertinent imaging results/findings within the past 24 hours.    Assessment/Plan:     * Coronary artery disease  Neuro:    -on precedex gtt  -wean sedation as tolerated  -fentanyl for pain while intubated     Pulm:   -mild pulmonary edema post-op  -wean to extubate  -on AC/VC 16/400/5/40%  -ABG/Chest x-ray prn         CV:  -S/p CABGx3  -HDS off pressors, off cardene  -Maintian SBP <140  -MAP goal 60-80  -Trend CT outputs     FEN/GI:  -NPO  -replete lytes prn  - on bowel regimen      Renal:  - BUN/Cr WNL  - Adequate UOP     Heme/ID:  - Daily CBC  - H/H 8.5/27.6  - Afebrile, normal WBC  - Ancef per CTS     Endo:  - Insulin gtt  - -180     MSK:  Bedrest while intubated      Critical secondary to Patient has a condition that poses threat to life and bodily function: s/p CABG     Critical care was time spent personally by me on the following activities: development of treatment plan with patient or surrogate and bedside caregivers, discussions with consultants, evaluation of patient's response to treatment, examination of patient, ordering and performing treatments and interventions, ordering and review of laboratory studies, ordering and review of radiographic studies, pulse oximetry, re-evaluation of patient's condition.  This critical care time did not overlap with that of any other provider or involve time for any procedures.     Mazin Beth MD  Critical Care - Surgery  [facility]

## 2020-05-26 NOTE — PLAN OF CARE
Problem: Occupational Therapy Goal  Goal: Occupational Therapy Goal  Description  Goals to be met by: 6/8/20     Patient will increase functional independence with ADLs by performing:    UE Dressing with Ramsey.  LE Dressing with Ramsey.  Grooming while standing at sink with Modified Ramsey.  Toileting from toilet with Modified Ramsey for hygiene and clothing management.   Bathing from  shower chair/bench with Modified Ramsey.  Toilet transfer to toilet with Modified Ramsey.  Increased functional strength to WFL for B UE.  Upper extremity exercise program x15 reps per handout, with independence.   Pt will be able to state all sternal precautions correctly c 100% accuracy.     Outcome: Ongoing, Progressing

## 2020-05-26 NOTE — SUBJECTIVE & OBJECTIVE
Interval History/Significant Events: weaned off Cardene over night. Otherwise, NAEON    Follow-up For: Procedure(s) (LRB):  CORONARY ARTERY BYPASS GRAFT (CABG) x3  (N/A)    Post-Operative Day: 1 Day Post-Op    Objective:     Vital Signs (Most Recent):  Temp: 98.6 °F (37 °C) (05/26/20 0200)  Pulse: 74 (05/26/20 0600)  Resp: (!) 21 (05/26/20 0600)  BP: 130/80 (05/26/20 0600)  SpO2: 96 % (05/26/20 0600) Vital Signs (24h Range):  Temp:  [97.5 °F (36.4 °C)-98.7 °F (37.1 °C)] 98.6 °F (37 °C)  Pulse:  [52-88] 74  Resp:  [11-35] 21  SpO2:  [87 %-100 %] 96 %  BP: (100-157)/(56-80) 130/80  Arterial Line BP: (100-150)/(43-64) 114/62     Weight: 87.6 kg (193 lb 2 oz)  Body mass index is 34.21 kg/m².      Intake/Output Summary (Last 24 hours) at 5/26/2020 0622  Last data filed at 5/26/2020 0600  Gross per 24 hour   Intake 4393 ml   Output 2355 ml   Net 2038 ml       Physical Exam   Constitutional: She appears well-developed and well-nourished.   HENT:   Head: Normocephalic and atraumatic.   Eyes: Pupils are equal, round, and reactive to light.   Neck: Neck supple.   Cardiovascular: Normal rate, regular rhythm and intact distal pulses.   Pulmonary/Chest:   intubated and sedated  Vent Mode: A/C  Oxygen Concentration (%):  (50-70) 50  Resp Rate Total:  (15 br/min-30 br/min) 19 br/min  Vt Set:  (380 mL-400 mL) 380 mL  PEEP/CPAP:  (5 cmH20-8 cmH20) 5 cmH20  Pressure Support:  (10 cmH20) 10 cmH20  Mean Airway Pressure:  (8.6 tzL00-60 cmH20) 8.9 cmH20     Abdominal: Soft. She exhibits no distension.   Musculoskeletal:   Chest tube to suction  Sternal dressing c/d/i   Neurological:   sedated   Skin: Skin is warm and dry.       Vents:  Vent Mode: A/C (05/26/20 0324)  Ventilator Initiated: Yes(chart correction) (05/25/20 1549)  Set Rate: 16 BPM (05/26/20 0324)  Vt Set: 380 mL (05/26/20 0324)  Pressure Support: 10 cmH20 (05/25/20 2340)  PEEP/CPAP: 5 cmH20 (05/26/20 0324)  Oxygen Concentration (%): 50 (05/26/20 0600)  Peak Airway Pressure:  22 cmH2O (05/26/20 0324)  Plateau Pressure: 23 cmH20 (05/26/20 0324)  Total Ve: 6.72 mL (05/26/20 0324)  F/VT Ratio<105 (RSBI): (!) 50.85 (05/26/20 0324)    Lines/Drains/Airways     Central Venous Catheter Line                 Percutaneous Central Line Insertion/Assessment - Quad lumen  05/25/20 0802 less than 1 day    Percutaneous Central Line Insertion/Assessment - Quad Lumen  05/25/20 0802 less than 1 day          Drain                 Urethral Catheter 05/25/20 0826 Straight-tip;Temperature probe 14 Fr. less than 1 day         Y Chest Tube 1 and 2 05/25/20 1413 Mediastinal 19 Fr. Mediastinal 19 Fr. less than 1 day         Y Chest Tube 3 and 4 05/25/20 1415 Right Pleural 19 Fr. Left Pleural 19 Fr. less than 1 day          Airway                 Airway - Non-Surgical 05/25/20 0826 less than 1 day          Arterial Line                 Arterial Line 05/25/20 0742 Right Radial less than 1 day          Peripheral Intravenous Line                 Peripheral IV - Single Lumen 05/18/20 2305 20 G Left Forearm 7 days         Peripheral IV - Single Lumen 05/25/20 0814 16 G Left Wrist less than 1 day                Significant Labs:    CBC/Anemia Profile:  Recent Labs   Lab 05/25/20 0239 05/25/20  1544  05/25/20  2338 05/26/20  0008 05/26/20  0258   WBC 10.18  --  14.80*  --   --   --  9.71   HGB 12.2  --  10.1*  --   --   --  8.5*   HCT 39.0   < > 31.0*   < > 26* 27* 27.6*     --  208  --   --   --  171   MCV 93  --  93  --   --   --  93   RDW 13.9  --  14.0  --   --   --  14.3    < > = values in this interval not displayed.        Chemistries:  Recent Labs   Lab 05/25/20 0239 05/25/20  1544 05/26/20  0258    140 139   K 3.7 4.0  4.0 4.0    108 107   CO2 26 22* 25   BUN 14 13 13   CREATININE 0.9 0.8 0.8   CALCIUM 9.3 9.0 8.8   MG 1.9 2.4 2.1   PHOS  --  3.7 3.9       All pertinent labs within the past 24 hours have been reviewed.    Significant Imaging:  I have reviewed all pertinent imaging  results/findings within the past 24 hours.

## 2020-05-26 NOTE — PT/OT/SLP EVAL
Physical Therapy Evaluation    Patient Name:  Cecilia Barahona   MRN:  [id number]  Admit Date: 5/18/2020  Admitting Diagnosis:  Coronary artery disease  Length of Stay: 6 days  Recent Surgery: Procedure(s) (LRB):  CORONARY ARTERY BYPASS GRAFT (CABG) x3  (N/A) 1 Day Post-Op    Recommendations:     Discharge Recommendations:  home health PT   Discharge Equipment Recommendations: (TBD)   Barriers to discharge: None    Assessment:     Cecilia Barahona is a 70 y.o. female admitted with a medical diagnosis of Coronary artery disease.      Problem List: weakness, impaired endurance, impaired functional mobilty, impaired self care skills, decreased upper extremity function, impaired cardiopulmonary response to activity  Rehab Prognosis: Good; patient would benefit from acute skilled PT services to address these deficits and reach maximum level of function.      Plan:     During this hospitalization, patient to be seen 5 x/week to address the identified rehab impairments via gait training, therapeutic activities, therapeutic exercises, neuromuscular re-education and progress towards the established goals.    · Plan of Care Expires:  06/25/20    Subjective   Communicated with RN prior to session.  Patient found HOB elevated upon PT entry to room, agreeable to evaluation. Cecilia Barahona's alone during session.    Chief Complaint: Chest Pain (PT C/O NON RADIATING MIDSTERNAL SP SINCE YESTERDAY, DENIES ANY OTHER SYMPTOMS)    Patient/Family Comments/goals: to get better and return home   Pain/Comfort:  · Pain Rating 1: 8/10(sternum)  · Pain Addressed 1: Reposition, Distraction  · Pain Rating Post-Intervention 1: 8/10    Living Environment:  Patient lives with  in a SSH with 7 TAWANNA and B HR. Pt with move in with daughter for a short period of time upon discharge. Daughter lives in a H with 5 TAWANNA and B HR   Prior Level of Function:   Patient reports being indep with mobility & with ADLs.  Patient uses DME as follows:  "none. DME owned (not currently used): none.    Patient reports they will have assistance from joaquinather upon discharge.  has difficulty getting around     Objective:   Patient found with: telemetry, pulse ox (continuous), blood pressure cuff, central line, peripheral IV, lombardi catheter, arterial line, chest tube     General Precautions: Standard, Cardiac fall, sternal   Orthopedic Precautions:N/A   Braces: N/A   Oxygen Device: Nasal Cannula   Vitals: BP (!) 156/72 (BP Location: Right arm, Patient Position: Lying)   Pulse 85   Temp 98.8 °F (37.1 °C) (Oral)   Resp (!) 23   Ht 5' 3" (1.6 m)   Wt 87.6 kg (193 lb 2 oz)   SpO2 (!) 93%   Breastfeeding? No   BMI 34.21 kg/m²     Exams:  · Cognition:   · Alert and Cooperative  · Ox4  · Command following: Follows multistep  commands  · Fluency: clear/fluent    · RLE ROM: WFL  · RLE Strength: WFL  · LLE ROM: WFL  · LLE Strength: WFL    Outcome Measures:  AM-PAC 6 CLICK MOBILITY  Turning over in bed (including adjusting bedclothes, sheets and blankets)?: 2  Sitting down on and standing up from a chair with arms (e.g., wheelchair, bedside commode, etc.): 3  Moving from lying on back to sitting on the side of the bed?: 2  Moving to and from a bed to a chair (including a wheelchair)?: 3  Need to walk in hospital room?: 3  Climbing 3-5 steps with a railing?: 1  Basic Mobility Total Score: 14     Functional Mobility:  Additional staff present: OT  Bed Mobility:  · Supine to Sit: maximal assistance; HOB elevated  · Scooting anteriorly to EOB to have both feet planted on floor: maximal assistance    Sitting Balance at Edge of Bed:   Assistance Level Required: Stand-by Assistance   Time: 5 minutes    Transfers:   · Sit <> Stand Transfer: minimum assistance with no assistive device from EOB using anterior approach       Gait:   · Patient ambulated: 4ft   · Patient required: minimal assist  · Patient used: no assistive device  · Gait Pattern observed: reciprocal " gait    Therapeutic Activities, Exercises, & Education:   Educated pt on PT role/POC  Educated pt on importance of OOB activity and daily ambulation   Educated pt on sternal precautions   Pt verbalized understanding     T/f to chair to increase tolerance to OOB activity and to create optimal positioning for lung expansion       Patient left up in chair with all lines intact, call button in reach and RN notified.    GOALS:   Multidisciplinary Problems     Physical Therapy Goals        Problem: Physical Therapy Goal    Goal Priority Disciplines Outcome Goal Variances Interventions   Physical Therapy Goal     PT, PT/OT Ongoing, Progressing     Description:  Goals to be met by: 2020    Patient will increase functional independence with mobility by performin. Supine to sit with Contact Guard Assistance - not met  2. Sit to stand transfer with Supervision - not met  3. Gait  x 150 feet with Supervision - not met  4. Ascend/descend 5 stair with bilateral Handrails Contact Guard Assistance - not met  5. Stand for 3 minutes with Supervision to increase activity tolerance - not met                      History:     Past Medical History:   Diagnosis Date    Acute coronary syndrome 2020: Presents with unstable angina.    Allergic rhinitis, seasonal     Anxiety     Arthritis     CVA (cerebral vascular accident)     Depression     Glaucoma NEC     Hallucination     images out of corner of eye about a year ago    History of positive PPD - treated - remote past     Hx of psychiatric care     Hyperlipidemia LDL goal < 100     Hypertension     Nuclear sclerosis of both eyes 2019    Obesity     CHETNA (obstructive sleep apnea)     Psychiatric problem     Psychosis     Renal disorder     Sleep difficulties     Suicide attempt     about 30 years ago by overdose of pills    Therapy     Type II or unspecified type diabetes mellitus without mention of complication, uncontrolled         Past Surgical History:   Procedure Laterality Date    CARPAL TUNNEL RELEASE       SECTION, CLASSIC      COLONOSCOPY N/A 3/5/2020    Procedure: COLONOSCOPY;  Surgeon: Wiliam Carrillo MD;  Location: [facility] (4TH FLR);  Service: Endoscopy;  Laterality: N/A;  20 appt confirmed-rb  pt requested this date    CORONARY ARTERY BYPASS GRAFT (CABG) N/A 2020    Procedure: CORONARY ARTERY BYPASS GRAFT (CABG) x3 ;  Surgeon: Yan Bowman MD;  Location: [facility] OR 2ND FLR;  Service: Cardiothoracic;  Laterality: N/A;  CABG X3  Endoharvest time start 0906  Endoharvest time end 1003  Vein prep start 1004  Vein prep end 1048    LEFT HEART CATHETERIZATION N/A 2020    Procedure: HEART CATH-LEFT;  Surgeon: Etelvina Lowery MD;  [facility]: [facility] CATH LAB;  Service: Cardiology;  Laterality: N/A;    TOTAL ABDOMINAL HYSTERECTOMY      TRIGGER FINGER RELEASE         Time Tracking:     PT Received On: 20  PT Start Time: 1405     PT Stop Time: 1430  PT Total Time (min): 25 min     Billable Minutes: Evaluation 10 and Gait Training 8    Irais Yao, PT, DPT  2020  [phone]

## 2020-05-26 NOTE — PT/OT/SLP EVAL
Occupational Therapy   Evaluation    Name: Cecilia Barahona  MRN: [id number]  Admitting Diagnosis:  Coronary artery disease 1 Day Post-Op    Recommendations:     Discharge Recommendations: home health OT  Discharge Equipment Recommendations:  (TBD)  Barriers to discharge:  None    Assessment:     Cecilia Barahona is a 70 y.o. female with a medical diagnosis of Coronary artery disease.  She was able to perform supine/sit T/F c total assist and sit/stand and bed/chair T/F c min A.  Able to perform UB dressing c total assist.  B UE are WFL.  Educated pt on sternal precautions.  Pt c c/o nausea throughout assessment and RN aware.  Pt is progressing well. Performance deficits affecting function: weakness, impaired endurance, impaired self care skills, impaired functional mobilty, decreased upper extremity function, impaired balance.      Rehab Prognosis: Good; patient would benefit from acute skilled OT services to address these deficits and reach maximum level of function.       Plan:     Patient to be seen 5 x/week to address the above listed problems via self-care/home management, therapeutic activities, therapeutic exercises  · Plan of Care Expires: 07/08/20  · Plan of Care Reviewed with: patient    Subjective     Chief Complaint: CABG x3  Patient/Family Comments/goals: To get better.    Occupational Profile:  Living Environment: Pt will be D/C home to her daughter initially.  Her daughter has a railed house that has 5 TAWANNA and B rails which are wide.  Pt has a raised house that has 7 TAWANNA and B rails which are wide.  Pt's daughter has a walk-in shower and pt has a tub/shower combo at her personal home.  Previous level of function: I PTA  Equipment Used at Home:  none  Assistance upon Discharge: Pt's  is not in good shape physically per pt and pt will D/C home c her daughter initially.    Pain/Comfort:  · Pain Rating 1: 8/10    Patients cultural, spiritual, Mormon conflicts given the current situation:  no    Objective:     Communicated with: RN prior to session.  Patient found supine with blood pressure cuff, chest tube, lombardi catheter, oxygen, peripheral IV, pulse ox (continuous), telemetry upon OT entry to room.    General Precautions: Standard, fall, sternal   Orthopedic Precautions:N/A   Braces: N/A     Occupational Performance:    Bed Mobility:    · Patient completed Supine to Sit with total assistance    Functional Mobility/Transfers:  · Patient completed Sit <> Stand Transfer with minimum assistance  with  hand-held assist   · Patient completed Bed <> Chair Transfer using Stand Pivot technique with minimum assistance with hand-held assist      Activities of Daily Living:  · Upper Body Dressing: total assistance to don hospital gown.    Cognitive/Visual Perceptual:  Cognitive/Psychosocial Skills:     -       Oriented to: Person, Place, Time and Situation   -       Follows Commands/attention:Follows multistep  commands    Physical Exam:  Upper Extremity Range of Motion:     -       Right Upper Extremity: WFL  -       Left Upper Extremity: WFL  Upper Extremity Strength:    -       Right Upper Extremity: 3/5  -       Left Upper Extremity: 3/5    AMPAC 6 Click ADL:  AMPAC Total Score: 13    Treatment & Education:  Educated pt on sternal precautions.  Education:    Patient left up in chair with all lines intact, call button in reach and RN notified    GOALS:   Multidisciplinary Problems     Occupational Therapy Goals        Problem: Occupational Therapy Goal    Goal Priority Disciplines Outcome Interventions   Occupational Therapy Goal     OT, PT/OT Ongoing, Progressing    Description:  Goals to be met by: 6/8/20     Patient will increase functional independence with ADLs by performing:    UE Dressing with Flora.  LE Dressing with Flora.  Grooming while standing at sink with Modified [place].  Toileting from toilet with Modified [place]ora for hygiene and clothing management.   Bathing from   shower chair/bench with Modified [place].  Toilet transfer to toilet with Modified [place].  Increased functional strength to WFL for B UE.  Upper extremity exercise program x15 reps per handout, with independence.   Pt will be able to state all sternal precautions correctly c 100% accuracy.                      History:     Past Medical History:   Diagnosis Date    Acute coronary syndrome 2020: Presents with unstable angina.    Allergic rhinitis, seasonal     Anxiety     Arthritis     CVA (cerebral vascular accident)     Depression     Glaucoma NEC     Hallucination     images out of corner of eye about a year ago    History of positive PPD - treated - remote past     Hx of psychiatric care     Hyperlipidemia LDL goal < 100     Hypertension     Nuclear sclerosis of both eyes 2019    Obesity     CHETNA (obstructive sleep apnea)     Psychiatric problem     Psychosis     Renal disorder     Sleep difficulties     Suicide attempt     about 30 years ago by overdose of pills    Therapy     Type II or unspecified type diabetes mellitus without mention of complication, uncontrolled        Past Surgical History:   Procedure Laterality Date    CARPAL TUNNEL RELEASE       SECTION, CLASSIC      COLONOSCOPY N/A 3/5/2020    Procedure: COLONOSCOPY;  Surgeon: Wiliam Carrillo MD;  Location: [facility] (4TH FLR);  Service: Endoscopy;  Laterality: N/A;  20 appt confirmed-rb  pt requested this date    CORONARY ARTERY BYPASS GRAFT (CABG) N/A 2020    Procedure: CORONARY ARTERY BYPASS GRAFT (CABG) x3 ;  Surgeon: Yan Bowman MD;  Location: [facility] OR [facility] FLR;  Service: Cardiothoracic;  Laterality: N/A;  CABG X3  Endoharvest time start 0906  Endoharvest time end 1003  Vein prep start 1004  Vein prep end 1048    LEFT HEART CATHETERIZATION N/A 2020    Procedure: HEART CATH-LEFT;  Surgeon: Etelvina Lowery MD;  Location: [facility] [place] CATH LAB;  Service: Cardiology;   Laterality: N/A;    TOTAL ABDOMINAL HYSTERECTOMY      TRIGGER FINGER RELEASE         Time Tracking:     OT Date of Treatment: 05/26/20  OT Start Time: 1405  OT Stop Time: 1426  OT Total Time (min): 21 min    Billable Minutes:Evaluation 11  Self Care/Home Management 10    EDGAR Marquez  5/26/2020

## 2020-05-26 NOTE — PROGRESS NOTES
Cardiothoracic Surgery  Progress Note      Admit Date: 5/18/2020    Disease Etiology: Ischemic  Open Chest: no  Surgery Performed: 5/25/2020 3V CABG    ASSESSMENT/PLAN:     I conducted multidisciplinary rounds in conjunction with the ICU/Critical Care attending staff and/or residents, with involvement of ancillary services as appropriate. The patient's general condition was reviewed, specifically including hemodynamic performance, dietary and nutritional status, pharmacy concerns, and status of expected transition to stepdown care. Plan has been discussed and agreed upon.    Plan:  Did well overnight. Extubating this AM. Hold off metoprolol given bradycardia in OR to 40s, and 60s overnight. Continue , start statin. Start Lasix gtt at 10. Close monitoring for respiratory status.     For details see the critical care note.    Cathleen Maher MD  Cardiac Surgery Resident, PGY7  Cardiothoracic Surgery  [facility]

## 2020-05-26 NOTE — PLAN OF CARE
Problem: Physical Therapy Goal  Goal: Physical Therapy Goal  Description  Goals to be met by: 2020    Patient will increase functional independence with mobility by performin. Supine to sit with Contact Guard Assistance - not met  2. Sit to stand transfer with Supervision - not met  3. Gait  x 150 feet with Supervision - not met  4. Ascend/descend 5 stair with bilateral Handrails Contact Guard Assistance - not met  5. Stand for 3 minutes with Supervision to increase activity tolerance - not met     Outcome: Ongoing, Progressing     Eval completed and goals appropriate

## 2020-05-26 NOTE — ANESTHESIA POSTPROCEDURE EVALUATION
Anesthesia Post Evaluation    Patient: Cecilia Barahona    Procedure(s) Performed: Procedure(s) (LRB):  CORONARY ARTERY BYPASS GRAFT (CABG) x3  (N/A)    Final Anesthesia Type: general    Patient location during evaluation: ICU  Patient participation: No - Unable to Participate, Intubation  Level of consciousness: sedated  Post-procedure vital signs: reviewed and stable  Pain management: adequate  Airway patency: patent    PONV status at discharge: No PONV  Anesthetic complications: no      Cardiovascular status: hemodynamically stable  Respiratory status: ETT, intubated and ventilator  Hydration status: euvolemic  Follow-up not needed.          Vitals Value Taken Time   /80 5/26/2020  6:02 AM   Temp 37 °C (98.6 °F) 5/26/2020  2:00 AM   Pulse 68 5/26/2020  6:22 AM   Resp 25 5/26/2020  6:22 AM   SpO2 95 % 5/26/2020  6:22 AM   Vitals shown include unvalidated device data.      No case tracking events are documented in the log.      Pain/Billy Score: Pain Rating Prior to Med Admin: 4 (5/26/2020  6:08 AM)  Pain Rating Post Med Admin: 0 (5/26/2020 12:23 AM)  Billy Score: 10 (5/25/2020  6:39 AM)

## 2020-05-26 NOTE — PLAN OF CARE
SW is following this Pt for DC planning needs. There are no identified needs at this time.    SW will continue to coordinate with patient, family, team and insurance to complete patient's discharge plan.    Mare Callahan LMSW   - Case Management

## 2020-05-26 NOTE — PLAN OF CARE
Pt free from falls and injury this shift. VSS at this time. Pt pain mildly controlled with prn medication. SpO2 @ 92-95% on 5L NC. Extubated this AM. MAPs maintained 60-80, SBP below 140. Pt remains only on continuous insulin and lasix. Abdomen rounded/soft, no BM this shift. Midsternal and chest tube dsg remains intact, pacer wires isolated and secured to dsg. Pleural CT output 110cc serosanguinous fluid, [place] CT output 60cc serosanguinous fluid. Akhtar remains intact, uop 75-150cc/hr. Pt with persistent nausea this shift, with mild relief. OOB to chair with physical therapy, tolerated well. No other significant events this shift. Plan of care reviewed with pt, all questions answered. Will continue to monitor closely.

## 2020-05-26 NOTE — CONSULTS
"  [facility]  Adult Nutrition  Consult Note    SUMMARY     Recommendations  1. As medically able, ADAT to Cardiac with texture per SLP.   2. If poor PO intake, recommend adding Boost Glucose Control OS to all meals.   3. RD to monitor.    Goals: Patient to receive nutrition by RD follow-up  Nutrition Goal Status: new  Communication of RD Recs: (POC)    Reason for Assessment  Reason For Assessment: consult  Diagnosis: surgery/postoperative complications(s/p CABG 5/25)  Relevant Medical History: CAD, CVA, DM2, HTN, HLD  Interdisciplinary Rounds: did not attend  General Information Comments: POD#1 s/p CABG. Extubated this morning. Remains NPO. Patient denies weight loss or poor appetite PTA. Patient does not meet criteria for malnutrition at this time.  Nutrition Discharge Planning: Adequate nutrition via PO intake.    Nutrition Risk Screen  Nutrition Risk Screen: no indicators present    Nutrition/Diet History  Spiritual, Cultural Beliefs, Methodist Practices, Values that Affect Care: no  Food Allergies: NKFA  Factors Affecting Nutritional Intake: NPO    Anthropometrics  Temp: 98.6 °F (37 °C)  Height Method: Stated  Height: 5' 3" (160 cm)  Height (inches): 63 in  Weight Method: Bed Scale  Weight: 87.6 kg (193 lb 2 oz)  Weight (lb): 193.12 lb  Ideal Body Weight (IBW), Female: 115 lb  % Ideal Body Weight, Female (lb): 168.13 %  BMI (Calculated): 34.2  BMI Grade: 30 - 34.9- obesity - grade I    Lab/Procedures/Meds  Pertinent Labs Reviewed: reviewed  Pertinent Labs Comments: Glu 158, POCT Glu 135-153, HgbA1c 7.1  Pertinent Medications Reviewed: reviewed  Pertinent Medications Comments: pantoprazole, precedex, epinephrine, insulin drip, cardene, levophed, vasopressin    Estimated/Assessed Needs  Weight Used For Calorie Calculations: 87.6 kg (193 lb 2 oz)  Energy Calorie Requirements (kcal): 1713 kcal/day  Energy Need Method: [place](x 1.25)  Protein Requirements:  g/day(1.0-1.2 " g/kg)  Weight Used For Protein Calculations: 87.6 kg (193 lb 2 oz)  Fluid Requirements (mL): 1 mL/kcal or per MD  Estimated Fluid Requirement Method: RDA Method  RDA Method (mL): 1713    Nutrition Prescription Ordered  Current Diet Order: NPO    Evaluation of Received Nutrient/Fluid Intake  I/O: +1.9L x 24hrs, +5.2L since admit  Comments: LBM 5/23  % Intake of Estimated Energy Needs: 0 - 25 %  % Meal Intake: NPO    Nutrition Risk  Level of Risk/Frequency of Follow-up: high(2x/week)     Assessment and Plan  Nutrition Problem  Increased nutrient needs    Related to (etiology):   Physiological causes related to healing    Signs and Symptoms (as evidenced by):   S/p CABG 5/25     Interventions (treatment strategy):  Collaboration of nutrition care with other providers    Nutrition Diagnosis Status:   New    Monitor and Evaluation  Food and Nutrient Intake: energy intake, food and beverage intake  Food and Nutrient Adminstration: diet order  Physical Activity and Function: nutrition-related ADLs and IADLs  Anthropometric Measurements: weight, weight change  Biochemical Data, Medical Tests and Procedures: electrolyte and renal panel, gastrointestinal profile, inflammatory profile  Nutrition-Focused Physical Findings: overall appearance     Nutrition Follow-Up  RD Follow-up?: Yes

## 2020-05-26 NOTE — ASSESSMENT & PLAN NOTE
Neuro:    -on precedex gtt  -wean sedation as tolerated  -fentanyl for pain while intubated     Pulm:   -mild pulmonary edema post-op  -wean to extubate  -on AC/VC 16/400/5/40%  -ABG/Chest x-ray prn         CV:  -S/p CABGx3  -HDS off pressors, off cardene  -Maintian SBP <140  -MAP goal 60-80  -Trend CT outputs     FEN/GI:  -NPO  -replete lytes prn  - on bowel regimen      Renal:  - BUN/Cr WNL  - Adequate UOP     Heme/ID:  - Daily CBC  - H/H 8.5/27.6  - Afebrile, normal WBC  - Ancef per CTS     Endo:  - Insulin gtt  - -180     MSK:  Bedrest while intubated

## 2020-05-26 NOTE — HPI
Ms. Cecilia Barahona is a 70 y.o. female w/ CAD, HTN. HLD, DM2 on insulin, 0.5ppd smoking history who presented to [facility] with 4 day history of chest pain.  Patient with exertional angina only until 4 days ago when she developed persistent chest pain.  She was admitted to the hospital and underwent though evaluation revealing mulit-vessel doronary artery disease.  Patient transferred to [facility] for CTS evaluation.       On 5/25, she underwent CABGx3.  Patient transferred to the ICU on 2.5 of nicardipine.  She remained intubated 2/2 low tidal volumes post-operatively.

## 2020-05-26 NOTE — PLAN OF CARE
Recommendations  1. As medically able, ADAT to Cardiac with texture per SLP.   2. If poor PO intake, recommend adding Boost Glucose Control OS to all meals.   3. RD to monitor.    Goals: Patient to receive nutrition by RD follow-up  Nutrition Goal Status: new    Full assessment completed, see RD Note 5/26/2020.

## 2020-05-26 NOTE — SUBJECTIVE & OBJECTIVE
"Interval HPI:   Overnight events: POD # 1 s/p CABG. Remains intubated in SICU. BG within goal ranges on IV intensive insulin infusion with rates ranging from 0.6-1.2 u/hr overnight.   Eating:   NPO  Nausea: No  Hypoglycemia and intervention: No  Fever: No  TPN and/or TF: No  If yes, type of TF/TPN and rate: none    /63 (BP Location: Right arm, Patient Position: Lying)   Pulse 66   Temp 98.6 °F (37 °C) (Oral)   Resp (!) 21   Ht 5' 3" (1.6 m)   Wt 87.6 kg (193 lb 2 oz)   SpO2 96%   Breastfeeding? No   BMI 34.21 kg/m²     Labs Reviewed and Include    Recent Labs   Lab 05/26/20  0258   *   CALCIUM 8.8      K 4.0   CO2 25      BUN 13   CREATININE 0.8     Lab Results   Component Value Date    WBC 9.71 05/26/2020    HGB 8.5 (L) 05/26/2020    HCT 26 (L) 05/26/2020    MCV 93 05/26/2020     05/26/2020     No results for input(s): TSH, FREET4 in the last 168 hours.  Lab Results   Component Value Date    HGBA1C 7.1 (H) 05/19/2020       Nutritional status:   Body mass index is 34.21 kg/m².  Lab Results   Component Value Date    ALBUMIN 3.2 (L) 05/19/2020    ALBUMIN 3.5 05/05/2020    ALBUMIN 3.4 (L) 12/20/2019     No results found for: PREALBUMIN    Estimated Creatinine Clearance: 69.7 mL/min (based on SCr of 0.8 mg/dL).    Accu-Checks  Recent Labs     05/25/20  2103 05/25/20  2118 05/25/20  2203 05/25/20  2335 05/26/20  0006 05/26/20  0125 05/26/20  0254 05/26/20  0411 05/26/20  0509 05/26/20  0627   POCTGLUCOSE 89 101 127* 186* 213* 168* 160* 135* 149* 153*       Current Medications and/or Treatments Impacting Glycemic Control  Immunotherapy:    Immunosuppressants     None        Steroids:   Hormones (From admission, onward)    Start     Stop Route Frequency Ordered    05/25/20 1630  vasopressin (PITRESSIN) 0.2 Units/mL in dextrose 5 % 100 mL infusion      -- IV Continuous 05/25/20 1529        Pressors:    Autonomic Drugs (From admission, onward)    Start     Stop Route Frequency Ordered "    05/25/20 1630  EPINEPHrine (ADRENALIN) 5 mg in sodium chloride 0.9% 250 mL infusion     Question Answer Comment   Titrate by: (in mcg/kg/min) 0.01    Titrate interval: (in minutes) 5    Titrate to maintain: (SBP or MAP or Cardiac Index) SBP    Maximum dose: (in mcg/kg/min) 2        -- IV Continuous 05/25/20 1529    05/25/20 1630  norepinephrine 4 mg in dextrose 5% 250 mL infusion (premix) (titrating)     Question Answer Comment   Titrate by: (in mcg/kg/min) 0.05    Titrate interval: (in minutes) 5    Titrate to maintain: (MAP or SBP) MAP    Greater than: (in mmHg) 65    Maximum dose: (in mcg/kg/min) 3        -- IV Continuous 05/25/20 1529        Hyperglycemia/Diabetes Medications:   Antihyperglycemics (From admission, onward)    Start     Stop Route Frequency Ordered    05/25/20 1630  insulin regular 100 Units in sodium chloride 0.9% 100 mL infusion     Question:  Insulin rate changes (DO NOT MODIFY ANSWER)  Answer:  [email or web address] [email or web address] [id number]    -- IV Continuous 05/25/20 1529

## 2020-05-26 NOTE — PROGRESS NOTES
"[facility]  Endocrinology  Progress Note    Admit Date: 5/18/2020     Reason for Consult: Management of T2DM, Hyperglycemia     Surgical Procedure and Date: CABG x 3 5/25/20    Lab Results   Component Value Date    HGBA1C 7.1 (H) 05/19/2020     Diabetes diagnosis year: TSERING    Home Diabetes Medications:  Jardiance 10 mg daily, Tresiba 36 units daily, Metformin 1000 mg BID    How often checking glucose at home? TSERING  BG readings on regimen: TSERING  Hypoglycemia on the regimen? TSERING  Missed doses on regimen?  TSERING    Diabetes Complications include:     Hyperglycemia    Complicating diabetes co morbidities:   History of CVA and CHETNA      HPI:   Patient is a 70 y.o. female with a diagnosis of CAD (newly diagnosed), HTN, HLD, DM2 (on insulin HbA1c 7.1%). Admitted to [facility] on 5/18 with worsening chest pain for the past 4 days. She started having chest pain in 12/2019, she underwent a SPECT which was unremarkable. No prior history of MI or CAD.  In OSH, troponin negative x4. 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  She is now s/p CABG. Endocrinology consulted for management of T2DM.        Interval HPI:   Overnight events: POD # 1 s/p CABG. Remains intubated in SICU. BG within goal ranges on IV intensive insulin infusion with rates ranging from 0.6-1.2 u/hr overnight.   Eating:   NPO  Nausea: No  Hypoglycemia and intervention: No  Fever: No  TPN and/or TF: No  If yes, type of TF/TPN and rate: none    /63 (BP Location: Right arm, Patient Position: Lying)   Pulse 66   Temp 98.6 °F (37 °C) (Oral)   Resp (!) 21   Ht 5' 3" (1.6 m)   Wt 87.6 kg (193 lb 2 oz)   SpO2 96%   Breastfeeding? No   BMI 34.21 kg/m²      Labs Reviewed and Include    Recent Labs   Lab 05/26/20  0258   *   CALCIUM 8.8      K 4.0   CO2 25      BUN 13   CREATININE 0.8     Lab Results   Component Value Date    WBC 9.71 05/26/2020    HGB 8.5 (L) 05/26/2020 "    HCT 26 (L) 05/26/2020    MCV 93 05/26/2020     05/26/2020     No results for input(s): TSH, FREET4 in the last 168 hours.  Lab Results   Component Value Date    HGBA1C 7.1 (H) 05/19/2020       Nutritional status:   Body mass index is 34.21 kg/m².  Lab Results   Component Value Date    ALBUMIN 3.2 (L) 05/19/2020    ALBUMIN 3.5 05/05/2020    ALBUMIN 3.4 (L) 12/20/2019     No results found for: PREALBUMIN    Estimated Creatinine Clearance: 69.7 mL/min (based on SCr of 0.8 mg/dL).    Accu-Checks  Recent Labs     05/25/20  2103 05/25/20  2118 05/25/20  2203 05/25/20  2335 05/26/20  0006 05/26/20  0125 05/26/20  0254 05/26/20  0411 05/26/20  0509 05/26/20  0627   POCTGLUCOSE 89 101 127* 186* 213* 168* 160* 135* 149* 153*       Current Medications and/or Treatments Impacting Glycemic Control  Immunotherapy:    Immunosuppressants     None        Steroids:   Hormones (From admission, onward)    Start     Stop Route Frequency Ordered    05/25/20 1630  vasopressin (PITRESSIN) 0.2 Units/mL in dextrose 5 % 100 mL infusion      -- IV Continuous 05/25/20 1529        Pressors:    Autonomic Drugs (From admission, onward)    Start     Stop Route Frequency Ordered    05/25/20 1630  EPINEPHrine (ADRENALIN) 5 mg in sodium chloride 0.9% 250 mL infusion     Question Answer Comment   Titrate by: (in mcg/kg/min) 0.01    Titrate interval: (in minutes) 5    Titrate to maintain: (SBP or MAP or Cardiac Index) SBP    Maximum dose: (in mcg/kg/min) 2        -- IV Continuous 05/25/20 1529    05/25/20 1630  norepinephrine 4 mg in dextrose 5% 250 mL infusion (premix) (titrating)     Question Answer Comment   Titrate by: (in mcg/kg/min) 0.05    Titrate interval: (in minutes) 5    Titrate to maintain: (MAP or SBP) MAP    Greater than: (in mmHg) 65    Maximum dose: (in mcg/kg/min) 3        -- IV Continuous 05/25/20 1529        Hyperglycemia/Diabetes Medications:   Antihyperglycemics (From admission, onward)    Start     Stop Route Frequency  Ordered    05/25/20 1630  insulin regular 100 Units in sodium chloride 0.9% 100 mL infusion     Question:  Insulin rate changes (DO NOT MODIFY ANSWER)  Answer:  [email or web address]\CTS [email or web address] [id number]    -- IV Continuous 05/25/20 1529          ASSESSMENT and PLAN    * Coronary artery disease  Managed per primary team  S/p CABG      S/P CABG x 3  Managed per primary team  Optimize BG control      Severe obesity (BMI 35.0-39.9) with comorbidity  Body mass index is 34.21 kg/m².  May increase insulin resistance.         Mixed hyperlipidemia  May increase insulin resistance.         Type 2 diabetes mellitus without complication, with long-term current use of insulin  BG goal 110-140    Continue IV insulin infusion protocol  Requires intensive BG monitoring while on protocol     ** Please call Endocrine for any BG related issues **  ** Please notify Endocrine for any change and/or advance in diet**    Discharge planning: JEWEL Estrada NP  Endocrinology  [facility]

## 2020-05-27 PROBLEM — R07.9 CHEST PAIN: Status: ACTIVE | Noted: 2020-05-27

## 2020-05-27 LAB
ALLENS TEST: ABNORMAL
ANION GAP SERPL CALC-SCNC: 11 MMOL/L (ref 8–16)
BASOPHILS # BLD AUTO: 0.03 K/UL (ref 0–0.2)
BASOPHILS NFR BLD: 0.2 % (ref 0–1.9)
BUN SERPL-MCNC: 18 MG/DL (ref 8–23)
CALCIUM SERPL-MCNC: 8.4 MG/DL (ref 8.7–10.5)
CHLORIDE SERPL-SCNC: 106 MMOL/L (ref 95–110)
CO2 SERPL-SCNC: 24 MMOL/L (ref 23–29)
CREAT SERPL-MCNC: 1.1 MG/DL (ref 0.5–1.4)
DIFFERENTIAL METHOD: ABNORMAL
EOSINOPHIL # BLD AUTO: 0 K/UL (ref 0–0.5)
EOSINOPHIL NFR BLD: 0.1 % (ref 0–8)
ERYTHROCYTE [DISTWIDTH] IN BLOOD BY AUTOMATED COUNT: 14.8 % (ref 11.5–14.5)
EST. GFR  (AFRICAN AMERICAN): 59.2 ML/MIN/1.73 M^2
EST. GFR  (NON AFRICAN AMERICAN): 51.3 ML/MIN/1.73 M^2
GLUCOSE SERPL-MCNC: 119 MG/DL (ref 70–110)
HCO3 UR-SCNC: 23.4 MMOL/L (ref 24–28)
HCT VFR BLD AUTO: 27.2 % (ref 37–48.5)
HGB BLD-MCNC: 8.4 G/DL (ref 12–16)
IMM GRANULOCYTES # BLD AUTO: 0.15 K/UL (ref 0–0.04)
IMM GRANULOCYTES NFR BLD AUTO: 1 % (ref 0–0.5)
LYMPHOCYTES # BLD AUTO: 1.9 K/UL (ref 1–4.8)
LYMPHOCYTES NFR BLD: 12.6 % (ref 18–48)
MAGNESIUM SERPL-MCNC: 1.7 MG/DL (ref 1.6–2.6)
MAGNESIUM SERPL-MCNC: 2.6 MG/DL (ref 1.6–2.6)
MCH RBC QN AUTO: 29.7 PG (ref 27–31)
MCHC RBC AUTO-ENTMCNC: 30.9 G/DL (ref 32–36)
MCV RBC AUTO: 96 FL (ref 82–98)
MONOCYTES # BLD AUTO: 1.2 K/UL (ref 0.3–1)
MONOCYTES NFR BLD: 8.1 % (ref 4–15)
NEUTROPHILS # BLD AUTO: 11.6 K/UL (ref 1.8–7.7)
NEUTROPHILS NFR BLD: 78 % (ref 38–73)
NRBC BLD-RTO: 0 /100 WBC
PCO2 BLDA: 41.3 MMHG (ref 35–45)
PH SMN: 7.36 [PH] (ref 7.35–7.45)
PHOSPHATE SERPL-MCNC: 3.3 MG/DL (ref 2.7–4.5)
PHOSPHATE SERPL-MCNC: 3.3 MG/DL (ref 2.7–4.5)
PLATELET # BLD AUTO: 171 K/UL (ref 150–350)
PMV BLD AUTO: 10 FL (ref 9.2–12.9)
PO2 BLDA: 62 MMHG (ref 80–100)
POC BE: -2 MMOL/L
POC SATURATED O2: 90 % (ref 95–100)
POC TCO2: 25 MMOL/L (ref 23–27)
POCT GLUCOSE: 126 MG/DL (ref 70–110)
POCT GLUCOSE: 139 MG/DL (ref 70–110)
POCT GLUCOSE: 152 MG/DL (ref 70–110)
POCT GLUCOSE: 155 MG/DL (ref 70–110)
POCT GLUCOSE: 159 MG/DL (ref 70–110)
POTASSIUM SERPL-SCNC: 3.6 MMOL/L (ref 3.5–5.1)
POTASSIUM SERPL-SCNC: 3.7 MMOL/L (ref 3.5–5.1)
POTASSIUM SERPL-SCNC: 3.7 MMOL/L (ref 3.5–5.1)
POTASSIUM SERPL-SCNC: 3.8 MMOL/L (ref 3.5–5.1)
RBC # BLD AUTO: 2.83 M/UL (ref 4–5.4)
SAMPLE: ABNORMAL
SITE: ABNORMAL
SODIUM SERPL-SCNC: 141 MMOL/L (ref 136–145)
WBC # BLD AUTO: 14.81 K/UL (ref 3.9–12.7)

## 2020-05-27 PROCEDURE — 80048 BASIC METABOLIC PNL TOTAL CA: CPT | Mod: HCNC

## 2020-05-27 PROCEDURE — 63600175 PHARM REV CODE 636 W HCPCS: Mod: HCNC | Performed by: STUDENT IN AN ORGANIZED HEALTH CARE EDUCATION/TRAINING PROGRAM

## 2020-05-27 PROCEDURE — 25000003 PHARM REV CODE 250: Mod: HCNC | Performed by: THORACIC SURGERY (CARDIOTHORACIC VASCULAR SURGERY)

## 2020-05-27 PROCEDURE — 83735 ASSAY OF MAGNESIUM: CPT | Mod: HCNC

## 2020-05-27 PROCEDURE — 25000003 PHARM REV CODE 250: Mod: HCNC | Performed by: STUDENT IN AN ORGANIZED HEALTH CARE EDUCATION/TRAINING PROGRAM

## 2020-05-27 PROCEDURE — 27100171 HC OXYGEN HIGH FLOW UP TO 24 HOURS: Mod: HCNC

## 2020-05-27 PROCEDURE — 99233 SBSQ HOSP IP/OBS HIGH 50: CPT | Mod: HCNC,,, | Performed by: SURGERY

## 2020-05-27 PROCEDURE — 97116 GAIT TRAINING THERAPY: CPT | Mod: HCNC

## 2020-05-27 PROCEDURE — 99232 PR SUBSEQUENT HOSPITAL CARE,LEVL II: ICD-10-PCS | Mod: HCNC,,, | Performed by: NURSE PRACTITIONER

## 2020-05-27 PROCEDURE — 97530 THERAPEUTIC ACTIVITIES: CPT | Mod: HCNC

## 2020-05-27 PROCEDURE — P9047 ALBUMIN (HUMAN), 25%, 50ML: HCPCS | Mod: JG,HCNC

## 2020-05-27 PROCEDURE — 20000000 HC ICU ROOM: Mod: HCNC

## 2020-05-27 PROCEDURE — 82803 BLOOD GASES ANY COMBINATION: CPT | Mod: HCNC

## 2020-05-27 PROCEDURE — 97535 SELF CARE MNGMENT TRAINING: CPT | Mod: HCNC

## 2020-05-27 PROCEDURE — 99232 SBSQ HOSP IP/OBS MODERATE 35: CPT | Mod: HCNC,,, | Performed by: NURSE PRACTITIONER

## 2020-05-27 PROCEDURE — 63700000 PHARM REV CODE 250 ALT 637 W/O HCPCS: Mod: HCNC | Performed by: STUDENT IN AN ORGANIZED HEALTH CARE EDUCATION/TRAINING PROGRAM

## 2020-05-27 PROCEDURE — 27000221 HC OXYGEN, UP TO 24 HOURS: Mod: HCNC

## 2020-05-27 PROCEDURE — 63600175 PHARM REV CODE 636 W HCPCS: Mod: JG,HCNC

## 2020-05-27 PROCEDURE — 84132 ASSAY OF SERUM POTASSIUM: CPT | Mod: HCNC

## 2020-05-27 PROCEDURE — 99233 PR SUBSEQUENT HOSPITAL CARE,LEVL III: ICD-10-PCS | Mod: HCNC,,, | Performed by: SURGERY

## 2020-05-27 PROCEDURE — 99900035 HC TECH TIME PER 15 MIN (STAT): Mod: HCNC

## 2020-05-27 PROCEDURE — P9047 ALBUMIN (HUMAN), 25%, 50ML: HCPCS | Mod: JG,HCNC | Performed by: STUDENT IN AN ORGANIZED HEALTH CARE EDUCATION/TRAINING PROGRAM

## 2020-05-27 PROCEDURE — 37799 UNLISTED PX VASCULAR SURGERY: CPT | Mod: HCNC

## 2020-05-27 PROCEDURE — 94761 N-INVAS EAR/PLS OXIMETRY MLT: CPT | Mod: HCNC

## 2020-05-27 PROCEDURE — 84100 ASSAY OF PHOSPHORUS: CPT | Mod: HCNC

## 2020-05-27 PROCEDURE — P9045 ALBUMIN (HUMAN), 5%, 250 ML: HCPCS | Mod: JG,HCNC | Performed by: STUDENT IN AN ORGANIZED HEALTH CARE EDUCATION/TRAINING PROGRAM

## 2020-05-27 PROCEDURE — 85025 COMPLETE CBC W/AUTO DIFF WBC: CPT | Mod: HCNC

## 2020-05-27 RX ORDER — ALBUMIN HUMAN 50 G/1000ML
SOLUTION INTRAVENOUS
Status: COMPLETED
Start: 2020-05-27 | End: 2020-05-27

## 2020-05-27 RX ORDER — POTASSIUM CHLORIDE 20 MEQ/15ML
40 SOLUTION ORAL ONCE
Status: COMPLETED | OUTPATIENT
Start: 2020-05-27 | End: 2020-05-27

## 2020-05-27 RX ORDER — BISACODYL 10 MG
10 SUPPOSITORY, RECTAL RECTAL DAILY PRN
Status: DISCONTINUED | OUTPATIENT
Start: 2020-05-27 | End: 2020-06-01 | Stop reason: HOSPADM

## 2020-05-27 RX ORDER — FUROSEMIDE 10 MG/ML
10 INJECTION INTRAMUSCULAR; INTRAVENOUS ONCE
Status: COMPLETED | OUTPATIENT
Start: 2020-05-27 | End: 2020-05-27

## 2020-05-27 RX ORDER — ALBUMIN HUMAN 50 G/1000ML
25 SOLUTION INTRAVENOUS ONCE
Status: COMPLETED | OUTPATIENT
Start: 2020-05-27 | End: 2020-05-27

## 2020-05-27 RX ORDER — ALBUMIN HUMAN 250 G/1000ML
SOLUTION INTRAVENOUS
Status: COMPLETED
Start: 2020-05-27 | End: 2020-05-27

## 2020-05-27 RX ORDER — ALBUMIN HUMAN 250 G/1000ML
25 SOLUTION INTRAVENOUS ONCE
Status: COMPLETED | OUTPATIENT
Start: 2020-05-27 | End: 2020-05-27

## 2020-05-27 RX ORDER — POTASSIUM CHLORIDE 20 MEQ/1
40 TABLET, EXTENDED RELEASE ORAL 2 TIMES DAILY
Status: DISCONTINUED | OUTPATIENT
Start: 2020-05-27 | End: 2020-06-01 | Stop reason: HOSPADM

## 2020-05-27 RX ADMIN — HYDROMORPHONE HYDROCHLORIDE 0.2 MG: 1 INJECTION, SOLUTION INTRAMUSCULAR; INTRAVENOUS; SUBCUTANEOUS at 07:05

## 2020-05-27 RX ADMIN — FUROSEMIDE 10 MG: 10 INJECTION, SOLUTION INTRAMUSCULAR; INTRAVENOUS at 08:05

## 2020-05-27 RX ADMIN — OXYCODONE HYDROCHLORIDE 10 MG: 10 TABLET ORAL at 08:05

## 2020-05-27 RX ADMIN — POTASSIUM CHLORIDE 40 MEQ: 20 SOLUTION ORAL at 08:05

## 2020-05-27 RX ADMIN — ALBUMIN (HUMAN) 25 G: 25 SOLUTION INTRAVENOUS at 10:05

## 2020-05-27 RX ADMIN — ASPIRIN 325 MG ORAL TABLET 325 MG: 325 PILL ORAL at 08:05

## 2020-05-27 RX ADMIN — POTASSIUM CHLORIDE 40 MEQ: 1500 TABLET, EXTENDED RELEASE ORAL at 08:05

## 2020-05-27 RX ADMIN — OXYCODONE HYDROCHLORIDE 10 MG: 10 TABLET ORAL at 10:05

## 2020-05-27 RX ADMIN — POTASSIUM CHLORIDE 20 MEQ: 200 INJECTION, SOLUTION INTRAVENOUS at 03:05

## 2020-05-27 RX ADMIN — BISACODYL 10 MG: 10 SUPPOSITORY RECTAL at 09:05

## 2020-05-27 RX ADMIN — ALBUMIN HUMAN 25 G: 0.25 SOLUTION INTRAVENOUS at 10:05

## 2020-05-27 RX ADMIN — OXYCODONE HYDROCHLORIDE 10 MG: 10 TABLET ORAL at 03:05

## 2020-05-27 RX ADMIN — ACETAMINOPHEN 650 MG: 325 TABLET ORAL at 08:05

## 2020-05-27 RX ADMIN — ROPINIROLE HYDROCHLORIDE 0.5 MG: 0.25 TABLET, FILM COATED ORAL at 08:05

## 2020-05-27 RX ADMIN — FUROSEMIDE 12 MG/HR: 10 INJECTION, SOLUTION INTRAMUSCULAR; INTRAVENOUS at 07:05

## 2020-05-27 RX ADMIN — CEFAZOLIN 2 G: 330 INJECTION, POWDER, FOR SOLUTION INTRAMUSCULAR; INTRAVENOUS at 05:05

## 2020-05-27 RX ADMIN — MUPIROCIN 1 G: 20 OINTMENT TOPICAL at 08:05

## 2020-05-27 RX ADMIN — PANTOPRAZOLE SODIUM 40 MG: 40 TABLET, DELAYED RELEASE ORAL at 08:05

## 2020-05-27 RX ADMIN — ONDANSETRON HYDROCHLORIDE 4 MG: 2 SOLUTION INTRAMUSCULAR; INTRAVENOUS at 07:05

## 2020-05-27 RX ADMIN — MAGNESIUM SULFATE IN WATER 2 G: 40 INJECTION, SOLUTION INTRAVENOUS at 01:05

## 2020-05-27 RX ADMIN — HYDROMORPHONE HYDROCHLORIDE 0.2 MG: 1 INJECTION, SOLUTION INTRAMUSCULAR; INTRAVENOUS; SUBCUTANEOUS at 05:05

## 2020-05-27 RX ADMIN — OXYCODONE HYDROCHLORIDE 10 MG: 10 TABLET ORAL at 01:05

## 2020-05-27 RX ADMIN — POLYETHYLENE GLYCOL 3350 17 G: 17 POWDER, FOR SOLUTION ORAL at 08:05

## 2020-05-27 RX ADMIN — OXYCODONE HYDROCHLORIDE 10 MG: 10 TABLET ORAL at 06:05

## 2020-05-27 RX ADMIN — ATORVASTATIN CALCIUM 80 MG: 20 TABLET, FILM COATED ORAL at 08:05

## 2020-05-27 RX ADMIN — ALBUMIN (HUMAN) 25 G: 12.5 SOLUTION INTRAVENOUS at 06:05

## 2020-05-27 NOTE — SUBJECTIVE & OBJECTIVE
"Interval HPI:   Overnight events: POD # 2 s/p CABG. Remains in SICU. BG well controlled on IV insulin infusion at 1.5 u/hr.   Eating:   clear liquids  Nausea: No  Hypoglycemia and intervention: No  Fever: No  TPN and/or TF: No  If yes, type of TF/TPN and rate: none    BP (!) 105/52 (BP Location: Right arm, Patient Position: Lying)   Pulse 88   Temp 98.7 °F (37.1 °C) (Oral)   Resp (!) 26   Ht 5' 3" (1.6 m)   Wt 89.5 kg (197 lb 5 oz)   SpO2 (!) 91%   Breastfeeding? No   BMI 34.95 kg/m²     Labs Reviewed and Include    Recent Labs   Lab 05/27/20  0500 05/27/20  0725   *  --    CALCIUM 8.4*  --      --    K 3.7  3.7 3.8   CO2 24  --      --    BUN 18  --    CREATININE 1.1  --      Lab Results   Component Value Date    WBC 14.81 (H) 05/27/2020    HGB 8.4 (L) 05/27/2020    HCT 27.2 (L) 05/27/2020    MCV 96 05/27/2020     05/27/2020     No results for input(s): TSH, FREET4 in the last 168 hours.  Lab Results   Component Value Date    HGBA1C 7.1 (H) 05/19/2020       Nutritional status:   Body mass index is 34.95 kg/m².  Lab Results   Component Value Date    ALBUMIN 3.2 (L) 05/19/2020    ALBUMIN 3.5 05/05/2020    ALBUMIN 3.4 (L) 12/20/2019     No results found for: PREALBUMIN    Estimated Creatinine Clearance: 51.2 mL/min (based on SCr of 1.1 mg/dL).    Accu-Checks  Recent Labs     05/26/20  1020 05/26/20  1125 05/26/20  1223 05/26/20  1408 05/26/20  1508 05/26/20  1621 05/26/20  1728 05/26/20  2204 05/27/20  0206 05/27/20  0724   POCTGLUCOSE 170* 156* 149* 142* 164* 156* 153* 162* 126* 159*       Current Medications and/or Treatments Impacting Glycemic Control  Immunotherapy:    Immunosuppressants     None        Steroids:   Hormones (From admission, onward)    Start     Stop Route Frequency Ordered    05/26/20 2311  melatonin tablet 6 mg      -- Oral Nightly PRN 05/26/20 2211        Pressors:    Autonomic Drugs (From admission, onward)    None        Hyperglycemia/Diabetes Medications: "   Antihyperglycemics (From admission, onward)    Start     Stop Route Frequency Ordered    05/26/20 1845  insulin regular 100 Units in sodium chloride 0.9% 100 mL infusion      -- IV Continuous 05/26/20 1742 05/26/20 1842  insulin aspart U-100 pen 0-4 Units      -- SubQ As needed (PRN) 05/26/20 1742

## 2020-05-27 NOTE — PLAN OF CARE
Problem: Occupational Therapy Goal  Goal: Occupational Therapy Goal  Description  Goals to be met by: 6/8/20     Patient will increase functional independence with ADLs by performing:    UE Dressing with [place].  LE Dressing with [place].  Grooming while standing at sink with Modified [place].  Toileting from toilet with Modified [place] for hygiene and clothing management.   Bathing from  shower chair/bench with Modified [place].  Toilet transfer to toilet with Modified [place].  Increased functional strength to WFL for B UE.  Upper extremity exercise program x15 reps per handout, with independence.   Pt will be able to state all sternal precautions correctly c 100% accuracy.     Outcome: Ongoing, Progressing

## 2020-05-27 NOTE — ASSESSMENT & PLAN NOTE
Neuro:    - AAOx3  - Multimodal pain management     Pulm:   - On 5 liters nasal cannula   - IS, pulmonary toilet  - ABG/Chest x-ray prn    CV:  -S/p CABGx3  -HDS off pressors, off cardene  -Maintian SBP <140  -MAP goal 60-80  -Trend CT outputs     FEN/GI:  - Clear liquid  - replete lytes prn  - on bowel regimen      Renal:  - BUN/Cr WNL  - Adequate UOP     Heme/ID:  - Daily CBC  - H/H stable  - Afebrile, normal WBC     Endo:  - Insulin gtt  - -180     MSK:  PT/OT  Up in chair

## 2020-05-27 NOTE — PROGRESS NOTES
"[facility]  Endocrinology  Progress Note    Admit Date: 5/18/2020     Reason for Consult: Management of T2DM, Hyperglycemia     Surgical Procedure and Date: CABG x 3 5/25/20    Lab Results   Component Value Date    HGBA1C 7.1 (H) 05/19/2020     Diabetes diagnosis year: TSERING    Home Diabetes Medications:  Jardiance 10 mg daily, Tresiba 36 units daily, Metformin 1000 mg BID    How often checking glucose at home? TSERING  BG readings on regimen: TSERING  Hypoglycemia on the regimen? TSERING  Missed doses on regimen?  TSERING    Diabetes Complications include:     Hyperglycemia    Complicating diabetes co morbidities:   History of CVA and CHETNA      HPI:   Patient is a 70 y.o. female with a diagnosis of CAD (newly diagnosed), HTN, HLD, DM2 (on insulin HbA1c 7.1%). Admitted to [facility] on 5/18 with worsening chest pain for the past 4 days. She started having chest pain in 12/2019, she underwent a SPECT which was unremarkable. No prior history of MI or CAD.  In OSH, troponin negative x4. 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  She is now s/p CABG. Endocrinology consulted for management of T2DM.        Interval HPI:   Overnight events: POD # 2 s/p CABG. Remains in SICU. BG well controlled on IV insulin infusion at 1.5 u/hr.   Eating:   clear liquids  Nausea: No  Hypoglycemia and intervention: No  Fever: No  TPN and/or TF: No  If yes, type of TF/TPN and rate: none    BP (!) 105/52 (BP Location: Right arm, Patient Position: Lying)   Pulse 88   Temp 98.7 °F (37.1 °C) (Oral)   Resp (!) 26   Ht 5' 3" (1.6 m)   Wt 89.5 kg (197 lb 5 oz)   SpO2 (!) 91%   Breastfeeding? No   BMI 34.95 kg/m²      Labs Reviewed and Include    Recent Labs   Lab 05/27/20  0500 05/27/20  0725   *  --    CALCIUM 8.4*  --      --    K 3.7  3.7 3.8   CO2 24  --      --    BUN 18  --    CREATININE 1.1  --      Lab Results   Component Value Date    WBC 14.81 (H) " 05/27/2020    HGB 8.4 (L) 05/27/2020    HCT 27.2 (L) 05/27/2020    MCV 96 05/27/2020     05/27/2020     No results for input(s): TSH, FREET4 in the last 168 hours.  Lab Results   Component Value Date    HGBA1C 7.1 (H) 05/19/2020       Nutritional status:   Body mass index is 34.95 kg/m².  Lab Results   Component Value Date    ALBUMIN 3.2 (L) 05/19/2020    ALBUMIN 3.5 05/05/2020    ALBUMIN 3.4 (L) 12/20/2019     No results found for: PREALBUMIN    Estimated Creatinine Clearance: 51.2 mL/min (based on SCr of 1.1 mg/dL).    Accu-Checks  Recent Labs     05/26/20  1020 05/26/20  1125 05/26/20  1223 05/26/20  1408 05/26/20  1508 05/26/20  1621 05/26/20  1728 05/26/20  2204 05/27/20  0206 05/27/20  0724   POCTGLUCOSE 170* 156* 149* 142* 164* 156* 153* 162* 126* 159*       Current Medications and/or Treatments Impacting Glycemic Control  Immunotherapy:    Immunosuppressants     None        Steroids:   Hormones (From admission, onward)    Start     Stop Route Frequency Ordered    05/26/20 2311  melatonin tablet 6 mg      -- Oral Nightly PRN 05/26/20 2211        Pressors:    Autonomic Drugs (From admission, onward)    None        Hyperglycemia/Diabetes Medications:   Antihyperglycemics (From admission, onward)    Start     Stop Route Frequency Ordered    05/26/20 1845  insulin regular 100 Units in sodium chloride 0.9% 100 mL infusion      -- IV Continuous 05/26/20 1742    05/26/20 1842  insulin aspart U-100 pen 0-4 Units      -- SubQ As needed (PRN) 05/26/20 1742          ASSESSMENT and PLAN    * Coronary artery disease  Managed per primary team  S/p CABG      S/P CABG x 3  Managed per primary team  Optimize BG control      Severe obesity (BMI 35.0-39.9) with comorbidity  Body mass index is 34.95 kg/m².  May increase insulin resistance.         Mixed hyperlipidemia  May increase insulin resistance.         Type 2 diabetes mellitus without complication, with long-term current use of insulin  BG goal 110-140  IV insulin  infusion decreased per protocol today.     Rewrite transition IV insulin infusion at 1.2 u/hr with stepdown parameters  Low Dose Correction Scale  BG monitoring ac/hs/0200    ** Please call Endocrine for any BG related issues **  ** Please notify Endocrine for any change and/or advance in diet**    Discharge planning: JEWEL Estrada NP  Endocrinology  [facility]

## 2020-05-27 NOTE — PT/OT/SLP PROGRESS
Occupational Therapy   Treatment    Name: Cecilia Barahona  MRN: [id number]  Admitting Diagnosis:  Coronary artery disease  2 Days Post-Op    Recommendations:     Discharge Recommendations: home health OT  Discharge Equipment Recommendations:  (TBD)  Barriers to discharge:  None    Assessment:     Cecilia Barahona is a 70 y.o. female with a medical diagnosis of Coronary artery disease.  She was able to perform sit/stand T/F c CGA and was able to walk to bathroom c min A and perform toilet T/F c min A.  Able to perform toilet hygiene c max A in bathroom.  Able to perform UB dressing c total assist 2* IV lines.  Pt was able to state 2/3 sternal precautions correctly.  Pt returned to supine at the end of tx session and was able to perform sit/supine T/F c max A.  Pt is progressing well. Performance deficits affecting function are weakness, impaired endurance, impaired self care skills, impaired functional mobilty, impaired balance, decreased upper extremity function.     Rehab Prognosis:  Good; patient would benefit from acute skilled OT services to address these deficits and reach maximum level of function.       Plan:     Patient to be seen 5 x/week to address the above listed problems via self-care/home management, therapeutic activities, therapeutic exercises  · Plan of Care Expires: 07/08/20  · Plan of Care Reviewed with: patient    Subjective     Pain/Comfort:  · Pain Rating 1: (Pt did not rate)    Objective:     Communicated with: RN prior to session.  Patient found up in chair with arterial line, blood pressure cuff, central line, lombardi catheter, oxygen, peripheral IV, pulse ox (continuous), telemetry upon OT entry to room.    General Precautions: Standard, fall, sternal   Orthopedic Precautions:N/A   Braces: N/A     Occupational Performance:     Bed Mobility:    · Patient completed Sit to Supine with maximal assistance     Functional Mobility/Transfers:  · Patient completed Sit <> Stand Transfer with contact  guard assistance  with  hand-held assist   · Patient completed Toilet Transfer Stand Pivot technique with minimum assistance with  hand-held assist  · Functional Mobility: Pt was able to walk to bathroom c min A and has fair static/dynamic standing balance.    Activities of Daily Living:  · Upper Body Dressing: maximal assistance to don hospital gown,  · Toileting: maximal assistance for toilet hygiene.      [facility] 6 Click ADL: 13      Patient left supine with all lines intact, call button in reach and RN notifiedEducation:      GOALS:   Multidisciplinary Problems     Occupational Therapy Goals        Problem: Occupational Therapy Goal    Goal Priority Disciplines Outcome Interventions   Occupational Therapy Goal     OT, PT/OT Ongoing, Progressing    Description:  Goals to be met by: 6/8/20     Patient will increase functional independence with ADLs by performing:    UE Dressing with Kleberg.  LE Dressing with Kleberg.  Grooming while standing at sink with Modified Kleberg.  Toileting from toilet with Modified Kleberg for hygiene and clothing management.   Bathing from  shower chair/bench with Modified Kleberg.  Toilet transfer to toilet with Modified Kleberg.  Increased functional strength to WFL for B UE.  Upper extremity exercise program x15 reps per handout, with independence.   Pt will be able to state all sternal precautions correctly c 100% accuracy.                      Time Tracking:     OT Date of Treatment: 05/27/20  OT Start Time: 1330  OT Stop Time: 1408  OT Total Time (min): 38 min    Billable Minutes:Self Care/Home Management 30  Therapeutic Activity 8    EDGAR Marquez  5/27/2020

## 2020-05-27 NOTE — ASSESSMENT & PLAN NOTE
BG goal 110-140  IV insulin infusion decreased per protocol today.     Rewrite transition IV insulin infusion at 1.2 u/hr with stepdown parameters  Low Dose Correction Scale  BG monitoring ac/hs/0200    ** Please call Endocrine for any BG related issues **  ** Please notify Endocrine for any change and/or advance in diet**    Discharge planning: TBFELICIA

## 2020-05-27 NOTE — PROGRESS NOTES
Cardiothoracic Surgery  Progress Note      Admit Date: 5/18/2020    Disease Etiology: Ischemic  Open Chest: no  Surgery Performed: 5/25/2020 3V CABG    ASSESSMENT/PLAN:     I conducted multidisciplinary rounds in conjunction with the ICU/Critical Care attending staff and/or residents, with involvement of ancillary services as appropriate. The patient's general condition was reviewed, specifically including hemodynamic performance, dietary and nutritional status, pharmacy concerns, and status of expected transition to stepdown care. Plan has been discussed and agreed upon.    Plan:  Increased O2 requirements overnight. UOP tapering off with increasing lasix gtt to 15, will try 100mL albumin 25%. Clears only today, potassium 40 BID scheduled with 40 oral now. Stepdown if weans O2;  keep sats > 88%.     For details see the critical care note.    Cathleen Maher MD  Cardiac Surgery Resident, PGY7  Cardiothoracic Surgery  [facility]

## 2020-05-27 NOTE — PLAN OF CARE
"      SICU PLAN OF CARE NOTE    Dx: Coronary artery disease    Shift Events: Lasix gtt increased due to low UOP. UOP remains marginal despite increase, MD aware. Stepdown orders in place.    Goals of Care: MAP 60-80, SpO2 > 88%    Neuro: AAO x4, Follows Commands and Moves All Extremities    Vital Signs: BP (!) 115/56 (BP Location: Right arm, Patient Position: Lying)   Pulse 71   Temp 98.5 °F (36.9 °C) (Oral)   Resp 18   Ht 5' 3" (1.6 m)   Wt 89.5 kg (197 lb 5 oz)   SpO2 (!) 93%   Breastfeeding? No   BMI 34.95 kg/m²     CVP 14 flat     Respiratory: Nasal Cannula 4-8 L, SpO2 maintained > 88%    Diet: Clear Liquid    Gtts: Insulin and Lasix    Urine Output: 0-85 cc/hour    Drains: Chest Tube, total output 290 mL, serosanguinous     Labs/Accuchecks: Labs sent, replacements admin per orders. Accuchecks ACHS.    Skin: No skin breakdown noted. Midsternal incision HARDIK with steri strips intact, cleansed with betadine. CT dressing CDI. L leg graft site with compression wrap in place. Bed plugged in with waffle mattress inflated. Heel and sacral foams in place. Weight shift assistance provided throughout shift.        "

## 2020-05-27 NOTE — PLAN OF CARE
"      SICU PLAN OF CARE NOTE    Dx: Coronary artery disease    Shift Events: Lasix gtt increased overnight due to low UOP.    Goals of Care: step down    Neuro: AAO x4, Follows Commands and Moves All Extremities    Vital Signs: BP (!) 123/58 (BP Location: Right arm, Patient Position: Lying)   Pulse 85   Temp 99.3 °F (37.4 °C) (Oral)   Resp (!) 28   Ht 5' 3" (1.6 m)   Wt 89.5 kg (197 lb 5 oz)   SpO2 (!) 90%   Breastfeeding? No   BMI 34.95 kg/m²     Respiratory: Nasal Cannula 6L    Diet: Clear Liquid    Gtts: Insulin and Lasix    Urine Output: Urinary Catheter  cc/hour    Drains: Chest Tube, total output 158 cc /  shift       Labs/Accuchecks: ACHS accuchecks. PRN electrolytes    Skin: Skin remains free from breakdown. Chest incision dressing to be changed today around 1600. CT sites CDI. Left leg wrapped in ace bandage. Patient turned Q2H. Sacral foam and heel foams on patient. Patient currently OOBTC       "

## 2020-05-27 NOTE — SUBJECTIVE & OBJECTIVE
Interval History/Significant Events: Extubated yesterday. Lasix gtt increased to 12mg/h over night. On 5 liters nasal cannula    Follow-up For: Procedure(s) (LRB):  CORONARY ARTERY BYPASS GRAFT (CABG) x3  (N/A)    Post-Operative Day: 2 Days Post-Op    Objective:     Vital Signs (Most Recent):  Temp: 99.3 °F (37.4 °C) (05/27/20 0313)  Pulse: 84 (05/27/20 0600)  Resp: (!) 27 (05/27/20 0600)  BP: (!) 123/57 (05/27/20 0600)  SpO2: (!) 91 % (05/27/20 0600) Vital Signs (24h Range):  Temp:  [98.4 °F (36.9 °C)-99.8 °F (37.7 °C)] 99.3 °F (37.4 °C)  Pulse:  [62-98] 84  Resp:  [18-56] 27  SpO2:  [87 %-97 %] 91 %  BP: (100-156)/(56-72) 123/57  Arterial Line BP: ()/(37-62) 117/44     Weight: 89.5 kg (197 lb 5 oz)  Body mass index is 34.95 kg/m².      Intake/Output Summary (Last 24 hours) at 5/27/2020 0614  Last data filed at 5/27/2020 0600  Gross per 24 hour   Intake 630 ml   Output 2540 ml   Net -1910 ml       Physical Exam   Constitutional: She is oriented to person, place, and time. She appears well-developed and well-nourished.   HENT:   Head: Normocephalic and atraumatic.   Eyes: Pupils are equal, round, and reactive to light. Conjunctivae and EOM are normal.   Neck: Neck supple.   Cardiovascular: Normal rate, regular rhythm, normal heart sounds and intact distal pulses.   Pulmonary/Chest: Effort normal and breath sounds normal. No respiratory distress.   On 5 liters nasal cannula    Abdominal: Soft. She exhibits no distension.   Musculoskeletal: Normal range of motion.   Chest tube to suction  Sternal dressing c/d/i   Neurological: She is alert and oriented to person, place, and time.   sedated   Skin: Skin is warm and dry. Capillary refill takes less than 2 seconds.         Lines/Drains/Airways     Central Venous Catheter Line                 Percutaneous Central Line Insertion/Assessment - Quad lumen  05/25/20 0802 1 day    Percutaneous Central Line Insertion/Assessment - Quad Lumen  05/25/20 0802 1 day           Drain                 Urethral Catheter 05/25/20 0826 Straight-tip;Temperature probe 14 Fr. 1 day         Y Chest Tube 1 and 2 05/25/20 1413 Mediastinal 19 Fr. Mediastinal 19 Fr. 1 day         Y Chest Tube 3 and 4 05/25/20 1415 Right Pleural 19 Fr. Left Pleural 19 Fr. 1 day          Arterial Line                 Arterial Line 05/25/20 0742 Right Radial 1 day          Peripheral Intravenous Line                 Peripheral IV - Single Lumen 05/18/20 2305 20 G Left Forearm 8 days         Peripheral IV - Single Lumen 05/25/20 0814 16 G Left Wrist 1 day                Significant Labs:    CBC/Anemia Profile:  Recent Labs   Lab 05/25/20  1544  05/26/20  0258 05/26/20  0320 05/26/20  0733 05/27/20  0500   WBC 14.80*  --  9.71  --   --  14.81*   HGB 10.1*  --  8.5*  --   --  8.4*   HCT 31.0*   < > 27.6* 27* 26* 27.2*     --  171  --   --  171   MCV 93  --  93  --   --  96   RDW 14.0  --  14.3  --   --  14.8*    < > = values in this interval not displayed.        Chemistries:  Recent Labs   Lab 05/25/20  1544 05/26/20  0258 05/26/20  1739 05/26/20  2328 05/27/20  0500    139  --   --  141   K 4.0  4.0 4.0 3.8 3.6 3.7  3.7    107  --   --  106   CO2 22* 25  --   --  24   BUN 13 13  --   --  18   CREATININE 0.8 0.8  --   --  1.1   CALCIUM 9.0 8.8  --   --  8.4*   MG 2.4 2.1 1.9 1.7 2.6   PHOS 3.7 3.9 3.4 3.3 3.3       All pertinent labs within the past 24 hours have been reviewed.    Significant Imaging:  I have reviewed all pertinent imaging results/findings within the past 24 hours.

## 2020-05-27 NOTE — PT/OT/SLP PROGRESS
Physical Therapy Treatment    Patient Name:  Cecilia Barahona   MRN:  [id number]  Admit Date: 2020  Admitting Diagnosis:  Coronary artery disease   Length of Stay: 7 days  Recent Surgery: Procedure(s) (LRB):  CORONARY ARTERY BYPASS GRAFT (CABG) x3  (N/A) 2 Days Post-Op    Recommendations:     Discharge Recommendations:  home health PT   Discharge Equipment Recommendations: (TBD)   Barriers to discharge: None    Plan:     During this hospitalization, patient to be seen 5 x/week to address the listed problems via gait training, therapeutic activities, therapeutic exercises, neuromuscular re-education  · Plan of Care Expires:  20   Plan of Care Reviewed with: patient    Assessment:     Cecilia Barahona is a 70 y.o. female admitted with a medical diagnosis of Coronary artery disease. Pt progressing towards goals, but not at PLOF. Pt tolerated session well but continues with sternal pain.  Pt is improving with therapy evidenced by increased gait distance and increased activity tolerance. Pt would benefit from continued PT services to improve overall functional mobility. Recommend d/c to HHPT to maximize functional independence.        Problem List: weakness, impaired endurance, impaired self care skills, impaired functional mobilty, decreased upper extremity function, pain, impaired cardiopulmonary response to activity.  Rehab Prognosis: Good     GOALS:   Multidisciplinary Problems     Physical Therapy Goals        Problem: Physical Therapy Goal    Goal Priority Disciplines Outcome Goal Variances Interventions   Physical Therapy Goal     PT, PT/OT Ongoing, Progressing     Description:  Goals to be met by: 2020    Patient will increase functional independence with mobility by performin. Supine to sit with Contact Guard Assistance - not met  2. Sit to stand transfer with Supervision - not met  3. Gait  x 150 feet with Supervision - not met  4. Ascend/descend 5 stair with bilateral Handrails Contact  "Guard Assistance - not met  5. Stand for 3 minutes with Supervision to increase activity tolerance - not met                      Subjective   Communicated with RN prior to session.  Patient found up in chair upon PT entry to room, agreeable to evaluation. Cecilia Barahona's alone during session.    Chief Complaint: pain  Patient/Family Comments/goals: to get better and return home   Pain/Comfort:  · Pain Rating 1: (sternum; unrated)  · Pain Addressed 1: Reposition, Distraction    Objective:   Patient found with: telemetry, pulse ox (continuous), blood pressure cuff, oxygen, central line, peripheral IV, lombardi catheter, chest tube, arterial line   General Precautions: Standard, Cardiac fall, sternal   Orthopedic Precautions:N/A   Braces: N/A   Oxygen Device: High Flow Nasal Cannula  Vitals: BP (!) 111/58 (BP Location: Right arm, Patient Position: Lying)   Pulse 76   Temp 98.2 °F (36.8 °C) (Oral)   Resp 20   Ht 5' 3" (1.6 m)   Wt 89.5 kg (197 lb 5 oz)   SpO2 (!) 92%   Breastfeeding? No   BMI 34.95 kg/m²     Outcome Measures:  AM-PAC 6 CLICK MOBILITY  Turning over in bed (including adjusting bedclothes, sheets and blankets)?: 2  Sitting down on and standing up from a chair with arms (e.g., wheelchair, bedside commode, etc.): 3  Moving from lying on back to sitting on the side of the bed?: 2  Moving to and from a bed to a chair (including a wheelchair)?: 3  Need to walk in hospital room?: 3  Climbing 3-5 steps with a railing?: 1  Basic Mobility Total Score: 14     Cognition:   · Alert and Cooperative  · Ox4  · Command following: Follows multistep  commands  · Fluency: clear/fluent    Functional Mobility:  Additional staff present: OT  Bed Mobility:    Sit to Supine: maximal assistance; HOB flat      Transfers:    Sit <> Stand Transfer: contact guard assistance progressing to min A with fatigue (chair, toilet)      Gait:  · Patient ambulated: 10ft + 4ft + 4ft with a seated rest break    · Patient required: " contact guard  · Patient used: no assistive device  · Gait Pattern observed: reciprocal gait  · Gait Deviation(s): decreased step length, wide base of support, flexed posture and decreased gibson  · Impairments due to: pain  · Comments:   · Cuing for upright posture and deep breathing techniques    Therapeutic Activities, Exercises, and Education:   Educated pt on PT role/POC  Educated pt on importance of OOB activity and daily ambulation   Educated pt on sternal precautions  Pt verbalized understanding     Patient left HOB elevated with all lines intact, call button in reach and RN notified..    Time Tracking:     PT Received On: 05/27/20  PT Start Time: 1337     PT Stop Time: 1411  PT Total Time (min): 34 min     Billable Minutes:   · Gait Training 23    Treatment Type: Treatment  PT/PTA: PT       Irais Yao PT, DPT  5/27/2020  [phone]

## 2020-05-27 NOTE — PLAN OF CARE
MICKEY is following this Pt for DC planning needs. MICKEY will work with patient, family and insurance to achieve HH once orders are placed as patient approaches discharge.    MICKEY will continue to coordinate with patient, family, team and insurance to complete patient's discharge plan.       05/27/20 0910   Post-Acute Status   Post-Acute Authorization Home Health   Home Health Status Awaiting Orders for HH   Discharge Plan   Discharge Plan A Home Health     Mare Callahan LMSW   - Case Management

## 2020-05-28 DIAGNOSIS — Z95.1 HX OF CABG: Primary | ICD-10-CM

## 2020-05-28 LAB
ANION GAP SERPL CALC-SCNC: 10 MMOL/L (ref 8–16)
BASOPHILS # BLD AUTO: 0.02 K/UL (ref 0–0.2)
BASOPHILS NFR BLD: 0.1 % (ref 0–1.9)
BUN SERPL-MCNC: 31 MG/DL (ref 8–23)
CALCIUM SERPL-MCNC: 8.9 MG/DL (ref 8.7–10.5)
CHLORIDE SERPL-SCNC: 106 MMOL/L (ref 95–110)
CO2 SERPL-SCNC: 24 MMOL/L (ref 23–29)
CREAT SERPL-MCNC: 1.5 MG/DL (ref 0.5–1.4)
DIFFERENTIAL METHOD: ABNORMAL
EOSINOPHIL # BLD AUTO: 0.1 K/UL (ref 0–0.5)
EOSINOPHIL NFR BLD: 0.7 % (ref 0–8)
ERYTHROCYTE [DISTWIDTH] IN BLOOD BY AUTOMATED COUNT: 15 % (ref 11.5–14.5)
EST. GFR  (AFRICAN AMERICAN): 40.7 ML/MIN/1.73 M^2
EST. GFR  (NON AFRICAN AMERICAN): 35.3 ML/MIN/1.73 M^2
GLUCOSE SERPL-MCNC: 110 MG/DL (ref 70–110)
HCT VFR BLD AUTO: 23.8 % (ref 37–48.5)
HGB BLD-MCNC: 7.2 G/DL (ref 12–16)
IMM GRANULOCYTES # BLD AUTO: 0.09 K/UL (ref 0–0.04)
IMM GRANULOCYTES NFR BLD AUTO: 0.7 % (ref 0–0.5)
LYMPHOCYTES # BLD AUTO: 2.2 K/UL (ref 1–4.8)
LYMPHOCYTES NFR BLD: 16.6 % (ref 18–48)
MCH RBC QN AUTO: 29 PG (ref 27–31)
MCHC RBC AUTO-ENTMCNC: 30.3 G/DL (ref 32–36)
MCV RBC AUTO: 96 FL (ref 82–98)
MONOCYTES # BLD AUTO: 1 K/UL (ref 0.3–1)
MONOCYTES NFR BLD: 7.3 % (ref 4–15)
NEUTROPHILS # BLD AUTO: 10 K/UL (ref 1.8–7.7)
NEUTROPHILS NFR BLD: 74.6 % (ref 38–73)
NRBC BLD-RTO: 0 /100 WBC
PLATELET # BLD AUTO: 162 K/UL (ref 150–350)
PMV BLD AUTO: 10.1 FL (ref 9.2–12.9)
POCT GLUCOSE: 104 MG/DL (ref 70–110)
POCT GLUCOSE: 108 MG/DL (ref 70–110)
POCT GLUCOSE: 122 MG/DL (ref 70–110)
POCT GLUCOSE: 124 MG/DL (ref 70–110)
POCT GLUCOSE: 126 MG/DL (ref 70–110)
POTASSIUM SERPL-SCNC: 4.3 MMOL/L (ref 3.5–5.1)
RBC # BLD AUTO: 2.48 M/UL (ref 4–5.4)
SODIUM SERPL-SCNC: 140 MMOL/L (ref 136–145)
WBC # BLD AUTO: 13.46 K/UL (ref 3.9–12.7)

## 2020-05-28 PROCEDURE — 25000003 PHARM REV CODE 250: Mod: HCNC | Performed by: STUDENT IN AN ORGANIZED HEALTH CARE EDUCATION/TRAINING PROGRAM

## 2020-05-28 PROCEDURE — 99900035 HC TECH TIME PER 15 MIN (STAT): Mod: HCNC

## 2020-05-28 PROCEDURE — 20000000 HC ICU ROOM: Mod: HCNC

## 2020-05-28 PROCEDURE — 99232 PR SUBSEQUENT HOSPITAL CARE,LEVL II: ICD-10-PCS | Mod: HCNC,,, | Performed by: NURSE PRACTITIONER

## 2020-05-28 PROCEDURE — 63600175 PHARM REV CODE 636 W HCPCS: Mod: HCNC | Performed by: STUDENT IN AN ORGANIZED HEALTH CARE EDUCATION/TRAINING PROGRAM

## 2020-05-28 PROCEDURE — 97530 THERAPEUTIC ACTIVITIES: CPT | Mod: HCNC

## 2020-05-28 PROCEDURE — 27100171 HC OXYGEN HIGH FLOW UP TO 24 HOURS: Mod: HCNC

## 2020-05-28 PROCEDURE — 97535 SELF CARE MNGMENT TRAINING: CPT | Mod: HCNC

## 2020-05-28 PROCEDURE — 25000003 PHARM REV CODE 250: Mod: HCNC | Performed by: THORACIC SURGERY (CARDIOTHORACIC VASCULAR SURGERY)

## 2020-05-28 PROCEDURE — P9045 ALBUMIN (HUMAN), 5%, 250 ML: HCPCS | Mod: JG,HCNC | Performed by: STUDENT IN AN ORGANIZED HEALTH CARE EDUCATION/TRAINING PROGRAM

## 2020-05-28 PROCEDURE — 99233 PR SUBSEQUENT HOSPITAL CARE,LEVL III: ICD-10-PCS | Mod: HCNC,,, | Performed by: SURGERY

## 2020-05-28 PROCEDURE — 99232 SBSQ HOSP IP/OBS MODERATE 35: CPT | Mod: HCNC,,, | Performed by: NURSE PRACTITIONER

## 2020-05-28 PROCEDURE — 99233 SBSQ HOSP IP/OBS HIGH 50: CPT | Mod: HCNC,,, | Performed by: SURGERY

## 2020-05-28 PROCEDURE — 63600175 PHARM REV CODE 636 W HCPCS: Mod: JG,HCNC | Performed by: STUDENT IN AN ORGANIZED HEALTH CARE EDUCATION/TRAINING PROGRAM

## 2020-05-28 PROCEDURE — 80048 BASIC METABOLIC PNL TOTAL CA: CPT | Mod: HCNC

## 2020-05-28 PROCEDURE — 94761 N-INVAS EAR/PLS OXIMETRY MLT: CPT | Mod: HCNC

## 2020-05-28 PROCEDURE — P9047 ALBUMIN (HUMAN), 25%, 50ML: HCPCS | Mod: JG,HCNC | Performed by: STUDENT IN AN ORGANIZED HEALTH CARE EDUCATION/TRAINING PROGRAM

## 2020-05-28 PROCEDURE — 85025 COMPLETE CBC W/AUTO DIFF WBC: CPT | Mod: HCNC

## 2020-05-28 PROCEDURE — 27000221 HC OXYGEN, UP TO 24 HOURS: Mod: HCNC

## 2020-05-28 PROCEDURE — 97110 THERAPEUTIC EXERCISES: CPT | Mod: HCNC

## 2020-05-28 RX ORDER — ALBUMIN HUMAN 250 G/1000ML
12.5 SOLUTION INTRAVENOUS ONCE
Status: DISCONTINUED | OUTPATIENT
Start: 2020-05-28 | End: 2020-05-28

## 2020-05-28 RX ORDER — METOLAZONE 5 MG/1
10 TABLET ORAL ONCE
Status: COMPLETED | OUTPATIENT
Start: 2020-05-28 | End: 2020-05-28

## 2020-05-28 RX ORDER — ALBUMIN HUMAN 50 G/1000ML
12.5 SOLUTION INTRAVENOUS ONCE
Status: COMPLETED | OUTPATIENT
Start: 2020-05-28 | End: 2020-05-28

## 2020-05-28 RX ORDER — AMOXICILLIN 250 MG
1 CAPSULE ORAL DAILY
Status: DISCONTINUED | OUTPATIENT
Start: 2020-05-28 | End: 2020-06-01 | Stop reason: HOSPADM

## 2020-05-28 RX ORDER — ACETAMINOPHEN 500 MG
1000 TABLET ORAL EVERY 8 HOURS
Status: DISPENSED | OUTPATIENT
Start: 2020-05-28 | End: 2020-05-31

## 2020-05-28 RX ORDER — ALBUMIN HUMAN 250 G/1000ML
25 SOLUTION INTRAVENOUS ONCE
Status: COMPLETED | OUTPATIENT
Start: 2020-05-28 | End: 2020-05-28

## 2020-05-28 RX ADMIN — STANDARDIZED SENNA CONCENTRATE AND DOCUSATE SODIUM 1 TABLET: 8.6; 5 TABLET ORAL at 08:05

## 2020-05-28 RX ADMIN — FUROSEMIDE 10 MG/HR: 10 INJECTION, SOLUTION INTRAMUSCULAR; INTRAVENOUS at 09:05

## 2020-05-28 RX ADMIN — ROPINIROLE HYDROCHLORIDE 0.5 MG: 0.25 TABLET, FILM COATED ORAL at 09:05

## 2020-05-28 RX ADMIN — MUPIROCIN 1 G: 20 OINTMENT TOPICAL at 09:05

## 2020-05-28 RX ADMIN — OXYCODONE HYDROCHLORIDE 10 MG: 10 TABLET ORAL at 08:05

## 2020-05-28 RX ADMIN — PANTOPRAZOLE SODIUM 40 MG: 40 TABLET, DELAYED RELEASE ORAL at 08:05

## 2020-05-28 RX ADMIN — POTASSIUM CHLORIDE 40 MEQ: 1500 TABLET, EXTENDED RELEASE ORAL at 09:05

## 2020-05-28 RX ADMIN — OXYCODONE HYDROCHLORIDE 10 MG: 10 TABLET ORAL at 03:05

## 2020-05-28 RX ADMIN — POTASSIUM CHLORIDE 40 MEQ: 1500 TABLET, EXTENDED RELEASE ORAL at 08:05

## 2020-05-28 RX ADMIN — POLYETHYLENE GLYCOL 3350 17 G: 17 POWDER, FOR SOLUTION ORAL at 08:05

## 2020-05-28 RX ADMIN — ASPIRIN 325 MG ORAL TABLET 325 MG: 325 PILL ORAL at 08:05

## 2020-05-28 RX ADMIN — ATORVASTATIN CALCIUM 80 MG: 20 TABLET, FILM COATED ORAL at 09:05

## 2020-05-28 RX ADMIN — ACETAMINOPHEN 1000 MG: 500 TABLET ORAL at 09:05

## 2020-05-28 RX ADMIN — METOLAZONE 10 MG: 5 TABLET ORAL at 10:05

## 2020-05-28 RX ADMIN — MUPIROCIN 1 G: 20 OINTMENT TOPICAL at 08:05

## 2020-05-28 RX ADMIN — FUROSEMIDE 10 MG/HR: 10 INJECTION, SOLUTION INTRAMUSCULAR; INTRAVENOUS at 05:05

## 2020-05-28 RX ADMIN — ACETAMINOPHEN 1000 MG: 500 TABLET ORAL at 03:05

## 2020-05-28 RX ADMIN — OXYCODONE HYDROCHLORIDE 10 MG: 10 TABLET ORAL at 11:05

## 2020-05-28 RX ADMIN — ALBUMIN (HUMAN) 25 G: 12.5 SOLUTION INTRAVENOUS at 08:05

## 2020-05-28 RX ADMIN — ALBUMIN (HUMAN) 12.5 G: 12.5 SOLUTION INTRAVENOUS at 02:05

## 2020-05-28 RX ADMIN — ALBUMIN (HUMAN) 12.5 G: 12.5 SOLUTION INTRAVENOUS at 04:05

## 2020-05-28 NOTE — PROGRESS NOTES
Cardiothoracic Surgery  Progress Note      Admit Date: 5/18/2020    Disease Etiology: Ischemic  Open Chest: no  Surgery Performed: 5/25/2020 3V CABG    ASSESSMENT/PLAN:     I conducted multidisciplinary rounds in conjunction with the ICU/Critical Care attending staff and/or residents, with involvement of ancillary services as appropriate. The patient's general condition was reviewed, specifically including hemodynamic performance, dietary and nutritional status, pharmacy concerns, and status of expected transition to stepdown care. Plan has been discussed and agreed upon.    Plan:  Chair time today. Scheduling tylenol 1g q8hrs, removing pleural chest tubes due to splinting with respirations. Cr up so do not titrate lasix gtt any further. Giving 1x metolazone 10 this AM after albumin 25% 100mL. Wean O2 and step down to floor.     For details see the critical care note.    Cathleen Maher MD  Cardiac Surgery Resident, PGY7  Cardiothoracic Surgery  [facility]

## 2020-05-28 NOTE — PLAN OF CARE
"      SICU PLAN OF CARE NOTE    Dx: Coronary artery disease    Shift Events: 25% albumin and metolazone admin for low UOP, pt responsive with UOP > 100 mL/hr. Pleural chest tubes D/C'd. O2 weaned as tolerated throughout shift. Pain managed with tylenol and oxy per PRN orders. OOBTC for majority of shift    Goals of Care: MAP 60-80, SBP < 140, SpO2 > 88%    Neuro: AAO x4, Follows Commands and Moves All Extremities    Vital Signs: BP (!) 101/55 (BP Location: Right arm, Patient Position: Lying)   Pulse 76   Temp 98.3 °F (36.8 °C) (Oral)   Resp 18   Ht 5' 3" (1.6 m)   Wt 92.3 kg (203 lb 7.8 oz)   SpO2 (!) 94%   Breastfeeding? No   BMI 36.05 kg/m²     Respiratory: Nasal Cannula, 1 L, SpO2 90%    Diet: Clear Liquid    Gtts: Insulin and Lasix    Urine Output: Urinary Catheter  cc/shift    Drains: Chest Tube, total output 60 cc /  shift     Labs/Accuchecks: Daily labs. Accuchecks ACHS, no SSI needed.    Skin: No skin breakdown noted. Midsternal incision HARDIK with steri strips intact, cleansed with betadine. CT dressing CDI. L leg graft site with gauze and tegaderm in place. Bed plugged in with waffle mattress inflated. Heel and sacral foams in place. Weight shift assistance provided throughout shift.           "

## 2020-05-28 NOTE — PLAN OF CARE
Problem: Physical Therapy Goal  Goal: Physical Therapy Goal  Description  Goals to be met by: 2020    Patient will increase functional independence with mobility by performin. Supine to sit with Contact Guard Assistance - not met  2. Sit to stand transfer with Supervision - not met  3. Gait  x 150 feet with Supervision - not met  4. Ascend/descend 5 stair with bilateral Handrails Contact Guard Assistance - not met  5. Stand for 3 minutes with Supervision to increase activity tolerance - not met     Outcome: Ongoing, Progressing     Pt is progressing toward goals. All goals remain appropriate.    Betsy Velázquez, PT, DPT  2020  [phone]

## 2020-05-28 NOTE — ASSESSMENT & PLAN NOTE
Neuro:    - AAOx3  - PRN oxycodone and dilaudid. Splinting still an issue, consider adding multimodals.      Pulm:   - Oxygen requirements vary between 6-12 liters nasal cannula depending on pain control.   - IS, pulmonary toilet  - ABG/Chest x-ray prn    CV:  -S/p CABGx3  -HDS off pressors, off cardene  -Maintian SBP <140  -MAP goal 60-80  -Trend CT outputs     FEN/GI:  - Clear liquid  - replete lytes prn  - on bowel regimen      Renal:  - BUN/Cr WNL  - Adequate UOP     Heme/ID:  - Daily CBC  - H/H stable  - Afebrile, normal WBC     Endo:  - Insulin gtt  - -180     MSK:  PT/OT  Up in chair

## 2020-05-28 NOTE — ASSESSMENT & PLAN NOTE
BG goal 110-140    Rewrite transition IV insulin infusion at 0.8 u/hr with stepdown parameters. BG is below goal this morning 05/28/2020  Low Dose Correction Scale. PRN BG excursions. Will consider scheduled novolog insulin once caloric intake and diet is advanced.   BG monitoring //0200    ADDENDUM 1453:   - Patient continues to trend down on Insulin infusion requirements.    - Rewrite  transition IV insulin infusion with stepdown parameters. Initial rate starting at 0.6 units/hr.     ** Please call Endocrine for any BG related issues **  ** Please notify Endocrine for any change and/or advance in diet**    Discharge planning:   TBD. Please notify endocrinology prior to discharge.

## 2020-05-28 NOTE — PROGRESS NOTES
[facility]  Critical Care - Surgery  Progress Note    Patient Name: Cecilia Barahona  MRN: [id number]  Admission Date: 5/18/2020  Hospital Length of Stay: 8 days  Code Status: Full Code  Attending Provider: Yan Bowman MD  Primary Care Provider: Yudi Smart MD   Principal Problem: Coronary artery disease    Subjective:     Hospital/ICU Course:  No notes on file    Interval History/Significant Events: Oxygen requirements fluctuating between 6-12 lpm. When patient is asleep/pain controlled and breathing deeply oxygen can be weaned down to 5-6 lpm. When she is splinting oxygen requirements increase to 12 lpm. Also, MAPs dipped into 50s over night. Lasix decreased from 20 to 10 mg/h and patient given Albumin 500 mg with improvement in pressure.      Follow-up For: Procedure(s) (LRB):  CORONARY ARTERY BYPASS GRAFT (CABG) x3  (N/A)    Post-Operative Day: 3 Days Post-Op    Objective:     Vital Signs (Most Recent):  Temp: 98.5 °F (36.9 °C) (05/28/20 0300)  Pulse: 78 (05/28/20 0705)  Resp: (!) 23 (05/28/20 0705)  BP: 128/60 (05/28/20 0705)  SpO2: (!) 89 % (05/28/20 0705) Vital Signs (24h Range):  Temp:  [98.2 °F (36.8 °C)-98.5 °F (36.9 °C)] 98.5 °F (36.9 °C)  Pulse:  [69-90] 78  Resp:  [15-36] 23  SpO2:  [60 %-98 %] 89 %  BP: ()/(52-63) 128/60  Arterial Line BP: ()/(37-66) 123/45     Weight: 92.3 kg (203 lb 7.8 oz)  Body mass index is 36.05 kg/m².      Intake/Output Summary (Last 24 hours) at 5/28/2020 0712  Last data filed at 5/28/2020 0600  Gross per 24 hour   Intake 2028 ml   Output 1130 ml   Net 898 ml       Physical Exam   Constitutional: She is oriented to person, place, and time. She appears well-developed and well-nourished.   HENT:   Head: Normocephalic and atraumatic.   Eyes: Pupils are equal, round, and reactive to light. Conjunctivae and EOM are normal.   Neck: Neck supple.   Cardiovascular: Normal rate, regular rhythm, normal heart sounds and intact distal pulses.    Pulmonary/Chest: Effort normal and breath sounds normal. No respiratory distress.   On 6 liters nasal cannula    Abdominal: Soft. She exhibits no distension.   Musculoskeletal: Normal range of motion.   Chest tube to suction  Sternal dressing c/d/i   Neurological: She is alert and oriented to person, place, and time.   Skin: Skin is warm and dry. Capillary refill takes less than 2 seconds.       Vents:  Vent Mode: Spont (05/26/20 0928)  Ventilator Initiated: Yes(chart correction) (05/25/20 1549)  Set Rate: 16 BPM (05/26/20 0324)  Vt Set: 380 mL (05/26/20 0324)  Pressure Support: 10 cmH20 (05/26/20 0928)  PEEP/CPAP: 5 cmH20 (05/26/20 0928)  Oxygen Concentration (%): 40 (05/26/20 0928)  Peak Airway Pressure: 16 cmH2O (05/26/20 0928)  Plateau Pressure: 23 cmH20 (05/26/20 0928)  Total Ve: 2.31 mL (05/26/20 0928)  Negative Inspiratory Force (cm H2O): -34 (05/26/20 0915)  F/VT Ratio<105 (RSBI): 142.13 (05/26/20 0915)    Lines/Drains/Airways     Central Venous Catheter Line                 Percutaneous Central Line Insertion/Assessment - Quad lumen  05/25/20 0802 2 days    Percutaneous Central Line Insertion/Assessment - Quad Lumen  05/25/20 0802 2 days          Drain                 Urethral Catheter 05/25/20 0826 Straight-tip;Temperature probe 14 Fr. 2 days         Y Chest Tube 1 and 2 05/25/20 1413 Mediastinal 19 Fr. Mediastinal 19 Fr. 2 days         Y Chest Tube 3 and 4 05/25/20 1415 Right Pleural 19 Fr. Left Pleural 19 Fr. 2 days          Arterial Line                 Arterial Line 05/25/20 0742 Right Radial 2 days          Peripheral Intravenous Line                 Peripheral IV - Single Lumen 05/18/20 2305 20 G Left Forearm 9 days         Peripheral IV - Single Lumen 05/25/20 0814 16 G Left Wrist 2 days                Significant Labs:    CBC/Anemia Profile:  Recent Labs   Lab 05/26/20  0733 05/27/20  0500 05/28/20  0341   WBC  --  14.81* 13.46*   HGB  --  8.4* 7.2*   HCT 26* 27.2* 23.8*   PLT  --  171 162   MCV   --  96 96   RDW  --  14.8* 15.0*        Chemistries:  Recent Labs   Lab 05/26/20  1739 05/26/20  2328 05/27/20  0500 05/27/20  0725 05/28/20  0341   NA  --   --  141  --  140   K 3.8 3.6 3.7  3.7 3.8 4.3   CL  --   --  106  --  106   CO2  --   --  24  --  24   BUN  --   --  18  --  31*   CREATININE  --   --  1.1  --  1.5*   CALCIUM  --   --  8.4*  --  8.9   MG 1.9 1.7 2.6  --   --    PHOS 3.4 3.3 3.3  --   --        All pertinent labs within the past 24 hours have been reviewed.    Significant Imaging:  I have reviewed all pertinent imaging results/findings within the past 24 hours.    Assessment/Plan:     * Coronary artery disease  Neuro:    - AAOx3  - PRN oxycodone and dilaudid. Splinting still an issue, consider adding multimodals.      Pulm:   - Oxygen requirements vary between 6-12 liters nasal cannula depending on pain control.   - IS, pulmonary toilet  - ABG/Chest x-ray prn    CV:  -S/p CABGx3  -HDS off pressors, off cardene  -Maintian SBP <140  -MAP goal 60-80  -Trend CT outputs     FEN/GI:  - Clear liquid  - replete lytes prn  - on bowel regimen      Renal:  - BUN/Cr WNL  - Adequate UOP     Heme/ID:  - Daily CBC  - H/H stable  - Afebrile, normal WBC     Endo:  - Insulin gtt  - -180     MSK:  PT/OT  Up in chair        Critical secondary to Patient has a condition that poses threat to life and bodily function: s/p CABG     Critical care was time spent personally by me on the following activities: development of treatment plan with patient or surrogate and bedside caregivers, discussions with consultants, evaluation of patient's response to treatment, examination of patient, ordering and performing treatments and interventions, ordering and review of laboratory studies, ordering and review of radiographic studies, pulse oximetry, re-evaluation of patient's condition.  This critical care time did not overlap with that of any other provider or involve time for any procedures.     Mazin Beth,  MD  Critical Care - Surgery  [facility]

## 2020-05-28 NOTE — PLAN OF CARE
SW will work with patient, family and insurance to achieve HH once orders are placed as patient approaches discharge.    Mare Callahan LMSW   - Case Management

## 2020-05-28 NOTE — PROGRESS NOTES
"[facility]  Endocrinology  Progress Note    Admit Date: 5/18/2020     Reason for Consult: Management of T2DM, Hyperglycemia     Surgical Procedure and Date: CABG x 3 5/25/20    Lab Results   Component Value Date    HGBA1C 7.1 (H) 05/19/2020     Diabetes diagnosis year: >10 years    Home Diabetes Medications:  (per [facility] endo team)  Jardiance 10 mg daily, Tresiba 36 units daily, Metformin 1000 mg BID    How often checking glucose at home? Free Style dionna  BG readings on regimen: 90's 100's  Hypoglycemia on the regimen? No  Missed doses on regimen?  No    Diabetes Complications include:     Hyperglycemia    Complicating diabetes co morbidities:   History of CVA and CHETNA      HPI:   Patient is a 70 y.o. female with a diagnosis of CAD (newly diagnosed), HTN, HLD, DM2 (on insulin HbA1c 7.1%). Admitted to [facility] on 5/18 with worsening chest pain for the past 4 days. She started having chest pain in 12/2019, she underwent a SPECT which was unremarkable. No prior history of MI or CAD.  In OSH, troponin negative x4. 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  She is now s/p CABG. Endocrinology consulted for management of T2DM.        Interval HPI:   Overnight events:   BG is below goal on current IV/SQ insulin regimen.   Diet clear liquid  3 Days Post-Op    Eating:   <25%  Nausea: No  Hypoglycemia and intervention: No  Fever: No  TPN and/or TF: No  If yes, type of TF/TPN and rate: NOne    /60 (BP Location: Right arm, Patient Position: Lying)   Pulse 78   Temp 98.2 °F (36.8 °C) (Oral)   Resp (!) 23   Ht 5' 3" (1.6 m)   Wt 92.3 kg (203 lb 7.8 oz)   SpO2 (!) 89%   Breastfeeding? No   BMI 36.05 kg/m²      Labs Reviewed and Include    Recent Labs   Lab 05/28/20  0341      CALCIUM 8.9      K 4.3   CO2 24      BUN 31*   CREATININE 1.5*     Lab Results   Component Value Date    WBC 13.46 (H) 05/28/2020    HGB 7.2 " (L) 05/28/2020    HCT 23.8 (L) 05/28/2020    MCV 96 05/28/2020     05/28/2020     No results for input(s): TSH, FREET4 in the last 168 hours.  Lab Results   Component Value Date    HGBA1C 7.1 (H) 05/19/2020       Nutritional status:   Body mass index is 36.05 kg/m².  Lab Results   Component Value Date    ALBUMIN 3.2 (L) 05/19/2020    ALBUMIN 3.5 05/05/2020    ALBUMIN 3.4 (L) 12/20/2019     No results found for: PREALBUMIN    Estimated Creatinine Clearance: 38.2 mL/min (A) (based on SCr of 1.5 mg/dL (H)).    Accu-Checks  Recent Labs     05/26/20  1621 05/26/20  1728 05/26/20  2204 05/27/20  0206 05/27/20  0724 05/27/20  1150 05/27/20  1708 05/27/20  2009 05/28/20  0226 05/28/20  0717   POCTGLUCOSE 156* 153* 162* 126* 159* 139* 155* 152* 126* 108       Current Medications and/or Treatments Impacting Glycemic Control  Immunotherapy:    Immunosuppressants     None        Steroids:   Hormones (From admission, onward)    Start     Stop Route Frequency Ordered    05/26/20 2311  melatonin tablet 6 mg      -- Oral Nightly PRN 05/26/20 2211        Pressors:    Autonomic Drugs (From admission, onward)    None        Hyperglycemia/Diabetes Medications:   Antihyperglycemics (From admission, onward)    Start     Stop Route Frequency Ordered    05/26/20 1845  insulin regular 100 Units in sodium chloride 0.9% 100 mL infusion      -- IV Continuous 05/26/20 1742    05/26/20 1842  insulin aspart U-100 pen 0-4 Units      -- SubQ As needed (PRN) 05/26/20 1742          ASSESSMENT and PLAN    * Coronary artery disease  Managed per primary team  S/p CABG      Type 2 diabetes mellitus without complication, with long-term current use of insulin  BG goal 110-140    Rewrite transition IV insulin infusion at 0.8 u/hr with stepdown parameters. BG is below goal this morning 05/28/2020  Low Dose Correction Scale. PRN BG excursions. Will consider scheduled novolog insulin once caloric intake and diet is advanced.   BG monitoring  //0200    ADDENDUM 1453:   - Patient continues to trend down on Insulin infusion requirements.    - Rewrite  transition IV insulin infusion with stepdown parameters. Initial rate starting at 0.6 units/hr.     ** Please call Endocrine for any BG related issues **  ** Please notify Endocrine for any change and/or advance in diet**    Discharge planning:   TBD. Please notify endocrinology prior to discharge.           Essential hypertension  Managed per primary team  Condition may cause insulin resistance         Severe obesity (BMI 35.0-39.9) with comorbidity  Body mass index is 36.05 kg/m².  May increase insulin resistance.             Ubaldo Rock, NP  Endocrinology  [facility]

## 2020-05-28 NOTE — SUBJECTIVE & OBJECTIVE
"Interval HPI:   Overnight events:   BG is below goal on current IV/SQ insulin regimen.   Diet clear liquid  3 Days Post-Op    Eating:   <25%  Nausea: No  Hypoglycemia and intervention: No  Fever: No  TPN and/or TF: No  If yes, type of TF/TPN and rate: NOne    /60 (BP Location: Right arm, Patient Position: Lying)   Pulse 78   Temp 98.2 °F (36.8 °C) (Oral)   Resp (!) 23   Ht 5' 3" (1.6 m)   Wt 92.3 kg (203 lb 7.8 oz)   SpO2 (!) 89%   Breastfeeding? No   BMI 36.05 kg/m²     Labs Reviewed and Include    Recent Labs   Lab 05/28/20  0341      CALCIUM 8.9      K 4.3   CO2 24      BUN 31*   CREATININE 1.5*     Lab Results   Component Value Date    WBC 13.46 (H) 05/28/2020    HGB 7.2 (L) 05/28/2020    HCT 23.8 (L) 05/28/2020    MCV 96 05/28/2020     05/28/2020     No results for input(s): TSH, FREET4 in the last 168 hours.  Lab Results   Component Value Date    HGBA1C 7.1 (H) 05/19/2020       Nutritional status:   Body mass index is 36.05 kg/m².  Lab Results   Component Value Date    ALBUMIN 3.2 (L) 05/19/2020    ALBUMIN 3.5 05/05/2020    ALBUMIN 3.4 (L) 12/20/2019     No results found for: PREALBUMIN    Estimated Creatinine Clearance: 38.2 mL/min (A) (based on SCr of 1.5 mg/dL (H)).    Accu-Checks  Recent Labs     05/26/20  1621 05/26/20  1728 05/26/20  2204 05/27/20  0206 05/27/20  0724 05/27/20  1150 05/27/20  1708 05/27/20  2009 05/28/20  0226 05/28/20  0717   POCTGLUCOSE 156* 153* 162* 126* 159* 139* 155* 152* 126* 108       Current Medications and/or Treatments Impacting Glycemic Control  Immunotherapy:    Immunosuppressants     None        Steroids:   Hormones (From admission, onward)    Start     Stop Route Frequency Ordered    05/26/20 2311  melatonin tablet 6 mg      -- Oral Nightly PRN 05/26/20 3648        Pressors:    Autonomic Drugs (From admission, onward)    None        Hyperglycemia/Diabetes Medications:   Antihyperglycemics (From admission, onward)    Start     Stop " Route Frequency Ordered    05/26/20 1845  insulin regular 100 Units in sodium chloride 0.9% 100 mL infusion      -- IV Continuous 05/26/20 1742 05/26/20 1842  insulin aspart U-100 pen 0-4 Units      -- SubQ As needed (PRN) 05/26/20 1742

## 2020-05-28 NOTE — PLAN OF CARE
Patient s/p CABG x3 (5/25/2020). A & O x4. Patient complained of severe pain so around-the-clock Oxycodone were given q4hr. Lasix were decrease to 10mg/hr due to MAPs dropping into the 50s. 500mL Albumin was given to combat the low blood pressure as well. As a result of decreasing the lasix, UOP did decrease to approx. 25-30/hr. Patient had two miniscule bowel movements during the shift. MD is aware of the current H/H (7/23), and will possibly transfuse 1 unit later today. Team is aware of the patient's poor oxygenation status. Patient has O2 sats from 88%-96% on 8LNC to 12L NC. When the patient is awake, she complain of pain and begins to breathe shallow, resulting in a dropping O2 saturation. Chest tubes put out about 25-50cc/shift each.    Skin Note: No new pressure-related injuries. Foam pads in place. Patient woke and moved q2hrs. Mattress inflated.

## 2020-05-28 NOTE — SUBJECTIVE & OBJECTIVE
Interval History/Significant Events: Oxygen requirements fluctuating between 6-12 lpm. When patient is asleep/pain controlled and breathing deeply oxygen can be weaned down to 5-6 lpm. When she is splinting oxygen requirements increase to 12 lpm. Also, MAPs dipped into 50s over night. Lasix decreased from 20 to 10 mg/h and patient given Albumin 500 mg with improvement in pressure.      Follow-up For: Procedure(s) (LRB):  CORONARY ARTERY BYPASS GRAFT (CABG) x3  (N/A)    Post-Operative Day: 3 Days Post-Op    Objective:     Vital Signs (Most Recent):  Temp: 98.5 °F (36.9 °C) (05/28/20 0300)  Pulse: 78 (05/28/20 0705)  Resp: (!) 23 (05/28/20 0705)  BP: 128/60 (05/28/20 0705)  SpO2: (!) 89 % (05/28/20 0705) Vital Signs (24h Range):  Temp:  [98.2 °F (36.8 °C)-98.5 °F (36.9 °C)] 98.5 °F (36.9 °C)  Pulse:  [69-90] 78  Resp:  [15-36] 23  SpO2:  [60 %-98 %] 89 %  BP: ()/(52-63) 128/60  Arterial Line BP: ()/(37-66) 123/45     Weight: 92.3 kg (203 lb 7.8 oz)  Body mass index is 36.05 kg/m².      Intake/Output Summary (Last 24 hours) at 5/28/2020 0712  Last data filed at 5/28/2020 0600  Gross per 24 hour   Intake 2028 ml   Output 1130 ml   Net 898 ml       Physical Exam   Constitutional: She is oriented to person, place, and time. She appears well-developed and well-nourished.   HENT:   Head: Normocephalic and atraumatic.   Eyes: Pupils are equal, round, and reactive to light. Conjunctivae and EOM are normal.   Neck: Neck supple.   Cardiovascular: Normal rate, regular rhythm, normal heart sounds and intact distal pulses.   Pulmonary/Chest: Effort normal and breath sounds normal. No respiratory distress.   On 6 liters nasal cannula    Abdominal: Soft. She exhibits no distension.   Musculoskeletal: Normal range of motion.   Chest tube to suction  Sternal dressing c/d/i   Neurological: She is alert and oriented to person, place, and time.   Skin: Skin is warm and dry. Capillary refill takes less than 2 seconds.        Vents:  Vent Mode: Spont (05/26/20 0928)  Ventilator Initiated: Yes(chart correction) (05/25/20 1549)  Set Rate: 16 BPM (05/26/20 0324)  Vt Set: 380 mL (05/26/20 0324)  Pressure Support: 10 cmH20 (05/26/20 0928)  PEEP/CPAP: 5 cmH20 (05/26/20 0928)  Oxygen Concentration (%): 40 (05/26/20 0928)  Peak Airway Pressure: 16 cmH2O (05/26/20 0928)  Plateau Pressure: 23 cmH20 (05/26/20 0928)  Total Ve: 2.31 mL (05/26/20 0928)  Negative Inspiratory Force (cm H2O): -34 (05/26/20 0915)  F/VT Ratio<105 (RSBI): 142.13 (05/26/20 0915)    Lines/Drains/Airways     Central Venous Catheter Line                 Percutaneous Central Line Insertion/Assessment - Quad lumen  05/25/20 0802 2 days    Percutaneous Central Line Insertion/Assessment - Quad Lumen  05/25/20 0802 2 days          Drain                 Urethral Catheter 05/25/20 0826 Straight-tip;Temperature probe 14 Fr. 2 days         Y Chest Tube 1 and 2 05/25/20 1413 Mediastinal 19 Fr. Mediastinal 19 Fr. 2 days         Y Chest Tube 3 and 4 05/25/20 1415 Right Pleural 19 Fr. Left Pleural 19 Fr. 2 days          Arterial Line                 Arterial Line 05/25/20 0742 Right Radial 2 days          Peripheral Intravenous Line                 Peripheral IV - Single Lumen 05/18/20 2305 20 G Left Forearm 9 days         Peripheral IV - Single Lumen 05/25/20 0814 16 G Left Wrist 2 days                Significant Labs:    CBC/Anemia Profile:  Recent Labs   Lab 05/26/20  0733 05/27/20  0500 05/28/20  0341   WBC  --  14.81* 13.46*   HGB  --  8.4* 7.2*   HCT 26* 27.2* 23.8*   PLT  --  171 162   MCV  --  96 96   RDW  --  14.8* 15.0*        Chemistries:  Recent Labs   Lab 05/26/20  1739 05/26/20  2328 05/27/20  0500 05/27/20  0725 05/28/20  0341   NA  --   --  141  --  140   K 3.8 3.6 3.7  3.7 3.8 4.3   CL  --   --  106  --  106   CO2  --   --  24  --  24   BUN  --   --  18  --  31*   CREATININE  --   --  1.1  --  1.5*   CALCIUM  --   --  8.4*  --  8.9   MG 1.9 1.7 2.6  --   --    PHOS  3.4 3.3 3.3  --   --        All pertinent labs within the past 24 hours have been reviewed.    Significant Imaging:  I have reviewed all pertinent imaging results/findings within the past 24 hours.

## 2020-05-28 NOTE — PT/OT/SLP PROGRESS
Occupational Therapy   Treatment    Name: Cecilia Barahona  MRN: [id number]  Admitting Diagnosis:  Coronary artery disease  3 Days Post-Op    Recommendations:     Discharge Recommendations: home with home health  Discharge Equipment Recommendations:  (TBD )  Barriers to discharge:  None    Assessment:     Cecilia Barahona is a 70 y.o. female with a medical diagnosis of Coronary artery disease.  She presents with performance deficits affecting function are weakness, impaired endurance, impaired self care skills, impaired functional mobilty, gait instability, impaired balance, impaired cardiopulmonary response to activity, pain. Pt tolerated session well despite feeling dizzy during sit<>stand trials. Dizziness improved during standing trials and pt tolerated performing oral care in standing at bedside. Pt would benefit from continued skilled acute OT services in order to maximize independence and safety with ADLs and functional mobility to ensure safe return to PLOF in the least restrictive environment.    Rehab Prognosis:  Good; patient would benefit from acute skilled OT services to address these deficits and reach maximum level of function.       Plan:     Patient to be seen 5 x/week to address the above listed problems via self-care/home management, therapeutic exercises, therapeutic activities  · Plan of Care Expires: 07/08/20  · Plan of Care Reviewed with: patient    Subjective     Pain/Comfort:  · Pain Rating 1: 0/10  · Pain Rating Post-Intervention 1: 0/10    Objective:     Communicated with: RN prior to session.  Patient found up in chair with arterial line, blood pressure cuff, central line, pulse ox (continuous), telemetry, lombardi catheter, oxygen upon OT entry to room. Pt agreeable to therapy session.     General Precautions: Standard, fall, sternal   Orthopedic Precautions:N/A   Braces: N/A     Occupational Performance:     Bed Mobility:    · Not performed, pt found sitting UIC.     Functional  Mobility/Transfers:  · Patient completed x2 reps Sit <> Stand Transfer from bedside chair  with contact guard assistance  with  no assistive device   · Functional Mobility: Attempted to take steps but pt with increased sway and B LE bucking. Pt also reporting increased dizziness therefore deferred functional mobility this date.     Activities of Daily Living:  · Grooming: minimum assistance pt completed oral care in standing with min A for applying toothpaste on toothbrush. Tray table positioned infront of pt and bedside chair behind pt  · Pt tolerated standing ~5 mins with CGA <> min A   · Upper Body Dressing: maximal assistance donning gown like robe while sitting [facility] 6 Click ADL: 14    Treatment & Education:  - Pt educated on role of OT, POC, and goals for therapy.    -Pt verbalized 3/3 sternal precautions with education provided on the importance of maintaining precautions with bed mobility and functional transfers from various surfaces. Pt verbalized understanding and demonstrated understanding by adhering to precautions with use of cardiac pillow throughout session.   - Pt reported increased dizziness in standing; unable to obtain vitals on portable monitor due to malfunction of monitor and pt was assisted back to sitting and transitioned back to room monitor with VSS  - Educated pt on being appropriate to transfer with nsg and PCT with x 1 person for safety   - Time provided for therapeutic counseling and discussion of health disposition.   - Importance of OOB ax's with staff member assistance and sitting OOB majority of day.   - Pt completed ADLs and functional mobility for treatment session as noted above   - Pt verbalized understanding. Pt expressed no further concerns/questions.  - whiteboard updated     Patient left up in chair with all lines intact, call button in reach and RN notifiedEducation:      GOALS:   Multidisciplinary Problems     Occupational Therapy Goals        Problem:  Occupational Therapy Goal    Goal Priority Disciplines Outcome Interventions   Occupational Therapy Goal     OT, PT/OT Ongoing, Progressing    Description:  Goals to be met by: 6/8/20     Patient will increase functional independence with ADLs by performing:    UE Dressing with [place].  LE Dressing with [place].  Grooming while standing at sink with Modified [place].  Toileting from toilet with Modified [place] for hygiene and clothing management.   Bathing from  shower chair/bench with Modified [place].  Toilet transfer to toilet with Modified [place].  Increased functional strength to WFL for B UE.  Upper extremity exercise program x15 reps per handout, with independence.   Pt will be able to state all sternal precautions correctly c 100% accuracy.                      Time Tracking:     OT Date of Treatment: 05/28/20  OT Start Time: 1026  OT Stop Time: 1050  OT Total Time (min): 24 min    Billable Minutes:Self Care/Home Management 12  Therapeutic Activity 11    Adriana Zuniga, OT  5/28/2020

## 2020-05-28 NOTE — PT/OT/SLP PROGRESS
Physical Therapy Treatment    Patient Name:  Cecilia Barahona   MRN:  [id number]    *co-treatment with OT     Recommendations:     Discharge Recommendations:  home health PT   Discharge Equipment Recommendations: (TBD pending progress )   Barriers to discharge: Decreased caregiver support at current level of function    Assessment:     Cecilia Barahona is a 70 y.o. female admitted with a medical diagnosis of Coronary artery disease.  She presents with the following impairments/functional limitations:  weakness, impaired endurance, impaired self care skills, impaired functional mobilty, gait instability, impaired balance, impaired cardiopulmonary response to activity, decreased safety awareness. Pt tolerated activity with mobility limited by onset of dizziness. Pt performed tasks in standing in front of chair, required assistance to maintain standing balance. Pt would continue to benefit from acute skilled therapy intervention to address deficits and progress toward prior level of function.       Rehab Prognosis: Good; patient would benefit from acute skilled PT services to address these deficits and reach maximum level of function.    Recent Surgery: Procedure(s) (LRB):  CORONARY ARTERY BYPASS GRAFT (CABG) x3  (N/A) 3 Days Post-Op    Plan:     During this hospitalization, patient to be seen 5 x/week to address the identified rehab impairments via gait training, therapeutic activities, therapeutic exercises, neuromuscular re-education and progress toward the following goals:    · Plan of Care Expires:  06/26/20    Subjective     Chief Complaint: Pt c/o dizziness while standing, resolved with seated rest   Patient/Family Comments/goals: to get better and return home  Pain/Comfort:  · Pain Rating 1: 0/10  · Pain Rating Post-Intervention 1: 0/10      Objective:     Communicated with RN prior to session.  Patient found up in chair with arterial line, blood pressure cuff, central line, pulse ox (continuous), telemetry,  lombardi catheter, oxygen upon PT entry to room.     General Precautions: Standard, fall, sternal   Orthopedic Precautions:N/A   Braces: N/A     Functional Mobility:  · Transfers:     · Sit to Stand: 2x from chair with contact guard assistance with no AD, cuing provided to increase anterior weight shift of trunk       AM-PAC 6 CLICK MOBILITY  Turning over in bed (including adjusting bedclothes, sheets and blankets)?: 2  Sitting down on and standing up from a chair with arms (e.g., wheelchair, bedside commode, etc.): 3  Moving from lying on back to sitting on the side of the bed?: 2  Moving to and from a bed to a chair (including a wheelchair)?: 3  Need to walk in hospital room?: 3  Climbing 3-5 steps with a railing?: 1  Basic Mobility Total Score: 14       Therapeutic Activities and Exercises:   Pt stood from chair, prepared to ambulate, however, reported dizziness upon standing with increased multidirectional sway. Upon referencing portable monitor for vitals, portable monitor was not operating properly and unable to monitor vitals. Pt returned to sitting and encouraged to perform ankle and hand pumps. PT quickly returned equipment to permanent monitor and vitals stable.   Pt attempted second stand, reported dizziness which improved in standing with deep breathing and hand pumps. Pt demo'd buckling in knees, however, able to recover. Pt maintained standing with contact guard assistance and performed brushing teeth at table. Pt returned to sitting 2/2 reports of fatigue.   Pt educated on role of PT/POC. Pt verbalized understanding.   Pt encouraged to only perform OOB mobility with assistance from nursing/therapy. Pt agreeable.   Pt educated on seated exercises to perform 20x, 3x/day. Exercises included bilateral LAQ, marching, ankle DF/PF      Patient left up in chair with all lines intact, call button in reach and RN notified..    GOALS:   Multidisciplinary Problems     Physical Therapy Goals        Problem: Physical  Therapy Goal    Goal Priority Disciplines Outcome Goal Variances Interventions   Physical Therapy Goal     PT, PT/OT Ongoing, Progressing     Description:  Goals to be met by: 2020    Patient will increase functional independence with mobility by performin. Supine to sit with Contact Guard Assistance - not met  2. Sit to stand transfer with Supervision - not met  3. Gait  x 150 feet with Supervision - not met  4. Ascend/descend 5 stair with bilateral Handrails Contact Guard Assistance - not met  5. Stand for 3 minutes with Supervision to increase activity tolerance - not met                      Time Tracking:     PT Received On: 20  PT Start Time: 1024     PT Stop Time: 1050  PT Total Time (min): 26 min     Billable Minutes: Therapeutic Exercise 26 mins     Treatment Type: Treatment  PT/PTA: PT     PTA Visit Number: 0     Betsy Velázquez, PT  2020

## 2020-05-29 PROBLEM — D62 POSTOPERATIVE ANEMIA DUE TO ACUTE BLOOD LOSS: Status: ACTIVE | Noted: 2020-05-29

## 2020-05-29 LAB
ANION GAP SERPL CALC-SCNC: 12 MMOL/L (ref 8–16)
BUN SERPL-MCNC: 43 MG/DL (ref 8–23)
CALCIUM SERPL-MCNC: 9.8 MG/DL (ref 8.7–10.5)
CHLORIDE SERPL-SCNC: 102 MMOL/L (ref 95–110)
CO2 SERPL-SCNC: 27 MMOL/L (ref 23–29)
CREAT SERPL-MCNC: 1.4 MG/DL (ref 0.5–1.4)
EST. GFR  (AFRICAN AMERICAN): 44.2 ML/MIN/1.73 M^2
EST. GFR  (NON AFRICAN AMERICAN): 38.4 ML/MIN/1.73 M^2
GLUCOSE SERPL-MCNC: 102 MG/DL (ref 70–110)
POCT GLUCOSE: 124 MG/DL (ref 70–110)
POCT GLUCOSE: 155 MG/DL (ref 70–110)
POCT GLUCOSE: 163 MG/DL (ref 70–110)
POCT GLUCOSE: 258 MG/DL (ref 70–110)
POCT GLUCOSE: 375 MG/DL (ref 70–110)
POTASSIUM SERPL-SCNC: 4.3 MMOL/L (ref 3.5–5.1)
SODIUM SERPL-SCNC: 141 MMOL/L (ref 136–145)

## 2020-05-29 PROCEDURE — 99232 PR SUBSEQUENT HOSPITAL CARE,LEVL II: ICD-10-PCS | Mod: HCNC,,, | Performed by: NURSE PRACTITIONER

## 2020-05-29 PROCEDURE — 97116 GAIT TRAINING THERAPY: CPT | Mod: HCNC

## 2020-05-29 PROCEDURE — 25000003 PHARM REV CODE 250: Mod: HCNC | Performed by: THORACIC SURGERY (CARDIOTHORACIC VASCULAR SURGERY)

## 2020-05-29 PROCEDURE — 99232 SBSQ HOSP IP/OBS MODERATE 35: CPT | Mod: HCNC,,, | Performed by: NURSE PRACTITIONER

## 2020-05-29 PROCEDURE — 25000003 PHARM REV CODE 250: Mod: HCNC | Performed by: STUDENT IN AN ORGANIZED HEALTH CARE EDUCATION/TRAINING PROGRAM

## 2020-05-29 PROCEDURE — 27000221 HC OXYGEN, UP TO 24 HOURS: Mod: HCNC

## 2020-05-29 PROCEDURE — 25000003 PHARM REV CODE 250: Mod: HCNC | Performed by: NURSE PRACTITIONER

## 2020-05-29 PROCEDURE — 80048 BASIC METABOLIC PNL TOTAL CA: CPT | Mod: HCNC

## 2020-05-29 PROCEDURE — 63600175 PHARM REV CODE 636 W HCPCS: Mod: HCNC | Performed by: NURSE PRACTITIONER

## 2020-05-29 PROCEDURE — 94761 N-INVAS EAR/PLS OXIMETRY MLT: CPT | Mod: HCNC

## 2020-05-29 PROCEDURE — 99900035 HC TECH TIME PER 15 MIN (STAT): Mod: HCNC

## 2020-05-29 PROCEDURE — 97535 SELF CARE MNGMENT TRAINING: CPT | Mod: HCNC

## 2020-05-29 PROCEDURE — 20600001 HC STEP DOWN PRIVATE ROOM: Mod: HCNC

## 2020-05-29 PROCEDURE — 97530 THERAPEUTIC ACTIVITIES: CPT | Mod: HCNC

## 2020-05-29 RX ORDER — IBUPROFEN 200 MG
16 TABLET ORAL
Status: DISCONTINUED | OUTPATIENT
Start: 2020-05-29 | End: 2020-05-31

## 2020-05-29 RX ORDER — IBUPROFEN 200 MG
24 TABLET ORAL
Status: DISCONTINUED | OUTPATIENT
Start: 2020-05-29 | End: 2020-05-31

## 2020-05-29 RX ORDER — INSULIN ASPART 100 [IU]/ML
0-10 INJECTION, SOLUTION INTRAVENOUS; SUBCUTANEOUS
Status: DISCONTINUED | OUTPATIENT
Start: 2020-05-29 | End: 2020-05-31

## 2020-05-29 RX ORDER — IBUPROFEN 200 MG
16 TABLET ORAL
Status: DISCONTINUED | OUTPATIENT
Start: 2020-05-29 | End: 2020-05-29

## 2020-05-29 RX ORDER — INSULIN ASPART 100 [IU]/ML
0-5 INJECTION, SOLUTION INTRAVENOUS; SUBCUTANEOUS
Status: DISCONTINUED | OUTPATIENT
Start: 2020-05-29 | End: 2020-05-29

## 2020-05-29 RX ORDER — GLUCAGON 1 MG
1 KIT INJECTION
Status: DISCONTINUED | OUTPATIENT
Start: 2020-05-29 | End: 2020-05-31

## 2020-05-29 RX ORDER — IBUPROFEN 200 MG
24 TABLET ORAL
Status: DISCONTINUED | OUTPATIENT
Start: 2020-05-29 | End: 2020-05-29

## 2020-05-29 RX ORDER — GLUCAGON 1 MG
1 KIT INJECTION
Status: DISCONTINUED | OUTPATIENT
Start: 2020-05-29 | End: 2020-05-29

## 2020-05-29 RX ORDER — METOPROLOL TARTRATE 25 MG/1
25 TABLET, FILM COATED ORAL 2 TIMES DAILY
Status: DISCONTINUED | OUTPATIENT
Start: 2020-05-29 | End: 2020-06-01

## 2020-05-29 RX ADMIN — ACETAMINOPHEN 1000 MG: 500 TABLET ORAL at 06:05

## 2020-05-29 RX ADMIN — POTASSIUM CHLORIDE 40 MEQ: 1500 TABLET, EXTENDED RELEASE ORAL at 08:05

## 2020-05-29 RX ADMIN — PANTOPRAZOLE SODIUM 40 MG: 40 TABLET, DELAYED RELEASE ORAL at 08:05

## 2020-05-29 RX ADMIN — MUPIROCIN 1 G: 20 OINTMENT TOPICAL at 09:05

## 2020-05-29 RX ADMIN — ASPIRIN 325 MG ORAL TABLET 325 MG: 325 PILL ORAL at 08:05

## 2020-05-29 RX ADMIN — ROPINIROLE HYDROCHLORIDE 0.5 MG: 0.25 TABLET, FILM COATED ORAL at 08:05

## 2020-05-29 RX ADMIN — STANDARDIZED SENNA CONCENTRATE AND DOCUSATE SODIUM 1 TABLET: 8.6; 5 TABLET ORAL at 08:05

## 2020-05-29 RX ADMIN — ACETAMINOPHEN 1000 MG: 500 TABLET ORAL at 02:05

## 2020-05-29 RX ADMIN — ATORVASTATIN CALCIUM 80 MG: 20 TABLET, FILM COATED ORAL at 08:05

## 2020-05-29 RX ADMIN — METOPROLOL TARTRATE 25 MG: 25 TABLET, FILM COATED ORAL at 11:05

## 2020-05-29 RX ADMIN — Medication 6 MG: at 11:05

## 2020-05-29 RX ADMIN — MUPIROCIN 1 G: 20 OINTMENT TOPICAL at 10:05

## 2020-05-29 RX ADMIN — ACETAMINOPHEN 1000 MG: 500 TABLET ORAL at 10:05

## 2020-05-29 RX ADMIN — METOPROLOL TARTRATE 25 MG: 25 TABLET, FILM COATED ORAL at 08:05

## 2020-05-29 RX ADMIN — INSULIN ASPART 5 UNITS: 100 INJECTION, SOLUTION INTRAVENOUS; SUBCUTANEOUS at 05:05

## 2020-05-29 NOTE — PLAN OF CARE
Problem: Physical Therapy Goal  Goal: Physical Therapy Goal  Description  Goals to be met by: 2020    Patient will increase functional independence with mobility by performin. Supine to sit with Contact Guard Assistance - not met  2. Sit to stand transfer with Supervision - not met  3. Gait  x 150 feet with Supervision - not met  4. Ascend/descend 5 stair with bilateral Handrails Contact Guard Assistance - not met  5. Stand for 3 minutes with Supervision to increase activity tolerance - not met     Outcome: Ongoing, Progressing    Pt cooperative with PT, goals remain appropriate. Will continue to follow while in house.     Taylor Cifuentes, PT, DPT  2020

## 2020-05-29 NOTE — PROGRESS NOTES
"[facility]  Endocrinology  Progress Note    Admit Date: 5/18/2020     Reason for Consult: Management of T2DM, Hyperglycemia     Surgical Procedure and Date: CABG x 3 5/25/20    Lab Results   Component Value Date    HGBA1C 7.1 (H) 05/19/2020     Diabetes diagnosis year: >10 years    Home Diabetes Medications:  (per [facility] end[facility] team)  Jardiance 10 mg daily, Tresiba 36 units daily, Metformin 1000 mg BID    How often checking glucose at home? Free Style dionna  BG readings on regimen: 90's 100's  Hypoglycemia on the regimen? No  Missed doses on regimen?  No    Diabetes Complications include:     Hyperglycemia    Complicating diabetes co morbidities:   History of CVA and CHETNA      HPI:   Patient is a 70 y.o. female with a diagnosis of CAD (newly diagnosed), HTN, HLD, DM2 (on insulin HbA1c 7.1%). Admitted to [facility] on 5/18 with worsening chest pain for the past 4 days. She started having chest pain in 12/2019, she underwent a SPECT which was unremarkable. No prior history of MI or CAD.  In OSH, troponin negative x4. 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  She is now s/p CABG. Endocrinology consulted for management of T2DM.        Interval HPI:   Overnight events:  BG is within goal on current IV/SQ insulin regimen. Patient has fallen off of IV insulin infusion parameters overnight. Deserts/pasteries noted on beside table.Diet clear liquid  4 Days Post-Op    Eating:   <25% - 50%  Nausea: No  Hypoglycemia and intervention: No  Fever: No  TPN and/or TF: No  If yes, type of TF/TPN and rate: None    /62 (BP Location: Right arm, Patient Position: Lying)   Pulse (!) 118   Temp 98.4 °F (36.9 °C) (Oral)   Resp 16   Ht 5' 3" (1.6 m)   Wt 92.3 kg (203 lb 7.8 oz)   SpO2 (!) 92%   Breastfeeding? No   BMI 36.05 kg/m²      Labs Reviewed and Include    Recent Labs   Lab 05/29/20  0555      CALCIUM 9.8      K 4.3   CO2 27 "      BUN 43*   CREATININE 1.4     Lab Results   Component Value Date    WBC 13.46 (H) 05/28/2020    HGB 7.2 (L) 05/28/2020    HCT 23.8 (L) 05/28/2020    MCV 96 05/28/2020     05/28/2020     No results for input(s): TSH, FREET4 in the last 168 hours.  Lab Results   Component Value Date    HGBA1C 7.1 (H) 05/19/2020       Nutritional status:   Body mass index is 36.05 kg/m².  Lab Results   Component Value Date    ALBUMIN 3.2 (L) 05/19/2020    ALBUMIN 3.5 05/05/2020    ALBUMIN 3.4 (L) 12/20/2019     No results found for: PREALBUMIN    Estimated Creatinine Clearance: 41 mL/min (based on SCr of 1.4 mg/dL).    Accu-Checks  Recent Labs     05/27/20  1150 05/27/20  1708 05/27/20  2009 05/28/20  0226 05/28/20  0717 05/28/20  1100 05/28/20  1708 05/28/20  2059 05/29/20  0219 05/29/20  0820   POCTGLUCOSE 139* 155* 152* 126* 108 124* 122* 104 124* 163*       Current Medications and/or Treatments Impacting Glycemic Control  Immunotherapy:    Immunosuppressants     None        Steroids:   Hormones (From admission, onward)    Start     Stop Route Frequency Ordered    05/26/20 2311  melatonin tablet 6 mg      -- Oral Nightly PRN 05/26/20 2211        Pressors:    Autonomic Drugs (From admission, onward)    None        Hyperglycemia/Diabetes Medications:   Antihyperglycemics (From admission, onward)    None          ASSESSMENT and PLAN    * Coronary artery disease  Managed per primary team  S/p CABG      Type 2 diabetes mellitus without complication, with long-term current use of insulin  BG goal 140 - 180  Insulin needs have decreased over past 48 hours.   Patient has fallen off of IV insulin infusion overnight.     - Discontinue  transition IV insulin infusion with stepdown parameters.   - Start Low Dose SQ Insulin Correction Scale PRN BG excursions.   - BG Monitoring AC/HS     ** Please call Endocrine for any BG related issues **  ** Please notify Endocrine for any change and/or advance in diet**    Discharge Planning:    TBD. Please notify endocrinology prior to discharge.             Essential hypertension  Managed per primary team  Condition may cause insulin resistance             Ubaldo Rock NP  Endocrinology  [facility]

## 2020-05-29 NOTE — ASSESSMENT & PLAN NOTE
BG goal 140 - 180  Insulin needs have decreased over past 48 hours.   Patient has fallen off of IV insulin infusion overnight.     - Discontinue  transition IV insulin infusion with stepdown parameters.   - Start Low Dose SQ Insulin Correction Scale PRN BG excursions.   - BG Monitoring AC/HS     ** Please call Endocrine for any BG related issues **  ** Please notify Endocrine for any change and/or advance in diet**    Discharge Planning:   TBD. Please notify endocrinology prior to discharge.

## 2020-05-29 NOTE — SUBJECTIVE & OBJECTIVE
"Interval HPI:   Overnight events:  BG is within goal on current IV/SQ insulin regimen. Patient has fallen off of IV insulin infusion parameters overnight. Deserts/pasteries noted on beside table.Diet clear liquid  4 Days Post-Op    Eating:   <25% - 50%  Nausea: No  Hypoglycemia and intervention: No  Fever: No  TPN and/or TF: No  If yes, type of TF/TPN and rate: None    /62 (BP Location: Right arm, Patient Position: Lying)   Pulse (!) 118   Temp 98.4 °F (36.9 °C) (Oral)   Resp 16   Ht 5' 3" (1.6 m)   Wt 92.3 kg (203 lb 7.8 oz)   SpO2 (!) 92%   Breastfeeding? No   BMI 36.05 kg/m²     Labs Reviewed and Include    Recent Labs   Lab 05/29/20  0555      CALCIUM 9.8      K 4.3   CO2 27      BUN 43*   CREATININE 1.4     Lab Results   Component Value Date    WBC 13.46 (H) 05/28/2020    HGB 7.2 (L) 05/28/2020    HCT 23.8 (L) 05/28/2020    MCV 96 05/28/2020     05/28/2020     No results for input(s): TSH, FREET4 in the last 168 hours.  Lab Results   Component Value Date    HGBA1C 7.1 (H) 05/19/2020       Nutritional status:   Body mass index is 36.05 kg/m².  Lab Results   Component Value Date    ALBUMIN 3.2 (L) 05/19/2020    ALBUMIN 3.5 05/05/2020    ALBUMIN 3.4 (L) 12/20/2019     No results found for: PREALBUMIN    Estimated Creatinine Clearance: 41 mL/min (based on SCr of 1.4 mg/dL).    Accu-Checks  Recent Labs     05/27/20  1150 05/27/20  1708 05/27/20  2009 05/28/20  0226 05/28/20  0717 05/28/20  1100 05/28/20  1708 05/28/20  2059 05/29/20  0219 05/29/20  0820   POCTGLUCOSE 139* 155* 152* 126* 108 124* 122* 104 124* 163*       Current Medications and/or Treatments Impacting Glycemic Control  Immunotherapy:    Immunosuppressants     None        Steroids:   Hormones (From admission, onward)    Start     Stop Route Frequency Ordered    05/26/20 2311  melatonin tablet 6 mg      -- Oral Nightly PRN 05/26/20 2211        Pressors:    Autonomic Drugs (From admission, onward)    None    "     Hyperglycemia/Diabetes Medications:   Antihyperglycemics (From admission, onward)    None

## 2020-05-29 NOTE — PLAN OF CARE
Pt not medically ready  - 2 last chest tubes removed and pt remains on lasix gtt. Pt has used [facility] in the past and is agreeable to their services again. CM forwarded referral to [facility] to update that pt is a potential wknd d/c. CM will continue to follow.    BENI DUARTE [id number]     05/29/20 1347   Discharge Reassessment   Assessment Type Discharge Planning Reassessment   Discharge Plan A Home with family;Home Health   Discharge Plan B Home with family   Anticipated Discharge Disposition Home-Health   Post-Acute Status   Post-Acute Authorization Home Health   Home Health Status Awaiting Internal Medical Clearance   Discharge Delays None known at this time

## 2020-05-29 NOTE — PROGRESS NOTES
"  [facility]  Adult Nutrition  Consult Note    SUMMARY     Recommendations    1. As medically able, ADAT to Cardiac with texture per SLP.   2. If poor PO intake, recommend adding Boost Glucose Control OS to all meals.   3. RD to monitor & follow-up.    Goals: Patient to receive nutrition by RD follow-up  Nutrition Goal Status: goal met  Communication of RD Recs: reviewed with RN    Reason for Assessment    Reason For Assessment: RD follow-up  Diagnosis: surgery/postoperative complications (s/p CABG 5/25)  Relevant Medical History: CAD, CVA, DM2, HTN, HLD  Interdisciplinary Rounds: did not attend    General Information Comments: S/p CABG. Pt extubated 5/26, diet advanced to clear liquids. Pt tolerating diet, consumed 50% of breakfast this AM. Patient denies weight loss or poor appetite PTA. Patient does not meet criteria for malnutrition at this time.  Nutrition Discharge Planning: Adequate nutrition via PO intake.    Nutrition/Diet History    Spiritual, Cultural Beliefs, Yazdanism Practices, Values that Affect Care: no  Food Allergies: NKFA  Factors Affecting Nutritional Intake: clear liquid diet    Anthropometrics    Temp: 98.4 °F (36.9 °C)  Height Method: Stated  Height: 5' 3" (160 cm)  Height (inches): 63 in  Weight Method: Bed Scale  Weight: 92.3 kg (203 lb 7.8 oz)  Weight (lb): 203.49 lb  Ideal Body Weight (IBW), Female: 115 lb  % Ideal Body Weight, Female (lb): 176.95 %  BMI (Calculated): 36.1  BMI Grade: 35 - 39.9 - obesity - grade II    Lab/Procedures/Meds    Pertinent Labs Reviewed: reviewed  Pertinent Labs Comments: BUN 43, GFR 44.2  Pertinent Medications Reviewed: reviewed  Pertinent Medications Comments: Lasix, Insulin    Estimated/Assessed Needs    Weight Used For Calorie Calculations: 92.3 kg (203 lb 7.8 oz)     Energy Calorie Requirements (kcal): 1771 kcal/d  Energy Need Method: Jay-St Alisa(1.25 PAL)     Protein Requirements:  g/d (1-1.2 g/kg)  Weight Used For Protein " Calculations: 92.3 kg (203 lb 7.8 oz)     Fluid Requirements (mL): 1 mL/kcal or per MD    Nutrition Prescription Ordered    Current Diet Order: Clear liquids    Evaluation of Received Nutrient/Fluid Intake    Comments: LBM: 5/28    Tolerance: tolerating    Nutrition Risk    Level of Risk/Frequency of Follow-up: (2x/week)     Assessment and Plan    Nutrition Problem  Increased nutrient needs     Related to (etiology):   Physiological causes related to healing     Signs and Symptoms (as evidenced by):   S/p CABG 5/25      Interventions (treatment strategy):  Collaboration of nutrition care with other providers     Nutrition Diagnosis Status:   Continues     Monitor and Evaluation    Food and Nutrient Intake: energy intake, food and beverage intake  Food and Nutrient Adminstration: diet order  Physical Activity and Function: nutrition-related ADLs and IADLs  Anthropometric Measurements: weight, weight change  Biochemical Data, Medical Tests and Procedures: electrolyte and renal panel, gastrointestinal profile, inflammatory profile  Nutrition-Focused Physical Findings: overall appearance     Nutrition Follow-Up    RD Follow-up?: Yes

## 2020-05-29 NOTE — PT/OT/SLP PROGRESS
Occupational Therapy   Treatment    Name: Cecilia Barahona  MRN: [id number]  Admitting Diagnosis:  Coronary artery disease  4 Days Post-Op    Recommendations:     Discharge Recommendations: home health OT  Discharge Equipment Recommendations:  none  Barriers to discharge:  None    Assessment:     Cecilia Barahona is a 70 y.o. female with a medical diagnosis of Coronary artery disease.  She presents with pain, poor endurance, fatigue, and SOB that limit her. Performance deficits affecting function are weakness, impaired endurance, impaired self care skills, impaired functional mobilty, gait instability, impaired balance, decreased upper extremity function, decreased lower extremity function, pain, decreased ROM, impaired cardiopulmonary response to activity, edema.     Rehab Prognosis:  Fair; patient would benefit from acute skilled OT services to address these deficits and reach maximum level of function.       Plan:     Patient to be seen 5 x/week to address the above listed problems via self-care/home management, therapeutic activities, therapeutic exercises  · Plan of Care Expires: 07/08/20  · Plan of Care Reviewed with: patient    Subjective     Pain/Comfort:  · Pain Rating 1: 4/10  · Location 1: sternal  · Pain Addressed 1: Reposition, Distraction  · Pain Rating Post-Intervention 1: other (see comments)(no change)    Objective:     Communicated with: RN prior to session.  Patient found up in chair with central line, telemetry, oxygen, peripheral IV upon OT entry to room.    General Precautions: Standard, fall, sternal   Orthopedic Precautions:N/A   Braces: N/A     Occupational Performance:      Functional Mobility/Transfers:  · Patient completed Sit <> Stand Transfer with contact guard assistance  with  hand-held assist   · Patient completed Toilet Transfer Step Transfer technique with contact guard assistance with  hand-held assist  Functional Mobility:  Pt was able to walk short household distances w/ no AD,  assistance w/ IV pole, and CGA-Omari for max v/c to keep eyes open and difficulty w/ object avoidance.        Activities of Daily Living:  · Upper Body Dressing: minimum assistance seated in chair to don back gown w/ setup  · Toileting: stand by assistance seated on toilet w/ setup for elizabeth-wipes and clothing management.      [facility] 6 Click ADL: 14    Treatment & Education:  -Pt edu on OT role/POC  -Importance of OOB activity with staff assistance  -Safety during functional t/f and mobility  - White board updated  - Multiple self care tasks/functional mobility completed-- assistance level noted above  - All questions/concerns answered within OT scope of practice      Patient left up in chair with all lines intact, call button in reach and RN notifiedEducation:      GOALS:   Multidisciplinary Problems     Occupational Therapy Goals        Problem: Occupational Therapy Goal    Goal Priority Disciplines Outcome Interventions   Occupational Therapy Goal     OT, PT/OT Ongoing, Progressing    Description:  Goals to be met by: 6/8/20     Patient will increase functional independence with A3DLs by performing:    UE Dressing with Cost.  LE Dressing with Cost.  Grooming while standing at sink with Modified Cost.  Toileting from toilet with Modified Cost for hygiene and clothing management.   Bathing from  shower chair/bench with Modified Cost.  Progressing   Toilet transfer to toilet with Modified Cost. Progressing   Increased functional strength to WFL for B UE.  Upper extremity exercise program x15 reps per handout, with independence.   Pt will be able to state all sternal precautions correctly c 100% accuracy. MET STAS 5/29                       Time Tracking:     OT Date of Treatment: 05/29/20  OT Start Time: 1349  OT Stop Time: 1413  OT Total Time (min): 24 min    Billable Minutes:Self Care/Home Management 24 mins    Shilo Bates, CARLOS  5/29/2020

## 2020-05-29 NOTE — PT/OT/SLP PROGRESS
Physical Therapy Treatment    Patient Name:  Cecilia Barahona   MRN:  [id number]    Recommendations:     Discharge Recommendations:  home health PT   Discharge Equipment Recommendations: none   Barriers to discharge: None    Assessment:     Cecilia Barahona is a 70 y.o. female admitted with a medical diagnosis of Coronary artery disease.  She presents with the following impairments/functional limitations:  impaired functional mobilty, pain, impaired endurance, impaired cardiopulmonary response to activity. Pt up in chair at beginning of session, agreeable to PT. Pt able to perform transfers and ambulation with CGA-Wilbur x 1 but limited by, pain, mild drowsiness (difficulty keeping eyes open) and fatigue with activity. Pt able to state 3/3 sternal precautions. Pt reports that once at home she will have assist of daughter for a few weeks ( is older and cannot assist). Pt continues to be appropriate for home PT once medically stable, will continue to follow while in house.     Rehab Prognosis: Good; patient would benefit from acute skilled PT services to address these deficits and reach maximum level of function.    Recent Surgery: Procedure(s) (LRB):  CORONARY ARTERY BYPASS GRAFT (CABG) x3  (N/A) 4 Days Post-Op    Plan:     During this hospitalization, patient to be seen 5 x/week to address the identified rehab impairments via gait training, therapeutic activities, therapeutic exercises, neuromuscular re-education and progress toward the following goals:    · Plan of Care Expires:  06/26/20    Subjective     Chief Complaint: Sternal pain   Patient/Family Comments/goals: To get stronger   Pain/Comfort:  · Pain Rating 1: 4/10  · Location 1: sternal  · Pain Addressed 1: Reposition, Distraction  · Pain Rating Post-Intervention 1: (Did not rate)      Objective:     Communicated with RN prior to session.  Patient found up in chair with central line, telemetry, oxygen, peripheral IV upon PT entry to room.     General  Precautions: Standard, fall, sternal   Orthopedic Precautions:N/A   Braces: N/A     Functional Mobility:  · Bed Mobility:     · Supine <> Sit: NT, pt in chair at beginning/end of session   · Transfers:     · Sit to Stand:  contact guard assistance with hand-held assist   · Toilet Transfer: contact guard assistance/Wilbur x 1 with hand-held assist (increased assist to descend down onto toilet as pt did not reach for grab bar)   · Gait: ~30' within room with HHA/CGA-Wilbur and O2 and IV follow, decreased gibson and difficulty keeping eyes open (walked into trash bin)  · Balance: Fair+      AM-PAC 6 CLICK MOBILITY  Turning over in bed (including adjusting bedclothes, sheets and blankets)?: 3  Sitting down on and standing up from a chair with arms (e.g., wheelchair, bedside commode, etc.): 3  Moving from lying on back to sitting on the side of the bed?: 3  Moving to and from a bed to a chair (including a wheelchair)?: 3  Need to walk in hospital room?: 3  Climbing 3-5 steps with a railing?: 3  Basic Mobility Total Score: 18       Therapeutic Activities and Exercises:   Pt educated on: PT role/POC; safety c mobility; benefits of OOB activities; sternal precautions; discharge recommendations. Pt verbalized understanding but will need reinforcement.     Patient left up in chair with all lines intact, call button in reach and RN notified.    GOALS:   Multidisciplinary Problems     Physical Therapy Goals        Problem: Physical Therapy Goal    Goal Priority Disciplines Outcome Goal Variances Interventions   Physical Therapy Goal     PT, PT/OT Ongoing, Progressing     Description:  Goals to be met by: 2020    Patient will increase functional independence with mobility by performin. Supine to sit with Contact Guard Assistance - not met  2. Sit to stand transfer with Supervision - not met  3. Gait  x 150 feet with Supervision - not met  4. Ascend/descend 5 stair with bilateral Handrails Contact Guard Assistance - not  met  5. Stand for 3 minutes with Supervision to increase activity tolerance - not met                      Time Tracking:     PT Received On: 05/29/20  PT Start Time: 1349     PT Stop Time: 1413  PT Total Time (min): 24 min     Billable Minutes: Gait Training 12 min  and Therapeutic Activity 12 min    Treatment Type: Treatment  PT/PTA: PT     PTA Visit Number: 0     Taylor Cifuentes PT, DPT  05/29/2020

## 2020-05-29 NOTE — PLAN OF CARE
AAOX4, NAD, VSS. Midsternal Chest tubes connected to continuous wall suction; serosanguinous drainage noted. MS dressing intact. Pacer wires connected to patient for emergent use. No transcutaneous monitor connected. CBG monitored and managed with insulin drip. Pain managed with PRN Percoset 10mg and scheduled tylenol. Quad-lumen central line intact with blood return noted to 2/4 lines. Continuous Insulin infusing at 0.2units/hr and Lasix 500mg infusing at 1ml/hr. POC reviewed with pt. Will continue to monitor.

## 2020-05-29 NOTE — PLAN OF CARE
Pt is A, A, Ox4. Calm, cooperative. Free of falls, trauma, and injuries. Skin intact ex MSI and LLE graft site. Pt educated on treatment plan. Pt demonstrates and verbalizes understanding. VSS. CT and V wires removed. RIJ removed. Akhtar removed. Diet advanced to Cardiac, 1500cc fluid restriction. BG monitored ACHS and 0200. MAP goal 60-80. Plan of care reviewed with pt.

## 2020-05-29 NOTE — SUBJECTIVE & OBJECTIVE
Interval History: Patient found in pleasant mood. Diet changed to cardiac with 1500 cc fluid restriction. Pacer wires and remains chest tubes removed today. NSR on monitor, pt on 2LO2 via NC. Pain well controled with oral pain medications per patient.    Review of Systems   Constitution: Negative for decreased appetite, fever and malaise/fatigue.   Cardiovascular: Negative for chest pain, claudication, dyspnea on exertion, irregular heartbeat, leg swelling, palpitations and syncope.   Respiratory: Negative for cough and shortness of breath.    Hematologic/Lymphatic: Negative for bleeding problem.   Skin: Negative for rash.   Musculoskeletal: Negative for arthritis and myalgias.   Gastrointestinal: Negative for abdominal pain, diarrhea, melena, nausea and vomiting.   Genitourinary: Negative for dysuria.   Neurological: Negative for headaches, paresthesias and seizures.     Medications:  Continuous Infusions:   furosemide (LASIX) 10 mg/mL infusion (non-titrating) 10 mg/hr (05/29/20 0000)     Scheduled Meds:   acetaminophen  1,000 mg Oral Q8H    aspirin  325 mg Oral Daily    atorvastatin  80 mg Oral QHS    metoprolol tartrate  25 mg Oral BID    mupirocin  1 g Nasal BID    pantoprazole  40 mg Oral Daily    polyethylene glycol  17 g Oral Daily    potassium chloride  40 mEq Oral BID    rOPINIRole  0.5 mg Oral Nightly    senna-docusate 8.6-50 mg  1 tablet Oral Daily     PRN Meds:acetaminophen, acetaminophen, bisacodyL, Dextrose 10% Bolus, Dextrose 10% Bolus, Dextrose 10% Bolus, Dextrose 10% Bolus, glucagon (human recombinant), glucose, glucose, insulin aspart U-100, melatonin, ondansetron, ondansetron, oxyCODONE, oxyCODONE     Objective:     Vital Signs (Most Recent):  Temp: 98.4 °F (36.9 °C) (05/29/20 0818)  Pulse: 87 (05/29/20 1103)  Resp: 16 (05/29/20 0818)  BP: 104/62 (05/29/20 0818)  SpO2: (!) 92 % (05/29/20 0818) Vital Signs (24h Range):  Temp:  [98 °F (36.7 °C)-99.1 °F (37.3 °C)] 98.4 °F (36.9 °C)  Pulse:   [] 87  Resp:  [13-35] 16  SpO2:  [80 %-96 %] 92 %  BP: ()/(55-63) 104/62  Arterial Line BP: ()/(41-52) 134/44     Weight: 92.3 kg (203 lb 7.8 oz)  Body mass index is 36.05 kg/m².    SpO2: (!) 92 %  O2 Device (Oxygen Therapy): nasal cannula    Intake/Output - Last 3 Shifts       05/27 0700 - 05/28 0659 05/28 0700 - 05/29 0659 05/29 0700 - 05/30 0659    P.O.  1078 360    I.V. (mL/kg) 1778 (19.3) 117 (1.3)     Blood 250 100     Total Intake(mL/kg) 2028 (22) 1295 (14) 360 (3.9)    Urine (mL/kg/hr) 940 (0.4) 3350 (1.5) 600 (1.5)    Stool 0 0     Chest Tube 385 110     Total Output 1325 3460 600    Net +703 -2165 -240           Stool Occurrence 3 x 1 x           Lines/Drains/Airways     Central Venous Catheter Line                 Percutaneous Central Line Insertion/Assessment - Quad lumen  05/25/20 0802 4 days    Percutaneous Central Line Insertion/Assessment - Quad Lumen  05/25/20 0802 4 days          Drain                 Y Chest Tube 1 and 2 05/25/20 1413 Mediastinal 19 Fr. Mediastinal 19 Fr. 3 days          Peripheral Intravenous Line                 Peripheral IV - Single Lumen 05/18/20 2305 20 G Left Forearm 10 days         Peripheral IV - Single Lumen 05/25/20 0814 16 G Left Wrist 4 days                Physical Exam   Constitutional: She is oriented to person, place, and time. She appears well-developed and well-nourished.   Cardiovascular: Normal rate, regular rhythm and normal heart sounds.   Pulmonary/Chest: Effort normal and breath sounds normal.   Abdominal: Soft.   Med chest tubes and pacer wires in place   Musculoskeletal: Normal range of motion.   Neurological: She is alert and oriented to person, place, and time.   Skin: Skin is warm, dry and intact.   Sternal Incision CDI   Psychiatric: She has a normal mood and affect.       Significant Labs:  BMP:   Recent Labs   Lab 05/29/20  0555         K 4.3      CO2 27   BUN 43*   CREATININE 1.4   CALCIUM 9.8     CMP:   Recent  Labs   Lab 05/29/20  0555      CALCIUM 9.8      K 4.3   CO2 27      BUN 43*   CREATININE 1.4     Coagulation: No results for input(s): PT, INR, APTT in the last 48 hours.    Significant Diagnostics:  I have reviewed all pertinent imaging results/findings within the past 24 hours.

## 2020-05-29 NOTE — PLAN OF CARE
Pt goals remain appropriate, continue w/ OT POC.    Problem: Occupational Therapy Goal  Goal: Occupational Therapy Goal  Description  Goals to be met by: 6/8/20     Patient will increase functional independence with A3DLs by performing:    UE Dressing with Vance.  LE Dressing with Vance.  Grooming while standing at sink with Modified Vance.  Toileting from toilet with Modified Vance for hygiene and clothing management.   Bathing from  shower chair/bench with Modified Vance.  Progressing   Toilet transfer to toilet with Modified Vance. Progressing   Increased functional strength to WFL for B UE.  Upper extremity exercise program x15 reps per handout, with independence.   Pt will be able to state all sternal precautions correctly c 100% accuracy. MET STAS 5/29      Outcome: Ongoing, Progressing   Shilo HERNÁNDEZ  5/29/2020

## 2020-05-29 NOTE — ASSESSMENT & PLAN NOTE
Ms. Cecilia Barahona is a 70 y.o. female w/ CAD, HTN. HLD, DM2 on insulin, 0.5ppd smoking history who presented to [facility] with 4 day history of chest pain.  Patient with exertional angina only until 4 days ago when she developed persistent chest pain.  She was admitted to the hospital and underwent though evaluation revealing mulit-vessel doronary artery disease.  Patient transferred to [facility] for CTS evaluation.  On 5/25, she underwent CABGx3.  -Chest tubes and pacer wires removed   -Metoprolol 25 mg BID ordered  -Lasix drip remains at 10mg/hr with scheduled potassium replacement  -Insulin drip managed by endocrine  -Continue ASA and statin   -Encourage ambulation with PT/OT  -Encourage IS and deep breathing  -Sternal precautions   -Cardiac diet with 1500 cc fluid restriction placed

## 2020-05-29 NOTE — PLAN OF CARE
Recommendations     1. As medically able, ADAT to Cardiac with texture per SLP.   2. If poor PO intake, recommend adding Boost Glucose Control OS to all meals.   3. RD to monitor & follow-up.     Goals: Patient to receive nutrition by RD follow-up  Nutrition Goal Status: goal met  Communication of RD Recs: reviewed with RN

## 2020-05-30 LAB
ANION GAP SERPL CALC-SCNC: 13 MMOL/L (ref 8–16)
ANION GAP SERPL CALC-SCNC: 14 MMOL/L (ref 8–16)
BASOPHILS # BLD AUTO: 0.02 K/UL (ref 0–0.2)
BASOPHILS NFR BLD: 0.2 % (ref 0–1.9)
BUN SERPL-MCNC: 57 MG/DL (ref 8–23)
BUN SERPL-MCNC: 61 MG/DL (ref 8–23)
CALCIUM SERPL-MCNC: 9.3 MG/DL (ref 8.7–10.5)
CALCIUM SERPL-MCNC: 9.6 MG/DL (ref 8.7–10.5)
CHLORIDE SERPL-SCNC: 97 MMOL/L (ref 95–110)
CHLORIDE SERPL-SCNC: 99 MMOL/L (ref 95–110)
CO2 SERPL-SCNC: 27 MMOL/L (ref 23–29)
CO2 SERPL-SCNC: 27 MMOL/L (ref 23–29)
CREAT SERPL-MCNC: 1.7 MG/DL (ref 0.5–1.4)
CREAT SERPL-MCNC: 1.7 MG/DL (ref 0.5–1.4)
DIFFERENTIAL METHOD: ABNORMAL
EOSINOPHIL # BLD AUTO: 0.2 K/UL (ref 0–0.5)
EOSINOPHIL NFR BLD: 2.7 % (ref 0–8)
ERYTHROCYTE [DISTWIDTH] IN BLOOD BY AUTOMATED COUNT: 14.7 % (ref 11.5–14.5)
EST. GFR  (AFRICAN AMERICAN): 35 ML/MIN/1.73 M^2
EST. GFR  (AFRICAN AMERICAN): 35 ML/MIN/1.73 M^2
EST. GFR  (NON AFRICAN AMERICAN): 30.3 ML/MIN/1.73 M^2
EST. GFR  (NON AFRICAN AMERICAN): 30.3 ML/MIN/1.73 M^2
GLUCOSE SERPL-MCNC: 256 MG/DL (ref 70–110)
GLUCOSE SERPL-MCNC: 269 MG/DL (ref 70–110)
HCT VFR BLD AUTO: 31.1 % (ref 37–48.5)
HGB BLD-MCNC: 9.5 G/DL (ref 12–16)
IMM GRANULOCYTES # BLD AUTO: 0.05 K/UL (ref 0–0.04)
IMM GRANULOCYTES NFR BLD AUTO: 0.6 % (ref 0–0.5)
LYMPHOCYTES # BLD AUTO: 1.7 K/UL (ref 1–4.8)
LYMPHOCYTES NFR BLD: 18.5 % (ref 18–48)
MAGNESIUM SERPL-MCNC: 2 MG/DL (ref 1.6–2.6)
MCH RBC QN AUTO: 29.1 PG (ref 27–31)
MCHC RBC AUTO-ENTMCNC: 30.5 G/DL (ref 32–36)
MCV RBC AUTO: 95 FL (ref 82–98)
MONOCYTES # BLD AUTO: 0.8 K/UL (ref 0.3–1)
MONOCYTES NFR BLD: 8.9 % (ref 4–15)
NEUTROPHILS # BLD AUTO: 6.2 K/UL (ref 1.8–7.7)
NEUTROPHILS NFR BLD: 69.1 % (ref 38–73)
NRBC BLD-RTO: 0 /100 WBC
PLATELET # BLD AUTO: 307 K/UL (ref 150–350)
PMV BLD AUTO: 10.2 FL (ref 9.2–12.9)
POCT GLUCOSE: 159 MG/DL (ref 70–110)
POCT GLUCOSE: 203 MG/DL (ref 70–110)
POCT GLUCOSE: 210 MG/DL (ref 70–110)
POCT GLUCOSE: 237 MG/DL (ref 70–110)
POCT GLUCOSE: 239 MG/DL (ref 70–110)
POCT GLUCOSE: 255 MG/DL (ref 70–110)
POTASSIUM SERPL-SCNC: 4.1 MMOL/L (ref 3.5–5.1)
POTASSIUM SERPL-SCNC: 4.6 MMOL/L (ref 3.5–5.1)
RBC # BLD AUTO: 3.27 M/UL (ref 4–5.4)
SODIUM SERPL-SCNC: 137 MMOL/L (ref 136–145)
SODIUM SERPL-SCNC: 140 MMOL/L (ref 136–145)
WBC # BLD AUTO: 9.02 K/UL (ref 3.9–12.7)

## 2020-05-30 PROCEDURE — 63600175 PHARM REV CODE 636 W HCPCS: Mod: HCNC | Performed by: NURSE PRACTITIONER

## 2020-05-30 PROCEDURE — 83735 ASSAY OF MAGNESIUM: CPT | Mod: HCNC

## 2020-05-30 PROCEDURE — 94761 N-INVAS EAR/PLS OXIMETRY MLT: CPT | Mod: HCNC

## 2020-05-30 PROCEDURE — 25000003 PHARM REV CODE 250: Mod: HCNC | Performed by: STUDENT IN AN ORGANIZED HEALTH CARE EDUCATION/TRAINING PROGRAM

## 2020-05-30 PROCEDURE — 20600001 HC STEP DOWN PRIVATE ROOM: Mod: HCNC

## 2020-05-30 PROCEDURE — 25000003 PHARM REV CODE 250: Mod: HCNC | Performed by: NURSE PRACTITIONER

## 2020-05-30 PROCEDURE — 80048 BASIC METABOLIC PNL TOTAL CA: CPT | Mod: 91,HCNC

## 2020-05-30 PROCEDURE — 80048 BASIC METABOLIC PNL TOTAL CA: CPT | Mod: HCNC

## 2020-05-30 PROCEDURE — 99232 PR SUBSEQUENT HOSPITAL CARE,LEVL II: ICD-10-PCS | Mod: HCNC,,, | Performed by: NURSE PRACTITIONER

## 2020-05-30 PROCEDURE — 85025 COMPLETE CBC W/AUTO DIFF WBC: CPT | Mod: HCNC

## 2020-05-30 PROCEDURE — 36415 COLL VENOUS BLD VENIPUNCTURE: CPT | Mod: HCNC

## 2020-05-30 PROCEDURE — 99232 SBSQ HOSP IP/OBS MODERATE 35: CPT | Mod: HCNC,,, | Performed by: NURSE PRACTITIONER

## 2020-05-30 RX ORDER — INSULIN ASPART 100 [IU]/ML
4 INJECTION, SOLUTION INTRAVENOUS; SUBCUTANEOUS
Status: DISCONTINUED | OUTPATIENT
Start: 2020-05-30 | End: 2020-05-31

## 2020-05-30 RX ORDER — FUROSEMIDE 40 MG/1
40 TABLET ORAL DAILY
Status: DISCONTINUED | OUTPATIENT
Start: 2020-05-31 | End: 2020-06-01 | Stop reason: HOSPADM

## 2020-05-30 RX ORDER — INSULIN ASPART 100 [IU]/ML
2-4 INJECTION, SOLUTION INTRAVENOUS; SUBCUTANEOUS
Status: DISCONTINUED | OUTPATIENT
Start: 2020-05-30 | End: 2020-05-30

## 2020-05-30 RX ADMIN — INSULIN ASPART 4 UNITS: 100 INJECTION, SOLUTION INTRAVENOUS; SUBCUTANEOUS at 04:05

## 2020-05-30 RX ADMIN — METOPROLOL TARTRATE 25 MG: 25 TABLET, FILM COATED ORAL at 08:05

## 2020-05-30 RX ADMIN — ROPINIROLE HYDROCHLORIDE 0.5 MG: 0.25 TABLET, FILM COATED ORAL at 08:05

## 2020-05-30 RX ADMIN — STANDARDIZED SENNA CONCENTRATE AND DOCUSATE SODIUM 1 TABLET: 8.6; 5 TABLET ORAL at 08:05

## 2020-05-30 RX ADMIN — POTASSIUM CHLORIDE 40 MEQ: 1500 TABLET, EXTENDED RELEASE ORAL at 08:05

## 2020-05-30 RX ADMIN — ATORVASTATIN CALCIUM 80 MG: 20 TABLET, FILM COATED ORAL at 08:05

## 2020-05-30 RX ADMIN — SODIUM CHLORIDE 0.5 UNITS/HR: 9 INJECTION, SOLUTION INTRAVENOUS at 12:05

## 2020-05-30 RX ADMIN — PANTOPRAZOLE SODIUM 40 MG: 40 TABLET, DELAYED RELEASE ORAL at 08:05

## 2020-05-30 RX ADMIN — ACETAMINOPHEN 1000 MG: 500 TABLET ORAL at 09:05

## 2020-05-30 RX ADMIN — ASPIRIN 325 MG ORAL TABLET 325 MG: 325 PILL ORAL at 08:05

## 2020-05-30 RX ADMIN — MUPIROCIN 1 G: 20 OINTMENT TOPICAL at 08:05

## 2020-05-30 RX ADMIN — INSULIN ASPART 4 UNITS: 100 INJECTION, SOLUTION INTRAVENOUS; SUBCUTANEOUS at 12:05

## 2020-05-30 RX ADMIN — OXYCODONE HYDROCHLORIDE 10 MG: 10 TABLET ORAL at 02:05

## 2020-05-30 RX ADMIN — ACETAMINOPHEN 1000 MG: 500 TABLET ORAL at 06:05

## 2020-05-30 RX ADMIN — INSULIN ASPART 2 UNITS: 100 INJECTION, SOLUTION INTRAVENOUS; SUBCUTANEOUS at 08:05

## 2020-05-30 RX ADMIN — POLYETHYLENE GLYCOL 3350 17 G: 17 POWDER, FOR SOLUTION ORAL at 08:05

## 2020-05-30 NOTE — SUBJECTIVE & OBJECTIVE
Interval History: pain well controlled; ambulating; tolerating diet; satting well on 3L O2    ROS  Medications:  Continuous Infusions:   furosemide (LASIX) 10 mg/mL infusion (non-titrating) 5 mg/hr (05/30/20 1124)    insulin (HUMAN R) infusion (adults) 0.7 Units/hr (05/30/20 1123)     Scheduled Meds:   acetaminophen  1,000 mg Oral Q8H    aspirin  325 mg Oral Daily    atorvastatin  80 mg Oral QHS    insulin aspart U-100  4 Units Subcutaneous TIDWM    metoprolol tartrate  25 mg Oral BID    pantoprazole  40 mg Oral Daily    polyethylene glycol  17 g Oral Daily    potassium chloride  40 mEq Oral BID    rOPINIRole  0.5 mg Oral Nightly    senna-docusate 8.6-50 mg  1 tablet Oral Daily     PRN Meds:acetaminophen, acetaminophen, bisacodyL, Dextrose 10% Bolus, Dextrose 10% Bolus, glucagon (human recombinant), glucose, glucose, insulin aspart U-100, melatonin, ondansetron, ondansetron, oxyCODONE, oxyCODONE     Objective:     Vital Signs (Most Recent):  Temp: 98.3 °F (36.8 °C) (05/30/20 1138)  Pulse: 65 (05/30/20 1138)  Resp: 16 (05/30/20 1138)  BP: (!) 90/53 (05/30/20 1138)  SpO2: 96 % (05/30/20 1138) Vital Signs (24h Range):  Temp:  [96.7 °F (35.9 °C)-98.3 °F (36.8 °C)] 98.3 °F (36.8 °C)  Pulse:  [60-76] 65  Resp:  [16-18] 16  SpO2:  [95 %-98 %] 96 %  BP: ()/(53-73) 90/53     Weight: 84.2 kg (185 lb 10 oz)  Body mass index is 32.88 kg/m².    SpO2: 96 %  O2 Device (Oxygen Therapy): room air    Intake/Output - Last 3 Shifts       05/28 0700 - 05/29 0659 05/29 0700 - 05/30 0659 05/30 0700 - 05/31 0659    P.O. 1078 1290 480    I.V. (mL/kg) 117 (1.3)      Blood 100      Total Intake(mL/kg) 1295 (14) 1290 (15.3) 480 (5.7)    Urine (mL/kg/hr) 3350 (1.5) 3000 (1.5) 500 (1.1)    Stool 0      Chest Tube 110      Total Output 3460 3000 500    Net -2165 -1710 -20           Stool Occurrence 1 x            Lines/Drains/Airways     Peripheral Intravenous Line                 Peripheral IV - Single Lumen 05/18/20 2305 20 G  Left Forearm 11 days         Peripheral IV - Single Lumen 05/30/20 0012 20 G Right;Anterior Forearm less than 1 day                Physical Exam  CTAB on 3L O2 nasal cannula    Significant Labs:  BMP:   Recent Labs   Lab 05/30/20  0847   *      K 4.6   CL 99   CO2 27   BUN 57*   CREATININE 1.7*   CALCIUM 9.6     CBC:   Recent Labs   Lab 05/30/20  0847   WBC 9.02   RBC 3.27*   HGB 9.5*   HCT 31.1*      MCV 95   MCH 29.1   MCHC 30.5*     CMP:   Recent Labs   Lab 05/30/20  0847   *   CALCIUM 9.6      K 4.6   CO2 27   CL 99   BUN 57*   CREATININE 1.7*       Significant Diagnostics:  CXR: improved pulm edema

## 2020-05-30 NOTE — PLAN OF CARE
Lasix GTT discontinued and transition to PO. Insulin increased to 0.7mL/hr at 7 units/hr. Possible discharge tomorrow. Patient on room air. VSS, AOX4, Oxycodone administered PRN for 7/10 aching pain at mid sternal incision. Sternal precautions and use of IS encouraged. 1500 FR promoted. VSS, AOX4, no complaints of SOB, no occurrences of falls during shift. POC reviewed with pt. Pt in bed at lowest position with wheels locked, 2x upper side rails raised, call light within reach. Will continue to monitor.

## 2020-05-30 NOTE — CARE UPDATE
BG goal 140 -180     BG now above goal. Correction scale insulin given as ordered.     - Start  transition IV insulin infusion with stepdown parameters. Initial rate starting at 0.5 unit/hr. 0.3 u/kg/d dosing.   - BG Monitoring AC/HS/0200. To allow for additional overnight correction.     ** Please call Endocrine for any BG related issues **  ** Please notify Endocrine for any change and/or advance in diet**    Ubaldo Rock, NP-C  [facility] Department of Endocrinology

## 2020-05-30 NOTE — PLAN OF CARE
Pt free of falls and injury. Pt ambulating to bathroom with standby assistance. Fall precautions remain in place. Sternal precautions maintained. IS encouraged. Pt remains on 3L O2 nasal cannula. NSR on telemetry. Educated pt on importance of accurate I/Os. Insulin gtt and lasix gtt continued per MAR. Plan of care reviewed with pt. Pt given PRN melatonin to help her sleep. Pt resting comfortably with no complaints of pain. Pt VSS, no distress, will continue to monitor.

## 2020-05-30 NOTE — ASSESSMENT & PLAN NOTE
Ms. Cecilia Barahona is a 70 y.o. female w/ CAD, HTN. HLD, DM2 on insulin, 0.5ppd smoking history who presented to [facility] with 4 day history of chest pain.  Patient with exertional angina only until 4 days ago when she developed persistent chest pain.  She was admitted to the hospital and underwent though evaluation revealing mulit-vessel doronary artery disease.  Patient transferred to [facility] for CTS evaluation.  On 5/25, she underwent CABGx3.  -Chest tubes and pacer wires removed   -Metoprolol 25 mg BID  -given increase renal numbers and improved pulm edema, decrease lasix; electrolyte replacements  -Insulin drip managed by endocrine  -Continue ASA and statin   -Encourage ambulation with PT/OT  -Encourage IS and deep breathing  -Sternal precautions   -Cardiac diet with 1500 cc fluid restriction placed  -F/U PM labs

## 2020-05-30 NOTE — ASSESSMENT & PLAN NOTE
71 yo lady with T2DM, HTN, HLD, GERD, presented to outside hospital with SOB and chest pain. Went to cath lab and found to have multivessel disease. Previously smokes 2 packs of ciagerettes per week but quit 20 years ago. Reports that at home she is able to perform ADLs without difficulty. Walking up the stairs to her house causes SOB. Denies chest pain. Reports having a stroke many years ago that caused some memory issues which is still a problem and some left hand weakness which has resolved. Blood glucose usually in 150s. Last A1C 7. Stop Valsartan and Imdur in preparation for CABG Monday, May 25. Will order CT chest and carotid US. Dr. Bowman to review and staff.

## 2020-05-30 NOTE — PROGRESS NOTES
"[facility]  Endocrinology  Progress Note    Admit Date: 2020     Reason for Consult: Management of T2DM, Hyperglycemia     Surgical Procedure and Date: CABG x 3 20    Lab Results   Component Value Date    HGBA1C 7.1 (H) 2020     Diabetes diagnosis year: >10 years    Home Diabetes Medications:  (per [facility] endo team)  Jardiance 10 mg daily, Tresiba 36 units daily, Metformin 1000 mg BID    How often checking glucose at home? Free Style dionna  BG readings on regimen: 90's 100's  Hypoglycemia on the regimen? No  Missed doses on regimen?  No    Diabetes Complications include:     Hyperglycemia    Complicating diabetes co morbidities:   History of CVA and CHETNA      HPI:   Patient is a 70 y.o. female with a diagnosis of CAD (newly diagnosed), HTN, HLD, DM2 (on insulin HbA1c 7.1%). Admitted to [facility] on  with worsening chest pain for the past 4 days. She started having chest pain in 2019, she underwent a SPECT which was unremarkable. No prior history of MI or CAD.  In OSH, troponin negative x4. 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  She is now s/p CABG. Endocrinology consulted for management of T2DM.        Interval HPI:    Overnight events:   BG is now slightly above goal after start of IV insulin infusion overnight. Otherwise No acute events noted as per chart review. BG excursion overnight most likely to patient snacking. Patient endorses eating strawberry shortcake and bread pudding last night.     Diet Cardiac Fluid - 1500mL  5 Days Post-Op    Eatin%  Nausea: No  Hypoglycemia and intervention: No  Fever: No  TPN and/or TF: No  If yes, type of TF/TPN and rate: None    BP (!) 107/57 (BP Location: Left arm, Patient Position: Sitting)   Pulse 69   Temp 97.8 °F (36.6 °C) (Oral)   Resp 18   Ht 5' 3" (1.6 m)   Wt 84.2 kg (185 lb 10 oz)   SpO2 97%   Breastfeeding? No   BMI 32.88 kg/m²      Labs " Reviewed and Include    No results for input(s): GLU, CALCIUM, ALBUMIN, PROT, NA, K, CO2, CL, BUN, CREATININE, ALKPHOS, ALT, AST, BILITOT in the last 24 hours.  Lab Results   Component Value Date    WBC 13.46 (H) 05/28/2020    HGB 7.2 (L) 05/28/2020    HCT 23.8 (L) 05/28/2020    MCV 96 05/28/2020     05/28/2020     No results for input(s): TSH, FREET4 in the last 168 hours.  Lab Results   Component Value Date    HGBA1C 7.1 (H) 05/19/2020       Nutritional status:   Body mass index is 32.88 kg/m².  Lab Results   Component Value Date    ALBUMIN 3.2 (L) 05/19/2020    ALBUMIN 3.5 05/05/2020    ALBUMIN 3.4 (L) 12/20/2019     No results found for: PREALBUMIN    Estimated Creatinine Clearance: 39 mL/min (based on SCr of 1.4 mg/dL).    Accu-Checks  Recent Labs     05/28/20  1708 05/28/20  2059 05/29/20  0219 05/29/20  0820 05/29/20  1146 05/29/20  1750 05/29/20  2229 05/29/20  2348 05/30/20  0203 05/30/20  0802   POCTGLUCOSE 122* 104 124* 163* 155* 375* 258* 237* 203* 210*       Current Medications and/or Treatments Impacting Glycemic Control  Immunotherapy:    Immunosuppressants     None        Steroids:   Hormones (From admission, onward)    Start     Stop Route Frequency Ordered    05/26/20 2311  melatonin tablet 6 mg      -- Oral Nightly PRN 05/26/20 2211        Pressors:    Autonomic Drugs (From admission, onward)    None        Hyperglycemia/Diabetes Medications:   Antihyperglycemics (From admission, onward)    Start     Stop Route Frequency Ordered    05/29/20 2307  insulin aspart U-100 pen 0-10 Units      -- SubQ As needed (PRN) 05/29/20 2208 05/29/20 2208  insulin regular 100 Units in sodium chloride 0.9% 100 mL infusion      -- IV Continuous 05/29/20 2208          ASSESSMENT and PLAN    * Coronary artery disease  Managed per primary team  S/p CABG      Type 2 diabetes mellitus without complication, with long-term current use of insulin  BG goal 140 - 180    - Increase  transition IV insulin infusion with  stepdown parameters. Initial rate starting at 0.7 units/hr. 30% increase. Fasting BG above goal this am.   - Start Novolog 2-4 untis TIDWM. Prandial excursions noted. Basal/Prandial physiologic matching.    - Start Low Dose SQ Insulin Correction Scale PRN BG excursions.   - BG Monitoring AC/HS     ** Please call Endocrine for any BG related issues **  ** Please notify Endocrine for any change and/or advance in diet**    Discharge Planning:   TBD. Please notify endocrinology prior to discharge.             Essential hypertension  Managed per primary team  Condition may cause insulin resistance             Ubaldo Rock NP  Endocrinology  [facility]

## 2020-05-30 NOTE — PROGRESS NOTES
[facility]  Cardiothoracic Surgery  Progress Note    Patient Name: Cecilia Barahona  MRN: [id number]  Admission Date: 5/18/2020  Hospital Length of Stay: 10 days  Code Status: Full Code   Attending Physician: Yan Bowman MD   Referring Provider: Self, Aaareferral  Principal Problem:S/P CABG x 3    Subjective:     Post-Op Info:  Procedure(s) (LRB):  CORONARY ARTERY BYPASS GRAFT (CABG) x3  (N/A)   5 Days Post-Op     Interval History: pain well controlled; ambulating; tolerating diet; satting well on 3L O2    ROS  Medications:  Continuous Infusions:   furosemide (LASIX) 10 mg/mL infusion (non-titrating) 5 mg/hr (05/30/20 1124)    insulin (HUMAN R) infusion (adults) 0.7 Units/hr (05/30/20 1123)     Scheduled Meds:   acetaminophen  1,000 mg Oral Q8H    aspirin  325 mg Oral Daily    atorvastatin  80 mg Oral QHS    insulin aspart U-100  4 Units Subcutaneous TIDWM    metoprolol tartrate  25 mg Oral BID    pantoprazole  40 mg Oral Daily    polyethylene glycol  17 g Oral Daily    potassium chloride  40 mEq Oral BID    rOPINIRole  0.5 mg Oral Nightly    senna-docusate 8.6-50 mg  1 tablet Oral Daily     PRN Meds:acetaminophen, acetaminophen, bisacodyL, Dextrose 10% Bolus, Dextrose 10% Bolus, glucagon (human recombinant), glucose, glucose, insulin aspart U-100, melatonin, ondansetron, ondansetron, oxyCODONE, oxyCODONE     Objective:     Vital Signs (Most Recent):  Temp: 98.3 °F (36.8 °C) (05/30/20 1138)  Pulse: 65 (05/30/20 1138)  Resp: 16 (05/30/20 1138)  BP: (!) 90/53 (05/30/20 1138)  SpO2: 96 % (05/30/20 1138) Vital Signs (24h Range):  Temp:  [96.7 °F (35.9 °C)-98.3 °F (36.8 °C)] 98.3 °F (36.8 °C)  Pulse:  [60-76] 65  Resp:  [16-18] 16  SpO2:  [95 %-98 %] 96 %  BP: ()/(53-73) 90/53     Weight: 84.2 kg (185 lb 10 oz)  Body mass index is 32.88 kg/m².    SpO2: 96 %  O2 Device (Oxygen Therapy): room air    Intake/Output - Last 3 Shifts       05/28 0700 - 05/29 0659 05/29 0700 - 05/30  0659 05/30 0700 - 05/31 0659    P.O. 1078 1290 480    I.V. (mL/kg) 117 (1.3)      Blood 100      Total Intake(mL/kg) 1295 (14) 1290 (15.3) 480 (5.7)    Urine (mL/kg/hr) 3350 (1.5) 3000 (1.5) 500 (1.1)    Stool 0      Chest Tube 110      Total Output 3460 3000 500    Net -2165 -1710 -20           Stool Occurrence 1 x            Lines/Drains/Airways     Peripheral Intravenous Line                 Peripheral IV - Single Lumen 05/18/20 2305 20 G Left Forearm 11 days         Peripheral IV - Single Lumen 05/30/20 0012 20 G Right;Anterior Forearm less than 1 day                Physical Exam  CTAB on 3L O2 nasal cannula    Significant Labs:  BMP:   Recent Labs   Lab 05/30/20  0847   *      K 4.6   CL 99   CO2 27   BUN 57*   CREATININE 1.7*   CALCIUM 9.6     CBC:   Recent Labs   Lab 05/30/20  0847   WBC 9.02   RBC 3.27*   HGB 9.5*   HCT 31.1*      MCV 95   MCH 29.1   MCHC 30.5*     CMP:   Recent Labs   Lab 05/30/20  0847   *   CALCIUM 9.6      K 4.6   CO2 27   CL 99   BUN 57*   CREATININE 1.7*       Significant Diagnostics:  CXR: improved pulm edema    Assessment/Plan:     * S/P CABG x 3  MsLluvia Barahona is a 70 y.o. female w/ CAD, HTN. HLD, DM2 on insulin, 0.5ppd smoking history who presented to [facility] with 4 day history of chest pain.  Patient with exertional angina only until 4 days ago when she developed persistent chest pain.  She was admitted to the hospital and underwent though evaluation revealing mulit-vessel doronary artery disease.  Patient transferred to [facility] for CTS evaluation.  On 5/25, she underwent CABGx3.  -Chest tubes and pacer wires removed   -Metoprolol 25 mg BID  -given increase renal numbers and improved pulm edema, decrease and schedule lasix; electrolyte replacements  -Insulin drip managed by endocrine  -Continue ASA and statin   -Encourage ambulation with PT/OT  -Encourage IS and deep breathing  -Sternal precautions   -Cardiac diet with 1500 cc  fluid restriction placed  -F/U PM labs    Marvin Cantor MD  Cardiothoracic Surgery  [facility]

## 2020-05-30 NOTE — SUBJECTIVE & OBJECTIVE
"Interval HPI:    Overnight events:   BG is now slightly above goal after start of IV insulin infusion overnight. Otherwise No acute events noted as per chart review. BG excursion overnight most likely to patient snacking. Patient endorses eating strawberry shortcake and bread pudding last night.     Diet Cardiac Fluid - 1500mL  5 Days Post-Op    Eatin%  Nausea: No  Hypoglycemia and intervention: No  Fever: No  TPN and/or TF: No  If yes, type of TF/TPN and rate: None    BP (!) 107/57 (BP Location: Left arm, Patient Position: Sitting)   Pulse 69   Temp 97.8 °F (36.6 °C) (Oral)   Resp 18   Ht 5' 3" (1.6 m)   Wt 84.2 kg (185 lb 10 oz)   SpO2 97%   Breastfeeding? No   BMI 32.88 kg/m²     Labs Reviewed and Include    No results for input(s): GLU, CALCIUM, ALBUMIN, PROT, NA, K, CO2, CL, BUN, CREATININE, ALKPHOS, ALT, AST, BILITOT in the last 24 hours.  Lab Results   Component Value Date    WBC 13.46 (H) 2020    HGB 7.2 (L) 2020    HCT 23.8 (L) 2020    MCV 96 2020     2020     No results for input(s): TSH, FREET4 in the last 168 hours.  Lab Results   Component Value Date    HGBA1C 7.1 (H) 2020       Nutritional status:   Body mass index is 32.88 kg/m².  Lab Results   Component Value Date    ALBUMIN 3.2 (L) 2020    ALBUMIN 3.5 2020    ALBUMIN 3.4 (L) 2019     No results found for: PREALBUMIN    Estimated Creatinine Clearance: 39 mL/min (based on SCr of 1.4 mg/dL).    Accu-Checks  Recent Labs     20  1708 20  2059 20  0219 20  0820 20  1146 20  1750 20  2229 20  2348 20  0203 20  0802   POCTGLUCOSE 122* 104 124* 163* 155* 375* 258* 237* 203* 210*       Current Medications and/or Treatments Impacting Glycemic Control  Immunotherapy:    Immunosuppressants     None        Steroids:   Hormones (From admission, onward)    Start     Stop Route Frequency Ordered    20 2311  melatonin tablet 6 " mg      -- Oral Nightly PRN 05/26/20 2211        Pressors:    Autonomic Drugs (From admission, onward)    None        Hyperglycemia/Diabetes Medications:   Antihyperglycemics (From admission, onward)    Start     Stop Route Frequency Ordered    05/29/20 2307  insulin aspart U-100 pen 0-10 Units      -- SubQ As needed (PRN) 05/29/20 2208 05/29/20 2208  insulin regular 100 Units in sodium chloride 0.9% 100 mL infusion      -- IV Continuous 05/29/20 2208

## 2020-05-30 NOTE — ASSESSMENT & PLAN NOTE
BG goal 140 - 180    - Increase  transition IV insulin infusion with stepdown parameters. Initial rate starting at 0.7 units/hr. 30% increase. Fasting BG above goal this am.   - Start Novolog 2-4 untis TIDWM. Prandial excursions noted. Basal/Prandial physiologic matching.    - Start Low Dose SQ Insulin Correction Scale PRN BG excursions.   - BG Monitoring AC/HS     ** Please call Endocrine for any BG related issues **  ** Please notify Endocrine for any change and/or advance in diet**    Discharge Planning:   TBD. Please notify endocrinology prior to discharge.

## 2020-05-31 LAB
ANION GAP SERPL CALC-SCNC: 11 MMOL/L (ref 8–16)
ANION GAP SERPL CALC-SCNC: 11 MMOL/L (ref 8–16)
BASOPHILS # BLD AUTO: 0.03 K/UL (ref 0–0.2)
BASOPHILS NFR BLD: 0.3 % (ref 0–1.9)
BUN SERPL-MCNC: 62 MG/DL (ref 8–23)
BUN SERPL-MCNC: 63 MG/DL (ref 8–23)
CALCIUM SERPL-MCNC: 9.5 MG/DL (ref 8.7–10.5)
CALCIUM SERPL-MCNC: 9.6 MG/DL (ref 8.7–10.5)
CHLORIDE SERPL-SCNC: 101 MMOL/L (ref 95–110)
CHLORIDE SERPL-SCNC: 99 MMOL/L (ref 95–110)
CO2 SERPL-SCNC: 27 MMOL/L (ref 23–29)
CO2 SERPL-SCNC: 30 MMOL/L (ref 23–29)
CREAT SERPL-MCNC: 1.3 MG/DL (ref 0.5–1.4)
CREAT SERPL-MCNC: 1.4 MG/DL (ref 0.5–1.4)
DIFFERENTIAL METHOD: ABNORMAL
EOSINOPHIL # BLD AUTO: 0.3 K/UL (ref 0–0.5)
EOSINOPHIL NFR BLD: 3 % (ref 0–8)
ERYTHROCYTE [DISTWIDTH] IN BLOOD BY AUTOMATED COUNT: 14.6 % (ref 11.5–14.5)
EST. GFR  (AFRICAN AMERICAN): 44.2 ML/MIN/1.73 M^2
EST. GFR  (AFRICAN AMERICAN): 48.4 ML/MIN/1.73 M^2
EST. GFR  (NON AFRICAN AMERICAN): 38.4 ML/MIN/1.73 M^2
EST. GFR  (NON AFRICAN AMERICAN): 42 ML/MIN/1.73 M^2
GLUCOSE SERPL-MCNC: 144 MG/DL (ref 70–110)
GLUCOSE SERPL-MCNC: 267 MG/DL (ref 70–110)
HCT VFR BLD AUTO: 30.5 % (ref 37–48.5)
HGB BLD-MCNC: 9.3 G/DL (ref 12–16)
IMM GRANULOCYTES # BLD AUTO: 0.07 K/UL (ref 0–0.04)
IMM GRANULOCYTES NFR BLD AUTO: 0.7 % (ref 0–0.5)
LYMPHOCYTES # BLD AUTO: 2.9 K/UL (ref 1–4.8)
LYMPHOCYTES NFR BLD: 27.6 % (ref 18–48)
MAGNESIUM SERPL-MCNC: 2.1 MG/DL (ref 1.6–2.6)
MCH RBC QN AUTO: 29.2 PG (ref 27–31)
MCHC RBC AUTO-ENTMCNC: 30.5 G/DL (ref 32–36)
MCV RBC AUTO: 96 FL (ref 82–98)
MONOCYTES # BLD AUTO: 1 K/UL (ref 0.3–1)
MONOCYTES NFR BLD: 9.4 % (ref 4–15)
NEUTROPHILS # BLD AUTO: 6.2 K/UL (ref 1.8–7.7)
NEUTROPHILS NFR BLD: 59 % (ref 38–73)
NRBC BLD-RTO: 0 /100 WBC
PLATELET # BLD AUTO: 329 K/UL (ref 150–350)
PMV BLD AUTO: 10.1 FL (ref 9.2–12.9)
POCT GLUCOSE: 149 MG/DL (ref 70–110)
POCT GLUCOSE: 174 MG/DL (ref 70–110)
POCT GLUCOSE: 248 MG/DL (ref 70–110)
POCT GLUCOSE: 295 MG/DL (ref 70–110)
POCT GLUCOSE: 319 MG/DL (ref 70–110)
POTASSIUM SERPL-SCNC: 4.3 MMOL/L (ref 3.5–5.1)
POTASSIUM SERPL-SCNC: 4.5 MMOL/L (ref 3.5–5.1)
RBC # BLD AUTO: 3.18 M/UL (ref 4–5.4)
SODIUM SERPL-SCNC: 139 MMOL/L (ref 136–145)
SODIUM SERPL-SCNC: 140 MMOL/L (ref 136–145)
WBC # BLD AUTO: 10.54 K/UL (ref 3.9–12.7)

## 2020-05-31 PROCEDURE — 25000003 PHARM REV CODE 250: Mod: HCNC | Performed by: STUDENT IN AN ORGANIZED HEALTH CARE EDUCATION/TRAINING PROGRAM

## 2020-05-31 PROCEDURE — 25000003 PHARM REV CODE 250: Mod: HCNC | Performed by: SURGERY

## 2020-05-31 PROCEDURE — 80048 BASIC METABOLIC PNL TOTAL CA: CPT | Mod: 91,HCNC

## 2020-05-31 PROCEDURE — 63600175 PHARM REV CODE 636 W HCPCS: Mod: HCNC | Performed by: NURSE PRACTITIONER

## 2020-05-31 PROCEDURE — C9399 UNCLASSIFIED DRUGS OR BIOLOG: HCPCS | Mod: HCNC | Performed by: NURSE PRACTITIONER

## 2020-05-31 PROCEDURE — 80048 BASIC METABOLIC PNL TOTAL CA: CPT | Mod: HCNC

## 2020-05-31 PROCEDURE — 94761 N-INVAS EAR/PLS OXIMETRY MLT: CPT | Mod: HCNC

## 2020-05-31 PROCEDURE — 36415 COLL VENOUS BLD VENIPUNCTURE: CPT | Mod: HCNC

## 2020-05-31 PROCEDURE — 20600001 HC STEP DOWN PRIVATE ROOM: Mod: HCNC

## 2020-05-31 PROCEDURE — 85025 COMPLETE CBC W/AUTO DIFF WBC: CPT | Mod: HCNC

## 2020-05-31 PROCEDURE — 99232 PR SUBSEQUENT HOSPITAL CARE,LEVL II: ICD-10-PCS | Mod: HCNC,,, | Performed by: NURSE PRACTITIONER

## 2020-05-31 PROCEDURE — 25000003 PHARM REV CODE 250: Mod: HCNC | Performed by: NURSE PRACTITIONER

## 2020-05-31 PROCEDURE — 99232 SBSQ HOSP IP/OBS MODERATE 35: CPT | Mod: HCNC,,, | Performed by: NURSE PRACTITIONER

## 2020-05-31 PROCEDURE — 27000221 HC OXYGEN, UP TO 24 HOURS: Mod: HCNC

## 2020-05-31 PROCEDURE — 83735 ASSAY OF MAGNESIUM: CPT | Mod: HCNC

## 2020-05-31 RX ORDER — GLUCAGON 1 MG
1 KIT INJECTION
Status: DISCONTINUED | OUTPATIENT
Start: 2020-05-31 | End: 2020-06-01

## 2020-05-31 RX ORDER — IBUPROFEN 200 MG
24 TABLET ORAL
Status: DISCONTINUED | OUTPATIENT
Start: 2020-05-31 | End: 2020-06-01

## 2020-05-31 RX ORDER — INSULIN ASPART 100 [IU]/ML
6 INJECTION, SOLUTION INTRAVENOUS; SUBCUTANEOUS
Status: DISCONTINUED | OUTPATIENT
Start: 2020-05-31 | End: 2020-06-01

## 2020-05-31 RX ORDER — INSULIN ASPART 100 [IU]/ML
0-5 INJECTION, SOLUTION INTRAVENOUS; SUBCUTANEOUS
Status: DISCONTINUED | OUTPATIENT
Start: 2020-05-31 | End: 2020-06-01

## 2020-05-31 RX ORDER — IBUPROFEN 200 MG
16 TABLET ORAL
Status: DISCONTINUED | OUTPATIENT
Start: 2020-05-31 | End: 2020-06-01

## 2020-05-31 RX ADMIN — INSULIN ASPART 1 UNITS: 100 INJECTION, SOLUTION INTRAVENOUS; SUBCUTANEOUS at 11:05

## 2020-05-31 RX ADMIN — METOPROLOL TARTRATE 25 MG: 25 TABLET, FILM COATED ORAL at 09:05

## 2020-05-31 RX ADMIN — ROPINIROLE HYDROCHLORIDE 0.5 MG: 0.25 TABLET, FILM COATED ORAL at 09:05

## 2020-05-31 RX ADMIN — PANTOPRAZOLE SODIUM 40 MG: 40 TABLET, DELAYED RELEASE ORAL at 08:05

## 2020-05-31 RX ADMIN — FUROSEMIDE 40 MG: 40 TABLET ORAL at 08:05

## 2020-05-31 RX ADMIN — POTASSIUM CHLORIDE 40 MEQ: 1500 TABLET, EXTENDED RELEASE ORAL at 09:05

## 2020-05-31 RX ADMIN — OXYCODONE HYDROCHLORIDE 10 MG: 10 TABLET ORAL at 09:05

## 2020-05-31 RX ADMIN — ATORVASTATIN CALCIUM 80 MG: 20 TABLET, FILM COATED ORAL at 09:05

## 2020-05-31 RX ADMIN — INSULIN DETEMIR 18 UNITS: 100 INJECTION, SOLUTION SUBCUTANEOUS at 12:05

## 2020-05-31 RX ADMIN — ASPIRIN 325 MG ORAL TABLET 325 MG: 325 PILL ORAL at 08:05

## 2020-05-31 RX ADMIN — METOPROLOL TARTRATE 25 MG: 25 TABLET, FILM COATED ORAL at 08:05

## 2020-05-31 RX ADMIN — INSULIN ASPART 2 UNITS: 100 INJECTION, SOLUTION INTRAVENOUS; SUBCUTANEOUS at 06:05

## 2020-05-31 RX ADMIN — ACETAMINOPHEN 1000 MG: 500 TABLET ORAL at 05:05

## 2020-05-31 RX ADMIN — INSULIN ASPART 4 UNITS: 100 INJECTION, SOLUTION INTRAVENOUS; SUBCUTANEOUS at 08:05

## 2020-05-31 RX ADMIN — INSULIN ASPART 2 UNITS: 100 INJECTION, SOLUTION INTRAVENOUS; SUBCUTANEOUS at 09:05

## 2020-05-31 RX ADMIN — INSULIN ASPART 6 UNITS: 100 INJECTION, SOLUTION INTRAVENOUS; SUBCUTANEOUS at 11:05

## 2020-05-31 RX ADMIN — POLYETHYLENE GLYCOL 3350 17 G: 17 POWDER, FOR SOLUTION ORAL at 08:05

## 2020-05-31 RX ADMIN — INSULIN ASPART 6 UNITS: 100 INJECTION, SOLUTION INTRAVENOUS; SUBCUTANEOUS at 04:05

## 2020-05-31 RX ADMIN — Medication 6 MG: at 09:05

## 2020-05-31 RX ADMIN — STANDARDIZED SENNA CONCENTRATE AND DOCUSATE SODIUM 1 TABLET: 8.6; 5 TABLET ORAL at 08:05

## 2020-05-31 RX ADMIN — POTASSIUM CHLORIDE 40 MEQ: 1500 TABLET, EXTENDED RELEASE ORAL at 08:05

## 2020-05-31 NOTE — SUBJECTIVE & OBJECTIVE
"Interval HPI:   Overnight events:  IV insulin infusion has bee discontinued per primary team as noted per chart review. Possible discharge scheduled for today. BG is currently within goal.    Diet Cardiac Fluid - 1500mL  6 Days Post-Op    Eatin%  Nausea: No  Hypoglycemia and intervention: No  Fever: No  TPN and/or TF: No  If yes, type of TF/TPN and rate: None    /60 (BP Location: Left arm, Patient Position: Lying)   Pulse 74   Temp 97.4 °F (36.3 °C) (Oral)   Resp 20   Ht 5' 3" (1.6 m)   Wt 84.9 kg (187 lb 2.7 oz)   SpO2 96%   Breastfeeding? No   BMI 33.16 kg/m²     Labs Reviewed and Include    Recent Labs   Lab 20  0433   *   CALCIUM 9.6      K 4.3   CO2 30*   CL 99   BUN 62*   CREATININE 1.3     Lab Results   Component Value Date    WBC 10.54 2020    HGB 9.3 (L) 2020    HCT 30.5 (L) 2020    MCV 96 2020     2020     No results for input(s): TSH, FREET4 in the last 168 hours.  Lab Results   Component Value Date    HGBA1C 7.1 (H) 2020       Nutritional status:   Body mass index is 33.16 kg/m².  Lab Results   Component Value Date    ALBUMIN 3.2 (L) 2020    ALBUMIN 3.5 2020    ALBUMIN 3.4 (L) 2019     No results found for: PREALBUMIN    Estimated Creatinine Clearance: 42.2 mL/min (based on SCr of 1.3 mg/dL).    Accu-Checks  Recent Labs     20  1750 20  2229 20  2348 20  0203 20  0802 20  1135 20  1658 20  2236 20  0206 20  0826   POCTGLUCOSE 375* 258* 237* 203* 210* 255* 239* 159* 149* 174*       Current Medications and/or Treatments Impacting Glycemic Control  Immunotherapy:    Immunosuppressants     None        Steroids:   Hormones (From admission, onward)    Start     Stop Route Frequency Ordered    20 2311  melatonin tablet 6 mg      -- Oral Nightly PRN 20 2211        Pressors:    Autonomic Drugs (From admission, onward)    None    "     Hyperglycemia/Diabetes Medications:   Antihyperglycemics (From admission, onward)    Start     Stop Route Frequency Ordered    05/31/20 1130  insulin aspart U-100 pen 6 Units      -- SubQ 3 times daily with meals 05/31/20 0932    05/31/20 1031  insulin aspart U-100 pen 0-5 Units      -- SubQ Before meals & nightly PRN 05/31/20 0932    05/31/20 0945  insulin detemir U-100 pen 18 Units      -- SubQ Daily 05/31/20 0932

## 2020-05-31 NOTE — PROGRESS NOTES
[facility]  Cardiothoracic Surgery  Progress Note    Patient Name: Cecilia Barahona  MRN: [id number]  Admission Date: 5/18/2020  Hospital Length of Stay: 11 days  Code Status: Full Code   Attending Physician: Yan Bowman MD   Referring Provider: Self, Aaareferral  Principal Problem:S/P CABG x 3      Subjective:     Post-Op Info:  Procedure(s) (LRB):  CORONARY ARTERY BYPASS GRAFT (CABG) x3  (N/A)   6 Days Post-Op     Interval History: pressures seen somewhat labile (sbp ) but asymptomatic; ambulating some; satting 93% on 1L nasal cannula; alert; tolerating a reg diet    ROS  Medications:  Continuous Infusions:  Scheduled Meds:   aspirin  325 mg Oral Daily    atorvastatin  80 mg Oral QHS    furosemide  40 mg Oral Daily    insulin aspart U-100  6 Units Subcutaneous TIDWM    insulin detemir U-100  18 Units Subcutaneous Daily    metoprolol tartrate  25 mg Oral BID    pantoprazole  40 mg Oral Daily    polyethylene glycol  17 g Oral Daily    potassium chloride  40 mEq Oral BID    rOPINIRole  0.5 mg Oral Nightly    senna-docusate 8.6-50 mg  1 tablet Oral Daily     PRN Meds:acetaminophen, acetaminophen, bisacodyL, Dextrose 10% Bolus, Dextrose 10% Bolus, Dextrose 10% Bolus, Dextrose 10% Bolus, glucagon (human recombinant), glucose, glucose, insulin aspart U-100, melatonin, ondansetron, ondansetron, oxyCODONE, oxyCODONE     Objective:     Vital Signs (Most Recent):  Temp: 99.4 °F (37.4 °C) (05/31/20 1151)  Pulse: (!) 59 (05/31/20 1151)  Resp: 18 (05/31/20 1151)  BP: (!) 106/53 (05/31/20 1151)  SpO2: 98 % (05/31/20 1151) Vital Signs (24h Range):  Temp:  [96.9 °F (36.1 °C)-99.4 °F (37.4 °C)] 99.4 °F (37.4 °C)  Pulse:  [56-74] 59  Resp:  [16-20] 18  SpO2:  [88 %-98 %] 98 %  BP: ()/(50-68) 106/53     Weight: 84.9 kg (187 lb 2.7 oz)  Body mass index is 33.16 kg/m².    SpO2: 98 %  O2 Device (Oxygen Therapy): nasal cannula    Intake/Output - Last 3 Shifts       05/29 0700 - 05/30 0659  05/30 0700 - 05/31 0659 05/31 0700 - 06/01 0659    P.O. 1290 1200 480    I.V. (mL/kg)       Blood       Total Intake(mL/kg) 1290 (15.3) 1200 (14.1) 480 (5.7)    Urine (mL/kg/hr) 3000 (1.5) 2300 (1.1)     Stool       Chest Tube       Total Output 3000 2300     Net -1710 -1100 +480                 Lines/Drains/Airways     Peripheral Intravenous Line                 Peripheral IV - Single Lumen 05/30/20 0012 20 G Right;Anterior Forearm 1 day                Physical Exam  Incision c/d/i; RRR    Significant Labs:  BMP:   Recent Labs   Lab 05/30/20  1538 05/31/20  0433   * 144*    140   K 4.1 4.3   CL 97 99   CO2 27 30*   BUN 61* 62*   CREATININE 1.7* 1.3   CALCIUM 9.3 9.6   MG 2.0  --      CBC:   Recent Labs   Lab 05/31/20  0433   WBC 10.54   RBC 3.18*   HGB 9.3*   HCT 30.5*      MCV 96   MCH 29.2   MCHC 30.5*     CMP:   Recent Labs   Lab 05/31/20  0433   *   CALCIUM 9.6      K 4.3   CO2 30*   CL 99   BUN 62*   CREATININE 1.3       Significant Diagnostics:      Assessment/Plan:     * S/P CABG x 3  Ms. Cecilia Barahona is a 70 y.o. female w/ CAD, HTN. HLD, DM2 on insulin, 0.5ppd smoking history who presented to [facility] with 4 day history of chest pain.  Patient with exertional angina only until 4 days ago when she developed persistent chest pain.  She was admitted to the hospital and underwent though evaluation revealing mulit-vessel doronary artery disease.  Patient transferred to [facility] for CTS evaluation.  On 5/25, she underwent CABGx3.  -Chest tubes and pacer wires removed   -Metoprolol 25 mg BID  -continue lasix 40 qd iv given; electrolyte replacements  -continue to monitor BP  -weaned O2 to off this AM  -Insulin drip managed by endocrine  -Continue ASA and statin   -Encourage ambulation with PT/OT  -Encourage IS and deep breathing  -Sternal precautions   -Cardiac diet with 1500 cc fluid restriction placed  -F/U PM labs      Marvin Cantor MD  Cardiothoracic  Surgery  [facility]

## 2020-05-31 NOTE — PLAN OF CARE
Pt AAOx4, no c/o pain, no c/o sob, no s/s of obvious distress noted at present, Nurse reinforced education on using pillow as a splint, safety, and mobility, Pt verbalized understanding, Care Plan discussed with Pt, Pt acknowledged understanding. No falls experienced during shift, will continue to monitor Pt status

## 2020-05-31 NOTE — ASSESSMENT & PLAN NOTE
BG goal 140 - 180    - Discontinue  transition IV insulin infusion with stepdown parameters.  - Start Levemir 18 units daily. Dosing based on current IV insulin infusion needs during admission.   - Increase scheduled novolog to 6 untis TDIWM. Prandial excursions noted. Basal/Prandial physiologic matching.  - Low Dose SQ Insulin Correction Scale.  - BG Monitoring AC/HS       ** Please call Endocrine for any BG related issues **  ** Please notify Endocrine for any change and/or advance in diet**    Discharge Planning:   TBD. Please notify endocrinology prior to discharge.

## 2020-05-31 NOTE — ASSESSMENT & PLAN NOTE
Ms. Cecilia Barahona is a 70 y.o. female w/ CAD, HTN. HLD, DM2 on insulin, 0.5ppd smoking history who presented to [facility] with 4 day history of chest pain.  Patient with exertional angina only until 4 days ago when she developed persistent chest pain.  She was admitted to the hospital and underwent though evaluation revealing mulit-vessel doronary artery disease.  Patient transferred to [facility] for CTS evaluation.  On 5/25, she underwent CABGx3.  -Chest tubes and pacer wires removed   -Metoprolol 25 mg BID  -continue lasix 40 qd iv given; electrolyte replacements  -continue to monitor BP  -weaned O2 to off this AM  -Insulin drip managed by endocrine  -Continue ASA and statin   -Encourage ambulation with PT/OT  -Encourage IS and deep breathing  -Sternal precautions   -Cardiac diet with 1500 cc fluid restriction placed  -F/U PM labs

## 2020-05-31 NOTE — SUBJECTIVE & OBJECTIVE
Interval History: pressures seen somewhat labile (sbp ) but asymptomatic; ambulating some; satting 93% on 1L nasal cannula; alert; tolerating a reg diet    ROS  Medications:  Continuous Infusions:  Scheduled Meds:   aspirin  325 mg Oral Daily    atorvastatin  80 mg Oral QHS    furosemide  40 mg Oral Daily    insulin aspart U-100  6 Units Subcutaneous TIDWM    insulin detemir U-100  18 Units Subcutaneous Daily    metoprolol tartrate  25 mg Oral BID    pantoprazole  40 mg Oral Daily    polyethylene glycol  17 g Oral Daily    potassium chloride  40 mEq Oral BID    rOPINIRole  0.5 mg Oral Nightly    senna-docusate 8.6-50 mg  1 tablet Oral Daily     PRN Meds:acetaminophen, acetaminophen, bisacodyL, Dextrose 10% Bolus, Dextrose 10% Bolus, Dextrose 10% Bolus, Dextrose 10% Bolus, glucagon (human recombinant), glucose, glucose, insulin aspart U-100, melatonin, ondansetron, ondansetron, oxyCODONE, oxyCODONE     Objective:     Vital Signs (Most Recent):  Temp: 99.4 °F (37.4 °C) (05/31/20 1151)  Pulse: (!) 59 (05/31/20 1151)  Resp: 18 (05/31/20 1151)  BP: (!) 106/53 (05/31/20 1151)  SpO2: 98 % (05/31/20 1151) Vital Signs (24h Range):  Temp:  [96.9 °F (36.1 °C)-99.4 °F (37.4 °C)] 99.4 °F (37.4 °C)  Pulse:  [56-74] 59  Resp:  [16-20] 18  SpO2:  [88 %-98 %] 98 %  BP: ()/(50-68) 106/53     Weight: 84.9 kg (187 lb 2.7 oz)  Body mass index is 33.16 kg/m².    SpO2: 98 %  O2 Device (Oxygen Therapy): nasal cannula    Intake/Output - Last 3 Shifts       05/29 0700 - 05/30 0659 05/30 0700 - 05/31 0659 05/31 0700 - 06/01 0659    P.O. 1290 1200 480    I.V. (mL/kg)       Blood       Total Intake(mL/kg) 1290 (15.3) 1200 (14.1) 480 (5.7)    Urine (mL/kg/hr) 3000 (1.5) 2300 (1.1)     Stool       Chest Tube       Total Output 3000 2300     Net -1710 -1100 +480                 Lines/Drains/Airways     Peripheral Intravenous Line                 Peripheral IV - Single Lumen 05/30/20 0012 20 G Right;Anterior Forearm 1 day                 Physical Exam  Incision c/d/i; RRR    Significant Labs:  BMP:   Recent Labs   Lab 05/30/20  1538 05/31/20  0433   * 144*    140   K 4.1 4.3   CL 97 99   CO2 27 30*   BUN 61* 62*   CREATININE 1.7* 1.3   CALCIUM 9.3 9.6   MG 2.0  --      CBC:   Recent Labs   Lab 05/31/20  0433   WBC 10.54   RBC 3.18*   HGB 9.3*   HCT 30.5*      MCV 96   MCH 29.2   MCHC 30.5*     CMP:   Recent Labs   Lab 05/31/20  0433   *   CALCIUM 9.6      K 4.3   CO2 30*   CL 99   BUN 62*   CREATININE 1.3       Significant Diagnostics:

## 2020-05-31 NOTE — PROGRESS NOTES
"[facility]  Endocrinology  Progress Note    Admit Date: 2020     Reason for Consult: Management of T2DM, Hyperglycemia     Surgical Procedure and Date: CABG x 3 20    Lab Results   Component Value Date    HGBA1C 7.1 (H) 2020     Diabetes diagnosis year: >10 years    Home Diabetes Medications:  (per [facility] endo team)  Jardiance 10 mg daily, Tresiba 36 units daily, Metformin 1000 mg BID    How often checking glucose at home? Free Style dionna  BG readings on regimen: 90's 100's  Hypoglycemia on the regimen? No  Missed doses on regimen?  No    Diabetes Complications include:     Hyperglycemia    Complicating diabetes co morbidities:   History of CVA and CHETNA      HPI:   Patient is a 70 y.o. female with a diagnosis of CAD (newly diagnosed), HTN, HLD, DM2 (on insulin HbA1c 7.1%). Admitted to [facility] on  with worsening chest pain for the past 4 days. She started having chest pain in 2019, she underwent a SPECT which was unremarkable. No prior history of MI or CAD.  In OSH, troponin negative x4. 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  She is now s/p CABG. Endocrinology consulted for management of T2DM.        Interval HPI:   Overnight events:  IV insulin infusion has bee discontinued per primary team as noted per chart review. Possible discharge scheduled for today. BG is currently within goal.    Diet Cardiac Fluid - 1500mL  6 Days Post-Op    Eatin%  Nausea: No  Hypoglycemia and intervention: No  Fever: No  TPN and/or TF: No  If yes, type of TF/TPN and rate: None    /60 (BP Location: Left arm, Patient Position: Lying)   Pulse 74   Temp 97.4 °F (36.3 °C) (Oral)   Resp 20   Ht 5' 3" (1.6 m)   Wt 84.9 kg (187 lb 2.7 oz)   SpO2 96%   Breastfeeding? No   BMI 33.16 kg/m²      Labs Reviewed and Include    Recent Labs   Lab 20  0433   *   CALCIUM 9.6      K 4.3   CO2 30*   CL 99 "   BUN 62*   CREATININE 1.3     Lab Results   Component Value Date    WBC 10.54 05/31/2020    HGB 9.3 (L) 05/31/2020    HCT 30.5 (L) 05/31/2020    MCV 96 05/31/2020     05/31/2020     No results for input(s): TSH, FREET4 in the last 168 hours.  Lab Results   Component Value Date    HGBA1C 7.1 (H) 05/19/2020       Nutritional status:   Body mass index is 33.16 kg/m².  Lab Results   Component Value Date    ALBUMIN 3.2 (L) 05/19/2020    ALBUMIN 3.5 05/05/2020    ALBUMIN 3.4 (L) 12/20/2019     No results found for: PREALBUMIN    Estimated Creatinine Clearance: 42.2 mL/min (based on SCr of 1.3 mg/dL).    Accu-Checks  Recent Labs     05/29/20  1750 05/29/20  2229 05/29/20  2348 05/30/20  0203 05/30/20  0802 05/30/20  1135 05/30/20  1658 05/30/20  2236 05/31/20  0206 05/31/20  0826   POCTGLUCOSE 375* 258* 237* 203* 210* 255* 239* 159* 149* 174*       Current Medications and/or Treatments Impacting Glycemic Control  Immunotherapy:    Immunosuppressants     None        Steroids:   Hormones (From admission, onward)    Start     Stop Route Frequency Ordered    05/26/20 2311  melatonin tablet 6 mg      -- Oral Nightly PRN 05/26/20 2211        Pressors:    Autonomic Drugs (From admission, onward)    None        Hyperglycemia/Diabetes Medications:   Antihyperglycemics (From admission, onward)    Start     Stop Route Frequency Ordered    05/31/20 1130  insulin aspart U-100 pen 6 Units      -- SubQ 3 times daily with meals 05/31/20 0932    05/31/20 1031  insulin aspart U-100 pen 0-5 Units      -- SubQ Before meals & nightly PRN 05/31/20 0932    05/31/20 0945  insulin detemir U-100 pen 18 Units      -- SubQ Daily 05/31/20 0932          ASSESSMENT and PLAN    * S/P CABG x 3  Managed per primary team  Optimize BG control      Type 2 diabetes mellitus without complication, with long-term current use of insulin  BG goal 140 - 180    - Discontinue  transition IV insulin infusion with stepdown parameters.  - Start Levemir 18 units  daily. Dosing based on current IV insulin infusion needs during admission.   - Increase scheduled novolog to 6 untis TDIWM. Prandial excursions noted. Basal/Prandial physiologic matching.  - Low Dose SQ Insulin Correction Scale.  - BG Monitoring AC/HS       ** Please call Endocrine for any BG related issues **  ** Please notify Endocrine for any change and/or advance in diet**    Discharge Planning:   TBD. Please notify endocrinology prior to discharge.             Essential hypertension  Managed per primary team  Condition may cause insulin resistance             Ubaldo Rock NP  Endocrinology  [facility]

## 2020-06-01 VITALS
TEMPERATURE: 98 F | WEIGHT: 182.75 LBS | DIASTOLIC BLOOD PRESSURE: 53 MMHG | OXYGEN SATURATION: 92 % | RESPIRATION RATE: 20 BRPM | HEIGHT: 63 IN | SYSTOLIC BLOOD PRESSURE: 91 MMHG | HEART RATE: 60 BPM | BODY MASS INDEX: 32.38 KG/M2

## 2020-06-01 LAB
ANION GAP SERPL CALC-SCNC: 8 MMOL/L (ref 8–16)
BASOPHILS # BLD AUTO: 0.04 K/UL (ref 0–0.2)
BASOPHILS NFR BLD: 0.4 % (ref 0–1.9)
BUN SERPL-MCNC: 51 MG/DL (ref 8–23)
CALCIUM SERPL-MCNC: 9.1 MG/DL (ref 8.7–10.5)
CHLORIDE SERPL-SCNC: 103 MMOL/L (ref 95–110)
CO2 SERPL-SCNC: 28 MMOL/L (ref 23–29)
CREAT SERPL-MCNC: 1 MG/DL (ref 0.5–1.4)
DIFFERENTIAL METHOD: ABNORMAL
EOSINOPHIL # BLD AUTO: 0.3 K/UL (ref 0–0.5)
EOSINOPHIL NFR BLD: 2.2 % (ref 0–8)
ERYTHROCYTE [DISTWIDTH] IN BLOOD BY AUTOMATED COUNT: 14.7 % (ref 11.5–14.5)
EST. GFR  (AFRICAN AMERICAN): >60 ML/MIN/1.73 M^2
EST. GFR  (NON AFRICAN AMERICAN): 57.6 ML/MIN/1.73 M^2
GLUCOSE SERPL-MCNC: 184 MG/DL (ref 70–110)
HCT VFR BLD AUTO: 27.3 % (ref 37–48.5)
HGB BLD-MCNC: 8.4 G/DL (ref 12–16)
IMM GRANULOCYTES # BLD AUTO: 0.1 K/UL (ref 0–0.04)
IMM GRANULOCYTES NFR BLD AUTO: 0.9 % (ref 0–0.5)
LYMPHOCYTES # BLD AUTO: 2.8 K/UL (ref 1–4.8)
LYMPHOCYTES NFR BLD: 24.6 % (ref 18–48)
MCH RBC QN AUTO: 29.6 PG (ref 27–31)
MCHC RBC AUTO-ENTMCNC: 30.8 G/DL (ref 32–36)
MCV RBC AUTO: 96 FL (ref 82–98)
MONOCYTES # BLD AUTO: 1 K/UL (ref 0.3–1)
MONOCYTES NFR BLD: 8.8 % (ref 4–15)
NEUTROPHILS # BLD AUTO: 7.1 K/UL (ref 1.8–7.7)
NEUTROPHILS NFR BLD: 63.1 % (ref 38–73)
NRBC BLD-RTO: 0 /100 WBC
PLATELET # BLD AUTO: 331 K/UL (ref 150–350)
PMV BLD AUTO: 10.1 FL (ref 9.2–12.9)
POCT GLUCOSE: 220 MG/DL (ref 70–110)
POCT GLUCOSE: 254 MG/DL (ref 70–110)
POTASSIUM SERPL-SCNC: 4.9 MMOL/L (ref 3.5–5.1)
RBC # BLD AUTO: 2.84 M/UL (ref 4–5.4)
SODIUM SERPL-SCNC: 139 MMOL/L (ref 136–145)
WBC # BLD AUTO: 11.2 K/UL (ref 3.9–12.7)

## 2020-06-01 PROCEDURE — 25000003 PHARM REV CODE 250: Mod: HCNC | Performed by: STUDENT IN AN ORGANIZED HEALTH CARE EDUCATION/TRAINING PROGRAM

## 2020-06-01 PROCEDURE — 36415 COLL VENOUS BLD VENIPUNCTURE: CPT | Mod: HCNC

## 2020-06-01 PROCEDURE — 97530 THERAPEUTIC ACTIVITIES: CPT | Mod: HCNC

## 2020-06-01 PROCEDURE — 85025 COMPLETE CBC W/AUTO DIFF WBC: CPT | Mod: HCNC

## 2020-06-01 PROCEDURE — 99232 SBSQ HOSP IP/OBS MODERATE 35: CPT | Mod: HCNC,,, | Performed by: NURSE PRACTITIONER

## 2020-06-01 PROCEDURE — 25000003 PHARM REV CODE 250: Mod: HCNC | Performed by: SURGERY

## 2020-06-01 PROCEDURE — 94761 N-INVAS EAR/PLS OXIMETRY MLT: CPT | Mod: HCNC

## 2020-06-01 PROCEDURE — 80048 BASIC METABOLIC PNL TOTAL CA: CPT | Mod: HCNC

## 2020-06-01 PROCEDURE — 99232 PR SUBSEQUENT HOSPITAL CARE,LEVL II: ICD-10-PCS | Mod: HCNC,,, | Performed by: NURSE PRACTITIONER

## 2020-06-01 PROCEDURE — 25000003 PHARM REV CODE 250: Mod: HCNC | Performed by: NURSE PRACTITIONER

## 2020-06-01 RX ORDER — PANTOPRAZOLE SODIUM 40 MG/1
40 TABLET, DELAYED RELEASE ORAL DAILY
Qty: 30 TABLET | Refills: 11 | Status: SHIPPED | OUTPATIENT
Start: 2020-06-02 | End: 2020-12-10

## 2020-06-01 RX ORDER — ACETAMINOPHEN 325 MG/1
650 TABLET ORAL EVERY 6 HOURS PRN
Qty: 30 TABLET | Refills: 0 | Status: SHIPPED | OUTPATIENT
Start: 2020-06-01 | End: 2020-12-10

## 2020-06-01 RX ORDER — INSULIN DEGLUDEC 200 U/ML
24 INJECTION, SOLUTION SUBCUTANEOUS DAILY
Qty: 6 SYRINGE | Refills: 1 | Status: SHIPPED | OUTPATIENT
Start: 2020-06-01 | End: 2020-10-02

## 2020-06-01 RX ORDER — POLYETHYLENE GLYCOL 3350 17 G/17G
17 POWDER, FOR SOLUTION ORAL 2 TIMES DAILY PRN
Qty: 10 EACH | Refills: 0 | Status: SHIPPED | OUTPATIENT
Start: 2020-06-01 | End: 2020-12-10

## 2020-06-01 RX ORDER — ONDANSETRON 8 MG/1
8 TABLET, ORALLY DISINTEGRATING ORAL EVERY 6 HOURS PRN
Qty: 30 TABLET | Refills: 1 | Status: SHIPPED | OUTPATIENT
Start: 2020-06-01 | End: 2020-06-09 | Stop reason: SDUPTHER

## 2020-06-01 RX ORDER — METOPROLOL TARTRATE 25 MG/1
12.5 TABLET, FILM COATED ORAL 2 TIMES DAILY
Qty: 30 TABLET | Refills: 11 | Status: CANCELLED | OUTPATIENT
Start: 2020-06-01 | End: 2021-06-01

## 2020-06-01 RX ORDER — ASPIRIN 325 MG
325 TABLET ORAL DAILY
Qty: 30 TABLET | Refills: 11 | Status: ON HOLD | OUTPATIENT
Start: 2020-06-02 | End: 2020-06-03 | Stop reason: HOSPADM

## 2020-06-01 RX ORDER — ATORVASTATIN CALCIUM 80 MG/1
80 TABLET, FILM COATED ORAL NIGHTLY
Qty: 90 TABLET | Refills: 3 | Status: SHIPPED | OUTPATIENT
Start: 2020-06-01 | End: 2020-09-01 | Stop reason: SDUPTHER

## 2020-06-01 RX ORDER — AMOXICILLIN 250 MG
1 CAPSULE ORAL DAILY
Qty: 30 TABLET | Refills: 0 | Status: SHIPPED | OUTPATIENT
Start: 2020-06-02 | End: 2020-12-10

## 2020-06-01 RX ORDER — INSULIN ASPART 100 [IU]/ML
9 INJECTION, SOLUTION INTRAVENOUS; SUBCUTANEOUS
Status: DISCONTINUED | OUTPATIENT
Start: 2020-06-01 | End: 2020-06-01

## 2020-06-01 RX ORDER — OXYCODONE HYDROCHLORIDE 5 MG/1
5 TABLET ORAL EVERY 4 HOURS PRN
Qty: 42 TABLET | Refills: 0 | Status: SHIPPED | OUTPATIENT
Start: 2020-06-01 | End: 2020-06-08

## 2020-06-01 RX ORDER — METOPROLOL TARTRATE 25 MG/1
12.5 TABLET, FILM COATED ORAL 2 TIMES DAILY
Qty: 30 TABLET | Refills: 11 | Status: ON HOLD | OUTPATIENT
Start: 2020-06-01 | End: 2020-06-03 | Stop reason: HOSPADM

## 2020-06-01 RX ORDER — POTASSIUM CHLORIDE 20 MEQ/1
TABLET, EXTENDED RELEASE ORAL
Qty: 60 TABLET | Refills: 11 | Status: ON HOLD | OUTPATIENT
Start: 2020-06-01 | End: 2020-06-03 | Stop reason: SDUPTHER

## 2020-06-01 RX ORDER — TALC
6 POWDER (GRAM) TOPICAL NIGHTLY PRN
Refills: 0 | COMMUNITY
Start: 2020-06-01

## 2020-06-01 RX ORDER — FUROSEMIDE 20 MG/1
TABLET ORAL
Qty: 60 TABLET | Refills: 11 | Status: ON HOLD | OUTPATIENT
Start: 2020-06-01 | End: 2020-06-03 | Stop reason: SDUPTHER

## 2020-06-01 RX ORDER — METOPROLOL TARTRATE 25 MG/1
12.5 TABLET ORAL 2 TIMES DAILY
Status: DISCONTINUED | OUTPATIENT
Start: 2020-06-01 | End: 2020-06-01 | Stop reason: HOSPADM

## 2020-06-01 RX ADMIN — STANDARDIZED SENNA CONCENTRATE AND DOCUSATE SODIUM 1 TABLET: 8.6; 5 TABLET ORAL at 08:06

## 2020-06-01 RX ADMIN — FUROSEMIDE 40 MG: 40 TABLET ORAL at 08:06

## 2020-06-01 RX ADMIN — ASPIRIN 325 MG ORAL TABLET 325 MG: 325 PILL ORAL at 08:06

## 2020-06-01 RX ADMIN — INSULIN DETEMIR 18 UNITS: 100 INJECTION, SOLUTION SUBCUTANEOUS at 09:06

## 2020-06-01 RX ADMIN — PANTOPRAZOLE SODIUM 40 MG: 40 TABLET, DELAYED RELEASE ORAL at 08:06

## 2020-06-01 RX ADMIN — METOPROLOL TARTRATE 25 MG: 25 TABLET, FILM COATED ORAL at 08:06

## 2020-06-01 RX ADMIN — POTASSIUM CHLORIDE 40 MEQ: 1500 TABLET, EXTENDED RELEASE ORAL at 08:06

## 2020-06-01 RX ADMIN — INSULIN ASPART 2 UNITS: 100 INJECTION, SOLUTION INTRAVENOUS; SUBCUTANEOUS at 08:06

## 2020-06-01 RX ADMIN — INSULIN ASPART 6 UNITS: 100 INJECTION, SOLUTION INTRAVENOUS; SUBCUTANEOUS at 08:06

## 2020-06-01 NOTE — HOSPITAL COURSE
On 5/25/2020, the patient was taken to the Operating Room for the above stated procedure. Please see the previously dictated operative report for complete details. Postoperatively, the patient was taken from the  Operating Room to the ICU where the vital signs were monitored and pain was kept under control. The patient was weaned from the drips and extubated in the ICU per protocol. Once hemodynamically stable, the patient was transferred to the Cardiac Step-Down floor for continued strengthening and ambulation. On postoperative day 7, the patient was ready for discharge to home. At the time of discharge, the patient was ambulating unassisted. Pain was well controlled with oral analgesics and the patient was tolerating the diet.     MOBILITY AND ACTIVITY: As tolerated. Patient may shower. No heavy lifting of greater than 5 pounds and no driving.     DIET: An 1800-calorie ADA with a 1500 mL fluid restriction.     WOUND CARE INSTRUCTIONS: Check for redness, swelling and drainage around the  incision or wound. Patient is to call for any obvious bleeding, drainage, pus from the wound, unusual problems or difficulties or temperature of greater than 101   degrees.     FOLLOWUP: Follow up with Dr. Bowman in approximately 3 weeks. Prior to this  appointment, the patient will have a chest x-ray and EKG.     Patient not placed on [employer]-In[employer] at the time of discharge due to potential for hypotension     DISCHARGE CONDITION: At the time of discharge, the patient was in sinus rhythm and afebrile with stable vital signs.

## 2020-06-01 NOTE — PT/OT/SLP PROGRESS
Occupational Therapy   Treatment    Name: Cecilia Barahona  MRN: [id number]  Admitting Diagnosis:  S/P CABG x 3  7 Days Post-Op    Recommendations:     Discharge Recommendations: home health OT, home health PT  Discharge Equipment Recommendations:  none  Barriers to discharge:  None    Assessment:     Cceilia Barahona is a 70 y.o. female with a medical diagnosis of S/P CABG x 3.  She presents with improving mobility, but Pt identified Stress Management and healthy habits as concerns for her post discharge environments, so this session was adjusted to reflect the Pts concerns. Performance deficits affecting function are weakness, impaired endurance, impaired self care skills, impaired functional mobilty, impaired cardiopulmonary response to activity.     Rehab Prognosis:  Good; patient would benefit from acute skilled OT services to address these deficits and reach maximum level of function.       Plan:     Patient to be seen 5 x/week to address the above listed problems via self-care/home management, therapeutic activities, therapeutic exercises  · Plan of Care Expires: 07/08/20  · Plan of Care Reviewed with: patient    Subjective     Pain/Comfort:  · Pain Rating 1: 0/10  · Location - Side 1: Bilateral  · Location 1: sternal  · Pain Addressed 1: Reposition, Distraction  · Pain Rating Post-Intervention 1: other (see comments)(No change)    Objective:     Communicated with: RN prior to session.  Patient found up in chair with telemetry upon OT entry to room.    General Precautions: Standard, fall, sternal   Orthopedic Precautions:N/A   Braces: N/A       AMPAC 6 Click ADL: 18    Treatment & Education:  -Pt edu on OT role/POC  -Importance of OOB activity with staff assistance  -Safety during functional t/f and mobility  - White board updated  - Multiple self care tasks/functional mobility completed-- assistance level noted above  - All questions/concerns answered within OT scope of practice    Today's session revolved  around healthy habits, and stress management.  Recommendations included:    -  Identifying a space at home that you can comfortably retreat to, to perform Self-Care mindfulness.    -  Set goals for daily self-care:    - start with 1 min a day of time to get your mind right.  Progress to 5 mins - then try twice a day.   -  Practice slowing down your breathing and  breathing from your nose (5 sec in, 7 sec out)    - Make small incremental changes to your diet, try to manage less healthy meals by reducing portions   - Limit the number of less healthy meals per day (don't do donuts and later a crawfish boil)       Patient left up in chair with all lines intact, call button in reach and RN notifiedEducation:      GOALS:   Multidisciplinary Problems     Occupational Therapy Goals        Problem: Occupational Therapy Goal    Goal Priority Disciplines Outcome Interventions   Occupational Therapy Goal     OT, PT/OT Ongoing, Progressing    Description:  Goals to be met by: 6/8/20     Patient will increase functional independence with A3DLs by performing:    UE Dressing with [place].  LE Dressing with [place].  Grooming while standing at sink with Modified [place].  Toileting from toilet with Modified [place] for hygiene and clothing management.   Bathing from  shower chair/bench with Modified [place].  Progressing   Toilet transfer to toilet with Modified [place]. Progressing   Increased functional strength to WFL for B UE.  Upper extremity exercise program x15 reps per handout, with independence.   Pt will be able to state all sternal precautions correctly c 100% accuracy. MET STAS 5/29                        Time Tracking:     OT Date of Treatment: 06/01/20  OT Start Time: 1144  OT Stop Time: 1214  OT Total Time (min): 30 min    Billable Minutes:Therapeutic Activity 30 mins    Shilo Bates, CARLOS  6/1/2020

## 2020-06-01 NOTE — PLAN OF CARE
Goals reviewed and remain appropriate. Pt progressing towards goals.    Idalmis Tubbs, PT, DPT   2020  [phone]    Problem: Physical Therapy Goal  Goal: Physical Therapy Goal  Description  Goals to be met by: 2020    Patient will increase functional independence with mobility by performin. Supine to sit with Contact Guard Assistance - not met  2. Sit to stand transfer with Supervision - not met  3. Gait  x 150 feet with Supervision - not met  4. Ascend/descend 5 stair with bilateral Handrails Contact Guard Assistance - not met  5. Stand for 3 minutes with Supervision to increase activity tolerance - not met     Outcome: Ongoing, Progressing

## 2020-06-01 NOTE — SUBJECTIVE & OBJECTIVE
"Interval HPI:   Overnight events:   BG is slightly above goal on current SQ insulin regimen.   Diet Cardiac Fluid - 1500mL  7 Days Post-Op    Eatin%  Nausea: No  Hypoglycemia and intervention: No  Fever: No  TPN and/or TF: No  If yes, type of TF/TPN and rate: None    BP (!) 116/55 (BP Location: Left arm, Patient Position: Sitting)   Pulse 62   Temp 99.5 °F (37.5 °C) (Oral)   Resp 18   Ht 5' 3" (1.6 m)   Wt 82.9 kg (182 lb 12.2 oz)   SpO2 95%   Breastfeeding? No   BMI 32.37 kg/m²     Labs Reviewed and Include    Recent Labs   Lab 20  0602   *   CALCIUM 9.1      K 4.9   CO2 28      BUN 51*   CREATININE 1.0     Lab Results   Component Value Date    WBC 11.20 2020    HGB 8.4 (L) 2020    HCT 27.3 (L) 2020    MCV 96 2020     2020     No results for input(s): TSH, FREET4 in the last 168 hours.  Lab Results   Component Value Date    HGBA1C 7.1 (H) 2020       Nutritional status:   Body mass index is 32.37 kg/m².  Lab Results   Component Value Date    ALBUMIN 3.2 (L) 2020    ALBUMIN 3.5 2020    ALBUMIN 3.4 (L) 2019     No results found for: PREALBUMIN    Estimated Creatinine Clearance: 54.1 mL/min (based on SCr of 1 mg/dL).    Accu-Checks  Recent Labs     20  0802 20  1135 20  1658 20  2236 20  0206 20  0826 20  1152 20  1625 20  2131 20  0802   POCTGLUCOSE 210* 255* 239* 159* 149* 174* 248* 295* 319* 220*       Current Medications and/or Treatments Impacting Glycemic Control  Immunotherapy:    Immunosuppressants     None        Steroids:   Hormones (From admission, onward)    Start     Stop Route Frequency Ordered    20 2311  melatonin tablet 6 mg      -- Oral Nightly PRN 20 2211        Pressors:    Autonomic Drugs (From admission, onward)    None        Hyperglycemia/Diabetes Medications:   Antihyperglycemics (From admission, onward)    Start     Stop " Route Frequency Ordered    05/31/20 1130  insulin aspart U-100 pen 6 Units      -- SubQ 3 times daily with meals 05/31/20 0932    05/31/20 1031  insulin aspart U-100 pen 0-5 Units      -- SubQ Before meals & nightly PRN 05/31/20 0932    05/31/20 0945  insulin detemir U-100 pen 18 Units      -- SubQ Daily 05/31/20 0932

## 2020-06-01 NOTE — NURSING
Pt DC'd via wheelchair. NAD. All IV's and tele removed. All belongings accounted for. DC instructions reviewed with pt and Rx provided.

## 2020-06-01 NOTE — PLAN OF CARE
Pt goals remain appropriate, continue w/ OT POC. Pt is scheduled to discharge today and would benefit from a home health consult.     Problem: Occupational Therapy Goal  Goal: Occupational Therapy Goal  Description  Goals to be met by: 6/8/20     Patient will increase functional independence with A3DLs by performing:    UE Dressing with [place].  LE Dressing with [place].  Grooming while standing at sink with Modified [place].  Toileting from toilet with Modified [place] for hygiene and clothing management.   Bathing from  shower chair/bench with Modified [place].  Progressing   Toilet transfer to toilet with Modified [place]. Progressing   Increased functional strength to WFL for B UE.  Upper extremity exercise program x15 reps per handout, with independence.   Pt will be able to state all sternal precautions correctly c 100% accuracy. MET STAS 5/29 6/1/2020 1355 by Shilo Bates, OT  Outcome: Ongoing, Progressing    Shilo HERNÁNDEZ  6/1/2020

## 2020-06-01 NOTE — DISCHARGE SUMMARY
[facility]  Cardiothoracic Surgery  Discharge Summary      Patient Name: Cecilia Barahona  MRN: [id number]  Admission Date: 5/18/2020  Hospital Length of Stay: 12 days  Discharge Date and Time:  06/01/2020 12:42 PM  Attending Physician: Yan Bowman MD   Discharging Provider: Brandy Peters NP  Primary Care Provider: Yudi Smart MD    HPI:   Ms. Barahona is a pleasant 70-year-old woman who initially presented to [facility] with a 4 day history of chest pain.  Her symptoms originally had started in December of last year, at which time a nuclear study was done which was unremarkable.  She had had only exertional angina until the recent 4 day period of persistent chest pain.  She was admitted to [facility], and underwent a thoughtful and thorough evaluation.  Coronary angiography demonstrated multivessel disease.  She now presents for coronary artery bypass grafting.    Procedure(s) (LRB):  CORONARY ARTERY BYPASS GRAFT (CABG) x3  (N/A)      Indwelling Lines/Drains at time of discharge:   Lines/Drains/Airways     None               Hospital Course: On 5/25/2020, the patient was taken to the Operating Room for the above stated procedure. Please see the previously dictated operative report for complete details. Postoperatively, the patient was taken from the  Operating Room to the ICU where the vital signs were monitored and pain was kept under control. The patient was weaned from the drips and extubated in the ICU per protocol. Once hemodynamically stable, the patient was transferred to the Cardiac Step-Down floor for continued strengthening and ambulation. On postoperative day 7, the patient was ready for discharge to home. At the time of discharge, the patient was ambulating unassisted. Pain was well controlled with oral analgesics and the patient was tolerating the diet.     MOBILITY AND ACTIVITY: As tolerated. Patient may shower. No heavy lifting of greater than 5 pounds and no  driving.     DIET: An 1800-calorie ADA with a 1500 mL fluid restriction.     WOUND CARE INSTRUCTIONS: Check for redness, swelling and drainage around the  incision or wound. Patient is to call for any obvious bleeding, drainage, pus from the wound, unusual problems or difficulties or temperature of greater than 101   degrees.     FOLLOWUP: Follow up with Dr. Bowman in approximately 3 weeks. Prior to this  appointment, the patient will have a chest x-ray and EKG.     Patient not placed on Ace-Inhibitor at the time of discharge due to potential for hypotension     DISCHARGE CONDITION: At the time of discharge, the patient was in sinus rhythm and afebrile with stable vital signs.      Consults (From admission, onward)        Status Ordering Provider     Consult to Endocrinology  Once     Provider:  (Not yet assigned)    Completed SAHRA CONNER     Inpatient consult to Cardiology  Once     Provider:  Etelvina Lowery MD    Completed ALLEN GUADARRAMA     Inpatient consult to Cardiothoracic Surgery  Once     Provider:  Yan Bowman MD    Completed JOBY HART     Inpatient consult to Registered Dietitian/Nutritionist  Once     Provider:  (Not yet assigned)    Completed SAHRA CONNER          Pending Diagnostic Studies:     Procedure Component Value Units Date/Time    CBC auto differential [[id number]] Collected:  05/29/20 0555    Order Status:  Sent Lab Status:  In process Updated:  05/29/20 0640    Specimen:  Blood           No new Assessment & Plan notes have been filed under this hospital service since the last note was generated.  Service: Cardiothoracic Surgery    Final Active Diagnoses:    Diagnosis Date Noted POA    PRINCIPAL PROBLEM:  S/P CABG x 3 [Z95.1] 05/25/2020 Not Applicable    Postoperative anemia due to acute blood loss [D62] 05/29/2020 No    Chest pain [R07.9] 05/27/2020 Yes    Encounter for weaning from ventilator [Z99.11]  Not Applicable    Bilateral carotid artery stenosis  [I65.23]  Yes    Coronary artery disease [I25.10] 05/20/2020 Yes    Mild major depression [F32.0] 01/17/2020 Yes    Restless legs syndrome (RLS) [G25.81] 03/12/2018 Yes    Type 2 diabetes mellitus without complication, with long-term current use of insulin [E11.9, Z79.4] 09/30/2015 Not Applicable    Gastroesophageal reflux disease without esophagitis [K21.9] 09/30/2015 Yes    Mixed hyperlipidemia [E78.2]  Yes    Essential hypertension [I10]  Yes    Glaucoma NEC [H40.89]  Yes    Severe obesity (BMI 35.0-39.9) with comorbidity [E66.01]  Yes      Problems Resolved During this Admission:    Diagnosis Date Noted Date Resolved POA    Chest pain [R07.9]  05/25/2020 Unknown    Acute coronary syndrome [I24.9] 05/20/2020 05/21/2020 Yes      Discharged Condition: stable    Disposition: Home or Self Care    Follow Up:  Follow-up Information     Yan Bwoman MD On 6/25/2020.    Specialties:  Cardiothoracic Surgery, Transplant  Why:  Post op Thursday 6/25 - Chest Xray @ 3pm; EKG @ 3:15pm; MD appt @ 3:45pm  Contact information:  [address]  [place] [address]  [phone]             [facility].    Specialty:  Home Health Services  Why:  Home Health  Contact information:  [address] [address]  [phone]                 Patient Instructions:      Ambulatory referral/consult to Home Health   Standing Status: Future   Referral Priority: Routine Referral Type: Home Health   Referral Reason: Specialty Services Required   Requested Specialty: Home Health Services   Number of Visits Requested: 1     Medications:  Reconciled Home Medications:      Medication List      START taking these medications    acetaminophen 325 MG tablet  Commonly known as:  TYLENOL  Take 2 tablets (650 mg total) by mouth every 6 (six) hours as needed for Pain or Temperature greater than (or equal to 101 degree F).     aspirin 325 MG tablet  Take 1 tablet (325 mg total) by mouth  once daily.  Start taking on:  June 2, 2020  Replaces:  aspirin 81 MG Chew     furosemide 20 MG tablet  Commonly known as:  LASIX  Take one tablet by mouth twice daily for 7 days then decrease to once daily     melatonin 3 mg tablet  Commonly known as:  MELATIN  Take 2 tablets (6 mg total) by mouth nightly as needed for Insomnia.     metoprolol tartrate 25 MG tablet  Commonly known as:  LOPRESSOR  Take 0.5 tablets (12.5 mg total) by mouth 2 (two) times daily.     ondansetron 8 MG Tbdl  Commonly known as:  ZOFRAN-ODT  Take 1 tablet (8 mg total) by mouth every 6 (six) hours as needed.     oxyCODONE 5 MG immediate release tablet  Commonly known as:  ROXICODONE  Take 1 tablet (5 mg total) by mouth every 4 (four) hours as needed.     pantoprazole 40 MG tablet  Commonly known as:  PROTONIX  Take 1 tablet (40 mg total) by mouth once daily.  Start taking on:  June 2, 2020     polyethylene glycol 17 gram Pwpk  Commonly known as:  GLYCOLAX  Take 17 g by mouth 2 (two) times daily as needed.     potassium chloride SA 20 MEQ tablet  Commonly known as:  K-DUR,KLOR-CON  Take one tablet by mouth twice daily for 7 days then decrease to once daily     senna-docusate 8.6-50 mg 8.6-50 mg per tablet  Commonly known as:  PERICOLACE  Take 1 tablet by mouth once daily.  Start taking on:  June 2, 2020        CHANGE how you take these medications    atorvastatin 80 MG tablet  Commonly known as:  LIPITOR  Take 1 tablet (80 mg total) by mouth every evening.  What changed:    · medication strength  · how much to take  · additional instructions     insulin degludec 200 unit/mL (3 mL) Inpn  Commonly known as:  TRESIBA FLEXTOUCH U-200  Inject 24 Units into the skin once daily. Inject 24 units once daily  What changed:    · how much to take  · additional instructions        CONTINUE taking these medications    albuterol 90 mcg/actuation inhaler  Commonly known as:  PROVENTIL/VENTOLIN HFA  Inhale 2 puffs into the lungs every 6 (six) hours as needed  for Wheezing or Shortness of Breath. Rescue     alcohol swabs Padm  Apply 1 each topically 3 (three) times daily.     artificial tears ointment Oint  Commonly known as:  ARTIFICIAL TEARS  Place into both eyes every evening.     azelastine 0.05 % ophthalmic solution  Commonly known as:  OPTIVAR  INSTILL 1 DROP IN EACH EYE TWICE DAILY     blood pressure monitor Kit  Commonly known as:  BLOOD PRESSURE KIT  1 kit by Misc.(Non-Drug; Combo Route) route once daily.     blood-glucose meter,continuous Misc  Commonly known as:  DEXCOM G6   Use with dexcom G6 system     blood-glucose sensor Vanna  Commonly known as:  DEXCOM G6 SENSOR  Change sensor every 10 days     blood-glucose transmitter Vanna  Commonly known as:  DEXCOM G6 TRANSMITTER  Change every 3 months     desoximetasone 0.05 % cream  Commonly known as:  TOPICORT  Apply topically 2 (two) times daily as needed.     dulaglutide 1.5 mg/0.5 mL Pnij  Commonly known as:  TRULICITY  Inject 1.5 mg into the skin every 7 days.     empagliflozin 10 mg tablet  Commonly known as:  JARDIANCE  Take 10 mg by mouth once daily.     flash glucose scanning reader Misc  Commonly known as:  FREESTYLE ANAMARIA 10 DAY READER  1 Units by Misc.(Non-Drug; Combo Route) route before meals as needed.     FLUoxetine 20 MG capsule  Take 1 capsule (20 mg total) by mouth once daily.     fluticasone propionate 50 mcg/actuation nasal spray  Commonly known as:  FLONASE  1 spray (50 mcg total) by Each Nare route once daily.     FREESTYLE ANAMARIA 14 DAY SENSOR Kit  Generic drug:  flash glucose sensor  CHANGE SENSOR EVERY 14 DAYS     lancets Misc  Check blood glucose 4 times a day. Dispense accuchek meter or other meter preferred by insurance. Dx code e11.65     LANCING DEVICE WITH LANCETS Misc  Generic drug:  lancing device  USE AS DIRECTED FOR DIABETIC TESTING     latanoprost 0.005 % ophthalmic solution  PLACE 1 DROP IN EACH EYE EVERY EVENING     loratadine 10 mg tablet  Commonly known as:   "CLARITIN  TAKE ONE TABLET BY MOUTH EVERY DAY AS NEEDED FOR ALLERGIES     metFORMIN 1000 MG tablet  Commonly known as:  GLUCOPHAGE  TAKE ONE TABLET BY MOUTH TWICE DAILY WITH MEALS     multivitamin with minerals tablet  Take 1 tablet by mouth once daily.     pen needle, diabetic 32 gauge x 5/32" Ndle  Commonly known as:  BD ULTRA-FINE LENY PEN NEEDLE  USE FIVE TIMES DAILY     rOPINIRole 0.5 MG tablet  Commonly known as:  REQUIP  TAKE ONE Tablet BY MOUTH EVERY NIGHT AT BEDTIME        STOP taking these medications    amLODIPine 10 MG tablet  Commonly known as:  NORVASC     aspirin 81 MG Chew  Replaced by:  aspirin 325 MG tablet     chlorthalidone 25 MG Tab  Commonly known as:  HYGROTEN     isosorbide mononitrate 30 MG 24 hr tablet  Commonly known as:  IMDUR     valsartan 160 MG tablet  Commonly known as:  DIOVAN          Time spent on the discharge of patient: 42 minutes    Brandy Peters NP  Cardiothoracic Surgery  [facility]  "

## 2020-06-01 NOTE — PROGRESS NOTES
"[facility]  Endocrinology  Progress Note    Admit Date: 2020     Reason for Consult: Management of T2DM, Hyperglycemia     Surgical Procedure and Date: CABG x 3 20    Lab Results   Component Value Date    HGBA1C 7.1 (H) 2020     Diabetes diagnosis year: >10 years    Home Diabetes Medications:  (per [facility] team)  Jardiance 10 mg daily, Tresiba 36 units daily, Metformin 1000 mg BID    How often checking glucose at home? Free Style dionna  BG readings on regimen: 90's 100's  Hypoglycemia on the regimen? No  Missed doses on regimen?  No    Diabetes Complications include:     Hyperglycemia    Complicating diabetes co morbidities:   History of CVA and CHETNA      HPI:   Patient is a 70 y.o. female with a diagnosis of CAD (newly diagnosed), HTN, HLD, DM2 (on insulin HbA1c 7.1%). Admitted to [facility] on  with worsening chest pain for the past 4 days. She started having chest pain in 2019, she underwent a SPECT which was unremarkable. No prior history of MI or CAD.  In OSH, troponin negative x4. 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  She is now s/p CABG. Endocrinology consulted for management of T2DM.        Interval HPI:   Overnight events:   BG is slightly above goal on current SQ insulin regimen.   Diet Cardiac Fluid - 1500mL  7 Days Post-Op    Eatin%  Nausea: No  Hypoglycemia and intervention: No  Fever: No  TPN and/or TF: No  If yes, type of TF/TPN and rate: None    BP (!) 116/55 (BP Location: Left arm, Patient Position: Sitting)   Pulse 62   Temp 99.5 °F (37.5 °C) (Oral)   Resp 18   Ht 5' 3" (1.6 m)   Wt 82.9 kg (182 lb 12.2 oz)   SpO2 95%   Breastfeeding? No   BMI 32.37 kg/m²      Labs Reviewed and Include    Recent Labs   Lab 20  0602   *   CALCIUM 9.1      K 4.9   CO2 28      BUN 51*   CREATININE 1.0     Lab Results   Component Value Date    WBC 11.20 2020 "    HGB 8.4 (L) 06/01/2020    HCT 27.3 (L) 06/01/2020    MCV 96 06/01/2020     06/01/2020     No results for input(s): TSH, FREET4 in the last 168 hours.  Lab Results   Component Value Date    HGBA1C 7.1 (H) 05/19/2020       Nutritional status:   Body mass index is 32.37 kg/m².  Lab Results   Component Value Date    ALBUMIN 3.2 (L) 05/19/2020    ALBUMIN 3.5 05/05/2020    ALBUMIN 3.4 (L) 12/20/2019     No results found for: PREALBUMIN    Estimated Creatinine Clearance: 54.1 mL/min (based on SCr of 1 mg/dL).    Accu-Checks  Recent Labs     05/30/20  0802 05/30/20  1135 05/30/20  1658 05/30/20  2236 05/31/20  0206 05/31/20  0826 05/31/20  1152 05/31/20  1625 05/31/20  2131 06/01/20  0802   POCTGLUCOSE 210* 255* 239* 159* 149* 174* 248* 295* 319* 220*       Current Medications and/or Treatments Impacting Glycemic Control  Immunotherapy:    Immunosuppressants     None        Steroids:   Hormones (From admission, onward)    Start     Stop Route Frequency Ordered    05/26/20 2311  melatonin tablet 6 mg      -- Oral Nightly PRN 05/26/20 2211        Pressors:    Autonomic Drugs (From admission, onward)    None        Hyperglycemia/Diabetes Medications:   Antihyperglycemics (From admission, onward)    Start     Stop Route Frequency Ordered    05/31/20 1130  insulin aspart U-100 pen 6 Units      -- SubQ 3 times daily with meals 05/31/20 0932    05/31/20 1031  insulin aspart U-100 pen 0-5 Units      -- SubQ Before meals & nightly PRN 05/31/20 0932    05/31/20 0945  insulin detemir U-100 pen 18 Units      -- SubQ Daily 05/31/20 0932          ASSESSMENT and PLAN    * S/P CABG x 3  Managed per primary team  Optimize BG control      Type 2 diabetes mellitus without complication, with long-term current use of insulin  BG goal 140 - 180    - Increase Levemir 20 units daily. aprox 10% dose increase. Morning BG has consistently been at upper end of goal.   - Increase scheduled novolog to 9 untis TDIWM. Prandial excursions noted,  and patient requires frequent correction scale insulin.   - Low Dose SQ Insulin Correction Scale.  - BG Monitoring AC/HS.      ** Please call Endocrine for any BG related issues **  ** Please notify Endocrine for any change and/or advance in diet**    Discharge Planning:   TBD. Please notify endocrinology prior to discharge.             Essential hypertension  Managed per primary team  Condition may cause insulin resistance         Severe obesity (BMI 35.0-39.9) with comorbidity  Body mass index is 32.37 kg/m².  May increase insulin resistance.             Ubaldo Rock NP  Endocrinology  [facility]

## 2020-06-01 NOTE — ASSESSMENT & PLAN NOTE
BG goal 140 - 180    - Increase Levemir 20 units daily. aprox 10% dose increase. Morning BG has consistently been at upper end of goal.   - Increase scheduled novolog to 9 untis TDIWM. Prandial excursions noted, and patient requires frequent correction scale insulin.   - Low Dose SQ Insulin Correction Scale.  - BG Monitoring AC/HS.      ** Please call Endocrine for any BG related issues **  ** Please notify Endocrine for any change and/or advance in diet**    Discharge Planning:   TBD. Please notify endocrinology prior to discharge.

## 2020-06-01 NOTE — PROGRESS NOTES
2 units aspart insulin sq given for BG of 319 per s/s. Instructed pt on improving diabetic diet and restraining from cokes and cookies.

## 2020-06-01 NOTE — PLAN OF CARE
Pt to d/c home w/ [facility]-.    Yan Bowman MD  On 6/25/2020  Post op Thursday 6/25 - Chest Xray @ 3pm; EKG @ 3:15pm; MD appt @ 3:45pm  [address]  [facility] [address]  [phone]   [facility]          06/01/20 1440   Final Note   Assessment Type Final Discharge Note   Anticipated Discharge Disposition Home-Health   Hospital Follow Up  Appt(s) scheduled? Yes   Right Care Referral Info   Post Acute Recommendation Home-care   Facility Name [facility]-   Post-Acute Status   Post-Acute Authorization [facility] Status Set-up Complete   Discharge Delays None known at this time

## 2020-06-02 ENCOUNTER — NURSE TRIAGE (OUTPATIENT)
Dept: ADMINISTRATIVE | Facility: CLINIC | Age: 70
End: 2020-06-02

## 2020-06-02 ENCOUNTER — OFFICE VISIT (OUTPATIENT)
Dept: FAMILY MEDICINE | Facility: CLINIC | Age: 70
End: 2020-06-02
Payer: MEDICARE

## 2020-06-02 ENCOUNTER — HOSPITAL ENCOUNTER (OUTPATIENT)
Facility: HOSPITAL | Age: 70
Discharge: HOME | End: 2020-06-03
Attending: EMERGENCY MEDICINE | Admitting: THORACIC SURGERY (CARDIOTHORACIC VASCULAR SURGERY)
Payer: MEDICARE

## 2020-06-02 DIAGNOSIS — R06.02 SHORTNESS OF BREATH: ICD-10-CM

## 2020-06-02 DIAGNOSIS — Z95.1 S/P CABG X 3: Primary | ICD-10-CM

## 2020-06-02 DIAGNOSIS — R06.02 SOB (SHORTNESS OF BREATH): Primary | ICD-10-CM

## 2020-06-02 DIAGNOSIS — Z95.1 S/P CABG (CORONARY ARTERY BYPASS GRAFT): ICD-10-CM

## 2020-06-02 DIAGNOSIS — E78.5 HYPERLIPIDEMIA LDL GOAL <70: ICD-10-CM

## 2020-06-02 LAB
ALBUMIN SERPL BCP-MCNC: 3.2 G/DL (ref 3.5–5.2)
ALP SERPL-CCNC: 102 U/L (ref 55–135)
ALT SERPL W/O P-5'-P-CCNC: 11 U/L (ref 10–44)
ANION GAP SERPL CALC-SCNC: 11 MMOL/L (ref 8–16)
AST SERPL-CCNC: 15 U/L (ref 10–40)
BASOPHILS # BLD AUTO: 0.02 K/UL (ref 0–0.2)
BASOPHILS NFR BLD: 0.2 % (ref 0–1.9)
BILIRUB SERPL-MCNC: 0.4 MG/DL (ref 0.1–1)
BNP SERPL-MCNC: 816 PG/ML (ref 0–99)
BUN SERPL-MCNC: 44 MG/DL (ref 8–23)
CALCIUM SERPL-MCNC: 8.9 MG/DL (ref 8.7–10.5)
CHLORIDE SERPL-SCNC: 103 MMOL/L (ref 95–110)
CO2 SERPL-SCNC: 22 MMOL/L (ref 23–29)
CREAT SERPL-MCNC: 1.2 MG/DL (ref 0.5–1.4)
DIFFERENTIAL METHOD: ABNORMAL
EOSINOPHIL # BLD AUTO: 0.2 K/UL (ref 0–0.5)
EOSINOPHIL NFR BLD: 1.8 % (ref 0–8)
ERYTHROCYTE [DISTWIDTH] IN BLOOD BY AUTOMATED COUNT: 14.9 % (ref 11.5–14.5)
EST. GFR  (AFRICAN AMERICAN): 53.3 ML/MIN/1.73 M^2
EST. GFR  (NON AFRICAN AMERICAN): 46.2 ML/MIN/1.73 M^2
GLUCOSE SERPL-MCNC: 266 MG/DL (ref 70–110)
HCT VFR BLD AUTO: 28.1 % (ref 37–48.5)
HGB BLD-MCNC: 8.7 G/DL (ref 12–16)
IMM GRANULOCYTES # BLD AUTO: 0.15 K/UL (ref 0–0.04)
IMM GRANULOCYTES NFR BLD AUTO: 1.2 % (ref 0–0.5)
LYMPHOCYTES # BLD AUTO: 3.1 K/UL (ref 1–4.8)
LYMPHOCYTES NFR BLD: 25.5 % (ref 18–48)
MCH RBC QN AUTO: 29.9 PG (ref 27–31)
MCHC RBC AUTO-ENTMCNC: 31 G/DL (ref 32–36)
MCV RBC AUTO: 97 FL (ref 82–98)
MONOCYTES # BLD AUTO: 1 K/UL (ref 0.3–1)
MONOCYTES NFR BLD: 8.1 % (ref 4–15)
NEUTROPHILS # BLD AUTO: 7.7 K/UL (ref 1.8–7.7)
NEUTROPHILS NFR BLD: 63.2 % (ref 38–73)
NRBC BLD-RTO: 1 /100 WBC
PLATELET # BLD AUTO: 363 K/UL (ref 150–350)
PMV BLD AUTO: 9.6 FL (ref 9.2–12.9)
POTASSIUM SERPL-SCNC: 4.8 MMOL/L (ref 3.5–5.1)
PROT SERPL-MCNC: 7.4 G/DL (ref 6–8.4)
RBC # BLD AUTO: 2.91 M/UL (ref 4–5.4)
SARS-COV-2 RDRP RESP QL NAA+PROBE: NEGATIVE
SODIUM SERPL-SCNC: 136 MMOL/L (ref 136–145)
WBC # BLD AUTO: 12.17 K/UL (ref 3.9–12.7)

## 2020-06-02 PROCEDURE — 93010 EKG 12-LEAD: ICD-10-PCS | Mod: HCNC,,, | Performed by: INTERNAL MEDICINE

## 2020-06-02 PROCEDURE — 1101F PT FALLS ASSESS-DOCD LE1/YR: CPT | Mod: HCNC,CPTII,95, | Performed by: NURSE PRACTITIONER

## 2020-06-02 PROCEDURE — 99214 OFFICE O/P EST MOD 30 MIN: CPT | Mod: HCNC,95,, | Performed by: NURSE PRACTITIONER

## 2020-06-02 PROCEDURE — G0378 HOSPITAL OBSERVATION PER HR: HCPCS

## 2020-06-02 PROCEDURE — 80053 COMPREHEN METABOLIC PANEL: CPT | Mod: HCNC

## 2020-06-02 PROCEDURE — U0002 COVID-19 LAB TEST NON-CDC: HCPCS | Mod: HCNC

## 2020-06-02 PROCEDURE — 3051F HG A1C>EQUAL 7.0%<8.0%: CPT | Mod: HCNC,CPTII,95, | Performed by: NURSE PRACTITIONER

## 2020-06-02 PROCEDURE — 96374 THER/PROPH/DIAG INJ IV PUSH: CPT | Performed by: THORACIC SURGERY (CARDIOTHORACIC VASCULAR SURGERY)

## 2020-06-02 PROCEDURE — 3051F PR MOST RECENT HEMOGLOBIN A1C LEVEL 7.0 - < 8.0%: ICD-10-PCS | Mod: HCNC,CPTII,95, | Performed by: NURSE PRACTITIONER

## 2020-06-02 PROCEDURE — 93005 ELECTROCARDIOGRAM TRACING: CPT | Mod: HCNC

## 2020-06-02 PROCEDURE — 99285 EMERGENCY DEPT VISIT HI MDM: CPT | Mod: 25,HCNC

## 2020-06-02 PROCEDURE — 25000003 PHARM REV CODE 250: Performed by: STUDENT IN AN ORGANIZED HEALTH CARE EDUCATION/TRAINING PROGRAM

## 2020-06-02 PROCEDURE — 1101F PR PT FALLS ASSESS DOC 0-1 FALLS W/OUT INJ PAST YR: ICD-10-PCS | Mod: HCNC,CPTII,95, | Performed by: NURSE PRACTITIONER

## 2020-06-02 PROCEDURE — 93010 ELECTROCARDIOGRAM REPORT: CPT | Mod: HCNC,,, | Performed by: INTERNAL MEDICINE

## 2020-06-02 PROCEDURE — 1159F MED LIST DOCD IN RCRD: CPT | Mod: HCNC,95,, | Performed by: NURSE PRACTITIONER

## 2020-06-02 PROCEDURE — 99214 PR OFFICE/OUTPT VISIT, EST, LEVL IV, 30-39 MIN: ICD-10-PCS | Mod: HCNC,95,, | Performed by: NURSE PRACTITIONER

## 2020-06-02 PROCEDURE — 63600175 PHARM REV CODE 636 W HCPCS: Performed by: STUDENT IN AN ORGANIZED HEALTH CARE EDUCATION/TRAINING PROGRAM

## 2020-06-02 PROCEDURE — 99285 EMERGENCY DEPT VISIT HI MDM: CPT | Mod: ,,, | Performed by: EMERGENCY MEDICINE

## 2020-06-02 PROCEDURE — 85025 COMPLETE CBC W/AUTO DIFF WBC: CPT | Mod: HCNC

## 2020-06-02 PROCEDURE — 1159F PR MEDICATION LIST DOCUMENTED IN MEDICAL RECORD: ICD-10-PCS | Mod: HCNC,95,, | Performed by: NURSE PRACTITIONER

## 2020-06-02 PROCEDURE — 99285 PR EMERGENCY DEPT VISIT,LEVEL V: ICD-10-PCS | Mod: ,,, | Performed by: EMERGENCY MEDICINE

## 2020-06-02 PROCEDURE — 83880 ASSAY OF NATRIURETIC PEPTIDE: CPT | Mod: HCNC

## 2020-06-02 RX ORDER — METOPROLOL TARTRATE 25 MG/1
12.5 TABLET ORAL DAILY
Status: DISCONTINUED | OUTPATIENT
Start: 2020-06-03 | End: 2020-06-03 | Stop reason: HOSPADM

## 2020-06-02 RX ORDER — BISACODYL 10 MG
10 SUPPOSITORY, RECTAL RECTAL DAILY PRN
Status: CANCELLED | OUTPATIENT
Start: 2020-06-02

## 2020-06-02 RX ORDER — GLUCAGON 1 MG
1 KIT INJECTION
Status: DISCONTINUED | OUTPATIENT
Start: 2020-06-02 | End: 2020-06-03 | Stop reason: HOSPADM

## 2020-06-02 RX ORDER — OXYCODONE HYDROCHLORIDE 5 MG/1
10 TABLET ORAL EVERY 4 HOURS PRN
Status: CANCELLED | OUTPATIENT
Start: 2020-06-02

## 2020-06-02 RX ORDER — ALBUTEROL SULFATE 90 UG/1
2 AEROSOL, METERED RESPIRATORY (INHALATION) EVERY 6 HOURS PRN
Status: DISCONTINUED | OUTPATIENT
Start: 2020-06-02 | End: 2020-06-03 | Stop reason: HOSPADM

## 2020-06-02 RX ORDER — INSULIN ASPART 100 [IU]/ML
1-10 INJECTION, SOLUTION INTRAVENOUS; SUBCUTANEOUS
Status: DISCONTINUED | OUTPATIENT
Start: 2020-06-02 | End: 2020-06-03 | Stop reason: HOSPADM

## 2020-06-02 RX ORDER — POTASSIUM CHLORIDE 20 MEQ/1
20 TABLET, EXTENDED RELEASE ORAL DAILY
Status: DISCONTINUED | OUTPATIENT
Start: 2020-06-03 | End: 2020-06-03 | Stop reason: HOSPADM

## 2020-06-02 RX ORDER — OXYCODONE HYDROCHLORIDE 5 MG/1
5 TABLET ORAL EVERY 4 HOURS PRN
Status: CANCELLED | OUTPATIENT
Start: 2020-06-02

## 2020-06-02 RX ORDER — ASPIRIN 325 MG
325 TABLET ORAL DAILY
Status: DISCONTINUED | OUTPATIENT
Start: 2020-06-03 | End: 2020-06-03 | Stop reason: HOSPADM

## 2020-06-02 RX ORDER — ROPINIROLE 0.25 MG/1
0.5 TABLET, FILM COATED ORAL NIGHTLY
Status: DISCONTINUED | OUTPATIENT
Start: 2020-06-02 | End: 2020-06-03 | Stop reason: HOSPADM

## 2020-06-02 RX ORDER — IBUPROFEN 200 MG
16 TABLET ORAL
Status: DISCONTINUED | OUTPATIENT
Start: 2020-06-02 | End: 2020-06-03 | Stop reason: HOSPADM

## 2020-06-02 RX ORDER — IBUPROFEN 200 MG
24 TABLET ORAL
Status: DISCONTINUED | OUTPATIENT
Start: 2020-06-02 | End: 2020-06-03 | Stop reason: HOSPADM

## 2020-06-02 RX ORDER — ATORVASTATIN CALCIUM 20 MG/1
80 TABLET, FILM COATED ORAL NIGHTLY
Status: DISCONTINUED | OUTPATIENT
Start: 2020-06-02 | End: 2020-06-03 | Stop reason: HOSPADM

## 2020-06-02 RX ORDER — ALBUTEROL SULFATE 2.5 MG/.5ML
2.5 SOLUTION RESPIRATORY (INHALATION)
Status: DISCONTINUED | OUTPATIENT
Start: 2020-06-03 | End: 2020-06-03 | Stop reason: HOSPADM

## 2020-06-02 RX ORDER — ONDANSETRON 2 MG/ML
4 INJECTION INTRAMUSCULAR; INTRAVENOUS EVERY 12 HOURS PRN
Status: CANCELLED | OUTPATIENT
Start: 2020-06-02

## 2020-06-02 RX ORDER — ONDANSETRON 8 MG/1
8 TABLET, ORALLY DISINTEGRATING ORAL EVERY 8 HOURS PRN
Status: CANCELLED | OUTPATIENT
Start: 2020-06-02

## 2020-06-02 RX ORDER — TALC
6 POWDER (GRAM) TOPICAL NIGHTLY PRN
Status: CANCELLED | OUTPATIENT
Start: 2020-06-02

## 2020-06-02 RX ORDER — PANTOPRAZOLE SODIUM 40 MG/1
40 TABLET, DELAYED RELEASE ORAL DAILY
Status: DISCONTINUED | OUTPATIENT
Start: 2020-06-03 | End: 2020-06-03 | Stop reason: HOSPADM

## 2020-06-02 RX ORDER — SODIUM CHLORIDE 0.9 % (FLUSH) 0.9 %
10 SYRINGE (ML) INJECTION
Status: DISCONTINUED | OUTPATIENT
Start: 2020-06-02 | End: 2020-06-03 | Stop reason: HOSPADM

## 2020-06-02 RX ORDER — POLYETHYLENE GLYCOL 3350 17 G/17G
17 POWDER, FOR SOLUTION ORAL DAILY
Status: DISCONTINUED | OUTPATIENT
Start: 2020-06-03 | End: 2020-06-03 | Stop reason: HOSPADM

## 2020-06-02 RX ADMIN — ROPINIROLE HYDROCHLORIDE 0.5 MG: 0.25 TABLET, FILM COATED ORAL at 10:06

## 2020-06-02 RX ADMIN — APIXABAN 2.5 MG: 2.5 TABLET, FILM COATED ORAL at 09:06

## 2020-06-02 RX ADMIN — ATORVASTATIN CALCIUM 80 MG: 20 TABLET, FILM COATED ORAL at 10:06

## 2020-06-02 RX ADMIN — FUROSEMIDE 30 MG/HR: 10 INJECTION, SOLUTION INTRAVENOUS at 08:06

## 2020-06-02 NOTE — PROGRESS NOTES
Subjective:       Patient ID: Cecilia Barahona is a 70 y.o. female.    Chief Complaint: No chief complaint on file.    The patient location is: home  The chief complaint leading to consultation is: SOB    Visit type: audiovisual    Face to Face time with patient: 12  minutes of total time spent on the encounter, which includes face to face time and non-face to face time preparing to see the patient (eg, review of tests), Obtaining and/or reviewing separately obtained history, Documenting clinical information in the electronic or other health record, Independently interpreting results (not separately reported) and communicating results to the patient/family/caregiver, or Care coordination (not separately reported).         Each patient to whom he or she provides medical services by telemedicine is:  (1) informed of the relationship between the physician and patient and the respective role of any other health care provider with respect to management of the patient; and (2) notified that he or she may decline to receive medical services by telemedicine and may withdraw from such care at any time.    Notes:  70-year-old female complaint of shortness of breath that started over the past 7 days but got worse earlier today.  The patient is visually short of breath.  She denies any chest pain, heart palpitations, or swelling to her lower extremities.  She did have open heart surgery on 05/25/2020 at [facility].  Her blood sugar presently is 266.  She is with her daughter and her .  The home health nurse arrived during my visit and I recommended to the nurse that I was sending her to the emergency room for further evaluation.  The patient and the patient's daughter agree to go to the emergency room right now for further evaluation.      Shortness of Breath   This is a new problem. The current episode started in the past 7 days. The problem occurs constantly. The problem has been gradually worsening. The average  episode lasts 5 weeks. Associated symptoms include orthopnea and PND. Pertinent negatives include no abdominal pain, chest pain, fever, headaches, leg swelling, vomiting or wheezing. Nothing aggravates the symptoms. The patient has no known risk factors for DVT/PE. She has tried rest for the symptoms. The treatment provided no relief. Her past medical history is significant for allergies.     Past Medical History:   Diagnosis Date    Acute coronary syndrome 2020: Presents with unstable angina.    Allergic rhinitis, seasonal     Anxiety     Arthritis     CVA (cerebral vascular accident)     Depression     Glaucoma NEC     Hallucination     images out of corner of eye about a year ago    History of positive PPD - treated - remote past     Hx of psychiatric care     Hyperlipidemia LDL goal < 100     Hypertension     Nuclear sclerosis of both eyes 2019    Obesity     CHETNA (obstructive sleep apnea)     Psychiatric problem     Psychosis     Renal disorder     Sleep difficulties     Suicide attempt     about 30 years ago by overdose of pills    Therapy     Type II or unspecified type diabetes mellitus without mention of complication, uncontrolled      Past Surgical History:   Procedure Laterality Date    CARPAL TUNNEL RELEASE       SECTION, CLASSIC      COLONOSCOPY N/A 3/5/2020    Procedure: COLONOSCOPY;  Surgeon: Wiliam Carrillo MD;  Location: [facility] (4TH FLR);  Service: Endoscopy;  Laterality: N/A;  20 appt confirmed-rb  pt requested this date    CORONARY ARTERY BYPASS GRAFT (CABG) N/A 2020    Procedure: CORONARY ARTERY BYPASS GRAFT (CABG) x3 ;  Surgeon: Yan Bowman MD;  Location: [facility] OR 2ND FLR;  Service: Cardiothoracic;  Laterality: N/A;  CABG X3  Endoharvest time start 0906  Endoharvest time end 1003  Vein prep start 1004  Vein prep end 1048    LEFT HEART CATHETERIZATION N/A 2020    Procedure: HEART CATH-LEFT;  Surgeon: Etelvina  MD Beverley;  Location: [facility] CATH LAB;  Service: Cardiology;  Laterality: N/A;    TOTAL ABDOMINAL HYSTERECTOMY      TRIGGER FINGER RELEASE        reports that she quit smoking about 7 years ago. Her smoking use included cigarettes. She smoked 0.50 packs per day. She has never used smokeless tobacco. She reports that she drank alcohol. She reports that she has current or past drug history. Drug: Marijuana.  Review of Systems   Constitutional: Negative for chills and fever.   Respiratory: Positive for shortness of breath. Negative for cough and wheezing.    Cardiovascular: Positive for orthopnea and PND. Negative for chest pain and leg swelling.   Gastrointestinal: Negative for abdominal pain, diarrhea, nausea and vomiting.   Neurological: Negative for dizziness, light-headedness and headaches.       Objective:      Physical Exam   Constitutional: She is oriented to person, place, and time. She appears well-developed and well-nourished. No distress.   Pulmonary/Chest:   Patient's respirations appear labored and she appears shortness of breath via audiovisual visit.   Neurological: She is alert and oriented to person, place, and time.   Skin: She is not diaphoretic.   Psychiatric: She has a normal mood and affect. Her behavior is normal. Judgment and thought content normal.       Assessment:       1. SOB (shortness of breath)    2. S/P CABG (coronary artery bypass graft)    3. Type 2 diabetes, uncontrolled, with neuropathy    4. Hyperlipidemia LDL goal <70        Plan:         SOB (shortness of breath)  - obviously sob on the audiovisual exam  - recommend going to [facility] now for further evaluation    S/P CABG (coronary artery bypass graft)  - just had surgery on 5/25/2020    Type 2 diabetes, uncontrolled, with neuropathy  - continue current medications    Hyperlipidemia LDL goal <70  - The current medical regimen is effective;  continue present plan and medications.    Patient's daughter and patient have both  agreed to go to the emergency room at [facility] for further evaluation.  I tried to call report but was left on hold for over 20 minutes.

## 2020-06-02 NOTE — ED PROVIDER NOTES
Encounter Date: 2020       History     Chief Complaint   Patient presents with    Shortness of Breath     Pt with CABG on Monday.  Pt denies CP, reports SOB occurs with talking and ambulation.     Pt is a 71 yo F with PMH of CAD s/p CABG, CVA, arthritis, HTN, depression, HLD who presents with shortness of breath that started this morning. She was discharged yesterday after CABG 20 8 days ago. She reports she was feeling well upon discharge yesterday. She did notice a sporadic dry cough that started yesterday. She denies fever, chills, body aches. She reports her pain has been controlled and she has not needed any opiate pain medication. She reports SOB Is present at rest but worse with exertion. She denies leg swelling.         Review of patient's allergies indicates:   Allergen Reactions    Ace inhibitors Other (See Comments)     cough    Invokana [canagliflozin] Other (See Comments)     Throat and tongue swelling    Ozempic [semaglutide]      Nausea and constipation on 0.25 mg dose.      Past Medical History:   Diagnosis Date    Acute coronary syndrome 2020: Presents with unstable angina.    Allergic rhinitis, seasonal     Anxiety     Arthritis     CVA (cerebral vascular accident)     Depression     Glaucoma NEC     Hallucination     images out of corner of eye about a year ago    History of positive PPD - treated - remote past     Hx of psychiatric care     Hyperlipidemia LDL goal < 100     Hypertension     Nuclear sclerosis of both eyes 2019    Obesity     CHETNA (obstructive sleep apnea)     Psychiatric problem     Psychosis     Renal disorder     Sleep difficulties     Suicide attempt     about 30 years ago by overdose of pills    Therapy     Type II or unspecified type diabetes mellitus without mention of complication, uncontrolled      Past Surgical History:   Procedure Laterality Date    CARPAL TUNNEL RELEASE       SECTION, CLASSIC       COLONOSCOPY N/A 3/5/2020    Procedure: COLONOSCOPY;  Surgeon: Wiliam Carrillo MD;  Location: [facility] ENDO (4TH FLR);  Service: Endoscopy;  Laterality: N/A;  20 appt confirmed-rb  pt requested this date    CORONARY ARTERY BYPASS GRAFT (CABG) N/A 2020    Procedure: CORONARY ARTERY BYPASS GRAFT (CABG) x3 ;  Surgeon: Yan Bowman MD;  Location: [facility] OR 2ND FLR;  Service: Cardiothoracic;  Laterality: N/A;  CABG X3  Endoharvest time start 0906  Endoharvest time end 1003  Vein prep start 1004  Vein prep end 1048    LEFT HEART CATHETERIZATION N/A 2020    Procedure: HEART CATH-LEFT;  Surgeon: Etelvina Lowery MD;  Location: [facility] CATH LAB;  Service: Cardiology;  Laterality: N/A;    TOTAL ABDOMINAL HYSTERECTOMY      TRIGGER FINGER RELEASE       Family History   Problem Relation Age of Onset    Hypertension Mother     Diabetes Mother     Heart failure Mother     Cataracts Mother     Glaucoma Mother     Prostate cancer Father     Hypertension Father     Diabetes Father     Heart failure Father     No Known Problems Sister     Diabetes Maternal Uncle     Hyperlipidemia Maternal Uncle     Hypertension Maternal Uncle     Diabetes Maternal Grandmother     Diabetes Maternal Grandfather     No Known Problems Paternal Grandmother     No Known Problems Paternal Grandfather     Diabetes Maternal Aunt     Alzheimer's disease Maternal Aunt     Schizophrenia Maternal Aunt     Heart disease Cousin         s/p heart transplant    No Known Problems Paternal Aunt     No Known Problems Paternal Uncle     Drug abuse Grandchild     Amblyopia Neg Hx     Blindness Neg Hx     Cancer Neg Hx     Macular degeneration Neg Hx     Retinal detachment Neg Hx     Strabismus Neg Hx     Stroke Neg Hx     Thyroid disease Neg Hx      Social History     Tobacco Use    Smoking status: Former Smoker     Packs/day: 0.50     Types: Cigarettes     Last attempt to quit: 11/10/2012     Years since quittin.5     Smokeless tobacco: Never Used   Substance Use Topics    Alcohol use: Not Currently     Comment: drank socially in the past    Drug use: Not Currently     Types: Marijuana     Comment: tried once 6 months ago     Review of Systems   Constitutional: Negative for chills and fever.   HENT: Negative for congestion.    Eyes: Negative for visual disturbance.   Respiratory: Positive for cough and shortness of breath.    Cardiovascular: Negative for chest pain and leg swelling.   Gastrointestinal: Negative for abdominal pain and diarrhea.   Endocrine: Negative for polyuria.   Genitourinary: Negative for dysuria.   Musculoskeletal: Negative for back pain and neck pain.   Skin: Negative for rash.   Allergic/Immunologic: Negative for immunocompromised state.   Neurological: Negative for light-headedness and headaches.   Hematological: Does not bruise/bleed easily.   Psychiatric/Behavioral: The patient is not nervous/anxious.        Physical Exam     Initial Vitals [06/02/20 1659]   BP Pulse Resp Temp SpO2   (!) 109/58 60 (!) 24 98.4 °F (36.9 °C) 95 %      MAP       --         Physical Exam    Nursing note and vitals reviewed.  Constitutional: She appears well-developed and well-nourished. She is not diaphoretic. No distress.   HENT:   Head: Normocephalic and atraumatic.   Eyes: EOM are normal.   Neck: Neck supple.   Cardiovascular: Normal rate, regular rhythm and intact distal pulses.   Pulmonary/Chest: Breath sounds normal. No respiratory distress.   mildly tachypneic   Abdominal: Soft. She exhibits no distension. There is no tenderness.   Musculoskeletal: She exhibits edema.   Neurological: She is alert and oriented to person, place, and time. GCS score is 15. GCS eye subscore is 4. GCS verbal subscore is 5. GCS motor subscore is 6.   Skin: Skin is warm and dry.   Linear 4 cm incisional wound to left lower medial thigh and 2 cm incisional wound to left medial lower leg   Psychiatric: She has a normal mood and affect.  Her behavior is normal. Judgment and thought content normal.         ED Course   Procedures  Labs Reviewed   CBC W/ AUTO DIFFERENTIAL - Abnormal; Notable for the following components:       Result Value    RBC 2.91 (*)     Hemoglobin 8.7 (*)     Hematocrit 28.1 (*)     Mean Corpuscular Hemoglobin Conc 31.0 (*)     RDW 14.9 (*)     Platelets 363 (*)     Immature Granulocytes 1.2 (*)     Immature Grans (Abs) 0.15 (*)     nRBC 1 (*)     All other components within normal limits   COMPREHENSIVE METABOLIC PANEL - Abnormal; Notable for the following components:    CO2 22 (*)     Glucose 266 (*)     BUN, Bld 44 (*)     Albumin 3.2 (*)     eGFR if  53.3 (*)     eGFR if non  46.2 (*)     All other components within normal limits   B-TYPE NATRIURETIC PEPTIDE - Abnormal; Notable for the following components:     (*)     All other components within normal limits   SARS-COV-2 RNA AMPLIFICATION, QUAL        ECG Results          EKG 12-lead (Final result)  Result time 06/03/20 13:52:14    Final result by Interface, Lab In [facility] (06/03/20 13:52:14)                 Narrative:    Test Reason : R06.02,    Vent. Rate : 059 BPM     Atrial Rate : 059 BPM     P-R Int : 194 ms          QRS Dur : 066 ms      QT Int : 384 ms       P-R-T Axes : 006 078 087 degrees     QTc Int : 380 ms    Baseline wander  Sinus bradycardia  Low voltage QRS    Abnormal ECG  When compared with ECG of 25-MAY-2020 15:54,  Criteria for Inferior infarct are no longer Present  QT has shortened  Confirmed by LOTUS AU MD ([id number]) on 6/3/2020 1:52:04 PM    Referred By: [email or web address]   SELF           Confirmed By:LOTUS AU MD                            Imaging Results           US Lower Extremity Veins Bilateral (Final result)  Result time 06/02/20 20:52:50    Final result by Vito Vicente MD (06/02/20 20:52:50)                 Impression:      Left lower extremity positive occlusive DVT in the greater saphenous vein.    No  evidence of DVT in the right lower extremity.    This report was flagged in Epic as abnormal.      Electronically signed by: Vito Vicente MD  Date:    06/02/2020  Time:    20:52             Narrative:    EXAMINATION:  US LOWER EXTREMITY VEINS BILATERAL    CLINICAL HISTORY:  s/p cardiac surgery, readmission with shortness of breath, rule out DVTs;    TECHNIQUE:  Duplex and color flow Doppler and dynamic compression was performed of the bilateral lower extremity veins was performed.    COMPARISON:  None    FINDINGS:  Right thigh veins: The common femoral, femoral, popliteal, upper greater saphenous, and deep femoral veins are patent and free of thrombus. The veins are normally compressible and have normal phasic flow and augmentation response.    Right calf veins: The visualized calf veins are patent.    Left thigh veins: There is distension of the greater saphenous vein containing echogenic material and no appreciable flow consistent with occlusive thrombus.  The common femoral, femoral, popliteal, and deep femoral veins are patent and free of thrombus. The veins are normally compressible and have normal phasic flow and augmentation response.    Left calf veins: The visualized calf veins are patent.    Miscellaneous: None                               X-Ray Chest AP Portable (Final result)  Result time 06/02/20 19:12:33    Final result by Vito Vicente MD (06/02/20 19:12:33)                 Impression:      Interval small left pleural effusion with probable underlying atelectasis/infiltrate.  Otherwise, no change.    Electronically signed by resident: Kirby Galvan  Date:    06/02/2020  Time:    19:04    Electronically signed by: Vito Vicente MD  Date:    06/02/2020  Time:    19:12             Narrative:    EXAMINATION:  XR CHEST AP PORTABLE    CLINICAL HISTORY:  shortness of breath;    TECHNIQUE:  Single frontal view of the chest was performed.    COMPARISON:  06/01/2020    FINDINGS:  Median sternotomy wires are  well aligned.Hazy opacification of the left lung base obscuring the diaphragm and costophrenic angle suggesting pleural effusion with underlying atelectasis/infiltrate not excluded.  Left upper lung zone and right hemithorax are otherwise well expanded and clear.  No pneumothorax.  Pulmonary vasculature and right hilar region is within normal limits.    The cardiac silhouette is enlarged.  Mediastinal contours are otherwise grossly unchanged.    No acute osseous process seen.  PA and lateral views can be obtained.                                 Medical Decision Making:   History:   Old Medical Records: I decided to obtain old medical records.  Initial Assessment:   Shortness of breath a week after CABG, discharged yesterday  Differential Diagnosis:   CHF, pneumonia, pneumothorax, anemia  Clinical Tests:   Lab Tests: Ordered and Reviewed  Radiological Study: Ordered and Reviewed  Other:   I have discussed this case with another health care provider.       <> Summary of the Discussion: Consulted cardiothoracic surgery                                 Clinical Impression:       ICD-10-CM ICD-9-CM   1. S/P CABG x 3 Z95.1 V45.81   2. Shortness of breath R06.02 786.05             ED Disposition Condition    Observation                           Sonal Craven MD  06/03/20 [id number]

## 2020-06-02 NOTE — ED NOTES
LOC: The patient is awake, alert, and oriented to place, time, situation. Affect is appropriate.  Speech is appropriate and clear.     APPEARANCE:Patient is clean and well groomed.    SKIN: The skin is warm and dry; color consistent with ethnicity.  Patient has normal skin turgor and moist mucus membranes. Surgical incision Left inner thigh and midline chest.    MUSCULOSKELETAL: Patient moving upper and lower extremities without difficulty.    RESPIRATORY: Airway is open and patent. Respirations spontaneous. Patient complaining of  Shortness of breath.     CARDIAC:  Normal rhythm and rate noted.  No peripheral edema noted. No complaints of chest pain.      ABDOMEN: Soft and non tender to palpation.  No distention noted.     NEUROLOGIC: Eyes open spontaneously.  Behavior appropriate to situation.  Follows commands; facial expression symmetrical.  Purposeful motor response noted; normal sensation in all extremities.

## 2020-06-02 NOTE — TELEPHONE ENCOUNTER
Patient has moderate SOB that has been ongoing for several days. Pt expressing language with pauses between every few words.    Advised to contact PCP. If no appointment available today, advised to go to . Pt expressed understanding.     Reason for Disposition   MILD difficulty breathing (e.g., minimal/no SOB at rest, SOB with walking, pulse <100)    Additional Information   Negative: SEVERE difficulty breathing (e.g., struggling for each breath, speaks in single words)    Protocols used: CORONAVIRUS (COVID-19) DIAGNOSED OR [id number]

## 2020-06-02 NOTE — HPI
Mrs. Barahona is a pleasant 71 y/o F with CAD, HTN, HLD, DM2 (on insulin HbA1c 7.1%) s/p 3V CABG by Dr. Bowman on 5/25/2020. She had an uneventful post-operative course, and was discharged to home 6/1/2020. She reports she felt very well yesterday on day of discharge, but felt mildly short of breath this morning, gradual onset, after she had breakfast. She was visited by Home Health later in the day, at which point it was difficult for her to finish sentences without having to take another breath. She was advised to presented to the ED. Lab work-up is largely equivocal, but BNP is elevated in 800s. Lung sounds with mild crackles. She requires 2L NC. Otherwise has an appetite, pain well controlled, passing flatus, having BMs.

## 2020-06-02 NOTE — ED TRIAGE NOTES
Patient comes into ER with complaints of SOB since yesterday when she was discharged from the hospital. Patient states that she had open heart on 5/25/20. Patient denies fever.

## 2020-06-03 ENCOUNTER — NURSE TRIAGE (OUTPATIENT)
Dept: ADMINISTRATIVE | Facility: CLINIC | Age: 70
End: 2020-06-03

## 2020-06-03 VITALS
HEART RATE: 73 BPM | SYSTOLIC BLOOD PRESSURE: 112 MMHG | HEIGHT: 63 IN | DIASTOLIC BLOOD PRESSURE: 70 MMHG | BODY MASS INDEX: 33.71 KG/M2 | OXYGEN SATURATION: 95 % | WEIGHT: 190.25 LBS | TEMPERATURE: 97 F | RESPIRATION RATE: 20 BRPM

## 2020-06-03 PROBLEM — I82.402 DEEP VEIN BLOOD CLOT OF LEFT LOWER EXTREMITY: Status: ACTIVE | Noted: 2020-06-03

## 2020-06-03 LAB
ANION GAP SERPL CALC-SCNC: 12 MMOL/L (ref 8–16)
BASOPHILS # BLD AUTO: 0.03 K/UL (ref 0–0.2)
BASOPHILS NFR BLD: 0.3 % (ref 0–1.9)
BUN SERPL-MCNC: 37 MG/DL (ref 8–23)
CALCIUM SERPL-MCNC: 9.3 MG/DL (ref 8.7–10.5)
CHLORIDE SERPL-SCNC: 100 MMOL/L (ref 95–110)
CO2 SERPL-SCNC: 29 MMOL/L (ref 23–29)
CREAT SERPL-MCNC: 1 MG/DL (ref 0.5–1.4)
DIFFERENTIAL METHOD: ABNORMAL
EOSINOPHIL # BLD AUTO: 0.2 K/UL (ref 0–0.5)
EOSINOPHIL NFR BLD: 2.1 % (ref 0–8)
ERYTHROCYTE [DISTWIDTH] IN BLOOD BY AUTOMATED COUNT: 14.8 % (ref 11.5–14.5)
EST. GFR  (AFRICAN AMERICAN): >60 ML/MIN/1.73 M^2
EST. GFR  (NON AFRICAN AMERICAN): 57.6 ML/MIN/1.73 M^2
GLUCOSE SERPL-MCNC: 186 MG/DL (ref 70–110)
HCT VFR BLD AUTO: 28.1 % (ref 37–48.5)
HGB BLD-MCNC: 8.7 G/DL (ref 12–16)
IMM GRANULOCYTES # BLD AUTO: 0.11 K/UL (ref 0–0.04)
IMM GRANULOCYTES NFR BLD AUTO: 1 % (ref 0–0.5)
LYMPHOCYTES # BLD AUTO: 2.8 K/UL (ref 1–4.8)
LYMPHOCYTES NFR BLD: 24.2 % (ref 18–48)
MCH RBC QN AUTO: 29.3 PG (ref 27–31)
MCHC RBC AUTO-ENTMCNC: 31 G/DL (ref 32–36)
MCV RBC AUTO: 95 FL (ref 82–98)
MONOCYTES # BLD AUTO: 0.9 K/UL (ref 0.3–1)
MONOCYTES NFR BLD: 8.1 % (ref 4–15)
NEUTROPHILS # BLD AUTO: 7.4 K/UL (ref 1.8–7.7)
NEUTROPHILS NFR BLD: 64.3 % (ref 38–73)
NRBC BLD-RTO: 1 /100 WBC
PLATELET # BLD AUTO: 430 K/UL (ref 150–350)
PMV BLD AUTO: 9.7 FL (ref 9.2–12.9)
POCT GLUCOSE: 183 MG/DL (ref 70–110)
POCT GLUCOSE: 455 MG/DL (ref 70–110)
POTASSIUM SERPL-SCNC: 3.9 MMOL/L (ref 3.5–5.1)
RBC # BLD AUTO: 2.97 M/UL (ref 4–5.4)
SODIUM SERPL-SCNC: 141 MMOL/L (ref 136–145)
WBC # BLD AUTO: 11.45 K/UL (ref 3.9–12.7)

## 2020-06-03 PROCEDURE — 94664 DEMO&/EVAL PT USE INHALER: CPT | Mod: HCNC

## 2020-06-03 PROCEDURE — 96376 TX/PRO/DX INJ SAME DRUG ADON: CPT | Performed by: THORACIC SURGERY (CARDIOTHORACIC VASCULAR SURGERY)

## 2020-06-03 PROCEDURE — 85025 COMPLETE CBC W/AUTO DIFF WBC: CPT | Mod: HCNC

## 2020-06-03 PROCEDURE — 27000646 HC AEROBIKA DEVICE: Mod: HCNC

## 2020-06-03 PROCEDURE — 94761 N-INVAS EAR/PLS OXIMETRY MLT: CPT | Mod: HCNC

## 2020-06-03 PROCEDURE — 80048 BASIC METABOLIC PNL TOTAL CA: CPT | Mod: HCNC

## 2020-06-03 PROCEDURE — 25500020 PHARM REV CODE 255: Mod: HCNC | Performed by: THORACIC SURGERY (CARDIOTHORACIC VASCULAR SURGERY)

## 2020-06-03 PROCEDURE — G0378 HOSPITAL OBSERVATION PER HR: HCPCS | Mod: HCNC

## 2020-06-03 PROCEDURE — 99900035 HC TECH TIME PER 15 MIN (STAT): Mod: HCNC

## 2020-06-03 PROCEDURE — 36415 COLL VENOUS BLD VENIPUNCTURE: CPT | Mod: HCNC

## 2020-06-03 PROCEDURE — 63600175 PHARM REV CODE 636 W HCPCS: Mod: HCNC | Performed by: STUDENT IN AN ORGANIZED HEALTH CARE EDUCATION/TRAINING PROGRAM

## 2020-06-03 PROCEDURE — 94640 AIRWAY INHALATION TREATMENT: CPT | Mod: HCNC

## 2020-06-03 PROCEDURE — 25000242 PHARM REV CODE 250 ALT 637 W/ HCPCS: Mod: HCNC | Performed by: STUDENT IN AN ORGANIZED HEALTH CARE EDUCATION/TRAINING PROGRAM

## 2020-06-03 PROCEDURE — 25000003 PHARM REV CODE 250: Mod: HCNC | Performed by: STUDENT IN AN ORGANIZED HEALTH CARE EDUCATION/TRAINING PROGRAM

## 2020-06-03 RX ORDER — FUROSEMIDE 20 MG/1
40 TABLET ORAL 2 TIMES DAILY
Qty: 60 TABLET | Refills: 11 | Status: SHIPPED | OUTPATIENT
Start: 2020-06-03 | End: 2020-09-01

## 2020-06-03 RX ORDER — METOPROLOL TARTRATE 25 MG/1
12.5 TABLET, FILM COATED ORAL DAILY
Qty: 15 TABLET | Refills: 11 | Status: SHIPPED | OUTPATIENT
Start: 2020-06-04 | End: 2020-09-01 | Stop reason: SDUPTHER

## 2020-06-03 RX ORDER — POTASSIUM CHLORIDE 20 MEQ/1
TABLET, EXTENDED RELEASE ORAL
Qty: 60 TABLET | Refills: 11 | Status: SHIPPED | OUTPATIENT
Start: 2020-06-03 | End: 2020-09-01

## 2020-06-03 RX ORDER — OXYCODONE HYDROCHLORIDE 5 MG/1
5 TABLET ORAL EVERY 6 HOURS PRN
Status: DISCONTINUED | OUTPATIENT
Start: 2020-06-03 | End: 2020-06-03 | Stop reason: HOSPADM

## 2020-06-03 RX ORDER — METFORMIN HYDROCHLORIDE 1000 MG/1
1000 TABLET ORAL 2 TIMES DAILY WITH MEALS
Qty: 180 TABLET | Refills: 2 | Status: SHIPPED | OUTPATIENT
Start: 2020-06-03 | End: 2021-07-15 | Stop reason: SDUPTHER

## 2020-06-03 RX ORDER — ASPIRIN 81 MG/1
81 TABLET ORAL DAILY
Qty: 30 TABLET | Refills: 11 | Status: SHIPPED | OUTPATIENT
Start: 2020-06-03 | End: 2020-09-01 | Stop reason: SDUPTHER

## 2020-06-03 RX ADMIN — METOPROLOL TARTRATE 12.5 MG: 25 TABLET, FILM COATED ORAL at 08:06

## 2020-06-03 RX ADMIN — ASPIRIN 325 MG ORAL TABLET 325 MG: 325 PILL ORAL at 08:06

## 2020-06-03 RX ADMIN — FUROSEMIDE 30 MG/HR: 10 INJECTION, SOLUTION INTRAVENOUS at 06:06

## 2020-06-03 RX ADMIN — IOHEXOL 100 ML: 350 INJECTION, SOLUTION INTRAVENOUS at 11:06

## 2020-06-03 RX ADMIN — ALBUTEROL SULFATE 2.5 MG: 2.5 SOLUTION RESPIRATORY (INHALATION) at 04:06

## 2020-06-03 RX ADMIN — ALBUTEROL SULFATE 2.5 MG: 2.5 SOLUTION RESPIRATORY (INHALATION) at 12:06

## 2020-06-03 RX ADMIN — APIXABAN 2.5 MG: 2.5 TABLET, FILM COATED ORAL at 08:06

## 2020-06-03 RX ADMIN — ALBUTEROL SULFATE 2.5 MG: 2.5 SOLUTION RESPIRATORY (INHALATION) at 09:06

## 2020-06-03 RX ADMIN — PANTOPRAZOLE SODIUM 40 MG: 40 TABLET, DELAYED RELEASE ORAL at 08:06

## 2020-06-03 RX ADMIN — POTASSIUM CHLORIDE 20 MEQ: 1500 TABLET, EXTENDED RELEASE ORAL at 08:06

## 2020-06-03 RX ADMIN — POLYETHYLENE GLYCOL 3350 17 G: 17 POWDER, FOR SOLUTION ORAL at 08:06

## 2020-06-03 NOTE — CONSULTS
[facility]  Cardiothoracic Surgery  Consult Note    Patient Name: Cecilia Barahona  MRN: [id number]  Admission Date: 6/2/2020  Attending Physician: Sonal Craven, *  Referring Provider: Self, Aaareferral    Patient information was obtained from patient, past medical records and ER records.     Inpatient consult to Cardiothoracic Surgery  Consult performed by: Cathleen Maher MD  Consult ordered by: Sonal Craven MD  Reason for consult: post-op cardiac surgery, dyspnea  Assessment/Recommendations: Mrs. Barahona is a pleasant 71 y/o F with CAD, HTN, HLD, DM2 (on insulin HbA1c 7.1%) s/p 3V CABG by Dr. Bowman on 5/25/2020 presenting to ED 1 day post-discharge with shortness of breath requiring nasal cannula oxygenation. WBC is 12, no coagulopathy noted, BUN decreasing, 44 on admission from 50s yesterday, Cr high-normal. CXR without large pleural effusions or concerns for marked pericardial fluid. Though she has been taking her prescribed medications (including her Lasix 20 BID) regularly, she is likely volume overloaded and needs further diuresis.     -CTS observation  -start lasix gtt at 30, non-titrating  -obtain BLE venous duplex to rule out BLE DVTs (sedentary, BMI 33.5, post-cardiac surgery)  -will start Eliquis  -CXR reviewed, no large pleural effusion to explain symptoms at this time, though there is evidence of pulmonary edema  -discussed with attending cardiothoracic surgeon, Dr. Bowman        Subjective:     Principal Problem: <principal problem not specified>    History of Present Illness: Mrs. Barahona is a pleasant 71 y/o F with CAD, HTN, HLD, DM2 (on insulin HbA1c 7.1%) s/p 3V CABG by Dr. Bowman on 5/25/2020. She had an uneventful post-operative course, and was discharged to home 6/1/2020. She reports she felt very well yesterday on day of discharge, but felt mildly short of breath this morning, gradual onset, after she had breakfast. She was visited by [facility]  later in the day, at which point it was difficult for her to finish sentences without having to take another breath. She was advised to presented to the ED. Lab work-up is largely equivocal, but BNP is elevated in 800s. Lung sounds with mild crackles. She requires 2L NC. Otherwise has an appetite, pain well controlled, passing flatus, having BMs.    No current facility-administered medications on file prior to encounter.      Current Outpatient Medications on File Prior to Encounter   Medication Sig    acetaminophen (TYLENOL) 325 MG tablet Take 2 tablets (650 mg total) by mouth every 6 (six) hours as needed for Pain or Temperature greater than (or equal to 101 degree F).    albuterol 90 mcg/actuation inhaler Inhale 2 puffs into the lungs every 6 (six) hours as needed for Wheezing or Shortness of Breath. Rescue    alcohol swabs PadM Apply 1 each topically 3 (three) times daily.    artificial tear ointment (ARTIFICIAL TEARS) Oint Place into both eyes every evening.    aspirin 325 MG tablet Take 1 tablet (325 mg total) by mouth once daily.    atorvastatin (LIPITOR) 80 MG tablet Take 1 tablet (80 mg total) by mouth every evening.    azelastine (OPTIVAR) 0.05 % ophthalmic solution INSTILL 1 DROP IN EACH EYE TWICE DAILY    blood pressure monitor (BLOOD PRESSURE KIT) Kit 1 kit by Misc.(Non-Drug; Combo Route) route once daily.    blood-glucose meter,continuous (DEXCOM G6 ) Misc Use with dexcom G6 system    blood-glucose sensor (DEXCOM G6 SENSOR) Vanna Change sensor every 10 days    blood-glucose transmitter (DEXCOM G6 TRANSMITTER) Vanna Change every 3 months    desoximetasone (TOPICORT) 0.05 % cream Apply topically 2 (two) times daily as needed.    dulaglutide (TRULICITY) 1.5 mg/0.5 mL PnIj Inject 1.5 mg into the skin every 7 days.    empagliflozin (JARDIANCE) 10 mg Tab Take 10 mg by mouth once daily.    flash glucose scanning reader (FREESTYLE ANAMARIA READER) Misc 1 Units by Misc.(Non-Drug; Combo Route)  "route before meals as needed.    FLUoxetine 20 MG capsule Take 1 capsule (20 mg total) by mouth once daily.    fluticasone (FLONASE) 50 mcg/actuation nasal spray 1 spray (50 mcg total) by Each Nare route once daily.    FREESTYLE ANAMARIA 14 DAY SENSOR Kit CHANGE SENSOR EVERY 14 DAYS    furosemide (LASIX) 20 MG tablet Take one tablet by mouth twice daily for 7 days then decrease to once daily    insulin degludec (TRESIBA FLEXTOUCH U-200) 200 unit/mL (3 mL) InPn Inject 24 Units into the skin once daily. Inject 24 units once daily    lancets Misc Check blood glucose 4 times a day. Dispense accuchek meter or other meter preferred by insurance. Dx code e11.65    LANCING DEVICE WITH LANCETS Misc USE AS DIRECTED FOR DIABETIC TESTING    latanoprost 0.005 % ophthalmic solution PLACE 1 DROP IN EACH EYE EVERY EVENING    loratadine (CLARITIN) 10 mg tablet TAKE ONE TABLET BY MOUTH EVERY DAY AS NEEDED FOR ALLERGIES    melatonin (MELATIN) 3 mg tablet Take 2 tablets (6 mg total) by mouth nightly as needed for Insomnia.    metFORMIN (GLUCOPHAGE) 1000 MG tablet TAKE ONE TABLET BY MOUTH TWICE DAILY WITH MEALS    metoprolol tartrate (LOPRESSOR) 25 MG tablet Take 0.5 tablets (12.5 mg total) by mouth 2 (two) times daily.    multivitamin with minerals tablet Take 1 tablet by mouth once daily.    ondansetron (ZOFRAN-ODT) 8 MG TbDL Take 1 tablet (8 mg total) by mouth every 6 (six) hours as needed.    oxyCODONE (ROXICODONE) 5 MG immediate release tablet Take 1 tablet (5 mg total) by mouth every 4 (four) hours as needed.    pantoprazole (PROTONIX) 40 MG tablet Take 1 tablet (40 mg total) by mouth once daily.    pen needle, diabetic (BD ULTRA-FINE LENY PEN NEEDLES) 32 gauge x 5/32" Ndle USE FIVE TIMES DAILY    polyethylene glycol (GLYCOLAX) 17 gram PwPk Take 17 g by mouth 2 (two) times daily as needed.    potassium chloride SA (K-DUR,KLOR-CON) 20 MEQ tablet Take one tablet by mouth twice daily for 7 days then decrease to once " daily    rOPINIRole (REQUIP) 0.5 MG tablet TAKE ONE Tablet BY MOUTH EVERY NIGHT AT BEDTIME    senna-docusate 8.6-50 mg (PERICOLACE) 8.6-50 mg per tablet Take 1 tablet by mouth once daily.       Review of patient's allergies indicates:   Allergen Reactions    Ace inhibitors Other (See Comments)     cough    Invokana [canagliflozin] Other (See Comments)     Throat and tongue swelling    Ozempic [semaglutide]      Nausea and constipation on 0.25 mg dose.        Past Medical History:   Diagnosis Date    Acute coronary syndrome 2020: Presents with unstable angina.    Allergic rhinitis, seasonal     Anxiety     Arthritis     CVA (cerebral vascular accident)     Depression     Glaucoma NEC     Hallucination     images out of corner of eye about a year ago    History of positive PPD - treated - remote past     Hx of psychiatric care     Hyperlipidemia LDL goal < 100     Hypertension     Nuclear sclerosis of both eyes 2019    Obesity     CHETNA (obstructive sleep apnea)     Psychiatric problem     Psychosis     Renal disorder     Sleep difficulties     Suicide attempt     about 30 years ago by overdose of pills    Therapy     Type II or unspecified type diabetes mellitus without mention of complication, uncontrolled      Past Surgical History:   Procedure Laterality Date    CARPAL TUNNEL RELEASE       SECTION, CLASSIC      COLONOSCOPY N/A 3/5/2020    Procedure: COLONOSCOPY;  Surgeon: Wiliam Carrillo MD;  Location: [facility] (4TH FLR);  Service: Endoscopy;  Laterality: N/A;  20 appt confirmed-rb  pt requested this date    CORONARY ARTERY BYPASS GRAFT (CABG) N/A 2020    Procedure: CORONARY ARTERY BYPASS GRAFT (CABG) x3 ;  Surgeon: Yan Bowman MD;  Location: [facility] OR 2ND FLR;  Service: Cardiothoracic;  Laterality: N/A;  CABG X3  Endoharvest time start 0906  Endoharvest time end 1003  Vein prep start 1004  Vein prep end 1048    LEFT HEART  CATHETERIZATION N/A 2020    Procedure: HEART CATH-LEFT;  Surgeon: Etelvina Lowery MD;  Location: [facility] CATH LAB;  Service: Cardiology;  Laterality: N/A;    TOTAL ABDOMINAL HYSTERECTOMY      TRIGGER FINGER RELEASE       Family History     Problem Relation (Age of Onset)    Alzheimer's disease Maternal Aunt    Cataracts Mother    Diabetes Mother, Father, Maternal Uncle, Maternal Grandmother, Maternal Grandfather, Maternal Aunt    Drug abuse Grandchild    Glaucoma Mother    Heart disease Cousin    Heart failure Mother, Father    Hyperlipidemia Maternal Uncle    Hypertension Mother, Father, Maternal Uncle    No Known Problems Sister, Paternal Grandmother, Paternal Grandfather, Paternal Aunt, Paternal Uncle    Prostate cancer Father    Schizophrenia Maternal Aunt        Tobacco Use    Smoking status: Former Smoker     Packs/day: 0.50     Types: Cigarettes     Last attempt to quit: 11/10/2012     Years since quittin.5    Smokeless tobacco: Never Used   Substance and Sexual Activity    Alcohol use: Not Currently     Comment: drank socially in the past    Drug use: Not Currently     Types: Marijuana     Comment: tried once 6 months ago    Sexual activity: Not Currently     Partners: Male     Review of Systems   Constitutional: Negative for activity change, chills, diaphoresis, fatigue and fever.   HENT: Negative for sore throat and voice change.    Eyes: Negative for visual disturbance.   Respiratory: Positive for shortness of breath. Negative for cough, chest tightness, wheezing and stridor.    Cardiovascular: Positive for leg swelling. Negative for chest pain.   Gastrointestinal: Negative for abdominal distention, abdominal pain, constipation, diarrhea, nausea and vomiting.   Endocrine: Negative for cold intolerance and heat intolerance.   Genitourinary: Negative for difficulty urinating, dysuria and flank pain.   Musculoskeletal: Negative for gait problem and joint swelling.   Skin: Negative for rash.    Neurological: Negative for dizziness, light-headedness and headaches.     Objective:     Vital Signs (Most Recent):  Temp: 98.4 °F (36.9 °C) (06/02/20 1659)  Pulse: (!) 56 (06/02/20 1846)  Resp: (!) 24 (06/02/20 1715)  BP: 128/63 (06/02/20 1846)  SpO2: 97 % (06/02/20 1846) Vital Signs (24h Range):  Temp:  [98.4 °F (36.9 °C)] 98.4 °F (36.9 °C)  Pulse:  [55-62] 56  Resp:  [24] 24  SpO2:  [95 %-98 %] 97 %  BP: (109-136)/(58-77) 128/63     Weight: 85.7 kg (189 lb)  Body mass index is 33.48 kg/m².    SpO2: 97 %  O2 Device (Oxygen Therapy): room air     Intake/Output - Last 3 Shifts     None           Lines/Drains/Airways     Peripheral Intravenous Line                 Peripheral IV - Single Lumen 06/02/20 1711 18 G Right Forearm less than 1 day              Physical Exam   Constitutional: She is oriented to person, place, and time. She appears well-developed and well-nourished. No distress.   HENT:   Head: Normocephalic and atraumatic.   Eyes: Conjunctivae and EOM are normal. No scleral icterus.   Neck: Normal range of motion. No tracheal deviation present.   Cardiovascular: Normal rate and regular rhythm.   No murmur heard.  Incision healing well, no erythema or drainage   Pulmonary/Chest: No stridor. No respiratory distress. She has no wheezes. She has rales.   O2 Sats 100% on 2L NC, does appear to have some difficulty finishing sentences.   Musculoskeletal: Normal range of motion. She exhibits edema.   Minimal BLE edema   Neurological: She is alert and oriented to person, place, and time. No cranial nerve deficit.   Skin: Skin is warm and dry. Capillary refill takes less than 2 seconds. No rash noted. She is not diaphoretic. No erythema.   LLE vein harvest incisions healing well   Psychiatric: She has a normal mood and affect.       Significant Labs:  BMP:   Recent Labs   Lab 06/02/20 1719   *      K 4.8      CO2 22*   BUN 44*   CREATININE 1.2   CALCIUM 8.9     Cardiac markers: No results for  input(s): CKMB, CPKMB, TROPONINT, TROPONINI, MYOGLOBIN in the last 48 hours.  CBC:   Recent Labs   Lab 06/02/20  1719   WBC 12.17   RBC 2.91*   HGB 8.7*   HCT 28.1*   *   MCV 97   MCH 29.9   MCHC 31.0*     CMP:   Recent Labs   Lab 06/02/20  1719   *   CALCIUM 8.9   ALBUMIN 3.2*   PROT 7.4      K 4.8   CO2 22*      BUN 44*   CREATININE 1.2   ALKPHOS 102   ALT 11   AST 15   BILITOT 0.4     All pertinent labs from the last 24 hours have been reviewed.    Significant Diagnostics:  CXR: I have reviewed all pertinent results/findings within the past 24 hours No significant pleural effusions or markedly increased cardiac silhouette compared to yesterday's CXR.    Assessment/Plan:     NYHA Score: NYHA II: slight limitation of physical activity, comfortable at rest    S/P CABG x 3  Mrs. Barahona is a pleasant 71 y/o F with CAD, HTN, HLD, DM2 (on insulin HbA1c 7.1%) s/p 3V CABG by Dr. Bowman on 5/25/2020  presenting to ED 1 day post-discharge with shortness of breath requiring nasal cannula oxygenation. WBC is 12, no coagulopathy noted, BUN decreasing, 44 on admission from 50s yesterday, Cr high-normal. CXR without large pleural effusions or concerns for marked pericardial fluid. Though she has been taking her prescribed medications (including her Lasix 20 BID) regularly, she is likely volume overloaded and needs further diuresis.     -CTS observation  -start lasix gtt at 30, non-titrating  -obtain BLE venous duplex to rule out BLE DVTs (sedentary, BMI 33.5, post-cardiac surgery)  -will start Eliquis  -CXR reviewed, no large pleural effusion to explain symptoms at this time, though there is evidence of pulmonary edema  -discussed with attending cardiothoracic surgeon, Dr. Bowman        Thank you for your consult. I will follow-up with patient. Please contact us if you have any additional questions.    Cathleen Maher MD  Cardiothoracic Surgery  [facility]    CTS Attending  Note:    I have personally taken the history and examined this patient and agree with the resident's note as stated above.  Plan for admission, diuresis, and lower extremity ultrasound to evaluate for DVT.

## 2020-06-03 NOTE — PROGRESS NOTES
[facility]  Cardiothoracic Surgery  Progress Note    Patient Name: Cecilia Barahona  MRN: [id number]  Admission Date: 6/2/2020  Hospital Length of Stay: 0 days  Code Status: Full Code   Attending Physician: Yan Bowman MD   Referring Provider: Self, Aaareferral  Principal Problem:S/P CABG x 3            Subjective:     Post-Op Info:  * No surgery found *         Interval History: Patient readmitted last night with SOB after being discharged Monday s/p CABG x 3. Underwent lower extremity US which showed DVT in LLE. Eliquis started. CTPA ordered for today. Reports breathing is much improved.     Review of Systems   Constitution: Negative for malaise/fatigue.   Cardiovascular: Negative for chest pain, leg swelling and palpitations.   Respiratory: Positive for shortness of breath (improved ).    Hematologic/Lymphatic: Negative for bleeding problem.   Musculoskeletal:        Left great toe pain    Genitourinary: Negative for dysuria.     Medications:  Continuous Infusions:   furosemide (LASIX) 10 mg/mL infusion (non-titrating) 30 mg/hr (06/03/20 0633)     Scheduled Meds:   albuterol sulfate  2.5 mg Nebulization Q4H WAKE    apixaban  2.5 mg Oral BID    aspirin  325 mg Oral Daily    atorvastatin  80 mg Oral QHS    metoprolol tartrate  12.5 mg Oral Daily    pantoprazole  40 mg Oral Daily    polyethylene glycol  17 g Oral Daily    potassium chloride SA  20 mEq Oral Daily    rOPINIRole  0.5 mg Oral Nightly     PRN Meds:albuterol, Dextrose 10% Bolus, Dextrose 10% Bolus, glucagon (human recombinant), glucose, glucose, insulin aspart U-100, oxyCODONE, sodium chloride 0.9%     Objective:     Vital Signs (Most Recent):  Temp: 98.4 °F (36.9 °C) (06/03/20 0828)  Pulse: 60 (06/03/20 0828)  Resp: 20 (06/03/20 0828)  BP: (!) 124/58 (06/03/20 0828)  SpO2: 95 % (06/03/20 0828) Vital Signs (24h Range):  Temp:  [97.7 °F (36.5 °C)-98.8 °F (37.1 °C)] 98.4 °F (36.9 °C)  Pulse:  [55-71] 60  Resp:  [16-24]  20  SpO2:  [94 %-100 %] 95 %  BP: (109-146)/(58-77) 124/58     Weight: 86.3 kg (190 lb 4.1 oz)  Body mass index is 33.7 kg/m².    SpO2: 95 %  O2 Device (Oxygen Therapy): room air    Intake/Output - Last 3 Shifts       06/01 0700 - 06/02 0659 06/02 0700 - 06/03 0659 06/03 0700 - 06/04 0659    P.O.  0     Total Intake(mL/kg)  0 (0)     Urine (mL/kg/hr)  0     Total Output  0     Net  0            Urine Occurrence  1 x           Lines/Drains/Airways     Drain            Female External Urinary Catheter 06/02/20 1908 less than 1 day          Peripheral Intravenous Line                 Peripheral IV - Single Lumen 06/02/20 1711 18 G Right Forearm less than 1 day                Physical Exam    Significant Labs:  ABGs: No results for input(s): PH, PCO2, PO2, HCO3, POCSATURATED, BE in the last 48 hours.  Amylase: No results for input(s): AMYLASE in the last 48 hours.  BMP:   Recent Labs   Lab 06/03/20  0443   *      K 3.9      CO2 29   BUN 37*   CREATININE 1.0   CALCIUM 9.3     Cardiac markers: No results for input(s): CKMB, CPKMB, TROPONINT, TROPONINI, MYOGLOBIN in the last 48 hours.  CBC:   Recent Labs   Lab 06/03/20  0443   WBC 11.45   RBC 2.97*   HGB 8.7*   HCT 28.1*   *   MCV 95   MCH 29.3   MCHC 31.0*     CMP:   Recent Labs   Lab 06/02/20  1719 06/03/20  0443   * 186*   CALCIUM 8.9 9.3   ALBUMIN 3.2*  --    PROT 7.4  --     141   K 4.8 3.9   CO2 22* 29    100   BUN 44* 37*   CREATININE 1.2 1.0   ALKPHOS 102  --    ALT 11  --    AST 15  --    BILITOT 0.4  --      Coagulation: No results for input(s): PT, INR, APTT in the last 48 hours.  Lactic Acid: No results for input(s): LACTATE in the last 48 hours.  LFTs:   Recent Labs   Lab 06/02/20  1719   ALT 11   AST 15   ALKPHOS 102   BILITOT 0.4   PROT 7.4   ALBUMIN 3.2*     Lipase: No results for input(s): LIPASE in the last 48 hours.    Significant Diagnostics:reviewed     Lower Extremity DVT 6/2/2020  Left lower extremity  positive occlusive DVT in the greater saphenous vein.  No evidence of DVT in the right lower extremity    CXR 6/3/2020  Study was obtained with the patient seated.  For the AP view the patient is in steep apical lordosis.  Extrinsic device overlies the right upper quadrant and midline of the abdomen obscuring some detail the right lower lung zone.  On lateral view the patient's arms overlie the image obscuring detail.    Sternal sutures are intact and aligned.  Hemostasis clip to the left of the inferior sternum suggests CABG with LIMA.    I suspect a small amount of dependent left pleural fluid, not unusual in light of recent sternotomy.    Mild patchy opacity in the left lower lung zone is consistent with subsegmental atelectasis perhaps due to overlying cardiac structures, dependent left pleural fluid or both.    Left mid and upper lung zones and right lung appear clear in their imaged extents.  There is no pulmonary edema.    I detect no pneumothorax, pneumomediastinum, pneumoperitoneum or significant osseous abnormality.    Assessment/Plan:     * S/P CABG x 3  Mrs. Barahona is a pleasant 71 y/o F with CAD, HTN, HLD, DM2 (on insulin HbA1c 7.1%) s/p 3V CABG by Dr. Bowman on 5/25/2020 presenting to ED 1 day post-discharge with shortness of breath requiring nasal cannula oxygenation. WBC is 12, no coagulopathy noted, BUN decreasing, 44 on admission from 50s yesterday, Cr high-normal. CXR without large pleural effusions or concerns for marked pericardial fluid. Though she has been taking her prescribed medications (including her Lasix 20 BID) regularly, she is likely volume overloaded and needs further diuresis.     -CTS observation  -start lasix gtt at 30, non-titrating  -obtain BLE venous duplex to rule out BLE DVTs (sedentary, BMI 33.5, post-cardiac surgery) --> resulted with LLE DVT of great saphenous vein   -will start Eliquis BID  -CXR reviewed, no large pleural effusion to explain symptoms at this time, though  there is evidence of pulmonary edema  -Will obtain CTPA today to evaluate for PE. If negative will discharge home today. If large PE, will start heparin gtt  -discussed with attending cardiothoracic surgeon, Dr. Bowman    Deep vein blood clot of left lower extremity  US with LLE DVT in greater saphenous vein   Eliquis started   CTPA ordered to evaluate for PE    Dispo: D/C depending results of CTPA    Pricilla Jain PA-C  Cardiothoracic Surgery  [facility]

## 2020-06-03 NOTE — PLAN OF CARE
06/03/20 1409   Discharge Assessment   Assessment Type Discharge Planning Assessment   Confirmed/corrected address and phone number on facesheet? Yes   Assessment information obtained from? Patient;Medical Record   Expected Length of Stay (days) 1   Communicated expected length of stay with patient/caregiver yes   Prior to hospitilization cognitive status: Alert/Oriented   Prior to hospitalization functional status: Needs Assistance   Current cognitive status: Alert/Oriented   Current Functional Status: Needs Assistance   Facility Arrived From: home   Lives With child(omar), adult   Able to Return to Prior Arrangements yes   Is patient able to care for self after discharge? No  (Sternal precautions)   Who are your caregiver(s) and their phone number(s)? Elli (daughter) [phone]   Patient's perception of discharge disposition home health   Readmission Within the Last 30 Days current reason for admission unrelated to previous admission   If yes, most recent facility name: [facility]   Patient currently being followed by outpatient case management? No   Patient currently receives any other outside agency services? Yes   Name and contact [facility]    Is it the patient/care giver preference to resume care with the current outside agency? Yes   Equipment Currently Used at Home none   Does the patient have transportation home? Yes   Transportation Anticipated family or friend will provide   Does the patient receive services at the [facility]? No   Discharge Plan A Home Health   Discharge Plan B Home Health   DME Needed Upon Discharge  none   Patient/Family in Agreement with Plan yes   Readmission Questionnaire   At the time of your discharge, did someone talk to you about what your health problems were? Yes   At the time of discharge, did someone talk to you about what to watch out for regarding worsening of your health problem? Yes   At the time of  discharge, did someone talk to you about what to do if you experienced worsening of your health problem? Yes   At the time of discharge, did someone talk to you about which medication to take when you left the hospital and which ones to stop taking? Yes   At the time of discharge, did someone talk to you about when and where to follow up with a doctor after you left the hospital? Yes   What do you believe caused you to be sick enough to be re-admitted? SOB     Pt d/c Monday s/p CABG. She was readmitted for SOB. Likely d/c today. She is followed by [facility] and [facility]    Future Appointments   Date Time Provider Department Center   6/25/2020  3:00 PM [facility] [id number] LB LIMIT [facility] XRAY OP [facility]   6/25/2020  3:15 PM EKG, APPT [facility] EKG [facility]   6/25/2020  3:45 PM Yan Bowman MD [facility] GE[place]IE [facility]       Julie Haase RN  Case Management [phone]

## 2020-06-03 NOTE — TELEPHONE ENCOUNTER
Patient is currently admitted to the hospital as an inpatient. No contact required for post procedural symptom tracker at this time.      Reason for Disposition   Patient already left for the hospital/clinic    Protocols used: NO CONTACT OR DUPLICATE CONTACT CALL-A-[place]

## 2020-06-03 NOTE — PLAN OF CARE
Pt will d/c with [facility]  to daughter's home.    Future Appointments   Date Time Provider Department Center   6/25/2020  3:00 PM [facility] [id number] 485 LB LIMIT [facility] XRAY OP [facility]   6/25/2020  3:15 PM EKG, APPT [facility] EKG [facility]   6/25/2020  3:45 PM Yan Bowman MD [facility] CARDVAS [facility]        06/03/20 1412   Final Note   Assessment Type Final Discharge Note   Anticipated Discharge Disposition Home-Health   Hospital Follow Up  Appt(s) scheduled? Yes   Discharge plans and expectations educations in teach back method with documentation complete? Yes     Julie Haase RN  Case Management [phone]

## 2020-06-03 NOTE — NURSING
Pt arrived to unit via stretcher  escort, she is alert and oriented x4. Pt is verbal and has no complaints of pain at this time and appears to be in no distress. Pt v/s stable she was orieinted to room was changed into gown with telemetry placed.

## 2020-06-03 NOTE — H&P
[facility]  Cardiothoracic Surgery  H&P Note     Patient Name: Cecilia Barahona  MRN: [id number]  Admission Date: 6/2/2020  Attending Physician: Sonal Craven, *  Referring Provider: Self, Aaareferral     Patient information was obtained from patient, past medical records and ER records.      Subjective:      Principal Problem: <principal problem not specified>     History of Present Illness: Mrs. Barahona is a pleasant 71 y/o F with CAD, HTN, HLD, DM2 (on insulin HbA1c 7.1%) s/p 3V CABG by Dr. Bowman on 5/25/2020. She had an uneventful post-operative course, and was discharged to home 6/1/2020. She reports she felt very well yesterday on day of discharge, but felt mildly short of breath this morning, gradual onset, after she had breakfast. She was visited by Home Health later in the day, at which point it was difficult for her to finish sentences without having to take another breath. She was advised to presented to the ED. Lab work-up is largely equivocal, but BNP is elevated in 800s. Lung sounds with mild crackles. She requires 2L NC. Otherwise has an appetite, pain well controlled, passing flatus, having BMs.     No current facility-administered medications on file prior to encounter.            Current Outpatient Medications on File Prior to Encounter   Medication Sig    acetaminophen (TYLENOL) 325 MG tablet Take 2 tablets (650 mg total) by mouth every 6 (six) hours as needed for Pain or Temperature greater than (or equal to 101 degree F).    albuterol 90 mcg/actuation inhaler Inhale 2 puffs into the lungs every 6 (six) hours as needed for Wheezing or Shortness of Breath. Rescue    alcohol swabs PadM Apply 1 each topically 3 (three) times daily.    artificial tear ointment (ARTIFICIAL TEARS) Oint Place into both eyes every evening.    aspirin 325 MG tablet Take 1 tablet (325 mg total) by mouth once daily.    atorvastatin (LIPITOR) 80 MG tablet Take 1 tablet (80 mg total) by  mouth every evening.    azelastine (OPTIVAR) 0.05 % ophthalmic solution INSTILL 1 DROP IN EACH EYE TWICE DAILY    blood pressure monitor (BLOOD PRESSURE KIT) Kit 1 kit by Misc.(Non-Drug; Combo Route) route once daily.    blood-glucose meter,continuous (DEXCOM G6 ) Misc Use with dexcom G6 system    blood-glucose sensor (DEXCOM G6 SENSOR) Vanna Change sensor every 10 days    blood-glucose transmitter (DEXCOM G6 TRANSMITTER) Vanna Change every 3 months    desoximetasone (TOPICORT) 0.05 % cream Apply topically 2 (two) times daily as needed.    dulaglutide (TRULICITY) 1.5 mg/0.5 mL PnIj Inject 1.5 mg into the skin every 7 days.    empagliflozin (JARDIANCE) 10 mg Tab Take 10 mg by mouth once daily.    flash glucose scanning reader (FREESTYLE ANAMARIA READER) Misc 1 Units by Misc.(Non-Drug; Combo Route) route before meals as needed.    FLUoxetine 20 MG capsule Take 1 capsule (20 mg total) by mouth once daily.    fluticasone (FLONASE) 50 mcg/actuation nasal spray 1 spray (50 mcg total) by Each Nare route once daily.    FREESTYLE ANAMARIA 14 DAY SENSOR Kit CHANGE SENSOR EVERY 14 DAYS    furosemide (LASIX) 20 MG tablet Take one tablet by mouth twice daily for 7 days then decrease to once daily    insulin degludec (TRESIBA FLEXTOUCH U-200) 200 unit/mL (3 mL) InPn Inject 24 Units into the skin once daily. Inject 24 units once daily    lancets Misc Check blood glucose 4 times a day. Dispense accuchek meter or other meter preferred by insurance. Dx code e11.65    LANCING DEVICE WITH LANCETS Misc USE AS DIRECTED FOR DIABETIC TESTING    latanoprost 0.005 % ophthalmic solution PLACE 1 DROP IN EACH EYE EVERY EVENING    loratadine (CLARITIN) 10 mg tablet TAKE ONE TABLET BY MOUTH EVERY DAY AS NEEDED FOR ALLERGIES    melatonin (MELATIN) 3 mg tablet Take 2 tablets (6 mg total) by mouth nightly as needed for Insomnia.    metFORMIN (GLUCOPHAGE) 1000 MG tablet TAKE ONE TABLET BY MOUTH TWICE DAILY WITH MEALS     "metoprolol tartrate (LOPRESSOR) 25 MG tablet Take 0.5 tablets (12.5 mg total) by mouth 2 (two) times daily.    multivitamin with minerals tablet Take 1 tablet by mouth once daily.    ondansetron (ZOFRAN-ODT) 8 MG TbDL Take 1 tablet (8 mg total) by mouth every 6 (six) hours as needed.    oxyCODONE (ROXICODONE) 5 MG immediate release tablet Take 1 tablet (5 mg total) by mouth every 4 (four) hours as needed.    pantoprazole (PROTONIX) 40 MG tablet Take 1 tablet (40 mg total) by mouth once daily.    pen needle, diabetic (BD ULTRA-FINE LENY PEN NEEDLES) 32 gauge x 5/32" Ndle USE FIVE TIMES DAILY    polyethylene glycol (GLYCOLAX) 17 gram PwPk Take 17 g by mouth 2 (two) times daily as needed.    potassium chloride SA (K-DUR,KLOR-CON) 20 MEQ tablet Take one tablet by mouth twice daily for 7 days then decrease to once daily    rOPINIRole (REQUIP) 0.5 MG tablet TAKE ONE Tablet BY MOUTH EVERY NIGHT AT BEDTIME    senna-docusate 8.6-50 mg (PERICOLACE) 8.6-50 mg per tablet Take 1 tablet by mouth once daily.               Review of patient's allergies indicates:   Allergen Reactions    Ace inhibitors Other (See Comments)       cough    Invokana [canagliflozin] Other (See Comments)       Throat and tongue swelling    Ozempic [semaglutide]         Nausea and constipation on 0.25 mg dose.               Past Medical History:   Diagnosis Date    Acute coronary syndrome 5/20/2020 5/18/2020: Presents with unstable angina.    Allergic rhinitis, seasonal      Anxiety      Arthritis      CVA (cerebral vascular accident)      Depression      Glaucoma NEC      Hallucination       images out of corner of eye about a year ago    History of positive PPD - treated - remote past      Hx of psychiatric care      Hyperlipidemia LDL goal < 100      Hypertension      Nuclear sclerosis of both eyes 2/26/2019    Obesity      CHETNA (obstructive sleep apnea)      Psychiatric problem      Psychosis      Renal disorder      " Sleep difficulties      Suicide attempt       about 30 years ago by overdose of pills    Therapy      Type II or unspecified type diabetes mellitus without mention of complication, uncontrolled              Past Surgical History:   Procedure Laterality Date    CARPAL TUNNEL RELEASE         SECTION, CLASSIC        COLONOSCOPY N/A 3/5/2020     Procedure: COLONOSCOPY;  Surgeon: Wiliam Carrillo MD;  Location: [facility] (4TH FLR);  Service: Endoscopy;  Laterality: N/A;  20 appt confirmed-rb  pt requested this date    CORONARY ARTERY BYPASS GRAFT (CABG) N/A 2020     Procedure: CORONARY ARTERY BYPASS GRAFT (CABG) x3 ;  Surgeon: Yan Bowman MD;  Location: [facility] OR 2ND FLR;  Service: Cardiothoracic;  Laterality: N/A;  CABG X3  Endoharvest time start 0906  Endoharvest time end 1003  Vein prep start 1004  Vein prep end 1048    LEFT HEART CATHETERIZATION N/A 2020     Procedure: HEART CATH-LEFT;  Surgeon: Etelvina Lowery MD;  Location: [facility] CATH LAB;  Service: Cardiology;  Laterality: N/A;    TOTAL ABDOMINAL HYSTERECTOMY        TRIGGER FINGER RELEASE               Family History      Problem Relation (Age of Onset)     Alzheimer's disease Maternal Aunt     Cataracts Mother     Diabetes Mother, Father, Maternal Uncle, Maternal Grandmother, Maternal Grandfather, Maternal Aunt     Drug abuse Grandchild     Glaucoma Mother     Heart disease Cousin     Heart failure Mother, Father     Hyperlipidemia Maternal Uncle     Hypertension Mother, Father, Maternal Uncle     No Known Problems Sister, Paternal Grandmother, Paternal Grandfather, Paternal Aunt, Paternal Uncle     Prostate cancer Father     Schizophrenia Maternal Aunt                Tobacco Use    Smoking status: Former Smoker       Packs/day: 0.50       Types: Cigarettes       Last attempt to quit: 11/10/2012       Years since quittin.5    Smokeless tobacco: Never Used   Substance and Sexual Activity    Alcohol use: Not Currently        Comment: drank socially in the past    Drug use: Not Currently       Types: Marijuana       Comment: tried once 6 months ago    Sexual activity: Not Currently       Partners: Male      Review of Systems   Constitutional: Negative for activity change, chills, diaphoresis, fatigue and fever.   HENT: Negative for sore throat and voice change.    Eyes: Negative for visual disturbance.   Respiratory: Positive for shortness of breath. Negative for cough, chest tightness, wheezing and stridor.    Cardiovascular: Positive for leg swelling. Negative for chest pain.   Gastrointestinal: Negative for abdominal distention, abdominal pain, constipation, diarrhea, nausea and vomiting.   Endocrine: Negative for cold intolerance and heat intolerance.   Genitourinary: Negative for difficulty urinating, dysuria and flank pain.   Musculoskeletal: Negative for gait problem and joint swelling.   Skin: Negative for rash.   Neurological: Negative for dizziness, light-headedness and headaches.      Objective:      Vital Signs (Most Recent):  Temp: 98.4 °F (36.9 °C) (06/02/20 1659)  Pulse: (!) 56 (06/02/20 1846)  Resp: (!) 24 (06/02/20 1715)  BP: 128/63 (06/02/20 1846)  SpO2: 97 % (06/02/20 1846) Vital Signs (24h Range):  Temp:  [98.4 °F (36.9 °C)] 98.4 °F (36.9 °C)  Pulse:  [55-62] 56  Resp:  [24] 24  SpO2:  [95 %-98 %] 97 %  BP: (109-136)/(58-77) 128/63      Weight: 85.7 kg (189 lb)  Body mass index is 33.48 kg/m².     SpO2: 97 %  O2 Device (Oxygen Therapy): room air         Intake/Output - Last 3 Shifts      None                 Lines/Drains/Airways            Peripheral Intravenous Line                          Peripheral IV - Single Lumen 06/02/20 1711 18 G Right Forearm less than 1 day                  Physical Exam   Constitutional: She is oriented to person, place, and time. She appears well-developed and well-nourished. No distress.   HENT:   Head: Normocephalic and atraumatic.   Eyes: Conjunctivae and EOM are normal. No  scleral icterus.   Neck: Normal range of motion. No tracheal deviation present.   Cardiovascular: Normal rate and regular rhythm.   No murmur heard.  Incision healing well, no erythema or drainage   Pulmonary/Chest: No stridor. No respiratory distress. She has no wheezes. She has rales.   O2 Sats 100% on 2L NC, does appear to have some difficulty finishing sentences.   Musculoskeletal: Normal range of motion. She exhibits edema.   Minimal BLE edema   Neurological: She is alert and oriented to person, place, and time. No cranial nerve deficit.   Skin: Skin is warm and dry. Capillary refill takes less than 2 seconds. No rash noted. She is not diaphoretic. No erythema.   LLE vein harvest incisions healing well   Psychiatric: She has a normal mood and affect.         Significant Labs:  BMP:       Recent Labs   Lab 06/02/20  1719   *      K 4.8      CO2 22*   BUN 44*   CREATININE 1.2   CALCIUM 8.9      Cardiac markers: No results for input(s): CKMB, CPKMB, TROPONINT, TROPONINI, MYOGLOBIN in the last 48 hours.  CBC:       Recent Labs   Lab 06/02/20  1719   WBC 12.17   RBC 2.91*   HGB 8.7*   HCT 28.1*   *   MCV 97   MCH 29.9   MCHC 31.0*      CMP:       Recent Labs   Lab 06/02/20  1719   *   CALCIUM 8.9   ALBUMIN 3.2*   PROT 7.4      K 4.8   CO2 22*      BUN 44*   CREATININE 1.2   ALKPHOS 102   ALT 11   AST 15   BILITOT 0.4      All pertinent labs from the last 24 hours have been reviewed.     Significant Diagnostics:  CXR: I have reviewed all pertinent results/findings within the past 24 hours No significant pleural effusions or markedly increased cardiac silhouette compared to yesterday's CXR.     Assessment/Plan:      NYHA Score: NYHA II: slight limitation of physical activity, comfortable at rest     S/P CABG x 3  Mrs. Barahona is a pleasant 71 y/o F with CAD, HTN, HLD, DM2 (on insulin HbA1c 7.1%) s/p 3V CABG by Dr. Bowman on 5/25/2020  presenting to ED 1 day post-discharge  with shortness of breath requiring nasal cannula oxygenation. WBC is 12, no coagulopathy noted, BUN decreasing, 44 on admission from 50s yesterday, Cr high-normal. CXR without large pleural effusions or concerns for marked pericardial fluid. Though she has been taking her prescribed medications (including her Lasix 20 BID) regularly, she is likely volume overloaded and needs further diuresis.      -CTS observation  -start lasix gtt at 30, non-titrating  -obtain STAT BLE venous duplex to rule out BLE DVTs (sedentary, BMI 33.5, post-cardiac surgery)  -will start Eliquis 2.5 BID  -reducing 12.5BID metoprolol to 12.5 QD for sinus norma in 50s  -CXR reviewed, no large pleural effusion to explain symptoms at this time, though there is evidence of pulmonary edema  -discussed with attending cardiothoracic surgeon, Dr. Colby Maher MD  Cardiothoracic Surgery  [facility]

## 2020-06-03 NOTE — HOSPITAL COURSE
Patient presented to ED with SOB on 6/2/2020. Patient is s/p CABG 5/25/2020 by Dr. Bowman. Was discharged 6/1/2020. Patient was started on a lasix drip and diuresed appropriately. At time of exam this morning patient was on room air and reports breathing was greatly improved. No evident edema. Lower extremity ultrasound showed DVT in left greater shapenous vein. Patient was started on Eliquis. CTPA was performed which was negative for pulmonary embolism. Patient with small pleural effusions, left > right. Metoprolol dose was decreased for HR in 50s. At the time of discharge patient was stable and breathing without difficulty.     MOBILITY AND ACTIVITY: As tolerated. Patient may shower. No heavy lifting of greater than 5 pounds and no driving.     DIET: An 1800-calorie ADA with a 1500 mL fluid restriction.     WOUND CARE INSTRUCTIONS: Check for redness, swelling and drainage around the  incision or wound. Patient is to call for any obvious bleeding, drainage, pus from the wound, unusual problems or difficulties or temperature of greater than 101   degrees.     FOLLOWUP: Follow up with Dr. Bowman in approximately 3 weeks. Prior to this  appointment, the patient will have a chest x-ray and EKG.     Patient not placed on Ace-Inhibitor at the time of discharge due to potential for hypotension     DISCHARGE CONDITION: At the time of discharge, the patient was in sinus rhythm and afebrile with stable vital signs.

## 2020-06-03 NOTE — ED NOTES
Report received from MATT Raygoza. Care assumed. Pt resting comfortably and independently repositioned in stretcher with bed locked in lowest position for safety. NAD noted at this time. Respirations even and unlabored and visible chest rise noted. Patient offered bathroom assistance and states she has to use the restroom. Pt placed on pure wick catheter at this time. Pt remains on cardiac monitor, automated BP cuff, and pulse oximeter. Side rails raised x2, bed locked and low.

## 2020-06-03 NOTE — NURSING TRANSFER
Nursing Transfer Note      6/3/2020     Transfer CT     Transfer via stretcher    Transfer with cardiac monitoring, Pump and Pole    Transported by Escort    Medicines sent: Lasix Drip    Chart send with patient: No

## 2020-06-03 NOTE — SUBJECTIVE & OBJECTIVE
No current facility-administered medications on file prior to encounter.      Current Outpatient Medications on File Prior to Encounter   Medication Sig    acetaminophen (TYLENOL) 325 MG tablet Take 2 tablets (650 mg total) by mouth every 6 (six) hours as needed for Pain or Temperature greater than (or equal to 101 degree F).    albuterol 90 mcg/actuation inhaler Inhale 2 puffs into the lungs every 6 (six) hours as needed for Wheezing or Shortness of Breath. Rescue    alcohol swabs PadM Apply 1 each topically 3 (three) times daily.    artificial tear ointment (ARTIFICIAL TEARS) Oint Place into both eyes every evening.    aspirin 325 MG tablet Take 1 tablet (325 mg total) by mouth once daily.    atorvastatin (LIPITOR) 80 MG tablet Take 1 tablet (80 mg total) by mouth every evening.    azelastine (OPTIVAR) 0.05 % ophthalmic solution INSTILL 1 DROP IN EACH EYE TWICE DAILY    blood pressure monitor (BLOOD PRESSURE KIT) Kit 1 kit by Misc.(Non-Drug; Combo Route) route once daily.    blood-glucose meter,continuous (DEXCOM G6 ) Misc Use with dexcom G6 system    blood-glucose sensor (DEXCOM G6 SENSOR) Vanna Change sensor every 10 days    blood-glucose transmitter (DEXCOM G6 TRANSMITTER) Vanna Change every 3 months    desoximetasone (TOPICORT) 0.05 % cream Apply topically 2 (two) times daily as needed.    dulaglutide (TRULICITY) 1.5 mg/0.5 mL PnIj Inject 1.5 mg into the skin every 7 days.    empagliflozin (JARDIANCE) 10 mg Tab Take 10 mg by mouth once daily.    flash glucose scanning reader (FREESTYLE ANAMARIA READER) Misc 1 Units by Misc.(Non-Drug; Combo Route) route before meals as needed.    FLUoxetine 20 MG capsule Take 1 capsule (20 mg total) by mouth once daily.    fluticasone (FLONASE) 50 mcg/actuation nasal spray 1 spray (50 mcg total) by Each Nare route once daily.    FREESTYLE ANAMARIA 14 DAY SENSOR Kit CHANGE SENSOR EVERY 14 DAYS    furosemide (LASIX) 20 MG tablet Take one tablet by mouth twice  "daily for 7 days then decrease to once daily    insulin degludec (TRESIBA FLEXTOUCH U-200) 200 unit/mL (3 mL) InPn Inject 24 Units into the skin once daily. Inject 24 units once daily    lancets Misc Check blood glucose 4 times a day. Dispense accuchek meter or other meter preferred by insurance. Dx code e11.65    LANCING DEVICE WITH LANCETS Misc USE AS DIRECTED FOR DIABETIC TESTING    latanoprost 0.005 % ophthalmic solution PLACE 1 DROP IN EACH EYE EVERY EVENING    loratadine (CLARITIN) 10 mg tablet TAKE ONE TABLET BY MOUTH EVERY DAY AS NEEDED FOR ALLERGIES    melatonin (MELATIN) 3 mg tablet Take 2 tablets (6 mg total) by mouth nightly as needed for Insomnia.    metFORMIN (GLUCOPHAGE) 1000 MG tablet TAKE ONE TABLET BY MOUTH TWICE DAILY WITH MEALS    metoprolol tartrate (LOPRESSOR) 25 MG tablet Take 0.5 tablets (12.5 mg total) by mouth 2 (two) times daily.    multivitamin with minerals tablet Take 1 tablet by mouth once daily.    ondansetron (ZOFRAN-ODT) 8 MG TbDL Take 1 tablet (8 mg total) by mouth every 6 (six) hours as needed.    oxyCODONE (ROXICODONE) 5 MG immediate release tablet Take 1 tablet (5 mg total) by mouth every 4 (four) hours as needed.    pantoprazole (PROTONIX) 40 MG tablet Take 1 tablet (40 mg total) by mouth once daily.    pen needle, diabetic (BD ULTRA-FINE LENY PEN NEEDLES) 32 gauge x 5/32" Ndle USE FIVE TIMES DAILY    polyethylene glycol (GLYCOLAX) 17 gram PwPk Take 17 g by mouth 2 (two) times daily as needed.    potassium chloride SA (K-DUR,KLOR-CON) 20 MEQ tablet Take one tablet by mouth twice daily for 7 days then decrease to once daily    rOPINIRole (REQUIP) 0.5 MG tablet TAKE ONE Tablet BY MOUTH EVERY NIGHT AT BEDTIME    senna-docusate 8.6-50 mg (PERICOLACE) 8.6-50 mg per tablet Take 1 tablet by mouth once daily.       Review of patient's allergies indicates:   Allergen Reactions    Ace inhibitors Other (See Comments)     cough    Invokana [canagliflozin] Other (See " Comments)     Throat and tongue swelling    Ozempic [semaglutide]      Nausea and constipation on 0.25 mg dose.        Past Medical History:   Diagnosis Date    Acute coronary syndrome 2020: Presents with unstable angina.    Allergic rhinitis, seasonal     Anxiety     Arthritis     CVA (cerebral vascular accident)     Depression     Glaucoma NEC     Hallucination     images out of corner of eye about a year ago    History of positive PPD - treated - remote past     Hx of psychiatric care     Hyperlipidemia LDL goal < 100     Hypertension     Nuclear sclerosis of both eyes 2019    Obesity     CHETNA (obstructive sleep apnea)     Psychiatric problem     Psychosis     Renal disorder     Sleep difficulties     Suicide attempt     about 30 years ago by overdose of pills    Therapy     Type II or unspecified type diabetes mellitus without mention of complication, uncontrolled      Past Surgical History:   Procedure Laterality Date    CARPAL TUNNEL RELEASE       SECTION, CLASSIC      COLONOSCOPY N/A 3/5/2020    Procedure: COLONOSCOPY;  Surgeon: Wiliam Carrillo MD;  Location: [facility] (4TH FLR);  Service: Endoscopy;  Laterality: N/A;  20 appt confirmed-rb  pt requested this date    CORONARY ARTERY BYPASS GRAFT (CABG) N/A 2020    Procedure: CORONARY ARTERY BYPASS GRAFT (CABG) x3 ;  Surgeon: Yan Bowman MD;  Location: [facility] OR 2ND FLR;  Service: Cardiothoracic;  Laterality: N/A;  CABG X3  Endoharvest time start 0906  Endoharvest time end 1003  Vein prep start 1004  Vein prep end 1048    LEFT HEART CATHETERIZATION N/A 2020    Procedure: HEART CATH-LEFT;  Surgeon: Etelvina Lowery MD;  Location: [facility] CATH LAB;  Service: Cardiology;  Laterality: N/A;    TOTAL ABDOMINAL HYSTERECTOMY      TRIGGER FINGER RELEASE       Family History     Problem Relation (Age of Onset)    Alzheimer's disease Maternal Aunt    Cataracts Mother    Diabetes Mother,  Father, Maternal Uncle, Maternal Grandmother, Maternal Grandfather, Maternal Aunt    Drug abuse Grandchild    Glaucoma Mother    Heart disease Cousin    Heart failure Mother, Father    Hyperlipidemia Maternal Uncle    Hypertension Mother, Father, Maternal Uncle    No Known Problems Sister, Paternal Grandmother, Paternal Grandfather, Paternal Aunt, Paternal Uncle    Prostate cancer Father    Schizophrenia Maternal Aunt        Tobacco Use    Smoking status: Former Smoker     Packs/day: 0.50     Types: Cigarettes     Last attempt to quit: 11/10/2012     Years since quittin.5    Smokeless tobacco: Never Used   Substance and Sexual Activity    Alcohol use: Not Currently     Comment: drank socially in the past    Drug use: Not Currently     Types: Marijuana     Comment: tried once 6 months ago    Sexual activity: Not Currently     Partners: Male     Review of Systems   Constitutional: Negative for activity change, chills, diaphoresis, fatigue and fever.   HENT: Negative for sore throat and voice change.    Eyes: Negative for visual disturbance.   Respiratory: Positive for shortness of breath. Negative for cough, chest tightness, wheezing and stridor.    Cardiovascular: Positive for leg swelling. Negative for chest pain.   Gastrointestinal: Negative for abdominal distention, abdominal pain, constipation, diarrhea, nausea and vomiting.   Endocrine: Negative for cold intolerance and heat intolerance.   Genitourinary: Negative for difficulty urinating, dysuria and flank pain.   Musculoskeletal: Negative for gait problem and joint swelling.   Skin: Negative for rash.   Neurological: Negative for dizziness, light-headedness and headaches.     Objective:     Vital Signs (Most Recent):  Temp: 98.4 °F (36.9 °C) (20 1659)  Pulse: (!) 56 (20 1846)  Resp: (!) 24 (20 1715)  BP: 128/63 (20 184)  SpO2: 97 % (20) Vital Signs (24h Range):  Temp:  [98.4 °F (36.9 °C)] 98.4 °F (36.9 °C)  Pulse:   [55-62] 56  Resp:  [24] 24  SpO2:  [95 %-98 %] 97 %  BP: (109-136)/(58-77) 128/63     Weight: 85.7 kg (189 lb)  Body mass index is 33.48 kg/m².    SpO2: 97 %  O2 Device (Oxygen Therapy): room air     Intake/Output - Last 3 Shifts     None           Lines/Drains/Airways     Peripheral Intravenous Line                 Peripheral IV - Single Lumen 06/02/20 1711 18 G Right Forearm less than 1 day              Physical Exam   Constitutional: She is oriented to person, place, and time. She appears well-developed and well-nourished. No distress.   HENT:   Head: Normocephalic and atraumatic.   Eyes: Conjunctivae and EOM are normal. No scleral icterus.   Neck: Normal range of motion. No tracheal deviation present.   Cardiovascular: Normal rate and regular rhythm.   No murmur heard.  Incision healing well, no erythema or drainage   Pulmonary/Chest: No stridor. No respiratory distress. She has no wheezes. She has rales.   O2 Sats 100% on 2L NC, does appear to have some difficulty finishing sentences.   Musculoskeletal: Normal range of motion. She exhibits edema.   Minimal BLE edema   Neurological: She is alert and oriented to person, place, and time. No cranial nerve deficit.   Skin: Skin is warm and dry. Capillary refill takes less than 2 seconds. No rash noted. She is not diaphoretic. No erythema.   LLE vein harvest incisions healing well   Psychiatric: She has a normal mood and affect.       Significant Labs:  BMP:   Recent Labs   Lab 06/02/20  1719   *      K 4.8      CO2 22*   BUN 44*   CREATININE 1.2   CALCIUM 8.9     Cardiac markers: No results for input(s): CKMB, CPKMB, TROPONINT, TROPONINI, MYOGLOBIN in the last 48 hours.  CBC:   Recent Labs   Lab 06/02/20  1719   WBC 12.17   RBC 2.91*   HGB 8.7*   HCT 28.1*   *   MCV 97   MCH 29.9   MCHC 31.0*     CMP:   Recent Labs   Lab 06/02/20  1719   *   CALCIUM 8.9   ALBUMIN 3.2*   PROT 7.4      K 4.8   CO2 22*      BUN 44*    CREATININE 1.2   ALKPHOS 102   ALT 11   AST 15   BILITOT 0.4     All pertinent labs from the last 24 hours have been reviewed.    Significant Diagnostics:  CXR: I have reviewed all pertinent results/findings within the past 24 hours No significant pleural effusions or markedly increased cardiac silhouette compared to yesterday's CXR.

## 2020-06-03 NOTE — ASSESSMENT & PLAN NOTE
Mrs. Barahona is a pleasant 71 y/o F with CAD, HTN, HLD, DM2 (on insulin HbA1c 7.1%) s/p 3V CABG by Dr. Bowman on 5/25/2020 presenting to ED 1 day post-discharge with shortness of breath requiring nasal cannula oxygenation. WBC is 12, no coagulopathy noted, BUN decreasing, 44 on admission from 50s yesterday, Cr high-normal. CXR without large pleural effusions or concerns for marked pericardial fluid. Though she has been taking her prescribed medications (including her Lasix 20 BID) regularly, she is likely volume overloaded and needs further diuresis.     -CTS observation  -start lasix gtt at 30, non-titrating  -obtain BLE venous duplex to rule out BLE DVTs (sedentary, BMI 33.5, post-cardiac surgery)  -will start Eliquis  -CXR reviewed, no large pleural effusion to explain symptoms at this time, though there is evidence of pulmonary edema  -discussed with attending cardiothoracic surgeon, Dr. Bowman

## 2020-06-03 NOTE — NURSING
D/C IV with catheter intact, applied 2x2 gauze and roller band, applied pressure, no bleeding noted. D/C telemetry box, D/C ViSi, went over discharge orders, Pt  verbalized understanding of discharge orders. Pt left in stable condition with family via private vehicle

## 2020-06-03 NOTE — PLAN OF CARE
Pt AAOx4, no c/o sob, no c/o pain, no s/s of obvious distress noted, reviewed Care Plan with Pt, Pt acknowledged understanding of Care Plan, no falls experienced during shift, will continue to monitor Pt

## 2020-06-03 NOTE — DISCHARGE SUMMARY
[facility]  Cardiothoracic Surgery  Discharge Summary      Patient Name: Cecilia Barahona  MRN: [id number]  Admission Date: 6/2/2020  Hospital Length of Stay: 0 days  Discharge Date and Time:  06/03/2020 12:57 PM  Attending Physician: Yan Bowman MD   Discharging Provider: Pricilla Jain PA-C  Primary Care Provider: Yudi Smart MD    HPI:   Mrs. Barahona is a pleasant 71 y/o F with CAD, HTN, HLD, DM2 (on insulin HbA1c 7.1%) s/p 3V CABG by Dr. Bowman on 5/25/2020. She had an uneventful post-operative course, and was discharged to home 6/1/2020. She reports she felt very well yesterday on day of discharge, but felt mildly short of breath this morning, gradual onset, after she had breakfast. She was visited by Home Health later in the day, at which point it was difficult for her to finish sentences without having to take another breath. She was advised to presented to the ED. Lab work-up is largely equivocal, but BNP is elevated in 800s. Lung sounds with mild crackles. She requires 2L NC. Otherwise has an appetite, pain well controlled, passing flatus, having BMs.     * No surgery found *      Indwelling Lines/Drains at time of discharge:   Lines/Drains/Airways     Drain            Female External Urinary Catheter 06/02/20 1908 less than 1 day              Hospital Course: Patient presented to ED with SOB on 6/2/2020. Patient is s/p CABG 5/25/2020 by Dr. Bowman. Was discharged 6/1/2020. Patient was started on a lasix drip and diuresed appropriately. At time of exam this morning patient was on room air and reports breathing was greatly improved. No evident edema. Lower extremity ultrasound showed DVT in left greater shapenous vein. Patient was started on Eliquis. CTPA was performed which was negative for pulmonary embolism. Patient with small pleural effusions, left > right. Metoprolol dose was decreased for HR in 50s. At the time of discharge patient was stable and breathing without  difficulty.     MOBILITY AND ACTIVITY: As tolerated. Patient may shower. No heavy lifting of greater than 5 pounds and no driving.     DIET: An 1800-calorie ADA with a 1500 mL fluid restriction.     WOUND CARE INSTRUCTIONS: Check for redness, swelling and drainage around the  incision or wound. Patient is to call for any obvious bleeding, drainage, pus from the wound, unusual problems or difficulties or temperature of greater than 101   degrees.     FOLLOWUP: Follow up with Dr. Bowman in approximately 3 weeks. Prior to this  appointment, the patient will have a chest x-ray and EKG.     Patient not placed on Ace-Inhibitor at the time of discharge due to potential for hypotension     DISCHARGE CONDITION: At the time of discharge, the patient was in sinus rhythm and afebrile with stable vital signs.      Consults (From admission, onward)        Status Ordering Provider     Inpatient consult to Cardiothoracic Surgery  Once     Provider:  (Not yet assigned)    MARSHA Calderon            No new Assessment & Plan notes have been filed under this hospital service since the last note was generated.  Service: Cardiothoracic Surgery    Final Active Diagnoses:    Diagnosis Date Noted POA    PRINCIPAL PROBLEM:  S/P CABG x 3 [Z95.1] 05/25/2020 Not Applicable    Deep vein blood clot of left lower extremity [I82.402] 06/03/2020 Unknown      Problems Resolved During this Admission:      Discharged Condition: stable    Disposition: Home or Self Care    Follow Up:    Patient Instructions:   No discharge procedures on file.  Medications:  Reconciled Home Medications:      Medication List      START taking these medications    apixaban 2.5 mg Tab  Commonly known as:  ELIQUIS  Take 1 tablet (2.5 mg total) by mouth 2 (two) times daily.     aspirin 81 MG EC tablet  Commonly known as:  ECOTRIN  Take 1 tablet (81 mg total) by mouth once daily.  Replaces:  aspirin 325 MG tablet        CHANGE how you take these medications     furosemide 20 MG tablet  Commonly known as:  LASIX  Take 2 tablets (40 mg total) by mouth 2 (two) times a day.  What changed:    · how much to take  · how to take this  · when to take this  · additional instructions     metFORMIN 1000 MG tablet  Commonly known as:  GLUCOPHAGE  Take 1 tablet (1,000 mg total) by mouth 2 (two) times daily with meals. Resume Saturday, 6/5/2020  What changed:  additional instructions     metoprolol tartrate 25 MG tablet  Commonly known as:  LOPRESSOR  Take 0.5 tablets (12.5 mg total) by mouth once daily.  Start taking on:  June 4, 2020  What changed:  when to take this     potassium chloride SA 20 MEQ tablet  Commonly known as:  K-DUR,KLOR-CON  Take 2 tablets (40mg) by mouth  twice daily  What changed:  additional instructions        CONTINUE taking these medications    acetaminophen 325 MG tablet  Commonly known as:  TYLENOL  Take 2 tablets (650 mg total) by mouth every 6 (six) hours as needed for Pain or Temperature greater than (or equal to 101 degree F).     albuterol 90 mcg/actuation inhaler  Commonly known as:  PROVENTIL/VENTOLIN HFA  Inhale 2 puffs into the lungs every 6 (six) hours as needed for Wheezing or Shortness of Breath. Rescue     alcohol swabs Padm  Apply 1 each topically 3 (three) times daily.     artificial tears ointment Oint  Commonly known as:  ARTIFICIAL TEARS  Place into both eyes every evening.     atorvastatin 80 MG tablet  Commonly known as:  LIPITOR  Take 1 tablet (80 mg total) by mouth every evening.     azelastine 0.05 % ophthalmic solution  Commonly known as:  OPTIVAR  INSTILL 1 DROP IN EACH EYE TWICE DAILY     blood pressure monitor Kit  Commonly known as:  BLOOD PRESSURE KIT  1 kit by Misc.(Non-Drug; Combo Route) route once daily.     blood-glucose meter,continuous Misc  Commonly known as:  DEXCOM G6   Use with dexcom G6 system     blood-glucose sensor Vanna  Commonly known as:  DEXCOM G6 SENSOR  Change sensor every 10 days     blood-glucose  transmitter Vanna  Commonly known as:  DEXCOM G6 TRANSMITTER  Change every 3 months     desoximetasone 0.05 % cream  Commonly known as:  TOPICORT  Apply topically 2 (two) times daily as needed.     dulaglutide 1.5 mg/0.5 mL Pnij  Commonly known as:  TRULICITY  Inject 1.5 mg into the skin every 7 days.     empagliflozin 10 mg tablet  Commonly known as:  JARDIANCE  Take 10 mg by mouth once daily.     flash glucose scanning reader Misc  Commonly known as:  FREESTYLE ANAMARIA 10 DAY READER  1 Units by Misc.(Non-Drug; Combo Route) route before meals as needed.     FLUoxetine 20 MG capsule  Take 1 capsule (20 mg total) by mouth once daily.     fluticasone propionate 50 mcg/actuation nasal spray  Commonly known as:  FLONASE  1 spray (50 mcg total) by Each Nare route once daily.     FREESTYLE ANAMARIA 14 DAY SENSOR Kit  Generic drug:  flash glucose sensor  CHANGE SENSOR EVERY 14 DAYS     insulin degludec 200 unit/mL (3 mL) Inpn  Commonly known as:  TRESIBA FLEXTOUCH U-200  Inject 24 Units into the skin once daily. Inject 24 units once daily     lancets Misc  Check blood glucose 4 times a day. Dispense accuchek meter or other meter preferred by insurance. Dx code e11.65     LANCING DEVICE WITH LANCETS Misc  Generic drug:  lancing device  USE AS DIRECTED FOR DIABETIC TESTING     latanoprost 0.005 % ophthalmic solution  PLACE 1 DROP IN EACH EYE EVERY EVENING     loratadine 10 mg tablet  Commonly known as:  CLARITIN  TAKE ONE TABLET BY MOUTH EVERY DAY AS NEEDED FOR ALLERGIES     melatonin 3 mg tablet  Commonly known as:  MELATIN  Take 2 tablets (6 mg total) by mouth nightly as needed for Insomnia.     multivitamin with minerals tablet  Take 1 tablet by mouth once daily.     ondansetron 8 MG Tbdl  Commonly known as:  ZOFRAN-ODT  Take 1 tablet (8 mg total) by mouth every 6 (six) hours as needed.     oxyCODONE 5 MG immediate release tablet  Commonly known as:  ROXICODONE  Take 1 tablet (5 mg total) by mouth every 4 (four) hours as  "needed.     pantoprazole 40 MG tablet  Commonly known as:  PROTONIX  Take 1 tablet (40 mg total) by mouth once daily.     pen needle, diabetic 32 gauge x 5/32" Ndle  Commonly known as:  BD ULTRA-FINE LENY PEN NEEDLE  USE FIVE TIMES DAILY     polyethylene glycol 17 gram Pwpk  Commonly known as:  GLYCOLAX  Take 17 g by mouth 2 (two) times daily as needed.     rOPINIRole 0.5 MG tablet  Commonly known as:  REQUIP  TAKE ONE Tablet BY MOUTH EVERY NIGHT AT BEDTIME     senna-docusate 8.6-50 mg 8.6-50 mg per tablet  Commonly known as:  PERICOLACE  Take 1 tablet by mouth once daily.        STOP taking these medications    aspirin 325 MG tablet  Replaced by:  aspirin 81 MG EC tablet          Time spent on the discharge of patient: 30 minutes    Pricilla Jain PA-C  Cardiothoracic Surgery  [facility]  "

## 2020-06-03 NOTE — ED NOTES
Pt pure wick catheter out of place. Pt had one episode of unmeasured urine output. Linens changed and perianal region cleansed.

## 2020-06-03 NOTE — SUBJECTIVE & OBJECTIVE
Interval History: Patient readmitted last night with SOB after being discharged Monday s/p CABG x 3. Underwent lower extremity US which showed     Review of Systems   Constitution: Negative for malaise/fatigue.   Cardiovascular: Negative for chest pain, leg swelling and palpitations.   Respiratory: Positive for shortness of breath (improved ).    Hematologic/Lymphatic: Negative for bleeding problem.   Musculoskeletal:        Left great toe pain    Genitourinary: Negative for dysuria.     Medications:  Continuous Infusions:   furosemide (LASIX) 10 mg/mL infusion (non-titrating) 30 mg/hr (06/03/20 0633)     Scheduled Meds:   albuterol sulfate  2.5 mg Nebulization Q4H WAKE    apixaban  2.5 mg Oral BID    aspirin  325 mg Oral Daily    atorvastatin  80 mg Oral QHS    metoprolol tartrate  12.5 mg Oral Daily    pantoprazole  40 mg Oral Daily    polyethylene glycol  17 g Oral Daily    potassium chloride SA  20 mEq Oral Daily    rOPINIRole  0.5 mg Oral Nightly     PRN Meds:albuterol, Dextrose 10% Bolus, Dextrose 10% Bolus, glucagon (human recombinant), glucose, glucose, insulin aspart U-100, oxyCODONE, sodium chloride 0.9%     Objective:     Vital Signs (Most Recent):  Temp: 98.4 °F (36.9 °C) (06/03/20 0828)  Pulse: 60 (06/03/20 0828)  Resp: 20 (06/03/20 0828)  BP: (!) 124/58 (06/03/20 0828)  SpO2: 95 % (06/03/20 0828) Vital Signs (24h Range):  Temp:  [97.7 °F (36.5 °C)-98.8 °F (37.1 °C)] 98.4 °F (36.9 °C)  Pulse:  [55-71] 60  Resp:  [16-24] 20  SpO2:  [94 %-100 %] 95 %  BP: (109-146)/(58-77) 124/58     Weight: 86.3 kg (190 lb 4.1 oz)  Body mass index is 33.7 kg/m².    SpO2: 95 %  O2 Device (Oxygen Therapy): room air    Intake/Output - Last 3 Shifts       06/01 0700 - 06/02 0659 06/02 0700 - 06/03 0659 06/03 0700 - 06/04 0659    P.O.  0     Total Intake(mL/kg)  0 (0)     Urine (mL/kg/hr)  0     Total Output  0     Net  0            Urine Occurrence  1 x           Lines/Drains/Airways     Drain            Female  External Urinary Catheter 06/02/20 1908 less than 1 day          Peripheral Intravenous Line                 Peripheral IV - Single Lumen 06/02/20 1711 18 G Right Forearm less than 1 day                Physical Exam    Significant Labs:  ABGs: No results for input(s): PH, PCO2, PO2, HCO3, POCSATURATED, BE in the last 48 hours.  Amylase: No results for input(s): AMYLASE in the last 48 hours.  BMP:   Recent Labs   Lab 06/03/20  0443   *      K 3.9      CO2 29   BUN 37*   CREATININE 1.0   CALCIUM 9.3     Cardiac markers: No results for input(s): CKMB, CPKMB, TROPONINT, TROPONINI, MYOGLOBIN in the last 48 hours.  CBC:   Recent Labs   Lab 06/03/20  0443   WBC 11.45   RBC 2.97*   HGB 8.7*   HCT 28.1*   *   MCV 95   MCH 29.3   MCHC 31.0*     CMP:   Recent Labs   Lab 06/02/20 1719 06/03/20  0443   * 186*   CALCIUM 8.9 9.3   ALBUMIN 3.2*  --    PROT 7.4  --     141   K 4.8 3.9   CO2 22* 29    100   BUN 44* 37*   CREATININE 1.2 1.0   ALKPHOS 102  --    ALT 11  --    AST 15  --    BILITOT 0.4  --      Coagulation: No results for input(s): PT, INR, APTT in the last 48 hours.  Lactic Acid: No results for input(s): LACTATE in the last 48 hours.  LFTs:   Recent Labs   Lab 06/02/20  1719   ALT 11   AST 15   ALKPHOS 102   BILITOT 0.4   PROT 7.4   ALBUMIN 3.2*     Lipase: No results for input(s): LIPASE in the last 48 hours.    Significant Diagnostics:reviewed     Lower Extremity DVT 6/2/2020  Left lower extremity positive occlusive DVT in the greater saphenous vein.  No evidence of DVT in the right lower extremity    CXR 6/3/2020  Study was obtained with the patient seated.  For the AP view the patient is in steep apical lordosis.  Extrinsic device overlies the right upper quadrant and midline of the abdomen obscuring some detail the right lower lung zone.  On lateral view the patient's arms overlie the image obscuring detail.    Sternal sutures are intact and aligned.  Hemostasis clip  to the left of the inferior sternum suggests CABG with LIMA.    I suspect a small amount of dependent left pleural fluid, not unusual in light of recent sternotomy.    Mild patchy opacity in the left lower lung zone is consistent with subsegmental atelectasis perhaps due to overlying cardiac structures, dependent left pleural fluid or both.    Left mid and upper lung zones and right lung appear clear in their imaged extents.  There is no pulmonary edema.    I detect no pneumothorax, pneumomediastinum, pneumoperitoneum or significant osseous abnormality.

## 2020-06-03 NOTE — NURSING TRANSFER
Nursing Transfer Note      6/3/2020     Transfer to xray    Transfer via stretch    Transfer with Telemetry, Lasix Drip, IV Pump    Transported by Escort    Medicines sent:Lasix Drip    Chart send with patient: No

## 2020-06-03 NOTE — ED NOTES
Admitted to floor. Diagnosis, medications, & POC discussed with patient. Patient verbalized understanding. All questions and concerns answered. No needs expressed at the time. Pt is awake, alert and oriented x4 with no acute distress noted. Respirations even and unlabored. Per stretcher out of ED to floor on CSU tele box and 2L oxygen per NC. All belongings sent to unit with patient. Mask in place for transfer.

## 2020-06-03 NOTE — ASSESSMENT & PLAN NOTE
Mrs. Barahona is a pleasant 69 y/o F with CAD, HTN, HLD, DM2 (on insulin HbA1c 7.1%) s/p 3V CABG by Dr. Bowman on 5/25/2020 presenting to ED 1 day post-discharge with shortness of breath requiring nasal cannula oxygenation. WBC is 12, no coagulopathy noted, BUN decreasing, 44 on admission from 50s yesterday, Cr high-normal. CXR without large pleural effusions or concerns for marked pericardial fluid. Though she has been taking her prescribed medications (including her Lasix 20 BID) regularly, she is likely volume overloaded and needs further diuresis.     -CTS observation  -start lasix gtt at 30, non-titrating  -obtain BLE venous duplex to rule out BLE DVTs (sedentary, BMI 33.5, post-cardiac surgery) --> resulted with LLE DVT of great saphenous vein   -will start Eliquis BID  -CXR reviewed, no large pleural effusion to explain symptoms at this time, though there is evidence of pulmonary edema  -Will obtain CTPA today to evaluate for PE. If negative will discharge home today. If large PE, will start heparin gtt  -discussed with attending cardiothoracic surgeon, Dr. Bowman

## 2020-06-05 PROCEDURE — G0180 MD CERTIFICATION HHA PATIENT: HCPCS | Mod: ,,, | Performed by: THORACIC SURGERY (CARDIOTHORACIC VASCULAR SURGERY)

## 2020-06-05 PROCEDURE — G0180 PR HOME HEALTH MD CERTIFICATION: ICD-10-PCS | Mod: ,,, | Performed by: THORACIC SURGERY (CARDIOTHORACIC VASCULAR SURGERY)

## 2020-06-07 ENCOUNTER — NURSE TRIAGE (OUTPATIENT)
Dept: ADMINISTRATIVE | Facility: CLINIC | Age: 70
End: 2020-06-07

## 2020-06-07 NOTE — TELEPHONE ENCOUNTER
Attempted to contact pt on behalf of Post Procedural Symptom Tracker. No answer 2nd attempt. No follow up needed.    Reason for Disposition   Second attempt to contact family AND no contact made.  Phone number verified.    Protocols used: NO CONTACT OR DUPLICATE CONTACT CALL-A-AH

## 2020-06-08 ENCOUNTER — PATIENT MESSAGE (OUTPATIENT)
Dept: FAMILY MEDICINE | Facility: CLINIC | Age: 70
End: 2020-06-08

## 2020-06-09 DIAGNOSIS — R11.2 NAUSEA AND VOMITING, INTRACTABILITY OF VOMITING NOT SPECIFIED, UNSPECIFIED VOMITING TYPE: Primary | ICD-10-CM

## 2020-06-09 RX ORDER — ONDANSETRON 8 MG/1
8 TABLET, ORALLY DISINTEGRATING ORAL EVERY 6 HOURS PRN
Qty: 30 TABLET | Refills: 1 | Status: SHIPPED | OUTPATIENT
Start: 2020-06-09 | End: 2020-10-13

## 2020-06-09 NOTE — TELEPHONE ENCOUNTER
Please queue up and send as refill request the medication they need refilled.  I would ask that, in this immediate post operative period, they contact cardiology with concerns as I would not be as familiar with what symptoms are concerning for a heart problem and which are not.

## 2020-06-12 ENCOUNTER — NURSE TRIAGE (OUTPATIENT)
Dept: ADMINISTRATIVE | Facility: CLINIC | Age: 70
End: 2020-06-12

## 2020-06-13 NOTE — TELEPHONE ENCOUNTER
Daughter states pt started having excruciating lower back pain this morning, pt states its a 10/10 when she moves, admits its on her lower back, right side, denies pain with urination or any blood in urine, advised her to go to ER for an assessment, pt agreed    Reason for Disposition   [1] SEVERE back pain (e.g., excruciating) AND [2] sudden onset AND [3] age > 60    Additional Information   Negative: Passed out (i.e., lost consciousness, collapsed and was not responding)   Negative: Shock suspected (e.g., cold/pale/clammy skin, too weak to stand, low BP, rapid pulse)   Negative: Sounds like a life-threatening emergency to the triager   Negative: Major injury to the back (e.g., MVA, fall > 10 feet or 3 meters, penetrating injury, etc.)   Negative: Followed a tailbone injury   Negative: [1] Pain in the upper back over the ribs (rib cage) AND [2] radiates (travels, goes) into chest   Negative: [1] Pain in the upper back over the ribs (rib cage) AND [2] worsened by coughing (or clearly increases with breathing)   Negative: Back pain during pregnancy   Negative: Pain mainly in flank (i.e., in the side, over the lower ribs or just below the ribs)    Protocols used: BACK PAIN-A-AH

## 2020-06-18 ENCOUNTER — TELEPHONE (OUTPATIENT)
Dept: FAMILY MEDICINE | Facility: CLINIC | Age: 70
End: 2020-06-18

## 2020-06-18 NOTE — TELEPHONE ENCOUNTER
----- Message from Hudson Paris sent at 6/18/2020  1:53 PM CDT -----  Regarding: verbal for a home health order  Type: Patient Call Back    Who called: Yudi     What is the request in detail: Yudi from [facility] called to get a verbal to move the patient's nursing portion of the order for home health , moved to next week.    Can the clinic reply by [facility]?no     Would the patient rather a call back or a response via [facility]? Call back     Best call back number:[phone]

## 2020-06-18 NOTE — TELEPHONE ENCOUNTER
I am not overseeing the HH - I do not know the importance of the timing of the visits. Defer to cardiology.

## 2020-06-18 NOTE — TELEPHONE ENCOUNTER
Spoke with Cat with HH is calling to get the ok to move pt's nursing portion of HH from Saturday to Monday.Please advise.

## 2020-06-22 ENCOUNTER — EXTERNAL HOME HEALTH (OUTPATIENT)
Dept: HOME HEALTH SERVICES | Facility: HOSPITAL | Age: 70
End: 2020-06-22
Payer: MEDICARE

## 2020-06-22 ENCOUNTER — PATIENT OUTREACH (OUTPATIENT)
Dept: OTHER | Facility: OTHER | Age: 70
End: 2020-06-22

## 2020-06-22 NOTE — PROGRESS NOTES
Digital Medicine: Clinician Follow-Up    Patient called me back and then gave her phone to her daughter. They were trying to get help logging into her 's apps. A CRM was created earlier today.    I asked patient's daughter if she helps with her mom's medications and she gave the phone back to the patient. Patient is s/p 3V CABG by Dr. Bowman on 5/25/2020. I asked patient about discontinuation of medications during admission (amlodipine, valsartan and chlorthalidone). She states she is unsure.      The history is provided by a relative and the patient.   Follow Up  Follow-up reason(s): reading review          INTERVENTION(S)  encouragement/support and denied questions    PLAN  patient verbalizes understanding and continue monitoring    2 days of recent BP readings and the readings are variable  Patient with recent admissions and has home health through this week (per patient)  Encouraged patient to charge BP cuff and try to submit at least 1 reading per week. Advised of option for hiatus if desired.  Continue current regimen as per chart, continue to review medications and adherence on follow up            Topic    Eye Exam        Last 5 Patient Entered Readings                                      Current 30 Day Average: 147/87     Recent Readings 6/17/2020 6/17/2020 6/17/2020 6/17/2020 6/16/2020    SBP (mmHg) 136 164 157 155 158    DBP (mmHg) 83 106 92 91 75    Pulse 76 117 119 122 81             Hypertension Medications             furosemide (LASIX) 20 MG tablet Take 2 tablets (40 mg total) by mouth 2 (two) times a day.    metoprolol tartrate (LOPRESSOR) 25 MG tablet Take 0.5 tablets (12.5 mg total) by mouth once daily.                 Screenings

## 2020-06-23 ENCOUNTER — DOCUMENT SCAN (OUTPATIENT)
Dept: HOME HEALTH SERVICES | Facility: HOSPITAL | Age: 70
End: 2020-06-23
Payer: MEDICARE

## 2020-06-25 ENCOUNTER — HOSPITAL ENCOUNTER (OUTPATIENT)
Dept: RADIOLOGY | Facility: HOSPITAL | Age: 70
Discharge: HOME | End: 2020-06-25
Attending: THORACIC SURGERY (CARDIOTHORACIC VASCULAR SURGERY)
Payer: MEDICARE

## 2020-06-25 ENCOUNTER — OFFICE VISIT (OUTPATIENT)
Dept: CARDIOTHORACIC SURGERY | Facility: CLINIC | Age: 70
End: 2020-06-25
Payer: MEDICARE

## 2020-06-25 ENCOUNTER — HOSPITAL ENCOUNTER (OUTPATIENT)
Dept: CARDIOLOGY | Facility: CLINIC | Age: 70
Discharge: HOME | End: 2020-06-25
Attending: THORACIC SURGERY (CARDIOTHORACIC VASCULAR SURGERY)
Payer: MEDICARE

## 2020-06-25 VITALS
HEART RATE: 67 BPM | BODY MASS INDEX: 32.69 KG/M2 | DIASTOLIC BLOOD PRESSURE: 84 MMHG | OXYGEN SATURATION: 97 % | SYSTOLIC BLOOD PRESSURE: 165 MMHG | WEIGHT: 184.5 LBS

## 2020-06-25 DIAGNOSIS — Z95.1 HX OF CABG: ICD-10-CM

## 2020-06-25 DIAGNOSIS — Z95.1 S/P CABG X 3: Primary | ICD-10-CM

## 2020-06-25 PROCEDURE — 93010 EKG 12-LEAD: ICD-10-PCS | Mod: HCNC,S$GLB,, | Performed by: INTERNAL MEDICINE

## 2020-06-25 PROCEDURE — 93005 EKG 12-LEAD: ICD-10-PCS | Mod: HCNC,S$GLB,, | Performed by: THORACIC SURGERY (CARDIOTHORACIC VASCULAR SURGERY)

## 2020-06-25 PROCEDURE — 71046 X-RAY EXAM CHEST 2 VIEWS: CPT | Mod: 26,HCNC,, | Performed by: RADIOLOGY

## 2020-06-25 PROCEDURE — 71046 XR CHEST PA AND LATERAL: ICD-10-PCS | Mod: 26,HCNC,, | Performed by: RADIOLOGY

## 2020-06-25 PROCEDURE — 99024 POSTOP FOLLOW-UP VISIT: CPT | Mod: HCNC,S$GLB,, | Performed by: THORACIC SURGERY (CARDIOTHORACIC VASCULAR SURGERY)

## 2020-06-25 PROCEDURE — 93010 ELECTROCARDIOGRAM REPORT: CPT | Mod: HCNC,S$GLB,, | Performed by: INTERNAL MEDICINE

## 2020-06-25 PROCEDURE — 71046 X-RAY EXAM CHEST 2 VIEWS: CPT | Mod: TC,HCNC,FY

## 2020-06-25 PROCEDURE — 99024 PR POST-OP FOLLOW-UP VISIT: ICD-10-PCS | Mod: HCNC,S$GLB,, | Performed by: THORACIC SURGERY (CARDIOTHORACIC VASCULAR SURGERY)

## 2020-06-25 PROCEDURE — 93005 ELECTROCARDIOGRAM TRACING: CPT | Mod: HCNC,S$GLB,, | Performed by: THORACIC SURGERY (CARDIOTHORACIC VASCULAR SURGERY)

## 2020-06-25 PROCEDURE — 99999 PR PBB SHADOW E&M-EST. PATIENT-LVL IV: ICD-10-PCS | Mod: PBBFAC,HCNC,, | Performed by: THORACIC SURGERY (CARDIOTHORACIC VASCULAR SURGERY)

## 2020-06-25 PROCEDURE — 99999 PR PBB SHADOW E&M-EST. PATIENT-LVL IV: CPT | Mod: PBBFAC,HCNC,, | Performed by: THORACIC SURGERY (CARDIOTHORACIC VASCULAR SURGERY)

## 2020-06-25 NOTE — PROGRESS NOTES
Patient seen and examined. Patient is progressively increasing activity. No significant complaints but does report mild depression and mild shortness of breath. She denies SI/HI. Family in room and agrees with statement. She feels like she should be doing better at this point. Patient was given verbal reassurance that she is doing great for the time frame. Of note, pts postoperatively developed a lower extremity DVT and was placed on Eliquis.     Sternum: stable, incision CDI  Chest xray: Acceptable post op chest  EKG: NSR     Assessment:  Operation Performed: Coronary artery bypass grafting x3, with left internal mammary artery to left anterior descending, saphenous vein graft as a sequenced graft to the second obtuse   marginal and to the fourth obtuse marginal     Plan:  Can begin driving   Can begin cardiac rehab 6 weeks after operation   We will refer to cardiology to assume care       No scheduled appointment, RTC prn    CTS Attending Note:    I have personally taken the history and examined this patient and agree with the RK's note as stated above.  She is doing well.  She was readmitted with a lower extremity DVT.  CT PA was negative for pulmonary emboli, but given the lower extremity findings we started anticoagulation.  She likely should remain on anticoagulation until 6 months after her diagnosis.

## 2020-06-25 NOTE — LETTER
June 25, 2020        Etelvina Lowery MD  [address]  [address]  [facility] [address]             [facility] - Cardiovascular Surg  [address]  [place] [address]  Phone: [phone]   Patient: Cecilia Barahona   MR Number: [id number]   YOB: 1950   Date of Visit: 6/25/2020       Dear Dr. Lowery:    Thank you for referring Ceciila Barahona to me for evaluation. Below are the relevant portions of my assessment and plan of care.            If you have questions, please do not hesitate to call me. I look forward to following Cecilia along with you.    Sincerely,      Yan Bowman MD           CC  No Recipients

## 2020-06-26 DIAGNOSIS — Z95.1 S/P CABG X 3: Primary | ICD-10-CM

## 2020-06-29 DIAGNOSIS — G25.81 RESTLESS LEGS SYNDROME (RLS): ICD-10-CM

## 2020-06-30 RX ORDER — EMPAGLIFLOZIN 10 MG/1
TABLET, FILM COATED ORAL
Qty: 30 TABLET | Refills: 3 | Status: SHIPPED | OUTPATIENT
Start: 2020-06-30 | End: 2020-09-25 | Stop reason: SDUPTHER

## 2020-06-30 RX ORDER — ROPINIROLE 0.5 MG/1
0.5 TABLET, FILM COATED ORAL NIGHTLY
Qty: 90 TABLET | Refills: 0 | Status: SHIPPED | OUTPATIENT
Start: 2020-06-30 | End: 2020-09-29

## 2020-07-06 ENCOUNTER — TELEPHONE (OUTPATIENT)
Dept: CARDIOTHORACIC SURGERY | Facility: CLINIC | Age: 70
End: 2020-07-06

## 2020-07-06 NOTE — TELEPHONE ENCOUNTER
Returned pts call, pt is reporting intermittent left sided lower back pain, which started 2 weeks ago. Pt currently using tylenol PRN.   Discussed with LORENZO Nuñez who recommends that patient try aleve PRN, push fluids.  Informed pt of the above, and encouraged her to remain active and walk, and reminded her of lifting restrictions.    Instructed pt to call with any other concerns    ----- Message from Kayla William sent at 7/6/2020  9:49 AM CDT -----  T is calling to speak with her nurse about her past surgery. Pt has some questions for the nurse and would like for the nurse to give her a call back

## 2020-07-16 DIAGNOSIS — Z95.1 S/P CABG X 3: Primary | ICD-10-CM

## 2020-07-22 ENCOUNTER — PES CALL (OUTPATIENT)
Dept: ADMINISTRATIVE | Facility: CLINIC | Age: 70
End: 2020-07-22

## 2020-07-22 ENCOUNTER — TELEPHONE (OUTPATIENT)
Dept: CARDIAC REHAB | Facility: CLINIC | Age: 70
End: 2020-07-22

## 2020-07-28 DIAGNOSIS — J30.9 ALLERGIC RHINITIS, UNSPECIFIED SEASONALITY, UNSPECIFIED TRIGGER: ICD-10-CM

## 2020-07-29 RX ORDER — LORATADINE 10 MG/1
TABLET ORAL
Qty: 30 TABLET | Refills: 2 | Status: SHIPPED | OUTPATIENT
Start: 2020-07-29 | End: 2020-09-30 | Stop reason: SDUPTHER

## 2020-08-04 ENCOUNTER — PATIENT OUTREACH (OUTPATIENT)
Dept: ADMINISTRATIVE | Facility: OTHER | Age: 70
End: 2020-08-04

## 2020-08-04 DIAGNOSIS — Z12.31 BREAST CANCER SCREENING BY MAMMOGRAM: Primary | ICD-10-CM

## 2020-08-04 NOTE — PROGRESS NOTES
Requested updates within Care Everywhere.  Patient's chart was reviewed for overdue RACHAEL topics.  Immunizations reconciled.    Orders placed: mammogram

## 2020-08-06 ENCOUNTER — OFFICE VISIT (OUTPATIENT)
Dept: PSYCHIATRY | Facility: CLINIC | Age: 70
End: 2020-08-06
Payer: MEDICARE

## 2020-08-06 ENCOUNTER — TELEPHONE (OUTPATIENT)
Dept: PSYCHIATRY | Facility: CLINIC | Age: 70
End: 2020-08-06

## 2020-08-06 VITALS
BODY MASS INDEX: 32.6 KG/M2 | HEIGHT: 63 IN | SYSTOLIC BLOOD PRESSURE: 138 MMHG | WEIGHT: 184 LBS | HEART RATE: 70 BPM | DIASTOLIC BLOOD PRESSURE: 76 MMHG

## 2020-08-06 DIAGNOSIS — F41.1 GENERALIZED ANXIETY DISORDER: ICD-10-CM

## 2020-08-06 DIAGNOSIS — Z03.818 ENCOUNTER FOR OBSERVATION FOR SUSPECTED EXPOSURE TO OTHER BIOLOGICAL AGENTS RULED OUT: ICD-10-CM

## 2020-08-06 DIAGNOSIS — Z95.1 S/P CABG (CORONARY ARTERY BYPASS GRAFT): Primary | ICD-10-CM

## 2020-08-06 DIAGNOSIS — E11.40 TYPE 2 DIABETES, CONTROLLED, WITH NEUROPATHY: ICD-10-CM

## 2020-08-06 DIAGNOSIS — I25.10 ATHEROSCLEROSIS OF NATIVE CORONARY ARTERY OF NATIVE HEART WITHOUT ANGINA PECTORIS: ICD-10-CM

## 2020-08-06 DIAGNOSIS — F33.2 SEVERE EPISODE OF RECURRENT MAJOR DEPRESSIVE DISORDER, WITHOUT PSYCHOTIC FEATURES: ICD-10-CM

## 2020-08-06 PROCEDURE — 3078F PR MOST RECENT DIASTOLIC BLOOD PRESSURE < 80 MM HG: ICD-10-PCS | Mod: HCNC,CPTII,S$GLB, | Performed by: PSYCHIATRY & NEUROLOGY

## 2020-08-06 PROCEDURE — 3078F DIAST BP <80 MM HG: CPT | Mod: HCNC,CPTII,S$GLB, | Performed by: PSYCHIATRY & NEUROLOGY

## 2020-08-06 PROCEDURE — 3075F SYST BP GE 130 - 139MM HG: CPT | Mod: HCNC,CPTII,S$GLB, | Performed by: PSYCHIATRY & NEUROLOGY

## 2020-08-06 PROCEDURE — 3008F BODY MASS INDEX DOCD: CPT | Mod: HCNC,CPTII,S$GLB, | Performed by: PSYCHIATRY & NEUROLOGY

## 2020-08-06 PROCEDURE — 1101F PT FALLS ASSESS-DOCD LE1/YR: CPT | Mod: HCNC,CPTII,S$GLB, | Performed by: PSYCHIATRY & NEUROLOGY

## 2020-08-06 PROCEDURE — 1159F MED LIST DOCD IN RCRD: CPT | Mod: HCNC,S$GLB,, | Performed by: PSYCHIATRY & NEUROLOGY

## 2020-08-06 PROCEDURE — 99214 OFFICE O/P EST MOD 30 MIN: CPT | Mod: HCNC,S$GLB,, | Performed by: PSYCHIATRY & NEUROLOGY

## 2020-08-06 PROCEDURE — 1126F PR PAIN SEVERITY QUANTIFIED, NO PAIN PRESENT: ICD-10-PCS | Mod: HCNC,S$GLB,, | Performed by: PSYCHIATRY & NEUROLOGY

## 2020-08-06 PROCEDURE — 3075F PR MOST RECENT SYSTOLIC BLOOD PRESS GE 130-139MM HG: ICD-10-PCS | Mod: HCNC,CPTII,S$GLB, | Performed by: PSYCHIATRY & NEUROLOGY

## 2020-08-06 PROCEDURE — 90836 PR PSYCHOTHERAPY W/PATIENT W/E&M, 45 MIN (ADD ON): ICD-10-PCS | Mod: HCNC,S$GLB,, | Performed by: PSYCHIATRY & NEUROLOGY

## 2020-08-06 PROCEDURE — 1159F PR MEDICATION LIST DOCUMENTED IN MEDICAL RECORD: ICD-10-PCS | Mod: HCNC,S$GLB,, | Performed by: PSYCHIATRY & NEUROLOGY

## 2020-08-06 PROCEDURE — 1101F PR PT FALLS ASSESS DOC 0-1 FALLS W/OUT INJ PAST YR: ICD-10-PCS | Mod: HCNC,CPTII,S$GLB, | Performed by: PSYCHIATRY & NEUROLOGY

## 2020-08-06 PROCEDURE — 99999 PR PBB SHADOW E&M-EST. PATIENT-LVL V: CPT | Mod: PBBFAC,HCNC,, | Performed by: PHYSICIAN ASSISTANT

## 2020-08-06 PROCEDURE — 90836 PSYTX W PT W E/M 45 MIN: CPT | Mod: HCNC,S$GLB,, | Performed by: PSYCHIATRY & NEUROLOGY

## 2020-08-06 PROCEDURE — 99214 PR OFFICE/OUTPT VISIT, EST, LEVL IV, 30-39 MIN: ICD-10-PCS | Mod: HCNC,S$GLB,, | Performed by: PSYCHIATRY & NEUROLOGY

## 2020-08-06 PROCEDURE — 3008F PR BODY MASS INDEX (BMI) DOCUMENTED: ICD-10-PCS | Mod: HCNC,CPTII,S$GLB, | Performed by: PSYCHIATRY & NEUROLOGY

## 2020-08-06 PROCEDURE — 99999 PR PBB SHADOW E&M-EST. PATIENT-LVL V: ICD-10-PCS | Mod: PBBFAC,HCNC,, | Performed by: PHYSICIAN ASSISTANT

## 2020-08-06 PROCEDURE — 1126F AMNT PAIN NOTED NONE PRSNT: CPT | Mod: HCNC,S$GLB,, | Performed by: PSYCHIATRY & NEUROLOGY

## 2020-08-06 RX ORDER — CHLORTHALIDONE 25 MG/1
25 TABLET ORAL DAILY
COMMUNITY
Start: 2020-08-04 | End: 2020-09-01 | Stop reason: SDUPTHER

## 2020-08-06 RX ORDER — FLUOXETINE HYDROCHLORIDE 40 MG/1
40 CAPSULE ORAL DAILY
Qty: 90 CAPSULE | Refills: 1 | Status: SHIPPED | OUTPATIENT
Start: 2020-08-06 | End: 2020-12-10 | Stop reason: SDUPTHER

## 2020-08-06 NOTE — TELEPHONE ENCOUNTER
Called patient to schedule 1 month follow up with LORENZO Doan. LVM to ask that she please call us back to schedule.

## 2020-08-06 NOTE — PATIENT INSTRUCTIONS
[facility] - DEPARTMENT OF PSYCHIATRY   NEW PATIENT ORIENTATION INFORMATION  OUTPATIENT SERVICES COUNSELING CONTRACT    We appreciate the opportunity to participate in your medical care and hope the following protocols will make it easier for you to receive quality treatment in our department.    1. PUNCTUALITY: Your appointment is scheduled for a fixed amount of time reserved especially for you.  To get the benefit of your appointment, please arrive early enough to allow time for parking and registration.  If you are late for your appointment, your clinician is not able to offer additional time.  Please make every effort to be on time.  You may be asked to reschedule.    2. PAYMENT FOR SERVICES:   Payments are expected at the time of service.  Please contact [phone] if you need to resolve issues involving your account at [facility] or to set up a payment plan.    3. CANCELLATION / MISSED APPOINTMENTS:   In order to receive quality care, all appointments must be kept.  Appointment may be cancelled, ONLY by talking with an  at phone number [phone], between 8:00 a.m. and 5:00 p.m., Monday through Friday, at least 24 hours before your appointment time.  Your clinician reserves this time specifically for you, and if you will be unable to use it, it is necessary that you cancel in a timely manner.  If you do not give at least 24-hour notice of cancellation a fee may be assessed.  Please note that insurance does not cover no-show charges, so you will be billed directly.  If you are consistently late, cancel, or do not show for your appointments, our department reserves the right to terminate treatment.    4. CALLING THE DEPARTMENT:   MESSAGES, SCHEDULE OR CANCEL APPOINTMENTS- In general you can reach the department by calling [phone], between 8:00 a.m. and 5:00 p.m., Monday through Friday, to schedule or cancel appointments or leave a message for your clinician.  It is  advisable to schedule your visits far in advance to obtain the most convenient times for your appointments.   AFTER HOURS, WEEKEND OR HOLIDAYS- For urgent questions after hours, weekends and holidays, calling the department number [phone] will connect you to the [facility] On [facility] nursing staff or the Psychiatry Inpatient Unit.  The [facility] On [facility] nursing staff will speak with you and direct your call/care as necessary.   EMERGENCY-  In case of a crisis when there is a concern of harm to self or others, call 911 or the office [phone] between 8:00 a.m. and 5:00 p.m., Monday through Friday.  After hours, weekends or holidays, please call 911 or go to the Emergency Department where you can be thoroughly evaluated by a physician.    5. TEAM APPROACH:  Most patients receive therapy through our team system.  In the team system, your primary therapist will be a , psychologist, psychiatry resident or psychiatrist.  If your therapist is a , psychologist or psychiatry resident, please contact your primary therapist first in matters other than medications or acute medical problems.    6. PRESCRIPTION REFILLS:  Prescription refills must be done at your physician office visit.  You will be given a sufficient number of refills to last one extra month beyond your next appointment.  No additional refills will be approved beyond the original treatment plan.  After hours, sufficient medication may be approved to last until the next scheduled appointment. After hours requests for refills on controlled substances will be declined by the psychiatrist on call, as he or she may not be familiar with your case.  Please work closely with your doctor so that you have sufficient medication until your next appointment.  Again, please note that no additional prescriptions will be approved per patient request over the phone.   No additional refills will be approved beyond the original treatment plan.   In  "certain exceptional situations, a phone consult appointment may be arranged, with appropriate charge, for the review and approval of prescription refills to last until the next scheduled appointment.    7. FOLLOW UP APPOINTMENTS:  Follow-up appointments can be made in person at the [facility] Psychiatry office, or by calling [phone], from 8am to 5pm, between Monday and Friday.  It is advisable to schedule your office visits far enough in advance to obtain the most convenient times for your appointments.    Revised Feb. 23, 2010 - F/OA1/Newpatient                You have been provided with a certain amount of medication with a specified number of refills.  Please follow up within an adequate time before you run out of medications.    REFILLS FOR CONTROLLED SUBSTANCES WILL NOT BE GIVEN WITHOUT AN APPOINTMENT.  I will not honor or fill automated refill requests from pharmacies.  You must come in for an appointment to get refills.        Please book your next appointment for myself or therapist by phone by calling our office at [phone].        Note that follow up appointments are 10-15 minutes long.  It is important that we focus on medication management.  Should you need therapy, please get set up with our therapist or call your insurance company to find out which therapists are available in your area.      PLEASE BE AT LEAST 15 MINUTES EARLY FOR YOUR NEXT APPOINTMENT.  Late arrivals WILL BE TURNED AWAY AND ASKED TO RESCHEDULE.  YOU MUST COME EARLY TO ALLOW TIME FOR CHECK-IN AS WELL AS GET YOUR VITAL SIGNS AND GO OVER YOUR MEDICATIONS.  Tardiness is not fair to the patients who present after you and are on time for their appointments.  It causes a delay in the appointments for patients and staff.  YOU MAY ALSO BE DISCHARGED FROM CLINIC with multiple late arrivals or "No Show" appointments.   "     -----------------------------------------------------------------------------------------------------------------  IF YOU FEEL SUICIDAL OR HAVING THOUGHTS OR PLANS TO HURT YOURSELF OR OTHERS, CALL 911 OR REPORT TO THE NEAREST EMERGENCY ROOM.  YOU CAN ALSO ACCESS THE FOLLOWING HOTLINE:    National Suicide Hotline Number 1-638-397-TALK (8520)

## 2020-08-06 NOTE — PROGRESS NOTES
Outpatient Psychiatry Follow-Up Visit (MD/NP/LINDA)    8/6/2020    Clinical Status of Patient:  Outpatient (Ambulatory)    Chief Complaint:  Cecilia Barahona is a 70 y.o. female who presents today for follow-up of depression and anxiety.  Met with patient.      Interval History and Content of Current Session:  Interim Events/Subjective Report/Content of Current Session:  Ms. Membreno presents today for follow-up of depression anxiety.  Her chief complaint today is nightmares, centering around her mother and father and their deaths.  Patient has a long history of emotional trauma, beginning as a teenager when she had her best friend and her father were found having an affair.  They went on to have 2 children together, and her father insisted that they all remain in the patient's life.  Patient reports she  her  at the age of 17 when he was 25 after she became pregnant with his baby.  States that after many years of marriage he told her he never loved her and that she was just a piece of ask to him.  Patient states she has so much resentment in her life from various sources she feels overwhelmed by resentment.  Has a daughter and a son, does not get along with either very well.  Now has moved out of her 's house and is living in a house alone, and states she has no functioning relationships.  States the nightmares started when she ran out of fluoxetine 40 mg a began taking fluoxetine 10 mg 2 per day.    Psychotherapy:  · Target symptoms: depression, anxiety , resentment of family members  · Why chosen therapy is appropriate versus another modality: relevant to diagnosis, patient responds to this modality  · Outcome monitoring methods: self-report  · Therapeutic intervention type: insight oriented psychotherapy, supportive psychotherapy  · Topics discussed/themes: parenting issues, illness/death of a loved one, difficulty managing affect in interpersonal relationships  · The patient's response to  "the intervention is accepting. The patient's progress toward treatment goals is good, fair.   · Duration of intervention: 45 minutes.    Review of Systems   · PSYCHIATRIC: Pertinant items are noted in the narrative.  · CONSTITUTIONAL: No weight gain or loss.   · MUSCULOSKELETAL: No pain or stiffness of the joints.    Past Medical, Family and Social History: The patient's past medical, family and social history have been reviewed and updated as appropriate within the electronic medical record - see encounter notes.    Compliance: yes    Side effects: None    Risk Parameters:  Patient reports no suicidal ideation  Patient reports no homicidal ideation  Patient reports no self-injurious behavior  Patient reports no violent behavior    Exam (detailed: at least 9 elements; comprehensive: all 15 elements)   Constitutional  Vitals:  Most recent vital signs, dated less than 90 days prior to this appointment, were reviewed.   Vitals:    08/06/20 1300   BP: 138/76   Pulse: 70   Weight: 83.4 kg (183 lb 15.6 oz)   Height: 5' 3" (1.6 m)        General:  unremarkable, age appropriate, overweight     Musculoskeletal  Muscle Strength/Tone:  no tremor, no tic   Gait & Station:  non-ataxic     Psychiatric  Speech:  no latency; no press   Mood & Affect:  steady  congruent and appropriate   Thought Process:  normal and logical   Associations:  intact   Thought Content:  normal, no suicidality, no homicidality, delusions, or paranoia   Insight:  limited awareness of illness   Judgement: behavior is adequate to circumstances   Orientation:  grossly intact   Memory: intact for content of interview   Language: grossly intact   Attention Span & Concentration:  able to focus   Fund of Knowledge:  intact and appropriate to age and level of education     Assessment and Diagnosis   Status/Progress: Based on the examination today, the patient's problem(s) is/are inadequately controlled.  New problems have not been presented today.   Diagnostic " uncertainty are not complicating management of the primary condition.  There are no active rule-out diagnoses for this patient at this time.     General Impression: Sub-optimally controlled depression and anxiety      ICD-10-CM ICD-9-CM   1. Severe episode of recurrent major depressive disorder, without psychotic features  F33.2 296.33   2. Generalized anxiety disorder  F41.1 300.02     1. Increasing fluoxetine back up to 40mg daily  2. Discussed further treatment specific to nightmares, decided to wait on this for now  3. F/u 4m      Intervention/Counseling/Treatment Plan   · Medication Management: The risks and benefits of medication were discussed with the patient.      Return to Clinic: 1 month

## 2020-08-13 NOTE — PROGRESS NOTES
HISTORY: S/P CABG (5-), CAD, HTN, HLP, DM, EF=75%(5-)    ANTHROPOMETRICS:     PRE   Abdominal girth (in) 43   Height (in) 63   Weight (lbs) 184   BMI 32.6   % Body Fat 27.9%       EXERCISE RESULTS:     PRE   Peak VO2 (CPX only) 12.4   Actual METS (CPX only) 3.5   Estimated METS 5.0       LAB RESULTS:    Lab Results   Component Value Date    MPV 10.1 08/18/2020       Lab Results   Component Value Date    CHOL 161 08/18/2020     Lab Results   Component Value Date    HDL 46 08/18/2020     Lab Results   Component Value Date    LDLCALC 81.8 08/18/2020     Lab Results   Component Value Date    TRIG 166 (H) 08/18/2020     Lab Results   Component Value Date    CHOLHDL 28.6 08/18/2020       Lab Results   Component Value Date    GLUF 92 08/18/2020     Lab Results   Component Value Date    HGBA1C 6.3 (H) 08/18/2020        Lab Results   Component Value Date    HSCRP 1.75 08/18/2020         CHITO SCORES:     PRE   Anxiety 3   Depression 2   Somatic 1   Hostility 0     SF-36 SCORES:     PRE   Physical Function 22   Social Function 11   Mental Health 26   Pain 8   Change in Health 4   Physical Role Limitation 4   Mental Role Limitation 3   Energy/Fatigue 17   Health Perceptions 21   Total Score 116     PHQ-9:     PRE   PHQ-9 10       EDUCATION SCORES:     PRE   Education Score 50

## 2020-08-14 ENCOUNTER — TELEPHONE (OUTPATIENT)
Dept: CARDIOLOGY | Facility: CLINIC | Age: 70
End: 2020-08-14

## 2020-08-14 NOTE — TELEPHONE ENCOUNTER
Reached out to patient. September 1st is the next available appointment to see Dr. Lowery. Told her to call her surgeon regarding her skin keloid. Patient verbalize understanding.      ----- Message from Rosemary Salinas sent at 8/14/2020  1:17 PM CDT -----  Regarding: Call Back  Name of Who is Calling : ZHEN CARRANZA [[id number]]    Patient is requesting a call from staff in regards to being seen sooner states she is still numb on her left side and her incision has started to keloid.  .....Please contact to further discuss and advise.    Can the clinic reply by [facility] : No    What Number to Call Back :  [phone]

## 2020-08-17 ENCOUNTER — PATIENT OUTREACH (OUTPATIENT)
Dept: ADMINISTRATIVE | Facility: OTHER | Age: 70
End: 2020-08-17

## 2020-08-17 ENCOUNTER — TELEPHONE (OUTPATIENT)
Dept: CARDIOTHORACIC SURGERY | Facility: CLINIC | Age: 70
End: 2020-08-17

## 2020-08-17 NOTE — TELEPHONE ENCOUNTER
Pt concerned about sternal incision and requesting an appt with Dr Bowman.  Recently developed a keloid and having intermittent sternal incision pain.   Also reporting intermittent left breast numbness.    Appt scheduled for pt to see Dr Bowman 8/18.

## 2020-08-17 NOTE — PROGRESS NOTES
Chart reviewed.   Immunizations: updated  Orders placed: n/a  Upcoming appts to satisfy RACHAEL topics: Hemoglobin A1c 9/16

## 2020-08-17 NOTE — TELEPHONE ENCOUNTER
----- Message from Gail Peters sent at 8/17/2020  3:14 PM CDT -----  Contact: Patient Called [phone]  Patient states that she has numbness and pain in surgical area.  Also she has a keloid that has formed.  Patient is wanting to know if she will be able to see doctor on tomorrow.  She will be at hospital on tomorrow morning for two apts

## 2020-08-18 ENCOUNTER — OFFICE VISIT (OUTPATIENT)
Dept: CARDIOTHORACIC SURGERY | Facility: CLINIC | Age: 70
End: 2020-08-18
Payer: MEDICARE

## 2020-08-18 ENCOUNTER — CLINICAL SUPPORT (OUTPATIENT)
Dept: TRANSPLANT | Facility: CLINIC | Age: 70
End: 2020-08-18
Payer: MEDICARE

## 2020-08-18 ENCOUNTER — HOSPITAL ENCOUNTER (OUTPATIENT)
Dept: CARDIOLOGY | Facility: HOSPITAL | Age: 70
Discharge: HOME | End: 2020-08-18
Attending: THORACIC SURGERY (CARDIOTHORACIC VASCULAR SURGERY)
Payer: MEDICARE

## 2020-08-18 VITALS
BODY MASS INDEX: 32.6 KG/M2 | HEIGHT: 63 IN | DIASTOLIC BLOOD PRESSURE: 87 MMHG | HEART RATE: 58 BPM | HEART RATE: 57 BPM | TEMPERATURE: 98 F | SYSTOLIC BLOOD PRESSURE: 162 MMHG | DIASTOLIC BLOOD PRESSURE: 83 MMHG | WEIGHT: 182.31 LBS | SYSTOLIC BLOOD PRESSURE: 164 MMHG | BODY MASS INDEX: 32.3 KG/M2 | HEIGHT: 63 IN | OXYGEN SATURATION: 99 % | WEIGHT: 184 LBS

## 2020-08-18 DIAGNOSIS — I25.10 ATHEROSCLEROSIS OF NATIVE CORONARY ARTERY OF NATIVE HEART WITHOUT ANGINA PECTORIS: ICD-10-CM

## 2020-08-18 DIAGNOSIS — Z13.9 SCREENING FOR UNSPECIFIED CONDITION: Primary | ICD-10-CM

## 2020-08-18 DIAGNOSIS — Z95.1 S/P CABG (CORONARY ARTERY BYPASS GRAFT): ICD-10-CM

## 2020-08-18 DIAGNOSIS — Z95.1 S/P CABG X 3: Primary | ICD-10-CM

## 2020-08-18 LAB
CV STRESS BASE HR: 57 BPM
DIASTOLIC BLOOD PRESSURE: 87 MMHG
OHS CV CPX 1 MINUTE RECOVERY HEART RATE: 91 BPM
OHS CV CPX 85 PERCENT MAX PREDICTED HEART RATE MALE: 123
OHS CV CPX ABDOMINAL GIRTH: 43 CM
OHS CV CPX ANAEROBIC THRESHOLD DIASTOLIC BLOOD PRESSURE: 76 MMHG
OHS CV CPX ANAEROBIC THRESHOLD HEART RATE: 82
OHS CV CPX ANAEROBIC THRESHOLD RATE PRESSURE PRODUCT: NORMAL
OHS CV CPX ANAEROBIC THRESHOLD SYSTOLIC BLOOD PRESSURE: 161
OHS CV CPX DATA GRADE - AT: 1.9
OHS CV CPX DATA GRADE - PEAK: 7.2
OHS CV CPX DATA O2 SAT - PEAK: 95
OHS CV CPX DATA O2 SAT - REST: 96
OHS CV CPX DATA SPEED - AT: 1.9
OHS CV CPX DATA SPEED - PEAK: 2.4
OHS CV CPX DATA TIME - AT: 2.25
OHS CV CPX DATA TIME - PEAK: 3.38
OHS CV CPX DATA VE/VCO2 - AT: 31
OHS CV CPX DATA VE/VCO2 - PEAK: 29
OHS CV CPX DATA VE/VO2 - AT: 27
OHS CV CPX DATA VE/VO2 - PEAK: 33
OHS CV CPX DATA VO2 - AT: 10
OHS CV CPX DATA VO2 - PEAK: 12.4
OHS CV CPX DATA VO2 - REST: 3.7
OHS CV CPX ESTIMATED METS: 5
OHS CV CPX FEV1/FVC: 0.84
OHS CV CPX FORCED EXPIRATORY VOLUME: 1.57
OHS CV CPX FORCED VITAL CAPACITY (FVC): 1.87
OHS CV CPX HIGHEST VO: 16.9
OHS CV CPX MAX PREDICTED HEART RATE: 145
OHS CV CPX MAXIMAL VOLUNTARY VENTILATION (MVV) PREDICTED: 62.8
OHS CV CPX MAXIMAL VOLUNTARY VENTILATION (MVV): 58
OHS CV CPX MAXIUMUM EXERCISE VENTILATION (VE MAX): 35.8
OHS CV CPX PATIENT AGE: 69
OHS CV CPX PATIENT HEIGHT IN: 63
OHS CV CPX PATIENT IS FEMALE AGE 11-19: 0
OHS CV CPX PATIENT IS FEMALE AGE GREATER THAN 19: 1
OHS CV CPX PATIENT IS FEMALE AGE LESS THAN 11: 0
OHS CV CPX PATIENT IS FEMALE: 1
OHS CV CPX PATIENT IS MALE AGE 11-25: 0
OHS CV CPX PATIENT IS MALE AGE GREATER THAN 25: 0
OHS CV CPX PATIENT IS MALE AGE LESS THAN 11: 0
OHS CV CPX PATIENT IS MALE GREATER THAN 18: 0
OHS CV CPX PATIENT IS MALE LESS THAN OR EQUAL TO 18: 0
OHS CV CPX PATIENT IS MALE: 0
OHS CV CPX PATIENT WEIGHT RETURNED IN OZ: 2944
OHS CV CPX PEAK DIASTOLIC BLOOD PRESSURE: 74 MMHG
OHS CV CPX PEAK HEAR RATE: 95 BPM
OHS CV CPX PEAK RATE PRESSURE PRODUCT: NORMAL
OHS CV CPX PEAK SYSTOLIC BLOOD PRESSURE: 173 MMHG
OHS CV CPX PERCENT BODY FAT: 27.9
OHS CV CPX PERCENT MAX PREDICTED HEART RATE ACHIEVED: 65
OHS CV CPX PREDICTED VO2: 16.9 ML/KG/MIN
OHS CV CPX RATE PRESSURE PRODUCT PRESENTING: 9234
OHS CV CPX REST PET CO2: 34
OHS CV CPX VE/VCO2 SLOPE: 28.6
SARS-COV-2 RDRP RESP QL NAA+PROBE: NEGATIVE
STRESS ECHO POST EXERCISE DUR MIN: 3 MINUTES
STRESS ECHO POST EXERCISE DUR SEC: 23 SECONDS
SYSTOLIC BLOOD PRESSURE: 162 MMHG

## 2020-08-18 PROCEDURE — 99999 PR PBB SHADOW E&M-EST. PATIENT-LVL V: CPT | Mod: PBBFAC,HCNC,, | Performed by: THORACIC SURGERY (CARDIOTHORACIC VASCULAR SURGERY)

## 2020-08-18 PROCEDURE — 94621 CARDIOPULM EXERCISE TESTING: CPT | Mod: 26,HCNC,, | Performed by: INTERNAL MEDICINE

## 2020-08-18 PROCEDURE — 94621 CARDIOPULMONARY EXERCISE TESTING (CUPID ONLY): ICD-10-PCS | Mod: 26,HCNC,, | Performed by: INTERNAL MEDICINE

## 2020-08-18 PROCEDURE — 94621 CARDIOPULM EXERCISE TESTING: CPT | Mod: HCNC

## 2020-08-18 PROCEDURE — U0002 COVID-19 LAB TEST NON-CDC: HCPCS | Mod: HCNC

## 2020-08-18 PROCEDURE — 99024 PR POST-OP FOLLOW-UP VISIT: ICD-10-PCS | Mod: HCNC,S$GLB,, | Performed by: THORACIC SURGERY (CARDIOTHORACIC VASCULAR SURGERY)

## 2020-08-18 PROCEDURE — 99999 PR PBB SHADOW E&M-EST. PATIENT-LVL V: ICD-10-PCS | Mod: PBBFAC,HCNC,, | Performed by: THORACIC SURGERY (CARDIOTHORACIC VASCULAR SURGERY)

## 2020-08-18 PROCEDURE — 99024 POSTOP FOLLOW-UP VISIT: CPT | Mod: HCNC,S$GLB,, | Performed by: THORACIC SURGERY (CARDIOTHORACIC VASCULAR SURGERY)

## 2020-08-18 NOTE — PROGRESS NOTES
Patient seen and examined. Patient is progressively increasing activity and starts cardiac rehab this week. No significant complaints except of left breast numbness.  She also reports that her sternotomy scar is starting to keloid.      Sternum: stable, incision CDI     Assessment:  Coronary artery bypass grafting x3, with left internal mammary artery to left anterior descending, saphenous vein graft as a sequenced graft to the second obtuse   marginal and to the fourth obtuse marginal     Plan:  No scheduled appointment, RTC prn      CTS Attending Note:    I have personally taken the history and examined this patient and agree with the RK's note as stated above.  I reassured her that breast numbness is normal after utilization of the left internal mammary artery for bypass, and that her skin sensation will return to normal over time.  I did inform her that it takes quite some time for nerves to heal, but that eventually her sensation would return to normal.  She has a well healed sternotomy incision.

## 2020-08-19 ENCOUNTER — TELEPHONE (OUTPATIENT)
Dept: CARDIAC REHAB | Facility: CLINIC | Age: 70
End: 2020-08-19

## 2020-08-19 NOTE — TELEPHONE ENCOUNTER
I returned Miss Brooks's call for directions to the [facility].  She was reminded to bring her questionnaire with her tomorrow for her orientation.  She stated understanding.

## 2020-08-20 ENCOUNTER — CLINICAL SUPPORT (OUTPATIENT)
Dept: CARDIAC REHAB | Facility: CLINIC | Age: 70
End: 2020-08-20
Payer: MEDICARE

## 2020-08-20 VITALS
OXYGEN SATURATION: 96 % | SYSTOLIC BLOOD PRESSURE: 142 MMHG | RESPIRATION RATE: 18 BRPM | DIASTOLIC BLOOD PRESSURE: 78 MMHG | HEART RATE: 69 BPM

## 2020-08-20 DIAGNOSIS — Z95.1 S/P CABG X 3: ICD-10-CM

## 2020-08-20 PROCEDURE — 93798 PHYS/QHP OP CAR RHAB W/ECG: CPT | Mod: HCNC,S$GLB,, | Performed by: INTERNAL MEDICINE

## 2020-08-20 PROCEDURE — 99999 PR PBB SHADOW E&M-EST. PATIENT-LVL V: ICD-10-PCS | Mod: PBBFAC,HCNC,,

## 2020-08-20 PROCEDURE — 99999 PR PBB SHADOW E&M-EST. PATIENT-LVL V: CPT | Mod: PBBFAC,HCNC,,

## 2020-08-20 PROCEDURE — 93798 PR CARDIAC REHAB/MONITOR: ICD-10-PCS | Mod: HCNC,S$GLB,, | Performed by: INTERNAL MEDICINE

## 2020-08-20 NOTE — PROGRESS NOTES
Exercise:  I met with Miss Brooks for orientation to Phase II cardiac rehab.  All consent forms were signed, entry cardiorespiratory exercise stress test (CPX) results were reviewed, proper attire and shoes were discussed, and range of motion with strength assessment was performed.         An estimated MET Level of 5.0 , a Peak VO2 of 12.4ml/kg/min, and actual METs of 3.5 were measured during entry CPX.  These results place the patient in the high risk category. The entry CPX test results also included weight (184 lb), abdominal girth (43.0 in), BMI (32.6), and body composition (27.9%).   At exit, an estimated MET Level of 6.5 will be desired to achieve the goal of a 30% improvement.     Based on the entry CPX test, the target heart range during aerobic exercise for Miss Brooks is 77 to 89 bpm.      She will attend cardiac rehab class 3 times per week which will include aerobic exercise, resistance training, and stretching that will be modified to fit limitations.  Aerobic exercise will be 30-60 minutes per day with a goal intensity of 12-15 on the Rate of Perceived Exertion (RPE) scale.  Resistance training will incorporate 1 to 2 sets of 10 to 15 repetitions with free weights.  A beginning weight of 3 to 5 pounds will be used based on strength and range of motion assessment.    There were no limitations noted by the patient.  However, she stated she has low back pain on occasion especially in the evening after housework.      Miss Brooks stated she is currently not exercising aerobically.  Participation in an aerobic exercise program was encouraged at least two additional days per week for at least 30 minutes per day outside of attending cardiac rehab class 3 days per week.  She stated understanding.    Miss Brooks will begin Cardiac Rehab on Wednesday, August 26 at 2:15pm .    The exercise prescription will be adjusted based on tolerance of exercise intensity by patient.    Angelica Burnham., CEP

## 2020-08-20 NOTE — PROGRESS NOTES
Patient here for Phase II Cardiac Rehab orientation.     BP (!) 142/78   Pulse 69   Resp 18   SpO2 96%     ASSESSMENT:  Heart Sounds: regular rate and rhythm  Prosthetic Valve: No  Lung Sounds: clear to auscultation bilaterally  Capillary Refill: normal  Left Radial Pulse: Normal (+2)  Right Radial Pulse: Normal (+2)  Left Pedal Pulse: Normal (+2)  Right Pedal Pulse: Normal (+2)  Right Edema: none  Left Edema none  Strength: normal  Range of Motion: full range of motion  Existing Limitations:N/A    Site   [] Arthritis, bursitis    [] Amputation, atrophy    [] Other:    []     Diabetic patient's foot examination comments: Normal -  Bilateral  Incisional site: healing well however slightly raised    QOL:  Discussed Scout, SF36, and PHQ-9 Questionnaire scores with patient.  Patient states she feels she has anxiety due to her health and caring for her . Pt is in therapy with psychiatry. Pt verbalized possibly seeking counseling to help with her anxiety. Pt given number to call for mental health department at [facility]. Patient has been instructed to notify staff if any more information is needed. Patient verbalizes understanding.    RISK FACTORS:  The following risk factors are present:  diabetes, nutrition, hyperlipidemia, hypertension, obesity, sedentary lifestyle, stress, exercise    Tobacco Use:former smoker  Quit in 2012  [] Non-smoker   [] Tobacco use in last 6 months - cigarettes, cigars, cigarillos, chew tobacco, and e-cigarettes   [] Education and counseling   [] Referral to smoking cessation   [] MD notified   [] [employer]      GOALS:  Patient has set the following goals:  Decrease cholesterol level  Increase exercise tolerance  Increase knowledge of CAD  Decrease blood pressure  Weight loss  Demonstrate accurate pulse taking  ID & manage personal areas of stress  Control diabetes by adjusting diet and exercise  Learn more about healthy eating    Discussed Cardiac Rehab program in depth with  patient.  Medication list updated per patient & marked as reviewed.  Patient has been instructed to notify staff of any problems while attending rehab (ie: chest pain, shortness of breath, lightheadedness, dizziness).  Patient has been instructed to monitor blood pressure readings outside of rehab & to keep a daily log of the readings.  Patient verbalizes understanding.    ROGE Johnson RN  Cardiac Rehab Nurse

## 2020-08-20 NOTE — PROGRESS NOTES
Cecilia Barahona, a 70 y.o. female, is here for nutrition counseling and education pertinent to her diagnosis of   1. S/P CABG x 3        Patient will be starting Phase II Cardiac Rehabilitation.     NUTRITION ASSESSMENT:    Anthropometrics:  · Height: 63 in  · Weight: 182 lbs  · BMI:  32.30  · Abdominal girth: 43 inches  · Body fat: 27.9%      Drug Allergies and Intolerances:  Review of patient's allergies indicates:   Allergen Reactions    Ace inhibitors Other (See Comments)     cough    Invokana [canagliflozin] Other (See Comments)     Throat and tongue swelling    Ozempic [semaglutide]      Nausea and constipation on 0.25 mg dose.        Food Allergies and Intolerances:  None    Past Medical History:  Past Medical History:   Diagnosis Date    Acute coronary syndrome 2020: Presents with unstable angina.    Allergic rhinitis, seasonal     Anxiety     Arthritis     CVA (cerebral vascular accident)     Depression     Glaucoma NEC     Hallucination     images out of corner of eye about a year ago    History of positive PPD - treated - remote past     Hx of psychiatric care     Hyperlipidemia LDL goal < 100     Hypertension     Nuclear sclerosis of both eyes 2019    Obesity     CHETNA (obstructive sleep apnea)     Psychiatric problem     Psychosis     Renal disorder     Sleep difficulties     Suicide attempt     about 30 years ago by overdose of pills    Therapy     Type II or unspecified type diabetes mellitus without mention of complication, uncontrolled        Past Surgical History:  Past Surgical History:   Procedure Laterality Date    CARPAL TUNNEL RELEASE       SECTION, CLASSIC      COLONOSCOPY N/A 3/5/2020    Procedure: COLONOSCOPY;  Surgeon: Wiliam Carrillo MD;  Location: [facility] ([address] [place] [address]);  Service: Endoscopy;  Laterality: N/A;  20 appt confirmed-rb  pt requested this date    CORONARY ARTERY BYPASS GRAFT (CABG) N/A 2020    Procedure:  CORONARY ARTERY BYPASS GRAFT (CABG) x3 ;  Surgeon: Yan Bowman MD;  Location: [facility] OR [address] [place] [address];  Service: Cardiothoracic;  Laterality: N/A;  CABG X3  Endoharvest time start 0906  Endoharvest time end 1003  Vein prep start 1004  Vein prep end 1048    LEFT HEART CATHETERIZATION N/A 5/20/2020    Procedure: HEART CATH-LEFT;  Surgeon: Etelvina Lowery MD;  Location: [facility] CATH LAB;  Service: Cardiology;  Laterality: N/A;    TOTAL ABDOMINAL HYSTERECTOMY      TRIGGER FINGER RELEASE         Medications:  Current Outpatient Medications   Medication Sig    acetaminophen (TYLENOL) 325 MG tablet Take 2 tablets (650 mg total) by mouth every 6 (six) hours as needed for Pain or Temperature greater than (or equal to 101 degree F).    albuterol 90 mcg/actuation inhaler Inhale 2 puffs into the lungs every 6 (six) hours as needed for Wheezing or Shortness of Breath. Rescue    alcohol swabs PadM Apply 1 each topically 3 (three) times daily. (Patient not taking: Reported on 8/18/2020)    apixaban (ELIQUIS) 2.5 mg Tab Take 1 tablet (2.5 mg total) by mouth 2 (two) times daily.    artificial tear ointment (ARTIFICIAL TEARS) Oint Place into both eyes every evening. (Patient not taking: Reported on 8/18/2020)    aspirin (ECOTRIN) 81 MG EC tablet Take 1 tablet (81 mg total) by mouth once daily.    atorvastatin (LIPITOR) 80 MG tablet Take 1 tablet (80 mg total) by mouth every evening.    azelastine (OPTIVAR) 0.05 % ophthalmic solution INSTILL 1 DROP IN EACH EYE TWICE DAILY    blood pressure monitor (BLOOD PRESSURE KIT) Kit 1 kit by Misc.(Non-Drug; Combo Route) route once daily.    blood-glucose meter,continuous (DEXCOM G6 ) Misc Use with dexcom G6 system    blood-glucose sensor (DEXCOM G6 SENSOR) Vanna Change sensor every 10 days    blood-glucose transmitter (DEXCOM G6 TRANSMITTER) Vanna Change every 3 months    chlorthalidone (HYGROTEN) 25 MG Tab     desoximetasone (TOPICORT) 0.05 % cream Apply topically 2 (two)  "times daily as needed.    flash glucose scanning reader (FREESTYLE ANAMARIA READER) Misc 1 Units by Misc.(Non-Drug; Combo Route) route before meals as needed.    FLUoxetine 40 MG capsule Take 1 capsule (40 mg total) by mouth once daily.    fluticasone (FLONASE) 50 mcg/actuation nasal spray 1 spray (50 mcg total) by Each Nare route once daily.    FREESTYLE ANAMARIA 14 DAY SENSOR Kit CHANGE SENSOR EVERY 14 DAYS    furosemide (LASIX) 20 MG tablet Take 2 tablets (40 mg total) by mouth 2 (two) times a day.    insulin degludec (TRESIBA FLEXTOUCH U-200) 200 unit/mL (3 mL) InPn Inject 24 Units into the skin once daily. Inject 24 units once daily    JARDIANCE 10 mg tablet TAKE 1 TABLET BY MOUTH EVERY DAY    lancets Misc Check blood glucose 4 times a day. Dispense accuchek meter or other meter preferred by insurance. Dx code e11.65    LANCING DEVICE WITH LANCETS Misc USE AS DIRECTED FOR DIABETIC TESTING    latanoprost 0.005 % ophthalmic solution PLACE 1 DROP IN EACH EYE EVERY EVENING    loratadine (CLARITIN) 10 mg tablet TAKE ONE TABLET BY MOUTH EVERY DAY AS NEEDED FOR ALLERGIES    melatonin (MELATIN) 3 mg tablet Take 2 tablets (6 mg total) by mouth nightly as needed for Insomnia.    metFORMIN (GLUCOPHAGE) 1000 MG tablet Take 1 tablet (1,000 mg total) by mouth 2 (two) times daily with meals. Resume Saturday, 6/5/2020    metoprolol tartrate (LOPRESSOR) 25 MG tablet Take 0.5 tablets (12.5 mg total) by mouth once daily.    multivitamin with minerals tablet Take 1 tablet by mouth once daily.    ondansetron (ZOFRAN-ODT) 8 MG TbDL Take 1 tablet (8 mg total) by mouth every 6 (six) hours as needed.    pantoprazole (PROTONIX) 40 MG tablet Take 1 tablet (40 mg total) by mouth once daily. (Patient not taking: Reported on 8/18/2020)    pen needle, diabetic (BD ULTRA-FINE LENY PEN NEEDLES) 32 gauge x 5/32" Ndle USE FIVE TIMES DAILY    polyethylene glycol (GLYCOLAX) 17 gram PwPk Take 17 g by mouth 2 (two) times daily as " needed.    potassium chloride SA (K-DUR,KLOR-CON) 20 MEQ tablet Take 2 tablets (40mg) by mouth  twice daily    rOPINIRole (REQUIP) 0.5 MG tablet Take 1 tablet (0.5 mg total) by mouth nightly. at bedtime.    senna-docusate 8.6-50 mg (PERICOLACE) 8.6-50 mg per tablet Take 1 tablet by mouth once daily.    TRULICITY 1.5 mg/0.5 mL pen injector INJECT 1.5 MG INTO THE SKIN EVERY 7 DAYS.     No current facility-administered medications for this visit.        Vitamins and Supplements:  None    Labs:  Labs reviewed with patient. Patient confirms she is taking Lipitor for cholesterol control.    Lab Results   Component Value Date    CHOL 161 08/18/2020     Lab Results   Component Value Date    HDL 46 08/18/2020     Lab Results   Component Value Date    LDLCALC 81.8 08/18/2020     Lab Results   Component Value Date    TRIG 166 (H) 08/18/2020     Lab Results   Component Value Date    CHOLHDL 28.6 08/18/2020         Lab Results   Component Value Date    GLUF 92 08/18/2020     Lab Results   Component Value Date    HGBA1C 6.3 (H) 08/18/2020       Nutrition/Diet History:  Patient eats 2-3 meals daily.  Seasons food with Rosendo and Season-All.  Admits to use of a salt shaker at the table on prepared foods. Dines out only very seldom at this time.  Chooses fried foods 1 times per week.  Chooses fish 1 times per week, but states she doesn't like or eat baked fish, only fried.  Beverages:    Alcohol: nonsmoker    24 Hour Recall:  · Breakfast: eggs/grits-but seldom has this  · Lunch:sandwich  · Dinner:sandwich  · Other: fruit    Typical Week:  · Breakfast: not a big breakfast eater, maybe toast and coffee if really hungry  · Lunch: sandwiches, if hungry  · Dinner: sandwich-she likes crabs and crawfish  · Snacks: watermelon, fruit    Difficulty Chewing or Swallowing: no  Current Exercise: See Exercise Physiologist Note  Food Safety/Food Preparation: self  Living Arrangements/Family Support: Lives alone  Cultural/Spiritual/Personal  Preferences: None  Barriers to Education: none identified  Stage of Change Related to Diet Habits: Patient is contemplation - ambivalent about change-patient has decreased appetite issues, she suspects from the Treseba she is taking. She got off Ozempic because it made her severely nauseated. She has lost approx 15 lbs since getting out of the hospital as well.     History of diabetes since greater than 10 years.  Diabetic medications currently taking include Treseba, Trulicity, Jardiance, Metformin.  States Treseba makes her have decreased appetite. Patient has a home glucometer-Freestyle- and checks glucose 1 times a day.  Reports typical morning glucose ranges between 105-60s mg/dL.  Patient verbalizes understanding to bring home glucometer and check glucose pre and post each exercise session.  Per cardiac rehab protocols, patient's glucose must be between 90 and 270 mg/dL to exercise.  Patient denies any recent glucose levels less than 60 mg/dL or greater than 300 mg/dL, however it seems her glucose runs below 90 frequently. Will make nursing aware and patient was educated on the  rule for exercise and to bring a snack with her to each session. Patient aware of signs and symptoms of hypoglycemia.  Patient verbalizes importance of notifying rehab staff if symptoms of hypoglycemia occur while at cardiac rehab.        NUTRITION DIAGNOSIS:  1. Food and nutrition related knowledge deficit related to the lack of prior nutrition education as evidenced by diet history and 24 hour recall        NUTRITION INTERVENTION:    Nutrition Prescription:  · Total Energy Estimated Needs: 5061-6571 Kcal/d   · Method for Estimating Needs: [place]  · Total Protein Estimated Needs: 60-70 g/d  · Method for Estimating Needs: 0.8-1.2 g/Kg BW  · Total Fluid Estimated Needs: 1 mL/Kcal    Meals and Snacks: 2 gram sodium, Mediterranean diet  Goal(s)    1. Patient willing to increase fish intake (non-fried varieties) to a goal  of 2-3 servings per week.   2. Patient willing to increase fruit and vegetable intake    Nutrition Education-Content: Purpose of the nutrition education, priority modifications, nutrition relationship to health/disease, and recommended modifications  Goal(s)  1. Understand and adhere to Mediterranean diet      Comments: Discussed ways to incorporate healthy snacks, eating on a schedule, and monitoring sodium intake for heart health.    Nutrition Education:   Person taught: patient   Comprehension: fair    Preferred Learning Method: verbal and written   Education Needed/Provided: Nutrition counseling and education related to cardiac rehabilitation   Outcome/Evaluation: Verbalizes understanding and was able to demonstrate comprehension on verification.    Nutrition Education Method: Weekly nutrition lectures on the Mediterranean diet, cooking, shopping, and dining out    Written Materials Provided:  3 Day Food Record, Introduction to Mediterranean Diet    Strategies Implemented: Motivational interviewing, Goal setting, Self-Monitoring, and Problem Solving    Provided RD Contact Information: Yes      NUTRITION MONITORING:  Patient to participate in Cardiac Rehab sessions three times a week  12, 24, and Discharge Session Follow Up  Weekly Dietitian Weight Check  Follow Up Plan for Ongoing Self-Management Support      Diana WAYNE, RDN

## 2020-08-24 NOTE — PROGRESS NOTES
Patient denies symptoms including cough, fevers, SOB, diarrhea, loss of taste or smell.   Informed patient of process for Covid test.  Patient denies prior nasal surgery. Pt verbalized permission to proceed with test.    Nasopharyngeal  specimen collected.  Patient tolerated procedure well.    Test results negative .  Pt notified.

## 2020-08-25 NOTE — PROGRESS NOTES
3 BP readings since last review. 2 readings above goal, one of which notes it was taken prior to medication.  Patient completing cardiac rehab.  Continue to monitor.

## 2020-08-26 NOTE — PROGRESS NOTES
[facility]: Health  Follow-Up                        Topics Covered on Call: physical activity    Additional Follow-up details: Reports she has had a nurse come to her house and take her blood pressure.        Diet-Not assessed          Physical Activity-Change      Additional physical activity details: States she went to cardiac rehab orientation last week, but should be starting this week.      Medication Adherence-Medication Adherence not addressed.      Substance, Sleep, Stress-Not assessed      Reviewed Device Techniques.     Addressed any questions or concerns and patient has my contact information if needed prior to next outreach. Patient verbalizes understanding.      Explained the importance of self-monitoring and medication adherence. Encouraged the patient to communicate with their health  for lifestyle modifications to help improve or maintain a healthy lifestyle.                Topic    Eye Exam     Urine Protein Check        Last 5 Patient Entered Readings                                      Current 30 Day Average: 147/82     Recent Readings 8/21/2020 8/14/2020 8/6/2020 6/17/2020 6/17/2020    SBP (mmHg) 130 167 144 136 164    DBP (mmHg) 75 90 81 83 106    Pulse 74 60 63 76 117

## 2020-08-28 ENCOUNTER — CLINICAL SUPPORT (OUTPATIENT)
Dept: CARDIAC REHAB | Facility: CLINIC | Age: 70
End: 2020-08-28
Payer: MEDICARE

## 2020-08-28 ENCOUNTER — PES CALL (OUTPATIENT)
Dept: ADMINISTRATIVE | Facility: CLINIC | Age: 70
End: 2020-08-28

## 2020-08-28 DIAGNOSIS — I25.10 ATHEROSCLEROSIS OF NATIVE CORONARY ARTERY OF NATIVE HEART WITHOUT ANGINA PECTORIS: ICD-10-CM

## 2020-08-28 DIAGNOSIS — Z95.1 S/P CABG (CORONARY ARTERY BYPASS GRAFT): ICD-10-CM

## 2020-08-28 PROCEDURE — 93798 PR CARDIAC REHAB/MONITOR: ICD-10-PCS | Mod: HCNC,S$GLB,, | Performed by: INTERNAL MEDICINE

## 2020-08-28 PROCEDURE — 93798 PHYS/QHP OP CAR RHAB W/ECG: CPT | Mod: HCNC,S$GLB,, | Performed by: INTERNAL MEDICINE

## 2020-08-31 ENCOUNTER — CLINICAL SUPPORT (OUTPATIENT)
Dept: CARDIAC REHAB | Facility: CLINIC | Age: 70
End: 2020-08-31
Payer: MEDICARE

## 2020-08-31 NOTE — PROGRESS NOTES
The patient tolerated exercise session well with no complaints. Patient reports that she did not realize that she had stopped taking her Metoprolol a few days ago because she thought that she did not need it anymore.  On patient's first day, we noted that he resting heart rate was at 98 BPM, which was much higher than previously noted at stress test.  She has resumed it & we noted her resting heart rate at 60 BPM.  Strongly encouraged for patient to never abruptly stop taking medications & to speak with her doctor prior to doing this.  Will continue to monitor patient.

## 2020-09-01 ENCOUNTER — OFFICE VISIT (OUTPATIENT)
Dept: CARDIOLOGY | Facility: CLINIC | Age: 70
End: 2020-09-01
Attending: INTERNAL MEDICINE
Payer: MEDICARE

## 2020-09-01 VITALS
DIASTOLIC BLOOD PRESSURE: 61 MMHG | BODY MASS INDEX: 33.16 KG/M2 | WEIGHT: 187.19 LBS | SYSTOLIC BLOOD PRESSURE: 109 MMHG | HEART RATE: 56 BPM

## 2020-09-01 DIAGNOSIS — E66.9 MILD OBESITY: ICD-10-CM

## 2020-09-01 DIAGNOSIS — E11.59 TYPE 2 DIABETES MELLITUS WITH OTHER CIRCULATORY COMPLICATION, WITH LONG-TERM CURRENT USE OF INSULIN: ICD-10-CM

## 2020-09-01 DIAGNOSIS — Z79.4 TYPE 2 DIABETES MELLITUS WITH OTHER CIRCULATORY COMPLICATION, WITH LONG-TERM CURRENT USE OF INSULIN: ICD-10-CM

## 2020-09-01 DIAGNOSIS — E78.00 HYPERCHOLESTEROLEMIA: ICD-10-CM

## 2020-09-01 DIAGNOSIS — E78.1 HYPERTRIGLYCERIDEMIA: ICD-10-CM

## 2020-09-01 DIAGNOSIS — Z86.718 HISTORY OF DEEP VEIN THROMBOSIS: ICD-10-CM

## 2020-09-01 DIAGNOSIS — Z95.1 HISTORY OF CORONARY ARTERY BYPASS SURGERY: ICD-10-CM

## 2020-09-01 DIAGNOSIS — I10 ESSENTIAL HYPERTENSION: ICD-10-CM

## 2020-09-01 DIAGNOSIS — I25.10 CORONARY ARTERY DISEASE INVOLVING NATIVE CORONARY ARTERY OF NATIVE HEART WITHOUT ANGINA PECTORIS: ICD-10-CM

## 2020-09-01 DIAGNOSIS — Z86.73 HISTORY OF CEREBROVASCULAR ACCIDENT: ICD-10-CM

## 2020-09-01 PROCEDURE — 3074F SYST BP LT 130 MM HG: CPT | Mod: HCNC,CPTII,S$GLB, | Performed by: INTERNAL MEDICINE

## 2020-09-01 PROCEDURE — 99215 OFFICE O/P EST HI 40 MIN: CPT | Mod: HCNC,S$GLB,, | Performed by: INTERNAL MEDICINE

## 2020-09-01 PROCEDURE — 99215 PR OFFICE/OUTPT VISIT, EST, LEVL V, 40-54 MIN: ICD-10-PCS | Mod: HCNC,S$GLB,, | Performed by: INTERNAL MEDICINE

## 2020-09-01 PROCEDURE — 1101F PR PT FALLS ASSESS DOC 0-1 FALLS W/OUT INJ PAST YR: ICD-10-PCS | Mod: HCNC,CPTII,S$GLB, | Performed by: INTERNAL MEDICINE

## 2020-09-01 PROCEDURE — 99999 PR PBB SHADOW E&M-EST. PATIENT-LVL III: ICD-10-PCS | Mod: PBBFAC,HCNC,, | Performed by: INTERNAL MEDICINE

## 2020-09-01 PROCEDURE — 1159F PR MEDICATION LIST DOCUMENTED IN MEDICAL RECORD: ICD-10-PCS | Mod: HCNC,S$GLB,, | Performed by: INTERNAL MEDICINE

## 2020-09-01 PROCEDURE — 3008F BODY MASS INDEX DOCD: CPT | Mod: HCNC,CPTII,S$GLB, | Performed by: INTERNAL MEDICINE

## 2020-09-01 PROCEDURE — 3008F PR BODY MASS INDEX (BMI) DOCUMENTED: ICD-10-PCS | Mod: HCNC,CPTII,S$GLB, | Performed by: INTERNAL MEDICINE

## 2020-09-01 PROCEDURE — 3078F PR MOST RECENT DIASTOLIC BLOOD PRESSURE < 80 MM HG: ICD-10-PCS | Mod: HCNC,CPTII,S$GLB, | Performed by: INTERNAL MEDICINE

## 2020-09-01 PROCEDURE — 1126F PR PAIN SEVERITY QUANTIFIED, NO PAIN PRESENT: ICD-10-PCS | Mod: HCNC,S$GLB,, | Performed by: INTERNAL MEDICINE

## 2020-09-01 PROCEDURE — 1101F PT FALLS ASSESS-DOCD LE1/YR: CPT | Mod: HCNC,CPTII,S$GLB, | Performed by: INTERNAL MEDICINE

## 2020-09-01 PROCEDURE — 1126F AMNT PAIN NOTED NONE PRSNT: CPT | Mod: HCNC,S$GLB,, | Performed by: INTERNAL MEDICINE

## 2020-09-01 PROCEDURE — 3044F PR MOST RECENT HEMOGLOBIN A1C LEVEL <7.0%: ICD-10-PCS | Mod: HCNC,CPTII,S$GLB, | Performed by: INTERNAL MEDICINE

## 2020-09-01 PROCEDURE — 3044F HG A1C LEVEL LT 7.0%: CPT | Mod: HCNC,CPTII,S$GLB, | Performed by: INTERNAL MEDICINE

## 2020-09-01 PROCEDURE — 99499 RISK ADDL DX/OHS AUDIT: ICD-10-PCS | Mod: HCNC,S$GLB,, | Performed by: INTERNAL MEDICINE

## 2020-09-01 PROCEDURE — 99499 UNLISTED E&M SERVICE: CPT | Mod: HCNC,S$GLB,, | Performed by: INTERNAL MEDICINE

## 2020-09-01 PROCEDURE — 3074F PR MOST RECENT SYSTOLIC BLOOD PRESSURE < 130 MM HG: ICD-10-PCS | Mod: HCNC,CPTII,S$GLB, | Performed by: INTERNAL MEDICINE

## 2020-09-01 PROCEDURE — 99999 PR PBB SHADOW E&M-EST. PATIENT-LVL III: CPT | Mod: PBBFAC,HCNC,, | Performed by: INTERNAL MEDICINE

## 2020-09-01 PROCEDURE — 1159F MED LIST DOCD IN RCRD: CPT | Mod: HCNC,S$GLB,, | Performed by: INTERNAL MEDICINE

## 2020-09-01 PROCEDURE — 3078F DIAST BP <80 MM HG: CPT | Mod: HCNC,CPTII,S$GLB, | Performed by: INTERNAL MEDICINE

## 2020-09-01 RX ORDER — ATORVASTATIN CALCIUM 80 MG/1
80 TABLET, FILM COATED ORAL DAILY
Qty: 90 TABLET | Refills: 3 | Status: SHIPPED | OUTPATIENT
Start: 2020-09-01 | End: 2020-10-13 | Stop reason: SDUPTHER

## 2020-09-01 RX ORDER — ASPIRIN 81 MG/1
81 TABLET ORAL DAILY
Qty: 90 TABLET | Refills: 3 | Status: SHIPPED | OUTPATIENT
Start: 2020-09-01 | End: 2020-10-13 | Stop reason: SDUPTHER

## 2020-09-01 RX ORDER — CHLORTHALIDONE 25 MG/1
25 TABLET ORAL DAILY
Qty: 90 TABLET | Refills: 3 | Status: SHIPPED | OUTPATIENT
Start: 2020-09-01 | End: 2020-10-13

## 2020-09-01 RX ORDER — VALSARTAN 320 MG/1
320 TABLET ORAL DAILY
Qty: 90 TABLET | Refills: 3 | Status: SHIPPED | OUTPATIENT
Start: 2020-09-01 | End: 2020-10-13 | Stop reason: SDUPTHER

## 2020-09-01 RX ORDER — EZETIMIBE 10 MG/1
10 TABLET ORAL DAILY
Qty: 90 TABLET | Refills: 3 | Status: SHIPPED | OUTPATIENT
Start: 2020-09-01 | End: 2020-10-13 | Stop reason: SDUPTHER

## 2020-09-01 RX ORDER — AMLODIPINE BESYLATE 10 MG/1
5 TABLET ORAL DAILY
Qty: 90 TABLET | Refills: 3 | Status: SHIPPED | OUTPATIENT
Start: 2020-09-01 | End: 2020-10-13

## 2020-09-01 RX ORDER — METOPROLOL TARTRATE 25 MG/1
25 TABLET, FILM COATED ORAL 2 TIMES DAILY
Qty: 180 TABLET | Refills: 3 | Status: SHIPPED | OUTPATIENT
Start: 2020-09-01 | End: 2020-10-13 | Stop reason: SDUPTHER

## 2020-09-01 NOTE — PROGRESS NOTES
Subjective:     Cecilia Barahona is a 70 y.o. female with hypertension, hypercholesterolemia, hypertriglyceridemia and diabetes mellitus type 2. She is mildly obese. She appears to have had a CVA in about 2004 when she had an episode of weakness in her left hand. She had exertional chest discomfort in 12/2019. She was given isosorbidemononitrate and the chest discomfort resolved. She had a Stress MPI that was negative. On 5/16/2020 she began to experience a mild pressure over her chest. The heaviness lasted most of the day. Walking in her house made the discomfort more pronounced. There was no radiation of the pain and neither any associated dyspnea or diaphoresis. She had similar discomfort on 5/17/2020 and 5/18/2020. She told her daughter that took her to . She received NTG sl x 2 tablets and the chest discomfort resolved. There were no acute ST-T wave changes. She was advised admission and transferred to [facility]. She has had no further chest pain after presentation. On 5/20/2020 she underwent coronary angiography that revealed severe 3 V CAD. There was basal inferior mild hypokinesia with an EF of 60%. On 5/25/2020 she underwent CABG x 3 receiving a LIMA to the LAD and a sequential SVG to OM2 and OM4. A few days after surgery she had a deep venous thrombosis of her left leg and she has been anticoagulated with apixiban. No exertional chest pain or exertional shortness of breath. No palpitations or weak spells. Feeling well overall.         Coronary Artery Disease  Presents for follow-up visit. Symptoms include chest pain. Pertinent negatives include no chest pressure, chest tightness, dizziness, leg swelling, muscle weakness, palpitations, shortness of breath or weight gain. Risk factors include hyperlipidemia. The symptoms have been stable.   Cerebrovascular Accident  This is a chronic problem. Associated symptoms include chest pain. Pertinent negatives include no abdominal pain, anorexia, arthralgias, change  in bowel habit, chills, congestion, coughing, diaphoresis, fatigue, fever, headaches, joint swelling, myalgias, nausea, neck pain, numbness, rash, sore throat, swollen glands, urinary symptoms, vertigo, visual change, vomiting or weakness.   Hypertension  Associated symptoms include chest pain. Pertinent negatives include no anxiety, blurred vision, headaches, malaise/fatigue, neck pain, orthopnea, palpitations, peripheral edema, PND, shortness of breath or sweats.   Hyperlipidemia  Associated symptoms include chest pain. Pertinent negatives include no focal weakness, myalgias or shortness of breath.       Review of Systems   Constitution: Negative for chills, diaphoresis, fatigue, fever, malaise/fatigue and weight gain.   HENT: Negative for congestion, nosebleeds and sore throat.    Eyes: Negative for blurred vision, double vision, vision loss in left eye and vision loss in right eye.   Cardiovascular: Positive for chest pain. Negative for claudication, dyspnea on exertion, irregular heartbeat, leg swelling, near-syncope, orthopnea, palpitations, paroxysmal nocturnal dyspnea and syncope.   Respiratory: Negative for chest tightness, cough, hemoptysis, shortness of breath and wheezing.    Endocrine: Negative for cold intolerance and heat intolerance.   Hematologic/Lymphatic: Negative for bleeding problem. Does not bruise/bleed easily.   Skin: Negative for color change and rash.   Musculoskeletal: Negative for arthralgias, back pain, falls, joint swelling, muscle weakness, myalgias and neck pain.   Gastrointestinal: Negative for abdominal pain, anorexia, change in bowel habit, heartburn, hematemesis, hematochezia, hemorrhoids, jaundice, melena, nausea and vomiting.   Genitourinary: Negative for dysuria and hematuria.   Neurological: Negative for dizziness, focal weakness, headaches, light-headedness, loss of balance, numbness, vertigo and weakness.   Psychiatric/Behavioral: Negative for altered mental status,  depression and memory loss. The patient is not nervous/anxious.    Allergic/Immunologic: Negative for hives and persistent infections.       Current Outpatient Medications on File Prior to Visit   Medication Sig Dispense Refill    acetaminophen (TYLENOL) 325 MG tablet Take 2 tablets (650 mg total) by mouth every 6 (six) hours as needed for Pain or Temperature greater than (or equal to 101 degree F). 30 tablet 0    albuterol 90 mcg/actuation inhaler Inhale 2 puffs into the lungs every 6 (six) hours as needed for Wheezing or Shortness of Breath. Rescue 1 Inhaler 0    alcohol swabs PadM Apply 1 each topically 3 (three) times daily. 400 each 2    artificial tear ointment (ARTIFICIAL TEARS) Oint Place into both eyes every evening. 305 g 1    aspirin (ECOTRIN) 81 MG EC tablet Take 1 tablet (81 mg total) by mouth once daily. 30 tablet 11    atorvastatin (LIPITOR) 80 MG tablet Take 1 tablet (80 mg total) by mouth every evening. 90 tablet 3    azelastine (OPTIVAR) 0.05 % ophthalmic solution INSTILL 1 DROP IN EACH EYE TWICE DAILY 12 mL 0    blood pressure monitor (BLOOD PRESSURE KIT) Kit 1 kit by Misc.(Non-Drug; Combo Route) route once daily. 1 each 0    blood-glucose meter,continuous (DEXCOM G6 ) Misc Use with dexcom G6 system 1 each 0    blood-glucose sensor (DEXCOM G6 SENSOR) Vanna Change sensor every 10 days 3 Device 12    blood-glucose transmitter (DEXCOM G6 TRANSMITTER) Vanna Change every 3 months 1 Device 3    chlorthalidone (HYGROTEN) 25 MG Tab Take 25 mg by mouth once daily.       desoximetasone (TOPICORT) 0.05 % cream Apply topically 2 (two) times daily as needed. 100 g 2    flash glucose scanning reader (FREESTYLE ANAMARIA READER) Misc 1 Units by Misc.(Non-Drug; Combo Route) route before meals as needed. 1 each 11    FLUoxetine 40 MG capsule Take 1 capsule (40 mg total) by mouth once daily. 90 capsule 1    fluticasone (FLONASE) 50 mcg/actuation nasal spray 1 spray (50 mcg total) by Each Nare  "route once daily. 16 g 5    FREESTYLE ANAMARIA 14 DAY SENSOR Kit CHANGE SENSOR EVERY 14 DAYS 2 kit 11    furosemide (LASIX) 20 MG tablet Take 2 tablets (40 mg total) by mouth 2 (two) times a day. 60 tablet 11    insulin degludec (TRESIBA FLEXTOUCH U-200) 200 unit/mL (3 mL) InPn Inject 24 Units into the skin once daily. Inject 24 units once daily 6 Syringe 1    JARDIANCE 10 mg tablet TAKE 1 TABLET BY MOUTH EVERY DAY 30 tablet 3    lancets Misc Check blood glucose 4 times a day. Dispense accuchek meter or other meter preferred by insurance. Dx code e11.65 400 each 3    LANCING DEVICE WITH LANCETS Misc USE AS DIRECTED FOR DIABETIC TESTING 1 each 0    latanoprost 0.005 % ophthalmic solution PLACE 1 DROP IN EACH EYE EVERY EVENING 2.5 mL 0    loratadine (CLARITIN) 10 mg tablet TAKE ONE TABLET BY MOUTH EVERY DAY AS NEEDED FOR ALLERGIES 30 tablet 2    melatonin (MELATIN) 3 mg tablet Take 2 tablets (6 mg total) by mouth nightly as needed for Insomnia.  0    metFORMIN (GLUCOPHAGE) 1000 MG tablet Take 1 tablet (1,000 mg total) by mouth 2 (two) times daily with meals. Resume Saturday, 6/5/2020 180 tablet 2    metoprolol tartrate (LOPRESSOR) 25 MG tablet Take 0.5 tablets (12.5 mg total) by mouth once daily. (Patient taking differently: Take 25 mg by mouth 2 (two) times daily. ) 15 tablet 11    multivitamin with minerals tablet Take 1 tablet by mouth once daily.      ondansetron (ZOFRAN-ODT) 8 MG TbDL Take 1 tablet (8 mg total) by mouth every 6 (six) hours as needed. 30 tablet 1    pantoprazole (PROTONIX) 40 MG tablet Take 1 tablet (40 mg total) by mouth once daily. 30 tablet 11    pen needle, diabetic (BD ULTRA-FINE LENY PEN NEEDLES) 32 gauge x 5/32" Ndle USE FIVE TIMES DAILY 100 each 5    polyethylene glycol (GLYCOLAX) 17 gram PwPk Take 17 g by mouth 2 (two) times daily as needed. 10 each 0    potassium chloride SA (K-DUR,KLOR-CON) 20 MEQ tablet Take 2 tablets (40mg) by mouth  twice daily 60 tablet 11    " rOPINIRole (REQUIP) 0.5 MG tablet Take 1 tablet (0.5 mg total) by mouth nightly. at bedtime. 90 tablet 0    senna-docusate 8.6-50 mg (PERICOLACE) 8.6-50 mg per tablet Take 1 tablet by mouth once daily. 30 tablet 0    TRULICITY 1.5 mg/0.5 mL pen injector INJECT 1.5 MG INTO THE SKIN EVERY 7 DAYS. 4 pen 1    apixaban (ELIQUIS) 2.5 mg Tab Take 1 tablet (2.5 mg total) by mouth 2 (two) times daily. (Patient not taking: Reported on 9/1/2020) 30 tablet 11     No current facility-administered medications on file prior to visit.        /61   Pulse (!) 56   Wt 84.9 kg (187 lb 2.7 oz)   BMI 33.16 kg/m²       Objective:     Physical Exam   Constitutional: She is oriented to person, place, and time. She appears well-developed and well-nourished.  Non-toxic appearance. No distress.   HENT:   Head: Normocephalic and atraumatic.   Nose: Nose normal.   Eyes: Right eye exhibits no discharge. Left eye exhibits no discharge. Right conjunctiva is not injected. Left conjunctiva is not injected. Right pupil is round. Left pupil is round. Pupils are equal.   Neck: Neck supple. No JVD present. Carotid bruit is not present. No thyromegaly present.   Cardiovascular: Normal rate, regular rhythm, S1 normal and S2 normal.  No extrasystoles are present. PMI is not displaced. Exam reveals gallop and S4.   No murmur heard.  Pulses:       Radial pulses are 2+ on the right side and 2+ on the left side.        Femoral pulses are 2+ on the right side and 2+ on the left side.       Dorsalis pedis pulses are 2+ on the right side and 2+ on the left side.        Posterior tibial pulses are 2+ on the right side and 2+ on the left side.   Pulmonary/Chest: Effort normal and breath sounds normal.   Midline sternotomy scar with moderate keloid.   Abdominal: Soft. Normal appearance. There is no hepatosplenomegaly. There is no abdominal tenderness.   Musculoskeletal:      Right ankle: She exhibits no swelling, no ecchymosis and no deformity.      Left  ankle: She exhibits no swelling, no ecchymosis and no deformity.   Lymphadenopathy:        Head (right side): No submandibular adenopathy present.        Head (left side): No submandibular adenopathy present.     She has no cervical adenopathy.   Neurological: She is alert and oriented to person, place, and time. She is not disoriented. No cranial nerve deficit.   Skin: Skin is warm, dry and intact. No rash noted. She is not diaphoretic. No cyanosis. Nails show no clubbing.   Psychiatric: She has a normal mood and affect. Her speech is normal and behavior is normal. Judgment and thought content normal. Cognition and memory are normal.       Assessment:     1. Coronary artery disease involving native coronary artery of native heart without angina pectoris    2. History of coronary artery bypass surgery    3. History of cerebrovascular accident    4. History of deep vein thrombosis    5. Essential hypertension    6. Hypercholesterolemia    7. Hypertriglyceridemia    8. Type 2 diabetes mellitus with other circulatory complication, with long-term current use of insulin    9. Mild obesity        Plan:     1. Coronary Artery Disease              12/2019: Began experience chest pain. Resolved with isosorbidemononitrate.              12/19/2019: Stress MPI:3:39 min. No CP. ECG negative. MPI: Mild to moderate fixed anterior and anteroapical defect. EF 73%.              5/18/2020: Presents with more pronounced chest pain. Resolved with NTG sl.              ECG without acute changes. Troponin x 3 negative.               5/19/2020: Echo: Normal left ventricular size and systolic function. EF 75%. Mildly dilated LA. Mild aortic valve sclerosis. Mild mitral valve sclerosis.              5/20/2020: [facility]: Cath: LAD: Prox 30%. Mid 70%. Distal 80%. OM2: Osteal 90%. OM3: 95%. OM4 90%. RCA: Mid subtotal. LV: Basal inferior mild hypokinesia. EF 60%.   5/25/2020: [facility]: CABG: LIMA to LAD and SVG to OM2 & OM4.   On metoprolol 25 mg Q12.               On aspirin 81 mg Q24.              2. History of Cerebrovascular Accident              2004: Weakness in left hand. Affected memory.    3. History of Deep Venous Trombosis of Lower Extremity   5/2020: DVT left leg after CABG.   Received apixiban for 3 months.     4. Hypertension              2000: Diagnosed.              On metoprolol 25 mg Q12, amlodipine 10 mg Q24, valsartan 320 mg Q24, furosemide 20 mg Q24 and chlorthalidone 25 mg Q24.   Keeping log at home.   Sharan[ears well controlled.   9/1/2020: Furosemide 20 mg Q24 and KCl 20 mEq Q24 to be discontinued.   9/10/2020: Do BMP.     5. Hypercholesterolemia              2000: Began statin.              On atorvastatin 40 mg Q24.                 5/19/2020: Chol 204. HDL 41. . .              On atorvastatin to 80 mg Q24 and ezetimibe 10 mg Q24.   8/18/2020: Chol 161. HDL 46. LDL 82. .   On atorvastatin to 80 mg Q24 and ezetimibe 10 mg Q24.   Fair lipid panel.    6. Hypertriglyceridemia   2020: Diagnosed.   As above.     7. Diabetes Mellitus, Type 2              2000: Diagnosed. Complications: CVA & CAD. Medications: Insulin.      8. Mild Obesity              5/19/2020: Weight 86 kg. BMI 33.     9. Primary Care              Dr. Yudi Smart.    F/u 6 weeks.    Etelvina Lowery M.D.

## 2020-09-02 ENCOUNTER — CLINICAL SUPPORT (OUTPATIENT)
Dept: CARDIAC REHAB | Facility: CLINIC | Age: 70
End: 2020-09-02
Payer: MEDICARE

## 2020-09-02 DIAGNOSIS — Z95.1 POSTSURGICAL AORTOCORONARY BYPASS STATUS: ICD-10-CM

## 2020-09-02 DIAGNOSIS — I25.10 CORONARY ATHEROSCLEROSIS OF NATIVE CORONARY ARTERY: ICD-10-CM

## 2020-09-02 PROCEDURE — 93798 PHYS/QHP OP CAR RHAB W/ECG: CPT | Mod: HCNC,S$GLB,, | Performed by: INTERNAL MEDICINE

## 2020-09-02 PROCEDURE — 93798 PR CARDIAC REHAB/MONITOR: ICD-10-PCS | Mod: HCNC,S$GLB,, | Performed by: INTERNAL MEDICINE

## 2020-09-16 ENCOUNTER — DOCUMENTATION ONLY (OUTPATIENT)
Dept: CARDIAC REHAB | Facility: CLINIC | Age: 70
End: 2020-09-16

## 2020-09-16 NOTE — PROGRESS NOTES
I contacted Miss Brooks about absences to Phase II cardiac rehab class.  She stated she is going to join a gym closer to her home.

## 2020-09-18 ENCOUNTER — PATIENT OUTREACH (OUTPATIENT)
Dept: ADMINISTRATIVE | Facility: OTHER | Age: 70
End: 2020-09-18

## 2020-09-18 NOTE — PROGRESS NOTES
Health Maintenance Due   Topic Date Due    Shingles Vaccine (1 of 2) 03/10/2001    Eye Exam  02/26/2020    Foot Exam  04/22/2020    Influenza Vaccine (1) 08/01/2020    Mammogram  08/02/2020    Urine Microalbumin  09/26/2020     Updates were requested from care everywhere.  Chart was reviewed for overdue Proactive [facility] Encounters (RACHAEL) topics (CRS, Breast Cancer Screening, Eye exam)  Health Maintenance has been updated.  [facility] immunization registry triggered.  Immunizations were reconciled.  \

## 2020-09-24 RX ORDER — FLASH GLUCOSE SENSOR
KIT MISCELLANEOUS
Qty: 2 KIT | Refills: 11 | Status: SHIPPED | OUTPATIENT
Start: 2020-09-24 | End: 2021-02-03 | Stop reason: SDUPTHER

## 2020-09-25 RX ORDER — EMPAGLIFLOZIN 10 MG/1
10 TABLET, FILM COATED ORAL DAILY
Qty: 30 TABLET | Refills: 0 | Status: SHIPPED | OUTPATIENT
Start: 2020-09-25 | End: 2020-10-02

## 2020-09-27 PROBLEM — R07.9 CHEST PAIN: Status: RESOLVED | Noted: 2020-05-27 | Resolved: 2020-09-27

## 2020-09-27 PROBLEM — Z12.11 SCREENING FOR COLON CANCER: Status: RESOLVED | Noted: 2020-03-05 | Resolved: 2020-09-27

## 2020-09-27 PROBLEM — R07.89 CHEST DISCOMFORT: Status: RESOLVED | Noted: 2020-01-27 | Resolved: 2020-09-27

## 2020-09-27 PROBLEM — D62 POSTOPERATIVE ANEMIA DUE TO ACUTE BLOOD LOSS: Status: RESOLVED | Noted: 2020-05-29 | Resolved: 2020-09-27

## 2020-09-27 RX ORDER — ISOSORBIDE MONONITRATE 30 MG/1
TABLET, EXTENDED RELEASE ORAL
COMMUNITY
Start: 2020-08-27 | End: 2020-10-13

## 2020-09-29 DIAGNOSIS — G25.81 RESTLESS LEGS SYNDROME (RLS): ICD-10-CM

## 2020-09-29 RX ORDER — ROPINIROLE 0.5 MG/1
TABLET, FILM COATED ORAL
Qty: 90 TABLET | Refills: 0 | Status: SHIPPED | OUTPATIENT
Start: 2020-09-29 | End: 2021-01-16

## 2020-09-30 ENCOUNTER — LAB VISIT (OUTPATIENT)
Dept: LAB | Facility: HOSPITAL | Age: 70
End: 2020-09-30
Attending: INTERNAL MEDICINE
Payer: MEDICARE

## 2020-09-30 ENCOUNTER — OFFICE VISIT (OUTPATIENT)
Dept: FAMILY MEDICINE | Facility: CLINIC | Age: 70
End: 2020-09-30
Payer: MEDICARE

## 2020-09-30 VITALS
OXYGEN SATURATION: 99 % | DIASTOLIC BLOOD PRESSURE: 50 MMHG | HEIGHT: 63 IN | BODY MASS INDEX: 31.95 KG/M2 | TEMPERATURE: 98 F | SYSTOLIC BLOOD PRESSURE: 114 MMHG | HEART RATE: 62 BPM | WEIGHT: 180.31 LBS

## 2020-09-30 DIAGNOSIS — Z86.73 HISTORY OF CEREBROVASCULAR ACCIDENT: ICD-10-CM

## 2020-09-30 DIAGNOSIS — Z23 NEED FOR SHINGLES VACCINE: ICD-10-CM

## 2020-09-30 DIAGNOSIS — Z23 FLU VACCINE NEED: ICD-10-CM

## 2020-09-30 DIAGNOSIS — E11.40 CONTROLLED TYPE 2 DIABETES WITH NEUROPATHY: ICD-10-CM

## 2020-09-30 DIAGNOSIS — E66.9 MILD OBESITY: ICD-10-CM

## 2020-09-30 DIAGNOSIS — J30.9 ALLERGIC RHINITIS, UNSPECIFIED SEASONALITY, UNSPECIFIED TRIGGER: ICD-10-CM

## 2020-09-30 DIAGNOSIS — Z79.4 LONG-TERM INSULIN USE: ICD-10-CM

## 2020-09-30 DIAGNOSIS — I10 ESSENTIAL HYPERTENSION: ICD-10-CM

## 2020-09-30 DIAGNOSIS — Z00.00 ROUTINE MEDICAL EXAM: Primary | ICD-10-CM

## 2020-09-30 DIAGNOSIS — Z87.898 H/O WHEEZING: ICD-10-CM

## 2020-09-30 DIAGNOSIS — I70.0 ATHEROSCLEROSIS OF AORTA: ICD-10-CM

## 2020-09-30 DIAGNOSIS — E78.00 HYPERCHOLESTEROLEMIA: ICD-10-CM

## 2020-09-30 DIAGNOSIS — I25.10 CORONARY ARTERY DISEASE INVOLVING NATIVE CORONARY ARTERY OF NATIVE HEART WITHOUT ANGINA PECTORIS: ICD-10-CM

## 2020-09-30 DIAGNOSIS — F32.0 MILD MAJOR DEPRESSION: ICD-10-CM

## 2020-09-30 DIAGNOSIS — I65.23 BILATERAL CAROTID ARTERY STENOSIS: ICD-10-CM

## 2020-09-30 LAB
ALBUMIN/CREAT UR: 10.7 UG/MG (ref 0–30)
CREAT UR-MCNC: 56 MG/DL (ref 15–325)
MICROALBUMIN UR DL<=1MG/L-MCNC: 6 UG/ML

## 2020-09-30 PROCEDURE — 99499 RISK ADDL DX/OHS AUDIT: ICD-10-PCS | Mod: HCNC,S$GLB,, | Performed by: INTERNAL MEDICINE

## 2020-09-30 PROCEDURE — G0008 FLU VACCINE - QUADRIVALENT - ADJUVANTED: ICD-10-PCS | Mod: HCNC,S$GLB,, | Performed by: INTERNAL MEDICINE

## 2020-09-30 PROCEDURE — 99499 UNLISTED E&M SERVICE: CPT | Mod: HCNC,S$GLB,, | Performed by: INTERNAL MEDICINE

## 2020-09-30 PROCEDURE — 90694 VACC AIIV4 NO PRSRV 0.5ML IM: CPT | Mod: HCNC,S$GLB,, | Performed by: INTERNAL MEDICINE

## 2020-09-30 PROCEDURE — 82043 UR ALBUMIN QUANTITATIVE: CPT | Mod: HCNC

## 2020-09-30 PROCEDURE — 3074F SYST BP LT 130 MM HG: CPT | Mod: HCNC,CPTII,S$GLB, | Performed by: INTERNAL MEDICINE

## 2020-09-30 PROCEDURE — G0008 ADMIN INFLUENZA VIRUS VAC: HCPCS | Mod: HCNC,S$GLB,, | Performed by: INTERNAL MEDICINE

## 2020-09-30 PROCEDURE — 99397 PR PREVENTIVE VISIT,EST,65 & OVER: ICD-10-PCS | Mod: HCNC,25,S$GLB, | Performed by: INTERNAL MEDICINE

## 2020-09-30 PROCEDURE — 99499 UNLISTED E&M SERVICE: CPT | Mod: HCNC,,, | Performed by: INTERNAL MEDICINE

## 2020-09-30 PROCEDURE — 99999 PR PBB SHADOW E&M-EST. PATIENT-LVL V: ICD-10-PCS | Mod: PBBFAC,HCNC,, | Performed by: INTERNAL MEDICINE

## 2020-09-30 PROCEDURE — 99999 PR PBB SHADOW E&M-EST. PATIENT-LVL V: CPT | Mod: PBBFAC,HCNC,, | Performed by: INTERNAL MEDICINE

## 2020-09-30 PROCEDURE — 3074F PR MOST RECENT SYSTOLIC BLOOD PRESSURE < 130 MM HG: ICD-10-PCS | Mod: HCNC,CPTII,S$GLB, | Performed by: INTERNAL MEDICINE

## 2020-09-30 PROCEDURE — 3078F PR MOST RECENT DIASTOLIC BLOOD PRESSURE < 80 MM HG: ICD-10-PCS | Mod: HCNC,CPTII,S$GLB, | Performed by: INTERNAL MEDICINE

## 2020-09-30 PROCEDURE — 90694 FLU VACCINE - QUADRIVALENT - ADJUVANTED: ICD-10-PCS | Mod: HCNC,S$GLB,, | Performed by: INTERNAL MEDICINE

## 2020-09-30 PROCEDURE — 99397 PER PM REEVAL EST PAT 65+ YR: CPT | Mod: HCNC,25,S$GLB, | Performed by: INTERNAL MEDICINE

## 2020-09-30 PROCEDURE — 99499 RISK ADDL DX/OHS AUDIT: ICD-10-PCS | Mod: HCNC,,, | Performed by: INTERNAL MEDICINE

## 2020-09-30 PROCEDURE — 3044F HG A1C LEVEL LT 7.0%: CPT | Mod: HCNC,CPTII,S$GLB, | Performed by: INTERNAL MEDICINE

## 2020-09-30 PROCEDURE — 3078F DIAST BP <80 MM HG: CPT | Mod: HCNC,CPTII,S$GLB, | Performed by: INTERNAL MEDICINE

## 2020-09-30 PROCEDURE — 3044F PR MOST RECENT HEMOGLOBIN A1C LEVEL <7.0%: ICD-10-PCS | Mod: HCNC,CPTII,S$GLB, | Performed by: INTERNAL MEDICINE

## 2020-09-30 RX ORDER — ALBUTEROL SULFATE 90 UG/1
2 AEROSOL, METERED RESPIRATORY (INHALATION) EVERY 6 HOURS PRN
Qty: 19 G | Refills: 0 | Status: SHIPPED | OUTPATIENT
Start: 2020-09-30

## 2020-09-30 RX ORDER — LORATADINE 10 MG/1
TABLET ORAL
Qty: 30 TABLET | Refills: 2 | Status: SHIPPED | OUTPATIENT
Start: 2020-09-30 | End: 2020-12-21

## 2020-09-30 NOTE — PROGRESS NOTES
HISTORY OF PRESENT ILLNESS:  Cecilia Barahona is a 70 y.o. female who presents to the clinic today for a routine physical exam. Her last physical exam was approximately 1 years(s) ago.        PAST MEDICAL HISTORY:  Past Medical History:   Diagnosis Date    Acute coronary syndrome 2020: Presents with unstable angina.    Allergic rhinitis, seasonal     Anxiety     Arthritis     CVA (cerebral vascular accident)     Depression     Glaucoma NEC     Hallucination     images out of corner of eye about a year ago    History of positive PPD - treated - remote past     Hx of psychiatric care     Hyperlipidemia LDL goal < 100     Hypertension     Nuclear sclerosis of both eyes 2019    Obesity     CHETNA (obstructive sleep apnea)     Psychiatric problem     Psychosis     Renal disorder     Sleep difficulties     Suicide attempt     about 30 years ago by overdose of pills    Therapy     Type II or unspecified type diabetes mellitus without mention of complication, uncontrolled        PAST SURGICAL HISTORY:  Past Surgical History:   Procedure Laterality Date    CARPAL TUNNEL RELEASE       SECTION, CLASSIC      COLONOSCOPY N/A 3/5/2020    Procedure: COLONOSCOPY;  Surgeon: Wiliam Carrillo MD;  Location: [facility] (4TH FLR);  Service: Endoscopy;  Laterality: N/A;  20 appt confirmed-rb  pt requested this date    CORONARY ARTERY BYPASS GRAFT (CABG) N/A 2020    Procedure: CORONARY ARTERY BYPASS GRAFT (CABG) x3 ;  Surgeon: Yan Bowman MD;  Location: [facility] FLR;  Service: Cardiothoracic;  Laterality: N/A;  CABG X3  Endoharvest time start 0906  Endoharvest time end 1003  Vein prep start 1004  Vein prep end 1048    LEFT HEART CATHETERIZATION N/A 2020    Procedure: HEART CATH-LEFT;  Surgeon: Etelvina Lowery MD;  Location: [facility] CATH LAB;  Service: Cardiology;  Laterality: N/A;    TOTAL ABDOMINAL HYSTERECTOMY      TRIGGER FINGER RELEASE         SOCIAL  HISTORY:  Social History     Socioeconomic History    Marital status:      Spouse name: Yuniel    Number of children: 3    Years of education: Not on file    Highest education level: Not on file   Occupational History    Occupation: Retired   Social Needs    Financial resource strain: Somewhat hard    Food insecurity     Worry: Never true     Inability: Sometimes true    Transportation needs     Medical: No     Non-medical: No   Tobacco Use    Smoking status: Former Smoker     Packs/day: 0.50     Types: Cigarettes     Quit date: 11/10/2012     Years since quittin.8    Smokeless tobacco: Never Used   Substance and Sexual Activity    Alcohol use: Not Currently     Comment: drank socially in the past    Drug use: Not Currently     Types: Marijuana     Comment: tried once 6 months ago    Sexual activity: Not Currently     Partners: Male   Lifestyle    Physical activity     Days per week: Not on file     Minutes per session: Not on file    Stress: Not at all   Relationships    Social connections     Talks on phone: Not on file     Gets together: Not on file     Attends [employer] service: Not on file     Active member of club or organization: Not on file     Attends meetings of clubs or organizations: Not on file     Relationship status: Not on file   Other Topics Concern    Patient feels they ought to cut down on drinking/drug use No    Patient annoyed by others criticizing their drinking/drug use No    Patient has felt bad or guilty about drinking/drug use No    Patient has had a drink/used drugs as an eye opener in the AM No   Social History Narrative     x 1.    Has 3 grown children.    Lives with spouse.       FAMILY HISTORY:  Family History   Problem Relation Age of Onset    Hypertension Mother     Diabetes Mother     Heart failure Mother     Cataracts Mother     Glaucoma Mother     Prostate cancer Father     Hypertension Father     Diabetes Father     Heart failure  Father     No Known Problems Sister     Diabetes Maternal Uncle     Hyperlipidemia Maternal Uncle     Hypertension Maternal Uncle     Diabetes Maternal Grandmother     Diabetes Maternal Grandfather     No Known Problems Paternal Grandmother     No Known Problems Paternal Grandfather     Diabetes Maternal Aunt     Alzheimer's disease Maternal Aunt     Schizophrenia Maternal Aunt     Heart disease Cousin         s/p heart transplant    No Known Problems Paternal Aunt     No Known Problems Paternal Uncle     Drug abuse Grandchild     Amblyopia Neg Hx     Blindness Neg Hx     Cancer Neg Hx     Macular degeneration Neg Hx     Retinal detachment Neg Hx     Strabismus Neg Hx     Stroke Neg Hx     Thyroid disease Neg Hx        ALLERGIES AND MEDICATIONS: updated and reviewed.  Review of patient's allergies indicates:   Allergen Reactions    Ace inhibitors Other (See Comments)     cough    Invokana [canagliflozin] Other (See Comments)     Throat and tongue swelling    Ozempic [semaglutide]      Nausea and constipation on 0.25 mg dose.      Medication List with Changes/Refills   Current Medications    ACETAMINOPHEN (TYLENOL) 325 MG TABLET    Take 2 tablets (650 mg total) by mouth every 6 (six) hours as needed for Pain or Temperature greater than (or equal to 101 degree F).    ALCOHOL SWABS PADM    Apply 1 each topically 3 (three) times daily.    AMLODIPINE (NORVASC) 10 MG TABLET    Take 0.5 tablets (5 mg total) by mouth once daily.    ARTIFICIAL TEAR OINTMENT (ARTIFICIAL TEARS) OINT    Place into both eyes every evening.    ASPIRIN (ECOTRIN) 81 MG EC TABLET    Take 1 tablet (81 mg total) by mouth once daily.    ATORVASTATIN (LIPITOR) 80 MG TABLET    Take 1 tablet (80 mg total) by mouth once daily.    AZELASTINE (OPTIVAR) 0.05 % OPHTHALMIC SOLUTION    INSTILL 1 DROP IN EACH EYE TWICE DAILY    BLOOD PRESSURE MONITOR (BLOOD PRESSURE KIT) KIT    1 kit by Misc.(Non-Drug; Combo Route) route once daily.     BLOOD-GLUCOSE METER,CONTINUOUS (DEXCOM G6 ) MISC    Use with dexcom G6 system    BLOOD-GLUCOSE SENSOR (DEXCOM G6 SENSOR) KAREN    Change sensor every 10 days    BLOOD-GLUCOSE TRANSMITTER (DEXCOM G6 TRANSMITTER) KAREN    Change every 3 months    CHLORTHALIDONE (HYGROTEN) 25 MG TAB    Take 1 tablet (25 mg total) by mouth once daily.    DESOXIMETASONE (TOPICORT) 0.05 % CREAM    Apply topically 2 (two) times daily as needed.    EMPAGLIFLOZIN (JARDIANCE) 10 MG TABLET    Take 1 tablet (10 mg total) by mouth once daily.    EZETIMIBE (ZETIA) 10 MG TABLET    Take 1 tablet (10 mg total) by mouth once daily.    FLASH GLUCOSE SCANNING READER (FREESTYLE ANAMARIA READER) MISC    1 Units by Misc.(Non-Drug; Combo Route) route before meals as needed.    FLASH GLUCOSE SENSOR (FREESTYLE ANAMARIA 14 DAY SENSOR) KIT    CHANGE SENSOR EVERY 14 DAYS    FLUOXETINE 40 MG CAPSULE    Take 1 capsule (40 mg total) by mouth once daily.    FLUTICASONE (FLONASE) 50 MCG/ACTUATION NASAL SPRAY    1 spray (50 mcg total) by Each Nare route once daily.    INSULIN DEGLUDEC (TRESIBA FLEXTOUCH U-200) 200 UNIT/ML (3 ML) INPN    Inject 24 Units into the skin once daily. Inject 24 units once daily    ISOSORBIDE MONONITRATE (IMDUR) 30 MG 24 HR TABLET        LANCETS MISC    Check blood glucose 4 times a day. Dispense accuchek meter or other meter preferred by insurance. Dx code e11.65    LANCING DEVICE WITH LANCETS MISC    USE AS DIRECTED FOR DIABETIC TESTING    LATANOPROST 0.005 % OPHTHALMIC SOLUTION    PLACE 1 DROP IN EACH EYE EVERY EVENING    MELATONIN (MELATIN) 3 MG TABLET    Take 2 tablets (6 mg total) by mouth nightly as needed for Insomnia.    METFORMIN (GLUCOPHAGE) 1000 MG TABLET    Take 1 tablet (1,000 mg total) by mouth 2 (two) times daily with meals. Resume Saturday, 6/5/2020    METOPROLOL TARTRATE (LOPRESSOR) 25 MG TABLET    Take 1 tablet (25 mg total) by mouth 2 (two) times daily.    MULTIVITAMIN WITH MINERALS TABLET    Take 1 tablet by mouth once  "daily.    ONDANSETRON (ZOFRAN-ODT) 8 MG TBDL    Take 1 tablet (8 mg total) by mouth every 6 (six) hours as needed.    PANTOPRAZOLE (PROTONIX) 40 MG TABLET    Take 1 tablet (40 mg total) by mouth once daily.    PEN NEEDLE, DIABETIC (BD ULTRA-FINE LENY PEN NEEDLES) 32 GAUGE X 5/32" NDLE    USE FIVE TIMES DAILY    POLYETHYLENE GLYCOL (GLYCOLAX) 17 GRAM PWPK    Take 17 g by mouth 2 (two) times daily as needed.    ROPINIROLE (REQUIP) 0.5 MG TABLET    TAKE 1 TABLET BY MOUTH AT BEDTIME    SENNA-DOCUSATE 8.6-50 MG (PERICOLACE) 8.6-50 MG PER TABLET    Take 1 tablet by mouth once daily.    TRULICITY 1.5 MG/0.5 ML PEN INJECTOR    INJECT 1.5 MG INTO THE SKIN EVERY 7 DAYS.    VALSARTAN (DIOVAN) 320 MG TABLET    Take 1 tablet (320 mg total) by mouth once daily.   Changed and/or Refilled Medications    Modified Medication Previous Medication    ALBUTEROL (PROVENTIL/VENTOLIN HFA) 90 MCG/ACTUATION INHALER albuterol 90 mcg/actuation inhaler       Inhale 2 puffs into the lungs every 6 (six) hours as needed for Wheezing or Shortness of Breath. Rescue    Inhale 2 puffs into the lungs every 6 (six) hours as needed for Wheezing or Shortness of Breath. Rescue    LORATADINE (CLARITIN) 10 MG TABLET loratadine (CLARITIN) 10 mg tablet       TAKE ONE TABLET BY MOUTH EVERY DAY AS NEEDED FOR ALLERGIES    TAKE ONE TABLET BY MOUTH EVERY DAY AS NEEDED FOR ALLERGIES          CARE TEAM:  Patient Care Team:  Yudi Smart MD as PCP - General (Internal Medicine)  Yudi Smart MD as Hypertension Digital Medicine Responsible Provider (Internal Medicine)  Otilia Martin, PharmD as Hypertension Digital Medicine Clinician (Pharmacist)  Rafa Vázquez as Digital Medicine Health   Susie Mauro LPN as Licensed Practical Nurse  [facility] as Hypertension Digital Medicine Contract           SCREENING HISTORY:  Health Maintenance       Date Due Completion Date    Shingles Vaccine (1 of 2) 03/10/2001 ---    Eye Exam 02/26/2020 " 2/26/2019    Override on 1/18/2016: Done (Dr. Lujan - no retinopathy)    Foot Exam 04/22/2020 4/22/2019    Override on 2/23/2015: Done    Influenza Vaccine (1) 08/01/2020 9/11/2019    Mammogram 08/02/2020 8/2/2018    Urine Microalbumin 09/26/2020 9/26/2019    Hemoglobin A1c 11/18/2020 8/18/2020    Lipid Panel 08/18/2021 8/18/2020    High Dose Statin 09/30/2021 9/30/2020    DEXA SCAN 06/12/2022 6/12/2019    Colorectal Cancer Screening 03/05/2025 3/5/2020    Override on 12/3/2004: Done    TETANUS VACCINE 09/30/2025 9/30/2015            REVIEW OF SYSTEMS:   The patient reports: good dietary habits.  She has improved her dietary intake and has lost some weight since her cardiac surgery earlier this year.  The patient reports : that they do not exercise.  She was doing cardiac rehab, but it became too difficult to go to [facility].  She would like to start working out at the gym.  Review of Systems   Constitutional: Negative for chills, fatigue, fever and unexpected weight change.   HENT: Negative for congestion and postnasal drip.    Eyes: Negative for pain and visual disturbance.   Respiratory: Negative for cough, shortness of breath and wheezing.    Cardiovascular: Negative for chest pain, palpitations and leg swelling.   Gastrointestinal: Negative for abdominal pain, constipation, diarrhea, nausea and vomiting.   Genitourinary: Negative for dysuria.   Musculoskeletal: Negative for arthralgias and back pain.   Skin: Negative for rash.   Neurological: Positive for numbness (She reports some bilateral hand numbness, worse at night.  Left is worse than the right side.). Negative for weakness and headaches.   Psychiatric/Behavioral: Positive for dysphoric mood. Negative for sleep disturbance. The patient is not nervous/anxious.       ROS (Optional)-: no pelvic pain  Breast ROS (Optional)-: negative for breast lumps/discharge            Physical Examination:   Vitals:    09/30/20 1406   BP: (!) 114/50   Pulse:  "62   Temp: 98.2 °F (36.8 °C)     Weight: 81.8 kg (180 lb 5.4 oz)   Height: 5' 3" (160 cm)   Body mass index is 31.95 kg/m².      Patient did not require to have a chaperone present during the exam today.    General appearance - alert, well appearing, and in no distress, obese  Psychiatric - alert, oriented to person, place, and time, normal mood, behavior, speech, dress, motor activity, and thought processes  Eyes - pupils equal and reactive, extraocular eye movements intact, sclera anicteric  Mouth - not examined; patient wearing mask due to Covid 19 pandemic  Neck - supple, no significant adenopathy, carotids upstroke normal bilaterally, no bruits, thyroid exam: thyroid is normal in size without nodules or tenderness  Lymphatics - no palpable cervical lymphadenopathy  Chest - clear to auscultation, no wheezes, rales or rhonchi, symmetric air entry  Heart - normal rate and regular rhythm, no gallops noted  Abdomen - soft, nontender, nondistended, no masses or organomegaly  Breasts - not examined  Back exam - not examined  Neurological - alert, normal speech, no focal findings or movement disorder noted, cranial nerves II through XII intact  Musculoskeletal - patient noted to have Moderate osteoarthritic changes to both knee joints. No joint effusions noted., no muscular tenderness noted  Extremities - peripheral pulses normal, no pedal edema, no clubbing or cyanosis  Skin - normal coloration and turgor, no rashes, no suspicious skin lesions noted  Diabetic Foot Exam -   Protective Sensation (w/ 10 gram monofilament):  Right: Intact  Left: Intact    Visual Inspection:  Normal -  Bilateral    Pedal Pulses:   Right: Present  Left: Present    Posterior tibialis:   Right:Present  Left: Present        Labs:  Lab Results   Component Value Date    HGBA1C 6.3 (H) 08/18/2020    HGBA1C 7.1 (H) 05/19/2020    HGBA1C 7.0 (H) 05/05/2020      Lab Results   Component Value Date    CHOL 161 08/18/2020    CHOL 204 (H) 05/19/2020    " CHOL 243 (H) 05/05/2020     Lab Results   Component Value Date    LDLCALC 81.8 08/18/2020    LDLCALC 128.8 05/19/2020    LDLCALC 150.0 05/05/2020         ASSESSMENT AND PLAN:  1. Routine medical exam  Counseled on age appropriate medical preventative services including age appropriate cancer screenings, age appropriate eye and dental exams, over all nutritional health, need for a consistent exercise regimen, and an over all push towards maintaining a vigorous and active lifestyle.  Counseled on age appropriate vaccines and discussed upcoming health care needs based on age/gender. Discussed good sleep hygiene and stress management.    2. Type 2 diabetes, controlled, with neuropathy/3. Long-term insulin use  The patient's diabetes is now controlled with improved dietary habits and weight loss.  She often has to hold her short-acting insulin in the afternoon.  She occasionally hold her Trulicity if her sugars are doing well.  We will consider dropping her to metformin only in the future if she continues to improve with diet/exercise/weight loss.  - Microalbumin/creatinine urine ratio; Future  - Ambulatory referral/consult to Optometry; Future    4. Essential hypertension/5. Coronary artery disease involving native coronary artery of native heart without angina pectoris  The current medical regimen is effective;  continue present plan and medications.   Followed by: Cardiology.     6. Hypercholesterolemia  We discussed low fat diet and regular exercise.The current medical regimen is effective;  continue present plan and medications.     7. Bilateral carotid artery stenosis  Stable. Asymptomatic. Observe.    8. History of cerebrovascular accident  Stable/asymptomatic.  We discussed risk factor reduction.    9. Mild major depression  The current medical regimen is effective;  continue present plan and medications.     10. Mild obesity  The patient is asked to continue to make an attempt to improve diet and exercise  patterns to aid in medical management of this problem.     11. Flu vaccine need    - Influenza (FLUAD) - Quadrivalent (Adjuvanted) *Preferred* (65+) (PF)    12. Need for shingles vaccine  Patient was advised to get immunization at the pharmacy.    13.  History of wheezing  She was given a refill on her medication to take occasionally as needed.  Consider further evaluation if she needs this medication frequently.  - albuterol (PROVENTIL/VENTOLIN HFA) 90 mcg/actuation inhaler; Inhale 2 puffs into the lungs every 6 (six) hours as needed for Wheezing or Shortness of Breath. Rescue  Dispense: 19 g; Refill: 0    14. Allergic rhinitis, unspecified seasonality, unspecified trigger  The current medical regimen is effective;  continue present plan and medications.   - loratadine (CLARITIN) 10 mg tablet; TAKE ONE TABLET BY MOUTH EVERY DAY AS NEEDED FOR ALLERGIES  Dispense: 30 tablet; Refill: 2    15.  Atherosclerosis of the aorta  Patient with Atherosclerosis of the Aorta.  Stable/asymptomatic. Currently stable on lipid lowering medication and b/p monitoring.      Follow up in about 6 months (around 3/30/2021), or if symptoms worsen or fail to improve, for follow up chronic medical conditions.. or sooner as needed.

## 2020-10-02 ENCOUNTER — TELEPHONE (OUTPATIENT)
Dept: CARDIOLOGY | Facility: CLINIC | Age: 70
End: 2020-10-02

## 2020-10-02 ENCOUNTER — OFFICE VISIT (OUTPATIENT)
Dept: ENDOCRINOLOGY | Facility: CLINIC | Age: 70
End: 2020-10-02
Payer: MEDICARE

## 2020-10-02 VITALS
DIASTOLIC BLOOD PRESSURE: 69 MMHG | SYSTOLIC BLOOD PRESSURE: 108 MMHG | HEART RATE: 62 BPM | WEIGHT: 177.69 LBS | TEMPERATURE: 97 F | BODY MASS INDEX: 31.48 KG/M2

## 2020-10-02 DIAGNOSIS — I25.10 CORONARY ARTERY DISEASE, ANGINA PRESENCE UNSPECIFIED, UNSPECIFIED VESSEL OR LESION TYPE, UNSPECIFIED WHETHER NATIVE OR TRANSPLANTED HEART: ICD-10-CM

## 2020-10-02 DIAGNOSIS — I10 ESSENTIAL HYPERTENSION: ICD-10-CM

## 2020-10-02 PROCEDURE — 99214 OFFICE O/P EST MOD 30 MIN: CPT | Mod: HCNC,S$GLB,, | Performed by: NURSE PRACTITIONER

## 2020-10-02 PROCEDURE — 3074F PR MOST RECENT SYSTOLIC BLOOD PRESSURE < 130 MM HG: ICD-10-PCS | Mod: HCNC,CPTII,S$GLB, | Performed by: NURSE PRACTITIONER

## 2020-10-02 PROCEDURE — 1159F MED LIST DOCD IN RCRD: CPT | Mod: HCNC,S$GLB,, | Performed by: NURSE PRACTITIONER

## 2020-10-02 PROCEDURE — 99999 PR PBB SHADOW E&M-EST. PATIENT-LVL V: CPT | Mod: PBBFAC,HCNC,, | Performed by: NURSE PRACTITIONER

## 2020-10-02 PROCEDURE — 1126F PR PAIN SEVERITY QUANTIFIED, NO PAIN PRESENT: ICD-10-PCS | Mod: HCNC,S$GLB,, | Performed by: NURSE PRACTITIONER

## 2020-10-02 PROCEDURE — 3078F PR MOST RECENT DIASTOLIC BLOOD PRESSURE < 80 MM HG: ICD-10-PCS | Mod: HCNC,CPTII,S$GLB, | Performed by: NURSE PRACTITIONER

## 2020-10-02 PROCEDURE — 3008F PR BODY MASS INDEX (BMI) DOCUMENTED: ICD-10-PCS | Mod: HCNC,CPTII,S$GLB, | Performed by: NURSE PRACTITIONER

## 2020-10-02 PROCEDURE — 3044F PR MOST RECENT HEMOGLOBIN A1C LEVEL <7.0%: ICD-10-PCS | Mod: HCNC,CPTII,S$GLB, | Performed by: NURSE PRACTITIONER

## 2020-10-02 PROCEDURE — 1126F AMNT PAIN NOTED NONE PRSNT: CPT | Mod: HCNC,S$GLB,, | Performed by: NURSE PRACTITIONER

## 2020-10-02 PROCEDURE — 3008F BODY MASS INDEX DOCD: CPT | Mod: HCNC,CPTII,S$GLB, | Performed by: NURSE PRACTITIONER

## 2020-10-02 PROCEDURE — 3044F HG A1C LEVEL LT 7.0%: CPT | Mod: HCNC,CPTII,S$GLB, | Performed by: NURSE PRACTITIONER

## 2020-10-02 PROCEDURE — 99999 PR PBB SHADOW E&M-EST. PATIENT-LVL V: ICD-10-PCS | Mod: PBBFAC,HCNC,, | Performed by: NURSE PRACTITIONER

## 2020-10-02 PROCEDURE — 3074F SYST BP LT 130 MM HG: CPT | Mod: HCNC,CPTII,S$GLB, | Performed by: NURSE PRACTITIONER

## 2020-10-02 PROCEDURE — 1159F PR MEDICATION LIST DOCUMENTED IN MEDICAL RECORD: ICD-10-PCS | Mod: HCNC,S$GLB,, | Performed by: NURSE PRACTITIONER

## 2020-10-02 PROCEDURE — 1101F PT FALLS ASSESS-DOCD LE1/YR: CPT | Mod: HCNC,CPTII,S$GLB, | Performed by: NURSE PRACTITIONER

## 2020-10-02 PROCEDURE — 99214 PR OFFICE/OUTPT VISIT, EST, LEVL IV, 30-39 MIN: ICD-10-PCS | Mod: HCNC,S$GLB,, | Performed by: NURSE PRACTITIONER

## 2020-10-02 PROCEDURE — 3078F DIAST BP <80 MM HG: CPT | Mod: HCNC,CPTII,S$GLB, | Performed by: NURSE PRACTITIONER

## 2020-10-02 PROCEDURE — 1101F PR PT FALLS ASSESS DOC 0-1 FALLS W/OUT INJ PAST YR: ICD-10-PCS | Mod: HCNC,CPTII,S$GLB, | Performed by: NURSE PRACTITIONER

## 2020-10-02 RX ORDER — EMPAGLIFLOZIN 25 MG/1
25 TABLET, FILM COATED ORAL DAILY
Qty: 30 TABLET | Refills: 3 | Status: SHIPPED | OUTPATIENT
Start: 2020-10-02 | End: 2021-02-01

## 2020-10-02 NOTE — PROGRESS NOTES
"CC: This 70 y.o. Black or  female  is here for evaluation of  T2DM along with comorbidities indicated in the Visit Diagnosis section of this encounter.    HPI: Cecilia Barahona was diagnosed with T2DM in her 30s. Oral agents started at the time of diagnosis.       Prior visit 9/30/19  a1c up from 8.1 to 9.1% - the highest it's been in 3 years.   She has not been testing BGs often. Admits diet has been poor - eating watermelon. Gets frustrated and has been eating more. "just not caring." Undergoing financial trouble that she has not been faced with before.   She is taking the lower dose of Trulicity and not having any nausea. Interested in trying higher dose again to improve BGs; believes nausea could be better if she takes it in the morning.   [place] Increase Trulicity to 1.5 mg weekly   Advised her to improve diet.   Test blood glucose 2x/day.   Return to clinic 3 mo with labs prior.       Prior visit 2/2020   Latest A1c was 9.1% a month ago.   She admits diet is poor and eats too much of the wrong things like astrid cake. Especially at night. Denies binging.   She did increase Trulicity from 0.75 mg to 1.5 mg weekly and denies nausea.   Plan Start jardiance 10 mg once daily.   Increase Tresiba to 36 units once daily.   Continue metformin 1000 mg twice daily.   Test glucose 2x/day. Try to limit high carb foods.   Recommend seeking therapy with a counselor to help with depression. Patient has been using food as a coping mechanism.   Return to clinic in 3 months with labs prior.       Interval history  Pt was advised to f/u with PCP in May because A1c had improved to 7.1%. Her A1c has further decreased to 6.3% in August.   Pt underwent CABGx3 in May.   She's monitoring diet better and has lost 19 lb since Feb.     She has cut down on Tresiba dose to 24 units and skips it most of the time. Might take it 1-2x/week at most and there are weeks she doesn't take it at all.   She takes Trulicity maybe every 10 " days, not 7 days.            PMHX: CHETNA - does not like her old mask. H/o stroke/right facial dropp     LAST DIABETES EDUCATION: never    RECENT ILLNESSES/HOSPITALIZATIONS - -No.     HOSPITALIZED FOR DIABETES  -  No.    PRESCRIBED DIABETES MEDICATIONS:  Tresiba 30 units hs ~ 10 pm, metformin 1000 mg bid, Trulicity 1.5 mg once weekly, Jardiance 10 mg once daily       Misses medication doses - yes as above     DM COMPLICATIONS: peripheral neuropathy    SIGNIFICANT DIABETES MED HISTORY:   Glimepiride stopped at initial ov 7/2017 since she was already on Novolog  ozempic started 10/2018,  switched to trulicity ~ 12/2018 d/t nausea     SELF MONITORING BLOOD GLUCOSE: Checks blood glucose at home  - 0-1x/day. BGs are low 100s.     HYPOGLYCEMIC EPISODES: BG dropped last week to 40s in the morning and she was symptomatic. She corrects with sugary drink. Infrequently occurs.      CURRENT DIET: drinks water or crystal lite - no more sweet tea.   Very seldom drinks a regular soda. Breakfast is cereal and grits.     CURRENT EXERCISE: none       /69 (BP Location: Right arm, Patient Position: Sitting, BP Method: Large (Automatic))   Pulse 62   Temp 97.1 °F (36.2 °C) (Temporal)   Wt 80.6 kg (177 lb 11.2 oz)   BMI 31.48 kg/m²         ROS:   CONSTITUTIONAL: Appetite good, denies fatigue  GI: + nausea       PHYSICAL EXAM:  GENERAL: Well developed, well nourished. No acute distress.   PSYCH: AAOx3, appropriate mood and affect, conversant, well-groomed. Judgement and insight good.   NEURO: Cranial nerves grossly intact. Speech clear, no tremor.   CHEST: Respirations even and unlabored.   MS: Gait steady. No clubbing.         Hemoglobin A1C   Date Value Ref Range Status   08/18/2020 6.3 (H) 4.0 - 5.6 % Final     Comment:     ADA Screening Guidelines:  5.7-6.4%  Consistent with prediabetes  >or=6.5%  Consistent with diabetes  High levels of fetal hemoglobin interfere with the HbA1C  assay. Heterozygous hemoglobin variants (HbS,  HgC, etc)do  not significantly interfere with this assay.   However, presence of multiple variants may affect accuracy.     05/19/2020 7.1 (H) 4.0 - 5.6 % Final     Comment:     ADA Screening Guidelines:  5.7-6.4%  Consistent with prediabetes  >or=6.5%  Consistent with diabetes  High levels of fetal hemoglobin interfere with the HbA1C  assay. Heterozygous hemoglobin variants (HbS, HgC, etc)do  not significantly interfere with this assay.   However, presence of multiple variants may affect accuracy.     05/05/2020 7.0 (H) 4.0 - 5.6 % Final     Comment:     ADA Screening Guidelines:  5.7-6.4%  Consistent with prediabetes  >or=6.5%  Consistent with diabetes  High levels of fetal hemoglobin interfere with the HbA1C  assay. Heterozygous hemoglobin variants (HbS, HgC, etc)do  not significantly interfere with this assay.   However, presence of multiple variants may affect accuracy.             Chemistry        Component Value Date/Time     06/03/2020 0443    K 3.9 06/03/2020 0443     06/03/2020 0443    CO2 29 06/03/2020 0443    BUN 37 (H) 06/03/2020 0443    CREATININE 1.0 06/03/2020 0443     (H) 06/03/2020 0443        Component Value Date/Time    CALCIUM 9.3 06/03/2020 0443    ALKPHOS 102 06/02/2020 1719    AST 15 06/02/2020 1719    ALT 11 06/02/2020 1719    BILITOT 0.4 06/02/2020 1719    ESTGFRAFRICA >60.0 06/03/2020 0443    EGFRNONAA 57.6 (A) 06/03/2020 0443          Lab Results   Component Value Date    LDLCALC 81.8 08/18/2020           Lab Results   Component Value Date    MICALBCREAT 10.7 09/30/2020           ASSESSMENT and PLAN:    A1C GOAL: < 7 %       1. Type 2 diabetes, uncontrolled, with neuropathy  Stop Tresiba insulin and Trulicity.   Continue metformin.   Increase Jardiance to 25 mg once daily in the AM.   Monitor glucose at least 2x/day or 3x/day with the [facility] in order to get continuous glucose readings.   Follow up in 2 months with labs prior.       Hemoglobin A1C    Ambulatory  referral/consult to Optometry   2. Coronary artery disease, angina presence unspecified, unspecified vessel or lesion type, unspecified whether native or transplanted heart  Continue jardiance    3. Essential hypertension  BP today is 108/69 and she has not taken any of her meds yet. Advised her to hold chlorthalidone, valsartan, and amlodipine. Continue with metoprolol and await further instruction from cardiologist. Will send message to Dr. Jimenez.           Orders Placed This Encounter   Procedures    Hemoglobin A1C     Standing Status:   Future     Standing Expiration Date:   12/1/2021    Ambulatory referral/consult to Optometry     Standing Status:   Future     Standing Expiration Date:   11/2/2021     Referral Priority:   Routine     Referral Type:   Vision (Optometry)     Referral Reason:   Specialty Services Required     Requested Specialty:   Optometry     Number of Visits Requested:   1        Follow up in about 2 months (around 12/2/2020).

## 2020-10-02 NOTE — PROGRESS NOTES
Patient seen by endocrinology today and Jardiance increased. Monitor for potential effect on BP.    Patient following with cardiology and medication list updated. Plan to review with patient on follow up.    Hypertension Medications             amLODIPine (NORVASC) 10 MG tablet Take 0.5 tablets (5 mg total) by mouth once daily.    chlorthalidone (HYGROTEN) 25 MG Tab Take 1 tablet (25 mg total) by mouth once daily.    isosorbide mononitrate (IMDUR) 30 MG 24 hr tablet     metoprolol tartrate (LOPRESSOR) 25 MG tablet Take 1 tablet (25 mg total) by mouth 2 (two) times daily.    valsartan (DIOVAN) 320 MG tablet Take 1 tablet (320 mg total) by mouth once daily.

## 2020-10-02 NOTE — PATIENT INSTRUCTIONS
Stop Tresiba insulin and Trulicity.   Continue metformin.   Increase Jardiance to 25 mg once daily in the AM.   Monitor glucose at least 2x/day or 3x/day with the [facility] in order to get continuous glucose readings.   Follow up in 2 months with labs prior.     This morning hold chlorthalidone, valsartan, and amlodipine. Continue with metoprolol and await further instruction from cardiologist. Will send message to Dr. Jimenez.

## 2020-10-02 NOTE — TELEPHONE ENCOUNTER
Patient notified and verbalized understanding.      ----- Message from Etelvina Lowery MD sent at 10/2/2020  3:02 PM CDT -----  Agree.    Continue metoprolol only.    Hold chlorthalidone, valsartan, and amlodipine for now.    Keep log of pressure.    See me 1-2 weeks.    Thanks,    Etelvina Lowery M.D.  ----- Message -----  From: Sindi Shultz NP  Sent: 10/2/2020  10:14 AM CDT  To: Etelvina Lowery MD    Hi Dr. Lowery, this patient's BP today is 108/69 and she has not taken any of her meds yet. Advised her to hold chlorthalidone, valsartan, and amlodipine and cotninue with her metoprolol this morning. I advised her to await further instruction from you regarding any medication adjustments. Thanks, Sindi

## 2020-10-02 NOTE — Clinical Note
Hi Dr. Lowery, this patient's BP today is 108/69 and she has not taken any of her meds yet. Advised her to hold chlorthalidone, valsartan, and amlodipine and cotninue with her metoprolol this morning. I advised her to await further instruction from you regarding any medication adjustments. Thanks, Sindi

## 2020-10-13 ENCOUNTER — OFFICE VISIT (OUTPATIENT)
Dept: CARDIOLOGY | Facility: CLINIC | Age: 70
End: 2020-10-13
Attending: INTERNAL MEDICINE
Payer: MEDICARE

## 2020-10-13 VITALS
DIASTOLIC BLOOD PRESSURE: 54 MMHG | SYSTOLIC BLOOD PRESSURE: 99 MMHG | BODY MASS INDEX: 32.11 KG/M2 | WEIGHT: 181.19 LBS | HEART RATE: 60 BPM | HEIGHT: 63 IN

## 2020-10-13 DIAGNOSIS — Z79.4 TYPE 2 DIABETES MELLITUS WITH OTHER CIRCULATORY COMPLICATION, WITH LONG-TERM CURRENT USE OF INSULIN: ICD-10-CM

## 2020-10-13 DIAGNOSIS — E11.59 TYPE 2 DIABETES MELLITUS WITH OTHER CIRCULATORY COMPLICATION, WITH LONG-TERM CURRENT USE OF INSULIN: ICD-10-CM

## 2020-10-13 DIAGNOSIS — Z86.718 HISTORY OF DEEP VEIN THROMBOSIS: ICD-10-CM

## 2020-10-13 DIAGNOSIS — E78.1 HYPERTRIGLYCERIDEMIA: ICD-10-CM

## 2020-10-13 DIAGNOSIS — E78.00 HYPERCHOLESTEROLEMIA: ICD-10-CM

## 2020-10-13 DIAGNOSIS — I10 ESSENTIAL HYPERTENSION: ICD-10-CM

## 2020-10-13 DIAGNOSIS — I25.10 CORONARY ARTERY DISEASE INVOLVING NATIVE CORONARY ARTERY OF NATIVE HEART WITHOUT ANGINA PECTORIS: ICD-10-CM

## 2020-10-13 DIAGNOSIS — I70.0 ATHEROSCLEROSIS OF AORTA: ICD-10-CM

## 2020-10-13 DIAGNOSIS — Z95.1 HISTORY OF CORONARY ARTERY BYPASS SURGERY: ICD-10-CM

## 2020-10-13 DIAGNOSIS — Z86.73 HISTORY OF CEREBROVASCULAR ACCIDENT: ICD-10-CM

## 2020-10-13 PROCEDURE — 99499 RISK ADDL DX/OHS AUDIT: ICD-10-PCS | Mod: HCNC,S$GLB,, | Performed by: INTERNAL MEDICINE

## 2020-10-13 PROCEDURE — 99215 PR OFFICE/OUTPT VISIT, EST, LEVL V, 40-54 MIN: ICD-10-PCS | Mod: HCNC,S$GLB,, | Performed by: INTERNAL MEDICINE

## 2020-10-13 PROCEDURE — 3008F PR BODY MASS INDEX (BMI) DOCUMENTED: ICD-10-PCS | Mod: HCNC,CPTII,S$GLB, | Performed by: INTERNAL MEDICINE

## 2020-10-13 PROCEDURE — 1159F PR MEDICATION LIST DOCUMENTED IN MEDICAL RECORD: ICD-10-PCS | Mod: HCNC,S$GLB,, | Performed by: INTERNAL MEDICINE

## 2020-10-13 PROCEDURE — 1101F PR PT FALLS ASSESS DOC 0-1 FALLS W/OUT INJ PAST YR: ICD-10-PCS | Mod: HCNC,CPTII,S$GLB, | Performed by: INTERNAL MEDICINE

## 2020-10-13 PROCEDURE — 1101F PT FALLS ASSESS-DOCD LE1/YR: CPT | Mod: HCNC,CPTII,S$GLB, | Performed by: INTERNAL MEDICINE

## 2020-10-13 PROCEDURE — 99215 OFFICE O/P EST HI 40 MIN: CPT | Mod: HCNC,S$GLB,, | Performed by: INTERNAL MEDICINE

## 2020-10-13 PROCEDURE — 99999 PR PBB SHADOW E&M-EST. PATIENT-LVL III: ICD-10-PCS | Mod: PBBFAC,HCNC,, | Performed by: INTERNAL MEDICINE

## 2020-10-13 PROCEDURE — 3044F PR MOST RECENT HEMOGLOBIN A1C LEVEL <7.0%: ICD-10-PCS | Mod: HCNC,CPTII,S$GLB, | Performed by: INTERNAL MEDICINE

## 2020-10-13 PROCEDURE — 3078F DIAST BP <80 MM HG: CPT | Mod: HCNC,CPTII,S$GLB, | Performed by: INTERNAL MEDICINE

## 2020-10-13 PROCEDURE — 3078F PR MOST RECENT DIASTOLIC BLOOD PRESSURE < 80 MM HG: ICD-10-PCS | Mod: HCNC,CPTII,S$GLB, | Performed by: INTERNAL MEDICINE

## 2020-10-13 PROCEDURE — 3074F SYST BP LT 130 MM HG: CPT | Mod: HCNC,CPTII,S$GLB, | Performed by: INTERNAL MEDICINE

## 2020-10-13 PROCEDURE — 99999 PR PBB SHADOW E&M-EST. PATIENT-LVL III: CPT | Mod: PBBFAC,HCNC,, | Performed by: INTERNAL MEDICINE

## 2020-10-13 PROCEDURE — 99499 UNLISTED E&M SERVICE: CPT | Mod: HCNC,S$GLB,, | Performed by: INTERNAL MEDICINE

## 2020-10-13 PROCEDURE — 3008F BODY MASS INDEX DOCD: CPT | Mod: HCNC,CPTII,S$GLB, | Performed by: INTERNAL MEDICINE

## 2020-10-13 PROCEDURE — 3044F HG A1C LEVEL LT 7.0%: CPT | Mod: HCNC,CPTII,S$GLB, | Performed by: INTERNAL MEDICINE

## 2020-10-13 PROCEDURE — 1159F MED LIST DOCD IN RCRD: CPT | Mod: HCNC,S$GLB,, | Performed by: INTERNAL MEDICINE

## 2020-10-13 PROCEDURE — 3074F PR MOST RECENT SYSTOLIC BLOOD PRESSURE < 130 MM HG: ICD-10-PCS | Mod: HCNC,CPTII,S$GLB, | Performed by: INTERNAL MEDICINE

## 2020-10-13 RX ORDER — ASPIRIN 81 MG/1
81 TABLET ORAL DAILY
Qty: 90 TABLET | Refills: 3 | Status: SHIPPED | OUTPATIENT
Start: 2020-10-13 | End: 2020-12-31 | Stop reason: SDUPTHER

## 2020-10-13 RX ORDER — METOPROLOL TARTRATE 25 MG/1
25 TABLET, FILM COATED ORAL 2 TIMES DAILY
Qty: 180 TABLET | Refills: 3 | Status: SHIPPED | OUTPATIENT
Start: 2020-10-13 | End: 2020-12-31 | Stop reason: SDUPTHER

## 2020-10-13 RX ORDER — VALSARTAN 320 MG/1
320 TABLET ORAL DAILY
Qty: 90 TABLET | Refills: 3 | Status: SHIPPED | OUTPATIENT
Start: 2020-10-13 | End: 2020-12-31 | Stop reason: SDUPTHER

## 2020-10-13 RX ORDER — EZETIMIBE 10 MG/1
10 TABLET ORAL DAILY
Qty: 90 TABLET | Refills: 3 | Status: SHIPPED | OUTPATIENT
Start: 2020-10-13 | End: 2020-12-31 | Stop reason: SDUPTHER

## 2020-10-13 RX ORDER — ATORVASTATIN CALCIUM 80 MG/1
80 TABLET, FILM COATED ORAL DAILY
Qty: 90 TABLET | Refills: 3 | Status: SHIPPED | OUTPATIENT
Start: 2020-10-13 | End: 2020-12-31 | Stop reason: SDUPTHER

## 2020-10-13 NOTE — PROGRESS NOTES
Subjective:     Cecilia Barahona is a 70 y.o. female with hypertension, hypercholesterolemia, hypertriglyceridemia and diabetes mellitus type 2. She is mildly obese. She appears to have had a CVA in about 2004 when she had an episode of weakness in her left hand. She had exertional chest discomfort in 12/2019. She was given isosorbidemononitrate and the chest discomfort resolved. She had a Stress MPI that was negative. On 5/16/2020 she began to experience a mild pressure over her chest. The heaviness lasted most of the day. Walking in her house made the discomfort more pronounced. There was no radiation of the pain and neither any associated dyspnea or diaphoresis. She had similar discomfort on 5/17/2020 and 5/18/2020. She told her daughter that took her to . She received NTG sl x 2 tablets and the chest discomfort resolved. There were no acute ST-T wave changes. She was advised admission and transferred to [facility]. She has had no further chest pain after presentation. On 5/20/2020 she underwent coronary angiography that revealed severe 3 V CAD. There was basal inferior mild hypokinesia with an EF of 60%. On 5/25/2020 she underwent CABG x 3 receiving a LIMA to the LAD and a sequential SVG to OM2 and OM4. A few days after surgery she had a deep venous thrombosis of her left leg and she was anticoagulated with apixiban. She has been able to lose weight after the CABG and her diabetes and hypertension has become easier to control. No exertional chest pain or exertional shortness of breath. No palpitations or weak spells. Feeling well overall.         Coronary Artery Disease  Presents for follow-up visit. Pertinent negatives include no chest pain, chest pressure, chest tightness, dizziness, leg swelling, muscle weakness, palpitations, shortness of breath or weight gain. Risk factors include hyperlipidemia. Risk factors do not include obesity. The symptoms have been stable.   Cerebrovascular Accident  This is a  chronic problem. The problem has been resolved. Pertinent negatives include no abdominal pain, anorexia, arthralgias, change in bowel habit, chest pain, chills, congestion, coughing, diaphoresis, fatigue, fever, headaches, joint swelling, myalgias, nausea, neck pain, numbness, rash, sore throat, swollen glands, urinary symptoms, vertigo, visual change, vomiting or weakness.   Hypertension  This is a chronic problem. The current episode started more than 1 year ago. The problem is unchanged. The problem is controlled (usually 110-120/65-75 mmHg at home). Pertinent negatives include no anxiety, blurred vision, chest pain, headaches, malaise/fatigue, neck pain, orthopnea, palpitations, peripheral edema, PND, shortness of breath or sweats. There is no history of chronic renal disease.   Hyperlipidemia  This is a chronic problem. The current episode started more than 1 year ago. The problem is controlled. Exacerbating diseases include diabetes. She has no history of chronic renal disease, hypothyroidism, liver disease, obesity or nephrotic syndrome. Pertinent negatives include no chest pain, focal weakness, leg pain, myalgias or shortness of breath.       Review of Systems   Constitution: Negative for chills, diaphoresis, fatigue, fever, malaise/fatigue and weight gain.   HENT: Negative for congestion, nosebleeds and sore throat.    Eyes: Negative for blurred vision, double vision, vision loss in left eye and vision loss in right eye.   Cardiovascular: Negative for chest pain, claudication, dyspnea on exertion, irregular heartbeat, leg swelling, near-syncope, orthopnea, palpitations, paroxysmal nocturnal dyspnea and syncope.   Respiratory: Negative for chest tightness, cough, hemoptysis, shortness of breath and wheezing.    Endocrine: Negative for cold intolerance and heat intolerance.   Hematologic/Lymphatic: Negative for bleeding problem. Does not bruise/bleed easily.   Skin: Negative for color change and rash.    Musculoskeletal: Negative for arthralgias, back pain, falls, joint swelling, muscle weakness, myalgias and neck pain.   Gastrointestinal: Negative for abdominal pain, anorexia, change in bowel habit, heartburn, hematemesis, hematochezia, hemorrhoids, jaundice, melena, nausea and vomiting.   Genitourinary: Negative for dysuria and hematuria.   Neurological: Negative for dizziness, focal weakness, headaches, light-headedness, loss of balance, numbness, vertigo and weakness.   Psychiatric/Behavioral: Negative for altered mental status, depression and memory loss. The patient is not nervous/anxious.    Allergic/Immunologic: Negative for hives and persistent infections.       Current Outpatient Medications on File Prior to Visit   Medication Sig Dispense Refill    albuterol (PROVENTIL/VENTOLIN HFA) 90 mcg/actuation inhaler Inhale 2 puffs into the lungs every 6 (six) hours as needed for Wheezing or Shortness of Breath. Rescue 19 g 0    alcohol swabs PadM Apply 1 each topically 3 (three) times daily. 400 each 2    artificial tear ointment (ARTIFICIAL TEARS) Oint Place into both eyes every evening. 305 g 1    aspirin (ECOTRIN) 81 MG EC tablet Take 1 tablet (81 mg total) by mouth once daily. 90 tablet 3    atorvastatin (LIPITOR) 80 MG tablet Take 1 tablet (80 mg total) by mouth once daily. 90 tablet 3    azelastine (OPTIVAR) 0.05 % ophthalmic solution INSTILL 1 DROP IN EACH EYE TWICE DAILY 12 mL 0    blood pressure monitor (BLOOD PRESSURE KIT) Kit 1 kit by Misc.(Non-Drug; Combo Route) route once daily. 1 each 0    desoximetasone (TOPICORT) 0.05 % cream Apply topically 2 (two) times daily as needed. 100 g 2    empagliflozin (JARDIANCE) 25 mg tablet Take 1 tablet (25 mg total) by mouth once daily. 30 tablet 3    flash glucose scanning reader (FREESTYLE ANAMARIA READER) Misc 1 Units by Misc.(Non-Drug; Combo Route) route before meals as needed. 1 each 11    flash glucose sensor (FREESTYLE ANAMARIA 14 DAY SENSOR) Kit  "CHANGE SENSOR EVERY 14 DAYS 2 kit 11    FLUoxetine 40 MG capsule Take 1 capsule (40 mg total) by mouth once daily. 90 capsule 1    fluticasone (FLONASE) 50 mcg/actuation nasal spray 1 spray (50 mcg total) by Each Nare route once daily. 16 g 5    lancets Misc Check blood glucose 4 times a day. Dispense accuchek meter or other meter preferred by insurance. Dx code e11.65 400 each 3    LANCING DEVICE WITH LANCETS Misc USE AS DIRECTED FOR DIABETIC TESTING 1 each 0    latanoprost 0.005 % ophthalmic solution PLACE 1 DROP IN EACH EYE EVERY EVENING 2.5 mL 0    loratadine (CLARITIN) 10 mg tablet TAKE ONE TABLET BY MOUTH EVERY DAY AS NEEDED FOR ALLERGIES 30 tablet 2    melatonin (MELATIN) 3 mg tablet Take 2 tablets (6 mg total) by mouth nightly as needed for Insomnia.  0    metFORMIN (GLUCOPHAGE) 1000 MG tablet Take 1 tablet (1,000 mg total) by mouth 2 (two) times daily with meals. Resume Saturday, 6/5/2020 180 tablet 2    metoprolol tartrate (LOPRESSOR) 25 MG tablet Take 1 tablet (25 mg total) by mouth 2 (two) times daily. 180 tablet 3    multivitamin with minerals tablet Take 1 tablet by mouth once daily.      ondansetron (ZOFRAN-ODT) 8 MG TbDL Take 1 tablet (8 mg total) by mouth every 6 (six) hours as needed. 30 tablet 1    pen needle, diabetic (BD ULTRA-FINE LENY PEN NEEDLES) 32 gauge x 5/32" Ndle USE FIVE TIMES DAILY 100 each 5    rOPINIRole (REQUIP) 0.5 MG tablet TAKE 1 TABLET BY MOUTH AT BEDTIME 90 tablet 0    valsartan (DIOVAN) 320 MG tablet Take 1 tablet (320 mg total) by mouth once daily. 90 tablet 3    acetaminophen (TYLENOL) 325 MG tablet Take 2 tablets (650 mg total) by mouth every 6 (six) hours as needed for Pain or Temperature greater than (or equal to 101 degree F). (Patient not taking: Reported on 9/30/2020) 30 tablet 0    amLODIPine (NORVASC) 10 MG tablet Take 0.5 tablets (5 mg total) by mouth once daily. (Patient not taking: Reported on 10/13/2020) 90 tablet 3    chlorthalidone (HYGROTEN) " "25 MG Tab Take 1 tablet (25 mg total) by mouth once daily. (Patient not taking: Reported on 10/13/2020) 90 tablet 3    ezetimibe (ZETIA) 10 mg tablet Take 1 tablet (10 mg total) by mouth once daily. (Patient not taking: Reported on 10/13/2020) 90 tablet 3    isosorbide mononitrate (IMDUR) 30 MG 24 hr tablet       pantoprazole (PROTONIX) 40 MG tablet Take 1 tablet (40 mg total) by mouth once daily. (Patient not taking: Reported on 9/30/2020) 30 tablet 11    polyethylene glycol (GLYCOLAX) 17 gram PwPk Take 17 g by mouth 2 (two) times daily as needed. (Patient not taking: Reported on 9/30/2020) 10 each 0    senna-docusate 8.6-50 mg (PERICOLACE) 8.6-50 mg per tablet Take 1 tablet by mouth once daily. (Patient not taking: Reported on 9/30/2020) 30 tablet 0     No current facility-administered medications on file prior to visit.        BP (!) 99/54 (BP Location: Left arm, Patient Position: Sitting, BP Method: Large (Automatic))   Pulse 60   Ht 5' 3" (1.6 m)   Wt 82.2 kg (181 lb 3.5 oz)   BMI 32.10 kg/m²       Objective:     Physical Exam   Constitutional: She is oriented to person, place, and time. She appears well-developed and well-nourished.  Non-toxic appearance. No distress.   HENT:   Head: Normocephalic and atraumatic.   Nose: Nose normal.   Eyes: Right eye exhibits no discharge. Left eye exhibits no discharge. Right conjunctiva is not injected. Left conjunctiva is not injected. Right pupil is round. Left pupil is round. Pupils are equal.   Neck: Neck supple. No JVD present. Carotid bruit is not present. No thyromegaly present.   Cardiovascular: Normal rate, regular rhythm, S1 normal and S2 normal.  No extrasystoles are present. PMI is not displaced. Exam reveals gallop and S4. Exam reveals no S3.   Murmur heard.   Midsystolic murmur is present with a grade of 2/6.  Pulses:       Radial pulses are 2+ on the right side and 2+ on the left side.        Femoral pulses are 2+ on the right side and 2+ on the left " side.       Dorsalis pedis pulses are 2+ on the right side and 2+ on the left side.        Posterior tibial pulses are 2+ on the right side and 2+ on the left side.   Pulmonary/Chest: Effort normal and breath sounds normal.   Midline sternotomy scar with moderate keloid.   Abdominal: Soft. Normal appearance. There is no hepatosplenomegaly. There is no abdominal tenderness.   Musculoskeletal:      Right ankle: She exhibits no swelling, no ecchymosis and no deformity.      Left ankle: She exhibits no swelling, no ecchymosis and no deformity.   Lymphadenopathy:        Head (right side): No submandibular adenopathy present.        Head (left side): No submandibular adenopathy present.     She has no cervical adenopathy.   Neurological: She is alert and oriented to person, place, and time. She is not disoriented. No cranial nerve deficit.   Skin: Skin is warm, dry and intact. No rash noted. She is not diaphoretic. No cyanosis. Nails show no clubbing.   Psychiatric: She has a normal mood and affect. Her speech is normal and behavior is normal. Judgment and thought content normal. Cognition and memory are normal.       Assessment:     1. Coronary artery disease involving native coronary artery of native heart without angina pectoris    2. History of coronary artery bypass surgery    3. History of cerebrovascular accident    4. Atherosclerosis of aorta    5. History of deep vein thrombosis    6. Essential hypertension    7. Hypercholesterolemia    8. Hypertriglyceridemia    9. Type 2 diabetes mellitus with other circulatory complication, with long-term current use of insulin        Plan:     1. Coronary Artery Disease              12/2019: Began experience chest pain. Resolved with isosorbidemononitrate.              12/19/2019: Stress MPI:3:39 min. No CP. ECG negative. MPI: Mild to moderate fixed anterior and anteroapical defect. EF 73%.              5/18/2020: Presents with more pronounced chest pain. Resolved with NTG  sl.              ECG without acute changes. Troponin x 3 negative.               5/19/2020: Echo: Normal left ventricular size and systolic function. EF 75%. Mildly dilated LA. Mild aortic valve sclerosis. Mild mitral valve sclerosis.              5/20/2020: [facility]: Cath: LAD: Prox 30%. Mid 70%. Distal 80%. OM2: Osteal 90%. OM3: 95%. OM4 90%. RCA: Mid subtotal. LV: Basal inferior mild hypokinesia. EF 60%.   5/25/2020: [facility]: CABG: LIMA to LAD and SVG1 to OM2 & OM4.   On metoprolol 25 mg Q12.              On aspirin 81 mg Q24.   Doing well.              2. History of Cerebrovascular Accident              2004: Weakness in left hand. Affected memory.    3. Atherosclerosis of Aorta   6/3/2020: CT Scan: Noted.      4. History of Deep Venous Trombosis of Lower Extremity   5/2020: DVT left leg after CABG.   Received apixiban for 3 months.     5. Hypertension              2000: Diagnosed.   9/1/2020: Furosemide 20 mg Q24 and KCl 20 mEq Q24 were discontinued.   Used to be on metoprolol 25 mg Q12, amlodipine 10 mg Q24, valsartan 320 mg Q24, furosemide 20 mg Q24 and chlorthalidone 25 mg Q24.on metoprolol 25 mg Q12, amlodipine 10 mg Q24, valsartan 320 mg Q24, furosemide 20 mg Q24 and chlorthalidone 25 mg Q24.   On metoprolol 25 mg Q12, amlodipine 10 mg Q24 and furosemide 20 mg Q24.   Keeping log at home.   Well controlled.   10/13/2020: Resume valsartan 320 mg Q24. Amlodipine 10 mg Q24 and furosemide 20 mg Q24 were discontinued.      5. Hypercholesterolemia              2000: Began statin.              On atorvastatin 40 mg Q24.                 5/19/2020: Chol 204. HDL 41. . .              On atorvastatin to 80 mg Q24 and ezetimibe 10 mg Q24.   8/18/2020: Chol 161. HDL 46. LDL 82. .   On atorvastatin to 80 mg Q24 and ezetimibe 10 mg Q24.   Fair lipid panel.    6. Hypertriglyceridemia   2020: Diagnosed.   As above.     7. Diabetes Mellitus, Type 2              2000: Diagnosed. Complications: CVA & CAD.  Medications: Insulin.      8. Mild Obesity              5/19/2020: Weight 86 kg. BMI 33.     9. Primary Care              Dr. Yudi Smart.    F/u 2 months.    Etelvina Lowery M.D.

## 2020-10-14 ENCOUNTER — PATIENT OUTREACH (OUTPATIENT)
Dept: OTHER | Facility: OTHER | Age: 70
End: 2020-10-14

## 2020-10-22 ENCOUNTER — PATIENT OUTREACH (OUTPATIENT)
Dept: OTHER | Facility: OTHER | Age: 70
End: 2020-10-22

## 2020-10-22 NOTE — PROGRESS NOTES
Digital Medicine: Clinician Follow-Up    Seen by Dr. Lowery (cardiology) 10/13/20, amlodipine and chlorthalidone stopped, resumed valsartan. She reports making advised medication changes 10/14. She is unclear why changes were made, but I suspect due to low BP reading in office (99/54 mmHg).    She reports taking second BP reading of 163/70 mmHg about 1 pm. States she takes valsartan when she wakes up, typically between 10 am - noon.    She is concerned about BP elevations when she wakes up (prior to medication).    The history is provided by the patient.   Follow-up reason(s): routine follow up.     Hypertension    Patient's blood pressure readings are labile. Patient is not experiencing signs/symptoms of hypertension. Slight headache, unsure if related to BP            Last 5 Patient Entered Readings                                      Current 30 Day Average: 137/70     Recent Readings 10/22/2020 10/21/2020 10/20/2020 10/17/2020 10/17/2020    SBP (mmHg) 169 160 156 132 158    DBP (mmHg) 79 73 74 65 73    Pulse 54 55 54 61 52                 Depression Screening  Did not address depression screening.    Sleep Apnea Screening    Did not address sleep apnea screening.     Medication Affordability Screening  Did not address medication affordability screening.     Medication Adherence-Medication adherence was assessed.          ASSESSMENT(S)  Patients BP average is 137/70 mmHg, which is above goal. Patient's BP goal is less than or equal to 130/80.     Hypertension Plan  Continue current therapy.  Patient appears to prefer amlodipine. Briefly reviewed MOA of amlodipine and valsartan and potential benefit of ARBs for LVH. Discussed allowing medication changes time to stabilize.  Will route note to Dr. Lowery. Discussed with patient that do not want to cause confusion with multiple people adjusting medications, and she is awaiting return call from him. Would consider resuming amlodipine at reduced dose of 5 mg  QPM.     Addressed patient questions and patient has my contact information if needed prior to next outreach. Patient verbalizes understanding.                 Hypertension Medications             metoprolol tartrate (LOPRESSOR) 25 MG tablet Take 1 tablet (25 mg total) by mouth 2 (two) times daily.    valsartan (DIOVAN) 320 MG tablet Take 1 tablet (320 mg total) by mouth once daily.

## 2020-10-23 RX ORDER — AMLODIPINE BESYLATE 5 MG/1
5 TABLET ORAL DAILY
Qty: 30 TABLET | Refills: 11 | Status: SHIPPED | OUTPATIENT
Start: 2020-10-23 | End: 2020-12-31

## 2020-10-23 NOTE — TELEPHONE ENCOUNTER
----- Message from Etelvina Lowery MD sent at 10/22/2020  6:27 PM CDT -----  No.    Stay on valsartan. Add amlodipine.    Etelvina oLwery M.D.  ----- Message -----  From: Kaylynn Reveles  Sent: 10/22/2020   4:05 PM CDT  To: Etelvina Lowery MD    Do you want her to stop the valsartan?    ----- Message -----  From: Etelvina Lowery MD  Sent: 10/22/2020   3:44 PM CDT  To: Chantelle Denny MA    Blood pressure high.    Resume amlodipine at 5 mg Q24.    Etelvina Lowery M.D.  ----- Message -----  From: Kaylynn Reveles  Sent: 10/22/2020   3:36 PM CDT  To: Etelvina Lowery MD      ----- Message -----  From: Otilia Martin, PharmD  Sent: 10/22/2020   3:23 PM CDT  To: [facility] Staff    Patient said she has a call into Dr. Lowery. Please see note for further information regarding BP.

## 2020-10-26 NOTE — PROGRESS NOTES
Amlodipine 5 mg resumed by Dr. Lowery 10/23/20  BP beginning to trend down. Continue to monitor.    Last 5 Patient Entered Readings                                      Current 30 Day Average: 137/71     Recent Readings 10/24/2020 10/24/2020 10/23/2020 10/23/2020 10/22/2020    SBP (mmHg) 133 155 137 157 169    DBP (mmHg) 65 74 83 79 79    Pulse 55 54 53 56 54

## 2020-10-28 ENCOUNTER — PATIENT OUTREACH (OUTPATIENT)
Dept: ADMINISTRATIVE | Facility: OTHER | Age: 70
End: 2020-10-28

## 2020-10-28 NOTE — PROGRESS NOTES
Health Maintenance Due   Topic Date Due    Shingles Vaccine (1 of 2) 03/10/2001    Mammogram  08/02/2020     Updates were requested from care everywhere.  Chart was reviewed for overdue Proactive [facility] Encounters ([facility]) topics (CRS, Breast Cancer Screening, Eye exam)  Health Maintenance has been updated.  LINKS immunization registry triggered.  Immunizations were reconciled.

## 2020-10-29 ENCOUNTER — TELEPHONE (OUTPATIENT)
Dept: OPHTHALMOLOGY | Facility: CLINIC | Age: 70
End: 2020-10-29

## 2020-11-06 ENCOUNTER — PES CALL (OUTPATIENT)
Dept: ADMINISTRATIVE | Facility: CLINIC | Age: 70
End: 2020-11-06

## 2020-11-11 ENCOUNTER — PATIENT OUTREACH (OUTPATIENT)
Dept: OTHER | Facility: OTHER | Age: 70
End: 2020-11-11

## 2020-11-11 NOTE — PROGRESS NOTES
Patient answered but was still in bed. I requested she call me at her convenience.  Follow up resumption of amlodipine 5 mg daily ~ 10/23. Last BP reading 11/3.  BP average 141/72 mmHg

## 2020-11-17 RX ORDER — INSULIN DEGLUDEC 200 U/ML
30 INJECTION, SOLUTION SUBCUTANEOUS DAILY
COMMUNITY
Start: 2020-10-10 | End: 2021-01-26

## 2020-11-17 RX ORDER — DULAGLUTIDE 1.5 MG/.5ML
1.5 INJECTION, SOLUTION SUBCUTANEOUS WEEKLY
COMMUNITY
Start: 2020-10-10 | End: 2021-01-26 | Stop reason: SDUPTHER

## 2020-11-17 RX ORDER — FUROSEMIDE 20 MG/1
40 TABLET ORAL 2 TIMES DAILY
COMMUNITY
Start: 2020-10-09 | End: 2020-12-31

## 2020-11-17 RX ORDER — CHLORTHALIDONE 25 MG/1
TABLET ORAL
COMMUNITY
Start: 2020-10-29 | End: 2020-12-31

## 2020-11-18 NOTE — PROGRESS NOTES
Digital Medicine: Health  Follow-Up    The history is provided by the patient.             Reason for review: Blood pressure not at goal        Topics Covered on Call: Diet    Additional Follow-up details: Patient had concerns about her 's BP readings not transmitting.  Patient's  is also in [facility].  Will ask 's care team to follow up.            Diet-no change to diet    No change to diet.  Patient reports eating or drinking the following: Patient continues to monitor sodium intake.    Intervention(s): DASH diet/sodium reduction education      Physical Activity-Not assessed    Medication Adherence-Medication adherence was assessed.      Substance, Sleep, Stress-Not assessed      Continue current diet/physical activity routine.       Addressed patient questions and patient has my contact information if needed prior to next outreach. Patient verbalizes understanding.      Explained the importance of self-monitoring and medication adherence. Encouraged the patient to communicate with their health  for lifestyle modifications to help improve or maintain a healthy lifestyle.                   Topic    Eye Exam     Hemoglobin A1C          Last 5 Patient Entered Readings                                      Current 30 Day Average: 141/72     Recent Readings 11/18/2020 11/16/2020 11/3/2020 11/2/2020 10/30/2020    SBP (mmHg) 112 132 136 144 138    DBP (mmHg) 69 69 67 65 67    Pulse 79 88 65 61 59

## 2020-11-30 ENCOUNTER — PATIENT OUTREACH (OUTPATIENT)
Dept: ADMINISTRATIVE | Facility: OTHER | Age: 70
End: 2020-11-30

## 2020-11-30 NOTE — PROGRESS NOTES
Digital Medicine: Clinician Follow-Up    Patient states she is doing well. She denies questions or concerns regarding BP medications or readings. Feeling well since amlodipine resumed.    The history is provided by the patient.   Follow-up reason(s): routine follow up.     Hypertension    Readings are trending down due to medication adherence.       Patient is not experiencing signs/symptoms of hypotension.  Patient is not experiencing signs/symptoms of hypertension.            Last 5 Patient Entered Readings                                      Current 30 Day Average: 122/66     Recent Readings 11/30/2020 11/29/2020 11/28/2020 11/27/2020 11/25/2020    SBP (mmHg) 105 124 128 118 103    DBP (mmHg) 64 63 75 68 52    Pulse 68 65 58 60 59                 Depression Screening  Did not address depression screening.    Sleep Apnea Screening    Did not address sleep apnea screening.     Medication Affordability Screening  Did not address medication affordability screening.     Medication Adherence-Medication adherence was assessed.          ASSESSMENT(S)  Patients BP average is 122/66 mmHg, which is at goal. Patient's BP goal is less than or equal to 130/80.    Hypertension Plan  Continue current therapy.       Addressed patient questions and patient has my contact information if needed prior to next outreach. Patient verbalizes understanding.                 Topic    Hemoglobin A1C      There are no preventive care reminders to display for this patient.      Hypertension Medications             amLODIPine (NORVASC) 5 MG tablet Take 1 tablet (5 mg total) by mouth once daily.    chlorthalidone (HYGROTEN) 25 MG Tab     furosemide (LASIX) 20 MG tablet Take 40 mg by mouth 2 (two) times daily.    metoprolol tartrate (LOPRESSOR) 25 MG tablet Take 1 tablet (25 mg total) by mouth 2 (two) times daily.    valsartan (DIOVAN) 320 MG tablet Take 1 tablet (320 mg total) by mouth once daily.

## 2020-12-10 ENCOUNTER — OFFICE VISIT (OUTPATIENT)
Dept: PSYCHIATRY | Facility: CLINIC | Age: 70
End: 2020-12-10
Payer: MEDICARE

## 2020-12-10 VITALS
WEIGHT: 181.88 LBS | HEIGHT: 63 IN | BODY MASS INDEX: 32.23 KG/M2 | HEART RATE: 59 BPM | OXYGEN SATURATION: 95 % | DIASTOLIC BLOOD PRESSURE: 59 MMHG | SYSTOLIC BLOOD PRESSURE: 115 MMHG

## 2020-12-10 DIAGNOSIS — F33.2 SEVERE EPISODE OF RECURRENT MAJOR DEPRESSIVE DISORDER, WITHOUT PSYCHOTIC FEATURES: Primary | ICD-10-CM

## 2020-12-10 DIAGNOSIS — F41.1 GENERALIZED ANXIETY DISORDER: ICD-10-CM

## 2020-12-10 PROCEDURE — 3008F BODY MASS INDEX DOCD: CPT | Mod: HCNC,CPTII,S$GLB, | Performed by: PHYSICIAN ASSISTANT

## 2020-12-10 PROCEDURE — 1159F PR MEDICATION LIST DOCUMENTED IN MEDICAL RECORD: ICD-10-PCS | Mod: HCNC,S$GLB,, | Performed by: PHYSICIAN ASSISTANT

## 2020-12-10 PROCEDURE — 99214 PR OFFICE/OUTPT VISIT, EST, LEVL IV, 30-39 MIN: ICD-10-PCS | Mod: HCNC,S$GLB,, | Performed by: PHYSICIAN ASSISTANT

## 2020-12-10 PROCEDURE — 1157F ADVNC CARE PLAN IN RCRD: CPT | Mod: HCNC,S$GLB,, | Performed by: PHYSICIAN ASSISTANT

## 2020-12-10 PROCEDURE — 1159F MED LIST DOCD IN RCRD: CPT | Mod: HCNC,S$GLB,, | Performed by: PHYSICIAN ASSISTANT

## 2020-12-10 PROCEDURE — 3078F DIAST BP <80 MM HG: CPT | Mod: HCNC,CPTII,S$GLB, | Performed by: PHYSICIAN ASSISTANT

## 2020-12-10 PROCEDURE — 1101F PR PT FALLS ASSESS DOC 0-1 FALLS W/OUT INJ PAST YR: ICD-10-PCS | Mod: HCNC,CPTII,S$GLB, | Performed by: PHYSICIAN ASSISTANT

## 2020-12-10 PROCEDURE — 3074F PR MOST RECENT SYSTOLIC BLOOD PRESSURE < 130 MM HG: ICD-10-PCS | Mod: HCNC,CPTII,S$GLB, | Performed by: PHYSICIAN ASSISTANT

## 2020-12-10 PROCEDURE — 3288F FALL RISK ASSESSMENT DOCD: CPT | Mod: HCNC,CPTII,S$GLB, | Performed by: PHYSICIAN ASSISTANT

## 2020-12-10 PROCEDURE — 3288F PR FALLS RISK ASSESSMENT DOCUMENTED: ICD-10-PCS | Mod: HCNC,CPTII,S$GLB, | Performed by: PHYSICIAN ASSISTANT

## 2020-12-10 PROCEDURE — 3074F SYST BP LT 130 MM HG: CPT | Mod: HCNC,CPTII,S$GLB, | Performed by: PHYSICIAN ASSISTANT

## 2020-12-10 PROCEDURE — 1101F PT FALLS ASSESS-DOCD LE1/YR: CPT | Mod: HCNC,CPTII,S$GLB, | Performed by: PHYSICIAN ASSISTANT

## 2020-12-10 PROCEDURE — 99214 OFFICE O/P EST MOD 30 MIN: CPT | Mod: HCNC,S$GLB,, | Performed by: PHYSICIAN ASSISTANT

## 2020-12-10 PROCEDURE — 99999 PR PBB SHADOW E&M-EST. PATIENT-LVL III: CPT | Mod: PBBFAC,HCNC,, | Performed by: PHYSICIAN ASSISTANT

## 2020-12-10 PROCEDURE — 1126F PR PAIN SEVERITY QUANTIFIED, NO PAIN PRESENT: ICD-10-PCS | Mod: HCNC,S$GLB,, | Performed by: PHYSICIAN ASSISTANT

## 2020-12-10 PROCEDURE — 1126F AMNT PAIN NOTED NONE PRSNT: CPT | Mod: HCNC,S$GLB,, | Performed by: PHYSICIAN ASSISTANT

## 2020-12-10 PROCEDURE — 3078F PR MOST RECENT DIASTOLIC BLOOD PRESSURE < 80 MM HG: ICD-10-PCS | Mod: HCNC,CPTII,S$GLB, | Performed by: PHYSICIAN ASSISTANT

## 2020-12-10 PROCEDURE — 3008F PR BODY MASS INDEX (BMI) DOCUMENTED: ICD-10-PCS | Mod: HCNC,CPTII,S$GLB, | Performed by: PHYSICIAN ASSISTANT

## 2020-12-10 PROCEDURE — 1157F PR ADVANCE CARE PLAN OR EQUIV PRESENT IN MEDICAL RECORD: ICD-10-PCS | Mod: HCNC,S$GLB,, | Performed by: PHYSICIAN ASSISTANT

## 2020-12-10 PROCEDURE — 99999 PR PBB SHADOW E&M-EST. PATIENT-LVL III: ICD-10-PCS | Mod: PBBFAC,HCNC,, | Performed by: PHYSICIAN ASSISTANT

## 2020-12-10 RX ORDER — FLUOXETINE HYDROCHLORIDE 20 MG/1
20 CAPSULE ORAL DAILY
Qty: 90 CAPSULE | Refills: 0 | Status: SHIPPED | OUTPATIENT
Start: 2020-12-10 | End: 2021-07-08 | Stop reason: SDUPTHER

## 2020-12-10 NOTE — PATIENT INSTRUCTIONS
"        You have been provided with a certain amount of medication with a specified number of refills.  Please follow up within an adequate time before you run out of medications.    REFILLS FOR CONTROLLED SUBSTANCES WILL NOT BE GIVEN WITHOUT AN APPOINTMENT.  I will not honor or fill automated refill requests from pharmacies.  You must come in for an appointment to get refills.        Please book your next appointment for myself or therapist by phone by calling our office at [phone].        Note that follow up appointments are 10-15 minutes long.  It is important that we focus on medication management.  Should you need therapy, please get set up with our therapist or call your insurance company to find out which therapists are available in your area.      PLEASE BE AT LEAST 15 MINUTES EARLY FOR YOUR NEXT APPOINTMENT.  Late arrivals WILL BE TURNED AWAY AND ASKED TO RESCHEDULE.  YOU MUST COME EARLY TO ALLOW TIME FOR CHECK-IN AS WELL AS GET YOUR VITAL SIGNS AND GO OVER YOUR MEDICATIONS.  Tardiness is not fair to the patients who present after you and are on time for their appointments.  It causes a delay in the appointments for patients and staff.  YOU MAY ALSO BE DISCHARGED FROM CLINIC with multiple late arrivals or "No Show" appointments.       -----------------------------------------------------------------------------------------------------------------  IF YOU FEEL SUICIDAL OR HAVING THOUGHTS OR PLANS TO HURT YOURSELF OR OTHERS, CALL 911 OR REPORT TO THE NEAREST EMERGENCY ROOM.  YOU CAN ALSO ACCESS THE FOLLOWING HOTLINE:    National Suicide Hotline Number 7-369-621-TALK (9160)                "

## 2020-12-10 NOTE — PROGRESS NOTES
"Outpatient Psychiatry Follow-Up Visit (LINDA)    12/10/2020    Clinical Status of Patient:  Outpatient (Ambulatory)    Chief Complaint:  Cecilia Barahona is a 70 y.o. female who presents today for follow-up of depression and anxiety.  Met with patient.      Interval History and Content of Current Session:  Interim Events/Subjective Report/Content of Current Session: Pt reports the increase in fluoxetine from 20mg to 40mg daily made her irritable and angry so she stopped taking it alltogether.  States she is feeling more depressed lately and would like to restart the fluoxetine 20mg daily.      Review of Systems   · PSYCHIATRIC: Pertinant items are noted in the narrative.  · CONSTITUTIONAL: No weight gain or loss.   · MUSCULOSKELETAL: No pain or stiffness of the joints.  · NEUROLOGIC: No weakness, sensory changes, seizures, confusion, memory loss, tremor or other abnormal movements.    Past Medical, Family and Social History: The patient's past medical, family and social history have been reviewed and updated as appropriate within the electronic medical record - see encounter notes.    Compliance: no    Side effects: see above    Risk Parameters:  Patient reports no suicidal ideation  Patient reports no homicidal ideation  Patient reports no self-injurious behavior  Patient reports no violent behavior    Exam (detailed: at least 9 elements; comprehensive: all 15 elements)   Constitutional  Vitals:  Most recent vital signs, dated less than 90 days prior to this appointment, were reviewed.   Vitals:    12/10/20 1034   BP: (!) 115/59   Pulse: (!) 59   SpO2: 95%   Weight: 82.5 kg (181 lb 14.1 oz)   Height: 5' 3" (1.6 m)        General:  unremarkable, age appropriate     Musculoskeletal  Muscle Strength/Tone:  no tremor, no tic   Gait & Station:  non-ataxic     Psychiatric  Speech:  no latency; no press   Mood & Affect:  irritable  congruent and appropriate, irritable, mood-congruent   Thought Process:  normal and " logical   Associations:  intact   Thought Content:  normal, no suicidality, no homicidality, delusions, or paranoia   Insight:  limited awareness of illness   Judgement: behavior is adequate to circumstances   Orientation:  grossly intact   Memory: intact for content of interview   Language: grossly intact   Attention Span & Concentration:  able to focus   Fund of Knowledge:  intact and appropriate to age and level of education     Assessment and Diagnosis   Status/Progress: Based on the examination today, the patient's problem(s) is/are failing to respond as expected to treatment.  New problems have not been presented today.   Lack of compliance are complicating management of the primary condition.  There are no active rule-out diagnoses for this patient at this time.     General Impression: Untreated depression and anxiety due to noncompliance.      ICD-10-CM ICD-9-CM   1. Severe episode of recurrent major depressive disorder, without psychotic features  F33.2 296.33   2. Generalized anxiety disorder  F41.1 300.02     1. Restart fluoxetine 20mg daily  2. F/u 1m    Intervention/Counseling/Treatment Plan   · Medication Management: The risks and benefits of medication were discussed with the patient.      Return to Clinic: 1 month

## 2020-12-22 ENCOUNTER — TELEPHONE (OUTPATIENT)
Dept: FAMILY MEDICINE | Facility: CLINIC | Age: 70
End: 2020-12-22

## 2020-12-22 NOTE — TELEPHONE ENCOUNTER
----- Message from Yudi Smart MD sent at 12/21/2020  3:32 PM CST -----  Please call patient to schedule her mmg.

## 2020-12-31 ENCOUNTER — OFFICE VISIT (OUTPATIENT)
Dept: CARDIOLOGY | Facility: CLINIC | Age: 70
End: 2020-12-31
Attending: INTERNAL MEDICINE
Payer: MEDICARE

## 2020-12-31 VITALS
HEART RATE: 47 BPM | DIASTOLIC BLOOD PRESSURE: 75 MMHG | WEIGHT: 186.75 LBS | SYSTOLIC BLOOD PRESSURE: 128 MMHG | BODY MASS INDEX: 33.09 KG/M2 | HEIGHT: 63 IN

## 2020-12-31 DIAGNOSIS — E66.9 MILD OBESITY: ICD-10-CM

## 2020-12-31 DIAGNOSIS — I25.10 CORONARY ARTERY DISEASE INVOLVING NATIVE CORONARY ARTERY OF NATIVE HEART WITHOUT ANGINA PECTORIS: ICD-10-CM

## 2020-12-31 DIAGNOSIS — Z86.73 HISTORY OF CEREBROVASCULAR ACCIDENT: ICD-10-CM

## 2020-12-31 DIAGNOSIS — E11.59 TYPE 2 DIABETES MELLITUS WITH OTHER CIRCULATORY COMPLICATION, WITH LONG-TERM CURRENT USE OF INSULIN: ICD-10-CM

## 2020-12-31 DIAGNOSIS — Z95.1 HISTORY OF CORONARY ARTERY BYPASS SURGERY: ICD-10-CM

## 2020-12-31 DIAGNOSIS — I70.0 ATHEROSCLEROSIS OF AORTA: ICD-10-CM

## 2020-12-31 DIAGNOSIS — Z79.4 TYPE 2 DIABETES MELLITUS WITH OTHER CIRCULATORY COMPLICATION, WITH LONG-TERM CURRENT USE OF INSULIN: ICD-10-CM

## 2020-12-31 DIAGNOSIS — E78.1 HYPERTRIGLYCERIDEMIA: ICD-10-CM

## 2020-12-31 DIAGNOSIS — I10 ESSENTIAL HYPERTENSION: ICD-10-CM

## 2020-12-31 DIAGNOSIS — E78.00 HYPERCHOLESTEROLEMIA: ICD-10-CM

## 2020-12-31 DIAGNOSIS — Z86.718 HISTORY OF DEEP VEIN THROMBOSIS: ICD-10-CM

## 2020-12-31 PROCEDURE — 1101F PR PT FALLS ASSESS DOC 0-1 FALLS W/OUT INJ PAST YR: ICD-10-PCS | Mod: HCNC,CPTII,S$GLB, | Performed by: INTERNAL MEDICINE

## 2020-12-31 PROCEDURE — 3078F PR MOST RECENT DIASTOLIC BLOOD PRESSURE < 80 MM HG: ICD-10-PCS | Mod: HCNC,CPTII,S$GLB, | Performed by: INTERNAL MEDICINE

## 2020-12-31 PROCEDURE — 93005 ELECTROCARDIOGRAM TRACING: CPT | Mod: HCNC

## 2020-12-31 PROCEDURE — 3044F PR MOST RECENT HEMOGLOBIN A1C LEVEL <7.0%: ICD-10-PCS | Mod: HCNC,CPTII,S$GLB, | Performed by: INTERNAL MEDICINE

## 2020-12-31 PROCEDURE — 1126F PR PAIN SEVERITY QUANTIFIED, NO PAIN PRESENT: ICD-10-PCS | Mod: HCNC,S$GLB,, | Performed by: INTERNAL MEDICINE

## 2020-12-31 PROCEDURE — 1157F ADVNC CARE PLAN IN RCRD: CPT | Mod: HCNC,S$GLB,, | Performed by: INTERNAL MEDICINE

## 2020-12-31 PROCEDURE — 3078F DIAST BP <80 MM HG: CPT | Mod: HCNC,CPTII,S$GLB, | Performed by: INTERNAL MEDICINE

## 2020-12-31 PROCEDURE — 93000 ELECTROCARDIOGRAM COMPLETE: CPT | Mod: HCNC,S$GLB,, | Performed by: INTERNAL MEDICINE

## 2020-12-31 PROCEDURE — 1159F MED LIST DOCD IN RCRD: CPT | Mod: HCNC,S$GLB,, | Performed by: INTERNAL MEDICINE

## 2020-12-31 PROCEDURE — 99214 PR OFFICE/OUTPT VISIT, EST, LEVL IV, 30-39 MIN: ICD-10-PCS | Mod: HCNC,25,S$GLB, | Performed by: INTERNAL MEDICINE

## 2020-12-31 PROCEDURE — 1101F PT FALLS ASSESS-DOCD LE1/YR: CPT | Mod: HCNC,CPTII,S$GLB, | Performed by: INTERNAL MEDICINE

## 2020-12-31 PROCEDURE — 99999 PR PBB SHADOW E&M-EST. PATIENT-LVL IV: CPT | Mod: PBBFAC,HCNC,, | Performed by: INTERNAL MEDICINE

## 2020-12-31 PROCEDURE — 1157F PR ADVANCE CARE PLAN OR EQUIV PRESENT IN MEDICAL RECORD: ICD-10-PCS | Mod: HCNC,S$GLB,, | Performed by: INTERNAL MEDICINE

## 2020-12-31 PROCEDURE — 1126F AMNT PAIN NOTED NONE PRSNT: CPT | Mod: HCNC,S$GLB,, | Performed by: INTERNAL MEDICINE

## 2020-12-31 PROCEDURE — 3288F FALL RISK ASSESSMENT DOCD: CPT | Mod: HCNC,CPTII,S$GLB, | Performed by: INTERNAL MEDICINE

## 2020-12-31 PROCEDURE — 99214 OFFICE O/P EST MOD 30 MIN: CPT | Mod: HCNC,25,S$GLB, | Performed by: INTERNAL MEDICINE

## 2020-12-31 PROCEDURE — 93010 ELECTROCARDIOGRAM REPORT: CPT | Mod: HCNC,S$GLB,, | Performed by: INTERNAL MEDICINE

## 2020-12-31 PROCEDURE — 3008F PR BODY MASS INDEX (BMI) DOCUMENTED: ICD-10-PCS | Mod: HCNC,CPTII,S$GLB, | Performed by: INTERNAL MEDICINE

## 2020-12-31 PROCEDURE — 3008F BODY MASS INDEX DOCD: CPT | Mod: HCNC,CPTII,S$GLB, | Performed by: INTERNAL MEDICINE

## 2020-12-31 PROCEDURE — 93010 EKG 12-LEAD: ICD-10-PCS | Mod: HCNC,S$GLB,, | Performed by: INTERNAL MEDICINE

## 2020-12-31 PROCEDURE — 3074F PR MOST RECENT SYSTOLIC BLOOD PRESSURE < 130 MM HG: ICD-10-PCS | Mod: HCNC,CPTII,S$GLB, | Performed by: INTERNAL MEDICINE

## 2020-12-31 PROCEDURE — 3288F PR FALLS RISK ASSESSMENT DOCUMENTED: ICD-10-PCS | Mod: HCNC,CPTII,S$GLB, | Performed by: INTERNAL MEDICINE

## 2020-12-31 PROCEDURE — 99999 PR PBB SHADOW E&M-EST. PATIENT-LVL IV: ICD-10-PCS | Mod: PBBFAC,HCNC,, | Performed by: INTERNAL MEDICINE

## 2020-12-31 PROCEDURE — 99499 UNLISTED E&M SERVICE: CPT | Mod: HCNC,S$GLB,, | Performed by: INTERNAL MEDICINE

## 2020-12-31 PROCEDURE — 1159F PR MEDICATION LIST DOCUMENTED IN MEDICAL RECORD: ICD-10-PCS | Mod: HCNC,S$GLB,, | Performed by: INTERNAL MEDICINE

## 2020-12-31 PROCEDURE — 3044F HG A1C LEVEL LT 7.0%: CPT | Mod: HCNC,CPTII,S$GLB, | Performed by: INTERNAL MEDICINE

## 2020-12-31 PROCEDURE — 3074F SYST BP LT 130 MM HG: CPT | Mod: HCNC,CPTII,S$GLB, | Performed by: INTERNAL MEDICINE

## 2020-12-31 PROCEDURE — 99499 RISK ADDL DX/OHS AUDIT: ICD-10-PCS | Mod: HCNC,S$GLB,, | Performed by: INTERNAL MEDICINE

## 2020-12-31 PROCEDURE — 93000 PR ELECTROCARDIOGRAM, COMPLETE: ICD-10-PCS | Mod: HCNC,S$GLB,, | Performed by: INTERNAL MEDICINE

## 2020-12-31 RX ORDER — ASPIRIN 81 MG/1
81 TABLET ORAL DAILY
Qty: 90 TABLET | Refills: 3 | Status: SHIPPED | OUTPATIENT
Start: 2020-12-31 | End: 2021-04-20 | Stop reason: SDUPTHER

## 2020-12-31 RX ORDER — METOPROLOL TARTRATE 25 MG/1
25 TABLET, FILM COATED ORAL 2 TIMES DAILY
Qty: 180 TABLET | Refills: 3 | Status: SHIPPED | OUTPATIENT
Start: 2020-12-31 | End: 2021-04-20 | Stop reason: SDUPTHER

## 2020-12-31 RX ORDER — ATORVASTATIN CALCIUM 80 MG/1
80 TABLET, FILM COATED ORAL DAILY
Qty: 90 TABLET | Refills: 3 | Status: SHIPPED | OUTPATIENT
Start: 2020-12-31 | End: 2021-04-20 | Stop reason: SDUPTHER

## 2020-12-31 RX ORDER — EZETIMIBE 10 MG/1
10 TABLET ORAL DAILY
Qty: 90 TABLET | Refills: 3 | Status: SHIPPED | OUTPATIENT
Start: 2020-12-31 | End: 2021-04-20 | Stop reason: SDUPTHER

## 2020-12-31 RX ORDER — VALSARTAN 320 MG/1
320 TABLET ORAL DAILY
Qty: 90 TABLET | Refills: 3 | Status: SHIPPED | OUTPATIENT
Start: 2020-12-31 | End: 2021-04-20 | Stop reason: SDUPTHER

## 2020-12-31 NOTE — PROGRESS NOTES
Subjective:     Cecilia Barahona is a 70 y.o. female with hypertension, hypercholesterolemia, hypertriglyceridemia and diabetes mellitus type 2. She is mildly obese. She appears to have had a CVA in about 2004 when she had an episode of weakness in her left hand. She had exertional chest discomfort in 12/2019. She was given isosorbidemononitrate and the chest discomfort resolved. She had a Stress MPI that was negative. On 5/16/2020 she began to experience a mild pressure over her chest. The heaviness lasted most of the day. Walking in her house made the discomfort more pronounced. There was no radiation of the pain and neither any associated dyspnea or diaphoresis. She had similar discomfort on 5/17/2020 and 5/18/2020. She told her daughter that took her to . She received NTG sl x 2 tablets and the chest discomfort resolved. There were no acute ST-T wave changes. She was advised admission and transferred to [facility]. She has had no further chest pain after presentation. On 5/20/2020 she underwent coronary angiography that revealed severe 3 V CAD. There was basal inferior mild hypokinesia with an EF of 60%. On 5/25/2020 she underwent CABG x 3 receiving a LIMA to the LAD and a sequential SVG to OM2 and OM4. A few days after surgery she had a deep venous thrombosis of her left leg and she was anticoagulated with apixiban. She was able to lose weight after the CABG and her diabetes and hypertension became easier to control. Some soreness and itching in the anterior chest wall. No exertional chest pain or exertional shortness of breath. No palpitations or weak spells. Feeling well overall.         Coronary Artery Disease  Presents for follow-up visit. Pertinent negatives include no chest pain, chest pressure, chest tightness, dizziness, leg swelling, muscle weakness, palpitations, shortness of breath or weight gain. Risk factors include hyperlipidemia. Risk factors do not include obesity. The symptoms have been  stable.   Cerebrovascular Accident  This is a chronic problem. The problem has been resolved. Pertinent negatives include no abdominal pain, anorexia, arthralgias, change in bowel habit, chest pain, chills, congestion, coughing, diaphoresis, fatigue, fever, headaches, joint swelling, myalgias, nausea, neck pain, numbness, rash, sore throat, swollen glands, urinary symptoms, vertigo, visual change, vomiting or weakness.   Hypertension  This is a chronic problem. The current episode started more than 1 year ago. The problem is unchanged. The problem is controlled (usually 120-130/65-75 mmHg at home). Pertinent negatives include no anxiety, blurred vision, chest pain, headaches, malaise/fatigue, neck pain, orthopnea, palpitations, peripheral edema, PND, shortness of breath or sweats. There is no history of chronic renal disease.   Hyperlipidemia  This is a chronic problem. The current episode started more than 1 year ago. The problem is controlled. Exacerbating diseases include diabetes. She has no history of chronic renal disease, hypothyroidism, liver disease, obesity or nephrotic syndrome. Pertinent negatives include no chest pain, focal weakness, leg pain, myalgias or shortness of breath.       Review of Systems   Constitution: Negative for chills, diaphoresis, fatigue, fever, malaise/fatigue and weight gain.   HENT: Negative for congestion, nosebleeds and sore throat.    Eyes: Negative for blurred vision, double vision, vision loss in left eye and vision loss in right eye.   Cardiovascular: Negative for chest pain, claudication, dyspnea on exertion, irregular heartbeat, leg swelling, near-syncope, orthopnea, palpitations, paroxysmal nocturnal dyspnea and syncope.   Respiratory: Negative for chest tightness, cough, hemoptysis, shortness of breath and wheezing.    Endocrine: Negative for cold intolerance and heat intolerance.   Hematologic/Lymphatic: Negative for bleeding problem. Does not bruise/bleed easily.    Skin: Negative for color change and rash.   Musculoskeletal: Negative for arthralgias, back pain, falls, joint swelling, muscle weakness, myalgias and neck pain.   Gastrointestinal: Negative for abdominal pain, anorexia, change in bowel habit, heartburn, hematemesis, hematochezia, hemorrhoids, jaundice, melena, nausea and vomiting.   Genitourinary: Negative for dysuria and hematuria.   Neurological: Negative for dizziness, focal weakness, headaches, light-headedness, loss of balance, numbness, vertigo and weakness.   Psychiatric/Behavioral: Negative for altered mental status, depression and memory loss. The patient is not nervous/anxious.    Allergic/Immunologic: Negative for hives and persistent infections.       Current Outpatient Medications on File Prior to Visit   Medication Sig Dispense Refill    albuterol (PROVENTIL/VENTOLIN HFA) 90 mcg/actuation inhaler Inhale 2 puffs into the lungs every 6 (six) hours as needed for Wheezing or Shortness of Breath. Rescue 19 g 0    amLODIPine (NORVASC) 5 MG tablet Take 1 tablet (5 mg total) by mouth once daily. 30 tablet 11    artificial tear ointment (ARTIFICIAL TEARS) Oint Place into both eyes every evening. 305 g 1    aspirin (ECOTRIN) 81 MG EC tablet Take 1 tablet (81 mg total) by mouth once daily. 90 tablet 3    atorvastatin (LIPITOR) 80 MG tablet Take 1 tablet (80 mg total) by mouth once daily. 90 tablet 3    azelastine (OPTIVAR) 0.05 % ophthalmic solution INSTILL 1 DROP IN EACH EYE TWICE DAILY 12 mL 0    blood pressure monitor (BLOOD PRESSURE KIT) Kit 1 kit by Misc.(Non-Drug; Combo Route) route once daily. 1 each 0    chlorthalidone (HYGROTEN) 25 MG Tab       desoximetasone (TOPICORT) 0.05 % cream Apply topically 2 (two) times daily as needed. 100 g 2    empagliflozin (JARDIANCE) 25 mg tablet Take 1 tablet (25 mg total) by mouth once daily. 30 tablet 3    ezetimibe (ZETIA) 10 mg tablet Take 1 tablet (10 mg total) by mouth once daily. 90 tablet 3    flash  "glucose scanning reader (FREESTYLE ANAMARIA READER) Misc 1 Units by Misc.(Non-Drug; Combo Route) route before meals as needed. 1 each 11    flash glucose sensor (FREESTYLE ANMAARIA 14 DAY SENSOR) Kit CHANGE SENSOR EVERY 14 DAYS 2 kit 11    FLUoxetine 20 MG capsule Take 1 capsule (20 mg total) by mouth once daily. 90 capsule 0    fluticasone (FLONASE) 50 mcg/actuation nasal spray 1 spray (50 mcg total) by Each Nare route once daily. 16 g 5    furosemide (LASIX) 20 MG tablet Take 40 mg by mouth 2 (two) times daily.      isosorbide mononitrate (IMDUR) 30 MG 24 hr tablet TAKE ONE TABLET BY MOUTH EVERY DAY 30 tablet 0    latanoprost 0.005 % ophthalmic solution PLACE 1 DROP IN EACH EYE EVERY EVENING 2.5 mL 0    loratadine (CLARITIN) 10 mg tablet TAKE ONE TABLET BY MOUTH EVERY DAY AS NEEDED FOR ALLERGIES 30 tablet 2    melatonin (MELATIN) 3 mg tablet Take 2 tablets (6 mg total) by mouth nightly as needed for Insomnia.  0    metFORMIN (GLUCOPHAGE) 1000 MG tablet Take 1 tablet (1,000 mg total) by mouth 2 (two) times daily with meals. Resume Saturday, 6/5/2020 180 tablet 2    metoprolol tartrate (LOPRESSOR) 25 MG tablet Take 1 tablet (25 mg total) by mouth 2 (two) times daily. 180 tablet 3    multivitamin with minerals tablet Take 1 tablet by mouth once daily.      rOPINIRole (REQUIP) 0.5 MG tablet TAKE 1 TABLET BY MOUTH AT BEDTIME 90 tablet 0    valsartan (DIOVAN) 320 MG tablet Take 1 tablet (320 mg total) by mouth once daily. 90 tablet 3    alcohol swabs PadM Apply 1 each topically 3 (three) times daily. 400 each 2    lancets Misc Check blood glucose 4 times a day. Dispense accuchek meter or other meter preferred by insurance. Dx code e11.65 (Patient not taking: Reported on 12/31/2020) 400 each 3    LANCING DEVICE WITH LANCETS Misc USE AS DIRECTED FOR DIABETIC TESTING (Patient not taking: Reported on 12/31/2020) 1 each 0    pen needle, diabetic (BD ULTRA-FINE LENY PEN NEEDLES) 32 gauge x 5/32" Ndle USE FIVE TIMES " "DAILY (Patient not taking: Reported on 12/31/2020) 100 each 5    potassium chloride SA (K-DUR,KLOR-CON) 20 MEQ tablet TAKE ONE TABLET BY MOUTH TWICE DAILY FOR 7 DAYS THEN DECREASE TO ONCE DAILY      TRESIBA FLEXTOUCH U-200 200 unit/mL (3 mL) InPn Inject 30 Units into the skin once daily.      TRULICITY 1.5 mg/0.5 mL pen injector Inject 1.5 mg into the skin once a week.       No current facility-administered medications on file prior to visit.        /75 Comment: Average at home.  Pulse (!) 47   Ht 5' 3" (1.6 m)   Wt 84.7 kg (186 lb 11.7 oz)   BMI 33.08 kg/m²       Objective:     Physical Exam   Constitutional: She is oriented to person, place, and time. She appears well-developed and well-nourished.  Non-toxic appearance. No distress.   HENT:   Head: Normocephalic and atraumatic.   Nose: Nose normal.   Eyes: Right eye exhibits no discharge. Left eye exhibits no discharge. Right conjunctiva is not injected. Left conjunctiva is not injected. Right pupil is round. Left pupil is round. Pupils are equal.   Neck: Neck supple. No JVD present. Carotid bruit is not present. No thyromegaly present.   Cardiovascular: Regular rhythm, S1 normal and S2 normal.  No extrasystoles are present. Bradycardia present. PMI is not displaced. Exam reveals gallop and S4. Exam reveals no S3.   Murmur heard.   Midsystolic murmur is present with a grade of 2/6.  Pulses:       Radial pulses are 2+ on the right side and 2+ on the left side.        Femoral pulses are 2+ on the right side and 2+ on the left side.       Dorsalis pedis pulses are 2+ on the right side and 2+ on the left side.        Posterior tibial pulses are 2+ on the right side and 2+ on the left side.   Pulmonary/Chest: Effort normal and breath sounds normal.   Midline sternotomy scar with moderate keloid.   Abdominal: Soft. Normal appearance. There is no hepatosplenomegaly. There is no abdominal tenderness.   Musculoskeletal:      Right ankle: She exhibits no " swelling, no ecchymosis and no deformity.      Left ankle: She exhibits no swelling, no ecchymosis and no deformity.   Lymphadenopathy:        Head (right side): No submandibular adenopathy present.        Head (left side): No submandibular adenopathy present.     She has no cervical adenopathy.   Neurological: She is alert and oriented to person, place, and time. She is not disoriented. No cranial nerve deficit.   Skin: Skin is warm, dry and intact. No rash noted. She is not diaphoretic. No cyanosis. Nails show no clubbing.   Psychiatric: She has a normal mood and affect. Her speech is normal and behavior is normal. Judgment and thought content normal. Cognition and memory are normal.       Assessment:     1. Coronary artery disease involving native coronary artery of native heart without angina pectoris    2. History of coronary artery bypass surgery    3. History of cerebrovascular accident    4. Atherosclerosis of aorta    5. History of deep vein thrombosis    6. Essential hypertension    7. Hypercholesterolemia    8. Hypertriglyceridemia    9. Type 2 diabetes mellitus with other circulatory complication, with long-term current use of insulin    10. Mild obesity        Plan:     1. Coronary Artery Disease              12/2019: Began experience chest pain. Resolved with isosorbidemononitrate.              12/19/2019: Stress MPI:3:39 min. No CP. ECG negative. MPI: Mild to moderate fixed anterior and anteroapical defect. EF 73%.              5/18/2020: Presents with more pronounced chest pain. Resolved with NTG sl.              ECG without acute changes. Troponin x 3 negative.               5/19/2020: Echo: Normal left ventricular size and systolic function. EF 75%. Mildly dilated LA. Mild aortic valve sclerosis. Mild mitral valve sclerosis.              5/20/2020: [facility]: Cath: LAD: Prox 30%. Mid 70%. Distal 80%. OM2: Osteal 90%. OM3: 95%. OM4 90%. RCA: Mid subtotal. LV: Basal inferior mild hypokinesia. EF  60%.   5/25/2020: [facility]: CABG: LIMA to LAD and SVG1 to OM2 & OM4.   On metoprolol 25 mg Q12.              On aspirin 81 mg Q24.   Doing well.              2. History of Cerebrovascular Accident              2004: Weakness in left hand. Affected memory.    3. Atherosclerosis of Aorta   6/3/2020: CT Scan: Noted.      4. History of Deep Venous Trombosis of Lower Extremity   5/2020: DVT left leg after CABG.   Received apixiban for 3 months.     5. Hypertension              2000: Diagnosed.   9/1/2020: Furosemide 20 mg Q24 and KCl 20 mEq Q24 were discontinued.   Used to be on metoprolol 25 mg Q12, amlodipine 10 mg Q24, valsartan 320 mg Q24, furosemide 20 mg Q24 and chlorthalidone 25 mg Q24.   10/13/2020: Valsartan 320 mg Q24 was resumed. Amlodipine 10 mg Q24 and furosemide 20 mg Q24 were discontinued.   On metoprolol 25 mg Q12 and valsartan 320 mg Q24.   Keeping log at home.   Well controlled.      5. Hypercholesterolemia              2000: Began statin.              On atorvastatin 40 mg Q24.                 5/19/2020: Chol 204. HDL 41. . .              On atorvastatin to 80 mg Q24 and ezetimibe 10 mg Q24.   8/18/2020: Chol 161. HDL 46. LDL 82. .   On atorvastatin to 80 mg Q24 and ezetimibe 10 mg Q24.   Fair lipid panel.    6. Hypertriglyceridemia   2020: Diagnosed.   As above.     7. Diabetes Mellitus, Type 2              2000: Diagnosed. Complications: CVA & CAD. Medications: Insulin.      8. Mild Obesity              5/19/2020: Weight 86 kg. BMI 33.     9. Primary Care              Dr. Yudi Smart.    F/u 4 months.    Etelvina Lowery M.D.

## 2021-01-12 ENCOUNTER — TELEPHONE (OUTPATIENT)
Dept: FAMILY MEDICINE | Facility: CLINIC | Age: 71
End: 2021-01-12

## 2021-01-15 DIAGNOSIS — H40.1111 PRIMARY OPEN ANGLE GLAUCOMA (POAG) OF RIGHT EYE, MILD STAGE: ICD-10-CM

## 2021-01-15 DIAGNOSIS — H40.1122 PRIMARY OPEN ANGLE GLAUCOMA (POAG) OF LEFT EYE, MODERATE STAGE: Primary | ICD-10-CM

## 2021-01-19 ENCOUNTER — TELEPHONE (OUTPATIENT)
Dept: OPTOMETRY | Facility: CLINIC | Age: 71
End: 2021-01-19

## 2021-01-22 ENCOUNTER — LAB VISIT (OUTPATIENT)
Dept: LAB | Facility: HOSPITAL | Age: 71
End: 2021-01-22
Attending: NURSE PRACTITIONER
Payer: MEDICARE

## 2021-01-22 LAB
CREAT SERPL-MCNC: 0.8 MG/DL (ref 0.5–1.4)
EST. GFR  (AFRICAN AMERICAN): >60 ML/MIN/1.73 M^2
EST. GFR  (NON AFRICAN AMERICAN): >60 ML/MIN/1.73 M^2

## 2021-01-22 PROCEDURE — 83036 HEMOGLOBIN GLYCOSYLATED A1C: CPT | Mod: HCNC

## 2021-01-22 PROCEDURE — 36415 COLL VENOUS BLD VENIPUNCTURE: CPT | Mod: HCNC,PN

## 2021-01-22 PROCEDURE — 82565 ASSAY OF CREATININE: CPT | Mod: HCNC

## 2021-01-23 LAB
ESTIMATED AVG GLUCOSE: 220 MG/DL (ref 68–131)
HBA1C MFR BLD HPLC: 9.3 % (ref 4–5.6)

## 2021-01-25 ENCOUNTER — PATIENT OUTREACH (OUTPATIENT)
Dept: ADMINISTRATIVE | Facility: OTHER | Age: 71
End: 2021-01-25

## 2021-01-26 ENCOUNTER — OFFICE VISIT (OUTPATIENT)
Dept: ENDOCRINOLOGY | Facility: CLINIC | Age: 71
End: 2021-01-26
Payer: MEDICARE

## 2021-01-26 VITALS
BODY MASS INDEX: 32.59 KG/M2 | HEART RATE: 54 BPM | DIASTOLIC BLOOD PRESSURE: 75 MMHG | TEMPERATURE: 99 F | WEIGHT: 184 LBS | SYSTOLIC BLOOD PRESSURE: 147 MMHG

## 2021-01-26 DIAGNOSIS — I25.10 CORONARY ARTERY DISEASE, ANGINA PRESENCE UNSPECIFIED, UNSPECIFIED VESSEL OR LESION TYPE, UNSPECIFIED WHETHER NATIVE OR TRANSPLANTED HEART: ICD-10-CM

## 2021-01-26 DIAGNOSIS — E78.5 HYPERLIPIDEMIA, UNSPECIFIED HYPERLIPIDEMIA TYPE: ICD-10-CM

## 2021-01-26 DIAGNOSIS — I10 ESSENTIAL HYPERTENSION: ICD-10-CM

## 2021-01-26 PROCEDURE — 3077F SYST BP >= 140 MM HG: CPT | Mod: HCNC,CPTII,S$GLB, | Performed by: NURSE PRACTITIONER

## 2021-01-26 PROCEDURE — 1126F PR PAIN SEVERITY QUANTIFIED, NO PAIN PRESENT: ICD-10-PCS | Mod: HCNC,S$GLB,, | Performed by: NURSE PRACTITIONER

## 2021-01-26 PROCEDURE — 3078F PR MOST RECENT DIASTOLIC BLOOD PRESSURE < 80 MM HG: ICD-10-PCS | Mod: HCNC,CPTII,S$GLB, | Performed by: NURSE PRACTITIONER

## 2021-01-26 PROCEDURE — 3046F PR MOST RECENT HEMOGLOBIN A1C LEVEL > 9.0%: ICD-10-PCS | Mod: HCNC,CPTII,S$GLB, | Performed by: NURSE PRACTITIONER

## 2021-01-26 PROCEDURE — 99214 PR OFFICE/OUTPT VISIT, EST, LEVL IV, 30-39 MIN: ICD-10-PCS | Mod: HCNC,S$GLB,, | Performed by: NURSE PRACTITIONER

## 2021-01-26 PROCEDURE — 1159F MED LIST DOCD IN RCRD: CPT | Mod: HCNC,S$GLB,, | Performed by: NURSE PRACTITIONER

## 2021-01-26 PROCEDURE — 3078F DIAST BP <80 MM HG: CPT | Mod: HCNC,CPTII,S$GLB, | Performed by: NURSE PRACTITIONER

## 2021-01-26 PROCEDURE — 1157F ADVNC CARE PLAN IN RCRD: CPT | Mod: HCNC,S$GLB,, | Performed by: NURSE PRACTITIONER

## 2021-01-26 PROCEDURE — 99999 PR PBB SHADOW E&M-EST. PATIENT-LVL V: CPT | Mod: PBBFAC,HCNC,, | Performed by: NURSE PRACTITIONER

## 2021-01-26 PROCEDURE — 3008F BODY MASS INDEX DOCD: CPT | Mod: HCNC,CPTII,S$GLB, | Performed by: NURSE PRACTITIONER

## 2021-01-26 PROCEDURE — 1159F PR MEDICATION LIST DOCUMENTED IN MEDICAL RECORD: ICD-10-PCS | Mod: HCNC,S$GLB,, | Performed by: NURSE PRACTITIONER

## 2021-01-26 PROCEDURE — 1126F AMNT PAIN NOTED NONE PRSNT: CPT | Mod: HCNC,S$GLB,, | Performed by: NURSE PRACTITIONER

## 2021-01-26 PROCEDURE — 3077F PR MOST RECENT SYSTOLIC BLOOD PRESSURE >= 140 MM HG: ICD-10-PCS | Mod: HCNC,CPTII,S$GLB, | Performed by: NURSE PRACTITIONER

## 2021-01-26 PROCEDURE — 1157F PR ADVANCE CARE PLAN OR EQUIV PRESENT IN MEDICAL RECORD: ICD-10-PCS | Mod: HCNC,S$GLB,, | Performed by: NURSE PRACTITIONER

## 2021-01-26 PROCEDURE — 99214 OFFICE O/P EST MOD 30 MIN: CPT | Mod: HCNC,S$GLB,, | Performed by: NURSE PRACTITIONER

## 2021-01-26 PROCEDURE — 3046F HEMOGLOBIN A1C LEVEL >9.0%: CPT | Mod: HCNC,CPTII,S$GLB, | Performed by: NURSE PRACTITIONER

## 2021-01-26 PROCEDURE — 99999 PR PBB SHADOW E&M-EST. PATIENT-LVL V: ICD-10-PCS | Mod: PBBFAC,HCNC,, | Performed by: NURSE PRACTITIONER

## 2021-01-26 PROCEDURE — 3008F PR BODY MASS INDEX (BMI) DOCUMENTED: ICD-10-PCS | Mod: HCNC,CPTII,S$GLB, | Performed by: NURSE PRACTITIONER

## 2021-01-26 RX ORDER — AMLODIPINE BESYLATE 5 MG/1
TABLET ORAL
COMMUNITY
Start: 2021-01-21 | End: 2021-04-20 | Stop reason: SDUPTHER

## 2021-01-26 RX ORDER — DULAGLUTIDE 0.75 MG/.5ML
0.75 INJECTION, SOLUTION SUBCUTANEOUS
Qty: 4 PEN | Refills: 0 | Status: SHIPPED | OUTPATIENT
Start: 2021-01-26 | End: 2021-02-17

## 2021-01-26 RX ORDER — LANCETS
EACH MISCELLANEOUS
Qty: 200 EACH | Refills: 3 | Status: SHIPPED | OUTPATIENT
Start: 2021-01-26 | End: 2023-08-23

## 2021-01-26 RX ORDER — INSULIN PUMP SYRINGE, 3 ML
EACH MISCELLANEOUS
Qty: 1 EACH | Refills: 0 | Status: SHIPPED | OUTPATIENT
Start: 2021-01-26 | End: 2022-01-26

## 2021-01-26 RX ORDER — DULAGLUTIDE 1.5 MG/.5ML
1.5 INJECTION, SOLUTION SUBCUTANEOUS
Qty: 4 PEN | Refills: 1 | Status: SHIPPED | OUTPATIENT
Start: 2021-01-26 | End: 2021-03-31

## 2021-01-29 ENCOUNTER — CLINICAL SUPPORT (OUTPATIENT)
Dept: OPHTHALMOLOGY | Facility: CLINIC | Age: 71
End: 2021-01-29
Attending: OPTOMETRIST
Payer: MEDICARE

## 2021-01-29 ENCOUNTER — OFFICE VISIT (OUTPATIENT)
Dept: OPTOMETRY | Facility: CLINIC | Age: 71
End: 2021-01-29
Payer: MEDICARE

## 2021-01-29 DIAGNOSIS — H40.1122 PRIMARY OPEN ANGLE GLAUCOMA (POAG) OF LEFT EYE, MODERATE STAGE: ICD-10-CM

## 2021-01-29 DIAGNOSIS — H40.1111 PRIMARY OPEN ANGLE GLAUCOMA (POAG) OF RIGHT EYE, MILD STAGE: ICD-10-CM

## 2021-01-29 DIAGNOSIS — E11.3293 MILD NONPROLIFERATIVE DIABETIC RETINOPATHY OF BOTH EYES WITHOUT MACULAR EDEMA ASSOCIATED WITH TYPE 2 DIABETES MELLITUS: Primary | ICD-10-CM

## 2021-01-29 DIAGNOSIS — H25.13 NUCLEAR SCLEROSIS OF BOTH EYES: ICD-10-CM

## 2021-01-29 LAB
LEFT EYE DM RETINOPATHY: POSITIVE
RIGHT EYE DM RETINOPATHY: POSITIVE

## 2021-01-29 PROCEDURE — 1157F PR ADVANCE CARE PLAN OR EQUIV PRESENT IN MEDICAL RECORD: ICD-10-PCS | Mod: HCNC,S$GLB,, | Performed by: OPTOMETRIST

## 2021-01-29 PROCEDURE — 99999 PR PBB SHADOW E&M-EST. PATIENT-LVL II: ICD-10-PCS | Mod: PBBFAC,HCNC,, | Performed by: OPTOMETRIST

## 2021-01-29 PROCEDURE — 1157F ADVNC CARE PLAN IN RCRD: CPT | Mod: HCNC,S$GLB,, | Performed by: OPTOMETRIST

## 2021-01-29 PROCEDURE — 99999 PR PBB SHADOW E&M-EST. PATIENT-LVL II: CPT | Mod: PBBFAC,HCNC,, | Performed by: OPTOMETRIST

## 2021-01-29 PROCEDURE — 92014 COMPRE OPH EXAM EST PT 1/>: CPT | Mod: HCNC,S$GLB,, | Performed by: OPTOMETRIST

## 2021-01-29 PROCEDURE — 3288F FALL RISK ASSESSMENT DOCD: CPT | Mod: HCNC,CPTII,S$GLB, | Performed by: OPTOMETRIST

## 2021-01-29 PROCEDURE — 92133 CPTRZD OPH DX IMG PST SGM ON: CPT | Mod: HCNC,S$GLB,, | Performed by: OPTOMETRIST

## 2021-01-29 PROCEDURE — 92014 PR EYE EXAM, EST PATIENT,COMPREHESV: ICD-10-PCS | Mod: HCNC,S$GLB,, | Performed by: OPTOMETRIST

## 2021-01-29 PROCEDURE — 3288F PR FALLS RISK ASSESSMENT DOCUMENTED: ICD-10-PCS | Mod: HCNC,CPTII,S$GLB, | Performed by: OPTOMETRIST

## 2021-01-29 PROCEDURE — 1101F PT FALLS ASSESS-DOCD LE1/YR: CPT | Mod: HCNC,CPTII,S$GLB, | Performed by: OPTOMETRIST

## 2021-01-29 PROCEDURE — 92133 POSTERIOR SEGMENT OCT OPTIC NERVE(OCULAR COHERENCE TOMOGRAPHY) - OU - BOTH EYES: ICD-10-PCS | Mod: HCNC,S$GLB,, | Performed by: OPTOMETRIST

## 2021-01-29 PROCEDURE — 1101F PR PT FALLS ASSESS DOC 0-1 FALLS W/OUT INJ PAST YR: ICD-10-PCS | Mod: HCNC,CPTII,S$GLB, | Performed by: OPTOMETRIST

## 2021-01-29 RX ORDER — LATANOPROST 50 UG/ML
1 SOLUTION/ DROPS OPHTHALMIC NIGHTLY
Qty: 7.5 ML | Refills: 2 | Status: SHIPPED | OUTPATIENT
Start: 2021-01-29 | End: 2021-09-16 | Stop reason: SDUPTHER

## 2021-02-03 RX ORDER — FLASH GLUCOSE SENSOR
KIT MISCELLANEOUS
Qty: 2 KIT | Refills: 11 | Status: SHIPPED | OUTPATIENT
Start: 2021-02-03 | End: 2021-04-28

## 2021-02-24 NOTE — PROGRESS NOTES
Health  completed outreach this week  BP remains near goal, average 134/70 mmHg  Continue current regimen adn monitoring

## 2021-03-09 ENCOUNTER — PATIENT OUTREACH (OUTPATIENT)
Dept: ADMINISTRATIVE | Facility: HOSPITAL | Age: 71
End: 2021-03-09

## 2021-03-09 RX ORDER — GLUCOSAM/CHON-MSM1/C/MANG/BOSW 500-416.6
TABLET ORAL
COMMUNITY
Start: 2021-02-10

## 2021-03-09 RX ORDER — BLOOD-GLUCOSE METER
EACH MISCELLANEOUS
COMMUNITY
Start: 2021-02-08 | End: 2023-08-23

## 2021-03-16 DIAGNOSIS — J30.9 ALLERGIC RHINITIS, UNSPECIFIED SEASONALITY, UNSPECIFIED TRIGGER: ICD-10-CM

## 2021-03-16 RX ORDER — LORATADINE 10 MG/1
TABLET ORAL
Qty: 90 TABLET | Refills: 0 | Status: SHIPPED | OUTPATIENT
Start: 2021-03-16 | End: 2021-06-08

## 2021-03-19 ENCOUNTER — TELEPHONE (OUTPATIENT)
Dept: FAMILY MEDICINE | Facility: CLINIC | Age: 71
End: 2021-03-19

## 2021-04-12 ENCOUNTER — TELEPHONE (OUTPATIENT)
Dept: ENDOCRINOLOGY | Facility: CLINIC | Age: 71
End: 2021-04-12

## 2021-04-14 ENCOUNTER — TELEPHONE (OUTPATIENT)
Dept: ENDOCRINOLOGY | Facility: CLINIC | Age: 71
End: 2021-04-14

## 2021-04-20 ENCOUNTER — OFFICE VISIT (OUTPATIENT)
Dept: CARDIOLOGY | Facility: CLINIC | Age: 71
End: 2021-04-20
Attending: INTERNAL MEDICINE
Payer: MEDICARE

## 2021-04-20 VITALS
SYSTOLIC BLOOD PRESSURE: 139 MMHG | WEIGHT: 189.81 LBS | DIASTOLIC BLOOD PRESSURE: 65 MMHG | HEART RATE: 63 BPM | BODY MASS INDEX: 33.63 KG/M2 | HEIGHT: 63 IN

## 2021-04-20 DIAGNOSIS — Z86.73 HISTORY OF CEREBROVASCULAR ACCIDENT: ICD-10-CM

## 2021-04-20 DIAGNOSIS — Z79.4 TYPE 2 DIABETES MELLITUS WITH OTHER CIRCULATORY COMPLICATION, WITH LONG-TERM CURRENT USE OF INSULIN: ICD-10-CM

## 2021-04-20 DIAGNOSIS — E11.59 TYPE 2 DIABETES MELLITUS WITH OTHER CIRCULATORY COMPLICATION, WITH LONG-TERM CURRENT USE OF INSULIN: ICD-10-CM

## 2021-04-20 DIAGNOSIS — E66.9 MILD OBESITY: ICD-10-CM

## 2021-04-20 DIAGNOSIS — I70.0 ATHEROSCLEROSIS OF AORTA: ICD-10-CM

## 2021-04-20 DIAGNOSIS — I25.10 CORONARY ARTERY DISEASE INVOLVING NATIVE CORONARY ARTERY OF NATIVE HEART WITHOUT ANGINA PECTORIS: ICD-10-CM

## 2021-04-20 DIAGNOSIS — I10 ESSENTIAL HYPERTENSION: ICD-10-CM

## 2021-04-20 DIAGNOSIS — E78.00 HYPERCHOLESTEROLEMIA: ICD-10-CM

## 2021-04-20 DIAGNOSIS — Z95.1 HISTORY OF CORONARY ARTERY BYPASS SURGERY: ICD-10-CM

## 2021-04-20 DIAGNOSIS — Z86.718 HISTORY OF DEEP VEIN THROMBOSIS: ICD-10-CM

## 2021-04-20 DIAGNOSIS — E78.1 HYPERTRIGLYCERIDEMIA: ICD-10-CM

## 2021-04-20 PROCEDURE — 99999 PR PBB SHADOW E&M-EST. PATIENT-LVL III: CPT | Mod: PBBFAC,,, | Performed by: INTERNAL MEDICINE

## 2021-04-20 PROCEDURE — 99214 PR OFFICE/OUTPT VISIT, EST, LEVL IV, 30-39 MIN: ICD-10-PCS | Mod: S$GLB,,, | Performed by: INTERNAL MEDICINE

## 2021-04-20 PROCEDURE — 1159F PR MEDICATION LIST DOCUMENTED IN MEDICAL RECORD: ICD-10-PCS | Mod: S$GLB,,, | Performed by: INTERNAL MEDICINE

## 2021-04-20 PROCEDURE — 99499 RISK ADDL DX/OHS AUDIT: ICD-10-PCS | Mod: S$GLB,,, | Performed by: INTERNAL MEDICINE

## 2021-04-20 PROCEDURE — 1157F PR ADVANCE CARE PLAN OR EQUIV PRESENT IN MEDICAL RECORD: ICD-10-PCS | Mod: S$GLB,,, | Performed by: INTERNAL MEDICINE

## 2021-04-20 PROCEDURE — 99214 OFFICE O/P EST MOD 30 MIN: CPT | Mod: S$GLB,,, | Performed by: INTERNAL MEDICINE

## 2021-04-20 PROCEDURE — 3008F BODY MASS INDEX DOCD: CPT | Mod: CPTII,S$GLB,, | Performed by: INTERNAL MEDICINE

## 2021-04-20 PROCEDURE — 99499 UNLISTED E&M SERVICE: CPT | Mod: S$GLB,,, | Performed by: INTERNAL MEDICINE

## 2021-04-20 PROCEDURE — 3008F PR BODY MASS INDEX (BMI) DOCUMENTED: ICD-10-PCS | Mod: CPTII,S$GLB,, | Performed by: INTERNAL MEDICINE

## 2021-04-20 PROCEDURE — 99999 PR PBB SHADOW E&M-EST. PATIENT-LVL III: ICD-10-PCS | Mod: PBBFAC,,, | Performed by: INTERNAL MEDICINE

## 2021-04-20 PROCEDURE — 1159F MED LIST DOCD IN RCRD: CPT | Mod: S$GLB,,, | Performed by: INTERNAL MEDICINE

## 2021-04-20 PROCEDURE — 1157F ADVNC CARE PLAN IN RCRD: CPT | Mod: S$GLB,,, | Performed by: INTERNAL MEDICINE

## 2021-04-20 RX ORDER — AMLODIPINE BESYLATE 5 MG/1
5 TABLET ORAL DAILY
Qty: 90 TABLET | Refills: 3 | Status: SHIPPED | OUTPATIENT
Start: 2021-04-20 | End: 2021-10-20 | Stop reason: SDUPTHER

## 2021-04-20 RX ORDER — METOPROLOL TARTRATE 25 MG/1
25 TABLET, FILM COATED ORAL 2 TIMES DAILY
Qty: 180 TABLET | Refills: 3 | Status: SHIPPED | OUTPATIENT
Start: 2021-04-20 | End: 2021-10-06

## 2021-04-20 RX ORDER — EZETIMIBE 10 MG/1
10 TABLET ORAL DAILY
Qty: 90 TABLET | Refills: 3 | Status: SHIPPED | OUTPATIENT
Start: 2021-04-20 | End: 2021-10-20 | Stop reason: SDUPTHER

## 2021-04-20 RX ORDER — ASPIRIN 81 MG/1
81 TABLET ORAL DAILY
Qty: 90 TABLET | Refills: 3 | Status: SHIPPED | OUTPATIENT
Start: 2021-04-20 | End: 2021-09-15 | Stop reason: SDUPTHER

## 2021-04-20 RX ORDER — VALSARTAN 320 MG/1
320 TABLET ORAL DAILY
Qty: 90 TABLET | Refills: 3 | Status: SHIPPED | OUTPATIENT
Start: 2021-04-20 | End: 2021-10-20 | Stop reason: SDUPTHER

## 2021-04-20 RX ORDER — ATORVASTATIN CALCIUM 80 MG/1
80 TABLET, FILM COATED ORAL DAILY
Qty: 90 TABLET | Refills: 3 | Status: SHIPPED | OUTPATIENT
Start: 2021-04-20 | End: 2021-10-20 | Stop reason: SDUPTHER

## 2021-04-28 ENCOUNTER — TELEPHONE (OUTPATIENT)
Dept: ENDOCRINOLOGY | Facility: CLINIC | Age: 71
End: 2021-04-28

## 2021-04-28 RX ORDER — FLASH GLUCOSE SENSOR
KIT MISCELLANEOUS
Qty: 2 KIT | Refills: 11 | Status: SHIPPED | OUTPATIENT
Start: 2021-04-28 | End: 2021-07-21

## 2021-04-30 ENCOUNTER — OFFICE VISIT (OUTPATIENT)
Dept: FAMILY MEDICINE | Facility: CLINIC | Age: 71
End: 2021-04-30
Payer: MEDICARE

## 2021-04-30 ENCOUNTER — PATIENT MESSAGE (OUTPATIENT)
Dept: FAMILY MEDICINE | Facility: CLINIC | Age: 71
End: 2021-04-30

## 2021-04-30 VITALS
WEIGHT: 188.5 LBS | DIASTOLIC BLOOD PRESSURE: 62 MMHG | TEMPERATURE: 98 F | HEART RATE: 65 BPM | OXYGEN SATURATION: 97 % | HEIGHT: 63 IN | SYSTOLIC BLOOD PRESSURE: 134 MMHG | BODY MASS INDEX: 33.4 KG/M2

## 2021-04-30 DIAGNOSIS — E66.9 OBESITY (BMI 30-39.9): ICD-10-CM

## 2021-04-30 DIAGNOSIS — E78.00 HYPERCHOLESTEROLEMIA: ICD-10-CM

## 2021-04-30 DIAGNOSIS — I70.0 ATHEROSCLEROSIS OF AORTA: ICD-10-CM

## 2021-04-30 DIAGNOSIS — I10 ESSENTIAL HYPERTENSION: ICD-10-CM

## 2021-04-30 DIAGNOSIS — Z79.4 TYPE 2 DIABETES MELLITUS WITH OTHER CIRCULATORY COMPLICATION, WITH LONG-TERM CURRENT USE OF INSULIN: Primary | ICD-10-CM

## 2021-04-30 DIAGNOSIS — Z12.31 ENCOUNTER FOR SCREENING MAMMOGRAM FOR BREAST CANCER: ICD-10-CM

## 2021-04-30 DIAGNOSIS — K21.9 GASTROESOPHAGEAL REFLUX DISEASE WITHOUT ESOPHAGITIS: ICD-10-CM

## 2021-04-30 DIAGNOSIS — M79.601 RIGHT ARM PAIN: ICD-10-CM

## 2021-04-30 DIAGNOSIS — Z86.73 HISTORY OF CEREBROVASCULAR ACCIDENT: ICD-10-CM

## 2021-04-30 DIAGNOSIS — Z79.4 LONG-TERM INSULIN USE: ICD-10-CM

## 2021-04-30 DIAGNOSIS — F32.0 MILD MAJOR DEPRESSION: ICD-10-CM

## 2021-04-30 DIAGNOSIS — Z23 NEED FOR SHINGLES VACCINE: ICD-10-CM

## 2021-04-30 DIAGNOSIS — E11.59 TYPE 2 DIABETES MELLITUS WITH OTHER CIRCULATORY COMPLICATION, WITH LONG-TERM CURRENT USE OF INSULIN: Primary | ICD-10-CM

## 2021-04-30 PROCEDURE — 3008F PR BODY MASS INDEX (BMI) DOCUMENTED: ICD-10-PCS | Mod: CPTII,S$GLB,, | Performed by: INTERNAL MEDICINE

## 2021-04-30 PROCEDURE — 99499 RISK ADDL DX/OHS AUDIT: ICD-10-PCS | Mod: HCNC,S$GLB,, | Performed by: INTERNAL MEDICINE

## 2021-04-30 PROCEDURE — 1101F PT FALLS ASSESS-DOCD LE1/YR: CPT | Mod: CPTII,S$GLB,, | Performed by: INTERNAL MEDICINE

## 2021-04-30 PROCEDURE — 1101F PR PT FALLS ASSESS DOC 0-1 FALLS W/OUT INJ PAST YR: ICD-10-PCS | Mod: CPTII,S$GLB,, | Performed by: INTERNAL MEDICINE

## 2021-04-30 PROCEDURE — 99999 PR PBB SHADOW E&M-EST. PATIENT-LVL V: ICD-10-PCS | Mod: PBBFAC,,, | Performed by: INTERNAL MEDICINE

## 2021-04-30 PROCEDURE — 3078F DIAST BP <80 MM HG: CPT | Mod: CPTII,S$GLB,, | Performed by: INTERNAL MEDICINE

## 2021-04-30 PROCEDURE — 1157F PR ADVANCE CARE PLAN OR EQUIV PRESENT IN MEDICAL RECORD: ICD-10-PCS | Mod: S$GLB,,, | Performed by: INTERNAL MEDICINE

## 2021-04-30 PROCEDURE — 1157F ADVNC CARE PLAN IN RCRD: CPT | Mod: S$GLB,,, | Performed by: INTERNAL MEDICINE

## 2021-04-30 PROCEDURE — 3046F PR MOST RECENT HEMOGLOBIN A1C LEVEL > 9.0%: ICD-10-PCS | Mod: CPTII,S$GLB,, | Performed by: INTERNAL MEDICINE

## 2021-04-30 PROCEDURE — 3046F HEMOGLOBIN A1C LEVEL >9.0%: CPT | Mod: CPTII,S$GLB,, | Performed by: INTERNAL MEDICINE

## 2021-04-30 PROCEDURE — 99499 UNLISTED E&M SERVICE: CPT | Mod: HCNC,S$GLB,, | Performed by: INTERNAL MEDICINE

## 2021-04-30 PROCEDURE — 99214 PR OFFICE/OUTPT VISIT, EST, LEVL IV, 30-39 MIN: ICD-10-PCS | Mod: S$GLB,,, | Performed by: INTERNAL MEDICINE

## 2021-04-30 PROCEDURE — 3288F PR FALLS RISK ASSESSMENT DOCUMENTED: ICD-10-PCS | Mod: CPTII,S$GLB,, | Performed by: INTERNAL MEDICINE

## 2021-04-30 PROCEDURE — 99999 PR PBB SHADOW E&M-EST. PATIENT-LVL V: CPT | Mod: PBBFAC,,, | Performed by: INTERNAL MEDICINE

## 2021-04-30 PROCEDURE — 3075F SYST BP GE 130 - 139MM HG: CPT | Mod: CPTII,S$GLB,, | Performed by: INTERNAL MEDICINE

## 2021-04-30 PROCEDURE — 1159F MED LIST DOCD IN RCRD: CPT | Mod: S$GLB,,, | Performed by: INTERNAL MEDICINE

## 2021-04-30 PROCEDURE — 3288F FALL RISK ASSESSMENT DOCD: CPT | Mod: CPTII,S$GLB,, | Performed by: INTERNAL MEDICINE

## 2021-04-30 PROCEDURE — 3075F PR MOST RECENT SYSTOLIC BLOOD PRESS GE 130-139MM HG: ICD-10-PCS | Mod: CPTII,S$GLB,, | Performed by: INTERNAL MEDICINE

## 2021-04-30 PROCEDURE — 99214 OFFICE O/P EST MOD 30 MIN: CPT | Mod: S$GLB,,, | Performed by: INTERNAL MEDICINE

## 2021-04-30 PROCEDURE — 1126F PR PAIN SEVERITY QUANTIFIED, NO PAIN PRESENT: ICD-10-PCS | Mod: S$GLB,,, | Performed by: INTERNAL MEDICINE

## 2021-04-30 PROCEDURE — 1159F PR MEDICATION LIST DOCUMENTED IN MEDICAL RECORD: ICD-10-PCS | Mod: S$GLB,,, | Performed by: INTERNAL MEDICINE

## 2021-04-30 PROCEDURE — 3008F BODY MASS INDEX DOCD: CPT | Mod: CPTII,S$GLB,, | Performed by: INTERNAL MEDICINE

## 2021-04-30 PROCEDURE — 1126F AMNT PAIN NOTED NONE PRSNT: CPT | Mod: S$GLB,,, | Performed by: INTERNAL MEDICINE

## 2021-04-30 PROCEDURE — 3078F PR MOST RECENT DIASTOLIC BLOOD PRESSURE < 80 MM HG: ICD-10-PCS | Mod: CPTII,S$GLB,, | Performed by: INTERNAL MEDICINE

## 2021-04-30 RX ORDER — FUROSEMIDE 20 MG/1
TABLET ORAL
COMMUNITY
Start: 2021-03-13 | End: 2021-10-14 | Stop reason: SDUPTHER

## 2021-04-30 RX ORDER — PANTOPRAZOLE SODIUM 40 MG/1
TABLET, DELAYED RELEASE ORAL
COMMUNITY
Start: 2021-03-02

## 2021-05-03 ENCOUNTER — TELEPHONE (OUTPATIENT)
Dept: CARDIOLOGY | Facility: CLINIC | Age: 71
End: 2021-05-03

## 2021-05-24 ENCOUNTER — PATIENT OUTREACH (OUTPATIENT)
Dept: ADMINISTRATIVE | Facility: HOSPITAL | Age: 71
End: 2021-05-24

## 2021-05-24 RX ORDER — POTASSIUM CHLORIDE 20 MEQ/1
TABLET, EXTENDED RELEASE ORAL
COMMUNITY
Start: 2021-03-02 | End: 2021-10-20

## 2021-06-08 ENCOUNTER — TELEPHONE (OUTPATIENT)
Dept: FAMILY MEDICINE | Facility: CLINIC | Age: 71
End: 2021-06-08

## 2021-06-08 DIAGNOSIS — E78.00 HYPERCHOLESTEROLEMIA: Primary | ICD-10-CM

## 2021-06-08 DIAGNOSIS — J30.9 ALLERGIC RHINITIS, UNSPECIFIED SEASONALITY, UNSPECIFIED TRIGGER: ICD-10-CM

## 2021-06-08 DIAGNOSIS — I10 ESSENTIAL HYPERTENSION: ICD-10-CM

## 2021-06-08 RX ORDER — LORATADINE 10 MG/1
TABLET ORAL
Qty: 90 TABLET | Refills: 1 | Status: SHIPPED | OUTPATIENT
Start: 2021-06-08 | End: 2021-09-15 | Stop reason: SDUPTHER

## 2021-06-08 RX ORDER — DULAGLUTIDE 1.5 MG/.5ML
1.5 INJECTION, SOLUTION SUBCUTANEOUS WEEKLY
Qty: 4 PEN | Refills: 1 | Status: SHIPPED | OUTPATIENT
Start: 2021-06-08 | End: 2021-08-02

## 2021-06-23 ENCOUNTER — TELEPHONE (OUTPATIENT)
Dept: FAMILY MEDICINE | Facility: CLINIC | Age: 71
End: 2021-06-23

## 2021-06-23 DIAGNOSIS — M25.519 SHOULDER PAIN, UNSPECIFIED CHRONICITY, UNSPECIFIED LATERALITY: Primary | ICD-10-CM

## 2021-06-28 ENCOUNTER — TELEPHONE (OUTPATIENT)
Dept: FAMILY MEDICINE | Facility: CLINIC | Age: 71
End: 2021-06-28

## 2021-06-29 ENCOUNTER — TELEPHONE (OUTPATIENT)
Dept: FAMILY MEDICINE | Facility: CLINIC | Age: 71
End: 2021-06-29

## 2021-06-29 DIAGNOSIS — K62.89 RECTAL PAIN: Primary | ICD-10-CM

## 2021-07-02 ENCOUNTER — TELEPHONE (OUTPATIENT)
Dept: FAMILY MEDICINE | Facility: CLINIC | Age: 71
End: 2021-07-02

## 2021-07-06 ENCOUNTER — HOSPITAL ENCOUNTER (OUTPATIENT)
Dept: RADIOLOGY | Facility: HOSPITAL | Age: 71
Discharge: HOME | End: 2021-07-06
Attending: INTERNAL MEDICINE
Payer: MEDICARE

## 2021-07-06 DIAGNOSIS — Z12.31 BREAST CANCER SCREENING BY MAMMOGRAM: ICD-10-CM

## 2021-07-06 PROCEDURE — 77067 SCR MAMMO BI INCL CAD: CPT | Mod: 26,,, | Performed by: RADIOLOGY

## 2021-07-06 PROCEDURE — 77063 MAMMO DIGITAL SCREENING BILAT WITH TOMO: ICD-10-PCS | Mod: 26,,, | Performed by: RADIOLOGY

## 2021-07-06 PROCEDURE — 77063 BREAST TOMOSYNTHESIS BI: CPT | Mod: 26,,, | Performed by: RADIOLOGY

## 2021-07-06 PROCEDURE — 77067 MAMMO DIGITAL SCREENING BILAT WITH TOMO: ICD-10-PCS | Mod: 26,,, | Performed by: RADIOLOGY

## 2021-07-06 PROCEDURE — 77067 SCR MAMMO BI INCL CAD: CPT | Mod: TC,PO

## 2021-07-08 ENCOUNTER — TELEPHONE (OUTPATIENT)
Dept: FAMILY MEDICINE | Facility: CLINIC | Age: 71
End: 2021-07-08

## 2021-07-08 ENCOUNTER — TELEPHONE (OUTPATIENT)
Dept: PSYCHIATRY | Facility: CLINIC | Age: 71
End: 2021-07-08

## 2021-07-08 DIAGNOSIS — R21 RASH: Primary | ICD-10-CM

## 2021-07-08 DIAGNOSIS — F41.1 GENERALIZED ANXIETY DISORDER: ICD-10-CM

## 2021-07-08 DIAGNOSIS — F33.2 SEVERE EPISODE OF RECURRENT MAJOR DEPRESSIVE DISORDER, WITHOUT PSYCHOTIC FEATURES: ICD-10-CM

## 2021-07-08 RX ORDER — FLUOXETINE HYDROCHLORIDE 20 MG/1
20 CAPSULE ORAL DAILY
Qty: 30 CAPSULE | Refills: 0 | Status: SHIPPED | OUTPATIENT
Start: 2021-07-08 | End: 2021-07-22

## 2021-07-13 ENCOUNTER — OFFICE VISIT (OUTPATIENT)
Dept: SURGERY | Facility: CLINIC | Age: 71
End: 2021-07-13
Payer: MEDICARE

## 2021-07-13 VITALS
SYSTOLIC BLOOD PRESSURE: 132 MMHG | BODY MASS INDEX: 33.55 KG/M2 | WEIGHT: 189.38 LBS | HEART RATE: 51 BPM | HEIGHT: 63 IN | DIASTOLIC BLOOD PRESSURE: 71 MMHG

## 2021-07-13 DIAGNOSIS — G25.81 RESTLESS LEGS SYNDROME (RLS): ICD-10-CM

## 2021-07-13 DIAGNOSIS — K62.89 RECTAL PAIN: ICD-10-CM

## 2021-07-13 PROCEDURE — 99999 PR PBB SHADOW E&M-EST. PATIENT-LVL V: ICD-10-PCS | Mod: PBBFAC,,, | Performed by: NURSE PRACTITIONER

## 2021-07-13 PROCEDURE — 1157F ADVNC CARE PLAN IN RCRD: CPT | Mod: S$GLB,,, | Performed by: NURSE PRACTITIONER

## 2021-07-13 PROCEDURE — 3288F PR FALLS RISK ASSESSMENT DOCUMENTED: ICD-10-PCS | Mod: CPTII,S$GLB,, | Performed by: NURSE PRACTITIONER

## 2021-07-13 PROCEDURE — 99999 PR PBB SHADOW E&M-EST. PATIENT-LVL V: CPT | Mod: PBBFAC,,, | Performed by: NURSE PRACTITIONER

## 2021-07-13 PROCEDURE — 1125F PR PAIN SEVERITY QUANTIFIED, PAIN PRESENT: ICD-10-PCS | Mod: S$GLB,,, | Performed by: NURSE PRACTITIONER

## 2021-07-13 PROCEDURE — 1157F PR ADVANCE CARE PLAN OR EQUIV PRESENT IN MEDICAL RECORD: ICD-10-PCS | Mod: S$GLB,,, | Performed by: NURSE PRACTITIONER

## 2021-07-13 PROCEDURE — 3008F BODY MASS INDEX DOCD: CPT | Mod: CPTII,S$GLB,, | Performed by: NURSE PRACTITIONER

## 2021-07-13 PROCEDURE — 1159F MED LIST DOCD IN RCRD: CPT | Mod: S$GLB,,, | Performed by: NURSE PRACTITIONER

## 2021-07-13 PROCEDURE — 3008F PR BODY MASS INDEX (BMI) DOCUMENTED: ICD-10-PCS | Mod: CPTII,S$GLB,, | Performed by: NURSE PRACTITIONER

## 2021-07-13 PROCEDURE — 1125F AMNT PAIN NOTED PAIN PRSNT: CPT | Mod: S$GLB,,, | Performed by: NURSE PRACTITIONER

## 2021-07-13 PROCEDURE — 1101F PR PT FALLS ASSESS DOC 0-1 FALLS W/OUT INJ PAST YR: ICD-10-PCS | Mod: CPTII,S$GLB,, | Performed by: NURSE PRACTITIONER

## 2021-07-13 PROCEDURE — 99203 OFFICE O/P NEW LOW 30 MIN: CPT | Mod: S$GLB,,, | Performed by: NURSE PRACTITIONER

## 2021-07-13 PROCEDURE — 99203 PR OFFICE/OUTPT VISIT, NEW, LEVL III, 30-44 MIN: ICD-10-PCS | Mod: S$GLB,,, | Performed by: NURSE PRACTITIONER

## 2021-07-13 PROCEDURE — 1101F PT FALLS ASSESS-DOCD LE1/YR: CPT | Mod: CPTII,S$GLB,, | Performed by: NURSE PRACTITIONER

## 2021-07-13 PROCEDURE — 1159F PR MEDICATION LIST DOCUMENTED IN MEDICAL RECORD: ICD-10-PCS | Mod: S$GLB,,, | Performed by: NURSE PRACTITIONER

## 2021-07-13 PROCEDURE — 3288F FALL RISK ASSESSMENT DOCD: CPT | Mod: CPTII,S$GLB,, | Performed by: NURSE PRACTITIONER

## 2021-07-13 RX ORDER — METFORMIN HYDROCHLORIDE 1000 MG/1
1000 TABLET ORAL 2 TIMES DAILY WITH MEALS
Qty: 180 TABLET | Refills: 0 | Status: CANCELLED | OUTPATIENT
Start: 2021-07-13

## 2021-07-13 RX ORDER — ROPINIROLE 0.5 MG/1
0.5 TABLET, FILM COATED ORAL NIGHTLY
Qty: 90 TABLET | Refills: 0 | Status: CANCELLED | OUTPATIENT
Start: 2021-07-13

## 2021-07-13 RX ORDER — FUROSEMIDE 20 MG/1
20 TABLET ORAL
Refills: 0 | Status: CANCELLED | OUTPATIENT
Start: 2021-07-13

## 2021-07-15 ENCOUNTER — PATIENT OUTREACH (OUTPATIENT)
Dept: ADMINISTRATIVE | Facility: OTHER | Age: 71
End: 2021-07-15

## 2021-07-15 ENCOUNTER — TELEPHONE (OUTPATIENT)
Dept: FAMILY MEDICINE | Facility: CLINIC | Age: 71
End: 2021-07-15

## 2021-07-15 RX ORDER — ISOSORBIDE MONONITRATE 30 MG/1
30 TABLET, EXTENDED RELEASE ORAL DAILY
Qty: 30 TABLET | Refills: 0 | Status: SHIPPED | OUTPATIENT
Start: 2021-07-15 | End: 2021-07-15

## 2021-07-15 RX ORDER — METFORMIN HYDROCHLORIDE 1000 MG/1
1000 TABLET ORAL 2 TIMES DAILY WITH MEALS
Qty: 180 TABLET | Refills: 0 | Status: SHIPPED | OUTPATIENT
Start: 2021-07-15 | End: 2021-10-14

## 2021-07-15 RX ORDER — FUROSEMIDE 20 MG/1
20 TABLET ORAL 2 TIMES DAILY
Qty: 180 TABLET | Refills: 0 | Status: SHIPPED | OUTPATIENT
Start: 2021-07-15 | End: 2021-10-14

## 2021-07-16 ENCOUNTER — TELEPHONE (OUTPATIENT)
Dept: ORTHOPEDICS | Facility: CLINIC | Age: 71
End: 2021-07-16

## 2021-07-19 ENCOUNTER — LAB VISIT (OUTPATIENT)
Dept: LAB | Facility: HOSPITAL | Age: 71
End: 2021-07-19
Attending: ORTHOPAEDIC SURGERY
Payer: MEDICARE

## 2021-07-19 ENCOUNTER — OFFICE VISIT (OUTPATIENT)
Dept: ORTHOPEDICS | Facility: CLINIC | Age: 71
End: 2021-07-19
Payer: MEDICARE

## 2021-07-19 VITALS
HEART RATE: 58 BPM | HEIGHT: 63 IN | BODY MASS INDEX: 33.91 KG/M2 | WEIGHT: 191.38 LBS | SYSTOLIC BLOOD PRESSURE: 132 MMHG | RESPIRATION RATE: 18 BRPM | TEMPERATURE: 99 F | DIASTOLIC BLOOD PRESSURE: 71 MMHG | OXYGEN SATURATION: 96 %

## 2021-07-19 DIAGNOSIS — M25.512 BILATERAL SHOULDER PAIN, UNSPECIFIED CHRONICITY: Primary | ICD-10-CM

## 2021-07-19 DIAGNOSIS — M25.519 SHOULDER PAIN, UNSPECIFIED CHRONICITY, UNSPECIFIED LATERALITY: Primary | ICD-10-CM

## 2021-07-19 DIAGNOSIS — M25.511 BILATERAL SHOULDER PAIN, UNSPECIFIED CHRONICITY: Primary | ICD-10-CM

## 2021-07-19 DIAGNOSIS — M67.911 ROTATOR CUFF DYSFUNCTION, RIGHT: ICD-10-CM

## 2021-07-19 LAB
CREAT SERPL-MCNC: 1.1 MG/DL (ref 0.5–1.4)
EST. GFR  (AFRICAN AMERICAN): 59 ML/MIN/1.73 M^2
EST. GFR  (NON AFRICAN AMERICAN): 51 ML/MIN/1.73 M^2

## 2021-07-19 PROCEDURE — 1125F PR PAIN SEVERITY QUANTIFIED, PAIN PRESENT: ICD-10-PCS | Mod: CPTII,S$GLB,, | Performed by: ORTHOPAEDIC SURGERY

## 2021-07-19 PROCEDURE — 1159F PR MEDICATION LIST DOCUMENTED IN MEDICAL RECORD: ICD-10-PCS | Mod: CPTII,S$GLB,, | Performed by: ORTHOPAEDIC SURGERY

## 2021-07-19 PROCEDURE — 1157F PR ADVANCE CARE PLAN OR EQUIV PRESENT IN MEDICAL RECORD: ICD-10-PCS | Mod: CPTII,S$GLB,, | Performed by: ORTHOPAEDIC SURGERY

## 2021-07-19 PROCEDURE — 1101F PT FALLS ASSESS-DOCD LE1/YR: CPT | Mod: CPTII,S$GLB,, | Performed by: ORTHOPAEDIC SURGERY

## 2021-07-19 PROCEDURE — 3008F BODY MASS INDEX DOCD: CPT | Mod: CPTII,S$GLB,, | Performed by: ORTHOPAEDIC SURGERY

## 2021-07-19 PROCEDURE — 99213 PR OFFICE/OUTPT VISIT, EST, LEVL III, 20-29 MIN: ICD-10-PCS | Mod: S$GLB,,, | Performed by: ORTHOPAEDIC SURGERY

## 2021-07-19 PROCEDURE — 1125F AMNT PAIN NOTED PAIN PRSNT: CPT | Mod: CPTII,S$GLB,, | Performed by: ORTHOPAEDIC SURGERY

## 2021-07-19 PROCEDURE — 3288F FALL RISK ASSESSMENT DOCD: CPT | Mod: CPTII,S$GLB,, | Performed by: ORTHOPAEDIC SURGERY

## 2021-07-19 PROCEDURE — 82565 ASSAY OF CREATININE: CPT | Performed by: NURSE PRACTITIONER

## 2021-07-19 PROCEDURE — 1101F PR PT FALLS ASSESS DOC 0-1 FALLS W/OUT INJ PAST YR: ICD-10-PCS | Mod: CPTII,S$GLB,, | Performed by: ORTHOPAEDIC SURGERY

## 2021-07-19 PROCEDURE — 1157F ADVNC CARE PLAN IN RCRD: CPT | Mod: CPTII,S$GLB,, | Performed by: ORTHOPAEDIC SURGERY

## 2021-07-19 PROCEDURE — 3008F PR BODY MASS INDEX (BMI) DOCUMENTED: ICD-10-PCS | Mod: CPTII,S$GLB,, | Performed by: ORTHOPAEDIC SURGERY

## 2021-07-19 PROCEDURE — 99999 PR PBB SHADOW E&M-EST. PATIENT-LVL V: CPT | Mod: PBBFAC,,, | Performed by: ORTHOPAEDIC SURGERY

## 2021-07-19 PROCEDURE — 36415 COLL VENOUS BLD VENIPUNCTURE: CPT | Mod: PN | Performed by: NURSE PRACTITIONER

## 2021-07-19 PROCEDURE — 99213 OFFICE O/P EST LOW 20 MIN: CPT | Mod: S$GLB,,, | Performed by: ORTHOPAEDIC SURGERY

## 2021-07-19 PROCEDURE — 1159F MED LIST DOCD IN RCRD: CPT | Mod: CPTII,S$GLB,, | Performed by: ORTHOPAEDIC SURGERY

## 2021-07-19 PROCEDURE — 99999 PR PBB SHADOW E&M-EST. PATIENT-LVL V: ICD-10-PCS | Mod: PBBFAC,,, | Performed by: ORTHOPAEDIC SURGERY

## 2021-07-19 PROCEDURE — 3288F PR FALLS RISK ASSESSMENT DOCUMENTED: ICD-10-PCS | Mod: CPTII,S$GLB,, | Performed by: ORTHOPAEDIC SURGERY

## 2021-07-19 PROCEDURE — 83036 HEMOGLOBIN GLYCOSYLATED A1C: CPT | Performed by: NURSE PRACTITIONER

## 2021-07-19 RX ORDER — METHOCARBAMOL 500 MG/1
500 TABLET, FILM COATED ORAL 3 TIMES DAILY PRN
Qty: 30 TABLET | Refills: 0 | Status: SHIPPED | OUTPATIENT
Start: 2021-07-19 | End: 2021-07-29

## 2021-07-19 RX ORDER — DICLOFENAC SODIUM 50 MG/1
50 TABLET, DELAYED RELEASE ORAL 2 TIMES DAILY
Qty: 60 TABLET | Refills: 0 | Status: SHIPPED | OUTPATIENT
Start: 2021-07-19 | End: 2021-08-19

## 2021-07-20 ENCOUNTER — OFFICE VISIT (OUTPATIENT)
Dept: ENDOCRINOLOGY | Facility: CLINIC | Age: 71
End: 2021-07-20
Payer: MEDICARE

## 2021-07-20 VITALS
DIASTOLIC BLOOD PRESSURE: 64 MMHG | TEMPERATURE: 99 F | SYSTOLIC BLOOD PRESSURE: 144 MMHG | WEIGHT: 190.31 LBS | HEART RATE: 65 BPM | BODY MASS INDEX: 33.71 KG/M2

## 2021-07-20 DIAGNOSIS — F32.A DEPRESSION, UNSPECIFIED DEPRESSION TYPE: ICD-10-CM

## 2021-07-20 DIAGNOSIS — I25.10 CORONARY ARTERY DISEASE, ANGINA PRESENCE UNSPECIFIED, UNSPECIFIED VESSEL OR LESION TYPE, UNSPECIFIED WHETHER NATIVE OR TRANSPLANTED HEART: ICD-10-CM

## 2021-07-20 LAB
ESTIMATED AVG GLUCOSE: 237 MG/DL (ref 68–131)
HBA1C MFR BLD: 9.9 % (ref 4–5.6)

## 2021-07-20 PROCEDURE — 3008F PR BODY MASS INDEX (BMI) DOCUMENTED: ICD-10-PCS | Mod: CPTII,S$GLB,, | Performed by: NURSE PRACTITIONER

## 2021-07-20 PROCEDURE — 1159F PR MEDICATION LIST DOCUMENTED IN MEDICAL RECORD: ICD-10-PCS | Mod: CPTII,S$GLB,, | Performed by: NURSE PRACTITIONER

## 2021-07-20 PROCEDURE — 1126F AMNT PAIN NOTED NONE PRSNT: CPT | Mod: CPTII,S$GLB,, | Performed by: NURSE PRACTITIONER

## 2021-07-20 PROCEDURE — 1159F MED LIST DOCD IN RCRD: CPT | Mod: CPTII,S$GLB,, | Performed by: NURSE PRACTITIONER

## 2021-07-20 PROCEDURE — 3046F HEMOGLOBIN A1C LEVEL >9.0%: CPT | Mod: CPTII,S$GLB,, | Performed by: NURSE PRACTITIONER

## 2021-07-20 PROCEDURE — 3077F SYST BP >= 140 MM HG: CPT | Mod: CPTII,S$GLB,, | Performed by: NURSE PRACTITIONER

## 2021-07-20 PROCEDURE — 99999 PR PBB SHADOW E&M-EST. PATIENT-LVL V: ICD-10-PCS | Mod: PBBFAC,,, | Performed by: NURSE PRACTITIONER

## 2021-07-20 PROCEDURE — 99214 PR OFFICE/OUTPT VISIT, EST, LEVL IV, 30-39 MIN: ICD-10-PCS | Mod: S$GLB,,, | Performed by: NURSE PRACTITIONER

## 2021-07-20 PROCEDURE — 1157F ADVNC CARE PLAN IN RCRD: CPT | Mod: CPTII,S$GLB,, | Performed by: NURSE PRACTITIONER

## 2021-07-20 PROCEDURE — 99214 OFFICE O/P EST MOD 30 MIN: CPT | Mod: S$GLB,,, | Performed by: NURSE PRACTITIONER

## 2021-07-20 PROCEDURE — 3078F PR MOST RECENT DIASTOLIC BLOOD PRESSURE < 80 MM HG: ICD-10-PCS | Mod: CPTII,S$GLB,, | Performed by: NURSE PRACTITIONER

## 2021-07-20 PROCEDURE — 3008F BODY MASS INDEX DOCD: CPT | Mod: CPTII,S$GLB,, | Performed by: NURSE PRACTITIONER

## 2021-07-20 PROCEDURE — 1157F PR ADVANCE CARE PLAN OR EQUIV PRESENT IN MEDICAL RECORD: ICD-10-PCS | Mod: CPTII,S$GLB,, | Performed by: NURSE PRACTITIONER

## 2021-07-20 PROCEDURE — 3046F PR MOST RECENT HEMOGLOBIN A1C LEVEL > 9.0%: ICD-10-PCS | Mod: CPTII,S$GLB,, | Performed by: NURSE PRACTITIONER

## 2021-07-20 PROCEDURE — 1126F PR PAIN SEVERITY QUANTIFIED, NO PAIN PRESENT: ICD-10-PCS | Mod: CPTII,S$GLB,, | Performed by: NURSE PRACTITIONER

## 2021-07-20 PROCEDURE — 3077F PR MOST RECENT SYSTOLIC BLOOD PRESSURE >= 140 MM HG: ICD-10-PCS | Mod: CPTII,S$GLB,, | Performed by: NURSE PRACTITIONER

## 2021-07-20 PROCEDURE — 99999 PR PBB SHADOW E&M-EST. PATIENT-LVL V: CPT | Mod: PBBFAC,,, | Performed by: NURSE PRACTITIONER

## 2021-07-20 PROCEDURE — 3078F DIAST BP <80 MM HG: CPT | Mod: CPTII,S$GLB,, | Performed by: NURSE PRACTITIONER

## 2021-07-21 ENCOUNTER — PATIENT MESSAGE (OUTPATIENT)
Dept: ENDOCRINOLOGY | Facility: CLINIC | Age: 71
End: 2021-07-21

## 2021-07-21 RX ORDER — PEN NEEDLE, DIABETIC 30 GX3/16"
1 NEEDLE, DISPOSABLE MISCELLANEOUS
Qty: 400 EACH | Refills: 4 | Status: SHIPPED | OUTPATIENT
Start: 2021-07-21 | End: 2023-04-13 | Stop reason: SDUPTHER

## 2021-07-21 RX ORDER — FLASH GLUCOSE SENSOR
KIT MISCELLANEOUS
Qty: 2 KIT | Refills: 11 | Status: SHIPPED | OUTPATIENT
Start: 2021-07-21 | End: 2022-10-28 | Stop reason: SDUPTHER

## 2021-07-21 RX ORDER — INSULIN DEGLUDEC 100 U/ML
INJECTION, SOLUTION SUBCUTANEOUS
Qty: 5 PEN | Refills: 1 | Status: SHIPPED | OUTPATIENT
Start: 2021-07-21 | End: 2021-08-16

## 2021-07-22 ENCOUNTER — OFFICE VISIT (OUTPATIENT)
Dept: PSYCHIATRY | Facility: CLINIC | Age: 71
End: 2021-07-22
Payer: MEDICARE

## 2021-07-22 VITALS
OXYGEN SATURATION: 96 % | HEIGHT: 63 IN | HEART RATE: 84 BPM | DIASTOLIC BLOOD PRESSURE: 79 MMHG | BODY MASS INDEX: 33.48 KG/M2 | SYSTOLIC BLOOD PRESSURE: 167 MMHG | WEIGHT: 188.94 LBS

## 2021-07-22 DIAGNOSIS — F33.2 SEVERE EPISODE OF RECURRENT MAJOR DEPRESSIVE DISORDER, WITHOUT PSYCHOTIC FEATURES: Primary | ICD-10-CM

## 2021-07-22 DIAGNOSIS — F41.1 GENERALIZED ANXIETY DISORDER: ICD-10-CM

## 2021-07-22 DIAGNOSIS — F43.20 ADJUSTMENT DISORDER, UNSPECIFIED TYPE: ICD-10-CM

## 2021-07-22 PROCEDURE — 3008F BODY MASS INDEX DOCD: CPT | Mod: CPTII,S$GLB,, | Performed by: PHYSICIAN ASSISTANT

## 2021-07-22 PROCEDURE — 99999 PR PBB SHADOW E&M-EST. PATIENT-LVL IV: ICD-10-PCS | Mod: PBBFAC,,, | Performed by: PHYSICIAN ASSISTANT

## 2021-07-22 PROCEDURE — 1101F PR PT FALLS ASSESS DOC 0-1 FALLS W/OUT INJ PAST YR: ICD-10-PCS | Mod: CPTII,S$GLB,, | Performed by: PHYSICIAN ASSISTANT

## 2021-07-22 PROCEDURE — 1101F PT FALLS ASSESS-DOCD LE1/YR: CPT | Mod: CPTII,S$GLB,, | Performed by: PHYSICIAN ASSISTANT

## 2021-07-22 PROCEDURE — 1126F AMNT PAIN NOTED NONE PRSNT: CPT | Mod: CPTII,S$GLB,, | Performed by: PHYSICIAN ASSISTANT

## 2021-07-22 PROCEDURE — 1126F PR PAIN SEVERITY QUANTIFIED, NO PAIN PRESENT: ICD-10-PCS | Mod: CPTII,S$GLB,, | Performed by: PHYSICIAN ASSISTANT

## 2021-07-22 PROCEDURE — 90833 PSYTX W PT W E/M 30 MIN: CPT | Mod: S$GLB,,, | Performed by: PHYSICIAN ASSISTANT

## 2021-07-22 PROCEDURE — 1157F PR ADVANCE CARE PLAN OR EQUIV PRESENT IN MEDICAL RECORD: ICD-10-PCS | Mod: CPTII,S$GLB,, | Performed by: PHYSICIAN ASSISTANT

## 2021-07-22 PROCEDURE — 3078F DIAST BP <80 MM HG: CPT | Mod: CPTII,S$GLB,, | Performed by: PHYSICIAN ASSISTANT

## 2021-07-22 PROCEDURE — 3078F PR MOST RECENT DIASTOLIC BLOOD PRESSURE < 80 MM HG: ICD-10-PCS | Mod: CPTII,S$GLB,, | Performed by: PHYSICIAN ASSISTANT

## 2021-07-22 PROCEDURE — 90833 PR PSYCHOTHERAPY W/PATIENT W/E&M, 30 MIN (ADD ON): ICD-10-PCS | Mod: S$GLB,,, | Performed by: PHYSICIAN ASSISTANT

## 2021-07-22 PROCEDURE — 99999 PR PBB SHADOW E&M-EST. PATIENT-LVL IV: CPT | Mod: PBBFAC,,, | Performed by: PHYSICIAN ASSISTANT

## 2021-07-22 PROCEDURE — 3077F SYST BP >= 140 MM HG: CPT | Mod: CPTII,S$GLB,, | Performed by: PHYSICIAN ASSISTANT

## 2021-07-22 PROCEDURE — 99499 UNLISTED E&M SERVICE: CPT | Mod: HCNC,S$GLB,, | Performed by: PHYSICIAN ASSISTANT

## 2021-07-22 PROCEDURE — 3046F PR MOST RECENT HEMOGLOBIN A1C LEVEL > 9.0%: ICD-10-PCS | Mod: CPTII,S$GLB,, | Performed by: PHYSICIAN ASSISTANT

## 2021-07-22 PROCEDURE — 1157F ADVNC CARE PLAN IN RCRD: CPT | Mod: CPTII,S$GLB,, | Performed by: PHYSICIAN ASSISTANT

## 2021-07-22 PROCEDURE — 99499 RISK ADDL DX/OHS AUDIT: ICD-10-PCS | Mod: HCNC,S$GLB,, | Performed by: PHYSICIAN ASSISTANT

## 2021-07-22 PROCEDURE — 1159F MED LIST DOCD IN RCRD: CPT | Mod: CPTII,S$GLB,, | Performed by: PHYSICIAN ASSISTANT

## 2021-07-22 PROCEDURE — 3288F PR FALLS RISK ASSESSMENT DOCUMENTED: ICD-10-PCS | Mod: CPTII,S$GLB,, | Performed by: PHYSICIAN ASSISTANT

## 2021-07-22 PROCEDURE — 3008F PR BODY MASS INDEX (BMI) DOCUMENTED: ICD-10-PCS | Mod: CPTII,S$GLB,, | Performed by: PHYSICIAN ASSISTANT

## 2021-07-22 PROCEDURE — 99214 PR OFFICE/OUTPT VISIT, EST, LEVL IV, 30-39 MIN: ICD-10-PCS | Mod: S$GLB,,, | Performed by: PHYSICIAN ASSISTANT

## 2021-07-22 PROCEDURE — 3077F PR MOST RECENT SYSTOLIC BLOOD PRESSURE >= 140 MM HG: ICD-10-PCS | Mod: CPTII,S$GLB,, | Performed by: PHYSICIAN ASSISTANT

## 2021-07-22 PROCEDURE — 3288F FALL RISK ASSESSMENT DOCD: CPT | Mod: CPTII,S$GLB,, | Performed by: PHYSICIAN ASSISTANT

## 2021-07-22 PROCEDURE — 3046F HEMOGLOBIN A1C LEVEL >9.0%: CPT | Mod: CPTII,S$GLB,, | Performed by: PHYSICIAN ASSISTANT

## 2021-07-22 PROCEDURE — 99214 OFFICE O/P EST MOD 30 MIN: CPT | Mod: S$GLB,,, | Performed by: PHYSICIAN ASSISTANT

## 2021-07-22 PROCEDURE — 1159F PR MEDICATION LIST DOCUMENTED IN MEDICAL RECORD: ICD-10-PCS | Mod: CPTII,S$GLB,, | Performed by: PHYSICIAN ASSISTANT

## 2021-07-22 RX ORDER — DULOXETIN HYDROCHLORIDE 30 MG/1
30 CAPSULE, DELAYED RELEASE ORAL DAILY
Qty: 90 CAPSULE | Refills: 0 | Status: SHIPPED | OUTPATIENT
Start: 2021-07-22 | End: 2021-10-20 | Stop reason: SDUPTHER

## 2021-07-22 RX ORDER — FLUOXETINE 10 MG/1
10 CAPSULE ORAL DAILY
Qty: 14 CAPSULE | Refills: 0 | Status: SHIPPED | OUTPATIENT
Start: 2021-07-22 | End: 2022-01-05

## 2021-07-27 ENCOUNTER — TELEPHONE (OUTPATIENT)
Dept: FAMILY MEDICINE | Facility: CLINIC | Age: 71
End: 2021-07-27

## 2021-07-29 ENCOUNTER — PES CALL (OUTPATIENT)
Dept: ADMINISTRATIVE | Facility: CLINIC | Age: 71
End: 2021-07-29

## 2021-07-30 ENCOUNTER — PATIENT OUTREACH (OUTPATIENT)
Dept: ADMINISTRATIVE | Facility: HOSPITAL | Age: 71
End: 2021-07-30

## 2021-08-05 ENCOUNTER — PES CALL (OUTPATIENT)
Dept: ADMINISTRATIVE | Facility: CLINIC | Age: 71
End: 2021-08-05

## 2021-08-09 ENCOUNTER — TELEPHONE (OUTPATIENT)
Dept: ENDOCRINOLOGY | Facility: CLINIC | Age: 71
End: 2021-08-09

## 2021-08-09 RX ORDER — DULAGLUTIDE 3 MG/.5ML
3 INJECTION, SOLUTION SUBCUTANEOUS
Qty: 4 PEN | Refills: 1 | Status: SHIPPED | OUTPATIENT
Start: 2021-08-09 | End: 2021-12-13

## 2021-08-11 ENCOUNTER — TELEPHONE (OUTPATIENT)
Dept: FAMILY MEDICINE | Facility: CLINIC | Age: 71
End: 2021-08-11

## 2021-08-19 DIAGNOSIS — G25.81 RESTLESS LEGS SYNDROME (RLS): ICD-10-CM

## 2021-08-19 RX ORDER — ROPINIROLE 0.5 MG/1
0.5 TABLET, FILM COATED ORAL NIGHTLY
Qty: 90 TABLET | Refills: 0 | Status: CANCELLED | OUTPATIENT
Start: 2021-08-19

## 2021-08-19 RX ORDER — ROPINIROLE 0.5 MG/1
0.5 TABLET, FILM COATED ORAL NIGHTLY
Qty: 90 TABLET | Refills: 0 | Status: SHIPPED | OUTPATIENT
Start: 2021-08-19 | End: 2021-09-15 | Stop reason: SDUPTHER

## 2021-09-02 ENCOUNTER — PATIENT OUTREACH (OUTPATIENT)
Dept: ADMINISTRATIVE | Facility: OTHER | Age: 71
End: 2021-09-02

## 2021-09-14 ENCOUNTER — TELEPHONE (OUTPATIENT)
Dept: CARDIOLOGY | Facility: CLINIC | Age: 71
End: 2021-09-14

## 2021-09-15 ENCOUNTER — TELEPHONE (OUTPATIENT)
Dept: FAMILY MEDICINE | Facility: CLINIC | Age: 71
End: 2021-09-15

## 2021-09-15 ENCOUNTER — OFFICE VISIT (OUTPATIENT)
Dept: FAMILY MEDICINE | Facility: CLINIC | Age: 71
End: 2021-09-15
Payer: MEDICARE

## 2021-09-15 VITALS
SYSTOLIC BLOOD PRESSURE: 108 MMHG | BODY MASS INDEX: 34.12 KG/M2 | TEMPERATURE: 98 F | DIASTOLIC BLOOD PRESSURE: 46 MMHG | WEIGHT: 192.56 LBS | OXYGEN SATURATION: 96 % | HEIGHT: 63 IN | HEART RATE: 60 BPM

## 2021-09-15 DIAGNOSIS — G25.81 RESTLESS LEGS SYNDROME (RLS): ICD-10-CM

## 2021-09-15 DIAGNOSIS — K21.9 GASTROESOPHAGEAL REFLUX DISEASE WITHOUT ESOPHAGITIS: ICD-10-CM

## 2021-09-15 DIAGNOSIS — E11.649 UNCONTROLLED TYPE 2 DIABETES MELLITUS WITH HYPOGLYCEMIA WITHOUT COMA: Primary | ICD-10-CM

## 2021-09-15 DIAGNOSIS — R30.0 DYSURIA: ICD-10-CM

## 2021-09-15 DIAGNOSIS — I25.10 CORONARY ARTERY DISEASE INVOLVING NATIVE CORONARY ARTERY OF NATIVE HEART WITHOUT ANGINA PECTORIS: ICD-10-CM

## 2021-09-15 DIAGNOSIS — E11.59 TYPE 2 DIABETES MELLITUS WITH OTHER CIRCULATORY COMPLICATION, WITH LONG-TERM CURRENT USE OF INSULIN: ICD-10-CM

## 2021-09-15 DIAGNOSIS — J30.9 ALLERGIC RHINITIS, UNSPECIFIED SEASONALITY, UNSPECIFIED TRIGGER: ICD-10-CM

## 2021-09-15 DIAGNOSIS — I70.0 ATHEROSCLEROSIS OF AORTA: ICD-10-CM

## 2021-09-15 DIAGNOSIS — Z00.00 ROUTINE MEDICAL EXAM: Primary | ICD-10-CM

## 2021-09-15 DIAGNOSIS — Z79.4 LONG-TERM INSULIN USE: ICD-10-CM

## 2021-09-15 DIAGNOSIS — F32.0 MILD MAJOR DEPRESSION: ICD-10-CM

## 2021-09-15 DIAGNOSIS — E78.00 HYPERCHOLESTEROLEMIA: ICD-10-CM

## 2021-09-15 DIAGNOSIS — L85.3 DRY SKIN DERMATITIS: ICD-10-CM

## 2021-09-15 DIAGNOSIS — Z71.89 ADVANCED DIRECTIVES, COUNSELING/DISCUSSION: ICD-10-CM

## 2021-09-15 DIAGNOSIS — K59.09 CHRONIC CONSTIPATION: ICD-10-CM

## 2021-09-15 DIAGNOSIS — E11.65 UNCONTROLLED TYPE 2 DIABETES MELLITUS WITH HYPERGLYCEMIA: Primary | ICD-10-CM

## 2021-09-15 DIAGNOSIS — E11.649 UNCONTROLLED TYPE 2 DIABETES MELLITUS WITH HYPOGLYCEMIA, UNSPECIFIED HYPOGLYCEMIA COMA STATUS: ICD-10-CM

## 2021-09-15 DIAGNOSIS — I10 ESSENTIAL HYPERTENSION: ICD-10-CM

## 2021-09-15 DIAGNOSIS — Z79.4 TYPE 2 DIABETES MELLITUS WITH OTHER CIRCULATORY COMPLICATION, WITH LONG-TERM CURRENT USE OF INSULIN: ICD-10-CM

## 2021-09-15 DIAGNOSIS — E66.9 OBESITY (BMI 30-39.9): ICD-10-CM

## 2021-09-15 DIAGNOSIS — R07.89 ATYPICAL CHEST PAIN: ICD-10-CM

## 2021-09-15 PROCEDURE — 4010F ACE/ARB THERAPY RXD/TAKEN: CPT | Mod: CPTII,S$GLB,, | Performed by: INTERNAL MEDICINE

## 2021-09-15 PROCEDURE — 93005 EKG 12-LEAD: ICD-10-PCS | Mod: S$GLB,,, | Performed by: INTERNAL MEDICINE

## 2021-09-15 PROCEDURE — 99397 PER PM REEVAL EST PAT 65+ YR: CPT | Mod: S$GLB,,, | Performed by: INTERNAL MEDICINE

## 2021-09-15 PROCEDURE — 1126F PR PAIN SEVERITY QUANTIFIED, NO PAIN PRESENT: ICD-10-PCS | Mod: CPTII,S$GLB,, | Performed by: INTERNAL MEDICINE

## 2021-09-15 PROCEDURE — 1101F PR PT FALLS ASSESS DOC 0-1 FALLS W/OUT INJ PAST YR: ICD-10-PCS | Mod: CPTII,S$GLB,, | Performed by: INTERNAL MEDICINE

## 2021-09-15 PROCEDURE — 1160F PR REVIEW ALL MEDS BY PRESCRIBER/CLIN PHARMACIST DOCUMENTED: ICD-10-PCS | Mod: CPTII,S$GLB,, | Performed by: INTERNAL MEDICINE

## 2021-09-15 PROCEDURE — 1159F MED LIST DOCD IN RCRD: CPT | Mod: CPTII,S$GLB,, | Performed by: INTERNAL MEDICINE

## 2021-09-15 PROCEDURE — 3078F PR MOST RECENT DIASTOLIC BLOOD PRESSURE < 80 MM HG: ICD-10-PCS | Mod: CPTII,S$GLB,, | Performed by: INTERNAL MEDICINE

## 2021-09-15 PROCEDURE — 3008F PR BODY MASS INDEX (BMI) DOCUMENTED: ICD-10-PCS | Mod: CPTII,S$GLB,, | Performed by: INTERNAL MEDICINE

## 2021-09-15 PROCEDURE — 1157F ADVNC CARE PLAN IN RCRD: CPT | Mod: CPTII,S$GLB,, | Performed by: INTERNAL MEDICINE

## 2021-09-15 PROCEDURE — 3046F HEMOGLOBIN A1C LEVEL >9.0%: CPT | Mod: CPTII,S$GLB,, | Performed by: INTERNAL MEDICINE

## 2021-09-15 PROCEDURE — 3078F DIAST BP <80 MM HG: CPT | Mod: CPTII,S$GLB,, | Performed by: INTERNAL MEDICINE

## 2021-09-15 PROCEDURE — 3288F PR FALLS RISK ASSESSMENT DOCUMENTED: ICD-10-PCS | Mod: CPTII,S$GLB,, | Performed by: INTERNAL MEDICINE

## 2021-09-15 PROCEDURE — 99999 PR PBB SHADOW E&M-EST. PATIENT-LVL V: ICD-10-PCS | Mod: PBBFAC,,, | Performed by: INTERNAL MEDICINE

## 2021-09-15 PROCEDURE — 4010F PR ACE/ARB THEARPY RXD/TAKEN: ICD-10-PCS | Mod: CPTII,S$GLB,, | Performed by: INTERNAL MEDICINE

## 2021-09-15 PROCEDURE — 1157F PR ADVANCE CARE PLAN OR EQUIV PRESENT IN MEDICAL RECORD: ICD-10-PCS | Mod: CPTII,S$GLB,, | Performed by: INTERNAL MEDICINE

## 2021-09-15 PROCEDURE — 99999 PR PBB SHADOW E&M-EST. PATIENT-LVL V: CPT | Mod: PBBFAC,,, | Performed by: INTERNAL MEDICINE

## 2021-09-15 PROCEDURE — 3288F FALL RISK ASSESSMENT DOCD: CPT | Mod: CPTII,S$GLB,, | Performed by: INTERNAL MEDICINE

## 2021-09-15 PROCEDURE — 93010 ELECTROCARDIOGRAM REPORT: CPT | Mod: S$GLB,,, | Performed by: INTERNAL MEDICINE

## 2021-09-15 PROCEDURE — 1126F AMNT PAIN NOTED NONE PRSNT: CPT | Mod: CPTII,S$GLB,, | Performed by: INTERNAL MEDICINE

## 2021-09-15 PROCEDURE — 93005 ELECTROCARDIOGRAM TRACING: CPT | Mod: S$GLB,,, | Performed by: INTERNAL MEDICINE

## 2021-09-15 PROCEDURE — 3074F SYST BP LT 130 MM HG: CPT | Mod: CPTII,S$GLB,, | Performed by: INTERNAL MEDICINE

## 2021-09-15 PROCEDURE — 1101F PT FALLS ASSESS-DOCD LE1/YR: CPT | Mod: CPTII,S$GLB,, | Performed by: INTERNAL MEDICINE

## 2021-09-15 PROCEDURE — 1159F PR MEDICATION LIST DOCUMENTED IN MEDICAL RECORD: ICD-10-PCS | Mod: CPTII,S$GLB,, | Performed by: INTERNAL MEDICINE

## 2021-09-15 PROCEDURE — 1160F RVW MEDS BY RX/DR IN RCRD: CPT | Mod: CPTII,S$GLB,, | Performed by: INTERNAL MEDICINE

## 2021-09-15 PROCEDURE — 3074F PR MOST RECENT SYSTOLIC BLOOD PRESSURE < 130 MM HG: ICD-10-PCS | Mod: CPTII,S$GLB,, | Performed by: INTERNAL MEDICINE

## 2021-09-15 PROCEDURE — 93010 EKG 12-LEAD: ICD-10-PCS | Mod: S$GLB,,, | Performed by: INTERNAL MEDICINE

## 2021-09-15 PROCEDURE — 99397 PR PREVENTIVE VISIT,EST,65 & OVER: ICD-10-PCS | Mod: S$GLB,,, | Performed by: INTERNAL MEDICINE

## 2021-09-15 PROCEDURE — 3008F BODY MASS INDEX DOCD: CPT | Mod: CPTII,S$GLB,, | Performed by: INTERNAL MEDICINE

## 2021-09-15 PROCEDURE — 3046F PR MOST RECENT HEMOGLOBIN A1C LEVEL > 9.0%: ICD-10-PCS | Mod: CPTII,S$GLB,, | Performed by: INTERNAL MEDICINE

## 2021-09-15 RX ORDER — ROPINIROLE 0.5 MG/1
0.5 TABLET, FILM COATED ORAL NIGHTLY
Qty: 90 TABLET | Refills: 1 | Status: SHIPPED | OUTPATIENT
Start: 2021-09-15 | End: 2021-11-22

## 2021-09-15 RX ORDER — ASPIRIN 81 MG/1
81 TABLET ORAL DAILY
Qty: 90 TABLET | Refills: 1 | Status: SHIPPED | OUTPATIENT
Start: 2021-09-15 | End: 2021-10-20 | Stop reason: SDUPTHER

## 2021-09-15 RX ORDER — DESOXIMETASONE 0.5 MG/G
CREAM TOPICAL 2 TIMES DAILY PRN
Qty: 100 G | Refills: 2 | Status: SHIPPED | OUTPATIENT
Start: 2021-09-15 | End: 2023-02-02

## 2021-09-15 RX ORDER — LORATADINE 10 MG/1
TABLET ORAL
Qty: 90 TABLET | Refills: 1 | Status: SHIPPED | OUTPATIENT
Start: 2021-09-15 | End: 2022-06-13

## 2021-09-15 RX ORDER — LATANOPROST 50 UG/ML
1 SOLUTION/ DROPS OPHTHALMIC NIGHTLY
Qty: 7.5 ML | Refills: 2 | Status: CANCELLED | OUTPATIENT
Start: 2021-09-15

## 2021-09-16 DIAGNOSIS — H40.1122 PRIMARY OPEN ANGLE GLAUCOMA (POAG) OF LEFT EYE, MODERATE STAGE: ICD-10-CM

## 2021-09-16 DIAGNOSIS — H40.1111 PRIMARY OPEN ANGLE GLAUCOMA (POAG) OF RIGHT EYE, MILD STAGE: ICD-10-CM

## 2021-09-16 RX ORDER — LATANOPROST 50 UG/ML
1 SOLUTION/ DROPS OPHTHALMIC NIGHTLY
Qty: 7.5 ML | Refills: 0 | Status: SHIPPED | OUTPATIENT
Start: 2021-09-16 | End: 2021-11-29

## 2021-09-17 ENCOUNTER — TELEPHONE (OUTPATIENT)
Dept: FAMILY MEDICINE | Facility: CLINIC | Age: 71
End: 2021-09-17

## 2021-09-29 ENCOUNTER — TELEPHONE (OUTPATIENT)
Dept: FAMILY MEDICINE | Facility: CLINIC | Age: 71
End: 2021-09-29

## 2021-09-30 ENCOUNTER — TELEPHONE (OUTPATIENT)
Dept: ENDOCRINOLOGY | Facility: CLINIC | Age: 71
End: 2021-09-30

## 2021-10-04 RX ORDER — FLASH GLUCOSE SCANNING READER
EACH MISCELLANEOUS
Qty: 1 EACH | Refills: 0 | Status: SHIPPED | OUTPATIENT
Start: 2021-10-04 | End: 2023-04-13

## 2021-10-20 ENCOUNTER — OFFICE VISIT (OUTPATIENT)
Dept: CARDIOLOGY | Facility: CLINIC | Age: 71
End: 2021-10-20
Attending: INTERNAL MEDICINE
Payer: MEDICARE

## 2021-10-20 VITALS
DIASTOLIC BLOOD PRESSURE: 63 MMHG | HEART RATE: 76 BPM | WEIGHT: 191 LBS | SYSTOLIC BLOOD PRESSURE: 124 MMHG | BODY MASS INDEX: 33.84 KG/M2 | HEIGHT: 63 IN

## 2021-10-20 DIAGNOSIS — I70.0 ATHEROSCLEROSIS OF AORTA: ICD-10-CM

## 2021-10-20 DIAGNOSIS — Z95.1 HISTORY OF CORONARY ARTERY BYPASS SURGERY: ICD-10-CM

## 2021-10-20 DIAGNOSIS — E66.9 MILD OBESITY: ICD-10-CM

## 2021-10-20 DIAGNOSIS — Z86.73 HISTORY OF CEREBROVASCULAR ACCIDENT: ICD-10-CM

## 2021-10-20 DIAGNOSIS — E78.1 HYPERTRIGLYCERIDEMIA: ICD-10-CM

## 2021-10-20 DIAGNOSIS — E11.59 TYPE 2 DIABETES MELLITUS WITH OTHER CIRCULATORY COMPLICATION, WITHOUT LONG-TERM CURRENT USE OF INSULIN: ICD-10-CM

## 2021-10-20 DIAGNOSIS — E78.00 HYPERCHOLESTEROLEMIA: ICD-10-CM

## 2021-10-20 DIAGNOSIS — I10 ESSENTIAL HYPERTENSION: ICD-10-CM

## 2021-10-20 DIAGNOSIS — I25.10 CORONARY ARTERY DISEASE INVOLVING NATIVE CORONARY ARTERY OF NATIVE HEART WITHOUT ANGINA PECTORIS: ICD-10-CM

## 2021-10-20 DIAGNOSIS — Z86.718 HISTORY OF DEEP VEIN THROMBOSIS: ICD-10-CM

## 2021-10-20 PROCEDURE — 4010F PR ACE/ARB THEARPY RXD/TAKEN: ICD-10-PCS | Mod: HCNC,CPTII,S$GLB, | Performed by: INTERNAL MEDICINE

## 2021-10-20 PROCEDURE — 99999 PR PBB SHADOW E&M-EST. PATIENT-LVL IV: ICD-10-PCS | Mod: PBBFAC,HCNC,, | Performed by: INTERNAL MEDICINE

## 2021-10-20 PROCEDURE — 3008F PR BODY MASS INDEX (BMI) DOCUMENTED: ICD-10-PCS | Mod: HCNC,CPTII,S$GLB, | Performed by: INTERNAL MEDICINE

## 2021-10-20 PROCEDURE — 99499 RISK ADDL DX/OHS AUDIT: ICD-10-PCS | Mod: S$GLB,,, | Performed by: INTERNAL MEDICINE

## 2021-10-20 PROCEDURE — 1159F PR MEDICATION LIST DOCUMENTED IN MEDICAL RECORD: ICD-10-PCS | Mod: HCNC,CPTII,S$GLB, | Performed by: INTERNAL MEDICINE

## 2021-10-20 PROCEDURE — 3078F PR MOST RECENT DIASTOLIC BLOOD PRESSURE < 80 MM HG: ICD-10-PCS | Mod: HCNC,CPTII,S$GLB, | Performed by: INTERNAL MEDICINE

## 2021-10-20 PROCEDURE — 99214 PR OFFICE/OUTPT VISIT, EST, LEVL IV, 30-39 MIN: ICD-10-PCS | Mod: HCNC,S$GLB,, | Performed by: INTERNAL MEDICINE

## 2021-10-20 PROCEDURE — 99499 UNLISTED E&M SERVICE: CPT | Mod: S$GLB,,, | Performed by: INTERNAL MEDICINE

## 2021-10-20 PROCEDURE — 99214 OFFICE O/P EST MOD 30 MIN: CPT | Mod: HCNC,S$GLB,, | Performed by: INTERNAL MEDICINE

## 2021-10-20 PROCEDURE — 3074F PR MOST RECENT SYSTOLIC BLOOD PRESSURE < 130 MM HG: ICD-10-PCS | Mod: HCNC,CPTII,S$GLB, | Performed by: INTERNAL MEDICINE

## 2021-10-20 PROCEDURE — 99999 PR PBB SHADOW E&M-EST. PATIENT-LVL IV: CPT | Mod: PBBFAC,HCNC,, | Performed by: INTERNAL MEDICINE

## 2021-10-20 PROCEDURE — 3074F SYST BP LT 130 MM HG: CPT | Mod: HCNC,CPTII,S$GLB, | Performed by: INTERNAL MEDICINE

## 2021-10-20 PROCEDURE — 1160F RVW MEDS BY RX/DR IN RCRD: CPT | Mod: HCNC,CPTII,S$GLB, | Performed by: INTERNAL MEDICINE

## 2021-10-20 PROCEDURE — 1159F MED LIST DOCD IN RCRD: CPT | Mod: HCNC,CPTII,S$GLB, | Performed by: INTERNAL MEDICINE

## 2021-10-20 PROCEDURE — 1157F ADVNC CARE PLAN IN RCRD: CPT | Mod: HCNC,CPTII,S$GLB, | Performed by: INTERNAL MEDICINE

## 2021-10-20 PROCEDURE — 3046F PR MOST RECENT HEMOGLOBIN A1C LEVEL > 9.0%: ICD-10-PCS | Mod: HCNC,CPTII,S$GLB, | Performed by: INTERNAL MEDICINE

## 2021-10-20 PROCEDURE — 4010F ACE/ARB THERAPY RXD/TAKEN: CPT | Mod: HCNC,CPTII,S$GLB, | Performed by: INTERNAL MEDICINE

## 2021-10-20 PROCEDURE — 3046F HEMOGLOBIN A1C LEVEL >9.0%: CPT | Mod: HCNC,CPTII,S$GLB, | Performed by: INTERNAL MEDICINE

## 2021-10-20 PROCEDURE — 1157F PR ADVANCE CARE PLAN OR EQUIV PRESENT IN MEDICAL RECORD: ICD-10-PCS | Mod: HCNC,CPTII,S$GLB, | Performed by: INTERNAL MEDICINE

## 2021-10-20 PROCEDURE — 3078F DIAST BP <80 MM HG: CPT | Mod: HCNC,CPTII,S$GLB, | Performed by: INTERNAL MEDICINE

## 2021-10-20 PROCEDURE — 1160F PR REVIEW ALL MEDS BY PRESCRIBER/CLIN PHARMACIST DOCUMENTED: ICD-10-PCS | Mod: HCNC,CPTII,S$GLB, | Performed by: INTERNAL MEDICINE

## 2021-10-20 PROCEDURE — 3008F BODY MASS INDEX DOCD: CPT | Mod: HCNC,CPTII,S$GLB, | Performed by: INTERNAL MEDICINE

## 2021-10-20 RX ORDER — ATORVASTATIN CALCIUM 80 MG/1
80 TABLET, FILM COATED ORAL DAILY
Qty: 90 TABLET | Refills: 3 | Status: SHIPPED | OUTPATIENT
Start: 2021-10-20 | End: 2022-11-28

## 2021-10-20 RX ORDER — EZETIMIBE 10 MG/1
10 TABLET ORAL DAILY
Qty: 90 TABLET | Refills: 3 | Status: SHIPPED | OUTPATIENT
Start: 2021-10-20 | End: 2022-12-09

## 2021-10-20 RX ORDER — FUROSEMIDE 20 MG/1
20 TABLET ORAL DAILY
Qty: 120 TABLET | Refills: 3 | Status: SHIPPED | OUTPATIENT
Start: 2021-10-20 | End: 2022-01-18

## 2021-10-20 RX ORDER — VALSARTAN 320 MG/1
320 TABLET ORAL DAILY
Qty: 90 TABLET | Refills: 3 | Status: SHIPPED | OUTPATIENT
Start: 2021-10-20 | End: 2022-11-28

## 2021-10-20 RX ORDER — ASPIRIN 81 MG/1
81 TABLET ORAL DAILY
Qty: 90 TABLET | Refills: 1 | Status: SHIPPED | OUTPATIENT
Start: 2021-10-20 | End: 2023-02-02 | Stop reason: SDUPTHER

## 2021-10-20 RX ORDER — METOPROLOL SUCCINATE 50 MG/1
50 TABLET, EXTENDED RELEASE ORAL DAILY
Qty: 90 TABLET | Refills: 3 | Status: SHIPPED | OUTPATIENT
Start: 2021-10-20 | End: 2022-02-23

## 2021-10-20 RX ORDER — AMLODIPINE BESYLATE 5 MG/1
5 TABLET ORAL DAILY
Qty: 90 TABLET | Refills: 3 | Status: SHIPPED | OUTPATIENT
Start: 2021-10-20 | End: 2022-02-23 | Stop reason: SDUPTHER

## 2021-11-18 ENCOUNTER — PATIENT OUTREACH (OUTPATIENT)
Dept: ADMINISTRATIVE | Facility: OTHER | Age: 71
End: 2021-11-18
Payer: MEDICARE

## 2021-11-21 DIAGNOSIS — G25.81 RESTLESS LEGS SYNDROME (RLS): ICD-10-CM

## 2021-11-22 RX ORDER — ROPINIROLE 0.5 MG/1
TABLET, FILM COATED ORAL
Qty: 90 TABLET | Refills: 0 | Status: SHIPPED | OUTPATIENT
Start: 2021-11-22 | End: 2022-05-23

## 2021-11-30 NOTE — PATIENT INSTRUCTIONS
"Estimated body mass index is 33.94 kg/m² as calculated from the following:    Height as of this encounter: 5' 3" (1.6 m).    Weight as of this encounter: 86.9 kg (191 lb 9.3 oz).    For Adults 20 Years and Older:    BMI Weight Status   Below 18.5 Underweight   18.6-24.9 Normal/Healthy   25.0-29.9 Overweight   30.0 & Above Obese     Ideal Weight Range for Your Height: 5'3" = 107 - 140 lbs      " Pt arrived to ED aaox4 spont unlab breathing on RA. PT is c/o severe abd pain, nausea but no vomiting.

## 2021-12-01 ENCOUNTER — TELEPHONE (OUTPATIENT)
Dept: FAMILY MEDICINE | Facility: CLINIC | Age: 71
End: 2021-12-01
Payer: MEDICARE

## 2021-12-06 NOTE — PROGRESS NOTES
Last 5 Patient Entered Readings                                      Current 30 Day Average: 149/77     Recent Readings 7/30/2018 7/29/2018 7/28/2018 7/28/2018 7/27/2018    SBP (mmHg) 149 149 144 151 159    DBP (mmHg) 81 80 72 76 75    Pulse 71 74 59 58 66        07/31: Patient reports problems with her bowel movements.  Not experiencing pain or bloating.  States she called the nurses office today but left message.  Sent message to staff.    Patient states she has been taking second blood pressure reading two minutes after taking the first reading.  Encouraged patient to wait 15 minutes before taking a second reading.     values and radiologic reports for Vivek Epstein.    Geovanny Chauhan MD

## 2021-12-13 RX ORDER — DULAGLUTIDE 3 MG/.5ML
INJECTION, SOLUTION SUBCUTANEOUS
Qty: 4 PEN | Refills: 1 | Status: SHIPPED | OUTPATIENT
Start: 2021-12-13 | End: 2022-02-08

## 2021-12-16 ENCOUNTER — TELEPHONE (OUTPATIENT)
Dept: FAMILY MEDICINE | Facility: CLINIC | Age: 71
End: 2021-12-16
Payer: MEDICARE

## 2021-12-16 ENCOUNTER — PES CALL (OUTPATIENT)
Dept: ADMINISTRATIVE | Facility: CLINIC | Age: 71
End: 2021-12-16
Payer: MEDICARE

## 2021-12-28 NOTE — ASSESSMENT & PLAN NOTE
- Continue amlodipine 10 mg daily, valsartan 320 mg daily. Also started on metoprolol 25 mg BID, continue   Patient on room air, no distress noted, and will continue to monitor.

## 2022-01-05 ENCOUNTER — TELEPHONE (OUTPATIENT)
Dept: BEHAVIORAL HEALTH | Facility: CLINIC | Age: 72
End: 2022-01-05
Payer: MEDICARE

## 2022-01-05 ENCOUNTER — CLINICAL SUPPORT (OUTPATIENT)
Dept: FAMILY MEDICINE | Facility: CLINIC | Age: 72
End: 2022-01-05
Attending: INTERNAL MEDICINE
Payer: MEDICARE

## 2022-01-05 ENCOUNTER — OFFICE VISIT (OUTPATIENT)
Dept: FAMILY MEDICINE | Facility: CLINIC | Age: 72
End: 2022-01-05
Payer: MEDICARE

## 2022-01-05 VITALS
OXYGEN SATURATION: 97 % | TEMPERATURE: 98 F | HEIGHT: 63 IN | BODY MASS INDEX: 33.75 KG/M2 | HEART RATE: 64 BPM | SYSTOLIC BLOOD PRESSURE: 124 MMHG | DIASTOLIC BLOOD PRESSURE: 68 MMHG | WEIGHT: 190.5 LBS

## 2022-01-05 DIAGNOSIS — E11.59 TYPE 2 DIABETES MELLITUS WITH OTHER CIRCULATORY COMPLICATION, WITHOUT LONG-TERM CURRENT USE OF INSULIN: ICD-10-CM

## 2022-01-05 DIAGNOSIS — M47.812 CERVICAL SPONDYLOSIS: ICD-10-CM

## 2022-01-05 DIAGNOSIS — E11.49 OTHER DIABETIC NEUROLOGICAL COMPLICATION ASSOCIATED WITH TYPE 2 DIABETES MELLITUS: ICD-10-CM

## 2022-01-05 DIAGNOSIS — E11.3292 MILD NONPROLIFERATIVE DIABETIC RETINOPATHY OF LEFT EYE WITHOUT MACULAR EDEMA ASSOCIATED WITH TYPE 2 DIABETES MELLITUS: Primary | ICD-10-CM

## 2022-01-05 DIAGNOSIS — E11.3391 MODERATE NONPROLIFERATIVE DIABETIC RETINOPATHY OF RIGHT EYE WITHOUT MACULAR EDEMA ASSOCIATED WITH TYPE 2 DIABETES MELLITUS: Primary | ICD-10-CM

## 2022-01-05 DIAGNOSIS — Z23 NEEDS FLU SHOT: ICD-10-CM

## 2022-01-05 DIAGNOSIS — E11.3292 MILD NONPROLIFERATIVE DIABETIC RETINOPATHY OF LEFT EYE WITHOUT MACULAR EDEMA ASSOCIATED WITH TYPE 2 DIABETES MELLITUS: ICD-10-CM

## 2022-01-05 DIAGNOSIS — H40.89 OTHER GLAUCOMA OF BOTH EYES: ICD-10-CM

## 2022-01-05 DIAGNOSIS — I70.0 ATHEROSCLEROSIS OF AORTA: ICD-10-CM

## 2022-01-05 DIAGNOSIS — F32.0 MILD MAJOR DEPRESSION: Primary | ICD-10-CM

## 2022-01-05 DIAGNOSIS — H40.1122 PRIMARY OPEN ANGLE GLAUCOMA (POAG) OF LEFT EYE, MODERATE STAGE: ICD-10-CM

## 2022-01-05 PROCEDURE — G0008 ADMIN INFLUENZA VIRUS VAC: HCPCS | Mod: HCNC,S$GLB,, | Performed by: INTERNAL MEDICINE

## 2022-01-05 PROCEDURE — 3074F SYST BP LT 130 MM HG: CPT | Mod: HCNC,CPTII,S$GLB, | Performed by: INTERNAL MEDICINE

## 2022-01-05 PROCEDURE — 1160F PR REVIEW ALL MEDS BY PRESCRIBER/CLIN PHARMACIST DOCUMENTED: ICD-10-PCS | Mod: HCNC,CPTII,S$GLB, | Performed by: INTERNAL MEDICINE

## 2022-01-05 PROCEDURE — 1159F PR MEDICATION LIST DOCUMENTED IN MEDICAL RECORD: ICD-10-PCS | Mod: HCNC,CPTII,S$GLB, | Performed by: INTERNAL MEDICINE

## 2022-01-05 PROCEDURE — G0008 FLU VACCINE - QUADRIVALENT - ADJUVANTED: ICD-10-PCS | Mod: HCNC,S$GLB,, | Performed by: INTERNAL MEDICINE

## 2022-01-05 PROCEDURE — 99499 UNLISTED E&M SERVICE: CPT | Mod: HCNC,S$GLB,, | Performed by: INTERNAL MEDICINE

## 2022-01-05 PROCEDURE — 3008F PR BODY MASS INDEX (BMI) DOCUMENTED: ICD-10-PCS | Mod: HCNC,CPTII,S$GLB, | Performed by: INTERNAL MEDICINE

## 2022-01-05 PROCEDURE — 92228 IMG RTA DETC/MNTR DS PHY/QHP: CPT | Mod: HCNC,TC,S$GLB, | Performed by: INTERNAL MEDICINE

## 2022-01-05 PROCEDURE — 3288F FALL RISK ASSESSMENT DOCD: CPT | Mod: HCNC,CPTII,S$GLB, | Performed by: INTERNAL MEDICINE

## 2022-01-05 PROCEDURE — 3078F DIAST BP <80 MM HG: CPT | Mod: HCNC,CPTII,S$GLB, | Performed by: INTERNAL MEDICINE

## 2022-01-05 PROCEDURE — 3074F PR MOST RECENT SYSTOLIC BLOOD PRESSURE < 130 MM HG: ICD-10-PCS | Mod: HCNC,CPTII,S$GLB, | Performed by: INTERNAL MEDICINE

## 2022-01-05 PROCEDURE — 99999 PR PBB SHADOW E&M-EST. PATIENT-LVL V: CPT | Mod: PBBFAC,HCNC,, | Performed by: INTERNAL MEDICINE

## 2022-01-05 PROCEDURE — 92228 IMG RTA DETC/MNTR DS PHY/QHP: CPT | Mod: 26,HCNC,S$GLB, | Performed by: OPHTHALMOLOGY

## 2022-01-05 PROCEDURE — 99499 RISK ADDL DX/OHS AUDIT: ICD-10-PCS | Mod: HCNC,S$GLB,, | Performed by: INTERNAL MEDICINE

## 2022-01-05 PROCEDURE — 1159F MED LIST DOCD IN RCRD: CPT | Mod: HCNC,CPTII,S$GLB, | Performed by: INTERNAL MEDICINE

## 2022-01-05 PROCEDURE — 3008F BODY MASS INDEX DOCD: CPT | Mod: HCNC,CPTII,S$GLB, | Performed by: INTERNAL MEDICINE

## 2022-01-05 PROCEDURE — 1157F ADVNC CARE PLAN IN RCRD: CPT | Mod: HCNC,CPTII,S$GLB, | Performed by: INTERNAL MEDICINE

## 2022-01-05 PROCEDURE — 1126F PR PAIN SEVERITY QUANTIFIED, NO PAIN PRESENT: ICD-10-PCS | Mod: HCNC,CPTII,S$GLB, | Performed by: INTERNAL MEDICINE

## 2022-01-05 PROCEDURE — 99214 OFFICE O/P EST MOD 30 MIN: CPT | Mod: HCNC,S$GLB,, | Performed by: INTERNAL MEDICINE

## 2022-01-05 PROCEDURE — 99999 PR PBB SHADOW E&M-EST. PATIENT-LVL V: ICD-10-PCS | Mod: PBBFAC,HCNC,, | Performed by: INTERNAL MEDICINE

## 2022-01-05 PROCEDURE — 99214 PR OFFICE/OUTPT VISIT, EST, LEVL IV, 30-39 MIN: ICD-10-PCS | Mod: HCNC,S$GLB,, | Performed by: INTERNAL MEDICINE

## 2022-01-05 PROCEDURE — 1126F AMNT PAIN NOTED NONE PRSNT: CPT | Mod: HCNC,CPTII,S$GLB, | Performed by: INTERNAL MEDICINE

## 2022-01-05 PROCEDURE — 1101F PT FALLS ASSESS-DOCD LE1/YR: CPT | Mod: HCNC,CPTII,S$GLB, | Performed by: INTERNAL MEDICINE

## 2022-01-05 PROCEDURE — 1101F PR PT FALLS ASSESS DOC 0-1 FALLS W/OUT INJ PAST YR: ICD-10-PCS | Mod: HCNC,CPTII,S$GLB, | Performed by: INTERNAL MEDICINE

## 2022-01-05 PROCEDURE — 1160F RVW MEDS BY RX/DR IN RCRD: CPT | Mod: HCNC,CPTII,S$GLB, | Performed by: INTERNAL MEDICINE

## 2022-01-05 PROCEDURE — 92228 DIABETIC EYE SCREENING PHOTO: ICD-10-PCS | Mod: 26,HCNC,S$GLB, | Performed by: OPHTHALMOLOGY

## 2022-01-05 PROCEDURE — 3078F PR MOST RECENT DIASTOLIC BLOOD PRESSURE < 80 MM HG: ICD-10-PCS | Mod: HCNC,CPTII,S$GLB, | Performed by: INTERNAL MEDICINE

## 2022-01-05 PROCEDURE — 3288F PR FALLS RISK ASSESSMENT DOCUMENTED: ICD-10-PCS | Mod: HCNC,CPTII,S$GLB, | Performed by: INTERNAL MEDICINE

## 2022-01-05 PROCEDURE — 90694 VACC AIIV4 NO PRSRV 0.5ML IM: CPT | Mod: HCNC,S$GLB,, | Performed by: INTERNAL MEDICINE

## 2022-01-05 PROCEDURE — 92228 DIABETIC EYE SCREENING PHOTO: ICD-10-PCS | Mod: HCNC,TC,S$GLB, | Performed by: INTERNAL MEDICINE

## 2022-01-05 PROCEDURE — 99499 NO LOS: ICD-10-PCS | Mod: HCNC,S$GLB,, | Performed by: INTERNAL MEDICINE

## 2022-01-05 PROCEDURE — 1157F PR ADVANCE CARE PLAN OR EQUIV PRESENT IN MEDICAL RECORD: ICD-10-PCS | Mod: HCNC,CPTII,S$GLB, | Performed by: INTERNAL MEDICINE

## 2022-01-05 PROCEDURE — 90694 FLU VACCINE - QUADRIVALENT - ADJUVANTED: ICD-10-PCS | Mod: HCNC,S$GLB,, | Performed by: INTERNAL MEDICINE

## 2022-01-05 RX ORDER — DULOXETIN HYDROCHLORIDE 60 MG/1
60 CAPSULE, DELAYED RELEASE ORAL DAILY
Qty: 90 CAPSULE | Refills: 1 | Status: SHIPPED | OUTPATIENT
Start: 2022-01-05 | End: 2022-01-12

## 2022-01-05 NOTE — Clinical Note
I just want to give you a heads up. This pt will be transitioning care to me as her PCP per her request.

## 2022-01-05 NOTE — PROGRESS NOTES
Behavioral Health Community Health Worker  Initial Assessment  Completed by:  Shaila Robertson    Date:  1/5/2022    Patient Enrollment in Behavioral Health Program:  · Patient verbalized understanding of Behavioral Health Integration services to include:  · Patient understands that CHW, LCSW, PharmD and consulting Psychiatrist are members of the care team working collaboratively with his/her primary care provider: Yes  · Patient understands that activation of their [facility] patient portal account is required for accessing the full scope of team services: Yes  · Patient understands that some counseling sessions may occur via video: Yes  · Clinic visits with the psychiatrist may be subject to a co-pay based on your insurance: Yes  · Patient consents to enroll in BHI program: Yes    Assessments     Single Item Health Literacy Scale:  · How often do you need to have someone help you read instructions, pamphlets or other written material from your doctor or pharmacy?: Never    Promis 10:  · Promis 10 Responses  · In general, would you say your health is: Good  · In general, would you say your quality of life is: Excellent  · In general, how would you rate your physical health?: Good  · In general, how would you rate your mental health, including your mood and your ability to think?: Poor  · In general, how would you rate your satisfaction with your social activities and relationships?: Fair  · In general, please rate how well you carry out your usual social activities and roles. (This includes activities at home, at work and in your community, and responsibilities as a parent, child, spouse, employee, friend, etc.): Good  · To what extent are you able to carry out your everyday physical activities such as walking, climbing stairs, carrying groceries, or moving a chair? : Completely  · How often have you been bothered by emotional problems such as feeling anxious, depressed or irritable?: Always  · In the past 7 days, how  would you rate your fatigue on average?: Moderate  · In the past 7 days, on a scale of 0 to 10 (where 0 is no pain and 10 is the worst pain imaginable) how would you rate your pain on average?: 0  · Global Physical Health: 11  · Global Mental health Score: 13    Depression PHQ:  PHQ9 2022   Total Score 14         Generalized Anxiety Disorder 7-Item Scale:  GAD7 2022   1. Feeling nervous, anxious, or on edge? 3   2. Not being able to stop or control worrying? 3   3. Worrying too much about different things? 3   4. Trouble relaxing? 0   5. Being so restless that it is hard to sit still? 0   6. Becoming easily annoyed or irritable? 3   7. Feeling afraid as if something awful might happen? 1   8. If you checked off any problems, how difficult have these problems made it for you to do your work, take care of things at home, or get along with other people? 1   EILEEN-7 Score 13       History     Social History     Socioeconomic History    Marital status:      Spouse name: Yuniel    Number of children: 3   Occupational History    Occupation: Retired   Tobacco Use    Smoking status: Former Smoker     Packs/day: 0.50     Types: Cigarettes     Quit date: 11/10/2012     Years since quittin.1    Smokeless tobacco: Never Used   Substance and Sexual Activity    Alcohol use: Not Currently     Comment: drank socially in the past    Drug use: Not Currently     Types: Marijuana     Comment: tried once 6 months ago    Sexual activity: Not Currently     Partners: Male   Other Topics Concern    Patient feels they ought to cut down on drinking/drug use No    Patient annoyed by others criticizing their drinking/drug use No    Patient has felt bad or guilty about drinking/drug use No    Patient has had a drink/used drugs as an eye opener in the AM No   Social History Narrative     x 1.    Has 3 grown children.    Lives with spouse.       Call Summary     Patient was referred to the I (Non-opioid)  "program by Primary Care Provider, Dr. Villa CHW contacted Cecilia Barahona who reports depression that limits [his/her] activities of daily living (ADLs).   Patient scored "13" on the PHQ9 and "14" on the EILEEN 7. Based on these scores patient is eligible for the Behavioral health Integration (Non-opioid) Program. CHW completed the intake and scheduled an appointment for patient with Tiffany Narayan LCSW, on 2/17/22.         "

## 2022-01-05 NOTE — PROGRESS NOTES
SUBJECTIVE     Chief Complaint   Patient presents with    [facility]       HPI  Cecilia Barahona is a 71 y.o. female with multiple medical diagnoses as listed in the medical history and problem list that presents for establishment of care and eval for depression x 2-3 years. Pt reports her symptoms have worsened over the past 10 months. Pt sleeps well and still has interest in being with her grandchildren. She does have feeling of guilt with low energy, decreased concentration, and an increased appetite(40 lb weight gain over the past 2 years). +crying spells. Denies any SI/HI.    PAST MEDICAL HISTORY:  Past Medical History:   Diagnosis Date    Acute coronary syndrome 2020: Presents with unstable angina.    Allergic rhinitis, seasonal     Anxiety     Arthritis     Colon polyp     CVA (cerebral vascular accident)     Depression     Glaucoma NEC     Hallucination     images out of corner of eye about a year ago    History of positive PPD - treated - remote past     Hx of psychiatric care     Hyperlipidemia LDL goal < 100     Hypertension     Nuclear sclerosis of both eyes 2019    Obesity     CHETNA (obstructive sleep apnea)     Psychiatric problem     Psychosis     Renal disorder     Sleep difficulties     Suicide attempt     about 30 years ago by overdose of pills    Therapy     Type II or unspecified type diabetes mellitus without mention of complication, uncontrolled        PAST SURGICAL HISTORY:  Past Surgical History:   Procedure Laterality Date    CARPAL TUNNEL RELEASE       SECTION, CLASSIC      COLONOSCOPY N/A 3/5/2020    Procedure: COLONOSCOPY;  Surgeon: Wiliam Carrillo MD;  Location: [facility] ([address] [place] [address]);  Service: Endoscopy;  Laterality: N/A;  20 appt confirmed-rb  pt requested this date    CORONARY ARTERY BYPASS GRAFT (CABG) N/A 2020    Procedure: CORONARY ARTERY BYPASS GRAFT (CABG) x3 ;  Surgeon: Yan Bowman MD;  Location:  [facility] OR 2ND FLR;  Service: Cardiothoracic;  Laterality: N/A;  CABG X3  Endoharvest time start 0906  Endoharvest time end 1003  Vein prep start 1004  Vein prep end 1048    LEFT HEART CATHETERIZATION N/A 2020    Procedure: HEART CATH-LEFT;  Surgeon: Etelvina Lowery MD;  Location: [facility] CATH LAB;  Service: Cardiology;  Laterality: N/A;    TOTAL ABDOMINAL HYSTERECTOMY      TRIGGER FINGER RELEASE         SOCIAL HISTORY:  Social History     Socioeconomic History    Marital status:      Spouse name: Yuniel    Number of children: 3   Occupational History    Occupation: Retired   Tobacco Use    Smoking status: Former Smoker     Packs/day: 0.50     Types: Cigarettes     Quit date: 11/10/2012     Years since quittin.1    Smokeless tobacco: Never Used   Substance and Sexual Activity    Alcohol use: Not Currently     Comment: drank socially in the past    Drug use: Not Currently     Types: Marijuana     Comment: tried once 6 months ago    Sexual activity: Not Currently     Partners: Male   Other Topics Concern    Patient feels they ought to cut down on drinking/drug use No    Patient annoyed by others criticizing their drinking/drug use No    Patient has felt bad or guilty about drinking/drug use No    Patient has had a drink/used drugs as an eye opener in the AM No   Social History Narrative     x 1.    Has 3 grown children.    Lives with spouse.       FAMILY HISTORY:  Family History   Problem Relation Age of Onset    Hypertension Mother     Diabetes Mother     Heart failure Mother     Cataracts Mother     Glaucoma Mother     Prostate cancer Father     Hypertension Father     Diabetes Father     Heart failure Father     No Known Problems Sister     No Known Problems Maternal Aunt     Diabetes Maternal Uncle     Hyperlipidemia Maternal Uncle     Hypertension Maternal Uncle     Diabetes Maternal Grandmother     Diabetes Maternal Grandfather     No Known Problems Paternal  Grandmother     No Known Problems Paternal Grandfather     Diabetes Maternal Aunt     Alzheimer's disease Maternal Aunt     Schizophrenia Maternal Aunt     Heart disease Cousin         s/p heart transplant    No Known Problems Paternal Aunt     No Known Problems Paternal Uncle     Drug abuse Grandchild     Amblyopia Neg Hx     Blindness Neg Hx     Cancer Neg Hx     Macular degeneration Neg Hx     Retinal detachment Neg Hx     Strabismus Neg Hx     Stroke Neg Hx     Thyroid disease Neg Hx        ALLERGIES AND MEDICATIONS: updated and reviewed.  Review of patient's allergies indicates:   Allergen Reactions    Ace inhibitors Other (See Comments)     cough    Invokana [canagliflozin] Other (See Comments)     Throat and tongue swelling    Ozempic [semaglutide]      Nausea and constipation on 0.25 mg dose.      Current Outpatient Medications   Medication Sig Dispense Refill    albuterol (PROVENTIL/VENTOLIN HFA) 90 mcg/actuation inhaler Inhale 2 puffs into the lungs every 6 (six) hours as needed for Wheezing or Shortness of Breath. Rescue 19 g 0    alcohol swabs PadM Apply 1 each topically 3 (three) times daily. 400 each 2    amLODIPine (NORVASC) 5 MG tablet Take 1 tablet (5 mg total) by mouth once daily. 90 tablet 3    artificial tear ointment (ARTIFICIAL TEARS) Oint Place into both eyes every evening. 305 g 1    aspirin (ECOTRIN) 81 MG EC tablet Take 1 tablet (81 mg total) by mouth once daily. 90 tablet 1    atorvastatin (LIPITOR) 80 MG tablet Take 1 tablet (80 mg total) by mouth once daily. 90 tablet 3    azelastine (OPTIVAR) 0.05 % ophthalmic solution INSTILL 1 DROP IN EACH EYE TWICE DAILY 12 mL 0    blood pressure monitor (BLOOD PRESSURE KIT) Kit 1 kit by Misc.(Non-Drug; Combo Route) route once daily. 1 each 0    blood sugar diagnostic Strp To check BG 2 times daily, to use with insurance preferred meter 200 each 3    blood-glucose meter kit To check BG 2 times daily, to use with insurance  "preferred meter 1 each 0    desoximetasone (TOPICORT) 0.05 % cream Apply topically 2 (two) times daily as needed (itch). 100 g 2    diclofenac (VOLTAREN) 50 MG EC tablet TAKE 1 TABLET(50 MG) BY MOUTH TWICE DAILY 60 tablet 0    diltiazem HCl (DILTIAZEM 2% CREAM) Apply topically 3 (three) times daily. Apply topically to anal area. 30 g 2    ezetimibe (ZETIA) 10 mg tablet Take 1 tablet (10 mg total) by mouth once daily. 90 tablet 3    flash glucose scanning reader (FREESTYLE ANAMARIA 2 READER) Misc Scan glucose every 8 hours 1 each 0    flash glucose sensor (FREESTYLE ANAMARIA 2 SENSOR) Kit Change every 14 days, to use with Freestyle Anamaria 2 reader 2 kit 11    fluticasone (FLONASE) 50 mcg/actuation nasal spray 1 spray (50 mcg total) by Each Nare route once daily. 16 g 5    furosemide (LASIX) 20 MG tablet Take 1 tablet (20 mg total) by mouth once daily. 120 tablet 3    insulin degludec (TRESIBA FLEXTOUCH U-100) 100 unit/mL (3 mL) insulin pen ADMINISTER 20 UNITS UNDER THE SKIN EVERY DAY( PATIENT WILL TITRATE TO 36 UNITS AS DIRECTED 5 pen 0    JARDIANCE 25 mg tablet TAKE 1 TABLET BY MOUTH EVERY DAY 90 tablet 2    lancets Misc To check BG 2 times daily, to use with insurance preferred meter 200 each 3    latanoprost 0.005 % ophthalmic solution INSTILL 1 DROP IN BOTH EYES EVERY EVENING 7.5 mL 0    loratadine (CLARITIN) 10 mg tablet TAKE ONE TABLET BY MOUTH EVERY DAY AS NEEDED FOR ALLERGIES 90 tablet 1    melatonin (MELATIN) 3 mg tablet Take 2 tablets (6 mg total) by mouth nightly as needed for Insomnia.  0    metFORMIN (GLUCOPHAGE) 1000 MG tablet TAKE 1 TABLET BY MOUTH TWICE DAILY WITH MEALS 180 tablet 0    metoprolol succinate (TOPROL-XL) 50 MG 24 hr tablet Take 1 tablet (50 mg total) by mouth once daily. 90 tablet 3    multivitamin with minerals tablet Take 1 tablet by mouth once daily.      pantoprazole (PROTONIX) 40 MG tablet       pen needle, diabetic (BD ULTRA-FINE LENY PEN NEEDLE) 32 gauge x 5/32" Ndle 1 " "Device by Misc.(Non-Drug; Combo Route) route 4 (four) times daily with meals and nightly. 400 each 4    rOPINIRole (REQUIP) 0.5 MG tablet TAKE 1 TABLET(0.5 MG) BY MOUTH EVERY EVENING 90 tablet 0    TRUE METRIX GO GLUCOSE METER Misc USE TO TEST TWICE DAILY      TRUEPLUS LANCETS 30 gauge Misc USE TO TEST TWICE DAILY      TRULICITY 3 mg/0.5 mL pen injector INJECT 3MG UNDER THE SKIN EVERY 7 DAYS 4 pen 1    valsartan (DIOVAN) 320 MG tablet Take 1 tablet (320 mg total) by mouth once daily. 90 tablet 3    DULoxetine (CYMBALTA) 60 MG capsule Take 1 capsule (60 mg total) by mouth once daily. 90 capsule 1     No current facility-administered medications for this visit.       ROS  Review of Systems   Constitutional: Negative for chills and fever.   HENT: Negative for hearing loss and sore throat.    Eyes: Negative for visual disturbance.   Respiratory: Negative for cough and shortness of breath.    Cardiovascular: Negative for chest pain, palpitations and leg swelling.   Gastrointestinal: Negative for abdominal pain, constipation, diarrhea, nausea and vomiting.   Genitourinary: Negative for dysuria, frequency and urgency.   Musculoskeletal: Negative for arthralgias, joint swelling and myalgias.   Skin: Negative for rash and wound.   Neurological: Negative for headaches.   Psychiatric/Behavioral: Positive for dysphoric mood. Negative for agitation and confusion. The patient is not nervous/anxious.          OBJECTIVE     Physical Exam  Vitals:    01/05/22 1330   BP: 124/68   Pulse: 64   Temp: 98.2 °F (36.8 °C)    Body mass index is 33.74 kg/m².  Weight: 86.4 kg (190 lb 7.6 oz)   Height: 5' 3" (160 cm)     Physical Exam  Constitutional:       General: She is not in acute distress.     Appearance: She is well-developed and well-nourished.   HENT:      Head: Normocephalic and atraumatic.      Right Ear: External ear normal.      Left Ear: External ear normal.      Nose: Nose normal.      Mouth/Throat:      Mouth: Oropharynx " is clear and moist.   Eyes:      General: No scleral icterus.        Right eye: No discharge.         Left eye: No discharge.      Extraocular Movements: EOM normal.      Conjunctiva/sclera: Conjunctivae normal.   Neck:      Vascular: No JVD.      Trachea: No tracheal deviation.   Cardiovascular:      Rate and Rhythm: Normal rate and regular rhythm.      Pulses: Intact distal pulses.      Heart sounds: No murmur heard.  No friction rub. No gallop.    Pulmonary:      Effort: Pulmonary effort is normal. No respiratory distress.      Breath sounds: Normal breath sounds. No wheezing.   Abdominal:      General: Bowel sounds are normal. There is no distension.      Palpations: Abdomen is soft. There is no mass.      Tenderness: There is no abdominal tenderness. There is no guarding or rebound.   Musculoskeletal:         General: No tenderness, deformity or edema. Normal range of motion.      Cervical back: Normal range of motion and neck supple.   Skin:     General: Skin is warm and dry.      Findings: No erythema or rash.   Neurological:      Mental Status: She is alert and oriented to person, place, and time.      Motor: No abnormal muscle tone.      Coordination: Coordination normal.   Psychiatric:         Mood and Affect: Mood is depressed. Affect is tearful.         Behavior: Behavior normal.         Thought Content: Thought content normal.         Judgment: Judgment normal.           Health Maintenance       Date Due Completion Date    Shingles Vaccine (1 of 2) Never done ---    Lipid Panel 08/18/2021 8/18/2020    Influenza Vaccine (1) 09/01/2021 9/30/2020    Diabetes Urine Screening 09/30/2021 9/30/2020    Hemoglobin A1c 10/19/2021 7/19/2021    Eye Exam 01/29/2022 1/29/2021    Override on 1/18/2016: Done (Dr. Lujan - no retinopathy)    DEXA SCAN 06/12/2022 6/12/2019    Mammogram 07/06/2022 7/6/2021    Foot Exam 09/15/2022 9/15/2021    Override on 2/23/2015: Done    High Dose Statin 01/05/2023 1/5/2022     Aspirin/Antiplatelet Therapy 01/05/2023 1/5/2022    Colorectal Cancer Screening 03/05/2025 3/5/2020    TETANUS VACCINE 09/30/2025 9/30/2015            ASSESSMENT     71 y.o. female with     1. Mild major depression    2. Type 2 diabetes mellitus with other circulatory complication, without long-term current use of insulin    3. Atherosclerosis of aorta    4. Cervical spondylosis    5. Needs flu shot    6. Other diabetic neurological complication associated with type 2 diabetes mellitus        PLAN:     1. Mild major depression  - Resume Cymbalta and will arrange for counseling  - DULoxetine (CYMBALTA) 60 MG capsule; Take 1 capsule (60 mg total) by mouth once daily.  Dispense: 90 capsule; Refill: 1  - Ambulatory referral/consult to Primary Care Behavioral Health (Non-Opioids); Future    2. Type 2 diabetes mellitus with other circulatory complication, without long-term current use of insulin  - Pt admits to non-compliance  - Continue management per Endocrinology    3. Atherosclerosis of aorta  - Stable; no acute issues    4. Cervical spondylosis  - Ambulatory referral/consult to Physical/Occupational Therapy; Future    5. Needs flu shot  - Influenza (FLUAD) - Quadrivalent (Adjuvanted) *Preferred* (65+) (PF)    6. Other diabetic neurological complication associated with type 2 diabetes mellitus  - Diabetic Eye Screening Photo; Future        RTC in 3 months       Tiarra Villa MD  01/05/2022 1:57 PM        No follow-ups on file.

## 2022-01-05 NOTE — PROGRESS NOTES
Cecilia Barahona is a 71 y.o. female here for a diabetic eye screening       Patient cooperative?: Yes  Small pupils?: Yes  Last eye exam: 01/29/2021    For exam results, see Encounter Report.

## 2022-01-12 ENCOUNTER — TELEPHONE (OUTPATIENT)
Dept: FAMILY MEDICINE | Facility: CLINIC | Age: 72
End: 2022-01-12
Payer: MEDICARE

## 2022-01-12 DIAGNOSIS — F32.0 MILD MAJOR DEPRESSION: Primary | ICD-10-CM

## 2022-01-12 RX ORDER — ESCITALOPRAM OXALATE 10 MG/1
10 TABLET ORAL DAILY
Qty: 30 TABLET | Refills: 0 | Status: SHIPPED | OUTPATIENT
Start: 2022-01-12 | End: 2022-02-14

## 2022-01-12 NOTE — TELEPHONE ENCOUNTER
Patient advised of Dr. Villa' note.  Patient verbalized understanding, but stated she had not been taking medication prescribed by Dr. Villa.  Advised patient that medication was first prescribed by psychiatry.  Patient did not realize that duloxetine is the generic for Cymbalta.  Stated she does not want to continue medication.  Would like to speak with Dr. Villa when she has a minute regarding medication.  Please advise.

## 2022-01-12 NOTE — TELEPHONE ENCOUNTER
Pt has been on med for some time, so I just told her to resume Cymbalta. Please inform pt that she won't have any of those adverse side effects if she's never had them before while taking it. She should also be aware that most meds for depression will have a similar adverse side effect profile.

## 2022-01-12 NOTE — TELEPHONE ENCOUNTER
Patient stated she has not started the medication - Cymbalta - because she read all of the side effects. Would like to have another medication prescribed.  Please advise.

## 2022-01-12 NOTE — TELEPHONE ENCOUNTER
----- Message from Mirian Villa MA sent at 1/11/2022  3:31 PM CST -----  Regarding: pt requesting a call back  Name of Who is Calling: ZHEN CARRANZA [[id number]]           What is the request in detail: pt requesting a call back pt states she is having bad side effects from medication DULoxetine (CYMBALTA) 60 MG capsule            Can the clinic reply by [facility]: no            [facility] [phone]

## 2022-01-15 NOTE — TELEPHONE ENCOUNTER
Care Due:                  Date            Visit Type   Department     Provider  --------------------------------------------------------------------------------                                             [facility] FAMILY                                           MEDICINE/INTERN  Last Visit: 01-      None         AL DARELL Villa  Next Visit: None Scheduled  None         None Found                                                            Last  Test          Frequency    Reason                     Performed    Due Date  --------------------------------------------------------------------------------    HBA1C.......  6 months...  metFORMIN................  07- 01-    Powered by mention by [employer]. Reference number: [id number].   1/15/2022 3:09:43 AM CST

## 2022-01-17 DIAGNOSIS — I10 ESSENTIAL HYPERTENSION: ICD-10-CM

## 2022-01-17 NOTE — TELEPHONE ENCOUNTER
No new care gaps identified.  Powered by [employer]. Reference number: [id number].   1/17/2022 8:31:45 AM CST

## 2022-01-18 RX ORDER — FUROSEMIDE 20 MG/1
TABLET ORAL
Qty: 180 TABLET | Refills: 0 | Status: SHIPPED | OUTPATIENT
Start: 2022-01-18 | End: 2023-02-02 | Stop reason: SDUPTHER

## 2022-01-19 ENCOUNTER — TELEPHONE (OUTPATIENT)
Dept: BEHAVIORAL HEALTH | Facility: CLINIC | Age: 72
End: 2022-01-19
Payer: MEDICARE

## 2022-01-20 ENCOUNTER — TELEPHONE (OUTPATIENT)
Dept: FAMILY MEDICINE | Facility: CLINIC | Age: 72
End: 2022-01-20
Payer: MEDICARE

## 2022-01-20 DIAGNOSIS — L29.9 ITCH: Primary | ICD-10-CM

## 2022-01-20 NOTE — TELEPHONE ENCOUNTER
----- Message from Mirian Villa MA sent at 1/20/2022 12:51 PM CST -----  Regarding: pt requesting a call back  Name of Who is Calling: ZHEN CARRANZA [[id number]]           What is the request in detail: pt requesting a call back pt states she still having a serious itch and its getting worst            Can the clinic reply by [facility]: no           [facility] [phone]

## 2022-01-20 NOTE — TELEPHONE ENCOUNTER
Return call to Pt, and she states that she has been having itching to her head and back for about 6 month's. She is asking for a referral to Dermatology. Please advise.

## 2022-01-26 RX ORDER — METFORMIN HYDROCHLORIDE 1000 MG/1
1000 TABLET ORAL 2 TIMES DAILY WITH MEALS
Qty: 180 TABLET | Refills: 0 | Status: SHIPPED | OUTPATIENT
Start: 2022-01-26 | End: 2022-05-12 | Stop reason: SDUPTHER

## 2022-01-26 RX ORDER — METFORMIN HYDROCHLORIDE 1000 MG/1
TABLET ORAL
Qty: 180 TABLET | Refills: 0 | Status: SHIPPED | OUTPATIENT
Start: 2022-01-26 | End: 2022-01-26 | Stop reason: CLARIF

## 2022-01-27 ENCOUNTER — TELEPHONE (OUTPATIENT)
Dept: BEHAVIORAL HEALTH | Facility: CLINIC | Age: 72
End: 2022-01-27
Payer: MEDICARE

## 2022-01-27 NOTE — TELEPHONE ENCOUNTER
Refill Authorization Note   Cecilia Barahona  is requesting a refill authorization.  Brief Assessment and Rationale for Refill:  Approve     Medication Therapy Plan:  approve     Medication Reconciliation Completed: No   Comments:   --->Care Gap information included below if applicable.       Requested Prescriptions   Pending Prescriptions Disp Refills    metFORMIN (GLUCOPHAGE) 1000 MG tablet [Pharmacy Med Name: METFORMIN 1000MG TABLETS] 180 tablet 0     Sig: TAKE 1 TABLET BY MOUTH TWICE DAILY WITH MEALS       Endocrinology:  Diabetes - Biguanides Failed - 1/26/2022  6:34 PM        Failed - HBA1C within 180 days     Lab Results   Component Value Date    HGBA1C 9.9 (H) 07/19/2021    HGBA1C 9.3 (H) 01/22/2021    HGBA1C 6.3 (H) 08/18/2020              Passed - Patient is at least 18 years old        Passed - Valid encounter within last 15 months     Recent Visits  Date Type Provider Dept   01/05/22 Office Visit Tiarra Villa MD [facility] Family Medicine/Internal Med   09/15/21 Office Visit Yudi Smart MD [facility] Family Med/ Internal Med/ Peds   04/30/21 Office Visit Yudi Smart MD [facility] Family Med/ Internal Med/ Peds   09/30/20 Office Visit Yudi Smart MD [facility] Family Med/ Internal Med/ Peds   Showing recent visits within past 720 days and meeting all other requirements  Future Appointments  No visits were found meeting these conditions.  Showing future appointments within next 150 days and meeting all other requirements      Future Appointments              In 6 days LAB, [facility] DRAW STATION [place] - Lab, [place] - B    In 6 days LAB, [facility] DRAW STATION [place] - Lab, [place] - B    In 1 week Yazmin Lewis, PT [place] - Rehab, [place] - B    In 1 week Sindi Shultz, NP [facility] [place] [facility]    In 4 weeks Etelvina Lowery MD [place] [facility], [facility]                Passed - Cr is 1.39 or below and within 360 days     Lab Results   Component Value Date    CREATININE 1.1  07/19/2021    CREATININE 0.8 01/22/2021    CREATININE 1.0 06/03/2020    POCCRE 1.0 05/18/2020    POCCRE 0.7 01/27/2020    POCCRE 0.7 04/15/2017              Passed - eGFR is 45 or above and within 360 days     Lab Results   Component Value Date    EGFRNONAA 51 (A) 07/19/2021    EGFRNONAA >60 01/22/2021    EGFRNONAA 57.6 (A) 06/03/2020                    Appointments  past 12m or future 3m with PCP    Date Provider   Last Visit   1/5/2022 Tiarra Villa MD   Next Visit   1/20/2022 Tiarra Villa MD   ED visits in past 90 days: 0     Note composed:6:35 PM 01/26/2022

## 2022-02-01 ENCOUNTER — LAB VISIT (OUTPATIENT)
Dept: LAB | Facility: HOSPITAL | Age: 72
End: 2022-02-01
Attending: NURSE PRACTITIONER
Payer: MEDICARE

## 2022-02-01 LAB
ALBUMIN/CREAT UR: 70.8 UG/MG (ref 0–30)
CREAT UR-MCNC: 65 MG/DL (ref 15–325)
MICROALBUMIN UR DL<=1MG/L-MCNC: 46 UG/ML

## 2022-02-01 PROCEDURE — 82570 ASSAY OF URINE CREATININE: CPT | Mod: HCNC | Performed by: NURSE PRACTITIONER

## 2022-02-01 PROCEDURE — 82043 UR ALBUMIN QUANTITATIVE: CPT | Mod: HCNC | Performed by: NURSE PRACTITIONER

## 2022-02-02 NOTE — PROGRESS NOTES
"See Plan of Care for complete Physical Therapy Evaluation.                                                       Physical Therapy Initial Evaluation     Name: Cecilia Barahona  Clinic Number: [id number]    Therapy Diagnosis:   Encounter Diagnoses   Name Primary?    Cervical spondylosis     Decreased muscle strength     Decreased ROM of neck     Postural imbalance      Physician: Tiarra Villa MD    Physician Orders: PT Eval and Treat   Medical Diagnosis from Referral: M47.812 (ICD-10-CM) - Cervical spondylosis  Evaluation Date: 2/3/2022  Authorization Period Expiration: 01/05/2023  Plan of Care Expiration: 5/3/22  Visit # / Visits authorized: 1/ 1    Time In: 1058a  Time Out: 1130a  Total Billable Time: 32 minutes    Precautions: Standard, Diabetes and hx of acute coronary syndrom, Hx CVA, depression, hx hallucination      TREATMENT     Treatment Time In: 1117a  Treatment Time Out: 1130a  Total Treatment time separate from Evaluation: 13 minutes    Cecilia received therapeutic exercises to develop ROM, flexibility and posture for 13 minutes including:    Supine chin tucks 3"x10  Supine cervical rotation 20x ea  Seated UT str 30"x2 ea  Seated scap retractions 2x10    NV: UBE, LS str, seated thoracic ext, rows, shoulder ext      Short Term GOALS: 6 weeks. Pt agrees with goals set.  1. Patient demonstrates independence with HEP.   2. Patient demonstrates independence with Postural Awareness.   3. Patient demonstrates independence with body mechanics.   4. Patient will report pain of </=7/10 at worst, on 0-10 pain scale, with all activity  5. Patient demonstrates increased bilateral cervical side bend by 10 degrees to improve tolerance to functional activities pain free.   6. Patient demonstrates increased strength BUE's by 1/3 or greater to improve tolerance to functional activities pain free.     Long Term GOALS: 12 weeks. Pt agrees with goals set.  1. Patient demonstrates increased bilateral cervical rotation to " 60 degrees to improve tolerance to functional activities pain free.   2. Patient demonstrates increased strength BUE's to 4+/5 or greater to improve tolerance to functional activities pain free.   3. Patient demonstrates improved overall function per FOTO Neck Survey to 45% Limitation or less.   4. Patient will report pain of </=3/10 at worst, on 0-10 pain scale, with all activity  5. Patient demonstrates ability to look up to see a bird with little difficulty to improve tolerance to functional activities pain free.       PLAN       Plan of care Certification: 2/3/2022 to 5/3/22.    Outpatient Physical Therapy 2 times weekly for 10 weeks to include the following interventions: Cervical/Lumbar Traction, Manual Therapy, Moist Heat/ Ice, Neuromuscular Re-ed, Patient Education, Therapeutic Activities, Therapeutic Exercise and FDN.  Pt may be seen by PTA as part of the rehabilitation team.     Yazmin Lewis, PT,DPT  2/4/2022

## 2022-02-03 ENCOUNTER — CLINICAL SUPPORT (OUTPATIENT)
Dept: REHABILITATION | Facility: HOSPITAL | Age: 72
End: 2022-02-03
Attending: INTERNAL MEDICINE
Payer: MEDICARE

## 2022-02-03 DIAGNOSIS — M62.81 DECREASED MUSCLE STRENGTH: ICD-10-CM

## 2022-02-03 DIAGNOSIS — R29.3 POSTURAL IMBALANCE: ICD-10-CM

## 2022-02-03 DIAGNOSIS — R29.898 DECREASED ROM OF NECK: ICD-10-CM

## 2022-02-03 DIAGNOSIS — M47.812 CERVICAL SPONDYLOSIS: ICD-10-CM

## 2022-02-03 PROCEDURE — 97161 PT EVAL LOW COMPLEX 20 MIN: CPT | Mod: HCNC,PN

## 2022-02-03 PROCEDURE — 97110 THERAPEUTIC EXERCISES: CPT | Mod: HCNC,PN

## 2022-02-04 ENCOUNTER — NURSE TRIAGE (OUTPATIENT)
Dept: ADMINISTRATIVE | Facility: CLINIC | Age: 72
End: 2022-02-04
Payer: MEDICARE

## 2022-02-04 DIAGNOSIS — R11.2 NON-INTRACTABLE VOMITING WITH NAUSEA, UNSPECIFIED VOMITING TYPE: Primary | ICD-10-CM

## 2022-02-04 PROBLEM — R29.3 POSTURAL IMBALANCE: Status: ACTIVE | Noted: 2022-02-04

## 2022-02-04 PROBLEM — M62.81 DECREASED MUSCLE STRENGTH: Status: ACTIVE | Noted: 2022-02-04

## 2022-02-04 PROBLEM — R29.898 DECREASED ROM OF NECK: Status: ACTIVE | Noted: 2022-02-04

## 2022-02-04 NOTE — PLAN OF CARE
Physical Therapy Initial Evaluation     Name: Cecilia Barahona  Clinic Number: [id number]    Therapy Diagnosis:   Encounter Diagnoses   Name Primary?    Cervical spondylosis     Decreased muscle strength     Decreased ROM of neck     Postural imbalance      Physician: Tiarra Villa MD    Physician Orders: PT Eval and Treat   Medical Diagnosis from Referral: M47.812 (ICD-10-CM) - Cervical spondylosis  Evaluation Date: 2/3/2022  Authorization Period Expiration: 2023  Plan of Care Expiration: 5/3/22  Visit # / Visits authorized:     Time In: 1058a  Time Out: 1130a  Total Billable Time: 32 minutes    Precautions: Standard, Diabetes and hx of acute coronary syndrom, Hx CVA, depression, hx hallucination    Subjective     Medical History:   Past Medical History:   Diagnosis Date    Acute coronary syndrome 2020: Presents with unstable angina.   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 2022    Nuclear sclerosis of both eyes 2019    Obesity     CHETNA (obstructive sleep apnea)     Psychiatric problem     Psychosis     Renal disorder     Sleep difficulties     Suicide attempt     about 30 years ago by overdose of pills    Therapy     Type II or unspecified type diabetes mellitus without mention of complication, uncontrolled        Surgical History:   Cecilia Barahona  has a past surgical history that includes  section, classic; Trigger finger release; Total abdominal hysterectomy; Carpal  "tunnel release; Colonoscopy (N/A, 3/5/2020); Left heart catheterization (N/A, 5/20/2020); and Coronary Artery Bypass Graft (CABG) (N/A, 5/25/2020).    Medications:   Cecilia has a current medication list which includes the following prescription(s): albuterol, alcohol swabs, amlodipine, artificial tears ointment, aspirin, atorvastatin, azelastine, blood pressure monitor, blood sugar diagnostic, desoximetasone, diclofenac, diltiazem hcl, escitalopram oxalate, ezetimibe, freestyle dionna 2 reader, freestyle dionna 2 sensor, fluticasone propionate, furosemide, tresiba flextouch u-100, jardiance, lancets, latanoprost, loratadine, melatonin, metformin, metoprolol succinate, multivitamin with minerals, pantoprazole, pen needle, diabetic, ropinirole, true metrix go glucose meter, trueplus lancets, trulicity, and valsartan.    Allergies:   Review of patient's allergies indicates:   Allergen Reactions    Ace inhibitors Other (See Comments)     cough    Invokana [canagliflozin] Other (See Comments)     Throat and tongue swelling    Ozempic [semaglutide]      Nausea and constipation on 0.25 mg dose.         Date of onset: over a year  History of current condition - Cecilia reports: She has pain when she sleeps or tries to turn her head. She feels a crack that sometimes has some pain. She has been ignoring it but the cracking has gotten louder. She denies N/T. She has greatest difficulty with turning her head around to look over her shoulder.     Imaging, none:     Prior Therapy: PT for her legs  Social History: lives with their spouse  Occupation: retired  Prior Level of Function: independent   DME owned/used: none   Current Level of Function: difficulty turning her head    Pain:  Current 3/10, worst 9/10, best 0/10   Location: bilateral neck   Description: Dull  Aggravating Factors: turning her head  Easing Factors: hot bath and rest    Pts goals:  "I want to be able to turn my head all the way around and not hear the " "popping."    Objective       Posture Alignment: rounded shoulders    CERVICAL SPINE AROM:   Flexion: 40   Extension: 30 discomfort   Left Sidebend: 20 p!   Right Sidebend: 10 p!   Left Rotation: 40 p!   Right Rotation: 35 p!     Shoulder ROM: WFL all except IR    SEGMENTAL MOBILITY: hypomibility of all cervical segments    UPPER EXTREMITY STRENGTH:   Left Right   Shoulder Flexion 4-/5 p! 4-/5 p!   Shoulder Abduction 4/5 4-/5     Shoulder Internal Rotation 4/5 4/5   Shoulder External Rotation 4/5 4-/5   Elbow Flexion 4/5 4/5   Elbow Extension 4/5 4/5       Dermatomes: Sensation: Light Touch: Intact  Palpation: TTP suboccipitals and bilateral UT    Special Tests:   Left Right   Compression - -   Distraction - -   Spurling - -     Pt/family was provided educational information, including: role of PT, goals for PT, scheduling - pt verbalized understanding. Discussed insurance limitations with pt.     CMS Impairment/Limitation/Restriction for FOTO Neck Survey    Therapist reviewed FOTO scores for Cecilia Barahona on 2/3/2022.   FOTO documents entered into [employer] - see Media section.    Limitation Score: 59%        TREATMENT     Treatment Time In: 1117a  Treatment Time Out: 1130a  Total Treatment time separate from Evaluation: 13 minutes    Cecilia received therapeutic exercises to develop ROM, flexibility and posture for 13 minutes including:    Supine chin tucks 3"x10  Supine cervical rotation 20x ea  Seated UT str 30"x2 ea  Seated scap retractions 2x10    NV: UBE, LS str, seated thoracic ext, rows, shoulder ext    Home Exercises and Patient Education Provided    Education provided:   - HEP  - POC  - anatomy involved    Written Home Exercises Provided: yes.  Exercises were reviewed and Cecilia was able to demonstrate them prior to the end of the session.  Cecilia demonstrated good  understanding of the education provided.     See EMR under Patient Instructions for exercises provided 2/3/2022.    Assessment     Cecilia is a 71 " y.o. female referred to outpatient Physical Therapy with a medical diagnosis of Cervical spondylosis. with signs and symptoms including: increased neck pain, decreased cervical ROM, decreased UE Strength, soft tissue dysfunction, postural imbalance,impaired joint mobility, and decreased tolerance to functional activities. 2/2 and symptoms consistent with medical diagnosis. Pt is currently most limited with cervical ROM with greatest limitation with rotation.   Pt with good motivation to perform physical activity and responds well to cueing.  Pt prognosis is Good.   Pt will benefit from skilled outpatient Physical Therapy to address the deficits stated above and in the chart below, provide pt/family education, and to maximize pt's level of independence.     Plan of care discussed with patient: Yes  Pt's spiritual, cultural and educational needs considered and patient is agreeable to the plan of care and goals as stated below:     Anticipated Barriers for therapy: Covid 19    Medical Necessity is demonstrated by the following  History  Co-morbidities and personal factors that may impact the plan of care Co-morbidities:   Acute coronary syndrome   5/18/2020: Presents with unstable angina.   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 type 2 diabetes mellitus   Nuclear sclerosis of both eyes   Obesity   CHETNA (obstructive sleep apnea)   Psychiatric problem   Psychosis   Renal disorder   Sleep difficulties   Suicide attempt   about 30 years ago by overdose of pills   Therapy   Type II or unspecified type diabetes mellitus without mention of complication, uncontrolled        Personal Factors:   age  coping style  social background  lifestyle  character     moderate   Examination  Body Structures and Functions, activity limitations and participation restrictions that may impact the plan of care Body Regions:   head  neck  upper extremities    Body Systems:    gross symmetry  ROM  strength  motor control  motor learning    Participation Restrictions:   Difficulty turning head while driving    Activity limitations:   Learning and applying knowledge  no deficits    General Tasks and Commands  no deficits    Communication  no deficits    Mobility  lifting and carrying objects  driving (bike, car, motorcycle)    Self care  no deficits    Domestic Life  shopping  cooking  doing house work (cleaning house, washing dishes, laundry)  assisting others    Interactions/Relationships  No deficits    Life Areas  No deficits    Community and Social Life  No deficits         moderate   Clinical Presentation stable and uncomplicated low   Decision Making/ Complexity Score: low     Pt's spiritual, cultural and educational needs considered and pt agreeable to plan of care and goals as stated below:       Short Term GOALS: 6 weeks. Pt agrees with goals set.  1. Patient demonstrates independence with HEP.   2. Patient demonstrates independence with Postural Awareness.   3. Patient demonstrates independence with body mechanics.   4. Patient will report pain of </=7/10 at worst, on 0-10 pain scale, with all activity  5. Patient demonstrates increased bilateral cervical side bend by 10 degrees to improve tolerance to functional activities pain free.   6. Patient demonstrates increased strength BUE's by 1/3 or greater to improve tolerance to functional activities pain free.     Long Term GOALS: 12 weeks. Pt agrees with goals set.  1. Patient demonstrates increased bilateral cervical rotation to 60 degrees to improve tolerance to functional activities pain free.   2. Patient demonstrates increased strength  BUE's to 4+/5 or greater to improve tolerance to functional activities pain free.   3. Patient demonstrates improved overall function per FOTO Neck Survey to 45% Limitation or less.   4. Patient will report pain of </=3/10 at worst, on 0-10 pain scale, with all activity  5. Patient demonstrates ability to look up to see a bird with little difficulty to improve tolerance to functional activities pain free.       PLAN       Plan of care Certification: 2/3/2022 to 5/3/22.    Outpatient Physical Therapy 2 times weekly for 10 weeks to include the following interventions: Cervical/Lumbar Traction, Manual Therapy, Moist Heat/ Ice, Neuromuscular Re-ed, Patient Education, Therapeutic Activities, Therapeutic Exercise and FDN.  Pt may be seen by PTA as part of the rehabilitation team.     Yazmin Lewis, PT,DPT  2/4/2022    I have seen the patient, reviewed the therapist's plan of care, and I agree with the plan of care.      I certify the need for these services furnished under this plan of treatment and while under my care.     ___________________ ________ Physician/Referring Practitioner            ___________________________ Date of Signature

## 2022-02-05 NOTE — TELEPHONE ENCOUNTER
Reason for Disposition   [1] Vomiting AND [2] signs of dehydration (e.g., very dry mouth, lightheaded, dark urine)    Additional Information   Negative: Unconscious or difficult to awaken   Negative: Seizure occurs   Negative: Acting confused (e.g., disoriented, slurred speech)   Negative: Very weak (e.g., can't stand)   Negative: Sounds like a life-threatening emergency to the triager    Protocols used: DIABETES - LOW BLOOD SUGAR-A-AH

## 2022-02-05 NOTE — TELEPHONE ENCOUNTER
Patient states this morning her bs was in the 60s. It was in the 70s around noon and she ate something. She is c/o lightheadedness and dizziness. At 6:17pm her bs was 66 and at 6:34pm it was 72. Patient states she did vomit earlier. She refuses disposition. States she will not go to ER she will just try to eat some sugar to bring up bs on her own.  is home with her. Please contact caller directly to discuss any further care advice.

## 2022-02-07 RX ORDER — ONDANSETRON 4 MG/1
4 TABLET, FILM COATED ORAL EVERY 6 HOURS PRN
Qty: 20 TABLET | Refills: 0 | Status: SHIPPED | OUTPATIENT
Start: 2022-02-07 | End: 2023-08-23 | Stop reason: SDUPTHER

## 2022-02-07 NOTE — TELEPHONE ENCOUNTER
Please call to ensure pt's blood glucose levels have returned to normal. Encourage her to keep well hydrated and ask if she needs any meds for N/V.

## 2022-02-07 NOTE — TELEPHONE ENCOUNTER
Patient informed of Dr. Villa' note.  Patient verbalized understanding. Stated her blood glucose level is back to normal, she will try to stay hydrated and yes she does need medication for nausea and vomiting.  Please be advised.

## 2022-02-08 ENCOUNTER — OFFICE VISIT (OUTPATIENT)
Dept: ENDOCRINOLOGY | Facility: CLINIC | Age: 72
End: 2022-02-08
Payer: MEDICARE

## 2022-02-08 VITALS
DIASTOLIC BLOOD PRESSURE: 80 MMHG | HEART RATE: 60 BPM | TEMPERATURE: 98 F | BODY MASS INDEX: 34.58 KG/M2 | WEIGHT: 195.19 LBS | SYSTOLIC BLOOD PRESSURE: 160 MMHG

## 2022-02-08 DIAGNOSIS — Z86.73 HISTORY OF CEREBROVASCULAR ACCIDENT: ICD-10-CM

## 2022-02-08 DIAGNOSIS — E78.5 HYPERLIPIDEMIA, UNSPECIFIED HYPERLIPIDEMIA TYPE: ICD-10-CM

## 2022-02-08 DIAGNOSIS — I10 ESSENTIAL HYPERTENSION: ICD-10-CM

## 2022-02-08 PROCEDURE — 4010F ACE/ARB THERAPY RXD/TAKEN: CPT | Mod: HCNC,CPTII,S$GLB, | Performed by: NURSE PRACTITIONER

## 2022-02-08 PROCEDURE — 95251 PR GLUCOSE MONITOR, 72 HOUR, PHYS INTERP: ICD-10-PCS | Mod: HCNC,S$GLB,, | Performed by: NURSE PRACTITIONER

## 2022-02-08 PROCEDURE — 1160F PR REVIEW ALL MEDS BY PRESCRIBER/CLIN PHARMACIST DOCUMENTED: ICD-10-PCS | Mod: HCNC,CPTII,S$GLB, | Performed by: NURSE PRACTITIONER

## 2022-02-08 PROCEDURE — 1157F ADVNC CARE PLAN IN RCRD: CPT | Mod: HCNC,CPTII,S$GLB, | Performed by: NURSE PRACTITIONER

## 2022-02-08 PROCEDURE — 3079F PR MOST RECENT DIASTOLIC BLOOD PRESSURE 80-89 MM HG: ICD-10-PCS | Mod: HCNC,CPTII,S$GLB, | Performed by: NURSE PRACTITIONER

## 2022-02-08 PROCEDURE — 1157F PR ADVANCE CARE PLAN OR EQUIV PRESENT IN MEDICAL RECORD: ICD-10-PCS | Mod: HCNC,CPTII,S$GLB, | Performed by: NURSE PRACTITIONER

## 2022-02-08 PROCEDURE — 99214 PR OFFICE/OUTPT VISIT, EST, LEVL IV, 30-39 MIN: ICD-10-PCS | Mod: HCNC,S$GLB,, | Performed by: NURSE PRACTITIONER

## 2022-02-08 PROCEDURE — 3077F PR MOST RECENT SYSTOLIC BLOOD PRESSURE >= 140 MM HG: ICD-10-PCS | Mod: HCNC,CPTII,S$GLB, | Performed by: NURSE PRACTITIONER

## 2022-02-08 PROCEDURE — 3066F NEPHROPATHY DOC TX: CPT | Mod: HCNC,CPTII,S$GLB, | Performed by: NURSE PRACTITIONER

## 2022-02-08 PROCEDURE — 99999 PR PBB SHADOW E&M-EST. PATIENT-LVL IV: CPT | Mod: PBBFAC,HCNC,, | Performed by: NURSE PRACTITIONER

## 2022-02-08 PROCEDURE — 95251 CONT GLUC MNTR ANALYSIS I&R: CPT | Mod: HCNC,S$GLB,, | Performed by: NURSE PRACTITIONER

## 2022-02-08 PROCEDURE — 3060F POS MICROALBUMINURIA REV: CPT | Mod: HCNC,CPTII,S$GLB, | Performed by: NURSE PRACTITIONER

## 2022-02-08 PROCEDURE — 3077F SYST BP >= 140 MM HG: CPT | Mod: HCNC,CPTII,S$GLB, | Performed by: NURSE PRACTITIONER

## 2022-02-08 PROCEDURE — 3008F BODY MASS INDEX DOCD: CPT | Mod: HCNC,CPTII,S$GLB, | Performed by: NURSE PRACTITIONER

## 2022-02-08 PROCEDURE — 4010F PR ACE/ARB THEARPY RXD/TAKEN: ICD-10-PCS | Mod: HCNC,CPTII,S$GLB, | Performed by: NURSE PRACTITIONER

## 2022-02-08 PROCEDURE — 3079F DIAST BP 80-89 MM HG: CPT | Mod: HCNC,CPTII,S$GLB, | Performed by: NURSE PRACTITIONER

## 2022-02-08 PROCEDURE — 99999 PR PBB SHADOW E&M-EST. PATIENT-LVL IV: ICD-10-PCS | Mod: PBBFAC,HCNC,, | Performed by: NURSE PRACTITIONER

## 2022-02-08 PROCEDURE — 99214 OFFICE O/P EST MOD 30 MIN: CPT | Mod: HCNC,S$GLB,, | Performed by: NURSE PRACTITIONER

## 2022-02-08 PROCEDURE — 1159F MED LIST DOCD IN RCRD: CPT | Mod: HCNC,CPTII,S$GLB, | Performed by: NURSE PRACTITIONER

## 2022-02-08 PROCEDURE — 1159F PR MEDICATION LIST DOCUMENTED IN MEDICAL RECORD: ICD-10-PCS | Mod: HCNC,CPTII,S$GLB, | Performed by: NURSE PRACTITIONER

## 2022-02-08 PROCEDURE — 3066F PR DOCUMENTATION OF TREATMENT FOR NEPHROPATHY: ICD-10-PCS | Mod: HCNC,CPTII,S$GLB, | Performed by: NURSE PRACTITIONER

## 2022-02-08 PROCEDURE — 3008F PR BODY MASS INDEX (BMI) DOCUMENTED: ICD-10-PCS | Mod: HCNC,CPTII,S$GLB, | Performed by: NURSE PRACTITIONER

## 2022-02-08 PROCEDURE — 1160F RVW MEDS BY RX/DR IN RCRD: CPT | Mod: HCNC,CPTII,S$GLB, | Performed by: NURSE PRACTITIONER

## 2022-02-08 PROCEDURE — 3052F PR MOST RECENT HEMOGLOBIN A1C LEVEL 8.0 - < 9.0%: ICD-10-PCS | Mod: HCNC,CPTII,S$GLB, | Performed by: NURSE PRACTITIONER

## 2022-02-08 PROCEDURE — 3060F PR POS MICROALBUMINURIA RESULT DOCUMENTED/REVIEW: ICD-10-PCS | Mod: HCNC,CPTII,S$GLB, | Performed by: NURSE PRACTITIONER

## 2022-02-08 PROCEDURE — 3052F HG A1C>EQUAL 8.0%<EQUAL 9.0%: CPT | Mod: HCNC,CPTII,S$GLB, | Performed by: NURSE PRACTITIONER

## 2022-02-08 RX ORDER — INSULIN DEGLUDEC 100 U/ML
INJECTION, SOLUTION SUBCUTANEOUS
Qty: 5 PEN | Refills: 0
Start: 2022-02-08 | End: 2022-03-24 | Stop reason: SDUPTHER

## 2022-02-08 NOTE — PATIENT INSTRUCTIONS
Continue metformin, Jardiance, and Trulicity.   Reduce Tresiba from 20 to 15 units once daily. Change to morning schedule to help with adherence.   Avoid beverages with sugar.     Finish off current supply of Trulicity (2 pens).   Then change to Ozempic 1 mg once weekly. And reduce Tresiba from 15 to 10 units once daily. Patient previously lost weight on on Ozempic but stopped it d/t nausea. However, she is interested in resuming to help with weight.     At any point, if blood sugars continue to drop less than 80, please contact office. Make sure to scan glucose every 8 hours, at least 3-4x/day.     Return to clinic in 6 weeks.

## 2022-02-08 NOTE — PROGRESS NOTES
CC: This 71 y.o. Black or  female  is here for evaluation of  T2DM along with comorbidities indicated in the Visit Diagnosis section of this encounter.    HPI: Cecilia Barahona was diagnosed with T2DM in her 30s. Oral agents started at the time of diagnosis.           Prior visit 1/2021  a1c is up from 6.3 to 9.3%. Diet has been poor. She is interested in resuming Trulicity to help lower appetite.   + 7 weight gain.   Plan Avoid beverages with sugar. Improve diet.   Start exercise - walking or other exercise videos at home, bike, or walk.   CONTINUE metformin and Jardiance.   Start Trulicity 0.75 mg once weekly for 4 weeks. Then increase to Trulicity 1.5 mg once weekly.   No insulin at this time.   Try to monitor and scan your glucose every 8 hours with the [facility].   Follow up in 3 months with labs prior.         Prior visit 7/2021  Patient has not been seen for several months. Patient returns today c/o depression and anxiety. She feels like she could die any moment now that she is 72 yo and wonders why should she care.  She is not monitoring her BP or BGs but is taking her meds.   Denies GI s/e with Trulicity. Does not suppress appetite however.   a1c result pending.   Plan Will call pt with recommendations when A1c is available.   Advised pt to seek counseling and continue f/u with Psych NP for depression. She will be more motivated to follow DM treatment plan if depression is better controlled.   rtc in 6 weeks.       Interval history  a1c is down from 9.9% in July to now 8.3%. She has not been seen for several months.States that her BG dropped to the 60s last week, which was unusual for her.  She does not know why her BG was so low. Of note, she forgets to take Tresiba most of the time.        PMHX: CHETNA - does not like her old mask. H/o stroke with right facial dropp     LAST DIABETES EDUCATION: never    RECENT ILLNESSES/HOSPITALIZATIONS - -No.     HOSPITALIZED FOR DIABETES  -  No.    PRESCRIBED  DIABETES MEDICATIONS:     metformin 1000 mg bid  Jardiance 25 mg once daily  Trulicity 3 mg once weekly  Tresiba 20 units once daily        Misses medication doses -  Forgets Tresiba as above, denies missed doses of the other meds     DM COMPLICATIONS: peripheral neuropathy    SIGNIFICANT DIABETES MED HISTORY:   Glimepiride stopped at initial ov 7/2017 since she was already on Novolog  ozempic started 10/2018,  switched to trulicity ~ 12/2018 d/t nausea   Jardiance started 2/2020  tresiba and trulicity stopped in 10/2020 d/t improved BGs but Trulicity resumed shortly d/t poor diet and DM control        SELF MONITORING BLOOD GLUCOSE: Checks blood glucose at home  - 1-3x/day.   CGM interpretation: BGs overall low but can rise to mid 200s post meal. Data limited, as CGM was active only 27% in the last 2 weeks.           HYPOGLYCEMIC EPISODES: as above, corrects with OJ.      CURRENT DIET: drinks water or crystal lite. Likes sodas and sweet tea - not as much as before. Nibbles all day. Does not like to eat sweets like cookies. No breakfast.     CURRENT EXERCISE: none       BP (!) 160/80   Pulse 60   Temp 98.4 °F (36.9 °C)   Wt 88.5 kg (195 lb 3.2 oz)   BMI 34.58 kg/m²         ROS:   CONSTITUTIONAL: Appetite good, no  fatigue  GI: denies n/v, constipation, diarrhea   PSYCH: + anxiety and depression, improved       PHYSICAL EXAM:  GENERAL: Well developed, well nourished. No acute distress.   PSYCH: AAOx3,  conversant, well-groomed. Judgement and insight good. Appropriate mood and affect.   NEURO: Cranial nerves grossly intact. Speech clear, no tremor.   CHEST: Respirations even and unlabored.   MS: Gait steady. No clubbing.         Hemoglobin A1C   Date Value Ref Range Status   02/01/2022 8.3 (H) 4.0 - 5.6 % Final     Comment:     ADA Screening Guidelines:  5.7-6.4%  Consistent with prediabetes  >or=6.5%  Consistent with diabetes    High levels of fetal hemoglobin interfere with the HbA1C  assay. Heterozygous  hemoglobin variants (HbS, HgC, etc)do  not significantly interfere with this assay.   However, presence of multiple variants may affect accuracy.     07/19/2021 9.9 (H) 4.0 - 5.6 % Final     Comment:     ADA Screening Guidelines:  5.7-6.4%  Consistent with prediabetes  >or=6.5%  Consistent with diabetes    High levels of fetal hemoglobin interfere with the HbA1C  assay. Heterozygous hemoglobin variants (HbS, HgC, etc)do  not significantly interfere with this assay.   However, presence of multiple variants may affect accuracy.     01/22/2021 9.3 (H) 4.0 - 5.6 % Final     Comment:     ADA Screening Guidelines:  5.7-6.4%  Consistent with prediabetes  >or=6.5%  Consistent with diabetes    High levels of fetal hemoglobin interfere with the HbA1C  assay. Heterozygous hemoglobin variants (HbS, HgC, etc)do  not significantly interfere with this assay.   However, presence of multiple variants may affect accuracy.             Chemistry        Component Value Date/Time     06/03/2020 0443    K 3.9 06/03/2020 0443     06/03/2020 0443    CO2 29 06/03/2020 0443    BUN 37 (H) 06/03/2020 0443    CREATININE 0.8 02/01/2022 0930     (H) 06/03/2020 0443        Component Value Date/Time    CALCIUM 9.3 06/03/2020 0443    ALKPHOS 102 06/02/2020 1719    AST 15 06/02/2020 1719    ALT 11 06/02/2020 1719    BILITOT 0.4 06/02/2020 1719    ESTGFRAFRICA >60 02/01/2022 0930    EGFRNONAA >60 02/01/2022 0930          Lab Results   Component Value Date    LDLCALC 54.2 (L) 02/01/2022        Ref. Range 8/18/2020 08:05 2/1/2022 09:30   Cholesterol Latest Ref Range: 120 - 199 mg/dL 161 132   HDL Latest Ref Range: 40 - 75 mg/dL 46 41   HDL/Cholesterol Ratio Latest Ref Range: 20.0 - 50.0 % 28.6 31.1   LDL Cholesterol External Latest Ref Range: 63.0 - 159.0 mg/dL 81.8 54.2 (L)   Non-HDL Cholesterol Latest Units: mg/dL 115 91   Total Cholesterol/HDL Ratio Latest Ref Range: 2.0 - 5.0  3.5 3.2   Triglycerides Latest Ref Range: 30 - 150  mg/dL 166 (H) 184 (H)       Lab Results   Component Value Date    PETRONA 70.8 (H) 02/01/2022           ASSESSMENT and PLAN:    A1C GOAL: < 7.5 %     1. Type 2 diabetes, uncontrolled, with neuropathy  Continue metformin, Jardiance, and Trulicity.   Reduce Tresiba from 20 to 15 units once daily. Change to morning schedule to help with adherence.   Avoid beverages with sugar.     Finish off current supply of Trulicity (2 pens).   Then change to Ozempic 1 mg once weekly. And reduce Tresiba from 15 to 10 units once daily. Patient previously lost weight on on Ozempic but stopped it d/t nausea. However, she is interested in resuming to help with weight.     Reviewed hypoglycemia tx.     At any point, if blood sugars continue to drop less than 80, please contact office. Make sure to scan glucose every 8 hours, at least 3-4x/day.     Return to clinic in 6 weeks.      2. Essential hypertension  Suboptimal. Will reassess BP at next visit.      3. Hyperlipidemia, unspecified hyperlipidemia type  Continue atorvastatin    4. History of cerebrovascular accident  Continue Trulicity and Jardiance, have CV benefits.   Improve glycemic control.   Avoid hypoglycemia.            No orders of the defined types were placed in this encounter.       Follow up in about 6 weeks (around 3/22/2022).

## 2022-02-10 ENCOUNTER — CLINICAL SUPPORT (OUTPATIENT)
Dept: REHABILITATION | Facility: HOSPITAL | Age: 72
End: 2022-02-10
Attending: INTERNAL MEDICINE
Payer: MEDICARE

## 2022-02-10 DIAGNOSIS — R29.898 DECREASED ROM OF NECK: ICD-10-CM

## 2022-02-10 DIAGNOSIS — M62.81 DECREASED MUSCLE STRENGTH: ICD-10-CM

## 2022-02-10 DIAGNOSIS — R29.3 POSTURAL IMBALANCE: ICD-10-CM

## 2022-02-10 PROCEDURE — 97110 THERAPEUTIC EXERCISES: CPT | Mod: HCNC,PN,CQ

## 2022-02-10 NOTE — PROGRESS NOTES
"[facility]   Physical Therapy Treatment Note     Name: Cecilia Barahona  Clinic Number: [id number]    Therapy Diagnosis:   Encounter Diagnoses   Name Primary?    Decreased muscle strength     Decreased ROM of neck     Postural imbalance      Physician: Tiarra Villa MD    Visit Date: 2/10/2022      Physician Orders: PT Eval and Treat   Medical Diagnosis from Referral: M47.812 (ICD-10-CM) - Cervical spondylosis  Evaluation Date: 2/3/2022  Authorization Period Expiration: 01/05/2023  Plan of Care Expiration: 5/3/22  Visit # / Visits authorized: 1/ 20     Time In: 2:15 PM  Time Out: 3:00 PM  Total Billable Time: 45 minutes     Precautions: Standard, Diabetes and hx of acute coronary syndrom, Hx CVA, depression, hx hallucination      SUBJECTIVE     Pt reports: her pain is her average today. Still hears her neck cracking at times.   She was compliant with home exercise program.  Response to previous treatment: good  Functional change: none to note yet    Pain: 6/10  Location: bilateral neck and shoulder    OBJECTIVE     Objective Measures updated at progress report unless specified.     Treatment     Cecilia received the treatments listed below:      therapeutic exercises to develop strength, endurance, ROM, flexibility, posture and core stabilization for 45 minutes including:  UBE 2/2  Rows with RTB 2 x 10  B shoulder ext with RTB 2 x 10  Seated thoracic ext against towel roll x 15  Supine chin tucks 3"x10  Supine cervical rotation 20x ea  Seated UT str 30"x2 ea  Seated Lev Scap st 30"x2 ea  Seated scap retractions 2x10         hot pack for 00 minutes to cervical region. (pt deferred)          Patient Education and Home Exercises     Home Exercises Provided and Patient Education Provided     Education provided:   -     Written Home Exercises Provided: Patient instructed to cont prior HEP. Exercises were reviewed and Cecilia was able to demonstrate them prior to the end of the session.  Cecilia " demonstrated good  understanding of the education provided. See EMR under Patient Instructions for exercises provided during therapy sessions    ASSESSMENT     Pt tolerated 1st treatment session post PT eval well. She enters clinic with reports of continued B neck and shoulder pain. Introduced and prescribed therex performed to address the pt's deficits in cervical ROM, cervical strength, postural awareness/strength and overall tolerance to activity. No adverse reaction to note. Updated HEP provided.        Cecilia Is progressing well towards her goals.   Pt prognosis is Good.     Pt will continue to benefit from skilled outpatient physical therapy to address the deficits listed in the problem list box on initial evaluation, provide pt/family education and to maximize pt's level of independence in the home and community environment.     Pt's spiritual, cultural and educational needs considered and pt agreeable to plan of care and goals.     Anticipated barriers to physical therapy: covid 19 concerns    Goals:   Short Term GOALS: 6 weeks. Pt agrees with goals set.  1. Patient demonstrates independence with HEP.   2. Patient demonstrates independence with Postural Awareness.   3. Patient demonstrates independence with body mechanics.   4. Patient will report pain of </=7/10 at worst, on 0-10 pain scale, with all activity  5. Patient demonstrates increased bilateral cervical side bend by 10 degrees to improve tolerance to functional activities pain free.   6. Patient demonstrates increased strength BUE's by 1/3 or greater to improve tolerance to functional activities pain free.      Long Term GOALS: 12 weeks. Pt agrees with goals set.  1. Patient demonstrates increased bilateral cervical rotation to 60 degrees to improve tolerance to functional activities pain free.   2. Patient demonstrates increased strength BUE's to 4+/5 or greater to improve tolerance to functional activities pain free.   3. Patient demonstrates  improved overall function per FOTO Neck Survey to 45% Limitation or less.   4. Patient will report pain of </=3/10 at worst, on 0-10 pain scale, with all activity  5. Patient demonstrates ability to look up to see a bird with little difficulty to improve tolerance to functional activities pain free.       PLAN     Plan of care Certification: 2/3/2022 to 5/3/22.     Outpatient Physical Therapy 2 times weekly for 10 weeks to include the following interventions: Cervical/Lumbar Traction, Manual Therapy, Moist Heat/ Ice, Neuromuscular Re-ed, Patient Education, Therapeutic Activities, Therapeutic Exercise and FDN.  Pt may be seen by PTA as part of the rehabilitation team.       Connie Baumann, ELSIE

## 2022-02-15 ENCOUNTER — TELEPHONE (OUTPATIENT)
Dept: BEHAVIORAL HEALTH | Facility: CLINIC | Age: 72
End: 2022-02-15
Payer: MEDICARE

## 2022-02-15 NOTE — PROGRESS NOTES
CHW spoke with patient about no showing her 2/11/22 appointment. Patient asked to be called back at 1 pm today to discuss rescheduling or closing episode of care.

## 2022-02-16 ENCOUNTER — PATIENT MESSAGE (OUTPATIENT)
Dept: ADMINISTRATIVE | Facility: HOSPITAL | Age: 72
End: 2022-02-16
Payer: MEDICARE

## 2022-02-16 ENCOUNTER — PATIENT OUTREACH (OUTPATIENT)
Dept: ADMINISTRATIVE | Facility: HOSPITAL | Age: 72
End: 2022-02-16
Payer: MEDICARE

## 2022-02-16 NOTE — PROGRESS NOTES
Hypertension gap report - Left message for patient to call our office. Updated blood pressure reading needed.

## 2022-02-21 DIAGNOSIS — M79.642 LEFT HAND PAIN: Primary | ICD-10-CM

## 2022-02-22 ENCOUNTER — TELEPHONE (OUTPATIENT)
Dept: ORTHOPEDICS | Facility: CLINIC | Age: 72
End: 2022-02-22
Payer: MEDICARE

## 2022-02-22 ENCOUNTER — PATIENT OUTREACH (OUTPATIENT)
Dept: ADMINISTRATIVE | Facility: OTHER | Age: 72
End: 2022-02-22
Payer: MEDICARE

## 2022-02-23 ENCOUNTER — HOSPITAL ENCOUNTER (OUTPATIENT)
Dept: RADIOLOGY | Facility: OTHER | Age: 72
Discharge: HOME | End: 2022-02-23
Attending: PLASTIC SURGERY
Payer: MEDICARE

## 2022-02-23 ENCOUNTER — OFFICE VISIT (OUTPATIENT)
Dept: ORTHOPEDICS | Facility: CLINIC | Age: 72
End: 2022-02-23
Payer: MEDICARE

## 2022-02-23 ENCOUNTER — OFFICE VISIT (OUTPATIENT)
Dept: CARDIOLOGY | Facility: CLINIC | Age: 72
End: 2022-02-23
Attending: INTERNAL MEDICINE
Payer: MEDICARE

## 2022-02-23 VITALS
SYSTOLIC BLOOD PRESSURE: 155 MMHG | HEIGHT: 63 IN | WEIGHT: 196.19 LBS | BODY MASS INDEX: 34.76 KG/M2 | OXYGEN SATURATION: 96 % | DIASTOLIC BLOOD PRESSURE: 70 MMHG | HEART RATE: 60 BPM

## 2022-02-23 VITALS — HEIGHT: 63 IN | WEIGHT: 195 LBS | BODY MASS INDEX: 34.55 KG/M2

## 2022-02-23 DIAGNOSIS — M65.342 ACQUIRED TRIGGER FINGER OF LEFT RING FINGER: ICD-10-CM

## 2022-02-23 DIAGNOSIS — Z86.718 HISTORY OF DEEP VEIN THROMBOSIS: ICD-10-CM

## 2022-02-23 DIAGNOSIS — E78.1 HYPERTRIGLYCERIDEMIA: ICD-10-CM

## 2022-02-23 DIAGNOSIS — I70.0 ATHEROSCLEROSIS OF AORTA: ICD-10-CM

## 2022-02-23 DIAGNOSIS — E66.9 MILD OBESITY: ICD-10-CM

## 2022-02-23 DIAGNOSIS — E78.00 HYPERCHOLESTEROLEMIA: ICD-10-CM

## 2022-02-23 DIAGNOSIS — Z95.1 HISTORY OF CORONARY ARTERY BYPASS SURGERY: ICD-10-CM

## 2022-02-23 DIAGNOSIS — G56.02 LEFT CARPAL TUNNEL SYNDROME: Primary | ICD-10-CM

## 2022-02-23 DIAGNOSIS — I10 PRIMARY HYPERTENSION: ICD-10-CM

## 2022-02-23 DIAGNOSIS — E11.59 TYPE 2 DIABETES MELLITUS WITH OTHER CIRCULATORY COMPLICATION, WITHOUT LONG-TERM CURRENT USE OF INSULIN: ICD-10-CM

## 2022-02-23 DIAGNOSIS — M79.642 LEFT HAND PAIN: ICD-10-CM

## 2022-02-23 DIAGNOSIS — Z86.73 HISTORY OF CEREBROVASCULAR ACCIDENT: ICD-10-CM

## 2022-02-23 DIAGNOSIS — I25.10 CORONARY ARTERY DISEASE INVOLVING NATIVE CORONARY ARTERY OF NATIVE HEART WITHOUT ANGINA PECTORIS: ICD-10-CM

## 2022-02-23 PROCEDURE — 1157F PR ADVANCE CARE PLAN OR EQUIV PRESENT IN MEDICAL RECORD: ICD-10-PCS | Mod: HCNC,CPTII,S$GLB, | Performed by: INTERNAL MEDICINE

## 2022-02-23 PROCEDURE — 1159F MED LIST DOCD IN RCRD: CPT | Mod: HCNC,CPTII,S$GLB, | Performed by: PLASTIC SURGERY

## 2022-02-23 PROCEDURE — 99999 PR PBB SHADOW E&M-EST. PATIENT-LVL IV: ICD-10-PCS | Mod: PBBFAC,HCNC,, | Performed by: PLASTIC SURGERY

## 2022-02-23 PROCEDURE — 4010F ACE/ARB THERAPY RXD/TAKEN: CPT | Mod: HCNC,CPTII,S$GLB, | Performed by: PLASTIC SURGERY

## 2022-02-23 PROCEDURE — 3066F PR DOCUMENTATION OF TREATMENT FOR NEPHROPATHY: ICD-10-PCS | Mod: HCNC,CPTII,S$GLB, | Performed by: PLASTIC SURGERY

## 2022-02-23 PROCEDURE — 4010F PR ACE/ARB THEARPY RXD/TAKEN: ICD-10-PCS | Mod: HCNC,CPTII,S$GLB, | Performed by: PLASTIC SURGERY

## 2022-02-23 PROCEDURE — 3066F NEPHROPATHY DOC TX: CPT | Mod: HCNC,CPTII,S$GLB, | Performed by: INTERNAL MEDICINE

## 2022-02-23 PROCEDURE — 73130 X-RAY EXAM OF HAND: CPT | Mod: TC,HCNC,FY,LT

## 2022-02-23 PROCEDURE — 3066F NEPHROPATHY DOC TX: CPT | Mod: HCNC,CPTII,S$GLB, | Performed by: PLASTIC SURGERY

## 2022-02-23 PROCEDURE — 4010F PR ACE/ARB THEARPY RXD/TAKEN: ICD-10-PCS | Mod: HCNC,CPTII,S$GLB, | Performed by: INTERNAL MEDICINE

## 2022-02-23 PROCEDURE — 3060F POS MICROALBUMINURIA REV: CPT | Mod: HCNC,CPTII,S$GLB, | Performed by: PLASTIC SURGERY

## 2022-02-23 PROCEDURE — 1159F PR MEDICATION LIST DOCUMENTED IN MEDICAL RECORD: ICD-10-PCS | Mod: HCNC,CPTII,S$GLB, | Performed by: PLASTIC SURGERY

## 2022-02-23 PROCEDURE — 99203 PR OFFICE/OUTPT VISIT, NEW, LEVL III, 30-44 MIN: ICD-10-PCS | Mod: 25,HCNC,S$GLB, | Performed by: PLASTIC SURGERY

## 2022-02-23 PROCEDURE — 3008F BODY MASS INDEX DOCD: CPT | Mod: HCNC,CPTII,S$GLB, | Performed by: PLASTIC SURGERY

## 2022-02-23 PROCEDURE — 3060F PR POS MICROALBUMINURIA RESULT DOCUMENTED/REVIEW: ICD-10-PCS | Mod: HCNC,CPTII,S$GLB, | Performed by: PLASTIC SURGERY

## 2022-02-23 PROCEDURE — 1125F PR PAIN SEVERITY QUANTIFIED, PAIN PRESENT: ICD-10-PCS | Mod: HCNC,CPTII,S$GLB, | Performed by: PLASTIC SURGERY

## 2022-02-23 PROCEDURE — 3008F PR BODY MASS INDEX (BMI) DOCUMENTED: ICD-10-PCS | Mod: HCNC,CPTII,S$GLB, | Performed by: INTERNAL MEDICINE

## 2022-02-23 PROCEDURE — 1157F PR ADVANCE CARE PLAN OR EQUIV PRESENT IN MEDICAL RECORD: ICD-10-PCS | Mod: HCNC,CPTII,S$GLB, | Performed by: PLASTIC SURGERY

## 2022-02-23 PROCEDURE — 3077F PR MOST RECENT SYSTOLIC BLOOD PRESSURE >= 140 MM HG: ICD-10-PCS | Mod: HCNC,CPTII,S$GLB, | Performed by: INTERNAL MEDICINE

## 2022-02-23 PROCEDURE — 3078F DIAST BP <80 MM HG: CPT | Mod: HCNC,CPTII,S$GLB, | Performed by: INTERNAL MEDICINE

## 2022-02-23 PROCEDURE — 3066F PR DOCUMENTATION OF TREATMENT FOR NEPHROPATHY: ICD-10-PCS | Mod: HCNC,CPTII,S$GLB, | Performed by: INTERNAL MEDICINE

## 2022-02-23 PROCEDURE — 99999 PR PBB SHADOW E&M-EST. PATIENT-LVL IV: CPT | Mod: PBBFAC,HCNC,, | Performed by: PLASTIC SURGERY

## 2022-02-23 PROCEDURE — 99203 OFFICE O/P NEW LOW 30 MIN: CPT | Mod: 25,HCNC,S$GLB, | Performed by: PLASTIC SURGERY

## 2022-02-23 PROCEDURE — 3060F POS MICROALBUMINURIA REV: CPT | Mod: HCNC,CPTII,S$GLB, | Performed by: INTERNAL MEDICINE

## 2022-02-23 PROCEDURE — 1101F PR PT FALLS ASSESS DOC 0-1 FALLS W/OUT INJ PAST YR: ICD-10-PCS | Mod: HCNC,CPTII,S$GLB, | Performed by: PLASTIC SURGERY

## 2022-02-23 PROCEDURE — 3052F PR MOST RECENT HEMOGLOBIN A1C LEVEL 8.0 - < 9.0%: ICD-10-PCS | Mod: HCNC,CPTII,S$GLB, | Performed by: INTERNAL MEDICINE

## 2022-02-23 PROCEDURE — 73130 X-RAY EXAM OF HAND: CPT | Mod: 26,HCNC,LT, | Performed by: RADIOLOGY

## 2022-02-23 PROCEDURE — 99214 PR OFFICE/OUTPT VISIT, EST, LEVL IV, 30-39 MIN: ICD-10-PCS | Mod: HCNC,S$GLB,, | Performed by: INTERNAL MEDICINE

## 2022-02-23 PROCEDURE — 3008F PR BODY MASS INDEX (BMI) DOCUMENTED: ICD-10-PCS | Mod: HCNC,CPTII,S$GLB, | Performed by: PLASTIC SURGERY

## 2022-02-23 PROCEDURE — 3078F PR MOST RECENT DIASTOLIC BLOOD PRESSURE < 80 MM HG: ICD-10-PCS | Mod: HCNC,CPTII,S$GLB, | Performed by: INTERNAL MEDICINE

## 2022-02-23 PROCEDURE — 73130 XR HAND COMPLETE 3 VIEW LEFT: ICD-10-PCS | Mod: 26,HCNC,LT, | Performed by: RADIOLOGY

## 2022-02-23 PROCEDURE — 3288F FALL RISK ASSESSMENT DOCD: CPT | Mod: HCNC,CPTII,S$GLB, | Performed by: PLASTIC SURGERY

## 2022-02-23 PROCEDURE — 99999 PR PBB SHADOW E&M-EST. PATIENT-LVL IV: ICD-10-PCS | Mod: PBBFAC,HCNC,, | Performed by: INTERNAL MEDICINE

## 2022-02-23 PROCEDURE — 3060F PR POS MICROALBUMINURIA RESULT DOCUMENTED/REVIEW: ICD-10-PCS | Mod: HCNC,CPTII,S$GLB, | Performed by: INTERNAL MEDICINE

## 2022-02-23 PROCEDURE — 1125F AMNT PAIN NOTED PAIN PRSNT: CPT | Mod: HCNC,CPTII,S$GLB, | Performed by: PLASTIC SURGERY

## 2022-02-23 PROCEDURE — 1159F MED LIST DOCD IN RCRD: CPT | Mod: HCNC,CPTII,S$GLB, | Performed by: INTERNAL MEDICINE

## 2022-02-23 PROCEDURE — 1157F ADVNC CARE PLAN IN RCRD: CPT | Mod: HCNC,CPTII,S$GLB, | Performed by: INTERNAL MEDICINE

## 2022-02-23 PROCEDURE — 1159F PR MEDICATION LIST DOCUMENTED IN MEDICAL RECORD: ICD-10-PCS | Mod: HCNC,CPTII,S$GLB, | Performed by: INTERNAL MEDICINE

## 2022-02-23 PROCEDURE — 1101F PT FALLS ASSESS-DOCD LE1/YR: CPT | Mod: HCNC,CPTII,S$GLB, | Performed by: PLASTIC SURGERY

## 2022-02-23 PROCEDURE — 3052F HG A1C>EQUAL 8.0%<EQUAL 9.0%: CPT | Mod: HCNC,CPTII,S$GLB, | Performed by: PLASTIC SURGERY

## 2022-02-23 PROCEDURE — 99999 PR PBB SHADOW E&M-EST. PATIENT-LVL IV: CPT | Mod: PBBFAC,HCNC,, | Performed by: INTERNAL MEDICINE

## 2022-02-23 PROCEDURE — 3288F PR FALLS RISK ASSESSMENT DOCUMENTED: ICD-10-PCS | Mod: HCNC,CPTII,S$GLB, | Performed by: PLASTIC SURGERY

## 2022-02-23 PROCEDURE — 20550 PR INJECT TENDON SHEATH/LIGAMENT: ICD-10-PCS | Mod: HCNC,F3,S$GLB, | Performed by: PLASTIC SURGERY

## 2022-02-23 PROCEDURE — 99214 OFFICE O/P EST MOD 30 MIN: CPT | Mod: HCNC,S$GLB,, | Performed by: INTERNAL MEDICINE

## 2022-02-23 PROCEDURE — 3077F SYST BP >= 140 MM HG: CPT | Mod: HCNC,CPTII,S$GLB, | Performed by: INTERNAL MEDICINE

## 2022-02-23 PROCEDURE — 3052F PR MOST RECENT HEMOGLOBIN A1C LEVEL 8.0 - < 9.0%: ICD-10-PCS | Mod: HCNC,CPTII,S$GLB, | Performed by: PLASTIC SURGERY

## 2022-02-23 PROCEDURE — 20550 NJX 1 TENDON SHEATH/LIGAMENT: CPT | Mod: HCNC,F3,S$GLB, | Performed by: PLASTIC SURGERY

## 2022-02-23 PROCEDURE — 1157F ADVNC CARE PLAN IN RCRD: CPT | Mod: HCNC,CPTII,S$GLB, | Performed by: PLASTIC SURGERY

## 2022-02-23 PROCEDURE — 3052F HG A1C>EQUAL 8.0%<EQUAL 9.0%: CPT | Mod: HCNC,CPTII,S$GLB, | Performed by: INTERNAL MEDICINE

## 2022-02-23 PROCEDURE — 4010F ACE/ARB THERAPY RXD/TAKEN: CPT | Mod: HCNC,CPTII,S$GLB, | Performed by: INTERNAL MEDICINE

## 2022-02-23 PROCEDURE — 3008F BODY MASS INDEX DOCD: CPT | Mod: HCNC,CPTII,S$GLB, | Performed by: INTERNAL MEDICINE

## 2022-02-23 RX ORDER — CARVEDILOL 12.5 MG/1
12.5 TABLET ORAL 2 TIMES DAILY
Qty: 180 TABLET | Refills: 3 | Status: SHIPPED | OUTPATIENT
Start: 2022-02-23 | End: 2022-10-24

## 2022-02-23 RX ORDER — TRIAMCINOLONE ACETONIDE 40 MG/ML
20 INJECTION, SUSPENSION INTRA-ARTICULAR; INTRAMUSCULAR
Status: COMPLETED | OUTPATIENT
Start: 2022-02-23 | End: 2022-02-23

## 2022-02-23 RX ORDER — AMLODIPINE BESYLATE 10 MG/1
10 TABLET ORAL DAILY
Qty: 90 TABLET | Refills: 3 | Status: SHIPPED | OUTPATIENT
Start: 2022-02-23 | End: 2023-02-02 | Stop reason: SDUPTHER

## 2022-02-23 RX ADMIN — TRIAMCINOLONE ACETONIDE 20 MG: 40 INJECTION, SUSPENSION INTRA-ARTICULAR; INTRAMUSCULAR at 11:02

## 2022-02-23 NOTE — PROGRESS NOTES
Chief Complaint: Pain of the Left Hand      HPI:    Cecilia Barahona is a right hand dominant 71 y.o. female presenting today for 3 week history of a left ring finger trigger digit.  She states she has had gradually worsening symptoms with occasional triggering.  She also complains of pain.  She does experience some numbness and tingling throughout the left hand.  She has a history of a right carpal tunnel release and a right ring finger trigger release in the past.  She also underwent an EMG in  which demonstrated moderate compression of the ulnar nerve at the elbow and the median nerve at the wrist.  She denies any recent history of trauma.  She has had no interventions to the left hand..    Past Medical History:   Diagnosis Date    Acute coronary syndrome 2020: Presents with unstable angina.   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 2022    Nuclear sclerosis of both eyes 2019    Obesity     CHETNA (obstructive sleep apnea)     Psychiatric problem     Psychosis     Renal disorder     Sleep difficulties     Suicide attempt     about 30 years ago by overdose of pills    Therapy     Type II or unspecified type diabetes mellitus without mention of complication, uncontrolled        Past Surgical History:   Procedure Laterality Date    CARPAL TUNNEL RELEASE       SECTION, CLASSIC      COLONOSCOPY N/A 3/5/2020    Procedure: COLONOSCOPY;  Surgeon: Wiliam Carrillo MD;  Location: [address]);  Service: Endoscopy;  Laterality: N/A;  20  appt confirmed-rb  pt requested this date    CORONARY ARTERY BYPASS GRAFT (CABG) N/A 5/25/2020    Procedure: CORONARY ARTERY BYPASS GRAFT (CABG) x3 ;  Surgeon: Yan Bowman MD;  Location: [facility] OR [address] [place] [address];  Service: Cardiothoracic;  Laterality: N/A;  CABG X3  Endoharvest time start 0906  Endoharvest time end 1003  Vein prep start 1004  Vein prep end 1048    LEFT HEART CATHETERIZATION N/A 5/20/2020    Procedure: HEART CATH-LEFT;  Surgeon: Etelvina Lowery MD;  [place] [facility] [place] C[facility];  Service: Cardiology;  Laterality: N/A;    TOTAL ABDOMINAL HYSTERECTOMY      TRIGGER FINGER RELEASE          Family History   Problem Relation Age of Onset    Hypertension Mother     Diabetes Mother     Heart failure Mother     Cataracts Mother     Glaucoma Mother     Prostate cancer Father     Hypertension Father     Diabetes Father     Heart failure Father     No Known Problems Sister     No Known Problems Maternal Aunt     Diabetes Maternal Uncle     Hyperlipidemia Maternal Uncle     Hypertension Maternal Uncle     Diabetes Maternal Grandmother     Diabetes Maternal Grandfather     No Known Problems Paternal Grandmother     No Known Problems Paternal Grandfather     Diabetes Maternal Aunt     Alzheimer's disease Maternal Aunt     Schizophrenia Maternal Aunt     Heart disease Cousin         s/p heart transplant    No Known Problems Paternal Aunt     No Known Problems Paternal Uncle     Drug abuse Grandchild     Amblyopia Neg Hx     Blindness Neg Hx     Cancer Neg Hx     Macular degeneration Neg Hx     Retinal detachment Neg Hx     Strabismus Neg Hx     Stroke Neg Hx     Thyroid disease Neg Hx        Social History     Socioeconomic History    Marital status:      Spouse name: Yuniel    Number of children: 3   Occupational History    Occupation: Retired   Tobacco Use    Smoking status: Former Smoker     Packs/day: 0.50     Types: Cigarettes     Quit date: 11/10/2012     Years since  quittin.2    Smokeless tobacco: Never Used   Substance and Sexual Activity    Alcohol use: Not Currently     Comment: drank socially in the past    Drug use: Not Currently     Types: Marijuana     Comment: tried once 6 months ago    Sexual activity: Not Currently     Partners: Male   Other Topics Concern    Patient feels they ought to cut down on drinking/drug use No    Patient annoyed by others criticizing their drinking/drug use No    Patient has felt bad or guilty about drinking/drug use No    Patient has had a drink/used drugs as an eye opener in the AM No   Social History Narrative     x 1.    Has 3 grown children.    Lives with spouse.       Review of patient's allergies indicates:   Allergen Reactions    Ace inhibitors Other (See Comments)     cough    Invokana [canagliflozin] Other (See Comments)     Throat and tongue swelling    Ozempic [semaglutide]      Nausea and constipation on 0.25 mg dose.          Current Outpatient Medications:     albuterol (PROVENTIL/VENTOLIN HFA) 90 mcg/actuation inhaler, Inhale 2 puffs into the lungs every 6 (six) hours as needed for Wheezing or Shortness of Breath. Rescue, Disp: 19 g, Rfl: 0    alcohol swabs PadM, Apply 1 each topically 3 (three) times daily., Disp: 400 each, Rfl: 2    amLODIPine (NORVASC) 5 MG tablet, Take 1 tablet (5 mg total) by mouth once daily., Disp: 90 tablet, Rfl: 3    artificial tear ointment (ARTIFICIAL TEARS) Oint, Place into both eyes every evening., Disp: 305 g, Rfl: 1    aspirin (ECOTRIN) 81 MG EC tablet, Take 1 tablet (81 mg total) by mouth once daily., Disp: 90 tablet, Rfl: 1    atorvastatin (LIPITOR) 80 MG tablet, Take 1 tablet (80 mg total) by mouth once daily., Disp: 90 tablet, Rfl: 3    azelastine (OPTIVAR) 0.05 % ophthalmic solution, INSTILL 1 DROP IN EACH EYE TWICE DAILY, Disp: 12 mL, Rfl: 0    blood pressure monitor (BLOOD PRESSURE KIT) Kit, 1 kit by Misc.(Non-Drug; Combo Route) route once daily., Disp: 1  each, Rfl: 0    blood sugar diagnostic Strp, To check BG 2 times daily, to use with insurance preferred meter, Disp: 200 each, Rfl: 3    desoximetasone (TOPICORT) 0.05 % cream, Apply topically 2 (two) times daily as needed (itch)., Disp: 100 g, Rfl: 2    diclofenac (VOLTAREN) 50 MG EC tablet, TAKE 1 TABLET(50 MG) BY MOUTH TWICE DAILY, Disp: 60 tablet, Rfl: 0    diltiazem HCl (DILTIAZEM 2% CREAM), Apply topically 3 (three) times daily. Apply topically to anal area., Disp: 30 g, Rfl: 2    EScitalopram oxalate (LEXAPRO) 10 MG tablet, TAKE 1 TABLET(10 MG) BY MOUTH EVERY DAY, Disp: 90 tablet, Rfl: 1    ezetimibe (ZETIA) 10 mg tablet, Take 1 tablet (10 mg total) by mouth once daily., Disp: 90 tablet, Rfl: 3    flash glucose scanning reader (FREESTYLE ANAMARIA 2 READER) [facility], Scan glucose every 8 hours, Disp: 1 each, Rfl: 0    flash glucose sensor (FREESTYLE ANAMARIA 2 SENSOR) Kit, Change every 14 days, to use with Freestyle Anamaria 2 reader, Disp: 2 kit, Rfl: 11    fluticasone (FLONASE) 50 mcg/actuation nasal spray, 1 spray (50 mcg total) by Each Nare route once daily., Disp: 16 g, Rfl: 5    furosemide (LASIX) 20 MG tablet, TAKE 1 TABLET(20 MG) BY MOUTH TWICE DAILY, Disp: 180 tablet, Rfl: 0    insulin degludec (TRESIBA FLEXTOUCH U-100) 100 unit/mL (3 mL) insulin pen, ADMINISTER 10 UNITS UNDER THE SKIN EVERY DAY, Disp: 5 pen, Rfl: 0    JARDIANCE 25 mg tablet, TAKE 1 TABLET BY MOUTH EVERY DAY, Disp: 90 tablet, Rfl: 2    lancets Misc, To check BG 2 times daily, to use with insurance preferred meter, Disp: 200 each, Rfl: 3    latanoprost 0.005 % ophthalmic solution, INSTILL 1 DROP IN BOTH EYES EVERY EVENING, Disp: 7.5 mL, Rfl: 0    loratadine (CLARITIN) 10 mg tablet, TAKE ONE TABLET BY MOUTH EVERY DAY AS NEEDED FOR ALLERGIES, Disp: 90 tablet, Rfl: 1    melatonin (MELATIN) 3 mg tablet, Take 2 tablets (6 mg total) by mouth nightly as needed for Insomnia., Disp: , Rfl: 0    metFORMIN (GLUCOPHAGE) 1000 MG tablet, Take 1  "tablet (1,000 mg total) by mouth 2 (two) times daily with meals., Disp: 180 tablet, Rfl: 0    metoprolol succinate (TOPROL-XL) 50 MG 24 hr tablet, Take 1 tablet (50 mg total) by mouth once daily., Disp: 90 tablet, Rfl: 3    multivitamin with minerals tablet, Take 1 tablet by mouth once daily., Disp: , Rfl:     ondansetron (ZOFRAN) 4 MG tablet, Take 1 tablet (4 mg total) by mouth every 6 (six) hours as needed for Nausea., Disp: 20 tablet, Rfl: 0    pantoprazole (PROTONIX) 40 MG tablet, , Disp: , Rfl:     pen needle, diabetic (BD ULTRA-FINE LENY PEN NEEDLE) 32 gauge x 5/32" Ndle, 1 Device by Misc.(Non-Drug; Combo Route) route 4 (four) times daily with meals and nightly., Disp: 400 each, Rfl: 4    rOPINIRole (REQUIP) 0.5 MG tablet, TAKE 1 TABLET(0.5 MG) BY MOUTH EVERY EVENING, Disp: 90 tablet, Rfl: 0    semaglutide (OZEMPIC) 1 mg/dose (4 mg/3 mL), Inject 1 mg into the skin every 7 days., Disp: 1 pen, Rfl: 5    TRUE METRIX GO GLUCOSE METER Misc, USE TO TEST TWICE DAILY, Disp: , Rfl:     TRUEPLUS LANCETS 30 gauge Misc, USE TO TEST TWICE DAILY, Disp: , Rfl:     valsartan (DIOVAN) 320 MG tablet, Take 1 tablet (320 mg total) by mouth once daily., Disp: 90 tablet, Rfl: 3    Current Facility-Administered Medications:     [COMPLETED] triamcinolone acetonide injection 20 mg, 20 mg, Other, 1 time in Clinic/HOD, Shalom Villatoro Jr., MD, 20 mg at 02/23/22 1100        Review of Systems:  Constitutional: no fever or chills  ENT: no nasal congestion or sore throat  Respiratory: no cough or shortness of breath  Cardiovascular: no chest pain or palpitations  Gastrointestinal: no nausea or vomiting, PUD, GERD, NSAID intolerance  Genitourinary: no hematuria or dysuria  Integument/Breast: no rash or pruritis  Hematologic/Lymphatic: no easy bruising or lymphadenopathy  Musculoskeletal: see HPI  Neurological: no seizures or tremors  Behavioral/Psych: no auditory or visual hallucinations      Objective:      PHYSICAL " "EXAM:  Vitals:    02/23/22 1040   Weight: 88.5 kg (195 lb)   Height: 5' 3" (1.6 m)   PainSc:   4     General Appearance: WDWN, NAD  Neuro/Psych: Mood & affect appropriate  Lungs: Respirations equal and unlabored.   CV: No apparent CV dysfunction, distal pulses intact, RRR  Skin: Intact throughout  Extremities:   Left Hand Exam     Tenderness   Left hand tenderness location: Tenderness to palpation of the A1 pulley at the base of the ring finger with active triggering.     Tests   Phalens sign: positive  Tinel's sign (median nerve): positive    Other   Erythema: absent  Sensation: normal  Pulse: present            PROCEDURE:  I have explained the risks, benefits, and alternatives of the procedure in detail.  The patient voices understanding and all questions have been answered. So after I performed a sterile prep of the skin, the level of there A-1 pulley of the left ring finger is injected using a 25 gauge needle from the volar approach with a  combination of 1cc 1% plain Lidocaine and 20 mg of Kenalog.  The patient is cautioned and immediate relief of pain is secondary to the local anesthetic and will be temporary.  After the anesthetic wears off there may be a increase in pain that may last for a few hours or a few days and they should use ice to help alleviate this flair up of pain.             Assessment:     Encounter Diagnoses   Name Primary?    Left carpal tunnel syndrome Yes    Acquired trigger finger of left ring finger           Plan/Discussion:   Cecilia was seen today for pain.    Diagnoses and all orders for this visit:    Left carpal tunnel syndrome  -     EMG W/ ULTRASOUND AND NERVE CONDUCTION TEST 1 Extremity; Future    Acquired trigger finger of left ring finger    Other orders  -     triamcinolone acetonide injection 20 mg      She presents today with symptoms of a left ring finger trigger digit and underlying left carpal tunnel syndrome.  Her last EMG was performed 2015 and the patient has not " had any interventions for her left carpal tunnel syndrome that was previously diagnosed.  I discussed that we need to repeat the EMG prior to moving forward surgery.  She was offered a corticosteroid injection to the tendon sheath of the left ring finger to help alleviate her symptoms in the meantime until the EMG can be performed.  I discussed that she will likely require a left carpal tunnel release left ring finger trigger release and possible ulnar nerve release at the elbow.  She understood this and all questions concerns were addressed.  She will follow up me after the EMG is performed

## 2022-02-23 NOTE — PROGRESS NOTES
Subjective:     Cecilia Barahona is a 71 y.o. female with hypertension, hypercholesterolemia, hypertriglyceridemia and diabetes mellitus type 2. She is mildly obese. She appears to have had a cerebrovascular accident in about 2004 when she had an episode of weakness in her left hand. She had exertional chest discomfort in 12/2019. She was given isosorbidemononitrate and the chest discomfort resolved. She had a stress MPI that was negative. On 5/16/2020 she began to experience a mild pressure over her chest. The heaviness lasted most of the day. Walking in her house made the discomfort more pronounced. There was no radiation of the pain and neither any associated dyspnea or diaphoresis. She had similar discomfort on 5/17/2020 and 5/18/2020. She told her daughter that took her to an urgent care center. She received two tablets of nitroglycerin  sublingually and the chest discomfort resolved. There were no acute ST-T wave changes. She was advised admission and was transferred to [facility]. She has had no further chest pain after presentation. On 5/20/2020 she underwent coronary angiography that revealed severe three vessel coronary artery disease. There was basal inferior mild hypokinesia with an ejection fraction of 60%. On 5/25/2020 she underwent coronary artery bypass grafting receiving three grafts. The left internal mammary artery was bypassed to the left anterior descending coronary artery and a sequential saphenous vein graft was placed to the second obtuse marginal branch and the fourth obtuse marginal branch. A few days after surgery she had a deep venous thrombosis of her left leg and she was anticoagulated with apixiban. She was able to lose weight after the coronary artery bypass grafting and her diabetes and hypertension became easier to control. Some soreness and itching in the anterior chest wall. In 4/2021 she went to [place] to visit her sister. No exertional chest  pain or exertional shortness of breath. No palpitations or weak spells. No issues with any of her prescribed medications. Feeling well overall.         Coronary Artery Disease  Presents for follow-up visit. Pertinent negatives include no chest pain, chest pressure, chest tightness, dizziness, leg swelling, muscle weakness, palpitations, shortness of breath or weight gain. Risk factors include hyperlipidemia. Risk factors do not include obesity. The symptoms have been stable.   Cerebrovascular Accident  This is a chronic problem. The problem has been resolved. Pertinent negatives include no abdominal pain, anorexia, arthralgias, change in bowel habit, chest pain, chills, congestion, coughing, diaphoresis, fatigue, fever, headaches, joint swelling, myalgias, nausea, neck pain, numbness, rash, sore throat, swollen glands, urinary symptoms, vertigo, visual change, vomiting or weakness.   Hypertension  This is a chronic problem. The current episode started more than 1 year ago. The problem is unchanged. The problem is controlled (usually 120-130/65-75 mmHg at home). Pertinent negatives include no anxiety, blurred vision, chest pain, headaches, malaise/fatigue, neck pain, orthopnea, palpitations, peripheral edema, PND, shortness of breath or sweats. There is no history of chronic renal disease.   Hyperlipidemia  This is a chronic problem. The current episode started more than 1 year ago. The problem is controlled. Exacerbating diseases include diabetes. She has no history of chronic renal disease, hypothyroidism, liver disease, obesity or nephrotic syndrome. Pertinent negatives include no chest pain, focal weakness, leg pain, myalgias or shortness of breath.       Review of Systems   Constitutional: Negative for chills, diaphoresis, fatigue, fever, malaise/fatigue and weight gain.   HENT: Negative for congestion, nosebleeds and sore throat.    Eyes: Negative for blurred vision, double vision, vision loss in left eye and  vision loss in right eye.   Cardiovascular: Negative for chest pain, claudication, dyspnea on exertion, irregular heartbeat, leg swelling, near-syncope, orthopnea, palpitations, paroxysmal nocturnal dyspnea and syncope.   Respiratory: Negative for chest tightness, cough, hemoptysis, shortness of breath and wheezing.    Endocrine: Negative for cold intolerance and heat intolerance.   Hematologic/Lymphatic: Negative for bleeding problem. Does not bruise/bleed easily.   Skin: Negative for color change and rash.   Musculoskeletal: Negative for arthralgias, back pain, falls, joint swelling, muscle weakness, myalgias and neck pain.   Gastrointestinal: Negative for abdominal pain, anorexia, change in bowel habit, heartburn, hematemesis, hematochezia, hemorrhoids, jaundice, melena, nausea and vomiting.   Genitourinary: Negative for dysuria and hematuria.   Neurological: Negative for dizziness, focal weakness, headaches, light-headedness, loss of balance, numbness, vertigo and weakness.   Psychiatric/Behavioral: Negative for altered mental status, depression and memory loss. The patient is not nervous/anxious.    Allergic/Immunologic: Negative for hives and persistent infections.       Current Outpatient Medications on File Prior to Visit   Medication Sig Dispense Refill    albuterol (PROVENTIL/VENTOLIN HFA) 90 mcg/actuation inhaler Inhale 2 puffs into the lungs every 6 (six) hours as needed for Wheezing or Shortness of Breath. Rescue 19 g 0    alcohol swabs PadM Apply 1 each topically 3 (three) times daily. 400 each 2    amLODIPine (NORVASC) 5 MG tablet Take 1 tablet (5 mg total) by mouth once daily. 90 tablet 3    artificial tear ointment (ARTIFICIAL TEARS) Oint Place into both eyes every evening. 305 g 1    aspirin (ECOTRIN) 81 MG EC tablet Take 1 tablet (81 mg total) by mouth once daily. 90 tablet 1    atorvastatin (LIPITOR) 80 MG tablet Take 1 tablet (80 mg total) by mouth once daily. 90 tablet 3    azelastine  (OPTIVAR) 0.05 % ophthalmic solution INSTILL 1 DROP IN EACH EYE TWICE DAILY 12 mL 0    blood pressure monitor (BLOOD PRESSURE KIT) Kit 1 kit by Misc.(Non-Drug; Combo Route) route once daily. 1 each 0    blood sugar diagnostic Strp To check BG 2 times daily, to use with insurance preferred meter 200 each 3    desoximetasone (TOPICORT) 0.05 % cream Apply topically 2 (two) times daily as needed (itch). 100 g 2    diclofenac (VOLTAREN) 50 MG EC tablet TAKE 1 TABLET(50 MG) BY MOUTH TWICE DAILY 60 tablet 0    diltiazem HCl (DILTIAZEM 2% CREAM) Apply topically 3 (three) times daily. Apply topically to anal area. 30 g 2    EScitalopram oxalate (LEXAPRO) 10 MG tablet TAKE 1 TABLET(10 MG) BY MOUTH EVERY DAY 90 tablet 1    ezetimibe (ZETIA) 10 mg tablet Take 1 tablet (10 mg total) by mouth once daily. 90 tablet 3    flash glucose scanning reader (FREESTYLE ANAMARIA 2 READER) [facility] Scan glucose every 8 hours 1 each 0    flash glucose sensor (FREESTYLE ANAMARIA 2 SENSOR) Kit Change every 14 days, to use with Freestyle Anamaria 2 reader 2 kit 11    fluticasone (FLONASE) 50 mcg/actuation nasal spray 1 spray (50 mcg total) by Each Nare route once daily. 16 g 5    furosemide (LASIX) 20 MG tablet TAKE 1 TABLET(20 MG) BY MOUTH TWICE DAILY 180 tablet 0    insulin degludec (TRESIBA FLEXTOUCH U-100) 100 unit/mL (3 mL) insulin pen ADMINISTER 10 UNITS UNDER THE SKIN EVERY DAY 5 pen 0    JARDIANCE 25 mg tablet TAKE 1 TABLET BY MOUTH EVERY DAY 90 tablet 2    lancets Misc To check BG 2 times daily, to use with insurance preferred meter 200 each 3    latanoprost 0.005 % ophthalmic solution INSTILL 1 DROP IN BOTH EYES EVERY EVENING 7.5 mL 0    loratadine (CLARITIN) 10 mg tablet TAKE ONE TABLET BY MOUTH EVERY DAY AS NEEDED FOR ALLERGIES 90 tablet 1    melatonin (MELATIN) 3 mg tablet Take 2 tablets (6 mg total) by mouth nightly as needed for Insomnia.  0    metFORMIN (GLUCOPHAGE) 1000 MG tablet Take 1 tablet (1,000 mg total) by mouth 2  "(two) times daily with meals. 180 tablet 0    metoprolol succinate (TOPROL-XL) 50 MG 24 hr tablet Take 1 tablet (50 mg total) by mouth once daily. 90 tablet 3    multivitamin with minerals tablet Take 1 tablet by mouth once daily.      ondansetron (ZOFRAN) 4 MG tablet Take 1 tablet (4 mg total) by mouth every 6 (six) hours as needed for Nausea. 20 tablet 0    pantoprazole (PROTONIX) 40 MG tablet       pen needle, diabetic (BD ULTRA-FINE LENY PEN NEEDLE) 32 gauge x 5/32" Ndle 1 Device by Misc.(Non-Drug; Combo Route) route 4 (four) times daily with meals and nightly. 400 each 4    rOPINIRole (REQUIP) 0.5 MG tablet TAKE 1 TABLET(0.5 MG) BY MOUTH EVERY EVENING 90 tablet 0    semaglutide (OZEMPIC) 1 mg/dose (4 mg/3 mL) Inject 1 mg into the skin every 7 days. 1 pen 5    TRUE METRIX GO GLUCOSE METER Misc USE TO TEST TWICE DAILY      TRUEPLUS LANCETS 30 gauge Misc USE TO TEST TWICE DAILY      valsartan (DIOVAN) 320 MG tablet Take 1 tablet (320 mg total) by mouth once daily. 90 tablet 3     Current Facility-Administered Medications on File Prior to Visit   Medication Dose Route Frequency Provider Last Rate Last Admin    [COMPLETED] triamcinolone acetonide injection 20 mg  20 mg Other 1 time in Clinic/HOD Shalom Villatoro Jr., MD   20 mg at 02/23/22 1100       BP (!) 155/70 Comment: average at home  Pulse 60   Ht 5' 3" (1.6 m)   Wt 89 kg (196 lb 3.4 oz)   SpO2 96%   BMI 34.76 kg/m²       Objective:     Physical Exam  Constitutional:       General: She is not in acute distress.     Appearance: Normal appearance. She is well-developed. She is not toxic-appearing or diaphoretic.   HENT:      Head: Normocephalic and atraumatic.      Nose: Nose normal.   Eyes:      General:         Right eye: No discharge.         Left eye: No discharge.      Conjunctiva/sclera:      Right eye: Right conjunctiva is not injected.      Left eye: Left conjunctiva is not injected.      Pupils: Pupils are equal.      Right eye: Pupil " is round.      Left eye: Pupil is round.   Neck:      Thyroid: No thyromegaly.      Vascular: No carotid bruit or JVD.   Cardiovascular:      Rate and Rhythm: Regular rhythm. Bradycardia present.  No extrasystoles are present.     Chest Wall: PMI is not displaced.      Pulses:           Radial pulses are 2+ on the right side and 2+ on the left side.        Femoral pulses are 2+ on the right side and 2+ on the left side.       Dorsalis pedis pulses are 2+ on the right side and 2+ on the left side.        Posterior tibial pulses are 2+ on the right side and 2+ on the left side.      Heart sounds: S1 normal and S2 normal. Murmur heard.    Midsystolic murmur is present with a grade of 2/6.    Gallop present. S4 sounds present. No S3 sounds.   Pulmonary:      Effort: Pulmonary effort is normal.      Breath sounds: Normal breath sounds.   Chest:      Comments: Midline sternotomy scar.  Abdominal:      Palpations: Abdomen is soft.      Tenderness: There is no abdominal tenderness.   Musculoskeletal:      Cervical back: Neck supple.      Right ankle: No swelling, deformity or ecchymosis.      Left ankle: No swelling, deformity or ecchymosis.   Lymphadenopathy:      Head:      Right side of head: No submandibular adenopathy.      Left side of head: No submandibular adenopathy.      Cervical: No cervical adenopathy.   Skin:     General: Skin is warm and dry.      Findings: No rash.      Nails: There is no clubbing.   Neurological:      General: No focal deficit present.      Mental Status: She is alert and oriented to person, place, and time. She is not disoriented.      Cranial Nerves: No cranial nerve deficit.   Psychiatric:         Attention and Perception: Attention and perception normal.         Mood and Affect: Mood and affect normal.         Speech: Speech normal.         Behavior: Behavior normal.         Thought Content: Thought content normal.         Cognition and Memory: Cognition and memory normal.          Judgment: Judgment normal.         Assessment:     1. Coronary artery disease involving native coronary artery of native heart without angina pectoris    2. History of coronary artery bypass surgery    3. History of cerebrovascular accident    4. Atherosclerosis of aorta    5. History of deep vein thrombosis    6. Primary hypertension    7. Hypercholesterolemia    8. Hypertriglyceridemia    9. Mild obesity        Plan:     1. Coronary Artery Disease              12/2019: Began experience chest pain. Resolved with isosorbidemononitrate.              12/19/2019: Stress MPI:3:39 min. No CP. ECG negative. MPI: Mild to moderate fixed anterior and anteroapical defect. EF 73%.              5/18/2020: Presented with more pronounced chest pain. Resolved with NTG sl.              ECG without acute changes. Troponin x 3 negative.               5/19/2020: Echo: Normal left ventricular size and systolic function. EF 75%. Mildly dilated LA. Mild aortic valve sclerosis. Mild mitral valve sclerosis.              5/20/2020: [facility]: Cath: LAD: Prox 30%. Mid 70%. Distal 80%. OM2: Osteal 90%. OM3: 95%. OM4 90%. RCA: Mid subtotal. LV: Basal inferior mild hypokinesia. EF 60%.   5/25/2020: [facility]: CABG: LIMA to LAD and SVG1 to OM2 & OM4.   On metoprolol 25 mg Q12.              On aspirin 81 mg Q24.   Doing well.              2. History of Cerebrovascular Accident              2004: Weakness in left hand. Affected memory. No sequela.   On aspirin 81 mg Q24.    3. Atherosclerosis of Aorta   6/3/2020: CT Scan: Noted.    All risk factors addressed.     4. History of Deep Venous Trombosis of Lower Extremity   5/2020: DVT left leg after CABG.   Received apixiban for 3 months.     5. Hypertension              2000: Diagnosed.   9/1/2020: Furosemide 20 mg Q24 and KCl 20 mEq Q24 were discontinued.   Used to be on metoprolol 25 mg Q12, amlodipine 10 mg Q24, valsartan 320 mg Q24, furosemide 20 mg Q24 and chlorthalidone 25 mg Q24.   10/13/2020: Valsartan  320 mg Q24 was resumed. Amlodipine 10 mg Q24 and furosemide 20 mg Q24 were discontinued.   On metoprolol 25 mg Q12, amlodipine 5 mg Q24, valsartan 320 mg Q24 and furosemide 20 mg Q24.   Keeping log at home.   2/23/2022: Pressure running 150-160/70-80 mmHg at home. Metoprolol 25 mg Q12 to be changed to carvedilol 12.5 mg Q12 and amlodipine 5 mg Q24 to be increased to amlodipine 10 mg Q24.      5. Hypercholesterolemia              2000: Began statin.              On atorvastatin 40 mg Q24.                 5/19/2020: Chol 204. HDL 41. . .              On atorvastatin 80 mg Q24 and ezetimibe 10 mg Q24.   8/18/2020: Chol 161. HDL 46. LDL 82. .   On atorvastatin 80 mg Q24 and ezetimibe 10 mg Q24.   Fair lipid panel.    6. Hypertriglyceridemia   2020: Diagnosed.   As above.     7. Diabetes Mellitus, Type 2              2000: Diagnosed. Complications: CVA & CAD. Medications: Insulin.      8. Mild Obesity              5/19/2020: Weight 86 kg. BMI 33.   Encourage to lose weight.     9. Primary Care              Dr. Yudi Smart.    Do CBC, CMP and lipid panel.    F/u 1 month.    Etelvina Lowery M.D.

## 2022-02-24 ENCOUNTER — LAB VISIT (OUTPATIENT)
Dept: LAB | Facility: HOSPITAL | Age: 72
End: 2022-02-24
Attending: INTERNAL MEDICINE
Payer: MEDICARE

## 2022-02-24 ENCOUNTER — TELEPHONE (OUTPATIENT)
Dept: FAMILY MEDICINE | Facility: CLINIC | Age: 72
End: 2022-02-24
Payer: MEDICARE

## 2022-02-24 DIAGNOSIS — E11.59 TYPE 2 DIABETES MELLITUS WITH OTHER CIRCULATORY COMPLICATION, WITHOUT LONG-TERM CURRENT USE OF INSULIN: ICD-10-CM

## 2022-02-24 DIAGNOSIS — E78.00 HYPERCHOLESTEROLEMIA: ICD-10-CM

## 2022-02-24 LAB
CHOLEST SERPL-MCNC: 94 MG/DL (ref 120–199)
CHOLEST/HDLC SERPL: 2.5 {RATIO} (ref 2–5)
HDLC SERPL-MCNC: 37 MG/DL (ref 40–75)
HDLC SERPL: 39.4 % (ref 20–50)
LDLC SERPL CALC-MCNC: 42.4 MG/DL (ref 63–159)
NONHDLC SERPL-MCNC: 57 MG/DL
TRIGL SERPL-MCNC: 73 MG/DL (ref 30–150)

## 2022-02-24 PROCEDURE — 80061 LIPID PANEL: CPT | Mod: HCNC | Performed by: INTERNAL MEDICINE

## 2022-02-24 PROCEDURE — 83036 HEMOGLOBIN GLYCOSYLATED A1C: CPT | Mod: HCNC | Performed by: INTERNAL MEDICINE

## 2022-02-24 NOTE — TELEPHONE ENCOUNTER
Last Office Visit Info:   The patient's last visit with Tiarra Villa MD was on 1/5/2022.    The patient's last visit in current department was on 1/5/2022.        Last CBC Results:   Lab Results   Component Value Date    WBC 8.70 02/24/2022    HGB 12.1 02/24/2022    HCT 38.5 02/24/2022     02/24/2022       Last CMP Results  Lab Results   Component Value Date     06/03/2020    K 3.9 06/03/2020     06/03/2020    CO2 29 06/03/2020    BUN 37 (H) 06/03/2020    CREATININE 0.8 02/01/2022    CALCIUM 9.3 06/03/2020    ALBUMIN 3.2 (L) 06/02/2020    AST 15 06/02/2020    ALT 11 06/02/2020       Last Lipids  Lab Results   Component Value Date    CHOL 132 02/01/2022    TRIG 184 (H) 02/01/2022    HDL 41 02/01/2022    LDLCALC 54.2 (L) 02/01/2022       Last A1C  Lab Results   Component Value Date    HGBA1C 8.3 (H) 02/01/2022       Last TSH  Lab Results   Component Value Date    TSH 2.177 09/26/2019             Current Med Refills  Medication List with Changes/Refills   Current Medications    ALBUTEROL (PROVENTIL/VENTOLIN HFA) 90 MCG/ACTUATION INHALER    Inhale 2 puffs into the lungs every 6 (six) hours as needed for Wheezing or Shortness of Breath. Rescue       Start Date: 9/30/2020 End Date: --    ALCOHOL SWABS PADM    Apply 1 each topically 3 (three) times daily.       Start Date: 11/6/2016 End Date: --    AMLODIPINE (NORVASC) 10 MG TABLET    Take 1 tablet (10 mg total) by mouth once daily.       Start Date: 2/23/2022 End Date: --    ARTIFICIAL TEAR OINTMENT (ARTIFICIAL TEARS) OINT    Place into both eyes every evening.       Start Date: 6/6/2014  End Date: --    ASPIRIN (ECOTRIN) 81 MG EC TABLET    Take 1 tablet (81 mg total) by mouth once daily.       Start Date: 10/20/2021End Date: --    ATORVASTATIN (LIPITOR) 80 MG TABLET    Take 1 tablet (80 mg total) by mouth once daily.       Start Date: 10/20/2021End Date: --    AZELASTINE (OPTIVAR) 0.05 % OPHTHALMIC SOLUTION    INSTILL 1 DROP IN EACH EYE TWICE DAILY        Start Date: 8/5/2018  End Date: --    BLOOD PRESSURE MONITOR (BLOOD PRESSURE KIT) KIT    1 kit by Misc.(Non-Drug; Combo Route) route once daily.       Start Date: 7/21/2017 End Date: --    BLOOD SUGAR DIAGNOSTIC STRP    To check BG 2 times daily, to use with insurance preferred meter       Start Date: 1/26/2021 End Date: --    CARVEDILOL (COREG) 12.5 MG TABLET    Take 1 tablet (12.5 mg total) by mouth 2 (two) times daily.       Start Date: 2/23/2022 End Date: --    DESOXIMETASONE (TOPICORT) 0.05 % CREAM    Apply topically 2 (two) times daily as needed (itch).       Start Date: 9/15/2021 End Date: --    DICLOFENAC (VOLTAREN) 50 MG EC TABLET    TAKE 1 TABLET(50 MG) BY MOUTH TWICE DAILY       Start Date: 8/19/2021 End Date: --    DILTIAZEM HCL (DILTIAZEM 2% CREAM)    Apply topically 3 (three) times daily. Apply topically to anal area.       Start Date: 7/13/2021 End Date: --    ESCITALOPRAM OXALATE (LEXAPRO) 10 MG TABLET    TAKE 1 TABLET(10 MG) BY MOUTH EVERY DAY       Start Date: 2/14/2022 End Date: --    EZETIMIBE (ZETIA) 10 MG TABLET    Take 1 tablet (10 mg total) by mouth once daily.       Start Date: 10/20/2021End Date: --    FLASH GLUCOSE SCANNING READER (FREESTYLE ANAMARIA 2 READER) [facility]    Scan glucose every 8 hours       Start Date: 10/4/2021 End Date: --    FLASH GLUCOSE SENSOR (FREESTYLE ANAMARIA 2 SENSOR) KIT    Change every 14 days, to use with Freestyle Anamaria 2 reader       Start Date: 7/21/2021 End Date: --    FLUTICASONE (FLONASE) 50 MCG/ACTUATION NASAL SPRAY    1 spray (50 mcg total) by Each Nare route once daily.       Start Date: 8/20/2018 End Date: --    FUROSEMIDE (LASIX) 20 MG TABLET    TAKE 1 TABLET(20 MG) BY MOUTH TWICE DAILY       Start Date: 1/18/2022 End Date: --    INSULIN DEGLUDEC (TRESIBA FLEXTOUCH U-100) 100 UNIT/ML (3 ML) INSULIN PEN    ADMINISTER 10 UNITS UNDER THE SKIN EVERY DAY       Start Date: 2/8/2022  End Date: --    JARDIANCE 25 MG TABLET    TAKE 1 TABLET BY MOUTH EVERY DAY        "Start Date: 2/1/2021  End Date: --    LANCETS MISC    To check BG 2 times daily, to use with insurance preferred meter       Start Date: 1/26/2021 End Date: --    LATANOPROST 0.005 % OPHTHALMIC SOLUTION    INSTILL 1 DROP IN BOTH EYES EVERY EVENING       Start Date: 11/29/2021End Date: --    LORATADINE (CLARITIN) 10 MG TABLET    TAKE ONE TABLET BY MOUTH EVERY DAY AS NEEDED FOR ALLERGIES       Start Date: 9/15/2021 End Date: --    MELATONIN (MELATIN) 3 MG TABLET    Take 2 tablets (6 mg total) by mouth nightly as needed for Insomnia.       Start Date: 6/1/2020  End Date: --    METFORMIN (GLUCOPHAGE) 1000 MG TABLET    Take 1 tablet (1,000 mg total) by mouth 2 (two) times daily with meals.       Start Date: 1/26/2022 End Date: --    MULTIVITAMIN WITH MINERALS TABLET    Take 1 tablet by mouth once daily.       Start Date: --        End Date: --    ONDANSETRON (ZOFRAN) 4 MG TABLET    Take 1 tablet (4 mg total) by mouth every 6 (six) hours as needed for Nausea.       Start Date: 2/7/2022  End Date: --    PANTOPRAZOLE (PROTONIX) 40 MG TABLET           Start Date: 3/2/2021  End Date: --    PEN NEEDLE, DIABETIC (BD ULTRA-FINE LENY PEN NEEDLE) 32 GAUGE X 5/32" NDLE    1 Device by Misc.(Non-Drug; Combo Route) route 4 (four) times daily with meals and nightly.       Start Date: 7/21/2021 End Date: --    ROPINIROLE (REQUIP) 0.5 MG TABLET    TAKE 1 TABLET(0.5 MG) BY MOUTH EVERY EVENING       Start Date: 11/22/2021End Date: --    SEMAGLUTIDE (OZEMPIC) 1 MG/DOSE (4 MG/3 ML)    Inject 1 mg into the skin every 7 days.       Start Date: 2/8/2022  End Date: 2/8/2023    TRUE METRIX GO GLUCOSE METER MISC    USE TO TEST TWICE DAILY       Start Date: 2/8/2021  End Date: --    TRUEPLUS LANCETS 30 GAUGE MISC    USE TO TEST TWICE DAILY       Start Date: 2/10/2021 End Date: --    VALSARTAN (DIOVAN) 320 MG TABLET    Take 1 tablet (320 mg total) by mouth once daily.       Start Date: 10/20/2021End Date: --                   "

## 2022-02-24 NOTE — TELEPHONE ENCOUNTER
Attempted to contact patient regarding refill request/ patient's lexapro has already been approved on 2/14/2022    Not sure which medication patient is requesting    Someone answered phone then hung up/ I called back again and no answer

## 2022-02-24 NOTE — TELEPHONE ENCOUNTER
----- Message from Symone Larsen sent at 2/24/2022  3:04 PM CST -----  Type: RX Refill Request    Who Called: self     Have you contacted your pharmacy: no     Refill or New Rx:refill     RX Name and Strength depression     How is the patient currently taking it? (ex. 1XDay):    Is this a 30 day or 90 day RX:    Preferred Pharmacy with phone number:      [id number] - [place] [address] AT [facility] [place] [address] [place] [address] [address] [facility]  [place] [address]  Phone: [phone] Fax: [phone]

## 2022-02-25 LAB
ESTIMATED AVG GLUCOSE: 203 MG/DL (ref 68–131)
HBA1C MFR BLD: 8.7 % (ref 4–5.6)

## 2022-03-07 NOTE — PROGRESS NOTES
[facility]   Physical Therapy Treatment Note     Name: Cecilia Barahona  [facility] Number: [id number]    Therapy Diagnosis:   Encounter Diagnoses   Name Primary?    Decreased muscle strength Yes    Decreased ROM of neck     Postural imbalance      Physician: Tiarra Villa MD    Visit Date: 3/8/2022      Physician Orders: PT Eval and Treat   Medical Diagnosis from Referral: M47.812 (ICD-10-CM) - Cervical spondylosis  Evaluation Date: 2/3/2022  Authorization Period Expiration: 01/05/2023  Plan of Care Expiration: 5/3/22  Visit # / Visits authorized: 2/ 20 (+eval)  PN DUE: 3/3/22     Time In: 1015a (late)  Time Out: 1114a  Total Billable Time: 49 minutes     Precautions: Standard, Diabetes and hx of acute coronary syndrom, Hx CVA, depression, hx hallucination      SUBJECTIVE     Pt reports: She is feeling better. She does not have any pain right now. She does still experience the popping sensation in her neck from time to time. When she is sleeping she does still get some tightness in her right shoulder that makes her wants to lay down flat on her back. She had her greatest pain in the past 2 weeks when she was reaching overhead in the cabinet putting up her dishes. She reports that the pain got up to a 9/10.   She was compliant with home exercise program.  Response to previous treatment: good  Functional change: none to note yet    Pain: 0/10  Location: bilateral neck and shoulder    OBJECTIVE     Objective Measures updated at progress report unless specified.   3/7/22:    CERVICAL SPINE AROM:   Flexion: 45 tightness   Extension: 30 pulling   Left Sidebend: 25 p!   Right Sidebend: 15 p!   Left Rotation: 45    Right Rotation: 45 p!      UPPER EXTREMITY STRENGTH:    Left Right   Shoulder Flexion 4+/5  4+/5 p!   Shoulder Abduction 4+/5 4+/5 p!      Shoulder Internal Rotation 4+/5 4+/5   Shoulder External Rotation 4/5 4/5   Elbow Flexion 4/+5 4+/5   Elbow Extension 4+/5 4+/5      CMS  "Impairment/Limitation/Restriction for FOTO Neck Survey    Therapist reviewed FOTO scores for Cecilia Barahona on 3/8/2022.   FOTO documents entered into EPIC - see Media section.    Limitation Score: 47%     Treatment     Cecilia received the treatments listed below:      therapeutic exercises to develop strength, endurance, ROM, flexibility, posture and core stabilization for 34 minutes including:    UBE 2/2  Rows with RTB 2 x 10  B shoulder ext with RTB 2 x 10  Seated thoracic ext against 1/2 roll x 15  +seated no money RTB 2x10  Supine chin tucks 3"x10  Supine cervical rotation 20x ea  +standing open books 10x    NP:  Seated UT str 30"x2 ea  Seated [place] Scap [place] 30"x2 ea  Seated scap retractions 2x10    Cecilia received the following manual therapy techniques: Joint mobilizations and Manual traction were applied to the: cervical spine  for 15 minutes, including:    , side glides  Manual traction c/ Suboccipital release  UT str bilaterally    hot pack for 00 minutes to cervical region. (pt deferred)      Patient Education and Home Exercises     Home Exercises Provided and Patient Education Provided     Education provided:   -     Written Home Exercises Provided: Patient instructed to cont prior HEP. Exercises were reviewed and Cecilia was able to demonstrate them prior to the end of the session.  Cecilia demonstrated good  understanding of the education provided. See EMR under Patient Instructions for exercises provided during therapy sessions    ASSESSMENT     Pt was reassessed today and has met 3/6 STG and 2/5 LTG with improvements with compliance with HEP, postural awareness, body mechanics, strength, and cervical ROM. At this time she continues to be limited with her ability to work overhead for prolonged periods of time without pain/difficulty. She remains appropriate for skilled PT at this time and would continue benefit from skilled services to address deficits.     Pt tolerated today's treatment session well. " Significant amount of time spent with cervical mobs to improve cervical ROM with good tolerance by pt. PT also added standing open books for improvements in thoracic rotation. Limitation noted with each direction. Pt tolerated today's session without adverse effects.     Cecilia Is progressing well towards her goals.   Pt prognosis is Good.     Pt will continue to benefit from skilled outpatient physical therapy to address the deficits listed in the problem list box on initial evaluation, provide pt/family education and to maximize pt's level of independence in the home and community environment.     Pt's spiritual, cultural and educational needs considered and pt agreeable to plan of care and goals.     Anticipated barriers to physical therapy: covid 19 concerns    Goals:   Short Term GOALS: 6 weeks. Pt agrees with goals set. progressing  1. Patient demonstrates independence with HEP. met  2. Patient demonstrates independence with Postural Awareness. met  3. Patient demonstrates independence with body mechanics. met  4. Patient will report pain of </=7/10 at worst, on 0-10 pain scale, with all activity  5. Patient demonstrates increased bilateral cervical side bend by 10 degrees to improve tolerance to functional activities pain free.   6. Patient demonstrates increased strength BUE's by 1/3 or greater to improve tolerance to functional activities pain free.      Long Term GOALS: 12 weeks. Pt agrees with goals set. progressing  1. Patient demonstrates increased bilateral cervical rotation to 60 degrees to improve tolerance to functional activities pain free.   2. Patient demonstrates increased strength BUE's to 4+/5 or greater to improve tolerance to functional activities pain free. met  3. Patient demonstrates improved overall function per FOTO Neck Survey to 45% Limitation or less.   4. Patient will report pain of </=3/10 at worst, on 0-10 pain scale, with all activity  5. Patient demonstrates ability to look up to  see a bird with little difficulty to improve tolerance to functional activities pain free. met      PLAN     Plan of care Certification: 2/3/2022 to 5/3/22.     Outpatient Physical Therapy 2 times weekly for 10 weeks to include the following interventions: Cervical/Lumbar Traction, Manual Therapy, Moist Heat/ Ice, Neuromuscular Re-ed, Patient Education, Therapeutic Activities, Therapeutic Exercise and FDN.  Pt may be seen by PTA as part of the rehabilitation team.       Yazmin Lewis, PT,DPT  03/08/2022

## 2022-03-08 ENCOUNTER — CLINICAL SUPPORT (OUTPATIENT)
Dept: REHABILITATION | Facility: HOSPITAL | Age: 72
End: 2022-03-08
Attending: INTERNAL MEDICINE
Payer: MEDICARE

## 2022-03-08 DIAGNOSIS — M62.81 DECREASED MUSCLE STRENGTH: Primary | ICD-10-CM

## 2022-03-08 DIAGNOSIS — R29.898 DECREASED ROM OF NECK: ICD-10-CM

## 2022-03-08 DIAGNOSIS — R29.3 POSTURAL IMBALANCE: ICD-10-CM

## 2022-03-08 PROCEDURE — 97110 THERAPEUTIC EXERCISES: CPT | Mod: PN

## 2022-03-08 PROCEDURE — 97140 MANUAL THERAPY 1/> REGIONS: CPT | Mod: PN

## 2022-03-24 ENCOUNTER — OFFICE VISIT (OUTPATIENT)
Dept: ENDOCRINOLOGY | Facility: CLINIC | Age: 72
End: 2022-03-24
Payer: MEDICARE

## 2022-03-24 VITALS
WEIGHT: 194 LBS | BODY MASS INDEX: 34.37 KG/M2 | DIASTOLIC BLOOD PRESSURE: 67 MMHG | HEART RATE: 59 BPM | TEMPERATURE: 98 F | SYSTOLIC BLOOD PRESSURE: 132 MMHG

## 2022-03-24 DIAGNOSIS — R80.9 MICROALBUMINURIA: ICD-10-CM

## 2022-03-24 DIAGNOSIS — E78.5 HYPERLIPIDEMIA, UNSPECIFIED HYPERLIPIDEMIA TYPE: ICD-10-CM

## 2022-03-24 PROCEDURE — 3060F PR POS MICROALBUMINURIA RESULT DOCUMENTED/REVIEW: ICD-10-PCS | Mod: CPTII,S$GLB,, | Performed by: NURSE PRACTITIONER

## 2022-03-24 PROCEDURE — 3078F PR MOST RECENT DIASTOLIC BLOOD PRESSURE < 80 MM HG: ICD-10-PCS | Mod: CPTII,S$GLB,, | Performed by: NURSE PRACTITIONER

## 2022-03-24 PROCEDURE — 99999 PR PBB SHADOW E&M-EST. PATIENT-LVL V: ICD-10-PCS | Mod: PBBFAC,,, | Performed by: NURSE PRACTITIONER

## 2022-03-24 PROCEDURE — 3075F SYST BP GE 130 - 139MM HG: CPT | Mod: CPTII,S$GLB,, | Performed by: NURSE PRACTITIONER

## 2022-03-24 PROCEDURE — 95251 PR GLUCOSE MONITOR, 72 HOUR, PHYS INTERP: ICD-10-PCS | Mod: S$GLB,,, | Performed by: NURSE PRACTITIONER

## 2022-03-24 PROCEDURE — 3052F HG A1C>EQUAL 8.0%<EQUAL 9.0%: CPT | Mod: CPTII,S$GLB,, | Performed by: NURSE PRACTITIONER

## 2022-03-24 PROCEDURE — 99214 PR OFFICE/OUTPT VISIT, EST, LEVL IV, 30-39 MIN: ICD-10-PCS | Mod: S$GLB,,, | Performed by: NURSE PRACTITIONER

## 2022-03-24 PROCEDURE — 3060F POS MICROALBUMINURIA REV: CPT | Mod: CPTII,S$GLB,, | Performed by: NURSE PRACTITIONER

## 2022-03-24 PROCEDURE — 3066F PR DOCUMENTATION OF TREATMENT FOR NEPHROPATHY: ICD-10-PCS | Mod: CPTII,S$GLB,, | Performed by: NURSE PRACTITIONER

## 2022-03-24 PROCEDURE — 3008F PR BODY MASS INDEX (BMI) DOCUMENTED: ICD-10-PCS | Mod: CPTII,S$GLB,, | Performed by: NURSE PRACTITIONER

## 2022-03-24 PROCEDURE — 1159F MED LIST DOCD IN RCRD: CPT | Mod: CPTII,S$GLB,, | Performed by: NURSE PRACTITIONER

## 2022-03-24 PROCEDURE — 1157F PR ADVANCE CARE PLAN OR EQUIV PRESENT IN MEDICAL RECORD: ICD-10-PCS | Mod: CPTII,S$GLB,, | Performed by: NURSE PRACTITIONER

## 2022-03-24 PROCEDURE — 99999 PR PBB SHADOW E&M-EST. PATIENT-LVL V: CPT | Mod: PBBFAC,,, | Performed by: NURSE PRACTITIONER

## 2022-03-24 PROCEDURE — 3008F BODY MASS INDEX DOCD: CPT | Mod: CPTII,S$GLB,, | Performed by: NURSE PRACTITIONER

## 2022-03-24 PROCEDURE — 95251 CONT GLUC MNTR ANALYSIS I&R: CPT | Mod: S$GLB,,, | Performed by: NURSE PRACTITIONER

## 2022-03-24 PROCEDURE — 1160F PR REVIEW ALL MEDS BY PRESCRIBER/CLIN PHARMACIST DOCUMENTED: ICD-10-PCS | Mod: CPTII,S$GLB,, | Performed by: NURSE PRACTITIONER

## 2022-03-24 PROCEDURE — 3052F PR MOST RECENT HEMOGLOBIN A1C LEVEL 8.0 - < 9.0%: ICD-10-PCS | Mod: CPTII,S$GLB,, | Performed by: NURSE PRACTITIONER

## 2022-03-24 PROCEDURE — 1157F ADVNC CARE PLAN IN RCRD: CPT | Mod: CPTII,S$GLB,, | Performed by: NURSE PRACTITIONER

## 2022-03-24 PROCEDURE — 99214 OFFICE O/P EST MOD 30 MIN: CPT | Mod: S$GLB,,, | Performed by: NURSE PRACTITIONER

## 2022-03-24 PROCEDURE — 4010F ACE/ARB THERAPY RXD/TAKEN: CPT | Mod: CPTII,S$GLB,, | Performed by: NURSE PRACTITIONER

## 2022-03-24 PROCEDURE — 3066F NEPHROPATHY DOC TX: CPT | Mod: CPTII,S$GLB,, | Performed by: NURSE PRACTITIONER

## 2022-03-24 PROCEDURE — 4010F PR ACE/ARB THEARPY RXD/TAKEN: ICD-10-PCS | Mod: CPTII,S$GLB,, | Performed by: NURSE PRACTITIONER

## 2022-03-24 PROCEDURE — 1159F PR MEDICATION LIST DOCUMENTED IN MEDICAL RECORD: ICD-10-PCS | Mod: CPTII,S$GLB,, | Performed by: NURSE PRACTITIONER

## 2022-03-24 PROCEDURE — 3078F DIAST BP <80 MM HG: CPT | Mod: CPTII,S$GLB,, | Performed by: NURSE PRACTITIONER

## 2022-03-24 PROCEDURE — 3075F PR MOST RECENT SYSTOLIC BLOOD PRESS GE 130-139MM HG: ICD-10-PCS | Mod: CPTII,S$GLB,, | Performed by: NURSE PRACTITIONER

## 2022-03-24 PROCEDURE — 1126F PR PAIN SEVERITY QUANTIFIED, NO PAIN PRESENT: ICD-10-PCS | Mod: CPTII,S$GLB,, | Performed by: NURSE PRACTITIONER

## 2022-03-24 PROCEDURE — 1160F RVW MEDS BY RX/DR IN RCRD: CPT | Mod: CPTII,S$GLB,, | Performed by: NURSE PRACTITIONER

## 2022-03-24 PROCEDURE — 1126F AMNT PAIN NOTED NONE PRSNT: CPT | Mod: CPTII,S$GLB,, | Performed by: NURSE PRACTITIONER

## 2022-03-24 RX ORDER — INSULIN DEGLUDEC 100 U/ML
INJECTION, SOLUTION SUBCUTANEOUS
Qty: 5 PEN | Refills: 0 | Status: SHIPPED | OUTPATIENT
Start: 2022-03-24 | End: 2022-09-20

## 2022-03-24 RX ORDER — EMPAGLIFLOZIN 25 MG/1
25 TABLET, FILM COATED ORAL DAILY
Qty: 90 TABLET | Refills: 2 | Status: SHIPPED | OUTPATIENT
Start: 2022-03-24 | End: 2023-04-13

## 2022-03-24 NOTE — PATIENT INSTRUCTIONS
Alarms set for 3 pm and 11 pm to remind pt to scan glucose.   Continue metformin and Jardiance, as well as Tresiba at 10 units.   Start Ozempic at 0.5 mg weekly (~ 35 clicks). Demonstration provided with demo pen.   Return to clinic in 6 weeks with labs prior. Expect that pt can wean off insulin soon, especially if she can tolerate Ozempic.

## 2022-03-24 NOTE — PROGRESS NOTES
CC: This 72 y.o. Black or  female  is here for evaluation of  T2DM along with comorbidities indicated in the Visit Diagnosis section of this encounter.    HPI: Cecilia Barahona was diagnosed with T2DM in her 30s. Oral agents started at the time of diagnosis.       Prior visit 2/8/22  a1c is down from 9.9% in July to now 8.3%. She has not been seen for several months.States that her BG dropped to the 60s last week, which was unusual for her.  She does not know why her BG was so low. Of note, she forgets to take Tresiba most of the time.  Plan Continue metformin, Jardiance, and Trulicity.   Reduce Tresiba from 20 to 15 units once daily. Change to morning schedule to help with adherence.   Avoid beverages with sugar.   Finish off current supply of Trulicity (2 pens).   Then change to Ozempic 1 mg once weekly. And reduce Tresiba from 15 to 10 units once daily. Patient previously lost weight on on Ozempic but stopped it d/t nausea. However, she is interested in resuming to help with weight.   Reviewed hypoglycemia tx.   At any point, if blood sugars continue to drop less than 80, please contact office. Make sure to scan glucose every 8 hours, at least 3-4x/day.   Return to clinic in 6 weeks.     Interval history  Pt started Ozempic a month ago but she has not been dialing pen up to 1 mg dose by mistake. Only dialing up by a few clicks.     Pt has been taking Tresiba 10 units instead of 15 units as advised at lov.   She has reduced the number of cokes she drinks as well as sweets.         PMHX: CHETNA - does not like her old mask. H/o stroke with right facial dropp     LAST DIABETES EDUCATION: never    RECENT ILLNESSES/HOSPITALIZATIONS - -No.     HOSPITALIZED FOR DIABETES  -  No.    PRESCRIBED DIABETES MEDICATIONS:     metformin 1000 mg bid  Jardiance 25 mg once daily  Ozempic 1 mg once weekly  Tresiba 15 units once daily        Misses medication doses -  Denies missed doses     DM COMPLICATIONS: peripheral  neuropathy    SIGNIFICANT DIABETES MED HISTORY:   Glimepiride stopped at initial ov 7/2017 since she was already on Novolog  ozempic started 10/2018,  switched to trulicity ~ 12/2018 d/t nausea   Jardiance started 2/2020  tresiba and trulicity stopped in 10/2020 d/t improved BGs but Trulicity resumed shortly d/t poor diet and DM control        SELF MONITORING BLOOD GLUCOSE: Checks blood glucose at home  - 1-x/day with lbre 2 reader.   CGM interpretation: overnight and early morning BGs are at goal, unable to evaluate glucoses later in the day as pt not scanning enough.         HYPOGLYCEMIC EPISODES: none.     CURRENT DIET: drinks water or crystal lite.   States she does not have set meals, nibbles throughout the day. Breakfast today was a breakfast bar. Dinner was eggs, grits, and dowell.     CURRENT EXERCISE: none       /67   Pulse (!) 59   Temp 98.1 °F (36.7 °C)   Wt 88 kg (194 lb)   BMI 34.37 kg/m²         ROS:   CONSTITUTIONAL: Appetite good, no  fatigue  GI: denies n/v, constipation, diarrhea   PSYCH: + anxiety and depression, improved       PHYSICAL EXAM:  GENERAL: Well developed, well nourished. No acute distress.   PSYCH: AAOx3,  conversant, well-groomed. Judgement and insight good. Appropriate mood and affect.   NEURO: Cranial nerves grossly intact. Speech clear, no tremor.   CHEST: Respirations even and unlabored.   MS: Gait steady. No clubbing.         Hemoglobin A1C   Date Value Ref Range Status   02/24/2022 8.7 (H) 4.0 - 5.6 % Final     Comment:     ADA Screening Guidelines:  5.7-6.4%  Consistent with prediabetes  >or=6.5%  Consistent with diabetes    High levels of fetal hemoglobin interfere with the HbA1C  assay. Heterozygous hemoglobin variants (HbS, HgC, etc)do  not significantly interfere with this assay.   However, presence of multiple variants may affect accuracy.     02/01/2022 8.3 (H) 4.0 - 5.6 % Final     Comment:     ADA Screening Guidelines:  5.7-6.4%  Consistent with  prediabetes  >or=6.5%  Consistent with diabetes    High levels of fetal hemoglobin interfere with the HbA1C  assay. Heterozygous hemoglobin variants (HbS, HgC, etc)do  not significantly interfere with this assay.   However, presence of multiple variants may affect accuracy.     07/19/2021 9.9 (H) 4.0 - 5.6 % Final     Comment:     ADA Screening Guidelines:  5.7-6.4%  Consistent with prediabetes  >or=6.5%  Consistent with diabetes    High levels of fetal hemoglobin interfere with the HbA1C  assay. Heterozygous hemoglobin variants (HbS, HgC, etc)do  not significantly interfere with this assay.   However, presence of multiple variants may affect accuracy.         No results found for: CPEPTIDE, GLUTAMICACID, ISLETCELLANT, FRUCTOSAMINE      Chemistry        Component Value Date/Time     02/24/2022 1124    K 4.1 02/24/2022 1124     02/24/2022 1124    CO2 28 02/24/2022 1124    BUN 13 02/24/2022 1124    CREATININE 0.7 02/24/2022 1124     (H) 02/24/2022 1124        Component Value Date/Time    CALCIUM 9.2 02/24/2022 1124    ALKPHOS 159 (H) 02/24/2022 1124    AST 16 02/24/2022 1124    ALT 16 02/24/2022 1124    BILITOT 0.8 02/24/2022 1124    ESTGFRAFRICA >60 02/24/2022 1124    EGFRNONAA >60 02/24/2022 1124          Lab Results   Component Value Date    LDLCALC 42.4 (L) 02/24/2022       Latest Reference Range & Units 02/24/22 11:24   Cholesterol 120 - 199 mg/dL 94 (L) [1]   HDL 40 - 75 mg/dL 37 (L) [2]   HDL/Cholesterol Ratio 20.0 - 50.0 % 39.4   LDL Cholesterol External 63.0 - 159.0 mg/dL 42.4 (L) [3]   Non-HDL Cholesterol mg/dL 57 [4]   Total Cholesterol/HDL Ratio 2.0 - 5.0  2.5   Triglycerides 30 - 150 mg/dL 73 [5]       Lab Results   Component Value Date    MICALBCREAT 70.8 (H) 02/01/2022           ASSESSMENT and PLAN:    A1C GOAL: < 7.5 %     1. Type 2 diabetes, uncontrolled, with neuropathy  Despite being off GLP1-RA for the last month, FBGs have been at goal, likely d/t improved diet. Advised:      Continue metformin and Jardiance, as well as Tresiba at 10 units.   Start Ozempic at 0.5 mg weekly (~ 35 clicks). Demonstration provided with demo pen.     Alarms set for 3 pm and 11 pm to remind pt to scan glucose.   Return to clinic in 6 weeks with labs prior. Expect that pt can wean off insulin soon, especially if she can tolerate Ozempic.     Hemoglobin A1C    Creatinine, Serum   2. Microalbuminuria  Creatinine, Serum    Microalbumin/Creatinine Ratio, Urine   3. Hyperlipidemia, unspecified hyperlipidemia type  Continue Zetia              Orders Placed This Encounter   Procedures    Hemoglobin A1C     Standing Status:   Future     Standing Expiration Date:   5/23/2023    Creatinine, Serum     Standing Status:   Future     Standing Expiration Date:   5/23/2023    Microalbumin/Creatinine Ratio, Urine     Standing Status:   Future     Standing Expiration Date:   5/23/2023     Order Specific Question:   Specimen Source     Answer:   Urine        Follow up in about 3 months (around 6/24/2022).

## 2022-03-25 ENCOUNTER — OFFICE VISIT (OUTPATIENT)
Dept: CARDIOLOGY | Facility: CLINIC | Age: 72
End: 2022-03-25
Attending: INTERNAL MEDICINE
Payer: MEDICARE

## 2022-03-25 VITALS
SYSTOLIC BLOOD PRESSURE: 125 MMHG | HEIGHT: 63 IN | HEART RATE: 61 BPM | WEIGHT: 193.25 LBS | DIASTOLIC BLOOD PRESSURE: 75 MMHG | BODY MASS INDEX: 34.24 KG/M2

## 2022-03-25 DIAGNOSIS — I70.0 ATHEROSCLEROSIS OF AORTA: ICD-10-CM

## 2022-03-25 DIAGNOSIS — E11.59 TYPE 2 DIABETES MELLITUS WITH OTHER CIRCULATORY COMPLICATION, WITHOUT LONG-TERM CURRENT USE OF INSULIN: ICD-10-CM

## 2022-03-25 DIAGNOSIS — I10 PRIMARY HYPERTENSION: ICD-10-CM

## 2022-03-25 DIAGNOSIS — Z86.73 HISTORY OF CEREBROVASCULAR ACCIDENT: ICD-10-CM

## 2022-03-25 DIAGNOSIS — Z86.718 HISTORY OF DEEP VEIN THROMBOSIS: ICD-10-CM

## 2022-03-25 DIAGNOSIS — Z95.1 HISTORY OF CORONARY ARTERY BYPASS SURGERY: ICD-10-CM

## 2022-03-25 DIAGNOSIS — E78.00 HYPERCHOLESTEROLEMIA: ICD-10-CM

## 2022-03-25 DIAGNOSIS — E78.1 HYPERTRIGLYCERIDEMIA: ICD-10-CM

## 2022-03-25 DIAGNOSIS — I25.10 CORONARY ARTERY DISEASE INVOLVING NATIVE CORONARY ARTERY OF NATIVE HEART WITHOUT ANGINA PECTORIS: ICD-10-CM

## 2022-03-25 PROCEDURE — 3008F BODY MASS INDEX DOCD: CPT | Mod: CPTII,S$GLB,, | Performed by: INTERNAL MEDICINE

## 2022-03-25 PROCEDURE — 99214 OFFICE O/P EST MOD 30 MIN: CPT | Mod: S$GLB,,, | Performed by: INTERNAL MEDICINE

## 2022-03-25 PROCEDURE — 1159F PR MEDICATION LIST DOCUMENTED IN MEDICAL RECORD: ICD-10-PCS | Mod: CPTII,S$GLB,, | Performed by: INTERNAL MEDICINE

## 2022-03-25 PROCEDURE — 3052F HG A1C>EQUAL 8.0%<EQUAL 9.0%: CPT | Mod: CPTII,S$GLB,, | Performed by: INTERNAL MEDICINE

## 2022-03-25 PROCEDURE — 3288F FALL RISK ASSESSMENT DOCD: CPT | Mod: CPTII,S$GLB,, | Performed by: INTERNAL MEDICINE

## 2022-03-25 PROCEDURE — 99999 PR PBB SHADOW E&M-EST. PATIENT-LVL III: CPT | Mod: PBBFAC,,, | Performed by: INTERNAL MEDICINE

## 2022-03-25 PROCEDURE — 3288F PR FALLS RISK ASSESSMENT DOCUMENTED: ICD-10-PCS | Mod: CPTII,S$GLB,, | Performed by: INTERNAL MEDICINE

## 2022-03-25 PROCEDURE — 3066F NEPHROPATHY DOC TX: CPT | Mod: CPTII,S$GLB,, | Performed by: INTERNAL MEDICINE

## 2022-03-25 PROCEDURE — 3060F PR POS MICROALBUMINURIA RESULT DOCUMENTED/REVIEW: ICD-10-PCS | Mod: CPTII,S$GLB,, | Performed by: INTERNAL MEDICINE

## 2022-03-25 PROCEDURE — 3078F PR MOST RECENT DIASTOLIC BLOOD PRESSURE < 80 MM HG: ICD-10-PCS | Mod: CPTII,S$GLB,, | Performed by: INTERNAL MEDICINE

## 2022-03-25 PROCEDURE — 1126F PR PAIN SEVERITY QUANTIFIED, NO PAIN PRESENT: ICD-10-PCS | Mod: CPTII,S$GLB,, | Performed by: INTERNAL MEDICINE

## 2022-03-25 PROCEDURE — 1157F ADVNC CARE PLAN IN RCRD: CPT | Mod: CPTII,S$GLB,, | Performed by: INTERNAL MEDICINE

## 2022-03-25 PROCEDURE — 3052F PR MOST RECENT HEMOGLOBIN A1C LEVEL 8.0 - < 9.0%: ICD-10-PCS | Mod: CPTII,S$GLB,, | Performed by: INTERNAL MEDICINE

## 2022-03-25 PROCEDURE — 1160F PR REVIEW ALL MEDS BY PRESCRIBER/CLIN PHARMACIST DOCUMENTED: ICD-10-PCS | Mod: CPTII,S$GLB,, | Performed by: INTERNAL MEDICINE

## 2022-03-25 PROCEDURE — 4010F PR ACE/ARB THEARPY RXD/TAKEN: ICD-10-PCS | Mod: CPTII,S$GLB,, | Performed by: INTERNAL MEDICINE

## 2022-03-25 PROCEDURE — 3074F PR MOST RECENT SYSTOLIC BLOOD PRESSURE < 130 MM HG: ICD-10-PCS | Mod: CPTII,S$GLB,, | Performed by: INTERNAL MEDICINE

## 2022-03-25 PROCEDURE — 3008F PR BODY MASS INDEX (BMI) DOCUMENTED: ICD-10-PCS | Mod: CPTII,S$GLB,, | Performed by: INTERNAL MEDICINE

## 2022-03-25 PROCEDURE — 3078F DIAST BP <80 MM HG: CPT | Mod: CPTII,S$GLB,, | Performed by: INTERNAL MEDICINE

## 2022-03-25 PROCEDURE — 1101F PT FALLS ASSESS-DOCD LE1/YR: CPT | Mod: CPTII,S$GLB,, | Performed by: INTERNAL MEDICINE

## 2022-03-25 PROCEDURE — 4010F ACE/ARB THERAPY RXD/TAKEN: CPT | Mod: CPTII,S$GLB,, | Performed by: INTERNAL MEDICINE

## 2022-03-25 PROCEDURE — 1159F MED LIST DOCD IN RCRD: CPT | Mod: CPTII,S$GLB,, | Performed by: INTERNAL MEDICINE

## 2022-03-25 PROCEDURE — 1157F PR ADVANCE CARE PLAN OR EQUIV PRESENT IN MEDICAL RECORD: ICD-10-PCS | Mod: CPTII,S$GLB,, | Performed by: INTERNAL MEDICINE

## 2022-03-25 PROCEDURE — 99999 PR PBB SHADOW E&M-EST. PATIENT-LVL III: ICD-10-PCS | Mod: PBBFAC,,, | Performed by: INTERNAL MEDICINE

## 2022-03-25 PROCEDURE — 3074F SYST BP LT 130 MM HG: CPT | Mod: CPTII,S$GLB,, | Performed by: INTERNAL MEDICINE

## 2022-03-25 PROCEDURE — 1126F AMNT PAIN NOTED NONE PRSNT: CPT | Mod: CPTII,S$GLB,, | Performed by: INTERNAL MEDICINE

## 2022-03-25 PROCEDURE — 3060F POS MICROALBUMINURIA REV: CPT | Mod: CPTII,S$GLB,, | Performed by: INTERNAL MEDICINE

## 2022-03-25 PROCEDURE — 3066F PR DOCUMENTATION OF TREATMENT FOR NEPHROPATHY: ICD-10-PCS | Mod: CPTII,S$GLB,, | Performed by: INTERNAL MEDICINE

## 2022-03-25 PROCEDURE — 99214 PR OFFICE/OUTPT VISIT, EST, LEVL IV, 30-39 MIN: ICD-10-PCS | Mod: S$GLB,,, | Performed by: INTERNAL MEDICINE

## 2022-03-25 PROCEDURE — 1101F PR PT FALLS ASSESS DOC 0-1 FALLS W/OUT INJ PAST YR: ICD-10-PCS | Mod: CPTII,S$GLB,, | Performed by: INTERNAL MEDICINE

## 2022-03-25 PROCEDURE — 1160F RVW MEDS BY RX/DR IN RCRD: CPT | Mod: CPTII,S$GLB,, | Performed by: INTERNAL MEDICINE

## 2022-03-25 NOTE — PROGRESS NOTES
Subjective:     Cecilia Barahona is a 72 y.o. female with hypertension, hypercholesterolemia, hypertriglyceridemia and diabetes mellitus type 2. She is mildly obese. She appears to have had a cerebrovascular accident in about 2004 when she had an episode of weakness in her left hand. She had exertional chest discomfort in 12/2019. She was given isosorbidemononitrate and the chest discomfort resolved. She had a stress MPI that was negative. On 5/16/2020 she began to experience a mild pressure over her chest. The heaviness lasted most of the day. Walking in her house made the discomfort more pronounced. There was no radiation of the pain and neither any associated dyspnea or diaphoresis. She had similar discomfort on 5/17/2020 and 5/18/2020. She told her daughter that took her to an urgent care center. She received two tablets of nitroglycerin  sublingually and the chest discomfort resolved. There were no acute ST-T wave changes. She was advised admission and was transferred to [facility]. She has had no further chest pain after presentation. On 5/20/2020 she underwent coronary angiography that revealed severe three vessel coronary artery disease. There was basal inferior mild hypokinesia with an ejection fraction of 60%. On 5/25/2020 she underwent coronary artery bypass grafting receiving three grafts. The left internal mammary artery was bypassed to the left anterior descending coronary artery and a sequential saphenous vein graft was placed to the second obtuse marginal branch and the fourth obtuse marginal branch. A few days after surgery she had a deep venous thrombosis of her left leg and she was anticoagulated with apixiban. She was able to lose weight after the coronary artery bypass grafting and her diabetes and hypertension became easier to control. Some soreness and itching in the anterior chest wall. In 4/2021 she went to [place] to visit her sister. In 3/2022 she is  still bothered by occasional chest wall pain. No exertional chest pain or exertional shortness of breath. No palpitations or weak spells. No issues with any of her prescribed medications. Feeling well overall.         Coronary Artery Disease  Presents for follow-up visit. Pertinent negatives include no chest pain, chest pressure, chest tightness, dizziness, leg swelling, muscle weakness, palpitations, shortness of breath or weight gain. Risk factors include hyperlipidemia. Risk factors do not include obesity. The symptoms have been stable.   Cerebrovascular Accident  This is a chronic problem. The problem has been resolved. Pertinent negatives include no abdominal pain, anorexia, arthralgias, change in bowel habit, chest pain, chills, congestion, coughing, diaphoresis, fatigue, fever, headaches, joint swelling, myalgias, nausea, neck pain, numbness, rash, sore throat, swollen glands, urinary symptoms, vertigo, visual change, vomiting or weakness.   Hypertension  This is a chronic problem. The current episode started more than 1 year ago. The problem is unchanged. The problem is controlled (usually 120-130/65-75 mmHg at home). Pertinent negatives include no anxiety, blurred vision, chest pain, headaches, malaise/fatigue, neck pain, orthopnea, palpitations, peripheral edema, PND, shortness of breath or sweats. There is no history of chronic renal disease.   Hyperlipidemia  This is a chronic problem. The current episode started more than 1 year ago. The problem is controlled. Exacerbating diseases include diabetes. She has no history of chronic renal disease, hypothyroidism, liver disease, obesity or nephrotic syndrome. Pertinent negatives include no chest pain, focal weakness, leg pain, myalgias or shortness of breath.       Review of Systems   Constitutional: Negative for chills, diaphoresis, fatigue, fever, malaise/fatigue and weight gain.   HENT: Negative for congestion, nosebleeds and sore throat.    Eyes:  Negative for blurred vision, double vision, vision loss in left eye and vision loss in right eye.   Cardiovascular: Negative for chest pain, claudication, dyspnea on exertion, irregular heartbeat, leg swelling, near-syncope, orthopnea, palpitations, paroxysmal nocturnal dyspnea and syncope.   Respiratory: Negative for chest tightness, cough, hemoptysis, shortness of breath and wheezing.    Endocrine: Negative for cold intolerance and heat intolerance.   Hematologic/Lymphatic: Negative for bleeding problem. Does not bruise/bleed easily.   Skin: Negative for color change and rash.   Musculoskeletal: Negative for arthralgias, back pain, falls, joint swelling, muscle weakness, myalgias and neck pain.   Gastrointestinal: Negative for abdominal pain, anorexia, change in bowel habit, heartburn, hematemesis, hematochezia, hemorrhoids, jaundice, melena, nausea and vomiting.   Genitourinary: Negative for dysuria and hematuria.   Neurological: Negative for dizziness, focal weakness, headaches, light-headedness, loss of balance, numbness, vertigo and weakness.   Psychiatric/Behavioral: Negative for altered mental status, depression and memory loss. The patient is not nervous/anxious.    Allergic/Immunologic: Negative for hives and persistent infections.       Current Outpatient Medications on File Prior to Visit   Medication Sig Dispense Refill    albuterol (PROVENTIL/VENTOLIN HFA) 90 mcg/actuation inhaler Inhale 2 puffs into the lungs every 6 (six) hours as needed for Wheezing or Shortness of Breath. Rescue 19 g 0    amLODIPine (NORVASC) 10 MG tablet Take 1 tablet (10 mg total) by mouth once daily. 90 tablet 3    artificial tear ointment (ARTIFICIAL TEARS) Oint Place into both eyes every evening. 305 g 1    aspirin (ECOTRIN) 81 MG EC tablet Take 1 tablet (81 mg total) by mouth once daily. 90 tablet 1    atorvastatin (LIPITOR) 80 MG tablet Take 1 tablet (80 mg total) by mouth once daily. 90 tablet 3    carvediloL (COREG)  12.5 MG tablet Take 1 tablet (12.5 mg total) by mouth 2 (two) times daily. 180 tablet 3    empagliflozin (JARDIANCE) 25 mg tablet Take 1 tablet (25 mg total) by mouth once daily. 90 tablet 2    EScitalopram oxalate (LEXAPRO) 10 MG tablet TAKE 1 TABLET(10 MG) BY MOUTH EVERY DAY 90 tablet 1    ezetimibe (ZETIA) 10 mg tablet Take 1 tablet (10 mg total) by mouth once daily. 90 tablet 3    furosemide (LASIX) 20 MG tablet TAKE 1 TABLET(20 MG) BY MOUTH TWICE DAILY 180 tablet 0    insulin degludec (TRESIBA FLEXTOUCH U-100) 100 unit/mL (3 mL) insulin pen ADMINISTER 10 UNITS UNDER THE SKIN EVERY DAY 5 pen 0    latanoprost 0.005 % ophthalmic solution INSTILL 1 DROP IN BOTH EYES EVERY EVENING 7.5 mL 0    metFORMIN (GLUCOPHAGE) 1000 MG tablet Take 1 tablet (1,000 mg total) by mouth 2 (two) times daily with meals. 180 tablet 0    pantoprazole (PROTONIX) 40 MG tablet       rOPINIRole (REQUIP) 0.5 MG tablet TAKE 1 TABLET(0.5 MG) BY MOUTH EVERY EVENING 90 tablet 0    semaglutide (OZEMPIC) 1 mg/dose (4 mg/3 mL) Inject 1 mg into the skin every 7 days. 1 pen 5    valsartan (DIOVAN) 320 MG tablet Take 1 tablet (320 mg total) by mouth once daily. 90 tablet 3    alcohol swabs PadM Apply 1 each topically 3 (three) times daily. 400 each 2    azelastine (OPTIVAR) 0.05 % ophthalmic solution INSTILL 1 DROP IN EACH EYE TWICE DAILY (Patient taking differently: PRN) 12 mL 0    blood pressure monitor (BLOOD PRESSURE KIT) Kit 1 kit by Misc.(Non-Drug; Combo Route) route once daily. 1 each 0    blood sugar diagnostic Strp To check BG 2 times daily, to use with insurance preferred meter 200 each 3    desoximetasone (TOPICORT) 0.05 % cream Apply topically 2 (two) times daily as needed (itch). 100 g 2    diclofenac (VOLTAREN) 50 MG EC tablet TAKE 1 TABLET(50 MG) BY MOUTH TWICE DAILY (Patient not taking: Reported on 3/25/2022) 60 tablet 0    diltiazem HCl (DILTIAZEM 2% CREAM) Apply topically 3 (three) times daily. Apply topically to  "anal area. 30 g 2    flash glucose scanning reader (FREESTYLE ANAMARIA 2 READER) Misc Scan glucose every 8 hours 1 each 0    flash glucose sensor (FREESTYLE ANAMARIA 2 SENSOR) Kit Change every 14 days, to use with Freestyle Anamaria 2 reader 2 kit 11    fluticasone (FLONASE) 50 mcg/actuation nasal spray 1 spray (50 mcg total) by Each Nare route once daily. 16 g 5    lancets Misc To check BG 2 times daily, to use with insurance preferred meter 200 each 3    loratadine (CLARITIN) 10 mg tablet TAKE ONE TABLET BY MOUTH EVERY DAY AS NEEDED FOR ALLERGIES 90 tablet 1    melatonin (MELATIN) 3 mg tablet Take 2 tablets (6 mg total) by mouth nightly as needed for Insomnia.  0    multivitamin with minerals tablet Take 1 tablet by mouth once daily.      ondansetron (ZOFRAN) 4 MG tablet Take 1 tablet (4 mg total) by mouth every 6 (six) hours as needed for Nausea. 20 tablet 0    pen needle, diabetic (BD ULTRA-FINE LENY PEN NEEDLE) 32 gauge x 5/32" Ndle 1 Device by Misc.(Non-Drug; Combo Route) route 4 (four) times daily with meals and nightly. 400 each 4    TRUE METRIX GO GLUCOSE METER Misc USE TO TEST TWICE DAILY      TRUEPLUS LANCETS 30 gauge Misc USE TO TEST TWICE DAILY       No current facility-administered medications on file prior to visit.       /75 Comment: average at home  Pulse 61   Ht 5' 3" (1.6 m)   Wt 87.6 kg (193 lb 3.7 oz)   BMI 34.23 kg/m²       Objective:     Physical Exam  Constitutional:       General: She is not in acute distress.     Appearance: Normal appearance. She is well-developed. She is not toxic-appearing or diaphoretic.   HENT:      Head: Normocephalic and atraumatic.      Nose: Nose normal.   Eyes:      General:         Right eye: No discharge.         Left eye: No discharge.      Conjunctiva/sclera:      Right eye: Right conjunctiva is not injected.      Left eye: Left conjunctiva is not injected.      Pupils: Pupils are equal.      Right eye: Pupil is round.      Left eye: Pupil is " round.   Neck:      Thyroid: No thyromegaly.      Vascular: No carotid bruit or JVD.   Cardiovascular:      Rate and Rhythm: Regular rhythm. Bradycardia present.  No extrasystoles are present.     Chest Wall: PMI is not displaced.      Pulses:           Radial pulses are 2+ on the right side and 2+ on the left side.        Femoral pulses are 2+ on the right side and 2+ on the left side.       Dorsalis pedis pulses are 2+ on the right side and 2+ on the left side.        Posterior tibial pulses are 2+ on the right side and 2+ on the left side.      Heart sounds: S1 normal and S2 normal. Murmur heard.    Midsystolic murmur is present with a grade of 2/6.    Gallop present. S4 sounds present. No S3 sounds.   Pulmonary:      Effort: Pulmonary effort is normal.      Breath sounds: Normal breath sounds.   Chest:      Comments: Midline sternotomy scar.  Abdominal:      Palpations: Abdomen is soft.      Tenderness: There is no abdominal tenderness.   Musculoskeletal:      Cervical back: Neck supple.      Right ankle: No swelling, deformity or ecchymosis.      Left ankle: No swelling, deformity or ecchymosis.   Lymphadenopathy:      Head:      Right side of head: No submandibular adenopathy.      Left side of head: No submandibular adenopathy.      Cervical: No cervical adenopathy.   Skin:     General: Skin is warm and dry.      Findings: No rash.      Nails: There is no clubbing.   Neurological:      General: No focal deficit present.      Mental Status: She is alert and oriented to person, place, and time. She is not disoriented.      Cranial Nerves: No cranial nerve deficit.   Psychiatric:         Attention and Perception: Attention and perception normal.         Mood and Affect: Mood and affect normal.         Speech: Speech normal.         Behavior: Behavior normal.         Thought Content: Thought content normal.         Cognition and Memory: Cognition and memory normal.         Judgment: Judgment normal.          Assessment:     1. Coronary artery disease involving native coronary artery of native heart without angina pectoris    2. History of coronary artery bypass surgery    3. History of cerebrovascular accident    4. Atherosclerosis of aorta    5. History of deep vein thrombosis    6. Primary hypertension    7. Hypercholesterolemia    8. Hypertriglyceridemia    9. Type 2 diabetes mellitus with other circulatory complication, without long-term current use of insulin        Plan:     1. Coronary Artery Disease              12/2019: Began experience chest pain. Resolved with isosorbidemononitrate.              12/19/2019: Stress MPI:3:39 min. No CP. ECG negative. MPI: Mild to moderate fixed anterior and anteroapical defect. EF 73%.              5/18/2020: Presented with more pronounced chest pain. Resolved with NTG sl.              ECG without acute changes. Troponin x 3 negative.               5/19/2020: Echo: Normal left ventricular size and systolic function. EF 75%. Mildly dilated LA. Mild aortic valve sclerosis. Mild mitral valve sclerosis.              5/20/2020: [facility]: Cath: LAD: Prox 30%. Mid 70%. Distal 80%. OM2: Osteal 90%. OM3: 95%. OM4 90%. RCA: Mid subtotal. LV: Basal inferior mild hypokinesia. EF 60%.   5/25/2020: [facility]: CABG: LIMA to LAD and SVG1 to OM2 & OM4.   On carvedilol 12.5 mg Q12.              On aspirin 81 mg Q24.   Doing well.              2. History of Cerebrovascular Accident              2004: Weakness in left hand. Affected memory. No sequela.   On aspirin 81 mg Q24.    3. Atherosclerosis of Aorta   6/3/2020: CT Scan: Noted.    All risk factors addressed.     4. History of Deep Venous Trombosis of Lower Extremity   5/2020: DVT left leg after CABG.   Received apixiban for 3 months.     5. Hypertension              2000: Diagnosed.   9/1/2020: Furosemide 20 mg Q24 and KCl 20 mEq Q24 were discontinued.   Used to be on metoprolol 25 mg Q12, amlodipine 10 mg Q24, valsartan 320 mg Q24,  furosemide 20 mg Q24 and chlorthalidone 25 mg Q24.   10/13/2020: Valsartan 320 mg Q24 was resumed. Amlodipine 10 mg Q24 and furosemide 20 mg Q24 were discontinued.   2/23/2022: Metoprolol 25 mg Q12 was changed to carvedilol 12.5 mg Q12 and amlodipine 5 mg Q24 was increased to amlodipine 10 mg Q24 as pressure was running 150-160/70-80 mmHg at home.     On carvedilol 12.5 mg Q12, amlodipine 10 mg Q24, valsartan 320 mg Q24 and furosemide 20 mg Q24.   Keeping log at home.   Well controlled.      5. Hypercholesterolemia              2000: Began statin.              On atorvastatin 40 mg Q24.                 5/19/2020: Chol 204. HDL 41. . .              On atorvastatin 80 mg Q24 and ezetimibe 10 mg Q24.   8/18/2020: Chol 161. HDL 46. LDL 82. .   2/24/2022: Chol 94. HDL 37. LDL 42. TG 73.   On atorvastatin 80 mg Q24 and ezetimibe 10 mg Q24.   Very favorable lipid panel.    6. Hypertriglyceridemia   2020: Diagnosed.   As above.     7. Diabetes Mellitus, Type 2              2000: Diagnosed. Complications: CVA & CAD. Medications: Insulin.   On semaglutide 4 mg Q weekly.      8. Mild Obesity              5/19/2020: Weight 86 kg. BMI 33.   Encourage to lose weight.     9. Primary Care              Dr. Yudi Smart.    F/u 4 month.    Etelvina Lowery M.D.

## 2022-04-14 ENCOUNTER — TELEPHONE (OUTPATIENT)
Dept: ORTHOPEDICS | Facility: CLINIC | Age: 72
End: 2022-04-14
Payer: MEDICARE

## 2022-04-14 NOTE — TELEPHONE ENCOUNTER
Reach out to pt about reschedule her EMG appointment leave an voicemail for her and a call back number.

## 2022-04-18 ENCOUNTER — TELEPHONE (OUTPATIENT)
Dept: ENDOCRINOLOGY | Facility: CLINIC | Age: 72
End: 2022-04-18
Payer: MEDICARE

## 2022-04-18 NOTE — TELEPHONE ENCOUNTER
Pt came in office for Ozempic coupon. Pt stated she had a coupon before and it worked. Per provider Sindi JOYCE give pt the coupon and see if it works for her. Pt verbalized understanding.

## 2022-04-18 NOTE — TELEPHONE ENCOUNTER
Message received that pt c/o cost of Ozempic, requests coupon for it.     Please let pt know that she cannot use Ozempic with coupon because she medicare. rx sent to [facility] to see if cost is better there.

## 2022-04-25 NOTE — PLAN OF CARE
Problem: Occupational Therapy Goal  Goal: Occupational Therapy Goal  Description  Goals to be met by: 6/8/20     Patient will increase functional independence with ADLs by performing:    UE Dressing with Hooker.  LE Dressing with Hooker.  Grooming while standing at sink with Modified Hooker.  Toileting from toilet with Modified Hooker for hygiene and clothing management.   Bathing from  shower chair/bench with Modified Hooker.  Toilet transfer to toilet with Modified Hooker.  Increased functional strength to WFL for B UE.  Upper extremity exercise program x15 reps per handout, with independence.   Pt will be able to state all sternal precautions correctly c 100% accuracy.     Outcome: Ongoing, Progressing    Adriana Zuniga OTR/L  Pager: [phone]  5/28/2020        - - -

## 2022-05-02 ENCOUNTER — TELEPHONE (OUTPATIENT)
Dept: ENDOCRINOLOGY | Facility: CLINIC | Age: 72
End: 2022-05-02
Payer: MEDICARE

## 2022-05-02 NOTE — TELEPHONE ENCOUNTER
----- Message from Ran Bolton sent at 5/2/2022  2:35 PM CDT -----  Regarding: Appt Reschedule  Contact: [phone]  Request Appt Reschedule    Who Called: Cecilia  Appt Type: 6WK F/U  Call Back Number:[phone]  Additional Information: The pt has a appt scheduled for 05/09/2022, but she need a appt time around 4:30 pm. Please call the pt regarding this change.

## 2022-05-09 ENCOUNTER — LAB VISIT (OUTPATIENT)
Dept: LAB | Facility: HOSPITAL | Age: 72
End: 2022-05-09
Attending: NURSE PRACTITIONER
Payer: MEDICARE

## 2022-05-09 DIAGNOSIS — R80.9 MICROALBUMINURIA: ICD-10-CM

## 2022-05-09 LAB
CREAT SERPL-MCNC: 0.7 MG/DL (ref 0.5–1.4)
EST. GFR  (AFRICAN AMERICAN): >60 ML/MIN/1.73 M^2
EST. GFR  (NON AFRICAN AMERICAN): >60 ML/MIN/1.73 M^2
ESTIMATED AVG GLUCOSE: 209 MG/DL (ref 68–131)
HBA1C MFR BLD: 8.9 % (ref 4–5.6)

## 2022-05-09 PROCEDURE — 36415 COLL VENOUS BLD VENIPUNCTURE: CPT | Performed by: NURSE PRACTITIONER

## 2022-05-09 PROCEDURE — 82565 ASSAY OF CREATININE: CPT | Performed by: NURSE PRACTITIONER

## 2022-05-09 PROCEDURE — 83036 HEMOGLOBIN GLYCOSYLATED A1C: CPT | Performed by: NURSE PRACTITIONER

## 2022-05-12 ENCOUNTER — OFFICE VISIT (OUTPATIENT)
Dept: ENDOCRINOLOGY | Facility: CLINIC | Age: 72
End: 2022-05-12
Payer: MEDICARE

## 2022-05-12 DIAGNOSIS — R80.9 MICROALBUMINURIA: ICD-10-CM

## 2022-05-12 DIAGNOSIS — E78.5 HYPERLIPIDEMIA, UNSPECIFIED HYPERLIPIDEMIA TYPE: ICD-10-CM

## 2022-05-12 PROCEDURE — 3052F HG A1C>EQUAL 8.0%<EQUAL 9.0%: CPT | Mod: CPTII,95,, | Performed by: NURSE PRACTITIONER

## 2022-05-12 PROCEDURE — 3060F PR POS MICROALBUMINURIA RESULT DOCUMENTED/REVIEW: ICD-10-PCS | Mod: CPTII,95,, | Performed by: NURSE PRACTITIONER

## 2022-05-12 PROCEDURE — 3060F POS MICROALBUMINURIA REV: CPT | Mod: CPTII,95,, | Performed by: NURSE PRACTITIONER

## 2022-05-12 PROCEDURE — 4010F PR ACE/ARB THEARPY RXD/TAKEN: ICD-10-PCS | Mod: CPTII,95,, | Performed by: NURSE PRACTITIONER

## 2022-05-12 PROCEDURE — 1159F MED LIST DOCD IN RCRD: CPT | Mod: CPTII,95,, | Performed by: NURSE PRACTITIONER

## 2022-05-12 PROCEDURE — 99214 PR OFFICE/OUTPT VISIT, EST, LEVL IV, 30-39 MIN: ICD-10-PCS | Mod: 95,,, | Performed by: NURSE PRACTITIONER

## 2022-05-12 PROCEDURE — 3066F PR DOCUMENTATION OF TREATMENT FOR NEPHROPATHY: ICD-10-PCS | Mod: CPTII,95,, | Performed by: NURSE PRACTITIONER

## 2022-05-12 PROCEDURE — 99214 OFFICE O/P EST MOD 30 MIN: CPT | Mod: 95,,, | Performed by: NURSE PRACTITIONER

## 2022-05-12 PROCEDURE — 4010F ACE/ARB THERAPY RXD/TAKEN: CPT | Mod: CPTII,95,, | Performed by: NURSE PRACTITIONER

## 2022-05-12 PROCEDURE — 1157F PR ADVANCE CARE PLAN OR EQUIV PRESENT IN MEDICAL RECORD: ICD-10-PCS | Mod: CPTII,95,, | Performed by: NURSE PRACTITIONER

## 2022-05-12 PROCEDURE — 3066F NEPHROPATHY DOC TX: CPT | Mod: CPTII,95,, | Performed by: NURSE PRACTITIONER

## 2022-05-12 PROCEDURE — 3052F PR MOST RECENT HEMOGLOBIN A1C LEVEL 8.0 - < 9.0%: ICD-10-PCS | Mod: CPTII,95,, | Performed by: NURSE PRACTITIONER

## 2022-05-12 PROCEDURE — 1157F ADVNC CARE PLAN IN RCRD: CPT | Mod: CPTII,95,, | Performed by: NURSE PRACTITIONER

## 2022-05-12 PROCEDURE — 1160F RVW MEDS BY RX/DR IN RCRD: CPT | Mod: CPTII,95,, | Performed by: NURSE PRACTITIONER

## 2022-05-12 PROCEDURE — 1160F PR REVIEW ALL MEDS BY PRESCRIBER/CLIN PHARMACIST DOCUMENTED: ICD-10-PCS | Mod: CPTII,95,, | Performed by: NURSE PRACTITIONER

## 2022-05-12 PROCEDURE — 1159F PR MEDICATION LIST DOCUMENTED IN MEDICAL RECORD: ICD-10-PCS | Mod: CPTII,95,, | Performed by: NURSE PRACTITIONER

## 2022-05-12 RX ORDER — DULAGLUTIDE 3 MG/.5ML
3 INJECTION, SOLUTION SUBCUTANEOUS
Qty: 4 PEN | Refills: 11
Start: 2022-05-12 | End: 2023-04-13

## 2022-05-12 RX ORDER — METFORMIN HYDROCHLORIDE 1000 MG/1
1000 TABLET ORAL 2 TIMES DAILY WITH MEALS
Qty: 180 TABLET | Refills: 2 | Status: SHIPPED | OUTPATIENT
Start: 2022-05-12 | End: 2023-04-13 | Stop reason: SDUPTHER

## 2022-05-12 RX ORDER — INSULIN GLARGINE 100 [IU]/ML
INJECTION, SOLUTION SUBCUTANEOUS
Refills: 0
Start: 2022-05-12 | End: 2022-09-20

## 2022-05-12 NOTE — PATIENT INSTRUCTIONS
Referral sent to Pharmacy Assistance to apply for Jardiance, Trulicity 3 mg, and Basaglar.   Switch from Ozempic 0.5 mg to Trulicity 3 mg; and from Tresiba to Basaglar.   Return to clinic in 2 months with labs prior.

## 2022-05-12 NOTE — PROGRESS NOTES
The patient location is: [place]  The chief complaint leading to consultation is: type 2 diabetes    Visit type: audiovisual    Face to Face time with patient: 32 minutes of total time spent on the encounter, which includes face to face time and non-face to face time preparing to see the patient (eg, review of tests), Obtaining and/or reviewing separately obtained history, Documenting clinical information in the electronic or other health record, Independently interpreting results (not separately reported) and communicating results to the patient/family/caregiver, or Care coordination (not separately reported).         Each patient to whom he or she provides medical services by telemedicine is:  (1) informed of the relationship between the physician and patient and the respective role of any other health care provider with respect to management of the patient; and (2) notified that he or she may decline to receive medical services by telemedicine and may withdraw from such care at any time.    Notes:     CC: This 72 y.o. Black or  female  is here for evaluation of  T2DM along with comorbidities indicated in the Visit Diagnosis section of this encounter.    HPI: Cecilia Barahona was diagnosed with T2DM in her 30s. Oral agents started at the time of diagnosis.       Prior visit 2/8/22  a1c is down from 9.9% in July to now 8.3%. She has not been seen for several months.States that her BG dropped to the 60s last week, which was unusual for her.  She does not know why her BG was so low. Of note, she forgets to take Tresiba most of the time.  Plan Continue metformin, Jardiance, and Trulicity.   Reduce Tresiba from 20 to 15 units once daily. Change to morning schedule to help with adherence.   Avoid beverages with sugar.   Finish off current supply of Trulicity (2 pens).   Then change to Ozempic 1 mg once weekly. And reduce Tresiba from 15 to 10 units once daily. Patient previously lost weight on on  "Ozempic but stopped it d/t nausea. However, she is interested in resuming to help with weight.   Reviewed hypoglycemia tx.   At any point, if blood sugars continue to drop less than 80, please contact office. Make sure to scan glucose every 8 hours, at least 3-4x/day.   Return to clinic in 6 weeks.     Prior visit 3/24/22  Pt started Ozempic a month ago but she has not been dialing pen up to 1 mg dose by mistake. Only dialing up by a few clicks.   Pt has been taking Tresiba 10 units instead of 15 units as advised at lov.   She has reduced the number of cokes she drinks as well as sweets.   Plan Despite being off GLP1-RA for the last month, FBGs have been at goal, likely d/t improved diet. Advised:   Continue metformin and Jardiance, as well as Tresiba at 10 units.   Start Ozempic at 0.5 mg weekly (~ 35 clicks). Demonstration provided with demo pen.   Alarms set for 3 pm and 11 pm to remind pt to scan glucose.   Return to clinic in 6 weeks with labs prior. Expect that pt can wean off insulin soon, especially if she can tolerate Ozempic.     Interval history virtual visit - unable to hear pt through virtual visit, converted to phone call with pt's permission.   a1c remains high, from 8.7 to 8.9%. She is taking her meds regularly. States her BGs have been good. She cannot recall how much Ozempic she is taking - she states she "assumes" how much to take, maybe 35 (=0.5 mg) or 72 clicks (=1 mg)? Suspect she is taking 35 clicks since this is what was marked on her pen at lov.     C/o cost of Ozempic and Jardiance - Ozempic is $200 per month. She is using leftover supply of these meds at present.        PMHX: CHETNA - does not like her old mask. H/o stroke with right facial dropp     LAST DIABETES E    DUCATION: never    RECENT ILLNESSES/HOSPITALIZATIONS - -No.     HOSPITALIZED FOR DIABETES  -  No.    PRESCRIBED DIABETES MEDICATIONS:     metformin 1000 mg bid  Jardiance 25 mg once daily  Ozempic 0.5 mg once weekly on Fridays "   Tresiba 10 units once daily        Misses medication doses -  Denies missed doses     DM COMPLICATIONS: peripheral neuropathy    SIGNIFICANT DIABETES MED HISTORY:   Glimepiride stopped at initial ov 7/2017 since she was already on Novolog  ozempic started 10/2018,  switched to trulicity ~ 12/2018 d/t nausea   Jardiance started 2/2020  tresiba and trulicity stopped in 10/2020 d/t improved BGs but Trulicity resumed shortly d/t poor diet and DM control        SELF MONITORING BLOOD GLUCOSE: Checks blood glucose at home with dionna 2 reader.   FBGs 90-140s  Daytime - never over 150         HYPOGLYCEMIC EPISODES: none.     CURRENT DIET: drinks water or crystal lite.   States she does not have set meals, nibbles throughout the day.  Appetite lower with Ozempic   CURRENT EXERCISE: none       There were no vitals taken for this visit.        ROS:   CONSTITUTIONAL: Appetite lower, no  fatigue  GI: denies n/v, constipation, diarrhea         PHYSICAL EXAM:          Hemoglobin A1C   Date Value Ref Range Status   05/09/2022 8.9 (H) 4.0 - 5.6 % Final     Comment:     ADA Screening Guidelines:  5.7-6.4%  Consistent with prediabetes  >or=6.5%  Consistent with diabetes    High levels of fetal hemoglobin interfere with the HbA1C  assay. Heterozygous hemoglobin variants (HbS, HgC, etc)do  not significantly interfere with this assay.   However, presence of multiple variants may affect accuracy.     02/24/2022 8.7 (H) 4.0 - 5.6 % Final     Comment:     ADA Screening Guidelines:  5.7-6.4%  Consistent with prediabetes  >or=6.5%  Consistent with diabetes    High levels of fetal hemoglobin interfere with the HbA1C  assay. Heterozygous hemoglobin variants (HbS, HgC, etc)do  not significantly interfere with this assay.   However, presence of multiple variants may affect accuracy.     02/01/2022 8.3 (H) 4.0 - 5.6 % Final     Comment:     ADA Screening Guidelines:  5.7-6.4%  Consistent with prediabetes  >or=6.5%  Consistent with  diabetes    High levels of fetal hemoglobin interfere with the HbA1C  assay. Heterozygous hemoglobin variants (HbS, HgC, etc)do  not significantly interfere with this assay.   However, presence of multiple variants may affect accuracy.         No results found for: CPEPTIDE, GLUTAMICACID, ISLETCELLANT, FRUCTOSAMINE      Chemistry        Component Value Date/Time     02/24/2022 1124    K 4.1 02/24/2022 1124     02/24/2022 1124    CO2 28 02/24/2022 1124    BUN 13 02/24/2022 1124    CREATININE 0.7 05/09/2022 1632     (H) 02/24/2022 1124        Component Value Date/Time    CALCIUM 9.2 02/24/2022 1124    ALKPHOS 159 (H) 02/24/2022 1124    AST 16 02/24/2022 1124    ALT 16 02/24/2022 1124    BILITOT 0.8 02/24/2022 1124    ESTGFRAFRICA >60.0 05/09/2022 1632    EGFRNONAA >60.0 05/09/2022 1632          Lab Results   Component Value Date    LDLCALC 42.4 (L) 02/24/2022       Latest Reference Range & Units 02/24/22 11:24   Cholesterol 120 - 199 mg/dL 94 (L) [1]   HDL 40 - 75 mg/dL 37 (L) [2]   HDL/Cholesterol Ratio 20.0 - 50.0 % 39.4   LDL Cholesterol External 63.0 - 159.0 mg/dL 42.4 (L) [3]   Non-HDL Cholesterol mg/dL 57 [4]   Total Cholesterol/HDL Ratio 2.0 - 5.0  2.5   Triglycerides 30 - 150 mg/dL 73 [5]       Lab Results   Component Value Date    MICALBCREAT 67.5 (H) 05/09/2022           ASSESSMENT and PLAN:    A1C GOAL: < 7.5 %     1. Type 2 diabetes, uncontrolled, with neuropathy  Difficult to evaluate glucoses without logs or CGM report. A1c higher than expected compared to reported BGs, perhaps because a1c was tested too soon or because pt not scanning glucoses nearly enough. Advised pt to scan glucose at least 3x/day.     Referral sent to Pharmacy Assistance to apply for Jardiance, Trulicity 3 mg, and Basaglar.   Switch from Ozempic 0.5 mg to Trulicity 3 mg; and from Tresiba to Basaglar at same dose. Call office with issues getting in contact with Pharm Assistance team.     Return to clinic in 2  months with labs prior.     Ambulatory referral/consult to Pharmacy Assistance    Hemoglobin A1C   2. Microalbuminuria  Continue ARB and SGLT2i   3. Hyperlipidemia, unspecified hyperlipidemia type  Continue atorvastatin and Zetia.              Orders Placed This Encounter   Procedures    Hemoglobin A1C     Standing Status:   Future     Standing Expiration Date:   7/11/2023    Ambulatory referral/consult to Pharmacy Assistance     Standing Status:   Future     Standing Expiration Date:   6/12/2023     Referral Priority:   Routine     Referral Type:   Consultation     Referral Reason:   Specialty Services Required     Number of Visits Requested:   1        Follow up in about 2 months (around 7/12/2022).

## 2022-05-16 ENCOUNTER — TELEPHONE (OUTPATIENT)
Dept: PHARMACY | Facility: CLINIC | Age: 72
End: 2022-05-16
Payer: MEDICARE

## 2022-05-16 ENCOUNTER — PATIENT MESSAGE (OUTPATIENT)
Dept: PHARMACY | Facility: CLINIC | Age: 72
End: 2022-05-16
Payer: MEDICARE

## 2022-05-16 NOTE — TELEPHONE ENCOUNTER
Spoke to pt and advised her to send in necessary documents listed in letter mailed out to her from Pharmacy Assistance. She voiced understanding.

## 2022-05-16 NOTE — TELEPHONE ENCOUNTER
Due to previous conversation with the patient she was adamant she didn't want to provide the necessary documents needed to submit an application with Pharmacy Patient Assistance. .  I mailed her letter requesting necessary documents.

## 2022-05-23 DIAGNOSIS — G25.81 RESTLESS LEGS SYNDROME (RLS): ICD-10-CM

## 2022-05-23 RX ORDER — ROPINIROLE 0.5 MG/1
TABLET, FILM COATED ORAL
Qty: 90 TABLET | Refills: 0 | Status: SHIPPED | OUTPATIENT
Start: 2022-05-23 | End: 2023-03-08

## 2022-05-23 NOTE — TELEPHONE ENCOUNTER
Last Office Visit Info:   The patient's last visit with Tiarra Villa MD was on 9/15/2021.    The patient's last visit in current department was on Visit date not found.        Last CBC Results:   Lab Results   Component Value Date    WBC 8.70 02/24/2022    HGB 12.1 02/24/2022    HCT 38.5 02/24/2022     02/24/2022       Last CMP Results  Lab Results   Component Value Date     02/24/2022    K 4.1 02/24/2022     02/24/2022    CO2 28 02/24/2022    BUN 13 02/24/2022    CREATININE 0.7 05/09/2022    CALCIUM 9.2 02/24/2022    ALBUMIN 3.4 (L) 02/24/2022    AST 16 02/24/2022    ALT 16 02/24/2022       Last Lipids  Lab Results   Component Value Date    CHOL 94 (L) 02/24/2022    TRIG 73 02/24/2022    HDL 37 (L) 02/24/2022    LDLCALC 42.4 (L) 02/24/2022       Last A1C  Lab Results   Component Value Date    HGBA1C 8.9 (H) 05/09/2022       Last TSH  Lab Results   Component Value Date    TSH 2.177 09/26/2019             Current Med Refills  Medication List with Changes/Refills   Current Medications    ALBUTEROL (PROVENTIL/VENTOLIN HFA) 90 MCG/ACTUATION INHALER    Inhale 2 puffs into the lungs every 6 (six) hours as needed for Wheezing or Shortness of Breath. Rescue       Start Date: 9/30/2020 End Date: --    ALCOHOL SWABS PADM    Apply 1 each topically 3 (three) times daily.       Start Date: 11/6/2016 End Date: --    AMLODIPINE (NORVASC) 10 MG TABLET    Take 1 tablet (10 mg total) by mouth once daily.       Start Date: 2/23/2022 End Date: --    ARTIFICIAL TEAR OINTMENT (ARTIFICIAL TEARS) OINT    Place into both eyes every evening.       Start Date: 6/6/2014  End Date: --    ASPIRIN (ECOTRIN) 81 MG EC TABLET    Take 1 tablet (81 mg total) by mouth once daily.       Start Date: 10/20/2021End Date: --    ATORVASTATIN (LIPITOR) 80 MG TABLET    Take 1 tablet (80 mg total) by mouth once daily.       Start Date: 10/20/2021End Date: --    AZELASTINE (OPTIVAR) 0.05 % OPHTHALMIC SOLUTION    INSTILL 1 DROP IN EACH EYE  TWICE DAILY       Start Date: 8/5/2018  End Date: --    BLOOD PRESSURE MONITOR (BLOOD PRESSURE KIT) KIT    1 kit by Misc.(Non-Drug; Combo Route) route once daily.       Start Date: 7/21/2017 End Date: --    BLOOD SUGAR DIAGNOSTIC STRP    To check BG 2 times daily, to use with insurance preferred meter       Start Date: 1/26/2021 End Date: --    CARVEDILOL (COREG) 12.5 MG TABLET    Take 1 tablet (12.5 mg total) by mouth 2 (two) times daily.       Start Date: 2/23/2022 End Date: --    DESOXIMETASONE (TOPICORT) 0.05 % CREAM    Apply topically 2 (two) times daily as needed (itch).       Start Date: 9/15/2021 End Date: --    DICLOFENAC (VOLTAREN) 50 MG EC TABLET    TAKE 1 TABLET(50 MG) BY MOUTH TWICE DAILY       Start Date: 8/19/2021 End Date: --    DILTIAZEM HCL (DILTIAZEM 2% CREAM)    Apply topically 3 (three) times daily. Apply topically to anal area.       Start Date: 7/13/2021 End Date: --    DULAGLUTIDE (TRULICITY) 3 MG/0.5 ML PEN INJECTOR    Inject 3 mg into the skin every 7 days.       Start Date: 5/12/2022 End Date: 5/12/2023    EMPAGLIFLOZIN (JARDIANCE) 25 MG TABLET    Take 1 tablet (25 mg total) by mouth once daily.       Start Date: 3/24/2022 End Date: --    ESCITALOPRAM OXALATE (LEXAPRO) 10 MG TABLET    TAKE 1 TABLET(10 MG) BY MOUTH EVERY DAY       Start Date: 2/14/2022 End Date: --    EZETIMIBE (ZETIA) 10 MG TABLET    Take 1 tablet (10 mg total) by mouth once daily.       Start Date: 10/20/2021End Date: --    FLASH GLUCOSE SCANNING READER (FREESTYLE [place] 2 READER) [facility] [place]    Scan glucose every 8 hours       Start Date: 10/4/2021 End Date: --    FLASH GLUCOSE SENSOR (FREESTYLE ANAMARIA 2 SENSOR) KIT    Change every 14 days, to use with Freestyle Anamaria 2 reader       Start Date: 7/21/2021 End Date: --    FLUTICASONE (FLONASE) 50 MCG/ACTUATION NASAL SPRAY    1 spray (50 mcg total) by Each Nare route once daily.       Start Date: 8/20/2018 End Date: --    FUROSEMIDE (LASIX) 20 MG TABLET    TAKE 1 TABLET(20 MG) BY  "MOUTH TWICE DAILY       Start Date: 1/18/2022 End Date: --    INSULIN (BASAGLAR KWIKPEN U-100 INSULIN) GLARGINE 100 UNITS/ML (3ML) SUBQ PEN    Inject 10 units daily       Start Date: 5/12/2022 End Date: --    INSULIN DEGLUDEC (TRESIBA FLEXTOUCH U-100) 100 UNIT/ML (3 ML) INSULIN PEN    ADMINISTER 10 UNITS UNDER THE SKIN EVERY DAY       Start Date: 3/24/2022 End Date: --    LANCETS MISC    To check BG 2 times daily, to use with insurance preferred meter       Start Date: 1/26/2021 End Date: --    LATANOPROST 0.005 % OPHTHALMIC SOLUTION    INSTILL 1 DROP IN BOTH EYES EVERY EVENING       Start Date: 11/29/2021End Date: --    LORATADINE (CLARITIN) 10 MG TABLET    TAKE ONE TABLET BY MOUTH EVERY DAY AS NEEDED FOR ALLERGIES       Start Date: 9/15/2021 End Date: --    MELATONIN (MELATIN) 3 MG TABLET    Take 2 tablets (6 mg total) by mouth nightly as needed for Insomnia.       Start Date: 6/1/2020  End Date: --    METFORMIN (GLUCOPHAGE) 1000 MG TABLET    Take 1 tablet (1,000 mg total) by mouth 2 (two) times daily with meals.       Start Date: 5/12/2022 End Date: --    MULTIVITAMIN WITH MINERALS TABLET    Take 1 tablet by mouth once daily.       Start Date: --        End Date: --    ONDANSETRON (ZOFRAN) 4 MG TABLET    Take 1 tablet (4 mg total) by mouth every 6 (six) hours as needed for Nausea.       Start Date: 2/7/2022  End Date: --    PANTOPRAZOLE (PROTONIX) 40 MG TABLET           Start Date: 3/2/2021  End Date: --    PEN NEEDLE, DIABETIC (BD ULTRA-FINE LENY PEN NEEDLE) 32 GAUGE X 5/32" NDLE    1 Device by Misc.(Non-Drug; Combo Route) route 4 (four) times daily with meals and nightly.       Start Date: 7/21/2021 End Date: --    ROPINIROLE (REQUIP) 0.5 MG TABLET    TAKE 1 TABLET(0.5 MG) BY MOUTH EVERY EVENING       Start Date: 11/22/2021End Date: --    SEMAGLUTIDE (OZEMPIC) 1 MG/DOSE (4 MG/3 ML)    Inject 1 mg into the skin every 7 days.       Start Date: 4/18/2022 End Date: 4/18/2023    TRUE METRIX GO GLUCOSE METER MISC    " USE TO TEST TWICE DAILY       Start Date: 2/8/2021  End Date: --    TRUEPLUS LANCETS 30 GAUGE MISC    USE TO TEST TWICE DAILY       Start Date: 2/10/2021 End Date: --    VALSARTAN (DIOVAN) 320 MG TABLET    Take 1 tablet (320 mg total) by mouth once daily.       Start Date: 10/20/2021End Date: --

## 2022-06-11 DIAGNOSIS — J30.9 ALLERGIC RHINITIS, UNSPECIFIED SEASONALITY, UNSPECIFIED TRIGGER: ICD-10-CM

## 2022-06-11 NOTE — TELEPHONE ENCOUNTER
No new care gaps identified.  [facility] Embedded Care Gaps. Reference number: [id number]. 6/11/2022   3:05:19 PM CDT

## 2022-06-13 RX ORDER — LORATADINE 10 MG/1
TABLET ORAL
Qty: 90 TABLET | Refills: 1 | Status: SHIPPED | OUTPATIENT
Start: 2022-06-13 | End: 2022-07-20 | Stop reason: SDUPTHER

## 2022-06-13 NOTE — TELEPHONE ENCOUNTER
Refill Routing Note   Medication(s) are not appropriate for processing by [facility] for the following reason(s):      - Medication not previously prescribed by PCP    [facility] action(s):  Defer          Medication reconciliation completed: No     Appointments  past 12m or future 3m with PCP    Date Provider   Last Visit   1/5/2022 Tiarra Villa MD   Next Visit   Visit date not found Tiarra Villa MD   ED visits in past 90 days: 0        Note composed:11:07 AM 06/13/2022

## 2022-07-06 ENCOUNTER — TELEPHONE (OUTPATIENT)
Dept: FAMILY MEDICINE | Facility: CLINIC | Age: 72
End: 2022-07-06
Payer: MEDICARE

## 2022-07-06 NOTE — TELEPHONE ENCOUNTER
Return call to patient, and she states that she has pain and tightness when she urinates. Patient requesting medication for a UTI. Please advise.

## 2022-07-06 NOTE — TELEPHONE ENCOUNTER
----- Message from Hailee Ovalles sent at 7/6/2022 12:41 PM CDT -----  Type:  Needs Medical Advice/Symptom-based Call    Who Called: pt    Symptoms (please be specific): uti. Requesting something to be called into the pharmacy. Call pt    How long has patient had these symptoms:  couple days    Would the patient rather a call back or a response via [facility]? call    Best Call Back Number: [phone] (home) [phone] (work)      Additional Information:

## 2022-07-06 NOTE — TELEPHONE ENCOUNTER
----- Message from Marisol Becky sent at 7/6/2022 12:17 PM CDT -----  Regarding: self [phone]  .Type: Patient Call Back    Who called self     What is the request in detail: Pt is requesting to get a rx for a UTI sent to .  [id number] - [place], [place] - [facility] [address] AT [facility] [address]  [facility] [address]  [place] [place] [place] [address]  Phone: [phone] Fax: [phone]    Can the clinic reply by [facility]? Call back   Would the patient rather a call back or a response via [facility]? Call back     Best call back number: .[phone]

## 2022-07-08 ENCOUNTER — TELEPHONE (OUTPATIENT)
Dept: PHARMACY | Facility: CLINIC | Age: 72
End: 2022-07-08
Payer: MEDICARE

## 2022-07-08 NOTE — TELEPHONE ENCOUNTER
I faxed over a application that required [employer] signature a few days ago. I believe it was signed however, it appears it was faxed back to the ( wrong fax #) manufacture program and not back to me. The program contacted me to inform me the patient was denied because of missing paperwork. If you still have the signed paperwork please fax the form back to [phone] so I can re-submit all required paperwork together. Please let me know if you need me to fax it again.

## 2022-07-12 PROBLEM — I65.23 OCCLUSION AND STENOSIS OF BILATERAL CAROTID ARTERIES: Status: ACTIVE | Noted: 2020-05-20

## 2022-07-12 PROBLEM — F41.9 ANXIETY DISORDER, UNSPECIFIED: Status: ACTIVE | Noted: 2020-01-01

## 2022-07-20 ENCOUNTER — OFFICE VISIT (OUTPATIENT)
Dept: FAMILY MEDICINE | Facility: CLINIC | Age: 72
End: 2022-07-20
Payer: MEDICARE

## 2022-07-20 VITALS
WEIGHT: 194.69 LBS | HEIGHT: 63 IN | TEMPERATURE: 98 F | SYSTOLIC BLOOD PRESSURE: 122 MMHG | DIASTOLIC BLOOD PRESSURE: 66 MMHG | BODY MASS INDEX: 34.5 KG/M2 | HEART RATE: 69 BPM | OXYGEN SATURATION: 93 %

## 2022-07-20 DIAGNOSIS — Z78.0 MENOPAUSE: ICD-10-CM

## 2022-07-20 DIAGNOSIS — Z79.4 LONG-TERM INSULIN USE: ICD-10-CM

## 2022-07-20 DIAGNOSIS — F41.9 ANXIETY DISORDER, UNSPECIFIED TYPE: ICD-10-CM

## 2022-07-20 DIAGNOSIS — K21.9 GASTROESOPHAGEAL REFLUX DISEASE WITHOUT ESOPHAGITIS: ICD-10-CM

## 2022-07-20 DIAGNOSIS — Z74.09 IMPAIRED MOBILITY: ICD-10-CM

## 2022-07-20 DIAGNOSIS — Z23 NEED FOR SHINGLES VACCINE: ICD-10-CM

## 2022-07-20 DIAGNOSIS — E78.00 HYPERCHOLESTEROLEMIA: ICD-10-CM

## 2022-07-20 DIAGNOSIS — I25.10 CORONARY ARTERY DISEASE INVOLVING NATIVE CORONARY ARTERY OF NATIVE HEART WITHOUT ANGINA PECTORIS: ICD-10-CM

## 2022-07-20 DIAGNOSIS — I70.0 ATHEROSCLEROSIS OF AORTA: ICD-10-CM

## 2022-07-20 DIAGNOSIS — I65.23 BILATERAL CAROTID ARTERY STENOSIS: ICD-10-CM

## 2022-07-20 DIAGNOSIS — J30.9 ALLERGIC RHINITIS, UNSPECIFIED SEASONALITY, UNSPECIFIED TRIGGER: ICD-10-CM

## 2022-07-20 DIAGNOSIS — F32.0 MILD MAJOR DEPRESSION: ICD-10-CM

## 2022-07-20 DIAGNOSIS — K59.09 CHRONIC CONSTIPATION: ICD-10-CM

## 2022-07-20 DIAGNOSIS — Z12.31 ENCOUNTER FOR SCREENING MAMMOGRAM FOR BREAST CANCER: ICD-10-CM

## 2022-07-20 DIAGNOSIS — I10 PRIMARY HYPERTENSION: ICD-10-CM

## 2022-07-20 DIAGNOSIS — E66.9 OBESITY (BMI 30-39.9): ICD-10-CM

## 2022-07-20 PROCEDURE — 3066F PR DOCUMENTATION OF TREATMENT FOR NEPHROPATHY: ICD-10-PCS | Mod: CPTII,S$GLB,, | Performed by: INTERNAL MEDICINE

## 2022-07-20 PROCEDURE — 3008F BODY MASS INDEX DOCD: CPT | Mod: CPTII,S$GLB,, | Performed by: INTERNAL MEDICINE

## 2022-07-20 PROCEDURE — 3066F NEPHROPATHY DOC TX: CPT | Mod: CPTII,S$GLB,, | Performed by: INTERNAL MEDICINE

## 2022-07-20 PROCEDURE — 1160F RVW MEDS BY RX/DR IN RCRD: CPT | Mod: CPTII,S$GLB,, | Performed by: INTERNAL MEDICINE

## 2022-07-20 PROCEDURE — 3078F DIAST BP <80 MM HG: CPT | Mod: CPTII,S$GLB,, | Performed by: INTERNAL MEDICINE

## 2022-07-20 PROCEDURE — 1126F PR PAIN SEVERITY QUANTIFIED, NO PAIN PRESENT: ICD-10-PCS | Mod: CPTII,S$GLB,, | Performed by: INTERNAL MEDICINE

## 2022-07-20 PROCEDURE — 4010F ACE/ARB THERAPY RXD/TAKEN: CPT | Mod: CPTII,S$GLB,, | Performed by: INTERNAL MEDICINE

## 2022-07-20 PROCEDURE — 3288F FALL RISK ASSESSMENT DOCD: CPT | Mod: CPTII,S$GLB,, | Performed by: INTERNAL MEDICINE

## 2022-07-20 PROCEDURE — 1101F PR PT FALLS ASSESS DOC 0-1 FALLS W/OUT INJ PAST YR: ICD-10-PCS | Mod: CPTII,S$GLB,, | Performed by: INTERNAL MEDICINE

## 2022-07-20 PROCEDURE — 3008F PR BODY MASS INDEX (BMI) DOCUMENTED: ICD-10-PCS | Mod: CPTII,S$GLB,, | Performed by: INTERNAL MEDICINE

## 2022-07-20 PROCEDURE — 1126F AMNT PAIN NOTED NONE PRSNT: CPT | Mod: CPTII,S$GLB,, | Performed by: INTERNAL MEDICINE

## 2022-07-20 PROCEDURE — 3074F PR MOST RECENT SYSTOLIC BLOOD PRESSURE < 130 MM HG: ICD-10-PCS | Mod: CPTII,S$GLB,, | Performed by: INTERNAL MEDICINE

## 2022-07-20 PROCEDURE — 1157F PR ADVANCE CARE PLAN OR EQUIV PRESENT IN MEDICAL RECORD: ICD-10-PCS | Mod: CPTII,S$GLB,, | Performed by: INTERNAL MEDICINE

## 2022-07-20 PROCEDURE — 3078F PR MOST RECENT DIASTOLIC BLOOD PRESSURE < 80 MM HG: ICD-10-PCS | Mod: CPTII,S$GLB,, | Performed by: INTERNAL MEDICINE

## 2022-07-20 PROCEDURE — 3052F PR MOST RECENT HEMOGLOBIN A1C LEVEL 8.0 - < 9.0%: ICD-10-PCS | Mod: CPTII,S$GLB,, | Performed by: INTERNAL MEDICINE

## 2022-07-20 PROCEDURE — 1157F ADVNC CARE PLAN IN RCRD: CPT | Mod: CPTII,S$GLB,, | Performed by: INTERNAL MEDICINE

## 2022-07-20 PROCEDURE — 99999 PR PBB SHADOW E&M-EST. PATIENT-LVL V: ICD-10-PCS | Mod: PBBFAC,,, | Performed by: INTERNAL MEDICINE

## 2022-07-20 PROCEDURE — 3060F PR POS MICROALBUMINURIA RESULT DOCUMENTED/REVIEW: ICD-10-PCS | Mod: CPTII,S$GLB,, | Performed by: INTERNAL MEDICINE

## 2022-07-20 PROCEDURE — 4010F PR ACE/ARB THEARPY RXD/TAKEN: ICD-10-PCS | Mod: CPTII,S$GLB,, | Performed by: INTERNAL MEDICINE

## 2022-07-20 PROCEDURE — 99214 PR OFFICE/OUTPT VISIT, EST, LEVL IV, 30-39 MIN: ICD-10-PCS | Mod: S$GLB,,, | Performed by: INTERNAL MEDICINE

## 2022-07-20 PROCEDURE — 3052F HG A1C>EQUAL 8.0%<EQUAL 9.0%: CPT | Mod: CPTII,S$GLB,, | Performed by: INTERNAL MEDICINE

## 2022-07-20 PROCEDURE — 1160F PR REVIEW ALL MEDS BY PRESCRIBER/CLIN PHARMACIST DOCUMENTED: ICD-10-PCS | Mod: CPTII,S$GLB,, | Performed by: INTERNAL MEDICINE

## 2022-07-20 PROCEDURE — 99214 OFFICE O/P EST MOD 30 MIN: CPT | Mod: S$GLB,,, | Performed by: INTERNAL MEDICINE

## 2022-07-20 PROCEDURE — 1101F PT FALLS ASSESS-DOCD LE1/YR: CPT | Mod: CPTII,S$GLB,, | Performed by: INTERNAL MEDICINE

## 2022-07-20 PROCEDURE — 3288F PR FALLS RISK ASSESSMENT DOCUMENTED: ICD-10-PCS | Mod: CPTII,S$GLB,, | Performed by: INTERNAL MEDICINE

## 2022-07-20 PROCEDURE — 99999 PR PBB SHADOW E&M-EST. PATIENT-LVL V: CPT | Mod: PBBFAC,,, | Performed by: INTERNAL MEDICINE

## 2022-07-20 PROCEDURE — 3074F SYST BP LT 130 MM HG: CPT | Mod: CPTII,S$GLB,, | Performed by: INTERNAL MEDICINE

## 2022-07-20 PROCEDURE — 1159F PR MEDICATION LIST DOCUMENTED IN MEDICAL RECORD: ICD-10-PCS | Mod: CPTII,S$GLB,, | Performed by: INTERNAL MEDICINE

## 2022-07-20 PROCEDURE — 3060F POS MICROALBUMINURIA REV: CPT | Mod: CPTII,S$GLB,, | Performed by: INTERNAL MEDICINE

## 2022-07-20 PROCEDURE — 1159F MED LIST DOCD IN RCRD: CPT | Mod: CPTII,S$GLB,, | Performed by: INTERNAL MEDICINE

## 2022-07-20 RX ORDER — DULOXETIN HYDROCHLORIDE 60 MG/1
CAPSULE, DELAYED RELEASE ORAL
COMMUNITY
Start: 2022-04-16 | End: 2023-03-08

## 2022-07-20 RX ORDER — LORATADINE 10 MG/1
TABLET ORAL
Qty: 90 TABLET | Refills: 1 | Status: SHIPPED | OUTPATIENT
Start: 2022-07-20 | End: 2023-03-07

## 2022-07-20 RX ORDER — METOPROLOL TARTRATE 25 MG/1
TABLET, FILM COATED ORAL
COMMUNITY
Start: 2022-04-05 | End: 2023-02-02

## 2022-07-20 NOTE — PROGRESS NOTES
274}  HISTORY OF PRESENT ILLNESS:  Cecilia Barahona is a 72 y.o. female who presents to the clinic today for Annual Exam  .     The patient presents to clinic today for follow-up of her type 2 diabetes mellitus complicated by neuropathy, hypertension, coronary artery disease/carotid artery disease, hyperlipidemia, and depression/anxiety.  She is Endocrinology for her diabetes.  It she still does not check her blood sugars on a regular basis despite having a continuous glucose monitor.  While in the office she checked her blood sugar and it was 287. She had eaten some grits, ham, and drunk some vegetable juice for breakfast.  She does not know what her blood sugar was prior to eating.  She denies any cardiac chest pain.  she reports compliance with her current medication.  She does report some ankle swelling in the evenings.  She is followed by Cardiology for her history of cardiac bypass.      PAST MEDICAL HISTORY:  Past Medical History:   Diagnosis Date    Acute coronary syndrome 5/20/2020 5/18/2020: Presents with unstable angina.   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 1/5/2022    Nuclear sclerosis of both eyes 2/26/2019    Obesity     CHETNA (obstructive sleep apnea)     Psychiatric problem     Psychosis     Renal disorder     Sleep difficulties     Suicide attempt     about 30 years ago by overdose of pills    Therapy     Type II or unspecified type diabetes mellitus without mention of complication, uncontrolled        PAST SURGICAL HISTORY:  Past Surgical History:    Procedure Laterality Date    CARPAL TUNNEL RELEASE       SECTION, CLASSIC      COLONOSCOPY N/A 3/5/2020    Procedure: COLONOSCOPY;  Surgeon: Wiliam Carrillo MD;  Location: [facility] (4TH FLR);  Service: Endoscopy;  Laterality: N/A;  20 appt confirmed-rb  pt requested this date    CORONARY ARTERY BYPASS GRAFT (CABG) N/A 2020    Procedure: CORONARY ARTERY BYPASS GRAFT (CABG) x3 ;  Surgeon: Yan Bowman MD;  Location: [facility] OR 2ND FLR;  Service: Cardiothoracic;  Laterality: N/A;  CABG X3  Endoharvest time start 0906  Endoharvest time end 1003  Vein prep start 1004  Vein prep end 1048    LEFT HEART CATHETERIZATION N/A 2020    Procedure: HEART CATH-LEFT;  Surgeon: Etelvina Lowery MD;  Location: [facility] CATH LAB;  Service: Cardiology;  Laterality: N/A;    TOTAL ABDOMINAL HYSTERECTOMY      TRIGGER FINGER RELEASE         SOCIAL HISTORY:  Social History     Socioeconomic History    Marital status:      Spouse name: Yuniel    Number of children: 3   Occupational History    Occupation: Retired   Tobacco Use    Smoking status: Former Smoker     Packs/day: 0.50     Types: Cigarettes     Quit date: 11/10/2012     Years since quittin.6    Smokeless tobacco: Never Used   Substance and Sexual Activity    Alcohol use: Not Currently     Comment: drank socially in the past    Drug use: Not Currently     Types: Marijuana     Comment: tried once 6 months ago    Sexual activity: Not Currently     Partners: Male   Other Topics Concern    Patient feels they ought to cut down on drinking/drug use No    Patient annoyed by others criticizing their drinking/drug use No    Patient has felt bad or guilty about drinking/drug use No    Patient has had a drink/used drugs as an eye opener in the AM No   Social History Narrative     x 1.    Has 3 grown children.    Lives with spouse.       FAMILY HISTORY:  Family History   Problem Relation Age of Onset    Hypertension Mother      Diabetes Mother     Heart failure Mother     Cataracts Mother     Glaucoma Mother     Prostate cancer Father     Hypertension Father     Diabetes Father     Heart failure Father     No Known Problems Sister     No Known Problems Maternal Aunt     Diabetes Maternal Uncle     Hyperlipidemia Maternal Uncle     Hypertension Maternal Uncle     Diabetes Maternal Grandmother     Diabetes Maternal Grandfather     No Known Problems Paternal Grandmother     No Known Problems Paternal Grandfather     Diabetes Maternal Aunt     Alzheimer's disease Maternal Aunt     Schizophrenia Maternal Aunt     Heart disease Cousin         s/p heart transplant    No Known Problems Paternal Aunt     No Known Problems Paternal Uncle     Drug abuse Grandchild     Amblyopia Neg Hx     Blindness Neg Hx     Cancer Neg Hx     Macular degeneration Neg Hx     Retinal detachment Neg Hx     Strabismus Neg Hx     Stroke Neg Hx     Thyroid disease Neg Hx        ALLERGIES AND MEDICATIONS: updated and reviewed.  Review of patient's allergies indicates:   Allergen Reactions    Ace inhibitors Other (See Comments)     cough    Invokana [canagliflozin] Other (See Comments)     Throat and tongue swelling     Medication List with Changes/Refills   Current Medications    ALBUTEROL (PROVENTIL/VENTOLIN HFA) 90 MCG/ACTUATION INHALER    Inhale 2 puffs into the lungs every 6 (six) hours as needed for Wheezing or Shortness of Breath. Rescue    ALCOHOL SWABS PADM    Apply 1 each topically 3 (three) times daily.    AMLODIPINE (NORVASC) 10 MG TABLET    Take 1 tablet (10 mg total) by mouth once daily.    ARTIFICIAL TEAR OINTMENT (ARTIFICIAL TEARS) OINT    Place into both eyes every evening.    ASPIRIN (ECOTRIN) 81 MG EC TABLET    Take 1 tablet (81 mg total) by mouth once daily.    ATORVASTATIN (LIPITOR) 80 MG TABLET    Take 1 tablet (80 mg total) by mouth once daily.    AZELASTINE (OPTIVAR) 0.05 % OPHTHALMIC SOLUTION    INSTILL 1 DROP IN  EACH EYE TWICE DAILY    BLOOD PRESSURE MONITOR (BLOOD PRESSURE KIT) KIT    1 kit by Misc.(Non-Drug; Combo Route) route once daily.    BLOOD SUGAR DIAGNOSTIC STRP    To check BG 2 times daily, to use with insurance preferred meter    CARVEDILOL (COREG) 12.5 MG TABLET    Take 1 tablet (12.5 mg total) by mouth 2 (two) times daily.    DESOXIMETASONE (TOPICORT) 0.05 % CREAM    Apply topically 2 (two) times daily as needed (itch).    DICLOFENAC (VOLTAREN) 50 MG EC TABLET    TAKE 1 TABLET(50 MG) BY MOUTH TWICE DAILY    DILTIAZEM HCL (DILTIAZEM 2% CREAM)    Apply topically 3 (three) times daily. Apply topically to anal area.    DULAGLUTIDE (TRULICITY) 3 MG/0.5 ML PEN INJECTOR    Inject 3 mg into the skin every 7 days.    DULOXETINE (CYMBALTA) 60 MG CAPSULE        EMPAGLIFLOZIN (JARDIANCE) 25 MG TABLET    Take 1 tablet (25 mg total) by mouth once daily.    ESCITALOPRAM OXALATE (LEXAPRO) 10 MG TABLET    Take 1 tablet (10 mg total) by mouth once daily.    EZETIMIBE (ZETIA) 10 MG TABLET    Take 1 tablet (10 mg total) by mouth once daily.    FLASH GLUCOSE SCANNING READER (FREESTYLE ANAMARIA 2 READER) [facility] [place]    Scan glucose every 8 hours    FLASH GLUCOSE SENSOR (FREESTYLE ANAMARIA 2 SENSOR) KIT    Change every 14 days, to use with Freestyle Anamaria 2 reader    FLUTICASONE (FLONASE) 50 MCG/ACTUATION NASAL SPRAY    1 spray (50 mcg total) by Each Nare route once daily.    FUROSEMIDE (LASIX) 20 MG TABLET    TAKE 1 TABLET(20 MG) BY MOUTH TWICE DAILY    INSULIN (BASAGLAR KWIKPEN U-100 INSULIN) GLARGINE 100 UNITS/ML (3ML) SUBQ PEN    Inject 10 units daily    INSULIN DEGLUDEC (TRESIBA FLEXTOUCH U-100) 100 UNIT/ML (3 ML) INSULIN PEN    ADMINISTER 10 UNITS UNDER THE SKIN EVERY DAY    LANCETS MISC    To check BG 2 times daily, to use with insurance preferred meter    LATANOPROST 0.005 % OPHTHALMIC SOLUTION    INSTILL 1 DROP IN BOTH EYES EVERY EVENING    MELATONIN (MELATIN) 3 MG TABLET    Take 2 tablets (6 mg total) by mouth nightly as needed for  "Insomnia.    METFORMIN (GLUCOPHAGE) 1000 MG TABLET    Take 1 tablet (1,000 mg total) by mouth 2 (two) times daily with meals.    METOPROLOL TARTRATE (LOPRESSOR) 25 MG TABLET        MULTIVITAMIN WITH MINERALS TABLET    Take 1 tablet by mouth once daily.    ONDANSETRON (ZOFRAN) 4 MG TABLET    Take 1 tablet (4 mg total) by mouth every 6 (six) hours as needed for Nausea.    PANTOPRAZOLE (PROTONIX) 40 MG TABLET        PEN NEEDLE, DIABETIC (BD ULTRA-FINE LENY PEN NEEDLE) 32 GAUGE X 5/32" NDLE    1 Device by Misc.(Non-Drug; Combo Route) route 4 (four) times daily with meals and nightly.    ROPINIROLE (REQUIP) 0.5 MG TABLET    TAKE 1 TABLET(0.5 MG) BY MOUTH EVERY EVENING    SEMAGLUTIDE (OZEMPIC) 1 MG/DOSE (4 MG/3 ML)    Inject 1 mg into the skin every 7 days.    TRUE METRIX GO GLUCOSE METER MISC    USE TO TEST TWICE DAILY    TRUEPLUS LANCETS 30 GAUGE MISC    USE TO TEST TWICE DAILY    VALSARTAN (DIOVAN) 320 MG TABLET    Take 1 tablet (320 mg total) by mouth once daily.   Changed and/or Refilled Medications    Modified Medication Previous Medication    LORATADINE (CLARITIN) 10 MG TABLET loratadine (CLARITIN) 10 mg tablet       TAKE 1 TABLET BY MOUTH EVERY DAY AS NEEDED FOR ALLERGIES    TAKE 1 TABLET BY MOUTH EVERY DAY AS NEEDED FOR ALLERGIES         CARE TEAM:  Patient Care Team:  Yudi Smart MD as PCP - General (Internal Medicine)  Fran AlbaradoD as Hypertension Digital Medicine Clinician (Pharmacist)  Rafa Vázquez as Digital Medicine Health   [facility] as Hypertension Digital Medicine Contract  Tiarra Villa MD as Hypertension Digital Medicine Responsible Provider (Internal Medicine)         REVIEW OF SYSTEMS:  Review of Systems   Constitutional: Positive for fatigue (mild; chronic). Negative for chills and fever.   Respiratory: Negative for cough and shortness of breath.    Cardiovascular: Positive for leg swelling. Negative for chest pain.   Gastrointestinal: Positive for " "constipation. Negative for abdominal pain.   Genitourinary: Negative for dysuria and hematuria.   Musculoskeletal: Positive for arthralgias.   Psychiatric/Behavioral: Positive for dysphoric mood. The patient is nervous/anxious.          PHYSICAL EXAM: 274}  Vitals:    07/20/22 0913   BP: 122/66   Pulse: 69   Temp: 98.3 °F (36.8 °C)     Weight: 88.3 kg (194 lb 10.7 oz)   Height: 5' 3" (160 cm)   Body mass index is 34.48 kg/m².      General appearance - alert, well appearing, and in no distress, obese  Psychiatric - alert, oriented to person, place, and time, normal behavior, speech, dress, motor activity and thought process, mildly depressed mood (baseline)  Eyes - pupils equal and reactive, extraocular eye movements intact, sclera anicteric  Mouth - not examined  Neck - supple, no significant adenopathy, carotids upstroke normal bilaterally, no bruits  Lymphatics - no palpable cervical lymphadenopathy  Chest - clear to auscultation, no wheezes, rales or rhonchi, symmetric air entry  Heart - normal rate and regular rhythm, no gallops noted  Neurological - alert, normal speech, no focal findings, cranial nerves II through XII intact  Musculoskeletal - patient noted to have Mild-Moderate osteoarthritic changes to both knee joints. No joint effusions noted., no muscular tenderness noted  Extremities - pedal edema trace  Skin - normal coloration, no suspicious skin lesions      Labs:  Lab Results   Component Value Date    HGBA1C 8.9 (H) 05/09/2022    HGBA1C 8.7 (H) 02/24/2022    HGBA1C 8.3 (H) 02/01/2022      Lab Results   Component Value Date    CHOL 94 (L) 02/24/2022    CHOL 132 02/01/2022    CHOL 161 08/18/2020     Lab Results   Component Value Date    LDLCALC 42.4 (L) 02/24/2022    LDLCALC 54.2 (L) 02/01/2022    LDLCALC 81.8 08/18/2020         ASSESSMENT AND PLAN:  274}  1. Type 2 diabetes, uncontrolled, with neuropathy/2. Long-term insulin use  Lab Results   Component Value Date    HGBA1C 8.9 (H) 05/09/2022 "     Diabetes is not controlled at this time (due to hyperglycemia) for age and comorbid conditions.   We discussed diabetic diet and regular exercise.   We discussed home blood sugar monitoring, if appropriate - the patient has a CGM.   Continue current medication regimen. Patient is followed by endocrinology. She will do better with diabetic diet and regular physical activity.  Diabetic complications addressed: Neuropathy pain controlled.   Patient was counseled on the need for yearly eye exam to screen for/monitor diabetic retinopathy and yearly diabetic foot exam.    3. Primary hypertension  The current medical regimen is effective;  continue present plan and medications. Recommended patient to check home readings to monitor and see me for followup as scheduled or sooner as needed.   Discussed sodium restriction, maintaining ideal body weight and regular exercise program as physiologic means to continue to achieve blood pressure control in addition to medication compliance.  Patient was educated that both decongestant and anti-inflammatory medication may raise blood pressure.  The patient is not active on the digital hypertension program.    4. Coronary artery disease involving native coronary artery of native heart without angina pectoris/5. Bilateral carotid artery stenosis  The current medical regimen is effective;  continue present plan and medications.   Followed by: Cardiology.     6. Hypercholesterolemia  Lab Results   Component Value Date    LDLCALC 42.4 (L) 02/24/2022     We discussed low fat diet and regular exercise.The current medical regimen is effective;  continue present plan and medications.     7. Atherosclerosis of aorta  Patient with Atherosclerosis of the Aorta.  Stable/asymptomatic. Currently stable on lipid lowering medication and blood pressure monitoring.    8. Gastroesophageal reflux disease without esophagitis  Symptoms controlled: yes - takes medication occasionally as needed.   Reflux  precautions discussed (eliminate tobacco if a smoker; minimize caffeine, chocolate and red/white peppermint intake; avoid heavy and spicy meals; don't lay down within 2-3 hours after eating; minimize the intake of NSAIDs). Medication as needed.   Patient asked to take medication breaks, if possible - discussed chronic use can limit calcium absorption (which can lead to osteopenia/osteoporosis), increases the risk for intestinal infections, and can cause kidney damage. There are also some newer studies that show possible increased risk of mortality.    9. Chronic constipation  The current medical regimen is effective;  continue present plan and medications.     10. Mild major depression/11. Anxiety disorder, unspecified type  The current medical regimen is effective;  continue present plan and medications.     12. Allergic rhinitis, unspecified seasonality, unspecified trigger  Stable. Medication as needed.  - loratadine (CLARITIN) 10 mg tablet; TAKE 1 TABLET BY MOUTH EVERY DAY AS NEEDED FOR ALLERGIES  Dispense: 90 tablet; Refill: 1    13. Obesity (BMI 30-39.9)  BMI Readings from Last 3 Encounters:   07/20/22 34.48 kg/m²   03/25/22 34.23 kg/m²   03/24/22 34.37 kg/m²     The patient is asked to make an attempt to improve diet and exercise patterns to aid in medical management of this problem.    14. Impaired mobility  We discussed fall precautions.    15. Need for shingles vaccine  Patient was advised to get immunization at the pharmacy.    16. Encounter for screening mammogram for breast cancer    - Mammo Digital Screening Bilat w/ Maxx; Future    17. Menopause    - DXA Bone Density Spine And Hip; Future         Orders Placed This Encounter   Procedures    Mammo Digital Screening Bilat w/ Maxx    DXA Bone Density Spine And Hip      Follow up in about 6 months (around 1/20/2023), or if symptoms worsen or fail to improve, for annual exam. or sooner as needed.

## 2022-07-26 ENCOUNTER — IMMUNIZATION (OUTPATIENT)
Dept: PHARMACY | Facility: CLINIC | Age: 72
End: 2022-07-26
Payer: MEDICARE

## 2022-07-26 ENCOUNTER — TELEPHONE (OUTPATIENT)
Dept: PHARMACY | Facility: CLINIC | Age: 72
End: 2022-07-26
Payer: MEDICARE

## 2022-07-26 DIAGNOSIS — Z23 NEED FOR VACCINATION: Primary | ICD-10-CM

## 2022-07-26 NOTE — TELEPHONE ENCOUNTER
Patient approved with [facility] ([facility] Basaglar) until 12/31/22.  Medication will be ship to patients home within 5-7 business days.

## 2022-08-02 ENCOUNTER — TELEPHONE (OUTPATIENT)
Dept: PHARMACY | Facility: CLINIC | Age: 72
End: 2022-08-02
Payer: MEDICARE

## 2022-08-02 NOTE — TELEPHONE ENCOUNTER
Patient approved with [facility](Jardiance) until 12/31/22.  Medication will be ship to patients home within 5-7 business days.

## 2022-08-08 ENCOUNTER — TELEPHONE (OUTPATIENT)
Dept: ENDOCRINOLOGY | Facility: CLINIC | Age: 72
End: 2022-08-08
Payer: MEDICARE

## 2022-08-08 NOTE — TELEPHONE ENCOUNTER
Patient states that she is confused as to what medications to take. Asked patient to write instructions down. Advised to complete tresiba then begin basaglar at the same dose. Moved up patients appointment. Patient verbalized understanding

## 2022-08-08 NOTE — TELEPHONE ENCOUNTER
----- Message from Lisa Chin sent at 8/8/2022  1:41 PM CDT -----  Type: Patient Call Back    Who called:self    What is the request in detail: pt received medication basaglar and states she has never taken that before and would like to know if she should take it and what is it for. Please call    Can the clinic reply by [facility]? no    Would the patient rather a call back or a response via [facility]?call    Best call back number.[phone] (home) [phone] (work)

## 2022-08-20 ENCOUNTER — PES CALL (OUTPATIENT)
Dept: ADMINISTRATIVE | Facility: CLINIC | Age: 72
End: 2022-08-20
Payer: MEDICARE

## 2022-09-06 ENCOUNTER — TELEPHONE (OUTPATIENT)
Dept: FAMILY MEDICINE | Facility: CLINIC | Age: 72
End: 2022-09-06
Payer: MEDICARE

## 2022-09-06 NOTE — TELEPHONE ENCOUNTER
----- Message from Ileana Nguyen MA sent at 9/6/2022 11:08 AM CDT -----  Type: Patient Call Back    Who called: Self    What is the request in detail: pt. States she has covid w/ runny nose & cough... she is asking for something to be called in for her ..     Can the clinic reply by [facility]?no    Would the patient rather a call back or a response via [facility]? yes    Best call back number: [phone]

## 2022-09-06 NOTE — TELEPHONE ENCOUNTER
Spoke to pt about starting an E-visit explained and informed pt about it. Pt agreed she will have her daughter help her direct the e-visit.   Sent e-visit message for pt.

## 2022-09-20 ENCOUNTER — OFFICE VISIT (OUTPATIENT)
Dept: ENDOCRINOLOGY | Facility: CLINIC | Age: 72
End: 2022-09-20
Payer: MEDICARE

## 2022-09-20 ENCOUNTER — TELEPHONE (OUTPATIENT)
Dept: FAMILY MEDICINE | Facility: CLINIC | Age: 72
End: 2022-09-20
Payer: MEDICARE

## 2022-09-20 VITALS
SYSTOLIC BLOOD PRESSURE: 143 MMHG | DIASTOLIC BLOOD PRESSURE: 83 MMHG | BODY MASS INDEX: 34.19 KG/M2 | TEMPERATURE: 99 F | HEART RATE: 80 BPM | WEIGHT: 193 LBS

## 2022-09-20 DIAGNOSIS — I10 ESSENTIAL HYPERTENSION: ICD-10-CM

## 2022-09-20 DIAGNOSIS — R41.3 MEMORY IMPAIRMENT: ICD-10-CM

## 2022-09-20 DIAGNOSIS — Z86.73 HISTORY OF CEREBROVASCULAR ACCIDENT: ICD-10-CM

## 2022-09-20 DIAGNOSIS — R41.3 MEMORY LOSS: Primary | ICD-10-CM

## 2022-09-20 PROCEDURE — 3079F PR MOST RECENT DIASTOLIC BLOOD PRESSURE 80-89 MM HG: ICD-10-PCS | Mod: CPTII,S$GLB,, | Performed by: NURSE PRACTITIONER

## 2022-09-20 PROCEDURE — 3066F NEPHROPATHY DOC TX: CPT | Mod: CPTII,S$GLB,, | Performed by: NURSE PRACTITIONER

## 2022-09-20 PROCEDURE — 99214 PR OFFICE/OUTPT VISIT, EST, LEVL IV, 30-39 MIN: ICD-10-PCS | Mod: S$GLB,,, | Performed by: NURSE PRACTITIONER

## 2022-09-20 PROCEDURE — 3077F PR MOST RECENT SYSTOLIC BLOOD PRESSURE >= 140 MM HG: ICD-10-PCS | Mod: CPTII,S$GLB,, | Performed by: NURSE PRACTITIONER

## 2022-09-20 PROCEDURE — 3052F PR MOST RECENT HEMOGLOBIN A1C LEVEL 8.0 - < 9.0%: ICD-10-PCS | Mod: CPTII,S$GLB,, | Performed by: NURSE PRACTITIONER

## 2022-09-20 PROCEDURE — 1157F ADVNC CARE PLAN IN RCRD: CPT | Mod: CPTII,S$GLB,, | Performed by: NURSE PRACTITIONER

## 2022-09-20 PROCEDURE — 4010F ACE/ARB THERAPY RXD/TAKEN: CPT | Mod: CPTII,S$GLB,, | Performed by: NURSE PRACTITIONER

## 2022-09-20 PROCEDURE — 3079F DIAST BP 80-89 MM HG: CPT | Mod: CPTII,S$GLB,, | Performed by: NURSE PRACTITIONER

## 2022-09-20 PROCEDURE — 99999 PR PBB SHADOW E&M-EST. PATIENT-LVL V: ICD-10-PCS | Mod: PBBFAC,,, | Performed by: NURSE PRACTITIONER

## 2022-09-20 PROCEDURE — 3066F PR DOCUMENTATION OF TREATMENT FOR NEPHROPATHY: ICD-10-PCS | Mod: CPTII,S$GLB,, | Performed by: NURSE PRACTITIONER

## 2022-09-20 PROCEDURE — 3008F BODY MASS INDEX DOCD: CPT | Mod: CPTII,S$GLB,, | Performed by: NURSE PRACTITIONER

## 2022-09-20 PROCEDURE — 1157F PR ADVANCE CARE PLAN OR EQUIV PRESENT IN MEDICAL RECORD: ICD-10-PCS | Mod: CPTII,S$GLB,, | Performed by: NURSE PRACTITIONER

## 2022-09-20 PROCEDURE — 1159F PR MEDICATION LIST DOCUMENTED IN MEDICAL RECORD: ICD-10-PCS | Mod: CPTII,S$GLB,, | Performed by: NURSE PRACTITIONER

## 2022-09-20 PROCEDURE — 95251 PR GLUCOSE MONITOR, 72 HOUR, PHYS INTERP: ICD-10-PCS | Mod: S$GLB,,, | Performed by: NURSE PRACTITIONER

## 2022-09-20 PROCEDURE — 3060F POS MICROALBUMINURIA REV: CPT | Mod: CPTII,S$GLB,, | Performed by: NURSE PRACTITIONER

## 2022-09-20 PROCEDURE — 3077F SYST BP >= 140 MM HG: CPT | Mod: CPTII,S$GLB,, | Performed by: NURSE PRACTITIONER

## 2022-09-20 PROCEDURE — 95251 CONT GLUC MNTR ANALYSIS I&R: CPT | Mod: S$GLB,,, | Performed by: NURSE PRACTITIONER

## 2022-09-20 PROCEDURE — 3060F PR POS MICROALBUMINURIA RESULT DOCUMENTED/REVIEW: ICD-10-PCS | Mod: CPTII,S$GLB,, | Performed by: NURSE PRACTITIONER

## 2022-09-20 PROCEDURE — 3008F PR BODY MASS INDEX (BMI) DOCUMENTED: ICD-10-PCS | Mod: CPTII,S$GLB,, | Performed by: NURSE PRACTITIONER

## 2022-09-20 PROCEDURE — 1160F PR REVIEW ALL MEDS BY PRESCRIBER/CLIN PHARMACIST DOCUMENTED: ICD-10-PCS | Mod: CPTII,S$GLB,, | Performed by: NURSE PRACTITIONER

## 2022-09-20 PROCEDURE — 1159F MED LIST DOCD IN RCRD: CPT | Mod: CPTII,S$GLB,, | Performed by: NURSE PRACTITIONER

## 2022-09-20 PROCEDURE — 3052F HG A1C>EQUAL 8.0%<EQUAL 9.0%: CPT | Mod: CPTII,S$GLB,, | Performed by: NURSE PRACTITIONER

## 2022-09-20 PROCEDURE — 99999 PR PBB SHADOW E&M-EST. PATIENT-LVL V: CPT | Mod: PBBFAC,,, | Performed by: NURSE PRACTITIONER

## 2022-09-20 PROCEDURE — 99214 OFFICE O/P EST MOD 30 MIN: CPT | Mod: S$GLB,,, | Performed by: NURSE PRACTITIONER

## 2022-09-20 PROCEDURE — 4010F PR ACE/ARB THEARPY RXD/TAKEN: ICD-10-PCS | Mod: CPTII,S$GLB,, | Performed by: NURSE PRACTITIONER

## 2022-09-20 PROCEDURE — 1160F RVW MEDS BY RX/DR IN RCRD: CPT | Mod: CPTII,S$GLB,, | Performed by: NURSE PRACTITIONER

## 2022-09-20 NOTE — PROGRESS NOTES
"CC: This 72 y.o. Black or  female  is here for evaluation of  T2DM along with comorbidities indicated in the Visit Diagnosis section of this encounter.    HPI: Cecilia Barahona was diagnosed with T2DM in her 30s. Oral agents started at the time of diagnosis.       Prior visit visit 5/2022 virtual visit - unable to hear pt through virtual visit, converted to phone call with pt's permission.   a1c remains high, from 8.7 to 8.9%. She is taking her meds regularly. States her BGs have been good. She cannot recall how much Ozempic she is taking - she states she "assumes" how much to take, maybe 35 (=0.5 mg) or 72 clicks (=1 mg)? Suspect she is taking 35 clicks since this is what was marked on her pen at lov.   C/o cost of Ozempic and Jardiance - Ozempic is $200 per month. She is using leftover supply of these meds at present.   Plan Difficult to evaluate glucoses without logs or CGM report. A1c higher than expected compared to reported BGs, perhaps because a1c was tested too soon or because pt not scanning glucoses nearly enough. Advised pt to scan glucose at least 3x/day.   Referral sent to Pharmacy Assistance to apply for Jardiance, Trulicity 3 mg, and Basaglar.   Switch from Ozempic 0.5 mg to Trulicity 3 mg; and from Tresiba to Basaglar at same dose. Call office with issues getting in contact with Pharm Assistance team.   Return to clinic in 2 months with labs prior.     Interval history  Pt now receiving Jardiance, Trulicity and Tresiba  from patient assistance as of  ~ 2 months ago.   Patient was very confused re: her medications. She was unable to recall the name of an injectable pen. Pt was shown multiple pictures of injectable pen before identifying Tresiba. Asserted that she was only taking Tresiba 30 clicks on Fridays.  However, about 20-30 minutes later, she denied taking it on Fridays and says she's actually been taking 10 units every AM.     Pt had a difficult time understanding instructions " and repeated same questions.         PMHX: CHETNA - does not like her old mask. H/o stroke with right facial dropp     LAST DIABETES EDUCATION: never    RECENT ILLNESSES/HOSPITALIZATIONS - -No.     HOSPITALIZED FOR DIABETES  -  No.    PRESCRIBED DIABETES MEDICATIONS:    metformin 1000 mg bid  Jardiance 25 mg once daily   Trulicity 3 mg once weekly on Fridays   Tresiba - as above.       Misses medication doses -  tresiba as above.      DM COMPLICATIONS: peripheral neuropathy    SIGNIFICANT DIABETES MED HISTORY:   Glimepiride stopped at initial ov 7/2017 since she was already on Novolog  ozempic started 10/2018,  switched to trulicity ~ 12/2018 d/t nausea   Jardiance started 2/2020  tresiba and trulicity stopped in 10/2020 d/t improved BGs but Trulicity resumed shortly d/t poor diet and DM control        SELF MONITORING BLOOD GLUCOSE: Checks blood glucose at home with Nordicplan reader.     CGM interpretation: FBGs overall controlled but she has large spikes postprandially from admittedly large meals. Infrequent mild hypoglycemia noted.         HYPOGLYCEMIC EPISODES: none.     CURRENT DIET: drinks water or crystal lite. Also Javi sweet tea.   States she does not have set meals, nibbles throughout the day.  Appetite lower with Ozempic     CURRENT EXERCISE: none       BP (!) 143/83   Pulse 80   Temp 98.6 °F (37 °C)   Wt 87.5 kg (193 lb)   BMI 34.19 kg/m²         ROS:   CONSTITUTIONAL: Appetite lower, no  fatigue  GI: denies n/v, constipation, diarrhea   : denies dysuria, vaginal itching       PHYSICAL EXAM:  GENERAL: Well developed, well nourished. No acute distress.   PSYCH: AAOx3, appropriate mood and affect, conversant, well-groomed. Judgement and insight limited.   NEURO: Cranial nerves grossly intact. Speech clear, no tremor.   CHEST: Respirations even and unlabored.           Hemoglobin A1C   Date Value Ref Range Status   05/09/2022 8.9 (H) 4.0 - 5.6 % Final     Comment:     ADA Screening Guidelines:  5.7-6.4%   Consistent with prediabetes  >or=6.5%  Consistent with diabetes    High levels of fetal hemoglobin interfere with the HbA1C  assay. Heterozygous hemoglobin variants (HbS, HgC, etc)do  not significantly interfere with this assay.   However, presence of multiple variants may affect accuracy.     02/24/2022 8.7 (H) 4.0 - 5.6 % Final     Comment:     ADA Screening Guidelines:  5.7-6.4%  Consistent with prediabetes  >or=6.5%  Consistent with diabetes    High levels of fetal hemoglobin interfere with the HbA1C  assay. Heterozygous hemoglobin variants (HbS, HgC, etc)do  not significantly interfere with this assay.   However, presence of multiple variants may affect accuracy.     02/01/2022 8.3 (H) 4.0 - 5.6 % Final     Comment:     ADA Screening Guidelines:  5.7-6.4%  Consistent with prediabetes  >or=6.5%  Consistent with diabetes    High levels of fetal hemoglobin interfere with the HbA1C  assay. Heterozygous hemoglobin variants (HbS, HgC, etc)do  not significantly interfere with this assay.   However, presence of multiple variants may affect accuracy.         No results found for: CPEPTIDE, GLUTAMICACID, ISLETCELLANT, FRUCTOSAMINE      Chemistry        Component Value Date/Time     02/24/2022 1124    K 4.1 02/24/2022 1124     02/24/2022 1124    CO2 28 02/24/2022 1124    BUN 13 02/24/2022 1124    CREATININE 0.7 05/09/2022 1632     (H) 02/24/2022 1124        Component Value Date/Time    CALCIUM 9.2 02/24/2022 1124    ALKPHOS 159 (H) 02/24/2022 1124    AST 16 02/24/2022 1124    ALT 16 02/24/2022 1124    BILITOT 0.8 02/24/2022 1124    ESTGFRAFRICA >60.0 05/09/2022 1632    EGFRNONAA >60.0 05/09/2022 1632          Lab Results   Component Value Date    LDLCALC 42.4 (L) 02/24/2022       Latest Reference Range & Units 02/24/22 11:24   Cholesterol 120 - 199 mg/dL 94 (L) [1]   HDL 40 - 75 mg/dL 37 (L) [2]   HDL/Cholesterol Ratio 20.0 - 50.0 % 39.4   LDL Cholesterol External 63.0 - 159.0 mg/dL 42.4 (L) [3]    Non-HDL Cholesterol mg/dL 57 [4]   Total Cholesterol/HDL Ratio 2.0 - 5.0  2.5   Triglycerides 30 - 150 mg/dL 73 [5]       Lab Results   Component Value Date    MICALBCREAT 67.5 (H) 05/09/2022           ASSESSMENT and PLAN:    A1C GOAL: < 7.5 %       1. Type 2 diabetes, uncontrolled, with neuropathy  Stop Tresiba due to mild hypoglycemia. Also, with patient's impaired memory, I would like to avoid high-risk medications like insulin if possible.       Patient would benefit from higher dose of Trulicity at 4.5 mg weekly.  We will hold off changing Patient Assistance application to Trulicity 4.5 mg since [employer] mails out 4 month supply at a time and she may not be able to tolerate it. Will have patient try Trulicity 4.5 mg at the beginning of next year when she out of prescription gap for 1-2 months before applying with [employer].     Continue Trulicity 3 mg once weekly on Fridays.   Continue metformin and Jardiance.   Maintain healthy eating patterns and avoid overeating.   Avoid beverages with sugar.     Return to clinic in 4 months with labs prior.   A1c today.       Hemoglobin A1C      2. Essential hypertension  Creatinine, Serum      3. History of cerebrovascular accident  Avoid hypoglycemia.   Continue Trulicity and Jardiance, both have CV benefits.       4. Memory impairment  As above.   Recommended patient see Neurology.           Orders Placed This Encounter   Procedures    Hemoglobin A1C     Standing Status:   Standing     Number of Occurrences:   2     Standing Expiration Date:   11/19/2023    Creatinine, Serum     Standing Status:   Future     Standing Expiration Date:   11/19/2023          Follow up in about 4 months (around 1/20/2023).

## 2022-09-20 NOTE — PATIENT INSTRUCTIONS
Stop Tresiba pen.   Continue Trulicity 3 mg once weekly on Fridays.   Continue metformin and Jardiance.   Please try to maintain healthy eating patterns and avoid overeating.   Avoid beverages with sugar.     Return to clinic in 4 months with labs prior.   A1c today.     Patient would benefit from higher dose of Trulicity at 4.5 mg weekly.  We will hold off changing Patient Assistance application to Trulicity 4.5 mg since [facility] mails out 4 month supply at a time and she may not be able to tolerate it. Will have patient try Trulicity 4.5 mg at the beginning of next year when she out of prescription gap for 1-2 months before applying with [facility].

## 2022-09-20 NOTE — TELEPHONE ENCOUNTER
----- Message from Jasmin Bucklin sent at 9/20/2022 10:03 AM CDT -----  .Type:  Patient Requesting Referral    Who Called: self     Referral to What Specialty: neurology     Reason for Referral memory loss per Sindi Shultz     Does the patient want the referral with a specific physician?: no     [facility]     Would the patient rather a call back or a response via [facility]? Call     Best Call Back Number: .[phone]

## 2022-09-21 ENCOUNTER — TELEPHONE (OUTPATIENT)
Dept: FAMILY MEDICINE | Facility: CLINIC | Age: 72
End: 2022-09-21
Payer: MEDICARE

## 2022-09-26 NOTE — TELEPHONE ENCOUNTER
----- Message from Hailee Ovalles sent at 1/29/2020 12:41 PM CST -----  Contact: pt  Type: Patient Call Back    Who called:pt    What is the request in detail:pt is requesting to find out if her medication was delivered. Call pt    Can the clinic reply by [facility]?    Would the patient rather a call back or a response via [facility]? call    Best call back number:[phone]    Additional Information:       O-T Plasty Text: The defect edges were debeveled with a #15 scalpel blade.  Given the location of the defect, shape of the defect and the proximity to free margins an O-T plasty was deemed most appropriate.  Using a sterile surgical marker, an appropriate O-T plasty was drawn incorporating the defect and placing the expected incisions within the relaxed skin tension lines where possible.    The area thus outlined was incised deep to adipose tissue with a #15 scalpel blade.  The skin margins were undermined to an appropriate distance in all directions utilizing iris scissors.

## 2022-09-30 ENCOUNTER — PATIENT MESSAGE (OUTPATIENT)
Dept: FAMILY MEDICINE | Facility: CLINIC | Age: 72
End: 2022-09-30
Payer: MEDICARE

## 2022-10-07 ENCOUNTER — TELEPHONE (OUTPATIENT)
Dept: FAMILY MEDICINE | Facility: CLINIC | Age: 72
End: 2022-10-07
Payer: MEDICARE

## 2022-10-07 ENCOUNTER — OFFICE VISIT (OUTPATIENT)
Dept: NEUROLOGY | Facility: CLINIC | Age: 72
End: 2022-10-07
Payer: MEDICARE

## 2022-10-07 VITALS
WEIGHT: 196.63 LBS | HEART RATE: 80 BPM | OXYGEN SATURATION: 97 % | BODY MASS INDEX: 34.84 KG/M2 | DIASTOLIC BLOOD PRESSURE: 72 MMHG | SYSTOLIC BLOOD PRESSURE: 143 MMHG | HEIGHT: 63 IN

## 2022-10-07 DIAGNOSIS — R41.3 OTHER AMNESIA: Primary | ICD-10-CM

## 2022-10-07 DIAGNOSIS — R41.3 SHORT-TERM MEMORY LOSS: ICD-10-CM

## 2022-10-07 PROCEDURE — 3078F DIAST BP <80 MM HG: CPT | Mod: CPTII,S$GLB,,

## 2022-10-07 PROCEDURE — 1126F PR PAIN SEVERITY QUANTIFIED, NO PAIN PRESENT: ICD-10-PCS | Mod: CPTII,S$GLB,,

## 2022-10-07 PROCEDURE — 3060F POS MICROALBUMINURIA REV: CPT | Mod: CPTII,S$GLB,,

## 2022-10-07 PROCEDURE — 3066F PR DOCUMENTATION OF TREATMENT FOR NEPHROPATHY: ICD-10-PCS | Mod: CPTII,S$GLB,,

## 2022-10-07 PROCEDURE — 1157F ADVNC CARE PLAN IN RCRD: CPT | Mod: CPTII,S$GLB,,

## 2022-10-07 PROCEDURE — 4010F ACE/ARB THERAPY RXD/TAKEN: CPT | Mod: CPTII,S$GLB,,

## 2022-10-07 PROCEDURE — 3052F HG A1C>EQUAL 8.0%<EQUAL 9.0%: CPT | Mod: CPTII,S$GLB,,

## 2022-10-07 PROCEDURE — 1157F PR ADVANCE CARE PLAN OR EQUIV PRESENT IN MEDICAL RECORD: ICD-10-PCS | Mod: CPTII,S$GLB,,

## 2022-10-07 PROCEDURE — 3288F PR FALLS RISK ASSESSMENT DOCUMENTED: ICD-10-PCS | Mod: CPTII,S$GLB,,

## 2022-10-07 PROCEDURE — 3288F FALL RISK ASSESSMENT DOCD: CPT | Mod: CPTII,S$GLB,,

## 2022-10-07 PROCEDURE — 4010F PR ACE/ARB THEARPY RXD/TAKEN: ICD-10-PCS | Mod: CPTII,S$GLB,,

## 2022-10-07 PROCEDURE — 99203 PR OFFICE/OUTPT VISIT, NEW, LEVL III, 30-44 MIN: ICD-10-PCS | Mod: S$GLB,,,

## 2022-10-07 PROCEDURE — 3008F BODY MASS INDEX DOCD: CPT | Mod: CPTII,S$GLB,,

## 2022-10-07 PROCEDURE — 3077F SYST BP >= 140 MM HG: CPT | Mod: CPTII,S$GLB,,

## 2022-10-07 PROCEDURE — 3060F PR POS MICROALBUMINURIA RESULT DOCUMENTED/REVIEW: ICD-10-PCS | Mod: CPTII,S$GLB,,

## 2022-10-07 PROCEDURE — 99203 OFFICE O/P NEW LOW 30 MIN: CPT | Mod: S$GLB,,,

## 2022-10-07 PROCEDURE — 1101F PT FALLS ASSESS-DOCD LE1/YR: CPT | Mod: CPTII,S$GLB,,

## 2022-10-07 PROCEDURE — 1101F PR PT FALLS ASSESS DOC 0-1 FALLS W/OUT INJ PAST YR: ICD-10-PCS | Mod: CPTII,S$GLB,,

## 2022-10-07 PROCEDURE — 1126F AMNT PAIN NOTED NONE PRSNT: CPT | Mod: CPTII,S$GLB,,

## 2022-10-07 PROCEDURE — 99999 PR PBB SHADOW E&M-EST. PATIENT-LVL V: CPT | Mod: PBBFAC,,,

## 2022-10-07 PROCEDURE — 3008F PR BODY MASS INDEX (BMI) DOCUMENTED: ICD-10-PCS | Mod: CPTII,S$GLB,,

## 2022-10-07 PROCEDURE — 3052F PR MOST RECENT HEMOGLOBIN A1C LEVEL 8.0 - < 9.0%: ICD-10-PCS | Mod: CPTII,S$GLB,,

## 2022-10-07 PROCEDURE — 3078F PR MOST RECENT DIASTOLIC BLOOD PRESSURE < 80 MM HG: ICD-10-PCS | Mod: CPTII,S$GLB,,

## 2022-10-07 PROCEDURE — 3066F NEPHROPATHY DOC TX: CPT | Mod: CPTII,S$GLB,,

## 2022-10-07 PROCEDURE — 99999 PR PBB SHADOW E&M-EST. PATIENT-LVL V: ICD-10-PCS | Mod: PBBFAC,,,

## 2022-10-07 PROCEDURE — 3077F PR MOST RECENT SYSTOLIC BLOOD PRESSURE >= 140 MM HG: ICD-10-PCS | Mod: CPTII,S$GLB,,

## 2022-10-07 RX ORDER — DIAZEPAM 5 MG/1
5 TABLET ORAL ONCE
Qty: 2 TABLET | Refills: 0 | Status: SHIPPED | OUTPATIENT
Start: 2022-10-07 | End: 2023-02-15

## 2022-10-12 ENCOUNTER — LAB VISIT (OUTPATIENT)
Dept: LAB | Facility: HOSPITAL | Age: 72
End: 2022-10-12
Payer: MEDICARE

## 2022-10-12 DIAGNOSIS — I10 ESSENTIAL HYPERTENSION: ICD-10-CM

## 2022-10-12 DIAGNOSIS — R41.3 SHORT-TERM MEMORY LOSS: ICD-10-CM

## 2022-10-12 PROCEDURE — 83036 HEMOGLOBIN GLYCOSYLATED A1C: CPT | Performed by: NURSE PRACTITIONER

## 2022-10-12 PROCEDURE — 36415 COLL VENOUS BLD VENIPUNCTURE: CPT | Mod: PN

## 2022-10-12 PROCEDURE — 84425 ASSAY OF VITAMIN B-1: CPT

## 2022-10-12 PROCEDURE — 82746 ASSAY OF FOLIC ACID SERUM: CPT

## 2022-10-12 PROCEDURE — 83921 ORGANIC ACID SINGLE QUANT: CPT

## 2022-10-12 PROCEDURE — 82565 ASSAY OF CREATININE: CPT | Performed by: NURSE PRACTITIONER

## 2022-10-12 PROCEDURE — 83735 ASSAY OF MAGNESIUM: CPT

## 2022-10-12 PROCEDURE — 83090 ASSAY OF HOMOCYSTEINE: CPT

## 2022-10-12 PROCEDURE — 82607 VITAMIN B-12: CPT

## 2022-10-13 LAB
CREAT SERPL-MCNC: 0.9 MG/DL (ref 0.5–1.4)
EST. GFR  (NO RACE VARIABLE): >60 ML/MIN/1.73 M^2
ESTIMATED AVG GLUCOSE: 183 MG/DL (ref 68–131)
FOLATE SERPL-MCNC: 16.7 NG/ML (ref 4–24)
HBA1C MFR BLD: 8 % (ref 4–5.6)
HCYS SERPL-SCNC: 7.1 UMOL/L (ref 4–15.5)
MAGNESIUM SERPL-MCNC: 1.9 MG/DL (ref 1.6–2.6)

## 2022-10-14 ENCOUNTER — TELEPHONE (OUTPATIENT)
Dept: NEUROLOGY | Facility: CLINIC | Age: 72
End: 2022-10-14
Payer: MEDICARE

## 2022-10-14 NOTE — TELEPHONE ENCOUNTER
----- Message from Nitza Villa sent at 10/14/2022 11:44 AM CDT -----  Type: Patient Call Back    Who called: Self    What is the request in detail: Pt is calling because she has an MRI on 10/17 and her prescription was unable to be filled because the DA number on the Rx was missing. Please call    Can the clinic reply by [facility]? No     Would the patient rather a call back or a response via [facility]? Call    Best call back number: [phone]    [id number] - [place] - [address] AT SEC OF [address]   Phone:  [phone]  Fax:  [phone]        Spoke to michael Lacy . Pt notified

## 2022-10-15 LAB — VIT B12 SERPL-MCNC: 286 NG/L (ref 180–914)

## 2022-10-17 ENCOUNTER — HOSPITAL ENCOUNTER (OUTPATIENT)
Dept: RADIOLOGY | Facility: HOSPITAL | Age: 72
Discharge: HOME | End: 2022-10-17
Payer: MEDICARE

## 2022-10-17 DIAGNOSIS — R41.3 SHORT-TERM MEMORY LOSS: ICD-10-CM

## 2022-10-17 PROCEDURE — 70551 MRI BRAIN STEM W/O DYE: CPT | Mod: 26,,, | Performed by: RADIOLOGY

## 2022-10-17 PROCEDURE — 70551 MRI BRAIN WITHOUT CONTRAST: ICD-10-PCS | Mod: 26,,, | Performed by: RADIOLOGY

## 2022-10-17 PROCEDURE — 70551 MRI BRAIN STEM W/O DYE: CPT | Mod: TC

## 2022-10-18 LAB
METHYLMALONATE SERPL-SCNC: 0.11 NMOL/ML
METHYLMALONATE SERPL-SCNC: 0.13 UMOL/L

## 2022-10-19 LAB — VIT B1 BLD-MCNC: 97 UG/L (ref 38–122)

## 2022-10-20 ENCOUNTER — TELEPHONE (OUTPATIENT)
Dept: ENDOCRINOLOGY | Facility: CLINIC | Age: 72
End: 2022-10-20
Payer: MEDICARE

## 2022-10-20 NOTE — TELEPHONE ENCOUNTER
Patient states that she receives her dionna sensors from [facility] and she is interested in switching to Dexcom because she is having issues with the dionna sensors staying on her arm.

## 2022-10-20 NOTE — TELEPHONE ENCOUNTER
----- Message from Sindi Shultz NP sent at 10/20/2022 10:55 AM CDT -----  Regarding: FW: Refill Request    ----- Message -----  From: Yudi Smart MD  Sent: 10/19/2022   5:51 PM CDT  To: Sindi Shultz NP  Subject: FW: Refill Request                               Can you please address with patient?  Thanks!  Yudi   ----- Message -----  From: Judi Gentile  Sent: 10/19/2022  12:41 PM CDT  To: [facility] FREEMAN Staff  Subject: Refill Request                                   Type: RX Refill Request      Who Called: Self       Refill or New Rx: New      RX Name and Strength:  Patient would like to switch to [facility]       Preferred Pharmacy with phone number:  .  [id number] - [place] [address] [place] [address] [address] [address]  Phone: [phone] Fax: [phone]      Would the patient rather a call back or a response via [facility]? call      Best Call Back Number:  [phone]

## 2022-10-26 ENCOUNTER — TELEPHONE (OUTPATIENT)
Dept: ENDOCRINOLOGY | Facility: CLINIC | Age: 72
End: 2022-10-26
Payer: MEDICARE

## 2022-10-26 NOTE — TELEPHONE ENCOUNTER
----- Message from Hailee Ovalles sent at 10/26/2022  1:50 PM CDT -----  Type: RX Refill Request    Who Called: pt    Have you contacted your pharmacy:  Refill or New Rx:flash glucose sensor (FREESTYLE ANAMARIA 2 SENSOR) Kit - needs pa and new prescription    RX Name and Strength:    How is the patient currently taking it? (ex. 1XDay):    Is this a 30 day or 90 day RX:    Preferred Pharmacy with phone number:  [id number] - [place] - [facility] AT [address]  [place] [address]  Phone: [phone] Fax: [phone]        Local or Mail Order:    Ordering Provider:    Would the patient rather a call back or a response via [facility]? call    Best Call Back Number:[phone] (home) [phone] (work)      Additional Information:

## 2022-10-26 NOTE — TELEPHONE ENCOUNTER
ELIDAM to notify pt that for refills fo the dionna, she needs to call [facility] at [phone]

## 2022-10-27 RX ORDER — FLASH GLUCOSE SENSOR
KIT MISCELLANEOUS
OUTPATIENT
Start: 2022-10-27

## 2022-10-28 RX ORDER — FLASH GLUCOSE SENSOR
KIT MISCELLANEOUS
Qty: 3 KIT | Refills: 11 | Status: SHIPPED | OUTPATIENT
Start: 2022-10-28 | End: 2023-04-13

## 2022-10-28 NOTE — TELEPHONE ENCOUNTER
----- Message from Nitza Villa sent at 10/28/2022  9:38 AM CDT -----  Regarding: returned call    Type:  Patient Returning Call    Who Called:  Self    Who Left Message for Patient: Pt says replacements are being put in for free style dionna that are being paid for by [facility]. She is not needing a new refill. Please call    Does the patient know what this is regarding?: yes    Would the patient rather a call back or a response via [facility]? Call    Best Call Back Number: [phone]

## 2022-10-28 NOTE — TELEPHONE ENCOUNTER
Pt stated she needed 3 replacement sensors for dionna 2. Pt called [facility] but they no longer send out replacements. They informed pt she would have to go through the pharmacy for them but provider needs to send Rx for sensors to [facility]. Pharmacy is already aware of this per Ms Jun.  Message will be sent to provider, understanding verbalized.

## 2022-11-01 ENCOUNTER — TELEPHONE (OUTPATIENT)
Dept: NEUROLOGY | Facility: CLINIC | Age: 72
End: 2022-11-01
Payer: MEDICARE

## 2022-11-04 ENCOUNTER — TELEPHONE (OUTPATIENT)
Dept: ORTHOPEDICS | Facility: CLINIC | Age: 72
End: 2022-11-04
Payer: MEDICARE

## 2022-11-04 NOTE — TELEPHONE ENCOUNTER
Spoke c pt. Offered and confirmed appt location & time c Sandee Sandhu PA-C 11/14/22. Pt expressed understanding & was thankful.

## 2022-11-04 NOTE — TELEPHONE ENCOUNTER
Spoke with pt to get her scheduled for an appointment. Advised pt that provider is only in the office Wednesday and every other Friday only in the morning. Pt stated that she would need appointments after 2pm. Advised again the days and times provider is in the office. She stated she wanted to be connected back to the scheduling line as she wants to see another provider. I advised that because she is already established with Dr. Villatoro she may not be able to see another provider. Pt was transferred back to scheduling.

## 2022-11-04 NOTE — TELEPHONE ENCOUNTER
----- Message from Ashely Johnson sent at 11/4/2022  9:06 AM CDT -----  Regarding: Wants to see Puneet  Can you please call her  ----- Message -----  From: Kaley Duffy  Sent: 11/4/2022   8:58 AM CDT  To: [facility] Clinical Staff    Type:  Sooner Apoointment Request    Caller is requesting a sooner appointment.  Caller declined first available appointment listed below.  Caller will not accept being placed on the waitlist and is requesting a message be sent to doctor.  Name of Caller:pt  When is the first available appointment?  Symptoms:hand injection for trigger finger   Would the patient rather a call back or a response via [facility]? Call back   Best Call Back Number:[phone]  Additional Information: pt would prefer appt after 2

## 2022-11-14 ENCOUNTER — OFFICE VISIT (OUTPATIENT)
Dept: ORTHOPEDICS | Facility: CLINIC | Age: 72
End: 2022-11-14
Payer: MEDICARE

## 2022-11-14 ENCOUNTER — TELEPHONE (OUTPATIENT)
Dept: ORTHOPEDICS | Facility: CLINIC | Age: 72
End: 2022-11-14
Payer: MEDICARE

## 2022-11-14 VITALS — HEIGHT: 63 IN | WEIGHT: 196 LBS | BODY MASS INDEX: 34.73 KG/M2

## 2022-11-14 DIAGNOSIS — M18.11 ARTHRITIS OF CARPOMETACARPAL (CMC) JOINT OF RIGHT THUMB: ICD-10-CM

## 2022-11-14 DIAGNOSIS — M65.342 TRIGGER RING FINGER OF LEFT HAND: Primary | ICD-10-CM

## 2022-11-14 DIAGNOSIS — G56.03 CARPAL TUNNEL SYNDROME, BILATERAL: ICD-10-CM

## 2022-11-14 PROCEDURE — 1157F PR ADVANCE CARE PLAN OR EQUIV PRESENT IN MEDICAL RECORD: ICD-10-PCS | Mod: CPTII,S$GLB,, | Performed by: PHYSICIAN ASSISTANT

## 2022-11-14 PROCEDURE — 1101F PR PT FALLS ASSESS DOC 0-1 FALLS W/OUT INJ PAST YR: ICD-10-PCS | Mod: CPTII,S$GLB,, | Performed by: PHYSICIAN ASSISTANT

## 2022-11-14 PROCEDURE — 3008F BODY MASS INDEX DOCD: CPT | Mod: CPTII,S$GLB,, | Performed by: PHYSICIAN ASSISTANT

## 2022-11-14 PROCEDURE — 1157F ADVNC CARE PLAN IN RCRD: CPT | Mod: CPTII,S$GLB,, | Performed by: PHYSICIAN ASSISTANT

## 2022-11-14 PROCEDURE — 3060F PR POS MICROALBUMINURIA RESULT DOCUMENTED/REVIEW: ICD-10-PCS | Mod: CPTII,S$GLB,, | Performed by: PHYSICIAN ASSISTANT

## 2022-11-14 PROCEDURE — 1125F PR PAIN SEVERITY QUANTIFIED, PAIN PRESENT: ICD-10-PCS | Mod: CPTII,S$GLB,, | Performed by: PHYSICIAN ASSISTANT

## 2022-11-14 PROCEDURE — 3066F NEPHROPATHY DOC TX: CPT | Mod: CPTII,S$GLB,, | Performed by: PHYSICIAN ASSISTANT

## 2022-11-14 PROCEDURE — 3052F PR MOST RECENT HEMOGLOBIN A1C LEVEL 8.0 - < 9.0%: ICD-10-PCS | Mod: CPTII,S$GLB,, | Performed by: PHYSICIAN ASSISTANT

## 2022-11-14 PROCEDURE — 99999 PR PBB SHADOW E&M-EST. PATIENT-LVL III: CPT | Mod: PBBFAC,,, | Performed by: PHYSICIAN ASSISTANT

## 2022-11-14 PROCEDURE — 99499 UNLISTED E&M SERVICE: CPT | Mod: S$GLB,,, | Performed by: PHYSICIAN ASSISTANT

## 2022-11-14 PROCEDURE — 1125F AMNT PAIN NOTED PAIN PRSNT: CPT | Mod: CPTII,S$GLB,, | Performed by: PHYSICIAN ASSISTANT

## 2022-11-14 PROCEDURE — 3008F PR BODY MASS INDEX (BMI) DOCUMENTED: ICD-10-PCS | Mod: CPTII,S$GLB,, | Performed by: PHYSICIAN ASSISTANT

## 2022-11-14 PROCEDURE — 20550 NJX 1 TENDON SHEATH/LIGAMENT: CPT | Mod: LT,S$GLB,, | Performed by: PHYSICIAN ASSISTANT

## 2022-11-14 PROCEDURE — 4010F ACE/ARB THERAPY RXD/TAKEN: CPT | Mod: CPTII,S$GLB,, | Performed by: PHYSICIAN ASSISTANT

## 2022-11-14 PROCEDURE — 3066F PR DOCUMENTATION OF TREATMENT FOR NEPHROPATHY: ICD-10-PCS | Mod: CPTII,S$GLB,, | Performed by: PHYSICIAN ASSISTANT

## 2022-11-14 PROCEDURE — 1159F PR MEDICATION LIST DOCUMENTED IN MEDICAL RECORD: ICD-10-PCS | Mod: CPTII,S$GLB,, | Performed by: PHYSICIAN ASSISTANT

## 2022-11-14 PROCEDURE — 99499 RISK ADDL DX/OHS AUDIT: ICD-10-PCS | Mod: S$GLB,,, | Performed by: PHYSICIAN ASSISTANT

## 2022-11-14 PROCEDURE — 3060F POS MICROALBUMINURIA REV: CPT | Mod: CPTII,S$GLB,, | Performed by: PHYSICIAN ASSISTANT

## 2022-11-14 PROCEDURE — 3288F PR FALLS RISK ASSESSMENT DOCUMENTED: ICD-10-PCS | Mod: CPTII,S$GLB,, | Performed by: PHYSICIAN ASSISTANT

## 2022-11-14 PROCEDURE — 20550 PR INJECT TENDON SHEATH/LIGAMENT: ICD-10-PCS | Mod: LT,S$GLB,, | Performed by: PHYSICIAN ASSISTANT

## 2022-11-14 PROCEDURE — 4010F PR ACE/ARB THEARPY RXD/TAKEN: ICD-10-PCS | Mod: CPTII,S$GLB,, | Performed by: PHYSICIAN ASSISTANT

## 2022-11-14 PROCEDURE — 99214 OFFICE O/P EST MOD 30 MIN: CPT | Mod: 25,S$GLB,, | Performed by: PHYSICIAN ASSISTANT

## 2022-11-14 PROCEDURE — 1101F PT FALLS ASSESS-DOCD LE1/YR: CPT | Mod: CPTII,S$GLB,, | Performed by: PHYSICIAN ASSISTANT

## 2022-11-14 PROCEDURE — 99214 PR OFFICE/OUTPT VISIT, EST, LEVL IV, 30-39 MIN: ICD-10-PCS | Mod: 25,S$GLB,, | Performed by: PHYSICIAN ASSISTANT

## 2022-11-14 PROCEDURE — 3052F HG A1C>EQUAL 8.0%<EQUAL 9.0%: CPT | Mod: CPTII,S$GLB,, | Performed by: PHYSICIAN ASSISTANT

## 2022-11-14 PROCEDURE — 1159F MED LIST DOCD IN RCRD: CPT | Mod: CPTII,S$GLB,, | Performed by: PHYSICIAN ASSISTANT

## 2022-11-14 PROCEDURE — 3288F FALL RISK ASSESSMENT DOCD: CPT | Mod: CPTII,S$GLB,, | Performed by: PHYSICIAN ASSISTANT

## 2022-11-14 PROCEDURE — 99999 PR PBB SHADOW E&M-EST. PATIENT-LVL III: ICD-10-PCS | Mod: PBBFAC,,, | Performed by: PHYSICIAN ASSISTANT

## 2022-11-14 RX ORDER — DEXAMETHASONE SODIUM PHOSPHATE 4 MG/ML
4 INJECTION, SOLUTION INTRA-ARTICULAR; INTRALESIONAL; INTRAMUSCULAR; INTRAVENOUS; SOFT TISSUE
Status: COMPLETED | OUTPATIENT
Start: 2022-11-14 | End: 2022-11-14

## 2022-11-14 RX ORDER — LIDOCAINE HYDROCHLORIDE 10 MG/ML
0.5 INJECTION, SOLUTION EPIDURAL; INFILTRATION; INTRACAUDAL; PERINEURAL ONCE
Status: COMPLETED | OUTPATIENT
Start: 2022-11-14 | End: 2022-11-14

## 2022-11-14 RX ADMIN — LIDOCAINE HYDROCHLORIDE 5 MG: 10 INJECTION, SOLUTION EPIDURAL; INFILTRATION; INTRACAUDAL; PERINEURAL at 04:11

## 2022-11-14 RX ADMIN — DEXAMETHASONE SODIUM PHOSPHATE 4 MG: 4 INJECTION, SOLUTION INTRA-ARTICULAR; INTRALESIONAL; INTRAMUSCULAR; INTRAVENOUS; SOFT TISSUE at 03:11

## 2022-11-14 NOTE — TELEPHONE ENCOUNTER
----- Message from Sol Galarza LPN sent at 11/14/2022  3:41 PM CST -----  Good Afternoon,    This patient has been previously seen by Dr. Arntet as well as Dr. Choudhary and seen by Sandee and is not happy. She did state she wanted to been ween by Dr. Schaefer. Sandee has given her an injection today and she needs to follow up with a surgeon in 6 weeks.Dr. choudhary scheduled an EMG which she never had done. I will try and get her scheduled for the EMG. Can someone please reach out to this patient to get her scheduled for a follow up with Dr. Schaefer?    Thanks,  Sol

## 2022-11-14 NOTE — PROGRESS NOTES
Subjective:      Patient ID: Cecilia Barahona is a 72 y.o. female.    Chief Complaint: Pain of the Left Hand      HPI  Cecilia Barahona is a 72 y.o. female with DMII presenting today for follow up left ring trigger finger. She was last seen by Dr. Villatoro, had an injection which provided temporary relief. She is interested in repeat injection today. She also has bilateral carpal tunnel symptoms, an EMG was ordered at last visit but not yet completed. In addition she mentions pain at the base of the right thumb with use. She has a hx of right ring and left thumb trigger finger releases by Dr. Arnett. Prior EMG from 2015 with moderately severe left carpal tunnel and moderate ulnar neuropathy. She does not want to f/u with Dr. Villatoro or Dr. Arnett. Most recent A1c 8.0.      Review of patient's allergies indicates:   Allergen Reactions    Ace inhibitors Other (See Comments)     cough    Invokana [canagliflozin] Other (See Comments)     Throat and tongue swelling         Current Outpatient Medications   Medication Sig Dispense Refill    albuterol (PROVENTIL/VENTOLIN HFA) 90 mcg/actuation inhaler Inhale 2 puffs into the lungs every 6 (six) hours as needed for Wheezing or Shortness of Breath. Rescue 19 g 0    alcohol swabs PadM Apply 1 each topically 3 (three) times daily. 400 each 2    amLODIPine (NORVASC) 10 MG tablet Take 1 tablet (10 mg total) by mouth once daily. 90 tablet 3    artificial tear ointment (ARTIFICIAL TEARS) Oint Place into both eyes every evening. 305 g 1    aspirin (ECOTRIN) 81 MG EC tablet Take 1 tablet (81 mg total) by mouth once daily. 90 tablet 1    atorvastatin (LIPITOR) 80 MG tablet Take 1 tablet (80 mg total) by mouth once daily. 90 tablet 3    azelastine (OPTIVAR) 0.05 % ophthalmic solution INSTILL 1 DROP IN EACH EYE TWICE DAILY (Patient taking differently: PRN) 12 mL 0    blood pressure monitor (BLOOD PRESSURE KIT) Kit 1 kit by Misc.(Non-Drug; Combo Route) route once daily. 1  each 0    blood sugar diagnostic Strp To check BG 2 times daily, to use with insurance preferred meter 200 each 3    carvediloL (COREG) 12.5 MG tablet TAKE 1 TABLET(12.5 MG) BY MOUTH TWICE DAILY 180 tablet 3    desoximetasone (TOPICORT) 0.05 % cream Apply topically 2 (two) times daily as needed (itch). 100 g 2    diclofenac (VOLTAREN) 50 MG EC tablet TAKE 1 TABLET(50 MG) BY MOUTH TWICE DAILY 60 tablet 0    diltiazem HCl (DILTIAZEM 2% CREAM) Apply topically 3 (three) times daily. Apply topically to anal area. 30 g 2    dulaglutide (TRULICITY) 3 mg/0.5 mL pen injector Inject 3 mg into the skin every 7 days. 4 pen 11    DULoxetine (CYMBALTA) 60 MG capsule       empagliflozin (JARDIANCE) 25 mg tablet Take 1 tablet (25 mg total) by mouth once daily. 90 tablet 2    EScitalopram oxalate (LEXAPRO) 10 MG tablet Take 1 tablet (10 mg total) by mouth once daily. 90 tablet 1    ezetimibe (ZETIA) 10 mg tablet Take 1 tablet (10 mg total) by mouth once daily. 90 tablet 3    flash glucose scanning reader (FREESTYLE ANAMARIA 2 READER) [facility] [place] Scan glucose every 8 hours 1 each 0    flash glucose sensor (FREESTYLE ANAMARIA 2 SENSOR) Kit Change every 14 days, to use with Freestyle Anamaria 2 reader 3 kit 11    fluticasone (FLONASE) 50 mcg/actuation nasal spray 1 spray (50 mcg total) by Each Nare route once daily. 16 g 5    furosemide (LASIX) 20 MG tablet TAKE 1 TABLET(20 MG) BY MOUTH TWICE DAILY 180 tablet 0    lancets Misc To check BG 2 times daily, to use with insurance preferred meter 200 each 3    latanoprost 0.005 % ophthalmic solution INSTILL 1 DROP IN BOTH EYES EVERY EVENING 7.5 mL 0    loratadine (CLARITIN) 10 mg tablet TAKE 1 TABLET BY MOUTH EVERY DAY AS NEEDED FOR ALLERGIES 90 tablet 1    melatonin (MELATIN) 3 mg tablet Take 2 tablets (6 mg total) by mouth nightly as needed for Insomnia.  0    metFORMIN (GLUCOPHAGE) 1000 MG tablet Take 1 tablet (1,000 mg total) by mouth 2 (two) times daily with meals. 180 tablet 2    metoprolol tartrate  "(LOPRESSOR) 25 MG tablet       multivitamin with minerals tablet Take 1 tablet by mouth once daily.      ondansetron (ZOFRAN) 4 MG tablet Take 1 tablet (4 mg total) by mouth every 6 (six) hours as needed for Nausea. 20 tablet 0    pantoprazole (PROTONIX) 40 MG tablet       pen needle, diabetic (BD ULTRA-FINE LENY PEN NEEDLE) 32 gauge x 5/32" Ndle 1 Device by Misc.(Non-Drug; Combo Route) route 4 (four) times daily with meals and nightly. 400 each 4    rOPINIRole (REQUIP) 0.5 MG tablet TAKE 1 TABLET(0.5 MG) BY MOUTH EVERY EVENING 90 tablet 0    TRUE METRIX GO GLUCOSE METER Misc USE TO TEST TWICE DAILY      TRUEPLUS LANCETS 30 gauge Misc USE TO TEST TWICE DAILY      valsartan (DIOVAN) 320 MG tablet Take 1 tablet (320 mg total) by mouth once daily. 90 tablet 3    varicella-zoster gE-AS01B, PF, (SHINGRIX) 50 mcg/0.5 mL injection Inject into the muscle. 1 each 0    diazePAM (VALIUM) 5 MG tablet Take 1 tablet (5 mg total) by mouth once. Please take 1 pill 1 hour before MRI. Can repeat dose after 1 hour for 1 dose 2 tablet 0     Current Facility-Administered Medications   Medication Dose Route Frequency Provider Last Rate Last Admin    [COMPLETED] LIDOcaine (PF) 10 mg/ml (1%) injection 5 mg  0.5 mL Other Once Sandee Sandhu PA-C   5 mg at 11/14/22 1645       Past Medical History:   Diagnosis Date    Acute coronary syndrome 5/20/2020 5/18/2020: Presents with unstable angina.    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 1/5/2022    Nuclear sclerosis of both eyes 2/26/2019    Obesity     CHETNA (obstructive sleep apnea)  " "   Psychiatric problem     Psychosis     Renal disorder     Sleep difficulties     Suicide attempt     about 30 years ago by overdose of pills    Therapy     Type II or unspecified type diabetes mellitus without mention of complication, uncontrolled        Past Surgical History:   Procedure Laterality Date    CARPAL TUNNEL RELEASE       SECTION, CLASSIC      COLONOSCOPY N/A 3/5/2020    Procedure: COLONOSCOPY;  Surgeon: Wiliam Carrillo MD;  Location: [facility] (4TH FLR);  Service: Endoscopy;  Laterality: N/A;  20 appt confirmed-rb  pt requested this date    CORONARY ARTERY BYPASS GRAFT (CABG) N/A 2020    Procedure: CORONARY ARTERY BYPASS GRAFT (CABG) x3 ;  Surgeon: Yan Bowman MD;  Location: [facility] OR 2ND FLR;  Service: Cardiothoracic;  Laterality: N/A;  CABG X3  Endoharvest time start 0906  Endoharvest time end 1003  Vein prep start 1004  Vein prep end 1048    LEFT HEART CATHETERIZATION N/A 2020    Procedure: HEART CATH-LEFT;  Surgeon: Etelvina Lowery MD;  Location: [facility] CATH LAB;  Service: Cardiology;  Laterality: N/A;    TOTAL ABDOMINAL HYSTERECTOMY      TRIGGER FINGER RELEASE         Review of Systems:  Constitutional: Negative for chills and fever.   Respiratory: Negative for cough and shortness of breath.    Gastrointestinal: Negative for nausea and vomiting.   Skin: Negative for rash.   Neurological: Negative for dizziness and headaches.   Psychiatric/Behavioral: Negative for depression.   MSK as in HPI       OBJECTIVE:     PHYSICAL EXAM:  Ht 5' 3" (1.6 m)   Wt 88.9 kg (196 lb)   BMI 34.72 kg/m²     GEN:  NAD, well-developed, well-groomed.  NEURO: Awake, alert, and oriented. Normal attention and concentration.    PSYCH: Normal mood and affect. Behavior is normal.  HEENT: No cervical lymphadenopathy noted.  CARDIOVASCULAR: Radial pulses 2+ bilaterally. No LE edema noted.  PULMONARY: Breath sounds normal. No respiratory distress.  SKIN: Intact, no rashes.      MSK: "     RUE:  Good active ROM of the wrist and fingers. Ttp thumb CMC. Mildly positive tinels carpal tunnel. AIN/PIN/Radial/Median/Ulnar Nerves assessed in isolation without deficit. Radial & Ulnar arteries palpated 2+. Capillary Refill <3s.    LUE:  Good active ROM of the wrist and fingers. Ttp ring A1 pulley with triggering. Positive tinels carpal tunnel. AIN/PIN/Radial/Median/Ulnar Nerves assessed in isolation without deficit. Radial & Ulnar arteries palpated 2+. Capillary Refill <3s.      RADIOGRAPHS:  Xray left hand 2/23/22   FINDINGS:  Sequelae of remote ulnar styloid avulsion fracture.  No acute, displaced fracture.  No aggressive osseous abnormality.  No dislocation.  Joint space narrowing and mild osteophytosis at the 1st carpometacarpal interphalangeal joints.  No focal soft tissue abnormality.     Impression:     Degenerative changes of the 1st CMC and IP joints.  Comments: I have personally reviewed the imaging and I agree with the above radiologist's report.    EMG 2015   Impression: 1 MODERATELY SEVERE LEFT CARPAL TUNNEL SYNDROME.   2 MODERATE ULNAR ENTRAPMENT AT THE LEFT ELBOW.   3 THERE IS NO DEFINITE EVIDENCE OF RADICULOPATHY.   4 THE PATIENT DECLINED NEEDLE EXAM OF THE PARASPINOUS MUSCLE.     ASSESSMENT/PLAN:       ICD-10-CM ICD-9-CM   1. Trigger ring finger of left hand  M65.342 727.03   2. Carpal tunnel syndrome, bilateral  G56.03 354.0   3. Arthritis of carpometacarpal (CMC) joint of right thumb  M18.11 716.94       Orders Placed This Encounter    LIDOcaine (PF) 10 mg/ml (1%) injection 5 mg    dexAMETHasone injection 4 mg     No orders of the defined types were placed in this encounter.       Plan:   Plan for repeat left ring trigger finger injection today. If symptoms persist discussed trigger finger release as next step   Discussed thumb CMC arthritis recommend bracing, paraffin, voltaren   We will schedule previously ordered EMG   Discussed she will need to be established with a surgeon for  follow up as next step would likely be left ring trigger finger release and carpal tunnel release, possible ulnar nerve decompression. She has previously been seen by Dr. Villatoro and Dr. Arnett she does not wish to see them. She has also been seen by Dr. Schaefer in the past we will msg his office for f/u.         PROCEDURE:  I have explained the risks, benefits, and alternatives of the procedure in detail.  The patient voices understanding and all questions have been answered. US used. The patient agrees to proceed as planned. So after a sterile prep of the skin in the normal fashion the left ring flexor tendon sheath is injected from the volar  approach using a 25 gauge needle with a combination of 0.5cc 1% plain lidocaine and 4 mg of dexamethasone.  The patient is cautioned and immediate relief of pain is secondary to the local anesthetic and will be temporary.  After the anesthetic wears off there may be a increase in pain that may last for a few hours or a few days and they should use ice to help alleviate this flair up of pain. Patient tolerated the procedure well.           The patient indicates understanding of these issues and agrees to the plan.    Sandee Sandhu PA-C  [facility]   [facility]  [place], [place]

## 2022-12-06 ENCOUNTER — TELEPHONE (OUTPATIENT)
Dept: FAMILY MEDICINE | Facility: CLINIC | Age: 72
End: 2022-12-06
Payer: MEDICARE

## 2022-12-06 DIAGNOSIS — M65.30 TRIGGER FINGER, UNSPECIFIED FINGER, UNSPECIFIED LATERALITY: Primary | ICD-10-CM

## 2022-12-06 NOTE — TELEPHONE ENCOUNTER
----- Message from Lisa Chin sent at 12/6/2022 11:00 AM CST -----  Type:  Patient Requesting Referral    Who Called:self    Referral to What Specialty:Hand     Reason for Referral:trigger finger    Does the patient want the referral with a specific physician?:no    Is the specialist an [facility]anyone on [facility]    Would the patient rather a call back or a response via [facility]? call    Best Call Back Number:[phone] (home) [phone] (work)

## 2022-12-08 NOTE — PROGRESS NOTES
"Subjective:       Patient ID: Cecilia Barahona is a 72 y.o. female.      History of Present Illness  72 year old female who presents alone today for evaluation of memory deficit. Past medical history below. She states she does not know why she was referred for her issues with memory. When I explained that she was referred due to her issues with recalling names of her medications. States she always state "I don't know" out of habit. Feels that she does this just to keep from thinking about it. Further states she did that during the appt because she felt "put on the spot". Notes that her daughter and  have mentioned that she often says "I don't know" to many things in the past year. She states if it is not important to her she does not try to focus on it. Admits that she does not recall what she worse yesterday or conversation she had yesterday. Denies any mood or behavioral changes. Still drives. Does not report any issues with driving. Able to complete all ADLs independently.     Past Medical History:   Diagnosis Date    Acute coronary syndrome 5/20/2020 5/18/2020: Presents with unstable angina.    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 1/5/2022    Nuclear sclerosis of both eyes 2/26/2019    Obesity     CHETNA (obstructive sleep apnea)     Psychiatric problem     Psychosis     Renal disorder     Sleep difficulties     Suicide attempt     about 30 years ago by overdose of pills    Therapy     Type II or unspecified type diabetes mellitus without mention of complication, uncontrolled        Past " Surgical History:   Procedure Laterality Date    CARPAL TUNNEL RELEASE       SECTION, CLASSIC      COLONOSCOPY N/A 3/5/2020    Procedure: COLONOSCOPY;  Surgeon: Wiliam Carrillo MD;  Location: [facility] (4TH FLR);  Service: Endoscopy;  Laterality: N/A;  20 appt confirmed-rb  pt requested this date    CORONARY ARTERY BYPASS GRAFT (CABG) N/A 2020    Procedure: CORONARY ARTERY BYPASS GRAFT (CABG) x3 ;  Surgeon: Yan Bowman MD;  Location: [facility] OR 2ND FLR;  Service: Cardiothoracic;  Laterality: N/A;  CABG X3  Endoharvest time start 0906  Endoharvest time end 1003  Vein prep start 1004  Vein prep end 1048    LEFT HEART CATHETERIZATION N/A 2020    Procedure: HEART CATH-LEFT;  Surgeon: Etelvina Lowery MD;  Location: [facility] CATH LAB;  Service: Cardiology;  Laterality: N/A;    TOTAL ABDOMINAL HYSTERECTOMY      TRIGGER FINGER RELEASE         Family History   Problem Relation Age of Onset    Hypertension Mother     Diabetes Mother     Heart failure Mother     Cataracts Mother     Glaucoma Mother     Prostate cancer Father     Hypertension Father     Diabetes Father     Heart failure Father     No Known Problems Sister     No Known Problems Maternal Aunt     Diabetes Maternal Uncle     Hyperlipidemia Maternal Uncle     Hypertension Maternal Uncle     Diabetes Maternal Grandmother     Diabetes Maternal Grandfather     No Known Problems Paternal Grandmother     No Known Problems Paternal Grandfather     Diabetes Maternal Aunt     Alzheimer's disease Maternal Aunt     Schizophrenia Maternal Aunt     Heart disease Cousin         s/p heart transplant    No Known Problems Paternal Aunt     No Known Problems Paternal Uncle     Drug abuse Grandchild     Amblyopia Neg Hx     Blindness Neg Hx     Cancer Neg Hx     Macular degeneration Neg Hx     Retinal detachment Neg Hx     Strabismus Neg Hx     Stroke Neg Hx     Thyroid disease Neg Hx        Social History     Socioeconomic History    Marital status:       Spouse name: Yuniel    Number of children: 3   Occupational History    Occupation: Retired   Tobacco Use    Smoking status: Former     Packs/day: 0.50     Types: Cigarettes     Quit date: 11/10/2012     Years since quitting: 10.0    Smokeless tobacco: Never   Substance and Sexual Activity    Alcohol use: Not Currently     Comment: drank socially in the past    Drug use: Not Currently     Types: Marijuana     Comment: tried once 6 months ago    Sexual activity: Not Currently     Partners: Male   Other Topics Concern    Patient feels they ought to cut down on drinking/drug use No    Patient annoyed by others criticizing their drinking/drug use No    Patient has felt bad or guilty about drinking/drug use No    Patient has had a drink/used drugs as an eye opener in the AM No   Social History Narrative     x 1.    Has 3 grown children.    Lives with spouse.       Current Outpatient Medications   Medication Sig Dispense Refill    albuterol (PROVENTIL/VENTOLIN HFA) 90 mcg/actuation inhaler Inhale 2 puffs into the lungs every 6 (six) hours as needed for Wheezing or Shortness of Breath. Rescue 19 g 0    alcohol swabs PadM Apply 1 each topically 3 (three) times daily. 400 each 2    amLODIPine (NORVASC) 10 MG tablet Take 1 tablet (10 mg total) by mouth once daily. 90 tablet 3    artificial tear ointment (ARTIFICIAL TEARS) Oint Place into both eyes every evening. 305 g 1    aspirin (ECOTRIN) 81 MG EC tablet Take 1 tablet (81 mg total) by mouth once daily. 90 tablet 1    azelastine (OPTIVAR) 0.05 % ophthalmic solution INSTILL 1 DROP IN EACH EYE TWICE DAILY (Patient taking differently: PRN) 12 mL 0    blood pressure monitor (BLOOD PRESSURE KIT) Kit 1 kit by Misc.(Non-Drug; Combo Route) route once daily. 1 each 0    blood sugar diagnostic Strp To check BG 2 times daily, to use with insurance preferred meter 200 each 3    desoximetasone (TOPICORT) 0.05 % cream Apply topically 2 (two) times daily as needed  "(itch). 100 g 2    diclofenac (VOLTAREN) 50 MG EC tablet TAKE 1 TABLET(50 MG) BY MOUTH TWICE DAILY 60 tablet 0    diltiazem HCl (DILTIAZEM 2% CREAM) Apply topically 3 (three) times daily. Apply topically to anal area. 30 g 2    dulaglutide (TRULICITY) 3 mg/0.5 mL pen injector Inject 3 mg into the skin every 7 days. 4 pen 11    DULoxetine (CYMBALTA) 60 MG capsule       empagliflozin (JARDIANCE) 25 mg tablet Take 1 tablet (25 mg total) by mouth once daily. 90 tablet 2    EScitalopram oxalate (LEXAPRO) 10 MG tablet Take 1 tablet (10 mg total) by mouth once daily. 90 tablet 1    ezetimibe (ZETIA) 10 mg tablet Take 1 tablet (10 mg total) by mouth once daily. 90 tablet 3    flash glucose scanning reader ("[employer] ANAMARIA 2 READER) [facility] [place] every 8 hours 1 each 0    fluticasone (FLONASE) 50 mcg/actuation nasal spray 1 spray (50 mcg total) by Each Nare route once daily. 16 g 5    furosemide (LASIX) 20 MG tablet TAKE 1 TABLET(20 MG) BY MOUTH TWICE DAILY 180 tablet 0    lancets Misc To check BG 2 times daily, to use with insurance preferred meter 200 each 3    latanoprost 0.005 % ophthalmic solution INSTILL 1 DROP IN BOTH EYES EVERY EVENING 7.5 mL 0    loratadine (CLARITIN) 10 mg tablet TAKE 1 TABLET BY MOUTH EVERY DAY AS NEEDED FOR ALLERGIES 90 tablet 1    melatonin (MELATIN) 3 mg tablet Take 2 tablets (6 mg total) by mouth nightly as needed for Insomnia.  0    metFORMIN (GLUCOPHAGE) 1000 MG tablet Take 1 tablet (1,000 mg total) by mouth 2 (two) times daily with meals. 180 tablet 2    metoprolol tartrate (LOPRESSOR) 25 MG tablet       multivitamin with minerals tablet Take 1 tablet by mouth once daily.      ondansetron (ZOFRAN) 4 MG tablet Take 1 tablet (4 mg total) by mouth every 6 (six) hours as needed for Nausea. 20 tablet 0    pantoprazole (PROTONIX) 40 MG tablet       pen needle, diabetic (BD ULTRA-FINE LENY PEN NEEDLE) 32 gauge x 5/32" Ndle 1 Device by Misc.(Non-Drug; Combo Route) route 4 (four) times daily " with meals and nightly. 400 each 4    rOPINIRole (REQUIP) 0.5 MG tablet TAKE 1 TABLET(0.5 MG) BY MOUTH EVERY EVENING 90 tablet 0    TRUE METRIX GO GLUCOSE METER Misc USE TO TEST TWICE DAILY      TRUEPLUS LANCETS 30 gauge Misc USE TO TEST TWICE DAILY      varicella-zoster gE-AS01B, PF, (SHINGRIX) 50 mcg/0.5 mL injection Inject into the muscle. 1 each 0    atorvastatin (LIPITOR) 80 MG tablet TAKE 1 TABLET(80 MG) BY MOUTH EVERY DAY 90 tablet 3    carvediloL (COREG) 12.5 MG tablet TAKE 1 TABLET(12.5 MG) BY MOUTH TWICE DAILY 180 tablet 3    diazePAM (VALIUM) 5 MG tablet Take 1 tablet (5 mg total) by mouth once. Please take 1 pill 1 hour before MRI. Can repeat dose after 1 hour for 1 dose 2 tablet 0    flash glucose sensor (FREESTYLE ANAMARIA 2 SENSOR) Kit Change every 14 days, to use with Freestyle Anamaria 2 reader 3 kit 11    valsartan (DIOVAN) 320 MG tablet TAKE 1 TABLET(320 MG) BY MOUTH EVERY DAY 90 tablet 3     No current facility-administered medications for this visit.       Review of patient's allergies indicates:   Allergen Reactions    Ace inhibitors Other (See Comments)     cough    Invokana [canagliflozin] Other (See Comments)     Throat and tongue swelling        Review of Systems  Review of Systems   Constitutional: Negative.    HENT: Negative.     Eyes: Negative.    Respiratory: Negative.     Cardiovascular: Negative.    Endocrine: Negative.    Genitourinary: Negative.    Musculoskeletal: Negative.    Skin: Negative.    Neurological: Negative.    Psychiatric/Behavioral:  Positive for confusion and decreased concentration.      Objective:      Neurologic Exam     Mental Status   Oriented to person, place, and time.   Registration: recalls 2 of 3 objects. Recall at 5 minutes: recalls 2 of 3 objects.   Attention: normal. Concentration: normal.   Speech: speech is normal   Level of consciousness: alert  Knowledge: good.   Normal comprehension.     Cranial Nerves   Cranial nerves II through XII intact.     Motor Exam    Muscle bulk: normal  Overall muscle tone: normal  Right arm tone: normal  Left arm tone: normal  Right arm pronator drift: absent  Left arm pronator drift: absent  Right leg tone: normal  Left leg tone: normal    Sensory Exam   Light touch normal.   Vibration normal.   Proprioception normal.   Pinprick normal.     Gait, Coordination, and Reflexes     Gait  Gait: normal    Coordination   Romberg: negative    Tremor   Resting tremor: absent    Physical Exam  HENT:      Head: Normocephalic and atraumatic.   Pulmonary:      Effort: Pulmonary effort is normal.   Skin:     General: Skin is warm and dry.   Neurological:      Mental Status: She is alert and oriented to person, place, and time.      Cranial Nerves: Cranial nerves 2-12 are intact.      Coordination: Romberg Test normal.      Gait: Gait is intact.   Psychiatric:         Mood and Affect: Affect is blunt and flat.         Speech: Speech normal.         Behavior: Behavior is cooperative.         Assessment and Plan:      1. Other amnesia    2. Short-term memory loss  - VITAMIN B1; Future  - METHYLMALONIC ACID, SERUM; Future  - Magnesium; Future  - FOLATE; Future  - Homocysteine, serum; Future  - Ambulatory consult to Neuropsychology; Future  - Vitamin B12 Deficiency Panel; Future  - MRI Brain Without Contrast; Future   -some apathy on exam

## 2022-12-13 ENCOUNTER — OFFICE VISIT (OUTPATIENT)
Dept: NEUROLOGY | Facility: CLINIC | Age: 72
End: 2022-12-13
Payer: MEDICARE

## 2022-12-13 DIAGNOSIS — F32.0 MILD MAJOR DEPRESSION: Primary | ICD-10-CM

## 2022-12-13 DIAGNOSIS — R41.3 SHORT-TERM MEMORY LOSS: ICD-10-CM

## 2022-12-13 DIAGNOSIS — F41.9 ANXIETY DISORDER, UNSPECIFIED TYPE: ICD-10-CM

## 2022-12-13 PROCEDURE — 3066F PR DOCUMENTATION OF TREATMENT FOR NEPHROPATHY: ICD-10-PCS | Mod: HCNC,CPTII,95, | Performed by: CLINICAL NEUROPSYCHOLOGIST

## 2022-12-13 PROCEDURE — 3060F PR POS MICROALBUMINURIA RESULT DOCUMENTED/REVIEW: ICD-10-PCS | Mod: HCNC,CPTII,95, | Performed by: CLINICAL NEUROPSYCHOLOGIST

## 2022-12-13 PROCEDURE — 1157F PR ADVANCE CARE PLAN OR EQUIV PRESENT IN MEDICAL RECORD: ICD-10-PCS | Mod: HCNC,CPTII,95, | Performed by: CLINICAL NEUROPSYCHOLOGIST

## 2022-12-13 PROCEDURE — 3066F NEPHROPATHY DOC TX: CPT | Mod: HCNC,CPTII,95, | Performed by: CLINICAL NEUROPSYCHOLOGIST

## 2022-12-13 PROCEDURE — 3060F POS MICROALBUMINURIA REV: CPT | Mod: HCNC,CPTII,95, | Performed by: CLINICAL NEUROPSYCHOLOGIST

## 2022-12-13 PROCEDURE — 4010F ACE/ARB THERAPY RXD/TAKEN: CPT | Mod: HCNC,CPTII,95, | Performed by: CLINICAL NEUROPSYCHOLOGIST

## 2022-12-13 PROCEDURE — 1157F ADVNC CARE PLAN IN RCRD: CPT | Mod: HCNC,CPTII,95, | Performed by: CLINICAL NEUROPSYCHOLOGIST

## 2022-12-13 PROCEDURE — 99499 UNLISTED E&M SERVICE: CPT | Mod: HCNC,95,, | Performed by: CLINICAL NEUROPSYCHOLOGIST

## 2022-12-13 PROCEDURE — 4010F PR ACE/ARB THEARPY RXD/TAKEN: ICD-10-PCS | Mod: HCNC,CPTII,95, | Performed by: CLINICAL NEUROPSYCHOLOGIST

## 2022-12-13 PROCEDURE — 3052F HG A1C>EQUAL 8.0%<EQUAL 9.0%: CPT | Mod: HCNC,CPTII,95, | Performed by: CLINICAL NEUROPSYCHOLOGIST

## 2022-12-13 PROCEDURE — 99499 NO LOS: ICD-10-PCS | Mod: HCNC,95,, | Performed by: CLINICAL NEUROPSYCHOLOGIST

## 2022-12-13 PROCEDURE — 90791 PSYCH DIAGNOSTIC EVALUATION: CPT | Mod: HCNC,95,FQ, | Performed by: CLINICAL NEUROPSYCHOLOGIST

## 2022-12-13 PROCEDURE — 3052F PR MOST RECENT HEMOGLOBIN A1C LEVEL 8.0 - < 9.0%: ICD-10-PCS | Mod: HCNC,CPTII,95, | Performed by: CLINICAL NEUROPSYCHOLOGIST

## 2022-12-13 PROCEDURE — 90791 PR PSYCHIATRIC DIAGNOSTIC EVALUATION: ICD-10-PCS | Mod: HCNC,95,FQ, | Performed by: CLINICAL NEUROPSYCHOLOGIST
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
## 2022-12-22 ENCOUNTER — TELEPHONE (OUTPATIENT)
Dept: FAMILY MEDICINE | Facility: CLINIC | Age: 72
End: 2022-12-22
Payer: MEDICARE

## 2022-12-22 NOTE — TELEPHONE ENCOUNTER
Called pt to reschedule appt 1/20 with michelle. No answer/ mailbox full.  Sent 24/7 Card message.

## 2022-12-29 ENCOUNTER — OFFICE VISIT (OUTPATIENT)
Dept: ORTHOPEDICS | Facility: CLINIC | Age: 72
End: 2022-12-29
Payer: MEDICARE

## 2022-12-29 VITALS — WEIGHT: 198 LBS | BODY MASS INDEX: 35.08 KG/M2 | HEIGHT: 63 IN

## 2022-12-29 DIAGNOSIS — M65.30 TRIGGER FINGER, UNSPECIFIED FINGER, UNSPECIFIED LATERALITY: ICD-10-CM

## 2022-12-29 DIAGNOSIS — M65.342 TRIGGER RING FINGER OF LEFT HAND: ICD-10-CM

## 2022-12-29 PROCEDURE — 99214 PR OFFICE/OUTPT VISIT, EST, LEVL IV, 30-39 MIN: ICD-10-PCS | Mod: HCNC,S$GLB,, | Performed by: ORTHOPAEDIC SURGERY

## 2022-12-29 PROCEDURE — 1125F PR PAIN SEVERITY QUANTIFIED, PAIN PRESENT: ICD-10-PCS | Mod: HCNC,CPTII,S$GLB, | Performed by: ORTHOPAEDIC SURGERY

## 2022-12-29 PROCEDURE — 1157F PR ADVANCE CARE PLAN OR EQUIV PRESENT IN MEDICAL RECORD: ICD-10-PCS | Mod: HCNC,CPTII,S$GLB, | Performed by: ORTHOPAEDIC SURGERY

## 2022-12-29 PROCEDURE — 3066F PR DOCUMENTATION OF TREATMENT FOR NEPHROPATHY: ICD-10-PCS | Mod: HCNC,CPTII,S$GLB, | Performed by: ORTHOPAEDIC SURGERY

## 2022-12-29 PROCEDURE — 99999 PR PBB SHADOW E&M-EST. PATIENT-LVL V: CPT | Mod: PBBFAC,HCNC,, | Performed by: ORTHOPAEDIC SURGERY

## 2022-12-29 PROCEDURE — 3052F HG A1C>EQUAL 8.0%<EQUAL 9.0%: CPT | Mod: HCNC,CPTII,S$GLB, | Performed by: ORTHOPAEDIC SURGERY

## 2022-12-29 PROCEDURE — 3052F PR MOST RECENT HEMOGLOBIN A1C LEVEL 8.0 - < 9.0%: ICD-10-PCS | Mod: HCNC,CPTII,S$GLB, | Performed by: ORTHOPAEDIC SURGERY

## 2022-12-29 PROCEDURE — 1101F PR PT FALLS ASSESS DOC 0-1 FALLS W/OUT INJ PAST YR: ICD-10-PCS | Mod: HCNC,CPTII,S$GLB, | Performed by: ORTHOPAEDIC SURGERY

## 2022-12-29 PROCEDURE — 1159F PR MEDICATION LIST DOCUMENTED IN MEDICAL RECORD: ICD-10-PCS | Mod: HCNC,CPTII,S$GLB, | Performed by: ORTHOPAEDIC SURGERY

## 2022-12-29 PROCEDURE — 99214 OFFICE O/P EST MOD 30 MIN: CPT | Mod: HCNC,S$GLB,, | Performed by: ORTHOPAEDIC SURGERY

## 2022-12-29 PROCEDURE — 3288F FALL RISK ASSESSMENT DOCD: CPT | Mod: HCNC,CPTII,S$GLB, | Performed by: ORTHOPAEDIC SURGERY

## 2022-12-29 PROCEDURE — 1101F PT FALLS ASSESS-DOCD LE1/YR: CPT | Mod: HCNC,CPTII,S$GLB, | Performed by: ORTHOPAEDIC SURGERY

## 2022-12-29 PROCEDURE — 3288F PR FALLS RISK ASSESSMENT DOCUMENTED: ICD-10-PCS | Mod: HCNC,CPTII,S$GLB, | Performed by: ORTHOPAEDIC SURGERY

## 2022-12-29 PROCEDURE — 3008F PR BODY MASS INDEX (BMI) DOCUMENTED: ICD-10-PCS | Mod: HCNC,CPTII,S$GLB, | Performed by: ORTHOPAEDIC SURGERY

## 2022-12-29 PROCEDURE — 1125F AMNT PAIN NOTED PAIN PRSNT: CPT | Mod: HCNC,CPTII,S$GLB, | Performed by: ORTHOPAEDIC SURGERY

## 2022-12-29 PROCEDURE — 4010F PR ACE/ARB THEARPY RXD/TAKEN: ICD-10-PCS | Mod: HCNC,CPTII,S$GLB, | Performed by: ORTHOPAEDIC SURGERY

## 2022-12-29 PROCEDURE — 1157F ADVNC CARE PLAN IN RCRD: CPT | Mod: HCNC,CPTII,S$GLB, | Performed by: ORTHOPAEDIC SURGERY

## 2022-12-29 PROCEDURE — 3066F NEPHROPATHY DOC TX: CPT | Mod: HCNC,CPTII,S$GLB, | Performed by: ORTHOPAEDIC SURGERY

## 2022-12-29 PROCEDURE — 3008F BODY MASS INDEX DOCD: CPT | Mod: HCNC,CPTII,S$GLB, | Performed by: ORTHOPAEDIC SURGERY

## 2022-12-29 PROCEDURE — 3060F POS MICROALBUMINURIA REV: CPT | Mod: HCNC,CPTII,S$GLB, | Performed by: ORTHOPAEDIC SURGERY

## 2022-12-29 PROCEDURE — 99999 PR PBB SHADOW E&M-EST. PATIENT-LVL V: ICD-10-PCS | Mod: PBBFAC,HCNC,, | Performed by: ORTHOPAEDIC SURGERY

## 2022-12-29 PROCEDURE — 3060F PR POS MICROALBUMINURIA RESULT DOCUMENTED/REVIEW: ICD-10-PCS | Mod: HCNC,CPTII,S$GLB, | Performed by: ORTHOPAEDIC SURGERY

## 2022-12-29 PROCEDURE — 4010F ACE/ARB THERAPY RXD/TAKEN: CPT | Mod: HCNC,CPTII,S$GLB, | Performed by: ORTHOPAEDIC SURGERY

## 2022-12-29 PROCEDURE — 1159F MED LIST DOCD IN RCRD: CPT | Mod: HCNC,CPTII,S$GLB, | Performed by: ORTHOPAEDIC SURGERY

## 2022-12-29 RX ORDER — TRAMADOL HYDROCHLORIDE 50 MG/1
50 TABLET ORAL EVERY 6 HOURS PRN
Qty: 30 TABLET | Refills: 0 | Status: SHIPPED | OUTPATIENT
Start: 2022-12-29 | End: 2023-01-08

## 2022-12-29 RX ORDER — CEFAZOLIN SODIUM 2 G/50ML
2 SOLUTION INTRAVENOUS
Status: CANCELLED | OUTPATIENT
Start: 2022-12-29

## 2022-12-29 NOTE — PROGRESS NOTES
CC:  Locked trigger finger left ring finger        HPI:  Cecilia Barahona is a very pleasant 72 y.o. female with painful locking of the left ring finger for the past several months   For the past several weeks has actually been stuck down in a flexed position   She has seen someone else for this who tried an injection without any relief  She is referred for possible surgery   No numbness or tingling is reported         PAST MEDICAL HISTORY:   Past Medical History:   Diagnosis Date    Acute coronary syndrome 2020: Presents with unstable angina.    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    Nuclear sclerosis of both eyes 2019    Obesity     CHETNA (obstructive sleep apnea)     Psychiatric problem     Psychosis     Renal disorder     Sleep difficulties     Suicide attempt     about 30 years ago by overdose of pills    Therapy     Type II or unspecified type diabetes mellitus without mention of complication, uncontrolled      PAST SURGICAL HISTORY:   Past Surgical History:   Procedure Laterality Date    CARPAL TUNNEL RELEASE       SECTION, CLASSIC      COLONOSCOPY N/A 3/5/2020    Procedure: COLONOSCOPY;  Surgeon: Wiliam Carrillo MD;  Location: [facility] ENDO (4TH FLR);  Service: Endoscopy;  Laterality: N/A;  20 appt confirmed-rb  pt requested this date    CORONARY ARTERY BYPASS GRAFT (CABG) N/A 2020    Procedure: CORONARY ARTERY BYPASS GRAFT (CABG) x3 ;  Surgeon: Yan Bowman MD;  Location: [facility] OR 2ND FLR;  Service: Cardiothoracic;  Laterality: N/A;  CABG  X3  Endoharvest time start 0906  Endoharvest time end 1003  Vein prep start 1004  Vein prep end 1048    LEFT HEART CATHETERIZATION N/A 5/20/2020    Procedure: HEART CATH-LEFT;  Surgeon: Etelvina Lowery MD;  Location: [facility] CATH LAB;  Service: Cardiology;  Laterality: N/A;    TOTAL ABDOMINAL HYSTERECTOMY      TRIGGER FINGER RELEASE       FAMILY HISTORY:   Family History   Problem Relation Age of Onset    Hypertension Mother     Diabetes Mother     Heart failure Mother     Cataracts Mother     Glaucoma Mother     Prostate cancer Father     Hypertension Father     Diabetes Father     Heart failure Father     No Known Problems Sister     No Known Problems Maternal Aunt     Diabetes Maternal Uncle     Hyperlipidemia Maternal Uncle     Hypertension Maternal Uncle     Diabetes Maternal Grandmother     Diabetes Maternal Grandfather     No Known Problems Paternal Grandmother     No Known Problems Paternal Grandfather     Diabetes Maternal Aunt     Alzheimer's disease Maternal Aunt     Schizophrenia Maternal Aunt     Heart disease Cousin         s/p heart transplant    No Known Problems Paternal Aunt     No Known Problems Paternal Uncle     Drug abuse Grandchild     Amblyopia Neg Hx     Blindness Neg Hx     Cancer Neg Hx     Macular degeneration Neg Hx     Retinal detachment Neg Hx     Strabismus Neg Hx     Stroke Neg Hx     Thyroid disease Neg Hx      SOCIAL HISTORY:   Social History     Socioeconomic History    Marital status:      Spouse name: Yuniel    Number of children: 3   Occupational History    Occupation: Retired   Tobacco Use    Smoking status: Former     Packs/day: 0.50     Types: Cigarettes     Quit date: 11/10/2012     Years since quitting: 10.1    Smokeless tobacco: Never   Substance and Sexual Activity    Alcohol use: Not Currently     Comment: drank socially in the past    Drug use: Not Currently     Types: Marijuana     Comment: tried once 6 months ago    Sexual activity: Not Currently     Partners:  Male   Other Topics Concern    Patient feels they ought to cut down on drinking/drug use No    Patient annoyed by others criticizing their drinking/drug use No    Patient has felt bad or guilty about drinking/drug use No    Patient has had a drink/used drugs as an eye opener in the AM No   Social History Narrative     x 1.    Has 3 grown children.    Lives with spouse.       MEDICATIONS:   Current Outpatient Medications:     albuterol (PROVENTIL/VENTOLIN HFA) 90 mcg/actuation inhaler, Inhale 2 puffs into the lungs every 6 (six) hours as needed for Wheezing or Shortness of Breath. Rescue, Disp: 19 g, Rfl: 0    alcohol swabs PadM, Apply 1 each topically 3 (three) times daily., Disp: 400 each, Rfl: 2    amLODIPine (NORVASC) 10 MG tablet, Take 1 tablet (10 mg total) by mouth once daily., Disp: 90 tablet, Rfl: 3    artificial tear ointment (ARTIFICIAL TEARS) Oint, Place into both eyes every evening., Disp: 305 g, Rfl: 1    aspirin (ECOTRIN) 81 MG EC tablet, Take 1 tablet (81 mg total) by mouth once daily., Disp: 90 tablet, Rfl: 1    atorvastatin (LIPITOR) 80 MG tablet, TAKE 1 TABLET(80 MG) BY MOUTH EVERY DAY, Disp: 90 tablet, Rfl: 3    azelastine (OPTIVAR) 0.05 % ophthalmic solution, INSTILL 1 DROP IN EACH EYE TWICE DAILY (Patient taking differently: PRN), Disp: 12 mL, Rfl: 0    blood pressure monitor (BLOOD PRESSURE KIT) Kit, 1 kit by Misc.(Non-Drug; Combo Route) route once daily., Disp: 1 each, Rfl: 0    blood sugar diagnostic Strp, To check BG 2 times daily, to use with insurance preferred meter, Disp: 200 each, Rfl: 3    carvediloL (COREG) 12.5 MG tablet, TAKE 1 TABLET(12.5 MG) BY MOUTH TWICE DAILY, Disp: 180 tablet, Rfl: 3    desoximetasone (TOPICORT) 0.05 % cream, Apply topically 2 (two) times daily as needed (itch)., Disp: 100 g, Rfl: 2    diclofenac (VOLTAREN) 50 MG EC tablet, TAKE 1 TABLET(50 MG) BY MOUTH TWICE DAILY, Disp: 60 tablet, Rfl: 0    diltiazem HCl (DILTIAZEM 2% CREAM), Apply topically 3 (three)  times daily. Apply topically to anal area., Disp: 30 g, Rfl: 2    dulaglutide (TRULICITY) 3 mg/0.5 mL pen injector, Inject 3 mg into the skin every 7 days., Disp: 4 pen, Rfl: 11    DULoxetine (CYMBALTA) 60 MG capsule, , Disp: , Rfl:     empagliflozin (JARDIANCE) 25 mg tablet, Take 1 tablet (25 mg total) by mouth once daily., Disp: 90 tablet, Rfl: 2    EScitalopram oxalate (LEXAPRO) 10 MG tablet, Take 1 tablet (10 mg total) by mouth once daily., Disp: 90 tablet, Rfl: 1    ezetimibe (ZETIA) 10 mg tablet, TAKE 1 TABLET(10 MG) BY MOUTH EVERY DAY, Disp: 90 tablet, Rfl: 3    flash glucose scanning reader (FREESTYLE ANAMARIA 2 READER) [facility] [place], Scan glucose every 8 hours, Disp: 1 each, Rfl: 0    flash glucose sensor (FREESTYLE ANAMARIA 2 SENSOR) Kit, Change every 14 days, to use with Freestyle Anamaria 2 reader, Disp: 3 kit, Rfl: 11    fluticasone (FLONASE) 50 mcg/actuation nasal spray, 1 spray (50 mcg total) by Each Nare route once daily., Disp: 16 g, Rfl: 5    furosemide (LASIX) 20 MG tablet, TAKE 1 TABLET(20 MG) BY MOUTH TWICE DAILY, Disp: 180 tablet, Rfl: 0    lancets Misc, To check BG 2 times daily, to use with insurance preferred meter, Disp: 200 each, Rfl: 3    latanoprost 0.005 % ophthalmic solution, INSTILL 1 DROP IN BOTH EYES EVERY EVENING, Disp: 7.5 mL, Rfl: 0    loratadine (CLARITIN) 10 mg tablet, TAKE 1 TABLET BY MOUTH EVERY DAY AS NEEDED FOR ALLERGIES, Disp: 90 tablet, Rfl: 1    melatonin (MELATIN) 3 mg tablet, Take 2 tablets (6 mg total) by mouth nightly as needed for Insomnia., Disp: , Rfl: 0    metFORMIN (GLUCOPHAGE) 1000 MG tablet, Take 1 tablet (1,000 mg total) by mouth 2 (two) times daily with meals., Disp: 180 tablet, Rfl: 2    metoprolol tartrate (LOPRESSOR) 25 MG tablet, , Disp: , Rfl:     multivitamin with minerals tablet, Take 1 tablet by mouth once daily., Disp: , Rfl:     ondansetron (ZOFRAN) 4 MG tablet, Take 1 tablet (4 mg total) by mouth every 6 (six) hours as needed for Nausea., Disp: 20 tablet, Rfl: 0     "pantoprazole (PROTONIX) 40 MG tablet, , Disp: , Rfl:     pen needle, diabetic (BD ULTRA-FINE LENY PEN NEEDLE) 32 gauge x 5/32" Ndle, 1 Device by Misc.(Non-Drug; Combo Route) route 4 (four) times daily with meals and nightly., Disp: 400 each, Rfl: 4    rOPINIRole (REQUIP) 0.5 MG tablet, TAKE 1 TABLET(0.5 MG) BY MOUTH EVERY EVENING, Disp: 90 tablet, Rfl: 0    TRUE METRIX GO GLUCOSE METER Misc, USE TO TEST TWICE DAILY, Disp: , Rfl:     TRUEPLUS LANCETS 30 gauge Misc, USE TO TEST TWICE DAILY, Disp: , Rfl:     valsartan (DIOVAN) 320 MG tablet, TAKE 1 TABLET(320 MG) BY MOUTH EVERY DAY, Disp: 90 tablet, Rfl: 3    varicella-zoster [id number], PF, (SHINGRIX) 50 mcg/0.5 mL injection, Inject into the muscle., Disp: 1 each, Rfl: 0    diazePAM (VALIUM) 5 MG tablet, Take 1 tablet (5 mg total) by mouth once. Please take 1 pill 1 hour before MRI. Can repeat dose after 1 hour for 1 dose, Disp: 2 tablet, Rfl: 0  ALLERGIES:   Review of patient's allergies indicates:   Allergen Reactions    Ace inhibitors Other (See Comments)     cough    Invokana [canagliflozin] Other (See Comments)     Throat and tongue swelling       Review of Systems:  Constitutional: no fever or chills  ENT: no nasal congestion or sore throat  Respiratory: no cough or shortness of breath  Cardiovascular: no chest pain or palpitations  Gastrointestinal: no nausea or vomiting, PUD, GERD, NSAID intolerance  Genitourinary: no hematuria or dysuria  Integument/Breast: no rash or pruritis  Hematologic/Lymphatic: no easy bruising or lymphadenopathy  Musculoskeletal: see HPI  Neurological: no seizures or tremors  Behavioral/Psych: no auditory or visual hallucinations      Physical Exam   Vitals:    12/29/22 1523   Weight: 89.8 kg (198 lb)   Height: 5' 3" (1.6 m)   PainSc:   9       Constitutional: Oriented to person, place, and time. Appears well-developed and well-nourished.   HENT:   Head: Normocephalic and atraumatic.   Nose: Nose normal.   Eyes: No scleral icterus. " "  Neck: Normal range of motion.   Cardiovascular: Normal rate and regular rhythm.    Pulses:       Radial pulses are 2+ on the right side, and 2+ on the left side.   Pulmonary/Chest: Effort normal and breath sounds normal.   Abdominal: Soft.   Neurological: Alert and oriented to person, place, and time.   Skin: Skin is warm.   Psychiatric: Normal mood and affect.     MUSCULOSKELETAL UPPER EXTREMITY:  Examination left hand there is a locked trigger finger of the left ring finger which is held in flexion   After some manipulation is possible to extend the finger with some pain   There is tenderness at the A1 pulley   Tendon function otherwise normal Tinel sign negative Phalen's test negative sensation intact all digits no atrophy            Diagnostic Studies:  None        Assessment:  Locked trigger left ring finger    Plan:  I explained the nature of the problem to the patient   At this point I have recommended surgical treatment she is in agreement  Will set up surgery for trigger release left ring finger as an outpatient surgery   In the meantime try warm water soaks and splinting at night      The risks and benefits of surgery were discussed with the patient today and they understand.  The consent was signed in the office for surgery.      Shayan Schaefer MD (Jay)  [facility]  Orthopedic Upper Extremity Surgery       "

## 2023-01-05 ENCOUNTER — TELEPHONE (OUTPATIENT)
Dept: NEUROLOGY | Facility: CLINIC | Age: 73
End: 2023-01-05
Payer: MEDICARE

## 2023-01-31 ENCOUNTER — TELEPHONE (OUTPATIENT)
Dept: FAMILY MEDICINE | Facility: CLINIC | Age: 73
End: 2023-01-31

## 2023-01-31 NOTE — TELEPHONE ENCOUNTER
----- Message from Lisa Chin sent at 1/30/2023  3:12 PM CST -----  Type: Patient Call Back    Who called:daughter    What is the request in detail:pt daughter wanted to make sure that [facility] paperwork was received and filled out. Please call    Can the clinic reply by [facility]?    Would the patient rather a call back or a response via [facility]? call    Best call back number:[phone] (home) [phone] (work)

## 2023-02-01 ENCOUNTER — TELEPHONE (OUTPATIENT)
Dept: ORTHOPEDICS | Facility: CLINIC | Age: 73
End: 2023-02-01

## 2023-02-01 NOTE — TELEPHONE ENCOUNTER
----- Message from Lilo Woodruff sent at 2/1/2023  3:13 PM CST -----  Type:  procedure     Who Called: Pt   Would the patient rather a call back or a response via [facility]? Call   Best Call Back Number: [phone]  Additional Information:     Requesting arrival time for procedure on 02/03

## 2023-02-01 NOTE — TELEPHONE ENCOUNTER
Spoke with patient. Informed that hospital will contact her tomorrow afternoon with procedure arrival time. Patient verbalized understanding.

## 2023-02-02 ENCOUNTER — OFFICE VISIT (OUTPATIENT)
Dept: CARDIOLOGY | Facility: CLINIC | Age: 73
End: 2023-02-02
Attending: INTERNAL MEDICINE
Payer: MEDICARE

## 2023-02-02 VITALS
HEIGHT: 63 IN | SYSTOLIC BLOOD PRESSURE: 145 MMHG | BODY MASS INDEX: 34.65 KG/M2 | WEIGHT: 195.56 LBS | HEART RATE: 51 BPM | OXYGEN SATURATION: 95 % | DIASTOLIC BLOOD PRESSURE: 65 MMHG

## 2023-02-02 DIAGNOSIS — I10 ESSENTIAL HYPERTENSION: ICD-10-CM

## 2023-02-02 DIAGNOSIS — E78.1 HYPERTRIGLYCERIDEMIA: ICD-10-CM

## 2023-02-02 DIAGNOSIS — E66.9 MILD OBESITY: ICD-10-CM

## 2023-02-02 DIAGNOSIS — E11.59 TYPE 2 DIABETES MELLITUS WITH OTHER CIRCULATORY COMPLICATION, WITHOUT LONG-TERM CURRENT USE OF INSULIN: ICD-10-CM

## 2023-02-02 DIAGNOSIS — I70.0 ATHEROSCLEROSIS OF AORTA: ICD-10-CM

## 2023-02-02 DIAGNOSIS — I25.10 CORONARY ARTERY DISEASE INVOLVING NATIVE CORONARY ARTERY OF NATIVE HEART WITHOUT ANGINA PECTORIS: ICD-10-CM

## 2023-02-02 DIAGNOSIS — Z86.73 HISTORY OF CEREBROVASCULAR ACCIDENT: ICD-10-CM

## 2023-02-02 DIAGNOSIS — Z95.1 HISTORY OF CORONARY ARTERY BYPASS SURGERY: ICD-10-CM

## 2023-02-02 DIAGNOSIS — I10 PRIMARY HYPERTENSION: ICD-10-CM

## 2023-02-02 DIAGNOSIS — Z86.718 HISTORY OF DEEP VEIN THROMBOSIS: ICD-10-CM

## 2023-02-02 DIAGNOSIS — E78.00 HYPERCHOLESTEROLEMIA: ICD-10-CM

## 2023-02-02 PROCEDURE — 3077F PR MOST RECENT SYSTOLIC BLOOD PRESSURE >= 140 MM HG: ICD-10-PCS | Mod: CPTII,S$GLB,, | Performed by: INTERNAL MEDICINE

## 2023-02-02 PROCEDURE — 3288F FALL RISK ASSESSMENT DOCD: CPT | Mod: CPTII,S$GLB,, | Performed by: INTERNAL MEDICINE

## 2023-02-02 PROCEDURE — 1160F RVW MEDS BY RX/DR IN RCRD: CPT | Mod: CPTII,S$GLB,, | Performed by: INTERNAL MEDICINE

## 2023-02-02 PROCEDURE — 1159F PR MEDICATION LIST DOCUMENTED IN MEDICAL RECORD: ICD-10-PCS | Mod: CPTII,S$GLB,, | Performed by: INTERNAL MEDICINE

## 2023-02-02 PROCEDURE — 1126F AMNT PAIN NOTED NONE PRSNT: CPT | Mod: CPTII,S$GLB,, | Performed by: INTERNAL MEDICINE

## 2023-02-02 PROCEDURE — 99214 PR OFFICE/OUTPT VISIT, EST, LEVL IV, 30-39 MIN: ICD-10-PCS | Mod: S$GLB,,, | Performed by: INTERNAL MEDICINE

## 2023-02-02 PROCEDURE — 3078F DIAST BP <80 MM HG: CPT | Mod: CPTII,S$GLB,, | Performed by: INTERNAL MEDICINE

## 2023-02-02 PROCEDURE — 1160F PR REVIEW ALL MEDS BY PRESCRIBER/CLIN PHARMACIST DOCUMENTED: ICD-10-PCS | Mod: CPTII,S$GLB,, | Performed by: INTERNAL MEDICINE

## 2023-02-02 PROCEDURE — 1157F ADVNC CARE PLAN IN RCRD: CPT | Mod: CPTII,S$GLB,, | Performed by: INTERNAL MEDICINE

## 2023-02-02 PROCEDURE — 1100F PTFALLS ASSESS-DOCD GE2>/YR: CPT | Mod: CPTII,S$GLB,, | Performed by: INTERNAL MEDICINE

## 2023-02-02 PROCEDURE — 99999 PR PBB SHADOW E&M-EST. PATIENT-LVL II: ICD-10-PCS | Mod: PBBFAC,,, | Performed by: INTERNAL MEDICINE

## 2023-02-02 PROCEDURE — 1126F PR PAIN SEVERITY QUANTIFIED, NO PAIN PRESENT: ICD-10-PCS | Mod: CPTII,S$GLB,, | Performed by: INTERNAL MEDICINE

## 2023-02-02 PROCEDURE — 99999 PR PBB SHADOW E&M-EST. PATIENT-LVL II: CPT | Mod: PBBFAC,,, | Performed by: INTERNAL MEDICINE

## 2023-02-02 PROCEDURE — 3008F PR BODY MASS INDEX (BMI) DOCUMENTED: ICD-10-PCS | Mod: CPTII,S$GLB,, | Performed by: INTERNAL MEDICINE

## 2023-02-02 PROCEDURE — 3008F BODY MASS INDEX DOCD: CPT | Mod: CPTII,S$GLB,, | Performed by: INTERNAL MEDICINE

## 2023-02-02 PROCEDURE — 1100F PR PT FALLS ASSESS DOC 2+ FALLS/FALL W/INJURY/YR: ICD-10-PCS | Mod: CPTII,S$GLB,, | Performed by: INTERNAL MEDICINE

## 2023-02-02 PROCEDURE — 3288F PR FALLS RISK ASSESSMENT DOCUMENTED: ICD-10-PCS | Mod: CPTII,S$GLB,, | Performed by: INTERNAL MEDICINE

## 2023-02-02 PROCEDURE — 3078F PR MOST RECENT DIASTOLIC BLOOD PRESSURE < 80 MM HG: ICD-10-PCS | Mod: CPTII,S$GLB,, | Performed by: INTERNAL MEDICINE

## 2023-02-02 PROCEDURE — 99214 OFFICE O/P EST MOD 30 MIN: CPT | Mod: S$GLB,,, | Performed by: INTERNAL MEDICINE

## 2023-02-02 PROCEDURE — 1157F PR ADVANCE CARE PLAN OR EQUIV PRESENT IN MEDICAL RECORD: ICD-10-PCS | Mod: CPTII,S$GLB,, | Performed by: INTERNAL MEDICINE

## 2023-02-02 PROCEDURE — 1159F MED LIST DOCD IN RCRD: CPT | Mod: CPTII,S$GLB,, | Performed by: INTERNAL MEDICINE

## 2023-02-02 PROCEDURE — 3077F SYST BP >= 140 MM HG: CPT | Mod: CPTII,S$GLB,, | Performed by: INTERNAL MEDICINE

## 2023-02-02 RX ORDER — ASPIRIN 81 MG/1
81 TABLET ORAL DAILY
Qty: 90 TABLET | Refills: 3 | Status: SHIPPED | OUTPATIENT
Start: 2023-02-02

## 2023-02-02 RX ORDER — ATORVASTATIN CALCIUM 80 MG/1
80 TABLET, FILM COATED ORAL DAILY
Qty: 90 TABLET | Refills: 3 | Status: SHIPPED | OUTPATIENT
Start: 2023-02-02

## 2023-02-02 RX ORDER — EZETIMIBE 10 MG/1
10 TABLET ORAL DAILY
Qty: 90 TABLET | Refills: 3 | Status: SHIPPED | OUTPATIENT
Start: 2023-02-02

## 2023-02-02 RX ORDER — VALSARTAN 320 MG/1
320 TABLET ORAL DAILY
Qty: 90 TABLET | Refills: 3 | Status: SHIPPED | OUTPATIENT
Start: 2023-02-02 | End: 2024-02-15

## 2023-02-02 RX ORDER — AMLODIPINE BESYLATE 10 MG/1
10 TABLET ORAL DAILY
Qty: 90 TABLET | Refills: 3 | Status: SHIPPED | OUTPATIENT
Start: 2023-02-02

## 2023-02-02 RX ORDER — FUROSEMIDE 20 MG/1
20 TABLET ORAL DAILY
Qty: 90 TABLET | Refills: 3 | Status: SHIPPED | OUTPATIENT
Start: 2023-02-02

## 2023-02-02 RX ORDER — CARVEDILOL 12.5 MG/1
12.5 TABLET ORAL 2 TIMES DAILY
Qty: 180 TABLET | Refills: 3 | Status: SHIPPED | OUTPATIENT
Start: 2023-02-02 | End: 2023-06-05

## 2023-02-02 NOTE — PROGRESS NOTES
Subjective:     Cecilia Barahona is a 72 y.o. female with hypertension, hypercholesterolemia, hypertriglyceridemia and diabetes mellitus type 2. She is mildly obese. She appears to have had a cerebrovascular accident in about 2004 when she had an episode of weakness in her left hand. She had exertional chest discomfort in 12/2019. She was given isosorbidemononitrate and the chest discomfort resolved. She had a stress MPI that was negative. On 5/16/2020 she began to experience a mild pressure over her chest. The heaviness lasted most of the day. Walking in her house made the discomfort more pronounced. There was no radiation of the pain and neither any associated dyspnea or diaphoresis. She had similar discomfort on 5/17/2020 and 5/18/2020. She told her daughter that took her to an urgent care center. She received two tablets of nitroglycerin  sublingually and the chest discomfort resolved. There were no acute ST-T wave changes. She was advised admission and was transferred to [facility]. She has had no further chest pain after presentation. On 5/20/2020 she underwent coronary angiography that revealed severe three vessel coronary artery disease. There was basal inferior mild hypokinesia with an ejection fraction of 60%. On 5/25/2020 she underwent coronary artery bypass grafting receiving three grafts. The left internal mammary artery was bypassed to the left anterior descending coronary artery and a sequential saphenous vein graft was placed to the second obtuse marginal branch and the fourth obtuse marginal branch. A few days after surgery she had a deep venous thrombosis of her left leg and she was anticoagulated with apixiban. She was able to lose weight after the coronary artery bypass grafting and her diabetes and hypertension became easier to control. Some soreness and itching in the anterior chest wall. In 4/2021 she went to [place] to visit her sister. In 3/2022 she is still  bothered by occasional chest wall pain. In 2022 her   after a lengthy hospital stay. She was not taking all of her prescribed medication during her husbands illness but got back on all her usual medications on about 2023. No exertional chest pain. Mild exertional shortness of breath. No palpitations or weak spells. No issues with any of her prescribed medications. Feeling fair overall.         Coronary Artery Disease  Presents for follow-up visit. Pertinent negatives include no chest pain, chest pressure, chest tightness, dizziness, leg swelling, muscle weakness, palpitations, shortness of breath or weight gain. Risk factors include hyperlipidemia. Risk factors do not include obesity. The symptoms have been stable.   Cerebrovascular Accident  This is a chronic problem. The problem has been resolved. Pertinent negatives include no abdominal pain, anorexia, arthralgias, change in bowel habit, chest pain, chills, congestion, coughing, diaphoresis, fatigue, fever, headaches, joint swelling, myalgias, nausea, neck pain, numbness, rash, sore throat, swollen glands, urinary symptoms, vertigo, visual change, vomiting or weakness.   Hypertension  This is a chronic problem. The current episode started more than 1 year ago. The problem is unchanged. The problem is controlled (usually 120-130/65-75 mmHg at home). Pertinent negatives include no anxiety, blurred vision, chest pain, headaches, malaise/fatigue, neck pain, orthopnea, palpitations, peripheral edema, PND, shortness of breath or sweats. There is no history of chronic renal disease.   Hyperlipidemia  This is a chronic problem. The current episode started more than 1 year ago. The problem is controlled. Exacerbating diseases include diabetes. She has no history of chronic renal disease, hypothyroidism, liver disease, obesity or nephrotic syndrome. Pertinent negatives include no chest pain, focal weakness, leg pain, myalgias or shortness of breath.      Review of Systems   Constitutional: Negative for chills, diaphoresis, fatigue, fever, malaise/fatigue and weight gain.   HENT:  Negative for congestion, nosebleeds and sore throat.    Eyes:  Negative for blurred vision, double vision, vision loss in left eye and vision loss in right eye.   Cardiovascular:  Positive for dyspnea on exertion. Negative for chest pain, claudication, irregular heartbeat, leg swelling, near-syncope, orthopnea, palpitations, paroxysmal nocturnal dyspnea and syncope.   Respiratory:  Negative for chest tightness, cough, hemoptysis, shortness of breath and wheezing.    Endocrine: Negative for cold intolerance and heat intolerance.   Hematologic/Lymphatic: Negative for bleeding problem. Does not bruise/bleed easily.   Skin:  Negative for color change and rash.   Musculoskeletal:  Negative for arthralgias, back pain, falls, joint swelling, muscle weakness, myalgias and neck pain.   Gastrointestinal:  Negative for abdominal pain, anorexia, change in bowel habit, heartburn, hematemesis, hematochezia, hemorrhoids, jaundice, melena, nausea and vomiting.   Genitourinary:  Negative for dysuria and hematuria.   Neurological:  Negative for dizziness, focal weakness, headaches, light-headedness, loss of balance, numbness, vertigo and weakness.   Psychiatric/Behavioral:  Negative for altered mental status, depression and memory loss. The patient is not nervous/anxious.    Allergic/Immunologic: Negative for hives and persistent infections.       Current Outpatient Medications on File Prior to Visit   Medication Sig Dispense Refill    albuterol (PROVENTIL/VENTOLIN HFA) 90 mcg/actuation inhaler Inhale 2 puffs into the lungs every 6 (six) hours as needed for Wheezing or Shortness of Breath. Rescue 19 g 0    alcohol swabs PadM Apply 1 each topically 3 (three) times daily. 400 each 2    amLODIPine (NORVASC) 10 MG tablet Take 1 tablet (10 mg total) by mouth once daily. 90 tablet 3    artificial tear ointment  (ARTIFICIAL TEARS) Oint Place into both eyes every evening. 305 g 1    aspirin (ECOTRIN) 81 MG EC tablet Take 1 tablet (81 mg total) by mouth once daily. 90 tablet 1    atorvastatin (LIPITOR) 80 MG tablet TAKE 1 TABLET(80 MG) BY MOUTH EVERY DAY 90 tablet 3    carvediloL (COREG) 12.5 MG tablet TAKE 1 TABLET(12.5 MG) BY MOUTH TWICE DAILY 180 tablet 3    dulaglutide (TRULICITY) 3 mg/0.5 mL pen injector Inject 3 mg into the skin every 7 days. 4 pen 11    empagliflozin (JARDIANCE) 25 mg tablet Take 1 tablet (25 mg total) by mouth once daily. 90 tablet 2    EScitalopram oxalate (LEXAPRO) 10 MG tablet Take 1 tablet (10 mg total) by mouth once daily. 90 tablet 1    ezetimibe (ZETIA) 10 mg tablet TAKE 1 TABLET(10 MG) BY MOUTH EVERY DAY 90 tablet 3    fluticasone (FLONASE) 50 mcg/actuation nasal spray 1 spray (50 mcg total) by Each Nare route once daily. 16 g 5    furosemide (LASIX) 20 MG tablet TAKE 1 TABLET(20 MG) BY MOUTH TWICE DAILY 180 tablet 0    loratadine (CLARITIN) 10 mg tablet TAKE 1 TABLET BY MOUTH EVERY DAY AS NEEDED FOR ALLERGIES 90 tablet 1    melatonin (MELATIN) 3 mg tablet Take 2 tablets (6 mg total) by mouth nightly as needed for Insomnia.  0    metoprolol tartrate (LOPRESSOR) 25 MG tablet       rOPINIRole (REQUIP) 0.5 MG tablet TAKE 1 TABLET(0.5 MG) BY MOUTH EVERY EVENING 90 tablet 0    valsartan (DIOVAN) 320 MG tablet TAKE 1 TABLET(320 MG) BY MOUTH EVERY DAY 90 tablet 3    varicella-zoster [id number], PF, (SHINGRIX) 50 mcg/0.5 mL injection Inject into the muscle. 1 each 0    azelastine (OPTIVAR) 0.05 % ophthalmic solution INSTILL 1 DROP IN EACH EYE TWICE DAILY (Patient not taking: Reported on 2/2/2023) 12 mL 0    blood pressure monitor (BLOOD PRESSURE KIT) Kit 1 kit by Misc.(Non-Drug; Combo Route) route once daily. (Patient not taking: Reported on 2/2/2023) 1 each 0    blood sugar diagnostic Strp To check BG 2 times daily, to use with insurance preferred meter (Patient not taking: Reported on 2/2/2023) 200  "each 3    desoximetasone (TOPICORT) 0.05 % cream Apply topically 2 (two) times daily as needed (itch). (Patient not taking: Reported on 2/2/2023) 100 g 2    diazePAM (VALIUM) 5 MG tablet Take 1 tablet (5 mg total) by mouth once. Please take 1 pill 1 hour before MRI. Can repeat dose after 1 hour for 1 dose 2 tablet 0    diclofenac (VOLTAREN) 50 MG EC tablet TAKE 1 TABLET(50 MG) BY MOUTH TWICE DAILY (Patient not taking: Reported on 2/2/2023) 60 tablet 0    diltiazem HCl (DILTIAZEM 2% CREAM) Apply topically 3 (three) times daily. Apply topically to anal area. (Patient not taking: Reported on 2/2/2023) 30 g 2    DULoxetine (CYMBALTA) 60 MG capsule       flash glucose scanning reader (FREESTYLE ANAMARIA 2 READER) [facility] Scan glucose every 8 hours (Patient not taking: Reported on 2/2/2023) 1 each 0    flash glucose sensor (FREESTYLE ANAMARIA 2 SENSOR) Kit Change every 14 days, to use with Freestyle Anamaria 2 reader (Patient not taking: Reported on 2/2/2023) 3 kit 11    lancets [facility] To check BG 2 times daily, to use with insurance preferred meter (Patient not taking: Reported on 2/2/2023) 200 each 3    latanoprost 0.005 % ophthalmic solution INSTILL 1 DROP IN BOTH EYES EVERY EVENING (Patient not taking: Reported on 2/2/2023) 7.5 mL 0    metFORMIN (GLUCOPHAGE) 1000 MG tablet Take 1 tablet (1,000 mg total) by mouth 2 (two) times daily with meals. (Patient not taking: Reported on 2/2/2023) 180 tablet 2    multivitamin with minerals tablet Take 1 tablet by mouth once daily.      ondansetron (ZOFRAN) 4 MG tablet Take 1 tablet (4 mg total) by mouth every 6 (six) hours as needed for Nausea. (Patient not taking: Reported on 2/2/2023) 20 tablet 0    pantoprazole (PROTONIX) 40 MG tablet       pen needle, diabetic (BD ULTRA-FINE LENY PEN NEEDLE) 32 gauge x 5/32" Ndle 1 Device by Misc.(Non-Drug; Combo Route) route 4 (four) times daily with meals and nightly. (Patient not taking: Reported on 2/2/2023) 400 each 4    TRUE METRIX GO GLUCOSE METER " "Misc USE TO TEST TWICE DAILY      TRUEPLUS LANCETS 30 gauge Misc USE TO TEST TWICE DAILY       No current facility-administered medications on file prior to visit.       BP (!) 145/65 (BP Location: Right arm, Patient Position: Sitting, BP Method: Medium (Manual))   Pulse (!) 51   Ht 5' 3" (1.6 m)   Wt 88.7 kg (195 lb 8.8 oz)   SpO2 95%   BMI 34.64 kg/m²       Objective:     Physical Exam  Constitutional:       General: She is not in acute distress.     Appearance: Normal appearance. She is well-developed. She is not toxic-appearing or diaphoretic.   HENT:      Head: Normocephalic and atraumatic.      Nose: Nose normal.   Eyes:      General:         Right eye: No discharge.         Left eye: No discharge.      Conjunctiva/sclera:      Right eye: Right conjunctiva is not injected.      Left eye: Left conjunctiva is not injected.      Pupils: Pupils are equal.      Right eye: Pupil is round.      Left eye: Pupil is round.   Neck:      Thyroid: No thyromegaly.      Vascular: No carotid bruit or JVD.   Cardiovascular:      Rate and Rhythm: Regular rhythm. Bradycardia present. No extrasystoles are present.     Chest Wall: PMI is not displaced.      Pulses:           Radial pulses are 2+ on the right side and 2+ on the left side.        Femoral pulses are 2+ on the right side and 2+ on the left side.       Dorsalis pedis pulses are 2+ on the right side and 2+ on the left side.        Posterior tibial pulses are 2+ on the right side and 2+ on the left side.      Heart sounds: S1 normal and S2 normal. Murmur heard.   Midsystolic murmur is present with a grade of 2/6.     Gallop present. S4 sounds present. No S3 sounds.   Pulmonary:      Effort: Pulmonary effort is normal.      Breath sounds: Normal breath sounds.   Chest:      Comments: Midline sternotomy scar.  Abdominal:      Palpations: Abdomen is soft.      Tenderness: There is no abdominal tenderness.   Musculoskeletal:      Cervical back: Neck supple.      Right " ankle: No swelling, deformity or ecchymosis.      Left ankle: No swelling, deformity or ecchymosis.   Lymphadenopathy:      Head:      Right side of head: No submandibular adenopathy.      Left side of head: No submandibular adenopathy.      Cervical: No cervical adenopathy.   Skin:     General: Skin is warm and dry.      Findings: No rash.      Nails: There is no clubbing.   Neurological:      General: No focal deficit present.      Mental Status: She is alert and oriented to person, place, and time. She is not disoriented.      Cranial Nerves: No cranial nerve deficit.   Psychiatric:         Attention and Perception: Attention and perception normal.         Mood and Affect: Mood and affect normal.         Speech: Speech normal.         Behavior: Behavior normal.         Thought Content: Thought content normal.         Cognition and Memory: Cognition and memory normal.         Judgment: Judgment normal.       Assessment:     1. Coronary artery disease involving native coronary artery of native heart without angina pectoris    2. History of coronary artery bypass surgery    3. History of cerebrovascular accident    4. Atherosclerosis of aorta    5. History of deep vein thrombosis    6. Primary hypertension    7. Hypercholesterolemia    8. Hypertriglyceridemia    9. Type 2 diabetes mellitus with other circulatory complication, without long-term current use of insulin    10. Mild obesity        Plan:     1. Coronary Artery Disease              12/2019: Began experience chest pain. Resolved with isosorbidemononitrate.              12/19/2019: Stress MPI:3:39 min. No CP. ECG negative. MPI: Mild to moderate fixed anterior and anteroapical defect. EF 73%.              5/18/2020: Presented with more pronounced chest pain. Resolved with NTG sl.              ECG without acute changes. Troponin x 3 negative.               5/19/2020: Echo: Normal left ventricular size and systolic function. EF 75%. Mildly dilated LA. Mild  aortic valve sclerosis. Mild mitral valve sclerosis.              5/20/2020: [facility]: Cath: LAD: Prox 30%. Mid 70%. Distal 80%. OM2: Osteal 90%. OM3: 95%. OM4 90%. RCA: Mid subtotal. LV: Basal inferior mild hypokinesia. EF 60%.   5/25/2020: [facility]: CABG x 3: LIMA to LAD and SVG1 to OM2 & OM4.   On carvedilol 12.5 mg Q12.              On aspirin 81 mg Q24.   Doing well.   2/2/2023: Do CBC, CMP, BNP and lipid panel. Enroll in exercise program.              2. History of Cerebrovascular Accident              2004: Weakness in left hand. Affected memory. No sequela.   On aspirin 81 mg Q24.    3. Atherosclerosis of Aorta   6/3/2020: CT Scan: Noted.    All risk factors addressed.     4. History of Deep Venous Trombosis of Lower Extremity   5/2020: DVT left leg after CABG.   Received apixiban for 3 months.     5. Hypertension              2000: Diagnosed.   9/1/2020: Furosemide 20 mg Q24 and KCl 20 mEq Q24 were discontinued.   Used to be on metoprolol 25 mg Q12, amlodipine 10 mg Q24, valsartan 320 mg Q24, furosemide 20 mg Q24 and chlorthalidone 25 mg Q24.   10/13/2020: Valsartan 320 mg Q24 was resumed. Amlodipine 10 mg Q24 and furosemide 20 mg Q24 were discontinued.   2/23/2022: Metoprolol 25 mg Q12 was changed to carvedilol 12.5 mg Q12 and amlodipine 5 mg Q24 was increased to amlodipine 10 mg Q24 as pressure was running 150-160/70-80 mmHg at home.     On carvedilol 12.5 mg Q12, amlodipine 10 mg Q24, valsartan 320 mg Q24 and furosemide 20 mg Q24.   Keeping log at home.   Well controlled.      5. Hypercholesterolemia              2000: Began statin.              On atorvastatin 40 mg Q24.                 5/19/2020: Chol 204. HDL 41. . .              On atorvastatin 80 mg Q24 and ezetimibe 10 mg Q24.   8/18/2020: Chol 161. HDL 46. LDL 82. .   2/24/2022: Chol 94. HDL 37. LDL 42. TG 73.   On atorvastatin 80 mg Q24 and ezetimibe 10 mg Q24.   Very favorable lipid panel.    6. Hypertriglyceridemia   2020:  Diagnosed.   As above.     7. Diabetes Mellitus, Type 2              2000: Diagnosed. Complications: CVA & CAD. Medications: Insulin and SGLT2i.   Used to be on semaglutide 4 mg Q weekly.   On empagliflozin 10 mg Q24.      8. Mild Obesity              5/19/2020: Weight 86 kg. BMI 33.   Encourage to lose weight.     9. Primary Care              Dr. Yudi Smart.    F/u 3 month.    Etelvina Lowery M.D.

## 2023-02-03 ENCOUNTER — HOSPITAL ENCOUNTER (OUTPATIENT)
Facility: HOSPITAL | Age: 73
Discharge: HOME | End: 2023-02-03
Attending: ORTHOPAEDIC SURGERY | Admitting: ORTHOPAEDIC SURGERY
Payer: MEDICARE

## 2023-02-03 ENCOUNTER — ANESTHESIA (OUTPATIENT)
Dept: SURGERY | Facility: HOSPITAL | Age: 73
End: 2023-02-03
Payer: MEDICARE

## 2023-02-03 ENCOUNTER — ANESTHESIA EVENT (OUTPATIENT)
Dept: SURGERY | Facility: HOSPITAL | Age: 73
End: 2023-02-03
Payer: MEDICARE

## 2023-02-03 VITALS
TEMPERATURE: 98 F | HEART RATE: 61 BPM | HEIGHT: 62 IN | SYSTOLIC BLOOD PRESSURE: 143 MMHG | RESPIRATION RATE: 16 BRPM | DIASTOLIC BLOOD PRESSURE: 64 MMHG | OXYGEN SATURATION: 95 % | BODY MASS INDEX: 35.7 KG/M2 | WEIGHT: 194 LBS

## 2023-02-03 DIAGNOSIS — M65.342 TRIGGER RING FINGER OF LEFT HAND: ICD-10-CM

## 2023-02-03 DIAGNOSIS — G56.02 CARPAL TUNNEL SYNDROME OF LEFT WRIST: Primary | ICD-10-CM

## 2023-02-03 DIAGNOSIS — I10 HYPERTENSION: ICD-10-CM

## 2023-02-03 DIAGNOSIS — M65.30 TRIGGER FINGER, UNSPECIFIED FINGER, UNSPECIFIED LATERALITY: ICD-10-CM

## 2023-02-03 LAB
ANION GAP SERPL CALC-SCNC: 9 MMOL/L (ref 8–16)
BUN SERPL-MCNC: 15 MG/DL (ref 8–23)
CALCIUM SERPL-MCNC: 9.7 MG/DL (ref 8.7–10.5)
CHLORIDE SERPL-SCNC: 105 MMOL/L (ref 95–110)
CO2 SERPL-SCNC: 27 MMOL/L (ref 23–29)
CREAT SERPL-MCNC: 0.8 MG/DL (ref 0.5–1.4)
EST. GFR  (NO RACE VARIABLE): >60 ML/MIN/1.73 M^2
GLUCOSE SERPL-MCNC: 158 MG/DL (ref 70–110)
POCT GLUCOSE: 148 MG/DL (ref 70–110)
POTASSIUM SERPL-SCNC: 4.3 MMOL/L (ref 3.5–5.1)
SODIUM SERPL-SCNC: 141 MMOL/L (ref 136–145)

## 2023-02-03 PROCEDURE — 99900035 HC TECH TIME PER 15 MIN (STAT)

## 2023-02-03 PROCEDURE — 36000706: Performed by: ORTHOPAEDIC SURGERY

## 2023-02-03 PROCEDURE — D9220A PRA ANESTHESIA: ICD-10-PCS | Mod: ANES,,, | Performed by: ANESTHESIOLOGY

## 2023-02-03 PROCEDURE — 71000015 HC POSTOP RECOV 1ST HR: Performed by: ORTHOPAEDIC SURGERY

## 2023-02-03 PROCEDURE — 63600175 PHARM REV CODE 636 W HCPCS: Performed by: ORTHOPAEDIC SURGERY

## 2023-02-03 PROCEDURE — 36000707: Performed by: ORTHOPAEDIC SURGERY

## 2023-02-03 PROCEDURE — 26055 PR INCISE FINGER TENDON SHEATH: ICD-10-PCS | Mod: F3,,, | Performed by: ORTHOPAEDIC SURGERY

## 2023-02-03 PROCEDURE — 93010 EKG 12-LEAD: ICD-10-PCS | Mod: ,,, | Performed by: INTERNAL MEDICINE

## 2023-02-03 PROCEDURE — 37000009 HC ANESTHESIA EA ADD 15 MINS: Performed by: ORTHOPAEDIC SURGERY

## 2023-02-03 PROCEDURE — 93010 ELECTROCARDIOGRAM REPORT: CPT | Mod: ,,, | Performed by: INTERNAL MEDICINE

## 2023-02-03 PROCEDURE — D9220A PRA ANESTHESIA: Mod: CRNA,,, | Performed by: NURSE ANESTHETIST, CERTIFIED REGISTERED

## 2023-02-03 PROCEDURE — 26055 INCISE FINGER TENDON SHEATH: CPT | Mod: F3,,, | Performed by: ORTHOPAEDIC SURGERY

## 2023-02-03 PROCEDURE — 36415 COLL VENOUS BLD VENIPUNCTURE: CPT | Performed by: ORTHOPAEDIC SURGERY

## 2023-02-03 PROCEDURE — 93005 ELECTROCARDIOGRAM TRACING: CPT | Mod: 59

## 2023-02-03 PROCEDURE — 25000003 PHARM REV CODE 250: Performed by: ORTHOPAEDIC SURGERY

## 2023-02-03 PROCEDURE — D9220A PRA ANESTHESIA: ICD-10-PCS | Mod: CRNA,,, | Performed by: NURSE ANESTHETIST, CERTIFIED REGISTERED

## 2023-02-03 PROCEDURE — 25000003 PHARM REV CODE 250: Performed by: NURSE ANESTHETIST, CERTIFIED REGISTERED

## 2023-02-03 PROCEDURE — D9220A PRA ANESTHESIA: Mod: ANES,,, | Performed by: ANESTHESIOLOGY

## 2023-02-03 PROCEDURE — 37000008 HC ANESTHESIA 1ST 15 MINUTES: Performed by: ORTHOPAEDIC SURGERY

## 2023-02-03 PROCEDURE — 63600175 PHARM REV CODE 636 W HCPCS: Performed by: NURSE ANESTHETIST, CERTIFIED REGISTERED

## 2023-02-03 PROCEDURE — 80048 BASIC METABOLIC PNL TOTAL CA: CPT | Performed by: ORTHOPAEDIC SURGERY

## 2023-02-03 RX ORDER — OXYCODONE HYDROCHLORIDE 5 MG/1
10 TABLET ORAL EVERY 4 HOURS PRN
Status: DISCONTINUED | OUTPATIENT
Start: 2023-02-03 | End: 2023-02-03 | Stop reason: HOSPADM

## 2023-02-03 RX ORDER — SODIUM CHLORIDE, SODIUM LACTATE, POTASSIUM CHLORIDE, CALCIUM CHLORIDE 600; 310; 30; 20 MG/100ML; MG/100ML; MG/100ML; MG/100ML
INJECTION, SOLUTION INTRAVENOUS CONTINUOUS PRN
Status: DISCONTINUED | OUTPATIENT
Start: 2023-02-03 | End: 2023-02-03

## 2023-02-03 RX ORDER — CEFAZOLIN SODIUM 2 G/50ML
2 SOLUTION INTRAVENOUS
Status: DISCONTINUED | OUTPATIENT
Start: 2023-02-03 | End: 2023-02-03 | Stop reason: HOSPADM

## 2023-02-03 RX ORDER — HYDROCODONE BITARTRATE AND ACETAMINOPHEN 5; 325 MG/1; MG/1
1 TABLET ORAL EVERY 4 HOURS PRN
Qty: 12 TABLET | Refills: 0 | Status: SHIPPED | OUTPATIENT
Start: 2023-02-03

## 2023-02-03 RX ORDER — LIDOCAINE HYDROCHLORIDE 10 MG/ML
INJECTION, SOLUTION EPIDURAL; INFILTRATION; INTRACAUDAL; PERINEURAL
Status: DISCONTINUED | OUTPATIENT
Start: 2023-02-03 | End: 2023-02-03 | Stop reason: HOSPADM

## 2023-02-03 RX ORDER — ACETAMINOPHEN 325 MG/1
650 TABLET ORAL EVERY 4 HOURS PRN
Status: DISCONTINUED | OUTPATIENT
Start: 2023-02-03 | End: 2023-02-03 | Stop reason: HOSPADM

## 2023-02-03 RX ORDER — KETOROLAC TROMETHAMINE 30 MG/ML
15 INJECTION, SOLUTION INTRAMUSCULAR; INTRAVENOUS ONCE
Status: DISCONTINUED | OUTPATIENT
Start: 2023-02-03 | End: 2023-02-03 | Stop reason: HOSPADM

## 2023-02-03 RX ORDER — SODIUM CHLORIDE 0.9 % (FLUSH) 0.9 %
3 SYRINGE (ML) INJECTION
Status: DISCONTINUED | OUTPATIENT
Start: 2023-02-03 | End: 2023-02-03 | Stop reason: HOSPADM

## 2023-02-03 RX ORDER — LIDOCAINE HCL/PF 100 MG/5ML
SYRINGE (ML) INTRAVENOUS
Status: DISCONTINUED | OUTPATIENT
Start: 2023-02-03 | End: 2023-02-03

## 2023-02-03 RX ORDER — MEPERIDINE HYDROCHLORIDE 50 MG/ML
12.5 INJECTION INTRAMUSCULAR; INTRAVENOUS; SUBCUTANEOUS ONCE AS NEEDED
Status: CANCELLED | OUTPATIENT
Start: 2023-02-03 | End: 2023-02-04

## 2023-02-03 RX ORDER — ONDANSETRON 2 MG/ML
4 INJECTION INTRAMUSCULAR; INTRAVENOUS ONCE AS NEEDED
Status: CANCELLED | OUTPATIENT
Start: 2023-02-03 | End: 2034-07-01

## 2023-02-03 RX ORDER — ONDANSETRON 8 MG/1
8 TABLET, ORALLY DISINTEGRATING ORAL EVERY 8 HOURS PRN
Status: DISCONTINUED | OUTPATIENT
Start: 2023-02-03 | End: 2023-02-03 | Stop reason: HOSPADM

## 2023-02-03 RX ORDER — MIDAZOLAM HYDROCHLORIDE 1 MG/ML
INJECTION INTRAMUSCULAR; INTRAVENOUS
Status: DISCONTINUED | OUTPATIENT
Start: 2023-02-03 | End: 2023-02-03

## 2023-02-03 RX ORDER — PROPOFOL 10 MG/ML
INJECTION, EMULSION INTRAVENOUS CONTINUOUS PRN
Status: DISCONTINUED | OUTPATIENT
Start: 2023-02-03 | End: 2023-02-03

## 2023-02-03 RX ORDER — BUPIVACAINE HYDROCHLORIDE 5 MG/ML
INJECTION, SOLUTION EPIDURAL; INTRACAUDAL
Status: DISCONTINUED | OUTPATIENT
Start: 2023-02-03 | End: 2023-02-03 | Stop reason: HOSPADM

## 2023-02-03 RX ORDER — HYDROMORPHONE HYDROCHLORIDE 2 MG/ML
0.2 INJECTION, SOLUTION INTRAMUSCULAR; INTRAVENOUS; SUBCUTANEOUS EVERY 5 MIN PRN
Status: CANCELLED | OUTPATIENT
Start: 2023-02-03

## 2023-02-03 RX ORDER — PROPOFOL 10 MG/ML
INJECTION, EMULSION INTRAVENOUS
Status: DISCONTINUED | OUTPATIENT
Start: 2023-02-03 | End: 2023-02-03

## 2023-02-03 RX ADMIN — CEFAZOLIN SODIUM 2 G: 2 SOLUTION INTRAVENOUS at 08:02

## 2023-02-03 RX ADMIN — PROPOFOL 70 MG: 10 INJECTION, EMULSION INTRAVENOUS at 08:02

## 2023-02-03 RX ADMIN — LIDOCAINE HYDROCHLORIDE 50 MG: 20 INJECTION, SOLUTION INTRAVENOUS at 08:02

## 2023-02-03 RX ADMIN — MIDAZOLAM HYDROCHLORIDE 2 MG: 1 INJECTION, SOLUTION INTRAMUSCULAR; INTRAVENOUS at 08:02

## 2023-02-03 RX ADMIN — PROPOFOL 125 MCG/KG/MIN: 10 INJECTION, EMULSION INTRAVENOUS at 08:02

## 2023-02-03 RX ADMIN — SODIUM CHLORIDE, SODIUM LACTATE, POTASSIUM CHLORIDE, AND CALCIUM CHLORIDE: .6; .31; .03; .02 INJECTION, SOLUTION INTRAVENOUS at 08:02

## 2023-02-03 NOTE — DISCHARGE INSTRUCTIONS
After Hand Surgery  After surgery, the better you take care of yourself--especially your hand--the sooner it will heal. Follow your surgeons instructions. Try not to bump your hand, and dont move or lift anything while youre still wearing bandages, a splint, or a cast.    Care for your hand    Keep your hand elevated above heart level as much as possible for the first several days after surgery. This helps reduce swelling and pain.  To help prevent infection and speed healing, take care not to get your cast or bandages wet.    Relieve pain as directed  Your surgeon may prescribe pain medicine or suggest you take an anti-inflammatory medicine. You might also be instructed to apply ice (or another cold source) to your hand. If you use ice cubes, put them in a plastic bag and rest it on top of your bandages. Leave the cold source on your hand for as long as its comfortable. Do this several times a day for the first few days after surgery. It may take several minutes before you can feel the cold through the cast or bandages.    Follow up with your surgeon  During a follow-up visit after surgery, your surgeon will check your progress. The stitches, bandages, splint, or cast may be removed. A new cast or splint may be placed. If your hand has healed enough, your surgeon may prescribe exercises.    Call your surgeon if you have...  A fever higher than 100.4°F (38.0°C) taken by mouth  Side effects from your medicine, such as prolonged nausea  A wet or loose dressing, or a dressing that is too tight  Excessive bleeding  Increased, ongoing pain or numbness  Signs of infection (such as drainage, warmth, or redness) at the incision site      ANESTHESIA  -For the first 24 hours after surgery:  Do not drive, use heavy equipment, make important decisions, or drink alcohol  -It is normal to feel sleepy for several hours.  Rest until you are more awake.  -Have someone stay with you, if needed.  They can watch for problems and  help keep you safe.  -Some possible post anesthesia side effects include: nausea and vomiting, sore throat and hoarseness, sleepiness, and dizziness.    PAIN  -If you have pain after surgery, pain medicine will help you feel better.  Take it as directed, before pain becomes severe.  Most pain relievers taken by mouth need at least 20-30 minutes to start working.  -Do not drive or drink alcohol while taking pain medicine.  -Pain medication can upset your stomach.  Taking them with a little food may help.  -Other ways to help control pain: elevation, ice, and relaxation  -Call your surgeon if still having unmanageable pain an hour after taking pain medicine.  -Pain medicine can cause constipation.  Taking an over-the counter stool softener while on prescription pain medicine and drinking plenty of fluids can prevent this side effect.  -Call your surgeon if you have severe side effects like: breathing problems, trouble waking up, dizziness, confusion, or severe constipation.    NAUSEA  -Some people have nausea after surgery.  This is often because of anesthesia, pain, pain medicine, or the stress of surgery.  -Do not push yourself to eat.  Start off with clear liquids and soup.  Slowly move to solid foods.  Don't eat fatty, rich, spicy foods at first.  Eat smaller amounts.  -If you develop persistent nausea and vomiting please notify your surgeon immediately.    BLEEDING  -Different types of surgery require different types of care and dressing changes.  It is important to follow all instructions and advice from your surgeon.  Change dressing as directed.  Call your surgeon for any concerns regarding postop bleeding.    SIGNS OF INFECTION  -Signs of infection include: fever, swelling, drainage, and redness  -Notify your surgeon if you have a fever of 100.4 F (38.0 C) or higher.  -Notify your surgeon if you notice redness, swelling, increased pain, pus, or a foul smell at the incision site.    Notify Dr. Schaefer for any  questions or concerns

## 2023-02-03 NOTE — ANESTHESIA POSTPROCEDURE EVALUATION
Anesthesia Post Evaluation    Patient: Cecilia Barahona    Procedure(s) Performed: Procedure(s) (LRB):  RELEASE, TRIGGER FINGER (Left)    Final Anesthesia Type: general      Patient location during evaluation: [facility]  Patient participation: Yes- Able to Participate  Level of consciousness: awake and alert  Post-procedure vital signs: reviewed and stable  Pain management: adequate  Airway patency: patent    PONV status at discharge: No PONV  Anesthetic complications: no      Cardiovascular status: hemodynamically stable  Respiratory status: unassisted, spontaneous ventilation and room air  Hydration status: euvolemic            Vitals Value Taken Time   /64 02/03/23 0855   Temp 36.7 °C (98 °F) 02/03/23 0855   Pulse 61 02/03/23 0855   Resp 16 02/03/23 0855   SpO2 95 % 02/03/23 0855         No case tracking events are documented in the log.      Pain/Billy Score: No data recorded

## 2023-02-03 NOTE — OPERATIVE NOTE ADDENDUM
Certification of Assistant at Surgery       Surgery Date: 2/3/2023     Participating Surgeons:  Surgeon(s) and Role:     * Shayan Schaefer Jr., MD - Primary    Procedures:  Procedure(s) (LRB):  RELEASE, TRIGGER FINGER (Left)    Assistant Surgeon's Certification of Necessity:  I understand that section 1842 (b) (6) (d) of the Social Security Act generally prohibits Medicare Part B reasonable charge payment for the services of assistants at surgery in teaching hospitals when qualified residents are available to furnish such services. I certify that the services for which payment is claimed were medically necessary, and that no qualified resident was available to perform the services. I further understand that these services are subject to post-payment review by the Medicare carrier.      Radha De La Torre PA-C    02/03/2023  8:48 AM

## 2023-02-03 NOTE — ANESTHESIA PREPROCEDURE EVALUATION
02/03/2023  Cecilia Barahona is a 72 y.o., female.      Pre-op Assessment    I have reviewed the Patient Summary Reports.     I have reviewed the Nursing Notes.    I have reviewed the Medications.     Review of Systems  Anesthesia Hx:  Denies Family Hx of Anesthesia complications.   Denies Personal Hx of Anesthesia complications.        Patient Active Problem List   Diagnosis    Hypertension    Mild obesity    Glaucoma NEC    Type 2 diabetes, uncontrolled, with neuropathy    Hypercholesterolemia    Cervical spondylosis    Trigger ring finger of right hand    Long-term insulin use    Diabetes mellitus, type 2    Gastroesophageal reflux disease without esophagitis    History of positive PPD - treated - remote past    Carpal tunnel syndrome of left wrist    Incomplete bladder emptying    Restless legs syndrome (RLS)    Impaired mobility    Calcific tendinitis of left shoulder    Greater trochanteric bursitis of right hip    Refractive error    Nuclear sclerosis of both eyes    Primary open-angle glaucoma, bilateral, moderate stage    Chronic constipation    Mild major depression    Coronary artery disease    Bilateral carotid artery stenosis    History of coronary artery bypass surgery    History of deep vein thrombosis    History of cerebrovascular accident    Hypertriglyceridemia    Atherosclerosis of aorta    Primary open angle glaucoma (POAG) of left eye, moderate stage    Mild nonproliferative diabetic retinopathy of left eye without macular edema associated with type 2 diabetes mellitus    Moderate nonproliferative diabetic retinopathy of right eye without macular edema associated with type 2 diabetes mellitus    Decreased muscle strength    Decreased ROM of neck    Postural imbalance    Occlusion and stenosis of bilateral carotid arteries    Anxiety disorder,  unspecified    Personal history of nicotine dependence    Trigger ring finger of left hand       Past Medical History:   Diagnosis Date    Acute coronary syndrome 5/20/2020 5/18/2020: Presents with unstable angina.   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 1/5/2022    Nuclear sclerosis of both eyes 2/26/2019    Obesity     CHETNA (obstructive sleep apnea)     Psychiatric problem     Psychosis     Renal disorder     Sleep difficulties     Suicide attempt     about 30 years ago by overdose of pills    Therapy     Type II or unspecified type diabetes mellitus without mention of complication, uncontrolled        ECHO: Results for orders placed during the hospital encounter of 05/18/20    Echo Color Flow Doppler? Yes    Interpretation Summary  · Normal left ventricular systolic function. The estimated ejection fraction is 75%.  · Normal right ventricular systolic function.  · Grade I (mild) left ventricular diastolic dysfunction consistent with impaired relaxation.  · Mild left atrial enlargement.  · Mild aortic valve sclerosis.  · Moderate mitral sclerosis.  · The estimated PA systolic pressure is 10 mmHg.  · Normal central venous pressure (3 mmHg).      There is no height or weight on file to calculate BMI.    Tobacco Use: Medium Risk    Smoking Tobacco Use: Former    Smokeless Tobacco Use: Never    Passive Exposure: Not on file       Social History     Substance and Sexual Activity   Drug Use Not Currently    Types: Marijuana    Comment: tried once 6 months ago        Alcohol  Use: Not on file       Review of patient's allergies indicates:   Allergen Reactions    Ace inhibitors Other (See Comments)     cough    Invokana [canagliflozin] Other (See Comments)     Throat and tongue swelling         Airway:  No value filed.     Physical Exam    Airway:  Mallampati: II   Mouth Opening: Normal  TM Distance: Normal          Anesthesia Plan  Type of Anesthesia, risks & benefits discussed:    Anesthesia Type: Gen Natural Airway  Intra-op Monitoring Plan: Standard ASA Monitors  Post Op Pain Control Plan: multimodal analgesia  Induction:  IV  Informed Consent: Informed consent signed with the Patient and all parties understand the risks and agree with anesthesia plan.  All questions answered.   ASA Score: 3  Day of Surgery Review of History & Physical: H&P Update referred to the surgeon/provider.  Anesthesia Plan Notes: Previous anesthesia record: Moderately difficult mask ventilation with oral airway    Ready For Surgery From Anesthesia Perspective.     .

## 2023-02-03 NOTE — OP NOTE
[facility] - [facility] (Hospital)  Operative Note      Date of Procedure: 2/3/2023     Procedure: Procedure(s) (LRB):  RELEASE, TRIGGER FINGER (Left)     Surgeon(s) and Role:     * Shayan Schaefer Jr., MD - Primary    Assisting Surgeon: None    Pre-Operative Diagnosis: Trigger ring finger of left hand [M65.342]    Post-Operative Diagnosis: Post-Op Diagnosis Codes:     * Trigger ring finger of left hand [M65.342]    Anesthesia: Local MAC    Operative Findings (including complications, if any):  Ring left ring finger    Description of Technical Procedures:     Preop diagnosis: Trigger finger left ring finger    Postop diagnosis: Same.      Operative procedure:  Trigger release left ring finger.      Surgeon: Silvano.      First assistant: Britt    Anesthesia:  Mac.      EBL:  Minimal.      Complications:  None.      Operative procedure in detail as follows:    After operative consent was obtained the patient brought the operating room placed supine operating table.  Anesthesia by IV sedation followed by injection of xylocaine Marcaine combination operative site left palm.  Tourniquet applied left arm left upper extremity prepped and draped out normal sterile fashion.  Esmarch used to exsanguinated the limb tourniquet inflated 225 mmHg.      Following this an oblique incision made in the distal crease left palm with the base of the ring finger.  Careful dissection used to expose the A1 pulley.  It was released longitudinally with the D[place] blade and scissors.  Range of motion then checked noted to be full without triggering.  Digital nerves carefully protected.  The wound irrigated and closed with interrupted 5 O nylon horizontal mattress suture on the skin.  Sterile dressing applied followed by light wrap and the tourniquet deflated.  Patient brought to the recovery room in stable condition all sponge needle counts reported as correct.  No complications.    Significant Surgical Tasks Conducted by the Assistant(s), if  Applicable: retraction    Estimated Blood Loss (EBL): * No values recorded between 2/3/2023  8:33 AM and 2/3/2023  8:51 AM *           Implants: * No implants in log *    Specimens:   Specimen (24h ago, onward)      None                    Condition: Good    Disposition: PACU - hemodynamically stable.    Attestation: I was present and scrubbed for the entire procedure.    Discharge Note    OUTCOME: Patient tolerated treatment/procedure well without complication and is now ready for discharge.    DISPOSITION: Home or Self Care    FINAL DIAGNOSIS:  Trigger ring finger of left hand    FOLLOWUP: In clinic    DISCHARGE INSTRUCTIONS:    Discharge Procedure Orders   Diet general     Call MD for:  temperature >100.4     Call MD for:  persistent nausea and vomiting     Call MD for:  severe uncontrolled pain     Keep surgical extremity elevated     Remove dressing in 72 hours

## 2023-02-03 NOTE — TRANSFER OF CARE
"Anesthesia Transfer of Care Note    Patient: Cecilia Barahona    Procedure(s) Performed: Procedure(s) (LRB):  RELEASE, TRIGGER FINGER (Left)    Patient location: [facility]    Anesthesia Type: general    Transport from OR: Transported from OR on room air with adequate spontaneous ventilation    Post pain: adequate analgesia    Post assessment: no apparent anesthetic complications and tolerated procedure well    Post vital signs: stable    Level of consciousness: awake    Nausea/Vomiting: no nausea/vomiting    Complications: none    Transfer of care protocol was followed      Last vitals:   Visit Vitals  BP (!) 146/65 (BP Location: Right arm, Patient Position: Lying)   Pulse (!) 54   Temp 36.8 °C (98.2 °F) (Skin)   Resp 16   Ht 5' 2" (1.575 m)   Wt 88 kg (194 lb)   SpO2 96%   Breastfeeding No   BMI 35.48 kg/m²     "

## 2023-02-03 NOTE — H&P
CC:  Locked trigger finger left ring finger           HPI:  Cecilia Barahona is a very pleasant 72 y.o. female with painful locking of the left ring finger for the past several months   For the past several weeks has actually been stuck down in a flexed position   She has seen someone else for this who tried an injection without any relief  She is referred for possible surgery   No numbness or tingling is reported            PAST MEDICAL HISTORY:        Past Medical History:   Diagnosis Date    Acute coronary syndrome 2020: Presents with unstable angina.    Allergic rhinitis, seasonal      Anxiety      Arthritis      Colon polyp      CVA (cerebral vascular accident)      Depression      Glaucoma NEC      Hallucination       images out of corner of eye about a year ago    History of positive PPD - treated - remote past      Hx of psychiatric care      Hyperlipidemia LDL goal < 100      Hypertension      Mild nonproliferative diabetic retinopathy of left eye without macular edema associated with type 2 diabetes mellitus 2022    Nuclear sclerosis of both eyes 2019    Obesity      CHETNA (obstructive sleep apnea)      Psychiatric problem      Psychosis      Renal disorder      Sleep difficulties      Suicide attempt       about 30 years ago by overdose of pills    Therapy      Type II or unspecified type diabetes mellitus without mention of complication, uncontrolled        PAST SURGICAL HISTORY:         Past Surgical History:   Procedure Laterality Date    CARPAL TUNNEL RELEASE         SECTION, CLASSIC        COLONOSCOPY N/A 3/5/2020     Procedure: COLONOSCOPY;  Surgeon: Wiliam Carrillo MD;  [facility]  Service: Endoscopy;  Laterality: N/A;  20 appt confirmed-rb  pt requested this date    CORONARY ARTERY BYPASS GRAFT (CABG) N/A 2020     Procedure: CORONARY ARTERY BYPASS GRAFT (CABG) x3 ;  Surgeon: Yan Bowman MD;  Location: [facility] OR [facility] FLR;   Service: Cardiothoracic;  Laterality: N/A;  CABG X3  Endoharvest time start 0906  Endoharvest time end 1003  Vein prep start 1004  Vein prep end 1048    LEFT HEART CATHETERIZATION N/A 5/20/2020     Procedure: HEART CATH-LEFT;  Surgeon: Etelvina Lowery MD;  Location: [facility] CATH LAB;  Service: Cardiology;  Laterality: N/A;    TOTAL ABDOMINAL HYSTERECTOMY        TRIGGER FINGER RELEASE          FAMILY HISTORY:         Family History   Problem Relation Age of Onset    Hypertension Mother      Diabetes Mother      Heart failure Mother      Cataracts Mother      Glaucoma Mother      Prostate cancer Father      Hypertension Father      Diabetes Father      Heart failure Father      No Known Problems Sister      No Known Problems Maternal Aunt      Diabetes Maternal Uncle      Hyperlipidemia Maternal Uncle      Hypertension Maternal Uncle      Diabetes Maternal Grandmother      Diabetes Maternal Grandfather      No Known Problems Paternal Grandmother      No Known Problems Paternal Grandfather      Diabetes Maternal Aunt      Alzheimer's disease Maternal Aunt      Schizophrenia Maternal Aunt      Heart disease Cousin           s/p heart transplant    No Known Problems Paternal Aunt      No Known Problems Paternal Uncle      Drug abuse Grandchild      Amblyopia Neg Hx      Blindness Neg Hx      Cancer Neg Hx      Macular degeneration Neg Hx      Retinal detachment Neg Hx      Strabismus Neg Hx      Stroke Neg Hx      Thyroid disease Neg Hx        SOCIAL HISTORY:   Social History               Socioeconomic History    Marital status:        Spouse name: Yuniel    Number of children: 3   Occupational History    Occupation: Retired   Tobacco Use    Smoking status: Former       Packs/day: 0.50       Types: Cigarettes       Quit date: 11/10/2012       Years since quitting: 10.1    Smokeless tobacco: Never   Substance and Sexual Activity    Alcohol use: Not Currently       Comment: drank socially in the past    Drug use: Not  Currently       Types: Marijuana       Comment: tried once 6 months ago    Sexual activity: Not Currently       Partners: Male   Other Topics Concern    Patient feels they ought to cut down on drinking/drug use No    Patient annoyed by others criticizing their drinking/drug use No    Patient has felt bad or guilty about drinking/drug use No    Patient has had a drink/used drugs as an eye opener in the AM No   Social History Narrative      x 1.     Has 3 grown children.     Lives with spouse.            MEDICATIONS:   Current Outpatient Medications:     albuterol (PROVENTIL/VENTOLIN HFA) 90 mcg/actuation inhaler, Inhale 2 puffs into the lungs every 6 (six) hours as needed for Wheezing or Shortness of Breath. Rescue, Disp: 19 g, Rfl: 0    alcohol swabs PadM, Apply 1 each topically 3 (three) times daily., Disp: 400 each, Rfl: 2    amLODIPine (NORVASC) 10 MG tablet, Take 1 tablet (10 mg total) by mouth once daily., Disp: 90 tablet, Rfl: 3    artificial tear ointment (ARTIFICIAL TEARS) Oint, Place into both eyes every evening., Disp: 305 g, Rfl: 1    aspirin (ECOTRIN) 81 MG EC tablet, Take 1 tablet (81 mg total) by mouth once daily., Disp: 90 tablet, Rfl: 1    atorvastatin (LIPITOR) 80 MG tablet, TAKE 1 TABLET(80 MG) BY MOUTH EVERY DAY, Disp: 90 tablet, Rfl: 3    azelastine (OPTIVAR) 0.05 % ophthalmic solution, INSTILL 1 DROP IN EACH EYE TWICE DAILY (Patient taking differently: PRN), Disp: 12 mL, Rfl: 0    blood pressure monitor (BLOOD PRESSURE KIT) Kit, 1 kit by Misc.(Non-Drug; Combo Route) route once daily., Disp: 1 each, Rfl: 0    blood sugar diagnostic Strp, To check BG 2 times daily, to use with insurance preferred meter, Disp: 200 each, Rfl: 3    carvediloL (COREG) 12.5 MG tablet, TAKE 1 TABLET(12.5 MG) BY MOUTH TWICE DAILY, Disp: 180 tablet, Rfl: 3    desoximetasone (TOPICORT) 0.05 % cream, Apply topically 2 (two) times daily as needed (itch)., Disp: 100 g, Rfl: 2    diclofenac (VOLTAREN) 50 MG EC tablet,  TAKE 1 TABLET(50 MG) BY MOUTH TWICE DAILY, Disp: 60 tablet, Rfl: 0    diltiazem HCl (DILTIAZEM 2% CREAM), Apply topically 3 (three) times daily. Apply topically to anal area., Disp: 30 g, Rfl: 2    dulaglutide (TRULICITY) 3 mg/0.5 mL pen injector, Inject 3 mg into the skin every 7 days., Disp: 4 pen, Rfl: 11    DULoxetine (CYMBALTA) 60 MG capsule, , Disp: , Rfl:     empagliflozin (JARDIANCE) 25 mg tablet, Take 1 tablet (25 mg total) by mouth once daily., Disp: 90 tablet, Rfl: 2    EScitalopram oxalate (LEXAPRO) 10 MG tablet, Take 1 tablet (10 mg total) by mouth once daily., Disp: 90 tablet, Rfl: 1    ezetimibe (ZETIA) 10 mg tablet, TAKE 1 TABLET(10 MG) BY MOUTH EVERY DAY, Disp: 90 tablet, Rfl: 3    flash glucose scanning reader (FREESTYLE [place] 2 READER) [facility] [place] glucose every 8 hours, Disp: 1 each, Rfl: 0    flash glucose sensor (FREESTYLE ANAMARIA 2 SENSOR) Kit, Change every 14 days, to use with Freestyle Anamaria 2 reader, Disp: 3 kit, Rfl: 11    fluticasone (FLONASE) 50 mcg/actuation nasal spray, 1 spray (50 mcg total) by Each Nare route once daily., Disp: 16 g, Rfl: 5    furosemide (LASIX) 20 MG tablet, TAKE 1 TABLET(20 MG) BY MOUTH TWICE DAILY, Disp: 180 tablet, Rfl: 0    lancets Misc, To check BG 2 times daily, to use with insurance preferred meter, Disp: 200 each, Rfl: 3    latanoprost 0.005 % ophthalmic solution, INSTILL 1 DROP IN BOTH EYES EVERY EVENING, Disp: 7.5 mL, Rfl: 0    loratadine (CLARITIN) 10 mg tablet, TAKE 1 TABLET BY MOUTH EVERY DAY AS NEEDED FOR ALLERGIES, Disp: 90 tablet, Rfl: 1    melatonin (MELATIN) 3 mg tablet, Take 2 tablets (6 mg total) by mouth nightly as needed for Insomnia., Disp: , Rfl: 0    metFORMIN (GLUCOPHAGE) 1000 MG tablet, Take 1 tablet (1,000 mg total) by mouth 2 (two) times daily with meals., Disp: 180 tablet, Rfl: 2    metoprolol tartrate (LOPRESSOR) 25 MG tablet, , Disp: , Rfl:     multivitamin with minerals tablet, Take 1 tablet by mouth once daily., Disp: , Rfl:      "ondansetron (ZOFRAN) 4 MG tablet, Take 1 tablet (4 mg total) by mouth every 6 (six) hours as needed for Nausea., Disp: 20 tablet, Rfl: 0    pantoprazole (PROTONIX) 40 MG tablet, , Disp: , Rfl:     pen needle, diabetic (BD ULTRA-FINE LENY PEN NEEDLE) 32 gauge x 5/32" Ndle, 1 Device by Misc.(Non-Drug; Combo Route) route 4 (four) times daily with meals and nightly., Disp: 400 each, Rfl: 4    rOPINIRole (REQUIP) 0.5 MG tablet, TAKE 1 TABLET(0.5 MG) BY MOUTH EVERY EVENING, Disp: 90 tablet, Rfl: 0    TRUE METRIX GO GLUCOSE METER Misc, USE TO TEST TWICE DAILY, Disp: , Rfl:     TRUEPLUS LANCETS 30 gauge Misc, USE TO TEST TWICE DAILY, Disp: , Rfl:     valsartan (DIOVAN) 320 MG tablet, TAKE 1 TABLET(320 MG) BY MOUTH EVERY DAY, Disp: 90 tablet, Rfl: 3    varicella-zoster [id number], PF, (SHINGRIX) 50 mcg/0.5 mL injection, Inject into the muscle., Disp: 1 each, Rfl: 0    diazePAM (VALIUM) 5 MG tablet, Take 1 tablet (5 mg total) by mouth once. Please take 1 pill 1 hour before MRI. Can repeat dose after 1 hour for 1 dose, Disp: 2 tablet, Rfl: 0  ALLERGIES:         Review of patient's allergies indicates:   Allergen Reactions    Ace inhibitors Other (See Comments)       cough    Invokana [canagliflozin] Other (See Comments)       Throat and tongue swelling         Review of Systems:  Constitutional: no fever or chills  ENT: no nasal congestion or sore throat  Respiratory: no cough or shortness of breath  Cardiovascular: no chest pain or palpitations  Gastrointestinal: no nausea or vomiting, PUD, GERD, NSAID intolerance  Genitourinary: no hematuria or dysuria  Integument/Breast: no rash or pruritis  Hematologic/Lymphatic: no easy bruising or lymphadenopathy  Musculoskeletal: see HPI  Neurological: no seizures or tremors  Behavioral/Psych: no auditory or visual hallucinations        Physical Exam       Vitals:     12/29/22 1523   Weight: 89.8 kg (198 lb)   Height: 5' 3" (1.6 m)   PainSc:   9         Constitutional: Oriented to person, " "place, and time. Appears well-developed and well-nourished.   HENT:   Head: Normocephalic and atraumatic.   Nose: Nose normal.   Eyes: No scleral icterus.   Neck: Normal range of motion.   Cardiovascular: Normal rate and regular rhythm.    Pulses:       Radial pulses are 2+ on the right side, and 2+ on the left side.   Pulmonary/Chest: Effort normal and breath sounds normal.   Abdominal: Soft.   Neurological: Alert and oriented to person, place, and time.   Skin: Skin is warm.   Psychiatric: Normal mood and affect.      MUSCULOSKELETAL UPPER EXTREMITY:  Examination left hand there is a locked trigger finger of the left ring finger which is held in flexion   After some manipulation is possible to extend the finger with some pain   There is tenderness at the A1 pulley   Tendon function otherwise normal Tinel sign negative Phalen's test negative sensation intact all digits no atrophy                 Diagnostic Studies:  None           Assessment:  Locked trigger left ring finger     Plan:  I explained the nature of the problem to the patient   At this point I have recommended surgical treatment she is in agreement  Will set up surgery for trigger release left ring finger as an outpatient surgery   In the meantime try warm water soaks and splinting at night        The risks and benefits of surgery were discussed with the patient today and they understand.  The consent was signed in the office for surgery.        Shayan Schaefer MD (Jay)  [facility]  Orthopedic Upper Extremity Surgery     "

## 2023-02-08 ENCOUNTER — TELEPHONE (OUTPATIENT)
Dept: ORTHOPEDICS | Facility: CLINIC | Age: 73
End: 2023-02-08

## 2023-02-08 NOTE — TELEPHONE ENCOUNTER
----- Message from Paris Martin sent at 2/8/2023 11:23 AM CST -----  PT REQUESTING TO SCHEDULE AN APPT     PT is requesting to schedule an appointment that our scheduling dept cannot schedule.    Who called: pt  Best call back #: [phone]  When pt wants appt: 2/17/23 at 1pm or 2/20/23 some time in the morning  Reason for appt:  Additional notes: pt wants to have her post op on 2/16/23 rescheduled due to her not having transportation because her daughter that drives her have school

## 2023-02-10 NOTE — PROGRESS NOTES
Subjective:      Patient ID: Cecilia Barahona is a 72 y.o. female.    Chief Complaint: Post-op Evaluation (L Trigger Finger )      HPI: Cecilia Barahona is here for a postoperative visit. she is 10 days s/p LEFT RING DIGIT trigger finger release and is doing well. Pt reports pain has been controlled and triggering has resolved. Post surgical complaints include: mild soreness to surgical site.     Past Medical History:   Diagnosis Date    Acute coronary syndrome 5/20/2020 5/18/2020: Presents with unstable angina.    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 Rescue, Disp: 19 g, Rfl: 0    alcohol swabs PadM, Apply 1 each topically 3 (three) times daily., Disp: 400 each, Rfl: 2    amLODIPine (NORVASC) 10 MG tablet, Take 1 tablet (10 mg total) by mouth once daily., Disp: 90 tablet, Rfl: 3    artificial tear ointment (ARTIFICIAL TEARS) Oint, Place into both eyes every evening., Disp: 305 g, Rfl: 1    aspirin (ECOTRIN) 81 MG EC tablet, Take 1 tablet (81 mg total) by  mouth once daily., Disp: 90 tablet, Rfl: 3    atorvastatin (LIPITOR) 80 MG tablet, Take 1 tablet (80 mg total) by mouth once daily., Disp: 90 tablet, Rfl: 3    azelastine (OPTIVAR) 0.05 % ophthalmic solution, INSTILL 1 DROP IN EACH EYE TWICE DAILY, Disp: 12 mL, Rfl: 0    blood pressure monitor (BLOOD PRESSURE KIT) Kit, 1 kit by Misc.(Non-Drug; Combo Route) route once daily., Disp: 1 each, Rfl: 0    blood sugar diagnostic Strp, To check BG 2 times daily, to use with insurance preferred meter, Disp: 200 each, Rfl: 3    carvediloL (COREG) 12.5 MG tablet, Take 1 tablet (12.5 mg total) by mouth 2 (two) times daily., Disp: 180 tablet, Rfl: 3    diazePAM (VALIUM) 5 MG tablet, Take 1 tablet (5 mg total) by mouth once. Please take 1 pill 1 hour before MRI. Can repeat dose after 1 hour for 1 dose, Disp: 2 tablet, Rfl: 0    diclofenac (VOLTAREN) 50 MG EC tablet, TAKE 1 TABLET(50 MG) BY MOUTH TWICE DAILY, Disp: 60 tablet, Rfl: 0    dulaglutide (TRULICITY) 3 mg/0.5 mL pen injector, Inject 3 mg into the skin every 7 days., Disp: 4 pen, Rfl: 11    DULoxetine (CYMBALTA) 60 MG capsule, , Disp: , Rfl:     empagliflozin (JARDIANCE) 25 mg tablet, Take 1 tablet (25 mg total) by mouth once daily., Disp: 90 tablet, Rfl: 2    EScitalopram oxalate (LEXAPRO) 10 MG tablet, Take 1 tablet (10 mg total) by mouth once daily., Disp: 90 tablet, Rfl: 1    ezetimibe (ZETIA) 10 mg tablet, Take 1 tablet (10 mg total) by mouth once daily., Disp: 90 tablet, Rfl: 3    flash glucose scanning reader (FREESTYLE ANAMARIA 2 READER) [facility], Scan glucose every 8 hours, Disp: 1 each, Rfl: 0    flash glucose sensor (FREESTYLE ANAMARIA 2 SENSOR) Kit, Change every 14 days, to use with Freestyle Anamaria 2 reader, Disp: 3 kit, Rfl: 11    fluticasone (FLONASE) 50 mcg/actuation nasal spray, 1 spray (50 mcg total) by Each Nare route once daily., Disp: 16 g, Rfl: 5    furosemide (LASIX) 20 MG tablet, Take 1 tablet (20 mg total) by mouth once daily., Disp: 90 tablet, Rfl: 3     "HYDROcodone-acetaminophen (NORCO) 5-325 mg per tablet, Take 1 tablet by mouth every 4 (four) hours as needed for Pain., Disp: 12 tablet, Rfl: 0    lancets Misc, To check BG 2 times daily, to use with insurance preferred meter, Disp: 200 each, Rfl: 3    latanoprost 0.005 % ophthalmic solution, INSTILL 1 DROP IN BOTH EYES EVERY EVENING, Disp: 7.5 mL, Rfl: 0    loratadine (CLARITIN) 10 mg tablet, TAKE 1 TABLET BY MOUTH EVERY DAY AS NEEDED FOR ALLERGIES, Disp: 90 tablet, Rfl: 1    melatonin (MELATIN) 3 mg tablet, Take 2 tablets (6 mg total) by mouth nightly as needed for Insomnia., Disp: , Rfl: 0    metFORMIN (GLUCOPHAGE) 1000 MG tablet, Take 1 tablet (1,000 mg total) by mouth 2 (two) times daily with meals., Disp: 180 tablet, Rfl: 2    multivitamin with minerals tablet, Take 1 tablet by mouth once daily., Disp: , Rfl:     ondansetron (ZOFRAN) 4 MG tablet, Take 1 tablet (4 mg total) by mouth every 6 (six) hours as needed for Nausea., Disp: 20 tablet, Rfl: 0    pantoprazole (PROTONIX) 40 MG tablet, , Disp: , Rfl:     pen needle, diabetic (BD ULTRA-FINE LENY PEN NEEDLE) 32 gauge x 5/32" Ndle, 1 Device by Misc.(Non-Drug; Combo Route) route 4 (four) times daily with meals and nightly., Disp: 400 each, Rfl: 4    rOPINIRole (REQUIP) 0.5 MG tablet, TAKE 1 TABLET(0.5 MG) BY MOUTH EVERY EVENING, Disp: 90 tablet, Rfl: 0    TRUE METRIX GO GLUCOSE METER Misc, USE TO TEST TWICE DAILY, Disp: , Rfl:     TRUEPLUS LANCETS 30 gauge Misc, USE TO TEST TWICE DAILY, Disp: , Rfl:     valsartan (DIOVAN) 320 MG tablet, Take 1 tablet (320 mg total) by mouth once daily., Disp: 90 tablet, Rfl: 3    varicella-zoster gE-AS01B, PF, (SHINGRIX) 50 mcg/0.5 mL injection, Inject into the muscle., Disp: 1 each, Rfl: 0  Review of patient's allergies indicates:   Allergen Reactions    Ace inhibitors Other (See Comments)     cough    Invokana [canagliflozin] Other (See Comments)     Throat and tongue swelling       Ht 5' 2" (1.575 m)   Wt 88 kg (194 lb)   " BMI 35.48 kg/m²     Review of Systems   Constitutional:  Negative for chills and fever.   Respiratory:  Negative for shortness of breath.    Cardiovascular:  Negative for chest pain and leg swelling.   Gastrointestinal:  Negative for nausea and vomiting.   Genitourinary:  Negative for dysuria.   Musculoskeletal:  Positive for joint pain. Negative for falls.   Skin:  Negative for rash.   Neurological:  Negative for dizziness and sensory change.        Objective:    Ortho Exam   LEFT hand:  Small incision over the LEFT RING A1 pulley with sutures in place. Wound margins are well approximated and healing nicely. No redness, warmth, drainage, or other signs of infection. mild swelling. ROM wrist and fingers intact without triggering or difficulty.   strength not tested today.  Sensation intact. Pulses present.    GEN: Well developed, well nourished female. AAOX3. No acute distress.   Breathing unlabored.  Mood and affect appropriate.       Assessment:         1. Status post trigger finger release          Plan:     73yo F 10dys s/p LEFT RING digit trigger finger release, who is following a normal postop recovery course.    Regular wound care explained with soap and water.  Start using hand for light activities.    Neosporin to the incision for the next few days, then patient can start scar massage with cocoa butter or vitamin-E oil.     Follow Up: 4wks via virtual telemedicine visit for re-evaluation of progress    The patient was encouraged to call the clinic if she begins to have any concerns about her recovery course.

## 2023-02-13 ENCOUNTER — OFFICE VISIT (OUTPATIENT)
Dept: ORTHOPEDICS | Facility: CLINIC | Age: 73
End: 2023-02-13
Payer: MEDICARE

## 2023-02-13 VITALS — BODY MASS INDEX: 35.7 KG/M2 | HEIGHT: 62 IN | WEIGHT: 194 LBS

## 2023-02-13 DIAGNOSIS — Z98.890 STATUS POST TRIGGER FINGER RELEASE: Primary | ICD-10-CM

## 2023-02-13 PROCEDURE — 1101F PT FALLS ASSESS-DOCD LE1/YR: CPT | Mod: CPTII,S$GLB,, | Performed by: PHYSICIAN ASSISTANT

## 2023-02-13 PROCEDURE — 1126F AMNT PAIN NOTED NONE PRSNT: CPT | Mod: CPTII,S$GLB,, | Performed by: PHYSICIAN ASSISTANT

## 2023-02-13 PROCEDURE — 3288F PR FALLS RISK ASSESSMENT DOCUMENTED: ICD-10-PCS | Mod: CPTII,S$GLB,, | Performed by: PHYSICIAN ASSISTANT

## 2023-02-13 PROCEDURE — 3008F PR BODY MASS INDEX (BMI) DOCUMENTED: ICD-10-PCS | Mod: CPTII,S$GLB,, | Performed by: PHYSICIAN ASSISTANT

## 2023-02-13 PROCEDURE — 1160F RVW MEDS BY RX/DR IN RCRD: CPT | Mod: CPTII,S$GLB,, | Performed by: PHYSICIAN ASSISTANT

## 2023-02-13 PROCEDURE — 4010F PR ACE/ARB THEARPY RXD/TAKEN: ICD-10-PCS | Mod: CPTII,S$GLB,, | Performed by: PHYSICIAN ASSISTANT

## 2023-02-13 PROCEDURE — 99999 PR PBB SHADOW E&M-EST. PATIENT-LVL IV: CPT | Mod: PBBFAC,,, | Performed by: PHYSICIAN ASSISTANT

## 2023-02-13 PROCEDURE — 3288F FALL RISK ASSESSMENT DOCD: CPT | Mod: CPTII,S$GLB,, | Performed by: PHYSICIAN ASSISTANT

## 2023-02-13 PROCEDURE — 99999 PR PBB SHADOW E&M-EST. PATIENT-LVL IV: ICD-10-PCS | Mod: PBBFAC,,, | Performed by: PHYSICIAN ASSISTANT

## 2023-02-13 PROCEDURE — 99024 POSTOP FOLLOW-UP VISIT: CPT | Mod: S$GLB,,, | Performed by: PHYSICIAN ASSISTANT

## 2023-02-13 PROCEDURE — 1159F MED LIST DOCD IN RCRD: CPT | Mod: CPTII,S$GLB,, | Performed by: PHYSICIAN ASSISTANT

## 2023-02-13 PROCEDURE — 1159F PR MEDICATION LIST DOCUMENTED IN MEDICAL RECORD: ICD-10-PCS | Mod: CPTII,S$GLB,, | Performed by: PHYSICIAN ASSISTANT

## 2023-02-13 PROCEDURE — 99024 PR POST-OP FOLLOW-UP VISIT: ICD-10-PCS | Mod: S$GLB,,, | Performed by: PHYSICIAN ASSISTANT

## 2023-02-13 PROCEDURE — 1157F ADVNC CARE PLAN IN RCRD: CPT | Mod: CPTII,S$GLB,, | Performed by: PHYSICIAN ASSISTANT

## 2023-02-13 PROCEDURE — 1126F PR PAIN SEVERITY QUANTIFIED, NO PAIN PRESENT: ICD-10-PCS | Mod: CPTII,S$GLB,, | Performed by: PHYSICIAN ASSISTANT

## 2023-02-13 PROCEDURE — 1157F PR ADVANCE CARE PLAN OR EQUIV PRESENT IN MEDICAL RECORD: ICD-10-PCS | Mod: CPTII,S$GLB,, | Performed by: PHYSICIAN ASSISTANT

## 2023-02-13 PROCEDURE — 1101F PR PT FALLS ASSESS DOC 0-1 FALLS W/OUT INJ PAST YR: ICD-10-PCS | Mod: CPTII,S$GLB,, | Performed by: PHYSICIAN ASSISTANT

## 2023-02-13 PROCEDURE — 3008F BODY MASS INDEX DOCD: CPT | Mod: CPTII,S$GLB,, | Performed by: PHYSICIAN ASSISTANT

## 2023-02-13 PROCEDURE — 4010F ACE/ARB THERAPY RXD/TAKEN: CPT | Mod: CPTII,S$GLB,, | Performed by: PHYSICIAN ASSISTANT

## 2023-02-13 PROCEDURE — 1160F PR REVIEW ALL MEDS BY PRESCRIBER/CLIN PHARMACIST DOCUMENTED: ICD-10-PCS | Mod: CPTII,S$GLB,, | Performed by: PHYSICIAN ASSISTANT

## 2023-02-15 ENCOUNTER — OFFICE VISIT (OUTPATIENT)
Dept: FAMILY MEDICINE | Facility: CLINIC | Age: 73
End: 2023-02-15
Payer: MEDICARE

## 2023-02-15 VITALS
HEIGHT: 62 IN | DIASTOLIC BLOOD PRESSURE: 54 MMHG | OXYGEN SATURATION: 95 % | HEART RATE: 73 BPM | TEMPERATURE: 98 F | WEIGHT: 193.44 LBS | SYSTOLIC BLOOD PRESSURE: 112 MMHG | BODY MASS INDEX: 35.6 KG/M2

## 2023-02-15 DIAGNOSIS — E66.01 SEVERE OBESITY (BMI 35.0-35.9 WITH COMORBIDITY): ICD-10-CM

## 2023-02-15 DIAGNOSIS — E11.3292 MILD NONPROLIFERATIVE DIABETIC RETINOPATHY OF LEFT EYE WITHOUT MACULAR EDEMA ASSOCIATED WITH TYPE 2 DIABETES MELLITUS: ICD-10-CM

## 2023-02-15 DIAGNOSIS — I65.23 BILATERAL CAROTID ARTERY STENOSIS: ICD-10-CM

## 2023-02-15 DIAGNOSIS — Z12.31 ENCOUNTER FOR SCREENING MAMMOGRAM FOR BREAST CANCER: ICD-10-CM

## 2023-02-15 DIAGNOSIS — F43.21 GRIEF REACTION: ICD-10-CM

## 2023-02-15 DIAGNOSIS — K21.9 GASTROESOPHAGEAL REFLUX DISEASE WITHOUT ESOPHAGITIS: ICD-10-CM

## 2023-02-15 DIAGNOSIS — I70.0 ATHEROSCLEROSIS OF AORTA: ICD-10-CM

## 2023-02-15 DIAGNOSIS — E11.40 POORLY CONTROLLED TYPE 2 DIABETES MELLITUS WITH NEUROPATHY: Primary | ICD-10-CM

## 2023-02-15 DIAGNOSIS — Z78.0 MENOPAUSE: ICD-10-CM

## 2023-02-15 DIAGNOSIS — E78.00 HYPERCHOLESTEROLEMIA: ICD-10-CM

## 2023-02-15 DIAGNOSIS — F32.0 MILD MAJOR DEPRESSION: ICD-10-CM

## 2023-02-15 DIAGNOSIS — E11.3391 MODERATE NONPROLIFERATIVE DIABETIC RETINOPATHY OF RIGHT EYE WITHOUT MACULAR EDEMA ASSOCIATED WITH TYPE 2 DIABETES MELLITUS: ICD-10-CM

## 2023-02-15 DIAGNOSIS — E11.65 POORLY CONTROLLED TYPE 2 DIABETES MELLITUS WITH NEUROPATHY: Primary | ICD-10-CM

## 2023-02-15 DIAGNOSIS — Z86.73 HISTORY OF CEREBROVASCULAR ACCIDENT: ICD-10-CM

## 2023-02-15 DIAGNOSIS — Z23 NEED FOR SHINGLES VACCINE: ICD-10-CM

## 2023-02-15 DIAGNOSIS — F41.9 ANXIETY DISORDER, UNSPECIFIED TYPE: ICD-10-CM

## 2023-02-15 DIAGNOSIS — Z79.4 LONG-TERM INSULIN USE: ICD-10-CM

## 2023-02-15 DIAGNOSIS — I10 PRIMARY HYPERTENSION: ICD-10-CM

## 2023-02-15 PROCEDURE — 1160F PR REVIEW ALL MEDS BY PRESCRIBER/CLIN PHARMACIST DOCUMENTED: ICD-10-PCS | Mod: CPTII,S$GLB,, | Performed by: INTERNAL MEDICINE

## 2023-02-15 PROCEDURE — 1157F ADVNC CARE PLAN IN RCRD: CPT | Mod: CPTII,S$GLB,, | Performed by: INTERNAL MEDICINE

## 2023-02-15 PROCEDURE — 1157F PR ADVANCE CARE PLAN OR EQUIV PRESENT IN MEDICAL RECORD: ICD-10-PCS | Mod: CPTII,S$GLB,, | Performed by: INTERNAL MEDICINE

## 2023-02-15 PROCEDURE — 1126F PR PAIN SEVERITY QUANTIFIED, NO PAIN PRESENT: ICD-10-PCS | Mod: CPTII,S$GLB,, | Performed by: INTERNAL MEDICINE

## 2023-02-15 PROCEDURE — 3008F BODY MASS INDEX DOCD: CPT | Mod: CPTII,S$GLB,, | Performed by: INTERNAL MEDICINE

## 2023-02-15 PROCEDURE — 3074F SYST BP LT 130 MM HG: CPT | Mod: CPTII,S$GLB,, | Performed by: INTERNAL MEDICINE

## 2023-02-15 PROCEDURE — 99215 OFFICE O/P EST HI 40 MIN: CPT | Mod: S$GLB,,, | Performed by: INTERNAL MEDICINE

## 2023-02-15 PROCEDURE — 1160F RVW MEDS BY RX/DR IN RCRD: CPT | Mod: CPTII,S$GLB,, | Performed by: INTERNAL MEDICINE

## 2023-02-15 PROCEDURE — 99999 PR PBB SHADOW E&M-EST. PATIENT-LVL III: CPT | Mod: PBBFAC,,, | Performed by: INTERNAL MEDICINE

## 2023-02-15 PROCEDURE — 3288F FALL RISK ASSESSMENT DOCD: CPT | Mod: CPTII,S$GLB,, | Performed by: INTERNAL MEDICINE

## 2023-02-15 PROCEDURE — 1159F PR MEDICATION LIST DOCUMENTED IN MEDICAL RECORD: ICD-10-PCS | Mod: CPTII,S$GLB,, | Performed by: INTERNAL MEDICINE

## 2023-02-15 PROCEDURE — 99999 PR PBB SHADOW E&M-EST. PATIENT-LVL III: ICD-10-PCS | Mod: PBBFAC,,, | Performed by: INTERNAL MEDICINE

## 2023-02-15 PROCEDURE — 4010F ACE/ARB THERAPY RXD/TAKEN: CPT | Mod: CPTII,S$GLB,, | Performed by: INTERNAL MEDICINE

## 2023-02-15 PROCEDURE — 3008F PR BODY MASS INDEX (BMI) DOCUMENTED: ICD-10-PCS | Mod: CPTII,S$GLB,, | Performed by: INTERNAL MEDICINE

## 2023-02-15 PROCEDURE — 1101F PR PT FALLS ASSESS DOC 0-1 FALLS W/OUT INJ PAST YR: ICD-10-PCS | Mod: CPTII,S$GLB,, | Performed by: INTERNAL MEDICINE

## 2023-02-15 PROCEDURE — 1159F MED LIST DOCD IN RCRD: CPT | Mod: CPTII,S$GLB,, | Performed by: INTERNAL MEDICINE

## 2023-02-15 PROCEDURE — 3288F PR FALLS RISK ASSESSMENT DOCUMENTED: ICD-10-PCS | Mod: CPTII,S$GLB,, | Performed by: INTERNAL MEDICINE

## 2023-02-15 PROCEDURE — 1101F PT FALLS ASSESS-DOCD LE1/YR: CPT | Mod: CPTII,S$GLB,, | Performed by: INTERNAL MEDICINE

## 2023-02-15 PROCEDURE — 4010F PR ACE/ARB THEARPY RXD/TAKEN: ICD-10-PCS | Mod: CPTII,S$GLB,, | Performed by: INTERNAL MEDICINE

## 2023-02-15 PROCEDURE — 3074F PR MOST RECENT SYSTOLIC BLOOD PRESSURE < 130 MM HG: ICD-10-PCS | Mod: CPTII,S$GLB,, | Performed by: INTERNAL MEDICINE

## 2023-02-15 PROCEDURE — 3078F DIAST BP <80 MM HG: CPT | Mod: CPTII,S$GLB,, | Performed by: INTERNAL MEDICINE

## 2023-02-15 PROCEDURE — 1126F AMNT PAIN NOTED NONE PRSNT: CPT | Mod: CPTII,S$GLB,, | Performed by: INTERNAL MEDICINE

## 2023-02-15 PROCEDURE — 3078F PR MOST RECENT DIASTOLIC BLOOD PRESSURE < 80 MM HG: ICD-10-PCS | Mod: CPTII,S$GLB,, | Performed by: INTERNAL MEDICINE

## 2023-02-15 PROCEDURE — 99215 PR OFFICE/OUTPT VISIT, EST, LEVL V, 40-54 MIN: ICD-10-PCS | Mod: S$GLB,,, | Performed by: INTERNAL MEDICINE

## 2023-02-15 RX ORDER — INSULIN ASPART 100 [IU]/ML
INJECTION, SOLUTION INTRAVENOUS; SUBCUTANEOUS
COMMUNITY
End: 2023-04-13

## 2023-02-15 NOTE — PATIENT INSTRUCTIONS
Psychiatry:   [facility]: [phone]  [facility]: [phone]     Duloxetine (Cymbalta) or lexapro (escitalopram)

## 2023-02-15 NOTE — PROGRESS NOTES
274}  HISTORY OF PRESENT ILLNESS:  Cecilia Barahona is a 72 y.o. female who presents to the clinic today for Annual Exam  .     The patient presents to clinic today for follow-up of her type 2 diabetes mellitus complicated by neuropathy and retinopathy, hypertension, and hyperlipidemia.  She is currently seeing endocrinology for her uncontrolled diabetes.  She denies any problems with low blood sugars.  Her  recently passed away after a 3 month illness.  She is going through the grieving process.  She is spending a lot of time with her daughters, but thinks it might be helpful for her to talk to somebody as well.      PAST MEDICAL HISTORY:  Past Medical History:   Diagnosis Date    Acute coronary syndrome 2020: Presents with unstable angina.    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    Nuclear sclerosis of both eyes 2019    Obesity     CHETNA (obstructive sleep apnea)     Psychiatric problem     Psychosis     Renal disorder     Sleep difficulties     Suicide attempt     about 30 years ago by overdose of pills    Therapy     Type II or unspecified type diabetes mellitus without mention of complication, uncontrolled        PAST SURGICAL HISTORY:  Past Surgical History:   Procedure Laterality Date    CARPAL TUNNEL RELEASE       SECTION, CLASSIC      COLONOSCOPY N/A 3/5/2020    Procedure: COLONOSCOPY;  Surgeon: Wiliam Carrillo MD;  Location: [address]);  Service: Endoscopy;  Laterality: N/A;  20 appt confirmed-rb  pt requested this date     CORONARY ARTERY BYPASS GRAFT (CABG) N/A 5/25/2020    Procedure: CORONARY ARTERY BYPASS GRAFT (CABG) x3 ;  Surgeon: Yan Bowman MD;  Location: [address];  Service: Cardiothoracic;  Laterality: N/A;  CABG X3  Endoharvest time start 0906  Endoharvest time end 1003  Vein prep start 1004  Vein prep end 1048    LEFT HEART CATHETERIZATION N/A 5/20/2020    Procedure: HEART CATH-LEFT;  Surgeon: Etelvina Lowery MD;  Location: [facility] CATH LAB;  Service: Cardiology;  Laterality: N/A;    TOTAL ABDOMINAL HYSTERECTOMY      TRIGGER FINGER RELEASE      TRIGGER FINGER RELEASE Left 2/3/2023    Procedure: RELEASE, TRIGGER FINGER;  Surgeon: Shayan Schaefer Jr., MD;  Location: [facility];  Service: Orthopedics;  Laterality: Left;       SOCIAL HISTORY:  Social History     Socioeconomic History    Marital status:      Spouse name: Yuniel    Number of children: 3   Occupational History    Occupation: Retired   Tobacco Use    Smoking status: Former     Packs/day: 0.50     Types: Cigarettes     Quit date: 11/10/2012     Years since quitting: 10.2    Smokeless tobacco: Never   Substance and Sexual Activity    Alcohol use: Not Currently     Comment: drank socially in the past    Drug use: Not Currently     Types: Marijuana     Comment: tried once 6 months ago    Sexual activity: Not Currently     Partners: Male   Other Topics Concern    Patient feels they ought to cut down on drinking/drug use No    Patient annoyed by others criticizing their drinking/drug use No    Patient has felt bad or guilty about drinking/drug use No    Patient has had a drink/used drugs as an eye opener in the AM No   Social History Narrative     x 1.    Has 3 grown children.    Lives with spouse.       FAMILY HISTORY:  Family History   Problem Relation Age of Onset    Hypertension Mother     Diabetes Mother     Heart failure Mother     Cataracts Mother     Glaucoma Mother     Prostate cancer Father     Hypertension Father     Diabetes Father      Heart failure Father     No Known Problems Sister     No Known Problems Maternal Aunt     Diabetes Maternal Uncle     Hyperlipidemia Maternal Uncle     Hypertension Maternal Uncle     Diabetes Maternal Grandmother     Diabetes Maternal Grandfather     No Known Problems Paternal Grandmother     No Known Problems Paternal Grandfather     Diabetes Maternal Aunt     Alzheimer's disease Maternal Aunt     Schizophrenia Maternal Aunt     Heart disease Cousin         s/p heart transplant    No Known Problems Paternal Aunt     No Known Problems Paternal Uncle     Drug abuse Grandchild     Amblyopia Neg Hx     Blindness Neg Hx     Cancer Neg Hx     Macular degeneration Neg Hx     Retinal detachment Neg Hx     Strabismus Neg Hx     Stroke Neg Hx     Thyroid disease Neg Hx        ALLERGIES AND MEDICATIONS: updated and reviewed.  Review of patient's allergies indicates:   Allergen Reactions    Ace inhibitors Other (See Comments)     cough    Invokana [canagliflozin] Other (See Comments)     Throat and tongue swelling     Medication List with Changes/Refills   Current Medications    ALBUTEROL (PROVENTIL/VENTOLIN HFA) 90 MCG/ACTUATION INHALER    Inhale 2 puffs into the lungs every 6 (six) hours as needed for Wheezing or Shortness of Breath. Rescue    ALCOHOL SWABS PADM    Apply 1 each topically 3 (three) times daily.    AMLODIPINE (NORVASC) 10 MG TABLET    Take 1 tablet (10 mg total) by mouth once daily.    ARTIFICIAL TEAR OINTMENT (ARTIFICIAL TEARS) OINT    Place into both eyes every evening.    ASPIRIN (ECOTRIN) 81 MG EC TABLET    Take 1 tablet (81 mg total) by mouth once daily.    ATORVASTATIN (LIPITOR) 80 MG TABLET    Take 1 tablet (80 mg total) by mouth once daily.    AZELASTINE (OPTIVAR) 0.05 % OPHTHALMIC SOLUTION    INSTILL 1 DROP IN EACH EYE TWICE DAILY    BLOOD PRESSURE MONITOR (BLOOD PRESSURE KIT) KIT    1 kit by Misc.(Non-Drug; Combo Route) route once daily.    BLOOD SUGAR DIAGNOSTIC STRP    To check BG 2 times daily,  to use with insurance preferred meter    CARVEDILOL (COREG) 12.5 MG TABLET    Take 1 tablet (12.5 mg total) by mouth 2 (two) times daily.    DICLOFENAC (VOLTAREN) 50 MG EC TABLET    TAKE 1 TABLET(50 MG) BY MOUTH TWICE DAILY    DULAGLUTIDE (TRULICITY) 3 MG/0.5 ML PEN INJECTOR    Inject 3 mg into the skin every 7 days.    DULOXETINE (CYMBALTA) 60 MG CAPSULE        EMPAGLIFLOZIN (JARDIANCE) 25 MG TABLET    Take 1 tablet (25 mg total) by mouth once daily.    ESCITALOPRAM OXALATE (LEXAPRO) 10 MG TABLET    Take 1 tablet (10 mg total) by mouth once daily.    EZETIMIBE (ZETIA) 10 MG TABLET    Take 1 tablet (10 mg total) by mouth once daily.    FLASH GLUCOSE SCANNING READER (FREESTYLE ANAMARIA 2 READER) [facility] [place] [facility]    Scan glucose every 8 hours    FLASH GLUCOSE SENSOR (FREESTYLE ANAMARIA 2 SENSOR) KIT    Change every 14 days, to use with Freestyle Anamaria 2 reader    FLUTICASONE (FLONASE) 50 MCG/ACTUATION NASAL SPRAY    1 spray (50 mcg total) by Each Nare route once daily.    FUROSEMIDE (LASIX) 20 MG TABLET    Take 1 tablet (20 mg total) by mouth once daily.    HYDROCODONE-ACETAMINOPHEN (NORCO) 5-325 MG PER TABLET    Take 1 tablet by mouth every 4 (four) hours as needed for Pain.    INSULIN ASPART U-100 (NOVOLOG) 100 UNIT/ML (3 ML) INPN PEN    Novolog FlexPen U-100 Insulin aspart 100 unit/mL (3 mL) subcutaneous    LANCETS MISC    To check BG 2 times daily, to use with insurance preferred meter    LATANOPROST 0.005 % OPHTHALMIC SOLUTION    INSTILL 1 DROP IN BOTH EYES EVERY EVENING    LORATADINE (CLARITIN) 10 MG TABLET    TAKE 1 TABLET BY MOUTH EVERY DAY AS NEEDED FOR ALLERGIES    MELATONIN (MELATIN) 3 MG TABLET    Take 2 tablets (6 mg total) by mouth nightly as needed for Insomnia.    METFORMIN (GLUCOPHAGE) 1000 MG TABLET    Take 1 tablet (1,000 mg total) by mouth 2 (two) times daily with meals.    MULTIVITAMIN WITH MINERALS TABLET    Take 1 tablet by mouth once daily.    ONDANSETRON (ZOFRAN) 4 MG TABLET    Take 1 tablet (4 mg total) by  "mouth every 6 (six) hours as needed for Nausea.    PANTOPRAZOLE (PROTONIX) 40 MG TABLET        PEN NEEDLE, DIABETIC (BD ULTRA-FINE LENY PEN NEEDLE) 32 GAUGE X 5/32" NDLE    1 Device by Misc.(Non-Drug; Combo Route) route 4 (four) times daily with meals and nightly.    ROPINIROLE (REQUIP) 0.5 MG TABLET    TAKE 1 TABLET(0.5 MG) BY MOUTH EVERY EVENING    TRUE METRIX GO GLUCOSE METER MISC    USE TO TEST TWICE DAILY    TRUEPLUS LANCETS 30 GAUGE MISC    USE TO TEST TWICE DAILY    VALSARTAN (DIOVAN) 320 MG TABLET    Take 1 tablet (320 mg total) by mouth once daily.   Discontinued Medications    DIAZEPAM (VALIUM) 5 MG TABLET    Take 1 tablet (5 mg total) by mouth once. Please take 1 pill 1 hour before MRI. Can repeat dose after 1 hour for 1 dose    VARICELLA-ZOSTER GE-AS01B, PF, (SHINGRIX) 50 MCG/0.5 ML INJECTION    Inject into the muscle.         CARE TEAM:  Patient Care Team:  Yudi Smart MD as PCP - General (Internal Medicine)  Fran AlbaradoD as Hypertension Digital Medicine Clinician (Pharmacist)  Rafa Vázquez as Digital Medicine Health   Tiarra Villa MD as Hypertension Digital Medicine Responsible Provider (Internal Medicine)  Fernando Perkins [employer] as Hypertension Digital Medicine Contract         REVIEW OF SYSTEMS:  Review of Systems   Constitutional:  Negative for chills and fever.   HENT:  Negative for congestion.    Respiratory:  Negative for shortness of breath.    Cardiovascular:  Negative for chest pain.   Gastrointestinal:  Negative for abdominal pain.   Musculoskeletal:  Positive for arthralgias (chronic; stable).   Psychiatric/Behavioral:  Positive for dysphoric mood.        PHYSICAL EXAM: 274}  Vitals:    02/15/23 1518   BP: (!) 112/54   Pulse: 73   Temp: 98 °F (36.7 °C)     Weight: 87.8 kg (193 lb 7.3 oz)   Height: 5' 2" (157.5 cm)   Body mass index is 35.38 kg/m².      General appearance - alert, well appearing, and in no distress, obese  Psychiatric - alert, oriented to person, " place, and time, mildly depressed mood, briefly tearful during office visit  Chest - clear to auscultation, no wheezes, rales or rhonchi, symmetric air entry  Heart - normal rate and regular rhythm  Diabetic Foot Exam                                                                                                                                                                                                                      Protective Sensation (w/ 10 gram monofilament):  Right: Intact  Left: Intact    Visual Inspection:  Normal -  Bilateral    Pedal Pulses:   Right: Present  Left: Present    Posterior tibialis:   Right:Present  Left: Present         Labs:  Lab Results   Component Value Date    HGBA1C 8.0 (H) 10/13/2022    HGBA1C 8.9 (H) 05/09/2022    HGBA1C 8.7 (H) 02/24/2022      Lab Results   Component Value Date    CHOL 94 (L) 02/24/2022    CHOL 132 02/01/2022    CHOL 161 08/18/2020     Lab Results   Component Value Date    LDLCALC 42.4 (L) 02/24/2022    LDLCALC 54.2 (L) 02/01/2022    LDLCALC 81.8 08/18/2020         ASSESSMENT AND PLAN:  274}  1. Poorly controlled type 2 diabetes mellitus with neuropathy/2. Mild nonproliferative diabetic retinopathy of left eye without macular edema associated with type 2 diabetes mellitus/3. Moderate nonproliferative diabetic retinopathy of right eye without macular edema associated with type 2 diabetes mellitus/4. Long-term insulin use  Lab Results   Component Value Date    HGBA1C 8.0 (H) 10/13/2022     Diabetes is not controlled at this time (due to hyperglycemia) for age and comorbid conditions.   We discussed diabetic diet and regular exercise.   We discussed home blood sugar monitoring, if appropriate - the patient should test twice daily and as needed.   Continue current medication regimen. Patient is followed by endocrinology.  Diabetic complications addressed: Neuropathy pain controlled.   Patient was counseled on the need for yearly eye exam to screen for/monitor  diabetic retinopathy and yearly diabetic foot exam.    5. Primary hypertension  BP Readings from Last 1 Encounters:   02/15/23 (!) 112/54      The current medical regimen is effective;  continue present plan and medications. Recommended patient to check home readings to monitor and see me for followup as scheduled or sooner as needed.   Discussed sodium restriction, maintaining ideal body weight and regular exercise program as physiologic means to continue to achieve blood pressure control in addition to medication compliance.  Patient was educated that both decongestant and anti-inflammatory medication may raise blood pressure.  The patient is not active on the digital hypertension program.  Overview:  2000: Diagnosed.      6. Hypercholesterolemia  Lab Results   Component Value Date    CHOL 94 (L) 02/24/2022     Lab Results   Component Value Date    HDL 37 (L) 02/24/2022     Lab Results   Component Value Date    LDLCALC 42.4 (L) 02/24/2022     Lab Results   Component Value Date    TRIG 73 02/24/2022     Lab Results   Component Value Date    LDLCALC 42.4 (L) 02/24/2022     We discussed low fat diet and regular exercise.The current medical regimen is effective;  continue present plan and medications.   Overview:  2000: Began statin.      7. Bilateral carotid artery stenosis  Stable. Asymptomatic. Observe.    8. Atherosclerosis of aorta  Patient with Atherosclerosis of the Aorta.  Stable/asymptomatic. Currently stable on lipid lowering medication and blood pressure monitoring.  Overview:  6/3/2020: CT Scan: Noted.      9. History of cerebrovascular accident  Stable/asymptomatic.  We discussed risk factor reduction.  Overview:  2004: Weakness in left hand. Affected memory.         10. Mild major depression/11. Anxiety disorder, unspecified type/17. Grief reaction  She is appropriately grieving over the death of her  last month.  She is not sure if she is currently taking Cymbalta or Celexa.  She will go home and  check her pill bottles.  If she is already on Cymbalta she is on the maximum dose.  If she is on the Lexapro we can increase her to 20 mg daily.  I also gave her information to seek care from a psychiatrist or psychologist.  She will call to make her own appointment.  She declined grief support group at this time.    12. Gastroesophageal reflux disease without esophagitis  Symptoms controlled: yes - takes medication occasionally as needed.   Reflux precautions discussed (eliminate tobacco if a smoker; minimize caffeine, chocolate and red/white peppermint intake; avoid heavy and spicy meals; don't lay down within 2-3 hours after eating; minimize the intake of NSAIDs). Medication as needed.   Patient asked to take medication breaks, if possible - discussed chronic use can limit calcium absorption (which can lead to osteopenia/osteoporosis), increases the risk for intestinal infections, and can cause kidney damage. There are also some newer studies that show possible increased risk of mortality.    13. Severe obesity (BMI 35.0-35.9 with comorbidity)  BMI Readings from Last 3 Encounters:   02/15/23 35.38 kg/m²   02/13/23 35.48 kg/m²   02/03/23 35.48 kg/m²     The patient is asked to make an attempt to improve diet and exercise patterns to aid in medical management of this problem.  Overview:  5/19/2020: Weight 86 kg. BMI 33.      14. Need for shingles vaccine  Patient was advised to get immunization at the pharmacy.    15. Encounter for screening mammogram for breast cancer  Scheduled.    16. Menopause  Scheduled.  -     DXA Bone Density Spine And Hip; Future; Expected date: 02/15/2023              Orders Placed This Encounter   Procedures    DXA Bone Density Spine And Hip      Follow up in about 6 months (around 8/15/2023), or if symptoms worsen or fail to improve, for annual exam. or sooner as needed.    I spent a total of 40 minutes on the day of the visit.  This includes face to face time and non-face to face time  preparing to see the patient (eg, review of tests), obtaining and/or reviewing separately obtained history, documenting clinical information in the electronic or other health record, independently interpreting results and communicating results to the patient/family/caregiver, or care coordinator.

## 2023-03-07 DIAGNOSIS — J30.9 ALLERGIC RHINITIS, UNSPECIFIED SEASONALITY, UNSPECIFIED TRIGGER: ICD-10-CM

## 2023-03-07 DIAGNOSIS — F32.0 MILD MAJOR DEPRESSION: ICD-10-CM

## 2023-03-07 DIAGNOSIS — G25.81 RESTLESS LEGS SYNDROME (RLS): ICD-10-CM

## 2023-03-07 RX ORDER — ESCITALOPRAM OXALATE 10 MG/1
10 TABLET ORAL DAILY
Qty: 90 TABLET | Refills: 1 | Status: CANCELLED | OUTPATIENT
Start: 2023-03-07

## 2023-03-07 RX ORDER — LORATADINE 10 MG/1
TABLET ORAL
Qty: 90 TABLET | Refills: 3 | Status: SHIPPED | OUTPATIENT
Start: 2023-03-07

## 2023-03-07 NOTE — TELEPHONE ENCOUNTER
----- Message from Hailee Ovalles sent at 3/7/2023 11:50 AM CST -----  Type: RX Refill Request    Who Called: pt requesting to get this through [facility]. Call pt     Have you contacted your pharmacy:    Refill or New Rx:EScitalopram oxalate (LEXAPRO) 10 MG tablet - needs pa    RX Name and Strength:    How is the patient currently taking it? (ex. 1XDay):    Is this a 30 day or 90 day RX:    Preferred Pharmacy with phone number:  [id number] - [place] - [facility] [address] [address]  [facility] [address]  Phone: [phone] Fax: [phone]        Local or Mail Order:    Ordering Provider:    Would the patient rather a call back or a response via [facility]? call    Best Call Back Number:[phone] (home) [phone] (work)      Additional Information:

## 2023-03-07 NOTE — TRANSFER OF CARE
"Anesthesia Transfer of Care Note    Patient: Cecilia Barahona    Procedure(s) Performed: Procedure(s) (LRB):  CORONARY ARTERY BYPASS GRAFT (CABG) x3  (N/A)    Patient location: ICU    Anesthesia Type: general    Transport from OR: Transported from OR intubated on 100% O2 by AMBU with assisted ventilation. Upon arrival to PACU/ICU, patient attached to ventilator and auscultated to confirm bilateral breath sounds and adequate TV. Continuous ECG monitoring in transport. Continuous SpO2 monitoring in transport. Continuos invasive BP monitoring in transport    Post pain: adequate analgesia    Post assessment: no apparent anesthetic complications and tolerated procedure well    Post vital signs: stable    Level of consciousness: sedated    Nausea/Vomiting: no nausea/vomiting    Complications: none    Transfer of care protocol was followed      Last vitals:   Visit Vitals  BP (!) 157/74   Pulse 62   Temp 36.4 °C (97.5 °F) (Oral)   Resp (!) 21   Ht 5' 3" (1.6 m)   Wt 87.6 kg (193 lb 2 oz)   SpO2 100%   Breastfeeding? No   BMI 34.21 kg/m²     " Assessment / Plan    1  Encounter for gynecological examination without abnormal finding  Normal well woman exam  Pap with HPV updated  Discussed progesterone only options for [facility]  She will consider  2  Encounter for Papanicolaou smear for cervical cancer screening    - Liquid-based pap, screening    3  Screening for HPV (human papillomavirus)    - Liquid-based pap, screening    4  Screening examination for STD (sexually transmitted disease)    - Chlamydia/GC amplified DNA by PCR          Subjective      Keri Nguyễn is a 40 y o  female who presents for her annual gynecologic exam     NEW PATIENT  39 yo G0    Last pap: , reportedly NL  Pap hx: NL  STD screening: agrees to  STD hx: negative  Sexually active: not currently; male/female  Expresses interest in contraception should she decide to have penetrative sex with males  Choices will be limited to prog only options due to her elevated BP  Condom use: n/a  Calcium: yes, adequate intact  Exercise: 2-3x per week  Safety: feels safe    Periods are regular   Current contraception: abstinence  History of abnormal Pap smear: no  Family history of breast,uterine, ovarian or colon cancer: yes - PGM ovarian ca      Menstrual History:  OB History        0    Para   0    Term   0       0    AB   0    Living   0       SAB   0    IAB   0    Ectopic   0    Multiple   0    Live Births   0               Patient's last menstrual period was 2023 (exact date)  The following portions of the patient's history were reviewed and updated as appropriate: allergies, current medications, past family history, past medical history, past social history, past surgical history and problem list     Review of Systems      Review of Systems   Constitutional: Negative for chills and fever  Respiratory: Negative for cough and shortness of breath      Gastrointestinal: Negative for abdominal distention, abdominal pain, blood in stool, constipation, diarrhea, nausea and vomiting  Genitourinary: Negative for difficulty urinating, dysuria, frequency, genital sores, hematuria, menstrual problem, pelvic pain, urgency, vaginal bleeding and vaginal discharge  Musculoskeletal: Negative for arthralgias and myalgias  Breasts:  Negative for skin changes, dimpling, asymmetry, nipple discharge, redness, tenderness or palpable masses    Objective      /100 (BP Location: Right arm, Patient Position: Sitting, Cuff Size: Standard)   Ht 5' 5 75" (1 67 m)   Wt 118 kg (261 lb 3 2 oz)   LMP 02/18/2023 (Exact Date)   BMI 42 48 kg/m²   Physical Exam  Constitutional:       General: She is not in acute distress  Appearance: Normal appearance  She is well-developed  She is not ill-appearing or diaphoretic  Comments: bmi 42 5   HENT:      Head: Normocephalic and atraumatic  Eyes:      Pupils: Pupils are equal, round, and reactive to light  Neck:      Thyroid: No thyromegaly  Pulmonary:      Effort: Pulmonary effort is normal    Chest:   Breasts:     Breasts are symmetrical       Right: No inverted nipple, mass, nipple discharge, skin change or tenderness  Left: No inverted nipple, mass, nipple discharge, skin change or tenderness  Abdominal:      General: There is no distension  Palpations: Abdomen is soft  There is no mass  Tenderness: There is no abdominal tenderness  There is no guarding or rebound  Genitourinary:     General: Normal vulva  Exam position: Lithotomy position  Labia:         Right: No rash, tenderness, lesion or injury  Left: No rash, tenderness, lesion or injury  Vagina: No signs of injury and foreign body  No vaginal discharge, erythema, tenderness or bleeding  Cervix: No cervical motion tenderness, discharge or friability  Uterus: Not enlarged and not tender  Adnexa:         Right: No mass or tenderness  Left: No mass or tenderness       Musculoskeletal:      Cervical back: Neck supple  Lymphadenopathy:      Cervical: No cervical adenopathy  Upper Body:      Right upper body: No supraclavicular adenopathy  Left upper body: No supraclavicular adenopathy  Skin:     General: Skin is warm and dry  Neurological:      General: No focal deficit present  Mental Status: She is alert and oriented to person, place, and time  Psychiatric:         Mood and Affect: Mood normal          Behavior: Behavior normal          Thought Content:  Thought content normal          Judgment: Judgment normal

## 2023-03-07 NOTE — TELEPHONE ENCOUNTER
No new care gaps identified.  [facility] Embedded Care Gaps. Reference number: [id number]. 3/07/2023   4:42:28 PM CST

## 2023-03-07 NOTE — TELEPHONE ENCOUNTER
No new care gaps identified.  [facility] Embedded Care Gaps. Reference number: [id number]. 3/07/2023   12:28:49 PM CST

## 2023-03-08 DIAGNOSIS — F32.0 MILD MAJOR DEPRESSION: ICD-10-CM

## 2023-03-08 RX ORDER — ESCITALOPRAM OXALATE 10 MG/1
10 TABLET ORAL DAILY
Qty: 90 TABLET | Refills: 1 | Status: SHIPPED | OUTPATIENT
Start: 2023-03-08 | End: 2023-08-14

## 2023-03-08 RX ORDER — ROPINIROLE 0.5 MG/1
TABLET, FILM COATED ORAL
Qty: 90 TABLET | Refills: 1 | Status: SHIPPED | OUTPATIENT
Start: 2023-03-08 | End: 2023-08-14

## 2023-03-08 NOTE — TELEPHONE ENCOUNTER
Spoke with patient to inquire whether or not she is taking both Cymbalta & Lexapro. Patient not familiar with her medications nor does she have them on hand since her  dies a month ago. She has been staying with her daughter. Patient requested for a call back in an hour so that she can get her medication out of the car. Nurse will call patient back in an hour.

## 2023-03-08 NOTE — TELEPHONE ENCOUNTER
No new care gaps identified.  [facility] Embedded Care Gaps. Reference number: [id number]. 3/08/2023   3:25:38 PM CST

## 2023-03-08 NOTE — TELEPHONE ENCOUNTER
Spoke with patient she is only taking the Lexapro, but doesn't feel as if it is working lately. Her  passed away recently and she feels lost.

## 2023-03-08 NOTE — TELEPHONE ENCOUNTER
Please call patient - she is requesting a refill on her lexapro, but I also see Cymbalta on her medication list. Is she taking both? These are the same type of medication and she really only should be on one of them.

## 2023-03-08 NOTE — TELEPHONE ENCOUNTER
Refill Routing Note   Medication(s) are not appropriate for processing by [facility] for the following reason(s):       Medication outside of protocol    [facility] action(s):  Route  Approve         Appointments  past 12m or future 3m with PCP    Date Provider   Last Visit   2/15/2023 Yudi Smart MD   Next Visit   8/18/2023 Yudi Smart MD   ED visits in past 90 days: 0        Note composed:6:34 PM 03/07/2023

## 2023-04-11 ENCOUNTER — TELEPHONE (OUTPATIENT)
Dept: ENDOCRINOLOGY | Facility: CLINIC | Age: 73
End: 2023-04-11

## 2023-04-11 NOTE — TELEPHONE ENCOUNTER
----- Message from Symone Larsen sent at 4/11/2023 12:56 PM CDT -----  Type: Patient Call Back    Who called: self     What is the request in detail: pt would like to switch to the [facility] she would like a call back to schedule     Can the clinic reply by [facility]?    Would the patient rather a call back or a response via [facility]?     Best call back number: [phone]       Additional Information:

## 2023-04-11 NOTE — TELEPHONE ENCOUNTER
----- Message from Tamy Mccurdy sent at 4/11/2023  2:06 PM CDT -----  Pt missed a call and would the nurse to return their call.    Pt can be reached at  [phone]

## 2023-04-12 NOTE — PROGRESS NOTES
CC: This 73 y.o. Black or  female  is here for evaluation of  T2DM along with comorbidities indicated in the Visit Diagnosis section of this encounter.    HPI: Cecilia Barahona was diagnosed with T2DM in her 30s. Oral agents started at the time of diagnosis.       Prior visit 9/2022  Pt now receiving Jardiance, Trulicity and Tresiba  from patient assistance as of  ~ 2 months ago.   Patient was very confused re: her medications. She was unable to recall the name of an injectable pen. Pt was shown multiple pictures of injectable pen before identifying Tresiba. Asserted that she was only taking Tresiba 30 clicks on Fridays.  However, about 20-30 minutes later, she denied taking it on Fridays and says she's actually been taking 10 units every AM.   Pt had a difficult time understanding instructions and repeated same questions.   Plan Stop Tresiba due to mild hypoglycemia. Also, with patient's impaired memory, I would like to avoid high-risk medications like insulin if possible.   Patient would benefit from higher dose of Trulicity at 4.5 mg weekly.  We will hold off changing Patient Assistance application to Trulicity 4.5 mg since [facility] mails out 4 month supply at a time and she may not be able to tolerate it. Will have patient try Trulicity 4.5 mg at the beginning of next year when she out of prescription gap for 1-2 months before applying with [facility].   Continue Trulicity 3 mg once weekly on Fridays.   Continue metformin and Jardiance.   Maintain healthy eating patterns and avoid overeating.   Avoid beverages with sugar.   Return to clinic in 4 months with labs prior.   A1c today.     Interval hx  Pt has not been seen in several months. Her  passed away in Jan and she stopped taking care of herself.  She hasn't been taking any of her medications. She is ready to get back on track.     No recent A1c available. Diet is poor. Drinks juices, 1 sweet iced coffee/day; down to 1 cold drink  per day.     She has about a month supply of Trulicity at home.  She wants to get back on Ozempic for weight loss.           PMHX: CHETNA - does not like her old mask. H/o stroke with right facial dropp     LAST DIABETES EDUCATION: never    RECENT ILLNESSES/HOSPITALIZATIONS - -No.     HOSPITALIZED FOR DIABETES  -  No.    PRESCRIBED DIABETES MEDICATIONS:    metformin 1000 mg bid  Jardiance 25 mg once daily   Trulicity 3 mg once weekly on Fridays   Tresiba - as above.       Misses medication doses -  yes     DM COMPLICATIONS: peripheral neuropathy    SIGNIFICANT DIABETES MED HISTORY:   Glimepiride stopped at initial ov 7/2017 since she was already on Novolog  ozempic started 10/2018,  switched to trulicity ~ 12/2018 d/t nausea at 0.5 mg dose.   Jardiance started 2/2020  tresiba and trulicity stopped in 10/2020 d/t improved BGs but Trulicity resumed shortly d/t poor diet and DM control        SELF MONITORING BLOOD GLUCOSE: Checks blood glucose at home with [employer] 2 reader.     POCT glucose 374, 1/2 hour after eating 2 slices of Telugu toast      HYPOGLYCEMIC EPISODES: none.     CURRENT DIET:  poor; sugary beverages.     CURRENT EXERCISE: none       /67   Pulse 78   Temp 97.8 °F (36.6 °C)   Wt 88 kg (194 lb)   BMI 35.48 kg/m²         ROS:   CONSTITUTIONAL: Appetite lower, no  fatigue  Other: + urinary frequency + polydipsia. Denies chest pain or abdominal pain       PHYSICAL EXAM:  GENERAL: Well developed, well nourished. No acute distress.   PSYCH: AAOx3, appropriate mood and affect, conversant, well-groomed. Judgement and insight good.   NEURO: Cranial nerves grossly intact. Speech clear, no tremor.   CHEST: Respirations even and unlabored.           Hemoglobin A1C   Date Value Ref Range Status   10/13/2022 8.0 (H) 4.0 - 5.6 % Final     Comment:     ADA Screening Guidelines:  5.7-6.4%  Consistent with prediabetes  >or=6.5%  Consistent with diabetes    High levels of fetal hemoglobin interfere with the  HbA1C  assay. Heterozygous hemoglobin variants (HbS, HgC, etc)do  not significantly interfere with this assay.   However, presence of multiple variants may affect accuracy.     05/09/2022 8.9 (H) 4.0 - 5.6 % Final     Comment:     ADA Screening Guidelines:  5.7-6.4%  Consistent with prediabetes  >or=6.5%  Consistent with diabetes    High levels of fetal hemoglobin interfere with the HbA1C  assay. Heterozygous hemoglobin variants (HbS, HgC, etc)do  not significantly interfere with this assay.   However, presence of multiple variants may affect accuracy.     02/24/2022 8.7 (H) 4.0 - 5.6 % Final     Comment:     ADA Screening Guidelines:  5.7-6.4%  Consistent with prediabetes  >or=6.5%  Consistent with diabetes    High levels of fetal hemoglobin interfere with the HbA1C  assay. Heterozygous hemoglobin variants (HbS, HgC, etc)do  not significantly interfere with this assay.   However, presence of multiple variants may affect accuracy.         No results found for: CPEPTIDE, GLUTAMICACID, ISLETCELLANT, FRUCTOSAMINE      Chemistry        Component Value Date/Time     02/03/2023 0610    K 4.3 02/03/2023 0610     02/03/2023 0610    CO2 27 02/03/2023 0610    BUN 15 02/03/2023 0610    CREATININE 0.8 02/03/2023 0610     (H) 02/03/2023 0610        Component Value Date/Time    CALCIUM 9.7 02/03/2023 0610    ALKPHOS 159 (H) 02/24/2022 1124    AST 16 02/24/2022 1124    ALT 16 02/24/2022 1124    BILITOT 0.8 02/24/2022 1124    ESTGFRAFRICA >60.0 05/09/2022 1632    EGFRNONAA >60.0 05/09/2022 1632          Lab Results   Component Value Date    LDLCALC 42.4 (L) 02/24/2022       Latest Reference Range & Units 02/24/22 11:24   Cholesterol 120 - 199 mg/dL 94 (L) [1]   HDL 40 - 75 mg/dL 37 (L) [2]   HDL/Cholesterol Ratio 20.0 - 50.0 % 39.4   LDL Cholesterol External 63.0 - 159.0 mg/dL 42.4 (L) [3]   Non-HDL Cholesterol mg/dL 57 [4]   Total Cholesterol/HDL Ratio 2.0 - 5.0  2.5   Triglycerides 30 - 150 mg/dL 73 [5]        Lab Results   Component Value Date    MICALBCREAT 67.5 (H) 05/09/2022           ASSESSMENT and PLAN:    A1C GOAL: < 7.5 %       1. Type 2 diabetes mellitus with hyperglycemia, unspecified whether long term insulin use  See Diabetes Education for Dexcom training and for diet review.     If not using Dexcom to monitor blood sugars, do fingersticks 2x/day.     Resume medications:   - Metformin 1000 mg twice daily with food   - Tresiba 20 units once daily. After 1-2 weeks if blood sugars are still higher than 180s, then increase Tresiba from 20 to 24 units once daily.   - Start Ozempic. Inject 0.25 mg once weekly for 4 weeks, then increase to 0.5 mg weekly. - patient has failed Ozempic at 0.5 mg in the past but she wants to try it again.       Hold off resuming Jardiance for now since it can trigger  infections especially when BGs are especially high.     Improve diet - Avoid beverages with sugar.     Return to clinic in 3 months with labs priro.   A1c today.       POCT Glucose, Hand-Held Device    insulin degludec (TRESIBA FLEXTOUCH U-200) 200 unit/mL (3 mL) insulin pen    semaglutide (OZEMPIC) 0.25 mg or 0.5 mg(2 mg/1.5 mL) pen injector    Hemoglobin A1C    Microalbumin/Creatinine Ratio, Urine      2. History of cerebrovascular accident  semaglutide (OZEMPIC) 0.25 mg or 0.5 mg(2 mg/1.5 mL) pen injector    Lipid Panel      3. Hyperlipidemia, unspecified hyperlipidemia type  Lipid Panel    Resume atorvastatin                    Orders Placed This Encounter   Procedures    Hemoglobin A1C     Standing Status:   Standing     Number of Occurrences:   2     Standing Expiration Date:   6/11/2024    Lipid Panel     Standing Status:   Future     Standing Expiration Date:   4/12/2024    Microalbumin/Creatinine Ratio, Urine     Standing Status:   Future     Standing Expiration Date:   6/11/2024     Order Specific Question:   Specimen Source     Answer:   Urine    POCT Glucose, Hand-Held Device          Follow up in about  3 months (around 7/13/2023).

## 2023-04-13 ENCOUNTER — PATIENT MESSAGE (OUTPATIENT)
Dept: ADMINISTRATIVE | Facility: HOSPITAL | Age: 73
End: 2023-04-13

## 2023-04-13 ENCOUNTER — OFFICE VISIT (OUTPATIENT)
Dept: ENDOCRINOLOGY | Facility: CLINIC | Age: 73
End: 2023-04-13
Payer: MEDICARE

## 2023-04-13 ENCOUNTER — LAB VISIT (OUTPATIENT)
Dept: LAB | Facility: HOSPITAL | Age: 73
End: 2023-04-13
Attending: NURSE PRACTITIONER
Payer: MEDICARE

## 2023-04-13 VITALS
DIASTOLIC BLOOD PRESSURE: 67 MMHG | TEMPERATURE: 98 F | BODY MASS INDEX: 35.48 KG/M2 | SYSTOLIC BLOOD PRESSURE: 138 MMHG | HEART RATE: 78 BPM | WEIGHT: 194 LBS

## 2023-04-13 DIAGNOSIS — E78.5 HYPERLIPIDEMIA, UNSPECIFIED HYPERLIPIDEMIA TYPE: ICD-10-CM

## 2023-04-13 DIAGNOSIS — Z86.73 HISTORY OF CEREBROVASCULAR ACCIDENT: ICD-10-CM

## 2023-04-13 DIAGNOSIS — E11.65 TYPE 2 DIABETES MELLITUS WITH HYPERGLYCEMIA, UNSPECIFIED WHETHER LONG TERM INSULIN USE: Primary | ICD-10-CM

## 2023-04-13 DIAGNOSIS — E11.65 TYPE 2 DIABETES MELLITUS WITH HYPERGLYCEMIA, UNSPECIFIED WHETHER LONG TERM INSULIN USE: ICD-10-CM

## 2023-04-13 LAB — GLUCOSE SERPL-MCNC: 374 MG/DL (ref 70–110)

## 2023-04-13 PROCEDURE — 1159F MED LIST DOCD IN RCRD: CPT | Mod: CPTII,S$GLB,, | Performed by: NURSE PRACTITIONER

## 2023-04-13 PROCEDURE — 3078F DIAST BP <80 MM HG: CPT | Mod: CPTII,S$GLB,, | Performed by: NURSE PRACTITIONER

## 2023-04-13 PROCEDURE — 1160F PR REVIEW ALL MEDS BY PRESCRIBER/CLIN PHARMACIST DOCUMENTED: ICD-10-PCS | Mod: CPTII,S$GLB,, | Performed by: NURSE PRACTITIONER

## 2023-04-13 PROCEDURE — 99214 OFFICE O/P EST MOD 30 MIN: CPT | Mod: S$GLB,,, | Performed by: NURSE PRACTITIONER

## 2023-04-13 PROCEDURE — 36415 COLL VENOUS BLD VENIPUNCTURE: CPT | Mod: PN | Performed by: NURSE PRACTITIONER

## 2023-04-13 PROCEDURE — 99999 PR PBB SHADOW E&M-EST. PATIENT-LVL V: CPT | Mod: PBBFAC,,, | Performed by: NURSE PRACTITIONER

## 2023-04-13 PROCEDURE — 82962 GLUCOSE BLOOD TEST: CPT | Mod: S$GLB,,, | Performed by: NURSE PRACTITIONER

## 2023-04-13 PROCEDURE — 4010F PR ACE/ARB THEARPY RXD/TAKEN: ICD-10-PCS | Mod: CPTII,S$GLB,, | Performed by: NURSE PRACTITIONER

## 2023-04-13 PROCEDURE — 82962 POCT GLUCOSE, HAND-HELD DEVICE: ICD-10-PCS | Mod: S$GLB,,, | Performed by: NURSE PRACTITIONER

## 2023-04-13 PROCEDURE — 1157F PR ADVANCE CARE PLAN OR EQUIV PRESENT IN MEDICAL RECORD: ICD-10-PCS | Mod: CPTII,S$GLB,, | Performed by: NURSE PRACTITIONER

## 2023-04-13 PROCEDURE — 4010F ACE/ARB THERAPY RXD/TAKEN: CPT | Mod: CPTII,S$GLB,, | Performed by: NURSE PRACTITIONER

## 2023-04-13 PROCEDURE — 99214 PR OFFICE/OUTPT VISIT, EST, LEVL IV, 30-39 MIN: ICD-10-PCS | Mod: S$GLB,,, | Performed by: NURSE PRACTITIONER

## 2023-04-13 PROCEDURE — 3075F PR MOST RECENT SYSTOLIC BLOOD PRESS GE 130-139MM HG: ICD-10-PCS | Mod: CPTII,S$GLB,, | Performed by: NURSE PRACTITIONER

## 2023-04-13 PROCEDURE — 3008F PR BODY MASS INDEX (BMI) DOCUMENTED: ICD-10-PCS | Mod: CPTII,S$GLB,, | Performed by: NURSE PRACTITIONER

## 2023-04-13 PROCEDURE — 3075F SYST BP GE 130 - 139MM HG: CPT | Mod: CPTII,S$GLB,, | Performed by: NURSE PRACTITIONER

## 2023-04-13 PROCEDURE — 1159F PR MEDICATION LIST DOCUMENTED IN MEDICAL RECORD: ICD-10-PCS | Mod: CPTII,S$GLB,, | Performed by: NURSE PRACTITIONER

## 2023-04-13 PROCEDURE — 3078F PR MOST RECENT DIASTOLIC BLOOD PRESSURE < 80 MM HG: ICD-10-PCS | Mod: CPTII,S$GLB,, | Performed by: NURSE PRACTITIONER

## 2023-04-13 PROCEDURE — 83036 HEMOGLOBIN GLYCOSYLATED A1C: CPT | Performed by: NURSE PRACTITIONER

## 2023-04-13 PROCEDURE — 3008F BODY MASS INDEX DOCD: CPT | Mod: CPTII,S$GLB,, | Performed by: NURSE PRACTITIONER

## 2023-04-13 PROCEDURE — 1160F RVW MEDS BY RX/DR IN RCRD: CPT | Mod: CPTII,S$GLB,, | Performed by: NURSE PRACTITIONER

## 2023-04-13 PROCEDURE — 1157F ADVNC CARE PLAN IN RCRD: CPT | Mod: CPTII,S$GLB,, | Performed by: NURSE PRACTITIONER

## 2023-04-13 PROCEDURE — 99999 PR PBB SHADOW E&M-EST. PATIENT-LVL V: ICD-10-PCS | Mod: PBBFAC,,, | Performed by: NURSE PRACTITIONER

## 2023-04-13 RX ORDER — INSULIN DEGLUDEC 200 U/ML
INJECTION, SOLUTION SUBCUTANEOUS
Qty: 3 PEN | Refills: 3 | Status: SHIPPED | OUTPATIENT
Start: 2023-04-13 | End: 2023-08-01 | Stop reason: SDUPTHER

## 2023-04-13 RX ORDER — METFORMIN HYDROCHLORIDE 1000 MG/1
1000 TABLET ORAL 2 TIMES DAILY WITH MEALS
Qty: 180 TABLET | Refills: 2 | Status: SHIPPED | OUTPATIENT
Start: 2023-04-13 | End: 2023-11-07 | Stop reason: ALTCHOICE

## 2023-04-13 RX ORDER — SEMAGLUTIDE 1.34 MG/ML
INJECTION, SOLUTION SUBCUTANEOUS
Qty: 1 EACH | Refills: 3 | Status: SHIPPED | OUTPATIENT
Start: 2023-04-13 | End: 2023-05-15

## 2023-04-13 RX ORDER — PEN NEEDLE, DIABETIC 30 GX3/16"
1 NEEDLE, DISPOSABLE MISCELLANEOUS DAILY
Qty: 100 EACH | Refills: 4 | Status: SHIPPED | OUTPATIENT
Start: 2023-04-13

## 2023-04-13 NOTE — PATIENT INSTRUCTIONS
See Diabetes Education for Dexcom training and for diet review.     If not using Dexcom to monitor blood sugars, do fingersticks 2x/day.     Resume medications:   - Metformin 1000 mg twice daily with food   - Tresiba 20 units once daily. After 1-2 weeks if blood sugars are still higher than 180s, then increase Tresiba from 20 to 24 units once daily.   - Start Ozempic. Inject 0.25 mg once weekly for 4 weeks, then increase to 0.5 mg weekly. - patient has failed Ozempic at 0.5 mg in the past but she wants to try it again.       Hold off resuming Jardiance for now since it can trigger  infections especially when BGs are especially high.     Improve diet - Avoid beverages with sugar.     Return to clinic in 3 months with labs priro.   A1c today.

## 2023-04-14 LAB
ESTIMATED AVG GLUCOSE: 209 MG/DL (ref 68–131)
HBA1C MFR BLD: 8.9 % (ref 4–5.6)

## 2023-04-18 DIAGNOSIS — Z86.73 HISTORY OF CEREBROVASCULAR ACCIDENT: ICD-10-CM

## 2023-04-18 DIAGNOSIS — E11.65 TYPE 2 DIABETES MELLITUS WITH HYPERGLYCEMIA, UNSPECIFIED WHETHER LONG TERM INSULIN USE: ICD-10-CM

## 2023-04-18 RX ORDER — SEMAGLUTIDE 0.68 MG/ML
INJECTION, SOLUTION SUBCUTANEOUS
Qty: 1 EACH | Refills: 3 | Status: SHIPPED | OUTPATIENT
Start: 2023-04-18 | End: 2023-06-20

## 2023-04-18 RX ORDER — SEMAGLUTIDE 1.34 MG/ML
INJECTION, SOLUTION SUBCUTANEOUS
Qty: 1 EACH | Refills: 3 | Status: CANCELLED | OUTPATIENT
Start: 2023-04-18

## 2023-04-18 NOTE — TELEPHONE ENCOUNTER
----- Message from Avelina Way sent at 4/18/2023  1:59 PM CDT -----  Type:  Pharmacy Calling to Clarify an RX    Name of Caller: Greg    Pharmacy Name: [facility]    Prescription Name: semaglutide (OZEMPIC) 0.25 mg or 0.5 mg(2 mg/1.5 mL) pen injector    What do they need to clarify? Meds changes    Can you be contacted via [facility]?no    Best Call Back Number: [phone]

## 2023-04-24 ENCOUNTER — TELEPHONE (OUTPATIENT)
Dept: ENDOCRINOLOGY | Facility: CLINIC | Age: 73
End: 2023-04-24

## 2023-04-24 ENCOUNTER — TELEPHONE (OUTPATIENT)
Dept: FAMILY MEDICINE | Facility: CLINIC | Age: 73
End: 2023-04-24

## 2023-04-24 DIAGNOSIS — Z12.31 BREAST CANCER SCREENING BY MAMMOGRAM: Primary | ICD-10-CM

## 2023-04-24 NOTE — TELEPHONE ENCOUNTER
----- Message from Avelina Way sent at 4/24/2023  2:03 PM CDT -----  Type:  Mammogram    Caller is requesting to schedule their annual mammogram appointment.  Order is not listed in EPIC.  Please enter order and contact patient to schedule.  Name of Caller: self    Where would they like the mammogram performed? [facility]    Would the patient rather a call back or a response via [facility]? call    Best Call Back Number: .[phone] (home) [phone] (work)

## 2023-04-25 ENCOUNTER — TELEPHONE (OUTPATIENT)
Dept: FAMILY MEDICINE | Facility: CLINIC | Age: 73
End: 2023-04-25

## 2023-04-25 ENCOUNTER — HOSPITAL ENCOUNTER (OUTPATIENT)
Dept: RADIOLOGY | Facility: HOSPITAL | Age: 73
Discharge: HOME | End: 2023-04-25
Attending: INTERNAL MEDICINE
Payer: MEDICARE

## 2023-04-25 VITALS — WEIGHT: 194 LBS | BODY MASS INDEX: 35.7 KG/M2 | HEIGHT: 62 IN

## 2023-04-25 DIAGNOSIS — Z12.31 BREAST CANCER SCREENING BY MAMMOGRAM: ICD-10-CM

## 2023-04-25 DIAGNOSIS — R21 RASH: Primary | ICD-10-CM

## 2023-04-25 PROCEDURE — 77063 BREAST TOMOSYNTHESIS BI: CPT | Mod: 26,,, | Performed by: RADIOLOGY

## 2023-04-25 PROCEDURE — 77067 SCR MAMMO BI INCL CAD: CPT | Mod: 26,,, | Performed by: RADIOLOGY

## 2023-04-25 PROCEDURE — 77067 MAMMO DIGITAL SCREENING BILAT WITH TOMO: ICD-10-PCS | Mod: 26,,, | Performed by: RADIOLOGY

## 2023-04-25 PROCEDURE — 77067 SCR MAMMO BI INCL CAD: CPT | Mod: TC

## 2023-04-25 PROCEDURE — 77063 MAMMO DIGITAL SCREENING BILAT WITH TOMO: ICD-10-PCS | Mod: 26,,, | Performed by: RADIOLOGY

## 2023-04-25 NOTE — TELEPHONE ENCOUNTER
----- Message from Nitza Vázquez sent at 4/25/2023  1:16 PM CDT -----  Regarding: Referral  .Type:  Patient Requesting Referral    Who Called:self    Referral to What Specialty:dermatology    Reason for Referral:    Does the patient want the referral with a specific physician?:no    [facility]    Would the patient rather a call back or a response via [facility]? call    Best Call Back Number: .[phone]     Additional Information:

## 2023-05-04 ENCOUNTER — CLINICAL SUPPORT (OUTPATIENT)
Dept: DIABETES | Facility: CLINIC | Age: 73
End: 2023-05-04
Payer: MEDICARE

## 2023-05-04 VITALS — HEIGHT: 62 IN | WEIGHT: 193 LBS | BODY MASS INDEX: 35.51 KG/M2

## 2023-05-04 DIAGNOSIS — E11.65 TYPE 2 DIABETES MELLITUS WITH HYPERGLYCEMIA, UNSPECIFIED WHETHER LONG TERM INSULIN USE: Primary | ICD-10-CM

## 2023-05-04 PROCEDURE — G0108 DIAB MANAGE TRN  PER INDIV: HCPCS | Mod: S$GLB,,, | Performed by: FAMILY MEDICINE

## 2023-05-04 PROCEDURE — G0108 PR DIAB MANAGE TRN  PER INDIV: ICD-10-PCS | Mod: S$GLB,,, | Performed by: FAMILY MEDICINE

## 2023-05-04 NOTE — PROGRESS NOTES
Diabetes Care Specialist Progress Note  Author: Amy Laguan RN  Date: 5/4/2023    Program Intake  Reason for Diabetes Program Visit:: Intervention  Type of Intervention:: Individual  Individual: Device Training  Device Training: Personal CGM  Current diabetes risk level:: moderate  In the last 12 months, have you:: used emergency room services  Was the ER or hospital admission related to diabetes?: No  Permission to speak with others about care:: yes    Lab Results   Component Value Date    HGBA1C 8.9 (H) 04/13/2023       Clinical  Clinical Assessment  Current Diabetes Treatment: Oral Medication, Injectable, Insulin (Tresiba 20U daily, Metformin 1000mg BID w/meals, and Ozempic weekly)  Have you ever experienced hypoglycemia (low blood sugar)?: no  Have you ever experienced hyperglycemia (high blood sugar)?: no    Medication Information  How do you obtain your medications?: Patient drives  How many days a week do you miss your medications?: Never  Do you sometimes have difficulty refilling your medications?: No  Medication adherence impacting ability to self-manage diabetes?: No    Labs  Do you have regular lab work to monitor your medications?: Yes  Type of Regular Lab Work: A1c  Where do you get your labs drawn?: [facility]  Lab Compliance Barriers: No    Nutritional Status  Diet: Regular  Recent Changes in Weight: No Recent Weight Change  Current nutritional status an area of need that is impacting patient's ability to self-manage diabetes?: No    Additional Social History  Support  Does anyone support you with your diabetes care?: yes  Who supports you?: son/daughter  Who takes you to your medical appointments?: self  Does the current support meet the patient's needs?: Yes  Is Support an area impacting ability to self-manage diabetes?: No    Access to Mass Media & Technology  Does the patient have access to any of the following devices or technologies?: Smart phone  Media or technology needs impacting ability to  self-manage diabetes?: No    Cognitive/Behavioral Health  Alert and Oriented: Yes  Difficulty Thinking: No  Requires Prompting: No  Requires assistance for routine expression?: No  Cognitive or behavioral barriers impacting ability to self-manage diabetes?: No    Culture/[employer]  Culture or Confucianism beliefs that may impact ability to access healthcare: No    Communication  Language preference: English  Hearing Problems: No  Vision Problems: Yes  Vision problem type:: Decreased Vision  Vision Assistance: Glasses  Communication needs impacting ability to self-manage diabetes?: No    Health Literacy  Preferred Learning Method: Face to Face, Reading Materials  How often do you need to have someone help you read instructions, pamphlets, or written material from your doctor or pharmacy?: Never  Health literacy needs impacting ability to self-manage diabetes?: No      Diabetes Self-Management Skills Assessment  Diabetes Disease Process/Treatment Options  Patient/caregiver able to state what happens when someone has diabetes.: no  Patient/caregiver knows what type of diabetes they have.: yes  Diabetes Type : Type II  Patient/caregiver able to identify at least three signs and symptoms of diabetes.: no  Patient able to identify at least three risk factors for diabetes.: no  Diabetes Disease Process/Treatment Options: Skills Assessment Completed: Yes  Assessment indicates:: Instruction Needed, Knowledge deficit  Area of need?: Yes    Nutrition/Healthy Eating  Nutrition/Healthy Eating Skills Assessment Completed:: No  Deffered due to:: Time         Medications  Medication Skills Assessment Completed:: No  Deffered due to:: Time    Home Blood Glucose Monitoring  Patient states that blood sugar is checked at home daily.: yes  Monitoring Method:: personal continuous glucose monitor  Personal CGM type:: Dexcom G6 start today  Patient is able to use personal CGM appropriately.: no  CGM Report reviewed?: no  Home Blood Glucose  Monitoring Skills Assessment Completed: : Yes  Assessment indicates:: Instruction Needed, Knowledge deficit  Area of need?: Yes    Acute Complications  Patient is able to identify types of acute complications: No  Acute Complications Skills Assessment Completed: : Yes  Assessment indicates:: Instruction Needed, Knowledge deficit  Area of need?: Yes    Chronic Complications  Chronic Complications Skills Assessment Completed: : No  Deferred due to:: Time    Psychosocial/Coping  Psychosocial/Coping Skills Assessment Completed: : No  Deffered due to:: Time    Assessment Summary and Plan    Based on today's diabetes care assessment, the following areas of need were identified:      Social 5/4/2023   Support No   Access to Mass Media/Tech No   Cognitive/Behavioral Health No   Culture/[employer] No   Communication No   Health Literacy No        Clinical 5/4/2023   Medication Adherence No   Lab Compliance No   Nutritional Status No        Diabetes Self-Management Skills 5/4/2023   Diabetes Disease Process/Treatment Options Yes- Discussed diabetes and the significance of current A1c.   Home Blood Glucose Monitoring Yes- see care planning   Acute Complications Yes- Reviewed BS goals, prevention, detection, signs and symptoms, and treatment of hypoglycemia           Today's interventions were provided through individual discussion, instruction, and written materials were provided.      Patient verbalized understanding of instruction and written materials.  Pt was able to return back demonstration of instructions today. Patient understood key points, needs reinforcement and further instruction.     Diabetes Self-Management Care Plan:    Today's Diabetes Self-Management Care Plan was developed with Cecilia's input. Cecilia has agreed to work toward the following goal(s) to improve his/her overall diabetes control.      Care Plan: Diabetes Management   Updates made since 4/4/2023 12:00 AM        Problem: Blood Glucose Self-Monitoring   "       Goal: Patient agrees to change Dexcom G6 sensor every 10 days and record blood sugars 2-3 times per day.    Start Date: 5/4/2023   Expected End Date: 5/12/2023   Priority: High   Barriers: No Barriers Identified   Note:    5/4/23 - - DEXCOM G6 CGM TRAINING  Patient referred to clinic today for Dexcom G6 continuous glucose sensor system training.  Dexcom G6 mobile yaakov downloaded to phone. Education was provided using "Quick Start Guide" per Dexcom protocol and video presentation.     Pt will be using her I-phone as the primary .  Overview:  5min glucose reading updates, trending arrows, BG graph screens, battery life indicator, Blue Tooth Symbol.  Menus: trend Graph, start sensor, enter BG, events, Alerts, Settings, Shutdown, Stop Sensor   Settings:                          * Urgent Low: 55 mg/dL                          * Urgent Low Soon: on                          * Low alert: 70 mg/dl repeat 15 min                          * High alert: 300 mg/dl                           * Rise rate: off                          * Fall Rate: off                          * Signal Loss: on repeat 20 min                          * No Reading: on repeat 20 min                          * Always sound: on                      Reviewed additional setting options with patient, including Graph Height and Transmitter ID. Transmitter was paired with .    Reviewed where to find sensor insertion time and sensor expiration date.   Discussed no need to calibrate sensor during the entirety of the 10 day wear. Discussed that pt can calibrate sensor if desired, but at that time she will need to continue calibrating every 12 hours for sensor to remain accurate. Reviewed appropriate calibration techniques.  Reviewed sensor site selection. Site selected and prepped using aseptic technique Inserted to left abdomen. Transmitter placed in pod and secured.  Practiced sensor pod/transmitter removal from site, and removal of " transmitter from sensor pod.  Patient able to demonstrate without difficulty.  Encouraged to review manual prior to starting another sensor.   Reviewed problem solving aspects of sensor transmission/ variables that can disrupt RF transmission.  range 20 feet, but the first 3 hrs keep within 3 feet of transmitter.  Pt instructed on lag time of interstitial fluid from CBG and was advised to tx hypoglycemia and dose insulin based on SMBG values.  Dexcom technical support contact number given and examples of when to contact them discussed.   Patient advised to always check glucose with glucometer should readings do not match symptoms felt (ex. Hypo or hyperglycemia).           Follow Up Plan     Follow up in about 8 days (around 5/12/2023) for F/U #1 dexcom + assessment.    Today's care plan and follow up schedule was discussed with patient.  Cecilia verbalized understanding of the care plan, goals, and agrees to follow up plan.        The patient was encouraged to communicate with his/her health care provider/physician and care team regarding his/her condition(s) and treatment.  I provided the patient with my contact information today and encouraged to contact me via phone or [facility] Patient Portal as needed.     Length of Visit   Total Time: 60 Minutes

## 2023-05-12 ENCOUNTER — CLINICAL SUPPORT (OUTPATIENT)
Dept: DIABETES | Facility: CLINIC | Age: 73
End: 2023-05-12
Payer: MEDICARE

## 2023-05-12 VITALS — BODY MASS INDEX: 35.4 KG/M2 | WEIGHT: 192.38 LBS | HEIGHT: 62 IN

## 2023-05-12 DIAGNOSIS — E11.65 TYPE 2 DIABETES MELLITUS WITH HYPERGLYCEMIA, UNSPECIFIED WHETHER LONG TERM INSULIN USE: Primary | ICD-10-CM

## 2023-05-12 PROCEDURE — 95249 CONT GLUC MNTR PT PROV EQP: CPT | Mod: S$GLB,,, | Performed by: FAMILY MEDICINE

## 2023-05-12 PROCEDURE — G0108 PR DIAB MANAGE TRN  PER INDIV: ICD-10-PCS | Mod: S$GLB,,, | Performed by: FAMILY MEDICINE

## 2023-05-12 PROCEDURE — 99999 PR PBB SHADOW E&M-EST. PATIENT-LVL I: ICD-10-PCS | Mod: PBBFAC,,,

## 2023-05-12 PROCEDURE — 99999 PR PBB SHADOW E&M-EST. PATIENT-LVL I: CPT | Mod: PBBFAC,,,

## 2023-05-12 PROCEDURE — 95249 PR GLUCOSE MONITORING, 72 HRS, SUB-Q SENSOR, PATIENT PROVIDED: ICD-10-PCS | Mod: S$GLB,,, | Performed by: FAMILY MEDICINE

## 2023-05-12 PROCEDURE — G0108 DIAB MANAGE TRN  PER INDIV: HCPCS | Mod: S$GLB,,, | Performed by: FAMILY MEDICINE

## 2023-05-12 NOTE — PROGRESS NOTES
Diabetes Care Specialist Progress Note  Author: Amy Laguna RN  Date: 5/12/2023    Program Intake  Reason for Diabetes Program Visit:: Intervention  Type of Intervention:: Individual  Individual: Education  Education: Pattern Management, Self-Management Skill Review, Nutrition and Meal Planning  Current diabetes risk level:: moderate  In the last 12 months, have you:: used emergency room services  Was the ER or hospital admission related to diabetes?: No  Permission to speak with others about care:: yes    Lab Results   Component Value Date    HGBA1C 8.9 (H) 04/13/2023       Clinical  Clinical Assessment  Current Diabetes Treatment: Oral Medication, Insulin, Injectable (Tresiba 20U daily, Metformin 1000mg BID w/meals, and Ozempic weekly)  Have you ever experienced hypoglycemia (low blood sugar)?: no  Have you ever experienced hyperglycemia (high blood sugar)?: no    Medication Information  How do you obtain your medications?: Patient drives  How many days a week do you miss your medications?: Never  Do you sometimes have difficulty refilling your medications?: No  Medication adherence impacting ability to self-manage diabetes?: No    Labs  Do you have regular lab work to monitor your medications?: Yes  Type of Regular Lab Work: A1c  Where do you get your labs drawn?: [facility]  Lab Compliance Barriers: No    Nutritional Status  Diet: Regular  Meal Plan 24 Hour Recall: Breakfast, Lunch, Dinner, Snack (Pt said she has been eating a lot of pecan candy over the last 2 weeks.)  Meal Plan 24 Hour Recall - Breakfast: starbucks bottled coffee, Mc[employer], pecan candy  Meal Plan 24 Hour Recall - Lunch: skips at times.....crawfish, potatoes, corn and 20oz coke  Meal Plan 24 Hour Recall - Dinner: liver, rice, and corn with a peach juice  Meal Plan 24 Hour Recall - Snack: chips  Change in appetite?: No  Recent Changes in Weight: No Recent Weight Change  Current nutritional status an area of need that is impacting  patient's ability to self-manage diabetes?: No    Additional Social History  Support  Does anyone support you with your diabetes care?: yes  Who supports you?: son/daughter  Who takes you to your medical appointments?: self  Does the current support meet the patient's needs?: Yes  Is Support an area impacting ability to self-manage diabetes?: No    Access to Mass Media & Technology  Does the patient have access to any of the following devices or technologies?: Smart phone  Media or technology needs impacting ability to self-manage diabetes?: No    Cognitive/Behavioral Health  Alert and Oriented: Yes  Difficulty Thinking: No  Requires Prompting: No  Requires assistance for routine expression?: No  Cognitive or behavioral barriers impacting ability to self-manage diabetes?: No    Culture/Catholic  Culture or Taoist beliefs that may impact ability to access healthcare: No    Communication  Language preference: English  Hearing Problems: No  Vision Problems: Yes  Vision problem type:: Decreased Vision  Vision Assistance: Glasses  Communication needs impacting ability to self-manage diabetes?: No    Health Literacy  Preferred Learning Method: Face to Face  How often do you need to have someone help you read instructions, pamphlets, or written material from your doctor or pharmacy?: Never  Health literacy needs impacting ability to self-manage diabetes?: No      Diabetes Self-Management Skills Assessment  Diabetes Disease Process/Treatment Options  Patient/caregiver able to state what happens when someone has diabetes.: somewhat  Patient/caregiver knows what type of diabetes they have.: yes  Diabetes Type : Type II  Patient/caregiver able to identify at least three signs and symptoms of diabetes.: no  Patient able to identify at least three risk factors for diabetes.: no  Diabetes Disease Process/Treatment Options: Skills Assessment Completed: Yes  Assessment indicates:: Instruction Needed, Knowledge deficit  Area of  need?: Yes    Nutrition/Healthy Eating  Challenges to healthy eating:: portion control  Method of carbohydrate measurement:: no method  Patient can identify foods that impact blood sugar.: yes  Patient-identified foods:: starches (bread, pasta, rice, cereal)  Nutrition/Healthy Eating Skills Assessment Completed:: Yes  Assessment indicates:: Instruction Needed, Knowledge deficit  Area of need?: Yes    Physical Activity/Exercise  Physical Activity/Exercise Skills Assessment Completed: : No  Deffered due to:: Time    Medications  Patient is able to identify current diabetes medications, dosages, and appropriate timing of medications.: yes  Patient understands the purpose of the medications taken for diabetes.: no  Patient reports problems or concerns with current medication regimen.: no  Medication Skills Assessment Completed:: Yes  Assessment indicates:: Instruction Needed, Knowledge deficit  Area of need?: Yes    Home Blood Glucose Monitoring  Patient states that blood sugar is checked at home daily.: yes  Monitoring Method:: personal continuous glucose monitor  Personal CGM type:: Dexcom G6  Patient is able to use personal CGM appropriately.: no  CGM Report reviewed?: yes  Home Blood Glucose Monitoring Skills Assessment Completed: : Yes  Assessment indicates:: Instruction Needed, Knowledge deficit  Area of need?: Yes    Acute Complications  Patient is able to identify types of acute complications: No  Acute Complications Skills Assessment Completed: : Yes  Assessment indicates:: Instruction Needed, Knowledge deficit  Area of need?: Yes    Chronic Complications  Chronic Complications Skills Assessment Completed: : No  Deferred due to:: Time    Psychosocial/Coping  Psychosocial/Coping Skills Assessment Completed: : No  Deffered due to:: Time    Assessment Summary and Plan    Based on today's diabetes care assessment, the following areas of need were identified:      Social 5/12/2023   Support No   Access to [facility]  "Media/Tech No   Cognitive/Behavioral Health No   Culture/Ten[employer] No   Communication No   Health Literacy No        Clinical 5/12/2023   Medication Adherence No   Lab Compliance No   Nutritional Status No        Diabetes Self-Management Skills 5/12/2023   Diabetes Disease Process/Treatment Options Yes- Reviewed what is diabetes and the significance of current A1c    Nutrition/Healthy Eating Yes- see care planning   Medication Yes- Reviewed Patient's current medication regimen. MOA reviewed including common side effects.   Home Blood Glucose Monitoring Yes- see care planning   Acute Complications Yes-Reviewed BS goals, prevention, detection, signs and symptoms, and treatment of hypoglycemia           Today's interventions were provided through individual discussion, instruction, and written materials were provided.      Patient verbalized understanding of instruction and written materials.  Pt was able to return back demonstration of instructions today. Patient understood key points, needs reinforcement and further instruction.     Diabetes Self-Management Care Plan:    Today's Diabetes Self-Management Care Plan was developed with Cecilia's input. Cecilia has agreed to work toward the following goal(s) to improve his/her overall diabetes control.      Care Plan: Diabetes Management   Updates made since 4/12/2023 12:00 AM        Problem: Blood Glucose Self-Monitoring         Goal: Patient agrees to change Dexcom G6 sensor every 10 days and record blood sugars 2-3 times per day.    Start Date: 5/4/2023   Expected End Date: 5/12/2023   This Visit's Progress: Not met   Priority: High   Barriers: No Barriers Identified   Note:    5/4/23 - - DEXCOM G6 CGM TRAINING  Patient referred to clinic today for Dexcom G6 continuous glucose sensor system training.  Dexcom G6 mobile yaakov downloaded to phone. Education was provided using "Quick Start Guide" per Dexcom protocol and video presentation.     Pt will be using her I-phone as the " primary .  Overview:  5min glucose reading updates, trending arrows, BG graph screens, battery life indicator, Blue Tooth Symbol.  Menus: trend Graph, start sensor, enter BG, events, Alerts, Settings, Shutdown, Stop Sensor   Settings:                          * Urgent Low: 55 mg/dL                          * Urgent Low Soon: on                          * Low alert: 70 mg/dl repeat 15 min                          * High alert: 300 mg/dl                           * Rise rate: off                          * Fall Rate: off                          * Signal Loss: on repeat 20 min                          * No Reading: on repeat 20 min                          * Always sound: on                      Reviewed additional setting options with patient, including Graph Height and Transmitter ID. Transmitter was paired with .    Reviewed where to find sensor insertion time and sensor expiration date.   Discussed no need to calibrate sensor during the entirety of the 10 day wear. Discussed that pt can calibrate sensor if desired, but at that time she will need to continue calibrating every 12 hours for sensor to remain accurate. Reviewed appropriate calibration techniques.  Reviewed sensor site selection. Site selected and prepped using aseptic technique Inserted to left abdomen. Transmitter placed in pod and secured.  Practiced sensor pod/transmitter removal from site, and removal of transmitter from sensor pod.  Patient able to demonstrate without difficulty.  Encouraged to review manual prior to starting another sensor.   Reviewed problem solving aspects of sensor transmission/ variables that can disrupt RF transmission.  range 20 feet, but the first 3 hrs keep within 3 feet of transmitter.  Pt instructed on lag time of interstitial fluid from CBG and was advised to tx hypoglycemia and dose insulin based on SMBG values.  Dexcom technical support contact number given and examples of when to  "contact them discussed.   Patient advised to always check glucose with glucometer should readings do not match symptoms felt (ex. Hypo or hyperglycemia).    5/12/23- Care Plan Update- Pt needed assist with changing dexcom G6 sensor. We discussed how to change the sensor, watched a video presentation,and reviewed the dexcom "Quick Start Guide" per Dexcom protocol.  Pt will be using her I-phone as the primary . We also reviewed her BS graphs and discussed her elevations and pattern management. Addressed pt's questions/concerns. Pt verbalized understanding.             Problem: Healthy Eating         Goal: Eat 2-3 meals daily with 30-45g/2-3 servings of Carbohydrate per meal. Limit snacking in between meal to 1 serving (15 grams).    Start Date: 5/12/2023   Expected End Date: 6/1/2023   Barriers: No Barriers Identified   Note:    5/12/23 - - Begin discussing eating healthy and diabetes. We discussed importance of portion sizes of food at each meal. Discussed carb vs non-carb foods, appropriate amount of carbs to have at meals/snacks and acceptable serving sizes of individual carb items. We also practiced reading food labels. Pt agreed to focusing on her portion sizes. We also discussed best practice to avoid sugary drinks and replace with non-caloric drinks. Pt agreed to start drinking more water in place of sugary drinks.          Follow Up Plan     Follow up in about 20 days (around 6/1/2023) for F/U #2 meal planning.    Today's care plan and follow up schedule was discussed with patient.  Cecilia verbalized understanding of the care plan, goals, and agrees to follow up plan.        The patient was encouraged to communicate with his/her health care provider/physician and care team regarding his/her condition(s) and treatment.  I provided the patient with my contact information today and encouraged to contact me via phone or [facility]'s Patient Portal as needed.     Length of Visit   Total Time: 60 Minutes       "

## 2023-05-15 ENCOUNTER — OFFICE VISIT (OUTPATIENT)
Dept: CARDIOLOGY | Facility: CLINIC | Age: 73
End: 2023-05-15
Attending: INTERNAL MEDICINE
Payer: MEDICARE

## 2023-05-15 VITALS
HEART RATE: 70 BPM | OXYGEN SATURATION: 96 % | WEIGHT: 195 LBS | BODY MASS INDEX: 35.88 KG/M2 | DIASTOLIC BLOOD PRESSURE: 60 MMHG | HEIGHT: 62 IN | SYSTOLIC BLOOD PRESSURE: 122 MMHG

## 2023-05-15 DIAGNOSIS — Z86.718 HISTORY OF DEEP VEIN THROMBOSIS: ICD-10-CM

## 2023-05-15 DIAGNOSIS — E78.00 HYPERCHOLESTEROLEMIA: ICD-10-CM

## 2023-05-15 DIAGNOSIS — Z95.1 HISTORY OF CORONARY ARTERY BYPASS SURGERY: ICD-10-CM

## 2023-05-15 DIAGNOSIS — E66.01 SEVERE OBESITY: ICD-10-CM

## 2023-05-15 DIAGNOSIS — I70.0 ATHEROSCLEROSIS OF AORTA: ICD-10-CM

## 2023-05-15 DIAGNOSIS — I25.10 CORONARY ARTERY DISEASE INVOLVING NATIVE CORONARY ARTERY OF NATIVE HEART WITHOUT ANGINA PECTORIS: ICD-10-CM

## 2023-05-15 DIAGNOSIS — Z86.73 HISTORY OF CEREBROVASCULAR ACCIDENT: ICD-10-CM

## 2023-05-15 DIAGNOSIS — E78.1 HYPERTRIGLYCERIDEMIA: ICD-10-CM

## 2023-05-15 DIAGNOSIS — I10 PRIMARY HYPERTENSION: ICD-10-CM

## 2023-05-15 PROCEDURE — 3288F FALL RISK ASSESSMENT DOCD: CPT | Mod: CPTII,S$GLB,, | Performed by: INTERNAL MEDICINE

## 2023-05-15 PROCEDURE — 99999 PR PBB SHADOW E&M-EST. PATIENT-LVL IV: CPT | Mod: PBBFAC,,, | Performed by: INTERNAL MEDICINE

## 2023-05-15 PROCEDURE — 1160F RVW MEDS BY RX/DR IN RCRD: CPT | Mod: CPTII,S$GLB,, | Performed by: INTERNAL MEDICINE

## 2023-05-15 PROCEDURE — 1100F PTFALLS ASSESS-DOCD GE2>/YR: CPT | Mod: CPTII,S$GLB,, | Performed by: INTERNAL MEDICINE

## 2023-05-15 PROCEDURE — 3078F DIAST BP <80 MM HG: CPT | Mod: CPTII,S$GLB,, | Performed by: INTERNAL MEDICINE

## 2023-05-15 PROCEDURE — 4010F PR ACE/ARB THEARPY RXD/TAKEN: ICD-10-PCS | Mod: CPTII,S$GLB,, | Performed by: INTERNAL MEDICINE

## 2023-05-15 PROCEDURE — 4010F ACE/ARB THERAPY RXD/TAKEN: CPT | Mod: CPTII,S$GLB,, | Performed by: INTERNAL MEDICINE

## 2023-05-15 PROCEDURE — 3078F PR MOST RECENT DIASTOLIC BLOOD PRESSURE < 80 MM HG: ICD-10-PCS | Mod: CPTII,S$GLB,, | Performed by: INTERNAL MEDICINE

## 2023-05-15 PROCEDURE — 3008F BODY MASS INDEX DOCD: CPT | Mod: CPTII,S$GLB,, | Performed by: INTERNAL MEDICINE

## 2023-05-15 PROCEDURE — 99999 PR PBB SHADOW E&M-EST. PATIENT-LVL IV: ICD-10-PCS | Mod: PBBFAC,,, | Performed by: INTERNAL MEDICINE

## 2023-05-15 PROCEDURE — 1159F PR MEDICATION LIST DOCUMENTED IN MEDICAL RECORD: ICD-10-PCS | Mod: CPTII,S$GLB,, | Performed by: INTERNAL MEDICINE

## 2023-05-15 PROCEDURE — 3052F PR MOST RECENT HEMOGLOBIN A1C LEVEL 8.0 - < 9.0%: ICD-10-PCS | Mod: CPTII,S$GLB,, | Performed by: INTERNAL MEDICINE

## 2023-05-15 PROCEDURE — 1100F PR PT FALLS ASSESS DOC 2+ FALLS/FALL W/INJURY/YR: ICD-10-PCS | Mod: CPTII,S$GLB,, | Performed by: INTERNAL MEDICINE

## 2023-05-15 PROCEDURE — 1126F AMNT PAIN NOTED NONE PRSNT: CPT | Mod: CPTII,S$GLB,, | Performed by: INTERNAL MEDICINE

## 2023-05-15 PROCEDURE — 1160F PR REVIEW ALL MEDS BY PRESCRIBER/CLIN PHARMACIST DOCUMENTED: ICD-10-PCS | Mod: CPTII,S$GLB,, | Performed by: INTERNAL MEDICINE

## 2023-05-15 PROCEDURE — 3288F PR FALLS RISK ASSESSMENT DOCUMENTED: ICD-10-PCS | Mod: CPTII,S$GLB,, | Performed by: INTERNAL MEDICINE

## 2023-05-15 PROCEDURE — 99214 PR OFFICE/OUTPT VISIT, EST, LEVL IV, 30-39 MIN: ICD-10-PCS | Mod: S$GLB,,, | Performed by: INTERNAL MEDICINE

## 2023-05-15 PROCEDURE — 3074F PR MOST RECENT SYSTOLIC BLOOD PRESSURE < 130 MM HG: ICD-10-PCS | Mod: CPTII,S$GLB,, | Performed by: INTERNAL MEDICINE

## 2023-05-15 PROCEDURE — 99214 OFFICE O/P EST MOD 30 MIN: CPT | Mod: S$GLB,,, | Performed by: INTERNAL MEDICINE

## 2023-05-15 PROCEDURE — 1157F ADVNC CARE PLAN IN RCRD: CPT | Mod: CPTII,S$GLB,, | Performed by: INTERNAL MEDICINE

## 2023-05-15 PROCEDURE — 3074F SYST BP LT 130 MM HG: CPT | Mod: CPTII,S$GLB,, | Performed by: INTERNAL MEDICINE

## 2023-05-15 PROCEDURE — 3008F PR BODY MASS INDEX (BMI) DOCUMENTED: ICD-10-PCS | Mod: CPTII,S$GLB,, | Performed by: INTERNAL MEDICINE

## 2023-05-15 PROCEDURE — 1157F PR ADVANCE CARE PLAN OR EQUIV PRESENT IN MEDICAL RECORD: ICD-10-PCS | Mod: CPTII,S$GLB,, | Performed by: INTERNAL MEDICINE

## 2023-05-15 PROCEDURE — 1126F PR PAIN SEVERITY QUANTIFIED, NO PAIN PRESENT: ICD-10-PCS | Mod: CPTII,S$GLB,, | Performed by: INTERNAL MEDICINE

## 2023-05-15 PROCEDURE — 3052F HG A1C>EQUAL 8.0%<EQUAL 9.0%: CPT | Mod: CPTII,S$GLB,, | Performed by: INTERNAL MEDICINE

## 2023-05-15 PROCEDURE — 1159F MED LIST DOCD IN RCRD: CPT | Mod: CPTII,S$GLB,, | Performed by: INTERNAL MEDICINE

## 2023-05-15 NOTE — PROGRESS NOTES
Subjective:     Cecilia Barahona is a 73 y.o. female with hypertension, hypercholesterolemia, hypertriglyceridemia and diabetes mellitus type 2. She is mildly obese. She appears to have had a cerebrovascular accident in about 2004 when she had an episode of weakness in her left hand. She had exertional chest discomfort in 12/2019. She was given isosorbidemononitrate and the chest discomfort resolved. She had a stress MPI that was negative. On 5/16/2020 she began to experience a mild pressure over her chest. The heaviness lasted most of the day. Walking in her house made the discomfort more pronounced. There was no radiation of the pain and neither any associated dyspnea or diaphoresis. She had similar discomfort on 5/17/2020 and 5/18/2020. She told her daughter that took her to an urgent care center. She received two tablets of nitroglycerin  sublingually and the chest discomfort resolved. There were no acute ST-T wave changes. She was advised admission and was transferred to [facility]. She has had no further chest pain after presentation. On 5/20/2020 she underwent coronary angiography that revealed severe three vessel coronary artery disease. There was basal inferior mild hypokinesia with an ejection fraction of 60%. On 5/25/2020 she underwent coronary artery bypass grafting receiving three grafts. The left internal mammary artery was bypassed to the left anterior descending coronary artery and a sequential saphenous vein graft was placed to the second obtuse marginal branch and the fourth obtuse marginal branch. A few days after surgery she had a deep venous thrombosis of her left leg and she was anticoagulated with apixiban. She was able to lose weight after the coronary artery bypass grafting and her diabetes and hypertension became easier to control. Some soreness and itching in the anterior chest wall. In 4/2021 she went to [place] to visit her sister. In 3/2022 she is still  bothered by occasional chest wall pain. In 2022 her   after a lengthy hospital stay. She was not taking all of her prescribed medication during her husbands illness but got back on all her usual medications on about 2023. No exertional chest pain. Mild exertional shortness of breath. No palpitations or weak spells. No issues with any of her prescribed medications. Feeling fair overall.        Coronary Artery Disease  Presents for follow-up visit. Pertinent negatives include no chest pain, chest pressure, chest tightness, dizziness, leg swelling, muscle weakness, palpitations, shortness of breath or weight gain. Risk factors include hyperlipidemia. Risk factors do not include obesity. The symptoms have been stable.   Cerebrovascular Accident  This is a chronic problem. The problem has been resolved. Pertinent negatives include no abdominal pain, anorexia, arthralgias, change in bowel habit, chest pain, chills, congestion, coughing, diaphoresis, fatigue, fever, headaches, joint swelling, myalgias, nausea, neck pain, numbness, rash, sore throat, swollen glands, urinary symptoms, vertigo, visual change, vomiting or weakness.   Hypertension  This is a chronic problem. The current episode started more than 1 year ago. The problem is unchanged. The problem is controlled (usually 120-130/65-75 mmHg at home). Pertinent negatives include no anxiety, blurred vision, chest pain, headaches, malaise/fatigue, neck pain, orthopnea, palpitations, peripheral edema, PND, shortness of breath or sweats. There is no history of chronic renal disease.   Hyperlipidemia  This is a chronic problem. The current episode started more than 1 year ago. The problem is controlled. Exacerbating diseases include diabetes. She has no history of chronic renal disease, hypothyroidism, liver disease, obesity or nephrotic syndrome. Pertinent negatives include no chest pain, focal weakness, leg pain, myalgias or shortness of breath.      Review of Systems   Constitutional: Negative for chills, diaphoresis, fatigue, fever, malaise/fatigue and weight gain.   HENT:  Negative for congestion, nosebleeds and sore throat.    Eyes:  Negative for blurred vision, double vision, vision loss in left eye and vision loss in right eye.   Cardiovascular:  Positive for dyspnea on exertion. Negative for chest pain, claudication, irregular heartbeat, leg swelling, near-syncope, orthopnea, palpitations, paroxysmal nocturnal dyspnea and syncope.   Respiratory:  Negative for chest tightness, cough, hemoptysis, shortness of breath and wheezing.    Endocrine: Negative for cold intolerance and heat intolerance.   Hematologic/Lymphatic: Negative for bleeding problem. Does not bruise/bleed easily.   Skin:  Negative for color change and rash.   Musculoskeletal:  Negative for arthralgias, back pain, falls, joint swelling, muscle weakness, myalgias and neck pain.   Gastrointestinal:  Negative for abdominal pain, anorexia, change in bowel habit, heartburn, hematemesis, hematochezia, hemorrhoids, jaundice, melena, nausea and vomiting.   Genitourinary:  Negative for dysuria and hematuria.   Neurological:  Negative for dizziness, focal weakness, headaches, light-headedness, loss of balance, numbness, vertigo and weakness.   Psychiatric/Behavioral:  Negative for altered mental status, depression and memory loss. The patient is not nervous/anxious.    Allergic/Immunologic: Negative for hives and persistent infections.       Current Outpatient Medications on File Prior to Visit   Medication Sig Dispense Refill    albuterol (PROVENTIL/VENTOLIN HFA) 90 mcg/actuation inhaler Inhale 2 puffs into the lungs every 6 (six) hours as needed for Wheezing or Shortness of Breath. Rescue 19 g 0    alcohol swabs PadM Apply 1 each topically 3 (three) times daily. 400 each 2    amLODIPine (NORVASC) 10 MG tablet Take 1 tablet (10 mg total) by mouth once daily. 90 tablet 3    artificial tear ointment  (ARTIFICIAL TEARS) Oint Place into both eyes every evening. 305 g 1    aspirin (ECOTRIN) 81 MG EC tablet Take 1 tablet (81 mg total) by mouth once daily. 90 tablet 3    atorvastatin (LIPITOR) 80 MG tablet Take 1 tablet (80 mg total) by mouth once daily. 90 tablet 3    azelastine (OPTIVAR) 0.05 % ophthalmic solution INSTILL 1 DROP IN EACH EYE TWICE DAILY 12 mL 0    blood pressure monitor (BLOOD PRESSURE KIT) Kit 1 kit by Misc.(Non-Drug; Combo Route) route once daily. 1 each 0    blood sugar diagnostic Strp To check BG 2 times daily, to use with insurance preferred meter 200 each 3    carvediloL (COREG) 12.5 MG tablet Take 1 tablet (12.5 mg total) by mouth 2 (two) times daily. 180 tablet 3    diclofenac (VOLTAREN) 50 MG EC tablet TAKE 1 TABLET(50 MG) BY MOUTH TWICE DAILY 60 tablet 0    EScitalopram oxalate (LEXAPRO) 10 MG tablet Take 1 tablet (10 mg total) by mouth once daily. 90 tablet 1    ezetimibe (ZETIA) 10 mg tablet Take 1 tablet (10 mg total) by mouth once daily. 90 tablet 3    fluticasone (FLONASE) 50 mcg/actuation nasal spray 1 spray (50 mcg total) by Each Nare route once daily. 16 g 5    furosemide (LASIX) 20 MG tablet Take 1 tablet (20 mg total) by mouth once daily. 90 tablet 3    HYDROcodone-acetaminophen (NORCO) 5-325 mg per tablet Take 1 tablet by mouth every 4 (four) hours as needed for Pain. 12 tablet 0    insulin degludec (TRESIBA FLEXTOUCH U-200) 200 unit/mL (3 mL) insulin pen Inject 20 units once daily 3 pen 3    lancets Misc To check BG 2 times daily, to use with insurance preferred meter 200 each 3    latanoprost 0.005 % ophthalmic solution INSTILL 1 DROP IN BOTH EYES EVERY EVENING 7.5 mL 0    loratadine (CLARITIN) 10 mg tablet TAKE 1 TABLET BY MOUTH EVERY DAY AS NEEDED FOR ALLERGIES 90 tablet 3    melatonin (MELATIN) 3 mg tablet Take 2 tablets (6 mg total) by mouth nightly as needed for Insomnia.  0    metFORMIN (GLUCOPHAGE) 1000 MG tablet Take 1 tablet (1,000 mg total) by mouth 2 (two) times  "daily with meals. 180 tablet 2    multivitamin with minerals tablet Take 1 tablet by mouth once daily.      ondansetron (ZOFRAN) 4 MG tablet Take 1 tablet (4 mg total) by mouth every 6 (six) hours as needed for Nausea. 20 tablet 0    pantoprazole (PROTONIX) 40 MG tablet       pen needle, diabetic (BD ULTRA-FINE LENY PEN NEEDLE) 32 gauge x 5/32" Ndle 1 Device by Misc.(Non-Drug; Combo Route) route Daily. 100 each 4    rOPINIRole (REQUIP) 0.5 MG tablet TAKE 1 TABLET(0.5 MG) BY MOUTH EVERY EVENING 90 tablet 1    semaglutide (OZEMPIC) 0.25 mg or 0.5 mg (2 mg/3 mL) pen injector Inject 0.25 mg once weekly for 4 weeks, then increase to 0.5 mg weekly. 1 each 3    TRUE METRIX GO GLUCOSE METER Misc USE TO TEST TWICE DAILY      TRUEPLUS LANCETS 30 gauge Misc USE TO TEST TWICE DAILY      valsartan (DIOVAN) 320 MG tablet Take 1 tablet (320 mg total) by mouth once daily. 90 tablet 3     No current facility-administered medications on file prior to visit.       /60 (BP Location: Right arm, Patient Position: Sitting, BP Method: Medium (Manual))   Pulse 70   Ht 5' 2" (1.575 m)   Wt 88.5 kg (195 lb)   SpO2 96%   BMI 35.67 kg/m²       Objective:     Physical Exam  Constitutional:       General: She is not in acute distress.     Appearance: Normal appearance. She is well-developed. She is not toxic-appearing or diaphoretic.   HENT:      Head: Normocephalic and atraumatic.      Nose: Nose normal.   Eyes:      General:         Right eye: No discharge.         Left eye: No discharge.      Conjunctiva/sclera:      Right eye: Right conjunctiva is not injected.      Left eye: Left conjunctiva is not injected.      Pupils: Pupils are equal.      Right eye: Pupil is round.      Left eye: Pupil is round.   Neck:      Thyroid: No thyromegaly.      Vascular: No carotid bruit or JVD.   Cardiovascular:      Rate and Rhythm: Regular rhythm. Bradycardia present. No extrasystoles are present.     Chest Wall: PMI is not displaced.      " Pulses:           Radial pulses are 2+ on the right side and 2+ on the left side.        Femoral pulses are 2+ on the right side and 2+ on the left side.       Dorsalis pedis pulses are 2+ on the right side and 2+ on the left side.        Posterior tibial pulses are 2+ on the right side and 2+ on the left side.      Heart sounds: S1 normal and S2 normal. Murmur heard.   Midsystolic murmur is present with a grade of 2/6.     Gallop present. S4 sounds present. No S3 sounds.   Pulmonary:      Effort: Pulmonary effort is normal.      Breath sounds: Normal breath sounds.   Chest:      Comments: Midline sternotomy scar.  Abdominal:      Palpations: Abdomen is soft.      Tenderness: There is no abdominal tenderness.   Musculoskeletal:      Cervical back: Neck supple.      Right ankle: No swelling, deformity or ecchymosis.      Left ankle: No swelling, deformity or ecchymosis.   Lymphadenopathy:      Head:      Right side of head: No submandibular adenopathy.      Left side of head: No submandibular adenopathy.      Cervical: No cervical adenopathy.   Skin:     General: Skin is warm and dry.      Findings: No rash.      Nails: There is no clubbing.   Neurological:      General: No focal deficit present.      Mental Status: She is alert and oriented to person, place, and time. She is not disoriented.      Cranial Nerves: No cranial nerve deficit.   Psychiatric:         Attention and Perception: Attention and perception normal.         Mood and Affect: Mood and affect normal.         Speech: Speech normal.         Behavior: Behavior normal.         Thought Content: Thought content normal.         Cognition and Memory: Cognition and memory normal.         Judgment: Judgment normal.       Assessment:     1. Coronary artery disease involving native coronary artery of native heart without angina pectoris    2. History of coronary artery bypass surgery    3. History of cerebrovascular accident    4. Atherosclerosis of aorta    5.  History of deep vein thrombosis    6. Primary hypertension    7. Hypercholesterolemia    8. Hypertriglyceridemia    9. Severe obesity        Plan:     1. Coronary Artery Disease              12/2019: Began experience chest pain. Resolved with isosorbidemononitrate.              12/19/2019: Stress MPI:3:39 min. No CP. ECG negative. MPI: Mild to moderate fixed anterior and anteroapical defect. EF 73%.              5/18/2020: Presented with more pronounced chest pain. Resolved with NTG sl.              ECG without acute changes. Troponin x 3 negative.               5/19/2020: Echo: Normal left ventricular size and systolic function. EF 75%. Mildly dilated LA. Mild aortic valve sclerosis. Mild mitral valve sclerosis.              5/20/2020: [facility]: Cath: LAD: Prox 30%. Mid 70%. Distal 80%. OM2: Osteal 90%. OM3: 95%. OM4 90%. RCA: Mid subtotal. LV: Basal inferior mild hypokinesia. EF 60%.   5/25/2020: [facility]: CABG x 3: LIMA to LAD and SVG1 to OM2 & OM4.   On carvedilol 12.5 mg Q12.              On aspirin 81 mg Q24.   Doing well.   Encouraged to enroll in exercise program.              2. History of Cerebrovascular Accident              2004: Weakness in left hand. Affected memory. No sequela.   On aspirin 81 mg Q24.    3. Atherosclerosis of Aorta   6/3/2020: CT Scan: Noted.    All risk factors addressed.     4. History of Deep Venous Trombosis of Lower Extremity   5/2020: DVT left leg after CABG.   Received apixiban for 3 months.     5. Hypertension              2000: Diagnosed.   9/1/2020: Furosemide 20 mg Q24 and KCl 20 mEq Q24 were discontinued.   Used to be on metoprolol 25 mg Q12, amlodipine 10 mg Q24, valsartan 320 mg Q24, furosemide 20 mg Q24 and chlorthalidone 25 mg Q24.   10/13/2020: Valsartan 320 mg Q24 was resumed. Amlodipine 10 mg Q24 and furosemide 20 mg Q24 were discontinued.   2/23/2022: Metoprolol 25 mg Q12 was changed to carvedilol 12.5 mg Q12 and amlodipine 5 mg Q24 was increased to amlodipine 10 mg Q24 as  pressure was running 150-160/70-80 mmHg at home.     On carvedilol 12.5 mg Q12, amlodipine 10 mg Q24, valsartan 320 mg Q24 and furosemide 20 mg Q24.   Keeping log at home.   Well controlled.      5. Hypercholesterolemia              2000: Began statin.              On atorvastatin 40 mg Q24.                 5/19/2020: Chol 204. HDL 41. . .              On atorvastatin 80 mg Q24 and ezetimibe 10 mg Q24.   8/18/2020: Chol 161. HDL 46. LDL 82. .   2/24/2022: Chol 94. HDL 37. LDL 42. TG 73.   On atorvastatin 80 mg Q24 and ezetimibe 10 mg Q24.   Very favorable lipid panel.    6. Hypertriglyceridemia   2020: Diagnosed.   As above.     7. Diabetes Mellitus, Type 2              2000: Diagnosed. Complications: CVA & CAD. Medications: Insulin and SGLT2i.   Used to be on semaglutide 4 mg Q weekly.   4/13/2023: HgbA1C 8.9%.   On empagliflozin 10 mg Q24.      8. Severe Obesity              5/19/2020: Weight 86 kg. BMI 33.   5/15/2023: Weight 88 kg. BMI 36.   Encourage to lose weight.     9. Primary Care              Dr. Yudi Smart.    F/u 4 months.    Etelvina Lowery M.D.

## 2023-05-22 ENCOUNTER — TELEPHONE (OUTPATIENT)
Dept: FAMILY MEDICINE | Facility: CLINIC | Age: 73
End: 2023-05-22

## 2023-05-22 RX ORDER — SCOLOPAMINE TRANSDERMAL SYSTEM 1 MG/1
1 PATCH, EXTENDED RELEASE TRANSDERMAL
Qty: 4 PATCH | Refills: 0 | Status: SHIPPED | OUTPATIENT
Start: 2023-05-22 | End: 2023-06-01

## 2023-05-22 NOTE — TELEPHONE ENCOUNTER
----- Message from Blair Moulton sent at 5/22/2023 10:34 AM CDT -----  Type: Patient Call Back    Who called:Self     What is the request in detail: Asking if she can be prescribed some medication for Motion sickness states she is going on a cruise .. says she have motion sickness very bad     Can the clinic reply by [facility]? No     Would the patient rather a call back or a response via [facility]? CALL     Best call back number: [phone] (home)

## 2023-05-22 NOTE — TELEPHONE ENCOUNTER
I sent the prescription(s) to the pharmacy on file.  Recommend she read the precaution insert for this medication prior to using.

## 2023-06-02 DIAGNOSIS — I10 PRIMARY HYPERTENSION: ICD-10-CM

## 2023-06-05 RX ORDER — CARVEDILOL 12.5 MG/1
TABLET ORAL
Qty: 180 TABLET | Refills: 3 | Status: SHIPPED | OUTPATIENT
Start: 2023-06-05

## 2023-06-12 ENCOUNTER — OFFICE VISIT (OUTPATIENT)
Dept: OPTOMETRY | Facility: CLINIC | Age: 73
End: 2023-06-12
Payer: MEDICARE

## 2023-06-12 ENCOUNTER — CLINICAL SUPPORT (OUTPATIENT)
Dept: OPHTHALMOLOGY | Facility: CLINIC | Age: 73
End: 2023-06-12
Payer: MEDICARE

## 2023-06-12 DIAGNOSIS — H40.002 GLAUCOMA SUSPECT, LEFT EYE: Primary | ICD-10-CM

## 2023-06-12 DIAGNOSIS — H52.13 MYOPIA OF BOTH EYES WITH ASTIGMATISM AND PRESBYOPIA: ICD-10-CM

## 2023-06-12 DIAGNOSIS — H52.4 MYOPIA OF BOTH EYES WITH ASTIGMATISM AND PRESBYOPIA: ICD-10-CM

## 2023-06-12 DIAGNOSIS — H40.50X0 GLAUCOMATOUS VISUAL FIELD DEFECT: ICD-10-CM

## 2023-06-12 DIAGNOSIS — H26.9 CORTICAL CATARACT OF BOTH EYES: ICD-10-CM

## 2023-06-12 DIAGNOSIS — H25.13 NUCLEAR SCLEROSIS OF BOTH EYES: ICD-10-CM

## 2023-06-12 DIAGNOSIS — H53.40 GLAUCOMATOUS VISUAL FIELD DEFECT: ICD-10-CM

## 2023-06-12 DIAGNOSIS — H52.203 MYOPIA OF BOTH EYES WITH ASTIGMATISM AND PRESBYOPIA: ICD-10-CM

## 2023-06-12 PROCEDURE — 4010F ACE/ARB THERAPY RXD/TAKEN: CPT | Mod: CPTII,S$GLB,, | Performed by: OPTOMETRIST

## 2023-06-12 PROCEDURE — 1126F PR PAIN SEVERITY QUANTIFIED, NO PAIN PRESENT: ICD-10-PCS | Mod: CPTII,S$GLB,, | Performed by: OPTOMETRIST

## 2023-06-12 PROCEDURE — 1159F PR MEDICATION LIST DOCUMENTED IN MEDICAL RECORD: ICD-10-PCS | Mod: CPTII,S$GLB,, | Performed by: OPTOMETRIST

## 2023-06-12 PROCEDURE — 1126F AMNT PAIN NOTED NONE PRSNT: CPT | Mod: CPTII,S$GLB,, | Performed by: OPTOMETRIST

## 2023-06-12 PROCEDURE — 4010F PR ACE/ARB THEARPY RXD/TAKEN: ICD-10-PCS | Mod: CPTII,S$GLB,, | Performed by: OPTOMETRIST

## 2023-06-12 PROCEDURE — 3052F PR MOST RECENT HEMOGLOBIN A1C LEVEL 8.0 - < 9.0%: ICD-10-PCS | Mod: CPTII,S$GLB,, | Performed by: OPTOMETRIST

## 2023-06-12 PROCEDURE — 99999 PR PBB SHADOW E&M-EST. PATIENT-LVL III: ICD-10-PCS | Mod: PBBFAC,,, | Performed by: OPTOMETRIST

## 2023-06-12 PROCEDURE — 92015 PR REFRACTION: ICD-10-PCS | Mod: S$GLB,,, | Performed by: OPTOMETRIST

## 2023-06-12 PROCEDURE — 99999 PR PBB SHADOW E&M-EST. PATIENT-LVL III: CPT | Mod: PBBFAC,,, | Performed by: OPTOMETRIST

## 2023-06-12 PROCEDURE — 3052F HG A1C>EQUAL 8.0%<EQUAL 9.0%: CPT | Mod: CPTII,S$GLB,, | Performed by: OPTOMETRIST

## 2023-06-12 PROCEDURE — 92015 DETERMINE REFRACTIVE STATE: CPT | Mod: S$GLB,,, | Performed by: OPTOMETRIST

## 2023-06-12 PROCEDURE — 92083 EXTENDED VISUAL FIELD XM: CPT | Mod: S$GLB,,, | Performed by: OPTOMETRIST

## 2023-06-12 PROCEDURE — 92014 PR EYE EXAM, EST PATIENT,COMPREHESV: ICD-10-PCS | Mod: S$GLB,,, | Performed by: OPTOMETRIST

## 2023-06-12 PROCEDURE — 1159F MED LIST DOCD IN RCRD: CPT | Mod: CPTII,S$GLB,, | Performed by: OPTOMETRIST

## 2023-06-12 PROCEDURE — 1157F PR ADVANCE CARE PLAN OR EQUIV PRESENT IN MEDICAL RECORD: ICD-10-PCS | Mod: CPTII,S$GLB,, | Performed by: OPTOMETRIST

## 2023-06-12 PROCEDURE — 92014 COMPRE OPH EXAM EST PT 1/>: CPT | Mod: S$GLB,,, | Performed by: OPTOMETRIST

## 2023-06-12 PROCEDURE — 1157F ADVNC CARE PLAN IN RCRD: CPT | Mod: CPTII,S$GLB,, | Performed by: OPTOMETRIST

## 2023-06-12 PROCEDURE — 92083 HUMPHREY VISUAL FIELD - OU - BOTH EYES: ICD-10-PCS | Mod: S$GLB,,, | Performed by: OPTOMETRIST

## 2023-06-12 NOTE — PROGRESS NOTES
HPI     Glaucoma            Comments: New patient glaucoma and diabetic eye exam.    (+)DM  Hemoglobin A1C       Date                     Value               Ref Range             Status                04/13/2023               8.9 (H)             4.0 - 5.6 %           Final                   10/13/2022               8.0 (H)             4.0 - 5.6 %           Final                   05/09/2022               8.9 (H)             4.0 - 5.6 %           Final                  glaucoma exam            Comments: General eye examination and refraction.  Prior diagnosis of glaucoma.  Was using Latanoprost 0.005% ophthalmic   solution in both eyes for IOP control/reduction, but she stopped using 6+   months ago, because she found her visual acuity was more blurry when she   used the drops.   Was not using any other drops for reduction of IOP.           Comments    Patient's age: 73 y.o. AA female   Occupation: Retired  Approximate date of last eye examination:  01/29/2021  Name of last eye doctor seen: Dr. Healy  City/State: [facility]  Wears glasses? Yes     If yes, wears  Full-time or part-time?  Full-time  Present glasses are: Bifocal, SV Distance, SV Reading?  PAL  Approximate age of present glasses:   2 - 2.5  years   Got new glasses following last exam, or subsequently?:  Yes   Any problem with VA with glasses?  Blurry VA OS in glasses. Pt also   states baggy eyes OU in the mornings that go away as day progresses.  Wears CLs?:  No.   Headaches?  No  Eye pain/discomfort?  No                                                                                     Flashes?  No  Floaters?  No  Diplopia/Double vision?  No  Patient's Ocular History:         Any eye surgeries? No         Any eye injury?  No         Any treatment for eye disease?  Yes.        POHx:         1. Mild nonproliferative diabetic retinopathy of both eyes without   macular edema associated with type 2 diabetes mellitus  - Primary         2. Primary open angle glaucoma  "(POAG) of left eye, moderate stage          3. Primary open angle glaucoma (POAG) of right eye, mild stage          4. Nuclear sclerosis of both eyes   Family history of eye disease?  (+)glaucoma - mother  Significant patient medical history:         1. Diabetes?  Yes x 30 years +/-       If yes, IDDM or NIDDM?  IDDM    2. HBP?  Yes -takes meds.               3. Other (describe):  (+) high cholesterol - takes medication.   (+)hx of open heart sx   ! OTC eyedrops currently using:  AT's PRN OU   ! Prescription eye meds currently using:  Latanoprost qhs OU - patient   ran out  6 months ago and did not have the Rx refilled. Pt states that eye   drops were "not working" and VA was becoming worse in both eyes.   ! Any history of allergy/adverse reaction to any eye meds used   previously?  No - but see notes above    ! Any history of allergy/adverse reaction to eyedrops used during prior   eye exam(s)? No    ! Any history of allergy/adverse reaction to Novacaine or similar meds?   No   ! Any history of allergy/adverse reaction to Epinephrine or similar meds?   No    ! Patient okay with use of anesthetic eyedrops to check eye pressure?    Yes         ! Patient okay with use of eyedrops to dilate pupils today?  Yes   !  Allergies/Medications/Medical History/Family History reviewed today?    Yes      PD =   68/64  Desired reading distance =  13"                                                                         Last edited by Jose Matthews, OD on 6/12/2023  4:14 PM.            Assessment /Plan     For exam results, see Encounter Report.    1. Glaucoma suspect, left eye  Ambulatory referral/consult to Ophthalmology    Dior Visual Field - OU - Extended - Both Eyes      2. Glaucomatous visual field defect  Ambulatory referral/consult to Ophthalmology    Dior Visual Field - OU - Extended - Both Eyes      3. Nuclear sclerosis of both eyes        4. Cortical cataract of both eyes        5. Myopia of both eyes with " astigmatism and presbyopia                     Bilateral nuclear sclerosis of lens, consistent with age.  Peripheral cortical cataract in both eyes.  No need for cataract surgery in either eye.    Myopia with astigmatism in each eye.  Best-corrected VA of 20/25-1+1 in the right eye and 20/30-1 in the left eye.  Presbyopia consistent with age  New spectacle lens Rx issued for use as desired.  Recommend full-time wear.    Prior diagnosis of bilateral (?) primary open angle glaucoma.  Had been using Latanoprost 0.005% ophthalmic solution in both eyes (one drop once per day), but ran out and did not refill the RX.  She felt that visual acuity was worse after using the drops, so she discontinued use in both eyes.    Note asymmetry in optic nerve head cupping, greater in the left eye.  Intraocular pressure of 12 mm Hg in the right eye and 16 mm Hg in the left eye.    Dior visual field test (24-2) done today.  Refer to test results in [place].  Taking test results at face, normal visual field in the right eye, without evidence of glaucomatous visual field defect.   HVF test results in the left eye indicate superior arcuate defect > inferior arcuate defect, suggestive of moderate stage glaucoma.  Discussed with Ms. Barahona.  Suggest consult with glaucoma specialist ( Dr Hoffman / Dr. Zarate / Dr. Gibbs) for glaucoma evaluation.  Follow up as recommended by glaucoma specialist.    Suggest recheck refraction in one year

## 2023-06-12 NOTE — PATIENT INSTRUCTIONS
Bilateral nuclear sclerosis of lens, consistent with age.  Peripheral cortical cataract in both eyes.  No need for cataract surgery in either eye.    Myopia with astigmatism in each eye.  Best-corrected VA of 20/25-1+1 in the right eye and 20/30-1 in the left eye.  Presbyopia consistent with age  New spectacle lens Rx issued for use as desired.  Recommend full-time wear.    Prior diagnosis of bilateral (?) primary open angle glaucoma.  Had been using Latanoprost 0.005% ophthalmic solution in both eyes (one drop once per day), but ran out and did not refill the RX.  She felt that visual acuity was worse after using the drops, so she discontinued use in both eyes.    Note asymmetry in optic nerve head cupping, greater in the left eye.  Intraocular pressure of 12 mm Hg in the right eye and 16 mm Hg in the left eye.    Dior visual field test (24-2) done today.  Refer to test results in [place].  Taking test results at face, normal visual field in the right eye, without evidence of glaucomatous visual field defect.   HVF test results in the left eye indicate superior arcuate defect > inferior arcuate defect, suggestive of moderate stage glaucoma.  Discussed with Ms. Jun.  Suggest consult with glaucoma specialist ( Dr Hoffman / Dr. Zarate / Dr. Gibbs) for glaucoma evaluation.  Follow up as recommended by glaucoma specialist.    Suggest recheck refraction in one year

## 2023-06-15 NOTE — PROGRESS NOTES
There are no diagnoses linked to this encounter.       24-2 SS VISUAL FIELDS DONE OU.  REL   FIX  COOP.  GOOD OU. PATIENT DENIES ALLERGIES TO ADHESIVES./MA      RX USED    -0.75 SPHERES  -0.75 SPHERES

## 2023-06-20 ENCOUNTER — TELEPHONE (OUTPATIENT)
Dept: ENDOCRINOLOGY | Facility: CLINIC | Age: 73
End: 2023-06-20

## 2023-06-20 RX ORDER — DULAGLUTIDE 3 MG/.5ML
3 INJECTION, SOLUTION SUBCUTANEOUS
Qty: 4 PEN | Refills: 1 | Status: SHIPPED | OUTPATIENT
Start: 2023-06-20 | End: 2023-08-01

## 2023-06-20 NOTE — TELEPHONE ENCOUNTER
Patient would like to get back on trulicity, states that she does not like the way ozempic makes her feel.

## 2023-06-20 NOTE — TELEPHONE ENCOUNTER
----- Message from Blair Moulton sent at 6/20/2023 11:47 AM CDT -----  Type: Patient Call Back    Who called:Self     What is the request in detail: Asking if she can get trulicity back says she doesn't like the way ozempic made her feel     Can the clinic reply by [facility]? No     Would the patient rather a call back or a response via [facility]? Call     Best call back number: [phone] (home)

## 2023-06-23 ENCOUNTER — TELEPHONE (OUTPATIENT)
Dept: ENDOCRINOLOGY | Facility: CLINIC | Age: 73
End: 2023-06-23

## 2023-06-23 NOTE — TELEPHONE ENCOUNTER
----- Message from Kika Bustamante sent at 6/23/2023  9:00 AM CDT -----  .Type: Patient Call Back    Who called: Self     What is the request in detail: Calling to see if dulaglutide (TRULICITY) 3 mg/0.5 mL pen injector was sent to pharmacy     Can the clinic reply by [facility]? No     Would the patient rather a call back or a response via [facility]? Call Back     Best call back number: .[phone] (home) [phone] (work)      Additional Information:

## 2023-06-26 ENCOUNTER — PATIENT MESSAGE (OUTPATIENT)
Dept: PHARMACY | Facility: CLINIC | Age: 73
End: 2023-06-26

## 2023-06-26 ENCOUNTER — TELEPHONE (OUTPATIENT)
Dept: ENDOCRINOLOGY | Facility: CLINIC | Age: 73
End: 2023-06-26

## 2023-06-26 ENCOUNTER — TELEPHONE (OUTPATIENT)
Dept: PHARMACY | Facility: CLINIC | Age: 73
End: 2023-06-26

## 2023-06-26 NOTE — TELEPHONE ENCOUNTER
----- Message from Nitza Gurpreet sent at 6/26/2023 12:27 PM CDT -----  .Type: Patient Call Back    Who called:self    What is the request in detail:requesting to speak to Scar states she missed her call and would like to speak with her    Can the clinic reply by [facility]? Call    Would the patient rather a call back or a response via [facility]? call    Best call back number:.[phone]     Additional Information:

## 2023-06-26 NOTE — TELEPHONE ENCOUNTER
Spoke to pharmacy, was informed that NP Lexii had to write a letter indicating the reasoning between switching the patient from ozempic to trulicity. Letter faxed to [facility] per their request.    Also was informed that the prescription was too soon to be filled but that may be able to be overridden with the letter.

## 2023-06-26 NOTE — TELEPHONE ENCOUNTER
We have contacted Cecilia Barahona to informed her of the re-enrollment process for the  [facility] Patient Assistance Program and what's she needs to provide to continue receiving Basaglar, Jardiance, and Trulicity through [facility] Patient Assistance Program.      The following documents may be required to re enroll for the 2023 calendar year: Proof of household Income( such as social security statement, 1099 form, pension statement or 3 consecutive pay stubs, Copy of all Insurance cards( front and back), and Signed & dated OCCP/ Patient Authorization Forms        Thank you   Pharmacy Patient Assistance

## 2023-07-12 ENCOUNTER — LAB VISIT (OUTPATIENT)
Dept: LAB | Facility: HOSPITAL | Age: 73
End: 2023-07-12
Payer: MEDICARE

## 2023-07-12 DIAGNOSIS — E78.5 HYPERLIPIDEMIA, UNSPECIFIED HYPERLIPIDEMIA TYPE: ICD-10-CM

## 2023-07-12 DIAGNOSIS — E11.65 TYPE 2 DIABETES MELLITUS WITH HYPERGLYCEMIA, UNSPECIFIED WHETHER LONG TERM INSULIN USE: ICD-10-CM

## 2023-07-12 DIAGNOSIS — Z86.73 HISTORY OF CEREBROVASCULAR ACCIDENT: ICD-10-CM

## 2023-07-12 LAB
CHOLEST SERPL-MCNC: 104 MG/DL (ref 120–199)
CHOLEST/HDLC SERPL: 3 {RATIO} (ref 2–5)
ESTIMATED AVG GLUCOSE: 177 MG/DL (ref 68–131)
HBA1C MFR BLD: 7.8 % (ref 4–5.6)
HDLC SERPL-MCNC: 35 MG/DL (ref 40–75)
HDLC SERPL: 33.7 % (ref 20–50)
LDLC SERPL CALC-MCNC: 51.8 MG/DL (ref 63–159)
NONHDLC SERPL-MCNC: 69 MG/DL
TRIGL SERPL-MCNC: 86 MG/DL (ref 30–150)

## 2023-07-12 PROCEDURE — 83036 HEMOGLOBIN GLYCOSYLATED A1C: CPT | Performed by: NURSE PRACTITIONER

## 2023-07-12 PROCEDURE — 36415 COLL VENOUS BLD VENIPUNCTURE: CPT | Mod: PN | Performed by: NURSE PRACTITIONER

## 2023-07-12 PROCEDURE — 80061 LIPID PANEL: CPT | Performed by: NURSE PRACTITIONER

## 2023-07-18 PROBLEM — E11.29 MICROALBUMINURIA DUE TO TYPE 2 DIABETES MELLITUS: Status: ACTIVE | Noted: 2023-07-18

## 2023-07-18 PROBLEM — R80.9 MICROALBUMINURIA DUE TO TYPE 2 DIABETES MELLITUS: Status: ACTIVE | Noted: 2023-07-18

## 2023-07-31 NOTE — PROGRESS NOTES
CC: This 73 y.o. Black or  female  is here for evaluation of  T2DM along with comorbidities indicated in the Visit Diagnosis section of this encounter.    HPI: Cecilia Barahona was diagnosed with T2DM in her 30s. Oral agents started at the time of diagnosis.         Prior visit 4/2023  Pt has not been seen in several months. Her  passed away in Jan and she stopped taking care of herself.  She hasn't been taking any of her medications. She is ready to get back on track.   No recent A1c available. Diet is poor. Drinks juices, 1 sweet iced coffee/day; down to 1 cold drink per day.   She has about a month supply of Trulicity at home.  She wants to get back on Ozempic for weight loss.   Plan See Diabetes Education for Dexcom training and for diet review.   If not using Dexcom to monitor blood sugars, do fingersticks 2x/day.   Resume medications:   - Metformin 1000 mg twice daily with food   - Tresiba 20 units once daily. After 1-2 weeks if blood sugars are still higher than 180s, then increase Tresiba from 20 to 24 units once daily.   - Start Ozempic. Inject 0.25 mg once weekly for 4 weeks, then increase to 0.5 mg weekly. - patient has failed Ozempic at 0.5 mg in the past but she wants to try it again.   Hold off resuming Jardiance for now since it can trigger  infections especially when BGs are especially high.   Improve diet - Avoid beverages with sugar.   Return to clinic in 3 months with labs prior. A1c today.       Interval hx  A1c is down from 8.9% to now 7.8%.   Pt did not tolerate Ozempic and went back on Trulicity.   She is now using Dexcom CGM.     She hasn't been taking Trulicity or Tresiba x 3 weeks while she's been with her daughter. She has been making a lot of candied pecans and eating a lot of sweets.   Pt does not remember how much Tresiba she was taking previously. C/o poor memory.     90 day CGM average 192      PMHX: CHETNA - does not like her old mask. H/o stroke with right  facial dropp     LAST DIABETES EDUCATION: never    RECENT ILLNESSES/HOSPITALIZATIONS - -No.     HOSPITALIZED FOR DIABETES  -  No.    PRESCRIBED DIABETES MEDICATIONS:    metformin 1000 mg bid  Jardiance 25 mg once daily   Trulicity 3 mg once weekly on Fridays   Tresiba 20 units once daily       Misses medication doses -  yes as above     DM COMPLICATIONS: peripheral neuropathy    SIGNIFICANT DIABETES MED HISTORY:   Glimepiride stopped at initial ov 7/2017 since she was already on Novolog  ozempic started 10/2018,  switched to trulicity ~ 12/2018 d/t nausea at 0.5 mg dose. Failed  again in 2023   Jardiance started 2/2020  tresiba and trulicity stopped in 10/2020 d/t improved BGs but Trulicity resumed shortly d/t poor diet and DM control        SELF MONITORING BLOOD GLUCOSE: Dexcom G6 CGM yaakov   CGM interpretation: glucoses overall quite high d/t dietary and medication non-adherence.         HYPOGLYCEMIC EPISODES: CGM shows glucose dropping to 50s one night around 2 am but pt denies having overnight symptoms.  Suspect it was an erroneous reading d/t lack of symptoms       CURRENT DIET:  poor; sugary beverages.     CURRENT EXERCISE: none       /70   Pulse 63   Temp 98 °F (36.7 °C)   Wt 86.2 kg (190 lb)   BMI 34.75 kg/m²         ROS:   CONSTITUTIONAL: Appetite lower, no  fatigue  Other: + urinary frequency + polydipsia.      PHYSICAL EXAM:  GENERAL: Well developed, well nourished. No acute distress.   PSYCH: AAOx3, appropriate mood and affect, conversant, well-groomed. Judgement and insight good.   NEURO: Cranial nerves grossly intact. Speech clear, no tremor.   CHEST: Respirations even and unlabored.           Hemoglobin A1C   Date Value Ref Range Status   07/12/2023 7.8 (H) 4.0 - 5.6 % Final     Comment:     ADA Screening Guidelines:  5.7-6.4%  Consistent with prediabetes  >or=6.5%  Consistent with diabetes    High levels of fetal hemoglobin interfere with the HbA1C  assay. Heterozygous hemoglobin variants  "(HbS, HgC, etc)do  not significantly interfere with this assay.   However, presence of multiple variants may affect accuracy.     04/13/2023 8.9 (H) 4.0 - 5.6 % Final     Comment:     ADA Screening Guidelines:  5.7-6.4%  Consistent with prediabetes  >or=6.5%  Consistent with diabetes    High levels of fetal hemoglobin interfere with the HbA1C  assay. Heterozygous hemoglobin variants (HbS, HgC, etc)do  not significantly interfere with this assay.   However, presence of multiple variants may affect accuracy.     10/13/2022 8.0 (H) 4.0 - 5.6 % Final     Comment:     ADA Screening Guidelines:  5.7-6.4%  Consistent with prediabetes  >or=6.5%  Consistent with diabetes    High levels of fetal hemoglobin interfere with the HbA1C  assay. Heterozygous hemoglobin variants (HbS, HgC, etc)do  not significantly interfere with this assay.   However, presence of multiple variants may affect accuracy.         No results found for: "CPEPTIDE", "GLUTAMICACID", "ISLETCELLANT", "FRUCTOSAMINE"      Chemistry        Component Value Date/Time     02/03/2023 0610    K 4.3 02/03/2023 0610     02/03/2023 0610    CO2 27 02/03/2023 0610    BUN 15 02/03/2023 0610    CREATININE 0.8 02/03/2023 0610     (H) 02/03/2023 0610        Component Value Date/Time    CALCIUM 9.7 02/03/2023 0610    ALKPHOS 159 (H) 02/24/2022 1124    AST 16 02/24/2022 1124    ALT 16 02/24/2022 1124    BILITOT 0.8 02/24/2022 1124    ESTGFRAFRICA >60.0 05/09/2022 1632    EGFRNONAA >60.0 05/09/2022 1632         Latest Reference Range & Units Most Recent   BUN 8 - 23 mg/dL 15  2/3/23 06:10   Creatinine 0.5 - 1.4 mg/dL 0.8  2/3/23 06:10   eGFR >60 mL/min/1.73 m^2 >60  2/3/23 06:10     Lab Results   Component Value Date    LDLCALC 51.8 (L) 07/12/2023       Latest Reference Range & Units 07/12/23 09:21   Cholesterol 120 - 199 mg/dL 104 (L)   HDL 40 - 75 mg/dL 35 (L)   HDL/Cholesterol Ratio 20.0 - 50.0 % 33.7   LDL Cholesterol External 63.0 - 159.0 mg/dL 51.8 (L) "   Non-HDL Cholesterol mg/dL 69   Total Cholesterol/HDL Ratio 2.0 - 5.0  3.0   Triglycerides 30 - 150 mg/dL 86   (L): Data is abnormally low      Lab Results   Component Value Date    MICALBCREAT 83.8 (H) 07/12/2023           ASSESSMENT and PLAN:    A1C GOAL: < 7.5 %       1. Type 2 diabetes mellitus with hyperglycemia, unspecified whether long term insulin use  Finish up Trulicity 3 mg supply then increase to 4.5 mg once weekly. May need to stay on 3 mg if 4.5 mg is temporarily out of stock.     Resume Tresiba a 20 units once daily. Continue metformin and Jardiance.     Continue Dexcom. Will send orders for Dexcom G7.      Improve diet - avoid sweets and candy.   Improve medication adherence.   Reviewed complications of uncontrolled diabetes.     Return to clinic in 3 months with labs prior.     Again, provided contact info to pt for Pharmacy Assistance and packet provided as well. She needs to re-apply for Trulicity, Jardiance and Tresiba. May lose Trulicity approval in 2024 if she doesn't apply this year.     Hemoglobin A1C    insulin degludec (TRESIBA FLEXTOUCH U-200) 200 unit/mL (3 mL) insulin pen      2. History of cerebrovascular accident  Improve glycemic control.         3. Microalbuminuria  Continue jardiance. Improve glycemic control.                   Orders Placed This Encounter   Procedures    Hemoglobin A1C     Standing Status:   Future     Standing Expiration Date:   9/29/2024          Follow up in about 3 months (around 11/1/2023).

## 2023-08-01 ENCOUNTER — OFFICE VISIT (OUTPATIENT)
Dept: ENDOCRINOLOGY | Facility: CLINIC | Age: 73
End: 2023-08-01
Payer: MEDICARE

## 2023-08-01 VITALS
TEMPERATURE: 98 F | DIASTOLIC BLOOD PRESSURE: 70 MMHG | HEART RATE: 63 BPM | WEIGHT: 190 LBS | SYSTOLIC BLOOD PRESSURE: 133 MMHG | BODY MASS INDEX: 34.75 KG/M2

## 2023-08-01 DIAGNOSIS — R80.9 MICROALBUMINURIA: ICD-10-CM

## 2023-08-01 DIAGNOSIS — Z86.73 HISTORY OF CEREBROVASCULAR ACCIDENT: ICD-10-CM

## 2023-08-01 DIAGNOSIS — E11.65 TYPE 2 DIABETES MELLITUS WITH HYPERGLYCEMIA, UNSPECIFIED WHETHER LONG TERM INSULIN USE: Primary | ICD-10-CM

## 2023-08-01 PROCEDURE — 3078F PR MOST RECENT DIASTOLIC BLOOD PRESSURE < 80 MM HG: ICD-10-PCS | Mod: CPTII,S$GLB,, | Performed by: NURSE PRACTITIONER

## 2023-08-01 PROCEDURE — 1160F RVW MEDS BY RX/DR IN RCRD: CPT | Mod: CPTII,S$GLB,, | Performed by: NURSE PRACTITIONER

## 2023-08-01 PROCEDURE — 3008F PR BODY MASS INDEX (BMI) DOCUMENTED: ICD-10-PCS | Mod: CPTII,S$GLB,, | Performed by: NURSE PRACTITIONER

## 2023-08-01 PROCEDURE — 1157F ADVNC CARE PLAN IN RCRD: CPT | Mod: CPTII,S$GLB,, | Performed by: NURSE PRACTITIONER

## 2023-08-01 PROCEDURE — 3060F POS MICROALBUMINURIA REV: CPT | Mod: CPTII,S$GLB,, | Performed by: NURSE PRACTITIONER

## 2023-08-01 PROCEDURE — 3075F SYST BP GE 130 - 139MM HG: CPT | Mod: CPTII,S$GLB,, | Performed by: NURSE PRACTITIONER

## 2023-08-01 PROCEDURE — 1160F PR REVIEW ALL MEDS BY PRESCRIBER/CLIN PHARMACIST DOCUMENTED: ICD-10-PCS | Mod: CPTII,S$GLB,, | Performed by: NURSE PRACTITIONER

## 2023-08-01 PROCEDURE — 1159F MED LIST DOCD IN RCRD: CPT | Mod: CPTII,S$GLB,, | Performed by: NURSE PRACTITIONER

## 2023-08-01 PROCEDURE — 4010F PR ACE/ARB THEARPY RXD/TAKEN: ICD-10-PCS | Mod: CPTII,S$GLB,, | Performed by: NURSE PRACTITIONER

## 2023-08-01 PROCEDURE — 99999 PR PBB SHADOW E&M-EST. PATIENT-LVL IV: ICD-10-PCS | Mod: PBBFAC,,, | Performed by: NURSE PRACTITIONER

## 2023-08-01 PROCEDURE — 3075F PR MOST RECENT SYSTOLIC BLOOD PRESS GE 130-139MM HG: ICD-10-PCS | Mod: CPTII,S$GLB,, | Performed by: NURSE PRACTITIONER

## 2023-08-01 PROCEDURE — 3008F BODY MASS INDEX DOCD: CPT | Mod: CPTII,S$GLB,, | Performed by: NURSE PRACTITIONER

## 2023-08-01 PROCEDURE — 1159F PR MEDICATION LIST DOCUMENTED IN MEDICAL RECORD: ICD-10-PCS | Mod: CPTII,S$GLB,, | Performed by: NURSE PRACTITIONER

## 2023-08-01 PROCEDURE — 1157F PR ADVANCE CARE PLAN OR EQUIV PRESENT IN MEDICAL RECORD: ICD-10-PCS | Mod: CPTII,S$GLB,, | Performed by: NURSE PRACTITIONER

## 2023-08-01 PROCEDURE — 3066F NEPHROPATHY DOC TX: CPT | Mod: CPTII,S$GLB,, | Performed by: NURSE PRACTITIONER

## 2023-08-01 PROCEDURE — 99214 OFFICE O/P EST MOD 30 MIN: CPT | Mod: S$GLB,,, | Performed by: NURSE PRACTITIONER

## 2023-08-01 PROCEDURE — 95251 PR GLUCOSE MONITOR, 72 HOUR, PHYS INTERP: ICD-10-PCS | Mod: S$GLB,,, | Performed by: NURSE PRACTITIONER

## 2023-08-01 PROCEDURE — 99999 PR PBB SHADOW E&M-EST. PATIENT-LVL IV: CPT | Mod: PBBFAC,,, | Performed by: NURSE PRACTITIONER

## 2023-08-01 PROCEDURE — 99214 PR OFFICE/OUTPT VISIT, EST, LEVL IV, 30-39 MIN: ICD-10-PCS | Mod: S$GLB,,, | Performed by: NURSE PRACTITIONER

## 2023-08-01 PROCEDURE — 3060F PR POS MICROALBUMINURIA RESULT DOCUMENTED/REVIEW: ICD-10-PCS | Mod: CPTII,S$GLB,, | Performed by: NURSE PRACTITIONER

## 2023-08-01 PROCEDURE — 4010F ACE/ARB THERAPY RXD/TAKEN: CPT | Mod: CPTII,S$GLB,, | Performed by: NURSE PRACTITIONER

## 2023-08-01 PROCEDURE — 3051F PR MOST RECENT HEMOGLOBIN A1C LEVEL 7.0 - < 8.0%: ICD-10-PCS | Mod: CPTII,S$GLB,, | Performed by: NURSE PRACTITIONER

## 2023-08-01 PROCEDURE — 3051F HG A1C>EQUAL 7.0%<8.0%: CPT | Mod: CPTII,S$GLB,, | Performed by: NURSE PRACTITIONER

## 2023-08-01 PROCEDURE — 3066F PR DOCUMENTATION OF TREATMENT FOR NEPHROPATHY: ICD-10-PCS | Mod: CPTII,S$GLB,, | Performed by: NURSE PRACTITIONER

## 2023-08-01 PROCEDURE — 3078F DIAST BP <80 MM HG: CPT | Mod: CPTII,S$GLB,, | Performed by: NURSE PRACTITIONER

## 2023-08-01 PROCEDURE — 95251 CONT GLUC MNTR ANALYSIS I&R: CPT | Mod: S$GLB,,, | Performed by: NURSE PRACTITIONER

## 2023-08-01 RX ORDER — INSULIN DEGLUDEC 200 U/ML
INJECTION, SOLUTION SUBCUTANEOUS
Qty: 3 PEN | Refills: 3 | Status: SHIPPED | OUTPATIENT
Start: 2023-08-01 | End: 2023-09-11

## 2023-08-01 RX ORDER — DULAGLUTIDE 4.5 MG/.5ML
4.5 INJECTION, SOLUTION SUBCUTANEOUS
Qty: 4 PEN | Refills: 3 | Status: SHIPPED | OUTPATIENT
Start: 2023-08-01 | End: 2024-07-31

## 2023-08-01 NOTE — PATIENT INSTRUCTIONS
Finish up Trulicity 3 mg supply then increase to 4.5 mg once weekly. May need to stay on 3 mg if 4.5 mg is temporarily out of stock.     Resume Tresiba a 20 units once daily. Continue metformin and Jardiance.     Continue Dexcom. Will send orders for Dexcom G7.      Improve diet - avoid sweets and candy.   Improve medication adherence.   Reviewed complications of uncontrolled diabetes.     Return to clinic in 3 months with labs prior.       A referral has been placed for Pharmacy Assistance to help apply for free Jardiance, Tresiba and Trulicity. Call Pharmacy Assistance at [phone]. Their hours of operation are Monday through Friday, 8:00am to 4:30pm.   You will need to provide the following documents: Proof of household Income( such as social security statement, 1099 form, pension statement or 3 consecutive pay stubs, Copy of all Insurance cards( front and back), and Signed & dated OCCP/ Patient Authorization Forms

## 2023-08-03 NOTE — TELEPHONE ENCOUNTER
Cecilia Barahona has provided the necessary documents today to begin the enrollment process into the [employer]  [employer] and [employer] (Trulicity, Jardiance, Tresiba & Gray)program. The prescription portion of the application has been sent to the providers office for approval and signature.

## 2023-08-03 NOTE — TELEPHONE ENCOUNTER
Hello,       A Patient Assistance Application for Cecilia Barahona - MRN  [id number] was faxed to your office @ [phone].  Please have IBIS Shultz review the application to ensure the prescription is correct. If correct, sign and fax the application back to the Pharmacy Patient Assistance Team @[phone].      If changes need to be made to this application, please let me know so corrections can be made, and the application will be faxed back to you for approval and signature.

## 2023-08-04 ENCOUNTER — TELEPHONE (OUTPATIENT)
Dept: ENDOCRINOLOGY | Facility: CLINIC | Age: 73
End: 2023-08-04
Payer: MEDICARE

## 2023-08-04 NOTE — TELEPHONE ENCOUNTER
Patient states that she is taking her medication consistently, she did not verify the reading with a fingerstick, and verbalized understanding as to why we will have to wait and see if her numbers are consistently elevated

## 2023-08-04 NOTE — TELEPHONE ENCOUNTER
Patient concerned that her BG increased this morning without eating anything. Would like to know why.

## 2023-08-04 NOTE — TELEPHONE ENCOUNTER
----- Message from Kelsi Sanders sent at 8/4/2023  8:19 AM CDT -----  Regarding: Self .[phone]  Type: Patient Call Back     What is the request in detail: Pt would like a call back to discuss sugar levels     Can the clinic reply by [facility]? No     Would the patient rather a call back or a response via [facility]? Call back    Best call back number: .[phone]      Additional Information:    Thank you.

## 2023-08-04 NOTE — TELEPHONE ENCOUNTER
Unclear why. Keep monitoring blood glucoses and if it keeps happening consistently she may need to take additional insulin to control it.

## 2023-08-08 NOTE — TELEPHONE ENCOUNTER
The signed and completed patient assistance application was received from the providers office and  has been submitted to [facility] and [employer] (Trulicity, Tresiba & Needles) or consideration of eligibility.

## 2023-08-09 NOTE — TELEPHONE ENCOUNTER
The signed and completed patient assistance application was received from the providers office and  has been submitted to [employer](Jardiance) for consideration of eligibility.

## 2023-08-09 NOTE — TELEPHONE ENCOUNTER
I have informed Cecilia Barahona she has been approved in the [facility] Patient Assistance Program through 12/31/23. A 120 day supply of Trulicity will be shipped to [facility] in 10-14 business days. The patient has 1 refill and  enrolled in auto-refills.  Updated proof of eligibility is required to re-enroll for 2024 calendar year.

## 2023-08-13 DIAGNOSIS — G25.81 RESTLESS LEGS SYNDROME (RLS): ICD-10-CM

## 2023-08-13 DIAGNOSIS — F32.0 MILD MAJOR DEPRESSION: ICD-10-CM

## 2023-08-13 NOTE — TELEPHONE ENCOUNTER
No care due was identified.  [facility] Embedded Care Due Messages. Reference number: [id number].   8/13/2023 5:34:00 PM CDT

## 2023-08-14 RX ORDER — ROPINIROLE 0.5 MG/1
TABLET, FILM COATED ORAL
Qty: 90 TABLET | Refills: 0 | Status: SHIPPED | OUTPATIENT
Start: 2023-08-14 | End: 2023-10-18

## 2023-08-14 RX ORDER — ESCITALOPRAM OXALATE 10 MG/1
10 TABLET ORAL
Qty: 90 TABLET | Refills: 1 | Status: SHIPPED | OUTPATIENT
Start: 2023-08-14

## 2023-08-14 NOTE — TELEPHONE ENCOUNTER
Cecilia Barahona has been denied by The [facility] Patient Assistance Program.    Denial Date: 8/10/23  Denial Reason:   Patient is over the income limit and Patient must apply for Low Income Subsidy    Next Steps:  Patient was informed if she is denied Low Income Subsidy to send me the denial letter so I can submit it to [facility](Jardiance)    Patient has been notified of this decision via PHONE

## 2023-08-14 NOTE — TELEPHONE ENCOUNTER
Refill Routing Note   Medication(s) are not appropriate for processing by [facility] for the following reason(s):      Medication outside of protocol    [facility] action(s):  Approve  Route Care Due:  None identified            Appointments  past 12m or future 3m with PCP    Date Provider   Last Visit   2/15/2023 Yudi Smart MD   Next Visit   Visit date not found Yudi Smart MD   ED visits in past 90 days: 0        Note composed:10:41 AM 08/14/2023

## 2023-08-22 NOTE — PROGRESS NOTES
Subjective:       Patient ID: Cecilia Barahona is a 73 y.o. female.    Chief Complaint: Glaucoma     HPI    Pt here for glaucoma evaluation per . Pt reports feeling like   vision was worse with latanoprost. She is having trouble with her new   eyeglasses Rx  - reports she has to shift the glasses down or take them   off to read. She does not notice any visual field defects. Denies a family   Hx of glaucoma. Denies a Hx of steroid use or trauma.     1. GS OS  2. VF Defect OS  3. NS OU  4. Myopia with Astigmatism and Presbyopia  Last edited by Karley Hoffman MD on 8/23/2023  9:35 AM.            Assessment & Plan   Primary open angle glaucoma (POAG) of left eye, severe stage  -     dorzolamide-timolol 2-0.5% (COSOPT) 22.3-6.8 mg/mL ophthalmic solution; Place 1 drop into both eyes 2 (two) times daily.  Dispense: 10 mL; Refill: 1  -     Posterior Segment OCT Optic Nerve- Both eyes  -     Dior Visual Field - OU - Extended - Both Eyes  -     Color Fundus Photography - OU - Both Eyes    Open angle with borderline findings, high risk, right  -     dorzolamide-timolol 2-0.5% (COSOPT) 22.3-6.8 mg/mL ophthalmic solution; Place 1 drop into both eyes 2 (two) times daily.  Dispense: 10 mL; Refill: 1  -     Posterior Segment OCT Optic Nerve- Both eyes  -     Dior Visual Field - OU - Extended - Both Eyes  -     Color Fundus Photography - OU - Both Eyes    Nuclear sclerosis of both eyes    Afferent pupillary defect of left eye    Epiretinal membrane, right       Dr. Matthews    GS OD // POAG severe OS   APD OS  -Fhx (-), Steroids (-),Trauma(-)  -Drops: none  -Drop intolerance/contraindication: Latanoprost - subjective blurry vision  -Laser: -  -Surgeries: -  -CCT:  498 /499 thin   -Gonio: SS and featureless  IOP max: ? Max at [facility] 18 // 20  IOP goal: <18 OD // ~12 OS      1/2021 RNFL full OD // dec G/TS/T/TI/N B NI OS (OS similar to 2016)    6/2023 HVF 24-2ss full VFI 99% OD // dense SAD/IAD SNS into  fixation VFI 50% OS --> +progression compared to 1/2021 2011 - Severe VF defects OS in with dense SNS into fixation and IAD    8/2023 HVF 24-2ss full VFI 99% OD // dense SAD/IAD SNS/INS VFI 60% OS   8/2023 RNFL full OD // dec G/TS/T/TI/N B NI OS --> stable from 2021 8/2023 Disc photos     Previously on Latanoprost and stopped 1/2023 due to pt feeling that the gtt makes her vision worse  Documented Hx of non compliance with drops - as far as 2012    Discussed various treatment options for glaucoma at length with patient including medications, lasers, and surgical options. Discussed the options for continued drops versus laser or surgical options. Discussed progressive nature of glaucoma and risk of blindness in the left eye without treatment.     Mutually agree to start cosopt BID OU    NS OU  NVS - monitor    Glasses problem  Dr. Matthews     ERM OD  NVS - monitor      PLAN  Start dorzolamide-timolol BID OU    RTC 6 weeks IOP / adherence check     Karley Hoffman M.D., M.S.  Department of Ophthalmology   Division of Glaucoma Surgery  [facility]

## 2023-08-23 ENCOUNTER — OFFICE VISIT (OUTPATIENT)
Dept: OPHTHALMOLOGY | Facility: CLINIC | Age: 73
End: 2023-08-23
Payer: MEDICARE

## 2023-08-23 ENCOUNTER — CLINICAL SUPPORT (OUTPATIENT)
Dept: OPHTHALMOLOGY | Facility: CLINIC | Age: 73
End: 2023-08-23
Payer: MEDICARE

## 2023-08-23 DIAGNOSIS — H40.021 OPEN ANGLE WITH BORDERLINE FINDINGS, HIGH RISK, RIGHT: ICD-10-CM

## 2023-08-23 DIAGNOSIS — H25.13 NUCLEAR SCLEROSIS OF BOTH EYES: ICD-10-CM

## 2023-08-23 DIAGNOSIS — H21.562 AFFERENT PUPILLARY DEFECT OF LEFT EYE: ICD-10-CM

## 2023-08-23 DIAGNOSIS — H35.371 EPIRETINAL MEMBRANE, RIGHT: ICD-10-CM

## 2023-08-23 DIAGNOSIS — H40.1123 PRIMARY OPEN ANGLE GLAUCOMA (POAG) OF LEFT EYE, SEVERE STAGE: Primary | ICD-10-CM

## 2023-08-23 PROCEDURE — 3288F PR FALLS RISK ASSESSMENT DOCUMENTED: ICD-10-PCS | Mod: CPTII,S$GLB,, | Performed by: STUDENT IN AN ORGANIZED HEALTH CARE EDUCATION/TRAINING PROGRAM

## 2023-08-23 PROCEDURE — 99204 OFFICE O/P NEW MOD 45 MIN: CPT | Mod: S$GLB,,, | Performed by: STUDENT IN AN ORGANIZED HEALTH CARE EDUCATION/TRAINING PROGRAM

## 2023-08-23 PROCEDURE — 3051F PR MOST RECENT HEMOGLOBIN A1C LEVEL 7.0 - < 8.0%: ICD-10-PCS | Mod: CPTII,S$GLB,, | Performed by: STUDENT IN AN ORGANIZED HEALTH CARE EDUCATION/TRAINING PROGRAM

## 2023-08-23 PROCEDURE — 92083 HUMPHREY VISUAL FIELD - OU - BOTH EYES: ICD-10-PCS | Mod: S$GLB,,, | Performed by: STUDENT IN AN ORGANIZED HEALTH CARE EDUCATION/TRAINING PROGRAM

## 2023-08-23 PROCEDURE — 3060F PR POS MICROALBUMINURIA RESULT DOCUMENTED/REVIEW: ICD-10-PCS | Mod: CPTII,S$GLB,, | Performed by: STUDENT IN AN ORGANIZED HEALTH CARE EDUCATION/TRAINING PROGRAM

## 2023-08-23 PROCEDURE — 1159F MED LIST DOCD IN RCRD: CPT | Mod: CPTII,S$GLB,, | Performed by: STUDENT IN AN ORGANIZED HEALTH CARE EDUCATION/TRAINING PROGRAM

## 2023-08-23 PROCEDURE — 1126F PR PAIN SEVERITY QUANTIFIED, NO PAIN PRESENT: ICD-10-PCS | Mod: CPTII,S$GLB,, | Performed by: STUDENT IN AN ORGANIZED HEALTH CARE EDUCATION/TRAINING PROGRAM

## 2023-08-23 PROCEDURE — 1126F AMNT PAIN NOTED NONE PRSNT: CPT | Mod: CPTII,S$GLB,, | Performed by: STUDENT IN AN ORGANIZED HEALTH CARE EDUCATION/TRAINING PROGRAM

## 2023-08-23 PROCEDURE — 99999 PR PBB SHADOW E&M-EST. PATIENT-LVL III: ICD-10-PCS | Mod: PBBFAC,,, | Performed by: STUDENT IN AN ORGANIZED HEALTH CARE EDUCATION/TRAINING PROGRAM

## 2023-08-23 PROCEDURE — 1157F ADVNC CARE PLAN IN RCRD: CPT | Mod: CPTII,S$GLB,, | Performed by: STUDENT IN AN ORGANIZED HEALTH CARE EDUCATION/TRAINING PROGRAM

## 2023-08-23 PROCEDURE — 3066F PR DOCUMENTATION OF TREATMENT FOR NEPHROPATHY: ICD-10-PCS | Mod: CPTII,S$GLB,, | Performed by: STUDENT IN AN ORGANIZED HEALTH CARE EDUCATION/TRAINING PROGRAM

## 2023-08-23 PROCEDURE — 3051F HG A1C>EQUAL 7.0%<8.0%: CPT | Mod: CPTII,S$GLB,, | Performed by: STUDENT IN AN ORGANIZED HEALTH CARE EDUCATION/TRAINING PROGRAM

## 2023-08-23 PROCEDURE — 4010F ACE/ARB THERAPY RXD/TAKEN: CPT | Mod: CPTII,S$GLB,, | Performed by: STUDENT IN AN ORGANIZED HEALTH CARE EDUCATION/TRAINING PROGRAM

## 2023-08-23 PROCEDURE — 92020 PR SPECIAL EYE EVAL,GONIOSCOPY: ICD-10-PCS | Mod: S$GLB,,, | Performed by: STUDENT IN AN ORGANIZED HEALTH CARE EDUCATION/TRAINING PROGRAM

## 2023-08-23 PROCEDURE — 1157F PR ADVANCE CARE PLAN OR EQUIV PRESENT IN MEDICAL RECORD: ICD-10-PCS | Mod: CPTII,S$GLB,, | Performed by: STUDENT IN AN ORGANIZED HEALTH CARE EDUCATION/TRAINING PROGRAM

## 2023-08-23 PROCEDURE — 1160F RVW MEDS BY RX/DR IN RCRD: CPT | Mod: CPTII,S$GLB,, | Performed by: STUDENT IN AN ORGANIZED HEALTH CARE EDUCATION/TRAINING PROGRAM

## 2023-08-23 PROCEDURE — 76514 PR  US, EYE, FOR CORNEAL THICKNESS: ICD-10-PCS | Mod: S$GLB,,, | Performed by: STUDENT IN AN ORGANIZED HEALTH CARE EDUCATION/TRAINING PROGRAM

## 2023-08-23 PROCEDURE — 99204 PR OFFICE/OUTPT VISIT, NEW, LEVL IV, 45-59 MIN: ICD-10-PCS | Mod: S$GLB,,, | Performed by: STUDENT IN AN ORGANIZED HEALTH CARE EDUCATION/TRAINING PROGRAM

## 2023-08-23 PROCEDURE — 92250 FUNDUS PHOTOGRAPHY W/I&R: CPT | Mod: 59,S$GLB,, | Performed by: STUDENT IN AN ORGANIZED HEALTH CARE EDUCATION/TRAINING PROGRAM

## 2023-08-23 PROCEDURE — 92250 COLOR FUNDUS PHOTOGRAPHY - OU - BOTH EYES: ICD-10-PCS | Mod: 59,S$GLB,, | Performed by: STUDENT IN AN ORGANIZED HEALTH CARE EDUCATION/TRAINING PROGRAM

## 2023-08-23 PROCEDURE — 3060F POS MICROALBUMINURIA REV: CPT | Mod: CPTII,S$GLB,, | Performed by: STUDENT IN AN ORGANIZED HEALTH CARE EDUCATION/TRAINING PROGRAM

## 2023-08-23 PROCEDURE — 4010F PR ACE/ARB THEARPY RXD/TAKEN: ICD-10-PCS | Mod: CPTII,S$GLB,, | Performed by: STUDENT IN AN ORGANIZED HEALTH CARE EDUCATION/TRAINING PROGRAM

## 2023-08-23 PROCEDURE — 1101F PT FALLS ASSESS-DOCD LE1/YR: CPT | Mod: CPTII,S$GLB,, | Performed by: STUDENT IN AN ORGANIZED HEALTH CARE EDUCATION/TRAINING PROGRAM

## 2023-08-23 PROCEDURE — 1159F PR MEDICATION LIST DOCUMENTED IN MEDICAL RECORD: ICD-10-PCS | Mod: CPTII,S$GLB,, | Performed by: STUDENT IN AN ORGANIZED HEALTH CARE EDUCATION/TRAINING PROGRAM

## 2023-08-23 PROCEDURE — 1160F PR REVIEW ALL MEDS BY PRESCRIBER/CLIN PHARMACIST DOCUMENTED: ICD-10-PCS | Mod: CPTII,S$GLB,, | Performed by: STUDENT IN AN ORGANIZED HEALTH CARE EDUCATION/TRAINING PROGRAM

## 2023-08-23 PROCEDURE — 1101F PR PT FALLS ASSESS DOC 0-1 FALLS W/OUT INJ PAST YR: ICD-10-PCS | Mod: CPTII,S$GLB,, | Performed by: STUDENT IN AN ORGANIZED HEALTH CARE EDUCATION/TRAINING PROGRAM

## 2023-08-23 PROCEDURE — 99999 PR PBB SHADOW E&M-EST. PATIENT-LVL III: CPT | Mod: PBBFAC,,, | Performed by: STUDENT IN AN ORGANIZED HEALTH CARE EDUCATION/TRAINING PROGRAM

## 2023-08-23 PROCEDURE — 92083 EXTENDED VISUAL FIELD XM: CPT | Mod: S$GLB,,, | Performed by: STUDENT IN AN ORGANIZED HEALTH CARE EDUCATION/TRAINING PROGRAM

## 2023-08-23 PROCEDURE — 92020 GONIOSCOPY: CPT | Mod: S$GLB,,, | Performed by: STUDENT IN AN ORGANIZED HEALTH CARE EDUCATION/TRAINING PROGRAM

## 2023-08-23 PROCEDURE — 92133 POSTERIOR SEGMENT OCT OPTIC NERVE(OCULAR COHERENCE TOMOGRAPHY) - OU - BOTH EYES: ICD-10-PCS | Mod: S$GLB,,, | Performed by: STUDENT IN AN ORGANIZED HEALTH CARE EDUCATION/TRAINING PROGRAM

## 2023-08-23 PROCEDURE — 76514 ECHO EXAM OF EYE THICKNESS: CPT | Mod: S$GLB,,, | Performed by: STUDENT IN AN ORGANIZED HEALTH CARE EDUCATION/TRAINING PROGRAM

## 2023-08-23 PROCEDURE — 92133 CPTRZD OPH DX IMG PST SGM ON: CPT | Mod: S$GLB,,, | Performed by: STUDENT IN AN ORGANIZED HEALTH CARE EDUCATION/TRAINING PROGRAM

## 2023-08-23 PROCEDURE — 3288F FALL RISK ASSESSMENT DOCD: CPT | Mod: CPTII,S$GLB,, | Performed by: STUDENT IN AN ORGANIZED HEALTH CARE EDUCATION/TRAINING PROGRAM

## 2023-08-23 PROCEDURE — 3066F NEPHROPATHY DOC TX: CPT | Mod: CPTII,S$GLB,, | Performed by: STUDENT IN AN ORGANIZED HEALTH CARE EDUCATION/TRAINING PROGRAM

## 2023-08-23 RX ORDER — DORZOLAMIDE HYDROCHLORIDE AND TIMOLOL MALEATE 20; 5 MG/ML; MG/ML
1 SOLUTION/ DROPS OPHTHALMIC 2 TIMES DAILY
Qty: 10 ML | Refills: 1 | Status: SHIPPED | OUTPATIENT
Start: 2023-08-23 | End: 2024-08-22

## 2023-08-23 NOTE — PROGRESS NOTES
Hvf done ou. Rel/fix coop. Good ou./ chart checked for latex allergy./ -1.25 + 1.00 x 173/od -1.25 + .50 x 175/os-smh

## 2023-08-23 NOTE — TELEPHONE ENCOUNTER
I have informed Cecilia Barahona she has been approved in the [employer] Patient Assistance Program through 12/31/23. A 120 day supply of Pen Needles and Tresiba will be shipped to [facility] in 10-14 business days. Patient has  1 refill  remaining and enrolled in auto-refill.  Updated proof of eligibility is required to re-enroll for 2024 calendar year.

## 2023-09-11 ENCOUNTER — TELEPHONE (OUTPATIENT)
Dept: ENDOCRINOLOGY | Facility: CLINIC | Age: 73
End: 2023-09-11
Payer: MEDICARE

## 2023-09-11 DIAGNOSIS — E11.65 TYPE 2 DIABETES MELLITUS WITH HYPERGLYCEMIA, UNSPECIFIED WHETHER LONG TERM INSULIN USE: ICD-10-CM

## 2023-09-11 RX ORDER — INSULIN DEGLUDEC 200 U/ML
INJECTION, SOLUTION SUBCUTANEOUS
Qty: 6 PEN | Refills: 3 | Status: SHIPPED | OUTPATIENT
Start: 2023-09-11 | End: 2023-11-07

## 2023-09-11 RX ORDER — BLOOD SUGAR DIAGNOSTIC
STRIP MISCELLANEOUS
Qty: 200 EACH | Refills: 3 | Status: SHIPPED | OUTPATIENT
Start: 2023-09-11

## 2023-09-18 ENCOUNTER — OFFICE VISIT (OUTPATIENT)
Dept: CARDIOLOGY | Facility: CLINIC | Age: 73
End: 2023-09-18
Attending: INTERNAL MEDICINE
Payer: MEDICARE

## 2023-09-18 VITALS
HEIGHT: 62 IN | BODY MASS INDEX: 35.78 KG/M2 | SYSTOLIC BLOOD PRESSURE: 130 MMHG | HEART RATE: 56 BPM | WEIGHT: 194.44 LBS | OXYGEN SATURATION: 98 % | DIASTOLIC BLOOD PRESSURE: 70 MMHG

## 2023-09-18 DIAGNOSIS — I10 PRIMARY HYPERTENSION: ICD-10-CM

## 2023-09-18 DIAGNOSIS — E78.1 HYPERTRIGLYCERIDEMIA: ICD-10-CM

## 2023-09-18 DIAGNOSIS — E78.00 HYPERCHOLESTEROLEMIA: ICD-10-CM

## 2023-09-18 DIAGNOSIS — I25.118 CORONARY ARTERY DISEASE INVOLVING NATIVE CORONARY ARTERY OF NATIVE HEART WITH OTHER FORM OF ANGINA PECTORIS: ICD-10-CM

## 2023-09-18 DIAGNOSIS — I70.0 ATHEROSCLEROSIS OF AORTA: ICD-10-CM

## 2023-09-18 DIAGNOSIS — Z86.718 HISTORY OF DEEP VEIN THROMBOSIS: ICD-10-CM

## 2023-09-18 DIAGNOSIS — R07.2 PRECORDIAL PAIN: ICD-10-CM

## 2023-09-18 DIAGNOSIS — Z95.1 HISTORY OF CORONARY ARTERY BYPASS SURGERY: ICD-10-CM

## 2023-09-18 DIAGNOSIS — Z86.73 HISTORY OF CEREBROVASCULAR ACCIDENT: ICD-10-CM

## 2023-09-18 PROBLEM — R07.9 CHEST PAIN: Status: ACTIVE | Noted: 2023-09-18

## 2023-09-18 PROCEDURE — 3051F PR MOST RECENT HEMOGLOBIN A1C LEVEL 7.0 - < 8.0%: ICD-10-PCS | Mod: CPTII,S$GLB,, | Performed by: INTERNAL MEDICINE

## 2023-09-18 PROCEDURE — 1159F MED LIST DOCD IN RCRD: CPT | Mod: CPTII,S$GLB,, | Performed by: INTERNAL MEDICINE

## 2023-09-18 PROCEDURE — 3060F PR POS MICROALBUMINURIA RESULT DOCUMENTED/REVIEW: ICD-10-PCS | Mod: CPTII,S$GLB,, | Performed by: INTERNAL MEDICINE

## 2023-09-18 PROCEDURE — 1157F PR ADVANCE CARE PLAN OR EQUIV PRESENT IN MEDICAL RECORD: ICD-10-PCS | Mod: CPTII,S$GLB,, | Performed by: INTERNAL MEDICINE

## 2023-09-18 PROCEDURE — 4010F ACE/ARB THERAPY RXD/TAKEN: CPT | Mod: CPTII,S$GLB,, | Performed by: INTERNAL MEDICINE

## 2023-09-18 PROCEDURE — 3066F PR DOCUMENTATION OF TREATMENT FOR NEPHROPATHY: ICD-10-PCS | Mod: CPTII,S$GLB,, | Performed by: INTERNAL MEDICINE

## 2023-09-18 PROCEDURE — 1160F PR REVIEW ALL MEDS BY PRESCRIBER/CLIN PHARMACIST DOCUMENTED: ICD-10-PCS | Mod: CPTII,S$GLB,, | Performed by: INTERNAL MEDICINE

## 2023-09-18 PROCEDURE — 1160F RVW MEDS BY RX/DR IN RCRD: CPT | Mod: CPTII,S$GLB,, | Performed by: INTERNAL MEDICINE

## 2023-09-18 PROCEDURE — 3075F SYST BP GE 130 - 139MM HG: CPT | Mod: CPTII,S$GLB,, | Performed by: INTERNAL MEDICINE

## 2023-09-18 PROCEDURE — 99999 PR PBB SHADOW E&M-EST. PATIENT-LVL III: ICD-10-PCS | Mod: PBBFAC,,, | Performed by: INTERNAL MEDICINE

## 2023-09-18 PROCEDURE — 1101F PT FALLS ASSESS-DOCD LE1/YR: CPT | Mod: CPTII,S$GLB,, | Performed by: INTERNAL MEDICINE

## 2023-09-18 PROCEDURE — 93010 EKG 12-LEAD: ICD-10-PCS | Mod: S$GLB,,, | Performed by: INTERNAL MEDICINE

## 2023-09-18 PROCEDURE — 3075F PR MOST RECENT SYSTOLIC BLOOD PRESS GE 130-139MM HG: ICD-10-PCS | Mod: CPTII,S$GLB,, | Performed by: INTERNAL MEDICINE

## 2023-09-18 PROCEDURE — 3008F BODY MASS INDEX DOCD: CPT | Mod: CPTII,S$GLB,, | Performed by: INTERNAL MEDICINE

## 2023-09-18 PROCEDURE — 3288F FALL RISK ASSESSMENT DOCD: CPT | Mod: CPTII,S$GLB,, | Performed by: INTERNAL MEDICINE

## 2023-09-18 PROCEDURE — 93010 ELECTROCARDIOGRAM REPORT: CPT | Mod: S$GLB,,, | Performed by: INTERNAL MEDICINE

## 2023-09-18 PROCEDURE — 1157F ADVNC CARE PLAN IN RCRD: CPT | Mod: CPTII,S$GLB,, | Performed by: INTERNAL MEDICINE

## 2023-09-18 PROCEDURE — 1125F PR PAIN SEVERITY QUANTIFIED, PAIN PRESENT: ICD-10-PCS | Mod: CPTII,S$GLB,, | Performed by: INTERNAL MEDICINE

## 2023-09-18 PROCEDURE — 3060F POS MICROALBUMINURIA REV: CPT | Mod: CPTII,S$GLB,, | Performed by: INTERNAL MEDICINE

## 2023-09-18 PROCEDURE — 3066F NEPHROPATHY DOC TX: CPT | Mod: CPTII,S$GLB,, | Performed by: INTERNAL MEDICINE

## 2023-09-18 PROCEDURE — 3008F PR BODY MASS INDEX (BMI) DOCUMENTED: ICD-10-PCS | Mod: CPTII,S$GLB,, | Performed by: INTERNAL MEDICINE

## 2023-09-18 PROCEDURE — 3078F DIAST BP <80 MM HG: CPT | Mod: CPTII,S$GLB,, | Performed by: INTERNAL MEDICINE

## 2023-09-18 PROCEDURE — 99999 PR PBB SHADOW E&M-EST. PATIENT-LVL III: CPT | Mod: PBBFAC,,, | Performed by: INTERNAL MEDICINE

## 2023-09-18 PROCEDURE — 1159F PR MEDICATION LIST DOCUMENTED IN MEDICAL RECORD: ICD-10-PCS | Mod: CPTII,S$GLB,, | Performed by: INTERNAL MEDICINE

## 2023-09-18 PROCEDURE — 3078F PR MOST RECENT DIASTOLIC BLOOD PRESSURE < 80 MM HG: ICD-10-PCS | Mod: CPTII,S$GLB,, | Performed by: INTERNAL MEDICINE

## 2023-09-18 PROCEDURE — 99215 PR OFFICE/OUTPT VISIT, EST, LEVL V, 40-54 MIN: ICD-10-PCS | Mod: 25,S$GLB,, | Performed by: INTERNAL MEDICINE

## 2023-09-18 PROCEDURE — 3288F PR FALLS RISK ASSESSMENT DOCUMENTED: ICD-10-PCS | Mod: CPTII,S$GLB,, | Performed by: INTERNAL MEDICINE

## 2023-09-18 PROCEDURE — 1125F AMNT PAIN NOTED PAIN PRSNT: CPT | Mod: CPTII,S$GLB,, | Performed by: INTERNAL MEDICINE

## 2023-09-18 PROCEDURE — 3051F HG A1C>EQUAL 7.0%<8.0%: CPT | Mod: CPTII,S$GLB,, | Performed by: INTERNAL MEDICINE

## 2023-09-18 PROCEDURE — 4010F PR ACE/ARB THEARPY RXD/TAKEN: ICD-10-PCS | Mod: CPTII,S$GLB,, | Performed by: INTERNAL MEDICINE

## 2023-09-18 PROCEDURE — 99215 OFFICE O/P EST HI 40 MIN: CPT | Mod: 25,S$GLB,, | Performed by: INTERNAL MEDICINE

## 2023-09-18 PROCEDURE — 93005 ELECTROCARDIOGRAM TRACING: CPT

## 2023-09-18 PROCEDURE — 1101F PR PT FALLS ASSESS DOC 0-1 FALLS W/OUT INJ PAST YR: ICD-10-PCS | Mod: CPTII,S$GLB,, | Performed by: INTERNAL MEDICINE

## 2023-09-18 RX ORDER — NITROGLYCERIN 0.4 MG/1
0.4 TABLET SUBLINGUAL EVERY 5 MIN PRN
Qty: 30 TABLET | Refills: 3 | Status: SHIPPED | OUTPATIENT
Start: 2023-09-18

## 2023-09-18 NOTE — PROGRESS NOTES
Subjective:     Cecilia Barahona is a 73 y.o. female with hypertension, hypercholesterolemia, hypertriglyceridemia and diabetes mellitus type 2. She is mildly obese. She appears to have had a cerebrovascular accident in about 2004 when she had an episode of weakness in her left hand. She had exertional chest discomfort in 12/2019. She was given isosorbidemononitrate and the chest discomfort resolved. She had a stress MPI that was negative. On 5/16/2020 she began to experience a mild pressure over her chest. The heaviness lasted most of the day. Walking in her house made the discomfort more pronounced. There was no radiation of the pain and neither any associated dyspnea or diaphoresis. She had similar discomfort on 5/17/2020 and 5/18/2020. She told her daughter that took her to an urgent care center. She received two tablets of nitroglycerin  sublingually and the chest discomfort resolved. There were no acute ST-T wave changes. She was advised admission and was transferred to [facility]. She has had no further chest pain after presentation. On 5/20/2020 she underwent coronary angiography that revealed severe three vessel coronary artery disease. There was basal inferior mild hypokinesia with an ejection fraction of 60%. On 5/25/2020 she underwent coronary artery bypass grafting receiving three grafts. The left internal mammary artery was bypassed to the left anterior descending coronary artery and a sequential saphenous vein graft was placed to the second obtuse marginal branch and the fourth obtuse marginal branch. A few days after surgery she had a deep venous thrombosis of her left leg and she was anticoagulated with apixiban. She was able to lose weight after the coronary artery bypass grafting and her diabetes and hypertension became easier to control. Some soreness and itching in the anterior chest wall. In 4/2021 she went to [place] to visit her sister. In 3/2022 she is still  bothered by occasional chest wall pain. In 2022 her   after a lengthy hospital stay. She was not taking all of her prescribed medication during her husbands illness but got back on all her usual medications on about 2023. In 2023 she has had a few episodes of chest pressure. No clear exertional chest pain. Mild exertional shortness of breath. No palpitations or weak spells. No issues with any of her prescribed medications. Feeling fair overall.        Coronary Artery Disease  Presents for follow-up visit. Symptoms include chest pain. Pertinent negatives include no chest pressure, chest tightness, dizziness, leg swelling, muscle weakness, palpitations, shortness of breath or weight gain. Risk factors include hyperlipidemia. Risk factors do not include obesity. The symptoms have been stable.   Cerebrovascular Accident  This is a chronic problem. The problem has been resolved. Associated symptoms include chest pain. Pertinent negatives include no abdominal pain, anorexia, arthralgias, change in bowel habit, chills, congestion, coughing, diaphoresis, fatigue, fever, headaches, joint swelling, myalgias, nausea, neck pain, numbness, rash, sore throat, swollen glands, urinary symptoms, vertigo, visual change, vomiting or weakness.   Hypertension  This is a chronic problem. The current episode started more than 1 year ago. The problem is unchanged. The problem is controlled (usually 120-130/65-75 mmHg at home). Associated symptoms include chest pain. Pertinent negatives include no anxiety, blurred vision, headaches, malaise/fatigue, neck pain, orthopnea, palpitations, peripheral edema, PND, shortness of breath or sweats. There is no history of chronic renal disease.   Hyperlipidemia  This is a chronic problem. The current episode started more than 1 year ago. The problem is controlled. Exacerbating diseases include diabetes. She has no history of chronic renal disease, hypothyroidism, liver disease,  obesity or nephrotic syndrome. Associated symptoms include chest pain. Pertinent negatives include no focal weakness, leg pain, myalgias or shortness of breath.   Chest Pain   Pertinent negatives include no abdominal pain, back pain, claudication, cough, diaphoresis, dizziness, fever, headaches, hemoptysis, irregular heartbeat, leg pain, malaise/fatigue, nausea, near-syncope, numbness, orthopnea, palpitations, PND, shortness of breath, syncope, vomiting or weakness.   Her past medical history is significant for diabetes and hyperlipidemia.   Pertinent negatives for past medical history include no muscle weakness.       Review of Systems   Constitutional: Negative for chills, diaphoresis, fatigue, fever, malaise/fatigue and weight gain.   HENT:  Negative for congestion, nosebleeds and sore throat.    Eyes:  Negative for blurred vision, double vision, vision loss in left eye and vision loss in right eye.   Cardiovascular:  Positive for chest pain and dyspnea on exertion. Negative for claudication, irregular heartbeat, leg swelling, near-syncope, orthopnea, palpitations, paroxysmal nocturnal dyspnea and syncope.   Respiratory:  Negative for chest tightness, cough, hemoptysis, shortness of breath and wheezing.    Endocrine: Negative for cold intolerance and heat intolerance.   Hematologic/Lymphatic: Negative for bleeding problem. Does not bruise/bleed easily.   Skin:  Negative for color change and rash.   Musculoskeletal:  Negative for arthralgias, back pain, falls, joint swelling, muscle weakness, myalgias and neck pain.   Gastrointestinal:  Negative for abdominal pain, anorexia, change in bowel habit, heartburn, hematemesis, hematochezia, hemorrhoids, jaundice, melena, nausea and vomiting.   Genitourinary:  Negative for dysuria and hematuria.   Neurological:  Negative for dizziness, focal weakness, headaches, light-headedness, loss of balance, numbness, vertigo and weakness.   Psychiatric/Behavioral:  Negative for  altered mental status, depression and memory loss. The patient is not nervous/anxious.    Allergic/Immunologic: Negative for hives and persistent infections.       Current Outpatient Medications on File Prior to Visit   Medication Sig Dispense Refill    albuterol (PROVENTIL/VENTOLIN HFA) 90 mcg/actuation inhaler Inhale 2 puffs into the lungs every 6 (six) hours as needed for Wheezing or Shortness of Breath. Rescue 19 g 0    amLODIPine (NORVASC) 10 MG tablet Take 1 tablet (10 mg total) by mouth once daily. 90 tablet 3    aspirin (ECOTRIN) 81 MG EC tablet Take 1 tablet (81 mg total) by mouth once daily. 90 tablet 3    atorvastatin (LIPITOR) 80 MG tablet Take 1 tablet (80 mg total) by mouth once daily. 90 tablet 3    carvediloL (COREG) 12.5 MG tablet TAKE 1 TABLET(12.5 MG) BY MOUTH TWICE DAILY 180 tablet 3    diclofenac (VOLTAREN) 50 MG EC tablet TAKE 1 TABLET(50 MG) BY MOUTH TWICE DAILY 60 tablet 0    dorzolamide-timolol 2-0.5% (COSOPT) 22.3-6.8 mg/mL ophthalmic solution Place 1 drop into both eyes 2 (two) times daily. 10 mL 1    dulaglutide (TRULICITY) 4.5 mg/0.5 mL pen injector Inject 4.5 mg into the skin every 7 days. 4 pen 3    empagliflozin (JARDIANCE) 10 mg tablet Take 1 tablet (10 mg total) by mouth once daily. 30 tablet 3    EScitalopram oxalate (LEXAPRO) 10 MG tablet TAKE 1 TABLET(10 MG) BY MOUTH EVERY DAY 90 tablet 1    ezetimibe (ZETIA) 10 mg tablet Take 1 tablet (10 mg total) by mouth once daily. 90 tablet 3    fluticasone (FLONASE) 50 mcg/actuation nasal spray 1 spray (50 mcg total) by Each Nare route once daily. 16 g 5    furosemide (LASIX) 20 MG tablet Take 1 tablet (20 mg total) by mouth once daily. 90 tablet 3    HYDROcodone-acetaminophen (NORCO) 5-325 mg per tablet Take 1 tablet by mouth every 4 (four) hours as needed for Pain. 12 tablet 0    insulin degludec (TRESIBA FLEXTOUCH U-200) 200 unit/mL (3 mL) insulin pen Inject 20 units once daily 6 pen 3    loratadine (CLARITIN) 10 mg tablet TAKE 1 TABLET  "BY MOUTH EVERY DAY AS NEEDED FOR ALLERGIES 90 tablet 3    melatonin (MELATIN) 3 mg tablet Take 2 tablets (6 mg total) by mouth nightly as needed for Insomnia.  0    metFORMIN (GLUCOPHAGE) 1000 MG tablet Take 1 tablet (1,000 mg total) by mouth 2 (two) times daily with meals. 180 tablet 2    multivitamin with minerals tablet Take 1 tablet by mouth once daily.      ondansetron (ZOFRAN-ODT) 8 MG TbDL       rOPINIRole (REQUIP) 0.5 MG tablet TAKE 1 TABLET(0.5 MG) BY MOUTH EVERY EVENING 90 tablet 0    valsartan (DIOVAN) 320 MG tablet Take 1 tablet (320 mg total) by mouth once daily. 90 tablet 3    finerenone (KERENDIA) 10 mg Tab Take 10 mg by mouth Daily. 90 tablet 0    pantoprazole (PROTONIX) 40 MG tablet       pen needle, diabetic (BD ULTRA-FINE LENY PEN NEEDLE) 32 gauge x 5/32" Ndle 1 Device by Misc.(Non-Drug; Combo Route) route Daily. 100 each 4    pen needle, diabetic (NOVOFINE 32) 32 gauge x 1/4" Ndle Use 1x/day 200 each 3    triamcinolone acetonide 0.1% (KENALOG) 0.1 % ointment Apply topically 2 (two) times daily. for 7 days 80 g 0    TRUEPLUS LANCETS 30 gauge Misc USE TO TEST TWICE DAILY       No current facility-administered medications on file prior to visit.       /70   Pulse (!) 56   Ht 5' 2" (1.575 m)   Wt 88.2 kg (194 lb 7.1 oz)   SpO2 98%   BMI 35.56 kg/m²       Objective:     Physical Exam  Constitutional:       General: She is not in acute distress.     Appearance: Normal appearance. She is well-developed. She is not toxic-appearing or diaphoretic.   HENT:      Head: Normocephalic and atraumatic.      Nose: Nose normal.   Eyes:      General:         Right eye: No discharge.         Left eye: No discharge.      Conjunctiva/sclera:      Right eye: Right conjunctiva is not injected.      Left eye: Left conjunctiva is not injected.      Pupils: Pupils are equal.      Right eye: Pupil is round.      Left eye: Pupil is round.   Neck:      Thyroid: No thyromegaly.      Vascular: No carotid bruit or " JVD.   Cardiovascular:      Rate and Rhythm: Regular rhythm. Bradycardia present. No extrasystoles are present.     Chest Wall: PMI is not displaced.      Pulses:           Radial pulses are 2+ on the right side and 2+ on the left side.        Femoral pulses are 2+ on the right side and 2+ on the left side.       Dorsalis pedis pulses are 2+ on the right side and 2+ on the left side.        Posterior tibial pulses are 2+ on the right side and 2+ on the left side.      Heart sounds: S1 normal and S2 normal. Murmur heard.      Midsystolic murmur is present with a grade of 2/6.      Gallop present. S4 sounds present. No S3 sounds.   Pulmonary:      Effort: Pulmonary effort is normal.      Breath sounds: Normal breath sounds.   Chest:      Comments: Midline sternotomy scar.  Abdominal:      Palpations: Abdomen is soft.      Tenderness: There is no abdominal tenderness.   Musculoskeletal:      Cervical back: Neck supple.      Right ankle: No swelling, deformity or ecchymosis.      Left ankle: No swelling, deformity or ecchymosis.   Lymphadenopathy:      Head:      Right side of head: No submandibular adenopathy.      Left side of head: No submandibular adenopathy.      Cervical: No cervical adenopathy.   Skin:     General: Skin is warm and dry.      Findings: No rash.      Nails: There is no clubbing.   Neurological:      General: No focal deficit present.      Mental Status: She is alert and oriented to person, place, and time. She is not disoriented.      Cranial Nerves: No cranial nerve deficit.   Psychiatric:         Attention and Perception: Attention and perception normal.         Mood and Affect: Mood and affect normal.         Speech: Speech normal.         Behavior: Behavior normal.         Thought Content: Thought content normal.         Cognition and Memory: Cognition and memory normal.         Judgment: Judgment normal.         Assessment:     1. Coronary artery disease involving native coronary artery of  native heart with other form of angina pectoris    2. History of coronary artery bypass surgery    3. Precordial pain    4. History of cerebrovascular accident    5. Atherosclerosis of aorta    6. History of deep vein thrombosis    7. Primary hypertension    8. Hypercholesterolemia    9. Hypertriglyceridemia        Plan:     1. Coronary Artery Disease              12/2019: Began experience chest pain. Resolved with isosorbidemononitrate.              12/19/2019: Stress MPI:3:39 min. No CP. ECG negative. MPI: Mild to moderate fixed anterior and anteroapical defect. EF 73%.              5/18/2020: Presented with more pronounced chest pain. Resolved with NTG sl.              ECG without acute changes. Troponin x 3 negative.               5/19/2020: Echo: Normal left ventricular size and systolic function. EF 75%. Mildly dilated LA. Mild aortic valve sclerosis. Mild mitral valve sclerosis.              5/20/2020: [facility]: Cath: LAD: Prox 30%. Mid 70%. Distal 80%. OM2: Osteal 90%. OM3: 95%. OM4 90%. RCA: Mid subtotal. LV: Basal inferior mild hypokinesia. EF 60%.   5/25/2020: [facility]: CABG x 3: LIMA to LAD and SVG1 to OM2 & OM4.   On carvedilol 12.5 mg Q12.              On aspirin 81 mg Q24.   Probably doing well.   Encouraged to enroll in exercise program.    2. Chest Pain   9/2023: Began experience occasional chest pain.   Probably non cardiac in origin.   9/18/2023: Do Stress Echo.              3. History of Cerebrovascular Accident              2004: Weakness in left hand. Affected memory. No sequela.   On aspirin 81 mg Q24.    4. Atherosclerosis of Aorta   6/3/2020: CT Scan: Noted.    All risk factors addressed.     5. History of Deep Venous Trombosis of Lower Extremity   5/2020: DVT left leg after CABG.   Received apixiban for 3 months.     6. Hypertension              2000: Diagnosed.   9/1/2020: Furosemide 20 mg Q24 and KCl 20 mEq Q24 were discontinued.   Used to be on metoprolol 25 mg Q12, amlodipine 10 mg Q24,  "valsartan 320 mg Q24, furosemide 20 mg Q24 and chlorthalidone 25 mg Q24.   10/13/2020: Valsartan 320 mg Q24 was resumed. Amlodipine 10 mg Q24 and furosemide 20 mg Q24 were discontinued.   2/23/2022: Metoprolol 25 mg Q12 was changed to carvedilol 12.5 mg Q12 and amlodipine 5 mg Q24 was increased to amlodipine 10 mg Q24 as pressure was running 150-160/70-80 mmHg at home.     On carvedilol 12.5 mg Q12, amlodipine 10 mg Q24, valsartan 320 mg Q24 and furosemide 20 mg Q24.   Keeping log at home.   Well controlled.      7. Hypercholesterolemia              2000: Began statin.              On atorvastatin 40 mg Q24.                 5/19/2020: Chol 204. HDL 41. . .              On atorvastatin 80 mg Q24 and ezetimibe 10 mg Q24.   8/18/2020: Chol 161. HDL 46. LDL 82. .   2/24/2022: Chol 94. HDL 37. LDL 42. TG 73.   On atorvastatin 80 mg Q24 and ezetimibe 10 mg Q24.   Very favorable lipid panel.    8. Hypertriglyceridemia   2020: Diagnosed.   As above.     9. Diabetes Mellitus, Type 2              2000: Diagnosed. Complications: CVA & CAD. Medications: Insulin and SGLT2i.   Used to be on semaglutide 4 mg Q weekly.   4/13/2023: HgbA1C 8.9%.   On empagliflozin 10 mg Q24.      10. Severe Obesity              5/19/2020: Weight 86 kg. BMI 33.   5/15/2023: Weight 88 kg. BMI 36.   Encourage to lose weight.     11. Primary Care              Dr. Yudi Smart.    F/u 4 weeks.    Etelvina Lowery M.D.      9/19/2023 3:46 PM, Addendum:    9/19/2023: Stress Echo: 2:26 min. No CP. ECG negative. Echo "negative". Normal left ventricular size and systolic function. EF 60%.    I discussed the above test result and the implications of the findings over the phone.    Etelvina Lowery M.D.            "

## 2023-09-19 ENCOUNTER — HOSPITAL ENCOUNTER (OUTPATIENT)
Dept: CARDIOLOGY | Facility: OTHER | Age: 73
Discharge: HOME | End: 2023-09-19
Attending: INTERNAL MEDICINE
Payer: MEDICARE

## 2023-09-19 VITALS — SYSTOLIC BLOOD PRESSURE: 134 MMHG | HEART RATE: 52 BPM | DIASTOLIC BLOOD PRESSURE: 62 MMHG

## 2023-09-19 DIAGNOSIS — R07.2 PRECORDIAL PAIN: ICD-10-CM

## 2023-09-19 LAB
CV ECHO LV RWT: 0.36 CM
CV STRESS BASE HR: 52 BPM
DIASTOLIC BLOOD PRESSURE: 62 MMHG
ECHO LV POSTERIOR WALL: 0.9 CM (ref 0.6–1.1)
EJECTION FRACTION: 60 %
FRACTIONAL SHORTENING: 50 % (ref 28–44)
INTERVENTRICULAR SEPTUM: 0.9 CM (ref 0.6–1.1)
LEFT INTERNAL DIMENSION IN SYSTOLE: 2.52 CM (ref 2.1–4)
LEFT VENTRICLE DIASTOLIC VOLUME: 120.15 ML
LEFT VENTRICLE SYSTOLIC VOLUME: 22.82 ML
LEFT VENTRICULAR INTERNAL DIMENSION IN DIASTOLE: 5.03 CM (ref 3.5–6)
LEFT VENTRICULAR MASS: 159.8 G
OHS CV CPX 1 MINUTE RECOVERY HEART RATE: 75 BPM
OHS CV CPX 85 PERCENT MAX PREDICTED HEART RATE MALE: 121
OHS CV CPX ESTIMATED METS: 4.6
OHS CV CPX MAX PREDICTED HEART RATE: 143
OHS CV CPX PATIENT IS FEMALE: 1
OHS CV CPX PATIENT IS MALE: 0
OHS CV CPX PEAK DIASTOLIC BLOOD PRESSURE: 67 MMHG
OHS CV CPX PEAK HEAR RATE: 94 BPM
OHS CV CPX PEAK RATE PRESSURE PRODUCT: ABNORMAL
OHS CV CPX PEAK SYSTOLIC BLOOD PRESSURE: 203 MMHG
OHS CV CPX PERCENT MAX PREDICTED HEART RATE ACHIEVED: 66
OHS CV CPX RATE PRESSURE PRODUCT PRESENTING: 6968
POST STRESS EJECTION FRACTION: 75 %
STRESS ECHO POST EXERCISE DUR MIN: 2 MINUTES
STRESS ECHO POST EXERCISE DUR SEC: 26 SECONDS
SYSTOLIC BLOOD PRESSURE: 134 MMHG

## 2023-09-19 PROCEDURE — 93351 STRESS TTE COMPLETE: CPT

## 2023-09-19 PROCEDURE — 93351 STRESS TTE COMPLETE: CPT | Mod: 26,,, | Performed by: INTERNAL MEDICINE

## 2023-09-19 PROCEDURE — 93351 STRESS ECHO (CUPID ONLY): ICD-10-PCS | Mod: 26,,, | Performed by: INTERNAL MEDICINE

## 2023-10-18 DIAGNOSIS — G25.81 RESTLESS LEGS SYNDROME (RLS): ICD-10-CM

## 2023-10-18 RX ORDER — ROPINIROLE 0.5 MG/1
TABLET, FILM COATED ORAL
Qty: 90 TABLET | Refills: 0 | Status: SHIPPED | OUTPATIENT
Start: 2023-10-18

## 2023-10-24 NOTE — PROGRESS NOTES
Called patient advised of physician's recommendation. Patient verbalized an understanding.   Yes [place] Pregnancy And Lactation Text: This medication is Pregnancy Category X and contraindicated in pregnancy and in women who may become pregnant. It is unknown if this medication is excreted in breast milk.

## 2023-11-01 ENCOUNTER — LAB VISIT (OUTPATIENT)
Dept: LAB | Facility: HOSPITAL | Age: 73
End: 2023-11-01
Payer: MEDICARE

## 2023-11-01 DIAGNOSIS — E11.65 TYPE 2 DIABETES MELLITUS WITH HYPERGLYCEMIA, UNSPECIFIED WHETHER LONG TERM INSULIN USE: ICD-10-CM

## 2023-11-01 PROCEDURE — 36415 COLL VENOUS BLD VENIPUNCTURE: CPT | Mod: PN | Performed by: NURSE PRACTITIONER

## 2023-11-01 PROCEDURE — 83036 HEMOGLOBIN GLYCOSYLATED A1C: CPT | Performed by: NURSE PRACTITIONER

## 2023-11-02 LAB
ESTIMATED AVG GLUCOSE: 183 MG/DL (ref 68–131)
HBA1C MFR BLD: 8 % (ref 4–5.6)

## 2023-11-06 ENCOUNTER — PATIENT MESSAGE (OUTPATIENT)
Dept: ADMINISTRATIVE | Facility: HOSPITAL | Age: 73
End: 2023-11-06
Payer: MEDICARE

## 2023-11-06 ENCOUNTER — TELEPHONE (OUTPATIENT)
Dept: ENDOCRINOLOGY | Facility: CLINIC | Age: 73
End: 2023-11-06
Payer: MEDICARE

## 2023-11-06 ENCOUNTER — CLINICAL SUPPORT (OUTPATIENT)
Dept: DIABETES | Facility: CLINIC | Age: 73
End: 2023-11-06
Payer: MEDICARE

## 2023-11-06 DIAGNOSIS — E11.65 TYPE 2 DIABETES MELLITUS WITH HYPERGLYCEMIA, UNSPECIFIED WHETHER LONG TERM INSULIN USE: Primary | ICD-10-CM

## 2023-11-06 PROCEDURE — G0108 DIAB MANAGE TRN  PER INDIV: HCPCS | Mod: S$GLB,,, | Performed by: FAMILY MEDICINE

## 2023-11-06 PROCEDURE — 99999 PR PBB SHADOW E&M-EST. PATIENT-LVL I: ICD-10-PCS | Mod: PBBFAC,,,

## 2023-11-06 PROCEDURE — 99999 PR PBB SHADOW E&M-EST. PATIENT-LVL I: CPT | Mod: PBBFAC,,,

## 2023-11-06 PROCEDURE — G0108 PR DIAB MANAGE TRN  PER INDIV: ICD-10-PCS | Mod: S$GLB,,, | Performed by: FAMILY MEDICINE

## 2023-11-06 NOTE — TELEPHONE ENCOUNTER
LVM to notify pt that there are no openings with education on tomorrow but there is one today at 1PM.

## 2023-11-06 NOTE — TELEPHONE ENCOUNTER
----- Message from Verena Wheeler sent at 11/6/2023  9:52 AM CST -----  Regarding: Patient call back  .Type: Patient Call Back    Who called:    What is the request in detail:New Dexcom 7 machine  asking if she can be shown how to install it at her appointment on tomorrow     Can the clinic reply by [facility]?no     Would the patient rather a call back or a response via [facility]? Call     Best call back number:.[phone]      Additional Information:

## 2023-11-06 NOTE — PROGRESS NOTES
CC: This 73 y.o. Black or  female  is here for evaluation of  T2DM along with comorbidities indicated in the Visit Diagnosis section of this encounter.    HPI: Cecilia Barahona was diagnosed with T2DM in her 30s. Oral agents started at the time of diagnosis.         Prior visit 8/2023  A1c is down from 8.9% to now 7.8%.   Pt did not tolerate Ozempic and went back on Trulicity.   She is now using Dexcom CGM.   She hasn't been taking Trulicity or Tresiba x 3 weeks while she's been with her daughter. She has been making a lot of candied pecans and eating a lot of sweets.   Pt does not remember how much Tresiba she was taking previously. C/o poor memory.   90 day CGM average 192  Plan Finish up Trulicity 3 mg supply then increase to 4.5 mg once weekly. May need to stay on 3 mg if 4.5 mg is temporarily out of stock.   Resume Tresiba a 20 units once daily. Continue metformin and Jardiance.   Continue Dexcom. Will send orders for Dexcom G7.    Improve diet - avoid sweets and candy.   Improve medication adherence.   Reviewed complications of uncontrolled diabetes.   Return to clinic in 3 months with labs prior.   Again, provided contact info to pt for Pharmacy Assistance and packet provided as well. She needs to re-apply for Trulicity, Jardiance and Tresiba. May lose Trulicity approval in 2024 if she doesn't apply this year.     Interval hx  A1c is about the same, from 7.8 to 8%.   Unable to get Jardiance from Pharmacy Assistance approved.   She has not been taking all her meds consistently. She is doing better lately however.   90 day CGM average 198        PMHX: CHETNA - does not like her old mask. H/o stroke with right facial dropp     LAST DIABETES EDUCATION: never    RECENT ILLNESSES/HOSPITALIZATIONS - -No.     HOSPITALIZED FOR DIABETES  -  No.    PRESCRIBED DIABETES MEDICATIONS:  gets  needles and Tresiba from Pharm Assistance/also Trulicity?   metformin 1000 mg bid  Jardiance 25 mg once daily    Trulicity 4.5 mg once weekly on Fridays   Tresiba 20 units once daily       Misses medication doses -  yes as above   She does forget to take metformin at night     DM COMPLICATIONS: peripheral neuropathy    SIGNIFICANT DIABETES MED HISTORY:   Glimepiride stopped at initial ov 7/2017 since she was already on Novolog  ozempic started 10/2018,  switched to trulicity ~ 12/2018 d/t nausea at 0.5 mg dose. Failed  again in 2023   Jardiance started 2/2020  tresiba and trulicity stopped in 10/2020 d/t improved BGs but Trulicity resumed shortly d/t poor diet and DM control        SELF MONITORING BLOOD GLUCOSE: Dexcom G6 CGM yaakov   CGM interpretation: lowest and most stable glucoses are overnight, with one night of hypoglycemia. Higher glucoses after meals in the afternoon d/t diet and perhaps medication non-adherence.          HYPOGLYCEMIC EPISODES: CGM shows glucose dropping to 50s one night   Symptoms: weakness   Corrects with hard candy.       CURRENT DIET:  poor; sugary beverages.     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     /60   Pulse 65   Temp 97.8 °F (36.6 °C)   Wt 85.3 kg (188 lb)   BMI 34.39 kg/m²         ROS:   CONSTITUTIONAL: Appetite lower, no  fatigue  Denies n/v, constipation, or diarrhea.   No dysuria       PHYSICAL EXAM:  GENERAL: Well developed, well nourished. No acute distress.   PSYCH: AAOx3, appropriate mood and affect, conversant, well-groomed. Judgement and insight good.   NEURO: Cranial nerves grossly intact. Speech clear, no tremor.   CHEST: Respirations even and unlabored.           Hemoglobin A1C   Date Value Ref Range Status   11/01/2023 8.0 (H) 4.0 - 5.6 % Final     Comment:     ADA Screening Guidelines:  5.7-6.4%  Consistent with prediabetes  >or=6.5%  Consistent with diabetes    High levels of fetal hemoglobin interfere with the HbA1C  assay. Heterozygous hemoglobin variants (HbS, HgC, etc)do  not significantly interfere with this assay.  "  However, presence of multiple variants may affect accuracy.     07/12/2023 7.8 (H) 4.0 - 5.6 % Final     Comment:     ADA Screening Guidelines:  5.7-6.4%  Consistent with prediabetes  >or=6.5%  Consistent with diabetes    High levels of fetal hemoglobin interfere with the HbA1C  assay. Heterozygous hemoglobin variants (HbS, HgC, etc)do  not significantly interfere with this assay.   However, presence of multiple variants may affect accuracy.     04/13/2023 8.9 (H) 4.0 - 5.6 % Final     Comment:     ADA Screening Guidelines:  5.7-6.4%  Consistent with prediabetes  >or=6.5%  Consistent with diabetes    High levels of fetal hemoglobin interfere with the HbA1C  assay. Heterozygous hemoglobin variants (HbS, HgC, etc)do  not significantly interfere with this assay.   However, presence of multiple variants may affect accuracy.         No results found for: "CPEPTIDE", "GLUTAMICACID", "ISLETCELLANT", "FRUCTOSAMINE"         Component Value Date/Time     02/03/2023 0610    K 4.3 02/03/2023 0610     02/03/2023 0610    CO2 27 02/03/2023 0610    BUN 15 02/03/2023 0610    CREATININE 0.8 02/03/2023 0610     (H) 02/03/2023 0610    CALCIUM 9.7 02/03/2023 0610    ALKPHOS 159 (H) 02/24/2022 1124    AST 16 02/24/2022 1124    ALT 16 02/24/2022 1124    BILITOT 0.8 02/24/2022 1124    EGFRNORACEVR >60 02/03/2023 0610    ESTGFRAFRICA >60.0 05/09/2022 1632         Lab Results   Component Value Date    LDLCALC 51.8 (L) 07/12/2023       Latest Reference Range & Units 07/12/23 09:21   Cholesterol 120 - 199 mg/dL 104 (L)   HDL 40 - 75 mg/dL 35 (L)   HDL/Cholesterol Ratio 20.0 - 50.0 % 33.7   LDL Cholesterol External 63.0 - 159.0 mg/dL 51.8 (L)   Non-HDL Cholesterol mg/dL 69   Total Cholesterol/HDL Ratio 2.0 - 5.0  3.0   Triglycerides 30 - 150 mg/dL 86   (L): Data is abnormally low      Lab Results   Component Value Date    MICALBCREAT 83.8 (H) 07/12/2023           ASSESSMENT and PLAN:    A1C GOAL: < 7.5 %         1. Type 2 " diabetes mellitus with hyperglycemia, unspecified whether long term insulin use  Reduce Tresiba from 20 to 16 units once daily.   Continue Trulicity 4.5 mg weekly.     Change metformin and Jardiance to combination Synjardy XR 2 tablets once daily in the morning. This gives the same dose of medications.    Improve medication adherence.   Improve diet.     Return to clinic in 3 months with labs prior.     Remember to re-apply for Trulicity and Basaglar with Pharmacy Assistance for 2024.   Fax number [phone]  Mary Kate Headley [phone]   You will need to provide the following documents:   - Proof of household Income (such as social security statement, 1099 form, pension statement or 3 consecutive pay stubs)  - Copy of all insurance cards (front and back)  - Signed & dated [facility]/Patient Authorization Forms           Hemoglobin A1C    insulin degludec (TRESIBA FLEXTOUCH U-200) 200 unit/mL (3 mL) insulin pen      2. History of cerebrovascular accident  Improve glycemic control.   Continue Trulicity and Jardiance.       3. Microalbuminuria  Continue Jardiance and valsartan.       4. Hyperlipidemia, unspecified hyperlipidemia type  Continue statin and Zetia.       5. Essential hypertension  Controlled               Orders Placed This Encounter   Procedures    Hemoglobin A1C     Standing Status:   Future     Standing Expiration Date:   1/5/2025          Follow up in about 3 months (around 2/7/2024).

## 2023-11-07 ENCOUNTER — OFFICE VISIT (OUTPATIENT)
Dept: ENDOCRINOLOGY | Facility: CLINIC | Age: 73
End: 2023-11-07
Payer: MEDICARE

## 2023-11-07 VITALS
DIASTOLIC BLOOD PRESSURE: 60 MMHG | BODY MASS INDEX: 34.39 KG/M2 | TEMPERATURE: 98 F | SYSTOLIC BLOOD PRESSURE: 122 MMHG | WEIGHT: 188 LBS | HEART RATE: 65 BPM

## 2023-11-07 DIAGNOSIS — R80.9 MICROALBUMINURIA: ICD-10-CM

## 2023-11-07 DIAGNOSIS — I10 ESSENTIAL HYPERTENSION: ICD-10-CM

## 2023-11-07 DIAGNOSIS — E78.5 HYPERLIPIDEMIA, UNSPECIFIED HYPERLIPIDEMIA TYPE: ICD-10-CM

## 2023-11-07 DIAGNOSIS — Z86.73 HISTORY OF CEREBROVASCULAR ACCIDENT: ICD-10-CM

## 2023-11-07 DIAGNOSIS — E11.65 TYPE 2 DIABETES MELLITUS WITH HYPERGLYCEMIA, UNSPECIFIED WHETHER LONG TERM INSULIN USE: Primary | ICD-10-CM

## 2023-11-07 PROCEDURE — 1157F ADVNC CARE PLAN IN RCRD: CPT | Mod: CPTII,S$GLB,, | Performed by: NURSE PRACTITIONER

## 2023-11-07 PROCEDURE — 1159F PR MEDICATION LIST DOCUMENTED IN MEDICAL RECORD: ICD-10-PCS | Mod: CPTII,S$GLB,, | Performed by: NURSE PRACTITIONER

## 2023-11-07 PROCEDURE — 3052F HG A1C>EQUAL 8.0%<EQUAL 9.0%: CPT | Mod: CPTII,S$GLB,, | Performed by: NURSE PRACTITIONER

## 2023-11-07 PROCEDURE — 99214 OFFICE O/P EST MOD 30 MIN: CPT | Mod: S$GLB,,, | Performed by: NURSE PRACTITIONER

## 2023-11-07 PROCEDURE — 1160F RVW MEDS BY RX/DR IN RCRD: CPT | Mod: CPTII,S$GLB,, | Performed by: NURSE PRACTITIONER

## 2023-11-07 PROCEDURE — 99999 PR PBB SHADOW E&M-EST. PATIENT-LVL V: ICD-10-PCS | Mod: PBBFAC,,, | Performed by: NURSE PRACTITIONER

## 2023-11-07 PROCEDURE — 95251 CONT GLUC MNTR ANALYSIS I&R: CPT | Mod: S$GLB,,, | Performed by: NURSE PRACTITIONER

## 2023-11-07 PROCEDURE — 3074F SYST BP LT 130 MM HG: CPT | Mod: CPTII,S$GLB,, | Performed by: NURSE PRACTITIONER

## 2023-11-07 PROCEDURE — 3008F PR BODY MASS INDEX (BMI) DOCUMENTED: ICD-10-PCS | Mod: CPTII,S$GLB,, | Performed by: NURSE PRACTITIONER

## 2023-11-07 PROCEDURE — 3074F PR MOST RECENT SYSTOLIC BLOOD PRESSURE < 130 MM HG: ICD-10-PCS | Mod: CPTII,S$GLB,, | Performed by: NURSE PRACTITIONER

## 2023-11-07 PROCEDURE — 95251 PR GLUCOSE MONITOR, 72 HOUR, PHYS INTERP: ICD-10-PCS | Mod: S$GLB,,, | Performed by: NURSE PRACTITIONER

## 2023-11-07 PROCEDURE — 1160F PR REVIEW ALL MEDS BY PRESCRIBER/CLIN PHARMACIST DOCUMENTED: ICD-10-PCS | Mod: CPTII,S$GLB,, | Performed by: NURSE PRACTITIONER

## 2023-11-07 PROCEDURE — 3008F BODY MASS INDEX DOCD: CPT | Mod: CPTII,S$GLB,, | Performed by: NURSE PRACTITIONER

## 2023-11-07 PROCEDURE — 99999 PR PBB SHADOW E&M-EST. PATIENT-LVL V: CPT | Mod: PBBFAC,,, | Performed by: NURSE PRACTITIONER

## 2023-11-07 PROCEDURE — 3060F PR POS MICROALBUMINURIA RESULT DOCUMENTED/REVIEW: ICD-10-PCS | Mod: CPTII,S$GLB,, | Performed by: NURSE PRACTITIONER

## 2023-11-07 PROCEDURE — 99214 PR OFFICE/OUTPT VISIT, EST, LEVL IV, 30-39 MIN: ICD-10-PCS | Mod: S$GLB,,, | Performed by: NURSE PRACTITIONER

## 2023-11-07 PROCEDURE — 4010F ACE/ARB THERAPY RXD/TAKEN: CPT | Mod: CPTII,S$GLB,, | Performed by: NURSE PRACTITIONER

## 2023-11-07 PROCEDURE — 1159F MED LIST DOCD IN RCRD: CPT | Mod: CPTII,S$GLB,, | Performed by: NURSE PRACTITIONER

## 2023-11-07 PROCEDURE — 3078F PR MOST RECENT DIASTOLIC BLOOD PRESSURE < 80 MM HG: ICD-10-PCS | Mod: CPTII,S$GLB,, | Performed by: NURSE PRACTITIONER

## 2023-11-07 PROCEDURE — 4010F PR ACE/ARB THEARPY RXD/TAKEN: ICD-10-PCS | Mod: CPTII,S$GLB,, | Performed by: NURSE PRACTITIONER

## 2023-11-07 PROCEDURE — 1157F PR ADVANCE CARE PLAN OR EQUIV PRESENT IN MEDICAL RECORD: ICD-10-PCS | Mod: CPTII,S$GLB,, | Performed by: NURSE PRACTITIONER

## 2023-11-07 PROCEDURE — 3066F NEPHROPATHY DOC TX: CPT | Mod: CPTII,S$GLB,, | Performed by: NURSE PRACTITIONER

## 2023-11-07 PROCEDURE — 3078F DIAST BP <80 MM HG: CPT | Mod: CPTII,S$GLB,, | Performed by: NURSE PRACTITIONER

## 2023-11-07 PROCEDURE — 3066F PR DOCUMENTATION OF TREATMENT FOR NEPHROPATHY: ICD-10-PCS | Mod: CPTII,S$GLB,, | Performed by: NURSE PRACTITIONER

## 2023-11-07 PROCEDURE — 3052F PR MOST RECENT HEMOGLOBIN A1C LEVEL 8.0 - < 9.0%: ICD-10-PCS | Mod: CPTII,S$GLB,, | Performed by: NURSE PRACTITIONER

## 2023-11-07 PROCEDURE — 3060F POS MICROALBUMINURIA REV: CPT | Mod: CPTII,S$GLB,, | Performed by: NURSE PRACTITIONER

## 2023-11-07 RX ORDER — INSULIN DEGLUDEC 200 U/ML
INJECTION, SOLUTION SUBCUTANEOUS
Qty: 6 PEN | Refills: 3
Start: 2023-11-07 | End: 2023-12-11 | Stop reason: SDUPTHER

## 2023-11-07 RX ORDER — EMPAGLIFLOZIN, METFORMIN HYDROCHLORIDE 12.5; 1 MG/1; MG/1
TABLET, EXTENDED RELEASE ORAL
Qty: 60 TABLET | Refills: 5 | Status: SHIPPED | OUTPATIENT
Start: 2023-11-07

## 2023-11-07 NOTE — PATIENT INSTRUCTIONS
Reduce Tresiba from 20 to 16 units once daily.   Continue Trulicity 4.5 mg weekly.     Change metformin and Jardiance to combination Synjardy XR 2 tablets once daily in the morning. This gives the same dose of medications.    Improve medication adherence.   Improve diet.     Return to clinic in 3 months with labs prior.     Remember to re-apply for Trulicity and Basaglar with Pharmacy Assistance for 2024.   Fax number [phone]  Mary Kate Headley [phone]   You will need to provide the following documents:   - Proof of household Income (such as social security statement, 1099 form, pension statement or 3 consecutive pay stubs)  - Copy of all insurance cards (front and back)  - Signed & dated [facility]/Patient Authorization Forms

## 2023-11-10 VITALS — HEIGHT: 62 IN | WEIGHT: 187.19 LBS | BODY MASS INDEX: 34.45 KG/M2

## 2023-11-10 NOTE — PROGRESS NOTES
"Diabetes Care Specialist Progress Note  Author: Amy Laguna RN  Date: 11/6/2023    Program Intake  Reason for Diabetes Program Visit:: Intervention  Individual: Device Training  Device Training: Personal CGM  Current diabetes risk level:: moderate  In the last 12 months, have you:: used emergency room services  Was the ER or hospital admission related to diabetes?: No  Permission to speak with others about care:: yes    Lab Results   Component Value Date    HGBA1C 8.0 (H) 11/01/2023       Clinical  Weight: 84.9 kg (187 lb 3.2 oz)   Height: 5' 2" (157.5 cm)   Body mass index is 34.24 kg/m².    Clinical Assessment  Current Diabetes Treatment: Injectable, Insulin, Oral Medication (Tresiba 20U daily, Metformin 1000mg BID w/meals, and Ozempic weekly)    Labs  Do you have regular lab work to monitor your medications?: Yes  Type of Regular Lab Work: A1c  Where do you get your labs drawn?: [facility]  Lab Compliance Barriers: No         Additional Social History  Support  Does anyone support you with your diabetes care?: yes  Who supports you?: son/daughter  Who takes you to your medical appointments?: self  Does the current support meet the patient's needs?: Yes  Is Support an area impacting ability to self-manage diabetes?: No    Access to Mass Media & Technology  Does the patient have access to any of the following devices or technologies?: Smart phone  Media or technology needs impacting ability to self-manage diabetes?: No    Cognitive/Behavioral Health  Alert and Oriented: Yes  Difficulty Thinking: No  Requires Prompting: No  Requires assistance for routine expression?: No  Cognitive or behavioral barriers impacting ability to self-manage diabetes?: No    Communication  Language preference: English  Hearing Problems: No  Vision Problems: Yes  Vision problem type:: Decreased Vision  Vision Assistance: Glasses  Communication needs impacting ability to self-manage diabetes?: No    Health Literacy  Preferred Learning " Method: Face to Face, Video, Demonstration, Reading Materials  How often do you need to have someone help you read instructions, pamphlets, or written material from your doctor or pharmacy?: Never  Health literacy needs impacting ability to self-manage diabetes?: No      Diabetes Self-Management Skills Assessment  Diabetes Disease Process/Treatment Options  Diabetes Disease Process/Treatment Options: Skills Assessment Completed: No  Area of need?: No    Nutrition/Healthy Eating  Nutrition/Healthy Eating Skills Assessment Completed:: No  Area of need?: Deferred    Physical Activity/Exercise  Physical Activity/Exercise Skills Assessment Completed: : No  Deffered due to:: Time    Medications  Medication Skills Assessment Completed:: No  Area of need?: Deferred    Home Blood Glucose Monitoring  Patient states that blood sugar is checked at home daily.: yes  Monitoring Method:: personal continuous glucose monitor  Personal CGM type:: Dexcom G7 start today  Patient is able to use personal CGM appropriately.: no  CGM Report reviewed?: no  Home Blood Glucose Monitoring Skills Assessment Completed: : Yes  Assessment indicates:: Instruction Needed, Knowledge deficit  Area of need?: Yes    Acute Complications  Patient is able to identify types of acute complications: No  Acute Complications Skills Assessment Completed: : Yes  Assessment indicates:: Instruction Needed, Knowledge deficit  Area of need?: Yes    Chronic Complications  Chronic Complications Skills Assessment Completed: : No  Area of need?: Deferred    Psychosocial/Coping  Psychosocial/Coping Skills Assessment Completed: : No  Deffered due to:: Time      Assessment Summary and Plan    Based on today's diabetes care assessment, the following areas of need were identified:          11/6/2023    12:03 AM   Social   Support No   Access to Mass Media/Tech No   Cognitive/Behavioral Health No   Communication No   Health Literacy No            11/6/2023    12:03 AM  "  Clinical   Lab Compliance No            11/6/2023    12:03 AM   Diabetes Self-Management Skills   Diabetes Disease Process/Treatment Options No   Nutrition/Healthy Eating Deferred   Medication Deferred   Home Blood Glucose Monitoring Yes-see care planning   Acute Complications Yes- Discussed blood sugar values, prevention, detection, signs and symptoms, and treatment of hypoglycemia following rule of 15.    Chronic Complications Deferred          Today's interventions were provided through individual discussion, instruction, and written materials were provided.      Patient verbalized understanding of instruction and written materials.  Pt was able to return back demonstration of instructions today. Patient understood key points, needs reinforcement and further instruction.     Diabetes Self-Management Care Plan:    Today's Diabetes Self-Management Care Plan was developed with Cecilia's input. Cecilia has agreed to work toward the following goal(s) to improve his/her overall diabetes control.      Care Plan: Diabetes Management   Updates made since 10/11/2023 12:00 AM        Problem: Blood Glucose Self-Monitoring         Goal: Patient agrees to change Dexcom G6 sensor every 10 days and record blood sugars 2-3 times per day. Completed 11/6/2023   Start Date: 5/4/2023   Expected End Date: 5/12/2023   Recent Progress: Not met   Priority: High   Barriers: No Barriers Identified   Note:    5/4/23 - - DEXCOM G6 CGM TRAINING  Patient referred to clinic today for Dexcom G6 continuous glucose sensor system training.  Dexcom G6 mobile yaakov downloaded to phone. Education was provided using "Quick Start Guide" per Dexcom protocol and video presentation.     Pt will be using her I-phone as the primary .  Overview:  5min glucose reading updates, trending arrows, BG graph screens, battery life indicator, Blue Tooth Symbol.  Menus: trend Graph, start sensor, enter BG, events, Alerts, Settings, Shutdown, Stop Sensor   " Settings:                          * Urgent Low: 55 mg/dL                          * Urgent Low Soon: on                          * Low alert: 70 mg/dl repeat 15 min                          * High alert: 300 mg/dl                           * Rise rate: off                          * Fall Rate: off                          * Signal Loss: on repeat 20 min                          * No Reading: on repeat 20 min                          * Always sound: on                      Reviewed additional setting options with patient, including Graph Height and Transmitter ID. Transmitter was paired with .    Reviewed where to find sensor insertion time and sensor expiration date.   Discussed no need to calibrate sensor during the entirety of the 10 day wear. Discussed that pt can calibrate sensor if desired, but at that time she will need to continue calibrating every 12 hours for sensor to remain accurate. Reviewed appropriate calibration techniques.  Reviewed sensor site selection. Site selected and prepped using aseptic technique Inserted to left abdomen. Transmitter placed in pod and secured.  Practiced sensor pod/transmitter removal from site, and removal of transmitter from sensor pod.  Patient able to demonstrate without difficulty.  Encouraged to review manual prior to starting another sensor.   Reviewed problem solving aspects of sensor transmission/ variables that can disrupt RF transmission.  range 20 feet, but the first 3 hrs keep within 3 feet of transmitter.  Pt instructed on lag time of interstitial fluid from CBG and was advised to tx hypoglycemia and dose insulin based on SMBG values.  Dexcom technical support contact number given and examples of when to contact them discussed.   Patient advised to always check glucose with glucometer should readings do not match symptoms felt (ex. Hypo or hyperglycemia).    5/12/23- Care Plan Update- Pt needed assist with changing dexcom G6 sensor. We discussed  "how to change the sensor, watched a video presentation,and reviewed the dexcom "Quick Start Guide" per Dexcom protocol.  Pt will be using her I-phone as the primary . We also reviewed her BS graphs and discussed her elevations and pattern management. Addressed pt's questions/concerns. Pt verbalized understanding.           Task: Provided patient with a meter today and sent Rx request to provider to send to patients pharmacy. Completed 11/10/2023        Task: Reviewed the importance of self-monitoring blood glucose and keeping logs. Completed 11/10/2023        Task: Instructed on how to self-monitor blood glucose using a home glucometer, how to properly dispose of used strips and lancets after use, and how to appropriately store meter and supplies. Completed 11/10/2023        Task: Provided patient with blood glucose logs, reviewed appropriate timing and frequency to SMBG, education on parameters on when to notify provider and advised patient to bring logs to all appts with PCP/Endocrinologist/Diabetes Care Specialist. Completed 11/10/2023        Task: Discussed ways to minimize pain when monitoring blood glucose. Completed 11/10/2023        Problem: Healthy Eating         Goal: Eat 2-3 meals daily with 30-45g/2-3 servings of Carbohydrate per meal. Limit snacking in between meal to 1 serving (15 grams).    Start Date: 5/12/2023   Expected End Date: 6/1/2023   This Visit's Progress: Deferred   Barriers: No Barriers Identified   Note:    5/12/23 - - Begin discussing eating healthy and diabetes. We discussed importance of portion sizes of food at each meal. Discussed carb vs non-carb foods, appropriate amount of carbs to have at meals/snacks and acceptable serving sizes of individual carb items. We also practiced reading food labels. Pt agreed to focusing on her portion sizes. We also discussed best practice to avoid sugary drinks and replace with non-caloric drinks. Pt agreed to start drinking more water in " place of sugary drinks.     Deferred 11/6/23       Problem: Blood Glucose Self-Monitoring         Goal: Patient agrees to change Dexcom G7 sensor every 10 days and review blood sugars per  3 times per day.    Start Date: 11/6/2023   Priority: High   Barriers: No Barriers Identified   Note:    11/6/23- - DEXCOM G7 CGM TRAINING  Patient referred to clinic today for Dexcom G7 continuous glucose sensor system training. Pt downloaded the Dexcom G7 Sharan and used her phone. We reviewed video on starting dexcom G7 using the Sharan. Also reviewed instructions per Dexcom G7 manual.   Overview:  5min glucose reading updates, trending arrows, BG graph screens, battery life indicator, Blue Tooth Symbol.  Menus: trend Graph, start sensor, enter BG, events, Alerts, Settings, Shutdown, Stop Sensor   Settings:                          * Urgent Low: 55 mg/dL                          * Urgent Low Soon: on                          * Low alert: 70 mg/dl                           * High alert: 250 mg/dl                           * Rise rate: off                          * Fall Rate: off                          * Signal Loss: on repeat 20 min                          * No Reading: on repeat 20 min                          * Always sound: on  Reviewed additional setting options with patient, including Graph Height and Transmitter ID. Dexcom G7 was paired.    Reviewed where to find sensor insertion time and sensor expiration date.   Discussed no need to calibrate sensor during the entirety of the 10 day wear. Discussed that pt can calibrate sensor if desired, but at that time he/she will need to continue calibrating every 12 hours for sensor to remain accurate. Reviewed appropriate calibration techniques.  Reviewed Dexcom G7 site selection. Site selected and prepped using aseptic technique Inserted to back of left upper arm. Transmitter/sensor placed per instructions.  Patient able to demonstrate without difficulty.  Encouraged  to review manual prior to starting another sensor.    range 20 feet, but the first 3 hrs keep within 3 feet of transmitter.  Pt instructed on lag time of interstitial fluid from CBG and was advised to tx hypoglycemia and dose insulin based on SMBG values.  Dexcom technical support contact number given and examples of when to contact them discussed. Patient advised to always check glucose with glucometer should readings do not match symptoms felt (ex. Hypo or hyperglycemia).             Follow Up Plan     No follow-ups on file.    Today's care plan and follow up schedule was discussed with patient.  Cecilia verbalized understanding of the care plan, goals, and agrees to follow up plan.        The patient was encouraged to communicate with his/her health care provider/physician and care team regarding his/her condition(s) and treatment.  I provided the patient with my contact information today and encouraged to contact me via phone or [facility] Patient Portal as needed.     Length of Visit   Total Time: 60 Minutes

## 2023-11-21 ENCOUNTER — OFFICE VISIT (OUTPATIENT)
Dept: CARDIOLOGY | Facility: CLINIC | Age: 73
End: 2023-11-21
Attending: INTERNAL MEDICINE
Payer: MEDICARE

## 2023-11-21 VITALS
OXYGEN SATURATION: 96 % | HEIGHT: 62 IN | BODY MASS INDEX: 34.55 KG/M2 | SYSTOLIC BLOOD PRESSURE: 130 MMHG | DIASTOLIC BLOOD PRESSURE: 70 MMHG | HEART RATE: 65 BPM | WEIGHT: 187.75 LBS

## 2023-11-21 DIAGNOSIS — Z86.73 HISTORY OF CEREBROVASCULAR ACCIDENT: ICD-10-CM

## 2023-11-21 DIAGNOSIS — Z95.1 HISTORY OF CORONARY ARTERY BYPASS SURGERY: ICD-10-CM

## 2023-11-21 DIAGNOSIS — I70.0 ATHEROSCLEROSIS OF AORTA: ICD-10-CM

## 2023-11-21 DIAGNOSIS — E78.1 HYPERTRIGLYCERIDEMIA: ICD-10-CM

## 2023-11-21 DIAGNOSIS — E78.00 HYPERCHOLESTEROLEMIA: ICD-10-CM

## 2023-11-21 DIAGNOSIS — R07.2 PRECORDIAL PAIN: ICD-10-CM

## 2023-11-21 DIAGNOSIS — Z86.718 HISTORY OF DEEP VEIN THROMBOSIS: ICD-10-CM

## 2023-11-21 DIAGNOSIS — I25.118 CORONARY ARTERY DISEASE INVOLVING NATIVE CORONARY ARTERY OF NATIVE HEART WITH OTHER FORM OF ANGINA PECTORIS: ICD-10-CM

## 2023-11-21 DIAGNOSIS — I10 PRIMARY HYPERTENSION: ICD-10-CM

## 2023-11-21 DIAGNOSIS — E66.9 MILD OBESITY: ICD-10-CM

## 2023-11-21 PROCEDURE — 99214 OFFICE O/P EST MOD 30 MIN: CPT | Mod: S$GLB,,, | Performed by: INTERNAL MEDICINE

## 2023-11-21 PROCEDURE — 99999 PR PBB SHADOW E&M-EST. PATIENT-LVL III: CPT | Mod: PBBFAC,,, | Performed by: INTERNAL MEDICINE

## 2023-11-21 PROCEDURE — 4010F ACE/ARB THERAPY RXD/TAKEN: CPT | Mod: CPTII,S$GLB,, | Performed by: INTERNAL MEDICINE

## 2023-11-21 PROCEDURE — 3060F POS MICROALBUMINURIA REV: CPT | Mod: CPTII,S$GLB,, | Performed by: INTERNAL MEDICINE

## 2023-11-21 PROCEDURE — 1101F PT FALLS ASSESS-DOCD LE1/YR: CPT | Mod: CPTII,S$GLB,, | Performed by: INTERNAL MEDICINE

## 2023-11-21 PROCEDURE — 3052F PR MOST RECENT HEMOGLOBIN A1C LEVEL 8.0 - < 9.0%: ICD-10-PCS | Mod: CPTII,S$GLB,, | Performed by: INTERNAL MEDICINE

## 2023-11-21 PROCEDURE — 3075F SYST BP GE 130 - 139MM HG: CPT | Mod: CPTII,S$GLB,, | Performed by: INTERNAL MEDICINE

## 2023-11-21 PROCEDURE — 4010F PR ACE/ARB THEARPY RXD/TAKEN: ICD-10-PCS | Mod: CPTII,S$GLB,, | Performed by: INTERNAL MEDICINE

## 2023-11-21 PROCEDURE — 3008F PR BODY MASS INDEX (BMI) DOCUMENTED: ICD-10-PCS | Mod: CPTII,S$GLB,, | Performed by: INTERNAL MEDICINE

## 2023-11-21 PROCEDURE — 1101F PR PT FALLS ASSESS DOC 0-1 FALLS W/OUT INJ PAST YR: ICD-10-PCS | Mod: CPTII,S$GLB,, | Performed by: INTERNAL MEDICINE

## 2023-11-21 PROCEDURE — 1157F PR ADVANCE CARE PLAN OR EQUIV PRESENT IN MEDICAL RECORD: ICD-10-PCS | Mod: CPTII,S$GLB,, | Performed by: INTERNAL MEDICINE

## 2023-11-21 PROCEDURE — 1159F PR MEDICATION LIST DOCUMENTED IN MEDICAL RECORD: ICD-10-PCS | Mod: CPTII,S$GLB,, | Performed by: INTERNAL MEDICINE

## 2023-11-21 PROCEDURE — 99999 PR PBB SHADOW E&M-EST. PATIENT-LVL III: ICD-10-PCS | Mod: PBBFAC,,, | Performed by: INTERNAL MEDICINE

## 2023-11-21 PROCEDURE — 3052F HG A1C>EQUAL 8.0%<EQUAL 9.0%: CPT | Mod: CPTII,S$GLB,, | Performed by: INTERNAL MEDICINE

## 2023-11-21 PROCEDURE — 3066F PR DOCUMENTATION OF TREATMENT FOR NEPHROPATHY: ICD-10-PCS | Mod: CPTII,S$GLB,, | Performed by: INTERNAL MEDICINE

## 2023-11-21 PROCEDURE — 3288F FALL RISK ASSESSMENT DOCD: CPT | Mod: CPTII,S$GLB,, | Performed by: INTERNAL MEDICINE

## 2023-11-21 PROCEDURE — 3078F DIAST BP <80 MM HG: CPT | Mod: CPTII,S$GLB,, | Performed by: INTERNAL MEDICINE

## 2023-11-21 PROCEDURE — 1159F MED LIST DOCD IN RCRD: CPT | Mod: CPTII,S$GLB,, | Performed by: INTERNAL MEDICINE

## 2023-11-21 PROCEDURE — 99214 PR OFFICE/OUTPT VISIT, EST, LEVL IV, 30-39 MIN: ICD-10-PCS | Mod: S$GLB,,, | Performed by: INTERNAL MEDICINE

## 2023-11-21 PROCEDURE — 3288F PR FALLS RISK ASSESSMENT DOCUMENTED: ICD-10-PCS | Mod: CPTII,S$GLB,, | Performed by: INTERNAL MEDICINE

## 2023-11-21 PROCEDURE — 3066F NEPHROPATHY DOC TX: CPT | Mod: CPTII,S$GLB,, | Performed by: INTERNAL MEDICINE

## 2023-11-21 PROCEDURE — 1126F PR PAIN SEVERITY QUANTIFIED, NO PAIN PRESENT: ICD-10-PCS | Mod: CPTII,S$GLB,, | Performed by: INTERNAL MEDICINE

## 2023-11-21 PROCEDURE — 1157F ADVNC CARE PLAN IN RCRD: CPT | Mod: CPTII,S$GLB,, | Performed by: INTERNAL MEDICINE

## 2023-11-21 PROCEDURE — 3075F PR MOST RECENT SYSTOLIC BLOOD PRESS GE 130-139MM HG: ICD-10-PCS | Mod: CPTII,S$GLB,, | Performed by: INTERNAL MEDICINE

## 2023-11-21 PROCEDURE — 3078F PR MOST RECENT DIASTOLIC BLOOD PRESSURE < 80 MM HG: ICD-10-PCS | Mod: CPTII,S$GLB,, | Performed by: INTERNAL MEDICINE

## 2023-11-21 PROCEDURE — 3008F BODY MASS INDEX DOCD: CPT | Mod: CPTII,S$GLB,, | Performed by: INTERNAL MEDICINE

## 2023-11-21 PROCEDURE — 3060F PR POS MICROALBUMINURIA RESULT DOCUMENTED/REVIEW: ICD-10-PCS | Mod: CPTII,S$GLB,, | Performed by: INTERNAL MEDICINE

## 2023-11-21 PROCEDURE — 1126F AMNT PAIN NOTED NONE PRSNT: CPT | Mod: CPTII,S$GLB,, | Performed by: INTERNAL MEDICINE

## 2023-11-21 NOTE — PROGRESS NOTES
Subjective:     Cecilia Barahona is a 73 y.o. female with hypertension, hypercholesterolemia, hypertriglyceridemia and diabetes mellitus type 2. She is mildly obese. She appears to have had a cerebrovascular accident in about 2004 when she had an episode of weakness in her left hand. She had exertional chest discomfort in 12/2019. She was given isosorbidemononitrate and the chest discomfort resolved. She had a stress MPI that was negative. On 5/16/2020 she began to experience a mild pressure over her chest. The heaviness lasted most of the day. Walking in her house made the discomfort more pronounced. There was no radiation of the pain and neither any associated dyspnea or diaphoresis. She had similar discomfort on 5/17/2020 and 5/18/2020. She told her daughter that took her to an urgent care center. She received two tablets of nitroglycerin  sublingually and the chest discomfort resolved. There were no acute ST-T wave changes. She was advised admission and was transferred to [facility]. She has had no further chest pain after presentation. On 5/20/2020 she underwent coronary angiography that revealed severe three vessel coronary artery disease. There was basal inferior mild hypokinesia with an ejection fraction of 60%. On 5/25/2020 she underwent coronary artery bypass grafting receiving three grafts. The left internal mammary artery was bypassed to the left anterior descending coronary artery and a sequential saphenous vein graft was placed to the second obtuse marginal branch and the fourth obtuse marginal branch. A few days after surgery she had a deep venous thrombosis of her left leg and she was anticoagulated with apixiban. She was able to lose weight after the coronary artery bypass grafting and her diabetes and hypertension became easier to control. Some soreness and itching in the anterior chest wall. In 4/2021 she went to [place] to visit her sister. In 3/2022 she is still  bothered by occasional chest wall pain. In 2022 her   after a lengthy hospital stay. She was not taking all of her prescribed medication during her husbands illness but got back on all her usual medications on about 2023. In 2023 she has had a few episodes of chest pressure. No clear exertional chest pain. On 2023 she underwent a stress echocardiogram. She was able to do 2:26 minutes on the treadmill. She had no chest pain. The electrocardiogram was negative. The echocardiogram was negative. There was normal left ventricular size and systolic function. The ejection fraction was 60%. It was felt the chest pain had a noncardiac cause. She has mild exertional shortness of breath. No palpitations or weak spells. No issues with any of her prescribed medications. Feeling fair overall.        Coronary Artery Disease  Presents for follow-up visit. Pertinent negatives include no chest pain, chest pressure, chest tightness, dizziness, leg swelling, muscle weakness, palpitations, shortness of breath or weight gain. Risk factors include hyperlipidemia. Risk factors do not include obesity. The symptoms have been stable.   Cerebrovascular Accident  This is a chronic problem. The problem has been resolved. Pertinent negatives include no abdominal pain, anorexia, arthralgias, change in bowel habit, chest pain, chills, congestion, coughing, diaphoresis, fatigue, fever, headaches, joint swelling, myalgias, nausea, neck pain, numbness, rash, sore throat, swollen glands, urinary symptoms, vertigo, visual change, vomiting or weakness.   Hypertension  This is a chronic problem. The current episode started more than 1 year ago. The problem is unchanged. The problem is controlled (usually 120-130/65-75 mmHg at home). Pertinent negatives include no anxiety, blurred vision, chest pain, headaches, malaise/fatigue, neck pain, orthopnea, palpitations, peripheral edema, PND, shortness of breath or sweats. There is no  history of chronic renal disease.   Hyperlipidemia  This is a chronic problem. The current episode started more than 1 year ago. The problem is controlled. Exacerbating diseases include diabetes. She has no history of chronic renal disease, hypothyroidism, liver disease, obesity or nephrotic syndrome. Pertinent negatives include no chest pain, focal weakness, leg pain, myalgias or shortness of breath.   Chest Pain   Pertinent negatives include no abdominal pain, back pain, claudication, cough, diaphoresis, dizziness, fever, headaches, hemoptysis, irregular heartbeat, leg pain, malaise/fatigue, nausea, near-syncope, numbness, orthopnea, palpitations, PND, shortness of breath, syncope, vomiting or weakness.   Her past medical history is significant for CAD, diabetes and hyperlipidemia.   Pertinent negatives for past medical history include no muscle weakness.       Review of Systems   Constitutional: Negative for chills, diaphoresis, fatigue, fever, malaise/fatigue and weight gain.   HENT:  Negative for congestion, nosebleeds and sore throat.    Eyes:  Negative for blurred vision, double vision, vision loss in left eye and vision loss in right eye.   Cardiovascular:  Positive for dyspnea on exertion. Negative for chest pain, claudication, irregular heartbeat, leg swelling, near-syncope, orthopnea, palpitations, paroxysmal nocturnal dyspnea and syncope.   Respiratory:  Negative for chest tightness, cough, hemoptysis, shortness of breath and wheezing.    Endocrine: Negative for cold intolerance and heat intolerance.   Hematologic/Lymphatic: Negative for bleeding problem. Does not bruise/bleed easily.   Skin:  Negative for color change and rash.   Musculoskeletal:  Negative for arthralgias, back pain, falls, joint swelling, muscle weakness, myalgias and neck pain.   Gastrointestinal:  Negative for abdominal pain, anorexia, change in bowel habit, heartburn, hematemesis, hematochezia, hemorrhoids, jaundice, melena, nausea  and vomiting.   Genitourinary:  Negative for dysuria and hematuria.   Neurological:  Negative for dizziness, focal weakness, headaches, light-headedness, loss of balance, numbness, vertigo and weakness.   Psychiatric/Behavioral:  Negative for altered mental status, depression and memory loss. The patient is not nervous/anxious.    Allergic/Immunologic: Negative for hives and persistent infections.       Current Outpatient Medications on File Prior to Visit   Medication Sig Dispense Refill    albuterol (PROVENTIL/VENTOLIN HFA) 90 mcg/actuation inhaler Inhale 2 puffs into the lungs every 6 (six) hours as needed for Wheezing or Shortness of Breath. Rescue 19 g 0    amLODIPine (NORVASC) 10 MG tablet Take 1 tablet (10 mg total) by mouth once daily. 90 tablet 3    aspirin (ECOTRIN) 81 MG EC tablet Take 1 tablet (81 mg total) by mouth once daily. 90 tablet 3    atorvastatin (LIPITOR) 80 MG tablet Take 1 tablet (80 mg total) by mouth once daily. 90 tablet 3    carvediloL (COREG) 12.5 MG tablet TAKE 1 TABLET(12.5 MG) BY MOUTH TWICE DAILY 180 tablet 3    dorzolamide-timolol 2-0.5% (COSOPT) 22.3-6.8 mg/mL ophthalmic solution Place 1 drop into both eyes 2 (two) times daily. 10 mL 1    dulaglutide (TRULICITY) 4.5 mg/0.5 mL pen injector Inject 4.5 mg into the skin every 7 days. 4 pen 3    empagliflozin-metformin (SYNJARDY XR) 12.5-1,000 mg TBph Take 2 tablets once daily in the morning. 60 tablet 5    EScitalopram oxalate (LEXAPRO) 10 MG tablet TAKE 1 TABLET(10 MG) BY MOUTH EVERY DAY 90 tablet 1    ezetimibe (ZETIA) 10 mg tablet Take 1 tablet (10 mg total) by mouth once daily. 90 tablet 3    finerenone (KERENDIA) 10 mg Tab Take 10 mg by mouth Daily. 90 tablet 0    furosemide (LASIX) 20 MG tablet Take 1 tablet (20 mg total) by mouth once daily. 90 tablet 3    insulin degludec (TRESIBA FLEXTOUCH U-200) 200 unit/mL (3 mL) insulin pen Inject 16 units once daily 6 pen 3    multivitamin with minerals tablet Take 1 tablet by mouth once  "daily.      ondansetron (ZOFRAN-ODT) 8 MG TbDL       pantoprazole (PROTONIX) 40 MG tablet       pen needle, diabetic (BD ULTRA-FINE LENY PEN NEEDLE) 32 gauge x 5/32" Ndle 1 Device by Misc.(Non-Drug; Combo Route) route Daily. 100 each 4    pen needle, diabetic (NOVOFINE 32) 32 gauge x 1/4" Ndle Use 1x/day 200 each 3    rOPINIRole (REQUIP) 0.5 MG tablet TAKE 1 TABLET(0.5 MG) BY MOUTH EVERY EVENING 90 tablet 0    TRUEPLUS LANCETS 30 gauge Misc USE TO TEST TWICE DAILY      valsartan (DIOVAN) 320 MG tablet Take 1 tablet (320 mg total) by mouth once daily. 90 tablet 3    diclofenac (VOLTAREN) 50 MG EC tablet TAKE 1 TABLET(50 MG) BY MOUTH TWICE DAILY (Patient not taking: Reported on 11/21/2023) 60 tablet 0    fluticasone (FLONASE) 50 mcg/actuation nasal spray 1 spray (50 mcg total) by Each Nare route once daily. (Patient not taking: Reported on 11/21/2023) 16 g 5    HYDROcodone-acetaminophen (NORCO) 5-325 mg per tablet Take 1 tablet by mouth every 4 (four) hours as needed for Pain. (Patient not taking: Reported on 11/21/2023) 12 tablet 0    loratadine (CLARITIN) 10 mg tablet TAKE 1 TABLET BY MOUTH EVERY DAY AS NEEDED FOR ALLERGIES (Patient not taking: Reported on 11/21/2023) 90 tablet 3    melatonin (MELATIN) 3 mg tablet Take 2 tablets (6 mg total) by mouth nightly as needed for Insomnia. (Patient not taking: Reported on 11/21/2023)  0    nitroGLYCERIN (NITROSTAT) 0.4 MG SL tablet Place 1 tablet (0.4 mg total) under the tongue every 5 (five) minutes as needed for Chest pain. (Patient not taking: Reported on 11/21/2023) 30 tablet 3    triamcinolone acetonide 0.1% (KENALOG) 0.1 % ointment Apply topically 2 (two) times daily. for 7 days 80 g 0     No current facility-administered medications on file prior to visit.       /70 Comment: average at home  Pulse 65   Ht 5' 2" (1.575 m)   Wt 85.2 kg (187 lb 11.6 oz)   SpO2 96%   BMI 34.33 kg/m²     Objective:     Physical Exam  Constitutional:       General: She is not in " acute distress.     Appearance: Normal appearance. She is well-developed. She is not toxic-appearing or diaphoretic.   HENT:      Head: Normocephalic and atraumatic.      Nose: Nose normal.   Eyes:      General:         Right eye: No discharge.         Left eye: No discharge.      Conjunctiva/sclera:      Right eye: Right conjunctiva is not injected.      Left eye: Left conjunctiva is not injected.      Pupils: Pupils are equal.      Right eye: Pupil is round.      Left eye: Pupil is round.   Neck:      Thyroid: No thyromegaly.      Vascular: No carotid bruit or JVD.   Cardiovascular:      Rate and Rhythm: Regular rhythm. Bradycardia present. No extrasystoles are present.     Chest Wall: PMI is not displaced.      Pulses:           Radial pulses are 2+ on the right side and 2+ on the left side.        Femoral pulses are 2+ on the right side and 2+ on the left side.       Dorsalis pedis pulses are 2+ on the right side and 2+ on the left side.        Posterior tibial pulses are 2+ on the right side and 2+ on the left side.      Heart sounds: S1 normal and S2 normal. Murmur heard.      Midsystolic murmur is present with a grade of 2/6.      Gallop present. S4 sounds present. No S3 sounds.   Pulmonary:      Effort: Pulmonary effort is normal.      Breath sounds: Normal breath sounds.   Chest:      Comments: Midline sternotomy scar.  Abdominal:      Palpations: Abdomen is soft.      Tenderness: There is no abdominal tenderness.   Musculoskeletal:      Cervical back: Neck supple.      Right ankle: No swelling, deformity or ecchymosis.      Left ankle: No swelling, deformity or ecchymosis.   Lymphadenopathy:      Head:      Right side of head: No submandibular adenopathy.      Left side of head: No submandibular adenopathy.      Cervical: No cervical adenopathy.   Skin:     General: Skin is warm and dry.      Findings: No rash.      Nails: There is no clubbing.   Neurological:      General: No focal deficit present.       "Mental Status: She is alert and oriented to person, place, and time. She is not disoriented.      Cranial Nerves: No cranial nerve deficit.   Psychiatric:         Attention and Perception: Attention and perception normal.         Mood and Affect: Mood and affect normal.         Speech: Speech normal.         Behavior: Behavior normal.         Thought Content: Thought content normal.         Cognition and Memory: Cognition and memory normal.         Judgment: Judgment normal.       Assessment:     1. Coronary artery disease involving native coronary artery of native heart with other form of angina pectoris    2. History of coronary artery bypass surgery    3. Precordial pain    4. History of cerebrovascular accident    5. Atherosclerosis of aorta    6. History of deep vein thrombosis    7. Primary hypertension    8. Hypercholesterolemia    9. Hypertriglyceridemia    10. Mild obesity        Plan:     1. Coronary Artery Disease              12/2019: Began experience chest pain. Resolved with isosorbidemononitrate.              12/19/2019: Stress MPI:3:39 min. No CP. ECG negative. MPI: Mild to moderate fixed anterior and anteroapical defect. EF 73%.              5/18/2020: Presented with more pronounced chest pain. Resolved with NTG sl.              ECG without acute changes. Troponin x 3 negative.               5/19/2020: Echo: Normal left ventricular size and systolic function. EF 75%. Mildly dilated LA. Mild aortic valve sclerosis. Mild mitral valve sclerosis.              5/20/2020: [facility]: Cath: LAD: Prox 30%. Mid 70%. Distal 80%. OM2: Osteal 90%. OM3: 95%. OM4 90%. RCA: Mid subtotal. LV: Basal inferior mild hypokinesia. EF 60%.   5/25/2020: [facility]: CABG x 3: LIMA to LAD and SVG1 to OM2 & OM4.   9/19/2023: Stress Echo: 2:26 min. No CP. ECG negative. Echo "negative". Normal left ventricular size and systolic function. EF 60%.   On carvedilol 12.5 mg Q12.              On aspirin 81 mg Q24.   Doing well.   Encouraged to " enroll in exercise program.    2. Chest Pain   9/2023: Began experience occasional chest pain.   Resolved.               3. History of Cerebrovascular Accident              2004: Weakness in left hand. Affected memory. No sequela.   On aspirin 81 mg Q24.    4. Atherosclerosis of Aorta   6/3/2020: CT Scan: Noted.    All risk factors addressed.     5. History of Deep Venous Trombosis of Lower Extremity   5/2020: DVT left leg after CABG.   Received apixiban for 3 months.     6. Hypertension              2000: Diagnosed.   9/1/2020: Furosemide 20 mg Q24 and KCl 20 mEq Q24 were discontinued.   Used to be on metoprolol 25 mg Q12, amlodipine 10 mg Q24, valsartan 320 mg Q24, furosemide 20 mg Q24 and chlorthalidone 25 mg Q24.   10/13/2020: Valsartan 320 mg Q24 was resumed. Amlodipine 10 mg Q24 and furosemide 20 mg Q24 were discontinued.   2/23/2022: Metoprolol 25 mg Q12 was changed to carvedilol 12.5 mg Q12 and amlodipine 5 mg Q24 was increased to amlodipine 10 mg Q24 as pressure was running 150-160/70-80 mmHg at home.     On carvedilol 12.5 mg Q12, amlodipine 10 mg Q24, valsartan 320 mg Q24 and furosemide 20 mg Q24.   Keeping log at home.   Well controlled.      7. Hypercholesterolemia              2000: Began statin.              On atorvastatin 40 mg Q24.                 5/19/2020: Chol 204. HDL 41. . .              On atorvastatin 80 mg Q24 and ezetimibe 10 mg Q24.   8/18/2020: Chol 161. HDL 46. LDL 82. .   2/24/2022: Chol 94. HDL 37. LDL 42. TG 73.   On atorvastatin 80 mg Q24 and ezetimibe 10 mg Q24.   Very favorable lipid panel.    8. Hypertriglyceridemia   2020: Diagnosed.   As above.     9. Diabetes Mellitus, Type 2              2000: Diagnosed. Complications: CVA & CAD. Medications: Insulin and SGLT2i.   On metformin, empagliflozin, dulaglutide and insulin.   4/13/2023: HgbA1C 8.9%.   11/1/2023: HgbA1C 8.0%       10. Mild Obesity              5/19/2020: Weight 86 kg. BMI 33.   5/15/2023: Weight 88  kg. BMI 36.   11/21/2023: Weight 85 kg. BMI 34.   Encourage to lose weight.     11. Primary Care              Dr. Yudi Smart.    F/u 2 months.    Etelvina Lowery M.D.

## 2023-11-23 ENCOUNTER — HOSPITAL ENCOUNTER (EMERGENCY)
Facility: OTHER | Age: 73
Discharge: HOME | End: 2023-11-23
Attending: EMERGENCY MEDICINE
Payer: MEDICARE

## 2023-11-23 VITALS
HEART RATE: 57 BPM | DIASTOLIC BLOOD PRESSURE: 76 MMHG | TEMPERATURE: 98 F | SYSTOLIC BLOOD PRESSURE: 163 MMHG | OXYGEN SATURATION: 96 % | RESPIRATION RATE: 20 BRPM

## 2023-11-23 DIAGNOSIS — I10 HYPERTENSION, POOR CONTROL: ICD-10-CM

## 2023-11-23 DIAGNOSIS — R07.89 MUSCULOSKELETAL CHEST PAIN: Primary | ICD-10-CM

## 2023-11-23 LAB
ALBUMIN SERPL BCP-MCNC: 3.6 G/DL (ref 3.5–5.2)
ALP SERPL-CCNC: 171 U/L (ref 55–135)
ALT SERPL W/O P-5'-P-CCNC: 16 U/L (ref 10–44)
ANION GAP SERPL CALC-SCNC: 13 MMOL/L (ref 8–16)
AST SERPL-CCNC: 20 U/L (ref 10–40)
BASOPHILS # BLD AUTO: 0.03 K/UL (ref 0–0.2)
BASOPHILS NFR BLD: 0.3 % (ref 0–1.9)
BILIRUB SERPL-MCNC: 0.5 MG/DL (ref 0.1–1)
BUN SERPL-MCNC: 19 MG/DL (ref 8–23)
CALCIUM SERPL-MCNC: 9.1 MG/DL (ref 8.7–10.5)
CHLORIDE SERPL-SCNC: 106 MMOL/L (ref 95–110)
CO2 SERPL-SCNC: 23 MMOL/L (ref 23–29)
CREAT SERPL-MCNC: 1 MG/DL (ref 0.5–1.4)
DIFFERENTIAL METHOD: NORMAL
EOSINOPHIL # BLD AUTO: 0.2 K/UL (ref 0–0.5)
EOSINOPHIL NFR BLD: 1.9 % (ref 0–8)
ERYTHROCYTE [DISTWIDTH] IN BLOOD BY AUTOMATED COUNT: 14.3 % (ref 11.5–14.5)
EST. GFR  (NO RACE VARIABLE): 60 ML/MIN/1.73 M^2
GLUCOSE SERPL-MCNC: 145 MG/DL (ref 70–110)
HCT VFR BLD AUTO: 38.5 % (ref 37–48.5)
HCV AB SERPL QL IA: NEGATIVE
HGB BLD-MCNC: 12.4 G/DL (ref 12–16)
HIV 1+2 AB+HIV1 P24 AG SERPL QL IA: NEGATIVE
IMM GRANULOCYTES # BLD AUTO: 0.03 K/UL (ref 0–0.04)
IMM GRANULOCYTES NFR BLD AUTO: 0.3 % (ref 0–0.5)
LYMPHOCYTES # BLD AUTO: 2.5 K/UL (ref 1–4.8)
LYMPHOCYTES NFR BLD: 24.2 % (ref 18–48)
MCH RBC QN AUTO: 30 PG (ref 27–31)
MCHC RBC AUTO-ENTMCNC: 32.2 G/DL (ref 32–36)
MCV RBC AUTO: 93 FL (ref 82–98)
MONOCYTES # BLD AUTO: 0.8 K/UL (ref 0.3–1)
MONOCYTES NFR BLD: 8.2 % (ref 4–15)
NEUTROPHILS # BLD AUTO: 6.6 K/UL (ref 1.8–7.7)
NEUTROPHILS NFR BLD: 65.1 % (ref 38–73)
NRBC BLD-RTO: 0 /100 WBC
PLATELET # BLD AUTO: 234 K/UL (ref 150–450)
PMV BLD AUTO: 9.8 FL (ref 9.2–12.9)
POTASSIUM SERPL-SCNC: 3.8 MMOL/L (ref 3.5–5.1)
PROT SERPL-MCNC: 7.7 G/DL (ref 6–8.4)
RBC # BLD AUTO: 4.13 M/UL (ref 4–5.4)
SODIUM SERPL-SCNC: 142 MMOL/L (ref 136–145)
TROPONIN I SERPL DL<=0.01 NG/ML-MCNC: <0.006 NG/ML (ref 0–0.03)
WBC # BLD AUTO: 10.11 K/UL (ref 3.9–12.7)

## 2023-11-23 PROCEDURE — 85025 COMPLETE CBC W/AUTO DIFF WBC: CPT | Performed by: EMERGENCY MEDICINE

## 2023-11-23 PROCEDURE — 93005 ELECTROCARDIOGRAM TRACING: CPT

## 2023-11-23 PROCEDURE — 86803 HEPATITIS C AB TEST: CPT | Performed by: EMERGENCY MEDICINE

## 2023-11-23 PROCEDURE — 87389 HIV-1 AG W/HIV-1&-2 AB AG IA: CPT | Performed by: EMERGENCY MEDICINE

## 2023-11-23 PROCEDURE — 93010 EKG 12-LEAD: ICD-10-PCS | Mod: ,,, | Performed by: INTERNAL MEDICINE

## 2023-11-23 PROCEDURE — 84484 ASSAY OF TROPONIN QUANT: CPT | Performed by: EMERGENCY MEDICINE

## 2023-11-23 PROCEDURE — 25000003 PHARM REV CODE 250: Performed by: EMERGENCY MEDICINE

## 2023-11-23 PROCEDURE — 93010 ELECTROCARDIOGRAM REPORT: CPT | Mod: ,,, | Performed by: INTERNAL MEDICINE

## 2023-11-23 PROCEDURE — 80053 COMPREHEN METABOLIC PANEL: CPT | Performed by: EMERGENCY MEDICINE

## 2023-11-23 PROCEDURE — 99285 EMERGENCY DEPT VISIT HI MDM: CPT | Mod: 25

## 2023-11-23 RX ORDER — FUROSEMIDE 20 MG/1
20 TABLET ORAL
Status: COMPLETED | OUTPATIENT
Start: 2023-11-23 | End: 2023-11-23

## 2023-11-23 RX ORDER — METHOCARBAMOL 500 MG/1
1000 TABLET, FILM COATED ORAL
Status: COMPLETED | OUTPATIENT
Start: 2023-11-23 | End: 2023-11-23

## 2023-11-23 RX ORDER — METHOCARBAMOL 500 MG/1
1000 TABLET, FILM COATED ORAL 3 TIMES DAILY PRN
Qty: 20 TABLET | Refills: 0 | Status: SHIPPED | OUTPATIENT
Start: 2023-11-23 | End: 2023-11-24 | Stop reason: SDUPTHER

## 2023-11-23 RX ORDER — AMLODIPINE BESYLATE 5 MG/1
10 TABLET ORAL
Status: COMPLETED | OUTPATIENT
Start: 2023-11-23 | End: 2023-11-23

## 2023-11-23 RX ORDER — ACETAMINOPHEN 500 MG
1000 TABLET ORAL
Status: COMPLETED | OUTPATIENT
Start: 2023-11-23 | End: 2023-11-23

## 2023-11-23 RX ADMIN — ACETAMINOPHEN 1000 MG: 500 TABLET ORAL at 03:11

## 2023-11-23 RX ADMIN — AMLODIPINE BESYLATE 10 MG: 5 TABLET ORAL at 04:11

## 2023-11-23 RX ADMIN — FUROSEMIDE 20 MG: 20 TABLET ORAL at 04:11

## 2023-11-23 RX ADMIN — METHOCARBAMOL 1000 MG: 500 TABLET ORAL at 03:11

## 2023-11-23 NOTE — ED TRIAGE NOTES
Cecilia Barahona, an 73 y.o. female presents to the ED with chest pain that started around 1100 today. States chest pain is mid sternal, sharp pain that is worse with movement. Denies dizziness, nausea, SOB, pain that radiates       Chief Complaint   Patient presents with    Chest Pain     Pt reporting mid-left sided chest pain since 8am. Worse when walking. Hx fo HTN, hyperlipidemia and triple bypass. Denies SOB.      Review of patient's allergies indicates:   Allergen Reactions    Invokana [canagliflozin] Swelling     Throat and tongue swelling      Ace inhibitors      cough     Past Medical History:   Diagnosis Date    Acute coronary syndrome 05/20/2020 5/18/2020: Presents with unstable angina.    Allergic rhinitis, seasonal     Anxiety     Arthritis     Colon polyp     CVA (cerebral vascular accident)     Depression     Glaucoma NEC     Hallucination     images out of corner of eye about a year ago    History of positive PPD - treated - remote past     Hx of psychiatric care     Hyperlipidemia LDL goal < 100     Hypertension     Nuclear sclerosis of both eyes 02/26/2019    Obesity     CHETNA (obstructive sleep apnea)     Psychiatric problem     Psychosis     Renal disorder     Sleep difficulties     Suicide attempt     about 30 years ago by overdose of pills    Therapy     Type II or unspecified type diabetes mellitus without mention of complication, uncontrolled

## 2023-11-23 NOTE — ED PROVIDER NOTES
Encounter Date: 11/23/2023    SCRIBE #1 NOTE: I, Clair Nielson, brielle scribing for, and in the presence of,  Barrie Meza II, MD. I have scribed the following portions of the note - Other sections scribed: HPI, ROS, PE.       History     Chief Complaint   Patient presents with    Chest Pain     Pt reporting mid-left sided chest pain since 8am. Worse when walking. Hx fo HTN, hyperlipidemia and triple bypass. Denies SOB.      Time seen by provider: 3:15 PM    This is a 73 y.o. female with history of HTN, HLD, DM, and CABG who presents with complaint of diffuse mid-left chest wall pain starting this morning. Patient awoke and thought her bra might be on too tight, as she was having pain which worsened any motion of her chest. This pain does not go away when she is at rest. 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 She was given aspirin and 1 inch of nitro paste as she has not had any of her medications since last night. Paramedic also adds that she has been spending the past several days cooking. Patient denies any cough, fever, congestion, or shortness of breath. SHx of former tobacco smoking, quit 20 years ago. This is the extent of the patient's complaints at this time.    The history is provided by the patient.     Review of patient's allergies indicates:   Allergen Reactions    Invokana [canagliflozin] Swelling     Throat and tongue swelling      Ace inhibitors      cough     Past Medical History:   Diagnosis Date    Acute coronary syndrome 05/20/2020 5/18/2020: Presents with unstable angina.    Allergic rhinitis, seasonal     Anxiety     Arthritis     Colon polyp     CVA (cerebral vascular accident)     Depression     Glaucoma NEC     Hallucination     images out of corner of eye about a year ago    History of positive PPD - treated - remote past     Hx of psychiatric care     Hyperlipidemia LDL goal < 100     Hypertension     Nuclear sclerosis of both eyes 02/26/2019     Obesity     CHETNA (obstructive sleep apnea)     Psychiatric problem     Psychosis     Renal disorder     Sleep difficulties     Suicide attempt     about 30 years ago by overdose of pills    Therapy     Type II or unspecified type diabetes mellitus without mention of complication, uncontrolled      Past Surgical History:   Procedure Laterality Date    CARPAL TUNNEL RELEASE       SECTION, CLASSIC      COLONOSCOPY N/A 3/5/2020    Procedure: COLONOSCOPY;  Surgeon: Wiliam Carrillo MD;  Location: [facility] (4TH FLR);  Service: Endoscopy;  Laterality: N/A;  20 appt confirmed-rb  pt requested this date    CORONARY ARTERY BYPASS GRAFT (CABG) N/A 2020    Procedure: CORONARY ARTERY BYPASS GRAFT (CABG) x3 ;  Surgeon: Yan Bowman MD;  Location: [facility] 2ND FLR;  Service: Cardiothoracic;  Laterality: N/A;  CABG X3  Endoharvest time start 0906  Endoharvest time end 1003  Vein prep start 1004  Vein prep end 1048    LEFT HEART CATHETERIZATION N/A 2020    Procedure: HEART CATH-LEFT;  Surgeon: Etelvina Lowery MD;  Location: [facility] CATH LAB;  Service: Cardiology;  Laterality: N/A;    TOTAL ABDOMINAL HYSTERECTOMY      TRIGGER FINGER RELEASE      TRIGGER FINGER RELEASE Left 2/3/2023    Procedure: RELEASE, TRIGGER FINGER;  Surgeon: Shayan Schaefer Jr., MD;  Location: [facility];  Service: Orthopedics;  Laterality: Left;     Family History   Problem Relation Age of Onset    Hypertension Mother     Diabetes Mother     Heart failure Mother     Cataracts Mother     Glaucoma Mother     Prostate cancer Father     Hypertension Father     Diabetes Father     Heart failure Father     No Known Problems Sister     No Known Problems Maternal Aunt     Diabetes Maternal Uncle     Hyperlipidemia Maternal Uncle     Hypertension Maternal Uncle     Diabetes Maternal Grandmother     Diabetes Maternal Grandfather     No Known Problems Paternal Grandmother     No Known Problems Paternal Grandfather     Diabetes Maternal Aunt      Alzheimer's disease Maternal Aunt     Schizophrenia Maternal Aunt     Heart disease Cousin         s/p heart transplant    No Known Problems Paternal Aunt     No Known Problems Paternal Uncle     Drug abuse Grandchild     Amblyopia Neg Hx     Blindness Neg Hx     Cancer Neg Hx     Macular degeneration Neg Hx     Retinal detachment Neg Hx     Strabismus Neg Hx     Stroke Neg Hx     Thyroid disease Neg Hx      Social History     Tobacco Use    Smoking status: Former     Current packs/day: 0.00     Types: Cigarettes     Quit date: 11/10/2012     Years since quittin.0    Smokeless tobacco: Never   Substance Use Topics    Alcohol use: Not Currently     Comment: drank socially in the past    Drug use: Not Currently     Types: Marijuana     Comment: tried once 6 months ago     Review of Systems   Constitutional:  Negative for fever.   HENT:  Negative for congestion and sore throat.    Respiratory:  Negative for cough and shortness of breath.    Cardiovascular:  Positive for chest pain.   Gastrointestinal:  Negative for nausea.   Genitourinary:  Negative for dysuria.   Musculoskeletal:  Negative for back pain.   Skin:  Negative for rash.   Neurological:  Negative for weakness.   Hematological:  Does not bruise/bleed easily.       Physical Exam     Initial Vitals [23 1520]   BP Pulse Resp Temp SpO2   (!) 185/83 61 16 98 °F (36.7 °C) 97 %      MAP       --         Physical Exam    Nursing note and vitals reviewed.  Constitutional: She appears well-developed and well-nourished.   HENT:   Head: Normocephalic and atraumatic.   Eyes: Conjunctivae are normal.   Neck: Neck supple.   Cardiovascular:  Normal rate, regular rhythm and normal heart sounds.           No murmur heard.  Pulmonary/Chest: Breath sounds normal. No respiratory distress. She has no wheezes. She has no rhonchi. She has no rales.   Reproducible pain int he lower sternal area with movements of the torso, such as sitting up.    Musculoskeletal:          General: No edema.      Cervical back: Neck supple.     Neurological: She is alert and oriented to person, place, and time.   Skin: Skin is warm and dry.   Psychiatric: She has a normal mood and affect.         ED Course   Procedures  Labs Reviewed   COMPREHENSIVE METABOLIC PANEL - Abnormal; Notable for the following components:       Result Value    Glucose 145 (*)     Alkaline Phosphatase 171 (*)     All other components within normal limits   CBC W/ AUTO DIFFERENTIAL   TROPONIN I   HIV 1 / 2 ANTIBODY    Narrative:     Release to patient->Immediate   HEPATITIS C ANTIBODY    Narrative:     Release to patient->Immediate     EKG Readings: (Independently Interpreted)   Sinus bradycardia Rate of 55. No ST or T wave changes. No acute ischemia or arrhythmia.       Imaging Results              X-Ray Chest AP Portable (Final result)  Result time 11/23/23 15:36:55      Final result by Zan Hoyos MD (11/23/23 15:36:55)                   Impression:      1. Central hilar interstitial findings may reflect edema noting bandlike atelectasis projected over the left lower lung zone.  No large focal consolidation.      Electronically signed by: Zan Hoyos MD  Date:    11/23/2023  Time:    15:36               Narrative:    EXAMINATION:  XR CHEST AP PORTABLE    CLINICAL HISTORY:  Chest Pain;    TECHNIQUE:  Single frontal view of the chest was performed.    COMPARISON:  06/25/2020    FINDINGS:  The cardiomediastinal silhouette is prominent, similar to the previous exam noting magnification by technique..  There is no pleural effusion.  The trachea is midline.  The lungs are symmetrically expanded bilaterally with mildly coarse central hilar interstitial attenuation.  There is bandlike atelectasis projected over the left lower lung zone.  There is elevation of the right hemidiaphragm..  No large focal consolidation seen.  There is no pneumothorax.  The osseous structures are remarkable for degenerative change..                                     X-Rays:   Independently Interpreted Readings:   Chest X-Ray: No focal infiltrate or effusion. No pulmonary edema.     Medications   acetaminophen tablet 1,000 mg (1,000 mg Oral Given 11/23/23 1558)   methocarbamoL tablet 1,000 mg (1,000 mg Oral Given 11/23/23 1558)   amLODIPine tablet 10 mg (10 mg Oral Given 11/23/23 1653)   furosemide tablet 20 mg (20 mg Oral Given 11/23/23 1652)     Medical Decision Making  Amount and/or Complexity of Data Reviewed  Labs: ordered. Decision-making details documented in ED Course.  Radiology: ordered and independent interpretation performed.     Details: See interpretation above.  ECG/medicine tests: ordered and independent interpretation performed.     Details: See interpretation above.     Risk  OTC drugs.  Prescription drug management.    Patient presents with onset of pain, mid chest earlier this morning.  It has not been exertional but it is worse when she is changing positions particularly when sitting up or lying back.  Does not feel short of breath although it is difficult to take a deep breath in due to the pain.  She does have significant cardiac history, however clinical picture suggest musculoskeletal etiology, rather than cardiac.  EKG did not show any ischemic changes and troponin was negative.  Given duration of symptoms I do not think admission for serial enzymes are warranted.  Chest x-ray did not show any pneumonia or effusion.  Mention is made by Radiology of possible pulmonary vascular congestion however the patient does not have any dyspnea.  Did not have decreased ejection fraction on most recent echo, and I do not think this x-ray reading correlates well clinically with the patient's presentation.  Patient was given Tylenol, muscle relaxant and is feeling better.  Will give prescription for similar medication.  Her blood pressure is still elevated.  But did not take medicines yesterday or today.  Will give her her  Norvasc and Lasix,  will have patient take her Coreg in a few hours at her regularly scheduled evening time and then resume medications are normal basis starting in the morning. Encouraged follow-up with primary care, especially if symptoms persist.  Return precautions discussed for the interim.        Scribe Attestation:   Scribe #1: I performed the above scribed service and the documentation accurately describes the services I performed. I attest to the accuracy of the note.    Physician Attestation for Scribe: I, TL, reviewed documentation as scribed in my presence, which is both accurate and complete.                            Clinical Impression:  Final diagnoses:  [R07.89] Musculoskeletal chest pain (Primary)  [I10] Hypertension, poor control          ED Disposition Condition    Discharge Stable          ED Prescriptions       Medication Sig Dispense Start Date End Date Auth. Provider    methocarbamoL (ROBAXIN) 500 MG Tab Take 2 tablets (1,000 mg total) by mouth 3 (three) times daily as needed. 20 tablet 11/23/2023 11/28/2023 Barrie Meza II, MD          Follow-up Information       Follow up With Specialties Details Why Contact Info    Yudi Smart MD Internal Medicine, Pediatrics In 1 week  [id number] [place] [address]  [phone]               Barrie Meza II, MD  11/23/23 3530

## 2023-11-24 RX ORDER — METHOCARBAMOL 500 MG/1
1000 TABLET, FILM COATED ORAL 3 TIMES DAILY PRN
Qty: 20 TABLET | Refills: 0 | Status: SHIPPED | OUTPATIENT
Start: 2023-11-24 | End: 2023-11-29

## 2023-11-24 RX ORDER — METHOCARBAMOL 500 MG/1
1000 TABLET, FILM COATED ORAL 3 TIMES DAILY PRN
Qty: 20 TABLET | Refills: 0 | Status: SHIPPED | OUTPATIENT
Start: 2023-11-24 | End: 2023-11-24 | Stop reason: SDUPTHER

## 2023-11-27 ENCOUNTER — PATIENT OUTREACH (OUTPATIENT)
Dept: EMERGENCY MEDICINE | Facility: HOSPITAL | Age: 73
End: 2023-11-27
Payer: MEDICARE

## 2023-11-27 NOTE — PROGRESS NOTES
Patient was seen in the ED on 11/23/23. Phoned patient to assist with Post ED Discharge Navigation. Patient agreed to assistance scheduling a PCP follow-up within 7 days. Message sent to PCP staff for assistance scheduling.  Lazara Sandhu

## 2023-12-11 ENCOUNTER — TELEPHONE (OUTPATIENT)
Dept: ENDOCRINOLOGY | Facility: CLINIC | Age: 73
End: 2023-12-11
Payer: MEDICARE

## 2023-12-11 DIAGNOSIS — E11.65 TYPE 2 DIABETES MELLITUS WITH HYPERGLYCEMIA, UNSPECIFIED WHETHER LONG TERM INSULIN USE: Primary | ICD-10-CM

## 2023-12-11 RX ORDER — INSULIN DEGLUDEC 200 U/ML
INJECTION, SOLUTION SUBCUTANEOUS
Qty: 3 PEN | Refills: 3 | Status: SHIPPED | OUTPATIENT
Start: 2023-12-11

## 2023-12-11 NOTE — TELEPHONE ENCOUNTER
----- Message from Gabriel Perez sent at 12/7/2023 12:15 PM CST -----  Regarding: Tresiba FlexTouch 200u Patient Assistance Program  [facility] has received medication from [facility] patient assistance program for your patient Cecilia Barahona ([id number]).  At your earliest convenience please send to [facility] escript for.  Tresiba FlexTouch 200u (qty 3 pens)  Thanks

## 2024-02-15 DIAGNOSIS — I10 PRIMARY HYPERTENSION: ICD-10-CM

## 2024-02-15 RX ORDER — VALSARTAN 320 MG/1
320 TABLET ORAL
Qty: 90 TABLET | Refills: 3 | Status: SHIPPED | OUTPATIENT
Start: 2024-02-15

## 2024-04-05 DIAGNOSIS — E78.00 HYPERCHOLESTEROLEMIA: ICD-10-CM

## 2024-04-08 ENCOUNTER — TELEPHONE (OUTPATIENT)
Dept: ORTHOPEDICS | Facility: CLINIC | Age: 74
End: 2024-04-08
Payer: MEDICARE

## 2024-04-08 RX ORDER — EZETIMIBE 10 MG/1
10 TABLET ORAL
Qty: 90 TABLET | Refills: 3 | Status: SHIPPED | OUTPATIENT
Start: 2024-04-08

## 2024-04-08 NOTE — TELEPHONE ENCOUNTER
----- Message from Magalie Espitia sent at 4/8/2024 10:43 AM CDT -----  Regarding: self [phone]  Type:  Sooner Appointment Request    Patient is requesting a sooner appointment.  Patient declined first available appointment listed as well as another facility and provider .  Patient will not accept being placed on the waitlist and is requesting a message be sent to doctor.    Name of Caller:  self   When is the first available appointment? May     Symptoms:  Trigger finger (middle right hand), isn't sure if she needs the surgery again or injection.     Would the patient rather a call back or a response via [facility]?  Call back     Best Call Back Number:  [phone]    Pt asked for an appt on a Friday.

## 2024-04-15 DIAGNOSIS — F32.0 MILD MAJOR DEPRESSION: ICD-10-CM

## 2024-04-15 DIAGNOSIS — I10 PRIMARY HYPERTENSION: ICD-10-CM

## 2024-04-15 RX ORDER — ESCITALOPRAM OXALATE 10 MG/1
10 TABLET ORAL DAILY
Qty: 90 TABLET | Refills: 2 | Status: SHIPPED | OUTPATIENT
Start: 2024-04-15

## 2024-04-15 RX ORDER — AMLODIPINE BESYLATE 10 MG/1
10 TABLET ORAL
Qty: 90 TABLET | Refills: 3 | Status: SHIPPED | OUTPATIENT
Start: 2024-04-15

## 2024-04-15 NOTE — TELEPHONE ENCOUNTER
Refill Decision Note   Cecilia Vigiln  is requesting a refill authorization.  Brief Assessment and Rationale for Refill:  Approve     Medication Therapy Plan:         Comments:     Note composed:1:43 PM 04/15/2024

## 2024-04-15 NOTE — TELEPHONE ENCOUNTER
No care due was identified.  [facility] Embedded Care Due Messages. Reference number: [id number].   4/15/2024 5:58:59 AM CDT

## 2024-04-16 DIAGNOSIS — E78.00 HYPERCHOLESTEROLEMIA: ICD-10-CM

## 2024-04-17 RX ORDER — ATORVASTATIN CALCIUM 80 MG/1
80 TABLET, FILM COATED ORAL
Qty: 90 TABLET | Refills: 3 | Status: SHIPPED | OUTPATIENT
Start: 2024-04-17

## 2024-04-20 DIAGNOSIS — E78.00 HYPERCHOLESTEROLEMIA: ICD-10-CM

## 2024-04-22 RX ORDER — ATORVASTATIN CALCIUM 80 MG/1
80 TABLET, FILM COATED ORAL
Qty: 90 TABLET | Refills: 3 | OUTPATIENT
Start: 2024-04-22

## 2024-05-03 ENCOUNTER — OFFICE VISIT (OUTPATIENT)
Dept: FAMILY MEDICINE | Facility: CLINIC | Age: 74
End: 2024-05-03
Payer: MEDICARE

## 2024-05-03 VITALS
TEMPERATURE: 98 F | HEIGHT: 62 IN | OXYGEN SATURATION: 96 % | WEIGHT: 191.56 LBS | BODY MASS INDEX: 35.25 KG/M2 | HEART RATE: 64 BPM

## 2024-05-03 DIAGNOSIS — E66.01 SEVERE OBESITY (BMI 35.0-39.9) WITH COMORBIDITY: ICD-10-CM

## 2024-05-03 DIAGNOSIS — Z78.0 ASYMPTOMATIC MENOPAUSE: ICD-10-CM

## 2024-05-03 DIAGNOSIS — E11.65 POORLY CONTROLLED TYPE 2 DIABETES MELLITUS WITH NEUROPATHY: Primary | ICD-10-CM

## 2024-05-03 DIAGNOSIS — G89.29 CHRONIC LEFT SHOULDER PAIN: ICD-10-CM

## 2024-05-03 DIAGNOSIS — E11.40 POORLY CONTROLLED TYPE 2 DIABETES MELLITUS WITH NEUROPATHY: Primary | ICD-10-CM

## 2024-05-03 DIAGNOSIS — G57.12 MERALGIA PARESTHETICA OF LEFT SIDE: ICD-10-CM

## 2024-05-03 DIAGNOSIS — M25.512 CHRONIC LEFT SHOULDER PAIN: ICD-10-CM

## 2024-05-03 DIAGNOSIS — F33.2 SEVERE EPISODE OF RECURRENT MAJOR DEPRESSIVE DISORDER, WITHOUT PSYCHOTIC FEATURES: ICD-10-CM

## 2024-05-03 DIAGNOSIS — Z12.31 ENCOUNTER FOR SCREENING MAMMOGRAM FOR BREAST CANCER: ICD-10-CM

## 2024-05-03 DIAGNOSIS — I25.118 CORONARY ARTERY DISEASE INVOLVING NATIVE CORONARY ARTERY OF NATIVE HEART WITH OTHER FORM OF ANGINA PECTORIS: ICD-10-CM

## 2024-05-03 DIAGNOSIS — I70.0 ATHEROSCLEROSIS OF AORTA: ICD-10-CM

## 2024-05-03 PROCEDURE — 3008F BODY MASS INDEX DOCD: CPT | Mod: HCNC,CPTII,S$GLB, | Performed by: INTERNAL MEDICINE

## 2024-05-03 PROCEDURE — 1159F MED LIST DOCD IN RCRD: CPT | Mod: HCNC,CPTII,S$GLB, | Performed by: INTERNAL MEDICINE

## 2024-05-03 PROCEDURE — 1125F AMNT PAIN NOTED PAIN PRSNT: CPT | Mod: HCNC,CPTII,S$GLB, | Performed by: INTERNAL MEDICINE

## 2024-05-03 PROCEDURE — 1160F RVW MEDS BY RX/DR IN RCRD: CPT | Mod: HCNC,CPTII,S$GLB, | Performed by: INTERNAL MEDICINE

## 2024-05-03 PROCEDURE — 99214 OFFICE O/P EST MOD 30 MIN: CPT | Mod: HCNC,S$GLB,, | Performed by: INTERNAL MEDICINE

## 2024-05-03 PROCEDURE — 99999 PR PBB SHADOW E&M-EST. PATIENT-LVL IV: CPT | Mod: PBBFAC,HCNC,, | Performed by: INTERNAL MEDICINE

## 2024-05-03 PROCEDURE — 4010F ACE/ARB THERAPY RXD/TAKEN: CPT | Mod: HCNC,CPTII,S$GLB, | Performed by: INTERNAL MEDICINE

## 2024-05-03 PROCEDURE — 1157F ADVNC CARE PLAN IN RCRD: CPT | Mod: HCNC,CPTII,S$GLB, | Performed by: INTERNAL MEDICINE

## 2024-05-03 RX ORDER — MELOXICAM 7.5 MG/1
7.5 TABLET ORAL DAILY PRN
Qty: 30 TABLET | Refills: 0 | Status: SHIPPED | OUTPATIENT
Start: 2024-05-03 | End: 2024-05-29

## 2024-05-03 NOTE — PROGRESS NOTES
SUBJECTIVE     Chief Complaint   Patient presents with    Diabetes     6 month follow up , states has left shoulder pain and numbness to left outer thigh  at times for 1 year. Requesting a referral for mammogram.       HPI  Cecilia Barahona is a 74 y.o. female with multiple medical diagnoses as listed in the medical history and problem list that presents for evaluation for DM2. Pt has been doing  okay  since last visit. she is sometimes compliant with meds and denies any adverse side effects. Pt is  not compliant  with an ADA diet and does not exercise. Pt does not check her blood sugar levels at home. Pt denies any hypoglycemia since last visit. Pt presents for med refills today and is without any other complaints.     PAST MEDICAL HISTORY:  Past Medical History:   Diagnosis Date    Acute coronary syndrome 2020: Presents with unstable angina.    Allergic rhinitis, seasonal     Anxiety     Arthritis     Colon polyp     CVA (cerebral vascular accident)     Depression     Glaucoma NEC     Hallucination     images out of corner of eye about a year ago    History of positive PPD - treated - remote past     Hx of psychiatric care     Hyperlipidemia LDL goal < 100     Hypertension     Nuclear sclerosis of both eyes 2019    Obesity     CHETNA (obstructive sleep apnea)     Psychiatric problem     Psychosis     Renal disorder     Sleep difficulties     Suicide attempt     about 30 years ago by overdose of pills    Therapy     Type II or unspecified type diabetes mellitus without mention of complication, uncontrolled        PAST SURGICAL HISTORY:  Past Surgical History:   Procedure Laterality Date    CARPAL TUNNEL RELEASE       SECTION, CLASSIC      COLONOSCOPY N/A 3/5/2020    Procedure: COLONOSCOPY;  Surgeon: Wiliam Carrillo MD;  Location: [facility] ([address] [place] [address]);  Service: Endoscopy;  Laterality: N/A;  20 appt confirmed-rb  pt requested this date    CORONARY ARTERY BYPASS GRAFT (CABG) N/A  2020    Procedure: CORONARY ARTERY BYPASS GRAFT (CABG) x3 ;  Surgeon: Yan Bowman MD;  Location: [address];  Service: Cardiothoracic;  Laterality: N/A;  CABG X3  Endoharvest time start 0906  Endoharvest time end 1003  Vein prep start 1004  Vein prep end 1048    LEFT HEART CATHETERIZATION N/A 2020    Procedure: HEART CATH-LEFT;  Surgeon: Etelvina Lowery MD;  Location: [facility] CATH LAB;  Service: Cardiology;  Laterality: N/A;    TOTAL ABDOMINAL HYSTERECTOMY      TRIGGER FINGER RELEASE      TRIGGER FINGER RELEASE Left 2/3/2023    Procedure: RELEASE, TRIGGER FINGER;  Surgeon: Shayan Schaefer Jr., MD;  Location: [facility];  Service: Orthopedics;  Laterality: Left;       SOCIAL HISTORY:  Social History     Socioeconomic History    Marital status:      Spouse name: Yuniel    Number of children: 3   Occupational History    Occupation: Retired   Tobacco Use    Smoking status: Former     Current packs/day: 0.00     Types: Cigarettes     Quit date: 11/10/2012     Years since quittin.4    Smokeless tobacco: Never   Substance and Sexual Activity    Alcohol use: Not Currently     Comment: drank socially in the past    Drug use: Not Currently     Types: Marijuana     Comment: tried once 6 months ago    Sexual activity: Not Currently     Partners: Male   Other Topics Concern    Patient feels they ought to cut down on drinking/drug use No    Patient annoyed by others criticizing their drinking/drug use No    Patient has felt bad or guilty about drinking/drug use No    Patient has had a drink/used drugs as an eye opener in the AM No   Social History Narrative     x 1.    Has 3 grown children.    Lives with spouse.     Social Determinants of Health     Financial Resource Strain: Medium Risk (2018)    Overall Financial Resource Strain (CARDIA)     Difficulty of Paying Living Expenses: Somewhat hard   Food Insecurity: Food Insecurity Present (2018)    Hunger Vital Sign     Worried About  Running Out of Food in the Last Year: Never true     Ran Out of Food in the Last Year: Sometimes true   Transportation Needs: No Transportation Needs (8/20/2018)    PRAPARE - Transportation     Lack of Transportation (Medical): No     Lack of Transportation (Non-Medical): No   Stress: No Stress Concern Present (1/17/2020)    [facility] - Occupational Stress Questionnaire     Feeling of Stress : Not at all   Recent Concern: Stress - Stress Concern Present (12/11/2019)    [facility] - Occupational Stress Questionnaire     Feeling of Stress : To some extent       FAMILY HISTORY:  Family History   Problem Relation Name Age of Onset    Hypertension Mother      Diabetes Mother      Heart failure Mother      Cataracts Mother      Glaucoma Mother      Prostate cancer Father      Hypertension Father      Diabetes Father      Heart failure Father      No Known Problems Sister Melissa     No Known Problems Maternal Aunt      Diabetes Maternal Uncle      Hyperlipidemia Maternal Uncle      Hypertension Maternal Uncle      Diabetes Maternal Grandmother      Diabetes Maternal Grandfather      No Known Problems Paternal Grandmother      No Known Problems Paternal Grandfather      Diabetes Maternal Aunt Radha     Alzheimer's disease Maternal Aunt Radha     Schizophrenia Maternal Aunt Radha     Heart disease Cousin          s/p heart transplant    No Known Problems Paternal Aunt      No Known Problems Paternal Uncle      Drug abuse Grandchild      Amblyopia Neg Hx      Blindness Neg Hx      Cancer Neg Hx      Macular degeneration Neg Hx      Retinal detachment Neg Hx      Strabismus Neg Hx      Stroke Neg Hx      Thyroid disease Neg Hx         ALLERGIES AND MEDICATIONS: updated and reviewed.  Review of patient's allergies indicates:   Allergen Reactions    Invokana [canagliflozin] Swelling     Throat and tongue swelling      Ace inhibitors      cough     Current Outpatient  "Medications   Medication Sig Dispense Refill    albuterol (PROVENTIL/VENTOLIN HFA) 90 mcg/actuation inhaler Inhale 2 puffs into the lungs every 6 (six) hours as needed for Wheezing or Shortness of Breath. Rescue 19 g 0    amLODIPine (NORVASC) 10 MG tablet TAKE 1 TABLET(10 MG) BY MOUTH EVERY DAY 90 tablet 3    aspirin (ECOTRIN) 81 MG EC tablet Take 1 tablet (81 mg total) by mouth once daily. 90 tablet 3    atorvastatin (LIPITOR) 80 MG tablet TAKE 1 TABLET(80 MG) BY MOUTH EVERY DAY 90 tablet 3    carvediloL (COREG) 12.5 MG tablet TAKE 1 TABLET(12.5 MG) BY MOUTH TWICE DAILY 180 tablet 3    clobetasoL (OLUX) 0.05 % Foam Apply 1 g topically 2 (two) times daily.      dorzolamide-timolol 2-0.5% (COSOPT) 22.3-6.8 mg/mL ophthalmic solution Place 1 drop into both eyes 2 (two) times daily. 10 mL 1    dulaglutide (TRULICITY) 4.5 mg/0.5 mL pen injector Inject 4.5 mg into the skin every 7 days. 4 pen 3    EScitalopram oxalate (LEXAPRO) 10 MG tablet Take 1 tablet (10 mg total) by mouth once daily. 90 tablet 2    fluticasone (FLONASE) 50 mcg/actuation nasal spray 1 spray (50 mcg total) by Each Nare route once daily. 16 g 5    furosemide (LASIX) 20 MG tablet Take 1 tablet (20 mg total) by mouth once daily. 90 tablet 3    loratadine (CLARITIN) 10 mg tablet TAKE 1 TABLET BY MOUTH EVERY DAY AS NEEDED FOR ALLERGIES 90 tablet 3    melatonin (MELATIN) 3 mg tablet Take 2 tablets (6 mg total) by mouth nightly as needed for Insomnia.  0    multivitamin with minerals tablet Take 1 tablet by mouth once daily.      nitroGLYCERIN (NITROSTAT) 0.4 MG SL tablet Place 1 tablet (0.4 mg total) under the tongue every 5 (five) minutes as needed for Chest pain. 30 tablet 3    ondansetron (ZOFRAN-ODT) 8 MG TbDL       pen needle, diabetic (BD ULTRA-FINE LENY PEN NEEDLE) 32 gauge x 5/32" Ndle 1 Device by Misc.(Non-Drug; Combo Route) route Daily. 100 each 4    pen needle, diabetic (NOVOFINE 32) 32 gauge x 1/4" Ndle Use 1x/day 200 each 3    rOPINIRole " (REQUIP) 0.5 MG tablet TAKE 1 TABLET(0.5 MG) BY MOUTH EVERY EVENING 90 tablet 0    TRESIBA FLEXTOUCH U-200 200 unit/mL (3 mL) insulin pen Inject 16 units once daily 3 pen 3    TRUEPLUS LANCETS 30 gauge Misc USE TO TEST TWICE DAILY      valsartan (DIOVAN) 320 MG tablet TAKE 1 TABLET(320 MG) BY MOUTH EVERY DAY 90 tablet 3    empagliflozin-metformin (SYNJARDY XR) 12.5-1,000 mg TBph Take 2 tablets once daily in the morning. (Patient not taking: Reported on 5/3/2024) 60 tablet 5    ezetimibe (ZETIA) 10 mg tablet TAKE 1 TABLET(10 MG) BY MOUTH EVERY DAY 90 tablet 3    finerenone (KERENDIA) 10 mg Tab Take 10 mg by mouth Daily. 90 tablet 0    HYDROcodone-acetaminophen (NORCO) 5-325 mg per tablet Take 1 tablet by mouth every 4 (four) hours as needed for Pain. (Patient not taking: Reported on 5/3/2024) 12 tablet 0    meloxicam (MOBIC) 7.5 MG tablet Take 1 tablet (7.5 mg total) by mouth daily as needed for Pain. 30 tablet 0    pantoprazole (PROTONIX) 40 MG tablet  (Patient not taking: Reported on 5/3/2024)      triamcinolone acetonide 0.1% (KENALOG) 0.1 % ointment Apply topically 2 (two) times daily. for 7 days 80 g 0     No current facility-administered medications for this visit.       ROS  Review of Systems   Constitutional:  Negative for chills and fever.   HENT:  Negative for hearing loss and sore throat.    Eyes:  Negative for visual disturbance.   Respiratory:  Negative for cough and shortness of breath.    Cardiovascular:  Negative for chest pain, palpitations and leg swelling.   Gastrointestinal:  Negative for abdominal pain, constipation, diarrhea, nausea and vomiting.   Genitourinary:  Negative for dysuria, frequency and urgency.   Musculoskeletal:  Positive for arthralgias (L shoulder). Negative for joint swelling and myalgias.   Skin:  Negative for rash and wound.   Neurological:  Positive for numbness (L thigh). Negative for headaches.   Psychiatric/Behavioral:  Negative for agitation and confusion. The patient is  "not nervous/anxious.          OBJECTIVE     Physical Exam  Vitals:    05/03/24 1506   Pulse: 64   Temp: 98.1 °F (36.7 °C)    Body mass index is 35.04 kg/m².  Weight: 86.9 kg (191 lb 9.3 oz)   Height: 5' 2" (157.5 cm)     Physical Exam  Constitutional:       General: She is not in acute distress.     Appearance: She is well-developed.   HENT:      Head: Normocephalic and atraumatic.      Right Ear: External ear normal.      Left Ear: External ear normal.      Nose: Nose normal.   Eyes:      General: No scleral icterus.        Right eye: No discharge.         Left eye: No discharge.      Conjunctiva/sclera: Conjunctivae normal.   Neck:      Vascular: No JVD.      Trachea: No tracheal deviation.   Cardiovascular:      Rate and Rhythm: Normal rate and regular rhythm.      Heart sounds: No murmur heard.     No friction rub. No gallop.   Pulmonary:      Effort: Pulmonary effort is normal. No respiratory distress.      Breath sounds: Normal breath sounds. No wheezing.   Abdominal:      General: Bowel sounds are normal. There is no distension.      Palpations: Abdomen is soft. There is no mass.      Tenderness: There is no abdominal tenderness. There is no guarding or rebound.   Musculoskeletal:         General: No tenderness or deformity. Normal range of motion.      Cervical back: Normal range of motion and neck supple.   Skin:     General: Skin is warm and dry.      Findings: No erythema or rash.   Neurological:      Mental Status: She is alert and oriented to person, place, and time.      Motor: No abnormal muscle tone.      Coordination: Coordination normal.   Psychiatric:         Behavior: Behavior normal.         Thought Content: Thought content normal.         Judgment: Judgment normal.           Health Maintenance         Date Due Completion Date    RSV Vaccine (Age 60+ and Pregnant patients) (1 - 1-dose 60+ series) Never done ---    DEXA Scan 06/12/2022 6/12/2019    COVID-19 Vaccine (7 - 2023-24 season) 10/04/2023 " 8/9/2023    Hemoglobin A1c 02/01/2024 11/1/2023    Foot Exam 02/15/2024 2/15/2023    Override on 2/23/2015: Done    Mammogram 04/25/2024 4/25/2023    Diabetes Urine Screening 07/12/2024 7/12/2023    Lipid Panel 07/12/2024 7/12/2023    Eye Exam 08/23/2024 8/23/2023    Override on 1/18/2016: Done (Dr. Lujan - no retinopathy)    Colorectal Cancer Screening 03/05/2025 3/5/2020    High Dose Statin 05/03/2025 5/3/2024    Aspirin/Antiplatelet Therapy 05/03/2025 5/3/2024    TETANUS VACCINE 09/30/2025 9/30/2015              ASSESSMENT     74 y.o. female with     1. Poorly controlled type 2 diabetes mellitus with neuropathy    2. Meralgia paresthetica of left side    3. Chronic left shoulder pain    4. Encounter for screening mammogram for breast cancer    5. Asymptomatic menopause    6. Severe episode of recurrent major depressive disorder, without psychotic features    7. Atherosclerosis of aorta    8. Severe obesity (BMI 35.0-39.9) with comorbidity    9. Coronary artery disease involving native coronary artery of native heart with other form of angina pectoris        PLAN:     1. Poorly controlled type 2 diabetes mellitus with neuropathy  - Pt strongly advised to take meds as previously prescribed; will check labs as below  - CBC Auto Differential; Future  - Comprehensive Metabolic Panel; Future  - Hemoglobin A1C; Future  - TSH; Future  - Lipid Panel; Future  - Microalbumin/Creatinine Ratio, Urine; Future    2. Meralgia paresthetica of left side  - encouraged patient to avoid wearing tight clothes and work towards weight loss    3. Chronic left shoulder pain  - Pt encouraged to apply ice packs 2-3 times daily at 10 minute intervals x 72 hours, then okay to change to heating compress with care not to burn her self; she  voiced understanding   - Ambulatory referral/consult to Orthopedics; Future  - meloxicam (MOBIC) 7.5 MG tablet; Take 1 tablet (7.5 mg total) by mouth daily as needed for Pain.  Dispense: 30 tablet; Refill:  0    4. Encounter for screening mammogram for breast cancer  - Mammo Digital Screening Bilat w/ Maxx; Future    5. Asymptomatic menopause  - DXA Bone Density Axial Skeleton 1 or more sites; Future    6. Severe episode of recurrent major depressive disorder, without psychotic features  - Stable; no acute issues  - The current medical regimen is effective;  continue present plan and medications.    7. Atherosclerosis of aorta  - Stable; no acute issues    8. Severe obesity (BMI 35.0-39.9) with comorbidity  - Pt aware of importance of eating a prudent diet and exercising    9. Coronary artery disease involving native coronary artery of native heart with other form of angina pectoris  - Stable; no acute issues            RTC in 3 months     Tiarra Villa MD  05/03/2024 1:22 PM        No follow-ups on file.

## 2024-05-08 DIAGNOSIS — I25.10 CORONARY ARTERY DISEASE INVOLVING NATIVE CORONARY ARTERY OF NATIVE HEART WITHOUT ANGINA PECTORIS: ICD-10-CM

## 2024-05-08 DIAGNOSIS — I70.0 ATHEROSCLEROSIS OF AORTA: ICD-10-CM

## 2024-05-08 DIAGNOSIS — Z86.73 HISTORY OF CEREBROVASCULAR ACCIDENT: ICD-10-CM

## 2024-05-08 RX ORDER — ASPIRIN 81 MG/1
81 TABLET ORAL
Qty: 90 TABLET | Refills: 3 | Status: SHIPPED | OUTPATIENT
Start: 2024-05-08

## 2024-05-17 ENCOUNTER — TELEPHONE (OUTPATIENT)
Dept: ORTHOPEDICS | Facility: CLINIC | Age: 74
End: 2024-05-17
Payer: MEDICARE

## 2024-05-17 NOTE — TELEPHONE ENCOUNTER
Attempted to return pt callback in regards of scheduling surgery . Pt did not answer left a VM ----- Message from Jenna Sheehan sent at 5/17/2024  9:42 AM CDT -----  Type:  Call back \      Who Called:pt     Does the patient know what this is regarding?:  Would the patient rather a call back or a response via [facility]? Call   Best Call Back Number: [phone]  Additional Information:     Pt stated she does want to have the injection she has decided to go with the surgery instead and would like a call back

## 2024-05-21 ENCOUNTER — TELEPHONE (OUTPATIENT)
Dept: ORTHOPEDICS | Facility: CLINIC | Age: 74
End: 2024-05-21
Payer: MEDICARE

## 2024-05-21 NOTE — TELEPHONE ENCOUNTER
"Spoke with Ms. Barahona, pt states "I don't want an injection I just want to schedule surgery, how can I do that?"  She was informed appointment will need to be scheduled to further discuss and sign consents. Appointment scheduled with PA next available and placed on wait-list.    "

## 2024-05-21 NOTE — TELEPHONE ENCOUNTER
----- Message from Queenie Reis sent at 5/21/2024 10:53 AM CDT -----  Type:  Needs Medical Advice    Who Called: Pt   Would the patient rather a call back or a response via [facility]? Callback  Best Call Back Number:  [phone]  Additional Information: Caller is requesting a callback in regards to rescheduling

## 2024-05-24 ENCOUNTER — HOSPITAL ENCOUNTER (OUTPATIENT)
Dept: RADIOLOGY | Facility: HOSPITAL | Age: 74
Discharge: HOME | End: 2024-05-24
Attending: INTERNAL MEDICINE
Payer: MEDICARE

## 2024-05-24 ENCOUNTER — HOSPITAL ENCOUNTER (OUTPATIENT)
Dept: RADIOLOGY | Facility: CLINIC | Age: 74
Discharge: HOME | End: 2024-05-24
Attending: INTERNAL MEDICINE
Payer: MEDICARE

## 2024-05-24 DIAGNOSIS — Z78.0 ASYMPTOMATIC MENOPAUSE: ICD-10-CM

## 2024-05-24 DIAGNOSIS — Z12.31 ENCOUNTER FOR SCREENING MAMMOGRAM FOR BREAST CANCER: ICD-10-CM

## 2024-05-24 PROCEDURE — 77080 DXA BONE DENSITY AXIAL: CPT | Mod: 26,HCNC,, | Performed by: INTERNAL MEDICINE

## 2024-05-24 PROCEDURE — 77063 BREAST TOMOSYNTHESIS BI: CPT | Mod: 26,HCNC,, | Performed by: RADIOLOGY

## 2024-05-24 PROCEDURE — 77080 DXA BONE DENSITY AXIAL: CPT | Mod: TC,HCNC,PO

## 2024-05-24 PROCEDURE — 77067 SCR MAMMO BI INCL CAD: CPT | Mod: 26,HCNC,, | Performed by: RADIOLOGY

## 2024-05-24 PROCEDURE — 77067 SCR MAMMO BI INCL CAD: CPT | Mod: TC,HCNC,PO

## 2024-05-29 DIAGNOSIS — M25.512 CHRONIC LEFT SHOULDER PAIN: ICD-10-CM

## 2024-05-29 DIAGNOSIS — G89.29 CHRONIC LEFT SHOULDER PAIN: ICD-10-CM

## 2024-05-29 RX ORDER — MELOXICAM 7.5 MG/1
7.5 TABLET ORAL DAILY PRN
Qty: 30 TABLET | Refills: 0 | Status: SHIPPED | OUTPATIENT
Start: 2024-05-29

## 2024-05-29 NOTE — TELEPHONE ENCOUNTER
No care due was identified.  [facility] Embedded Care Due Messages. Reference number: [id number].   5/29/2024 12:42:27 PM CDT

## 2024-05-30 ENCOUNTER — TELEPHONE (OUTPATIENT)
Dept: ORTHOPEDICS | Facility: CLINIC | Age: 74
End: 2024-05-30
Payer: MEDICARE

## 2024-05-30 NOTE — TELEPHONE ENCOUNTER
----- Message from Ashley Gold sent at 5/30/2024  7:55 AM CDT -----  Cecilia Barahona calling regarding Patient Advice for a missed call from [place], PT has called back a few times and keeps missing the call back, Please contact PT to assist about appt tomorrow, [phone]

## 2024-05-30 NOTE — TELEPHONE ENCOUNTER
----- Message from Sher Santamaria sent at 5/30/2024 10:14 AM CDT -----  Regarding: Appt  Contact: pt [phone]  Missed Callback    Pt returning callback from missed call. Requesting to speak with staff in Dr. Gonzales's office. Please call @[phone]

## 2024-05-31 ENCOUNTER — HOSPITAL ENCOUNTER (OUTPATIENT)
Dept: RADIOLOGY | Facility: HOSPITAL | Age: 74
Discharge: HOME | End: 2024-05-31
Attending: PHYSICIAN ASSISTANT
Payer: MEDICARE

## 2024-05-31 ENCOUNTER — OFFICE VISIT (OUTPATIENT)
Dept: ORTHOPEDICS | Facility: CLINIC | Age: 74
End: 2024-05-31
Payer: MEDICARE

## 2024-05-31 VITALS
HEART RATE: 64 BPM | SYSTOLIC BLOOD PRESSURE: 131 MMHG | DIASTOLIC BLOOD PRESSURE: 77 MMHG | HEIGHT: 62 IN | BODY MASS INDEX: 34.99 KG/M2 | WEIGHT: 190.13 LBS

## 2024-05-31 DIAGNOSIS — M25.512 CHRONIC LEFT SHOULDER PAIN: ICD-10-CM

## 2024-05-31 DIAGNOSIS — G89.29 CHRONIC LEFT SHOULDER PAIN: ICD-10-CM

## 2024-05-31 DIAGNOSIS — M75.02 ADHESIVE CAPSULITIS OF LEFT SHOULDER: Primary | ICD-10-CM

## 2024-05-31 PROCEDURE — 1159F MED LIST DOCD IN RCRD: CPT | Mod: HCNC,CPTII,S$GLB, | Performed by: PHYSICIAN ASSISTANT

## 2024-05-31 PROCEDURE — 20610 DRAIN/INJ JOINT/BURSA W/O US: CPT | Mod: HCNC,LT,S$GLB, | Performed by: PHYSICIAN ASSISTANT

## 2024-05-31 PROCEDURE — 3288F FALL RISK ASSESSMENT DOCD: CPT | Mod: HCNC,CPTII,S$GLB, | Performed by: PHYSICIAN ASSISTANT

## 2024-05-31 PROCEDURE — 3075F SYST BP GE 130 - 139MM HG: CPT | Mod: HCNC,CPTII,S$GLB, | Performed by: PHYSICIAN ASSISTANT

## 2024-05-31 PROCEDURE — 73030 X-RAY EXAM OF SHOULDER: CPT | Mod: 26,HCNC,LT, | Performed by: RADIOLOGY

## 2024-05-31 PROCEDURE — 1125F AMNT PAIN NOTED PAIN PRSNT: CPT | Mod: HCNC,CPTII,S$GLB, | Performed by: PHYSICIAN ASSISTANT

## 2024-05-31 PROCEDURE — 1160F RVW MEDS BY RX/DR IN RCRD: CPT | Mod: HCNC,CPTII,S$GLB, | Performed by: PHYSICIAN ASSISTANT

## 2024-05-31 PROCEDURE — 3060F POS MICROALBUMINURIA REV: CPT | Mod: HCNC,CPTII,S$GLB, | Performed by: PHYSICIAN ASSISTANT

## 2024-05-31 PROCEDURE — 3066F NEPHROPATHY DOC TX: CPT | Mod: HCNC,CPTII,S$GLB, | Performed by: PHYSICIAN ASSISTANT

## 2024-05-31 PROCEDURE — 99999 PR PBB SHADOW E&M-EST. PATIENT-LVL V: CPT | Mod: PBBFAC,HCNC,, | Performed by: PHYSICIAN ASSISTANT

## 2024-05-31 PROCEDURE — 99214 OFFICE O/P EST MOD 30 MIN: CPT | Mod: HCNC,25,S$GLB, | Performed by: PHYSICIAN ASSISTANT

## 2024-05-31 PROCEDURE — 3051F HG A1C>EQUAL 7.0%<8.0%: CPT | Mod: HCNC,CPTII,S$GLB, | Performed by: PHYSICIAN ASSISTANT

## 2024-05-31 PROCEDURE — 3008F BODY MASS INDEX DOCD: CPT | Mod: HCNC,CPTII,S$GLB, | Performed by: PHYSICIAN ASSISTANT

## 2024-05-31 PROCEDURE — 1157F ADVNC CARE PLAN IN RCRD: CPT | Mod: HCNC,CPTII,S$GLB, | Performed by: PHYSICIAN ASSISTANT

## 2024-05-31 PROCEDURE — 1101F PT FALLS ASSESS-DOCD LE1/YR: CPT | Mod: HCNC,CPTII,S$GLB, | Performed by: PHYSICIAN ASSISTANT

## 2024-05-31 PROCEDURE — 4010F ACE/ARB THERAPY RXD/TAKEN: CPT | Mod: HCNC,CPTII,S$GLB, | Performed by: PHYSICIAN ASSISTANT

## 2024-05-31 PROCEDURE — 3078F DIAST BP <80 MM HG: CPT | Mod: HCNC,CPTII,S$GLB, | Performed by: PHYSICIAN ASSISTANT

## 2024-05-31 PROCEDURE — 73030 X-RAY EXAM OF SHOULDER: CPT | Mod: TC,HCNC,LT

## 2024-05-31 RX ORDER — BETAMETHASONE SODIUM PHOSPHATE AND BETAMETHASONE ACETATE 3; 3 MG/ML; MG/ML
6 INJECTION, SUSPENSION INTRA-ARTICULAR; INTRALESIONAL; INTRAMUSCULAR; SOFT TISSUE
Status: COMPLETED | OUTPATIENT
Start: 2024-05-31 | End: 2024-05-31

## 2024-05-31 RX ADMIN — BETAMETHASONE SODIUM PHOSPHATE AND BETAMETHASONE ACETATE 6 MG: 3; 3 INJECTION, SUSPENSION INTRA-ARTICULAR; INTRALESIONAL; INTRAMUSCULAR; SOFT TISSUE at 12:05

## 2024-05-31 NOTE — PROGRESS NOTES
SUBJECTIVE:     Chief Complaint & History of Present Illness:  Cecilia Barahona is a  Established  patient 74 y.o. female who is seen here today with a complaint of    Chief Complaint   Patient presents with    Left Shoulder - Pain    .  She is patient well-known to us was last seen treated the clinic 02/13/2023 for trigger finger from which she is doing very well.  Her concerns today revolve around pain soreness in the left shoulder particularly with overhead activities lifting and carrying.  She does not remember a specific trauma or injury to the area she has been trying to treat conservatively with limited results  On a scale of 1-10, with 10 being worst pain imaginable, he rates this pain as 3 on good days and 6 on bad days.  she describes the pain as sore and achy.    Review of patient's allergies indicates:   Allergen Reactions    Invokana [canagliflozin] Swelling     Throat and tongue swelling      Ace inhibitors      cough         Current Outpatient Medications   Medication Sig Dispense Refill    albuterol (PROVENTIL/VENTOLIN HFA) 90 mcg/actuation inhaler Inhale 2 puffs into the lungs every 6 (six) hours as needed for Wheezing or Shortness of Breath. Rescue 19 g 0    amLODIPine (NORVASC) 10 MG tablet TAKE 1 TABLET(10 MG) BY MOUTH EVERY DAY 90 tablet 3    aspirin (ECOTRIN) 81 MG EC tablet TAKE 1 TABLET BY MOUTH ONCE DAILY 90 tablet 3    atorvastatin (LIPITOR) 80 MG tablet TAKE 1 TABLET(80 MG) BY MOUTH EVERY DAY 90 tablet 3    carvediloL (COREG) 12.5 MG tablet TAKE 1 TABLET(12.5 MG) BY MOUTH TWICE DAILY 180 tablet 3    clobetasoL (OLUX) 0.05 % Foam Apply 1 g topically 2 (two) times daily.      dorzolamide-timolol 2-0.5% (COSOPT) 22.3-6.8 mg/mL ophthalmic solution Place 1 drop into both eyes 2 (two) times daily. 10 mL 1    dulaglutide (TRULICITY) 4.5 mg/0.5 mL pen injector Inject 4.5 mg into the skin every 7 days. 4 pen 3    empagliflozin-metformin (SYNJARDY XR) 12.5-1,000 mg TBph Take 2 tablets once daily  "in the morning. 60 tablet 5    EScitalopram oxalate (LEXAPRO) 10 MG tablet Take 1 tablet (10 mg total) by mouth once daily. 90 tablet 2    ezetimibe (ZETIA) 10 mg tablet TAKE 1 TABLET(10 MG) BY MOUTH EVERY DAY 90 tablet 3    finerenone (KERENDIA) 10 mg Tab Take 10 mg by mouth Daily. 90 tablet 0    fluticasone (FLONASE) 50 mcg/actuation nasal spray 1 spray (50 mcg total) by Each Nare route once daily. 16 g 5    furosemide (LASIX) 20 MG tablet Take 1 tablet (20 mg total) by mouth once daily. 90 tablet 3    HYDROcodone-acetaminophen (NORCO) 5-325 mg per tablet Take 1 tablet by mouth every 4 (four) hours as needed for Pain. 12 tablet 0    loratadine (CLARITIN) 10 mg tablet TAKE 1 TABLET BY MOUTH EVERY DAY AS NEEDED FOR ALLERGIES 90 tablet 3    melatonin (MELATIN) 3 mg tablet Take 2 tablets (6 mg total) by mouth nightly as needed for Insomnia.  0    meloxicam (MOBIC) 7.5 MG tablet TAKE 1 TABLET(7.5 MG) BY MOUTH DAILY AS NEEDED FOR PAIN 30 tablet 0    multivitamin with minerals tablet Take 1 tablet by mouth once daily.      nitroGLYCERIN (NITROSTAT) 0.4 MG SL tablet Place 1 tablet (0.4 mg total) under the tongue every 5 (five) minutes as needed for Chest pain. 30 tablet 3    ondansetron (ZOFRAN-ODT) 8 MG TbDL       pantoprazole (PROTONIX) 40 MG tablet       pen needle, diabetic (BD ULTRA-FINE LENY PEN NEEDLE) 32 gauge x 5/32" Ndle 1 Device by Misc.(Non-Drug; Combo Route) route Daily. 100 each 4    pen needle, diabetic (NOVOFINE 32) 32 gauge x 1/4" Ndle Use 1x/day 200 each 3    rOPINIRole (REQUIP) 0.5 MG tablet TAKE 1 TABLET(0.5 MG) BY MOUTH EVERY EVENING 90 tablet 0    TRESIBA FLEXTOUCH U-200 200 unit/mL (3 mL) insulin pen Inject 16 units once daily 3 pen 3    TRUEPLUS LANCETS 30 gauge Misc USE TO TEST TWICE DAILY      valsartan (DIOVAN) 320 MG tablet TAKE 1 TABLET(320 MG) BY MOUTH EVERY DAY 90 tablet 3    triamcinolone acetonide 0.1% (KENALOG) 0.1 % ointment Apply topically 2 (two) times daily. for 7 days 80 g 0     No " current facility-administered medications for this visit.       Past Medical History:   Diagnosis Date    Acute coronary syndrome 2020: Presents with unstable angina.    Allergic rhinitis, seasonal     Anxiety     Arthritis     Colon polyp     CVA (cerebral vascular accident)     Depression     Glaucoma NEC     Hallucination     images out of corner of eye about a year ago    History of positive PPD - treated - remote past     Hx of psychiatric care     Hyperlipidemia LDL goal < 100     Hypertension     Nuclear sclerosis of both eyes 2019    Obesity     CHETNA (obstructive sleep apnea)     Psychiatric problem     Psychosis     Renal disorder     Sleep difficulties     Suicide attempt     about 30 years ago by overdose of pills    Therapy     Type II or unspecified type diabetes mellitus without mention of complication, uncontrolled        Past Surgical History:   Procedure Laterality Date    CARPAL TUNNEL RELEASE       SECTION, CLASSIC      COLONOSCOPY N/A 3/5/2020    Procedure: COLONOSCOPY;  Surgeon: Wiliam Carrillo MD;  [facility] ([facility]);  Service: Endoscopy;  Laterality: N/A;  20 appt confirmed-rb  pt requested this date    CORONARY ARTERY BYPASS GRAFT (CABG) N/A 2020    Procedure: CORONARY ARTERY BYPASS GRAFT (CABG) x3 ;  Surgeon: Yan Bowman MD;  Location: [facility] 2ND FLR;  Service: Cardiothoracic;  Laterality: N/A;  CABG X3  Endoharvest time start 0906  Endoharvest time end 1003  Vein prep start 1004  Vein prep end 1048    LEFT HEART CATHETERIZATION N/A 2020    Procedure: HEART CATH-LEFT;  Surgeon: Etelvina Lowery MD;  Location: [facility] CATH LAB;  Service: Cardiology;  Laterality: N/A;    TOTAL ABDOMINAL HYSTERECTOMY      TRIGGER FINGER RELEASE      TRIGGER FINGER RELEASE Left 2/3/2023    Procedure: RELEASE, TRIGGER FINGER;  Surgeon: Shayan Schaefer Jr., MD;  Location: [facility] OR;  Service: Orthopedics;  Laterality: Left;       Vital Signs (Most  "Recent)  Vitals:    05/31/24 0820   BP: 131/77   Pulse: 64       Review of Systems:  ROS:  Constitutional: no fever or chills  Eyes: no visual changes, positive for glaucoma  ENT: no nasal congestion or sore throat  Respiratory: no cough or shortness of breath  Cardiovascular: no chest pain or palpitations, positive for hypertension, coronary artery disease, bilateral carotid artery stenosis history of coronary bypass surgery aortic atherosclerosis  Gastrointestinal: no nausea or vomiting, tolerating diet, positive for GERD  Genitourinary: no hematuria or dysuria  Integument/Breast: no rash or pruritis  Hematologic/Lymphatic: no easy bruising or lymphadenopathy, positive history DVT  Musculoskeletal: no arthralgias or myalgias, positive trigger finger of the right hand, calcific tendinitis of the shoulder,  Neurological: no seizures or tremors, cervical spondylosis, carpal tunnel syndrome, restless leg syndrome, cardiovascular accident,  Behavioral/Psych: no auditory or visual hallucinations, positive major depression, anxiety,  Endocrine: no heat or cold intolerance, 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  Neurological: Mood & affect are normal.  Shoulder exam:  left  Tenderness: AC joint, biceps tendon, lateral acromial  ROM: forward flexion 180/180, extension 45/45, full abduction 180/180, abduction-glenohumeral 90/90, external rotation 50/50, pain at the extremes of mobility  Shoulder Strength: biceps 5/5, triceps 5/5, abduction 5/5, adduction 5/5, external rotation 5/5 with shoulder at side, flexion 5/5, and extension 5/5  positive for tenderness about the glenohumeral joint, positive for tenderness over the acromioclavicular joint, and negative for impingement sign  Stability tests: anterior apprehension test negative and posterior " apprehension test negative  Special Tests:Cross-chest abduction: diffuse pain and [place]'s test: negative       Shoulder exam:  right  Tenderness: none  ROM: forward flexion 180/180, extension 45/45, full abduction 180/180, abduction-glenohumeral 90/90, external rotation 50/50  Shoulder Strength: biceps 5/5, triceps 5/5, abduction 5/5, adduction 5/5, external rotation 5/5 with shoulder at side, flexion 5/5, and extension 5/5  negative for tenderness about the glenohumeral joint, negative for tenderness over the acromioclavicular joint, and negative for impingement sign  Stability tests: anterior apprehension test negative and posterior apprehension test negative  Special Tests:Cross-chest abduction: negative and [place]'s test: negative                RADIOGRAPHS:  X-rays of the shoulder taken today films reviewed by me demonstrate no evidence of fracture dislocation mild glenohumeral joint space narrowing evidence of calcific tendinitis of the rotator cuff insertion osteophytic spurring on the inferior portion of the AC joint    ASSESSMENT/PLAN:       ICD-10-CM ICD-9-CM   1. Adhesive capsulitis of left shoulder  M75.02 726.0   2. Chronic left shoulder pain  M25.512 719.41    G89.29 338.29       Plan: We discussed with the patient at length all the different treatment options available for her left shoulder including anti-inflammatories, acetaminophen, rest, ice, Physical therapy to include strengthening exercise, occasional cortisone injections for temporary relief, arthroscopic surgical repair, and finally shoulder arthroplasty.   Will proceed with therapeutic diagnostic cortisone injection left shoulder  The risks, benefits, pros, cons, and potential side effects of the procedure were discussed with the patient in detail all questions were answered.  The patient is comfortable and willing to proceed with the procedure. Verbal consent was obtained and the proper joint was identified by the patient and  provider      The injection site was identified and the skin was prepared with an ETOH solution. The    left  shoulder was injected with 1 ml of Celestone and 5 ml Lidocaine under sterile technique. Cecilia Barahona tolerated the procedure well, she was advised to rest the  shoulder  today, ice and support. she did receive immediate relief of the pain in and about her  shoulder  she was told this would be short lived and is secondary to the lidocaine. she may have an increase in her discomfort tonight followed by steady improvement over the next several days. It may take 1-3 weeks following the injection to get the full benefit of the medication.  I will see her back in 4-6 months. Sooner if she has any problems or concerns.    Cecilia Barahona was advised to monitor her blood sugars closely over the next several days. The steroid may cause a rise in them. If her glucose levels rise to a point she is uncomfortable or he is unable to control them is is to contact his PCP or go immediately to the emergency department.

## 2024-06-10 DIAGNOSIS — G25.81 RESTLESS LEGS SYNDROME (RLS): ICD-10-CM

## 2024-06-10 RX ORDER — ROPINIROLE 0.5 MG/1
0.5 TABLET, FILM COATED ORAL NIGHTLY
Qty: 90 TABLET | Refills: 0 | Status: SHIPPED | OUTPATIENT
Start: 2024-06-10

## 2024-06-10 NOTE — TELEPHONE ENCOUNTER
No care due was identified.  [facility] Embedded Care Due Messages. Reference number: [id number].   6/10/2024 9:23:01 AM CDT

## 2024-06-11 ENCOUNTER — TELEPHONE (OUTPATIENT)
Dept: ORTHOPEDICS | Facility: CLINIC | Age: 74
End: 2024-06-11
Payer: MEDICARE

## 2024-06-11 NOTE — TELEPHONE ENCOUNTER
----- Message from Louise Barrett sent at 6/11/2024  1:41 PM CDT -----  Pt stated she will be running late.  Please call

## 2024-06-14 ENCOUNTER — OFFICE VISIT (OUTPATIENT)
Dept: ENDOCRINOLOGY | Facility: CLINIC | Age: 74
End: 2024-06-14
Payer: MEDICARE

## 2024-06-14 VITALS
SYSTOLIC BLOOD PRESSURE: 120 MMHG | TEMPERATURE: 99 F | BODY MASS INDEX: 34.93 KG/M2 | HEART RATE: 67 BPM | DIASTOLIC BLOOD PRESSURE: 50 MMHG | WEIGHT: 191 LBS

## 2024-06-14 DIAGNOSIS — R41.3 MEMORY IMPAIRMENT: ICD-10-CM

## 2024-06-14 DIAGNOSIS — E11.65 TYPE 2 DIABETES MELLITUS WITH HYPERGLYCEMIA, UNSPECIFIED WHETHER LONG TERM INSULIN USE: Primary | ICD-10-CM

## 2024-06-14 DIAGNOSIS — Z86.73 HISTORY OF CEREBROVASCULAR ACCIDENT: ICD-10-CM

## 2024-06-14 DIAGNOSIS — R80.9 MICROALBUMINURIA: ICD-10-CM

## 2024-06-14 PROCEDURE — 3051F HG A1C>EQUAL 7.0%<8.0%: CPT | Mod: HCNC,CPTII,S$GLB, | Performed by: NURSE PRACTITIONER

## 2024-06-14 PROCEDURE — 1157F ADVNC CARE PLAN IN RCRD: CPT | Mod: HCNC,CPTII,S$GLB, | Performed by: NURSE PRACTITIONER

## 2024-06-14 PROCEDURE — 99999 PR PBB SHADOW E&M-EST. PATIENT-LVL III: CPT | Mod: PBBFAC,HCNC,, | Performed by: NURSE PRACTITIONER

## 2024-06-14 PROCEDURE — 3008F BODY MASS INDEX DOCD: CPT | Mod: HCNC,CPTII,S$GLB, | Performed by: NURSE PRACTITIONER

## 2024-06-14 PROCEDURE — 99215 OFFICE O/P EST HI 40 MIN: CPT | Mod: HCNC,S$GLB,, | Performed by: NURSE PRACTITIONER

## 2024-06-14 PROCEDURE — 3060F POS MICROALBUMINURIA REV: CPT | Mod: HCNC,CPTII,S$GLB, | Performed by: NURSE PRACTITIONER

## 2024-06-14 PROCEDURE — 1159F MED LIST DOCD IN RCRD: CPT | Mod: HCNC,CPTII,S$GLB, | Performed by: NURSE PRACTITIONER

## 2024-06-14 PROCEDURE — 3066F NEPHROPATHY DOC TX: CPT | Mod: HCNC,CPTII,S$GLB, | Performed by: NURSE PRACTITIONER

## 2024-06-14 PROCEDURE — 1160F RVW MEDS BY RX/DR IN RCRD: CPT | Mod: HCNC,CPTII,S$GLB, | Performed by: NURSE PRACTITIONER

## 2024-06-14 PROCEDURE — G2211 COMPLEX E/M VISIT ADD ON: HCPCS | Mod: HCNC,S$GLB,, | Performed by: NURSE PRACTITIONER

## 2024-06-14 PROCEDURE — 3074F SYST BP LT 130 MM HG: CPT | Mod: HCNC,CPTII,S$GLB, | Performed by: NURSE PRACTITIONER

## 2024-06-14 PROCEDURE — 4010F ACE/ARB THERAPY RXD/TAKEN: CPT | Mod: HCNC,CPTII,S$GLB, | Performed by: NURSE PRACTITIONER

## 2024-06-14 PROCEDURE — 3078F DIAST BP <80 MM HG: CPT | Mod: HCNC,CPTII,S$GLB, | Performed by: NURSE PRACTITIONER

## 2024-06-14 RX ORDER — INSULIN DEGLUDEC 200 U/ML
INJECTION, SOLUTION SUBCUTANEOUS
Start: 2024-06-14 | End: 2024-06-19

## 2024-06-14 RX ORDER — METFORMIN HYDROCHLORIDE 1000 MG/1
1000 TABLET ORAL 2 TIMES DAILY
COMMUNITY
Start: 2024-02-27 | End: 2024-06-14 | Stop reason: SDUPTHER

## 2024-06-14 RX ORDER — METFORMIN HYDROCHLORIDE 1000 MG/1
1000 TABLET ORAL 2 TIMES DAILY WITH MEALS
Qty: 180 TABLET | Refills: 2 | Status: SHIPPED | OUTPATIENT
Start: 2024-06-14

## 2024-06-14 NOTE — PATIENT INSTRUCTIONS
Avoid beverages with sugar.   Stop Basaglar and take Tresiba     Reduce Tresiba to 10 units once daily every morning.  If blood sugars continue to be less than 80, please contact office.     Continue metformin twice daily.   Continue Jardiance once daily in the morning and Trulicity once weekly.   Ok to take metformin, Jardiance and Tresiba at the same time in the morning.     Prefer for metformin to be taken with food to avoid upset stomach.     Return to clinic in 3 months with labs prior.

## 2024-06-14 NOTE — PROGRESS NOTES
CC: This 74 y.o. Black or  female  is here for evaluation of  T2DM along with comorbidities indicated in the Visit Diagnosis section of this encounter.    HPI: Cecilia Barahona was diagnosed with T2DM in her 30s. Oral agents started at the time of diagnosis.       Prior visit 11/2023  A1c is about the same, from 7.8 to 8%.   Unable to get Jardiance from [facility] approved.   She has not been taking all her meds consistently. She is doing better lately however.   90 day CGM average 198  Plan Reduce Tresiba from 20 to 16 units once daily.   Continue Trulicity 4.5 mg weekly.   Change metformin and Jardiance to combination Synjardy XR 2 tablets once daily in the morning. This gives the same dose of medications.  Improve medication adherence.   Improve diet. Return to clinic in 3 months with labs prior    Interval hx  A1c down a bit from 8% in Nov to 7.6% in May.      She has both Basaglar and Tresiba. She alternates between the two insulins at 15 units. This week she took Basaglar. Admits that she forgets to take injections a lot.   She is back on Jardiance and metformin bc she c/o malodorous urine on Synjardy.           PMHX: CHETNA - does not like her old mask. H/o stroke with right facial dropp     LAST DIABETES EDUCATION: never    RECENT ILLNESSES/HOSPITALIZATIONS - -No.     HOSPITALIZED FOR DIABETES  -  No.    PRESCRIBED DIABETES MEDICATIONS:  gets  needles and Tresiba from [facility]/also Trulicity?     metformin 1000 mg bid  Jardiance 25 mg once daily   Trulicity 4.5 mg once weekly on Sundays    Tresiba 20 units once daily     Misses medication doses -  yes    DM COMPLICATIONS: peripheral neuropathy    SIGNIFICANT DIABETES MED HISTORY:   Glimepiride stopped at initial ov 7/2017 since she was already on Novolog  ozempic started 10/2018,  switched to trulicity ~ 12/2018 d/t nausea at 0.5 mg dose. Failed  again in 2023   Jardiance started 2/2020  tresiba and trulicity stopped in  10/2020 d/t improved BGs but Trulicity resumed shortly d/t poor diet and DM control        SELF MONITORING BLOOD GLUCOSE: Dexcom G6 CGM [facility]   Pt has been using Dexcom. Unfortunately it cannot be downloaded because pt was not logged into the [facility].   CGM reading is HI, after pt drank a coke.        HYPOGLYCEMIC EPISODES: reports her BG dropped often last night as low as 40s. She corrected with candy and soda.   Symptoms: weakness   Corrects with hard candy.       CURRENT DIET:  poor; sugary beverages.   Drinks 2 cokes/week.     Breakfast is typically 2 slices of toast with coffee with a lot of sugar.   Diet recall: 1 cup of coffee with sugar; coke.       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     BP (!) 120/50   Pulse 67   Temp 98.6 °F (37 °C)   Wt 86.6 kg (191 lb)   BMI 34.93 kg/m²         ROS:   CONSTITUTIONAL: Appetite lower, no  fatigue  Denies n/v, constipation, or diarrhea.   No dysuria       PHYSICAL EXAM:  GENERAL: Well developed, well nourished. No acute distress.   PSYCH: AAOx3, appropriate mood and affect, conversant, well-groomed. Judgement and insight good.   NEURO: Cranial nerves grossly intact. Speech clear, no tremor.   CHEST: Respirations even and unlabored.           Hemoglobin A1C   Date Value Ref Range Status   05/24/2024 7.6 (H) 4.0 - 5.6 % Final     Comment:     ADA Screening Guidelines:  5.7-6.4%  Consistent with prediabetes  >or=6.5%  Consistent with diabetes    High levels of fetal hemoglobin interfere with the HbA1C  assay. Heterozygous hemoglobin variants (HbS, HgC, etc)do  not significantly interfere with this assay.   However, presence of multiple variants may affect accuracy.     11/01/2023 8.0 (H) 4.0 - 5.6 % Final     Comment:     ADA Screening Guidelines:  5.7-6.4%  Consistent with prediabetes  >or=6.5%  Consistent with diabetes    High levels of fetal hemoglobin interfere with the HbA1C  assay. Heterozygous hemoglobin variants (HbS, HgC,  "etc)do  not significantly interfere with this assay.   However, presence of multiple variants may affect accuracy.     07/12/2023 7.8 (H) 4.0 - 5.6 % Final     Comment:     ADA Screening Guidelines:  5.7-6.4%  Consistent with prediabetes  >or=6.5%  Consistent with diabetes    High levels of fetal hemoglobin interfere with the HbA1C  assay. Heterozygous hemoglobin variants (HbS, HgC, etc)do  not significantly interfere with this assay.   However, presence of multiple variants may affect accuracy.         No results found for: "CPEPTIDE", "GLUTAMICACID", "ISLETCELLANT", "FRUCTOSAMINE"         Component Value Date/Time     05/24/2024 1334    K 4.0 05/24/2024 1334     05/24/2024 1334    CO2 21 (L) 05/24/2024 1334    BUN 22 05/24/2024 1334    CREATININE 0.8 05/24/2024 1334    GLU 67 (L) 05/24/2024 1334    CALCIUM 9.8 05/24/2024 1334    ALKPHOS 142 (H) 05/24/2024 1334    AST 18 05/24/2024 1334    ALT 15 05/24/2024 1334    BILITOT 0.7 05/24/2024 1334    EGFRNORACEVR >60.0 05/24/2024 1334    ESTGFRAFRICA >60.0 05/09/2022 1632      Lab Results   Component Value Date    CHOL 113 (L) 05/24/2024    CHOL 104 (L) 07/12/2023    CHOL 94 (L) 02/24/2022     Lab Results   Component Value Date    HDL 41 05/24/2024    HDL 35 (L) 07/12/2023    HDL 37 (L) 02/24/2022     Lab Results   Component Value Date    LDLCALC 52.6 (L) 05/24/2024    LDLCALC 51.8 (L) 07/12/2023    LDLCALC 42.4 (L) 02/24/2022     Lab Results   Component Value Date    TRIG 97 05/24/2024    TRIG 86 07/12/2023    TRIG 73 02/24/2022       Lab Results   Component Value Date    CHOLHDL 36.3 05/24/2024    CHOLHDL 33.7 07/12/2023    CHOLHDL 39.4 02/24/2022               Lab Results   Component Value Date    MICALBCREAT 61.1 (H) 05/24/2024           ASSESSMENT and PLAN:    A1C GOAL: < 7.5 %     1. Type 2 diabetes mellitus with hyperglycemia, unspecified whether long term insulin use  Spoke to pt's daughter Yettia on the phone. I am concerned about pt's ability to " take her medications consistently and safely. Her daughter was updated on plan of care and will be in regular contact with pt to help with medication adherence. She was advised to connect to pt's [employer] account.     Advised:     Avoid beverages with sugar.   Stop Basaglar and take Tresiba     Reduce Tresiba to 10 units once daily every morning.  If blood sugars continue to be less than 80, please contact office.     Ok to take metformin, Jardiance and Tresiba at the same time in the morning.     Prefer for metformin to be taken with food to avoid upset stomach.     Return to clinic in 3 months with labs prior.     TRESIBA FLEXTOUCH U-200 200 unit/mL (3 mL) insulin pen    Hemoglobin A1C      2. History of cerebrovascular accident  Avoid hypoglycemia.   Improve glycemic control.         3. Microalbuminuria  Improve glycemic control.   Continue jardiance.       4. Memory impairment  As above.                  Orders Placed This Encounter   Procedures    Hemoglobin A1C     Standing Status:   Future     Standing Expiration Date:   8/13/2025          Follow up in about 3 months (around 9/14/2024).

## 2024-06-19 ENCOUNTER — OFFICE VISIT (OUTPATIENT)
Dept: ORTHOPEDICS | Facility: CLINIC | Age: 74
End: 2024-06-19
Payer: MEDICARE

## 2024-06-19 DIAGNOSIS — M65.331 TRIGGER FINGER, RIGHT MIDDLE FINGER: Primary | ICD-10-CM

## 2024-06-19 DIAGNOSIS — E11.65 TYPE 2 DIABETES MELLITUS WITH HYPERGLYCEMIA, UNSPECIFIED WHETHER LONG TERM INSULIN USE: ICD-10-CM

## 2024-06-19 PROCEDURE — 1101F PT FALLS ASSESS-DOCD LE1/YR: CPT | Mod: HCNC,CPTII,S$GLB, | Performed by: PHYSICIAN ASSISTANT

## 2024-06-19 PROCEDURE — 3060F POS MICROALBUMINURIA REV: CPT | Mod: HCNC,CPTII,S$GLB, | Performed by: PHYSICIAN ASSISTANT

## 2024-06-19 PROCEDURE — 3051F HG A1C>EQUAL 7.0%<8.0%: CPT | Mod: HCNC,CPTII,S$GLB, | Performed by: PHYSICIAN ASSISTANT

## 2024-06-19 PROCEDURE — 4010F ACE/ARB THERAPY RXD/TAKEN: CPT | Mod: HCNC,CPTII,S$GLB, | Performed by: PHYSICIAN ASSISTANT

## 2024-06-19 PROCEDURE — 99213 OFFICE O/P EST LOW 20 MIN: CPT | Mod: HCNC,S$GLB,, | Performed by: PHYSICIAN ASSISTANT

## 2024-06-19 PROCEDURE — 1160F RVW MEDS BY RX/DR IN RCRD: CPT | Mod: HCNC,CPTII,S$GLB, | Performed by: PHYSICIAN ASSISTANT

## 2024-06-19 PROCEDURE — 1157F ADVNC CARE PLAN IN RCRD: CPT | Mod: HCNC,CPTII,S$GLB, | Performed by: PHYSICIAN ASSISTANT

## 2024-06-19 PROCEDURE — 1159F MED LIST DOCD IN RCRD: CPT | Mod: HCNC,CPTII,S$GLB, | Performed by: PHYSICIAN ASSISTANT

## 2024-06-19 PROCEDURE — 99999 PR PBB SHADOW E&M-EST. PATIENT-LVL III: CPT | Mod: PBBFAC,HCNC,, | Performed by: PHYSICIAN ASSISTANT

## 2024-06-19 PROCEDURE — 3066F NEPHROPATHY DOC TX: CPT | Mod: HCNC,CPTII,S$GLB, | Performed by: PHYSICIAN ASSISTANT

## 2024-06-19 PROCEDURE — 3288F FALL RISK ASSESSMENT DOCD: CPT | Mod: HCNC,CPTII,S$GLB, | Performed by: PHYSICIAN ASSISTANT

## 2024-06-19 RX ORDER — MUPIROCIN 20 MG/G
OINTMENT TOPICAL
OUTPATIENT
Start: 2024-06-19

## 2024-06-19 RX ORDER — DULAGLUTIDE 4.5 MG/.5ML
4.5 INJECTION, SOLUTION SUBCUTANEOUS
Qty: 12 PEN | Refills: 2 | Status: SHIPPED | OUTPATIENT
Start: 2024-06-19 | End: 2025-06-19

## 2024-06-19 RX ORDER — CEFAZOLIN SODIUM 2 G/50ML
2 SOLUTION INTRAVENOUS
OUTPATIENT
Start: 2024-06-19

## 2024-06-19 RX ORDER — INSULIN DEGLUDEC 100 U/ML
10 INJECTION, SOLUTION SUBCUTANEOUS DAILY
Qty: 15 ML | Refills: 2 | Status: SHIPPED | OUTPATIENT
Start: 2024-06-19 | End: 2025-06-19

## 2024-06-19 RX ORDER — INSULIN DEGLUDEC 200 U/ML
INJECTION, SOLUTION SUBCUTANEOUS
Qty: 3 PEN | Refills: 3 | Status: CANCELLED | OUTPATIENT
Start: 2024-06-19

## 2024-06-19 NOTE — PROGRESS NOTES
Subjective:      Patient ID: Cecilia Barahona is a 74 y.o. female.    Chief Complaint: Hand Pain (Discuss sx for rt middle finger )    HPI: Cecilia Barahona returns to the clinic today for evaluation of new onset triggering to the right hand middle digit.  Her symptoms are much like previous trigger fingers that she has had in the past.  It is beginning to lock throughout the day.  She denies sensation changes and/or additional complaints.    Hx of L ring digit trigger finger release on 2-3-2023.  She notes complete resolution to symptoms on this digit.      Past Medical History:   Diagnosis Date    Acute coronary syndrome 05/20/2020 5/18/2020: Presents with unstable angina.    Allergic rhinitis, seasonal     Anxiety     Arthritis     Colon polyp     CVA (cerebral vascular accident)     Depression     Glaucoma NEC     Hallucination     images out of corner of eye about a year ago    History of positive PPD - treated - remote past     Hx of psychiatric care     Hyperlipidemia LDL goal < 100     Hypertension     Nuclear sclerosis of both eyes 02/26/2019    Obesity     CHETNA (obstructive sleep apnea)     Psychiatric problem     Psychosis     Renal disorder     Sleep difficulties     Suicide attempt     about 30 years ago by overdose of pills    Therapy     Type II or unspecified type diabetes mellitus without mention of complication, uncontrolled        Current Outpatient Medications:     amLODIPine (NORVASC) 10 MG tablet, TAKE 1 TABLET(10 MG) BY MOUTH EVERY DAY, Disp: 90 tablet, Rfl: 3    aspirin (ECOTRIN) 81 MG EC tablet, TAKE 1 TABLET BY MOUTH ONCE DAILY, Disp: 90 tablet, Rfl: 3    atorvastatin (LIPITOR) 80 MG tablet, TAKE 1 TABLET(80 MG) BY MOUTH EVERY DAY, Disp: 90 tablet, Rfl: 3    carvediloL (COREG) 12.5 MG tablet, TAKE 1 TABLET(12.5 MG) BY MOUTH TWICE DAILY, Disp: 180 tablet, Rfl: 3    clobetasoL (OLUX) 0.05 % Foam, Apply 1 g topically 2 (two) times daily., Disp: , Rfl:     dorzolamide-timolol 2-0.5%  "(COSOPT) 22.3-6.8 mg/mL ophthalmic solution, Place 1 drop into both eyes 2 (two) times daily., Disp: 10 mL, Rfl: 1    dulaglutide (TRULICITY) 4.5 mg/0.5 mL pen injector, Inject 4.5 mg into the skin every 7 days., Disp: 12 pen , Rfl: 2    empagliflozin (JARDIANCE) 10 mg tablet, Take 10 mg by mouth once daily., Disp: , Rfl:     EScitalopram oxalate (LEXAPRO) 10 MG tablet, Take 1 tablet (10 mg total) by mouth once daily., Disp: 90 tablet, Rfl: 2    ezetimibe (ZETIA) 10 mg tablet, TAKE 1 TABLET(10 MG) BY MOUTH EVERY DAY, Disp: 90 tablet, Rfl: 3    finerenone (KERENDIA) 10 mg Tab, Take 10 mg by mouth Daily., Disp: 90 tablet, Rfl: 0    furosemide (LASIX) 20 MG tablet, Take 1 tablet (20 mg total) by mouth once daily., Disp: 90 tablet, Rfl: 3    HYDROcodone-acetaminophen (NORCO) 5-325 mg per tablet, Take 1 tablet by mouth every 4 (four) hours as needed for Pain., Disp: 12 tablet, Rfl: 0    insulin degludec (TRESIBA FLEXTOUCH U-100) 100 unit/mL (3 mL) insulin pen, Inject 10 Units into the skin once daily., Disp: 15 mL, Rfl: 2    loratadine (CLARITIN) 10 mg tablet, TAKE 1 TABLET BY MOUTH EVERY DAY AS NEEDED FOR ALLERGIES, Disp: 90 tablet, Rfl: 3    melatonin (MELATIN) 3 mg tablet, Take 2 tablets (6 mg total) by mouth nightly as needed for Insomnia., Disp: , Rfl: 0    meloxicam (MOBIC) 7.5 MG tablet, TAKE 1 TABLET(7.5 MG) BY MOUTH DAILY AS NEEDED FOR PAIN, Disp: 30 tablet, Rfl: 0    metFORMIN (GLUCOPHAGE) 1000 MG tablet, Take 1 tablet (1,000 mg total) by mouth 2 (two) times daily with meals., Disp: 180 tablet, Rfl: 2    multivitamin with minerals tablet, Take 1 tablet by mouth once daily., Disp: , Rfl:     ondansetron (ZOFRAN-ODT) 8 MG TbDL, , Disp: , Rfl:     pantoprazole (PROTONIX) 40 MG tablet, , Disp: , Rfl:     pen needle, diabetic (BD ULTRA-FINE LENY PEN NEEDLE) 32 gauge x 5/32" Ndle, 1 Device by Misc.(Non-Drug; Combo Route) route Daily., Disp: 100 each, Rfl: 4    pen needle, diabetic (NOVOFINE 32) 32 gauge x 1/4" Ndle, " Use 1x/day, Disp: 200 each, Rfl: 3    rOPINIRole (REQUIP) 0.5 MG tablet, Take 1 tablet (0.5 mg total) by mouth every evening., Disp: 90 tablet, Rfl: 0    TRUEPLUS LANCETS 30 gauge Misc, USE TO TEST TWICE DAILY, Disp: , Rfl:     valsartan (DIOVAN) 320 MG tablet, TAKE 1 TABLET(320 MG) BY MOUTH EVERY DAY, Disp: 90 tablet, Rfl: 3    albuterol (PROVENTIL/VENTOLIN HFA) 90 mcg/actuation inhaler, Inhale 2 puffs into the lungs every 6 (six) hours as needed for Wheezing or Shortness of Breath. Rescue (Patient not taking: Reported on 6/19/2024), Disp: 19 g, Rfl: 0    fluticasone (FLONASE) 50 mcg/actuation nasal spray, 1 spray (50 mcg total) by Each Nare route once daily. (Patient not taking: Reported on 6/19/2024), Disp: 16 g, Rfl: 5    nitroGLYCERIN (NITROSTAT) 0.4 MG SL tablet, Place 1 tablet (0.4 mg total) under the tongue every 5 (five) minutes as needed for Chest pain. (Patient not taking: Reported on 6/19/2024), Disp: 30 tablet, Rfl: 3    triamcinolone acetonide 0.1% (KENALOG) 0.1 % ointment, Apply topically 2 (two) times daily. for 7 days, Disp: 80 g, Rfl: 0  Review of patient's allergies indicates:   Allergen Reactions    Invokana [canagliflozin] Swelling     Throat and tongue swelling      Ace inhibitors      cough       There were no vitals taken for this visit.    Review of Systems   Constitutional:  Negative for chills and fever.   Respiratory:  Negative for shortness of breath.    Cardiovascular:  Negative for chest pain and leg swelling.   Gastrointestinal:  Negative for nausea and vomiting.   Genitourinary:  Negative for dysuria.   Musculoskeletal:  Positive for joint pain. Negative for falls.   Skin:  Negative for rash.   Neurological:  Negative for dizziness and sensory change.          Objective:    Ortho Exam     General  General: alert & oriented x 3, NAD, sitting comfortably in the exam room  Resp: breathing unlabored  GI: abdomen soft and nondistended  Psych: mood and affect appropriate  HEENT:  PERRLA    Examination RIGHT HAND/WRIST:   Skin: (+) scar overlying carpal tunnel present  Swelling: negative throughout  Palpation:   TTP (+) to A1 pulley of right hand middle digit.  Palpable triggering during digit range of motion   Otherwise, NTTP to bony prominences and soft tissues throughout.   ROM (active and passive):  Full to WRIST flex/ext/radial and ulnar deviation.  Full to DIGIT MCP/PIP/DIP digits without pain or difficulty   Sensation: grossly intact to radial/median/ulnar nerve distributions  Special Testing: Tinels negative. Finkelstein negative. Thumb CMC grind (-)  NV: Pulses palpable. Cap refill brisk    Assessment:         1. Trigger finger, right middle finger          Plan:   The patient's trigger finger symptoms to her right middle digit have increasingly worsened.  She is very aware of her treatment options as she has had multiple trigger fingers in the past.    Patient would like to proceed with surgical trigger finger release    OR: Plan to go to OR for right middle digit trigger finger release    Follow-up: 2 weeks postop for wound check and suture removal

## 2024-06-20 ENCOUNTER — TELEPHONE (OUTPATIENT)
Dept: ORTHOPEDICS | Facility: CLINIC | Age: 74
End: 2024-06-20
Payer: MEDICARE

## 2024-06-20 NOTE — TELEPHONE ENCOUNTER
----- Message from Renetta Jovel sent at 6/20/2024  9:24 AM CDT -----  Name of Who is calling :ZHEN CARRANZA [[id number]]        What is the request in detail:Pt would like to see a nurse to call her back to schedule her surgery  .please assist        Can the clinic reply by [facility]:  no          What number to call back if not in [place]: [phone]

## 2024-06-20 NOTE — TELEPHONE ENCOUNTER
Spoke to patient. Patient stated that she wanted to speak with someone at the [facility] because se wanting to have her surgery done sooner then 8/2. I informed patient that we will forward a message to [facility] to see if they are able to see her sooner then 8/2 with Dr Schaefer.

## 2024-06-21 ENCOUNTER — TELEPHONE (OUTPATIENT)
Dept: SURGERY | Facility: HOSPITAL | Age: 74
End: 2024-06-21
Payer: MEDICARE

## 2024-06-21 DIAGNOSIS — Z01.818 PRE-OP EVALUATION: Primary | ICD-10-CM

## 2024-06-21 DIAGNOSIS — I10 PRIMARY HYPERTENSION: ICD-10-CM

## 2024-06-25 ENCOUNTER — TELEPHONE (OUTPATIENT)
Dept: PHARMACY | Facility: CLINIC | Age: 74
End: 2024-06-25
Payer: MEDICARE

## 2024-06-25 DIAGNOSIS — J30.9 ALLERGIC RHINITIS, UNSPECIFIED SEASONALITY, UNSPECIFIED TRIGGER: ICD-10-CM

## 2024-06-25 NOTE — TELEPHONE ENCOUNTER
We have reached out to Cecilia Barahona to inform her of the [employer], [employer], and [employer] Patient Assistance Program re-enrollment process for Jardiance, Pen Needles, Tresiba Pen, and Trulicity 4.5 mg. Patient must provided the following documentation to re-apply for 2024: Proof of household Income( such as social security statement, 1099 form, pension statement or 3 consecutive pay stubs, Copy of all Insurance cards( front and back), and Signed and dated HIPAA /Patient Information Forms          Thank you   Pharmacy Patient Assistance

## 2024-06-26 RX ORDER — LORATADINE 10 MG/1
TABLET ORAL
Qty: 90 TABLET | Refills: 2 | Status: SHIPPED | OUTPATIENT
Start: 2024-06-26

## 2024-06-26 NOTE — TELEPHONE ENCOUNTER
Refill Routing Note   Medication(s) are not appropriate for processing by [facility] for the following reason(s):        No active prescription written by provider    [facility] action(s):  Defer        Medication Therapy Plan: Not ordered by PCP in more than 2 years (6/13/22)      Appointments  past 12m or future 3m with PCP    Date Provider   Last Visit   5/3/2024 Tiarra Villa MD   Next Visit   Visit date not found Tiarra Villa MD   ED visits in past 90 days: 0        Note composed:10:22 PM 06/25/2024

## 2024-06-26 NOTE — TELEPHONE ENCOUNTER
No care due was identified.  [facility] Embedded Care Due Messages. Reference number: [id number].   6/25/2024 9:43:45 PM CDT

## 2024-07-02 ENCOUNTER — PATIENT MESSAGE (OUTPATIENT)
Dept: PHARMACY | Facility: CLINIC | Age: 74
End: 2024-07-02
Payer: MEDICARE

## 2024-07-03 ENCOUNTER — PATIENT MESSAGE (OUTPATIENT)
Dept: PHARMACY | Facility: CLINIC | Age: 74
End: 2024-07-03
Payer: MEDICARE

## 2024-07-08 ENCOUNTER — PATIENT MESSAGE (OUTPATIENT)
Dept: PHARMACY | Facility: CLINIC | Age: 74
End: 2024-07-08
Payer: MEDICARE

## 2024-07-08 DIAGNOSIS — G89.29 CHRONIC LEFT SHOULDER PAIN: ICD-10-CM

## 2024-07-08 DIAGNOSIS — M25.512 CHRONIC LEFT SHOULDER PAIN: ICD-10-CM

## 2024-07-08 RX ORDER — MELOXICAM 7.5 MG/1
7.5 TABLET ORAL DAILY PRN
Qty: 90 TABLET | Refills: 1 | Status: SHIPPED | OUTPATIENT
Start: 2024-07-08

## 2024-07-08 NOTE — TELEPHONE ENCOUNTER
No care due was identified.  [facility] Embedded Care Due Messages. Reference number: [id number].   7/08/2024 1:15:50 PM CDT

## 2024-07-10 ENCOUNTER — PATIENT MESSAGE (OUTPATIENT)
Dept: ENDOCRINOLOGY | Facility: CLINIC | Age: 74
End: 2024-07-10
Payer: MEDICARE

## 2024-07-11 RX ORDER — GLIPIZIDE 2.5 MG/1
2.5 TABLET, EXTENDED RELEASE ORAL
Qty: 90 TABLET | Refills: 0 | Status: SHIPPED | OUTPATIENT
Start: 2024-07-11 | End: 2025-07-11

## 2024-07-11 NOTE — TELEPHONE ENCOUNTER
Spoke to patient, notified that medication was delivered to wrong location, patient states she will go get it today.    Patient also states that she quit taking tresiba due to low glucoses. She tried taking it yesterday and had lows again. Dexcom report uploaded.

## 2024-07-14 DIAGNOSIS — G25.81 RESTLESS LEGS SYNDROME (RLS): ICD-10-CM

## 2024-07-14 NOTE — TELEPHONE ENCOUNTER
No care due was identified.  [facility] Embedded Care Due Messages. Reference number: [id number].   7/14/2024 6:09:11 AM CDT

## 2024-07-15 RX ORDER — ROPINIROLE 0.5 MG/1
0.5 TABLET, FILM COATED ORAL
Qty: 90 TABLET | Refills: 0 | Status: SHIPPED | OUTPATIENT
Start: 2024-07-15

## 2024-07-18 ENCOUNTER — HOSPITAL ENCOUNTER (OUTPATIENT)
Dept: PREADMISSION TESTING | Facility: HOSPITAL | Age: 74
Discharge: HOME | End: 2024-07-18
Attending: INTERNAL MEDICINE
Payer: MEDICARE

## 2024-07-18 NOTE — DISCHARGE INSTRUCTIONS
Your surgery is scheduled for 8/2/24.    Please report to Hospital Front Lobby on the 1st Floor at 0630 a.m.    THIS TIME IS SUBJECT TO CHANGE.  YOU WILL RECEIVE A PHONE CALL THE DAY BEFORE SURGERY BY 3:30 PM TO CONFIRM YOUR TIME OF ARRIVAL.  IF YOU HAVE NOT RECEIVED A PHONE CALL BY 3:30 PM THE DAY BEFORE YOUR SURGERY PLEASE CALL [phone]     INSTRUCTIONS IMPORTANT!!!  ¨ Do not eat or drink 8 hours prior to arrival time-including water, candy, gum, & mints. OK to brush teeth.      Please take Amlodipine, Carvedilol, and Lexapro the morning of surgery. Please stop Jardiance 3 days prior to surgery, Meloxicam 7 days prior to surgery. Stop Trulicity on 7/21/24.       ____  Do not wear makeup, including mascara.  ____  No powder, lotions or creams to surgical area.  ____  Please remove all jewelry, including piercings and leave at home.  ____  No money or valuables needed. Please leave at home.  ____  Please bring any documents given by your doctor.  ____  If going home the same day, arrange for a ride home. You will not be able to             drive if Anesthesia was used.  ____  Children under 18 years require a parent / guardian present the entire time             they are in surgery / recovery.  ____  Wear loose fitting clothing. Allow for dressings, bandages.  ____  Stop Aspirin and blood thinners as directed by prescribing provider.  ____  Do not take any herbal, vitamins, and or supplements 2 weeks prior to surgery.  ____  Stop NSAIDS (Naproxen, Aleve, Voltaren, Celebrex, Melxoicam), Goody's, and BC powder 7 days prior to surgery.  ____  Wash the surgical area with Hibiclens or Dial Antibacterial bar soap the night before surgery, and again the             morning of surgery.  Be sure to rinse hibiclens or Dial Antibacterial bar soap off completely (if instructed by   nurse).  ____  If you take diabetic medication including Metformin, Glimepiride, Glipizide, Glyburide, Byetta, Januvia, Actos, do not take am  of surgery unless            instructed by Doctor.  ____  Hold Invokana, Farxiga, and Jardiance for 3 days prior to surgery.   ____  Call MD for temperature above 101 degrees or any other signs of infection such as Urinary (bladder) infection, Upper respiratory infection, skin boils,             etc.  ____ Do Not wear your contact lenses the day of your procedure.  You may wear your glasses.      ____ Do not shave surgical site for 3 days prior to surgery.  ____ Practice Good hand washing before, during, and after procedure.      I have read or had read and explained to me, and understand the above information.  Additional comments or instructions:  For additional questions call [phone]      ANESTHESIA SIDE EFFECTS  -For the first 24 hours after surgery:  Do not drive, use heavy equipment, make important decisions, or drink alcohol  -It is normal to feel sleepy for several hours.  Rest until you are more awake.  -Have someone stay with you, if needed.  They can watch for problems and help keep you safe.  -Some possible post anesthesia side effects include: nausea and vomiting, sore throat and hoarseness, sleepiness, and dizziness.        Pre-Op Bathing Instructions    Before surgery, you can play an important role in your own health.    Because skin is not sterile, we need to be sure that your skin is as free of germs as possible. By following the instructions below, you can reduce the number of germs on your skin before surgery.    IMPORTANT: You will need to shower with a special soap called Hibiclens*, available at any pharmacy.  If you are allergic to Chlorhexidine (the antiseptic in Hibiclens), use an antibacterial soap such as Dial Soap for your preoperative shower.  You will shower with Hibiclens both the night before your surgery and the morning of your surgery.  Do not use Hibiclens on the head, face or genitals to avoid injury to those areas.    STEP #1: THE NIGHT BEFORE YOUR SURGERY     Do not shave the  area of your body where your surgery will be performed.  Shower and wash your hair and body as usual with your normal soap and shampoo.  Rinse your hair and body thoroughly after you shower to remove all soap residue.  With your hand, apply one packet of Hibiclens soap to the surgical site.   Wash the site gently for five (5) minutes. Do not scrub your skin too hard.   Do not wash with your regular soap after Hibiclens is used.  Rinse your body thoroughly.  Pat yourself dry with a clean, soft towel.  Do not use lotion, cream, or powder.  Wear clean clothes.    STEP #2: THE MORNING OF YOUR SURGERY     Repeat Step #1.    * Not to be used by people allergic to Chlorhexidine.

## 2024-07-18 NOTE — PRE-PROCEDURE INSTRUCTIONS
Daughter.    Allergies, medical, surgical, family and psychosocial histories reviewed with patient. Periop plan of care reviewed. Preop instructions given, including medications to take and to hold. Hibiclens soap and instructions on use given. Time allotted for questions to be addressed.      Arrival time 0630.      Instructed to  take Amlodipine, Carvedilol, and Lexapro the morning of surgery. Please stop Jardiance 3 days prior to surgery, Meloxicam 7 days prior to surgery. Stop Trulicity on 7/21/24.

## 2024-07-22 ENCOUNTER — HOSPITAL ENCOUNTER (OUTPATIENT)
Dept: CARDIOLOGY | Facility: HOSPITAL | Age: 74
Discharge: HOME | End: 2024-07-22
Attending: STUDENT IN AN ORGANIZED HEALTH CARE EDUCATION/TRAINING PROGRAM
Payer: MEDICARE

## 2024-07-22 DIAGNOSIS — I10 PRIMARY HYPERTENSION: ICD-10-CM

## 2024-07-22 DIAGNOSIS — Z01.818 PRE-OP EVALUATION: ICD-10-CM

## 2024-07-22 LAB
OHS QRS DURATION: 66 MS
OHS QTC CALCULATION: 374 MS

## 2024-07-22 PROCEDURE — 93005 ELECTROCARDIOGRAM TRACING: CPT | Mod: HCNC

## 2024-07-22 PROCEDURE — 93010 ELECTROCARDIOGRAM REPORT: CPT | Mod: HCNC,,, | Performed by: INTERNAL MEDICINE

## 2024-07-22 NOTE — PROGRESS NOTES
[facility]  Cardiothoracic Surgery  Progress Note    Patient Name: Cecilia Barahona  MRN: [id number]  Admission Date: 5/18/2020  Hospital Length of Stay: 9 days  Code Status: Full Code   Attending Physician: Yan Bowman MD   Referring Provider: Self, Aaareferral  Principal Problem:Coronary artery disease            Subjective:     Post-Op Info:  Procedure(s) (LRB):  CORONARY ARTERY BYPASS GRAFT (CABG) x3  (N/A)   4 Days Post-Op     Interval History: Patient found in pleasant mood. Diet changed to cardiac with 1500 cc fluid restriction. Pacer wires and remains chest tubes removed today. NSR on monitor, pt on 2LO2 via NC. Pain well controled with oral pain medications per patient.    Review of Systems   Constitution: Negative for decreased appetite, fever and malaise/fatigue.   Cardiovascular: Negative for chest pain, claudication, dyspnea on exertion, irregular heartbeat, leg swelling, palpitations and syncope.   Respiratory: Negative for cough and shortness of breath.    Hematologic/Lymphatic: Negative for bleeding problem.   Skin: Negative for rash.   Musculoskeletal: Negative for arthritis and myalgias.   Gastrointestinal: Negative for abdominal pain, diarrhea, melena, nausea and vomiting.   Genitourinary: Negative for dysuria.   Neurological: Negative for headaches, paresthesias and seizures.     Medications:  Continuous Infusions:   furosemide (LASIX) 10 mg/mL infusion (non-titrating) 10 mg/hr (05/29/20 0000)     Scheduled Meds:   acetaminophen  1,000 mg Oral Q8H    aspirin  325 mg Oral Daily    atorvastatin  80 mg Oral QHS    metoprolol tartrate  25 mg Oral BID    mupirocin  1 g Nasal BID    pantoprazole  40 mg Oral Daily    polyethylene glycol  17 g Oral Daily    potassium chloride  40 mEq Oral BID    rOPINIRole  0.5 mg Oral Nightly    senna-docusate 8.6-50 mg  1 tablet Oral Daily     PRN Meds:acetaminophen, acetaminophen, bisacodyL, Dextrose 10% Bolus, Dextrose 10% Bolus,  Pt d/c home with AVS no s.s of distress noted script x 2 sent to pharmacy pt denies questions about f/u no s/s of distress noted    Dextrose 10% Bolus, Dextrose 10% Bolus, glucagon (human recombinant), glucose, glucose, insulin aspart U-100, melatonin, ondansetron, ondansetron, oxyCODONE, oxyCODONE     Objective:     Vital Signs (Most Recent):  Temp: 98.4 °F (36.9 °C) (05/29/20 0818)  Pulse: 87 (05/29/20 1103)  Resp: 16 (05/29/20 0818)  BP: 104/62 (05/29/20 0818)  SpO2: (!) 92 % (05/29/20 0818) Vital Signs (24h Range):  Temp:  [98 °F (36.7 °C)-99.1 °F (37.3 °C)] 98.4 °F (36.9 °C)  Pulse:  [] 87  Resp:  [13-35] 16  SpO2:  [80 %-96 %] 92 %  BP: ()/(55-63) 104/62  Arterial Line BP: ()/(41-52) 134/44     Weight: 92.3 kg (203 lb 7.8 oz)  Body mass index is 36.05 kg/m².    SpO2: (!) 92 %  O2 Device (Oxygen Therapy): nasal cannula    Intake/Output - Last 3 Shifts       05/27 0700 - 05/28 0659 05/28 0700 - 05/29 0659 05/29 0700 - 05/30 0659    P.O.  1078 360    I.V. (mL/kg) 1778 (19.3) 117 (1.3)     Blood 250 100     Total Intake(mL/kg) 2028 (22) 1295 (14) 360 (3.9)    Urine (mL/kg/hr) 940 (0.4) 3350 (1.5) 600 (1.5)    Stool 0 0     Chest Tube 385 110     Total Output 1325 3460 600    Net +703 -2165 -240           Stool Occurrence 3 x 1 x           Lines/Drains/Airways     Central Venous Catheter Line                 Percutaneous Central Line Insertion/Assessment - Quad lumen  05/25/20 0802 4 days    Percutaneous Central Line Insertion/Assessment - Quad Lumen  05/25/20 0802 4 days          Drain                 Y Chest Tube 1 and 2 05/25/20 1413 Mediastinal 19 Fr. Mediastinal 19 Fr. 3 days          Peripheral Intravenous Line                 Peripheral IV - Single Lumen 05/18/20 2305 20 G Left Forearm 10 days         Peripheral IV - Single Lumen 05/25/20 0814 16 G Left Wrist 4 days                Physical Exam   Constitutional: She is oriented to person, place, and time. She appears well-developed and well-nourished.   Cardiovascular: Normal rate, regular rhythm and normal heart sounds.   Pulmonary/Chest: Effort normal and breath  sounds normal.   Abdominal: Soft.   Med chest tubes and pacer wires in place   Musculoskeletal: Normal range of motion.   Neurological: She is alert and oriented to person, place, and time.   Skin: Skin is warm, dry and intact.   Sternal Incision CDI   Psychiatric: She has a normal mood and affect.       Significant Labs:  BMP:   Recent Labs   Lab 05/29/20  0555         K 4.3      CO2 27   BUN 43*   CREATININE 1.4   CALCIUM 9.8     CMP:   Recent Labs   Lab 05/29/20  0555      CALCIUM 9.8      K 4.3   CO2 27      BUN 43*   CREATININE 1.4     Coagulation: No results for input(s): PT, INR, APTT in the last 48 hours.    Significant Diagnostics:  I have reviewed all pertinent imaging results/findings within the past 24 hours.    Assessment/Plan:     * Coronary artery disease  71 yo lady with T2DM, HTN, HLD, GERD, presented to outside hospital with SOB and chest pain. Went to cath lab and found to have multivessel disease. Previously smokes 2 packs of ciagerettes per week but quit 20 years ago. Reports that at home she is able to perform ADLs without difficulty. Walking up the stairs to her house causes SOB. Denies chest pain today. Reports having a stroke many years ago that caused some memory issues which is still a problem and some left hand weakness which has resolved. Blood glucose usually in 150s. Last A1C 7. Stop Valsartan and Imdur in preparation for CABG Monday, May 25. Will order CT chest and carotid US. Dr. Bowman to review and staff.     Postoperative anemia due to acute blood loss  -Expected KRUPA post-operatively  -CBC daily to monitor and trend    S/P CABG x 3  Ms. Cecilia Barahona is a 70 y.o. female w/ CAD, HTN. HLD, DM2 on insulin, 0.5ppd smoking history who presented to [facility] with 4 day history of chest pain.  Patient with exertional angina only until 4 days ago when she developed persistent chest pain.  She was admitted to the hospital and underwent  though evaluation revealing mulit-vessel doronary artery disease.  Patient transferred to [facility] for CTS evaluation.  On 5/25, she underwent CABGx3.  -Chest tubes and pacer wires removed   -Metoprolol 25 mg BID ordered  -Lasix drip remains at 10mg/hr with scheduled potassium replacement  -Insulin drip managed by endocrine  -Continue ASA and statin   -Encourage ambulation with PT/OT  -Encourage IS and deep breathing  -Sternal precautions   -Cardiac diet with 1500 cc fluid restriction placed      Type 2 diabetes mellitus without complication, with long-term current use of insulin  -Endocrine following          Brandy Peters NP  Cardiothoracic Surgery  [facility]

## 2024-07-23 ENCOUNTER — OFFICE VISIT (OUTPATIENT)
Dept: FAMILY MEDICINE | Facility: CLINIC | Age: 74
End: 2024-07-23
Payer: MEDICARE

## 2024-07-23 ENCOUNTER — TELEPHONE (OUTPATIENT)
Dept: FAMILY MEDICINE | Facility: CLINIC | Age: 74
End: 2024-07-23
Payer: MEDICARE

## 2024-07-23 ENCOUNTER — TELEPHONE (OUTPATIENT)
Dept: ENDOCRINOLOGY | Facility: CLINIC | Age: 74
End: 2024-07-23
Payer: MEDICARE

## 2024-07-23 VITALS
SYSTOLIC BLOOD PRESSURE: 130 MMHG | WEIGHT: 192.69 LBS | DIASTOLIC BLOOD PRESSURE: 74 MMHG | BODY MASS INDEX: 35.46 KG/M2 | HEART RATE: 72 BPM | OXYGEN SATURATION: 96 % | TEMPERATURE: 98 F | HEIGHT: 62 IN

## 2024-07-23 DIAGNOSIS — R94.31 ABNORMAL EKG: ICD-10-CM

## 2024-07-23 DIAGNOSIS — I10 INTERMITTENT HYPERTENSION: Primary | ICD-10-CM

## 2024-07-23 DIAGNOSIS — E11.29 TYPE 2 DIABETES MELLITUS WITH DIABETIC MICROALBUMINURIA, WITHOUT LONG-TERM CURRENT USE OF INSULIN: ICD-10-CM

## 2024-07-23 DIAGNOSIS — R80.9 TYPE 2 DIABETES MELLITUS WITH DIABETIC MICROALBUMINURIA, WITHOUT LONG-TERM CURRENT USE OF INSULIN: ICD-10-CM

## 2024-07-23 PROBLEM — R07.9 CHEST PAIN: Status: RESOLVED | Noted: 2023-09-18 | Resolved: 2024-07-23

## 2024-07-23 PROBLEM — E11.65 TYPE 2 DIABETES MELLITUS WITH HYPERGLYCEMIA: Status: ACTIVE | Noted: 2024-07-23

## 2024-07-23 PROCEDURE — 1157F ADVNC CARE PLAN IN RCRD: CPT | Mod: HCNC,CPTII,S$GLB, | Performed by: NURSE PRACTITIONER

## 2024-07-23 PROCEDURE — 3051F HG A1C>EQUAL 7.0%<8.0%: CPT | Mod: HCNC,CPTII,S$GLB, | Performed by: NURSE PRACTITIONER

## 2024-07-23 PROCEDURE — 3075F SYST BP GE 130 - 139MM HG: CPT | Mod: HCNC,CPTII,S$GLB, | Performed by: NURSE PRACTITIONER

## 2024-07-23 PROCEDURE — 3060F POS MICROALBUMINURIA REV: CPT | Mod: HCNC,CPTII,S$GLB, | Performed by: NURSE PRACTITIONER

## 2024-07-23 PROCEDURE — 4010F ACE/ARB THERAPY RXD/TAKEN: CPT | Mod: HCNC,CPTII,S$GLB, | Performed by: NURSE PRACTITIONER

## 2024-07-23 PROCEDURE — 3066F NEPHROPATHY DOC TX: CPT | Mod: HCNC,CPTII,S$GLB, | Performed by: NURSE PRACTITIONER

## 2024-07-23 PROCEDURE — 99999 PR PBB SHADOW E&M-EST. PATIENT-LVL V: CPT | Mod: PBBFAC,HCNC,, | Performed by: NURSE PRACTITIONER

## 2024-07-23 PROCEDURE — 3288F FALL RISK ASSESSMENT DOCD: CPT | Mod: HCNC,CPTII,S$GLB, | Performed by: NURSE PRACTITIONER

## 2024-07-23 PROCEDURE — 99214 OFFICE O/P EST MOD 30 MIN: CPT | Mod: HCNC,S$GLB,, | Performed by: NURSE PRACTITIONER

## 2024-07-23 PROCEDURE — 1101F PT FALLS ASSESS-DOCD LE1/YR: CPT | Mod: HCNC,CPTII,S$GLB, | Performed by: NURSE PRACTITIONER

## 2024-07-23 PROCEDURE — 3008F BODY MASS INDEX DOCD: CPT | Mod: HCNC,CPTII,S$GLB, | Performed by: NURSE PRACTITIONER

## 2024-07-23 PROCEDURE — 3078F DIAST BP <80 MM HG: CPT | Mod: HCNC,CPTII,S$GLB, | Performed by: NURSE PRACTITIONER

## 2024-07-23 PROCEDURE — 1159F MED LIST DOCD IN RCRD: CPT | Mod: HCNC,CPTII,S$GLB, | Performed by: NURSE PRACTITIONER

## 2024-07-23 PROCEDURE — 1125F AMNT PAIN NOTED PAIN PRSNT: CPT | Mod: HCNC,CPTII,S$GLB, | Performed by: NURSE PRACTITIONER

## 2024-07-23 RX ORDER — PEN NEEDLE, DIABETIC, SAFETY 31 GX3/16"
NEEDLE, DISPOSABLE MISCELLANEOUS
COMMUNITY
Start: 2024-05-30

## 2024-07-23 RX ORDER — LANCING DEVICE
EACH MISCELLANEOUS
COMMUNITY
Start: 2024-05-30

## 2024-07-23 RX ORDER — BLOOD SUGAR DIAGNOSTIC
STRIP MISCELLANEOUS
COMMUNITY
Start: 2024-05-30

## 2024-07-23 RX ORDER — LANCETS 30 GAUGE
EACH MISCELLANEOUS
COMMUNITY
Start: 2024-05-30

## 2024-07-23 RX ORDER — BLOOD-GLUCOSE METER
EACH MISCELLANEOUS
COMMUNITY
Start: 2024-05-30

## 2024-07-23 RX ORDER — PEN NEEDLE, DIABETIC, SAFETY 31 G X1/4"
NEEDLE, DISPOSABLE MISCELLANEOUS
COMMUNITY
Start: 2024-05-30

## 2024-07-23 NOTE — TELEPHONE ENCOUNTER
----- Message from Gabriel Perez sent at 7/23/2024  2:50 PM CDT -----  Regarding: Tresiba FlexTouch 200u Patient Assistance Program  [facility] has received medication from [facility] patient assistance program for your patient Cecilia Barahona ([id number]).  At your earliest convenience please send to [facility] escript for.  Tresiba FlexTouch 200u (qty 3 pens)  Thanks

## 2024-07-23 NOTE — PROGRESS NOTES
Subjective:      Patient ID: Cecilia Barahona is a 74 y.o. female.  Pt remotely known to me. She reports increasing blood pressures and increasing SOB with exertion that began ~3wks ago. Home BP ranging 160's-120's/80's-90's despite continuing amlodipine, coreg, lasix and valsartan daily. She also had an EKG yesterday that showed abnormalities that she is concerned about. Also reports BS is improved and more stable since stopping tresiba a few weeks ago. BS remaining 's with trulicity, metformin, glipizide and jardiance.       Vent. Rate : 072 BPM     Atrial Rate : 072 BPM      P-R Int : 210 ms          QRS Dur : 066 ms       QT Int : 342 ms       P-R-T Axes : 032 034 074 degrees      QTc Int : 374 ms     Sinus rhythm with 1st degree A-V block with occasional Premature   ventricular complexes and Premature atrial complexes   Low voltage QRS   Cannot rule out Anterior infarct (cited on or before 03-FEB-2023)   Abnormal ECG   When compared with ECG of 23-NOV-2023 15:48,   Significant changes have occurred   Confirmed by Stephanie BASS, Nicole HEBERT ([id number]) on 7/22/2024 9:57:16 PM       Hypertension  This is a recurrent problem. The current episode started 1 to 4 weeks ago. The problem has been waxing and waning since onset. The problem is controlled. Associated symptoms include malaise/fatigue, palpitations, peripheral edema and shortness of breath. Pertinent negatives include no anxiety, blurred vision, chest pain, headaches, neck pain, orthopnea, PND or sweats. There are no associated agents to hypertension. Risk factors for coronary artery disease include diabetes mellitus, dyslipidemia, post-menopausal state, obesity, sedentary lifestyle and stress. Past treatments include calcium channel blockers, beta blockers, diuretics, angiotensin blockers and lifestyle changes. Improvement on treatment: variable. Hypertensive end-organ damage includes angina, kidney disease, CAD/MI, CVA, heart failure, left ventricular  "hypertrophy, PVD and retinopathy. Identifiable causes of hypertension include chronic renal disease. There is no history of coarctation of the aorta, hyperaldosteronism, hypercortisolism, hyperparathyroidism, a hypertension causing med, pheochromocytoma, renovascular disease, sleep apnea or a thyroid problem.     Review of Systems   Constitutional:  Positive for fatigue and malaise/fatigue. Negative for activity change, appetite change, chills, diaphoresis, fever and unexpected weight change.   HENT: Negative.     Eyes:  Negative for blurred vision, pain and visual disturbance.   Respiratory:  Positive for shortness of breath. Negative for cough, chest tightness and wheezing.    Cardiovascular:  Positive for palpitations and leg swelling. Negative for chest pain, orthopnea, PND and claudication.   Gastrointestinal:  Negative for abdominal pain, change in bowel habit, constipation, diarrhea, nausea and vomiting.   Genitourinary:  Negative for difficulty urinating and dysuria.   Musculoskeletal:  Positive for arthralgias. Negative for back pain, gait problem, myalgias and neck pain.   Integumentary:  Negative for rash.   Neurological:  Negative for headaches.   Psychiatric/Behavioral: Negative.     All other systems reviewed and are negative.        Objective:     Vitals:    07/23/24 1336   BP: 130/74   Pulse: 72   Temp: 97.8 °F (36.6 °C)   TempSrc: Oral   SpO2: 96%   Weight: 87.4 kg (192 lb 10.9 oz)   Height: 5' 2" (1.575 m)     Physical Exam  Vitals and nursing note reviewed.   Constitutional:       General: She is not in acute distress.     Appearance: Normal appearance. She is well-developed and well-groomed. She is not ill-appearing.   HENT:      Head: Normocephalic and atraumatic.      Right Ear: External ear normal.      Left Ear: External ear normal.      Nose: Nose normal.      Mouth/Throat:      Lips: Pink.      Mouth: Mucous membranes are moist.   Eyes:      General: Lids are normal. Vision grossly intact. " Gaze aligned appropriately.      Conjunctiva/sclera: Conjunctivae normal.      Pupils: Pupils are equal, round, and reactive to light.   Neck:      Trachea: Phonation normal.   Cardiovascular:      Rate and Rhythm: Normal rate. Rhythm irregular.      Heart sounds: Murmur heard.   Pulmonary:      Effort: Pulmonary effort is normal. No accessory muscle usage or respiratory distress.      Breath sounds: Normal breath sounds and air entry.   Abdominal:      General: Abdomen is flat. Bowel sounds are normal. There is no distension.      Palpations: Abdomen is soft.      Tenderness: There is no abdominal tenderness.   Musculoskeletal:      Cervical back: Neck supple.      Right lower le+ Edema present.      Left lower le+ Edema present.   Skin:     General: Skin is warm and dry.      Findings: No rash.   Neurological:      General: No focal deficit present.      Mental Status: She is alert and oriented to person, place, and time. Mental status is at baseline.      Sensory: Sensation is intact.      Motor: Motor function is intact.      Coordination: Coordination is intact.      Gait: Gait is intact.   Psychiatric:         Attention and Perception: Attention and perception normal.         Mood and Affect: Mood and affect normal.         Speech: Speech normal.         Behavior: Behavior normal. Behavior is cooperative.         Thought Content: Thought content normal.         Cognition and Memory: Cognition and memory normal.         Judgment: Judgment normal.       Assessment and Plan:     1. Intermittent hypertension  BP normal today, continue current medication regimen and f/u with cardiology for further evaluation  Dietary sodium restriction.  Regular aerobic exercise.  Weight loss.  Patient Education: Reviewed risks of hypertension and principles of treatment.  - Comprehensive Metabolic Panel; Future    2. Abnormal EKG  No acute CV complaints today, continue current regimen and f/u with cards for further eval    - Comprehensive Metabolic Panel; Future    3. Type 2 diabetes mellitus with diabetic microalbuminuria, without long-term current use of insulin  DM stabilizing   Discussed general issues about diabetes pathophysiology and management.  Addressed ADA diet.  Suggested low cholesterol diet.  Encouraged aerobic exercise.  Discussed foot care.  Reminded to get yearly retinal exam.  Discussed ways to avoid symptomatic hypoglycemia.  Discussed sick day management.  Rx changes: none  Education: Reviewed ABCs of diabetes management (respective goals in parentheses):  A1C (<7), blood pressure (<130/80), and cholesterol (LDL <100).  - Comprehensive Metabolic Panel; Future           CHAPIS Griffin, FNP-C  Family/Internal Medicine  [facility]

## 2024-07-23 NOTE — TELEPHONE ENCOUNTER
Good afternoon, the attached patient was seen today by [facility].Opal Nava for preop clearance. Patient's recent EKG is abnormal also. Patient has an upcoming appointment to see Dr. Lowery for 8/16/2024 but needs to be seen sooner for clearance. Attempted to get patient scheduled for sooner appointment with a Cardiologist but patient insisted that she would like a message to be sent to Dr. Lowery's staff for a possible sooner appointment. Please contact patient for scheduling. Thanks

## 2024-07-24 ENCOUNTER — LAB VISIT (OUTPATIENT)
Dept: LAB | Facility: HOSPITAL | Age: 74
End: 2024-07-24
Payer: MEDICARE

## 2024-07-24 DIAGNOSIS — I10 INTERMITTENT HYPERTENSION: ICD-10-CM

## 2024-07-24 DIAGNOSIS — R94.31 ABNORMAL EKG: ICD-10-CM

## 2024-07-24 DIAGNOSIS — R80.9 TYPE 2 DIABETES MELLITUS WITH DIABETIC MICROALBUMINURIA, WITHOUT LONG-TERM CURRENT USE OF INSULIN: ICD-10-CM

## 2024-07-24 DIAGNOSIS — E11.29 TYPE 2 DIABETES MELLITUS WITH DIABETIC MICROALBUMINURIA, WITHOUT LONG-TERM CURRENT USE OF INSULIN: ICD-10-CM

## 2024-07-24 PROCEDURE — 80053 COMPREHEN METABOLIC PANEL: CPT | Mod: HCNC | Performed by: NURSE PRACTITIONER

## 2024-07-24 PROCEDURE — 36415 COLL VENOUS BLD VENIPUNCTURE: CPT | Mod: HCNC,PN | Performed by: NURSE PRACTITIONER

## 2024-07-25 ENCOUNTER — PATIENT MESSAGE (OUTPATIENT)
Dept: FAMILY MEDICINE | Facility: CLINIC | Age: 74
End: 2024-07-25
Payer: MEDICARE

## 2024-07-25 LAB
ALBUMIN SERPL BCP-MCNC: 3.7 G/DL (ref 3.5–5.2)
ALP SERPL-CCNC: 128 U/L (ref 55–135)
ALT SERPL W/O P-5'-P-CCNC: 17 U/L (ref 10–44)
ANION GAP SERPL CALC-SCNC: 17 MMOL/L (ref 8–16)
AST SERPL-CCNC: 19 U/L (ref 10–40)
BILIRUB SERPL-MCNC: 0.5 MG/DL (ref 0.1–1)
BUN SERPL-MCNC: 19 MG/DL (ref 8–23)
CALCIUM SERPL-MCNC: 9.4 MG/DL (ref 8.7–10.5)
CHLORIDE SERPL-SCNC: 103 MMOL/L (ref 95–110)
CO2 SERPL-SCNC: 20 MMOL/L (ref 23–29)
CREAT SERPL-MCNC: 1 MG/DL (ref 0.5–1.4)
EST. GFR  (NO RACE VARIABLE): 59 ML/MIN/1.73 M^2
GLUCOSE SERPL-MCNC: 115 MG/DL (ref 70–110)
POTASSIUM SERPL-SCNC: 4.5 MMOL/L (ref 3.5–5.1)
PROT SERPL-MCNC: 7.3 G/DL (ref 6–8.4)
SODIUM SERPL-SCNC: 140 MMOL/L (ref 136–145)

## 2024-07-26 ENCOUNTER — TELEPHONE (OUTPATIENT)
Dept: CARDIOLOGY | Facility: CLINIC | Age: 74
End: 2024-07-26
Payer: MEDICARE

## 2024-07-26 NOTE — TELEPHONE ENCOUNTER
----- Message from Liliana Bustamante RN sent at 7/25/2024  9:01 AM CDT -----  Regarding: FW: surgery  Maybe an e-visit since no slots available before her surgery?  ----- Message -----  From: Diana Kohli  Sent: 7/23/2024   4:52 PM CDT  To: [facility] Staff  Subject: surgery                                          Type:  Patient Returning Call      Name of who is calling:patient        What is request in detail:Patient is requesting a call back in regards to hand surgery patient has scheduled on 08/02. Patients PCP is requesting that doctor have a look at patients EKG before upcoming surgery.Patient would like to see doctor before her scheduled appt 08/16, to discuss latest EKG before her 08/02 procedure        Can clinic reply by [facility]:no         What number to call back if not in [facility]: [phone]

## 2024-08-01 ENCOUNTER — NURSE TRIAGE (OUTPATIENT)
Dept: ADMINISTRATIVE | Facility: CLINIC | Age: 74
End: 2024-08-01
Payer: MEDICARE

## 2024-08-01 ENCOUNTER — ANESTHESIA EVENT (OUTPATIENT)
Dept: SURGERY | Facility: HOSPITAL | Age: 74
End: 2024-08-01
Payer: MEDICARE

## 2024-08-01 ENCOUNTER — OFFICE VISIT (OUTPATIENT)
Dept: CARDIOLOGY | Facility: CLINIC | Age: 74
End: 2024-08-01
Attending: INTERNAL MEDICINE
Payer: MEDICARE

## 2024-08-01 VITALS
WEIGHT: 191.69 LBS | SYSTOLIC BLOOD PRESSURE: 121 MMHG | BODY MASS INDEX: 35.28 KG/M2 | HEART RATE: 61 BPM | OXYGEN SATURATION: 97 % | HEIGHT: 62 IN | DIASTOLIC BLOOD PRESSURE: 59 MMHG

## 2024-08-01 DIAGNOSIS — E11.59 TYPE 2 DIABETES MELLITUS WITH OTHER CIRCULATORY COMPLICATION, WITHOUT LONG-TERM CURRENT USE OF INSULIN: ICD-10-CM

## 2024-08-01 DIAGNOSIS — Z87.898 HISTORY OF CHEST PAIN: ICD-10-CM

## 2024-08-01 DIAGNOSIS — E78.1 HYPERTRIGLYCERIDEMIA: ICD-10-CM

## 2024-08-01 DIAGNOSIS — Z95.1 HISTORY OF CORONARY ARTERY BYPASS SURGERY: ICD-10-CM

## 2024-08-01 DIAGNOSIS — I25.10 CORONARY ARTERY DISEASE INVOLVING NATIVE CORONARY ARTERY OF NATIVE HEART WITHOUT ANGINA PECTORIS: ICD-10-CM

## 2024-08-01 DIAGNOSIS — I70.0 ATHEROSCLEROSIS OF AORTA: ICD-10-CM

## 2024-08-01 DIAGNOSIS — R07.2 PRECORDIAL PAIN: ICD-10-CM

## 2024-08-01 DIAGNOSIS — Z01.810 PRE-OPERATIVE CARDIOVASCULAR EXAMINATION: ICD-10-CM

## 2024-08-01 DIAGNOSIS — Z86.718 HISTORY OF DEEP VEIN THROMBOSIS: ICD-10-CM

## 2024-08-01 DIAGNOSIS — E78.00 HYPERCHOLESTEROLEMIA: ICD-10-CM

## 2024-08-01 DIAGNOSIS — Z86.73 HISTORY OF CEREBROVASCULAR ACCIDENT: ICD-10-CM

## 2024-08-01 DIAGNOSIS — M65.341 TRIGGER RING FINGER OF RIGHT HAND: ICD-10-CM

## 2024-08-01 DIAGNOSIS — I10 PRIMARY HYPERTENSION: ICD-10-CM

## 2024-08-01 DIAGNOSIS — E66.01 SEVERE OBESITY: ICD-10-CM

## 2024-08-01 PROCEDURE — 99999 PR PBB SHADOW E&M-EST. PATIENT-LVL III: CPT | Mod: PBBFAC,HCNC,, | Performed by: INTERNAL MEDICINE

## 2024-08-01 RX ORDER — NITROGLYCERIN 0.4 MG/1
0.4 TABLET SUBLINGUAL EVERY 5 MIN PRN
Qty: 30 TABLET | Refills: 3 | Status: SHIPPED | OUTPATIENT
Start: 2024-08-01

## 2024-08-01 RX ORDER — EZETIMIBE 10 MG/1
10 TABLET ORAL DAILY
Qty: 90 TABLET | Refills: 3 | Status: SHIPPED | OUTPATIENT
Start: 2024-08-01

## 2024-08-01 RX ORDER — CARVEDILOL 12.5 MG/1
12.5 TABLET ORAL 2 TIMES DAILY
Qty: 180 TABLET | Refills: 3 | Status: SHIPPED | OUTPATIENT
Start: 2024-08-01

## 2024-08-01 RX ORDER — FUROSEMIDE 20 MG/1
20 TABLET ORAL DAILY
Qty: 90 TABLET | Refills: 3 | Status: SHIPPED | OUTPATIENT
Start: 2024-08-01

## 2024-08-01 RX ORDER — VALSARTAN 320 MG/1
320 TABLET ORAL DAILY
Qty: 90 TABLET | Refills: 3 | Status: SHIPPED | OUTPATIENT
Start: 2024-08-01

## 2024-08-01 RX ORDER — ATORVASTATIN CALCIUM 80 MG/1
80 TABLET, FILM COATED ORAL DAILY
Qty: 90 TABLET | Refills: 3 | Status: SHIPPED | OUTPATIENT
Start: 2024-08-01

## 2024-08-01 RX ORDER — ASPIRIN 81 MG/1
81 TABLET ORAL DAILY
Qty: 90 TABLET | Refills: 3 | Status: SHIPPED | OUTPATIENT
Start: 2024-08-01

## 2024-08-01 RX ORDER — SPIRONOLACTONE 25 MG/1
25 TABLET ORAL DAILY
Qty: 90 TABLET | Refills: 3 | Status: SHIPPED | OUTPATIENT
Start: 2024-08-01

## 2024-08-01 RX ORDER — AMLODIPINE BESYLATE 10 MG/1
10 TABLET ORAL DAILY
Qty: 90 TABLET | Refills: 3 | Status: SHIPPED | OUTPATIENT
Start: 2024-08-01

## 2024-08-01 NOTE — PROGRESS NOTES
Subjective:     Cecilia Barahona is a 74 y.o. female with hypertension, hypercholesterolemia, hypertriglyceridemia and diabetes mellitus type 2. She is mildly obese. She appears to have had a cerebrovascular accident in about 2004 when she had an episode of weakness in her left hand. She had exertional chest discomfort in 12/2019. She was given isosorbidemononitrate and the chest discomfort resolved. She had a stress MPI that was negative. On 5/16/2020 she began to experience a mild pressure over her chest. The heaviness lasted most of the day. Walking in her house made the discomfort more pronounced. There was no radiation of the pain and neither any associated dyspnea or diaphoresis. She had similar discomfort on 5/17/2020 and 5/18/2020. She told her daughter that took her to an urgent care center. She received two tablets of nitroglycerin  sublingually and the chest discomfort resolved. There were no acute ST-T wave changes. She was advised admission and was transferred to [facility]. She has had no further chest pain after presentation. On 5/20/2020 she underwent coronary angiography that revealed severe three vessel coronary artery disease. There was basal inferior mild hypokinesia with an ejection fraction of 60%. On 5/25/2020 she underwent coronary artery bypass grafting receiving three grafts. The left internal mammary artery was bypassed to the left anterior descending coronary artery and a sequential saphenous vein graft was placed to the second obtuse marginal branch and the fourth obtuse marginal branch. A few days after surgery she had a deep venous thrombosis of her left leg and she was anticoagulated with apixiban. She was able to lose weight after the coronary artery bypass grafting and her diabetes and hypertension became easier to control. Some soreness and itching in the anterior chest wall. In 4/2021 she went to [place] to visit her sister. In 3/2022 she is still  bothered by occasional chest wall pain. In 2022 her   after a lengthy hospital stay. She was not taking all of her prescribed medication during her husbands illness but got back on all her usual medications on about 2023. In 2023 she has had a few episodes of chest pressure. No clear exertional chest pain. On 2023 she underwent a stress echocardiogram. She was able to do 2:26 minutes on the treadmill. She had no chest pain. The electrocardiogram was negative. The echocardiogram was negative. There was normal left ventricular size and systolic function. The ejection fraction was 60%. It was felt the chest pain had a noncardiac cause. She has mild exertional shortness of breath. No palpitations or weak spells. No issues with any of her prescribed medications. Feeling fair overall. She has a trigger finger on the right hand. The plan is release on 2024.        Coronary Artery Disease  Presents for follow-up visit. Pertinent negatives include no chest pain, chest pressure, chest tightness, dizziness, leg swelling, muscle weakness, palpitations, shortness of breath or weight gain. Risk factors include hyperlipidemia. Risk factors do not include obesity. The symptoms have been stable.   Cerebrovascular Accident  This is a chronic problem. The problem has been resolved. Pertinent negatives include no abdominal pain, anorexia, arthralgias, change in bowel habit, chest pain, chills, congestion, coughing, diaphoresis, fatigue, fever, headaches, joint swelling, myalgias, nausea, neck pain, numbness, rash, sore throat, swollen glands, urinary symptoms, vertigo, visual change, vomiting or weakness.   Hypertension  This is a chronic problem. The current episode started more than 1 year ago. The problem is unchanged. The problem is controlled (usually 120-130/65-75 mmHg at home). Pertinent negatives include no anxiety, blurred vision, chest pain, headaches, malaise/fatigue, neck pain, orthopnea,  palpitations, peripheral edema, PND, shortness of breath or sweats. There is no history of chronic renal disease.   Hyperlipidemia  This is a chronic problem. The current episode started more than 1 year ago. The problem is controlled. Exacerbating diseases include diabetes. She has no history of chronic renal disease, hypothyroidism, liver disease, obesity or nephrotic syndrome. Pertinent negatives include no chest pain, focal weakness, leg pain, myalgias or shortness of breath.   Chest Pain   Pertinent negatives include no abdominal pain, back pain, claudication, cough, diaphoresis, dizziness, fever, headaches, hemoptysis, irregular heartbeat, leg pain, malaise/fatigue, nausea, near-syncope, numbness, orthopnea, palpitations, PND, shortness of breath, syncope, vomiting or weakness.   Her past medical history is significant for CAD, diabetes and hyperlipidemia.   Pertinent negatives for past medical history include no muscle weakness.       Review of Systems   Constitutional: Negative for chills, diaphoresis, fatigue, fever, malaise/fatigue and weight gain.   HENT:  Negative for congestion, nosebleeds and sore throat.    Eyes:  Negative for blurred vision, double vision, vision loss in left eye and vision loss in right eye.   Cardiovascular:  Positive for dyspnea on exertion. Negative for chest pain, claudication, irregular heartbeat, leg swelling, near-syncope, orthopnea, palpitations, paroxysmal nocturnal dyspnea and syncope.   Respiratory:  Negative for chest tightness, cough, hemoptysis, shortness of breath and wheezing.    Endocrine: Negative for cold intolerance and heat intolerance.   Hematologic/Lymphatic: Negative for bleeding problem. Does not bruise/bleed easily.   Skin:  Negative for color change and rash.   Musculoskeletal:  Negative for arthralgias, back pain, falls, joint swelling, muscle weakness, myalgias and neck pain.   Gastrointestinal:  Negative for abdominal pain, anorexia, change in bowel  "habit, heartburn, hematemesis, hematochezia, hemorrhoids, jaundice, melena, nausea and vomiting.   Genitourinary:  Negative for dysuria and hematuria.   Neurological:  Negative for dizziness, focal weakness, headaches, light-headedness, loss of balance, numbness, vertigo and weakness.   Psychiatric/Behavioral:  Negative for altered mental status, depression and memory loss. The patient is not nervous/anxious.    Allergic/Immunologic: Negative for hives and persistent infections.       Current Outpatient Medications on File Prior to Visit   Medication Sig Dispense Refill    ACCU-CHEK GUIDE GLUCOSE METER Misc       ACCU-CHEK GUIDE TEST STRIPS Strp       albuterol (PROVENTIL/VENTOLIN HFA) 90 mcg/actuation inhaler Inhale 2 puffs into the lungs every 6 (six) hours as needed for Wheezing or Shortness of Breath. Rescue 19 g 0    amLODIPine (NORVASC) 10 MG tablet TAKE 1 TABLET(10 MG) BY MOUTH EVERY DAY 90 tablet 3    aspirin (ECOTRIN) 81 MG EC tablet TAKE 1 TABLET BY MOUTH ONCE DAILY 90 tablet 3    atorvastatin (LIPITOR) 80 MG tablet TAKE 1 TABLET(80 MG) BY MOUTH EVERY DAY 90 tablet 3    carvediloL (COREG) 12.5 MG tablet TAKE 1 TABLET(12.5 MG) BY MOUTH TWICE DAILY 180 tablet 3    clobetasoL (OLUX) 0.05 % Foam Apply 1 g topically 2 (two) times daily.      dorzolamide-timolol 2-0.5% (COSOPT) 22.3-6.8 mg/mL ophthalmic solution Place 1 drop into both eyes 2 (two) times daily. 10 mL 1    DROPSAFE PEN NEEDLE 31 gauge x 1/4" Ndle       DROPSAFE PEN NEEDLE 31 gauge x 3/16" Ndle       dulaglutide (TRULICITY) 4.5 mg/0.5 mL pen injector Inject 4.5 mg into the skin every 7 days. 12 pen 2    empagliflozin (JARDIANCE) 25 mg tablet Take 1 tablet (25 mg total) by mouth once daily. 30 tablet 3    EScitalopram oxalate (LEXAPRO) 10 MG tablet Take 1 tablet (10 mg total) by mouth once daily. 90 tablet 2    ezetimibe (ZETIA) 10 mg tablet TAKE 1 TABLET(10 MG) BY MOUTH EVERY DAY 90 tablet 3    finerenone (KERENDIA) 10 mg Tab Take 10 mg by mouth " "Daily. 90 tablet 0    fluticasone (FLONASE) 50 mcg/actuation nasal spray 1 spray (50 mcg total) by Each Nare route once daily. 16 g 5    furosemide (LASIX) 20 MG tablet Take 1 tablet (20 mg total) by mouth once daily. 90 tablet 3    glipiZIDE (GLUCOTROL) 2.5 MG TR24 Take 1 tablet (2.5 mg total) by mouth daily with breakfast. 90 tablet 0    HYDROcodone-acetaminophen (NORCO) 5-325 mg per tablet Take 1 tablet by mouth every 4 (four) hours as needed for Pain. 12 tablet 0    lancing device Misc Apply topically.      loratadine (CLARITIN) 10 mg tablet TAKE 1 TABLET BY MOUTH EVERY DAY AS NEEDED FOR ALLERGIES 90 tablet 2    melatonin (MELATIN) 3 mg tablet Take 2 tablets (6 mg total) by mouth nightly as needed for Insomnia.  0    meloxicam (MOBIC) 7.5 MG tablet TAKE 1 TABLET(7.5 MG) BY MOUTH DAILY AS NEEDED FOR PAIN 90 tablet 1    metFORMIN (GLUCOPHAGE) 1000 MG tablet Take 1 tablet (1,000 mg total) by mouth 2 (two) times daily with meals. 180 tablet 2    multivitamin with minerals tablet Take 1 tablet by mouth once daily.      nitroGLYCERIN (NITROSTAT) 0.4 MG SL tablet Place 1 tablet (0.4 mg total) under the tongue every 5 (five) minutes as needed for Chest pain. 30 tablet 3    ondansetron (ZOFRAN-ODT) 8 MG TbDL       pantoprazole (PROTONIX) 40 MG tablet       pen needle, diabetic (BD ULTRA-FINE LENY PEN NEEDLE) 32 gauge x 5/32" Ndle 1 Device by Misc.(Non-Drug; Combo Route) route Daily. 100 each 4    pen needle, diabetic (NOVOFINE 32) 32 gauge x 1/4" Ndle Use 1x/day 200 each 3    rOPINIRole (REQUIP) 0.5 MG tablet TAKE 1 TABLET(0.5 MG) BY MOUTH EVERY EVENING 90 tablet 0    TRUE COMFORT ALCOHOL PADS PadM Apply topically.      TRUEPLUS LANCETS 30 gauge Misc USE TO TEST TWICE DAILY      valsartan (DIOVAN) 320 MG tablet TAKE 1 TABLET(320 MG) BY MOUTH EVERY DAY 90 tablet 3    triamcinolone acetonide 0.1% (KENALOG) 0.1 % ointment Apply topically 2 (two) times daily. for 7 days 80 g 0     No current facility-administered medications " "on file prior to visit.       BP (!) 121/59   Pulse 61   Ht 5' 2" (1.575 m)   Wt 87 kg (191 lb 11 oz)   SpO2 97%   BMI 35.06 kg/m²     Objective:     Physical Exam  Constitutional:       General: She is not in acute distress.     Appearance: Normal appearance. She is well-developed. She is not toxic-appearing or diaphoretic.   HENT:      Head: Normocephalic and atraumatic.      Nose: Nose normal.   Eyes:      General:         Right eye: No discharge.         Left eye: No discharge.      Conjunctiva/sclera:      Right eye: Right conjunctiva is not injected.      Left eye: Left conjunctiva is not injected.      Pupils: Pupils are equal.      Right eye: Pupil is round.      Left eye: Pupil is round.   Neck:      Thyroid: No thyromegaly.      Vascular: No carotid bruit or JVD.   Cardiovascular:      Rate and Rhythm: Regular rhythm. Bradycardia present. No extrasystoles are present.     Chest Wall: PMI is not displaced.      Pulses:           Radial pulses are 2+ on the right side and 2+ on the left side.        Femoral pulses are 2+ on the right side and 2+ on the left side.       Dorsalis pedis pulses are 2+ on the right side and 2+ on the left side.        Posterior tibial pulses are 2+ on the right side and 2+ on the left side.      Heart sounds: S1 normal and S2 normal. Murmur heard.      Midsystolic murmur is present with a grade of 2/6.      Gallop present. S4 sounds present. No S3 sounds.   Pulmonary:      Effort: Pulmonary effort is normal.      Breath sounds: Normal breath sounds.   Chest:      Comments: Midline sternotomy scar.  Abdominal:      Palpations: Abdomen is soft.      Tenderness: There is no abdominal tenderness.   Musculoskeletal:      Cervical back: Neck supple.      Right ankle: No swelling, deformity or ecchymosis.      Left ankle: No swelling, deformity or ecchymosis.   Lymphadenopathy:      Head:      Right side of head: No submandibular adenopathy.      Left side of head: No submandibular " adenopathy.      Cervical: No cervical adenopathy.   Skin:     General: Skin is warm and dry.      Findings: No rash.      Nails: There is no clubbing.   Neurological:      General: No focal deficit present.      Mental Status: She is alert and oriented to person, place, and time. She is not disoriented.      Cranial Nerves: No cranial nerve deficit.   Psychiatric:         Attention and Perception: Attention and perception normal.         Mood and Affect: Mood and affect normal.         Speech: Speech normal.         Behavior: Behavior normal.         Thought Content: Thought content normal.         Cognition and Memory: Cognition and memory normal.         Judgment: Judgment normal.       Assessment:     1. Coronary artery disease involving native coronary artery of native heart without angina pectoris    2. History of coronary artery bypass surgery    3. History of chest pain    4. History of cerebrovascular accident    5. Atherosclerosis of aorta    6. History of deep vein thrombosis    7. Primary hypertension    8. Hypercholesterolemia    9. Hypertriglyceridemia    10. Type 2 diabetes mellitus with other circulatory complication, without long-term current use of insulin    11. Severe obesity    12. Trigger ring finger of right hand    13. Pre-operative cardiovascular examination        Plan:     1. Coronary Artery Disease              12/2019: Began experience chest pain. Resolved with isosorbidemononitrate.              12/19/2019: Stress MPI:3:39 min. No CP. ECG negative. MPI: Mild to moderate fixed anterior and anteroapical defect. EF 73%.              5/18/2020: Presented with more pronounced chest pain. Resolved with NTG sl.              ECG without acute changes. Troponin x 3 negative.               5/19/2020: Echo: Normal left ventricular size and systolic function. EF 75%. Mildly dilated LA. Mild aortic valve sclerosis. Mild mitral valve sclerosis.              5/20/2020: [facility]: Cath: LAD: Prox 30%. Mid  "70%. Distal 80%. OM2: Osteal 90%. OM3: 95%. OM4 90%. RCA: Mid subtotal. LV: Basal inferior mild hypokinesia. EF 60%.   5/25/2020: [facility]: CABG x 3: LIMA to LAD and SVG1 to OM2 & OM4.   9/19/2023: Stress Echo: 2:26 min. No CP. ECG negative. Echo "negative". Normal left ventricular size and systolic function. EF 60%.   On carvedilol 12.5 mg Q12.              On aspirin 81 mg Q24.   Doing well.     2. History of Chest Pain   9/2023: Began experience occasional chest pain.   Resolved.               3. History of Cerebrovascular Accident              2004: Weakness in left hand. Affected memory. No sequela.   On aspirin 81 mg Q24.    4. Atherosclerosis of Aorta   6/3/2020: CT Scan: Noted.    All risk factors addressed.     5. History of Deep Venous Trombosis of Lower Extremity   5/2020: DVT left leg after CABG.   Received apixiban for 3 months.     6. Hypertension              2000: Diagnosed.   9/1/2020: Furosemide 20 mg Q24 and KCl 20 mEq Q24 were discontinued.   Used to be on metoprolol 25 mg Q12, amlodipine 10 mg Q24, valsartan 320 mg Q24, furosemide 20 mg Q24 and chlorthalidone 25 mg Q24.   10/13/2020: Valsartan 320 mg Q24 was resumed. Amlodipine 10 mg Q24 and furosemide 20 mg Q24 were discontinued.   2/23/2022: Metoprolol 25 mg Q12 was changed to carvedilol 12.5 mg Q12 and amlodipine 5 mg Q24 was increased to amlodipine 10 mg Q24 as pressure was running 150-160/70-80 mmHg at home.     On carvedilol 12.5 mg Q12, amlodipine 10 mg Q24, valsartan 320 mg Q24 and furosemide 20 mg Q24.   Keeping log at home.   8/1/2024: Running high. Spironolactone 25 mg Q24. In 2 weeks: Do BMP.      7. Hypercholesterolemia              2000: Began statin.              On atorvastatin 40 mg Q24.                 5/19/2020: Chol 204. HDL 41. . .              On atorvastatin 80 mg Q24 and ezetimibe 10 mg Q24.   8/18/2020: Chol 161. HDL 46. LDL 82. .   2/24/2022: Chol 94. HDL 37. LDL 42. TG 73.   5/24/2024: Chol 113. HDL 41. " LDL 53. TG 97.   On atorvastatin 80 mg Q24 and ezetimibe 10 mg Q24.   Very favorable lipid panel.    8. Hypertriglyceridemia   2020: Diagnosed.   As above.     9. Diabetes Mellitus, Type 2              2000: Diagnosed. Complications: CVA & CAD. Medications: Insulin and SGLT2i.   On metformin, empagliflozin, dulaglutide and insulin.   4/13/2023: HgbA1C 8.9%.   11/1/2023: HgbA1C 8.0%.   5/24/2024: HgbA1C 7.6%.        10. Mild Obesity              5/19/2020: Weight 86 kg. BMI 33.   5/15/2023: Weight 88 kg. BMI 36.   11/21/2023: Weight 85 kg. BMI 34.   Encourage to lose weight.     11. Primary Care              Dr. Yudi Smart.    12. Pre Operative Cardiovascular Evaluation   8/2/2024: Plan is surgery of right hand trigger finger.   Low cardiac risk.    F/u 3 months.    Etelvina Lowery M.D.

## 2024-08-01 NOTE — ANESTHESIA PREPROCEDURE EVALUATION
[facility]  Anesthesia Pre-Operative Evaluation         Patient Name: Cecilia Barahona  YOB: 1950  MRN: [id number]    SUBJECTIVE:     Pre-operative evaluation for Procedure(s) (LRB):  RELEASE, TRIGGER FINGER (Right)     08/01/2024    Cecilia Barahona is a 74 y.o. female with a significant medical history of HTN, NIDDM2, CAD s/p CABG 2020, CHETNA, GERD, CKD who presents for the above procedure.    LDA: None documented.     Prev airway:   Intubation:     Induction:  Intravenous    Intubated:  Postinduction    Mask Ventilation:  Moderately difficult with oral airway    Attempts:  1    Attempted By:  Resident anesthesiologist    Method of Intubation:  Bougie and direct    Blade:  Shirley 2    Laryngeal View Grade: Grade IIA - cords partially seen      Difficult Airway Encountered?: No      Complications:  None    Airway Device:  Oral endotracheal tube    Airway Device Size:  7.0    Style/Cuff Inflation:  Cuffed (inflated to minimal occlusive pressure)    Inflation Amount (mL):  5    Tube secured:  23    Secured at:  The lips    Placement Verified By:  Capnometry    Complicating Factors:  Anterior larynx, short neck, poor neck/head extension, small mouth and oropharyngeal edema or fat    Findings Post-Intubation:  BS equal bilateral and atraumatic/condition of teeth unchanged    Drips: None documented.    Results for orders placed during the hospital encounter of 05/18/20    Echo Color Flow Doppler? Yes    Interpretation Summary  · Normal left ventricular systolic function. The estimated ejection fraction is 75%.  · Normal right ventricular systolic function.  · Grade I (mild) left ventricular diastolic dysfunction consistent with impaired relaxation.  · Mild left atrial enlargement.  · Mild aortic valve sclerosis.  · Moderate mitral sclerosis.  · The estimated PA systolic pressure is 10 mmHg.  · Normal central venous  pressure (3 mmHg).      Stress Echo 10/2023  The study is normal. This study shows a low prognostic risk. The ECG and Echo portions of the stress study are concordant with no evidence of inducible myocardial ischemia.     Patient Active Problem List   Diagnosis    Hypertension    Severe obesity    Glaucoma NEC    Poorly controlled type 2 diabetes mellitus with neuropathy    Hypercholesterolemia    Cervical spondylosis    Trigger ring finger of right hand    Long-term insulin use    Diabetes mellitus, type 2    Gastroesophageal reflux disease without esophagitis    History of positive PPD - treated - remote past    Carpal tunnel syndrome of left wrist    Incomplete bladder emptying    Restless legs syndrome (RLS)    Impaired mobility    Calcific tendinitis of left shoulder    Greater trochanteric bursitis of right hip    Refractive error    Nuclear sclerosis of both eyes    Primary open-angle glaucoma, bilateral, moderate stage    Chronic constipation    Mild major depression    Coronary artery disease    Bilateral carotid artery stenosis    History of coronary artery bypass surgery    History of deep vein thrombosis    History of cerebrovascular accident    Hypertriglyceridemia    Atherosclerosis of aorta    Primary open angle glaucoma (POAG) of left eye, moderate stage    Mild nonproliferative diabetic retinopathy of left eye without macular edema associated with type 2 diabetes mellitus    Moderate nonproliferative diabetic retinopathy of right eye without macular edema associated with type 2 diabetes mellitus    Decreased muscle strength    Decreased ROM of neck    Postural imbalance    Occlusion and stenosis of bilateral carotid arteries    Anxiety disorder, unspecified    Personal history of nicotine dependence    Trigger ring finger of left hand    Microalbuminuria due to type 2 diabetes mellitus    History of chest pain    Chronic left shoulder pain    Meralgia paresthetica of left side    Severe episode of  "recurrent major depressive disorder, without psychotic features    Type 2 diabetes mellitus with diabetic microalbuminuria    Pre-operative cardiovascular examination       Review of patient's allergies indicates:   Allergen Reactions    Invokana [canagliflozin] Swelling     Throat and tongue swelling      Ace inhibitors      cough       Current Inpatient Medications:      No current facility-administered medications on file prior to encounter.     Current Outpatient Medications on File Prior to Encounter   Medication Sig Dispense Refill    albuterol (PROVENTIL/VENTOLIN HFA) 90 mcg/actuation inhaler Inhale 2 puffs into the lungs every 6 (six) hours as needed for Wheezing or Shortness of Breath. Rescue 19 g 0    clobetasoL (OLUX) 0.05 % Foam Apply 1 g topically 2 (two) times daily.      dorzolamide-timolol 2-0.5% (COSOPT) 22.3-6.8 mg/mL ophthalmic solution Place 1 drop into both eyes 2 (two) times daily. 10 mL 1    dulaglutide (TRULICITY) 4.5 mg/0.5 mL pen injector Inject 4.5 mg into the skin every 7 days. 12 pen 2    EScitalopram oxalate (LEXAPRO) 10 MG tablet Take 1 tablet (10 mg total) by mouth once daily. 90 tablet 2    finerenone (KERENDIA) 10 mg Tab Take 10 mg by mouth Daily. 90 tablet 0    fluticasone (FLONASE) 50 mcg/actuation nasal spray 1 spray (50 mcg total) by Each Nare route once daily. 16 g 5    HYDROcodone-acetaminophen (NORCO) 5-325 mg per tablet Take 1 tablet by mouth every 4 (four) hours as needed for Pain. 12 tablet 0    melatonin (MELATIN) 3 mg tablet Take 2 tablets (6 mg total) by mouth nightly as needed for Insomnia.  0    metFORMIN (GLUCOPHAGE) 1000 MG tablet Take 1 tablet (1,000 mg total) by mouth 2 (two) times daily with meals. 180 tablet 2    multivitamin with minerals tablet Take 1 tablet by mouth once daily.      ondansetron (ZOFRAN-ODT) 8 MG TbDL       pantoprazole (PROTONIX) 40 MG tablet       pen needle, diabetic (BD ULTRA-FINE LENY PEN NEEDLE) 32 gauge x 5/32" Ndle 1 Device by " "Misc.(Non-Drug; Combo Route) route Daily. 100 each 4    pen needle, diabetic (NOVOFINE 32) 32 gauge x 1/4" Ndle Use 1x/day 200 each 3    triamcinolone acetonide 0.1% (KENALOG) 0.1 % ointment Apply topically 2 (two) times daily. for 7 days 80 g 0    TRUEPLUS LANCETS 30 gauge Misc USE TO TEST TWICE DAILY         Past Surgical History:   Procedure Laterality Date    CARPAL TUNNEL RELEASE       SECTION, CLASSIC      COLONOSCOPY N/A 3/5/2020    Procedure: COLONOSCOPY;  Surgeon: Wiliam Carrillo MD;  Location: [facility] (4TH FLR);  Service: Endoscopy;  Laterality: N/A;  20 appt confirmed-rb  pt requested this date    CORONARY ARTERY BYPASS GRAFT (CABG) N/A 2020    Procedure: CORONARY ARTERY BYPASS GRAFT (CABG) x3 ;  Surgeon: Yan Bowman MD;  Location: [facility] 2ND FLR;  Service: Cardiothoracic;  Laterality: N/A;  CABG X3  Endoharvest time start 0906  Endoharvest time end 1003  Vein prep start 1004  Vein prep end 1048    LEFT HEART CATHETERIZATION N/A 2020    Procedure: HEART CATH-LEFT;  Surgeon: Etelvina Lowery MD;  Location: [facility] CATH LAB;  Service: Cardiology;  Laterality: N/A;    TOTAL ABDOMINAL HYSTERECTOMY      TRIGGER FINGER RELEASE      TRIGGER FINGER RELEASE Left 2/3/2023    Procedure: RELEASE, TRIGGER FINGER;  Surgeon: Shayan Schaefer Jr., MD;  Location: [facility];  Service: Orthopedics;  Laterality: Left;       Social History     Socioeconomic History    Marital status:      Spouse name: Yuniel    Number of children: 3   Occupational History    Occupation: Retired   Tobacco Use    Smoking status: Former     Current packs/day: 0.00     Types: Cigarettes     Quit date: 11/10/2012     Years since quittin.7    Smokeless tobacco: Never   Substance and Sexual Activity    Alcohol use: Not Currently     Comment: drank socially in the past    Drug use: Not Currently     Types: Marijuana     Comment: tried once 6 months ago    Sexual activity: Not Currently     Partners: Male " "  Other Topics Concern    Patient feels they ought to cut down on drinking/drug use No    Patient annoyed by others criticizing their drinking/drug use No    Patient has felt bad or guilty about drinking/drug use No    Patient has had a drink/used drugs as an eye opener in the AM No   Social History Narrative     x 1.    Has 3 grown children.    Lives with spouse.     Social Determinants of Health     Financial Resource Strain: Medium Risk (7/29/2024)    Overall Financial Resource Strain (CARDIA)     Difficulty of Paying Living Expenses: Somewhat hard   Food Insecurity: No Food Insecurity (7/29/2024)    Hunger Vital Sign     Worried About Running Out of Food in the Last Year: Never true     Ran Out of Food in the Last Year: Never true   Transportation Needs: No Transportation Needs (8/20/2018)    PRAPARE - Transportation     Lack of Transportation (Medical): No     Lack of Transportation (Non-Medical): No   Physical Activity: Inactive (7/29/2024)    Exercise Vital Sign     Days of Exercise per Week: 0 days     Minutes of Exercise per Session: 20 min   Stress: Stress Concern Present (7/29/2024)    [employer] [place] [employer] - Occupational Stress Questionnaire     Feeling of Stress : Very much   Housing Stability: Unknown (7/29/2024)    Housing Stability Vital Sign     Unable to Pay for Housing in the Last Year: No       OBJECTIVE:     Vital Signs Range:      6/14/2024     2:35 PM 7/23/2024     1:36 PM 8/1/2024     2:47 PM   Vitals - 1 value per visit   SYSTOLIC 120 130 121   DIASTOLIC 50 74 59   Pulse 67 72 61   Temp 37 °C (98.6 °F) 36.6 °C (97.8 °F)    SPO2  96 % 97 %   Weight (lb) 191 192.68 191.69   Weight (kg) 86.637 87.4 86.95   Height  5' 2" (1.575 m) 5' 2" (1.575 m)   BMI (Calculated)  35.2 35.1   Pain Score  Seven Six         CBC:   Lab Results   Component Value Date    WBC 8.97 05/24/2024    HGB 11.8 (L) 05/24/2024    HCT 38.2 05/24/2024    MCV 97 05/24/2024     05/24/2024 "         CMP:   Sodium   Date Value Ref Range Status   07/24/2024 140 136 - 145 mmol/L Final     Potassium   Date Value Ref Range Status   07/24/2024 4.5 3.5 - 5.1 mmol/L Final     Chloride   Date Value Ref Range Status   07/24/2024 103 95 - 110 mmol/L Final     CO2   Date Value Ref Range Status   07/24/2024 20 (L) 23 - 29 mmol/L Final     Glucose   Date Value Ref Range Status   07/24/2024 115 (H) 70 - 110 mg/dL Final     BUN   Date Value Ref Range Status   07/24/2024 19 8 - 23 mg/dL Final     Creatinine   Date Value Ref Range Status   07/24/2024 1.0 0.5 - 1.4 mg/dL Final     Calcium   Date Value Ref Range Status   07/24/2024 9.4 8.7 - 10.5 mg/dL Final     Total Protein   Date Value Ref Range Status   07/24/2024 7.3 6.0 - 8.4 g/dL Final     Albumin   Date Value Ref Range Status   07/24/2024 3.7 3.5 - 5.2 g/dL Final     Total Bilirubin   Date Value Ref Range Status   07/24/2024 0.5 0.1 - 1.0 mg/dL Final     Comment:     For infants and newborns, interpretation of results should be based  on gestational age, weight and in agreement with clinical  observations.    Premature Infant recommended reference ranges:  Up to 24 hours.............<8.0 mg/dL  Up to 48 hours............<12.0 mg/dL  3-5 days..................<15.0 mg/dL  6-29 days.................<15.0 mg/dL       Alkaline Phosphatase   Date Value Ref Range Status   07/24/2024 128 55 - 135 U/L Final     AST   Date Value Ref Range Status   07/24/2024 19 10 - 40 U/L Final     ALT   Date Value Ref Range Status   07/24/2024 17 10 - 44 U/L Final     Anion Gap   Date Value Ref Range Status   07/24/2024 17 (H) 8 - 16 mmol/L Final     eGFR if    Date Value Ref Range Status   05/09/2022 >60.0 >60 mL/min/1.73 m^2 Final     eGFR if non    Date Value Ref Range Status   05/09/2022 >60.0 >60 mL/min/1.73 m^2 Final     Comment:     Calculation used to obtain the estimated glomerular filtration  rate (eGFR) is the CKD-EPI equation.          INR:  Lab  Results   Component Value Date    INR 1.1 05/25/2020    INR 1.0 08/07/2014    INR 1.0 09/06/2012       EKG:   Results for orders placed or performed during the hospital encounter of 07/22/24   EKG 12-lead    Collection Time: 07/22/24  1:32 PM   Result Value Ref Range    QRS Duration 66 ms    [facility] QTC Calculation 374 ms    Narrative    Test Reason : Z01.818,I10,    Vent. Rate : 072 BPM     Atrial Rate : 072 BPM     P-R Int : 210 ms          QRS Dur : 066 ms      QT Int : 342 ms       P-R-T Axes : 032 034 074 degrees     QTc Int : 374 ms    Sinus rhythm with 1st degree A-V block with occasional Premature  ventricular complexes and Premature atrial complexes  Low voltage QRS  Cannot rule out Anterior infarct (cited on or before 03-FEB-2023)  Abnormal ECG  When compared with ECG of 23-NOV-2023 15:48,  Significant changes have occurred  Confirmed by Stephanie BASS, Nicole HEBERT ([id number]) on 7/22/2024 9:57:16 PM    Referred By: LATISHA LOVELACE           Confirmed By:Nicole Brown MD        2D ECHO:   Results for orders placed during the hospital encounter of 05/18/20    Echo Color Flow Doppler? Yes    Interpretation Summary  · Normal left ventricular systolic function. The estimated ejection fraction is 75%.  · Normal right ventricular systolic function.  · Grade I (mild) left ventricular diastolic dysfunction consistent with impaired relaxation.  · Mild left atrial enlargement.  · Mild aortic valve sclerosis.  · Moderate mitral sclerosis.  · The estimated PA systolic pressure is 10 mmHg.  · Normal central venous pressure (3 mmHg).         ASSESSMENT/PLAN:           Pre-op Assessment    I have reviewed the Patient Summary Reports.     I have reviewed the Nursing Notes. I have reviewed the NPO Status.   I have reviewed the Medications.     Review of Systems  Anesthesia Hx:  No problems with previous Anesthesia   History of prior surgery of interest to airway management or planning:  Previous anesthesia: General, MAC        Denies Family  Hx of Anesthesia complications.    Denies Personal Hx of Anesthesia complications.                    Social:  Former Smoker, No Alcohol Use       Cardiovascular:     Hypertension   CAD   CABG/stent (CABG 2020)    Denies Angina.  Denies CHF.        No recent angina                          Pulmonary:    Denies COPD.  Denies Asthma.    Sleep Apnea (No CPAP), CPAP           Education provided regarding risk of obstructive sleep apnea            Renal/:  Chronic Renal Disease, CKD                Hepatic/GI:     GERD Denies Liver Disease.            Musculoskeletal:  Arthritis               Neurological:    Denies CVA. Neuromuscular Disease,   Denies Seizures.                                Endocrine:  Diabetes Denies Hypothyroidism.  Denies Hyperthyroidism.         Psych:  Psychiatric History anxiety depression                Physical Exam  General: Well nourished, Cooperative, Alert and Oriented    Airway:  Mallampati: III / II  Mouth Opening: Normal  TM Distance: Normal  Tongue: Normal  Neck ROM: Normal ROM    Dental:  Intact    Chest/Lungs:  Normal Respiratory Rate    Heart:  Rate: Normal        Anesthesia Plan  Type of Anesthesia, risks & benefits discussed:    Anesthesia Type: Gen Natural Airway  Intra-op Monitoring Plan: Standard ASA Monitors  Post Op Pain Control Plan: multimodal analgesia and IV/PO Opioids PRN  Induction:  IV  Airway Plan: Direct  Informed Consent: Informed consent signed with the Patient and all parties understand the risks and agree with anesthesia plan.  All questions answered.   ASA Score: 3  Day of Surgery Review of History & Physical: H&P Update referred to the surgeon/provider.    Ready For Surgery From Anesthesia Perspective.     .

## 2024-08-02 ENCOUNTER — ANESTHESIA (OUTPATIENT)
Dept: SURGERY | Facility: HOSPITAL | Age: 74
End: 2024-08-02
Payer: MEDICARE

## 2024-08-02 ENCOUNTER — HOSPITAL ENCOUNTER (OUTPATIENT)
Facility: HOSPITAL | Age: 74
Discharge: HOME | End: 2024-08-02
Attending: ORTHOPAEDIC SURGERY | Admitting: ORTHOPAEDIC SURGERY
Payer: MEDICARE

## 2024-08-02 VITALS
OXYGEN SATURATION: 98 % | DIASTOLIC BLOOD PRESSURE: 78 MMHG | TEMPERATURE: 98 F | WEIGHT: 191.81 LBS | RESPIRATION RATE: 17 BRPM | HEIGHT: 62 IN | HEART RATE: 67 BPM | BODY MASS INDEX: 35.3 KG/M2 | SYSTOLIC BLOOD PRESSURE: 139 MMHG

## 2024-08-02 DIAGNOSIS — M65.331 TRIGGER FINGER, RIGHT MIDDLE FINGER: ICD-10-CM

## 2024-08-02 LAB — POCT GLUCOSE: 98 MG/DL (ref 70–110)

## 2024-08-02 PROCEDURE — 37000008 HC ANESTHESIA 1ST 15 MINUTES: Mod: HCNC | Performed by: ORTHOPAEDIC SURGERY

## 2024-08-02 PROCEDURE — 63600175 PHARM REV CODE 636 W HCPCS: Mod: HCNC

## 2024-08-02 PROCEDURE — 71000016 HC POSTOP RECOV ADDL HR: Mod: HCNC | Performed by: ORTHOPAEDIC SURGERY

## 2024-08-02 PROCEDURE — 36000707: Mod: HCNC | Performed by: ORTHOPAEDIC SURGERY

## 2024-08-02 PROCEDURE — 36000706: Mod: HCNC | Performed by: ORTHOPAEDIC SURGERY

## 2024-08-02 PROCEDURE — 26055 INCISE FINGER TENDON SHEATH: CPT | Mod: F7,HCNC,, | Performed by: ORTHOPAEDIC SURGERY

## 2024-08-02 PROCEDURE — 63600175 PHARM REV CODE 636 W HCPCS: Mod: JZ,JG,HCNC | Performed by: ORTHOPAEDIC SURGERY

## 2024-08-02 PROCEDURE — 71000015 HC POSTOP RECOV 1ST HR: Mod: HCNC | Performed by: ORTHOPAEDIC SURGERY

## 2024-08-02 PROCEDURE — 25000003 PHARM REV CODE 250: Mod: HCNC | Performed by: ORTHOPAEDIC SURGERY

## 2024-08-02 PROCEDURE — 37000009 HC ANESTHESIA EA ADD 15 MINS: Mod: HCNC | Performed by: ORTHOPAEDIC SURGERY

## 2024-08-02 PROCEDURE — 25000003 PHARM REV CODE 250: Mod: HCNC

## 2024-08-02 RX ORDER — ACETAMINOPHEN 325 MG/1
650 TABLET ORAL EVERY 4 HOURS PRN
Status: DISCONTINUED | OUTPATIENT
Start: 2024-08-02 | End: 2024-08-02 | Stop reason: HOSPADM

## 2024-08-02 RX ORDER — CEFAZOLIN SODIUM 1 G/3ML
INJECTION, POWDER, FOR SOLUTION INTRAMUSCULAR; INTRAVENOUS
Status: DISCONTINUED | OUTPATIENT
Start: 2024-08-02 | End: 2024-08-02

## 2024-08-02 RX ORDER — PROPOFOL 10 MG/ML
VIAL (ML) INTRAVENOUS
Status: DISCONTINUED | OUTPATIENT
Start: 2024-08-02 | End: 2024-08-02

## 2024-08-02 RX ORDER — PHENYLEPHRINE HCL IN 0.9% NACL 1 MG/10 ML
SYRINGE (ML) INTRAVENOUS
Status: DISCONTINUED | OUTPATIENT
Start: 2024-08-02 | End: 2024-08-02

## 2024-08-02 RX ORDER — HYDROMORPHONE HYDROCHLORIDE 2 MG/ML
0.2 INJECTION, SOLUTION INTRAMUSCULAR; INTRAVENOUS; SUBCUTANEOUS EVERY 5 MIN PRN
Status: DISCONTINUED | OUTPATIENT
Start: 2024-08-02 | End: 2024-08-02 | Stop reason: HOSPADM

## 2024-08-02 RX ORDER — PROPOFOL 10 MG/ML
INJECTION, EMULSION INTRAVENOUS
Status: DISCONTINUED | OUTPATIENT
Start: 2024-08-02 | End: 2024-08-02

## 2024-08-02 RX ORDER — OXYCODONE HYDROCHLORIDE 5 MG/1
10 TABLET ORAL EVERY 4 HOURS PRN
Status: DISCONTINUED | OUTPATIENT
Start: 2024-08-02 | End: 2024-08-02 | Stop reason: HOSPADM

## 2024-08-02 RX ORDER — SODIUM CHLORIDE 0.9 % (FLUSH) 0.9 %
10 SYRINGE (ML) INJECTION
Status: DISCONTINUED | OUTPATIENT
Start: 2024-08-02 | End: 2024-08-02 | Stop reason: HOSPADM

## 2024-08-02 RX ORDER — LIDOCAINE HYDROCHLORIDE 10 MG/ML
INJECTION, SOLUTION EPIDURAL; INFILTRATION; INTRACAUDAL; PERINEURAL
Status: DISCONTINUED | OUTPATIENT
Start: 2024-08-02 | End: 2024-08-02 | Stop reason: HOSPADM

## 2024-08-02 RX ORDER — BUPIVACAINE HYDROCHLORIDE 5 MG/ML
INJECTION, SOLUTION EPIDURAL; INTRACAUDAL
Status: DISCONTINUED | OUTPATIENT
Start: 2024-08-02 | End: 2024-08-02 | Stop reason: HOSPADM

## 2024-08-02 RX ORDER — ONDANSETRON HYDROCHLORIDE 2 MG/ML
4 INJECTION, SOLUTION INTRAVENOUS DAILY PRN
Status: DISCONTINUED | OUTPATIENT
Start: 2024-08-02 | End: 2024-08-02 | Stop reason: HOSPADM

## 2024-08-02 RX ORDER — PROPOFOL 10 MG/ML
INJECTION, EMULSION INTRAVENOUS CONTINUOUS PRN
Status: DISCONTINUED | OUTPATIENT
Start: 2024-08-02 | End: 2024-08-02

## 2024-08-02 RX ORDER — CEFAZOLIN SODIUM 2 G/50ML
2 SOLUTION INTRAVENOUS
Status: DISCONTINUED | OUTPATIENT
Start: 2024-08-02 | End: 2024-08-02 | Stop reason: HOSPADM

## 2024-08-02 RX ORDER — SODIUM CHLORIDE, SODIUM LACTATE, POTASSIUM CHLORIDE, CALCIUM CHLORIDE 600; 310; 30; 20 MG/100ML; MG/100ML; MG/100ML; MG/100ML
INJECTION, SOLUTION INTRAVENOUS CONTINUOUS PRN
Status: DISCONTINUED | OUTPATIENT
Start: 2024-08-02 | End: 2024-08-02

## 2024-08-02 RX ORDER — HYDROCODONE BITARTRATE AND ACETAMINOPHEN 5; 325 MG/1; MG/1
1 TABLET ORAL EVERY 4 HOURS PRN
Qty: 12 TABLET | Refills: 0 | Status: SHIPPED | OUTPATIENT
Start: 2024-08-02

## 2024-08-02 RX ORDER — KETOROLAC TROMETHAMINE 30 MG/ML
15 INJECTION, SOLUTION INTRAMUSCULAR; INTRAVENOUS ONCE
Status: COMPLETED | OUTPATIENT
Start: 2024-08-02 | End: 2024-08-02

## 2024-08-02 RX ORDER — MIDAZOLAM HYDROCHLORIDE 1 MG/ML
INJECTION INTRAMUSCULAR; INTRAVENOUS
Status: DISCONTINUED | OUTPATIENT
Start: 2024-08-02 | End: 2024-08-02

## 2024-08-02 RX ORDER — GLUCAGON 1 MG
1 KIT INJECTION
Status: DISCONTINUED | OUTPATIENT
Start: 2024-08-02 | End: 2024-08-02 | Stop reason: HOSPADM

## 2024-08-02 RX ORDER — ONDANSETRON 8 MG/1
8 TABLET, ORALLY DISINTEGRATING ORAL EVERY 8 HOURS PRN
Status: DISCONTINUED | OUTPATIENT
Start: 2024-08-02 | End: 2024-08-02 | Stop reason: HOSPADM

## 2024-08-02 RX ORDER — LIDOCAINE HYDROCHLORIDE 20 MG/ML
INJECTION INTRAVENOUS
Status: DISCONTINUED | OUTPATIENT
Start: 2024-08-02 | End: 2024-08-02

## 2024-08-02 RX ORDER — OXYCODONE HYDROCHLORIDE 5 MG/1
5 TABLET ORAL
Status: DISCONTINUED | OUTPATIENT
Start: 2024-08-02 | End: 2024-08-02 | Stop reason: HOSPADM

## 2024-08-02 RX ADMIN — SODIUM CHLORIDE, SODIUM LACTATE, POTASSIUM CHLORIDE, AND CALCIUM CHLORIDE: .6; .31; .03; .02 INJECTION, SOLUTION INTRAVENOUS at 08:08

## 2024-08-02 RX ADMIN — PROPOFOL 60 MG: 10 INJECTION, EMULSION INTRAVENOUS at 08:08

## 2024-08-02 RX ADMIN — PROPOFOL 150 MCG/KG/MIN: 10 INJECTION, EMULSION INTRAVENOUS at 08:08

## 2024-08-02 RX ADMIN — Medication 50 MCG: at 08:08

## 2024-08-02 RX ADMIN — CEFAZOLIN 2 G: 330 INJECTION, POWDER, FOR SOLUTION INTRAMUSCULAR; INTRAVENOUS at 08:08

## 2024-08-02 RX ADMIN — LIDOCAINE HYDROCHLORIDE 60 MG: 20 INJECTION, SOLUTION INTRAVENOUS at 08:08

## 2024-08-02 RX ADMIN — MIDAZOLAM HYDROCHLORIDE 2 MG: 1 INJECTION, SOLUTION INTRAMUSCULAR; INTRAVENOUS at 08:08

## 2024-08-02 RX ADMIN — KETOROLAC TROMETHAMINE 15 MG: 30 INJECTION, SOLUTION INTRAMUSCULAR; INTRAVENOUS at 09:08

## 2024-08-02 NOTE — TRANSFER OF CARE
"Anesthesia Transfer of Care Note    Patient: Cecilia Barahona    Procedure(s) Performed: Procedure(s) (LRB):  RELEASE, TRIGGER FINGER (Right)    Patient location: PACU    Anesthesia Type: general    Transport from OR: Transported from OR on 6-10 L/min O2 by face mask with adequate spontaneous ventilation    Post pain: adequate analgesia    Post assessment: no apparent anesthetic complications and tolerated procedure well    Post vital signs: stable    Level of consciousness: awake    Nausea/Vomiting: no nausea/vomiting    Complications: none    Transfer of care protocol was followed      Last vitals: Visit Vitals  BP (!) 159/72 (BP Location: Left arm, Patient Position: Lying)   Pulse 61   Temp 36.7 °C (98.1 °F) (Skin)   Resp 18   Ht 5' 2" (1.575 m)   Wt 87 kg (191 lb 12.8 oz)   SpO2 96%   Breastfeeding No   BMI 35.08 kg/m²     "

## 2024-08-02 NOTE — ANESTHESIA POSTPROCEDURE EVALUATION
Anesthesia Post Evaluation    Patient: Cecilia Barahona    Procedure(s) Performed: Procedure(s) (LRB):  RELEASE, TRIGGER FINGER (Right)    Final Anesthesia Type: general      Patient location during evaluation: PACU  Patient participation: Yes- Able to Participate  Level of consciousness: awake  Post-procedure vital signs: reviewed and stable  Pain management: adequate  Airway patency: patent    PONV status at discharge: No PONV  Anesthetic complications: no      Cardiovascular status: blood pressure returned to baseline  Respiratory status: unassisted  Hydration status: euvolemic  Follow-up not needed.              Vitals Value Taken Time   /78 08/02/24 0950   Temp 36.9 °C (98.4 °F) 08/02/24 0950   Pulse 67 08/02/24 0950   Resp 17 08/02/24 0950   SpO2 98 % 08/02/24 0950         No case tracking events are documented in the log.      Pain/Billy Score: Pain Rating Prior to Med Admin: 1 (8/2/2024  9:17 AM)  Billy Score: 10 (8/2/2024  9:50 AM)

## 2024-08-02 NOTE — TELEPHONE ENCOUNTER
Caller states she is scheduled for surgery on tomorrow. Caller states she is unaware of surgery time. No time listed in chart. House supervisor contacted at [place], stated that surgery time is 755a, and pt should arrive 1 hour prior. Caller informed.  Pt advised per protocol and verbalized understanding.   Reason for Disposition   General information question, no triage required and triager able to answer question    Additional Information   Negative: Triager needs further essential information from caller in order to complete triage   Negative: [1] Caller requesting NON-URGENT health information AND [2] PCP's office is the best resource   Negative: Requesting regular office appointment    Protocols used: Information Only Call - No Triage-A-

## 2024-08-06 ENCOUNTER — OFFICE VISIT (OUTPATIENT)
Dept: PODIATRY | Facility: CLINIC | Age: 74
End: 2024-08-06
Payer: MEDICARE

## 2024-08-06 VITALS — HEIGHT: 62 IN | WEIGHT: 191.81 LBS | BODY MASS INDEX: 35.3 KG/M2

## 2024-08-06 DIAGNOSIS — M20.40 HAMMER TOE, UNSPECIFIED LATERALITY: ICD-10-CM

## 2024-08-06 DIAGNOSIS — B35.1 ONYCHOMYCOSIS DUE TO DERMATOPHYTE: ICD-10-CM

## 2024-08-06 DIAGNOSIS — R80.9 TYPE 2 DIABETES MELLITUS WITH DIABETIC MICROALBUMINURIA, WITHOUT LONG-TERM CURRENT USE OF INSULIN: Primary | ICD-10-CM

## 2024-08-06 DIAGNOSIS — E11.29 TYPE 2 DIABETES MELLITUS WITH DIABETIC MICROALBUMINURIA, WITHOUT LONG-TERM CURRENT USE OF INSULIN: Primary | ICD-10-CM

## 2024-08-06 PROCEDURE — 3008F BODY MASS INDEX DOCD: CPT | Mod: HCNC,CPTII,S$GLB, | Performed by: PODIATRIST

## 2024-08-06 PROCEDURE — 1159F MED LIST DOCD IN RCRD: CPT | Mod: HCNC,CPTII,S$GLB, | Performed by: PODIATRIST

## 2024-08-06 PROCEDURE — 1157F ADVNC CARE PLAN IN RCRD: CPT | Mod: HCNC,CPTII,S$GLB, | Performed by: PODIATRIST

## 2024-08-06 PROCEDURE — 1101F PT FALLS ASSESS-DOCD LE1/YR: CPT | Mod: HCNC,CPTII,S$GLB, | Performed by: PODIATRIST

## 2024-08-06 PROCEDURE — 3288F FALL RISK ASSESSMENT DOCD: CPT | Mod: HCNC,CPTII,S$GLB, | Performed by: PODIATRIST

## 2024-08-06 PROCEDURE — 99203 OFFICE O/P NEW LOW 30 MIN: CPT | Mod: HCNC,S$GLB,, | Performed by: PODIATRIST

## 2024-08-06 PROCEDURE — 99999 PR PBB SHADOW E&M-EST. PATIENT-LVL V: CPT | Mod: PBBFAC,HCNC,, | Performed by: PODIATRIST

## 2024-08-06 PROCEDURE — 3051F HG A1C>EQUAL 7.0%<8.0%: CPT | Mod: HCNC,CPTII,S$GLB, | Performed by: PODIATRIST

## 2024-08-06 PROCEDURE — 4010F ACE/ARB THERAPY RXD/TAKEN: CPT | Mod: HCNC,CPTII,S$GLB, | Performed by: PODIATRIST

## 2024-08-06 PROCEDURE — 3066F NEPHROPATHY DOC TX: CPT | Mod: HCNC,CPTII,S$GLB, | Performed by: PODIATRIST

## 2024-08-06 PROCEDURE — 1126F AMNT PAIN NOTED NONE PRSNT: CPT | Mod: HCNC,CPTII,S$GLB, | Performed by: PODIATRIST

## 2024-08-06 PROCEDURE — 3060F POS MICROALBUMINURIA REV: CPT | Mod: HCNC,CPTII,S$GLB, | Performed by: PODIATRIST

## 2024-08-06 RX ORDER — CICLOPIROX 80 MG/ML
SOLUTION TOPICAL NIGHTLY
Qty: 6.6 ML | Refills: 3 | Status: SHIPPED | OUTPATIENT
Start: 2024-08-06

## 2024-08-09 NOTE — H&P
"Incoming call from mom who reports that Adalgisa has been complaining about weakness in L arm, L leg, L stomach for past 15 minutes. Gait appears abnormal per parent report with some stumbling. Mom reports it is hard to tell if he is faking, as sometimes he has complained of weakness in an extremity and then reported to parents it was not real, but this time is all along one side of the body and he reports to parents that he is weak and \"it is real\".  Reports of \"feeling weird\" in stomach/abdomen area are abnormal for him. Advised report to ED for work up due to complex medical hx and risk of severe stroke due to known Nino Nino dx.  " HISTORY & PHYSICAL  Surgical Critical Care    Patient Name: Cecilia Barahona  YOB: 1950    Date: 20                     PRESENTING HISTORY     Chief Complaint/Reason for Admission: Coronary artery disease    History of Present Illness:  Ms. Cecilia Barahona is a 70 y.o. female w/ CAD, HTN. HLD, DM2 on insulin, 0.5ppd smoking history who presented to [facility] with 4 day history of chest pain.  Patient with exertional angina only until 4 days ago when she developed persistent chest pain.  She was admitted to the hospital and underwent though evaluation revealing mulit-vessel doronary artery disease.  Patient transferred to [facility] for CTS evaluation.      On , she underwent CABGx3.  Patient transferred to the ICU on 2.5 of nicardipine.  She remained intubated 2/2 low tidal volumes post-operatively.     Review of Systems:  Unable to obtain 2/2 clinical condition    PAST HISTORY:     Past Medical History:   Diagnosis Date    Acute coronary syndrome 2020: Presents with unstable angina.    Allergic rhinitis, seasonal     Anxiety     Arthritis     CVA (cerebral vascular accident)     Depression     Glaucoma NEC     Hallucination     images out of corner of eye about a year ago    History of positive PPD - treated - remote past     Hx of psychiatric care     Hyperlipidemia LDL goal < 100     Hypertension     Nuclear sclerosis of both eyes 2019    Obesity     CHETNA (obstructive sleep apnea)     Psychiatric problem     Psychosis     Renal disorder     Sleep difficulties     Suicide attempt     about 30 years ago by overdose of pills    Therapy     Type II or unspecified type diabetes mellitus without mention of complication, uncontrolled        Past Surgical History:   Procedure Laterality Date    CARPAL TUNNEL RELEASE       SECTION, CLASSIC      COLONOSCOPY N/A 3/5/2020    Procedure: COLONOSCOPY;  Surgeon: Wiliam Carrillo MD;   Location: [facility] ENDO (4TH FLR);  Service: Endoscopy;  Laterality: N/A;  2/6/20 appt confirmed-rb  pt requested this date    LEFT HEART CATHETERIZATION N/A 5/20/2020    Procedure: HEART CATH-LEFT;  Surgeon: Etelvina Lowery MD;  Location: [facility] CATH LAB;  Service: Cardiology;  Laterality: N/A;    TOTAL ABDOMINAL HYSTERECTOMY      TRIGGER FINGER RELEASE         Family History   Problem Relation Age of Onset    Hypertension Mother     Diabetes Mother     Heart failure Mother     Cataracts Mother     Glaucoma Mother     Prostate cancer Father     Hypertension Father     Diabetes Father     Heart failure Father     No Known Problems Sister     Diabetes Maternal Uncle     Hyperlipidemia Maternal Uncle     Hypertension Maternal Uncle     Diabetes Maternal Grandmother     Diabetes Maternal Grandfather     No Known Problems Paternal Grandmother     No Known Problems Paternal Grandfather     Diabetes Maternal Aunt     Alzheimer's disease Maternal Aunt     Schizophrenia Maternal Aunt     Heart disease Cousin         s/p heart transplant    No Known Problems Paternal Aunt     No Known Problems Paternal Uncle     Drug abuse Grandchild     Amblyopia Neg Hx     Blindness Neg Hx     Cancer Neg Hx     Macular degeneration Neg Hx     Retinal detachment Neg Hx     Strabismus Neg Hx     Stroke Neg Hx     Thyroid disease Neg Hx        Social History     Socioeconomic History    Marital status:      Spouse name: Yuniel    Number of children: 3    Years of education: Not on file    Highest education level: Not on file   Occupational History    Occupation: Retired   Social Needs    Financial resource strain: Somewhat hard    Food insecurity:     Worry: Never true     Inability: Sometimes true    Transportation needs:     Medical: No     Non-medical: No   Tobacco Use    Smoking status: Former Smoker     Packs/day: 0.50     Types: Cigarettes     Last attempt to quit: 11/10/2012     Years since  quittin.5    Smokeless tobacco: Never Used   Substance and Sexual Activity    Alcohol use: Not Currently     Comment: drank socially in the past    Drug use: Not Currently     Types: Marijuana     Comment: tried once 6 months ago    Sexual activity: Not Currently     Partners: Male   Lifestyle    Physical activity:     Days per week: Not on file     Minutes per session: Not on file    Stress: Not at all   Relationships    Social connections:     Talks on phone: Not on file     Gets together: Not on file     Attends [employer] service: Not on file     Active member of club or organization: Not on file     Attends meetings of clubs or organizations: Not on file     Relationship status: Not on file   Other Topics Concern    Patient feels they ought to cut down on drinking/drug use No    Patient annoyed by others criticizing their drinking/drug use No    Patient has felt bad or guilty about drinking/drug use No    Patient has had a drink/used drugs as an eye opener in the AM No   Social History Narrative     x 1.    Has 3 grown children.    Lives with spouse.       MEDICATIONS & ALLERGIES:     No current facility-administered medications on file prior to encounter.      Current Outpatient Medications on File Prior to Encounter   Medication Sig    alcohol swabs PadM Apply 1 each topically 3 (three) times daily.    amLODIPine (NORVASC) 10 MG tablet TAKE ONE TABLET BY MOUTH EVERY DAY    artificial tear ointment (ARTIFICIAL TEARS) Oint Place into both eyes every evening.    aspirin 81 MG Chew Take by mouth once daily. 1 tablet, chewable Oral Every day    atorvastatin (LIPITOR) 40 MG tablet Take 1 tablet (40 mg total) by mouth every evening. For cholesterol.    azelastine (OPTIVAR) 0.05 % ophthalmic solution INSTILL 1 DROP IN EACH EYE TWICE DAILY    chlorthalidone (HYGROTEN) 25 MG Tab TAKE ONE TABLET BY MOUTH EVERY DAY    dulaglutide (TRULICITY) 1.5 mg/0.5 mL PnIj Inject 1.5 mg into the skin  every 7 days.    empagliflozin (JARDIANCE) 10 mg Tab Take 10 mg by mouth once daily.    flash glucose scanning reader (FREESTYLE ANAMARIA READER) Misc 1 Units by Misc.(Non-Drug; Combo Route) route before meals as needed.    fluticasone (FLONASE) 50 mcg/actuation nasal spray 1 spray (50 mcg total) by Each Nare route once daily.    insulin degludec (TRESIBA FLEXTOUCH U-200) 200 unit/mL (3 mL) InPn Inject 36 Units into the skin once daily. Inject 36 units once daily (Patient taking differently: Inject 30 Units into the skin once daily. Inject 36 units once daily)    isosorbide mononitrate (IMDUR) 30 MG 24 hr tablet Take 1 tablet (30 mg total) by mouth once daily.    latanoprost 0.005 % ophthalmic solution PLACE 1 DROP IN EACH EYE EVERY EVENING    loratadine (CLARITIN) 10 mg tablet TAKE ONE TABLET BY MOUTH EVERY DAY AS NEEDED FOR ALLERGIES    metFORMIN (GLUCOPHAGE) 1000 MG tablet TAKE ONE TABLET BY MOUTH TWICE DAILY WITH MEALS    multivitamin with minerals tablet Take 1 tablet by mouth once daily.    naproxen (NAPROSYN) 500 MG tablet Take 500 mg by mouth 2 (two) times daily as needed.    rOPINIRole (REQUIP) 0.5 MG tablet TAKE ONE Tablet BY MOUTH EVERY NIGHT AT BEDTIME    albuterol 90 mcg/actuation inhaler Inhale 2 puffs into the lungs every 6 (six) hours as needed for Wheezing or Shortness of Breath. Rescue    blood pressure monitor (BLOOD PRESSURE KIT) Kit 1 kit by Misc.(Non-Drug; Combo Route) route once daily.    blood-glucose meter,continuous (DEXCOM G6 ) Misc Use with dexcom G6 system    blood-glucose sensor (DEXCOM G6 SENSOR) Vanna Change sensor every 10 days    blood-glucose transmitter (DEXCOM G6 TRANSMITTER) Vanna Change every 3 months    desoximetasone (TOPICORT) 0.05 % cream Apply topically 2 (two) times daily as needed.    FLUoxetine 20 MG capsule Take 1 capsule (20 mg total) by mouth once daily.    FREESTYLE ANAMARIA 14 DAY SENSOR Kit CHANGE SENSOR EVERY 14 DAYS    lancets Misc Check  "blood glucose 4 times a day. Dispense accuchek meter or other meter preferred by insurance. Dx code e11.65    LANCING DEVICE WITH LANCETS Misc USE AS DIRECTED FOR DIABETIC TESTING    pen needle, diabetic (BD ULTRA-FINE LENY PEN NEEDLES) 32 gauge x 5/32" Ndle USE FIVE TIMES DAILY    valsartan (DIOVAN) 160 MG tablet Take 2 tablets (320 mg total) by mouth once daily. (Patient taking differently: Take 320 mg by mouth 2 (two) times daily. )       Review of patient's allergies indicates:   Allergen Reactions    Ace inhibitors Other (See Comments)     cough    Invokana [canagliflozin] Other (See Comments)     Throat and tongue swelling    Ozempic [semaglutide]      Nausea and constipation on 0.25 mg dose.        OBJECTIVE:     Vital Signs:  Temp:  [97.5 °F (36.4 °C)-98.9 °F (37.2 °C)] 97.5 °F (36.4 °C)  Pulse:  [48-63] 62  Resp:  [16-21] 21  SpO2:  [94 %-100 %] 100 %  BP: (148-167)/(69-78) 157/74  Arterial Line BP: (123)/(57) 123/57  Body mass index is 34.21 kg/m².     Physical Exam:  General:  Well developed, well nourished, no acute distress  HEENT:  Normocephalic, atraumatic, PERRL  CVS:  RRR  Resp:  No increased wob, equal chest rise b/l  GI:  Abdomen soft, non-tender, non-distended  MSK:  No muscle atrophy, cyanosis, peripheral edema  Skin:  No rashes, ulcers, erythema  Neuro:  CNII-XII grossly intact  Psych:  Intubated and sedated    Laboratory  Recent Labs   Lab 05/25/20  0239  05/25/20  1404   WBC 10.18  --   --    RBC 4.19  --   --    HGB 12.2  --   --    HCT 39.0   < > 26*     --   --    MCV 93  --   --    MCH 29.1  --   --    MCHC 31.3*  --   --     < > = values in this interval not displayed.     Recent Labs   Lab 05/25/20  0239   CALCIUM 9.3      K 3.7   CO2 26      BUN 14   CREATININE 0.9     No results for input(s): PT, INR, APTT in the last 24 hours.    Recent Labs     05/25/20  1408   PH 7.438   PCO2 39.5   PO2 208*   HCO3 26.7   POCSATURATED 100   BE 3       Diagnostic " Results:  CXR: pending    ASSESSMENT & PLAN:   Ms. Cecilia Barahona is a 70 y.o. female s/p 3 vessel CABG on 5/25/20    Plan:    Neuro:   -wean sedation as tolerated  -fentanyl for pain while intubated    Pulm:   -wean to extubate  -on AC/VC 16/400/5/50%  -ABG 7.44/39/208/26.7/+3    CV:  S/p CABGx3  On Cardene @ 15   Maintian SBP <140  MAP goal 60-80  Trend CT outputs    FEN/GI:  NPO  IVF  Will give resuscitation prn    Renal:  Lombardi in place  Strict intake and output  Replace lytes as indicated  Trend labs     Heme/ID:  H/H 12/39  Trend H/H  Ancef per CTS    Endo:  On insulin infusion, wean as tolerated    MSK:  Bedrest while intubated    LDA:  Yael, CVL, ETT, med CTx2, pleural CTx2, lombardi    Dispo: Admit to SICU    Poppy Sutton, HO2  [facility] [facility]

## 2024-08-15 ENCOUNTER — OFFICE VISIT (OUTPATIENT)
Dept: ORTHOPEDICS | Facility: CLINIC | Age: 74
End: 2024-08-15
Payer: MEDICARE

## 2024-08-15 VITALS — WEIGHT: 191.81 LBS | BODY MASS INDEX: 35.3 KG/M2 | HEIGHT: 62 IN

## 2024-08-15 DIAGNOSIS — M65.342 TRIGGER RING FINGER OF LEFT HAND: Primary | ICD-10-CM

## 2024-08-15 PROCEDURE — 3060F POS MICROALBUMINURIA REV: CPT | Mod: HCNC,CPTII,S$GLB, | Performed by: ORTHOPAEDIC SURGERY

## 2024-08-15 PROCEDURE — 1159F MED LIST DOCD IN RCRD: CPT | Mod: HCNC,CPTII,S$GLB, | Performed by: ORTHOPAEDIC SURGERY

## 2024-08-15 PROCEDURE — 1101F PT FALLS ASSESS-DOCD LE1/YR: CPT | Mod: HCNC,CPTII,S$GLB, | Performed by: ORTHOPAEDIC SURGERY

## 2024-08-15 PROCEDURE — 1125F AMNT PAIN NOTED PAIN PRSNT: CPT | Mod: HCNC,CPTII,S$GLB, | Performed by: ORTHOPAEDIC SURGERY

## 2024-08-15 PROCEDURE — 4010F ACE/ARB THERAPY RXD/TAKEN: CPT | Mod: HCNC,CPTII,S$GLB, | Performed by: ORTHOPAEDIC SURGERY

## 2024-08-15 PROCEDURE — 1157F ADVNC CARE PLAN IN RCRD: CPT | Mod: HCNC,CPTII,S$GLB, | Performed by: ORTHOPAEDIC SURGERY

## 2024-08-15 PROCEDURE — 3288F FALL RISK ASSESSMENT DOCD: CPT | Mod: HCNC,CPTII,S$GLB, | Performed by: ORTHOPAEDIC SURGERY

## 2024-08-15 PROCEDURE — 99024 POSTOP FOLLOW-UP VISIT: CPT | Mod: HCNC,S$GLB,, | Performed by: ORTHOPAEDIC SURGERY

## 2024-08-15 PROCEDURE — 3066F NEPHROPATHY DOC TX: CPT | Mod: HCNC,CPTII,S$GLB, | Performed by: ORTHOPAEDIC SURGERY

## 2024-08-15 PROCEDURE — 3051F HG A1C>EQUAL 7.0%<8.0%: CPT | Mod: HCNC,CPTII,S$GLB, | Performed by: ORTHOPAEDIC SURGERY

## 2024-08-15 PROCEDURE — 99999 PR PBB SHADOW E&M-EST. PATIENT-LVL IV: CPT | Mod: PBBFAC,HCNC,, | Performed by: ORTHOPAEDIC SURGERY

## 2024-08-15 NOTE — PROGRESS NOTES
"Subjective:      Patient ID: Cecilia Barahona is a 74 y.o. female.  Chief Complaint: Post-op Evaluation of the Right Hand (2 WEEK PO )      HPI  Cecilia Barahona is a  74 y.o. female presenting today for post op visit.  She is s/p right middle finger trigger release 2 weeks postop she is doing well some stiffness reported.     Review of patient's allergies indicates:   Allergen Reactions    Invokana [canagliflozin] Swelling     Throat and tongue swelling      Ace inhibitors      cough         Current Outpatient Medications   Medication Sig Dispense Refill    ACCU-CHEK GUIDE GLUCOSE METER Misc       ACCU-CHEK GUIDE TEST STRIPS Strp       albuterol (PROVENTIL/VENTOLIN HFA) 90 mcg/actuation inhaler Inhale 2 puffs into the lungs every 6 (six) hours as needed for Wheezing or Shortness of Breath. Rescue 19 g 0    amLODIPine (NORVASC) 10 MG tablet Take 1 tablet (10 mg total) by mouth once daily. 90 tablet 3    aspirin (ECOTRIN) 81 MG EC tablet Take 1 tablet (81 mg total) by mouth once daily. 90 tablet 3    atorvastatin (LIPITOR) 80 MG tablet Take 1 tablet (80 mg total) by mouth once daily. 90 tablet 3    carvediloL (COREG) 12.5 MG tablet Take 1 tablet (12.5 mg total) by mouth 2 (two) times daily. 180 tablet 3    ciclopirox (PENLAC) 8 % Soln Apply topically nightly. 6.6 mL 3    clobetasoL (OLUX) 0.05 % Foam Apply 1 g topically 2 (two) times daily.      dorzolamide-timolol 2-0.5% (COSOPT) 22.3-6.8 mg/mL ophthalmic solution Place 1 drop into both eyes 2 (two) times daily. 10 mL 1    DROPSAFE PEN NEEDLE 31 gauge x 1/4" Ndle       DROPSAFE PEN NEEDLE 31 gauge x 3/16" Ndle       dulaglutide (TRULICITY) 4.5 mg/0.5 mL pen injector Inject 4.5 mg into the skin every 7 days. 12 pen 2    empagliflozin (JARDIANCE) 25 mg tablet Take 1 tablet (25 mg total) by mouth once daily. 30 tablet 3    EScitalopram oxalate (LEXAPRO) 10 MG tablet Take 1 tablet (10 mg total) by mouth once daily. 90 tablet 2    ezetimibe (ZETIA) 10 mg tablet Take " "1 tablet (10 mg total) by mouth once daily. 90 tablet 3    finerenone (KERENDIA) 10 mg Tab Take 10 mg by mouth Daily. 90 tablet 0    fluticasone (FLONASE) 50 mcg/actuation nasal spray 1 spray (50 mcg total) by Each Nare route once daily. 16 g 5    furosemide (LASIX) 20 MG tablet Take 1 tablet (20 mg total) by mouth once daily. 90 tablet 3    glipiZIDE (GLUCOTROL) 2.5 MG TR24 Take 1 tablet (2.5 mg total) by mouth daily with breakfast. 90 tablet 0    HYDROcodone-acetaminophen (NORCO) 5-325 mg per tablet Take 1 tablet by mouth every 4 (four) hours as needed for Pain. 12 tablet 0    HYDROcodone-acetaminophen (NORCO) 5-325 mg per tablet Take 1 tablet by mouth every 4 (four) hours as needed for Pain. 12 tablet 0    lancing device Misc Apply topically.      loratadine (CLARITIN) 10 mg tablet TAKE 1 TABLET BY MOUTH EVERY DAY AS NEEDED FOR ALLERGIES 90 tablet 2    melatonin (MELATIN) 3 mg tablet Take 2 tablets (6 mg total) by mouth nightly as needed for Insomnia.  0    meloxicam (MOBIC) 7.5 MG tablet TAKE 1 TABLET(7.5 MG) BY MOUTH DAILY AS NEEDED FOR PAIN 90 tablet 1    metFORMIN (GLUCOPHAGE) 1000 MG tablet Take 1 tablet (1,000 mg total) by mouth 2 (two) times daily with meals. 180 tablet 2    multivitamin with minerals tablet Take 1 tablet by mouth once daily.      nitroGLYCERIN (NITROSTAT) 0.4 MG SL tablet Place 1 tablet (0.4 mg total) under the tongue every 5 (five) minutes as needed for Chest pain. 30 tablet 3    ondansetron (ZOFRAN-ODT) 8 MG TbDL       pantoprazole (PROTONIX) 40 MG tablet       pen needle, diabetic (BD ULTRA-FINE LENY PEN NEEDLE) 32 gauge x 5/32" Ndle 1 Device by Misc.(Non-Drug; Combo Route) route Daily. 100 each 4    pen needle, diabetic (NOVOFINE 32) 32 gauge x 1/4" Ndle Use 1x/day 200 each 3    rOPINIRole (REQUIP) 0.5 MG tablet TAKE 1 TABLET(0.5 MG) BY MOUTH EVERY EVENING 90 tablet 0    spironolactone (ALDACTONE) 25 MG tablet Take 1 tablet (25 mg total) by mouth once daily. 90 tablet 3    TRUE " COMFORT ALCOHOL PADS PadM Apply topically.      TRUEPLUS LANCETS 30 gauge Misc USE TO TEST TWICE DAILY      valsartan (DIOVAN) 320 MG tablet Take 1 tablet (320 mg total) by mouth once daily. 90 tablet 3    triamcinolone acetonide 0.1% (KENALOG) 0.1 % ointment Apply topically 2 (two) times daily. for 7 days 80 g 0     No current facility-administered medications for this visit.       Past Medical History:   Diagnosis Date    Acute coronary syndrome 2020: Presents with unstable angina.    Allergic rhinitis, seasonal     Anxiety     Arthritis     Colon polyp     CVA (cerebral vascular accident)     Depression     Glaucoma NEC     Hallucination     images out of corner of eye about a year ago    History of positive PPD - treated - remote past     Hx of psychiatric care     Hyperlipidemia LDL goal < 100     Hypertension     Nuclear sclerosis of both eyes 2019    Obesity     CHETNA (obstructive sleep apnea)     Psychiatric problem     Psychosis     Renal disorder     Sleep difficulties     Suicide attempt     about 30 years ago by overdose of pills    Therapy     Type II or unspecified type diabetes mellitus without mention of complication, uncontrolled        Past Surgical History:   Procedure Laterality Date    CARPAL TUNNEL RELEASE       SECTION, CLASSIC      COLONOSCOPY N/A 3/5/2020    Procedure: COLONOSCOPY;  Surgeon: Wiliam Carrillo MD;  Location: [facility] (4TH FLR);  Service: Endoscopy;  Laterality: N/A;  20 appt confirmed-rb  pt requested this date    CORONARY ARTERY BYPASS GRAFT (CABG) N/A 2020    Procedure: CORONARY ARTERY BYPASS GRAFT (CABG) x3 ;  Surgeon: Yan Bowman MD;  Location: [facility] OR 2ND FLR;  Service: Cardiothoracic;  Laterality: N/A;  CABG X3  Endoharvest time start 0906  Endoharvest time end 1003  Vein prep start 1004  Vein prep end 1048    LEFT HEART CATHETERIZATION N/A 2020    Procedure: HEART CATH-LEFT;  Surgeon: Etelvina Lowery MD;   "Location: [facility] CATH LAB;  Service: Cardiology;  Laterality: N/A;    TOTAL ABDOMINAL HYSTERECTOMY      TRIGGER FINGER RELEASE      TRIGGER FINGER RELEASE Left 2/3/2023    Procedure: RELEASE, TRIGGER FINGER;  Surgeon: Shayan Schaefer Jr., MD;  Location: [facility] OR;  Service: Orthopedics;  Laterality: Left;    TRIGGER FINGER RELEASE Right 8/2/2024    Procedure: RELEASE, TRIGGER FINGER;  Surgeon: Shayan Schaefer Jr., MD;  Location: [facility] OR;  Service: Orthopedics;  Laterality: Right;  Right Middle       OBJECTIVE:   PHYSICAL EXAM:  Height: 5' 2" (157.5 cm) Weight: 87 kg (191 lb 12.8 oz)  Vitals:    08/15/24 1343   Weight: 87 kg (191 lb 12.8 oz)   Height: 5' 2" (1.575 m)   PainSc:   1     Ortho/SPM Exam  Examination right hand sutures in place slight swelling range of motion limited sensation intact   No evidence of infection    RADIOGRAPHS:  None  Comments: I have personally reviewed the imaging and I agree with the above radiologist's report.    ASSESSMENT/PLAN:     IMPRESSION:  Status post trigger release right middle finger    PLAN:  Sutures removed   Patient instructed in appropriate wound care   I have ordered some OT for range of motion    FOLLOW UP:  3 weeks    Disclaimer: This note has been generated using voice-recognition software. There may be typographical errors that have been missed during proof-reading.     "

## 2024-08-22 ENCOUNTER — TELEPHONE (OUTPATIENT)
Dept: FAMILY MEDICINE | Facility: CLINIC | Age: 74
End: 2024-08-22
Payer: MEDICARE

## 2024-08-22 ENCOUNTER — TELEPHONE (OUTPATIENT)
Dept: ORTHOPEDICS | Facility: CLINIC | Age: 74
End: 2024-08-22
Payer: MEDICARE

## 2024-08-22 NOTE — TELEPHONE ENCOUNTER
----- Message from Greggbarry Smith sent at 8/22/2024  8:09 AM CDT -----  Regarding: Cecilia  Type:Patient Callback     Who called: Cecilia     What is the request in detail: Pt stated that she needs to the doctor she needs a referral for Orthopedics at the main campus. Please put in the referral and call the patient once done so she can schedule the appointment.     Can the clinic reply by [facility]? Yes     Would the patient rather a call back or a response via [facility]? Callback     Best call back number: [phone]     Additional Information:

## 2024-08-26 ENCOUNTER — TELEPHONE (OUTPATIENT)
Dept: ORTHOPEDICS | Facility: CLINIC | Age: 74
End: 2024-08-26
Payer: MEDICARE

## 2024-08-26 NOTE — TELEPHONE ENCOUNTER
----- Message from Tiffany Mujica sent at 8/26/2024  4:29 PM CDT -----  Regarding: Therapy  Hello,    The therapy scheduling department has received your order for therapy for this patient. When she was called to be scheduled, she informed us that the injury has gotten better, and she won't be needing therapy. We just wanted to let you know why she has not been scheduled.    Thank you,    Tiffany Mujica

## 2024-09-04 ENCOUNTER — HOSPITAL ENCOUNTER (EMERGENCY)
Facility: HOSPITAL | Age: 74
Discharge: HOME | End: 2024-09-04
Attending: EMERGENCY MEDICINE
Payer: MEDICARE

## 2024-09-04 VITALS
WEIGHT: 198 LBS | TEMPERATURE: 98 F | HEART RATE: 75 BPM | SYSTOLIC BLOOD PRESSURE: 150 MMHG | BODY MASS INDEX: 36.21 KG/M2 | OXYGEN SATURATION: 100 % | DIASTOLIC BLOOD PRESSURE: 78 MMHG | RESPIRATION RATE: 16 BRPM

## 2024-09-04 DIAGNOSIS — S09.90XA INJURY OF HEAD, INITIAL ENCOUNTER: Primary | ICD-10-CM

## 2024-09-04 PROCEDURE — 99284 EMERGENCY DEPT VISIT MOD MDM: CPT | Mod: 25,HCNC

## 2024-09-04 RX ORDER — OXYMETAZOLINE HCL 0.05 %
1 SPRAY, NON-AEROSOL (ML) NASAL 2 TIMES DAILY
Qty: 15 ML | Refills: 0 | Status: SHIPPED | OUTPATIENT
Start: 2024-09-04 | End: 2024-09-07

## 2024-09-04 RX ORDER — ACETAMINOPHEN 500 MG
1000 TABLET ORAL EVERY 6 HOURS PRN
Qty: 60 TABLET | Refills: 0 | Status: SHIPPED | OUTPATIENT
Start: 2024-09-04

## 2024-09-04 NOTE — ED PROVIDER NOTES
Encounter Date: 9/4/2024    SCRIBE #1 NOTE: I, Hunter Shultz, am scribing for, and in the presence of,  Kp Schrieber MD.       History     Chief Complaint   Patient presents with    Fall     Pt tripped and fell hit face on pavement. No LOC or vomiting. No blood thinners. Pt reports epistaxis after fall. Dizziness after fall. Dizziness resolved at present. Pt AAO x4. PERRLA      74 y.o. female with a PMHx of anxiety, arthritis, CVA, HLD, HTN, T2DM, s/p CABG, presents to the ED for evaluation post fall that occurred 1.5 hours PTA. Patient reports that she was leaving the house, when she believes that she trip and fell, hitting her head on the concrete. Denies any LOC. Reports of an episode of epistaxis, that is resolved at this time. She was helped up to her feet by her grandchildren and has been ambulatory without assistance. Reports that her last fall was 6 months ago and was not evaluated for that. Denies any recent illnesses. Not on anticoagulant. No medications taken for symptoms. Denies leg weakness, lightheadedness, dizziness, LOC, nausea, vomiting or any associated symptoms.     The history is provided by the patient. No  was used.     Review of patient's allergies indicates:   Allergen Reactions    Invokana [canagliflozin] Swelling     Throat and tongue swelling      Ace inhibitors      cough     Past Medical History:   Diagnosis Date    Acute coronary syndrome 05/20/2020 5/18/2020: Presents with unstable angina.    Allergic rhinitis, seasonal     Anxiety     Arthritis     Colon polyp     CVA (cerebral vascular accident)     Depression     Glaucoma NEC     Hallucination     images out of corner of eye about a year ago    History of positive PPD - treated - remote past     Hx of psychiatric care     Hyperlipidemia LDL goal < 100     Hypertension     Nuclear sclerosis of both eyes 02/26/2019    Obesity     CHETNA (obstructive sleep apnea)     Psychiatric problem     Psychosis      Renal disorder     Sleep difficulties     Suicide attempt     about 30 years ago by overdose of pills    Therapy     Type II or unspecified type diabetes mellitus without mention of complication, uncontrolled      Past Surgical History:   Procedure Laterality Date    CARPAL TUNNEL RELEASE       SECTION, CLASSIC      COLONOSCOPY N/A 3/5/2020    Procedure: COLONOSCOPY;  Surgeon: Wiliam Carrillo MD;  Location: [facility] ENDO (4TH FLR);  Service: Endoscopy;  Laterality: N/A;  20 appt confirmed-rb  pt requested this date    CORONARY ARTERY BYPASS GRAFT (CABG) N/A 2020    Procedure: CORONARY ARTERY BYPASS GRAFT (CABG) x3 ;  Surgeon: Yan Bowman MD;  Location: [facility] OR 2ND FLR;  Service: Cardiothoracic;  Laterality: N/A;  CABG X3  Endoharvest time start 0906  Endoharvest time end 1003  Vein prep start 1004  Vein prep end 1048    LEFT HEART CATHETERIZATION N/A 2020    Procedure: HEART CATH-LEFT;  Surgeon: Etelvina Lowery MD;  Location: [facility] CATH LAB;  Service: Cardiology;  Laterality: N/A;    TOTAL ABDOMINAL HYSTERECTOMY      TRIGGER FINGER RELEASE      TRIGGER FINGER RELEASE Left 2/3/2023    Procedure: RELEASE, TRIGGER FINGER;  Surgeon: Shayan Schaefer Jr., MD;  Location: [facility] OR;  Service: Orthopedics;  Laterality: Left;    TRIGGER FINGER RELEASE Right 2024    Procedure: RELEASE, TRIGGER FINGER;  Surgeon: Shayan Schaefer Jr., MD;  Location: [facility] OR;  Service: Orthopedics;  Laterality: Right;  Right Middle     Family History   Problem Relation Name Age of Onset    Hypertension Mother      Diabetes Mother      Heart failure Mother      Cataracts Mother      Glaucoma Mother      Prostate cancer Father      Hypertension Father      Diabetes Father      Heart failure Father      No Known Problems Sister Melissa     No Known Problems Maternal Aunt      Diabetes Maternal Uncle      Hyperlipidemia Maternal Uncle      Hypertension Maternal Uncle      Diabetes Maternal Grandmother      Diabetes  Maternal Grandfather      No Known Problems Paternal Grandmother      No Known Problems Paternal Grandfather      Diabetes Maternal Aunt Radha     Alzheimer's disease Maternal Aunt Radha     Schizophrenia Maternal Aunt Radha     Heart disease Cousin          s/p heart transplant    No Known Problems Paternal Aunt      No Known Problems Paternal Uncle      Drug abuse Grandchild      Amblyopia Neg Hx      Blindness Neg Hx      Cancer Neg Hx      Macular degeneration Neg Hx      Retinal detachment Neg Hx      Strabismus Neg Hx      Stroke Neg Hx      Thyroid disease Neg Hx       Social History     Tobacco Use    Smoking status: Former     Current packs/day: 0.00     Types: Cigarettes     Quit date: 11/10/2012     Years since quittin.8    Smokeless tobacco: Never   Substance Use Topics    Alcohol use: Not Currently     Comment: drank socially in the past    Drug use: Not Currently     Types: Marijuana     Comment: tried once 6 months ago     Review of Systems   Constitutional: Negative.    HENT:  Positive for nosebleeds.    Eyes: Negative.    Respiratory: Negative.     Cardiovascular: Negative.    Gastrointestinal: Negative.  Negative for nausea and vomiting.   Genitourinary: Negative.    Musculoskeletal: Negative.    Skin: Negative.    Neurological: Negative.  Negative for dizziness, syncope, weakness and light-headedness.       Physical Exam     Initial Vitals [24 0848]   BP Pulse Resp Temp SpO2   129/66 61 18 98.2 °F (36.8 °C) 97 %      MAP       --         Physical Exam    Nursing note and vitals reviewed.  Constitutional: She appears well-developed and well-nourished. She is not diaphoretic. No distress.   HENT:   Head: Normocephalic.   Nose: Nose normal.   Small amount of blood noted to the left anterior nare, small amount of bruising with mild erythema to the left forehead   Eyes: EOM are normal. Pupils are equal, round, and reactive to light.   Neck: Neck supple. No JVD present.   Normal range of  motion.  Cardiovascular:  Normal rate, regular rhythm, normal heart sounds and intact distal pulses.           Pulmonary/Chest: Breath sounds normal. No stridor. No respiratory distress. She has no wheezes. She has no rales.   Abdominal: Abdomen is soft. Bowel sounds are normal. She exhibits no distension. There is no abdominal tenderness.   Musculoskeletal:         General: No tenderness or edema. Normal range of motion.      Cervical back: Normal range of motion and neck supple.     Neurological: She is alert and oriented to person, place, and time. She has normal strength.   Skin: Skin is warm and dry. Capillary refill takes less than 2 seconds. No rash noted. No erythema.         ED Course   Procedures  Labs Reviewed - No data to display       Imaging Results              CT Head Without Contrast (Final result)  Result time 09/04/24 09:44:51      Final result by Heber Carballo MD (09/04/24 09:44:51)                   Impression:      Left periorbital soft tissue swelling without evidence of displaced fracture.    No evidence of acute intracranial hemorrhage.  Mild chronic small vessel ischemic change and generalized cerebral volume loss, similar to prior.    Moderate cervical spondylosis without evidence of an acute displaced fracture.      Electronically signed by: Heber Carballo MD  Date:    09/04/2024  Time:    09:44               Narrative:    EXAMINATION:  CT CERVICAL SPINE WITHOUT CONTRAST; CT HEAD WITHOUT CONTRAST; CT MAXILLOFACIAL WITHOUT CONTRAST    CLINICAL HISTORY:  Neck trauma (Age >= 65y);; Head trauma, minor (Age >= 65y);; Facial fracture, follow up;    TECHNIQUE:  Low dose axial CT images obtained throughout the head, facial bones and cervical spine without intravenous contrast.  Axial, sagittal and coronal reconstructions were performed.    COMPARISON:  MRI brain 10/17/2022 MRI cervical spine 08/07/2014    FINDINGS:  Head:    Prominence of ventricles and sulci keeping with cerebral volume  loss, unchanged from prior.  No hydrocephalus.    No extra-axial blood or fluid collections.    Mild patchy hypoattenuation in the supratentorial white matter, nonspecific but most likely reflecting chronic small vessel ischemic changes.  Hypoattenuating foci in the left basal ganglia, probable prominent perivascular spaces.  No new major vascular distribution infarct. No acute hemorrhage.  No mass effect or midline shift.    No calvarial fracture.    Facial bones:    Left periorbital soft tissue swelling.  The globes and intraorbital contents are unremarkable.  No orbital fracture.    No displaced fracture elsewhere.    Soft tissue structures about the face appear within normal limits.    Paranasal sinuses and mastoid air cells are essentially clear.    Spine:    The vertebral bodies are normal in height and morphology without evidence of fracture.    Straightening with slight reversal expected cervical lordosis.  Multilevel degenerative disc disease with advanced loss of disc height at C3-4,  C5-6 and C6-7.    Limited evaluation of the intraspinal contents demonstrates no evidence of hematoma.    The paraspinal soft tissue structures exhibit no acute abnormalities.  Punctate left submandibular sialolith.                                       CT Maxillofacial Without Contrast (Final result)  Result time 09/04/24 09:44:51      Final result by Heber Carballo MD (09/04/24 09:44:51)                   Impression:      Left periorbital soft tissue swelling without evidence of displaced fracture.    No evidence of acute intracranial hemorrhage.  Mild chronic small vessel ischemic change and generalized cerebral volume loss, similar to prior.    Moderate cervical spondylosis without evidence of an acute displaced fracture.      Electronically signed by: Heber Carballo MD  Date:    09/04/2024  Time:    09:44               Narrative:    EXAMINATION:  CT CERVICAL SPINE WITHOUT CONTRAST; CT HEAD WITHOUT CONTRAST; CT  MAXILLOFACIAL WITHOUT CONTRAST    CLINICAL HISTORY:  Neck trauma (Age >= 65y);; Head trauma, minor (Age >= 65y);; Facial fracture, follow up;    TECHNIQUE:  Low dose axial CT images obtained throughout the head, facial bones and cervical spine without intravenous contrast.  Axial, sagittal and coronal reconstructions were performed.    COMPARISON:  MRI brain 10/17/2022 MRI cervical spine 08/07/2014    FINDINGS:  Head:    Prominence of ventricles and sulci keeping with cerebral volume loss, unchanged from prior.  No hydrocephalus.    No extra-axial blood or fluid collections.    Mild patchy hypoattenuation in the supratentorial white matter, nonspecific but most likely reflecting chronic small vessel ischemic changes.  Hypoattenuating foci in the left basal ganglia, probable prominent perivascular spaces.  No new major vascular distribution infarct. No acute hemorrhage.  No mass effect or midline shift.    No calvarial fracture.    Facial bones:    Left periorbital soft tissue swelling.  The globes and intraorbital contents are unremarkable.  No orbital fracture.    No displaced fracture elsewhere.    Soft tissue structures about the face appear within normal limits.    Paranasal sinuses and mastoid air cells are essentially clear.    Spine:    The vertebral bodies are normal in height and morphology without evidence of fracture.    Straightening with slight reversal expected cervical lordosis.  Multilevel degenerative disc disease with advanced loss of disc height at C3-4,  C5-6 and C6-7.    Limited evaluation of the intraspinal contents demonstrates no evidence of hematoma.    The paraspinal soft tissue structures exhibit no acute abnormalities.  Punctate left submandibular sialolith.                                       CT Cervical Spine Without Contrast (Final result)  Result time 09/04/24 09:44:51      Final result by Heber Craballo MD (09/04/24 09:44:51)                   Impression:      Left periorbital  soft tissue swelling without evidence of displaced fracture.    No evidence of acute intracranial hemorrhage.  Mild chronic small vessel ischemic change and generalized cerebral volume loss, similar to prior.    Moderate cervical spondylosis without evidence of an acute displaced fracture.      Electronically signed by: Heber Carballo MD  Date:    09/04/2024  Time:    09:44               Narrative:    EXAMINATION:  CT CERVICAL SPINE WITHOUT CONTRAST; CT HEAD WITHOUT CONTRAST; CT MAXILLOFACIAL WITHOUT CONTRAST    CLINICAL HISTORY:  Neck trauma (Age >= 65y);; Head trauma, minor (Age >= 65y);; Facial fracture, follow up;    TECHNIQUE:  Low dose axial CT images obtained throughout the head, facial bones and cervical spine without intravenous contrast.  Axial, sagittal and coronal reconstructions were performed.    COMPARISON:  MRI brain 10/17/2022 MRI cervical spine 08/07/2014    FINDINGS:  Head:    Prominence of ventricles and sulci keeping with cerebral volume loss, unchanged from prior.  No hydrocephalus.    No extra-axial blood or fluid collections.    Mild patchy hypoattenuation in the supratentorial white matter, nonspecific but most likely reflecting chronic small vessel ischemic changes.  Hypoattenuating foci in the left basal ganglia, probable prominent perivascular spaces.  No new major vascular distribution infarct. No acute hemorrhage.  No mass effect or midline shift.    No calvarial fracture.    Facial bones:    Left periorbital soft tissue swelling.  The globes and intraorbital contents are unremarkable.  No orbital fracture.    No displaced fracture elsewhere.    Soft tissue structures about the face appear within normal limits.    Paranasal sinuses and mastoid air cells are essentially clear.    Spine:    The vertebral bodies are normal in height and morphology without evidence of fracture.    Straightening with slight reversal expected cervical lordosis.  Multilevel degenerative disc disease with  advanced loss of disc height at C3-4,  C5-6 and C6-7.    Limited evaluation of the intraspinal contents demonstrates no evidence of hematoma.    The paraspinal soft tissue structures exhibit no acute abnormalities.  Punctate left submandibular sialolith.                                       Medications - No data to display  Medical Decision Making  Amount and/or Complexity of Data Reviewed  Radiology: ordered. Decision-making details documented in ED Course.    Risk  OTC drugs.      MDM:    74-year-old female with past medical history as noted above presents after fall.  Differential Diagnosis includes:  Laceration, fracture, intracranial hemorrhage, syncope.  Physical exam as noted above.  ED workup notable for CT head, maxillofacial, cervical spine shows left periorbital soft tissue swelling without evidence of fracture, no evidence of intracranial hemorrhage, no evidence of acute displaced fracture on cervical spine.  Patient presentation appears consistent with mechanical fall, subsequent epistaxis has completely resolved, no further bleeding or areas of trauma noted upon reassessment.  She appears to be at baseline.  Advised on further symptomatic care going forward.  Do not suspect any additional surgical or medical emergency. Discussed diagnosis and further treatment with patient, including f/u.  Return precautions given and all questions answered.  Patient in understanding of plan.  Pt discharged to home improved and stable.        Note was created using voice recognition software. Note may have occasional typographical or grammatical errors, garbled syntax, and other bizarre constructions that may not have been identified and edited despite good alaina initial review prior to signing.               Scribe Attestation:   Scribe #1: I performed the above scribed service and the documentation accurately describes the services I performed. I attest to the accuracy of the note.                               I,  Kp Schreiber M.D., personally performed the services described in this documentation. All medical record entries made by the scribe were at my direction and in my presence. I have reviewed the chart and agree that the record reflects my personal performance and is accurate and complete.      DISCLAIMER: This note was prepared with [employer] voice recognition transcription software. Garbled syntax, mangled pronouns, and other bizarre constructions may be attributed to that software system.        Clinical Impression:  Final diagnoses:  [S09.90XA] Injury of head, initial encounter (Primary)          ED Disposition Condition    Discharge Stable          ED Prescriptions       Medication Sig Dispense Start Date End Date Auth. Provider    oxymetazoline (AFRIN) 0.05 % nasal spray () 1 spray by Nasal route 2 (two) times daily. for 3 days 15 mL 2024 Kp Schreiber MD    acetaminophen (TYLENOL) 500 MG tablet Take 2 tablets (1,000 mg total) by mouth every 6 (six) hours as needed. 60 tablet 2024 -- Kp Schreiber MD          Follow-up Information       Follow up With Specialties Details Why Contact Info    [facility] - Emergency Dept Emergency Medicine Go to  If symptoms worsen [address] [facility] [place] [address] [phone]    Tiarra Villa MD Internal Medicine, Wound Care Go to  If symptoms worsen [address] [address]  [address]  [place] [address]  [phone]               Kp Schreiber MD  24 [id number]

## 2024-09-04 NOTE — ED TRIAGE NOTES
"Pt arrives to ED with c/o fall. Reports trip and fall this morning. States "I just felt myself falling and I hit the concrete." Denies HA, dizziness, nausea. Swelling noted above r eye. Denies blood thinners. NAD. Reports having nose bleed after incident.   " no acute distress

## 2024-09-05 ENCOUNTER — PATIENT OUTREACH (OUTPATIENT)
Dept: EMERGENCY MEDICINE | Facility: HOSPITAL | Age: 74
End: 2024-09-05
Payer: MEDICARE

## 2024-09-05 NOTE — PROGRESS NOTES
Patient visited the ED on 9/4/2024.  Patient declined Post ED 7 day PCP, Tiarra Villa MD follow up appointment.

## 2024-09-17 ENCOUNTER — LAB VISIT (OUTPATIENT)
Dept: LAB | Facility: HOSPITAL | Age: 74
End: 2024-09-17
Attending: NURSE PRACTITIONER
Payer: MEDICARE

## 2024-09-17 DIAGNOSIS — E11.65 TYPE 2 DIABETES MELLITUS WITH HYPERGLYCEMIA, UNSPECIFIED WHETHER LONG TERM INSULIN USE: ICD-10-CM

## 2024-09-17 PROCEDURE — 83036 HEMOGLOBIN GLYCOSYLATED A1C: CPT | Mod: HCNC | Performed by: NURSE PRACTITIONER

## 2024-09-17 PROCEDURE — 36415 COLL VENOUS BLD VENIPUNCTURE: CPT | Mod: HCNC | Performed by: NURSE PRACTITIONER

## 2024-09-18 ENCOUNTER — OFFICE VISIT (OUTPATIENT)
Dept: ENDOCRINOLOGY | Facility: CLINIC | Age: 74
End: 2024-09-18
Payer: MEDICARE

## 2024-09-18 VITALS
WEIGHT: 189 LBS | SYSTOLIC BLOOD PRESSURE: 108 MMHG | TEMPERATURE: 99 F | DIASTOLIC BLOOD PRESSURE: 50 MMHG | BODY MASS INDEX: 34.57 KG/M2 | HEART RATE: 69 BPM

## 2024-09-18 DIAGNOSIS — I10 PRIMARY HYPERTENSION: ICD-10-CM

## 2024-09-18 DIAGNOSIS — E11.65 TYPE 2 DIABETES MELLITUS WITH HYPERGLYCEMIA, UNSPECIFIED WHETHER LONG TERM INSULIN USE: Primary | ICD-10-CM

## 2024-09-18 DIAGNOSIS — R80.9 MICROALBUMINURIA: ICD-10-CM

## 2024-09-18 LAB
ESTIMATED AVG GLUCOSE: 166 MG/DL (ref 68–131)
HBA1C MFR BLD: 7.4 % (ref 4–5.6)

## 2024-09-18 PROCEDURE — 99999 PR PBB SHADOW E&M-EST. PATIENT-LVL IV: CPT | Mod: PBBFAC,HCNC,, | Performed by: NURSE PRACTITIONER

## 2024-09-18 NOTE — PROGRESS NOTES
CC: This 74 y.o. Black or  female  is here for evaluation of  T2DM along with comorbidities indicated in the Visit Diagnosis section of this encounter.    HPI: Cecilia Barahona was diagnosed with T2DM in her 30s. Oral agents started at the time of diagnosis.       Prior visit 11/2023  A1c is about the same, from 7.8 to 8%.   Unable to get Jardiance from [facility] approved.   She has not been taking all her meds consistently. She is doing better lately however.   90 day CGM average 198  Plan Reduce Tresiba from 20 to 16 units once daily.   Continue Trulicity 4.5 mg weekly.   Change metformin and Jardiance to combination Synjardy XR 2 tablets once daily in the morning. This gives the same dose of medications.  Improve medication adherence.   Improve diet. Return to clinic in 3 months with labs prior    Prior visit 6/2024  A1c down a bit from 8% in Nov to 7.6% in May.    She has both Basaglar and Tresiba. She alternates between the two insulins at 15 units. This week she took Basaglar. Admits that she forgets to take injections a lot.   She is back on Jardiance and metformin bc she c/o malodorous urine on Synjardy.   Plan Spoke to pt's daughter Elli on the phone. I am concerned about pt's ability to take her medications consistently and safely. Her daughter was updated on plan of care and will be in regular contact with pt to help with medication adherence. She was advised to connect to pt's [employer] account.   Advised:   Avoid beverages with sugar.   Stop Basaglar and take Tresiba   Reduce Tresiba to 10 units once daily every morning.  If blood sugars continue to be less than 80, please contact office.   Ok to take metformin, Jardiance and Tresiba at the same time in the morning.   Prefer for metformin to be taken with food to avoid upset stomach.   Return to clinic in 3 months with labs prior.     Interval hx  Tresiba was stopped early July.   Pt had a fall earlier this month.   A1c is  down from 7.6 to 7.4%. 90 day CGM average 166.     Pt dropped metformin to 1/2 tablet in the evenings about a week ago d/t low glucose alerts overnight (pt was asymptomatic).         PMHX: CHETNA - does not like her old mask. H/o stroke with right facial dropp     LAST DIABETES EDUCATION: never    RECENT ILLNESSES/HOSPITALIZATIONS - -No.     HOSPITALIZED FOR DIABETES  -  No.    PRESCRIBED DIABETES MEDICATIONS:  gets  needles and Tresiba from [facility]/also Trulicity?   Glipizide XL 2.5 mg once daily before breakfast   metformin 1000 mg bid  Jardiance 25 mg once daily   Trulicity 4.5 mg once weekly on Sundays         Misses medication doses -  metformin as above.     DM COMPLICATIONS: peripheral neuropathy    SIGNIFICANT DIABETES MED HISTORY:   Glimepiride stopped at initial ov 7/2017 since she was already on Novolog  ozempic started 10/2018,  switched to trulicity ~ 12/2018 d/t nausea at 0.5 mg dose. Failed  again in 2023   Jardiance started 2/2020  tresiba and trulicity stopped in 10/2020 d/t improved BGs but Trulicity resumed shortly d/t poor diet and DM control        SELF MONITORING BLOOD GLUCOSE: Dexcom G7 CGM yaakov     CGM interpretation:  mild hypoglycemia noted overnight/daytime occasionally. Fairly frequent spikes in to 200s, sometimes 300s depending on diet.           HYPOGLYCEMIC EPISODES: denies symptoms.  She corrected with candy and soda.   Symptoms: weakness   Corrects with hard candy.       CURRENT DIET:  drinks 1 cup of coffee per day with sugar, no cokes at present.   Sometimes has a breakfast - bagel  Lunch at the group home and snacks in the evenings.       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     BP (!) 108/50   Pulse 69   Temp 98.5 °F (36.9 °C)   Wt 85.7 kg (189 lb)   BMI 34.57 kg/m²         ROS:   CONSTITUTIONAL: Appetite lower, no  fatigue  GI: Denies n/v, or diarrhea. + occasional constipation.   : No dysuria       PHYSICAL EXAM:  GENERAL:  "Well developed, well nourished. No acute distress.   PSYCH: AAOx3, appropriate mood and affect, conversant, well-groomed. Judgement and insight good.   NEURO: Cranial nerves grossly intact. Speech clear, no tremor.   CHEST: Respirations even and unlabored.           Hemoglobin A1C   Date Value Ref Range Status   09/17/2024 7.4 (H) 4.0 - 5.6 % Final     Comment:     ADA Screening Guidelines:  5.7-6.4%  Consistent with prediabetes  >or=6.5%  Consistent with diabetes    High levels of fetal hemoglobin interfere with the HbA1C  assay. Heterozygous hemoglobin variants (HbS, HgC, etc)do  not significantly interfere with this assay.   However, presence of multiple variants may affect accuracy.     05/24/2024 7.6 (H) 4.0 - 5.6 % Final     Comment:     ADA Screening Guidelines:  5.7-6.4%  Consistent with prediabetes  >or=6.5%  Consistent with diabetes    High levels of fetal hemoglobin interfere with the HbA1C  assay. Heterozygous hemoglobin variants (HbS, HgC, etc)do  not significantly interfere with this assay.   However, presence of multiple variants may affect accuracy.     11/01/2023 8.0 (H) 4.0 - 5.6 % Final     Comment:     ADA Screening Guidelines:  5.7-6.4%  Consistent with prediabetes  >or=6.5%  Consistent with diabetes    High levels of fetal hemoglobin interfere with the HbA1C  assay. Heterozygous hemoglobin variants (HbS, HgC, etc)do  not significantly interfere with this assay.   However, presence of multiple variants may affect accuracy.         No results found for: "CPEPTIDE", "GLUTAMICACID", "ISLETCELLANT", "FRUCTOSAMINE"         Component Value Date/Time     07/24/2024 1421    K 4.5 07/24/2024 1421     07/24/2024 1421    CO2 20 (L) 07/24/2024 1421    BUN 19 07/24/2024 1421    CREATININE 1.0 07/24/2024 1421     (H) 07/24/2024 1421    CALCIUM 9.4 07/24/2024 1421    ALKPHOS 128 07/24/2024 1421    AST 19 07/24/2024 1421    ALT 17 07/24/2024 1421    BILITOT 0.5 07/24/2024 1421    EGFRNORACEVR " 59 (A) 07/24/2024 1421    ESTGFRAFRICA >60.0 05/09/2022 1632      Lab Results   Component Value Date    CHOL 113 (L) 05/24/2024    CHOL 104 (L) 07/12/2023    CHOL 94 (L) 02/24/2022     Lab Results   Component Value Date    HDL 41 05/24/2024    HDL 35 (L) 07/12/2023    HDL 37 (L) 02/24/2022     Lab Results   Component Value Date    LDLCALC 52.6 (L) 05/24/2024    LDLCALC 51.8 (L) 07/12/2023    LDLCALC 42.4 (L) 02/24/2022     Lab Results   Component Value Date    TRIG 97 05/24/2024    TRIG 86 07/12/2023    TRIG 73 02/24/2022       Lab Results   Component Value Date    CHOLHDL 36.3 05/24/2024    CHOLHDL 33.7 07/12/2023    CHOLHDL 39.4 02/24/2022               Lab Results   Component Value Date    MICALBCREAT 61.1 (H) 05/24/2024           ASSESSMENT and PLAN:         1. Type 2 diabetes mellitus with hyperglycemia, unspecified whether long term insulin use  STOP glipizide.   Resume metformin at 1 tablet twice daily. Do not reduce dose.   Continue Jardiance and Trulicity.   For constipation - try ducoloax or Miralax. Take on a SCHEDULE, once daily or MWF.    Monitor eating habits, cut back or avoid fruit.   Return to clinic in 4 months with labs prior.         empagliflozin (JARDIANCE) 25 mg tablet    Hemoglobin A1C      2. Primary hypertension  Basic Metabolic Panel      3. Microalbuminuria  empagliflozin (JARDIANCE) 25 mg tablet                  Orders Placed This Encounter   Procedures    Hemoglobin A1C     Standing Status:   Future     Standing Expiration Date:   11/17/2025          Follow up in about 4 months (around 1/18/2025).

## 2024-09-18 NOTE — PATIENT INSTRUCTIONS
STOP glipizide.   Resume metformin at 1 tablet twice daily. Do not reduce dose.   Continue Jardiance and Trulicity.   Monitor eating habits, cut back or avoid fruit.   Return to clinic in 4 months with labs prior.     For constipation - try ducoloax or Miralax. Take on a SCHEDULE, once daily or MWF.

## 2024-09-25 DIAGNOSIS — Z00.00 ENCOUNTER FOR MEDICARE ANNUAL WELLNESS EXAM: ICD-10-CM

## 2024-10-14 DIAGNOSIS — M25.512 CHRONIC LEFT SHOULDER PAIN: ICD-10-CM

## 2024-10-14 DIAGNOSIS — G89.29 CHRONIC LEFT SHOULDER PAIN: ICD-10-CM

## 2024-10-14 NOTE — TELEPHONE ENCOUNTER
No care due was identified.  [facility] Embedded Care Due Messages. Reference number: [id number].   10/14/2024 6:08:36 AM CDT

## 2024-10-15 RX ORDER — MELOXICAM 7.5 MG/1
7.5 TABLET ORAL DAILY PRN
Qty: 90 TABLET | Refills: 1 | Status: SHIPPED | OUTPATIENT
Start: 2024-10-15

## 2024-10-21 ENCOUNTER — TELEPHONE (OUTPATIENT)
Dept: ADMINISTRATIVE | Facility: CLINIC | Age: 74
End: 2024-10-21
Payer: MEDICARE

## 2024-10-22 ENCOUNTER — TELEPHONE (OUTPATIENT)
Dept: OPHTHALMOLOGY | Facility: CLINIC | Age: 74
End: 2024-10-22
Payer: MEDICARE

## 2024-10-22 ENCOUNTER — OFFICE VISIT (OUTPATIENT)
Dept: FAMILY MEDICINE | Facility: CLINIC | Age: 74
End: 2024-10-22
Payer: MEDICARE

## 2024-10-22 ENCOUNTER — TELEPHONE (OUTPATIENT)
Dept: FAMILY MEDICINE | Facility: CLINIC | Age: 74
End: 2024-10-22
Payer: MEDICARE

## 2024-10-22 VITALS
HEIGHT: 62 IN | OXYGEN SATURATION: 98 % | TEMPERATURE: 98 F | SYSTOLIC BLOOD PRESSURE: 130 MMHG | BODY MASS INDEX: 34.69 KG/M2 | WEIGHT: 188.5 LBS | DIASTOLIC BLOOD PRESSURE: 60 MMHG | RESPIRATION RATE: 19 BRPM | HEART RATE: 63 BPM

## 2024-10-22 DIAGNOSIS — M75.32 CALCIFIC TENDONITIS OF LEFT SHOULDER REGION: ICD-10-CM

## 2024-10-22 DIAGNOSIS — M25.561 ARTHRALGIA OF RIGHT KNEE: ICD-10-CM

## 2024-10-22 DIAGNOSIS — E11.40 TYPE 2 DIABETES MELLITUS WITH DIABETIC NEUROPATHY, WITHOUT LONG-TERM CURRENT USE OF INSULIN: ICD-10-CM

## 2024-10-22 DIAGNOSIS — B35.1 TOENAIL FUNGUS: ICD-10-CM

## 2024-10-22 DIAGNOSIS — E11.21 TYPE 2 DIABETES MELLITUS WITH DIABETIC NEPHROPATHY, WITHOUT LONG-TERM CURRENT USE OF INSULIN: Primary | ICD-10-CM

## 2024-10-22 DIAGNOSIS — N18.31 STAGE 3A CHRONIC KIDNEY DISEASE: ICD-10-CM

## 2024-10-22 DIAGNOSIS — Z90.710 POST HYSTERECTOMY MENOPAUSE: ICD-10-CM

## 2024-10-22 DIAGNOSIS — R80.9 TYPE 2 DIABETES MELLITUS WITH DIABETIC MICROALBUMINURIA, WITHOUT LONG-TERM CURRENT USE OF INSULIN: ICD-10-CM

## 2024-10-22 DIAGNOSIS — M65.30 TRIGGER FINGER, UNSPECIFIED FINGER, UNSPECIFIED LATERALITY: ICD-10-CM

## 2024-10-22 DIAGNOSIS — E11.29 TYPE 2 DIABETES MELLITUS WITH DIABETIC MICROALBUMINURIA, WITHOUT LONG-TERM CURRENT USE OF INSULIN: ICD-10-CM

## 2024-10-22 DIAGNOSIS — E89.40 POST HYSTERECTOMY MENOPAUSE: ICD-10-CM

## 2024-10-22 PROBLEM — Z01.810 PRE-OPERATIVE CARDIOVASCULAR EXAMINATION: Status: RESOLVED | Noted: 2024-08-01 | Resolved: 2024-10-22

## 2024-10-22 PROCEDURE — 3060F POS MICROALBUMINURIA REV: CPT | Mod: HCNC,CPTII,S$GLB, | Performed by: NURSE PRACTITIONER

## 2024-10-22 PROCEDURE — 99214 OFFICE O/P EST MOD 30 MIN: CPT | Mod: HCNC,25,S$GLB, | Performed by: NURSE PRACTITIONER

## 2024-10-22 PROCEDURE — 96372 THER/PROPH/DIAG INJ SC/IM: CPT | Mod: HCNC,S$GLB,, | Performed by: NURSE PRACTITIONER

## 2024-10-22 PROCEDURE — 1159F MED LIST DOCD IN RCRD: CPT | Mod: HCNC,CPTII,S$GLB, | Performed by: NURSE PRACTITIONER

## 2024-10-22 PROCEDURE — 1160F RVW MEDS BY RX/DR IN RCRD: CPT | Mod: HCNC,CPTII,S$GLB, | Performed by: NURSE PRACTITIONER

## 2024-10-22 PROCEDURE — 3008F BODY MASS INDEX DOCD: CPT | Mod: HCNC,CPTII,S$GLB, | Performed by: NURSE PRACTITIONER

## 2024-10-22 PROCEDURE — 4010F ACE/ARB THERAPY RXD/TAKEN: CPT | Mod: HCNC,CPTII,S$GLB, | Performed by: NURSE PRACTITIONER

## 2024-10-22 PROCEDURE — 1157F ADVNC CARE PLAN IN RCRD: CPT | Mod: HCNC,CPTII,S$GLB, | Performed by: NURSE PRACTITIONER

## 2024-10-22 PROCEDURE — 3288F FALL RISK ASSESSMENT DOCD: CPT | Mod: HCNC,CPTII,S$GLB, | Performed by: NURSE PRACTITIONER

## 2024-10-22 PROCEDURE — 3051F HG A1C>EQUAL 7.0%<8.0%: CPT | Mod: HCNC,CPTII,S$GLB, | Performed by: NURSE PRACTITIONER

## 2024-10-22 PROCEDURE — 1125F AMNT PAIN NOTED PAIN PRSNT: CPT | Mod: HCNC,CPTII,S$GLB, | Performed by: NURSE PRACTITIONER

## 2024-10-22 PROCEDURE — 3075F SYST BP GE 130 - 139MM HG: CPT | Mod: HCNC,CPTII,S$GLB, | Performed by: NURSE PRACTITIONER

## 2024-10-22 PROCEDURE — 1100F PTFALLS ASSESS-DOCD GE2>/YR: CPT | Mod: HCNC,CPTII,S$GLB, | Performed by: NURSE PRACTITIONER

## 2024-10-22 PROCEDURE — 3078F DIAST BP <80 MM HG: CPT | Mod: HCNC,CPTII,S$GLB, | Performed by: NURSE PRACTITIONER

## 2024-10-22 PROCEDURE — 99999 PR PBB SHADOW E&M-EST. PATIENT-LVL V: CPT | Mod: PBBFAC,HCNC,, | Performed by: NURSE PRACTITIONER

## 2024-10-22 PROCEDURE — 3066F NEPHROPATHY DOC TX: CPT | Mod: HCNC,CPTII,S$GLB, | Performed by: NURSE PRACTITIONER

## 2024-10-22 RX ORDER — KETOROLAC TROMETHAMINE 30 MG/ML
30 INJECTION, SOLUTION INTRAMUSCULAR; INTRAVENOUS
Status: COMPLETED | OUTPATIENT
Start: 2024-10-22 | End: 2024-10-22

## 2024-10-22 RX ORDER — CICLOPIROX 80 MG/ML
SOLUTION TOPICAL NIGHTLY
Qty: 6.6 ML | Refills: 3 | Status: SHIPPED | OUTPATIENT
Start: 2024-10-22

## 2024-10-22 RX ORDER — DORZOLAMIDE HYDROCHLORIDE AND TIMOLOL MALEATE 20; 5 MG/ML; MG/ML
1 SOLUTION/ DROPS OPHTHALMIC 2 TIMES DAILY
Qty: 10 ML | Refills: 1 | Status: SHIPPED | OUTPATIENT
Start: 2024-10-22 | End: 2024-11-01 | Stop reason: SDUPTHER

## 2024-10-22 RX ORDER — DICLOFENAC SODIUM 10 MG/G
2 GEL TOPICAL 4 TIMES DAILY
Qty: 450 G | Refills: 2 | Status: SHIPPED | OUTPATIENT
Start: 2024-10-22

## 2024-10-22 RX ORDER — FINERENONE 10 MG/1
10 TABLET, FILM COATED ORAL DAILY
Qty: 90 TABLET | Refills: 3 | Status: SHIPPED | OUTPATIENT
Start: 2024-10-22 | End: 2024-11-04 | Stop reason: SDUPTHER

## 2024-10-22 RX ADMIN — KETOROLAC TROMETHAMINE 30 MG: 30 INJECTION, SOLUTION INTRAMUSCULAR; INTRAVENOUS at 11:10

## 2024-10-22 NOTE — TELEPHONE ENCOUNTER
----- Message from Nitza sent at 10/22/2024  2:47 PM CDT -----  The referral the doctor put in for the pt for physical therapy is out of network with the physical therapist offices  want to let the doctor know so they can send the pt to a different offices

## 2024-10-22 NOTE — TELEPHONE ENCOUNTER
Spoke w/ PT. She stated that she received a call from [facility] through [facility],  but her insurance does not cover.    She will contact her insurance company to inquire which companies are covered and call us back.    Will resend referral for outside of network P.T.

## 2024-10-22 NOTE — TELEPHONE ENCOUNTER
----- Message from Tech Edwige sent at 10/22/2024  4:31 PM CDT -----  Regarding: New Dr. Nicholas (Dr. Hoffman)    ----- Message -----  From: Pat Lewis MA  Sent: 10/22/2024   4:27 PM CDT  To: Zhanna Nicholas Staff    Can you please schedule an appt with dr. Nicholas previous pt of Dr. Hoffman.  ----- Message -----  From: Billie Healy OD  Sent: 10/22/2024   4:18 PM CDT  To: Pat Lewis MA    Pt scheduled with us for 2/24 for glaucoma follow up, previous Dr. Hoffman pt, can establish care with glaucoma service - pt overdue

## 2024-10-22 NOTE — PROGRESS NOTES
Order from Opal Nava NP for Toradol 30mg IM. Patient identified using full name and . Allergies reviewed with patient.   Toradol 30mg given IM to LT upper outer gluteal and well tolerated. Patient observed for 15 minutes for any drug reactions or side effects.  No adverse reaction noted.  Patient released from clinic in stable condition.

## 2024-10-28 ENCOUNTER — PATIENT MESSAGE (OUTPATIENT)
Dept: PHARMACY | Facility: CLINIC | Age: 74
End: 2024-10-28
Payer: MEDICARE

## 2024-10-28 ENCOUNTER — TELEPHONE (OUTPATIENT)
Dept: PHARMACY | Facility: CLINIC | Age: 74
End: 2024-10-28
Payer: MEDICARE

## 2024-10-31 DIAGNOSIS — H40.1123 PRIMARY OPEN ANGLE GLAUCOMA (POAG) OF LEFT EYE, SEVERE STAGE: ICD-10-CM

## 2024-10-31 RX ORDER — DORZOLAMIDE HYDROCHLORIDE AND TIMOLOL MALEATE 20; 5 MG/ML; MG/ML
1 SOLUTION/ DROPS OPHTHALMIC 2 TIMES DAILY
Qty: 10 ML | Refills: 1 | OUTPATIENT
Start: 2024-10-31 | End: 2025-10-31

## 2024-11-01 RX ORDER — DORZOLAMIDE HYDROCHLORIDE AND TIMOLOL MALEATE 20; 5 MG/ML; MG/ML
1 SOLUTION/ DROPS OPHTHALMIC 2 TIMES DAILY
Qty: 10 ML | Refills: 1 | Status: SHIPPED | OUTPATIENT
Start: 2024-11-01 | End: 2025-11-01

## 2024-11-04 PROBLEM — N18.31 STAGE 3A CHRONIC KIDNEY DISEASE: Status: ACTIVE | Noted: 2024-11-04

## 2024-11-04 PROBLEM — E78.1 HYPERTRIGLYCERIDEMIA: Status: RESOLVED | Noted: 2020-09-01 | Resolved: 2024-11-04

## 2024-11-04 PROBLEM — H40.1122 PRIMARY OPEN ANGLE GLAUCOMA (POAG) OF LEFT EYE, MODERATE STAGE: Status: RESOLVED | Noted: 2021-01-29 | Resolved: 2024-11-04

## 2024-11-04 PROBLEM — F32.0 MILD MAJOR DEPRESSION: Status: RESOLVED | Noted: 2020-01-17 | Resolved: 2024-11-04

## 2024-11-04 PROBLEM — B35.1 TOENAIL FUNGUS: Status: ACTIVE | Noted: 2024-11-04

## 2024-11-04 PROBLEM — R29.898 DECREASED ROM OF NECK: Status: RESOLVED | Noted: 2022-02-04 | Resolved: 2024-11-04

## 2024-11-04 PROBLEM — M62.81 DECREASED MUSCLE STRENGTH: Status: RESOLVED | Noted: 2022-02-04 | Resolved: 2024-11-04

## 2024-11-04 PROBLEM — E89.40 POST HYSTERECTOMY MENOPAUSE: Status: ACTIVE | Noted: 2024-11-04

## 2024-11-04 PROBLEM — Z87.898 HISTORY OF CHEST PAIN: Status: RESOLVED | Noted: 2023-09-18 | Resolved: 2024-11-04

## 2024-11-04 PROBLEM — E11.21 TYPE 2 DIABETES MELLITUS WITH DIABETIC NEPHROPATHY, WITHOUT LONG-TERM CURRENT USE OF INSULIN: Status: ACTIVE | Noted: 2024-11-04

## 2024-11-04 PROBLEM — Z90.710 POST HYSTERECTOMY MENOPAUSE: Status: ACTIVE | Noted: 2024-11-04

## 2024-11-04 RX ORDER — FINERENONE 10 MG/1
10 TABLET, FILM COATED ORAL DAILY
Qty: 90 TABLET | Refills: 3 | Status: SHIPPED | OUTPATIENT
Start: 2024-11-04

## 2024-11-04 NOTE — PROGRESS NOTES
Subjective:      Patient ID: Cecilia Barahona is a 74 y.o. female.  Pt presents to clinic with multiple complaints. She is having trouble getting kidney medications and is in need of pharmacy assistance. Reports doing well with diabetes since seeing endocrinology with BS steadily decreasing. Pt also reports worsening right knee pain after remote fall with persistent finger locking and decreased ROM. States she has extensive arthritis with RLS and neuropathy which makes it hard for her to be mobile. Pt also reports worsening difficulty urinating, requires tremendous amount of force to get urine out and feels she does not empty with frequency and inability to hold her urine though denies burning, pain or blood.       Review of Systems   Constitutional:  Positive for fatigue. Negative for activity change, appetite change, fever, night sweats and unexpected weight change.   HENT: Negative.     Eyes:  Positive for visual disturbance (chronic). Negative for pain.   Respiratory:  Negative for chest tightness and shortness of breath.    Cardiovascular:  Positive for leg swelling and claudication. Negative for chest pain and palpitations.   Gastrointestinal:  Negative for abdominal pain, change in bowel habit, constipation, diarrhea, nausea, vomiting and fecal incontinence.   Genitourinary:  Positive for bladder incontinence, decreased urine volume, difficulty urinating, frequency, nocturia and vaginal dryness. Negative for dysuria, enuresis, flank pain, hematuria, pelvic pain, urgency, vaginal bleeding, vaginal discharge and vaginal pain.   Musculoskeletal:  Positive for arthralgias, gait problem, joint swelling, leg pain, myalgias, neck pain, neck stiffness and joint deformity. Negative for back pain.   Integumentary:  Negative for rash and wound.   Neurological:  Positive for weakness and numbness. Negative for dizziness, vertigo, tremors, seizures, syncope, facial asymmetry, speech difficulty, light-headedness,  "headaches, memory loss and coordination difficulties.   Psychiatric/Behavioral:  Positive for depressed mood, dysphoric mood and sleep disturbance. Negative for agitation, behavioral problems, confusion, decreased concentration, hallucinations, self-injury and suicidal ideas. The patient is nervous/anxious. The patient is not hyperactive.    All other systems reviewed and are negative.        Objective:     Vitals:    10/22/24 0947   BP: 130/60   Pulse: 63   Resp: 19   Temp: 98.4 °F (36.9 °C)   TempSrc: Oral   SpO2: 98%   Weight: 85.5 kg (188 lb 7.9 oz)   Height: 5' 2" (1.575 m)     Physical Exam  Vitals and nursing note reviewed.   Constitutional:       General: She is not in acute distress.     Appearance: Normal appearance. She is well-developed and well-groomed. She is not ill-appearing.   HENT:      Head: Normocephalic and atraumatic.      Right Ear: External ear normal.      Left Ear: External ear normal.      Nose: Nose normal.      Mouth/Throat:      Lips: Pink.      Mouth: Mucous membranes are moist.   Eyes:      General: Lids are normal. Vision grossly intact. Gaze aligned appropriately.      Conjunctiva/sclera: Conjunctivae normal.      Pupils: Pupils are equal, round, and reactive to light.   Neck:      Trachea: Phonation normal.   Cardiovascular:      Rate and Rhythm: Normal rate. Rhythm irregular.      Heart sounds: Murmur heard.   Pulmonary:      Effort: Pulmonary effort is normal. No accessory muscle usage or respiratory distress.      Breath sounds: Normal breath sounds and air entry.   Abdominal:      General: Abdomen is flat. Bowel sounds are normal. There is no distension.      Palpations: Abdomen is soft.      Tenderness: There is no abdominal tenderness.   Musculoskeletal:      Right shoulder: Crepitus present. No swelling, deformity, effusion, laceration, tenderness or bony tenderness. Decreased range of motion. Normal strength. Normal pulse.      Left shoulder: Tenderness, bony tenderness and " crepitus present. No swelling, deformity, effusion or laceration. Decreased range of motion. Decreased strength. Normal pulse.      Right upper arm: Normal.      Left upper arm: Normal.      Right elbow: Normal.      Left elbow: Normal.      Right forearm: Normal.      Left forearm: Normal.      Right wrist: No swelling, deformity, effusion, lacerations, tenderness, bony tenderness, snuff box tenderness or crepitus. Decreased range of motion. Normal pulse.      Left wrist: No swelling, deformity, effusion, lacerations, tenderness, bony tenderness, snuff box tenderness or crepitus. Decreased range of motion. Normal pulse.      Right hand: Deformity and tenderness present. No swelling, lacerations or bony tenderness. Decreased range of motion. Decreased strength. Normal sensation. There is disruption of two-point discrimination. Normal capillary refill. Normal pulse.      Left hand: Deformity and tenderness present. No swelling, lacerations or bony tenderness. Decreased range of motion. Decreased strength. Normal sensation. There is disruption of two-point discrimination. Normal capillary refill. Normal pulse.      Cervical back: Neck supple. Spasms, tenderness and crepitus present. No swelling, edema, deformity, erythema, signs of trauma, lacerations, rigidity, torticollis or bony tenderness. No pain with movement. Decreased range of motion.      Right upper leg: Normal.      Left upper leg: Normal.      Right knee: Swelling, effusion and crepitus present. No deformity, erythema, ecchymosis, lacerations or bony tenderness. Decreased range of motion. Tenderness present over the patellar tendon.      Left knee: Crepitus present. No swelling, deformity, effusion, erythema, ecchymosis, lacerations or bony tenderness. Decreased range of motion. No tenderness.      Right lower leg: Tenderness and bony tenderness present. No swelling or deformity. 1+ Edema present.      Left lower leg: No swelling, deformity, tenderness or  bony tenderness. 1+ Edema present.      Right ankle: Normal.      Left ankle: Normal.        Legs:    Feet:      Right foot:      Skin integrity: Skin integrity normal.      Toenail Condition: Fungal disease present.     Left foot:      Skin integrity: Skin integrity normal.      Toenail Condition: Fungal disease present.  Skin:     General: Skin is warm and dry.      Findings: No rash.   Neurological:      General: No focal deficit present.      Mental Status: She is alert and oriented to person, place, and time. Mental status is at baseline.      Sensory: Sensory deficit present.      Motor: Weakness present. No tremor, atrophy or abnormal muscle tone.      Coordination: Coordination is intact. Coordination normal.      Gait: Gait abnormal.   Psychiatric:         Attention and Perception: Attention and perception normal.         Mood and Affect: Mood is anxious. Affect is labile and blunt.         Speech: Speech normal.         Behavior: Behavior normal. Behavior is cooperative.         Thought Content: Thought content normal. Thought content does not include homicidal or suicidal ideation. Thought content does not include homicidal or suicidal plan.         Cognition and Memory: Cognition and memory normal.         Judgment: Judgment normal.       Assessment and Plan:     1. Type 2 diabetes mellitus with diabetic nephropathy, without long-term current use of insulin (Primary)  Rx changes: none  Education: Reviewed ABCs of diabetes management (respective goals in parentheses):  A1C (<7), blood pressure (<130/80), and cholesterol (LDL <100).  Addressed ADA diet.  Suggested low cholesterol diet.  Encouraged aerobic exercise.  Discussed foot care.  Reminded to get yearly retinal exam.  Discussed ways to avoid symptomatic hypoglycemia.  Discussed sick day management.  - tirzepatide 10 mg/0.5 mL PnIj; Inject 10 mg into the skin every 7 days.  Dispense: 12 Pen; Refill: 1  - finerenone (KERENDIA) 10 mg Tab; Take 1  tablet by mouth Daily.  Dispense: 90 tablet; Refill: 3    2. Type 2 diabetes mellitus with diabetic microalbuminuria, without long-term current use of insulin  Rx changes: none  Education: Reviewed ABCs of diabetes management (respective goals in parentheses):  A1C (<7), blood pressure (<130/80), and cholesterol (LDL <100).  Addressed ADA diet.  Suggested low cholesterol diet.  Encouraged aerobic exercise.  Discussed foot care.  Reminded to get yearly retinal exam.  Discussed ways to avoid symptomatic hypoglycemia.  Discussed sick day management.  - tirzepatide 10 mg/0.5 mL PnIj; Inject 10 mg into the skin every 7 days.  Dispense: 12 Pen; Refill: 1  - finerenone (KERENDIA) 10 mg Tab; Take 1 tablet by mouth Daily.  Dispense: 90 tablet; Refill: 3    3. Stage 3a chronic kidney disease  Rx changes: none  Education: Reviewed ABCs of diabetes management (respective goals in parentheses):  A1C (<7), blood pressure (<130/80), and cholesterol (LDL <100).  Addressed ADA diet.  Suggested low cholesterol diet.  Encouraged aerobic exercise.  Discussed foot care.  Reminded to get yearly retinal exam.  Discussed ways to avoid symptomatic hypoglycemia.  Discussed sick day management.  - tirzepatide 10 mg/0.5 mL PnIj; Inject 10 mg into the skin every 7 days.  Dispense: 12 Pen; Refill: 1  - finerenone (KERENDIA) 10 mg Tab; Take 1 tablet by mouth Daily.  Dispense: 90 tablet; Refill: 3    4. Type 2 diabetes mellitus with diabetic neuropathy, without long-term current use of insulin  Rx changes: none  Education: Reviewed ABCs of diabetes management (respective goals in parentheses):  A1C (<7), blood pressure (<130/80), and cholesterol (LDL <100).  Addressed ADA diet.  Suggested low cholesterol diet.  Encouraged aerobic exercise.  Discussed foot care.  Reminded to get yearly retinal exam.  Discussed ways to avoid symptomatic hypoglycemia.  Discussed sick day management.  - tirzepatide 10 mg/0.5 mL PnIj; Inject 10 mg into the skin  every 7 days.  Dispense: 12 Pen; Refill: 1    5. Arthralgia of right knee  Natural history and expected course discussed. Questions answered.  Proper lifting, bending technique discussed.  Stretching exercises discussed.  Regular aerobic and trunk strengthening exercises discussed.  Ice to affected area as needed for local pain relief.  Heat to affected area as needed for local pain relief.  NSAIDs per medication orders.  OTC analgesics as needed.  PT referral.  - Ambulatory referral/consult to Physical/Occupational Therapy; Future  - X-Ray Knee Complete 4 Or More Views Right; Future  - diclofenac sodium (VOLTAREN) 1 % Gel; Apply 2 g topically 4 (four) times daily.  Dispense: 450 g; Refill: 2  - ketorolac injection 30 mg    6. Calcific tendonitis of left shoulder region  Natural history and expected course discussed. Questions answered.  Proper lifting, bending technique discussed.  Stretching exercises discussed.  Regular aerobic and trunk strengthening exercises discussed.  Ice to affected area as needed for local pain relief.  Heat to affected area as needed for local pain relief.  NSAIDs per medication orders.  OTC analgesics as needed.  PT referral.  - Ambulatory referral/consult to Physical/Occupational Therapy; Future  - diclofenac sodium (VOLTAREN) 1 % Gel; Apply 2 g topically 4 (four) times daily.  Dispense: 450 g; Refill: 2  - ketorolac injection 30 mg    7. Trigger finger, unspecified finger, unspecified laterality  Natural history and expected course discussed. Questions answered.  Proper lifting, bending technique discussed.  Stretching exercises discussed.  Regular aerobic and trunk strengthening exercises discussed.  Ice to affected area as needed for local pain relief.  Heat to affected area as needed for local pain relief.  NSAIDs per medication orders.  OTC analgesics as needed.  PT referral.  - Ambulatory referral/consult to Physical/Occupational Therapy; Future  - diclofenac sodium (VOLTAREN) 1 %  Gel; Apply 2 g topically 4 (four) times daily.  Dispense: 450 g; Refill: 2  - ketorolac injection 30 mg    8. Toenail fungus  Chronic, f/u with podiatry as previously instructed   - ciclopirox (PENLAC) 8 % Soln; Apply topically nightly.  Dispense: 6.6 mL; Refill: 3    9. Post hysterectomy menopause  Atrophy vs cystocele vs rectocele  Planned voiding discussed  F/u with urogyne for further eval   - Ambulatory referral/consult to Urogynecology; Future        CHAPIS Griffin, FNP-C  Family/Internal Medicine  [facility]

## 2024-11-07 ENCOUNTER — OFFICE VISIT (OUTPATIENT)
Dept: CARDIOLOGY | Facility: CLINIC | Age: 74
End: 2024-11-07
Attending: INTERNAL MEDICINE
Payer: MEDICARE

## 2024-11-07 VITALS
BODY MASS INDEX: 34.71 KG/M2 | SYSTOLIC BLOOD PRESSURE: 125 MMHG | HEIGHT: 62 IN | DIASTOLIC BLOOD PRESSURE: 75 MMHG | WEIGHT: 188.63 LBS | HEART RATE: 60 BPM | OXYGEN SATURATION: 97 %

## 2024-11-07 DIAGNOSIS — E78.00 HYPERCHOLESTEROLEMIA: ICD-10-CM

## 2024-11-07 DIAGNOSIS — Z95.1 HISTORY OF CORONARY ARTERY BYPASS SURGERY: ICD-10-CM

## 2024-11-07 DIAGNOSIS — I70.0 ATHEROSCLEROSIS OF AORTA: ICD-10-CM

## 2024-11-07 DIAGNOSIS — E11.59 TYPE 2 DIABETES MELLITUS WITH OTHER CIRCULATORY COMPLICATION, WITHOUT LONG-TERM CURRENT USE OF INSULIN: ICD-10-CM

## 2024-11-07 DIAGNOSIS — Z86.73 HISTORY OF CEREBROVASCULAR ACCIDENT: ICD-10-CM

## 2024-11-07 DIAGNOSIS — Z87.898 HISTORY OF CHEST PAIN: ICD-10-CM

## 2024-11-07 DIAGNOSIS — E78.1 HYPERTRIGLYCERIDEMIA: ICD-10-CM

## 2024-11-07 DIAGNOSIS — I10 PRIMARY HYPERTENSION: ICD-10-CM

## 2024-11-07 DIAGNOSIS — Z86.718 HISTORY OF DEEP VEIN THROMBOSIS: ICD-10-CM

## 2024-11-07 DIAGNOSIS — E66.9 MILD OBESITY: ICD-10-CM

## 2024-11-07 DIAGNOSIS — I25.10 CORONARY ARTERY DISEASE INVOLVING NATIVE CORONARY ARTERY OF NATIVE HEART WITHOUT ANGINA PECTORIS: ICD-10-CM

## 2024-11-07 PROCEDURE — 1159F MED LIST DOCD IN RCRD: CPT | Mod: HCNC,CPTII,S$GLB, | Performed by: INTERNAL MEDICINE

## 2024-11-07 PROCEDURE — 3008F BODY MASS INDEX DOCD: CPT | Mod: HCNC,CPTII,S$GLB, | Performed by: INTERNAL MEDICINE

## 2024-11-07 PROCEDURE — 99214 OFFICE O/P EST MOD 30 MIN: CPT | Mod: HCNC,S$GLB,, | Performed by: INTERNAL MEDICINE

## 2024-11-07 PROCEDURE — 1100F PTFALLS ASSESS-DOCD GE2>/YR: CPT | Mod: HCNC,CPTII,S$GLB, | Performed by: INTERNAL MEDICINE

## 2024-11-07 PROCEDURE — 1157F ADVNC CARE PLAN IN RCRD: CPT | Mod: HCNC,CPTII,S$GLB, | Performed by: INTERNAL MEDICINE

## 2024-11-07 PROCEDURE — 3066F NEPHROPATHY DOC TX: CPT | Mod: HCNC,CPTII,S$GLB, | Performed by: INTERNAL MEDICINE

## 2024-11-07 PROCEDURE — 3078F DIAST BP <80 MM HG: CPT | Mod: HCNC,CPTII,S$GLB, | Performed by: INTERNAL MEDICINE

## 2024-11-07 PROCEDURE — 99999 PR PBB SHADOW E&M-EST. PATIENT-LVL IV: CPT | Mod: PBBFAC,HCNC,, | Performed by: INTERNAL MEDICINE

## 2024-11-07 PROCEDURE — 3060F POS MICROALBUMINURIA REV: CPT | Mod: HCNC,CPTII,S$GLB, | Performed by: INTERNAL MEDICINE

## 2024-11-07 PROCEDURE — 3051F HG A1C>EQUAL 7.0%<8.0%: CPT | Mod: HCNC,CPTII,S$GLB, | Performed by: INTERNAL MEDICINE

## 2024-11-07 PROCEDURE — 1125F AMNT PAIN NOTED PAIN PRSNT: CPT | Mod: HCNC,CPTII,S$GLB, | Performed by: INTERNAL MEDICINE

## 2024-11-07 PROCEDURE — 3288F FALL RISK ASSESSMENT DOCD: CPT | Mod: HCNC,CPTII,S$GLB, | Performed by: INTERNAL MEDICINE

## 2024-11-07 PROCEDURE — 3074F SYST BP LT 130 MM HG: CPT | Mod: HCNC,CPTII,S$GLB, | Performed by: INTERNAL MEDICINE

## 2024-11-07 PROCEDURE — 4010F ACE/ARB THERAPY RXD/TAKEN: CPT | Mod: HCNC,CPTII,S$GLB, | Performed by: INTERNAL MEDICINE

## 2024-11-07 PROCEDURE — 1160F RVW MEDS BY RX/DR IN RCRD: CPT | Mod: HCNC,CPTII,S$GLB, | Performed by: INTERNAL MEDICINE

## 2024-11-07 NOTE — PROGRESS NOTES
Subjective:     Cecilia Barahona is a 74 y.o. female with hypertension, hypercholesterolemia, hypertriglyceridemia and diabetes mellitus type 2. She is mildly obese. She appears to have had a cerebrovascular accident in about 2004 when she had an episode of weakness in her left hand. She had exertional chest discomfort in 12/2019. She was given isosorbidemononitrate and the chest discomfort resolved. She had a stress MPI that was negative. On 5/16/2020 she began to experience a mild pressure over her chest. The heaviness lasted most of the day. Walking in her house made the discomfort more pronounced. There was no radiation of the pain and neither any associated dyspnea or diaphoresis. She had similar discomfort on 5/17/2020 and 5/18/2020. She told her daughter that took her to an urgent care center. She received two tablets of nitroglycerin  sublingually and the chest discomfort resolved. There were no acute ST-T wave changes. She was advised admission and was transferred to [facility]. She has had no further chest pain after presentation. On 5/20/2020 she underwent coronary angiography that revealed severe three vessel coronary artery disease. There was basal inferior mild hypokinesia with an ejection fraction of 60%. On 5/25/2020 she underwent coronary artery bypass grafting receiving three grafts. The left internal mammary artery was bypassed to the left anterior descending coronary artery and a sequential saphenous vein graft was placed to the second obtuse marginal branch and the fourth obtuse marginal branch. A few days after surgery she had a deep venous thrombosis of her left leg and she was anticoagulated with apixiban. She was able to lose weight after the coronary artery bypass grafting and her diabetes and hypertension became easier to control. Some soreness and itching in the anterior chest wall. In 4/2021 she went to [place] to visit her sister. In 3/2022 she is still  bothered by occasional chest wall pain. In 2022 her   after a lengthy hospital stay. She was not taking all of her prescribed medication during her husbands illness but got back on all her usual medications on about 2023. In 2023 she has had a few episodes of chest pressure. No clear exertional chest pain. On 2023 she underwent a stress echocardiogram. She was able to do 2:26 minutes on the treadmill. She had no chest pain. The electrocardiogram was negative. The echocardiogram was negative. There was normal left ventricular size and systolic function. The ejection fraction was 60%. It was felt the chest pain had a noncardiac cause. She underwent surgery of her right hand on 2024 for trigger finger. She has mild exertional shortness of breath. No palpitations or weak spells. No issues with any of her prescribed medications. Feeling fair overall.      Coronary Artery Disease  Presents for follow-up visit. Pertinent negatives include no chest pain, chest pressure, chest tightness, dizziness, leg swelling, muscle weakness, palpitations, shortness of breath or weight gain. Risk factors include hyperlipidemia. Risk factors do not include obesity. The symptoms have been stable.   Cerebrovascular Accident  This is a chronic problem. The problem has been resolved. Pertinent negatives include no abdominal pain, anorexia, arthralgias, change in bowel habit, chest pain, chills, congestion, coughing, diaphoresis, fatigue, fever, headaches, joint swelling, myalgias, nausea, neck pain, numbness, rash, sore throat, swollen glands, urinary symptoms, vertigo, visual change, vomiting or weakness.   Hypertension  This is a chronic problem. The current episode started more than 1 year ago. The problem is unchanged. The problem is controlled (usually 120-130/65-75 mmHg at home). Pertinent negatives include no anxiety, blurred vision, chest pain, headaches, malaise/fatigue, neck pain, orthopnea,  palpitations, peripheral edema, PND, shortness of breath or sweats. There is no history of chronic renal disease.   Hyperlipidemia  This is a chronic problem. The current episode started more than 1 year ago. The problem is controlled. Exacerbating diseases include diabetes. She has no history of chronic renal disease, hypothyroidism, liver disease, obesity or nephrotic syndrome. Pertinent negatives include no chest pain, focal weakness, leg pain, myalgias or shortness of breath.   Chest Pain   Pertinent negatives include no abdominal pain, back pain, claudication, cough, diaphoresis, dizziness, fever, headaches, hemoptysis, irregular heartbeat, leg pain, malaise/fatigue, nausea, near-syncope, numbness, orthopnea, palpitations, PND, shortness of breath, syncope, vomiting or weakness.   Her past medical history is significant for CAD, diabetes and hyperlipidemia.   Pertinent negatives for past medical history include no muscle weakness.       Review of Systems   Constitutional: Negative for chills, diaphoresis, fatigue, fever, malaise/fatigue and weight gain.   HENT:  Negative for congestion, nosebleeds and sore throat.    Eyes:  Negative for blurred vision, double vision, vision loss in left eye and vision loss in right eye.   Cardiovascular:  Positive for dyspnea on exertion. Negative for chest pain, claudication, irregular heartbeat, leg swelling, near-syncope, orthopnea, palpitations, paroxysmal nocturnal dyspnea and syncope.   Respiratory:  Negative for chest tightness, cough, hemoptysis, shortness of breath and wheezing.    Endocrine: Negative for cold intolerance and heat intolerance.   Hematologic/Lymphatic: Negative for bleeding problem. Does not bruise/bleed easily.   Skin:  Negative for color change and rash.   Musculoskeletal:  Negative for arthralgias, back pain, falls, joint swelling, muscle weakness, myalgias and neck pain.   Gastrointestinal:  Negative for abdominal pain, anorexia, change in bowel  habit, heartburn, hematemesis, hematochezia, hemorrhoids, jaundice, melena, nausea and vomiting.   Genitourinary:  Negative for dysuria and hematuria.   Neurological:  Negative for dizziness, focal weakness, headaches, light-headedness, loss of balance, numbness, vertigo and weakness.   Psychiatric/Behavioral:  Negative for altered mental status, depression and memory loss. The patient is not nervous/anxious.    Allergic/Immunologic: Negative for hives and persistent infections.       Current Outpatient Medications on File Prior to Visit   Medication Sig Dispense Refill    acetaminophen (TYLENOL) 500 MG tablet Take 2 tablets (1,000 mg total) by mouth every 6 (six) hours as needed. 60 tablet 0    albuterol (PROVENTIL/VENTOLIN HFA) 90 mcg/actuation inhaler Inhale 2 puffs into the lungs every 6 (six) hours as needed for Wheezing or Shortness of Breath. Rescue 19 g 0    amLODIPine (NORVASC) 10 MG tablet Take 1 tablet (10 mg total) by mouth once daily. 90 tablet 3    aspirin (ECOTRIN) 81 MG EC tablet Take 1 tablet (81 mg total) by mouth once daily. 90 tablet 3    atorvastatin (LIPITOR) 80 MG tablet Take 1 tablet (80 mg total) by mouth once daily. 90 tablet 3    carvediloL (COREG) 12.5 MG tablet Take 1 tablet (12.5 mg total) by mouth 2 (two) times daily. 180 tablet 3    ciclopirox (PENLAC) 8 % Soln Apply topically nightly. 6.6 mL 3    diclofenac sodium (VOLTAREN) 1 % Gel Apply 2 g topically 4 (four) times daily. 450 g 2    dorzolamide-timolol 2-0.5% (COSOPT) 22.3-6.8 mg/mL ophthalmic solution Place 1 drop into both eyes 2 (two) times daily. 10 mL 1    dulaglutide (TRULICITY) 4.5 mg/0.5 mL pen injector Inject 4.5 mg into the skin every 7 days. 12 pen 2    empagliflozin (JARDIANCE) 25 mg tablet Take 1 tablet (25 mg total) by mouth once daily. 30 tablet 3    EScitalopram oxalate (LEXAPRO) 10 MG tablet Take 1 tablet (10 mg total) by mouth once daily. 90 tablet 2    ezetimibe (ZETIA) 10 mg tablet Take 1 tablet (10 mg total) by  "mouth once daily. 90 tablet 3    fluticasone (FLONASE) 50 mcg/actuation nasal spray 1 spray (50 mcg total) by Each Nare route once daily. 16 g 5    furosemide (LASIX) 20 MG tablet Take 1 tablet (20 mg total) by mouth once daily. 90 tablet 3    HYDROcodone-acetaminophen (NORCO) 5-325 mg per tablet Take 1 tablet by mouth every 4 (four) hours as needed for Pain. 12 tablet 0    loratadine (CLARITIN) 10 mg tablet TAKE 1 TABLET BY MOUTH EVERY DAY AS NEEDED FOR ALLERGIES 90 tablet 2    melatonin (MELATIN) 3 mg tablet Take 2 tablets (6 mg total) by mouth nightly as needed for Insomnia.  0    meloxicam (MOBIC) 7.5 MG tablet TAKE 1 TABLET(7.5 MG) BY MOUTH DAILY AS NEEDED FOR PAIN 90 tablet 1    metFORMIN (GLUCOPHAGE) 1000 MG tablet Take 1 tablet (1,000 mg total) by mouth 2 (two) times daily with meals. 180 tablet 2    multivitamin with minerals tablet Take 1 tablet by mouth once daily.      rOPINIRole (REQUIP) 0.5 MG tablet TAKE 1 TABLET(0.5 MG) BY MOUTH EVERY EVENING 90 tablet 0    spironolactone (ALDACTONE) 25 MG tablet Take 1 tablet (25 mg total) by mouth once daily. 90 tablet 3    tirzepatide 10 mg/0.5 mL PnIj Inject 10 mg into the skin every 7 days. 12 Pen 1    valsartan (DIOVAN) 320 MG tablet Take 1 tablet (320 mg total) by mouth once daily. 90 tablet 3    ACCU-CHEK GUIDE GLUCOSE METER Misc  (Patient not taking: Reported on 10/22/2024)      ACCU-CHEK GUIDE TEST STRIPS Strp  (Patient not taking: Reported on 10/22/2024)      DROPSAFE PEN NEEDLE 31 gauge x 1/4" Ndle       DROPSAFE PEN NEEDLE 31 gauge x 3/16" Ndle       finerenone (KERENDIA) 10 mg Tab Take 1 tablet by mouth Daily. (Patient not taking: Reported on 11/7/2024) 90 tablet 3    lancing device Misc Apply topically. (Patient not taking: Reported on 10/22/2024)      nitroGLYCERIN (NITROSTAT) 0.4 MG SL tablet Place 1 tablet (0.4 mg total) under the tongue every 5 (five) minutes as needed for Chest pain. 30 tablet 3    pen needle, diabetic (BD ULTRA-FINE LENY PEN " "NEEDLE) 32 gauge x 5/32" Ndle 1 Device by Misc.(Non-Drug; Combo Route) route Daily. 100 each 4    pen needle, diabetic (NOVOFINE 32) 32 gauge x 1/4" Ndle Use 1x/day 200 each 3    triamcinolone acetonide 0.1% (KENALOG) 0.1 % ointment Apply topically 2 (two) times daily. for 7 days 80 g 0    TRUE COMFORT ALCOHOL PADS PadM Apply topically.      TRUEPLUS LANCETS 30 gauge Misc USE TO TEST TWICE DAILY       No current facility-administered medications on file prior to visit.       /75 Comment: average at home  Pulse 60   Ht 5' 2" (1.575 m)   Wt 85.6 kg (188 lb 9.7 oz)   SpO2 97%   BMI 34.50 kg/m²     Objective:     Physical Exam  Constitutional:       General: She is not in acute distress.     Appearance: Normal appearance. She is well-developed. She is not toxic-appearing or diaphoretic.   HENT:      Head: Normocephalic and atraumatic.      Nose: Nose normal.   Eyes:      General:         Right eye: No discharge.         Left eye: No discharge.      Conjunctiva/sclera:      Right eye: Right conjunctiva is not injected.      Left eye: Left conjunctiva is not injected.      Pupils: Pupils are equal.      Right eye: Pupil is round.      Left eye: Pupil is round.   Neck:      Thyroid: No thyromegaly.      Vascular: No carotid bruit or JVD.   Cardiovascular:      Rate and Rhythm: Regular rhythm. Bradycardia present. No extrasystoles are present.     Chest Wall: PMI is not displaced.      Pulses:           Radial pulses are 2+ on the right side and 2+ on the left side.        Femoral pulses are 2+ on the right side and 2+ on the left side.       Dorsalis pedis pulses are 2+ on the right side and 2+ on the left side.        Posterior tibial pulses are 2+ on the right side and 2+ on the left side.      Heart sounds: S1 normal and S2 normal. Murmur heard.      Midsystolic murmur is present with a grade of 2/6.      Gallop present. S4 sounds present. No S3 sounds.   Pulmonary:      Effort: Pulmonary effort is normal.    "   Breath sounds: Normal breath sounds.   Chest:      Comments: Midline sternotomy scar.  Abdominal:      Palpations: Abdomen is soft.      Tenderness: There is no abdominal tenderness.   Musculoskeletal:      Cervical back: Neck supple.      Right ankle: No swelling, deformity or ecchymosis.      Left ankle: No swelling, deformity or ecchymosis.   Lymphadenopathy:      Head:      Right side of head: No submandibular adenopathy.      Left side of head: No submandibular adenopathy.      Cervical: No cervical adenopathy.   Skin:     General: Skin is warm and dry.      Findings: No rash.      Nails: There is no clubbing.   Neurological:      General: No focal deficit present.      Mental Status: She is alert and oriented to person, place, and time. She is not disoriented.      Cranial Nerves: No cranial nerve deficit.   Psychiatric:         Attention and Perception: Attention and perception normal.         Mood and Affect: Mood and affect normal.         Speech: Speech normal.         Behavior: Behavior normal.         Thought Content: Thought content normal.         Cognition and Memory: Cognition and memory normal.         Judgment: Judgment normal.       Assessment:     1. Coronary artery disease involving native coronary artery of native heart without angina pectoris    2. History of coronary artery bypass surgery    3. History of chest pain    4. History of cerebrovascular accident    5. Atherosclerosis of aorta    6. History of deep vein thrombosis    7. Primary hypertension    8. Hypercholesterolemia    9. Hypertriglyceridemia    10. Type 2 diabetes mellitus with other circulatory complication, without long-term current use of insulin    11. Mild obesity        Plan:     1. Coronary Artery Disease              12/2019: Began experience chest pain. Resolved with isosorbidemononitrate.              12/19/2019: Stress MPI:3:39 min. No CP. ECG negative. MPI: Mild to moderate fixed anterior and anteroapical defect. EF  "73%.              5/18/2020: Presented with more pronounced chest pain. Resolved with NTG sl.              ECG without acute changes. Troponin x 3 negative.               5/19/2020: Echo: Normal left ventricular size and systolic function. EF 75%. Mildly dilated LA. Mild aortic valve sclerosis. Mild mitral valve sclerosis.              5/20/2020: [facility]: Cath: LAD: Prox 30%. Mid 70%. Distal 80%. OM2: Osteal 90%. OM3: 95%. OM4 90%. RCA: Mid subtotal. LV: Basal inferior mild hypokinesia. EF 60%.   5/25/2020: [facility]: CABG x 3: LIMA to LAD and SVG1 to OM2 & OM4.   9/19/2023: Stress Echo: 2:26 min. No CP. ECG negative. Echo "negative". Normal left ventricular size and systolic function. EF 60%.   On carvedilol 12.5 mg Q12.              On aspirin 81 mg Q24.   Doing well.     2. History of Chest Pain   9/2023: Began experience occasional chest pain.   Resolved.               3. History of Cerebrovascular Accident              2004: Weakness in left hand. Affected memory. No sequela.   On aspirin 81 mg Q24.    4. Atherosclerosis of Aorta   6/3/2020: CT Scan: Noted.    All risk factors addressed.     5. History of Deep Venous Trombosis of Lower Extremity   5/2020: DVT left leg after CABG.   Received apixiban for 3 months.   On aspirin 81 mg Q24.     6. Hypertension              2000: Diagnosed.   9/1/2020: Furosemide 20 mg Q24 and KCl 20 mEq Q24 were discontinued.   Used to be on metoprolol 25 mg Q12, amlodipine 10 mg Q24, valsartan 320 mg Q24, furosemide 20 mg Q24 and chlorthalidone 25 mg Q24.   10/13/2020: Valsartan 320 mg Q24 was resumed. Amlodipine 10 mg Q24 and furosemide 20 mg Q24 were discontinued.   2/23/2022: Metoprolol 25 mg Q12 was changed to carvedilol 12.5 mg Q12 and amlodipine 5 mg Q24 was increased to amlodipine 10 mg Q24 as pressure was running 150-160/70-80 mmHg at home.    8/1/2024: Spironolactone 25 mg Q24 was begun as pressure was running high.     On carvedilol 12.5 mg Q12, amlodipine 10 mg Q24, valsartan " 320 mg Q24, spironolactone 25 mg Q24 and furosemide 20 mg Q24.   Keeping log at home.   Well controlled.     7. Hypercholesterolemia              2000: Began statin.              On atorvastatin 40 mg Q24.                 5/19/2020: Chol 204. HDL 41. . .              On atorvastatin 80 mg Q24 and ezetimibe 10 mg Q24.   8/18/2020: Chol 161. HDL 46. LDL 82. .   2/24/2022: Chol 94. HDL 37. LDL 42. TG 73.   5/24/2024: Chol 113. HDL 41. LDL 53. TG 97.   On atorvastatin 80 mg Q24 and ezetimibe 10 mg Q24.   Very favorable lipid panel.    8. Hypertriglyceridemia   2020: Diagnosed.   As above.     9. Diabetes Mellitus, Type 2              2000: Diagnosed. Complications: CVA & CAD. Medications: Insulin and SGLT2i.   On metformin, empagliflozin, dulaglutide and insulin.   4/13/2023: HgbA1C 8.9%.   11/1/2023: HgbA1C 8.0%.   5/24/2024: HgbA1C 7.6%.  9/17/2024: HgbA1C 7.4%.        10. Mild Obesity              5/19/2020: Weight 86 kg. BMI 33.   5/15/2023: Weight 88 kg. BMI 36.   11/21/2023: Weight 85 kg. BMI 34.   Encourage to lose weight.     11. Primary Care              Dr. Yudi Smart.    F/u 4 months.    Etelvina Lowery M.D.

## 2024-11-09 NOTE — PROGRESS NOTES
HISTORY OF PRESENT ILLNESS:  Cecilia Barahona is a 68 y.o. female who presents to the clinic today for Hypertension (F/U); Constipation; and Hand Pain (Right)  .  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  She is enrolled in the digital hypertension program.  She thinks her blood pressure cuff is not working correctly as her blood pressures at home tend to be quite elevated.  She admits to some worsening dietary habits and decrease in exercise.  She recently had blood work done and is here today to discuss the results.  Reflux is pretty well controlled.  She has been started on a CPAP machine.  She states she uses it at least 5 days a week.  She uses it about 7 hr per night each of those nights.  She states that has giving her more energy and she is definitely getting benefit from it.  She has intermittent mild problems with constipation.  She has started Metamucil supplement and drinking more water which helps.  She complains today of arthritis pain in her right thumb base and numbness and the left hand compatible with carpal tunnel syndrome.  She wears her wrist brace only intermittently.  She denies cardiac chest pain or shortness of breath.  She denies abdominal pain, temperature intolerance, or unexplained changes in her weight.      PAST MEDICAL HISTORY:  Past Medical History:   Diagnosis Date    Allergic rhinitis, seasonal     Arthritis     Glaucoma NEC     History of positive PPD - treated - remote past     Hyperlipidemia LDL goal < 100     Hypertension     Obesity     CHETNA (obstructive sleep apnea)     Type II or unspecified type diabetes mellitus without mention of complication, uncontrolled        PAST SURGICAL HISTORY:  Past Surgical History:   Procedure Laterality Date    CARPAL TUNNEL RELEASE       SECTION, CLASSIC      TOTAL ABDOMINAL HYSTERECTOMY       Occupational Therapy  Orders received, chart reviewed and OT evaluation attempted; 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 Will follow up as appropriate for this patient. Maria Alejandra Beckwith, OTR/L   TRIGGER FINGER RELEASE         SOCIAL HISTORY:  Social History     Socioeconomic History    Marital status:      Spouse name: Not on file    Number of children: 3    Years of education: Not on file    Highest education level: Not on file   Social Needs    Financial resource strain: Somewhat hard    Food insecurity - worry: Never true    Food insecurity - inability: Sometimes true    Transportation needs - medical: No    Transportation needs - non-medical: No   Occupational History    Occupation: disabled - back and leg pain   Tobacco Use    Smoking status: Former Smoker     Packs/day: 0.50     Types: Cigarettes     Last attempt to quit: 11/10/2012     Years since quittin.7    Smokeless tobacco: Never Used   Substance and Sexual Activity    Alcohol use: No    Drug use: No    Sexual activity: No     Partners: Male   Other Topics Concern    Not on file   Social History Narrative    Not on file       FAMILY HISTORY:  Family History   Problem Relation Age of Onset    Hypertension Mother     Diabetes Mother     Heart failure Mother     Cataracts Mother     Prostate cancer Father     Hypertension Father     Diabetes Father     Heart failure Father     No Known Problems Sister     Alcohol abuse Maternal Aunt     Diabetes Maternal Uncle     Hyperlipidemia Maternal Uncle     Hypertension Maternal Uncle     Diabetes Maternal Grandmother     Diabetes Maternal Grandfather     No Known Problems Paternal Grandmother     No Known Problems Paternal Grandfather     Diabetes Maternal Aunt     Alzheimer's disease Maternal Aunt     Heart disease Cousin         s/p heart transplant    Amblyopia Neg Hx     Blindness Neg Hx     Cancer Neg Hx     Glaucoma Neg Hx     Macular degeneration Neg Hx     Retinal detachment Neg Hx     Strabismus Neg Hx     Stroke Neg Hx     Thyroid disease Neg Hx        ALLERGIES AND MEDICATIONS: updated and reviewed.  Review of patient's allergies indicates:    Allergen Reactions    Ace inhibitors Other (See Comments)     cough    Invokana [canagliflozin] Other (See Comments)     Throat and tongue swelling     Medication List with Changes/Refills   New Medications    FLASH GLUCOSE SCANNING READER (FREESTYLE ANAMARIA READER) MISC    1 Units by Misc.(Non-Drug; Combo Route) route before meals as needed.    FLASH GLUCOSE SENSOR (FREESTYLE ANAMARIA SENSOR) KIT    1 Units by Misc.(Non-Drug; Combo Route) route every meal as needed.   Current Medications    ALBUTEROL 90 MCG/ACTUATION INHALER    Inhale 2 puffs into the lungs every 6 (six) hours as needed for Wheezing or Shortness of Breath. Rescue    ALCOHOL SWABS PADM    Apply 1 each topically 3 (three) times daily.    AMLODIPINE (NORVASC) 10 MG TABLET    TAKE ONE TABLET BY MOUTH EVERY DAY    ARTIFICIAL TEAR OINTMENT (ARTIFICIAL TEARS) OINT    Place into both eyes every evening.    ASPIRIN 81 MG CHEW    Take by mouth once daily. 1 tablet, chewable Oral Every day    AZELASTINE (OPTIVAR) 0.05 % OPHTHALMIC SOLUTION    INSTILL 1 DROP IN EACH EYE TWICE DAILY    BLOOD GLUCOSE CONTROL HIGH,LOW (ACCU-CHEK BRIDGETTE CONTROL SOLN) SOLN    1 kit by Misc.(Non-Drug; Combo Route) route once daily.    BLOOD GLUCOSE CONTROL, LOW (EMBRACE GLUCOSE CONTROL LOW) SOLN    Inject 1 Bottle into the skin every 30 days.    BLOOD PRESSURE MONITOR (BLOOD PRESSURE KIT) KIT    1 kit by Misc.(Non-Drug; Combo Route) route once daily.    BLOOD SUGAR DIAGNOSTIC STRP    Check blood glucose 4 times a day. Dispense accuchek meter or other meter preferred by insurance. Dx code e11.65    BLOOD-GLUCOSE METER KIT    ispense accuchek meter or other meter preferred by insurance. Dx code e11.65    BUPROPION (WELLBUTRIN) 75 MG TABLET    TAKE TWO TABLETS BY MOUTH TWICE DAILY    DESOXIMETASONE (TOPICORT) 0.05 % CREAM    Apply topically 2 (two) times daily as needed.    GABAPENTIN (NEURONTIN) 300 MG CAPSULE    Take 1 capsule (300 mg total) by mouth every evening.     "HYDROCHLOROTHIAZIDE (HYDRODIURIL) 25 MG TABLET    TAKE ONE TABLET BY MOUTH EVERY DAY    IBUPROFEN (ADVIL,MOTRIN) 800 MG TABLET    Take 1 tablet (800 mg total) by mouth every 8 (eight) hours as needed for Pain.    INSULIN ASPART (NOVOLOG FLEXPEN) 100 UNIT/ML INPN PEN    10 units sq three times a day with meals    LANCETS MISC    Check blood glucose 4 times a day. Dispense accuchek meter or other meter preferred by insurance. Dx code e11.65    LANCING DEVICE WITH LANCETS MISC    USE AS DIRECTED FOR DIABETIC TESTING    LATANOPROST 0.005 % OPHTHALMIC SOLUTION    Place 1 drop into both eyes every evening.    LEVEMIR FLEXTOUCH U-100 INSULN 100 UNIT/ML (3 ML) INPN PEN    INJECT 38 UNITS SUBCUTANEOUSLY AT BEDTIME    LEVOCETIRIZINE (XYZAL) 5 MG TABLET    Take 1 tablet (5 mg total) by mouth nightly as needed for Allergies.    LORATADINE (CLARITIN) 10 MG TABLET    Take 1 tablet (10 mg total) by mouth once daily.    MELOXICAM (MOBIC) 15 MG TABLET    Take 15 mg by mouth once daily.    METFORMIN (GLUCOPHAGE) 1000 MG TABLET    TAKE ONE TABLET BY MOUTH TWICE DAILY WITH FOOD    MULTIVITAMIN WITH MINERALS TABLET    Take 1 tablet by mouth once daily.    NAPROXEN (NAPROSYN) 500 MG TABLET    Take 500 mg by mouth 2 (two) times daily as needed.    NOVOLOG FLEXPEN U-100 INSULIN 100 UNIT/ML INPN PEN    INJECT 10 UNITS SUBCUTANEOUSLY BEFORE meals sliding scale TAKE 2 additional UNITS FOR each blood sugar increment OF 50 above 150    PEN NEEDLE, DIABETIC (BD ULTRA-FINE LENY PEN NEEDLES) 32 GAUGE X 5/32" NDLE    USE FIVE TIMES DAILY    PRAVASTATIN (PRAVACHOL) 40 MG TABLET    Take 1 tablet (40 mg total) by mouth once daily.    TRAMADOL (ULTRAM) 50 MG TABLET    Take 50 mg by mouth every 6 (six) hours as needed for Pain.    VALSARTAN (DIOVAN) 320 MG TABLET    TAKE ONE TABLET BY MOUTH EVERY EVENING.   Changed and/or Refilled Medications    Modified Medication Previous Medication    FLUTICASONE (FLONASE) 50 MCG/ACTUATION NASAL SPRAY fluticasone " "(FLONASE) 50 mcg/actuation nasal spray       1 spray (50 mcg total) by Each Nare route once daily.    1 spray (50 mcg total) by Each Nare route once daily.    ROPINIROLE (REQUIP) 0.5 MG TABLET rOPINIRole (REQUIP) 0.5 MG tablet       ropinirole 0.5 mg tablet    ropinirole 0.5 mg tablet          CARE TEAM:  Patient Care Team:  Yudi Smart MD as PCP - General (Internal Medicine)  Yudi Smart MD as PCP - [facility] Attributed  Yudi Smart MD as Hypertension Digital Medicine Responsible Provider (Internal Medicine)  Otilia Martin PharmD as Hypertension Digital Medicine Clinician (Pharmacist)  Rafa Vázquez as Digital Medicine Health          REVIEW OF SYSTEMS:  Review of Systems   Constitutional: Positive for fatigue (- improved with CPAP machine). Negative for chills, fever and unexpected weight change.   HENT: Negative for congestion and postnasal drip.    Eyes: Negative for pain and visual disturbance.   Respiratory: Negative for cough, shortness of breath and wheezing.    Cardiovascular: Negative for chest pain, palpitations and leg swelling.   Gastrointestinal: Positive for constipation. Negative for abdominal pain, diarrhea, nausea and vomiting.   Genitourinary: Negative for dysuria.   Musculoskeletal: Positive for arthralgias. Negative for back pain.        - sometimes she gets cramping in her legs - she is not sure if this is due to RLS - she has not been taking her requip   Skin: Negative for rash.   Neurological: Positive for numbness. Negative for weakness and headaches.   Psychiatric/Behavioral: Positive for dysphoric mood. Negative for sleep disturbance. The patient is not nervous/anxious.          PHYSICAL EXAM:   Vitals:    08/20/18 1402   BP: (!) 140/60   Pulse: 74   Temp: 98.6 °F (37 °C)     Weight: 90.5 kg (199 lb 8.3 oz)   Height: 5' 3" (160 cm)   Body mass index is 35.34 kg/m².     General appearance - alert, well appearing, and in no distress and obese  Mental status - alert, " oriented to person, place, and time, normal mood, behavior, speech, dress, motor activity, and thought processes  Eyes - pupils equal and reactive, extraocular eye movements intact, sclera anicteric  Mouth - mucous membranes moist, pharynx normal without lesions  Neck - supple, no significant adenopathy, carotids upstroke normal bilaterally, no bruits, thyroid exam: thyroid is normal in size without nodules or tenderness  Lymphatics - no palpable lymphadenopathy  Chest - clear to auscultation, no wheezes, rales or rhonchi, symmetric air entry  Heart - normal rate and regular rhythm, no gallops noted  Back exam - not examined  Neurological - alert, oriented, normal speech, no focal findings or movement disorder noted, cranial nerves II through XII intact, positive Tinel sign on the left.  Negative Finkelstein test on the right.  Musculoskeletal - no muscular tenderness noted, Mild-Moderate osteoarthritic changes noted to both knee joints. No joint effusions noted.   Extremities - no pedal edema noted  Skin - normal coloration and turgor, no rashes, no suspicious skin lesions noted      Results for orders placed or performed in visit on 08/02/18   Comprehensive metabolic panel   Result Value Ref Range    Sodium 143 136 - 145 mmol/L    Potassium 3.8 3.5 - 5.1 mmol/L    Chloride 105 95 - 110 mmol/L    CO2 28 23 - 29 mmol/L    Glucose 91 70 - 110 mg/dL    BUN, Bld 19 8 - 23 mg/dL    Creatinine 0.8 0.5 - 1.4 mg/dL    Calcium 9.4 8.7 - 10.5 mg/dL    Total Protein 7.2 6.0 - 8.4 g/dL    Albumin 3.6 3.5 - 5.2 g/dL    Total Bilirubin 0.5 0.1 - 1.0 mg/dL    Alkaline Phosphatase 99 55 - 135 U/L    AST 25 10 - 40 U/L    ALT 24 10 - 44 U/L    Anion Gap 10 8 - 16 mmol/L    eGFR if African American >60.0 >60 mL/min/1.73 m^2    eGFR if non African American >60.0 >60 mL/min/1.73 m^2   Hemoglobin A1c   Result Value Ref Range    Hemoglobin A1C 8.6 (H) 4.0 - 5.6 %    Estimated Avg Glucose 200 (H) 68 - 131 mg/dL   Lipid panel   Result  Value Ref Range    Cholesterol 155 120 - 199 mg/dL    Triglycerides 121 30 - 150 mg/dL    HDL 42 40 - 75 mg/dL    LDL Cholesterol 88.8 63.0 - 159.0 mg/dL    HDL/Chol Ratio 27.1 20.0 - 50.0 %    Total Cholesterol/HDL Ratio 3.7 2.0 - 5.0    Non-HDL Cholesterol 113 mg/dL   CBC auto differential   Result Value Ref Range    WBC 7.97 3.90 - 12.70 K/uL    RBC 3.81 (L) 4.00 - 5.40 M/uL    Hemoglobin 11.1 (L) 12.0 - 16.0 g/dL    Hematocrit 36.4 (L) 37.0 - 48.5 %    MCV 96 82 - 98 fL    MCH 29.1 27.0 - 31.0 pg    MCHC 30.5 (L) 32.0 - 36.0 g/dL    RDW 14.3 11.5 - 14.5 %    Platelets 281 150 - 350 K/uL    MPV 10.0 9.2 - 12.9 fL    Immature Granulocytes 0.3 0.0 - 0.5 %    Gran # (ANC) 4.4 1.8 - 7.7 K/uL    Immature Grans (Abs) 0.02 0.00 - 0.04 K/uL    Lymph # 2.7 1.0 - 4.8 K/uL    Mono # 0.7 0.3 - 1.0 K/uL    Eos # 0.2 0.0 - 0.5 K/uL    Baso # 0.02 0.00 - 0.20 K/uL    nRBC 0 0 /100 WBC    Gran% 55.2 38.0 - 73.0 %    Lymph% 33.6 18.0 - 48.0 %    Mono% 8.7 4.0 - 15.0 %    Eosinophil% 1.9 0.0 - 8.0 %    Basophil% 0.3 0.0 - 1.9 %    Differential Method Automated    Ferritin   Result Value Ref Range    Ferritin 38 20.0 - 300.0 ng/mL          ASSESSMENT AND PLAN:  1. Type 2 diabetes, uncontrolled, with neuropathy/2.  Uncontrolled type 2 diabetes mellitus with proteinuria/3.  Long-term insulin use  Diabetes currently is not controlled for age and comorbid conditions. We discussed diabetic diet and regular exercise. We discussed home blood sugar monitoring, if appropriate.  Her diabetes control has worsened significantly.  She is requesting a continuous glucose monitor so she can adjust her insulin dosing accordingly.  Neuropathy pain control.  - Microalbumin/creatinine urine ratio; Future  - Ambulatory consult to Optometry  - flash glucose scanning reader (FREESTYLE ANAMARIA READER) Misc; 1 Units by Misc.(Non-Drug; Combo Route) route before meals as needed.  Dispense: 1 each; Refill: 11  - flash glucose sensor (FREESTYLE ANAMARIA SENSOR)  Kit; 1 Units by Misc.(Non-Drug; Combo Route) route every meal as needed.  Dispense: 1 kit; Refill: 0    4. Essential hypertension  Discussed sodium restriction, maintaining ideal body weight and regular exercise program as physiologic means to achieve blood pressure control. The patient will strive towards this. Patient was educated that both decongestant and anti-inflammatory medication may raise blood pressure.   Blood pressures do not appear to be well controlled.  Recheck by me was 150 over 85.  I do not know if her machine is not working well.  She will come in to have this recheck.  She will continue on the digital hypertension program.    5. Hyperlipidemia LDL goal <70  We discussed low fat diet and regular exercise.The current medical regimen is effective;  continue present plan and medications.      6. Gastroesophageal reflux disease without esophagitis  Symptoms controlled: yes. Reflux precautions discussed (eliminate tobacco if a smoker; minimize caffeine, chocolate and red/white peppermint intake; avoid heavy and spicy meals; don't lay down within 2-3 hours after eating; minimize the intake of NSAIDs). Medication as needed. Patient asked to take medication breaks, if possible - discussed chronic use can limit calcium absorption (which can lead to osteopenia/osteoporosis), increases the risk for intestinal infections, and can cause kidney damage. There are also some newer studies that show possible increased risk of mortality.     7. CHETNA (obstructive sleep apnea)  Patient reports that they are compliant with their CPAP machine.  We discussed the potential ramifications of untreated sleep apnea, which could include daytime sleepiness, hypertension, heart disease including CHF, sudden death while sleeping and/or stroke. The patient was advised to abstain from driving should they feel sleepy or drowsy.  We discussed potential treatment options, which could include weight loss, body positioning, continuous  positive airway pressure (CPAP), or referral for surgical consideration.      8. Allergic rhinitis, unspecified seasonality, unspecified trigger  We discussed several treatment strategies: antihistamine at bedtime, flonase in the morning. We also discussed saline nasal rinse in the evening as needed. I recommended allergy covers for pillow and mattress. Patient will let me know if symptoms worsen or persist.   - fluticasone (FLONASE) 50 mcg/actuation nasal spray; 1 spray (50 mcg total) by Each Nare route once daily.  Dispense: 16 g; Refill: 5    9. Severe obesity (BMI 35.0-39.9) with comorbidity  The patient is asked to make an attempt to improve diet and exercise patterns to aid in medical management of this problem.     10. Flu vaccine need  Will get later this fall.    11. Need for shingles vaccine  Patient was advised to get immunization at the pharmacy.     12. Degenerative arthritis of thumb, right  Patient advised on heat and Tylenol as needed for pain.  She is not interested to see Orthopedics for further evaluation possible steroid injection.    13. Left carpal tunnel syndrome  I advised her to be more compliant with wearing her wrist brace when she sleeps at night.  She will see Orthopedics for possible discussion of surgery if symptoms worsen or persist.    14. Restless legs syndrome (RLS)  Patient advised that ferritin level was less than 50.  I asked to start over-the-counter iron supplementation.  She will combine this with a stool softener as she has had some problems with constipation already.  She will retry the Requip.  We may need to increase the dose if her symptoms do not improve.  - rOPINIRole (REQUIP) 0.5 MG tablet; ropinirole 0.5 mg tablet  Dispense: 90 tablet; Refill: 1           Follow-up in about 4 months (around 12/20/2018), or if symptoms worsen or fail to improve, for follow up chronic medical conditions.. or sooner as needed.

## 2025-01-08 DIAGNOSIS — F32.0 MILD MAJOR DEPRESSION: ICD-10-CM

## 2025-01-08 RX ORDER — METFORMIN HYDROCHLORIDE 1000 MG/1
1000 TABLET ORAL 2 TIMES DAILY WITH MEALS
Qty: 180 TABLET | Refills: 2 | Status: SHIPPED | OUTPATIENT
Start: 2025-01-08

## 2025-01-08 RX ORDER — ESCITALOPRAM OXALATE 10 MG/1
10 TABLET ORAL
Qty: 90 TABLET | Refills: 2 | Status: SHIPPED | OUTPATIENT
Start: 2025-01-08

## 2025-01-08 NOTE — TELEPHONE ENCOUNTER
No care due was identified.  Health [facility] Embedded Care Due Messages. Reference number: [id number].   1/08/2025 6:07:39 AM CST

## 2025-01-12 DIAGNOSIS — G25.81 RESTLESS LEGS SYNDROME (RLS): ICD-10-CM

## 2025-01-12 NOTE — TELEPHONE ENCOUNTER
No care due was identified.  Health [facility] Embedded Care Due Messages. Reference number: [id number].   1/12/2025 6:07:01 AM CST

## 2025-01-13 RX ORDER — ROPINIROLE 0.5 MG/1
0.5 TABLET, FILM COATED ORAL
Qty: 90 TABLET | Refills: 0 | Status: SHIPPED | OUTPATIENT
Start: 2025-01-13

## 2025-01-15 ENCOUNTER — OFFICE VISIT (OUTPATIENT)
Dept: OPHTHALMOLOGY | Facility: CLINIC | Age: 75
End: 2025-01-15
Payer: MEDICARE

## 2025-01-15 DIAGNOSIS — H25.13 NUCLEAR SCLEROSIS OF BOTH EYES: ICD-10-CM

## 2025-01-15 DIAGNOSIS — H40.021 OPEN ANGLE WITH BORDERLINE FINDINGS, HIGH RISK, RIGHT: Primary | ICD-10-CM

## 2025-01-15 DIAGNOSIS — H40.1123 PRIMARY OPEN ANGLE GLAUCOMA (POAG) OF LEFT EYE, SEVERE STAGE: ICD-10-CM

## 2025-01-15 DIAGNOSIS — H21.562 AFFERENT PUPILLARY DEFECT OF LEFT EYE: ICD-10-CM

## 2025-01-15 DIAGNOSIS — H35.371 EPIRETINAL MEMBRANE, RIGHT: ICD-10-CM

## 2025-01-15 PROCEDURE — 1157F ADVNC CARE PLAN IN RCRD: CPT | Mod: HCNC,CPTII,S$GLB, | Performed by: STUDENT IN AN ORGANIZED HEALTH CARE EDUCATION/TRAINING PROGRAM

## 2025-01-15 PROCEDURE — 92020 GONIOSCOPY: CPT | Mod: HCNC,S$GLB,, | Performed by: STUDENT IN AN ORGANIZED HEALTH CARE EDUCATION/TRAINING PROGRAM

## 2025-01-15 PROCEDURE — 1159F MED LIST DOCD IN RCRD: CPT | Mod: HCNC,CPTII,S$GLB, | Performed by: STUDENT IN AN ORGANIZED HEALTH CARE EDUCATION/TRAINING PROGRAM

## 2025-01-15 PROCEDURE — 3288F FALL RISK ASSESSMENT DOCD: CPT | Mod: HCNC,CPTII,S$GLB, | Performed by: STUDENT IN AN ORGANIZED HEALTH CARE EDUCATION/TRAINING PROGRAM

## 2025-01-15 PROCEDURE — 99214 OFFICE O/P EST MOD 30 MIN: CPT | Mod: HCNC,S$GLB,, | Performed by: STUDENT IN AN ORGANIZED HEALTH CARE EDUCATION/TRAINING PROGRAM

## 2025-01-15 PROCEDURE — 1126F AMNT PAIN NOTED NONE PRSNT: CPT | Mod: HCNC,CPTII,S$GLB, | Performed by: STUDENT IN AN ORGANIZED HEALTH CARE EDUCATION/TRAINING PROGRAM

## 2025-01-15 PROCEDURE — 99999 PR PBB SHADOW E&M-EST. PATIENT-LVL III: CPT | Mod: PBBFAC,HCNC,, | Performed by: STUDENT IN AN ORGANIZED HEALTH CARE EDUCATION/TRAINING PROGRAM

## 2025-01-15 PROCEDURE — 1100F PTFALLS ASSESS-DOCD GE2>/YR: CPT | Mod: HCNC,CPTII,S$GLB, | Performed by: STUDENT IN AN ORGANIZED HEALTH CARE EDUCATION/TRAINING PROGRAM

## 2025-01-15 PROCEDURE — 4010F ACE/ARB THERAPY RXD/TAKEN: CPT | Mod: HCNC,CPTII,S$GLB, | Performed by: STUDENT IN AN ORGANIZED HEALTH CARE EDUCATION/TRAINING PROGRAM

## 2025-01-15 PROCEDURE — G2211 COMPLEX E/M VISIT ADD ON: HCPCS | Mod: HCNC,S$GLB,, | Performed by: STUDENT IN AN ORGANIZED HEALTH CARE EDUCATION/TRAINING PROGRAM

## 2025-01-15 RX ORDER — DORZOLAMIDE HYDROCHLORIDE AND TIMOLOL MALEATE 20; 5 MG/ML; MG/ML
1 SOLUTION/ DROPS OPHTHALMIC 2 TIMES DAILY
Qty: 10 ML | Refills: 11 | Status: SHIPPED | OUTPATIENT
Start: 2025-01-15 | End: 2026-01-15

## 2025-01-15 NOTE — PROGRESS NOTES
Subjective:  HPI    DLS: 8/23/23 w/ Dr. Hoffman    74 y.o. female presents for IOP Check. Patient complains of blurred VA OU   in the morning, improves throughout day. Complains of dry eyes, headaches   related to BG, and floaters. States she has not used cosopt.    Meds:  Cosopt BID OU  Last edited by Cele Sommers on 1/15/2025  2:33 PM.        Exam:  See encounter report for full exam    Assessment:  1. Open angle with borderline findings, high risk, right  2. Primary open angle glaucoma (POAG) of left eye, severe stage  3. Afferent pupillary defect of left eye  - Referral from: Dr. Hoffman. Establishing me 1/2025. LTFU since 8/2023.  - Surg hx: none   - Laser hx: none  - Glaucoma FHx: denies  -  / 499  - Gonio:  (uY 1/2025)  - Tmax: ? 18/20 at [facility]  - Target IOP: <18 OD, low teens OS  - Med adverse effects: n/a    Baseline photos 8/2023      01/15/2025  IOP 14/18 off drops for 2 months  Testing not scheduled today    4. Nuclear sclerosis of both eyes  Monitor    5. Epiretinal membrane, right  Asymptomatic  Monitor    Plan:  - Restart Cosopt 2/2  - Update glaucoma imaging (LTFU since 8/2023)    Today's visit is associated with current and anticipated ongoing medical care related to this patient's single serious/complex condition (glaucoma). Follow up is to be continued indefinitely to monitor the condition.    Return 3 months -- HVF, OCT, VA, IOP    Zhanna Nicholas MD  [facility]

## 2025-01-25 ENCOUNTER — LAB VISIT (OUTPATIENT)
Dept: LAB | Facility: HOSPITAL | Age: 75
End: 2025-01-25
Attending: NURSE PRACTITIONER
Payer: MEDICARE

## 2025-01-25 DIAGNOSIS — E11.65 TYPE 2 DIABETES MELLITUS WITH HYPERGLYCEMIA, UNSPECIFIED WHETHER LONG TERM INSULIN USE: ICD-10-CM

## 2025-01-25 LAB
ESTIMATED AVG GLUCOSE: 189 MG/DL (ref 68–131)
HBA1C MFR BLD: 8.2 % (ref 4–5.6)

## 2025-01-25 PROCEDURE — 83036 HEMOGLOBIN GLYCOSYLATED A1C: CPT | Mod: HCNC | Performed by: NURSE PRACTITIONER

## 2025-01-25 PROCEDURE — 36415 COLL VENOUS BLD VENIPUNCTURE: CPT | Mod: HCNC | Performed by: NURSE PRACTITIONER

## 2025-01-28 ENCOUNTER — OFFICE VISIT (OUTPATIENT)
Dept: ENDOCRINOLOGY | Facility: CLINIC | Age: 75
End: 2025-01-28
Payer: MEDICARE

## 2025-01-28 ENCOUNTER — TELEPHONE (OUTPATIENT)
Dept: PHARMACY | Facility: CLINIC | Age: 75
End: 2025-01-28
Payer: MEDICARE

## 2025-01-28 VITALS
TEMPERATURE: 97 F | HEART RATE: 65 BPM | SYSTOLIC BLOOD PRESSURE: 134 MMHG | DIASTOLIC BLOOD PRESSURE: 68 MMHG | BODY MASS INDEX: 34.17 KG/M2 | WEIGHT: 186.81 LBS

## 2025-01-28 DIAGNOSIS — N18.31 STAGE 3A CHRONIC KIDNEY DISEASE: ICD-10-CM

## 2025-01-28 DIAGNOSIS — E11.59 TYPE 2 DIABETES MELLITUS WITH OTHER CIRCULATORY COMPLICATION, WITHOUT LONG-TERM CURRENT USE OF INSULIN: Primary | ICD-10-CM

## 2025-01-28 DIAGNOSIS — R80.9 MICROALBUMINURIA: ICD-10-CM

## 2025-01-28 DIAGNOSIS — K59.00 CONSTIPATION, UNSPECIFIED CONSTIPATION TYPE: ICD-10-CM

## 2025-01-28 PROCEDURE — 3052F HG A1C>EQUAL 8.0%<EQUAL 9.0%: CPT | Mod: HCNC,CPTII,S$GLB, | Performed by: NURSE PRACTITIONER

## 2025-01-28 PROCEDURE — 99999 PR PBB SHADOW E&M-EST. PATIENT-LVL V: CPT | Mod: PBBFAC,HCNC,, | Performed by: NURSE PRACTITIONER

## 2025-01-28 PROCEDURE — 1157F ADVNC CARE PLAN IN RCRD: CPT | Mod: HCNC,CPTII,S$GLB, | Performed by: NURSE PRACTITIONER

## 2025-01-28 PROCEDURE — 1160F RVW MEDS BY RX/DR IN RCRD: CPT | Mod: HCNC,CPTII,S$GLB, | Performed by: NURSE PRACTITIONER

## 2025-01-28 PROCEDURE — 1159F MED LIST DOCD IN RCRD: CPT | Mod: HCNC,CPTII,S$GLB, | Performed by: NURSE PRACTITIONER

## 2025-01-28 PROCEDURE — 3008F BODY MASS INDEX DOCD: CPT | Mod: HCNC,CPTII,S$GLB, | Performed by: NURSE PRACTITIONER

## 2025-01-28 PROCEDURE — 3075F SYST BP GE 130 - 139MM HG: CPT | Mod: HCNC,CPTII,S$GLB, | Performed by: NURSE PRACTITIONER

## 2025-01-28 PROCEDURE — 99214 OFFICE O/P EST MOD 30 MIN: CPT | Mod: HCNC,S$GLB,, | Performed by: NURSE PRACTITIONER

## 2025-01-28 PROCEDURE — 4010F ACE/ARB THERAPY RXD/TAKEN: CPT | Mod: HCNC,CPTII,S$GLB, | Performed by: NURSE PRACTITIONER

## 2025-01-28 PROCEDURE — G2211 COMPLEX E/M VISIT ADD ON: HCPCS | Mod: HCNC,S$GLB,, | Performed by: NURSE PRACTITIONER

## 2025-01-28 PROCEDURE — 95251 CONT GLUC MNTR ANALYSIS I&R: CPT | Mod: HCNC,S$GLB,, | Performed by: NURSE PRACTITIONER

## 2025-01-28 PROCEDURE — 3078F DIAST BP <80 MM HG: CPT | Mod: HCNC,CPTII,S$GLB, | Performed by: NURSE PRACTITIONER

## 2025-01-28 RX ORDER — DULAGLUTIDE 4.5 MG/.5ML
4.5 INJECTION, SOLUTION SUBCUTANEOUS
Qty: 4 PEN | Refills: 3 | Status: SHIPPED | OUTPATIENT
Start: 2025-01-28 | End: 2026-01-28

## 2025-01-28 RX ORDER — FINERENONE 10 MG/1
10 TABLET, FILM COATED ORAL DAILY
Qty: 30 TABLET | Refills: 3 | Status: SHIPPED | OUTPATIENT
Start: 2025-01-28

## 2025-01-28 NOTE — TELEPHONE ENCOUNTER
We have reached out to Cecilia Barahona to inform her of the [employer], [employer]man , and [employer] Patient Assistance Program re-enrollment process for Jardiance, Kerendia, and Trulicity 4.5 mg. Patient must provided the following documentation to re-apply for 2025: Proof of household Income(such as social security award letter, pension statement or 3 consecutive pay stubs) and Copy of all insurance cards (front and back)        Mary Kate Headley  Pharmacy Patient Assistance

## 2025-01-28 NOTE — PROGRESS NOTES
CC: This 74 y.o. Black or  female  is here for evaluation of  T2DM along with comorbidities indicated in the Visit Diagnosis section of this encounter.    HPI: Cecilia Barahona was diagnosed with T2DM in her 30s. Oral agents started at the time of diagnosis.     Prior visit 9/2024  Tresiba was stopped early July.   Pt had a fall earlier this month.   A1c is down from 7.6 to 7.4%. 90 day CGM average 166.   Pt dropped metformin to 1/2 tablet in the evenings about a week ago d/t low glucose alerts overnight (pt was asymptomatic).   Plan STOP glipizide.   Resume metformin at 1 tablet twice daily. Do not reduce dose.   Continue Jardiance and Trulicity.   For constipation - try ducolax or Miralax. Take on a SCHEDULE, once daily or MWF.    Monitor eating habits, cut back or avoid fruit.   Return to clinic in 4 months with labs prior.      Interval hx pt's daughter Elli is present on phone.   A1c is up from 7.4 to 8.2%.   90 day CGM average 195.   Pt has not been monitoring diet closely. Daughter reports pt drinks large coffee with a lot of creamer and sugar along with toast in the AM. Has 1 coke a day.         PMHX: CHETNA - does not like her old mask. H/o stroke with right facial dropp     LAST DIABETES EDUCATION: never    RECENT ILLNESSES/HOSPITALIZATIONS - -No.     HOSPITALIZED FOR DIABETES  -  No.    PRESCRIBED DIABETES MEDICATIONS:  gets from [facility]/also Trulicity?   metformin 1000 mg bid  Jardiance 25 mg once daily   Trulicity 4.5 mg once weekly on Sundays         Misses medication doses -  metformin as above.   -- does not tolerate Synjardy as well as taking meds separately     DM COMPLICATIONS: peripheral neuropathy    SIGNIFICANT DIABETES MED HISTORY:   Glimepiride stopped at initial ov 7/2017 since she was already on Novolog  ozempic started 10/2018,  switched to trulicity ~ 12/2018 d/t nausea at 0.5 mg dose. Failed  again in 2023   Jardiance started 2/2020  tresiba and trulicity  stopped in 10/2020 d/t improved BGs but Trulicity resumed shortly d/t poor diet and DM control        SELF MONITORING BLOOD GLUCOSE: Dexcom G7 CGM yaakov     CGM interpretation: glucoses much higher d/t diet. No hypoglycemia. Postprandial BGs spike > 400.             HYPOGLYCEMIC EPISODES: denies symptoms.  She corrected with candy and soda.   Symptoms: weakness   Corrects with hard candy.       CURRENT DIET:  drinks 1 cup of coffee per day with sugar, no cokes at present.   Sometimes has a breakfast - bagel  Lunch at the group home and snacks in the evenings.       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     /68 (BP Location: Left arm, Patient Position: Sitting)   Pulse 65   Temp 96.7 °F (35.9 °C)   Wt 84.7 kg (186 lb 12.8 oz)   BMI 34.17 kg/m²         ROS:   CONSTITUTIONAL: Appetite lower, no  fatigue  GI: Denies n/v.  + constipation and occasional diarrhea   : No dysuria; no vaginal itching/irritation       PHYSICAL EXAM:  GENERAL: Well developed, well nourished. No acute distress.   PSYCH: AAOx3, appropriate mood and affect, conversant, well-groomed. Judgement and insight good.   NEURO: Cranial nerves grossly intact. Speech clear, no tremor.   CHEST: Respirations even and unlabored.           Hemoglobin A1C   Date Value Ref Range Status   01/25/2025 8.2 (H) 4.0 - 5.6 % Final     Comment:     ADA Screening Guidelines:  5.7-6.4%  Consistent with prediabetes  >or=6.5%  Consistent with diabetes    High levels of fetal hemoglobin interfere with the HbA1C  assay. Heterozygous hemoglobin variants (HbS, HgC, etc)do  not significantly interfere with this assay.   However, presence of multiple variants may affect accuracy.     09/17/2024 7.4 (H) 4.0 - 5.6 % Final     Comment:     ADA Screening Guidelines:  5.7-6.4%  Consistent with prediabetes  >or=6.5%  Consistent with diabetes    High levels of fetal hemoglobin interfere with the HbA1C  assay. Heterozygous hemoglobin  "variants (HbS, HgC, etc)do  not significantly interfere with this assay.   However, presence of multiple variants may affect accuracy.     05/24/2024 7.6 (H) 4.0 - 5.6 % Final     Comment:     ADA Screening Guidelines:  5.7-6.4%  Consistent with prediabetes  >or=6.5%  Consistent with diabetes    High levels of fetal hemoglobin interfere with the HbA1C  assay. Heterozygous hemoglobin variants (HbS, HgC, etc)do  not significantly interfere with this assay.   However, presence of multiple variants may affect accuracy.         No results found for: "CPEPTIDE", "GLUTAMICACID", "ISLETCELLANT", "FRUCTOSAMINE"         Component Value Date/Time     07/24/2024 1421    K 4.5 07/24/2024 1421     07/24/2024 1421    CO2 20 (L) 07/24/2024 1421    BUN 19 07/24/2024 1421    CREATININE 1.0 07/24/2024 1421     (H) 07/24/2024 1421    CALCIUM 9.4 07/24/2024 1421    ALKPHOS 128 07/24/2024 1421    AST 19 07/24/2024 1421    ALT 17 07/24/2024 1421    BILITOT 0.5 07/24/2024 1421    EGFRNORACEVR 59 (A) 07/24/2024 1421    ESTGFRAFRICA >60.0 05/09/2022 1632      Lab Results   Component Value Date    CHOL 113 (L) 05/24/2024    CHOL 104 (L) 07/12/2023    CHOL 94 (L) 02/24/2022     Lab Results   Component Value Date    HDL 41 05/24/2024    HDL 35 (L) 07/12/2023    HDL 37 (L) 02/24/2022     Lab Results   Component Value Date    LDLCALC 52.6 (L) 05/24/2024    LDLCALC 51.8 (L) 07/12/2023    LDLCALC 42.4 (L) 02/24/2022     Lab Results   Component Value Date    TRIG 97 05/24/2024    TRIG 86 07/12/2023    TRIG 73 02/24/2022       Lab Results   Component Value Date    CHOLHDL 36.3 05/24/2024    CHOLHDL 33.7 07/12/2023    CHOLHDL 39.4 02/24/2022               Lab Results   Component Value Date    MICALBCREAT 61.1 (H) 05/24/2024           ASSESSMENT and PLAN:       1. Type 2 diabetes mellitus with other circulatory complication, without long-term current use of insulin  Avoid beverages with sugar.   Limit coffee intake.     Focus on " dietary changes.   No change in medications at this time.     Start Kerendia, if affordable.   Potassium in 1 month.     Return to clinic in 3 months with labs prior.     Ambulatory referral/consult to [facility] - for Jardiance and Trulicity     finerenone (KERENDIA) 10 mg Tab    Hemoglobin A1C    Lipid Panel    Microalbumin/Creatinine Ratio, Urine    Potassium    Potassium      2. Stage 3a chronic kidney disease  finerenone (KERENDIA) 10 mg Tab    Potassium in 1 month.       3. Microalbuminuria  finerenone (KERENDIA) 10 mg Tab    empagliflozin (JARDIANCE) 25 mg tablet      4. Constipation, unspecified constipation type  For constipation - try ducolax or Miralax. Take on a SCHEDULE, once daily or MWF.                  Orders Placed This Encounter   Procedures    Hemoglobin A1C     Standing Status:   Future     Standing Expiration Date:   3/29/2026     Order Specific Question:   Send normal result to authorizing provider's In Basket if patient is active on MyChart:     Answer:   Yes    Lipid Panel     Standing Status:   Future     Standing Expiration Date:   1/28/2026     Order Specific Question:   Send normal result to authorizing provider's In Basket if patient is active on MyChart:     Answer:   Yes    Microalbumin/Creatinine Ratio, Urine     Standing Status:   Future     Standing Expiration Date:   3/29/2026     Order Specific Question:   Specimen Source     Answer:   Urine    Potassium     Standing Status:   Future     Standing Expiration Date:   4/28/2026     Order Specific Question:   Send normal result to authorizing provider's In Basket if patient is active on MyChart:     Answer:   Yes    Potassium     Standing Status:   Future     Standing Expiration Date:   4/28/2026     Order Specific Question:   Send normal result to authorizing provider's In Basket if patient is active on MyChart:     Answer:   Yes    Ambulatory referral/consult to [facility]     Standing Status:   Future      Standing Expiration Date:   2/28/2026     Referral Priority:   Routine     Referral Type:   Consultation     Referral Reason:   Specialty Services Required     Number of Visits Requested:   1          Follow up in about 3 months (around 4/28/2025).

## 2025-01-28 NOTE — PATIENT INSTRUCTIONS
For constipation - try ducolax or Miralax. Take on a SCHEDULE, once daily or MWF.      Avoid beverages with sugar.   Limit coffee intake.     Focus on dietary changes.   No change in medications at this time.     Start Kerendia, if affordable.   Potassium in 1 month.     Return to clinic in 3 months with labs prior.

## 2025-01-30 DIAGNOSIS — Z00.00 ENCOUNTER FOR MEDICARE ANNUAL WELLNESS EXAM: ICD-10-CM

## 2025-02-27 ENCOUNTER — PATIENT OUTREACH (OUTPATIENT)
Dept: ADMINISTRATIVE | Facility: HOSPITAL | Age: 75
End: 2025-02-27
Payer: MEDICARE

## 2025-03-10 ENCOUNTER — TELEPHONE (OUTPATIENT)
Dept: CARDIOLOGY | Facility: CLINIC | Age: 75
End: 2025-03-10
Payer: MEDICARE

## 2025-03-10 NOTE — TELEPHONE ENCOUNTER
Appt rescheduled to 4/1/25.      ----- Message from Reji sent at 3/10/2025  1:15 PM CDT -----  Type:  Patient CallWho Called: [facility]  Does the patient know what this is regarding?: Requesting a call back about having a her appointment rescheduled to 3/14 if possible ; please advise Would the patient rather a call back or a response via [facility]?call Best Call Back Number: [phone] Additional Information:

## 2025-03-15 DIAGNOSIS — J30.9 ALLERGIC RHINITIS, UNSPECIFIED SEASONALITY, UNSPECIFIED TRIGGER: ICD-10-CM

## 2025-03-15 NOTE — TELEPHONE ENCOUNTER
Care Due:                  Date            Visit Type   Department     Provider  --------------------------------------------------------------------------------                                [facility]                              PRIMARY      MEDICINE/INTERN  Last Visit: 05-      CARE ([facility])   AL DARELL [place]  Next Visit: None Scheduled  None         None Found                                                            Last  Test          Frequency    Reason                     Performed    Due Date  --------------------------------------------------------------------------------    Office Visit  12 months..  meloxicam................  05- 04-    CBC.........  12 months..  meloxicam................  05- 05-    [employer] Embedded Care Due Messages. Reference number: [id number].   3/15/2025 11:29:41 AM CDT

## 2025-03-17 RX ORDER — LORATADINE 10 MG/1
TABLET ORAL
Qty: 90 TABLET | Refills: 2 | Status: SHIPPED | OUTPATIENT
Start: 2025-03-17

## 2025-04-01 DIAGNOSIS — I10 PRIMARY HYPERTENSION: ICD-10-CM

## 2025-04-01 DIAGNOSIS — R07.2 PRECORDIAL PAIN: ICD-10-CM

## 2025-04-01 RX ORDER — AMLODIPINE BESYLATE 10 MG/1
10 TABLET ORAL
Qty: 90 TABLET | Refills: 3 | Status: SHIPPED | OUTPATIENT
Start: 2025-04-01

## 2025-04-01 RX ORDER — VALSARTAN 320 MG/1
320 TABLET ORAL
Qty: 90 TABLET | Refills: 3 | Status: SHIPPED | OUTPATIENT
Start: 2025-04-01

## 2025-04-01 RX ORDER — CARVEDILOL 12.5 MG/1
12.5 TABLET ORAL 2 TIMES DAILY
Qty: 180 TABLET | Refills: 3 | Status: SHIPPED | OUTPATIENT
Start: 2025-04-01

## 2025-04-01 RX ORDER — FUROSEMIDE 20 MG/1
20 TABLET ORAL
Qty: 90 TABLET | Refills: 3 | Status: SHIPPED | OUTPATIENT
Start: 2025-04-01

## 2025-04-01 RX ORDER — NITROGLYCERIN 0.4 MG/1
0.4 TABLET SUBLINGUAL EVERY 5 MIN PRN
Qty: 25 TABLET | Refills: 3 | Status: SHIPPED | OUTPATIENT
Start: 2025-04-01

## 2025-04-03 ENCOUNTER — PATIENT OUTREACH (OUTPATIENT)
Dept: ADMINISTRATIVE | Facility: HOSPITAL | Age: 75
End: 2025-04-03
Payer: MEDICARE

## 2025-04-03 DIAGNOSIS — Z12.11 SCREEN FOR COLON CANCER: ICD-10-CM

## 2025-04-03 DIAGNOSIS — Z71.89 COMPLEX CARE COORDINATION: Primary | ICD-10-CM

## 2025-04-03 DIAGNOSIS — Z12.31 VISIT FOR SCREENING MAMMOGRAM: ICD-10-CM

## 2025-04-03 NOTE — PROGRESS NOTES
Care Coordination Encounter Details:       MyChart Portal Status:         [x]  Reviewed MyChart Portal Status offered / enrolled if applicable        Additional Notes:     MyChart Outcomes: Pt is enrolled & active          Updates Requested / Reviewed:        Updated Care Coordination Note, Care Everywhere, , Care Team Updated, Removed  or Duplicate Orders, and Immunizations Reconciliation Completed or Queried: [place]         Health Maintenance Screening(s) Due:      Health Maintenance Topics Overdue:      VBHM Score: 0     Patient is not due for any topics at this time.    Influenza Vaccine  RSV Vaccine                  Health Maintenance Topic(s) Outreach Outcomes & Actions Taken:    Breast Cancer Screening - Outreach Outcomes & Actions Taken  : Mammogram Order Placed and Mammogram Screening Scheduled    Colorectal Cancer Screening - Outreach Outcomes & Actions Taken  : Colonoscopy Case Request / Referral / Home Test Order Placed and SCHEDULED PAT APPOINTMENT    Lab(s) - Outreach Outcomes & Actions Taken  : Overdue Lab(s) Ordered and Overdue Lab(s) Scheduled           Additional Notes:  SCHEDULED THE PATIENT PAT APPOINTMENT, MAMMOGRAM. UPDATED SDOH           Chronic Disease Management:     Diabetes Measures        Lab Results   Component Value Date    HGBA1C 8.2 (H) 2025           [x]  Reviewed chart for active Diabetes diagnosis     []  Scheduled necessary follow up appointments if needed         Additional Notes:  SCHEDULED 25           Hypertension Measures        BP Readings from Last 1 Encounters:   25 134/68           [x]  Reviewed chart for active Hypertension diagnosis     []  Reviewed & documented Home BP Cuff     []  Documented a Remote BP if needed & applicable     []  Scheduled necessary follow up appointments with Primary Care if needed         Additional Notes:  BLOOD PRESSURE WNL           Provider Team Continuity:     Last PCP Visit Date: 5/3/2024           [x]  Reviewed Primary Care Provider Visits, Annual Wellness Visit, and Future          Appointments to ensure appointments have been scheduled and/or           completed        Additional Notes:  PATIENT IS SCHEDULED 10/22/24           Social Determinants of Health          [x]  Reviewed, completed, and/or updated the following sections:                  Food Insecurity, Transportation Needs, Financial Resource Strain,                 Tobacco Use        Additional Notes:  UPDATED           Care Management, Digital Medicine, and/or Education Referrals    OPCM Risk Score: 63.9         Next Steps - Referral Actions: Referral placed for OPCM and Referral place for OPCM to [facility] for SDOH Food Insecurity & Transportation Needs if applicable         Additional Notes:  ELIGIBLE FOR DIABETIC DIGITAL MEDICINE

## 2025-04-04 ENCOUNTER — OUTPATIENT CASE MANAGEMENT (OUTPATIENT)
Dept: ADMINISTRATIVE | Facility: OTHER | Age: 75
End: 2025-04-04
Payer: MEDICARE

## 2025-04-04 NOTE — PROGRESS NOTES
Outpatient Care Management  Plan of Care Follow Up Visit    Patient: Cecilia Barahona  MRN: [id number]  Date of Service: 04/04/2025  Completed by: Genesis Chacon RN  Referral Date: 04/03/2025    Reason for Visit   Patient presents with    OPCM RN First Assessment Attempt    OPCM Chart Review    OPCM Enrollment Call    Plan Of Care       Brief Summary: Pt requests a callback. Pt voiced that she does not feel well at this time. Scheduled callback for 4/7/2025.

## 2025-04-10 DIAGNOSIS — G25.81 RESTLESS LEGS SYNDROME (RLS): ICD-10-CM

## 2025-04-10 RX ORDER — ROPINIROLE 0.5 MG/1
0.5 TABLET, FILM COATED ORAL
Qty: 30 TABLET | Refills: 0 | Status: SHIPPED | OUTPATIENT
Start: 2025-04-10

## 2025-04-10 NOTE — TELEPHONE ENCOUNTER
No care due was identified.  Health [facility] Embedded Care Due Messages. Reference number: [id number].   4/10/2025 5:53:28 AM CDT

## 2025-04-11 ENCOUNTER — OUTPATIENT CASE MANAGEMENT (OUTPATIENT)
Dept: ADMINISTRATIVE | Facility: OTHER | Age: 75
End: 2025-04-11
Payer: MEDICARE

## 2025-04-11 NOTE — PROGRESS NOTES
Pt voiced that she fells better on today. Reports that she had been having problems with her sinuses. Pt requests a callback scheduled for April 14/2025.

## 2025-04-12 DIAGNOSIS — M25.512 CHRONIC LEFT SHOULDER PAIN: ICD-10-CM

## 2025-04-12 DIAGNOSIS — G89.29 CHRONIC LEFT SHOULDER PAIN: ICD-10-CM

## 2025-04-12 NOTE — TELEPHONE ENCOUNTER
No care due was identified.  Health [facility] Embedded Care Due Messages. Reference number: [id number].   4/12/2025 5:55:49 AM CDT

## 2025-04-14 ENCOUNTER — TELEPHONE (OUTPATIENT)
Dept: UROGYNECOLOGY | Facility: CLINIC | Age: 75
End: 2025-04-14
Payer: MEDICARE

## 2025-04-15 RX ORDER — MELOXICAM 7.5 MG/1
7.5 TABLET ORAL DAILY PRN
Qty: 30 TABLET | Refills: 0 | Status: SHIPPED | OUTPATIENT
Start: 2025-04-15

## 2025-04-22 ENCOUNTER — LAB VISIT (OUTPATIENT)
Dept: LAB | Facility: HOSPITAL | Age: 75
End: 2025-04-22
Attending: NURSE PRACTITIONER
Payer: MEDICARE

## 2025-04-22 ENCOUNTER — OFFICE VISIT (OUTPATIENT)
Dept: CARDIOLOGY | Facility: CLINIC | Age: 75
End: 2025-04-22
Attending: INTERNAL MEDICINE
Payer: MEDICARE

## 2025-04-22 VITALS
HEART RATE: 68 BPM | SYSTOLIC BLOOD PRESSURE: 112 MMHG | DIASTOLIC BLOOD PRESSURE: 48 MMHG | BODY MASS INDEX: 34.29 KG/M2 | WEIGHT: 186.31 LBS | HEIGHT: 62 IN

## 2025-04-22 DIAGNOSIS — Z87.898 HISTORY OF CHEST PAIN: ICD-10-CM

## 2025-04-22 DIAGNOSIS — Z86.718 HISTORY OF DEEP VEIN THROMBOSIS: ICD-10-CM

## 2025-04-22 DIAGNOSIS — I70.0 ATHEROSCLEROSIS OF AORTA: ICD-10-CM

## 2025-04-22 DIAGNOSIS — E78.1 HYPERTRIGLYCERIDEMIA: ICD-10-CM

## 2025-04-22 DIAGNOSIS — I10 PRIMARY HYPERTENSION: ICD-10-CM

## 2025-04-22 DIAGNOSIS — E66.9 MILD OBESITY: ICD-10-CM

## 2025-04-22 DIAGNOSIS — E11.59 TYPE 2 DIABETES MELLITUS WITH OTHER CIRCULATORY COMPLICATION, WITHOUT LONG-TERM CURRENT USE OF INSULIN: ICD-10-CM

## 2025-04-22 DIAGNOSIS — Z95.1 HISTORY OF CORONARY ARTERY BYPASS SURGERY: ICD-10-CM

## 2025-04-22 DIAGNOSIS — Z86.73 HISTORY OF CEREBROVASCULAR ACCIDENT: ICD-10-CM

## 2025-04-22 DIAGNOSIS — I25.10 CORONARY ARTERY DISEASE INVOLVING NATIVE CORONARY ARTERY OF NATIVE HEART WITHOUT ANGINA PECTORIS: ICD-10-CM

## 2025-04-22 DIAGNOSIS — E78.00 HYPERCHOLESTEROLEMIA: ICD-10-CM

## 2025-04-22 LAB
ALBUMIN/CREAT UR: 17.2 UG/MG
CHOLEST SERPL-MCNC: 114 MG/DL (ref 120–199)
CHOLEST/HDLC SERPL: 3.2 {RATIO} (ref 2–5)
CREAT UR-MCNC: 87 MG/DL (ref 15–325)
EAG (OHS): 194 MG/DL (ref 68–131)
HBA1C MFR BLD: 8.4 % (ref 4–5.6)
HDLC SERPL-MCNC: 36 MG/DL (ref 40–75)
HDLC SERPL: 31.6 % (ref 20–50)
LDLC SERPL CALC-MCNC: 55.2 MG/DL (ref 63–159)
MICROALBUMIN UR-MCNC: 15 UG/ML (ref ?–5000)
NONHDLC SERPL-MCNC: 78 MG/DL
POTASSIUM SERPL-SCNC: 4.3 MMOL/L (ref 3.5–5.1)
TRIGL SERPL-MCNC: 114 MG/DL (ref 30–150)

## 2025-04-22 PROCEDURE — 1160F RVW MEDS BY RX/DR IN RCRD: CPT | Mod: HCNC,CPTII,S$GLB, | Performed by: INTERNAL MEDICINE

## 2025-04-22 PROCEDURE — 3078F DIAST BP <80 MM HG: CPT | Mod: HCNC,CPTII,S$GLB, | Performed by: INTERNAL MEDICINE

## 2025-04-22 PROCEDURE — 1126F AMNT PAIN NOTED NONE PRSNT: CPT | Mod: HCNC,CPTII,S$GLB, | Performed by: INTERNAL MEDICINE

## 2025-04-22 PROCEDURE — 3074F SYST BP LT 130 MM HG: CPT | Mod: HCNC,CPTII,S$GLB, | Performed by: INTERNAL MEDICINE

## 2025-04-22 PROCEDURE — 99214 OFFICE O/P EST MOD 30 MIN: CPT | Mod: HCNC,S$GLB,, | Performed by: INTERNAL MEDICINE

## 2025-04-22 PROCEDURE — 3008F BODY MASS INDEX DOCD: CPT | Mod: HCNC,CPTII,S$GLB, | Performed by: INTERNAL MEDICINE

## 2025-04-22 PROCEDURE — 3288F FALL RISK ASSESSMENT DOCD: CPT | Mod: HCNC,CPTII,S$GLB, | Performed by: INTERNAL MEDICINE

## 2025-04-22 PROCEDURE — 1101F PT FALLS ASSESS-DOCD LE1/YR: CPT | Mod: HCNC,CPTII,S$GLB, | Performed by: INTERNAL MEDICINE

## 2025-04-22 PROCEDURE — 3052F HG A1C>EQUAL 8.0%<EQUAL 9.0%: CPT | Mod: HCNC,CPTII,S$GLB, | Performed by: INTERNAL MEDICINE

## 2025-04-22 PROCEDURE — 1157F ADVNC CARE PLAN IN RCRD: CPT | Mod: HCNC,CPTII,S$GLB, | Performed by: INTERNAL MEDICINE

## 2025-04-22 PROCEDURE — 83036 HEMOGLOBIN GLYCOSYLATED A1C: CPT | Mod: HCNC

## 2025-04-22 PROCEDURE — 99999 PR PBB SHADOW E&M-EST. PATIENT-LVL V: CPT | Mod: PBBFAC,HCNC,, | Performed by: INTERNAL MEDICINE

## 2025-04-22 PROCEDURE — 4010F ACE/ARB THERAPY RXD/TAKEN: CPT | Mod: HCNC,CPTII,S$GLB, | Performed by: INTERNAL MEDICINE

## 2025-04-22 PROCEDURE — 36415 COLL VENOUS BLD VENIPUNCTURE: CPT | Mod: HCNC,PO

## 2025-04-22 PROCEDURE — 83718 ASSAY OF LIPOPROTEIN: CPT | Mod: HCNC

## 2025-04-22 PROCEDURE — 82043 UR ALBUMIN QUANTITATIVE: CPT | Mod: HCNC

## 2025-04-22 PROCEDURE — 84132 ASSAY OF SERUM POTASSIUM: CPT | Mod: HCNC

## 2025-04-22 PROCEDURE — 1159F MED LIST DOCD IN RCRD: CPT | Mod: HCNC,CPTII,S$GLB, | Performed by: INTERNAL MEDICINE

## 2025-04-22 NOTE — PROGRESS NOTES
Subjective:     Cecilia Barahona is a 75 y.o. female with hypertension, hypercholesterolemia, hypertriglyceridemia and diabetes mellitus type 2. She is mildly obese. She appears to have had a cerebrovascular accident in about 2004 when she had an episode of weakness in her left hand. She had exertional chest discomfort in 12/2019. She was given isosorbidemononitrate and the chest discomfort resolved. She had a stress MPI that was negative. On 5/16/2020 she began to experience a mild pressure over her chest. The heaviness lasted most of the day. Walking in her house made the discomfort more pronounced. There was no radiation of the pain and neither any associated dyspnea or diaphoresis. She had similar discomfort on 5/17/2020 and 5/18/2020. She told her daughter that took her to an urgent care center. She received two tablets of nitroglycerin  sublingually and the chest discomfort resolved. There were no acute ST-T wave changes. She was advised admission and was transferred to [facility]. She has had no further chest pain after presentation. On 5/20/2020 she underwent coronary angiography that revealed severe three vessel coronary artery disease. There was basal inferior mild hypokinesia with an ejection fraction of 60%. On 5/25/2020 she underwent coronary artery bypass grafting receiving three grafts. The left internal mammary artery was bypassed to the left anterior descending coronary artery and a sequential saphenous vein graft was placed to the second obtuse marginal branch and the fourth obtuse marginal branch. A few days after surgery she had a deep venous thrombosis of her left leg and she was anticoagulated with apixiban. She was able to lose weight after the coronary artery bypass grafting and her diabetes and hypertension became easier to control. Some soreness and itching in the anterior chest wall. In 4/2021 she went to [place] to visit her sister. In 3/2022 she is still  bothered by occasional chest wall pain. In 2022 her   after a lengthy hospital stay. She was not taking all of her prescribed medication during her husbands illness but got back on all her usual medications on about 2023. In 2023 she has had a few episodes of chest pressure. No clear exertional chest pain. On 2023 she underwent a stress echocardiogram. She was able to do 2:26 minutes on the treadmill. She had no chest pain. The electrocardiogram was negative. The echocardiogram was negative. There was normal left ventricular size and systolic function. The ejection fraction was 60%. It was felt the chest pain had a noncardiac cause. She underwent surgery of her right hand on 2024 for trigger finger. She has mild exertional shortness of breath. No palpitations or weak spells. No issues with any of her prescribed medications. Feeling fair overall.      Coronary Artery Disease  Presents for follow-up visit. Pertinent negatives include no chest pain, chest pressure, chest tightness, dizziness, leg swelling, muscle weakness, palpitations, shortness of breath or weight gain. Risk factors include hyperlipidemia. Risk factors do not include obesity. The symptoms have been stable.   Cerebrovascular Accident  This is a chronic problem. The problem has been resolved. Pertinent negatives include no abdominal pain, anorexia, arthralgias, change in bowel habit, chest pain, chills, congestion, coughing, diaphoresis, fatigue, fever, headaches, joint swelling, myalgias, nausea, neck pain, numbness, rash, sore throat, swollen glands, urinary symptoms, vertigo, visual change, vomiting or weakness.   Hypertension  This is a chronic problem. The current episode started more than 1 year ago. The problem is unchanged. The problem is controlled (usually 120-130/65-75 mmHg at home). Pertinent negatives include no anxiety, blurred vision, chest pain, headaches, malaise/fatigue, neck pain, orthopnea,  palpitations, peripheral edema, PND, shortness of breath or sweats. There is no history of chronic renal disease.   Hyperlipidemia  This is a chronic problem. The current episode started more than 1 year ago. The problem is controlled. Exacerbating diseases include diabetes. She has no history of chronic renal disease, hypothyroidism, liver disease, obesity or nephrotic syndrome. Pertinent negatives include no chest pain, focal weakness, leg pain, myalgias or shortness of breath.   Chest Pain   Pertinent negatives include no abdominal pain, back pain, claudication, cough, diaphoresis, dizziness, fever, headaches, hemoptysis, irregular heartbeat, leg pain, malaise/fatigue, nausea, near-syncope, numbness, orthopnea, palpitations, PND, shortness of breath, syncope, vomiting or weakness.   Her past medical history is significant for CAD, diabetes and hyperlipidemia.   Pertinent negatives for past medical history include no muscle weakness.       Review of Systems   Constitutional: Negative for chills, diaphoresis, fatigue, fever, malaise/fatigue and weight gain.   HENT:  Negative for congestion, nosebleeds and sore throat.    Eyes:  Negative for blurred vision, double vision, vision loss in left eye and vision loss in right eye.   Cardiovascular:  Positive for dyspnea on exertion. Negative for chest pain, claudication, irregular heartbeat, leg swelling, near-syncope, orthopnea, palpitations, paroxysmal nocturnal dyspnea and syncope.   Respiratory:  Negative for chest tightness, cough, hemoptysis, shortness of breath and wheezing.    Endocrine: Negative for cold intolerance and heat intolerance.   Hematologic/Lymphatic: Negative for bleeding problem. Does not bruise/bleed easily.   Skin:  Negative for color change and rash.   Musculoskeletal:  Negative for arthralgias, back pain, falls, joint swelling, muscle weakness, myalgias and neck pain.   Gastrointestinal:  Negative for abdominal pain, anorexia, change in bowel  habit, heartburn, hematemesis, hematochezia, hemorrhoids, jaundice, melena, nausea and vomiting.   Genitourinary:  Negative for dysuria and hematuria.   Neurological:  Negative for dizziness, focal weakness, headaches, light-headedness, loss of balance, numbness, vertigo and weakness.   Psychiatric/Behavioral:  Negative for altered mental status, depression and memory loss. The patient is not nervous/anxious.    Allergic/Immunologic: Negative for hives and persistent infections.       Current Outpatient Medications on File Prior to Visit   Medication Sig Dispense Refill    acetaminophen (TYLENOL) 500 MG tablet Take 2 tablets (1,000 mg total) by mouth every 6 (six) hours as needed. 60 tablet 0    albuterol (PROVENTIL/VENTOLIN HFA) 90 mcg/actuation inhaler Inhale 2 puffs into the lungs every 6 (six) hours as needed for Wheezing or Shortness of Breath. Rescue 19 g 0    amLODIPine (NORVASC) 10 MG tablet TAKE 1 TABLET(10 MG) BY MOUTH EVERY DAY 90 tablet 3    aspirin (ECOTRIN) 81 MG EC tablet Take 1 tablet (81 mg total) by mouth once daily. 90 tablet 3    atorvastatin (LIPITOR) 80 MG tablet Take 1 tablet (80 mg total) by mouth once daily. 90 tablet 3    carvediloL (COREG) 12.5 MG tablet TAKE 1 TABLET(12.5 MG) BY MOUTH TWICE DAILY 180 tablet 3    ciclopirox (PENLAC) 8 % Soln Apply topically nightly. 6.6 mL 3    diclofenac sodium (VOLTAREN) 1 % Gel Apply 2 g topically 4 (four) times daily. 450 g 2    dorzolamide-timolol 2-0.5% (COSOPT) 22.3-6.8 mg/mL ophthalmic solution Place 1 drop into both eyes 2 (two) times daily. 10 mL 11    dulaglutide (TRULICITY) 4.5 mg/0.5 mL pen injector Inject 4.5 mg into the skin every 7 days. 4 pen 3    empagliflozin (JARDIANCE) 25 mg tablet Take 1 tablet (25 mg total) by mouth once daily. 30 tablet 3    EScitalopram oxalate (LEXAPRO) 10 MG tablet TAKE 1 TABLET(10 MG) BY MOUTH DAILY 90 tablet 2    ezetimibe (ZETIA) 10 mg tablet Take 1 tablet (10 mg total) by mouth once daily. 90 tablet 3     "fluticasone (FLONASE) 50 mcg/actuation nasal spray 1 spray (50 mcg total) by Each Nare route once daily. 16 g 5    furosemide (LASIX) 20 MG tablet TAKE 1 TABLET(20 MG) BY MOUTH EVERY DAY 90 tablet 3    HYDROcodone-acetaminophen (NORCO) 5-325 mg per tablet Take 1 tablet by mouth every 4 (four) hours as needed for Pain. 12 tablet 0    loratadine (CLARITIN) 10 mg tablet TAKE 1 TABLET BY MOUTH EVERY DAY AS NEEDED FOR ALLERGIES 90 tablet 2    melatonin (MELATIN) 3 mg tablet Take 2 tablets (6 mg total) by mouth nightly as needed for Insomnia.  0    meloxicam (MOBIC) 7.5 MG tablet TAKE 1 TABLET(7.5 MG) BY MOUTH DAILY AS NEEDED FOR PAIN 30 tablet 0    metFORMIN (GLUCOPHAGE) 1000 MG tablet TAKE 1 TABLET(1000 MG) BY MOUTH TWICE DAILY WITH MEALS 180 tablet 2    multivitamin with minerals tablet Take 1 tablet by mouth once daily.      rOPINIRole (REQUIP) 0.5 MG tablet TAKE 1 TABLET(0.5 MG) BY MOUTH EVERY EVENING 30 tablet 0    spironolactone (ALDACTONE) 25 MG tablet Take 1 tablet (25 mg total) by mouth once daily. 90 tablet 3    triamcinolone acetonide 0.1% (KENALOG) 0.1 % ointment APPLY TOPICALLY TO THE AFFECTED AREA TWICE DAILY FOR 7 DAYS 80 g 0    valsartan (DIOVAN) 320 MG tablet TAKE 1 TABLET(320 MG) BY MOUTH EVERY DAY 90 tablet 3    ACCU-CHEK GUIDE GLUCOSE METER Misc  (Patient not taking: Reported on 1/15/2025)      ACCU-CHEK GUIDE TEST STRIPS Strp  (Patient not taking: Reported on 1/15/2025)      DROPSAFE PEN NEEDLE 31 gauge x 1/4" Ndle       DROPSAFE PEN NEEDLE 31 gauge x 3/16" Ndle       finerenone (KERENDIA) 10 mg Tab Take 1 tablet by mouth Daily. 30 tablet 3    lancing device Misc Apply topically. (Patient not taking: Reported on 1/15/2025)      nitroGLYCERIN (NITROSTAT) 0.4 MG SL tablet PLACE 1 TABLET UNDER THE TONGUE EVERY 5 MINUTES AS NEEDED FOR CHEST PAIN 25 tablet 3    pen needle, diabetic (BD ULTRA-FINE LENY PEN NEEDLE) 32 gauge x 5/32" Ndle 1 Device by Misc.(Non-Drug; Combo Route) route Daily. 100 each 4    pen " "needle, diabetic (NOVOFINE 32) 32 gauge x 1/4" Ndle Use 1x/day 200 each 3    TRUE COMFORT ALCOHOL PADS PadM Apply topically.      TRUEPLUS LANCETS 30 gauge Misc USE TO TEST TWICE DAILY       No current facility-administered medications on file prior to visit.       BP (!) 112/48   Pulse 68   Ht 5' 2" (1.575 m)   Wt 84.5 kg (186 lb 4.6 oz)   SpO2 (P) 96%   BMI 34.07 kg/m²     Objective:     Physical Exam  Constitutional:       General: She is not in acute distress.     Appearance: Normal appearance. She is well-developed. She is not toxic-appearing or diaphoretic.   HENT:      Head: Normocephalic and atraumatic.      Nose: Nose normal.   Eyes:      General:         Right eye: No discharge.         Left eye: No discharge.      Conjunctiva/sclera:      Right eye: Right conjunctiva is not injected.      Left eye: Left conjunctiva is not injected.      Pupils: Pupils are equal.      Right eye: Pupil is round.      Left eye: Pupil is round.   Neck:      Thyroid: No thyromegaly.      Vascular: No carotid bruit or JVD.   Cardiovascular:      Rate and Rhythm: Regular rhythm. Bradycardia present. No extrasystoles are present.     Chest Wall: PMI is not displaced.      Pulses:           Radial pulses are 2+ on the right side and 2+ on the left side.        Femoral pulses are 2+ on the right side and 2+ on the left side.       Dorsalis pedis pulses are 2+ on the right side and 2+ on the left side.        Posterior tibial pulses are 2+ on the right side and 2+ on the left side.      Heart sounds: S1 normal and S2 normal. Murmur heard.      Midsystolic murmur is present with a grade of 2/6.      Gallop present. S4 sounds present. No S3 sounds.   Pulmonary:      Effort: Pulmonary effort is normal.      Breath sounds: Normal breath sounds.   Chest:      Comments: Midline sternotomy scar.  Abdominal:      Palpations: Abdomen is soft.      Tenderness: There is no abdominal tenderness.   Musculoskeletal:      Cervical back: Neck " supple.      Right ankle: No swelling, deformity or ecchymosis.      Left ankle: No swelling, deformity or ecchymosis.   Lymphadenopathy:      Head:      Right side of head: No submandibular adenopathy.      Left side of head: No submandibular adenopathy.      Cervical: No cervical adenopathy.   Skin:     General: Skin is warm and dry.      Findings: No rash.      Nails: There is no clubbing.   Neurological:      General: No focal deficit present.      Mental Status: She is alert and oriented to person, place, and time. She is not disoriented.      Cranial Nerves: No cranial nerve deficit.   Psychiatric:         Attention and Perception: Attention and perception normal.         Mood and Affect: Mood and affect normal.         Speech: Speech normal.         Behavior: Behavior normal.         Thought Content: Thought content normal.         Cognition and Memory: Cognition and memory normal.         Judgment: Judgment normal.       Assessment:     1. Coronary artery disease involving native coronary artery of native heart without angina pectoris    2. History of coronary artery bypass surgery    3. History of chest pain    4. History of cerebrovascular accident    5. Atherosclerosis of aorta    6. History of deep vein thrombosis    7. Primary hypertension    8. Hypercholesterolemia    9. Hypertriglyceridemia    10. Type 2 diabetes mellitus with other circulatory complication, without long-term current use of insulin    11. Mild obesity        Plan:     1. Coronary Artery Disease              12/2019: Began experience chest pain. Resolved with isosorbidemononitrate.              12/19/2019: Stress MPI:3:39 min. No CP. ECG negative. MPI: Mild to moderate fixed anterior and anteroapical defect. EF 73%.              5/18/2020: Presented with more pronounced chest pain. Resolved with NTG sl.              ECG without acute changes. Troponin x 3 negative.               5/19/2020: Echo: Normal left ventricular size and  "systolic function. EF 75%. Mildly dilated LA. Mild aortic valve sclerosis. Mild mitral valve sclerosis.              5/20/2020: [facility]: Cath: LAD: Prox 30%. Mid 70%. Distal 80%. OM2: Osteal 90%. OM3: 95%. OM4 90%. RCA: Mid subtotal. LV: Basal inferior mild hypokinesia. EF 60%.   5/25/2020: [facility]: CABG x 3: LIMA to LAD and SVG1 to OM2 & OM4.   9/19/2023: Stress Echo: 2:26 min. No CP. ECG negative. Echo "negative". Normal left ventricular size and systolic function. EF 60%.   On carvedilol 12.5 mg Q12.              On aspirin 81 mg Q24.   Doing well.     2. History of Chest Pain   9/2023: Began experience occasional chest pain.   Resolved.               3. History of Cerebrovascular Accident              2004: Weakness in left hand. Affected memory. No sequela.   On aspirin 81 mg Q24.    4. Atherosclerosis of Aorta   6/3/2020: CT Scan: Noted.    All risk factors addressed.     5. History of Deep Venous Trombosis of Lower Extremity   5/2020: DVT left leg after CABG.   Received apixiban for 3 months.   On aspirin 81 mg Q24.     6. Hypertension              2000: Diagnosed.   9/1/2020: Furosemide 20 mg Q24 and KCl 20 mEq Q24 were discontinued.   Used to be on metoprolol 25 mg Q12, amlodipine 10 mg Q24, valsartan 320 mg Q24, furosemide 20 mg Q24 and chlorthalidone 25 mg Q24.   10/13/2020: Valsartan 320 mg Q24 was resumed. Amlodipine 10 mg Q24 and furosemide 20 mg Q24 were discontinued.   2/23/2022: Metoprolol 25 mg Q12 was changed to carvedilol 12.5 mg Q12 and amlodipine 5 mg Q24 was increased to amlodipine 10 mg Q24 as pressure was running 150-160/70-80 mmHg at home.    8/1/2024: Spironolactone 25 mg Q24 was begun as pressure was running high.     On carvedilol 12.5 mg Q12, amlodipine 10 mg Q24, valsartan 320 mg Q24, spironolactone 25 mg Q24 and furosemide 20 mg Q24.   Keeping log at home.   Well controlled.     7. Hypercholesterolemia              2000: Began statin.              On atorvastatin 40 mg Q24.                 " 5/19/2020: Chol 204. HDL 41. . .              On atorvastatin 80 mg Q24 and ezetimibe 10 mg Q24.   8/18/2020: Chol 161. HDL 46. LDL 82. .   2/24/2022: Chol 94. HDL 37. LDL 42. TG 73.   5/24/2024: Chol 113. HDL 41. LDL 53. TG 97.   4/22/2025: Chol 114. HDL 36. LDL 53. .   On atorvastatin 80 mg Q24 and ezetimibe 10 mg Q24.   Very favorable lipid panel.    8. Hypertriglyceridemia   2020: Diagnosed.   As above.     9. Diabetes Mellitus, Type 2              2000: Diagnosed. Complications: CVA & CAD. Medications: Insulin and SGLT2i.   On metformin, empagliflozin, dulaglutide and insulin.   4/13/2023: HgbA1C 8.9%.   11/1/2023: HgbA1C 8.0%.   5/24/2024: HgbA1C 7.6%.  9/17/2024: HgbA1C 7.4%.  4/22/2025: HgbA1C 8.4%.        10. Mild Obesity              5/19/2020: Weight 86 kg. BMI 33.   5/15/2023: Weight 88 kg. BMI 36.   11/21/2023: Weight 85 kg. BMI 34.   Encourage to lose weight.     11. Primary Care              Dr. Yudi Smart.    F/u 4 months.    Etelvina Lowery M.D.

## 2025-04-29 ENCOUNTER — OFFICE VISIT (OUTPATIENT)
Dept: ENDOCRINOLOGY | Facility: CLINIC | Age: 75
End: 2025-04-29
Payer: MEDICARE

## 2025-04-29 VITALS
OXYGEN SATURATION: 96 % | WEIGHT: 186 LBS | SYSTOLIC BLOOD PRESSURE: 129 MMHG | RESPIRATION RATE: 16 BRPM | DIASTOLIC BLOOD PRESSURE: 72 MMHG | HEIGHT: 62 IN | BODY MASS INDEX: 34.23 KG/M2 | HEART RATE: 59 BPM

## 2025-04-29 DIAGNOSIS — R80.9 MICROALBUMINURIA: ICD-10-CM

## 2025-04-29 DIAGNOSIS — E11.65 TYPE 2 DIABETES MELLITUS WITH HYPERGLYCEMIA, UNSPECIFIED WHETHER LONG TERM INSULIN USE: Primary | ICD-10-CM

## 2025-04-29 DIAGNOSIS — Z86.73 HISTORY OF CEREBROVASCULAR ACCIDENT: ICD-10-CM

## 2025-04-29 DIAGNOSIS — N18.31 STAGE 3A CHRONIC KIDNEY DISEASE: ICD-10-CM

## 2025-04-29 PROCEDURE — 3078F DIAST BP <80 MM HG: CPT | Mod: CPTII,HCNC,S$GLB, | Performed by: NURSE PRACTITIONER

## 2025-04-29 PROCEDURE — 3008F BODY MASS INDEX DOCD: CPT | Mod: CPTII,HCNC,S$GLB, | Performed by: NURSE PRACTITIONER

## 2025-04-29 PROCEDURE — 1101F PT FALLS ASSESS-DOCD LE1/YR: CPT | Mod: CPTII,HCNC,S$GLB, | Performed by: NURSE PRACTITIONER

## 2025-04-29 PROCEDURE — 1159F MED LIST DOCD IN RCRD: CPT | Mod: CPTII,HCNC,S$GLB, | Performed by: NURSE PRACTITIONER

## 2025-04-29 PROCEDURE — 3052F HG A1C>EQUAL 8.0%<EQUAL 9.0%: CPT | Mod: CPTII,HCNC,S$GLB, | Performed by: NURSE PRACTITIONER

## 2025-04-29 PROCEDURE — 3066F NEPHROPATHY DOC TX: CPT | Mod: CPTII,HCNC,S$GLB, | Performed by: NURSE PRACTITIONER

## 2025-04-29 PROCEDURE — G2211 COMPLEX E/M VISIT ADD ON: HCPCS | Mod: HCNC,S$GLB,, | Performed by: NURSE PRACTITIONER

## 2025-04-29 PROCEDURE — 1160F RVW MEDS BY RX/DR IN RCRD: CPT | Mod: CPTII,HCNC,S$GLB, | Performed by: NURSE PRACTITIONER

## 2025-04-29 PROCEDURE — 3074F SYST BP LT 130 MM HG: CPT | Mod: CPTII,HCNC,S$GLB, | Performed by: NURSE PRACTITIONER

## 2025-04-29 PROCEDURE — 3288F FALL RISK ASSESSMENT DOCD: CPT | Mod: CPTII,HCNC,S$GLB, | Performed by: NURSE PRACTITIONER

## 2025-04-29 PROCEDURE — 99999 PR PBB SHADOW E&M-EST. PATIENT-LVL V: CPT | Mod: PBBFAC,HCNC,, | Performed by: NURSE PRACTITIONER

## 2025-04-29 PROCEDURE — 1157F ADVNC CARE PLAN IN RCRD: CPT | Mod: CPTII,HCNC,S$GLB, | Performed by: NURSE PRACTITIONER

## 2025-04-29 PROCEDURE — 3061F NEG MICROALBUMINURIA REV: CPT | Mod: CPTII,HCNC,S$GLB, | Performed by: NURSE PRACTITIONER

## 2025-04-29 PROCEDURE — 4010F ACE/ARB THERAPY RXD/TAKEN: CPT | Mod: CPTII,HCNC,S$GLB, | Performed by: NURSE PRACTITIONER

## 2025-04-29 PROCEDURE — 99214 OFFICE O/P EST MOD 30 MIN: CPT | Mod: HCNC,S$GLB,, | Performed by: NURSE PRACTITIONER

## 2025-04-29 PROCEDURE — 1126F AMNT PAIN NOTED NONE PRSNT: CPT | Mod: CPTII,HCNC,S$GLB, | Performed by: NURSE PRACTITIONER

## 2025-04-29 NOTE — PROGRESS NOTES
CC: This 75 y.o. Black or  female  is here for evaluation of  T2DM along with comorbidities indicated in the Visit Diagnosis section of this encounter.    HPI: Cecilia Barahona was diagnosed with T2DM in her 30s. Oral agents started at the time of diagnosis.     Prior visit 9/2024  Tresiba was stopped early July.   Pt had a fall earlier this month.   A1c is down from 7.6 to 7.4%. 90 day CGM average 166.   Pt dropped metformin to 1/2 tablet in the evenings about a week ago d/t low glucose alerts overnight (pt was asymptomatic).   Plan STOP glipizide.   Resume metformin at 1 tablet twice daily. Do not reduce dose.   Continue Jardiance and Trulicity.   For constipation - try ducolax or Miralax. Take on a SCHEDULE, once daily or MWF.    Monitor eating habits, cut back or avoid fruit.   Return to clinic in 4 months with labs prior.      Prior visit 01/2025 pt's daughter Elli is present on phone.   A1c is up from 7.4 to 8.2%.   90 day CGM average 195.   Pt has not been monitoring diet closely. Daughter reports pt drinks large coffee with a lot of creamer and sugar along with toast in the AM. Has 1 coke a day.   Plan Avoid beverages with sugar.   Limit coffee intake.   Focus on dietary changes.   No change in medications at this time.   Start Kerendia, if affordable.   Potassium in 1 month.   Return to clinic in 3 months with labs prior.       Interval hx  A1c remains high, from 8.2% to now 8.4%.  90 day CGM average 204    Has large iced coffee with sugar milk from [email or web address] 2x/week.. She enjoys fruit. Again she is surprised BG spikes so high after drinking coffee.   Paying for Jardiance and Trulicity at pharmacy. Dropped off applications  today for Trulicity.         PMHX: CHETNA - does not like her old mask. H/o stroke with right facial dropp     LAST DIABETES EDUCATION: never    RECENT ILLNESSES/HOSPITALIZATIONS - -No.     HOSPITALIZED FOR DIABETES  -  No.    PRESCRIBED DIABETES MEDICATIONS:  gets from  "Pharm Assistance/also Trulicity?   metformin 1000 mg bid  Jardiance 25 mg once daily   Trulicity 4.5 mg once weekly on Sundays         Misses medication doses -  denies   -- does not tolerate Synjardy as well as taking meds separately     DM COMPLICATIONS: peripheral neuropathy    SIGNIFICANT DIABETES MED HISTORY:   Glimepiride stopped at initial ov 7/2017 since she was already on Novolog  ozempic started 10/2018,  switched to trulicity ~ 12/2018 d/t nausea at 0.5 mg dose. Failed  again in 2023   Jardiance started 2/2020  tresiba and trulicity stopped in 10/2020 d/t improved BGs but Trulicity resumed shortly d/t poor diet and DM control        SELF MONITORING BLOOD GLUCOSE: Dexcom G7 CGM yaakov     CGM interpretation: Postprandial BGs spike > 400 d/t diet especially after coffee. No hypoglycemia. Controlled fasting glucoses.             HYPOGLYCEMIC EPISODES: denies symptoms.  She corrected with candy and soda.   Symptoms: weakness   Corrects with hard candy.       CURRENT DIET:  drinks 1 cup of coffee per day with sugar, no cokes at present.   Sometimes has a breakfast - bagel  Lunch at the group home and snacks in the evenings.     Diet recall: yesterday has 2 small bags of chips. A peanut butter and jelly sandwich, water. Coffee yesterday morning.     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     /72 (BP Location: Left arm, Patient Position: Sitting)   Pulse (!) 59   Resp 16   Ht 5' 2" (1.575 m)   Wt 84.4 kg (186 lb)   SpO2 96%   BMI 34.02 kg/m²         ROS:   CONSTITUTIONAL: Appetite lower, no  fatigue  GI: Denies n/v, constipation, or diarrhea.         PHYSICAL EXAM:  GENERAL: Well developed, well nourished. No acute distress.   PSYCH: AAOx3, appropriate mood and affect, conversant, well-groomed. Judgement and insight good.   NEURO: Cranial nerves grossly intact. Speech clear, no tremor.   CHEST: Respirations even and unlabored.           Hemoglobin A1C   Date Value " "Ref Range Status   01/25/2025 8.2 (H) 4.0 - 5.6 % Final     Comment:     ADA Screening Guidelines:  5.7-6.4%  Consistent with prediabetes  >or=6.5%  Consistent with diabetes    High levels of fetal hemoglobin interfere with the HbA1C  assay. Heterozygous hemoglobin variants (HbS, HgC, etc)do  not significantly interfere with this assay.   However, presence of multiple variants may affect accuracy.     09/17/2024 7.4 (H) 4.0 - 5.6 % Final     Comment:     ADA Screening Guidelines:  5.7-6.4%  Consistent with prediabetes  >or=6.5%  Consistent with diabetes    High levels of fetal hemoglobin interfere with the HbA1C  assay. Heterozygous hemoglobin variants (HbS, HgC, etc)do  not significantly interfere with this assay.   However, presence of multiple variants may affect accuracy.     05/24/2024 7.6 (H) 4.0 - 5.6 % Final     Comment:     ADA Screening Guidelines:  5.7-6.4%  Consistent with prediabetes  >or=6.5%  Consistent with diabetes    High levels of fetal hemoglobin interfere with the HbA1C  assay. Heterozygous hemoglobin variants (HbS, HgC, etc)do  not significantly interfere with this assay.   However, presence of multiple variants may affect accuracy.       Hemoglobin A1c   Date Value Ref Range Status   04/22/2025 8.4 (H) 4.0 - 5.6 % Final     Comment:     ADA Screening Guidelines:  5.7-6.4%  Consistent with prediabetes  >=6.5%  Consistent with diabetes    High levels of fetal hemoglobin interfere with the HbA1C  assay. Heterozygous hemoglobin variants (HbS, HgC, etc)do  not significantly interfere with this assay.   However, presence of multiple variants may affect accuracy.       No results found for: "CPEPTIDE", "GLUTAMICACID", "ISLETCELLANT", "FRUCTOSAMINE"         Component Value Date/Time     07/24/2024 1421    K 4.3 04/22/2025 0758    K 4.5 07/24/2024 1421     07/24/2024 1421    CO2 20 (L) 07/24/2024 1421    BUN 19 07/24/2024 1421    CREATININE 1.0 07/24/2024 1421     (H) 07/24/2024 1421 "    CALCIUM 9.4 07/24/2024 1421    ALKPHOS 128 07/24/2024 1421    AST 19 07/24/2024 1421    ALT 17 07/24/2024 1421    BILITOT 0.5 07/24/2024 1421    EGFRNORACEVR 59 (A) 07/24/2024 1421    ESTGFRAFRICA >60.0 05/09/2022 1632      Lab Results   Component Value Date    CHOL 114 (L) 04/22/2025    CHOL 113 (L) 05/24/2024    CHOL 104 (L) 07/12/2023     Lab Results   Component Value Date    HDL 36 (L) 04/22/2025    HDL 41 05/24/2024    HDL 35 (L) 07/12/2023     Lab Results   Component Value Date    LDLCALC 55.2 (L) 04/22/2025    LDLCALC 52.6 (L) 05/24/2024    LDLCALC 51.8 (L) 07/12/2023     Lab Results   Component Value Date    TRIG 114 04/22/2025    TRIG 97 05/24/2024    TRIG 86 07/12/2023       Lab Results   Component Value Date    CHOLHDL 31.6 04/22/2025    CHOLHDL 36.3 05/24/2024    CHOLHDL 33.7 07/12/2023               Lab Results   Component Value Date    MICALBCREAT 17.2 04/22/2025           ASSESSMENT and PLAN:    1. Type 2 diabetes mellitus with hyperglycemia, unspecified whether long term insulin use  Avoid beverages with sugar especially coffee in the morning.   Switch from Trulicity to MounjaMOOI. Patient is aware there is no financial assistance for Mounjaro.   Continue Jardiance and metformin.   Return to clinic in 3 months with labs prior.       Hemoglobin A1C      2. Stage 3a chronic kidney disease  Improve glycemic control.         3. History of cerebrovascular accident  Improve glycemic control.         4. Microalbuminuria  Continue Kerendia. uACR now wnl.                Orders Placed This Encounter   Procedures    Hemoglobin A1C     Standing Status:   Future     Expected Date:   4/29/2025     Expiration Date:   6/28/2026     Send normal result to authorizing provider's In Basket if patient is active on MyChart::   Yes          Follow up in about 3 months (around 7/29/2025).

## 2025-04-29 NOTE — PATIENT INSTRUCTIONS
Avoid beverages with sugar especially coffee in the morning.   Switch from Trulicity to Mounjaro. Patient is aware there is no financial assistance for Mounjaro.   Continue Jardiance and metformin.   Return to clinic in 3 months with labs prior.

## 2025-05-02 ENCOUNTER — TELEPHONE (OUTPATIENT)
Dept: ENDOSCOPY | Facility: HOSPITAL | Age: 75
End: 2025-05-02

## 2025-05-02 ENCOUNTER — CLINICAL SUPPORT (OUTPATIENT)
Dept: ENDOSCOPY | Facility: HOSPITAL | Age: 75
End: 2025-05-02
Attending: INTERNAL MEDICINE
Payer: MEDICARE

## 2025-05-02 DIAGNOSIS — Z12.11 SCREEN FOR COLON CANCER: ICD-10-CM

## 2025-05-02 RX ORDER — SOD SULF/POT CHLORIDE/MAG SULF 1.479 G
12 TABLET ORAL DAILY
Qty: 24 TABLET | Refills: 0 | Status: SHIPPED | OUTPATIENT
Start: 2025-05-02

## 2025-05-02 NOTE — TELEPHONE ENCOUNTER
Referral for procedure from PAT appointment      Spoke to patient to schedule procedure(s) Colonoscopy       Physician to perform procedure(s) Dr. TIFFANIE Cheung  Date of Procedure (s) 6/6/25  Arrival Time 12:00 PM  Time of Procedure(s) 1:00 PM   Location of Procedure(s) [place] 4th Floor  Type of Rx Prep sent to patient: [facility]  Instructions provided to patient via [facility] and Postal Mail    Patient was informed on the following information and verbalized understanding. Screening questionnaire reviewed with patient and complete. If procedure requires anesthesia, a responsible adult needs to be present to accompany the patient home, patient cannot drive after receiving anesthesia. Appointment details are tentative, especially check-in time. Patient will receive a prep-op call 7 days prior to confirm check-in time for procedure. If applicable the patient should contact their pharmacy to verify Rx for procedure prep is ready for pick-up. Patient was advised to call the scheduling department at [phone] if pharmacy states no Rx is available. Patient was advised to call the endoscopy scheduling department if any questions or concerns arise.      [facility] Scheduling Department

## 2025-05-06 ENCOUNTER — TELEPHONE (OUTPATIENT)
Dept: FAMILY MEDICINE | Facility: CLINIC | Age: 75
End: 2025-05-06
Payer: MEDICARE

## 2025-05-06 DIAGNOSIS — R21 SKIN RASH: Primary | ICD-10-CM

## 2025-05-06 NOTE — TELEPHONE ENCOUNTER
----- Message from Kika sent at 5/6/2025  2:42 PM CDT -----  .Type:  Patient Returning CallWho Called: SelfWho Left Message for Patient: Jasmyn Padilla LPNDoes the patient know what this is regarding?: Yes Would the patient rather a call back or a response via [facility]? Call Back Best Call Back Number: .[phone] (home) [phone] (work)Additional Information:

## 2025-05-06 NOTE — TELEPHONE ENCOUNTER
Copied from CRM #[id number]. Topic: General Inquiry - Patient Advice  >> May 6, 2025  9:09 AM Judi wrote:  .Type:  Patient Requesting Referral    Who Called:Self     Referral to What Specialty: Dermatology     Reason for Referral: severe itch in head and rash on her back     Does the patient want the referral with a specific physician?: no     [facility]     Would the patient rather a call back or a response via [facility]? Call     Best Call Back Number: .[phone]      Additional Information:

## 2025-05-06 NOTE — TELEPHONE ENCOUNTER
Patient reports rash that has been occurring in the same spot for th last 6 months. Referral pended to PCP for review.

## 2025-05-06 NOTE — TELEPHONE ENCOUNTER
Spoke with the patient and reassured her that the request was sent to  and pending review. She stated understanding.

## 2025-05-07 ENCOUNTER — PATIENT OUTREACH (OUTPATIENT)
Facility: OTHER | Age: 75
End: 2025-05-07
Payer: MEDICARE

## 2025-05-07 ENCOUNTER — OUTPATIENT CASE MANAGEMENT (OUTPATIENT)
Dept: ADMINISTRATIVE | Facility: OTHER | Age: 75
End: 2025-05-07
Payer: MEDICARE

## 2025-05-07 ENCOUNTER — NURSE TRIAGE (OUTPATIENT)
Dept: ADMINISTRATIVE | Facility: CLINIC | Age: 75
End: 2025-05-07
Payer: MEDICARE

## 2025-05-07 ENCOUNTER — OCHSNER VIRTUAL EMERGENCY DEPARTMENT (OUTPATIENT)
Facility: CLINIC | Age: 75
End: 2025-05-07
Payer: MEDICARE

## 2025-05-07 DIAGNOSIS — R21 RASH: Primary | ICD-10-CM

## 2025-05-07 NOTE — PROGRESS NOTES
Outpatient Care Management  Plan of Care Follow Up Visit    Patient: Cecilia Barahona  MRN: [id number]  Date of Service: 05/07/2025  Completed by: Genesis Chacon RN  Referral Date: 04/03/2025    Reason for Visit   Patient presents with    OPCM Chart Review    OPCM Enrollment Call    Case Closure       Brief Summary: Pt reports that she is doing good at this time. Pt voiced that her blood sugars are good. Pt voiced that she is not checking the bp at home. Pt voiced that she is enrolled with [facility] for bp. Pt voiced that her needs are met at this time.

## 2025-05-07 NOTE — TELEPHONE ENCOUNTER
Pt c/o  rash to back and head/scalp for more than a year. States that rash to back is itchy and dark. Pt was seen by [place] [facility] a year ago and had a biopsy. Denies pain to rash and denies fever. Pt states that she was prescribed a steroid cream that did not work. Advise to be seen today or tomorrow per protocol. Pt requesting to be seen by dermatology. Unable to schedule specialty appt. Appt scheduled with PCP for today per protocol. Appt also scheduled for Dermatology in September with waiting list placement per [facility] staff. Pt made aware. VU. Encounter routed to provider.   Reason for Disposition   SEVERE local itching persists after 2 days of steroid cream    Additional Information   Negative: Sounds like a life-threatening emergency to the triager   Negative: Fever and localized purple or blood-colored spots or dots that are not from injury or friction   Negative: Fever and localized rash is very painful   Negative: Patient sounds very sick or weak to the triager   Negative: Looks like a boil, infected sore, deep ulcer, or other infected rash (spreading redness, pus)   Negative: Painful rash with multiple small blisters grouped together (i.e., dermatomal distribution or 'band' or 'stripe')   Negative: Localized rash is very painful (no fever)   Negative: Localized purple or blood-colored spots or dots that are not from injury or friction (no fever)   Negative: Lyme disease suspected (e.g., bull's-eye rash or tick bite / exposure)   Negative: Patient wants to be seen   Negative: Tender bumps in armpits   Negative: Pimples (localized) and no improvement after using CARE ADVICE    Protocols used: Rash or Redness - [id number]

## 2025-05-07 NOTE — PROGRESS NOTES
"Patient spoke with [facility] RN on 5/7/2025 with complaint of "Pt rash to back and head/scalp for more than a year. States that rash to back is itchy and dark. Pt was seen by [place] [facility] a year ago and had a biopsy. Denies pain to rash and denies fever."    [facility] RN consulted with [facility] provider, Dr. Bolden, and disposition recommended was specialty/dermatology follow up.  Unable to schedule dermatology appointment.  Patient scheduled same day PCP appointment.    Will follow up with patient during the week of May 8-13, 2025 to assess for any concerns/needs to be addressed.     " Chief complaint:   Chief Complaint   Patient presents with   • Office Visit   • Physical       Vitals:  Visit Vitals  /78 (BP Location: RUE - Right upper extremity, Patient Position: Sitting, Cuff Size: Regular)   Pulse 60   Temp 97.3 °F (36.3 °C) (Temporal)   Ht 5' (1.524 m)   Wt 57.2 kg (126 lb)   SpO2 98%   BMI 24.61 kg/m²       HISTORY OF PRESENT ILLNESS     HPI  This is a 53 yo female who presents with the followin.  General exam.  2.  R arm neuropathy with history of lower extremity neuropathy, carpal tunnel, fibromyalgia:  She wears heels to work. She notes her L foot is painful in the great toe. She is using Icy Hot which relieves. She notes her R arm is also sore. She notes if she bends back her neck, she gets a shooting pain and and tingling sensation into her shoulder. She uses a heating pad at work.  She has weakness in her R hand and pain in the wrist. She does a lot of typing but not everyday. She works in admin. She uses Tizanidine just at night.   3.  ADHD, OCD with Anxiety/Depression with Insomnia: She is on Adderall, Sertraline with Trazodone.  Denies anxiety/depression, suicidal or homicidal ideation, insomnia. She is focusing well. She follows with psych.   4.  Hypertension: She is on Metoprolol. Denies lightheadedness, dizziness, chest pain, headaches. She had an eye exam.  5.  Raynaud's syndrome: She gets when she is cold but it is manageable.   6.  Renal stones: Denies blood in urine, pain with urination, abdominal pain.  7.  Allergies with Eczema: She denies cough, shortness of breath. She has some congestion. She has itchy eyes. She notes her allergies are terrible in the fall and in the spring. She uses Triamcinolone as needed. Her rashes on her hands come and go. She also has some rash intermittently of the L foot.     Other significant problems:  Patient Active Problem List    Diagnosis Date Noted   • Eczema 2016     Priority: Low   • Neuropathy 2015     Priority:  Low   • General medical exam 03/09/2015     Priority: Low   • Essential hypertension 03/09/2015     Priority: Low   • Fibromyalgia 03/09/2015     Priority: Low   • Adjustment reaction with anxiety and depression 03/09/2015     Priority: Low   • OCD (obsessive compulsive disorder) 03/09/2015     Priority: Low   • ADHD (attention deficit hyperactivity disorder) 03/09/2015     Priority: Low       PAST MEDICAL, FAMILY AND SOCIAL HISTORY     Medications:  Current Outpatient Medications   Medication Sig Dispense Refill   • tiZANidine (ZANAFLEX) 2 MG tablet TAKE 1 TABLET BY MOUTH EVERY 8 HOURS AS NEEDED FOR MUSCLE SPASMS 60 tablet 1   • metoPROLOL succinate (TOPROL-XL) 25 MG 24 hr tablet TAKE 1 TABLET BY MOUTH DAILY 90 tablet 1   • Amphetamine-Dextroamphetamine (ADDERALL PO)      • traZODone (DESYREL) 50 MG tablet TAKE 2 TABLETS BY MOUTH NIGHTLY 60 tablet 11   • sertraline (ZOLOFT) 25 MG tablet TAKE 1 TABLET BY MOUTH DAILY 30 tablet 11   • albuterol 108 (90 Base) MCG/ACT inhaler Inhale 2 puffs into the lungs every 4 hours as needed for Shortness of Breath or Wheezing (coughing spells, chest tightness). 1 Inhaler 1   • triamcinolone (ARISTOCORT) 0.1 % cream Apply twice daily 30 g 5   • EPINEPHrine (AUVI-Q) 0.3 MG/0.3ML Solution Auto-injector Inject 0.3 mLs into the muscle once as needed for Anaphylaxis. 1 each 0     No current facility-administered medications for this visit.       Allergies:  ALLERGIES:   Allergen Reactions   • Bee Sting ANAPHYLAXIS   • Adhesive   (Environmental) RASH   • Imipramine HIVES   • Latex HIVES       Past Medical  History/Surgeries:  Past Medical History:   Diagnosis Date   • ADHD (attention deficit hyperactivity disorder)    • Allergy    • Anaphylaxis     as child to bee stings-no issues since   • Anemia    • Anxiety    • Bladder injury     post surgery   • Carpal tunnel syndrome of right wrist 2012    EMG confirmed   • Chronic pain     abdominal   • Depressive disorder    • Eczema    •  Hypertension     resolved   • Hypokalemia    • Kidney stone 2010    on CT, left kidney   • Menorrhagia     adenomyosis   • Neuropathy     had EMG   • OCD (obsessive compulsive disorder)    • Raynaud's syndrome        Past Surgical History:   Procedure Laterality Date   • Bladder repair      ureteral stenting   • Breast enhancement surgery     •  section, classic     • Cholecystectomy  2010   • Cosmetic surgery     • Hysterectomy  03/15/2010    vaginal    • Tubal ligation         Family History:  Family History   Problem Relation Age of Onset   • Arthritis Mother    • Diabetes Mother    • High blood pressure Mother    • High cholesterol Mother    • Stroke Mother    • Vision Loss Mother    • High blood pressure Father    • High blood pressure Brother    • Hearing Loss Daughter    • High blood pressure Maternal Uncle    • High blood pressure Paternal Aunt    • Stroke Paternal Aunt    • High blood pressure Maternal Grandmother    • Heart disease Maternal Grandmother    • Hearing Loss Maternal Grandmother    • Arthritis Maternal Grandmother    • Stroke Maternal Grandmother    • High blood pressure Maternal Grandfather    • Hearing Loss Maternal Grandfather    • Heart disease Maternal Grandfather    • Diabetes Maternal Grandfather    • High blood pressure Paternal Grandmother    • Heart disease Paternal Grandmother    • Hearing Loss Paternal Grandmother    • Stroke Paternal Grandmother    • Stroke Brother    • Stroke Other    • Hypertension Other    • High cholesterol Other    • Thyroid Other    • Diabetes Other        Social History:  Social History     Tobacco Use   • Smoking status: Never Smoker   • Smokeless tobacco: Never Used   Substance Use Topics   • Alcohol use: No       REVIEW OF SYSTEMS     Review of Systems   Constitutional: Negative for chills, fatigue and fever.   HENT: Positive for sore throat (irritated throat on and off. her dentist notes that she gets foods stuck in her tonsils.).  Negative for congestion and hearing loss.    Eyes: Positive for itching. Negative for visual disturbance.   Respiratory: Negative for cough and shortness of breath.    Cardiovascular: Negative for chest pain.   Gastrointestinal: Negative for blood in stool, constipation, diarrhea, nausea and vomiting.   Genitourinary: Negative for difficulty urinating, frequency and hematuria.   Neurological: Negative for dizziness, light-headedness and headaches.   Psychiatric/Behavioral: Negative for decreased concentration and dysphoric mood. The patient is not nervous/anxious.        PHYSICAL EXAM     Physical Exam  Vitals reviewed.   Constitutional:       Appearance: Normal appearance. She is not ill-appearing.   HENT:      Head: Normocephalic and atraumatic.      Right Ear: Tympanic membrane, ear canal and external ear normal.      Left Ear: Tympanic membrane, ear canal and external ear normal.      Neck: Normal range of motion and neck supple.   Eyes:      General: No scleral icterus.        Right eye: No discharge.         Left eye: No discharge.      Extraocular Movements: Extraocular movements intact.      Conjunctiva/sclera: Conjunctivae normal.      Pupils: Pupils are equal, round, and reactive to light.   Neck:      Comments: No supraclavicular lymphadenopathy. No thyromegaly.   Cardiovascular:      Rate and Rhythm: Normal rate and regular rhythm.      Pulses: Normal pulses.      Heart sounds: Normal heart sounds. No murmur heard.  Pulmonary:      Effort: Pulmonary effort is normal.      Breath sounds: Normal breath sounds.   Abdominal:      General: Bowel sounds are normal. There is no distension.      Palpations: Abdomen is soft. There is no mass.      Tenderness: There is no abdominal tenderness. There is no guarding or rebound.   Musculoskeletal:         General: No swelling or tenderness. Normal range of motion.      Right lower leg: No edema.      Left lower leg: No edema.      Comments: Grossly normal in all 4  extremities.    Lymphadenopathy:      Cervical: No cervical adenopathy.   Skin:     General: Skin is warm.      Coloration: Skin is not pale.   Neurological:      General: No focal deficit present.      Mental Status: She is alert and oriented to person, place, and time.      Cranial Nerves: No cranial nerve deficit.      Sensory: No sensory deficit.      Motor: No weakness.      Gait: Gait normal.      Deep Tendon Reflexes: Reflexes normal.   Psychiatric:         Mood and Affect: Mood normal.         Behavior: Behavior normal.         Thought Content: Thought content normal.         Judgment: Judgment normal.         ASSESSMENT/PLAN   This is a 51 yo female who presents with the followin.  General exam. Education regarding preventative health recommendations given.   -Lab: CMP, FLP, TSH, CBC     2.  R arm neuropathy with history of lower extremity neuropathy, carpal tunnel, fibromyalgia:  Increased symptoms, likely at least in part muscle tension. Positive Spurlings on the R-arthritis causing impingement.   -Imaging: XR cervical   -Continue Tizanidine as needed  -Sleep in hand braces    3.  ADHD, OCD with Anxiety/Depression with Insomnia:  Controlled.   -Continue Sertraline with Trazodone  -Continue to follow with psych    4.  Hypertension: Stable.   -Continue Metoprolol    5.  Raynaud's syndrome: Stable.   -Monitor     6.  Renal stones: Stable.   -Monitor    7. Allergies with Eczema: Increased. If no improvement with steroid, would trial antifungal.   -Continue Triamcinolone as needed

## 2025-05-07 NOTE — PLAN OF CARE-OVED
[facility] Emergency Department Plan of Care Note  Referral Source: Nurse On-Call                               Chief Complaint   Patient presents with    Rash     Patient called reporting itchy rash to head/scalp/back fo rover a year, reports she had a biopsy at [place] [facility] and was treated with steroid cream that was not effective. Requesting appointment with dermatology.        Recommendation: Specialist Referral         Specialty: Dermatology Soonest appointment scheduled for September, added to wait list in case of cancellation. Patient declined sooner appointment with PCP.                   Encounter Diagnosis   Name Primary?    Rash Yes        Orders Placed This Encounter    Ambulatory referral/consult to Dermatology     Referral Priority:   Routine     Referral Type:   Consultation     Referral Reason:   Specialty Services Required     Requested Specialty:   Dermatology     Number of Visits Requested:   1

## 2025-05-09 ENCOUNTER — OFFICE VISIT (OUTPATIENT)
Dept: DERMATOLOGY | Facility: CLINIC | Age: 75
End: 2025-05-09
Payer: MEDICARE

## 2025-05-09 DIAGNOSIS — R20.2 NOTALGIA PARESTHETICA: Primary | ICD-10-CM

## 2025-05-09 DIAGNOSIS — L29.9 SCALP PRURITUS: ICD-10-CM

## 2025-05-09 PROCEDURE — 99999 PR PBB SHADOW E&M-EST. PATIENT-LVL IV: CPT | Mod: PBBFAC,HCNC,, | Performed by: STUDENT IN AN ORGANIZED HEALTH CARE EDUCATION/TRAINING PROGRAM

## 2025-05-09 RX ORDER — GABAPENTIN 300 MG/1
CAPSULE ORAL
Qty: 30 CAPSULE | Refills: 3 | Status: SHIPPED | OUTPATIENT
Start: 2025-05-09

## 2025-05-09 NOTE — PROGRESS NOTES
Subjective:      Patient ID:  Cecilia Barahona is a 75 y.o. female who presents for No chief complaint on file.    75 y.o. female presents today for a rash.    New patient    Location: itch on scalp and back  Duration: 6 months   Symptoms: 10/10 itching   Current treatments: rubbing alcohol to scalp and lotion to back   Prior treatments tried: NA   Other pertinent history:    Patient with new area of concern:   Location: skin discoloration on face   Previous treatments: Opzelura- needs refill     She has a spot near the right eyebrow that she has been applying Opzelura for about a year. Outside dermatologist thought it was vitiligo and gave her Opzelura. She ran out about 2 months ago however. It itches.   She also has a hyperpigmented patch on her upper back that she scratches multiple times a day. It has been pruritic for about a year. The color and itch both bother her. She cleans the area with rubbing alcohol TIW. She also reports pruritus of her hands and chest. She is using Dove soap. She does not moisturize.    Her scalp also itches on the left side. It has been going on for about 6 months. She applies alcohol there once a week. She uses moisturizing cream and oils to her scalp daily.           Review of Systems   Skin:  Positive for itching and rash.       Objective:   Physical Exam   Constitutional: She appears well-developed and well-nourished.   Skin:                    Diagram Legend     Erythematous scaling macule/papule c/w actinic keratosis       Vascular papule c/w angioma      Pigmented verrucoid papule/plaque c/w seborrheic keratosis      Yellow umbilicated papule c/w sebaceous hyperplasia      Irregularly shaped tan macule c/w lentigo     1-2 mm smooth white papules consistent with Milia      Movable subcutaneous cyst with punctum c/w epidermal inclusion cyst      Subcutaneous movable cyst c/w pilar cyst      Firm pink to brown papule c/w dermatofibroma      Pedunculated fleshy papule(s) c/w  Patient is a 79y old  Male who presents with a chief complaint of syncope (13 Dec 2020 11:22)      SUBJECTIVE / OVERNIGHT EVENTS:    MEDICATIONS  (STANDING):  allopurinol 100 milliGRAM(s) Oral daily  clopidogrel Tablet 75 milliGRAM(s) Oral daily  dextrose 40% Gel 15 Gram(s) Oral once  dextrose 5%. 1000 milliLiter(s) (50 mL/Hr) IV Continuous <Continuous>  dextrose 5%. 1000 milliLiter(s) (100 mL/Hr) IV Continuous <Continuous>  dextrose 50% Injectable 25 Gram(s) IV Push once  dextrose 50% Injectable 12.5 Gram(s) IV Push once  dextrose 50% Injectable 25 Gram(s) IV Push once  fluticasone propionate 50 MICROgram(s)/spray Nasal Spray 1 Spray(s) Both Nostrils two times a day  glucagon  Injectable 1 milliGRAM(s) IntraMuscular once  hydrALAZINE 25 milliGRAM(s) Oral three times a day  insulin lispro (ADMELOG) corrective regimen sliding scale   SubCutaneous three times a day before meals  insulin lispro (ADMELOG) corrective regimen sliding scale   SubCutaneous at bedtime  isosorbide   mononitrate ER Tablet (IMDUR) 30 milliGRAM(s) Oral daily  metoprolol tartrate 50 milliGRAM(s) Oral two times a day  polyethylene glycol 3350 17 Gram(s) Oral two times a day  rivaroxaban 15 milliGRAM(s) Oral with dinner  senna 2 Tablet(s) Oral at bedtime  sodium chloride 0.9%. 1000 milliLiter(s) (100 mL/Hr) IV Continuous <Continuous>    MEDICATIONS  (PRN):      Vital Signs Last 24 Hrs  T(C): 36.4 (14 Dec 2020 08:39), Max: 37 (14 Dec 2020 05:52)  T(F): 97.6 (14 Dec 2020 08:39), Max: 98.6 (14 Dec 2020 05:52)  HR: 94 (14 Dec 2020 08:39) (82 - 100)  BP: 140/73 (14 Dec 2020 05:52) (130/84 - 161/110)  BP(mean): --  RR: 22 (14 Dec 2020 08:39) (18 - 22)  SpO2: 98% (14 Dec 2020 08:39) (93% - 98%)  CAPILLARY BLOOD GLUCOSE      POCT Blood Glucose.: 91 mg/dL (14 Dec 2020 07:41)  POCT Blood Glucose.: 108 mg/dL (13 Dec 2020 22:35)  POCT Blood Glucose.: 111 mg/dL (13 Dec 2020 17:35)  POCT Blood Glucose.: 112 mg/dL (13 Dec 2020 11:59)    I&O's Summary    13 Dec 2020 07:01  -  14 Dec 2020 07:00  --------------------------------------------------------  IN: 0 mL / OUT: 200 mL / NET: -200 mL        PHYSICAL EXAM:  GENERAL: NAD, well-developed  HEAD:  Atraumatic, Normocephalic  EYES: EOMI, PERRLA, conjunctiva and sclera clear  NECK: Supple, No JVD  CHEST/LUNG: Clear to auscultation bilaterally; No wheeze  HEART: Regular rate and rhythm; No murmurs, rubs, or gallops  ABDOMEN: Soft, Nontender, Nondistended; Bowel sounds present  EXTREMITIES:  2+ Peripheral Pulses, No clubbing, cyanosis, or edema  PSYCH: AAOx3  NEUROLOGY: non-focal  SKIN: No rashes or lesions    LABS:                        12.5   6.57  )-----------( 149      ( 14 Dec 2020 07:20 )             37.7     12-14    133<L>  |  99  |  21  ----------------------------<  85  4.3   |  22  |  1.29    Ca    8.1<L>      14 Dec 2020 07:20  Phos  2.5     12-13  Mg     2.0     12-13    TPro  6.6  /  Alb  2.5<L>  /  TBili  0.9  /  DBili  x   /  AST  44<H>  /  ALT  37  /  AlkPhos  66  12-13              RADIOLOGY & ADDITIONAL TESTS:    Imaging Personally Reviewed:    Consultant(s) Notes Reviewed:      Care Discussed with Consultants/Other Providers:   skin tag(s)      Evenly pigmented macule c/w junctional nevus     Mildly variegated pigmented, slightly irregular-bordered macule c/w mildly atypical nevus      Flesh colored to evenly pigmented papule c/w intradermal nevus       Pink pearly papule/plaque c/w basal cell carcinoma      Erythematous hyperkeratotic cursted plaque c/w SCC      Surgical scar with no sign of skin cancer recurrence      Open and closed comedones      Inflammatory papules and pustules      Verrucoid papule consistent consistent with wart     Erythematous eczematous patches and plaques     Dystrophic onycholytic nail with subungual debris c/w onychomycosis     Umbilicated papule    Erythematous-base heme-crusted tan verrucoid plaque consistent with inflamed seborrheic keratosis     Erythematous Silvery Scaling Plaque c/w Psoriasis     See annotation      Assessment / Plan:        Notalgia paresthetica  Scalp pruritus/dysesthesia  10/10 severity pruritus localized to scalp (no dermatitis) and upper back (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 Recommend CeraVe, Cetaphil, Vanicream emollients  - Advised to stop applying alcohol to the pruritic areas as it can dry out her skin and precipitate worsening pruritus in the long run    Hx cervical spondylosis may contribute to scalp pruritus. Also with hx T2DM A1c 8.4.    Prurigo nodularis  -Recommended she avoid scratching or picking that spot near her right eyebrow. Discussed it is not  vitiligo. She was previously prescribed opzelura. The thin scale makes it appear lighter but it is not depigmented.    Patient seen with resident Dr. Yan Cheng    Follow up in about 2 months (around 7/9/2025).     Patient is a 79y old  Male who presents with a chief complaint of syncope (13 Dec 2020 11:22)      SUBJECTIVE / OVERNIGHT EVENTS: patient seen and examined by bedside, pt feeling better off oxygen, denies headache, dizziness, SOB, CP, Palpitations , N/V/D, abdominal pain        MEDICATIONS  (STANDING):  allopurinol 100 milliGRAM(s) Oral daily  clopidogrel Tablet 75 milliGRAM(s) Oral daily  dextrose 40% Gel 15 Gram(s) Oral once  dextrose 5%. 1000 milliLiter(s) (50 mL/Hr) IV Continuous <Continuous>  dextrose 5%. 1000 milliLiter(s) (100 mL/Hr) IV Continuous <Continuous>  dextrose 50% Injectable 25 Gram(s) IV Push once  dextrose 50% Injectable 12.5 Gram(s) IV Push once  dextrose 50% Injectable 25 Gram(s) IV Push once  fluticasone propionate 50 MICROgram(s)/spray Nasal Spray 1 Spray(s) Both Nostrils two times a day  glucagon  Injectable 1 milliGRAM(s) IntraMuscular once  hydrALAZINE 25 milliGRAM(s) Oral three times a day  insulin lispro (ADMELOG) corrective regimen sliding scale   SubCutaneous three times a day before meals  insulin lispro (ADMELOG) corrective regimen sliding scale   SubCutaneous at bedtime  isosorbide   mononitrate ER Tablet (IMDUR) 30 milliGRAM(s) Oral daily  metoprolol tartrate 50 milliGRAM(s) Oral two times a day  polyethylene glycol 3350 17 Gram(s) Oral two times a day  rivaroxaban 15 milliGRAM(s) Oral with dinner  senna 2 Tablet(s) Oral at bedtime  sodium chloride 0.9%. 1000 milliLiter(s) (100 mL/Hr) IV Continuous <Continuous>    MEDICATIONS  (PRN):      Vital Signs Last 24 Hrs  T(C): 36.4 (14 Dec 2020 08:39), Max: 37 (14 Dec 2020 05:52)  T(F): 97.6 (14 Dec 2020 08:39), Max: 98.6 (14 Dec 2020 05:52)  HR: 94 (14 Dec 2020 08:39) (82 - 100)  BP: 140/73 (14 Dec 2020 05:52) (130/84 - 161/110)  BP(mean): --  RR: 22 (14 Dec 2020 08:39) (18 - 22)  SpO2: 98% (14 Dec 2020 08:39) (93% - 98%)  CAPILLARY BLOOD GLUCOSE      POCT Blood Glucose.: 91 mg/dL (14 Dec 2020 07:41)  POCT Blood Glucose.: 108 mg/dL (13 Dec 2020 22:35)  POCT Blood Glucose.: 111 mg/dL (13 Dec 2020 17:35)  POCT Blood Glucose.: 112 mg/dL (13 Dec 2020 11:59)    I&O's Summary    13 Dec 2020 07:01  -  14 Dec 2020 07:00  --------------------------------------------------------  IN: 0 mL / OUT: 200 mL / NET: -200 mL        PHYSICAL EXAM:  GENERAL: NAD, well-developed  EYES: EOMI, PERRLA, conjunctiva and sclera clear  CHEST/LUNG: Clear to auscultation bilaterally; No wheeze  HEART: Regular rate and rhythm;   ABDOMEN: Soft, Nontender, Nondistended; Bowel sounds present  EXTREMITIES:  2+ Peripheral Pulses, No clubbing, cyanosis, or edema  PSYCH: AAOx3  NEUROLOGY: non-focal  SKIN: No rashes or lesions    LABS:                        12.5   6.57  )-----------( 149      ( 14 Dec 2020 07:20 )             37.7     12-14    133<L>  |  99  |  21  ----------------------------<  85  4.3   |  22  |  1.29    Ca    8.1<L>      14 Dec 2020 07:20  Phos  2.5     12-13  Mg     2.0     12-13    TPro  6.6  /  Alb  2.5<L>  /  TBili  0.9  /  DBili  x   /  AST  44<H>  /  ALT  37  /  AlkPhos  66  12-13              RADIOLOGY & ADDITIONAL TESTS:    Imaging Personally Reviewed:    Consultant(s) Notes Reviewed:      Care Discussed with Consultants/Other Providers:

## 2025-05-09 NOTE — PATIENT INSTRUCTIONS
Plan:    Scalp:  Royal Oils Instant Sooth Scalp Elixir with cooling menthol    Body:  Start pramoxine lotion (this is an anti-itch lotion that be purchased over the counter at drugstores, brands include Sarna or CeraVe anti-itch) every day as often needed to relieve itching symptoms instead of scratching, can keep bottle of lotion in refrigerator and it may help relieve itch more    Notalgia paresthetica    Notalgia paresthetica is a condition of the skin of the upper back with extreme pruritus in a localized area just below or medial to the scapula. Notalgia paresthetica is felt to be secondary to spinal nerve impingement, causing a sensory neuropathy and persistent itch.     Recommend gentle skin care-- bathing in lukewarm water (not scalding hot) no more than 5-10 minutes per day. Recommend fragrance-free detergent (I.e. All Free and Clear) and soap (I.e. Dove sensitive skin bar soap) only to soiled areas of body. Recommend liberal use of moisturizing cream (I.e. CeraVe cream, Cetaphil cream) especially within 5 minutes of bathing.    Start pramoxine lotion over the counter (Sarna or CeraVe anti-itch lotion) keep in fridge for further cooling effects and apply when itchy

## 2025-05-10 DIAGNOSIS — G25.81 RESTLESS LEGS SYNDROME (RLS): ICD-10-CM

## 2025-05-10 NOTE — TELEPHONE ENCOUNTER
Care Due:                  Date            Visit Type   Department     Provider  --------------------------------------------------------------------------------                                [facility]                              PRIMARY      MEDICINE /  Last Visit: 05-      CARE ([facility])   INTERNAL MED   Tiarra Villa  Next Visit: None Scheduled  None         None Found                                                            Last  Test          Frequency    Reason                     Performed    Due Date  --------------------------------------------------------------------------------    CMP.........  12 months..  meloxicam................  07- 07-    [facility] Embedded Care Due Messages. Reference number: [id number].   5/10/2025 5:55:08 AM CDT

## 2025-05-12 RX ORDER — ROPINIROLE 0.5 MG/1
0.5 TABLET, FILM COATED ORAL
Qty: 30 TABLET | Refills: 0 | OUTPATIENT
Start: 2025-05-12

## 2025-06-03 ENCOUNTER — TELEPHONE (OUTPATIENT)
Dept: OPHTHALMOLOGY | Facility: CLINIC | Age: 75
End: 2025-06-03
Payer: MEDICARE

## 2025-06-03 ENCOUNTER — OFFICE VISIT (OUTPATIENT)
Dept: OPTOMETRY | Facility: CLINIC | Age: 75
End: 2025-06-03
Payer: COMMERCIAL

## 2025-06-03 DIAGNOSIS — H40.1123 PRIMARY OPEN ANGLE GLAUCOMA (POAG) OF LEFT EYE, SEVERE STAGE: ICD-10-CM

## 2025-06-03 DIAGNOSIS — E11.9 TYPE 2 DIABETES MELLITUS WITHOUT RETINOPATHY: ICD-10-CM

## 2025-06-03 DIAGNOSIS — H40.013 OAG (OPEN ANGLE GLAUCOMA) SUSPECT, LOW RISK, BILATERAL: Primary | ICD-10-CM

## 2025-06-03 DIAGNOSIS — H21.562 AFFERENT PUPILLARY DEFECT OF LEFT EYE: ICD-10-CM

## 2025-06-03 DIAGNOSIS — H25.13 NUCLEAR SCLEROSIS OF BOTH EYES: ICD-10-CM

## 2025-06-03 DIAGNOSIS — H52.13 MYOPIA OF BOTH EYES WITH ASTIGMATISM AND PRESBYOPIA: ICD-10-CM

## 2025-06-03 DIAGNOSIS — H26.9 CORTICAL CATARACT OF BOTH EYES: ICD-10-CM

## 2025-06-03 DIAGNOSIS — H52.203 MYOPIA OF BOTH EYES WITH ASTIGMATISM AND PRESBYOPIA: ICD-10-CM

## 2025-06-03 DIAGNOSIS — H52.4 MYOPIA OF BOTH EYES WITH ASTIGMATISM AND PRESBYOPIA: ICD-10-CM

## 2025-06-03 PROCEDURE — 99214 OFFICE O/P EST MOD 30 MIN: CPT | Mod: S$GLB,,, | Performed by: OPTOMETRIST

## 2025-06-03 PROCEDURE — 99999 PR PBB SHADOW E&M-EST. PATIENT-LVL IV: CPT | Mod: PBBFAC,,, | Performed by: OPTOMETRIST

## 2025-06-03 PROCEDURE — 92015 DETERMINE REFRACTIVE STATE: CPT | Mod: S$GLB,,, | Performed by: OPTOMETRIST

## 2025-06-05 ENCOUNTER — HOSPITAL ENCOUNTER (OUTPATIENT)
Dept: RADIOLOGY | Facility: HOSPITAL | Age: 75
Discharge: HOME | End: 2025-06-05
Attending: INTERNAL MEDICINE
Payer: MEDICARE

## 2025-06-05 DIAGNOSIS — Z12.31 VISIT FOR SCREENING MAMMOGRAM: ICD-10-CM

## 2025-06-05 PROCEDURE — 77063 BREAST TOMOSYNTHESIS BI: CPT | Mod: TC,HCNC,PO

## 2025-06-06 ENCOUNTER — RESULTS FOLLOW-UP (OUTPATIENT)
Dept: FAMILY MEDICINE | Facility: CLINIC | Age: 75
End: 2025-06-06

## 2025-06-06 ENCOUNTER — ANESTHESIA EVENT (OUTPATIENT)
Dept: ENDOSCOPY | Facility: HOSPITAL | Age: 75
End: 2025-06-06
Payer: MEDICARE

## 2025-06-06 ENCOUNTER — HOSPITAL ENCOUNTER (OUTPATIENT)
Facility: HOSPITAL | Age: 75
Discharge: HOME | End: 2025-06-06
Attending: INTERNAL MEDICINE | Admitting: INTERNAL MEDICINE
Payer: MEDICARE

## 2025-06-06 ENCOUNTER — ANESTHESIA (OUTPATIENT)
Dept: ENDOSCOPY | Facility: HOSPITAL | Age: 75
End: 2025-06-06
Payer: MEDICARE

## 2025-06-06 VITALS
BODY MASS INDEX: 32.93 KG/M2 | SYSTOLIC BLOOD PRESSURE: 154 MMHG | HEART RATE: 88 BPM | TEMPERATURE: 98 F | OXYGEN SATURATION: 97 % | WEIGHT: 185.88 LBS | HEIGHT: 63 IN | DIASTOLIC BLOOD PRESSURE: 70 MMHG | RESPIRATION RATE: 16 BRPM

## 2025-06-06 DIAGNOSIS — K63.5 COLON POLYPS: Primary | ICD-10-CM

## 2025-06-06 DIAGNOSIS — G25.81 RESTLESS LEGS SYNDROME (RLS): ICD-10-CM

## 2025-06-06 LAB — POCT GLUCOSE: 155 MG/DL (ref 70–110)

## 2025-06-06 PROCEDURE — 63600175 PHARM REV CODE 636 W HCPCS: Mod: HCNC

## 2025-06-06 PROCEDURE — 25000003 PHARM REV CODE 250: Mod: HCNC | Performed by: INTERNAL MEDICINE

## 2025-06-06 PROCEDURE — 37000008 HC ANESTHESIA 1ST 15 MINUTES: Mod: HCNC | Performed by: INTERNAL MEDICINE

## 2025-06-06 PROCEDURE — G0105 COLORECTAL SCRN; HI RISK IND: HCPCS | Mod: HCNC,,, | Performed by: INTERNAL MEDICINE

## 2025-06-06 PROCEDURE — G0105 COLORECTAL SCRN; HI RISK IND: HCPCS | Mod: HCNC | Performed by: INTERNAL MEDICINE

## 2025-06-06 PROCEDURE — 37000009 HC ANESTHESIA EA ADD 15 MINS: Mod: HCNC | Performed by: INTERNAL MEDICINE

## 2025-06-06 RX ORDER — LIDOCAINE HYDROCHLORIDE 20 MG/ML
INJECTION, SOLUTION EPIDURAL; INFILTRATION; INTRACAUDAL; PERINEURAL
Status: DISCONTINUED | OUTPATIENT
Start: 2025-06-06 | End: 2025-06-06

## 2025-06-06 RX ORDER — PROPOFOL 10 MG/ML
VIAL (ML) INTRAVENOUS
Status: DISCONTINUED | OUTPATIENT
Start: 2025-06-06 | End: 2025-06-06

## 2025-06-06 RX ORDER — SODIUM CHLORIDE 9 MG/ML
INJECTION, SOLUTION INTRAVENOUS CONTINUOUS
Status: DISCONTINUED | OUTPATIENT
Start: 2025-06-06 | End: 2025-06-06 | Stop reason: HOSPADM

## 2025-06-06 RX ADMIN — PROPOFOL 150 MCG/KG/MIN: 10 INJECTION, EMULSION INTRAVENOUS at 12:06

## 2025-06-06 RX ADMIN — SODIUM CHLORIDE: 0.9 INJECTION, SOLUTION INTRAVENOUS at 12:06

## 2025-06-06 RX ADMIN — LIDOCAINE HYDROCHLORIDE 50 MG: 20 INJECTION, SOLUTION EPIDURAL; INFILTRATION; INTRACAUDAL; PERINEURAL at 12:06

## 2025-06-06 RX ADMIN — PROPOFOL 50 MG: 10 INJECTION, EMULSION INTRAVENOUS at 12:06

## 2025-06-06 NOTE — H&P
Short Stay Endoscopy History and Physical    PCP - Tiarra Villa MD    Procedure - Colonoscopy  ASA - per anesthesia  Mallampati - per anesthesia  History of Anesthesia problems - no  Family history Anesthesia problems - no   Plan of anesthesia - General    HPI:  This is a 75 y.o. female here for evaluation of : personal history of colon polyps      ROS:  Constitutional: No fevers, chills, No weight loss  CV: No chest pain  Pulm: No cough, No shortness of breath  GI: see HPI  Derm: No rash    Medical History:  has a past medical history of Acute coronary syndrome (2020), Allergic rhinitis, seasonal, Anxiety, Arthritis, Colon polyp, CVA (cerebral vascular accident), Depression, Glaucoma NEC, Hallucination, History of positive PPD - treated - remote past, psychiatric care, Hyperlipidemia LDL goal < 100, Hypertension, Nuclear sclerosis of both eyes (2019), Obesity, CHETNA (obstructive sleep apnea), Psychiatric problem, Psychosis, Renal disorder, Sleep difficulties, Suicide attempt, Therapy, and Type II or unspecified type diabetes mellitus without mention of complication, uncontrolled.    Surgical History:  has a past surgical history that includes  section, classic; Trigger finger release; Total abdominal hysterectomy; Carpal tunnel release; Colonoscopy (N/A, 3/5/2020); Left heart catheterization (N/A, 2020); Coronary Artery Bypass Graft (CABG) (N/A, 2020); Trigger finger release (Left, 2/3/2023); and Trigger finger release (Right, 2024).    Family History: family history includes Alzheimer's disease in her maternal aunt; Cataracts in her mother; Diabetes in her father, maternal aunt, maternal grandfather, maternal grandmother, maternal uncle, and mother; Drug abuse in her grandchild; Glaucoma in her mother; Heart disease in her cousin; Heart failure in her father and mother; Hyperlipidemia in her maternal uncle; Hypertension in her father, maternal uncle, and mother; No Known  Problems in her maternal aunt, paternal aunt, paternal grandfather, paternal grandmother, paternal uncle, and sister; Prostate cancer in her father; Schizophrenia in her maternal aunt.. Otherwise no colon cancer, inflammatory bowel disease, or GI malignancies.    Social History:  reports that she quit smoking about 12 years ago. Her smoking use included cigarettes. She has never used smokeless tobacco. She reports that she does not currently use alcohol. She reports that she does not currently use drugs after having used the following drugs: Marijuana.    Review of patient's allergies indicates:   Allergen Reactions    Invokana [canagliflozin] Swelling     Throat and tongue swelling      Ace inhibitors      cough       Medications:   Prescriptions Prior to Admission[1]      Physical Exam:    Vital Signs:   Vitals:    06/06/25 1204   BP: (!) 151/70   Pulse: (!) 57   Resp: 18   Temp: 98.4 °F (36.9 °C)       General Appearance: Well appearing in no acute distress  Eyes:    No scleral icterus  ENT: Neck supple, Lips, mucosa, and tongue normal; teeth and gums normal  Abdomen: Soft, non tender, non distended with positive bowel sounds. No hepatosplenomegaly, ascites, or mass.  Extremities: 2+ pulses, no clubbing, cyanosis or edema  Skin: No rash      Labs:  Lab Results   Component Value Date    WBC 8.97 05/24/2024    HGB 11.8 (L) 05/24/2024    HCT 38.2 05/24/2024     05/24/2024    CHOL 114 (L) 04/22/2025    TRIG 114 04/22/2025    HDL 36 (L) 04/22/2025    ALT 17 07/24/2024    AST 19 07/24/2024     07/24/2024    K 4.3 04/22/2025     07/24/2024    CREATININE 1.0 07/24/2024    BUN 19 07/24/2024    CO2 20 (L) 07/24/2024    TSH 2.253 05/24/2024    INR 1.1 05/25/2020    GLUF 92 08/18/2020    HGBA1C 8.4 (H) 04/22/2025       I have explained the risks and benefits of endoscopy procedures to the patient including but not limited to bleeding, perforation, infection, and death.  The patient was asked if they  understand and allowed to ask any further questions to their satisfaction.    Faizan Cheung MD         [1]   Medications Prior to Admission   Medication Sig Dispense Refill Last Dose/Taking    amLODIPine (NORVASC) 10 MG tablet TAKE 1 TABLET(10 MG) BY MOUTH EVERY DAY 90 tablet 3 Past Week    aspirin (ECOTRIN) 81 MG EC tablet Take 1 tablet (81 mg total) by mouth once daily. 90 tablet 3 Past Week    atorvastatin (LIPITOR) 80 MG tablet Take 1 tablet (80 mg total) by mouth once daily. 90 tablet 3 Past Week    carvediloL (COREG) 12.5 MG tablet TAKE 1 TABLET(12.5 MG) BY MOUTH TWICE DAILY 180 tablet 3 Past Week    EScitalopram oxalate (LEXAPRO) 10 MG tablet TAKE 1 TABLET(10 MG) BY MOUTH DAILY 90 tablet 2 Past Week    ezetimibe (ZETIA) 10 mg tablet Take 1 tablet (10 mg total) by mouth once daily. 90 tablet 3 Past Week    finerenone (KERENDIA) 10 mg Tab Take 1 tablet by mouth Daily. 30 tablet 3 Past Week    furosemide (LASIX) 20 MG tablet TAKE 1 TABLET(20 MG) BY MOUTH EVERY DAY 90 tablet 3 Past Week    gabapentin (NEURONTIN) 300 MG capsule Take 1 pill by mouth nightly as needed for itching 30 capsule 3 Past Week    meloxicam (MOBIC) 7.5 MG tablet TAKE 1 TABLET(7.5 MG) BY MOUTH DAILY AS NEEDED FOR PAIN 30 tablet 0 Past Week    metFORMIN (GLUCOPHAGE) 1000 MG tablet TAKE 1 TABLET(1000 MG) BY MOUTH TWICE DAILY WITH MEALS 180 tablet 2 Past Week    rOPINIRole (REQUIP) 0.5 MG tablet TAKE 1 TABLET(0.5 MG) BY MOUTH EVERY EVENING 30 tablet 0 Past Week    sod sulf-pot chloride-mag sulf (SUTAB) 1.479-0.188- 0.225 gram tablet Take 12 tablets by mouth once daily. Please follow Endoscopy 's instructions. Please apply coupon code. Please apply coupon code. BIN: [id number], PCN: [id number], GROUP: [id number], MEMBER ID: [id number] 24 tablet 0 6/6/2025    spironolactone (ALDACTONE) 25 MG tablet Take 1 tablet (25 mg total) by mouth once daily. 90 tablet 3 Past Week    valsartan (DIOVAN) 320 MG tablet TAKE 1 TABLET(320 MG) BY MOUTH EVERY DAY 90  "tablet 3 Past Week    ACCU-CHEK GUIDE GLUCOSE METER [facility] [place]        ACCU-CHEK GUIDE TEST STRIPS Strp        acetaminophen (TYLENOL) 500 MG tablet Take 2 tablets (1,000 mg total) by mouth every 6 (six) hours as needed. 60 tablet 0 More than a month    albuterol (PROVENTIL/VENTOLIN HFA) 90 mcg/actuation inhaler Inhale 2 puffs into the lungs every 6 (six) hours as needed for Wheezing or Shortness of Breath. Rescue 19 g 0 More than a month    ciclopirox (PENLAC) 8 % Soln Apply topically nightly. 6.6 mL 3     diclofenac sodium (VOLTAREN) 1 % Gel Apply 2 g topically 4 (four) times daily. 450 g 2     dorzolamide-timolol 2-0.5% (COSOPT) 22.3-6.8 mg/mL ophthalmic solution Place 1 drop into both eyes 2 (two) times daily. 10 mL 11     DROPSAFE PEN NEEDLE 31 gauge x 1/4" Ndle        DROPSAFE PEN NEEDLE 31 gauge x 3/16" Ndle        empagliflozin (JARDIANCE) 25 mg tablet Take 1 tablet (25 mg total) by mouth once daily. 30 tablet 3 5/25/2025    fluticasone (FLONASE) 50 mcg/actuation nasal spray 1 spray (50 mcg total) by Each Nare route once daily. 16 g 5     HYDROcodone-acetaminophen (NORCO) 5-325 mg per tablet Take 1 tablet by mouth every 4 (four) hours as needed for Pain. 12 tablet 0 More than a month    lancing device Misc Apply topically.       loratadine (CLARITIN) 10 mg tablet TAKE 1 TABLET BY MOUTH EVERY DAY AS NEEDED FOR ALLERGIES 90 tablet 2 More than a month    melatonin (MELATIN) 3 mg tablet Take 2 tablets (6 mg total) by mouth nightly as needed for Insomnia.  0     multivitamin with minerals tablet Take 1 tablet by mouth once daily.       nitroGLYCERIN (NITROSTAT) 0.4 MG SL tablet PLACE 1 TABLET UNDER THE TONGUE EVERY 5 MINUTES AS NEEDED FOR CHEST PAIN 25 tablet 3     pen needle, diabetic (BD ULTRA-FINE LENY PEN NEEDLE) 32 gauge x 5/32" Ndle 1 Device by Misc.(Non-Drug; Combo Route) route Daily. 100 each 4     pen needle, diabetic (NOVOFINE 32) 32 gauge x 1/4" Ndle Use 1x/day 200 each 3     tirzepatide 7.5 mg/0.5 mL PnIj " Inject 7.5 mg into the skin every 7 days. 4 Pen 3 5/25/2025    triamcinolone acetonide 0.1% (KENALOG) 0.1 % ointment APPLY TOPICALLY TO THE AFFECTED AREA TWICE DAILY FOR 7 DAYS 80 g 0     TRUE COMFORT ALCOHOL PADS PadM Apply topically.       TRUEPLUS LANCETS 30 gauge Misc USE TO TEST TWICE DAILY

## 2025-06-06 NOTE — PROVATION PATIENT INSTRUCTIONS
Discharge Summary/Instructions after an Endoscopic Procedure  Patient Name: Cecilia Barahona  Patient MRN: [id number]  Patient YOB: 1950     Friday, June 6, 2025  Faizan Cheung MD  Dear patient,  As a result of recent federal legislation (The Federal Cures Act), you may   receive lab or pathology results from your procedure in your [facility]   account before your physician is able to contact you. Your physician or   their representative will relay the results to you with their   recommendations at their soonest availability.  Thank you,  RESTRICTIONS:  During your procedure today, you received medications for sedation.  These   medications may affect your judgment, balance and coordination.  Therefore,   for 24 hours, you have the following restrictions:   - DO NOT drive a car, operate machinery, make legal/financial decisions,   sign important papers or drink alcohol.    ACTIVITY:  Today: no heavy lifting, straining or running due to procedural   sedation/anesthesia.  The following day: return to full activity including work.  DIET:  Eat and drink normally unless instructed otherwise.     TREATMENT FOR COMMON SIDE EFFECTS:  - Mild abdominal pain, nausea, belching, bloating or excessive gas:  rest,   eat lightly and use a heating pad.  - Sore Throat: treat with throat lozenges and/or gargle with warm salt   water.  - Because air was used during the procedure, expelling large amounts of air   from your rectum or belching is normal.  - If a bowel prep was taken, you may not have a bowel movement for 1-3 days.    This is normal.  SYMPTOMS TO WATCH FOR AND REPORT TO YOUR PHYSICIAN:  1. Abdominal pain or bloating, other than gas cramps.  2. Chest pain.  3. Back pain.  4. Signs of infection such as: chills or fever occurring within 24 hours   after the procedure.  5. Rectal bleeding, which would show as bright red, maroon, or black stools.   (A tablespoon of blood from the rectum is not serious, especially if   hemorrhoids  are present.)  6. Vomiting.  7. Weakness or dizziness.  GO DIRECTLY TO THE NEAREST EMERGENCY ROOM IF YOU HAVE ANY OF THE FOLLOWING:      Difficulty breathing              Chills and/or fever over 101 F   Persistent vomiting and/or vomiting blood   Severe abdominal pain   Severe chest pain   Black, tarry stools   Bleeding- more than one tablespoon   Any other symptom or condition that you feel may need urgent attention  Your doctor recommends these additional instructions:  If any biopsies were taken, your doctors clinic will contact you in 1 to 2   weeks with any results.  - Discharge patient to home.   - Repeat colonoscopy is not recommended due to current age (66 years or   older) for screening purposes.   - The findings and recommendations were discussed with the designated   responsible adult.  For questions, problems or results please call your physician - Faizan Cheung MD at Work:  [phone].  [facility], EMERGENCY ROOM PHONE NUMBER: [phone]  IF A COMPLICATION OR EMERGENCY SITUATION ARISES AND YOU ARE UNABLE TO REACH   YOUR PHYSICIAN - GO DIRECTLY TO THE EMERGENCY ROOM.  Faizan Cheung MD  6/6/2025 12:59:21 PM  This report has been verified and signed electronically.  Dear patient,  As a result of recent federal legislation (The Federal Cures Act), you may   receive lab or pathology results from your procedure in your [facility]   account before your physician is able to contact you. Your physician or   their representative will relay the results to you with their   recommendations at their soonest availability.  Thank you,  PROVATION

## 2025-06-09 ENCOUNTER — RESULTS FOLLOW-UP (OUTPATIENT)
Dept: FAMILY MEDICINE | Facility: CLINIC | Age: 75
End: 2025-06-09

## 2025-06-10 ENCOUNTER — TELEPHONE (OUTPATIENT)
Dept: OPHTHALMOLOGY | Facility: CLINIC | Age: 75
End: 2025-06-10
Payer: MEDICARE

## 2025-06-10 NOTE — TELEPHONE ENCOUNTER
We have left messages for pt to call back and schedule appt. Pt sees  and missed her 3 month follow up in march.  is out on maternity leave and  requests follow up with  until she returns. Pt can either be scheduled with  next available in August without visual fields test or wait for  in September.

## 2025-06-16 ENCOUNTER — NURSE TRIAGE (OUTPATIENT)
Dept: ADMINISTRATIVE | Facility: CLINIC | Age: 75
End: 2025-06-16
Payer: MEDICARE

## 2025-06-16 ENCOUNTER — TELEPHONE (OUTPATIENT)
Dept: FAMILY MEDICINE | Facility: CLINIC | Age: 75
End: 2025-06-16
Payer: MEDICARE

## 2025-06-16 NOTE — TELEPHONE ENCOUNTER
Returned call to patient & offered sooner appointment with NP. Patient declined sooner appt with NP. Informed patient I will place appt with Dr. Villa for July on wait list.

## 2025-06-16 NOTE — TELEPHONE ENCOUNTER
Copied from CRM #[id number]. Topic: Appointments - Appointment Access  >> Jun 16, 2025  9:33 AM Judi wrote:  .Type:  Sooner Appointment Request    Patient is requesting a sooner appointment.  Patient declined first available appointment listed as well as another facility and provider .  Patient will not accept being placed on the waitlist and is requesting a message be sent to doctor.    Name of Caller: Self     When is the first available appointment? July 28 th     Symptoms: Leg pain     Would the patient rather a call back or a response via [facility]? Call     Best Call Back Number: .[phone]      Additional Information: only wants to see Dr Villa

## 2025-06-16 NOTE — TELEPHONE ENCOUNTER
Patient was offered to see a different and same day virtual visit, patient declined stating she'll wait to see Dr. Villa. Advised patient of UC and/or ED if symptoms worsens. Please advise if you would like an override.

## 2025-06-16 NOTE — TELEPHONE ENCOUNTER
Pt co problems with left upper thigh been seen by dr. Villa for it in the past but wants another apt states outside of upper thigh feels numb and numbness goes down to knee at night has problems with same leg and states she also has tingling to left arm that goes to hand during the hand. Denies arm symptoms or thigh symptoms at this time just states she is concerned and wants to know why this is happening. Per  protocol instructed pt be seen today no apts available within time frame sent message to pcp offered VV.   Reason for Disposition   Patient wants to be seen    Additional Information   Negative: SEVERE weakness (e.g., unable to walk or barely able to walk, requires support) and new-onset or getting worse   Negative: Difficult to awaken or acting confused (e.g., disoriented, slurred speech)   Negative: Sudden onset of weakness of the face, arm or leg on one side of the body and present now (Exception: Bell's palsy suspected: weakness on one side of the face developing over hours to days, with no other symptoms.)   Negative: Sudden onset of numbness of the face, arm or leg on one side of the body and present now   Negative: Sudden onset of loss of speech or garbled speech and present now   Negative: Sounds like a life-threatening emergency to the triager   Negative: Headache (with neurologic deficit)   Negative: Unable to urinate (or only a few drops) and bladder feels very full   Negative: Loss of bladder or bowel control (urine or bowel incontinence; wetting self, leaking stool) of new-onset   Negative: Back pain with numbness (loss of sensation) in groin or rectal area   Negative: Neurologic deficit that was brief (now gone), ANY of the following: * Weakness of the face, arm, or leg on one side of the body * Numbness of the face, arm, or leg on one side of the body * Loss of speech or garbled speech   Negative: Patient sounds very sick or weak to the triager   Negative: Neurologic deficit of gradual onset  (e.g., days to weeks), ANY of the following: * Weakness of the face, arm, or leg on one side of the body* Numbness of the face, arm, or leg on one side of the body* Loss of speech or garbled speech   Negative: [place] palsy suspected (i.e., weakness only one side of the face, developing over hours to days, no other symptoms)   Negative: Tingling (e.g., pins and needles) of the face, arm or leg on one side of the body, that is present now  (Exceptions: Chronic or recurrent symptom lasting > 4 weeks; or from known cause, such as: bumped elbow, carpal tunnel, pinched nerve.)   Negative: Neck pain (with neurologic deficit)   Negative: Back pain (with neurologic deficit)    Protocols used: Neurologic Deficit-A-OH

## 2025-06-16 NOTE — TELEPHONE ENCOUNTER
Copied from CRM #[id number]. Topic: Appointments - Appointment Access  >> Jun 16, 2025 12:23 PM Avelina wrote:  Type:  Sooner Appointment Request    Patient is requesting a sooner appointment.  Patient declined first available appointment listed as well as another facility and provider .  Patient will not accept being placed on the waitlist and is requesting a message be sent to doctor.    Name of Caller: self    When is the first available appointment? 2/2025    Symptoms: hand tingling     Would the patient rather a call back or a response via [facility]? call    Best Call Back Number: .[phone] ([phone] (work)     Additional Information:

## 2025-06-16 NOTE — TELEPHONE ENCOUNTER
Attempt x2 to contact pt on contact number listed in chart; unsuccessful. Voice message left for pt. Care advice no contact.   Reason for Disposition   Second attempt to contact caller AND no contact made. Phone number verified.    Protocols used: No Contact or Duplicate Contact Call-A-OH

## 2025-06-17 ENCOUNTER — TELEPHONE (OUTPATIENT)
Dept: ENDOCRINOLOGY | Facility: CLINIC | Age: 75
End: 2025-06-17
Payer: MEDICARE

## 2025-06-17 RX ORDER — INSULIN ASPART 100 [IU]/ML
INJECTION, SOLUTION INTRAVENOUS; SUBCUTANEOUS
Qty: 15 ML | Refills: 0 | Status: SHIPPED | OUTPATIENT
Start: 2025-06-17

## 2025-06-17 NOTE — TELEPHONE ENCOUNTER
Copied from CRM #[id number]. Topic: Medications - Medication Refill  >> Jun 17, 2025 11:50 AM Helen wrote:  Type:  Diabetic/Medical Supplies Request    Name of Caller:self  What supplies are needed:freestyle dionna 3 sensors  What is the brand of the supplies:  Refill or New Rx:refill  If checking glucose, how many times do they check it?:  Who prescribed the original supplies:julianne pearson  Pharmacy/Company Name, Phone #, Location:  Requesting a Call Back?:yes  Would the patient rather a call back or a response via [facility]? call  Best Call Back Number:.[phone]  Additional Information: only have two left supplies are running low

## 2025-06-17 NOTE — TELEPHONE ENCOUNTER
Copied from CRM #[id number]. Topic: Medications - Pharmacy  >> Jun 17, 2025  3:00 PM Love wrote:  Type:  Pharmacy Calling to Clarify an RX    Name of Caller: jeri    Pharmacy Name:     [id number] - [place] - [facility] AT [address]  [address]  [place] [address]  Phone: [phone] Fax: [phone]          Prescription Name: insulin aspart U-100 (NOVOLOG FLEXPEN U-100 INSULIN) 100 unit/mL (3 mL) InPn pen    What do they need to clarify? Needs to know how many units pt has to inject everyday    Can you be contacted via [facility]?    Best Call Back Number:  [phone]    Additional Information:

## 2025-06-17 NOTE — TELEPHONE ENCOUNTER
Spoke with patient. Patient is using Dexcom and not Arvin. Dexcom sensors refill sent to [facility] and prescription for Novolog sent to [facility]. Patient verbalized understanding.

## 2025-06-17 NOTE — TELEPHONE ENCOUNTER
Spoke to [facility] staff, clarification was given. No further questions or concerns at this time.

## 2025-06-30 DIAGNOSIS — E78.00 HYPERCHOLESTEROLEMIA: ICD-10-CM

## 2025-06-30 RX ORDER — ATORVASTATIN CALCIUM 80 MG/1
80 TABLET, FILM COATED ORAL DAILY
Qty: 100 TABLET | Refills: 3 | Status: SHIPPED | OUTPATIENT
Start: 2025-06-30

## 2025-07-09 RX ORDER — METFORMIN HYDROCHLORIDE 1000 MG/1
1000 TABLET ORAL 2 TIMES DAILY WITH MEALS
Qty: 180 TABLET | Refills: 2 | Status: SHIPPED | OUTPATIENT
Start: 2025-07-09

## 2025-07-12 DIAGNOSIS — F32.0 MILD MAJOR DEPRESSION: ICD-10-CM

## 2025-07-12 DIAGNOSIS — G25.81 RESTLESS LEGS SYNDROME (RLS): ICD-10-CM

## 2025-07-12 NOTE — TELEPHONE ENCOUNTER
Care Due:                  Date            Visit Type   Department     Provider  --------------------------------------------------------------------------------                                [facility]                              PRIMARY      MEDICINE /  Last Visit: 05-      CARE ([facility])   INTERNAL DARELL Villa                              [facility]      MEDICINE /  Next Visit: 07-      CARE ([facility])   INTERNAL DARELL Villa                                                            Last  Test          Frequency    Reason                     Performed    Due Date  --------------------------------------------------------------------------------    CBC.........  12 months..  meloxicam................  05- 05-    CMP.........  12 months..  meloxicam................  07- 07-    [facility] Embedded Care Due Messages. Reference number: [id number].   7/12/2025 11:46:50 AM CDT

## 2025-07-13 DIAGNOSIS — R80.9 MICROALBUMINURIA: ICD-10-CM

## 2025-07-13 RX ORDER — ESCITALOPRAM OXALATE 10 MG/1
10 TABLET ORAL
Qty: 90 TABLET | Refills: 0 | Status: SHIPPED | OUTPATIENT
Start: 2025-07-13

## 2025-07-13 RX ORDER — ROPINIROLE 0.5 MG/1
0.5 TABLET, FILM COATED ORAL
Qty: 30 TABLET | Refills: 0 | Status: SHIPPED | OUTPATIENT
Start: 2025-07-13

## 2025-07-14 RX ORDER — EMPAGLIFLOZIN 25 MG/1
TABLET, FILM COATED ORAL
Qty: 30 TABLET | Refills: 6 | Status: SHIPPED | OUTPATIENT
Start: 2025-07-14

## 2025-07-24 ENCOUNTER — TELEPHONE (OUTPATIENT)
Dept: FAMILY MEDICINE | Facility: CLINIC | Age: 75
End: 2025-07-24
Payer: MEDICARE

## 2025-07-24 NOTE — TELEPHONE ENCOUNTER
Return call to Cecilia who's requesting to schedule a wellness visit. Nurse explained unable to schedule and gave number ([phone]) to get assistance with scheduling.

## 2025-07-24 NOTE — TELEPHONE ENCOUNTER
Copied from CRM #[id number]. Topic: Appointments - Amb Referral  >> Jul 24, 2025 10:38 AM Mary Kate wrote:  Type: General Call Back         Name of Caller:  Would the patient rather a call back or a response via [facility]? call  Best Call Back Number:[phone]   Additional Information:   Pt is requesting a virtual yaakov please contact pt with further information

## 2025-07-28 ENCOUNTER — OFFICE VISIT (OUTPATIENT)
Dept: FAMILY MEDICINE | Facility: CLINIC | Age: 75
End: 2025-07-28
Payer: MEDICARE

## 2025-07-28 VITALS
BODY MASS INDEX: 30.82 KG/M2 | TEMPERATURE: 98 F | HEART RATE: 55 BPM | SYSTOLIC BLOOD PRESSURE: 122 MMHG | HEIGHT: 65 IN | OXYGEN SATURATION: 95 % | WEIGHT: 185 LBS | DIASTOLIC BLOOD PRESSURE: 58 MMHG

## 2025-07-28 DIAGNOSIS — G56.02 CARPAL TUNNEL SYNDROME OF LEFT WRIST: Primary | ICD-10-CM

## 2025-07-28 DIAGNOSIS — F33.2 SEVERE EPISODE OF RECURRENT MAJOR DEPRESSIVE DISORDER, WITHOUT PSYCHOTIC FEATURES: ICD-10-CM

## 2025-07-28 DIAGNOSIS — G57.12 MERALGIA PARESTHETICA OF LEFT SIDE: ICD-10-CM

## 2025-07-28 DIAGNOSIS — R21 RASH AND OTHER NONSPECIFIC SKIN ERUPTION: ICD-10-CM

## 2025-07-28 PROCEDURE — 3061F NEG MICROALBUMINURIA REV: CPT | Mod: CPTII,HCNC,S$GLB, | Performed by: INTERNAL MEDICINE

## 2025-07-28 PROCEDURE — 1101F PT FALLS ASSESS-DOCD LE1/YR: CPT | Mod: CPTII,HCNC,S$GLB, | Performed by: INTERNAL MEDICINE

## 2025-07-28 PROCEDURE — 99999 PR PBB SHADOW E&M-EST. PATIENT-LVL V: CPT | Mod: PBBFAC,HCNC,, | Performed by: INTERNAL MEDICINE

## 2025-07-28 PROCEDURE — G2211 COMPLEX E/M VISIT ADD ON: HCPCS | Mod: HCNC,S$GLB,, | Performed by: INTERNAL MEDICINE

## 2025-07-28 PROCEDURE — 3052F HG A1C>EQUAL 8.0%<EQUAL 9.0%: CPT | Mod: CPTII,HCNC,S$GLB, | Performed by: INTERNAL MEDICINE

## 2025-07-28 PROCEDURE — 1159F MED LIST DOCD IN RCRD: CPT | Mod: CPTII,HCNC,S$GLB, | Performed by: INTERNAL MEDICINE

## 2025-07-28 PROCEDURE — 3078F DIAST BP <80 MM HG: CPT | Mod: CPTII,HCNC,S$GLB, | Performed by: INTERNAL MEDICINE

## 2025-07-28 PROCEDURE — 3008F BODY MASS INDEX DOCD: CPT | Mod: CPTII,HCNC,S$GLB, | Performed by: INTERNAL MEDICINE

## 2025-07-28 PROCEDURE — 1125F AMNT PAIN NOTED PAIN PRSNT: CPT | Mod: CPTII,HCNC,S$GLB, | Performed by: INTERNAL MEDICINE

## 2025-07-28 PROCEDURE — 3288F FALL RISK ASSESSMENT DOCD: CPT | Mod: CPTII,HCNC,S$GLB, | Performed by: INTERNAL MEDICINE

## 2025-07-28 PROCEDURE — 3066F NEPHROPATHY DOC TX: CPT | Mod: CPTII,HCNC,S$GLB, | Performed by: INTERNAL MEDICINE

## 2025-07-28 PROCEDURE — 1157F ADVNC CARE PLAN IN RCRD: CPT | Mod: CPTII,HCNC,S$GLB, | Performed by: INTERNAL MEDICINE

## 2025-07-28 PROCEDURE — 99214 OFFICE O/P EST MOD 30 MIN: CPT | Mod: HCNC,S$GLB,, | Performed by: INTERNAL MEDICINE

## 2025-07-28 PROCEDURE — 4010F ACE/ARB THERAPY RXD/TAKEN: CPT | Mod: CPTII,HCNC,S$GLB, | Performed by: INTERNAL MEDICINE

## 2025-07-28 PROCEDURE — 3074F SYST BP LT 130 MM HG: CPT | Mod: CPTII,HCNC,S$GLB, | Performed by: INTERNAL MEDICINE

## 2025-07-28 PROCEDURE — 1160F RVW MEDS BY RX/DR IN RCRD: CPT | Mod: CPTII,HCNC,S$GLB, | Performed by: INTERNAL MEDICINE

## 2025-07-28 RX ORDER — BETAMETHASONE DIPROPIONATE 0.5 MG/G
CREAM TOPICAL 2 TIMES DAILY
Qty: 50 G | Refills: 0 | Status: SHIPPED | OUTPATIENT
Start: 2025-07-28 | End: 2025-08-04

## 2025-07-28 NOTE — PROGRESS NOTES
SUBJECTIVE     Chief Complaint   Patient presents with    Hand Pain     Left hand pain and Left Thigh pain        HPI  Cecilia Barahona is a 75 y.o. female with multiple medical diagnoses as listed in the medical history and problem list that presents for evaluation of L hand numbness x 1 month. Pt denies any preceding trauma, falls, or heavy lifting. She also reports some L thigh numbness for 1 year, but it has now become painful. Pain is dull at a 6/10 constantly in nature without radiation. She has been applying an OTC itch cream without improvement of symptoms.     PAST MEDICAL HISTORY:  Past Medical History:   Diagnosis Date    Acute coronary syndrome 2020: Presents with unstable angina.    Allergic rhinitis, seasonal     Anxiety     Arthritis     Colon polyp     CVA (cerebral vascular accident)     Depression     Glaucoma NEC     Hallucination     images out of corner of eye about a year ago    History of positive PPD - treated - remote past     Hx of psychiatric care     Hyperlipidemia LDL goal < 100     Hypertension     Nuclear sclerosis of both eyes 2019    Obesity     CHETNA (obstructive sleep apnea)     Psychiatric problem     Psychosis     Renal disorder     Sleep difficulties     Suicide attempt     about 30 years ago by overdose of pills    Therapy     Type II or unspecified type diabetes mellitus without mention of complication, uncontrolled        PAST SURGICAL HISTORY:  Past Surgical History:   Procedure Laterality Date    CARPAL TUNNEL RELEASE       SECTION, CLASSIC      COLONOSCOPY N/A 3/5/2020    Procedure: COLONOSCOPY;  Surgeon: Wiliam Carrillo MD;  Location: [facility] ([address] [place] [address]);  Service: Endoscopy;  Laterality: N/A;  20 appt confirmed-rb  pt requested this date    COLONOSCOPY, SCREENING, HIGH RISK PATIENT N/A 2025    Procedure: COLONOSCOPY, SCREENING, HIGH RISK PATIENT;  Surgeon: Faizan Cheung MD;  Location: [facility] ([address] [place] [address]);  Service: Endoscopy;   Laterality: N/A;  ref by   Tiarra Villa MD, Tirzepatide, Jardiance, Sutab, portal and mailed -ml  5/29 precall complete/ last inj 5/24, will hold next inj/last day for jardiance 6/2   mleone    CORONARY ARTERY BYPASS GRAFT (CABG) N/A 5/25/2020    Procedure: CORONARY ARTERY BYPASS GRAFT (CABG) x3 ;  Surgeon: Yan Bowman MD;  Location: [address];  Service: Cardiothoracic;  Laterality: N/A;  CABG X3  Endoharvest time start 0906  Endoharvest time end 1003  Vein prep start 1004  Vein prep end 1048    LEFT HEART CATHETERIZATION N/A 5/20/2020    Procedure: HEART CATH-LEFT;  Surgeon: Etelvina Loweyr MD;  Location: [facility] CATH LAB;  Service: Cardiology;  Laterality: N/A;    TOTAL ABDOMINAL HYSTERECTOMY      TRIGGER FINGER RELEASE      TRIGGER FINGER RELEASE Left 2/3/2023    Procedure: RELEASE, TRIGGER FINGER;  Surgeon: Shayan Schaefer Jr., MD;  Location: [facility] OR;  Service: Orthopedics;  Laterality: Left;    TRIGGER FINGER RELEASE Right 8/2/2024    Procedure: RELEASE, TRIGGER FINGER;  Surgeon: Shayan Schaefer Jr., MD;  Location: [facility] OR;  Service: Orthopedics;  Laterality: Right;  Right Middle       SOCIAL HISTORY:  Social History[1]    FAMILY HISTORY:  Family History   Problem Relation Name Age of Onset    Hypertension Mother      Diabetes Mother      Heart failure Mother      Cataracts Mother      Glaucoma Mother      Prostate cancer Father      Hypertension Father      Diabetes Father      Heart failure Father      No Known Problems Sister Melissa     No Known Problems Maternal Aunt      Diabetes Maternal Uncle      Hyperlipidemia Maternal Uncle      Hypertension Maternal Uncle      Diabetes Maternal Grandmother      Diabetes Maternal Grandfather      No Known Problems Paternal Grandmother      No Known Problems Paternal Grandfather      Diabetes Maternal Aunt Radha     Alzheimer's disease Maternal Aunt Radha     Schizophrenia Maternal Aunt Radha     Heart disease Cousin          s/p heart transplant     "No Known Problems Paternal Aunt      No Known Problems Paternal Uncle      Drug abuse Grandchild      Amblyopia Neg Hx      Blindness Neg Hx      Cancer Neg Hx      Macular degeneration Neg Hx      Retinal detachment Neg Hx      Strabismus Neg Hx      Stroke Neg Hx      Thyroid disease Neg Hx         ALLERGIES AND MEDICATIONS: updated and reviewed.  Review of patient's allergies indicates:   Allergen Reactions    Invokana [canagliflozin] Swelling     Throat and tongue swelling      Ace inhibitors      cough     Current Medications[2]    ROS  Review of Systems   Constitutional:  Negative for chills and fever.   HENT:  Negative for hearing loss and sore throat.    Eyes:  Negative for visual disturbance.   Respiratory:  Negative for cough and shortness of breath.    Cardiovascular:  Negative for chest pain, palpitations and leg swelling.   Gastrointestinal:  Negative for abdominal pain, constipation, diarrhea, nausea and vomiting.   Genitourinary:  Negative for dysuria, frequency and urgency.   Musculoskeletal:  Positive for back pain. Negative for arthralgias, joint swelling and myalgias.   Skin:  Positive for rash. Negative for wound.   Neurological:  Positive for numbness (L hand and L thigh). Negative for headaches.   Psychiatric/Behavioral:  Negative for agitation and confusion. The patient is not nervous/anxious.          OBJECTIVE     Physical Exam  Vitals:    07/28/25 1538   BP: (!) 122/58   Pulse: (!) 55   Temp: 98 °F (36.7 °C)    Body mass index is 30.79 kg/m².  Weight: 83.9 kg (185 lb)   Height: 5' 5" (165.1 cm)     Physical Exam  Constitutional:       General: She is not in acute distress.     Appearance: She is well-developed.   HENT:      Head: Normocephalic and atraumatic.      Right Ear: External ear normal.      Left Ear: External ear normal.      Nose: Nose normal.   Eyes:      General: No scleral icterus.        Right eye: No discharge.         Left eye: No discharge.      Conjunctiva/sclera: " Conjunctivae normal.   Neck:      Vascular: No JVD.      Trachea: No tracheal deviation.   Cardiovascular:      Rate and Rhythm: Normal rate and regular rhythm.      Heart sounds: Murmur heard.      Systolic murmur is present with a grade of 3/6.      No friction rub. No gallop.   Pulmonary:      Effort: Pulmonary effort is normal. No respiratory distress.      Breath sounds: Normal breath sounds. No wheezing.   Abdominal:      General: Bowel sounds are normal. There is no distension.      Palpations: Abdomen is soft. There is no mass.      Tenderness: There is no abdominal tenderness. There is no guarding or rebound.   Musculoskeletal:         General: No tenderness or deformity. Normal range of motion.      Cervical back: Normal range of motion and neck supple.      Comments: +Phalen's and Tinel's test to L wrist   Skin:     General: Skin is warm and dry.      Findings: Rash (Hyperpigmented macules to the upper back) present. No erythema.   Neurological:      Mental Status: She is alert and oriented to person, place, and time.      Motor: No abnormal muscle tone.      Coordination: Coordination normal.   Psychiatric:         Behavior: Behavior normal.         Thought Content: Thought content normal.         Judgment: Judgment normal.           Health Maintenance         Date Due Completion Date    RSV Vaccine (Age 60+ and Pregnant patients) (1 - Risk 60-74 years 1-dose series) Never done ---    COVID-19 Vaccine (7 - 2024-25 season) 09/01/2024 8/9/2023    Hemoglobin A1c 07/22/2025 4/22/2025    Foot Exam 08/06/2025 8/6/2024 (Done)    Override on 8/6/2024: Done    Override on 2/23/2015: Done    Influenza Vaccine (1) 09/01/2025 8/9/2023    TETANUS VACCINE 09/30/2025 9/30/2015    Diabetes Urine Screening 04/22/2026 4/22/2025    Lipid Panel 04/22/2026 4/22/2025    Diabetic Eye Exam 06/03/2026 6/3/2025    Override on 1/18/2016: Done (Dr. Lujan - no retinopathy)    Mammogram 06/05/2026 6/5/2025    High Dose Statin  07/28/2026 7/28/2025    Aspirin/Antiplatelet Therapy 07/28/2026 7/28/2025    Colorectal Cancer Screening 06/06/2030 6/6/2025              ASSESSMENT     75 y.o. female with     1. Carpal tunnel syndrome of left wrist    2. Meralgia paresthetica of left side    3. Rash and other nonspecific skin eruption    4. Severe episode of recurrent major depressive disorder, without psychotic features        PLAN:     1. Carpal tunnel syndrome of left wrist  - patient to wear a carpal tunnel wrist brace nightly with removal during the day  - okay to take NSAIDs during the day p.r.n. pain  - CBC Auto Differential; Future  - Comprehensive Metabolic Panel; Future  - TSH; Future  - Magnesium; Future    2. Meralgia paresthetica of left side  - patient with numbness now transitioned to pain, so will start with x-rays as below  - CBC Auto Differential; Future  - Comprehensive Metabolic Panel; Future  - TSH; Future  - X-Ray Lumbar Spine AP And Lateral; Future  - X-Ray Hip 2 or 3 views Left with Pelvis when performed; Future  - Magnesium; Future    3. Rash and other nonspecific skin eruption  - unimproved with gabapentin as prescribed per dermatology, so will start betamethasone as below  - augmented betamethasone dipropionate (DIPROLENE-AF) 0.05 % cream; Apply topically 2 (two) times daily. for 7 days  Dispense: 50 g; Refill: 0    4. Severe episode of recurrent major depressive disorder, without psychotic features  - Stable; no acute issues          RTC in 1-2 weeks as needed for any acute worsening of current condition or failure to improve       Tiarra Villa MD  07/28/2025 3:50 PM        No follow-ups on file.                     [1]   Social History  Socioeconomic History    Marital status:      Spouse name: Yuniel    Number of children: 3   Occupational History    Occupation: Retired   Tobacco Use    Smoking status: Former     Current packs/day: 0.00     Types: Cigarettes     Quit date: 11/10/2012     Years since  quittin.7    Smokeless tobacco: Never   Substance and Sexual Activity    Alcohol use: Not Currently     Comment: drank socially in the past    Drug use: Not Currently     Types: Marijuana     Comment: tried once 6 months ago    Sexual activity: Not Currently     Partners: Male   Other Topics Concern    Patient feels they ought to cut down on drinking/drug use No    Patient annoyed by others criticizing their drinking/drug use No    Patient has felt bad or guilty about drinking/drug use No    Patient has had a drink/used drugs as an eye opener in the AM No   Social History Narrative     x 1.    Has 3 grown children.    Lives with spouse.     Social Drivers of Health     Financial Resource Strain: High Risk (4/3/2025)    Overall Financial Resource Strain (CARDIA)     Difficulty of Paying Living Expenses: Hard   Food Insecurity: Food Insecurity Present (4/3/2025)    Hunger Vital Sign     Worried About Running Out of Food in the Last Year: Often true     Ran Out of Food in the Last Year: Often true   Transportation Needs: No Transportation Needs (4/3/2025)    PRAPARE - Transportation     Lack of Transportation (Medical): No     Lack of Transportation (Non-Medical): No   Physical Activity: Inactive (10/22/2024)    Exercise Vital Sign     Days of Exercise per Week: 0 days     Minutes of Exercise per Session: 0 min   Stress: Stress Concern Present (10/22/2024)    [employer] - Occupational Stress Questionnaire     Feeling of Stress : To some extent   Housing Stability: Unknown (10/22/2024)    Housing Stability Vital Sign     Unable to Pay for Housing in the Last Year: No     Homeless in the Last Year: No   [2]   Current Outpatient Medications   Medication Sig Dispense Refill    acetaminophen (TYLENOL) 500 MG tablet Take 2 tablets (1,000 mg total) by mouth every 6 (six) hours as needed. 60 tablet 0    albuterol (PROVENTIL/VENTOLIN HFA) 90 mcg/actuation inhaler Inhale 2 puffs into the  lungs every 6 (six) hours as needed for Wheezing or Shortness of Breath. Rescue 19 g 0    amLODIPine (NORVASC) 10 MG tablet TAKE 1 TABLET(10 MG) BY MOUTH EVERY DAY 90 tablet 3    aspirin (ECOTRIN) 81 MG EC tablet Take 1 tablet (81 mg total) by mouth once daily. 90 tablet 3    atorvastatin (LIPITOR) 80 MG tablet Take 1 tablet (80 mg total) by mouth once daily. 100 tablet 3    carvediloL (COREG) 12.5 MG tablet TAKE 1 TABLET(12.5 MG) BY MOUTH TWICE DAILY 180 tablet 3    diclofenac sodium (VOLTAREN) 1 % Gel Apply 2 g topically 4 (four) times daily. 450 g 2    dorzolamide-timolol 2-0.5% (COSOPT) 22.3-6.8 mg/mL ophthalmic solution Place 1 drop into both eyes 2 (two) times daily. 10 mL 11    empagliflozin (JARDIANCE) 25 mg tablet TAKE 1 TABLET(25 MG) BY MOUTH DAILY 30 tablet 6    EScitalopram oxalate (LEXAPRO) 10 MG tablet TAKE 1 TABLET(10 MG) BY MOUTH DAILY 90 tablet 0    ezetimibe (ZETIA) 10 mg tablet Take 1 tablet (10 mg total) by mouth once daily. 90 tablet 3    finerenone (KERENDIA) 10 mg Tab Take 1 tablet by mouth Daily. 30 tablet 3    fluticasone (FLONASE) 50 mcg/actuation nasal spray 1 spray (50 mcg total) by Each Nare route once daily. 16 g 5    furosemide (LASIX) 20 MG tablet TAKE 1 TABLET(20 MG) BY MOUTH EVERY DAY 90 tablet 3    gabapentin (NEURONTIN) 300 MG capsule Take 1 pill by mouth nightly as needed for itching 30 capsule 3    HYDROcodone-acetaminophen (NORCO) 5-325 mg per tablet Take 1 tablet by mouth every 4 (four) hours as needed for Pain. 12 tablet 0    insulin aspart U-100 (NOVOLOG FLEXPEN U-100 INSULIN) 100 unit/mL (3 mL) InPn pen Inject as needed before meals if glucose is high: 150-200=+1, 201-250=+2, 251-300=+3; 301-350=+4, over 350=+5 units 15 mL 0    loratadine (CLARITIN) 10 mg tablet TAKE 1 TABLET BY MOUTH EVERY DAY AS NEEDED FOR ALLERGIES 90 tablet 2    melatonin (MELATIN) 3 mg tablet Take 2 tablets (6 mg total) by mouth nightly as needed for Insomnia.  0    meloxicam (MOBIC) 7.5 MG tablet TAKE  "1 TABLET(7.5 MG) BY MOUTH DAILY AS NEEDED FOR PAIN 30 tablet 0    metFORMIN (GLUCOPHAGE) 1000 MG tablet TAKE 1 TABLET(1000 MG) BY MOUTH TWICE DAILY WITH MEALS 180 tablet 2    nitroGLYCERIN (NITROSTAT) 0.4 MG SL tablet PLACE 1 TABLET UNDER THE TONGUE EVERY 5 MINUTES AS NEEDED FOR CHEST PAIN 25 tablet 3    pen needle, diabetic (NOVOFINE 32) 32 gauge x 1/4" Ndle Use 1x/day 200 each 3    rOPINIRole (REQUIP) 0.5 MG tablet TAKE 1 TABLET(0.5 MG) BY MOUTH EVERY EVENING 30 tablet 0    spironolactone (ALDACTONE) 25 MG tablet Take 1 tablet (25 mg total) by mouth once daily. 90 tablet 3    tirzepatide 7.5 mg/0.5 mL PnIj Inject 7.5 mg into the skin every 7 days. 4 Pen 3    triamcinolone acetonide 0.1% (KENALOG) 0.1 % ointment APPLY TOPICALLY TO THE AFFECTED AREA TWICE DAILY FOR 7 DAYS 80 g 0    valsartan (DIOVAN) 320 MG tablet TAKE 1 TABLET(320 MG) BY MOUTH EVERY DAY 90 tablet 3    ACCU-CHEK GUIDE GLUCOSE METER [facility]       ACCU-CHEK GUIDE TEST STRIPS Strp       augmented betamethasone dipropionate (DIPROLENE-AF) 0.05 % cream Apply topically 2 (two) times daily. for 7 days 50 g 0    ciclopirox (PENLAC) 8 % Soln Apply topically nightly. (Patient not taking: Reported on 7/28/2025) 6.6 mL 3    DROPSAFE PEN NEEDLE 31 gauge x 1/4" Ndle       DROPSAFE PEN NEEDLE 31 gauge x 3/16" Ndle       lancing device Misc Apply topically.      multivitamin with minerals tablet Take 1 tablet by mouth once daily. (Patient not taking: Reported on 7/28/2025)      pen needle, diabetic (BD ULTRA-FINE LENY PEN NEEDLE) 32 gauge x 5/32" Ndle 1 Device by Misc.(Non-Drug; Combo Route) route Daily. (Patient not taking: Reported on 7/28/2025) 100 each 4    TRUE COMFORT ALCOHOL PADS PadM Apply topically.      TRUEPLUS LANCETS 30 gauge Misc USE TO TEST TWICE DAILY       No current facility-administered medications for this visit.     "

## 2025-07-29 ENCOUNTER — HOSPITAL ENCOUNTER (OUTPATIENT)
Dept: RADIOLOGY | Facility: HOSPITAL | Age: 75
Discharge: HOME | End: 2025-07-29
Attending: INTERNAL MEDICINE
Payer: MEDICARE

## 2025-07-29 DIAGNOSIS — G57.12 MERALGIA PARESTHETICA OF LEFT SIDE: ICD-10-CM

## 2025-07-29 PROCEDURE — 73502 X-RAY EXAM HIP UNI 2-3 VIEWS: CPT | Mod: 26,HCNC,LT, | Performed by: RADIOLOGY

## 2025-07-29 PROCEDURE — 73502 X-RAY EXAM HIP UNI 2-3 VIEWS: CPT | Mod: TC,HCNC,LT

## 2025-07-29 PROCEDURE — 72100 X-RAY EXAM L-S SPINE 2/3 VWS: CPT | Mod: TC,HCNC

## 2025-07-29 PROCEDURE — 72100 X-RAY EXAM L-S SPINE 2/3 VWS: CPT | Mod: 26,HCNC,, | Performed by: RADIOLOGY

## 2025-07-30 DIAGNOSIS — N18.31 STAGE 3A CHRONIC KIDNEY DISEASE: ICD-10-CM

## 2025-07-30 DIAGNOSIS — R74.8 ELEVATED ALKALINE PHOSPHATASE LEVEL: Primary | ICD-10-CM

## 2025-07-30 PROBLEM — M51.362 DEGENERATION OF INTERVERTEBRAL DISC OF LUMBAR REGION WITH DISCOGENIC BACK PAIN AND LOWER EXTREMITY PAIN: Status: ACTIVE | Noted: 2025-07-30

## 2025-07-30 PROBLEM — M16.11 PRIMARY OSTEOARTHRITIS OF RIGHT HIP: Status: ACTIVE | Noted: 2025-07-30

## 2025-07-30 PROBLEM — M16.12 PRIMARY OSTEOARTHRITIS OF LEFT HIP: Status: ACTIVE | Noted: 2025-07-30

## 2025-08-05 ENCOUNTER — OFFICE VISIT (OUTPATIENT)
Dept: FAMILY MEDICINE | Facility: CLINIC | Age: 75
End: 2025-08-05
Payer: MEDICARE

## 2025-08-05 ENCOUNTER — TELEPHONE (OUTPATIENT)
Dept: FAMILY MEDICINE | Facility: CLINIC | Age: 75
End: 2025-08-05

## 2025-08-05 ENCOUNTER — RESULTS FOLLOW-UP (OUTPATIENT)
Dept: FAMILY MEDICINE | Facility: CLINIC | Age: 75
End: 2025-08-05

## 2025-08-05 ENCOUNTER — LAB VISIT (OUTPATIENT)
Dept: LAB | Facility: HOSPITAL | Age: 75
End: 2025-08-05
Attending: INTERNAL MEDICINE
Payer: MEDICARE

## 2025-08-05 VITALS
OXYGEN SATURATION: 95 % | DIASTOLIC BLOOD PRESSURE: 64 MMHG | HEART RATE: 61 BPM | WEIGHT: 188.94 LBS | TEMPERATURE: 98 F | RESPIRATION RATE: 17 BRPM | BODY MASS INDEX: 31.48 KG/M2 | SYSTOLIC BLOOD PRESSURE: 106 MMHG | HEIGHT: 65 IN

## 2025-08-05 DIAGNOSIS — N18.31 TYPE 2 DIABETES MELLITUS WITH STAGE 3A CHRONIC KIDNEY DISEASE, WITH LONG-TERM CURRENT USE OF INSULIN: ICD-10-CM

## 2025-08-05 DIAGNOSIS — E11.40 TYPE 2 DIABETES MELLITUS WITH DIABETIC NEUROPATHY, WITHOUT LONG-TERM CURRENT USE OF INSULIN: ICD-10-CM

## 2025-08-05 DIAGNOSIS — Z00.00 ENCOUNTER FOR MEDICARE ANNUAL WELLNESS EXAM: Primary | ICD-10-CM

## 2025-08-05 DIAGNOSIS — Z79.4 TYPE 2 DIABETES MELLITUS WITH STAGE 3A CHRONIC KIDNEY DISEASE, WITH LONG-TERM CURRENT USE OF INSULIN: ICD-10-CM

## 2025-08-05 DIAGNOSIS — E11.3292 MILD NONPROLIFERATIVE DIABETIC RETINOPATHY OF LEFT EYE WITHOUT MACULAR EDEMA ASSOCIATED WITH TYPE 2 DIABETES MELLITUS: ICD-10-CM

## 2025-08-05 DIAGNOSIS — I25.10 CORONARY ARTERY DISEASE INVOLVING NATIVE CORONARY ARTERY OF NATIVE HEART WITHOUT ANGINA PECTORIS: ICD-10-CM

## 2025-08-05 DIAGNOSIS — I77.9 CAROTID ARTERY DISORDER: ICD-10-CM

## 2025-08-05 DIAGNOSIS — R80.9 TYPE 2 DIABETES MELLITUS WITH DIABETIC MICROALBUMINURIA, WITHOUT LONG-TERM CURRENT USE OF INSULIN: ICD-10-CM

## 2025-08-05 DIAGNOSIS — F33.0 MILD EPISODE OF RECURRENT MAJOR DEPRESSIVE DISORDER: ICD-10-CM

## 2025-08-05 DIAGNOSIS — R74.8 ELEVATED ALKALINE PHOSPHATASE LEVEL: ICD-10-CM

## 2025-08-05 DIAGNOSIS — E11.3391 MODERATE NONPROLIFERATIVE DIABETIC RETINOPATHY OF RIGHT EYE WITHOUT MACULAR EDEMA ASSOCIATED WITH TYPE 2 DIABETES MELLITUS: ICD-10-CM

## 2025-08-05 DIAGNOSIS — I70.0 ATHEROSCLEROSIS OF AORTA: ICD-10-CM

## 2025-08-05 DIAGNOSIS — E11.21 TYPE 2 DIABETES MELLITUS WITH DIABETIC NEPHROPATHY, WITHOUT LONG-TERM CURRENT USE OF INSULIN: ICD-10-CM

## 2025-08-05 DIAGNOSIS — I10 PRIMARY HYPERTENSION: ICD-10-CM

## 2025-08-05 DIAGNOSIS — N18.31 STAGE 3A CHRONIC KIDNEY DISEASE: ICD-10-CM

## 2025-08-05 DIAGNOSIS — G56.02 CARPAL TUNNEL SYNDROME OF LEFT WRIST: ICD-10-CM

## 2025-08-05 DIAGNOSIS — E11.29 TYPE 2 DIABETES MELLITUS WITH DIABETIC MICROALBUMINURIA, WITHOUT LONG-TERM CURRENT USE OF INSULIN: ICD-10-CM

## 2025-08-05 DIAGNOSIS — E11.22 TYPE 2 DIABETES MELLITUS WITH STAGE 3A CHRONIC KIDNEY DISEASE, WITH LONG-TERM CURRENT USE OF INSULIN: ICD-10-CM

## 2025-08-05 PROBLEM — E78.2 MIXED HYPERLIPIDEMIA: Status: ACTIVE | Noted: 2020-09-01

## 2025-08-05 LAB
ALBUMIN SERPL BCP-MCNC: 3.6 G/DL (ref 3.5–5.2)
ALP SERPL-CCNC: 147 UNIT/L (ref 40–150)
ALT SERPL W/O P-5'-P-CCNC: 8 UNIT/L (ref 10–44)
ANION GAP (OHS): 12 MMOL/L (ref 8–16)
AST SERPL-CCNC: 16 UNIT/L (ref 11–45)
BILIRUB SERPL-MCNC: 0.5 MG/DL (ref 0.1–1)
BUN SERPL-MCNC: 18 MG/DL (ref 8–23)
CALCIUM SERPL-MCNC: 9 MG/DL (ref 8.7–10.5)
CHLORIDE SERPL-SCNC: 103 MMOL/L (ref 95–110)
CO2 SERPL-SCNC: 24 MMOL/L (ref 23–29)
CREAT SERPL-MCNC: 0.9 MG/DL (ref 0.5–1.4)
EAG (OHS): 183 MG/DL (ref 68–131)
GFR SERPLBLD CREATININE-BSD FMLA CKD-EPI: >60 ML/MIN/1.73/M2
GGT SERPL-CCNC: 19 U/L (ref 8–55)
GLUCOSE SERPL-MCNC: 188 MG/DL (ref 70–110)
HBA1C MFR BLD: 8 % (ref 4–5.6)
POTASSIUM SERPL-SCNC: 4.5 MMOL/L (ref 3.5–5.1)
PROT SERPL-MCNC: 7 GM/DL (ref 6–8.4)
SODIUM SERPL-SCNC: 139 MMOL/L (ref 136–145)

## 2025-08-05 PROCEDURE — 83036 HEMOGLOBIN GLYCOSYLATED A1C: CPT | Mod: HCNC

## 2025-08-05 PROCEDURE — 3052F HG A1C>EQUAL 8.0%<EQUAL 9.0%: CPT | Mod: CPTII,HCNC,S$GLB, | Performed by: NURSE PRACTITIONER

## 2025-08-05 PROCEDURE — 3074F SYST BP LT 130 MM HG: CPT | Mod: CPTII,HCNC,S$GLB, | Performed by: NURSE PRACTITIONER

## 2025-08-05 PROCEDURE — 3061F NEG MICROALBUMINURIA REV: CPT | Mod: CPTII,HCNC,S$GLB, | Performed by: NURSE PRACTITIONER

## 2025-08-05 PROCEDURE — 3078F DIAST BP <80 MM HG: CPT | Mod: CPTII,HCNC,S$GLB, | Performed by: NURSE PRACTITIONER

## 2025-08-05 PROCEDURE — 82977 ASSAY OF GGT: CPT | Mod: HCNC

## 2025-08-05 PROCEDURE — 3288F FALL RISK ASSESSMENT DOCD: CPT | Mod: CPTII,HCNC,S$GLB, | Performed by: NURSE PRACTITIONER

## 2025-08-05 PROCEDURE — 1126F AMNT PAIN NOTED NONE PRSNT: CPT | Mod: CPTII,HCNC,S$GLB, | Performed by: NURSE PRACTITIONER

## 2025-08-05 PROCEDURE — 1160F RVW MEDS BY RX/DR IN RCRD: CPT | Mod: CPTII,HCNC,S$GLB, | Performed by: NURSE PRACTITIONER

## 2025-08-05 PROCEDURE — 1100F PTFALLS ASSESS-DOCD GE2>/YR: CPT | Mod: CPTII,HCNC,S$GLB, | Performed by: NURSE PRACTITIONER

## 2025-08-05 PROCEDURE — 1123F ACP DISCUSS/DSCN MKR DOCD: CPT | Mod: CPTII,HCNC,S$GLB, | Performed by: NURSE PRACTITIONER

## 2025-08-05 PROCEDURE — 99999 PR PBB SHADOW E&M-EST. PATIENT-LVL V: CPT | Mod: PBBFAC,HCNC,, | Performed by: NURSE PRACTITIONER

## 2025-08-05 PROCEDURE — 1170F FXNL STATUS ASSESSED: CPT | Mod: CPTII,HCNC,S$GLB, | Performed by: NURSE PRACTITIONER

## 2025-08-05 PROCEDURE — 3066F NEPHROPATHY DOC TX: CPT | Mod: CPTII,HCNC,S$GLB, | Performed by: NURSE PRACTITIONER

## 2025-08-05 PROCEDURE — 80053 COMPREHEN METABOLIC PANEL: CPT | Mod: HCNC

## 2025-08-05 PROCEDURE — 36415 COLL VENOUS BLD VENIPUNCTURE: CPT | Mod: HCNC,PO

## 2025-08-05 PROCEDURE — 1159F MED LIST DOCD IN RCRD: CPT | Mod: CPTII,HCNC,S$GLB, | Performed by: NURSE PRACTITIONER

## 2025-08-05 PROCEDURE — G0439 PPPS, SUBSEQ VISIT: HCPCS | Mod: HCNC,S$GLB,, | Performed by: NURSE PRACTITIONER

## 2025-08-05 PROCEDURE — 4010F ACE/ARB THERAPY RXD/TAKEN: CPT | Mod: CPTII,HCNC,S$GLB, | Performed by: NURSE PRACTITIONER

## 2025-08-05 NOTE — TELEPHONE ENCOUNTER
Notified patient once all the lab results are completed she will be notified. Patient is wanting  to call her with the results and does not want the nurse to call with the results.

## 2025-08-05 NOTE — PATIENT INSTRUCTIONS
Counseling and Referral of Other Preventative  (Italic type indicates deductible and co-insurance are waived)    Patient Name: Cecilia Barahona  Today's Date: 8/5/2025    Health Maintenance       Date Due Completion Date    RSV Vaccine (Age 60+ and Pregnant patients) (1 - Risk 60-74 years 1-dose series) Never done ---    COVID-19 Vaccine (7 - 2024-25 season) 09/01/2024 8/9/2023    Hemoglobin A1c 07/22/2025 4/22/2025    Foot Exam 08/06/2025 8/6/2024 (Done)    Override on 8/6/2024: Done    Override on 2/23/2015: Done    Influenza Vaccine (1) 09/01/2025 8/9/2023    TETANUS VACCINE 09/30/2025 9/30/2015    Diabetes Urine Screening 04/22/2026 4/22/2025    Lipid Panel 04/22/2026 4/22/2025    Diabetic Eye Exam 06/03/2026 6/3/2025    Override on 1/18/2016: Done (Dr. Lujan - no retinopathy)    Mammogram 06/05/2026 6/5/2025    High Dose Statin 08/05/2026 8/5/2025    Aspirin/Antiplatelet Therapy 08/05/2026 8/5/2025    Colorectal Cancer Screening 06/06/2030 6/6/2025        Orders Placed This Encounter   Procedures    Ambulatory referral/consult to Orthopedics    Ambulatory referral/consult to Podiatry     The following information is provided to all patients.  This information is to help you find resources for any of the problems found today that may be affecting your health:                  Living healthy guide: www.[facility].[place].gov      Understanding Diabetes: www.diabetes.org      Eating healthy: www.cdc.gov/healthyweight      CDC home safety checklist: www.cdc.gov/steadi/patient.html      Agency on Aging: www.goea.[place].gov      Alcoholics anonymous (AA): www.aa.org      Physical Activity: www.micheal.nih.gov/qi1wxyv      Tobacco use: www.quitwithusla.org

## 2025-08-05 NOTE — PROGRESS NOTES
"  Cecilia Barahona presented for a  Medicare AWV and comprehensive Health Risk Assessment today. The following components were reviewed and updated:    Medical history  Family History  Social history  Allergies and Current Medications  Health Risk Assessment  Health Maintenance  Care Team         ** See Completed Assessments for Annual Wellness Visit within the encounter summary.**         The following assessments were completed:  Living Situation  CAGE  Depression Screening  Timed Get Up and Go  Whisper Test  Cognitive Function Screening  Nutrition Screening  ADL Screening  PAQ Screening      Opioid documentation:  Patient does not have a current opioid prescription.        Vitals:    08/05/25 0811   BP: 106/64   Pulse: 61   Resp: 17   Temp: 98.4 °F (36.9 °C)   TempSrc: Oral   SpO2: 95%   Weight: 85.7 kg (188 lb 15 oz)   Height: 5' 5" (1.651 m)     Body mass index is 31.44 kg/m².  Physical Exam  Vitals and nursing note reviewed.   Constitutional:       General: She is not in acute distress.     Appearance: Normal appearance. She is well-developed and well-groomed. She is not ill-appearing.   HENT:      Head: Normocephalic and atraumatic.      Right Ear: External ear normal.      Left Ear: External ear normal.      Nose: Nose normal.      Mouth/Throat:      Lips: Pink.      Mouth: Mucous membranes are moist.   Eyes:      General: Lids are normal. Vision grossly intact. Gaze aligned appropriately.      Conjunctiva/sclera: Conjunctivae normal.   Neck:      Trachea: Phonation normal.   Pulmonary:      Effort: Pulmonary effort is normal. No accessory muscle usage or respiratory distress.   Abdominal:      General: Abdomen is flat. There is no distension.   Musculoskeletal:      Cervical back: Neck supple.   Skin:     General: Skin is warm and dry.      Findings: No rash.   Neurological:      General: No focal deficit present.      Mental Status: She is alert and oriented to person, place, and time. Mental status is at " baseline.      Motor: No abnormal muscle tone.      Gait: Gait normal.   Psychiatric:         Attention and Perception: Attention and perception normal.         Mood and Affect: Mood and affect normal.         Speech: Speech normal.         Behavior: Behavior normal. Behavior is cooperative.         Thought Content: Thought content normal.         Cognition and Memory: Cognition and memory normal.         Judgment: Judgment normal.               Diagnoses and health risks identified today and associated recommendations/orders:    1. Encounter for Medicare annual wellness exam  Health maintenance reviewed and up to date, will f/u with PCP for routine preventative care  Review for Opioid Screening: Pt does not have Rx for Opioids   Review for Substance Use Disorders: Patient does not use substance   - Referral to Enhanced Annual Wellness Visit (eAWV) W+1    2. Type 2 diabetes mellitus with diabetic neuropathy, without long-term current use of insulin  Lab Results   Component Value Date    HGBA1C 8.4 (H) 04/22/2025     Education: Reviewed ABCs of diabetes management (respective goals in parentheses):  A1C (<7), blood pressure (<130/80), and cholesterol (LDL <100).  Addressed ADA diet.  Suggested low cholesterol diet.  Encouraged aerobic exercise.  Discussed foot care.  Reminded to get yearly retinal exam.  Discussed ways to avoid symptomatic hypoglycemia.  Discussed sick day management.  - Ambulatory referral/consult to Podiatry; Future  - Hemoglobin A1C; Future    3. Type 2 diabetes mellitus with diabetic microalbuminuria, without long-term current use of insulin  Education: Reviewed ABCs of diabetes management (respective goals in parentheses):  A1C (<7), blood pressure (<130/80), and cholesterol (LDL <100).  Addressed ADA diet.  Suggested low cholesterol diet.  Encouraged aerobic exercise.  Discussed foot care.  Reminded to get yearly retinal exam.  Discussed ways to avoid symptomatic hypoglycemia.  Discussed  sick day management.    4. Type 2 diabetes mellitus with diabetic nephropathy, without long-term current use of insulin  Education: Reviewed ABCs of diabetes management (respective goals in parentheses):  A1C (<7), blood pressure (<130/80), and cholesterol (LDL <100).  Addressed ADA diet.  Suggested low cholesterol diet.  Encouraged aerobic exercise.  Discussed foot care.  Reminded to get yearly retinal exam.  Discussed ways to avoid symptomatic hypoglycemia.  Discussed sick day management.    5. Type 2 diabetes mellitus with stage 3a chronic kidney disease, with long-term current use of insulin  Education: Reviewed ABCs of diabetes management (respective goals in parentheses):  A1C (<7), blood pressure (<130/80), and cholesterol (LDL <100).  Addressed ADA diet.  Suggested low cholesterol diet.  Encouraged aerobic exercise.  Discussed foot care.  Reminded to get yearly retinal exam.  Discussed ways to avoid symptomatic hypoglycemia.  Discussed sick day management.    6. Moderate nonproliferative diabetic retinopathy of right eye without macular edema associated with type 2 diabetes mellitus  Education: Reviewed ABCs of diabetes management (respective goals in parentheses):  A1C (<7), blood pressure (<130/80), and cholesterol (LDL <100).  Addressed ADA diet.  Suggested low cholesterol diet.  Encouraged aerobic exercise.  Discussed foot care.  Reminded to get yearly retinal exam.  Discussed ways to avoid symptomatic hypoglycemia.  Discussed sick day management.    7. Mild nonproliferative diabetic retinopathy of left eye without macular edema associated with type 2 diabetes mellitus  Education: Reviewed ABCs of diabetes management (respective goals in parentheses):  A1C (<7), blood pressure (<130/80), and cholesterol (LDL <100).  Addressed ADA diet.  Suggested low cholesterol diet.  Encouraged aerobic exercise.  Discussed foot care.  Reminded to get yearly retinal exam.  Discussed ways to avoid symptomatic  hypoglycemia.  Discussed sick day management.    8. Mild episode of recurrent major depressive disorder  Mood exacerbated by chronic pain and sleep disturbance though remains manageable on SSRI daily   Instructed patient to contact office or on-call physician promptly should condition worsen or any new symptoms appear and provided on-call telephone numbers. IF THE PATIENT HAS ANY SUICIDAL OR HOMICIDAL IDEATIONS, CALL THE OFFICE, DISCUSS WITH A SUPPORT MEMBER, OR GO TO THE ER IMMEDIATELY. Patient was agreeable with this plan.    9. Carotid artery disorder  Bilateral stenosis and occlusion with hx of CVA, stroke precautions and understands when to seek medical attention     10. Atherosclerosis of aorta  Uncomplicated and asymptomatic, BP controlled, on statin with ASA anticoagulation     11. Coronary artery disease involving native coronary artery of native heart without angina pectoris  Current treatment plan is effective, no change in therapy.  Reviewed diet, exercise and weight control.  Recommended sodium restriction.  Cardiovascular risk and specific lipid/LDL goals reviewed.  Use of aspirin to prevent MI and TIA's discussed.  Use and side effects of nitroglycerine reviewed, call 911 if chest pain unrelieved by 3 tablets.  For claudication, exercise to point of pain, rest, then continue.    12. Primary hypertension  Continue current treatment regimen.  Dietary sodium restriction.  Regular aerobic exercise.  Weight loss.  Patient Education: Reviewed risks of hypertension and principles of treatment.    13. Carpal tunnel syndrome of left wrist  Chronic pain, f/u with ortho for further management   - Ambulatory referral/consult to Orthopedics; Future      Provided Cecilia with a 5-10 year written screening schedule and personal prevention plan. Recommendations were developed using the USPSTF age appropriate recommendations. Education, counseling, and referrals were provided as needed. After Visit Summary printed and  given to patient which includes a list of additional screenings\tests needed.    No follow-ups on file.    Opal Nava, IBIS  I offered to discuss advanced care planning, including how to pick a person who would make decisions for you if you were unable to make them for yourself, called a health care power of , and what kind of decisions you might make such as use of life sustaining treatments such as ventilators and tube feeding when faced with a life limiting illness recorded on a living will that they will need to know. (How you want to be cared for as you near the end of your natural life)     X  Patient has advanced directives on file, which we reviewed, and they do not wish to make changes.

## 2025-08-05 NOTE — TELEPHONE ENCOUNTER
Previous message through the portal.     Your labs have not been reviewed at this time. Once the provider has reviewed you will be notified.

## 2025-08-05 NOTE — TELEPHONE ENCOUNTER
Copied from CRM #[id number]. Topic: General Inquiry - Return Call  >> Aug 5, 2025  2:17 PM Aurelio wrote:  Type:  Patient Returning Call    Who Called: self     Who Left Message for Patient: kiko nazario     Does the patient know what this is regarding?:yes     Would the patient rather a call back or a response via [facility]?callback     Best Call Back Number:.[phone]      Additional Information: pt only wants to speak with dr dior not a nurse

## 2025-08-05 NOTE — TELEPHONE ENCOUNTER
Copied from CRM #[id number]. Topic: General Inquiry - Test Results  >> Aug 5, 2025  1:40 PM Alejandra wrote:  Type:  Test Results    Who Called: Self     Name of Test (Lab/Mammo/Etc): LABS     Date of Test:  08/05/2025    Ordering Provider:  Tiarra Villa     Where the test was performed:  [facility]     Would the patient rather a call back or a response via [facility]? Call back     Best Call Back Number: [phone]      Additional Information:  pt is requesting to speak with .     For Clinical Team:Has the provider reviewed the results?

## 2025-08-06 ENCOUNTER — PATIENT OUTREACH (OUTPATIENT)
Dept: ADMINISTRATIVE | Facility: HOSPITAL | Age: 75
End: 2025-08-06
Payer: MEDICARE

## 2025-08-06 NOTE — PROGRESS NOTES
KED- Completed with in the last 30 days. Immunization's updated/triggered. Gap report updated.

## 2025-08-10 DIAGNOSIS — G25.81 RESTLESS LEGS SYNDROME (RLS): ICD-10-CM

## 2025-08-10 RX ORDER — ROPINIROLE 0.5 MG/1
0.5 TABLET, FILM COATED ORAL
Qty: 90 TABLET | Refills: 3 | Status: SHIPPED | OUTPATIENT
Start: 2025-08-10

## 2025-08-14 RX ORDER — GABAPENTIN 300 MG/1
CAPSULE ORAL
Qty: 30 CAPSULE | Refills: 3 | Status: SHIPPED | OUTPATIENT
Start: 2025-08-14

## 2025-08-15 DIAGNOSIS — E78.00 HYPERCHOLESTEROLEMIA: ICD-10-CM

## 2025-08-15 DIAGNOSIS — I10 PRIMARY HYPERTENSION: ICD-10-CM

## 2025-08-15 DIAGNOSIS — R80.9 MICROALBUMINURIA: ICD-10-CM

## 2025-08-15 RX ORDER — AMLODIPINE BESYLATE 10 MG/1
10 TABLET ORAL DAILY
Qty: 90 TABLET | Refills: 3 | Status: SHIPPED | OUTPATIENT
Start: 2025-08-15

## 2025-08-15 RX ORDER — FUROSEMIDE 20 MG/1
20 TABLET ORAL DAILY
Qty: 90 TABLET | Refills: 3 | Status: SHIPPED | OUTPATIENT
Start: 2025-08-15

## 2025-08-15 RX ORDER — CARVEDILOL 12.5 MG/1
12.5 TABLET ORAL 2 TIMES DAILY
Qty: 90 TABLET | Refills: 3 | Status: SHIPPED | OUTPATIENT
Start: 2025-08-15

## 2025-08-15 RX ORDER — ATORVASTATIN CALCIUM 80 MG/1
80 TABLET, FILM COATED ORAL DAILY
Qty: 90 TABLET | Refills: 3 | Status: SHIPPED | OUTPATIENT
Start: 2025-08-15

## 2025-08-15 RX ORDER — SPIRONOLACTONE 25 MG/1
25 TABLET ORAL DAILY
Qty: 90 TABLET | Refills: 3 | Status: SHIPPED | OUTPATIENT
Start: 2025-08-15

## 2025-08-15 RX ORDER — VALSARTAN 320 MG/1
320 TABLET ORAL DAILY
Qty: 90 TABLET | Refills: 3 | Status: SHIPPED | OUTPATIENT
Start: 2025-08-15

## 2025-08-15 RX ORDER — EZETIMIBE 10 MG/1
10 TABLET ORAL DAILY
Qty: 90 TABLET | Refills: 3 | Status: SHIPPED | OUTPATIENT
Start: 2025-08-15

## 2025-08-18 DIAGNOSIS — G89.29 CHRONIC LEFT SHOULDER PAIN: ICD-10-CM

## 2025-08-18 DIAGNOSIS — L30.9 DERMATITIS: ICD-10-CM

## 2025-08-18 DIAGNOSIS — G25.81 RESTLESS LEGS SYNDROME (RLS): ICD-10-CM

## 2025-08-18 DIAGNOSIS — F32.0 MILD MAJOR DEPRESSION: ICD-10-CM

## 2025-08-18 DIAGNOSIS — M25.512 CHRONIC LEFT SHOULDER PAIN: ICD-10-CM

## 2025-08-18 RX ORDER — INSULIN ASPART 100 [IU]/ML
INJECTION, SOLUTION INTRAVENOUS; SUBCUTANEOUS
Qty: 15 ML | Refills: 0 | Status: SHIPPED | OUTPATIENT
Start: 2025-08-18

## 2025-08-18 RX ORDER — ROPINIROLE 0.5 MG/1
TABLET, FILM COATED ORAL
Refills: 0 | OUTPATIENT
Start: 2025-08-18

## 2025-08-18 RX ORDER — ESCITALOPRAM OXALATE 10 MG/1
TABLET ORAL
Refills: 0 | OUTPATIENT
Start: 2025-08-18

## 2025-08-18 RX ORDER — TRIAMCINOLONE ACETONIDE 1 MG/G
OINTMENT TOPICAL
Refills: 0 | OUTPATIENT
Start: 2025-08-18

## 2025-08-18 RX ORDER — METFORMIN HYDROCHLORIDE 1000 MG/1
1000 TABLET ORAL 2 TIMES DAILY WITH MEALS
Qty: 180 TABLET | Refills: 2 | Status: SHIPPED | OUTPATIENT
Start: 2025-08-18

## 2025-08-18 RX ORDER — MELOXICAM 7.5 MG/1
TABLET ORAL
Refills: 0 | OUTPATIENT
Start: 2025-08-18

## 2025-08-20 ENCOUNTER — OFFICE VISIT (OUTPATIENT)
Dept: PODIATRY | Facility: CLINIC | Age: 75
End: 2025-08-20
Payer: MEDICARE

## 2025-08-20 VITALS
WEIGHT: 188.94 LBS | SYSTOLIC BLOOD PRESSURE: 140 MMHG | DIASTOLIC BLOOD PRESSURE: 56 MMHG | HEIGHT: 65 IN | BODY MASS INDEX: 31.48 KG/M2

## 2025-08-20 DIAGNOSIS — Q66.72 CAVUS DEFORMITY OF BOTH FEET: ICD-10-CM

## 2025-08-20 DIAGNOSIS — M20.41 HAMMER TOES OF BOTH FEET: ICD-10-CM

## 2025-08-20 DIAGNOSIS — E11.9 ENCOUNTER FOR COMPREHENSIVE DIABETIC FOOT EXAMINATION, TYPE 2 DIABETES MELLITUS: Primary | ICD-10-CM

## 2025-08-20 DIAGNOSIS — M20.5X1 HALLUX LIMITUS, ACQUIRED, RIGHT: ICD-10-CM

## 2025-08-20 DIAGNOSIS — M20.42 HAMMER TOES OF BOTH FEET: ICD-10-CM

## 2025-08-20 DIAGNOSIS — L84 CORN OR CALLUS: ICD-10-CM

## 2025-08-20 DIAGNOSIS — M21.6X2 ACQUIRED EQUINUS DEFORMITY OF BOTH FEET: ICD-10-CM

## 2025-08-20 DIAGNOSIS — M20.5X2 HALLUX LIMITUS, ACQUIRED, LEFT: ICD-10-CM

## 2025-08-20 DIAGNOSIS — Q66.71 CAVUS DEFORMITY OF BOTH FEET: ICD-10-CM

## 2025-08-20 DIAGNOSIS — M21.6X1 ACQUIRED EQUINUS DEFORMITY OF BOTH FEET: ICD-10-CM

## 2025-08-20 PROCEDURE — 99999 PR PBB SHADOW E&M-EST. PATIENT-LVL V: CPT | Mod: PBBFAC,HCNC,, | Performed by: PODIATRIST

## 2025-08-20 RX ORDER — AMMONIUM LACTATE 12 G/100G
LOTION TOPICAL 2 TIMES DAILY
Qty: 400 G | Refills: 5 | Status: SHIPPED | OUTPATIENT
Start: 2025-08-20

## 2025-08-21 ENCOUNTER — TELEPHONE (OUTPATIENT)
Dept: ENDOCRINOLOGY | Facility: CLINIC | Age: 75
End: 2025-08-21
Payer: MEDICARE

## 2025-08-28 ENCOUNTER — OFFICE VISIT (OUTPATIENT)
Dept: CARDIOLOGY | Facility: CLINIC | Age: 75
End: 2025-08-28
Attending: INTERNAL MEDICINE
Payer: MEDICARE

## 2025-08-28 VITALS
BODY MASS INDEX: 31.28 KG/M2 | SYSTOLIC BLOOD PRESSURE: 106 MMHG | HEIGHT: 65 IN | HEART RATE: 66 BPM | DIASTOLIC BLOOD PRESSURE: 58 MMHG | WEIGHT: 187.75 LBS | OXYGEN SATURATION: 94 %

## 2025-08-28 DIAGNOSIS — E78.00 HYPERCHOLESTEROLEMIA: ICD-10-CM

## 2025-08-28 DIAGNOSIS — Z86.73 HISTORY OF CEREBROVASCULAR ACCIDENT: ICD-10-CM

## 2025-08-28 DIAGNOSIS — I10 PRIMARY HYPERTENSION: ICD-10-CM

## 2025-08-28 DIAGNOSIS — E78.1 HYPERTRIGLYCERIDEMIA: ICD-10-CM

## 2025-08-28 DIAGNOSIS — Z95.1 HISTORY OF CORONARY ARTERY BYPASS SURGERY: ICD-10-CM

## 2025-08-28 DIAGNOSIS — E11.59 TYPE 2 DIABETES MELLITUS WITH OTHER CIRCULATORY COMPLICATION, WITHOUT LONG-TERM CURRENT USE OF INSULIN: ICD-10-CM

## 2025-08-28 DIAGNOSIS — I70.0 ATHEROSCLEROSIS OF AORTA: ICD-10-CM

## 2025-08-28 DIAGNOSIS — R07.2 PRECORDIAL PAIN: ICD-10-CM

## 2025-08-28 DIAGNOSIS — Z87.898 HISTORY OF CHEST PAIN: ICD-10-CM

## 2025-08-28 DIAGNOSIS — M65.341 TRIGGER RING FINGER OF RIGHT HAND: ICD-10-CM

## 2025-08-28 DIAGNOSIS — I25.10 CORONARY ARTERY DISEASE INVOLVING NATIVE CORONARY ARTERY OF NATIVE HEART WITHOUT ANGINA PECTORIS: ICD-10-CM

## 2025-08-28 DIAGNOSIS — Z86.718 HISTORY OF DEEP VEIN THROMBOSIS: ICD-10-CM

## 2025-08-28 DIAGNOSIS — E66.9 MILD OBESITY: ICD-10-CM

## 2025-08-28 LAB
OHS QRS DURATION: 68 MS
OHS QTC CALCULATION: 377 MS

## 2025-08-28 PROCEDURE — 99214 OFFICE O/P EST MOD 30 MIN: CPT | Mod: HCNC,S$GLB,, | Performed by: INTERNAL MEDICINE

## 2025-08-28 PROCEDURE — 3288F FALL RISK ASSESSMENT DOCD: CPT | Mod: CPTII,HCNC,S$GLB, | Performed by: INTERNAL MEDICINE

## 2025-08-28 PROCEDURE — 1101F PT FALLS ASSESS-DOCD LE1/YR: CPT | Mod: CPTII,HCNC,S$GLB, | Performed by: INTERNAL MEDICINE

## 2025-08-28 PROCEDURE — 4010F ACE/ARB THERAPY RXD/TAKEN: CPT | Mod: CPTII,HCNC,S$GLB, | Performed by: INTERNAL MEDICINE

## 2025-08-28 PROCEDURE — 99999 PR PBB SHADOW E&M-EST. PATIENT-LVL IV: CPT | Mod: PBBFAC,HCNC,, | Performed by: INTERNAL MEDICINE

## 2025-08-28 PROCEDURE — 3061F NEG MICROALBUMINURIA REV: CPT | Mod: CPTII,HCNC,S$GLB, | Performed by: INTERNAL MEDICINE

## 2025-08-28 PROCEDURE — 1157F ADVNC CARE PLAN IN RCRD: CPT | Mod: CPTII,HCNC,S$GLB, | Performed by: INTERNAL MEDICINE

## 2025-08-28 PROCEDURE — 3008F BODY MASS INDEX DOCD: CPT | Mod: CPTII,HCNC,S$GLB, | Performed by: INTERNAL MEDICINE

## 2025-08-28 PROCEDURE — 3074F SYST BP LT 130 MM HG: CPT | Mod: CPTII,HCNC,S$GLB, | Performed by: INTERNAL MEDICINE

## 2025-08-28 PROCEDURE — 1126F AMNT PAIN NOTED NONE PRSNT: CPT | Mod: CPTII,HCNC,S$GLB, | Performed by: INTERNAL MEDICINE

## 2025-08-28 PROCEDURE — 3052F HG A1C>EQUAL 8.0%<EQUAL 9.0%: CPT | Mod: CPTII,HCNC,S$GLB, | Performed by: INTERNAL MEDICINE

## 2025-08-28 PROCEDURE — 1159F MED LIST DOCD IN RCRD: CPT | Mod: CPTII,HCNC,S$GLB, | Performed by: INTERNAL MEDICINE

## 2025-08-28 PROCEDURE — 3078F DIAST BP <80 MM HG: CPT | Mod: CPTII,HCNC,S$GLB, | Performed by: INTERNAL MEDICINE

## 2025-08-28 PROCEDURE — 3066F NEPHROPATHY DOC TX: CPT | Mod: CPTII,HCNC,S$GLB, | Performed by: INTERNAL MEDICINE

## 2025-08-28 RX ORDER — ATORVASTATIN CALCIUM 80 MG/1
80 TABLET, FILM COATED ORAL DAILY
Qty: 90 TABLET | Refills: 3 | Status: SHIPPED | OUTPATIENT
Start: 2025-08-28

## 2025-08-28 RX ORDER — VALSARTAN 320 MG/1
320 TABLET ORAL DAILY
Qty: 90 TABLET | Refills: 3 | Status: SHIPPED | OUTPATIENT
Start: 2025-08-28

## 2025-08-28 RX ORDER — FUROSEMIDE 20 MG/1
20 TABLET ORAL DAILY
Qty: 90 TABLET | Refills: 3 | Status: SHIPPED | OUTPATIENT
Start: 2025-08-28

## 2025-08-28 RX ORDER — CARVEDILOL 12.5 MG/1
12.5 TABLET ORAL 2 TIMES DAILY
Qty: 90 TABLET | Refills: 3 | Status: SHIPPED | OUTPATIENT
Start: 2025-08-28

## 2025-08-28 RX ORDER — NITROGLYCERIN 0.4 MG/1
0.4 TABLET SUBLINGUAL EVERY 5 MIN PRN
Qty: 25 TABLET | Refills: 3 | Status: SHIPPED | OUTPATIENT
Start: 2025-08-28

## 2025-08-28 RX ORDER — ASPIRIN 81 MG/1
81 TABLET ORAL DAILY
Qty: 90 TABLET | Refills: 3 | Status: SHIPPED | OUTPATIENT
Start: 2025-08-28

## 2025-08-28 RX ORDER — EZETIMIBE 10 MG/1
10 TABLET ORAL DAILY
Qty: 90 TABLET | Refills: 3 | Status: SHIPPED | OUTPATIENT
Start: 2025-08-28

## 2025-08-28 RX ORDER — AMLODIPINE BESYLATE 10 MG/1
10 TABLET ORAL DAILY
Qty: 90 TABLET | Refills: 3 | Status: SHIPPED | OUTPATIENT
Start: 2025-08-28

## 2025-09-04 DIAGNOSIS — M79.642 LEFT HAND PAIN: Primary | ICD-10-CM

## (undated) DEVICE — DRAIN CHEST DRY SUCTION

## (undated) DEVICE — INTRODUCER CATH 4F 11CM

## (undated) DEVICE — DRAPE SPLIT STERILE

## (undated) DEVICE — CHLORAPREP 1.5 ML FREPP

## (undated) DEVICE — GOWN POLY REINF BRTH SLV XL

## (undated) DEVICE — PACK SURGERY START

## (undated) DEVICE — ALCOHOL 70% ISOP W/GREEN 16OZ

## (undated) DEVICE — BANDAGE MATRIX HK LOOP 2IN 5YD

## (undated) DEVICE — BLADE 4IN EDGE INSULATED

## (undated) DEVICE — GLOVE PROTEXIS HYDROGEL SZ7.5

## (undated) DEVICE — CLIP SPRING 6MM

## (undated) DEVICE — BLADE SCALP OPHTL BEVEL STR

## (undated) DEVICE — OMNIPAQUE 350MG 150ML VIAL

## (undated) DEVICE — BRA POST SURGICAL WHT 44-46IN

## (undated) DEVICE — TUBING HF INSUFFLATION W/ FLTR

## (undated) DEVICE — PLEDGET SUT SOFT 3/8X3/16X1/16

## (undated) DEVICE — NDL HYPO REG 25G X 1 1/2

## (undated) DEVICE — GUIDEWIRE 145CM .035

## (undated) DEVICE — APPLICATOR CHLORAPREP ORN 26ML

## (undated) DEVICE — DRAPE HAND STERILE

## (undated) DEVICE — COVER OVERHEAD SURG LT BLUE

## (undated) DEVICE — TRAY CORONARY CUSTOM BAPTIST

## (undated) DEVICE — STOCKINET 4INX48

## (undated) DEVICE — WIRE INTRAMYOCARDIAL TEMP

## (undated) DEVICE — GOWN POLY REINF BRTH SLV LG

## (undated) DEVICE — Device

## (undated) DEVICE — WIPE ESENTA BARR STNG FREE 3ML

## (undated) DEVICE — GLOVE BIOGEL PI MICRO SZ 7.5

## (undated) DEVICE — GLOVE BIOGEL SKINSENSE PI 8.5

## (undated) DEVICE — GLOVE SENSICARE PI MICRO 7.5

## (undated) DEVICE — MANIFOLD 4 PORT

## (undated) DEVICE — DRESSING TRANS 4X4 TEGADERM

## (undated) DEVICE — SET DECANTER MEDICHOICE

## (undated) DEVICE — SUT LIGACLIP SMALL XTRA

## (undated) DEVICE — SUT 6 18IN STEEL MONO CCS

## (undated) DEVICE — TOURNIQUET SB QC DP 18X4IN

## (undated) DEVICE — SUT VICRYL BR 1 GEN 27 CT-1

## (undated) DEVICE — CATH INFINITI 4F PIG 110CM 6SH

## (undated) DEVICE — COVER SANP CLR 36X54

## (undated) DEVICE — GLOVE SURG BIOGEL LATEX SZ 7.5

## (undated) DEVICE — CLOSURE SKIN STERI STRIP 1/2X4

## (undated) DEVICE — GOWN SMART IMP BREATHABLE XXLG

## (undated) DEVICE — BLADE SURG #15 CARBON STEEL

## (undated) DEVICE — DRESSING XEROFORM NONADH 1X8IN

## (undated) DEVICE — TRAY HEART

## (undated) DEVICE — SUT ETHILON 5-0 PS-2 18IN

## (undated) DEVICE — SUT 4-0 12-18IN SILK BLACK

## (undated) DEVICE — GLOVE BIOGEL SKINSENSE PI 8.0

## (undated) DEVICE — PAD CAST 2 IN X 4YDS STERILE

## (undated) DEVICE — DRESSING AQUACEL SACRAL 9 X 9

## (undated) DEVICE — COVER LIGHT HANDLE 80/CA

## (undated) DEVICE — RETRACTOR OCTOBASE INSERT HOLD

## (undated) DEVICE — SYR 10CC LUER LOCK

## (undated) DEVICE — INSERTS STEALTH FIBRA SIZE 5

## (undated) DEVICE — PACK BASIC

## (undated) DEVICE — SYR 3CC LUER LOC

## (undated) DEVICE — DRAPE SLUSH WARMER WITH DISC

## (undated) DEVICE — ELECTRODE REM PLYHSV RETURN 9

## (undated) DEVICE — SYR ANGIO INJ CT/V200 200

## (undated) DEVICE — SUT SILK 2-0 SH 18IN BLACK

## (undated) DEVICE — KIT SAHARA DRAPE DRAW/LIFT

## (undated) DEVICE — GAUZE SPONGE 4X4 12PLY

## (undated) DEVICE — SUT VICRYL 3-0 27 SH

## (undated) DEVICE — NDL 18GA X1 1/2 REG BEVEL

## (undated) DEVICE — SUT SILK BLK BR. 2 2-60

## (undated) DEVICE — SEE L#120831

## (undated) DEVICE — SUT PROLENE 4-0 RB-1 BL MO

## (undated) DEVICE — SUT PROLENE 7-0 BV-1 30

## (undated) DEVICE — SEE MEDLINE ITEM 157117

## (undated) DEVICE — CANNULA VESSEL FREE FLOW

## (undated) DEVICE — BANDAGE ESMARK ELASTIC ST 4X9

## (undated) DEVICE — BLADE SAW STERNAL 5/BX

## (undated) DEVICE — KIT URINARY CATH URINE METER

## (undated) DEVICE — PAD PREP CUFFED NS 24X48IN

## (undated) DEVICE — DRESSING TELFA STRL 4X3 LF

## (undated) DEVICE — SPONGE GAUZE 16PLY 4X4

## (undated) DEVICE — SUT PROLENE 4-0 SH BLU 36IN

## (undated) DEVICE — BLOWER MISTER

## (undated) DEVICE — SYS VIRTUOSAPH PLUS EVM

## (undated) DEVICE — SEE MEDLINE ITEM 146417

## (undated) DEVICE — STOPCOCK 3 WAY MED PRESSURE

## (undated) DEVICE — BAG DRAINAGE W/SPIKE

## (undated) DEVICE — DRESSING ADH ISLAND 3.6 X 14

## (undated) DEVICE — HEMOSTAT SURGICEL NU-KNIT 6X9

## (undated) DEVICE — PACK ECLIPSE BASIC III SURG

## (undated) DEVICE — SUT 2/0 36IN COATED VICRYL

## (undated) DEVICE — SPONGE COTTON TRAY 4X4IN

## (undated) DEVICE — SEE MEDLINE ITEM 152515

## (undated) DEVICE — CATH INFINITI 4F JL4 .042X100

## (undated) DEVICE — SUT PROLENE 5-0 BL C-1 4-24

## (undated) DEVICE — SYR B-D DISP CONTROL 10CC100/C

## (undated) DEVICE — SPLINT WRIST FOAM 8.8IN LG/LFT

## (undated) DEVICE — CATH INFINITI JUDKINS JR4

## (undated) DEVICE — BANDAGE ACE ELASTIC 6"

## (undated) DEVICE — DRESSING XEROFORM FOIL PK 1X8